# Patient Record
Sex: FEMALE | Race: WHITE | NOT HISPANIC OR LATINO | Employment: OTHER | URBAN - METROPOLITAN AREA
[De-identification: names, ages, dates, MRNs, and addresses within clinical notes are randomized per-mention and may not be internally consistent; named-entity substitution may affect disease eponyms.]

---

## 2017-01-05 ENCOUNTER — GENERIC CONVERSION - ENCOUNTER (OUTPATIENT)
Dept: OTHER | Facility: OTHER | Age: 57
End: 2017-01-05

## 2017-01-26 ENCOUNTER — GENERIC CONVERSION - ENCOUNTER (OUTPATIENT)
Dept: OTHER | Facility: OTHER | Age: 57
End: 2017-01-26

## 2017-01-27 ENCOUNTER — GENERIC CONVERSION - ENCOUNTER (OUTPATIENT)
Dept: OTHER | Facility: OTHER | Age: 57
End: 2017-01-27

## 2018-01-10 NOTE — PROGRESS NOTES
Medical Alert: Asthma    Diabetes    Fainting    Glaucoma    Tobacco User    Heart Condition    Respiratory Problems    Stroke    Thyroid Problems    Colitis    Frequent Headaches    Liver Disease    Organ Transplant    Low Blood Pressure    anemia  Medications: Vicodin 5mg/300mg    Clindamycin HCl 300 MG    Ibuprofen 600 MG    Peridex 0 12 %    Klonopin    Lyrica    Naprosyn    Reglan    Cymbalta    Albuterol    Atorvastatin    Amoxicillin    Niaspan    Omeprazole    Hydrocodone    Advair diskus    Bupropion Oral Tablet [Wellbutrin]    Trazodone HCl    Carbamazepine    Clonazepam    Hydroxyzine    Melatonin    Nitroglycerine    Aspirin  Allergies:      Latex    Sulfa Drugs    Biaxin    Clarithromycin    other  Since Last Visit: Medical Alert: No Change    Medications: No Change    Allergies:        No Change  Pain Scale Type: Numeric Pain ScalePain Level: 0  Description:  Pt presents for wax try in   PMH reviewed, no changes  Tooth set up tried  intraorally  Confirmed proper tissue support and occlusion  Pt confirmed  satisfaction with function and esthetics via pt mirror  Selected gingival shade   original  Pt left satisfied and ambulatory      NV: delivery    FORREST/URMILA    ----- Signed on Thursday, January 05, 2017 at 3:36:49 PM  -----  ----- Provider: [email or web address] - [facility], Dentist -- Clinic: [facility] -----

## 2018-01-10 NOTE — PROGRESS NOTES
Patient Health Assessment    Date:            05/10/2016  Blood Pressure:  102/66  Pulse:           76  Age:             55  Weight:          163 lbs  Height/Length:   5' 4"  Body Mass Index: 28 0  Provider:        [email or web address]  Clinic:          [facility] 39: Asthma    Diabetes    Fainting    Glaucoma    Tobacco User    Heart Condition    Respiratory Problems    Stroke    Thyroid Problems    Colitis    Frequent Headaches    Liver Disease    Organ Transplant    Low Blood Pressure    anemia  Medications: Vicodin 5mg/300mg    Klonopin    Lyrica    Naprosyn    Reglan    Cymbalta    Albuterol    Atorvastatin    Amoxicillin    Niaspan    Omeprazole    Hydrocodone    Advair diskus    Bupropion Oral Tablet [Wellbutrin]    Trazodone HCl    Carbamazepine    Clonazepam    Hydroxyzine    Melatonin    Nitroglycerine  Allergies:      Latex    Sulfa Drugs    Biaxin    Clarithromycin  Since Last Visit: Medical Alert: No Change    Medications: No Change    Allergies:        No Change  Pain Scale Type: Numeric Pain ScalePain Level: 0  Description:  Ext #20,21,22,23,24    Patient presents for Ext #07,53,04,45,28  [facility] [facility] [address] patient [facility] any [facility]  Obtained consent, and Pre-Op /66    topical LA applied  Administered 1 carpule of  2 % Lidocaine w/ 1:100,000 epi  via block and 2 carpules of 4% articaine with 1:100,000 epi given as  infiltration  Adequate anesthesia obtained, reflected gingiva and buccal flap, elevated,  and extracted #20,21,22,23,24    Socket curetted, sharp edges of bone filed,  3 0 chromic gut sutures placed  hemostasis achieved  patient tolerated the  procedure well  Upon dismissal, patient received gauze, POI (written and verbal),( patient  was told not to smoke post extraction)  patient has pain meds at home  prescribed by her physician   patient satisfied and left in good health        NV:ext #18 on oral surgery day    CORRIE/Dr Arely Lema    ----- Signed on Tuesday, May 10, 2016 at 2:45:26 PM -----  ----- Provider: [id number] - [facility], Dentist -- Clinic: [facility] -----

## 2018-01-11 NOTE — PROGRESS NOTES
Medical Alert: Asthma    Diabetes    Fainting    Glaucoma    Tobacco User    Heart Condition    Respiratory Problems    Stroke    Thyroid Problems    Colitis    Frequent Headaches    Liver Disease    Organ Transplant    Low Blood Pressure    anemia  Medications: Vicodin 5mg/300mg    Clindamycin HCl 300 MG    Ibuprofen 600 MG    Peridex 0 12 %    Klonopin    Lyrica    Naprosyn    Reglan    Cymbalta    Albuterol    Atorvastatin    Amoxicillin    Niaspan    Omeprazole    Hydrocodone    Advair diskus    Bupropion Oral Tablet [Wellbutrin]    Trazodone HCl    Carbamazepine    Clonazepam    Hydroxyzine    Melatonin    Nitroglycerine    Aspirin  Allergies:      Latex    Sulfa Drugs    Biaxin    Clarithromycin    other  Since Last Visit: Medical Alert: No Change    Medications: No Change    Allergies:        No Change  Pain Scale Type: Numeric Pain ScalePain Level: 0  Description: Pt  presents for follow-up following extraction of #18  Bone and  tissues appear to have healed nicely  Pt  interested in implant retained  dentures due to her extensive health hx and was told by her PCP that they are  medically necessary  Explained to pt  with Dr Rachel Erazo that we recommend  conventional dentures to start out with and then can possibly convert to  attachments in the future  Pt  understands and accepts tx plan of u/l  complete dentures   Pre-auth given to Leonard     NV: Alginate impressions for dentures    /Dr Rachel Erazo    ----- Signed on Thursday, September 01, 2016 at 10:43:51 AM  -----  ----- Provider: [id number] - [facility], Dentist -- Clinic: [facility] -----

## 2018-01-11 NOTE — PROGRESS NOTES
Medical Alert: Asthma    Diabetes    Fainting    Glaucoma    Tobacco User    Heart Condition    Respiratory Problems    Stroke    Thyroid Problems    Colitis    Frequent Headaches    Liver Disease    Organ Transplant    Low Blood Pressure    anemia  Medications: Vicodin 5mg/300mg    Clindamycin HCl 300 MG    Ibuprofen 600 MG    Peridex 0 12 %    Klonopin    Lyrica    Naprosyn    Reglan    Cymbalta    Albuterol    Atorvastatin    Amoxicillin    Niaspan    Omeprazole    Hydrocodone    Advair diskus    Bupropion Oral Tablet [Wellbutrin]    Trazodone HCl    Carbamazepine    Clonazepam    Hydroxyzine    Melatonin    Nitroglycerine    Aspirin  Allergies:      Latex    Sulfa Drugs    Biaxin    Clarithromycin    other  Since Last Visit: Medical Alert: No Change    Medications: No Change    Allergies:        No Change  Pain Scale Type: Numeric Pain ScalePain Level: 0  Description:  Pt presents for denture follow up  PMH reviewed, no changes  Dentures  removed, no areas of irritation  Pt left satisfied and ambulatory      NV: Follow-up as needed    FORREST/Dr Jayne Ramirez    ----- Signed on Friday, January 27, 2017 at 12:11:43 PM  -----  ----- Provider: [id number] - [facility] [employer], Dentist -- Clinic: [facility] -----

## 2018-01-12 NOTE — PROGRESS NOTES
Medical Alert: Asthma    Diabetes    Fainting    Glaucoma    Tobacco User    Heart Condition    Respiratory Problems    Stroke    Thyroid Problems    Colitis    Frequent Headaches    Liver Disease    Organ Transplant    Low Blood Pressure    anemia  Medications: Vicodin 5mg/300mg    Clindamycin HCl 300 MG    Ibuprofen 600 MG    Peridex 0 12 %    Klonopin    Lyrica    Naprosyn    Reglan    Cymbalta    Albuterol    Atorvastatin    Amoxicillin    Niaspan    Omeprazole    Hydrocodone    Advair diskus    Bupropion Oral Tablet [Wellbutrin]    Trazodone HCl    Carbamazepine    Clonazepam    Hydroxyzine    Melatonin    Nitroglycerine    Aspirin  Allergies:      Latex    Sulfa Drugs    Biaxin    Clarithromycin    other  Since Last Visit: Medical Alert: No Change    Medications: No Change    Allergies:        No Change  Pain Scale Type: Numeric Pain ScalePain Level: 0  Description: pt presents with CNA caregiver due to having pain and swelling  after extractions on 5/10/16  pt reports she did smoke within 24 hours after  extractions and pain is not getting any better even after taking vicodin  prescribed by her PCP  clinical exam reveals swelling on LL side, granulation  tissue present  gave pt rx for clindamycin, motrin 600mg and peridex rinse  and instructed to rinse 3x per day and return for follow up in 2 weeks  pt reports she was told by os at last visit that she needs to see and ENT  pt  reports she has an appt end of June      dr payne/dr Eduar Block    nv: 2 week follow up  nnv: exo #18 os day    ----- Signed on Tuesday, May 17, 2016 at 9:30:39 AM  -----  ----- Provider: [email or web address] - [employer], Dentist -- Clinic: [facility]  -----

## 2018-01-12 NOTE — PROGRESS NOTES
Medical Alert: Asthma    Diabetes    Fainting    Glaucoma    Tobacco User    Heart Condition    Respiratory Problems    Stroke    Thyroid Problems    Colitis    Frequent Headaches    Liver Disease    Organ Transplant    Low Blood Pressure    anemia  Medications: Vicodin 5mg/300mg    Clindamycin HCl 300 MG    Ibuprofen 600 MG    Peridex 0 12 %    Klonopin    Lyrica    Naprosyn    Reglan    Cymbalta    Albuterol    Atorvastatin    Amoxicillin    Niaspan    Omeprazole    Hydrocodone    Advair diskus    Bupropion Oral Tablet [Wellbutrin]    Trazodone HCl    Carbamazepine    Clonazepam    Hydroxyzine    Melatonin    Nitroglycerine    Aspirin  Allergies:      Latex    Sulfa Drugs    Biaxin    Clarithromycin    other  Since Last Visit: Medical Alert: No Change    Medications: No Change    Allergies:        No Change  Pain Scale Type: Numeric Pain ScalePain Level: 0  Description:  Pt presents for complete denture alginate impressions  PMH reviewed, no  changes  Alginate impressions made  Confirmed impression captured vestibules  and all tissues without major voids or distortions  Poured stone models for  custom tray fabrication  Explained process and number of visits until final  dentures are completed to patient  Pt understands, left satisfied and  ambulatory      NV: final impression    FORREST/MQ    ----- Signed on Tuesday, October 04, 2016 at 10:06:44 AM  -----  ----- Provider: [id number] - [facility], Dentist -- Clinic: [place] -----

## 2018-01-16 NOTE — PROGRESS NOTES
Patient pharmacist, Cedric Mak, [phone] says pt is not to be presribed  vicodin by dental clinic only by primary care provider that is managing her  chronic pain and pt is not to exceed the 4 per day prescribed by PCP  - Dr Genet Cintron    ----- Signed on Friday, May 20, 2016 at 12:18:19 PM  -----  ----- Provider: [email or web address] - [employer] [employer], Dentist -- Clinic: [facility]  -----

## 2018-01-16 NOTE — PROGRESS NOTES
Patient Health Assessment    Date:            07/27/2016  Blood Pressure:  107/70  Pulse:           80  Age:             48  Weight:          0 lbs  Height/Length:   0' 0"  Body Mass Index: 0 0  Provider:        [email or web address]  [facility]: [facility]    Diabetes    Fainting    Glaucoma    Tobacco User    Heart Condition    Respiratory Problems    Stroke    Thyroid Problems    Colitis    Frequent Headaches    Liver Disease    Organ Transplant    Low Blood Pressure    anemia  Medications: Vicodin 5mg/300mg    Clindamycin HCl 300 MG    Ibuprofen 600 MG    Peridex 0 12 %    Klonopin    Lyrica    Naprosyn    Reglan    Cymbalta    Albuterol    Atorvastatin    Amoxicillin    Niaspan    Omeprazole    Hydrocodone    Advair diskus    Bupropion Oral Tablet [Wellbutrin]    Trazodone HCl    Carbamazepine    Clonazepam    Hydroxyzine    Melatonin    Nitroglycerine    Aspirin  Allergies:      Latex    Sulfa Drugs    Biaxin    Clarithromycin    other  Since Last Visit: Medical Alert: No Change    Medications: No Change    Allergies:        No Change  Pain Scale Type: Numeric Pain ScalePain Level: 0  Description:    Patient presents for ext #18  Reviewed PMH, patient denies any changes  Explained benefits and risks of  procedure to pt, pt understands and consents to treatment  Signed and  uploaded consent form to doc center  Applied topical benzocaine, administered 1 5 carps of 2% Lidocaine w/ 1:100,000   epi via left BREANA block  Gingiva elevated with #9 periosteal elevator, tooth  #18 luxated from socket with straight elevators  Socket curetted and  irrigated with NS  Placed gauze soaked in NS and provided verbal and written  POI  Instructed pt to take 600mg ibuprofen PRN pain  Pt tolerated procedure  well, left satisfied and ambulatory      NV: 1 month f/u eval for implants on OS day (15min)    JS/SALBADOR    ----- Signed on Wednesday, July 27, 2016 at 2:03:50 PM  -----  ----- Provider: Minoo Villalba - [employer], Dentist -- Clinic: [facility] -----

## 2018-01-16 NOTE — PROGRESS NOTES
Medical Alert: Asthma    Diabetes    Fainting    Glaucoma    Tobacco User    Heart Condition    Respiratory Problems    Stroke    Thyroid Problems    Colitis    Frequent Headaches    Liver Disease    Organ Transplant    Low Blood Pressure    anemia  Medications: Vicodin 5mg/300mg    Clindamycin HCl 300 MG    Ibuprofen 600 MG    Peridex 0 12 %    Klonopin    Lyrica    Naprosyn    Reglan    Cymbalta    Albuterol    Atorvastatin    Amoxicillin    Niaspan    Omeprazole    Hydrocodone    Advair diskus    Bupropion Oral Tablet [Wellbutrin]    Trazodone HCl    Carbamazepine    Clonazepam    Hydroxyzine    Melatonin    Nitroglycerine    Aspirin  Allergies:      Latex    Sulfa Drugs    Biaxin    Clarithromycin    other  Since Last Visit: Medical Alert: No Change    Medications: No Change    Allergies:        No Change  Pain Scale Type: Numeric Pain ScalePain Level: 0  Description: Pt presents for delivery of max/gwendolyn complete dentures  PMH  reviewed, no changes  Denture tried intraorally, pt confirmed satisfaction with   esthetics via pt mirror  Denture seated with PIP paste, guided patient through   mastication and border molding movements  Adjusted areas of impingement with  acrylic burs, polished with rag wheel and pumice  Confirmed patient satisfied  with fit and no remaining areas of discomfort  Provided denture home care  instructions and storage case  Pt understands and left satisified and  ambulatory      NV: 24 hour denture follow up    Radha Martinez    ----- Signed on Thursday, January 26, 2017 at 10:47:28 AM  -----  ----- Provider: [email or web address] - [employer], Dentist -- Clinic: [facility] -----

## 2018-01-16 NOTE — PROGRESS NOTES
Edited Patient Health Assessment    Date:            02/09/2016  Blood Pressure:  119/79  Pulse:           71  Age:             55  Weight:          172 lbs  Height/Length:   5' 4"  Body Mass Index: 29 5  Provider:        [email or web address]  Clinic:          [facility]: Asthma    Diabetes    Fainting    Glaucoma    Tobacco User    Heart Condition    Respiratory Problems    Stroke    Thyroid Problems    Colitis    Frequent Headaches    Liver Disease    Organ Transplant    Low Blood Pressure    anemia  Medications: Vicodin 5mg/300mg    Klonopin    Lyrica    Naprosyn    Reglan    Cymbalta    Albuterol    Atorvastatin    Amoxicillin    Niaspan    Omeprazole    Hydrocodone    Advair diskus    Bupropion Oral Tablet [Wellbutrin]    Trazodone HCl    Carbamazepine    Clonazepam    Hydroxyzine    Melatonin    Nitroglycerine  Allergies:      Latex    Sulfa Drugs    Biaxin    Clarithromycin  Since Last Visit: Medical Alert: No Change    Medications: No Change    Allergies:        No Change  Pain Scale Type: Numeric Pain ScalePain Level: 0  Description:  Ext #56,80,53,07,19    Patient presents for Ext #73,01,26,87,28  [address], patient denies any changes  Obtained consent, and Pre-Op /79  pulse 69    topical LA applied  1 carpule of 2% lidocaine with 1:100,000 epi given as right   BREANA block after negative aspiration  2 carpules of 2% lidocaine with 1:100,000   epi given as lingual infiltration and 1 carpule of 4% articaine with 1:100,000   epi given as buccal infiltration  Adequate anesthesia obtained, buccal flap reflected, surgical handpiece used,  elevated, and extracted #25,26,27,28,29   sharp edges of bone filed  Socket  irrigated, and  4 0 chromic gut sutures placed  hemostasis achieved  patient  tolerated the procedure well  Upon dismissal, patient received POI, (written and verbal), gauze  told patient   not to smoke post extraction  patient said that she has Vicodin prescription  from her physician  asked her to take it if for pain as prescribed  patient  understood and left in good health        NV:more exts on Oral Surgery day    JF/Dr Shamika Foster    ----- Signed on Tuesday, February 09, 2016 at 5:06:35 PM  -----  ----- Provider: [id number] - [employer], Dentist -- Clinic: [facility] -----

## 2018-01-18 NOTE — PROGRESS NOTES
Medical Alert: Asthma    Diabetes    Fainting    Glaucoma    Tobacco User    Heart Condition    Respiratory Problems    Stroke    Thyroid Problems    Colitis    Frequent Headaches    Liver Disease    Organ Transplant    Low Blood Pressure    anemia  Medications: Vicodin 5mg/300mg    Clindamycin HCl 300 MG    Ibuprofen 600 MG    Peridex 0 12 %    Klonopin    Lyrica    Naprosyn    Reglan    Cymbalta    Albuterol    Atorvastatin    Amoxicillin    Niaspan    Omeprazole    Hydrocodone    Advair diskus    Bupropion Oral Tablet [Wellbutrin]    Trazodone HCl    Carbamazepine    Clonazepam    Hydroxyzine    Melatonin    Nitroglycerine    Aspirin  Allergies:      Latex    Sulfa Drugs    Biaxin    Clarithromycin    other  Since Last Visit: Medical Alert: No Change    Medications: No Change    Allergies:        No Change  Pain Scale Type: Numeric Pain ScalePain Level: 0  Description:  Pt presents for max and gwendolyn wax rims  PMH reviewed, no changes  Wax rims tried   intraorally  Adjusted maxillary rim to holliday plane, proper esthetics, phonetics,   and tissue support  Adjusted mandibular rim to maxillary rim at VDO with  proper tissue support  Marked midline, high smile line, and distal of canine  lines  Notched rims and obtained bite registration with bluprint PVS   Selected shade A1 and confirmed by pt via mirror  Pt left satisfied and  ambulatory        NV: wax try in    AH/MQ    ----- Signed on Friday, December 16, 2016 at 11:35:05 AM  -----  ----- Provider: [id number] - [facility], Dentist -- Clinic: [facility] -----

## 2018-02-07 ENCOUNTER — APPOINTMENT (EMERGENCY)
Dept: RADIOLOGY | Facility: HOSPITAL | Age: 58
End: 2018-02-07
Payer: COMMERCIAL

## 2018-02-07 ENCOUNTER — ANESTHESIA EVENT (OUTPATIENT)
Dept: GASTROENTEROLOGY | Facility: HOSPITAL | Age: 58
End: 2018-02-07
Payer: COMMERCIAL

## 2018-02-07 ENCOUNTER — HOSPITAL ENCOUNTER (EMERGENCY)
Facility: HOSPITAL | Age: 58
Discharge: HOME | End: 2018-02-08
Attending: EMERGENCY MEDICINE | Admitting: EMERGENCY MEDICINE
Payer: COMMERCIAL

## 2018-02-07 DIAGNOSIS — K92.1 BLOOD IN STOOL: Primary | ICD-10-CM

## 2018-02-07 DIAGNOSIS — R10.9 ABDOMINAL PAIN: ICD-10-CM

## 2018-02-07 LAB
ALBUMIN SERPL BCP-MCNC: 3.6 G/DL (ref 3.5–5)
ALP SERPL-CCNC: 72 U/L (ref 46–116)
ALT SERPL W P-5'-P-CCNC: 31 U/L (ref 12–78)
ANION GAP SERPL CALCULATED.3IONS-SCNC: 8 MMOL/L (ref 4–13)
AST SERPL W P-5'-P-CCNC: 18 U/L (ref 5–45)
BASOPHILS # BLD AUTO: 0.03 THOUSANDS/ΜL (ref 0–0.1)
BASOPHILS NFR BLD AUTO: 0 % (ref 0–1)
BILIRUB SERPL-MCNC: 0.18 MG/DL (ref 0.2–1)
BUN SERPL-MCNC: 11 MG/DL (ref 5–25)
CALCIUM SERPL-MCNC: 8.6 MG/DL (ref 8.3–10.1)
CHLORIDE SERPL-SCNC: 107 MMOL/L (ref 100–108)
CO2 SERPL-SCNC: 26 MMOL/L (ref 21–32)
CREAT SERPL-MCNC: 0.78 MG/DL (ref 0.6–1.3)
EOSINOPHIL # BLD AUTO: 0.06 THOUSAND/ΜL (ref 0–0.61)
EOSINOPHIL NFR BLD AUTO: 1 % (ref 0–6)
ERYTHROCYTE [DISTWIDTH] IN BLOOD BY AUTOMATED COUNT: 13.3 % (ref 11.6–15.1)
GFR SERPL CREATININE-BSD FRML MDRD: 85 ML/MIN/1.73SQ M
GLUCOSE SERPL-MCNC: 99 MG/DL (ref 65–140)
HCT VFR BLD AUTO: 38 % (ref 34.8–46.1)
HGB BLD-MCNC: 12.6 G/DL (ref 11.5–15.4)
LIPASE SERPL-CCNC: 113 U/L (ref 73–393)
LYMPHOCYTES # BLD AUTO: 3.99 THOUSANDS/ΜL (ref 0.6–4.47)
LYMPHOCYTES NFR BLD AUTO: 40 % (ref 14–44)
MCH RBC QN AUTO: 31.1 PG (ref 26.8–34.3)
MCHC RBC AUTO-ENTMCNC: 33.2 G/DL (ref 31.4–37.4)
MCV RBC AUTO: 94 FL (ref 82–98)
MONOCYTES # BLD AUTO: 0.64 THOUSAND/ΜL (ref 0.17–1.22)
MONOCYTES NFR BLD AUTO: 6 % (ref 4–12)
NEUTROPHILS # BLD AUTO: 5.16 THOUSANDS/ΜL (ref 1.85–7.62)
NEUTS SEG NFR BLD AUTO: 53 % (ref 43–75)
NRBC BLD AUTO-RTO: 0 /100 WBCS
PLATELET # BLD AUTO: 190 THOUSANDS/UL (ref 149–390)
PMV BLD AUTO: 9.9 FL (ref 8.9–12.7)
POTASSIUM SERPL-SCNC: 3.6 MMOL/L (ref 3.5–5.3)
PROT SERPL-MCNC: 7.2 G/DL (ref 6.4–8.2)
RBC # BLD AUTO: 4.05 MILLION/UL (ref 3.81–5.12)
SODIUM SERPL-SCNC: 141 MMOL/L (ref 136–145)
WBC # BLD AUTO: 9.94 THOUSAND/UL (ref 4.31–10.16)

## 2018-02-07 PROCEDURE — 96374 THER/PROPH/DIAG INJ IV PUSH: CPT

## 2018-02-07 PROCEDURE — 93005 ELECTROCARDIOGRAM TRACING: CPT | Performed by: EMERGENCY MEDICINE

## 2018-02-07 PROCEDURE — 96375 TX/PRO/DX INJ NEW DRUG ADDON: CPT

## 2018-02-07 PROCEDURE — 36415 COLL VENOUS BLD VENIPUNCTURE: CPT | Performed by: EMERGENCY MEDICINE

## 2018-02-07 PROCEDURE — 85025 COMPLETE CBC W/AUTO DIFF WBC: CPT | Performed by: EMERGENCY MEDICINE

## 2018-02-07 PROCEDURE — 96361 HYDRATE IV INFUSION ADD-ON: CPT

## 2018-02-07 PROCEDURE — 83690 ASSAY OF LIPASE: CPT | Performed by: EMERGENCY MEDICINE

## 2018-02-07 PROCEDURE — 80053 COMPREHEN METABOLIC PANEL: CPT | Performed by: EMERGENCY MEDICINE

## 2018-02-07 RX ORDER — PREGABALIN 150 MG/1
CAPSULE ORAL
COMMUNITY
Start: 2017-10-26

## 2018-02-07 RX ORDER — DULOXETIN HYDROCHLORIDE 30 MG/1
90 CAPSULE, DELAYED RELEASE ORAL
Status: ON HOLD | COMMUNITY
Start: 2018-01-25 | End: 2020-06-15

## 2018-02-07 RX ORDER — NAPROXEN 500 MG/1
500 TABLET ORAL 2 TIMES DAILY
Status: ON HOLD | COMMUNITY
Start: 2017-07-14 | End: 2020-06-15

## 2018-02-07 RX ORDER — TRAZODONE HYDROCHLORIDE 100 MG/1
100 TABLET ORAL
Status: ON HOLD | COMMUNITY
Start: 2018-01-25 | End: 2020-06-15

## 2018-02-07 RX ORDER — MELATONIN 10 MG
1 CAPSULE ORAL
COMMUNITY

## 2018-02-07 RX ORDER — MAGNESIUM GLUCONATE 30 MG(550)
30 TABLET ORAL
Status: ON HOLD | COMMUNITY
End: 2020-06-15

## 2018-02-07 RX ORDER — SUMATRIPTAN 100 MG/1
TABLET, FILM COATED ORAL
Status: ON HOLD | COMMUNITY
Start: 2018-01-29 | End: 2021-01-23

## 2018-02-07 RX ORDER — ATORVASTATIN CALCIUM 40 MG/1
80 TABLET, FILM COATED ORAL
COMMUNITY
Start: 2018-01-09 | End: 2022-08-04

## 2018-02-07 RX ORDER — PREGABALIN 100 MG/1
CAPSULE ORAL
COMMUNITY
Start: 2017-10-26

## 2018-02-07 RX ORDER — HYDROCODONE BITARTRATE AND ACETAMINOPHEN 10; 325 MG/1; MG/1
TABLET ORAL 2 TIMES DAILY
COMMUNITY
Start: 2018-01-29

## 2018-02-07 RX ORDER — CLONAZEPAM 1 MG/1
1 TABLET ORAL DAILY
COMMUNITY
Start: 2018-01-25

## 2018-02-07 RX ORDER — METOCLOPRAMIDE 5 MG/1
5 TABLET ORAL
COMMUNITY
Start: 2017-07-14 | End: 2018-02-07 | Stop reason: ALTCHOICE

## 2018-02-07 RX ORDER — ALBUTEROL SULFATE 90 UG/1
2 AEROSOL, METERED RESPIRATORY (INHALATION) EVERY 6 HOURS
COMMUNITY
Start: 2017-12-28

## 2018-02-07 RX ORDER — CYCLOBENZAPRINE HCL 10 MG
TABLET ORAL
Status: ON HOLD | COMMUNITY
Start: 2017-08-14 | End: 2022-06-21

## 2018-02-07 RX ORDER — ONDANSETRON 2 MG/ML
4 INJECTION INTRAMUSCULAR; INTRAVENOUS ONCE
Status: COMPLETED | OUTPATIENT
Start: 2018-02-07 | End: 2018-02-07

## 2018-02-07 RX ORDER — MORPHINE SULFATE 4 MG/ML
4 INJECTION, SOLUTION INTRAMUSCULAR; INTRAVENOUS ONCE
Status: COMPLETED | OUTPATIENT
Start: 2018-02-07 | End: 2018-02-07

## 2018-02-07 RX ORDER — BUSPIRONE HYDROCHLORIDE 15 MG/1
15 TABLET ORAL
COMMUNITY
Start: 2017-10-16 | End: 2021-01-10

## 2018-02-07 RX ORDER — OMEPRAZOLE 20 MG/1
40 CAPSULE, DELAYED RELEASE ORAL
COMMUNITY
Start: 2017-09-25 | End: 2021-12-02 | Stop reason: ALTCHOICE

## 2018-02-07 RX ORDER — LAMOTRIGINE 100 MG/1
100 TABLET ORAL DAILY
COMMUNITY
Start: 2018-01-16 | End: 2022-08-04

## 2018-02-07 RX ADMIN — ONDANSETRON 4 MG: 2 INJECTION INTRAMUSCULAR; INTRAVENOUS at 22:39

## 2018-02-07 RX ADMIN — SODIUM CHLORIDE 1000 ML: 0.9 INJECTION, SOLUTION INTRAVENOUS at 22:37

## 2018-02-07 RX ADMIN — MORPHINE SULFATE 4 MG: 4 INJECTION INTRAVENOUS at 22:39

## 2018-02-07 NOTE — ANESTHESIA PREPROCEDURE EVALUATION
Review of Systems/Medical History  Patient summary reviewed  Chart reviewed  No history of anesthetic complications     Cardiovascular  Hyperlipidemia, Hypertension ,    Pulmonary  Smoker (1 1/2 ppd, smoked this am) cigarette smoker  , COPD , Asthma: ,        GI/Hepatic      Comment: Abdominal pain/"Black stools"--hx of 15 laparotomies for "abdominal pain"     Negative  ROS        Endo/Other  History of thyroid disease (Nodules) ,   Obesity (BMI 34)    GYN       Hematology  Negative hematology ROS      Musculoskeletal    Comment: Spinal stenosis      Neurology    Neuromuscular disease (Fibromyalgia) , TIA, CVA , Headaches (Migraines),    Psychology   Anxiety, Depression , bipolar disorder,   Chronic opioid dependence Chronic pain           Physical Exam    Airway  Comment: 3 FB  Mallampati score: II         Dental   upper dentures and lower dentures,     Cardiovascular  Rhythm: regular,     Pulmonary  Wheezes (bilat lung fields),     Other Findings  Unusual affect      Anesthesia Plan  ASA Score- 4     Anesthesia Type- IV sedation with anesthesia with ASA Monitors  Additional Monitors:   Airway Plan:         Plan Factors-    Induction- intravenous  Postoperative Plan-     Informed Consent- Anesthetic plan and risks discussed with patient  I personally reviewed this patient with the CRNA  Discussed and agreed on the Anesthesia Plan with the CRNA  Marilyn Schwarz

## 2018-02-08 ENCOUNTER — HOSPITAL ENCOUNTER (OUTPATIENT)
Facility: HOSPITAL | Age: 58
Setting detail: OUTPATIENT SURGERY
Discharge: HOME | End: 2018-02-08
Attending: INTERNAL MEDICINE | Admitting: INTERNAL MEDICINE
Payer: COMMERCIAL

## 2018-02-08 ENCOUNTER — APPOINTMENT (EMERGENCY)
Dept: RADIOLOGY | Facility: HOSPITAL | Age: 58
End: 2018-02-08
Payer: COMMERCIAL

## 2018-02-08 ENCOUNTER — ANESTHESIA (OUTPATIENT)
Dept: GASTROENTEROLOGY | Facility: HOSPITAL | Age: 58
End: 2018-02-08
Payer: COMMERCIAL

## 2018-02-08 VITALS
DIASTOLIC BLOOD PRESSURE: 85 MMHG | RESPIRATION RATE: 20 BRPM | HEIGHT: 63 IN | OXYGEN SATURATION: 97 % | SYSTOLIC BLOOD PRESSURE: 135 MMHG | BODY MASS INDEX: 34.91 KG/M2 | WEIGHT: 197 LBS | TEMPERATURE: 98 F | HEART RATE: 85 BPM

## 2018-02-08 VITALS
DIASTOLIC BLOOD PRESSURE: 78 MMHG | HEIGHT: 64 IN | TEMPERATURE: 98.2 F | SYSTOLIC BLOOD PRESSURE: 135 MMHG | BODY MASS INDEX: 33.63 KG/M2 | OXYGEN SATURATION: 96 % | WEIGHT: 197 LBS | HEART RATE: 81 BPM | RESPIRATION RATE: 16 BRPM

## 2018-02-08 DIAGNOSIS — R10.84 GENERALIZED ABDOMINAL PAIN: ICD-10-CM

## 2018-02-08 PROCEDURE — PBSUR PB OPERATIVE NOTE CHARGE PLACEHOLDER: Performed by: INTERNAL MEDICINE

## 2018-02-08 PROCEDURE — 99285 EMERGENCY DEPT VISIT HI MDM: CPT

## 2018-02-08 PROCEDURE — 88305 TISSUE EXAM BY PATHOLOGIST: CPT | Performed by: INTERNAL MEDICINE

## 2018-02-08 PROCEDURE — 74177 CT ABD & PELVIS W/CONTRAST: CPT

## 2018-02-08 PROCEDURE — 96376 TX/PRO/DX INJ SAME DRUG ADON: CPT

## 2018-02-08 PROCEDURE — 88305 TISSUE EXAM BY PATHOLOGIST: CPT | Performed by: PATHOLOGY

## 2018-02-08 RX ORDER — PROMETHAZINE HYDROCHLORIDE 25 MG/1
25 TABLET ORAL 3 TIMES DAILY
Status: ON HOLD | COMMUNITY
End: 2020-06-15

## 2018-02-08 RX ORDER — MORPHINE SULFATE 4 MG/ML
4 INJECTION, SOLUTION INTRAMUSCULAR; INTRAVENOUS ONCE
Status: COMPLETED | OUTPATIENT
Start: 2018-02-08 | End: 2018-02-08

## 2018-02-08 RX ORDER — SODIUM CHLORIDE 9 MG/ML
50 INJECTION, SOLUTION INTRAVENOUS CONTINUOUS
Status: DISCONTINUED | OUTPATIENT
Start: 2018-02-08 | End: 2018-02-08 | Stop reason: HOSPADM

## 2018-02-08 RX ORDER — ONDANSETRON 2 MG/ML
INJECTION INTRAMUSCULAR; INTRAVENOUS AS NEEDED
Status: DISCONTINUED | OUTPATIENT
Start: 2018-02-08 | End: 2018-02-08 | Stop reason: SURG

## 2018-02-08 RX ORDER — FENTANYL CITRATE 50 UG/ML
INJECTION, SOLUTION INTRAMUSCULAR; INTRAVENOUS AS NEEDED
Status: DISCONTINUED | OUTPATIENT
Start: 2018-02-08 | End: 2018-02-08 | Stop reason: SURG

## 2018-02-08 RX ORDER — ALBUTEROL SULFATE 2.5 MG/3ML
2.5 SOLUTION RESPIRATORY (INHALATION) ONCE
Status: DISCONTINUED | OUTPATIENT
Start: 2018-02-08 | End: 2018-02-08

## 2018-02-08 RX ORDER — PROPOFOL 10 MG/ML
INJECTION, EMULSION INTRAVENOUS AS NEEDED
Status: DISCONTINUED | OUTPATIENT
Start: 2018-02-08 | End: 2018-02-08 | Stop reason: SURG

## 2018-02-08 RX ADMIN — PROPOFOL 30 MG: 10 INJECTION, EMULSION INTRAVENOUS at 09:59

## 2018-02-08 RX ADMIN — MORPHINE SULFATE 4 MG: 4 INJECTION INTRAVENOUS at 00:42

## 2018-02-08 RX ADMIN — PROPOFOL 20 MG: 10 INJECTION, EMULSION INTRAVENOUS at 10:17

## 2018-02-08 RX ADMIN — PROPOFOL 30 MG: 10 INJECTION, EMULSION INTRAVENOUS at 09:55

## 2018-02-08 RX ADMIN — PROPOFOL 30 MG: 10 INJECTION, EMULSION INTRAVENOUS at 10:04

## 2018-02-08 RX ADMIN — PROPOFOL 30 MG: 10 INJECTION, EMULSION INTRAVENOUS at 10:07

## 2018-02-08 RX ADMIN — IOHEXOL 100 ML: 350 INJECTION, SOLUTION INTRAVENOUS at 00:26

## 2018-02-08 RX ADMIN — PROPOFOL 30 MG: 10 INJECTION, EMULSION INTRAVENOUS at 10:25

## 2018-02-08 RX ADMIN — PROPOFOL 30 MG: 10 INJECTION, EMULSION INTRAVENOUS at 10:20

## 2018-02-08 RX ADMIN — PROPOFOL 30 MG: 10 INJECTION, EMULSION INTRAVENOUS at 10:11

## 2018-02-08 RX ADMIN — FENTANYL CITRATE 100 MCG: 50 INJECTION, SOLUTION INTRAMUSCULAR; INTRAVENOUS at 09:50

## 2018-02-08 RX ADMIN — PROPOFOL 30 MG: 10 INJECTION, EMULSION INTRAVENOUS at 10:15

## 2018-02-08 RX ADMIN — ONDANSETRON 4 MG: 2 INJECTION INTRAMUSCULAR; INTRAVENOUS at 09:50

## 2018-02-08 RX ADMIN — PROPOFOL 90 MG: 10 INJECTION, EMULSION INTRAVENOUS at 09:52

## 2018-02-08 RX ADMIN — PROPOFOL 20 MG: 10 INJECTION, EMULSION INTRAVENOUS at 10:01

## 2018-02-08 RX ADMIN — SODIUM CHLORIDE 50 ML/HR: 0.9 INJECTION, SOLUTION INTRAVENOUS at 09:26

## 2018-02-08 NOTE — ED PROVIDER NOTES
History  Chief Complaint   Patient presents with   Sadie Erika or Bloody Stool     Pt has a colonoscopy ordered for tomorrow morning took prep and noticed that she is having black stools, has not have stool like this with other bowel preps  Pt complains of nausea and RLQ pain      79-year-old woman with a history of COPD, hypertension, hyperlipidemia, migraine, chronic pain, and CVA presents for evaluation of black stools  Patient describes a 2 month history of progressive right lower quadrant pain and a reported mass  She has been evaluated by her PCP and most recently by GI yesterday with plans for colonoscopy tomorrow  Patient was using the bowel prep and noted passage of black stool without bright red blood or clots  She had worsening right lower quadrant pain, a new epigastric pain, and called EMS  She notes associated nausea and 1 episode of nonbloody emesis  No recent fevers, chills, chest pain, new dyspnea, or urinary symptoms  Last colonoscopy she says was in  which showed diverticulosis  Patient says she is supposed to get a colonoscopy every 3 years because her father was diagnosed with colon cancer in his 46s  She denies any imaging for the workup of this abdominal pain and there is none listed in our system  She has been taking her home chronic pain medications without additional medications  On arrival, patient is afebrile with otherwise normal vital signs  Physical exam shows            Prior to Admission Medications   Prescriptions Last Dose Informant Patient Reported? Taking? ASPIRIN 81 PO 2018 at Unknown time  Yes Yes   Sig: Take 325 mg by mouth     DULoxetine (CYMBALTA) 30 mg delayed release capsule 2018 at Unknown time  Yes Yes   Sig: Take 90 mg by mouth   HYDROcodone-acetaminophen (NORCO)  mg per tablet 2018 at Unknown time  Yes Yes   Si tab every 6 hours as needed for severe pain    Do not fill before 18   Magnesium Gluconate 550 MG TABS 2018 at Unknown time  Yes Yes   Sig: Take 30 mg by mouth   Melatonin 10 MG CAPS 2/6/2018 at Unknown time  Yes Yes   Sig: Take 1 tablet by mouth   SUMAtriptan (IMITREX) 100 mg tablet 2/6/2018 at Unknown time  Yes Yes   Sig: TAKE 1 TABLET BY MOUTH AT START OF MIGRAINE HEADACHE  MAY REPEAT IN 2 HOURS IF NEEDED   LIMIT TO NO MORE THAN 2 TABLETS IN 24 HOURS, THREE TI   albuterol (PROVENTIL HFA,VENTOLIN HFA) 90 mcg/act inhaler 2/6/2018 at Unknown time  Yes Yes   Sig: Inhale 2 puffs every 6 (six) hours   atorvastatin (LIPITOR) 40 mg tablet 2/6/2018 at Unknown time  Yes Yes   Sig: Take 40 mg by mouth   busPIRone (BUSPAR) 15 mg tablet 2/6/2018 at Unknown time  Yes Yes   Sig: Take 15 mg by mouth   clonazePAM (KlonoPIN) 1 mg tablet 2/6/2018 at Unknown time  Yes Yes   Sig: Take 1 mg by mouth Three times a day   cyclobenzaprine (FLEXERIL) 10 mg tablet 2/6/2018 at Unknown time  Yes Yes   Sig: TAKE 1 TABLET BY MOUTH 3 TIMES A DAY AS NEEDED FOR MUSCLE SPASMS   fluticasone-salmeterol (ADVAIR DISKUS) 500-50 mcg/dose 2/6/2018 at Unknown time  Yes Yes   Sig: Inhale 1 puff   lamoTRIgine (LaMICtal) 200 MG tablet 2/6/2018 at Unknown time  Yes Yes   Sig: Take 200 mg by mouth   naproxen (NAPROSYN) 500 mg tablet 2/6/2018 at Unknown time  Yes Yes   Sig: Take 500 mg by mouth 2 (two) times a day   omeprazole (PriLOSEC) 20 mg delayed release capsule 2/6/2018 at Unknown time  Yes Yes   Sig: take 1 capsule by mouth once daily   pregabalin (LYRICA) 100 mg capsule 2/6/2018 at Unknown time  Yes Yes   Sig: take 1 capsule by mouth three times a day   pregabalin (LYRICA) 150 mg capsule 2/6/2018 at Unknown time  Yes Yes   Sig: take 1 capsule by mouth NIGHTLY   traZODone (DESYREL) 100 mg tablet 2/6/2018 at Unknown time  Yes Yes   Sig: Take 100 mg by mouth      Facility-Administered Medications: None       Past Medical History:   Diagnosis Date    Anxiety     Asthma     Bipolar 1 disorder ([facility] )     Brain aneurysm     Chronic pain disorder     spinal stenosis  Concussion syndrome     neurological rx and balance rx    COPD (chronic obstructive pulmonary disease) (HCC)     Depression     Disease of thyroid gland     nodules     Family history of colon cancer     father    Fibromyalgia, primary     History of colon polyps     Hyperlipidemia     Hypertension     Migraine     Neuropathy     bilateral feet and hands    PTSD (post-traumatic stress disorder)     Stroke ([facility] [address] )     2012 no deficeits    TIA (transient ischemic attack)        Past Surgical History:   Procedure Laterality Date    ABDOMINAL SURGERY      laproscopic/ endometriosis    BRAIN SURGERY      aneurysm/ coiling procedure    CARPAL TUNNEL RELEASE      CHOLECYSTECTOMY      DENTAL SURGERY      EYE SURGERY      cataract    HYSTERECTOMY      THYROID SURGERY         History reviewed  No pertinent family history  I have reviewed and agree with the history as documented      Social History   Substance Use Topics    Smoking status: Current Every Day Smoker     Packs/day: 1 00     Types: Cigarettes    Smokeless tobacco: Never Used    Alcohol use No        Review of Systems    Physical Exam  ED Triage Vitals [02/07/18 2140]   Temperature Pulse Respirations Blood Pressure SpO2   99 °F (37 2 °C) 83 20 132/71 96 %      Temp Source Heart Rate Source Patient Position - Orthostatic VS BP Location FiO2 (%)   Oral Monitor Lying Right arm --      Pain Score       Worst Possible Pain           Orthostatic Vital Signs  Vitals:    02/08/18 0045 02/08/18 0050 02/08/18 0228 02/08/18 0350   BP:  139/78 135/85    Pulse: 82  80 85   Patient Position - Orthostatic VS:  Lying         Physical Exam    ED Medications  Medications    EMS REPLENISHMENT MED ( Does not apply Given to EMS 2/7/18 2150)   sodium chloride 0 9 % bolus 1,000 mL (0 mL Intravenous Stopped 2/8/18 0007)   morphine (PF) 4 mg/mL injection 4 mg (4 mg Intravenous Given 2/7/18 2239)   ondansetron (ZOFRAN) injection 4 mg (4 mg Intravenous Given 2/7/18 2239)   morphine (PF) 4 mg/mL injection 4 mg (4 mg Intravenous Given 2/8/18 0042)   iohexol (OMNIPAQUE) 350 MG/ML injection (MULTI-DOSE) 100 mL (100 mL Intravenous Given 2/8/18 0026)       Diagnostic Studies  Results Reviewed     Procedure Component Value Units Date/Time    Comprehensive metabolic panel [[id number]]  (Abnormal) Collected:  02/07/18 2238    Lab Status:  Final result Specimen:  Blood from Arm, Left Updated:  02/07/18 2318     Sodium 141 mmol/L      Potassium 3 6 mmol/L      Chloride 107 mmol/L      CO2 26 mmol/L      Anion Gap 8 mmol/L      BUN 11 mg/dL      Creatinine 0 78 mg/dL      Glucose 99 mg/dL      Calcium 8 6 mg/dL      AST 18 U/L      ALT 31 U/L      Alkaline Phosphatase 72 U/L      Total Protein 7 2 g/dL      Albumin 3 6 g/dL      Total Bilirubin 0 18 (L) mg/dL      eGFR 85 ml/min/1 73sq m     Narrative:         National Kidney Disease Education Program recommendations are as follows:  GFR calculation is accurate only with a steady state creatinine  Chronic Kidney disease less than 60 ml/min/1 73 sq  meters  Kidney failure less than 15 ml/min/1 73 sq  meters      Lipase [[id number]]  (Normal) Collected:  02/07/18 2238    Lab Status:  Final result Specimen:  Blood from Arm, Left Updated:  02/07/18 2318     Lipase 113 u/L     CBC and differential [[id number]]  (Normal) Collected:  02/07/18 2238    Lab Status:  Final result Specimen:  Blood from Arm, Left Updated:  02/07/18 2303     WBC 9 94 Thousand/uL      RBC 4 05 Million/uL      Hemoglobin 12 6 g/dL      Hematocrit 38 0 %      MCV 94 fL      MCH 31 1 pg      MCHC 33 2 g/dL      RDW 13 3 %      MPV 9 9 fL      Platelets 644 Thousands/uL      nRBC 0 /100 WBCs      Neutrophils Relative 53 %      Lymphocytes Relative 40 %      Monocytes Relative 6 %      Eosinophils Relative 1 %      Basophils Relative 0 %      Neutrophils Absolute 5 16 Thousands/µL      Lymphocytes Absolute 3 99 Thousands/µL      Monocytes Absolute 0 64 Thousand/µL      Eosinophils Absolute 0 06 Thousand/µL      Basophils Absolute 0 03 Thousands/µL                  CT abdomen pelvis with contrast   Final Result by Minor Dozier MD (02/08 0043)      1  Mild diffuse wall thickening of the colon which is fluid-filled which may be related to given history of colonoscopy preparation  2   Increasing moderate intrahepatic and extrahepatic biliary duct dilatation which is likely related to cholecystectomy state  Correlate for cholestasis lab values  If there is clinical concern for biliary obstruction further evaluation with MRI with    MRCP may be obtained  Workstation performed: [id number]               Procedures  Procedures      Phone Consults  ED Phone Contact    ED Course  ED Course                                MDM  Number of Diagnoses or Management Options  Abdominal pain:   Blood in stool:   Diagnosis management comments: Lab work and urine studies unremarkable  CT abd/pelv showed colonic wall thickening possibly related to bowel prep  No other acute findings  Patient was discharged with outpatient follow up including scheduled colonoscopy tomorrow  CritCare Time    Disposition  Final diagnoses:   Blood in stool   Abdominal pain     Time reflects when diagnosis was documented in both MDM as applicable and the Disposition within this note     Time User Action Codes Description Comment    2/8/2018  2:13 AM Marina Martinez Add [K92 1] Blood in stool     2/8/2018  2:13 AM Marina Martinez Add [R10 9] Abdominal pain       ED Disposition     ED Disposition Condition Comment    Discharge  Jennifer Woodall discharge to home/self care      Condition at discharge: Stable        Follow-up Information     Follow up With Specialties Details Why [facility]   follow up with primary care physician within 3-5 days for reevaluation of symptoms [address] [facility] [address]        Discharge Medication List as of 2/8/2018  2:15 AM      CONTINUE these medications which have NOT CHANGED    Details   albuterol (PROVENTIL HFA,VENTOLIN HFA) 90 mcg/act inhaler Inhale 2 puffs every 6 (six) hours, Starting Thu 12/28/2017, Historical Med      ASPIRIN 81 PO Take 81 mg by mouth, Starting Tue 1/9/2018, Until Wed 1/9/2019, Historical Med      atorvastatin (LIPITOR) 40 mg tablet Take 40 mg by mouth, Starting Tue 1/9/2018, Until Wed 1/9/2019, Historical Med      busPIRone (BUSPAR) 15 mg tablet Take 15 mg by mouth, Starting Mon 10/16/2017, Historical Med      clonazePAM (KlonoPIN) 1 mg tablet Take 1 mg by mouth Three times a day, Starting Thu 1/25/2018, Historical Med      cyclobenzaprine (FLEXERIL) 10 mg tablet TAKE 1 TABLET BY MOUTH 3 TIMES A DAY AS NEEDED FOR MUSCLE SPASMS, Historical Med      DULoxetine (CYMBALTA) 30 mg delayed release capsule Take 90 mg by mouth, Starting Thu 1/25/2018, Historical Med      fluticasone-salmeterol (ADVAIR DISKUS) 500-50 mcg/dose Inhale 1 puff, Starting Thu 12/28/2017, Until Fri 12/28/2018, Historical Med      HYDROcodone-acetaminophen (NORCO)  mg per tablet 1 tab every 6 hours as needed for severe pain    Do not fill before 2/12/18, Historical Med      lamoTRIgine (LaMICtal) 200 MG tablet Take 200 mg by mouth, Starting Tue 1/16/2018, Until Wed 1/16/2019, Historical Med      Magnesium Gluconate 550 MG TABS Take 30 mg by mouth, Historical Med      Melatonin 10 MG CAPS Take 1 tablet by mouth, Historical Med      naproxen (NAPROSYN) 500 mg tablet Take 500 mg by mouth 2 (two) times a day, Starting Fri 7/14/2017, Until Sat 7/14/2018, Historical Med      omeprazole (PriLOSEC) 20 mg delayed release capsule take 1 capsule by mouth once daily, Historical Med      !! pregabalin (LYRICA) 100 mg capsule take 1 capsule by mouth three times a day, Historical Med      !! pregabalin (LYRICA) 150 mg capsule take 1 capsule by mouth NIGHTLY, Historical Med      SUMAtriptan (IMITREX) 100 mg tablet TAKE 1 TABLET BY MOUTH AT START OF MIGRAINE HEADACHE  MAY REPEAT IN 2 HOURS IF NEEDED  LIMIT TO NO MORE THAN 2 TABLETS IN 24 HOURS, THREE TI, Historical Med      traZODone (DESYREL) 100 mg tablet Take 100 mg by mouth, Starting Thu 1/25/2018, Historical Med       !! - Potential duplicate medications found  Please discuss with provider  No discharge procedures on file  ED Provider  Attending physically available and evaluated [address]  I managed the patient along with the ED Attending      Electronically Signed by         Mikaela Cintron MD  02/08/18 5344

## 2018-02-08 NOTE — ED NOTES
Pt  Rings call bell  Reports, "they came before to get me for my Cat Scan, and I was going to the bathroom, so they said they'd come back, and they never did "  Pt  Informed by RN she will check with CT to see when patient can be taken for scan  Pt  Reports increased pain at this time  Requesting for pain medication  Pt  Also reports, "Since I've been here, I haven't gotten any of my anxiety medication, and I'm feeling very anxious "  Dr Lilibeth Muller made aware of report, order for IVP pain medication will be placed  Per Dr Lilibeth Muller, anxiety medication will be held until CT report is back  Pt  Made aware            Russell Zepeda, RN  02/08/18 6033

## 2018-02-08 NOTE — OP NOTE
**** GI/ENDOSCOPY REPORT ****     PATIENT NAME: GRICELDA LEON - VISIT ID:     INTRODUCTION: Esophagogastroduodenoscopy - A 65 female patient presents   for an outpatient Esophagogastroduodenoscopy at [address] [place] [address] [address]  INDICATIONS: Pain located in the epigastrium  CONSENT: The benefits, risks, and alternatives to the procedure were   discussed and informed consent was obtained from the patient  PREPARATION:  EKG, pulse, pulse oximetry and blood pressure were monitored   throughout the procedure  MEDICATIONS: Anesthesia-check records     PROCEDURE:  The endoscope was passed with ease under direct visualization   and advanced to the 3rd portion of the duodenum  The scope was withdrawn   and the mucosa was carefully examined  The views were excellent  The   patient's toleration of the procedure was excellent  FINDINGS:   Esophagus: The esophagus appeared to be normal  There was no   evidence of Hewitt's esophagus or esophagitis in the esophagus  Stomach: The stomach appeared to be normal  There was no evidence of ulcers,   tumors, or erythematous mucosae in the stomach  Duodenum: The duodenum   appeared to be normal  There was no evidence of ulcers in the duodenum  COMPLICATIONS: There were no complications  IMPRESSIONS: Normal esophagus  No evidence of Hewitt's esophagus and   esophagitis in the esophagus  Normal stomach  No evidence of ulcers,   tumors, and erythematous mucosae in the stomach  Normal duodenum  No   evidence of ulcers in the duodenum  ESTIMATED BLOOD LOSS:     RECOMMENDATIONS: Call Dr Diamond He [phone] with questions or   problems  Follow-up appointment with Dr Diamond He in 4 weeks  PROCEDURE CODES: 67067 - EGD flexible; incl brushing or washing     ICD-9 Codes: 789 06 Abdominal pain, epigastric     ICD-10 Codes: R10 13 Epigastric pain     PERFORMED BY: CIERA Sanford  on 02/08/2018    Version 1, electronically signed by CIERA Eddy  on   02/08/2018 at 09:59

## 2018-02-08 NOTE — H&P
History and Physical -  [facility]  Lacey Woodall 65 y o  female MRN: [id number]                  HPI: Luis Lipscomb is a 65y o  year old female who presents for the evaluation of reflux symptoms, epigastric pain, and black stools  She is also coming for screening colonoscopy  REVIEW OF SYSTEMS: Per the HPI, and otherwise unremarkable  Historical Information   Past Medical History:   Diagnosis Date    Anxiety     Asthma     Bipolar 1 disorder ([facility] )     Brain aneurysm     Chronic pain disorder     spinal stenosis    Concussion syndrome     neurological rx and balance rx    [facility] disease) ([facility])     Depression     Disease of thyroid gland     nodules     Family history of colon cancer     father    Fibromyalgia, primary     History of colon polyps     Hyperlipidemia     Hypertension     Migraine     Neuropathy     bilateral feet and hands    PTSD (post-traumatic stress disorder)     Stroke ([place] [address] )     2012 no deficeits    TIA (transient ischemic attack)      Past Surgical History:   Procedure Laterality Date    ABDOMINAL SURGERY      laproscopic/ endometriosis    BRAIN SURGERY      aneurysm/ coiling procedure    CARPAL TUNNEL RELEASE      CHOLECYSTECTOMY      DENTAL SURGERY      EYE SURGERY      cataract    HYSTERECTOMY      THYROID SURGERY       Social History   History   Alcohol Use No     History   Drug Use No     History   Smoking Status    Current Every Day Smoker    Packs/day: 1 00    Types: Cigarettes   Smokeless Tobacco    Never Used     History reviewed  No pertinent family history      Meds/Allergies     Prescriptions Prior to Admission   Medication    albuterol (PROVENTIL HFA,VENTOLIN HFA) 90 mcg/act inhaler    busPIRone (BUSPAR) 15 mg tablet    clonazePAM (KlonoPIN) 1 mg tablet    cyclobenzaprine (FLEXERIL) 10 mg tablet    DULoxetine (CYMBALTA) 30 mg delayed release capsule    fluticasone-salmeterol (ADVAIR DISKUS) 500-50 mcg/dose    HYDROcodone-acetaminophen (NORCO)  mg per tablet    lamoTRIgine (LaMICtal) 200 MG tablet    Magnesium Gluconate 550 MG TABS    Melatonin 10 MG CAPS    omeprazole (PriLOSEC) 20 mg delayed release capsule    pregabalin (LYRICA) 100 mg capsule    pregabalin (LYRICA) 150 mg capsule    promethazine (PHENERGAN) 25 mg tablet    SUMAtriptan (IMITREX) 100 mg tablet    traZODone (DESYREL) 100 mg tablet    ASPIRIN 81 PO    atorvastatin (LIPITOR) 40 mg tablet    naproxen (NAPROSYN) 500 mg tablet       Allergies   Allergen Reactions    Hydroxyzine Anaphylaxis     Claims it gives her convulsions   Other     Tree Extract     Clarithromycin Rash     Pt denies    Latex Rash    Sulfa Antibiotics Rash     "severe skin burn"       Objective     Blood pressure 113/67, pulse 85, temperature 98 2 °F (36 8 °C), temperature source Tympanic, resp  rate 18, height 5' 4" (1 626 m), weight 89 4 kg (197 lb), SpO2 95 %  PHYSICAL EXAM    Gen: NAD  CV: RRR  CHEST: Clear  ABD: soft, NT/ND  EXT: no edema      ASSESSMENT/PLAN:  This is a 65y o  year old female here for the evaluation of chronic upper abdominal pain, reflux symptoms  The patient had also black stools yesterday  The most recent colonoscopy was performed in 2009        PLAN:  Upper endoscopy and colonoscopy   Procedure:

## 2018-02-08 NOTE — DISCHARGE INSTRUCTIONS
Continue home medications as directed  Follow up for colonoscopy tomorrow as scheduled  Follow up with primary care physician within 3-5 days for reevaluation of symptoms  Return to ED if new or worsening symptoms for immediate reevaluation

## 2018-02-08 NOTE — ED ATTENDING ATTESTATION
Shaka Gonzalez MD, saw and evaluated the patient  All available labs and X-rays were ordered by me or the resident and have been reviewed by myself  I discussed the patient with the resident / non-physician and agree with the resident's / non-physician practitioner's findings and plan as documented in the resident's / non-physician practicitioner's note, except where noted  At this point, I agree with the current assessment done in the ED  Chief Complaint   Patient presents with   Chang Massing or Bloody Stool     Pt has a colonoscopy ordered for tomorrow morning took prep and noticed that she is having black stools, has not have stool like this with other bowel preps  Pt complains of nausea and RLQ pain      This is a 65year old female presenting for acute on chronic abd pain  The patient has been having belly pain in the RLQ x2 months  She has f/u-ed with GI + PCP for this  No imaging was done  A plan for colonoscopy tomorrow is in place  She started a bowel prep including gatorade (blue) + miralax as well as a laxative  The patient has been having increased pain  Has noted black colored stools today  She hasn't had this before  Denies f/ch/n/v/cp/sob  Denies dizziness/Lh  PMH:  - TIA  - Asthma  - COPd  - HTN  - HLD  - Migraines  - Hypothyroidism  PSH:  - hepatic cyst resection x2  - RASHAWN  - Thyroid surgery  - 15 laparotomies for belly pain  +smoking  No alcohol/drugs  FH:  - Dad had colon CA  PE:  Vitals:    02/08/18 0045 02/08/18 0050 02/08/18 0228 02/08/18 0350   BP:  139/78 135/85    Pulse: 82  80 85   Resp:   18 20   Temp:   98 °F (36 7 °C)    TempSrc:   Oral    SpO2: 96%  97%    Weight:       Height:       General: VSS, NAD, awake, alert  Well-nourished, well-developed  Appears stated age  Speaking normally in full sentences  Head: Normocephalic, atraumatic, nontender  Eyes: PERRL, EOM-I  No diplopia  No hyphema  No subconjunctival hemorrhages  Symmetrical lids     ENT: Atraumatic external nose and ears  MMM  No malocclusion  No stridor  Normal phonation  No drooling  Normal swallowing  Neck: Symmetric, trachea midline  No JVD  CV: RRR  +S1/S2  No murmurs or gallops  Peripheral pulses +2 throughout  No chest wall tenderness  Lungs:   Unlabored No retractions  CTAB, lungs sounds equal bilateral    No tachypnea  Abd: +BS, soft  Diffuse belly tenderness  Hemoccult positive  RLQ maximal pain  MSK:   FROM   Back:   No rashes  Skin: Dry, intact  Neuro: AAOx3, GCS 15, CN II-XII grossly intact  Motor grossly intact  Psychiatric/Behavioral: Appropriate mood and affect   Exam: deferred  A:  - Belly pain  - Melena  P:  - CT AP  - Pain control  - Fluids for hydration  - Basic labs  - Dispo per above  - 13 point ROS was performed and all are normal unless stated in the history above  - Nursing note reviewed  Vitals reviewed  - Orders placed by myself and/or advanced practitioner / resident     - Previous chart was reviewed  - No language barrier    - History obtained from patient  - There are no limitations to the history obtained  - Critical care time: Not applicable for this patient  Final Diagnosis:  1  Blood in stool    2  Abdominal pain        ED Course      Medications    EMS REPLENISHMENT MED ( Does not apply Given to EMS 2/7/18 2150)   sodium chloride 0 9 % bolus 1,000 mL (0 mL Intravenous Stopped 2/8/18 0007)   morphine (PF) 4 mg/mL injection 4 mg (4 mg Intravenous Given 2/7/18 2239)   ondansetron (ZOFRAN) injection 4 mg (4 mg Intravenous Given 2/7/18 2239)   morphine (PF) 4 mg/mL injection 4 mg (4 mg Intravenous Given 2/8/18 0042)   iohexol (OMNIPAQUE) 350 MG/ML injection (MULTI-DOSE) 100 mL (100 mL Intravenous Given 2/8/18 0026)     CT abdomen pelvis with contrast   Final Result      1  Mild diffuse wall thickening of the colon which is fluid-filled which may be related to given history of colonoscopy preparation     2   Increasing moderate intrahepatic and extrahepatic biliary duct dilatation which is likely related to cholecystectomy state  Correlate for cholestasis lab values  If there is clinical concern for biliary obstruction further evaluation with MRI with    MRCP may be obtained  Workstation performed: [id number]           Orders Placed This Encounter   Procedures    CT abdomen pelvis with contrast    CBC and differential    Comprehensive metabolic panel    Lipase    ECG 12 lead     Labs Reviewed   COMPREHENSIVE METABOLIC PANEL - Abnormal        Result Value Ref Range Status    Sodium 141  136 - 145 mmol/L Final    Potassium 3 6  3 5 - 5 3 mmol/L Final    Chloride 107  100 - 108 mmol/L Final    CO2 26  21 - 32 mmol/L Final    Anion Gap 8  4 - 13 mmol/L Final    BUN 11  5 - 25 mg/dL Final    Creatinine 0 78  0 60 - 1 30 mg/dL Final    Comment: Standardized to IDMS reference method    Glucose 99  65 - 140 mg/dL Final    Comment:   If the patient is fasting, the ADA then defines impaired fasting glucose as > 100 mg/dL and diabetes as > or equal to 123 mg/dL  Specimen collection should occur prior to Sulfasalazine administration due to the potential for falsely depressed results  Specimen collection should occur prior to Sulfapyridine administration due to the potential for falsely elevated results  Calcium 8 6  8 3 - 10 1 mg/dL Final    AST 18  5 - 45 U/L Final    Comment:   Specimen collection should occur prior to Sulfasalazine administration due to the potential for falsely depressed results  ALT 31  12 - 78 U/L Final    Comment:   Specimen collection should occur prior to Sulfasalazine and/or Sulfapyridine administration due to the potential for falsely depressed results       Alkaline Phosphatase 72  46 - 116 U/L Final    Total Protein 7 2  6 4 - 8 2 g/dL Final    Albumin 3 6  3 5 - 5 0 g/dL Final    Total Bilirubin 0 18 (*) 0 20 - 1 00 mg/dL Final    eGFR 85  ml/min/1 73sq m Final    Narrative:     National Kidney Disease Education Program recommendations are as follows:  GFR calculation is accurate only with a steady state creatinine  Chronic Kidney disease less than 60 ml/min/1 73 sq  meters  Kidney failure less than 15 ml/min/1 73 sq  meters  CBC AND DIFFERENTIAL - Normal    WBC 9 94  4 31 - 10 16 Thousand/uL Final    RBC 4 05  3 81 - 5 12 Million/uL Final    Hemoglobin 12 6  11 5 - 15 4 g/dL Final    Hematocrit 38 0  34 8 - 46 1 % Final    MCV 94  82 - 98 fL Final    MCH 31 1  26 8 - 34 3 pg Final    MCHC 33 2  31 4 - 37 4 g/dL Final    RDW 13 3  11 6 - 15 1 % Final    MPV 9 9  8 9 - 12 7 fL Final    Platelets 847  000 - 390 Thousands/uL Final    nRBC 0  /100 WBCs Final    Neutrophils Relative 53  43 - 75 % Final    Lymphocytes Relative 40  14 - 44 % Final    Monocytes Relative 6  4 - 12 % Final    Eosinophils Relative 1  0 - 6 % Final    Basophils Relative 0  0 - 1 % Final    Neutrophils Absolute 5 16  1 85 - 7 62 Thousands/µL Final    Lymphocytes Absolute 3 99  0 60 - 4 47 Thousands/µL Final    Monocytes Absolute 0 64  0 17 - 1 22 Thousand/µL Final    Eosinophils Absolute 0 06  0 00 - 0 61 Thousand/µL Final    Basophils Absolute 0 03  0 00 - 0 10 Thousands/µL Final   LIPASE - Normal    Lipase 113  73 - 393 u/L Final     Time reflects when diagnosis was documented in both MDM as applicable and the Disposition within this note     Time User Action Codes Description Comment    2/8/2018  2:13 AM Hudgins, Beryle Brew Add [K92 1] Blood in stool     2/8/2018  2:13 AM Hudgins, Beryle Brew Add [R10 9] Abdominal pain       ED Disposition     ED Disposition Condition Comment    Discharge  Jennifer Woodall discharge to home/self care      Condition at discharge: Stable        Follow-up Information     Follow up With Specialties Details Why [facility]   follow up with primary care physician within 3-5 days for reevaluation of symptoms [address] [facility] [address] Discharge Medication List as of 2/8/2018  2:15 AM      CONTINUE these medications which have NOT CHANGED    Details   albuterol (PROVENTIL HFA,VENTOLIN HFA) 90 mcg/act inhaler Inhale 2 puffs every 6 (six) hours, Starting Thu 12/28/2017, Historical Med      ASPIRIN 81 PO Take 81 mg by mouth, Starting Tue 1/9/2018, Until Wed 1/9/2019, Historical Med      atorvastatin (LIPITOR) 40 mg tablet Take 40 mg by mouth, Starting Tue 1/9/2018, Until Wed 1/9/2019, Historical Med      busPIRone (BUSPAR) 15 mg tablet Take 15 mg by mouth, Starting Mon 10/16/2017, Historical Med      clonazePAM (KlonoPIN) 1 mg tablet Take 1 mg by mouth Three times a day, Starting Thu 1/25/2018, Historical Med      cyclobenzaprine (FLEXERIL) 10 mg tablet TAKE 1 TABLET BY MOUTH 3 TIMES A DAY AS NEEDED FOR MUSCLE SPASMS, Historical Med      DULoxetine (CYMBALTA) 30 mg delayed release capsule Take 90 mg by mouth, Starting Thu 1/25/2018, Historical Med      fluticasone-salmeterol (ADVAIR DISKUS) 500-50 mcg/dose Inhale 1 puff, Starting Thu 12/28/2017, Until Fri 12/28/2018, Historical Med      HYDROcodone-acetaminophen (NORCO)  mg per tablet 1 tab every 6 hours as needed for severe pain    Do not fill before 2/12/18, Historical Med      lamoTRIgine (LaMICtal) 200 MG tablet Take 200 mg by mouth, Starting Tue 1/16/2018, Until Wed 1/16/2019, Historical Med      Magnesium Gluconate 550 MG TABS Take 30 mg by mouth, Historical Med      Melatonin 10 MG CAPS Take 1 tablet by mouth, Historical Med      naproxen (NAPROSYN) 500 mg tablet Take 500 mg by mouth 2 (two) times a day, Starting Fri 7/14/2017, Until Sat 7/14/2018, Historical Med      omeprazole (PriLOSEC) 20 mg delayed release capsule take 1 capsule by mouth once daily, Historical Med      !! pregabalin (LYRICA) 100 mg capsule take 1 capsule by mouth three times a day, Historical Med      !! pregabalin (LYRICA) 150 mg capsule take 1 capsule by mouth NIGHTLY, Historical Med      SUMAtriptan (IMITREX) 100 mg tablet TAKE 1 TABLET BY MOUTH AT START OF MIGRAINE HEADACHE  MAY REPEAT IN 2 HOURS IF NEEDED  LIMIT TO NO MORE THAN 2 TABLETS IN 24 HOURS, THREE TI, Historical Med      traZODone (DESYREL) 100 mg tablet Take 100 mg by mouth, Starting Thu 2018, Historical Med       !! - Potential duplicate medications found  Please discuss with provider  No discharge procedures on file  Prior to Admission Medications   Prescriptions Last Dose Informant Patient Reported? Taking? ASPIRIN 81 PO 2018 at Unknown time  Yes Yes   Sig: Take 325 mg by mouth     DULoxetine (CYMBALTA) 30 mg delayed release capsule 2018 at Unknown time  Yes Yes   Sig: Take 90 mg by mouth   HYDROcodone-acetaminophen (NORCO)  mg per tablet 2018 at Unknown time  Yes Yes   Si tab every 6 hours as needed for severe pain   Do not fill before 18   Magnesium Gluconate 550 MG TABS 2018 at Unknown time  Yes Yes   Sig: Take 30 mg by mouth   Melatonin 10 MG CAPS 2018 at Unknown time  Yes Yes   Sig: Take 1 tablet by mouth   SUMAtriptan (IMITREX) 100 mg tablet 2018 at Unknown time  Yes Yes   Sig: TAKE 1 TABLET BY MOUTH AT START OF MIGRAINE HEADACHE  MAY REPEAT IN 2 HOURS IF NEEDED   LIMIT TO NO MORE THAN 2 TABLETS IN 24 HOURS, THREE TI   albuterol (PROVENTIL HFA,VENTOLIN HFA) 90 mcg/act inhaler 2018 at Unknown time  Yes Yes   Sig: Inhale 2 puffs every 6 (six) hours   atorvastatin (LIPITOR) 40 mg tablet 2018 at Unknown time  Yes Yes   Sig: Take 40 mg by mouth   busPIRone (BUSPAR) 15 mg tablet 2018 at Unknown time  Yes Yes   Sig: Take 15 mg by mouth   clonazePAM (KlonoPIN) 1 mg tablet 2018 at Unknown time  Yes Yes   Sig: Take 1 mg by mouth Three times a day   cyclobenzaprine (FLEXERIL) 10 mg tablet 2018 at Unknown time  Yes Yes   Sig: TAKE 1 TABLET BY MOUTH 3 TIMES A DAY AS NEEDED FOR MUSCLE SPASMS   fluticasone-salmeterol (ADVAIR DISKUS) 500-50 mcg/dose 2018 at Unknown time  Yes Yes   Sig: Inhale 1 puff   lamoTRIgine (LaMICtal) 200 MG tablet 2/6/2018 at Unknown time  Yes Yes   Sig: Take 200 mg by mouth   naproxen (NAPROSYN) 500 mg tablet 2/6/2018 at Unknown time  Yes Yes   Sig: Take 500 mg by mouth 2 (two) times a day   omeprazole (PriLOSEC) 20 mg delayed release capsule 2/6/2018 at Unknown time  Yes Yes   Sig: take 1 capsule by mouth once daily   pregabalin (LYRICA) 100 mg capsule 2/6/2018 at Unknown time  Yes Yes   Sig: take 1 capsule by mouth three times a day   pregabalin (LYRICA) 150 mg capsule 2/6/2018 at Unknown time  Yes Yes   Sig: take 1 capsule by mouth NIGHTLY   traZODone (DESYREL) 100 mg tablet 2/6/2018 at Unknown time  Yes Yes   Sig: Take 100 mg by mouth      Facility-Administered Medications: None       Portions of the record may have been created with voice recognition software  Occasional wrong word or "sound a like" substitutions may have occurred due to the inherent limitations of voice recognition software  Read the chart carefully and recognize, using context, where substitutions have occurred      Electronically signed by:  Levi Saaevdra

## 2018-02-08 NOTE — ANESTHESIA POSTPROCEDURE EVALUATION
Post-Op Assessment Note      CV Status:  Stable    Mental Status:  Alert and awake    Hydration Status:  Stable    PONV Controlled:  None    Airway Patency:  Patent    Post Op Vitals Reviewed: Yes          Staff: CRNA           /79 (02/08/18 1032)    Temp      Pulse 88 (02/08/18 1032)   Resp 20 (02/08/18 1032)    SpO2 98 % (02/08/18 1032)

## 2018-02-09 LAB
ATRIAL RATE: 81 BPM
P AXIS: 54 DEGREES
PR INTERVAL: 134 MS
QRS AXIS: 80 DEGREES
QRSD INTERVAL: 70 MS
QT INTERVAL: 370 MS
QTC INTERVAL: 429 MS
T WAVE AXIS: 66 DEGREES
VENTRICULAR RATE: 81 BPM

## 2018-02-09 PROCEDURE — 93010 ELECTROCARDIOGRAM REPORT: CPT | Performed by: INTERNAL MEDICINE

## 2018-03-05 ENCOUNTER — TRANSCRIBE ORDERS (OUTPATIENT)
Dept: ADMINISTRATIVE | Facility: HOSPITAL | Age: 58
End: 2018-03-05

## 2018-03-05 DIAGNOSIS — R10.9 ABDOMINAL PAIN, UNSPECIFIED ABDOMINAL LOCATION: Primary | ICD-10-CM

## 2018-03-16 ENCOUNTER — HOSPITAL ENCOUNTER (OUTPATIENT)
Dept: RADIOLOGY | Facility: HOSPITAL | Age: 58
Discharge: HOME | End: 2018-03-16
Attending: INTERNAL MEDICINE
Payer: COMMERCIAL

## 2018-03-16 DIAGNOSIS — R10.9 ABDOMINAL PAIN, UNSPECIFIED ABDOMINAL LOCATION: ICD-10-CM

## 2018-03-16 PROCEDURE — 76700 US EXAM ABDOM COMPLETE: CPT

## 2018-12-31 ENCOUNTER — TRANSCRIBE ORDERS (OUTPATIENT)
Dept: ADMINISTRATIVE | Facility: HOSPITAL | Age: 58
End: 2018-12-31

## 2018-12-31 ENCOUNTER — APPOINTMENT (OUTPATIENT)
Dept: LAB | Facility: HOSPITAL | Age: 58
End: 2018-12-31
Payer: MEDICARE

## 2018-12-31 DIAGNOSIS — K76.9 DISEASE OF LIVER: ICD-10-CM

## 2018-12-31 DIAGNOSIS — K76.9 DISEASE OF LIVER: Primary | ICD-10-CM

## 2018-12-31 LAB
ALBUMIN SERPL BCP-MCNC: 3.9 G/DL (ref 3.5–5)
ALP SERPL-CCNC: 80 U/L (ref 46–116)
ALT SERPL W P-5'-P-CCNC: 40 U/L (ref 12–78)
ANION GAP SERPL CALCULATED.3IONS-SCNC: 11 MMOL/L (ref 4–13)
AST SERPL W P-5'-P-CCNC: 34 U/L (ref 5–45)
BASOPHILS # BLD AUTO: 0.06 THOUSANDS/ΜL (ref 0–0.1)
BASOPHILS NFR BLD AUTO: 1 % (ref 0–1)
BILIRUB SERPL-MCNC: 0.3 MG/DL (ref 0.2–1)
BUN SERPL-MCNC: 15 MG/DL (ref 5–25)
CALCIUM SERPL-MCNC: 9.1 MG/DL (ref 8.3–10.1)
CHLORIDE SERPL-SCNC: 103 MMOL/L (ref 100–108)
CO2 SERPL-SCNC: 28 MMOL/L (ref 21–32)
CREAT SERPL-MCNC: 1.1 MG/DL (ref 0.6–1.3)
EOSINOPHIL # BLD AUTO: 0.05 THOUSAND/ΜL (ref 0–0.61)
EOSINOPHIL NFR BLD AUTO: 1 % (ref 0–6)
ERYTHROCYTE [DISTWIDTH] IN BLOOD BY AUTOMATED COUNT: 13.2 % (ref 11.6–15.1)
GFR SERPL CREATININE-BSD FRML MDRD: 56 ML/MIN/1.73SQ M
GLUCOSE SERPL-MCNC: 120 MG/DL (ref 65–140)
HCT VFR BLD AUTO: 43 % (ref 34.8–46.1)
HGB BLD-MCNC: 14 G/DL (ref 11.5–15.4)
IMM GRANULOCYTES # BLD AUTO: 0.06 THOUSAND/UL (ref 0–0.2)
IMM GRANULOCYTES NFR BLD AUTO: 1 % (ref 0–2)
LYMPHOCYTES # BLD AUTO: 3.04 THOUSANDS/ΜL (ref 0.6–4.47)
LYMPHOCYTES NFR BLD AUTO: 37 % (ref 14–44)
MCH RBC QN AUTO: 31.3 PG (ref 26.8–34.3)
MCHC RBC AUTO-ENTMCNC: 32.6 G/DL (ref 31.4–37.4)
MCV RBC AUTO: 96 FL (ref 82–98)
MONOCYTES # BLD AUTO: 0.38 THOUSAND/ΜL (ref 0.17–1.22)
MONOCYTES NFR BLD AUTO: 5 % (ref 4–12)
NEUTROPHILS # BLD AUTO: 4.68 THOUSANDS/ΜL (ref 1.85–7.62)
NEUTS SEG NFR BLD AUTO: 55 % (ref 43–75)
NRBC BLD AUTO-RTO: 0 /100 WBCS
PLATELET # BLD AUTO: 217 THOUSANDS/UL (ref 149–390)
PMV BLD AUTO: 10.4 FL (ref 8.9–12.7)
POTASSIUM SERPL-SCNC: 3.5 MMOL/L (ref 3.5–5.3)
PROT SERPL-MCNC: 7.2 G/DL (ref 6.4–8.2)
RBC # BLD AUTO: 4.47 MILLION/UL (ref 3.81–5.12)
SODIUM SERPL-SCNC: 142 MMOL/L (ref 136–145)
WBC # BLD AUTO: 8.27 THOUSAND/UL (ref 4.31–10.16)

## 2018-12-31 PROCEDURE — 80053 COMPREHEN METABOLIC PANEL: CPT

## 2018-12-31 PROCEDURE — 36415 COLL VENOUS BLD VENIPUNCTURE: CPT

## 2018-12-31 PROCEDURE — 85025 COMPLETE CBC W/AUTO DIFF WBC: CPT

## 2019-01-07 ENCOUNTER — HOSPITAL ENCOUNTER (EMERGENCY)
Facility: HOSPITAL | Age: 59
Discharge: HOME | End: 2019-01-07
Attending: FAMILY MEDICINE | Admitting: FAMILY MEDICINE
Payer: MEDICARE

## 2019-01-07 ENCOUNTER — APPOINTMENT (EMERGENCY)
Dept: CT IMAGING | Facility: HOSPITAL | Age: 59
End: 2019-01-07
Payer: MEDICARE

## 2019-01-07 VITALS
RESPIRATION RATE: 16 BRPM | OXYGEN SATURATION: 98 % | WEIGHT: 193.6 LBS | HEART RATE: 82 BPM | TEMPERATURE: 97.6 F | DIASTOLIC BLOOD PRESSURE: 81 MMHG | BODY MASS INDEX: 33.23 KG/M2 | SYSTOLIC BLOOD PRESSURE: 128 MMHG

## 2019-01-07 DIAGNOSIS — N39.0 UTI (URINARY TRACT INFECTION): Primary | ICD-10-CM

## 2019-01-07 DIAGNOSIS — R10.9 ABDOMINAL PAIN: ICD-10-CM

## 2019-01-07 LAB
ANION GAP SERPL CALCULATED.3IONS-SCNC: 10 MMOL/L (ref 4–13)
BACTERIA UR QL AUTO: ABNORMAL /HPF
BASOPHILS # BLD AUTO: 0.07 THOUSANDS/ΜL (ref 0–0.1)
BASOPHILS NFR BLD AUTO: 1 % (ref 0–1)
BILIRUB UR QL STRIP: NEGATIVE
BUN SERPL-MCNC: 12 MG/DL (ref 5–25)
CALCIUM SERPL-MCNC: 8.6 MG/DL (ref 8.3–10.1)
CHLORIDE SERPL-SCNC: 103 MMOL/L (ref 100–108)
CLARITY UR: ABNORMAL
CO2 SERPL-SCNC: 27 MMOL/L (ref 21–32)
COLOR UR: YELLOW
CREAT SERPL-MCNC: 1.15 MG/DL (ref 0.6–1.3)
EOSINOPHIL # BLD AUTO: 0.09 THOUSAND/ΜL (ref 0–0.61)
EOSINOPHIL NFR BLD AUTO: 1 % (ref 0–6)
ERYTHROCYTE [DISTWIDTH] IN BLOOD BY AUTOMATED COUNT: 13.2 % (ref 11.6–15.1)
GFR SERPL CREATININE-BSD FRML MDRD: 53 ML/MIN/1.73SQ M
GLUCOSE SERPL-MCNC: 111 MG/DL (ref 65–140)
GLUCOSE UR STRIP-MCNC: NEGATIVE MG/DL
HCT VFR BLD AUTO: 41.6 % (ref 34.8–46.1)
HGB BLD-MCNC: 13.5 G/DL (ref 11.5–15.4)
HGB UR QL STRIP.AUTO: NEGATIVE
IMM GRANULOCYTES # BLD AUTO: 0.05 THOUSAND/UL (ref 0–0.2)
IMM GRANULOCYTES NFR BLD AUTO: 1 % (ref 0–2)
KETONES UR STRIP-MCNC: NEGATIVE MG/DL
LEUKOCYTE ESTERASE UR QL STRIP: ABNORMAL
LIPASE SERPL-CCNC: 145 U/L (ref 73–393)
LYMPHOCYTES # BLD AUTO: 3.11 THOUSANDS/ΜL (ref 0.6–4.47)
LYMPHOCYTES NFR BLD AUTO: 37 % (ref 14–44)
MCH RBC QN AUTO: 31.2 PG (ref 26.8–34.3)
MCHC RBC AUTO-ENTMCNC: 32.5 G/DL (ref 31.4–37.4)
MCV RBC AUTO: 96 FL (ref 82–98)
MONOCYTES # BLD AUTO: 0.43 THOUSAND/ΜL (ref 0.17–1.22)
MONOCYTES NFR BLD AUTO: 5 % (ref 4–12)
MUCOUS THREADS UR QL AUTO: ABNORMAL
NEUTROPHILS # BLD AUTO: 4.71 THOUSANDS/ΜL (ref 1.85–7.62)
NEUTS SEG NFR BLD AUTO: 55 % (ref 43–75)
NITRITE UR QL STRIP: NEGATIVE
NON-SQ EPI CELLS URNS QL MICRO: ABNORMAL /HPF
NRBC BLD AUTO-RTO: 0 /100 WBCS
OTHER STN SPEC: ABNORMAL
PH UR STRIP.AUTO: 6.5 [PH] (ref 4.5–8)
PLATELET # BLD AUTO: 193 THOUSANDS/UL (ref 149–390)
PMV BLD AUTO: 10.4 FL (ref 8.9–12.7)
POTASSIUM SERPL-SCNC: 3.5 MMOL/L (ref 3.5–5.3)
PROT UR STRIP-MCNC: NEGATIVE MG/DL
RBC # BLD AUTO: 4.33 MILLION/UL (ref 3.81–5.12)
RBC #/AREA URNS AUTO: ABNORMAL /HPF
SODIUM SERPL-SCNC: 140 MMOL/L (ref 136–145)
SP GR UR STRIP.AUTO: 1.02 (ref 1–1.03)
UROBILINOGEN UR QL STRIP.AUTO: 0.2 E.U./DL
WBC # BLD AUTO: 8.46 THOUSAND/UL (ref 4.31–10.16)
WBC #/AREA URNS AUTO: ABNORMAL /HPF

## 2019-01-07 PROCEDURE — 36415 COLL VENOUS BLD VENIPUNCTURE: CPT | Performed by: FAMILY MEDICINE

## 2019-01-07 PROCEDURE — 96361 HYDRATE IV INFUSION ADD-ON: CPT

## 2019-01-07 PROCEDURE — 74177 CT ABD & PELVIS W/CONTRAST: CPT

## 2019-01-07 PROCEDURE — 96374 THER/PROPH/DIAG INJ IV PUSH: CPT

## 2019-01-07 PROCEDURE — 81001 URINALYSIS AUTO W/SCOPE: CPT | Performed by: FAMILY MEDICINE

## 2019-01-07 PROCEDURE — 80048 BASIC METABOLIC PNL TOTAL CA: CPT | Performed by: FAMILY MEDICINE

## 2019-01-07 PROCEDURE — 96375 TX/PRO/DX INJ NEW DRUG ADDON: CPT

## 2019-01-07 PROCEDURE — 85025 COMPLETE CBC W/AUTO DIFF WBC: CPT | Performed by: FAMILY MEDICINE

## 2019-01-07 PROCEDURE — 99284 EMERGENCY DEPT VISIT MOD MDM: CPT

## 2019-01-07 PROCEDURE — 83690 ASSAY OF LIPASE: CPT | Performed by: FAMILY MEDICINE

## 2019-01-07 RX ORDER — NITROFURANTOIN 25; 75 MG/1; MG/1
100 CAPSULE ORAL 2 TIMES DAILY
Qty: 14 CAPSULE | Refills: 0 | Status: SHIPPED | OUTPATIENT
Start: 2019-01-07 | End: 2019-01-14

## 2019-01-07 RX ORDER — ONDANSETRON 2 MG/ML
4 INJECTION INTRAMUSCULAR; INTRAVENOUS ONCE
Status: COMPLETED | OUTPATIENT
Start: 2019-01-07 | End: 2019-01-07

## 2019-01-07 RX ADMIN — IOHEXOL 100 ML: 350 INJECTION, SOLUTION INTRAVENOUS at 16:35

## 2019-01-07 RX ADMIN — FAMOTIDINE 40 MG: 10 INJECTION, SOLUTION INTRAVENOUS at 17:04

## 2019-01-07 RX ADMIN — ONDANSETRON HYDROCHLORIDE 4 MG: 2 INJECTION, SOLUTION INTRAMUSCULAR; INTRAVENOUS at 16:15

## 2019-01-07 RX ADMIN — SODIUM CHLORIDE 1000 ML: 0.9 INJECTION, SOLUTION INTRAVENOUS at 16:15

## 2019-01-07 NOTE — ED PROVIDER NOTES
History  Chief Complaint   Patient presents with    Abdominal Pain     Patient states RLQ Abdominal pain since yesterday  Patient states pain radiates to her back and right flank  Patient also states she has had diarrhea for the past 10 days  History provided by:  Patient   used: No     This is a 45-year-old female with fibromyalgia presented to ED with complain of diarrhea times 10 days and abdominal pain that started yesterday  Patient rating her pain 10/10 at this time pain is the more generalized and is nonradiating at this time  Patient states moving around makes the pain worse Vicodin makes the pain better  Patient states she has been having diarrhea for 10 days however pain started last night  Patient states she has improved her PCP tomorrow but was not able to tolerate her abdominal pain which prompted this ED visit  Apparently patient has history of liver cirrhosis it and did had a follow-up in [place] but moved here November still have not seen GI  She denies any chest pain or palpitations time  She does complain of some nausea  Denies any blood in her stool  Prior to Admission Medications   Prescriptions Last Dose Informant Patient Reported? Taking? ASPIRIN 81 PO   Yes Yes   Sig: Take 325 mg by mouth     DULoxetine (CYMBALTA) 30 mg delayed release capsule   Yes Yes   Sig: Take 90 mg by mouth   HYDROcodone-acetaminophen (NORCO)  mg per tablet   Yes Yes   Si tab every 6 hours as needed for severe pain   Do not fill before 18   Magnesium Gluconate 550 MG TABS   Yes Yes   Sig: Take 30 mg by mouth   Melatonin 10 MG CAPS   Yes Yes   Sig: Take 1 tablet by mouth   SUMAtriptan (IMITREX) 100 mg tablet   Yes Yes   Sig: TAKE 1 TABLET BY MOUTH AT START OF MIGRAINE HEADACHE  MAY REPEAT IN 2 HOURS IF NEEDED   LIMIT TO NO MORE THAN 2 TABLETS IN 24 HOURS, THREE TI   albuterol (PROVENTIL HFA,VENTOLIN HFA) 90 mcg/act inhaler   Yes Yes   Sig: Inhale 2 puffs every 6 (six) hours   atorvastatin (LIPITOR) 40 mg tablet   Yes Yes   Sig: Take 40 mg by mouth   busPIRone (BUSPAR) 15 mg tablet   Yes Yes   Sig: Take 15 mg by mouth   clonazePAM (KlonoPIN) 1 mg tablet   Yes Yes   Sig: Take 1 mg by mouth Three times a day   cyclobenzaprine (FLEXERIL) 10 mg tablet   Yes Yes   Sig: TAKE 1 TABLET BY MOUTH 3 TIMES A DAY AS NEEDED FOR MUSCLE SPASMS   lamoTRIgine (LaMICtal) 200 MG tablet   Yes Yes   Sig: Take 200 mg by mouth   naproxen (NAPROSYN) 500 mg tablet   Yes Yes   Sig: Take 500 mg by mouth 2 (two) times a day   omeprazole (PriLOSEC) 20 mg delayed release capsule   Yes Yes   Sig: take 1 capsule by mouth once daily   pregabalin (LYRICA) 100 mg capsule   Yes Yes   Sig: take 1 capsule by mouth three times a day   pregabalin (LYRICA) 150 mg capsule   Yes Yes   Sig: take 1 capsule by mouth NIGHTLY   promethazine (PHENERGAN) 25 mg tablet   Yes Yes   Sig: Take 25 mg by mouth 3 (three) times a day   traZODone (DESYREL) 100 mg tablet   Yes Yes   Sig: Take 100 mg by mouth      Facility-Administered Medications: None       Past Medical History:   Diagnosis Date    Anxiety     Asthma     Bipolar 1 disorder (HCC)     Brain aneurysm     Chronic pain disorder     spinal stenosis    Concussion syndrome     neurological rx and balance rx    COPD (chronic obstructive pulmonary disease) (HCC)     Depression     Disease of thyroid gland     nodules     Family history of colon cancer     father    Fibromyalgia, primary     History of colon polyps     Hyperlipidemia     Hypertension     Migraine     Neuropathy     bilateral feet and hands    PTSD (post-traumatic stress disorder)     Stroke ([facility] [address] )     2012 no deficeits    TIA (transient ischemic attack)        Past Surgical History:   Procedure Laterality Date    ABDOMINAL SURGERY      laproscopic/ endometriosis    BRAIN SURGERY      aneurysm/ coiling procedure    CARPAL TUNNEL RELEASE      CHOLECYSTECTOMY      DENTAL SURGERY      EYE SURGERY      cataract    HYSTERECTOMY      MA ESOPHAGOGASTRODUODENOSCOPY TRANSORAL DIAGNOSTIC N/A 2/8/2018    Procedure: EGD AND COLONOSCOPY;  Surgeon: Pastora Huggins MD;  Location: [facility]; Service: Gastroenterology    THYROID SURGERY         History reviewed  No pertinent family history  I have reviewed and agree with the history as documented  Social History   Substance Use Topics    Smoking status: Current Every Day Smoker     Packs/day: 1 50     Types: Cigarettes    Smokeless tobacco: Never Used    Alcohol use No        Review of Systems   Constitutional: Negative  HENT: Negative  Eyes: Negative  Respiratory: Negative  Cardiovascular: Negative  Gastrointestinal: Positive for abdominal pain, diarrhea and nausea  Negative for vomiting  Genitourinary: Positive for dysuria  Musculoskeletal: Negative  Skin: Negative  Neurological: Negative  Psychiatric/Behavioral: Negative  Physical Exam  Physical Exam   Constitutional: She is oriented to person, place, and time  She appears well-developed and well-nourished  No distress  HENT:   Head: Normocephalic and atraumatic  Eyes: Pupils are equal, round, and reactive to light  EOM are normal    Neck: Normal range of motion  Neck supple  Cardiovascular: Normal rate, regular rhythm and normal heart sounds  Pulmonary/Chest: Effort normal and breath sounds normal    Abdominal: Soft  Bowel sounds are normal  She exhibits no mass  There is tenderness (Generalized tenderness)  There is no rebound and no guarding  Musculoskeletal: Normal range of motion  Neurological: She is alert and oriented to person, place, and time  Skin: Skin is warm  Nursing note and vitals reviewed        Vital Signs  ED Triage Vitals [01/07/19 1543]   Temperature Pulse Respirations Blood Pressure SpO2   97 6 °F (36 4 °C) 82 16 128/81 98 %      Temp Source Heart Rate Source Patient Position - Orthostatic VS BP Location FiO2 (%)   Temporal Monitor Sitting Left arm --      Pain Score       Worst Possible Pain           Vitals:    01/07/19 1543   BP: 128/81   Pulse: 82   Patient Position - Orthostatic VS: Sitting       Visual Acuity      ED Medications  Medications   sodium chloride 0 9 % bolus 1,000 mL (0 mL Intravenous Stopped 1/7/19 1758)   ondansetron (ZOFRAN) injection 4 mg (4 mg Intravenous Given 1/7/19 1615)   famotidine (PEPCID) injection 40 mg (40 mg Intravenous Given 1/7/19 1704)   iohexol (OMNIPAQUE) 350 MG/ML injection (SINGLE-DOSE) 100 mL (100 mL Intravenous Given 1/7/19 1635)       Diagnostic Studies  Results Reviewed     Procedure Component Value Units Date/Time    Urine Microscopic [[id number]]  (Abnormal) Collected:  01/07/19 1617    Lab Status:  Final result Specimen:  Urine from Urine, Clean Catch Updated:  01/07/19 1638     RBC, UA 2-4 (A) /hpf      WBC, UA 4-10 (A) /hpf      Epithelial Cells Innumerable (A) /hpf      Bacteria, UA Innumerable (A) /hpf      OTHER OBSERVATIONS Transitional Epithelial Cells     MUCUS THREADS Moderate (A)    UA w Reflex to Microscopic [[id number]]  (Abnormal) Collected:  01/07/19 1617    Lab Status:  Final result Specimen:  Urine from Urine, Clean Catch Updated:  01/07/19 1624     Color, UA Yellow     Clarity, UA Slightly Cloudy     Specific [place], UA 1 020     pH, UA 6 5     Leukocytes, UA Small (A)     Nitrite, UA Negative     Protein, UA Negative mg/dl      Glucose, UA Negative mg/dl      Ketones, UA Negative mg/dl      Urobilinogen, UA 0 2 E U /dl      Bilirubin, UA Negative     Blood, UA Negative    Basic metabolic panel [[id number]] Collected:  01/07/19 1600    Lab Status:  Final result Specimen:  Blood from Arm, Left Updated:  01/07/19 1616     Sodium 140 mmol/L      Potassium 3 5 mmol/L      Chloride 103 mmol/L      CO2 27 mmol/L      ANION GAP 10 mmol/L      BUN 12 mg/dL      Creatinine 1 15 mg/dL      Glucose 111 mg/dL      Calcium 8 6 mg/dL      eGFR 53 ml/min/1 73sq m     Narrative: National Kidney Disease Education Program recommendations are as follows:  GFR calculation is accurate only with a steady state creatinine  Chronic Kidney disease less than 60 ml/min/1 73 sq  meters  Kidney failure less than 15 ml/min/1 73 sq  meters  Lipase [[id number]]  (Normal) Collected:  01/07/19 1600    Lab Status:  Final result Specimen:  Blood from Arm, Left Updated:  01/07/19 1616     Lipase 145 u/L     CBC and differential [[id number]] Collected:  01/07/19 1600    Lab Status:  Final result Specimen:  Blood from Arm, Left Updated:  01/07/19 1605     WBC 8 46 Thousand/uL      RBC 4 33 Million/uL      Hemoglobin 13 5 g/dL      Hematocrit 41 6 %      MCV 96 fL      MCH 31 2 pg      MCHC 32 5 g/dL      RDW 13 2 %      MPV 10 4 fL      Platelets 180 Thousands/uL      nRBC 0 /100 WBCs      Neutrophils Relative 55 %      Immat GRANS % 1 %      Lymphocytes Relative 37 %      Monocytes Relative 5 %      Eosinophils Relative 1 %      Basophils Relative 1 %      Neutrophils Absolute 4 71 Thousands/µL      Immature Grans Absolute 0 05 Thousand/uL      Lymphocytes Absolute 3 11 Thousands/µL      Monocytes Absolute 0 43 Thousand/µL      Eosinophils Absolute 0 09 Thousand/µL      Basophils Absolute 0 07 Thousands/µL                  CT abdomen pelvis with contrast   Final Result by Darrion Gallardo MD (01/07 1655)      No acute intra-abdominal abnormality              Workstation performed: [id number]                    Procedures  Procedures       Phone Contacts  ED Phone Contact    ED Course                               MDM  CritCare Time    Disposition  Final diagnoses:   UTI (urinary tract infection)   Abdominal pain     Time reflects when diagnosis was documented in both MDM as applicable and the Disposition within this note     Time User Action Codes Description Comment    1/7/2019  5:52 PM Pradhan Sieve Add [N39 0] UTI (urinary tract infection)     1/7/2019  5:52 PM Pradhan Sieve Add [R10 9] Abdominal pain       ED Disposition     ED Disposition Condition Comment    Discharge  Dellia Boeck Remmel discharge to home/self care  Condition at discharge: Stable        Follow-up Information     Follow up With Specialties Details Why Contact Info    Ellen Evans, DO Family Medicine In 2 days If symptoms worsen [address] [place] [id number] [phone]            Patient's Medications   Discharge Prescriptions    NITROFURANTOIN (MACROBID) 100 MG CAPSULE    Take 1 capsule (100 mg total) by mouth 2 (two) times a day for 7 days       Start Date: 1/7/2019  End Date: 1/14/2019       Order Dose: 100 mg       Quantity: 14 capsule    Refills: 0     No discharge procedures on file      ED Provider  Electronically Signed by           Berta Michelle MD  01/07/19 1180

## 2019-01-07 NOTE — DISCHARGE INSTRUCTIONS
Abdominal Pain, Ambulatory Care   GENERAL INFORMATION:   Abdominal pain  can be dull, achy, or sharp  You may have pain in one area of your abdomen, or in your entire abdomen  Your pain may be caused by constipation, food sensitivity or poisoning, infection, or a blockage  Abdominal pain can also be caused by a hernia, appendicitis, or an ulcer  The cause of your abdominal pain may be unknown  Seek immediate care for the following symptoms:   · New chest pain or shortness of breath    · Pulsing pain in your upper abdomen or lower back that suddenly becomes constant    · Pain in the right lower abdominal area that worsens with movement    · Fever over 100 4°F (38°C) or shaking chills    · Vomiting and you cannot keep food or fluids down    · Pain does not improve or gets worse over the next 8 to 12 hours    · Blood in your vomit or bowel movements, or they look black and tarry    · Skin or the whites of your eyes turn yellow    · Large amount of vaginal bleeding that is not your monthly period  Treatment for abdominal pain  may include medicine to calm your stomach, prevent vomiting, or decrease pain  Follow up with your healthcare provider as directed:  Write down your questions so you remember to ask them during your visits  CARE AGREEMENT:   You have the right to help plan your care  Learn about your health condition and how it may be treated  Discuss treatment options with your caregivers to decide what care you want to receive  You always have the right to refuse treatment  The above information is an  only  It is not intended as medical advice for individual conditions or treatments  Talk to your doctor, nurse or pharmacist before following any medical regimen to see if it is safe and effective for you  © 2014 [address] is for End User's use only and may not be sold, redistributed or otherwise used for commercial purposes   All illustrations and images included in [facility] are the copyrighted property of [employer]  or Chang Mathew  Urinary Tract Infection in Women   WHAT YOU NEED TO KNOW:   What is a urinary tract infection (UTI)? A UTI is caused by bacteria that get inside your urinary tract  Your urinary tract includes your kidneys, ureters, bladder, and urethra  Urine is made in your kidneys, and it flows from the ureters to the bladder  Urine leaves the bladder through the urethra  A UTI is more common in your lower urinary tract, which includes your bladder and urethra  What increases my risk for a UTI? · A urinary catheter or self-catheterization    · Pregnancy    · Urinary tract problems, such as a narrowing, kidney stones, or inability to empty your bladder completely    · History of a UTI    · Sexual intercourse    · Menopause    · Diabetes or obesity  What are the signs and symptoms of a UTI? · Urinating more often than usual, leaking urine, or waking from sleep to urinate    · Pain or burning when you urinate    · Pain or pressure in your lower abdomen     · Urine that smells bad    · Blood in your urine  How is a UTI diagnosed? Your healthcare provider will ask about your signs and symptoms  Your provider may press on your abdomen, sides, and back to check if you feel pain  Your urine will be tested for bacteria that may be causing your infection  If you have UTIs often, you may need more tests to find the cause  How is a UTI treated? · Antibiotics  help fight a bacterial infection  · Medicines  may be given to decrease pain and burning when you urinate  They will also help decrease the feeling that you need to urinate often  These medicines will make your urine orange or red  What can I do to prevent a UTI? · Empty your bladder often  Urinate and empty your bladder as soon as you feel the need  Do not hold your urine for long periods of time  · Wipe from front to back after you urinate or have a bowel movement  This will help prevent germs from getting into your urinary tract through your urethra  · Drink liquids as directed  Ask how much liquid to drink each day and which liquids are best for you  You may need to drink more liquids than usual to help flush out the bacteria  Do not drink alcohol, caffeine, or citrus juices  These can irritate your bladder and increase your symptoms  Your healthcare provider may recommend cranberry juice to help prevent a UTI  · Urinate after you have sex  This can help flush out bacteria passed during sex  · Do not douche or use feminine deodorants  These can change the chemical balance in your vagina  · Change sanitary pads or tampons often  This will help prevent germs from getting into your urinary tract  · Do pelvic muscle exercises often  Pelvic muscle exercises may help you start and stop urinating  Strong pelvic muscles may help you empty your bladder easier  Squeeze these muscles tightly for 5 seconds like you are trying to hold back urine  Then relax for 5 seconds  Gradually work up to squeezing for 10 seconds  Do 3 sets of 15 repetitions a day, or as directed  When should I seek immediate care? · You are urinating very little or not at all  · You have a high fever with shaking chills  · You have side or back pain that gets worse  When should I contact my healthcare provider? · You have a mild fever  · You do not feel better after 2 days of taking antibiotics  · You have new symptoms, such as blood or pus in your urine  · You are vomiting  · You have questions or concerns about your condition or care  CARE AGREEMENT:   You have the right to help plan your care  Learn about your health condition and how it may be treated  Discuss treatment options with your caregivers to decide what care you want to receive  You always have the right to refuse treatment  The above information is an  only   It is not intended as medical advice for individual conditions or treatments  Talk to your doctor, nurse or pharmacist before following any medical regimen to see if it is safe and effective for you  © 2017 [address] Information is for End User's use only and may not be sold, redistributed or otherwise used for commercial purposes  All illustrations and images included in [employer]Notes® are the copyrighted property of A D A M , [employer]  or Chang Mathew

## 2019-02-07 ENCOUNTER — HOSPITAL ENCOUNTER (EMERGENCY)
Facility: HOSPITAL | Age: 59
Discharge: HOME | End: 2019-02-07
Attending: EMERGENCY MEDICINE | Admitting: EMERGENCY MEDICINE
Payer: COMMERCIAL

## 2019-02-07 ENCOUNTER — APPOINTMENT (EMERGENCY)
Dept: CT IMAGING | Facility: HOSPITAL | Age: 59
End: 2019-02-07
Payer: COMMERCIAL

## 2019-02-07 VITALS
HEART RATE: 90 BPM | HEIGHT: 64 IN | WEIGHT: 198.63 LBS | SYSTOLIC BLOOD PRESSURE: 135 MMHG | TEMPERATURE: 98.3 F | OXYGEN SATURATION: 99 % | RESPIRATION RATE: 16 BRPM | BODY MASS INDEX: 33.91 KG/M2 | DIASTOLIC BLOOD PRESSURE: 85 MMHG

## 2019-02-07 DIAGNOSIS — H04.123 BILATERAL DRY EYES: Primary | ICD-10-CM

## 2019-02-07 DIAGNOSIS — F41.8 ANXIETY ABOUT HEALTH: ICD-10-CM

## 2019-02-07 DIAGNOSIS — R68.89 ITCHY EYES: ICD-10-CM

## 2019-02-07 PROCEDURE — 99283 EMERGENCY DEPT VISIT LOW MDM: CPT

## 2019-02-07 PROCEDURE — 70450 CT HEAD/BRAIN W/O DYE: CPT

## 2019-02-07 RX ORDER — CARBOXYMETHYLCELLULOSE SODIUM 5 MG/ML
1 SOLUTION/ DROPS OPHTHALMIC DAILY PRN
Qty: 1 BOTTLE | Refills: 0 | Status: SHIPPED | OUTPATIENT
Start: 2019-02-07

## 2019-02-07 RX ORDER — TETRACAINE HYDROCHLORIDE 5 MG/ML
2 SOLUTION OPHTHALMIC ONCE
Status: COMPLETED | OUTPATIENT
Start: 2019-02-07 | End: 2019-02-07

## 2019-02-07 RX ADMIN — FLUORESCEIN SODIUM 2 STRIP: 1 STRIP OPHTHALMIC at 07:56

## 2019-02-07 RX ADMIN — TETRACAINE HYDROCHLORIDE 2 DROP: 5 SOLUTION OPHTHALMIC at 07:57

## 2019-02-07 NOTE — DISCHARGE INSTRUCTIONS
Dry Eye Syndrome   WHAT YOU NEED TO KNOW:   Dry eye syndrome happens when the eye has trouble keeping moisture  This may be caused by a lack of tears or having tears that cannot moisturize the eye  It may also happen when tears leave the eye too quickly  Dry eye syndrome may also be called dry eye disease or keratoconjunctivitis sicca (KCS)  DISCHARGE INSTRUCTIONS:   Contact your healthcare provider if:   · Your dry eyes do not get better with treatment or get worse  · You have thick, yellow drainage from one or both eyes  · Your eyelids or skin around your eyes is red and swollen  · You have changes in your vision  · You have questions or concerns about your condition or care  Medicines:   · Medicine  may be given to decrease pain or swelling, or treat an eye infection  You may also need medicine to help your eyes make more tears  These medicines will be given as eyedrops  · Take your medicine as directed  Contact your healthcare provider if you think your medicine is not helping or if you have side effects  Tell him of her if you are allergic to any medicine  Keep a list of the medicines, vitamins, and herbs you take  Include the amounts, and when and why you take them  Bring the list or the pill bottles to follow-up visits  Carry your medicine list with you in case of an emergency  Self-care:   · Use artificial tears, gels, and lubricating ointments  as directed  They are available without a doctor's order  These products can replace tears and help add moisture to your eyes  Ask your healthcare provider how often to use these products  Also ask where to buy them  · Apply a warm compress to your eyelids  as directed  Use a soft washcloth soaked in warm water  Leave the compress on your eyelids for 5 minutes  Gently massage your eyelids after you remove the compress  These actions may help open your tear glands   Your tear glands can make oil that will help keep tears and moisture on the eye's surface  · Wear glasses or sunglasses  that cover the sides of your eyes and fit close to your face  These will protect your eyes from dry air  They may also help keep moisture in your eyes  · Use a humidifier  in your home  A humidifier may help keep moisture in the air and prevent dry eyes  · Take vitamins and supplements  as directed  Certain vitamins and supplements may help decrease eye dryness  Examples include fish oil and vitamin A  Ask your healthcare provider what supplements you need and how often to take them  · Eat foods with high amounts of omega-3 fatty acids  Examples include salmon, tuna, walnuts, and flaxseeds  Omega-3 fatty acids may help relieve dry eyes  Ask your healthcare provider for a list of foods that contain fatty acids and how much you should eat each day  · Do not smoke  Nicotine and other chemicals in cigarettes and cigars can cause lung damage  Smoke from cigarettes and cigars can make dry eyes worse  Ask your healthcare provider for information if you currently smoke and need help to quit  E-cigarettes or smokeless tobacco still contain nicotine  Talk to your healthcare provider before you use these products  Follow up with your healthcare provider as directed:  Write down your questions so you remember to ask them during your visits  © 2017 [address] [facility] Information is for End User's use only and may not be sold, redistributed or otherwise used for commercial purposes  All illustrations and images included in CareNotes® are the copyrighted property of A [employer] M [employer]  or Chang Mathew  The above information is an  only  It is not intended as medical advice for individual conditions or treatments  Talk to your doctor, nurse or pharmacist before following any medical regimen to see if it is safe and effective for you

## 2019-02-07 NOTE — ED NOTES
Pt standing in hallway, yelling at Dr , states she is unhappy with recommendation to stop smoking and follow up with her PCP  Dr Manish Galan speaking in hallway with pt  Pt then began to speak calmly  Discharge instructions reviewed with and provided to pt by Dr Manish Galan       [address], RN  02/07/19 5168

## 2019-02-07 NOTE — ED PROVIDER NOTES
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  She has an appointment with Ophthalmology later this month  She also admits that she ran out of her Refresh Plus wetting drops that she is supposed to use daily  She continues to smoke  She has had no recent cough, cold, fever, ocular trauma, eye irritation, known foreign bodies, seasonal/environmental allergies or UV exposure  She was able to drive here and is ambulating in the department without difficulty  She reports "losing everything" in [place] in November, including some of her medications and her eyeglasses  No recent travel or similar sick contacts  Denies f/c, HA, vision change, chest pain, dyspnea, abdominal pain, n/v/d  History provided by:  Patient and medical records  Eye Pain   Location:  Bilateral  Quality:  Dry  itchy  Severity:  Mild  Onset quality:  Gradual  Duration:  5 days  Timing:  Constant  Progression:  Unchanged  Chronicity:  New  Context:  Ran out of rewetting drops  Relieved by:  Nothing tried  Worsened by:  Smoking  Ineffective treatments:  None tried  Associated symptoms: no abdominal pain, no chest pain, no congestion, no cough, no diarrhea, no fatigue, no fever, no headaches, no loss of consciousness, no nausea, no rhinorrhea, no shortness of breath, no sore throat, no vomiting and no wheezing    Risk factors:  Diabetes      Prior to Admission Medications   Prescriptions Last Dose Informant Patient Reported? Taking? DULoxetine (CYMBALTA) 30 mg delayed release capsule   Yes No   Sig: Take 90 mg by mouth   HYDROcodone-acetaminophen (NORCO)  mg per tablet   Yes No   Si tab every 6 hours as needed for severe pain    Do not fill before 2/12/18   Magnesium Gluconate 550 MG TABS   Yes No   Sig: Take 30 mg by mouth   Melatonin 10 MG CAPS   Yes No   Sig: Take 1 tablet by mouth   SUMAtriptan (IMITREX) 100 mg tablet   Yes No   Sig: TAKE 1 TABLET BY MOUTH AT START OF MIGRAINE HEADACHE  MAY REPEAT IN 2 HOURS IF NEEDED   LIMIT TO NO MORE THAN 2 TABLETS IN 24 HOURS, THREE TI   albuterol (PROVENTIL HFA,VENTOLIN HFA) 90 mcg/act inhaler   Yes No   Sig: Inhale 2 puffs every 6 (six) hours   atorvastatin (LIPITOR) 40 mg tablet   Yes No   Sig: Take 40 mg by mouth   busPIRone (BUSPAR) 15 mg tablet   Yes No   Sig: Take 15 mg by mouth   clonazePAM (KlonoPIN) 1 mg tablet   Yes No   Sig: Take 1 mg by mouth Three times a day   cyclobenzaprine (FLEXERIL) 10 mg tablet   Yes No   Sig: TAKE 1 TABLET BY MOUTH 3 TIMES A DAY AS NEEDED FOR MUSCLE SPASMS   lamoTRIgine (LaMICtal) 200 MG tablet   Yes No   Sig: Take 200 mg by mouth   naproxen (NAPROSYN) 500 mg tablet   Yes No   Sig: Take 500 mg by mouth 2 (two) times a day   omeprazole (PriLOSEC) 20 mg delayed release capsule   Yes No   Sig: take 1 capsule by mouth once daily   pregabalin (LYRICA) 100 mg capsule   Yes No   Sig: take 1 capsule by mouth three times a day   pregabalin (LYRICA) 150 mg capsule   Yes No   Sig: take 1 capsule by mouth NIGHTLY   promethazine (PHENERGAN) 25 mg tablet   Yes No   Sig: Take 25 mg by mouth 3 (three) times a day   traZODone (DESYREL) 100 mg tablet   Yes No   Sig: Take 100 mg by mouth      Facility-Administered Medications: None       Past Medical History:   Diagnosis Date    Anxiety     Asthma     Bipolar 1 disorder (HCC)     Brain aneurysm     Chronic pain disorder     spinal stenosis    Concussion syndrome     neurological rx and balance rx    COPD (chronic obstructive pulmonary disease) (HCC)     Depression     Disease of thyroid gland     nodules     Family history of colon cancer     father    Fibromyalgia, primary     History of colon polyps     Hyperlipidemia     Hypertension     Migraine     Neuropathy     bilateral feet and hands    PTSD (post-traumatic stress disorder)     Stroke ([facility] [address] )     2012 no deficeits    TIA (transient ischemic attack)        Past Surgical History:   Procedure Laterality Date    ABDOMINAL SURGERY      laproscopic/ endometriosis    BRAIN SURGERY      aneurysm/ coiling procedure    CARPAL TUNNEL RELEASE      CHOLECYSTECTOMY      DENTAL SURGERY      EYE SURGERY      cataract    HYSTERECTOMY      ME ESOPHAGOGASTRODUODENOSCOPY TRANSORAL DIAGNOSTIC N/A 2/8/2018    Procedure: EGD AND COLONOSCOPY;  Surgeon: Eileen Fleming MD;  Location: [facility]; Service: Gastroenterology    THYROID SURGERY         History reviewed  No pertinent family history  I have reviewed and agree with the history as documented  Social History   Substance Use Topics    Smoking status: Current Every Day Smoker     Packs/day: 1 50     Types: Cigarettes    Smokeless tobacco: Never Used    Alcohol use No        Review of Systems   Constitutional: Negative for chills, fatigue and fever  HENT: Negative for congestion, rhinorrhea, sore throat and trouble swallowing  Eyes: Positive for pain  Respiratory: Negative for cough, chest tightness, shortness of breath and wheezing  Cardiovascular: Negative for chest pain, palpitations and leg swelling  Gastrointestinal: Negative for abdominal pain, diarrhea, nausea and vomiting  Genitourinary: Negative for dysuria, flank pain, frequency and urgency  Musculoskeletal: Negative for back pain, neck pain and neck stiffness  Skin: Negative for pallor  Neurological: Negative for dizziness, loss of consciousness, syncope, weakness, light-headedness, numbness and headaches  Hematological: Negative for adenopathy  Psychiatric/Behavioral: Negative for confusion  The patient is not nervous/anxious  All other systems reviewed and are negative        Physical Exam  Physical Exam   Constitutional: She is oriented to person, place, and time  She appears well-developed and well-nourished  No distress  HENT:   Head: Normocephalic  Edentulous     Eyes: Pupils are equal, round, and reactive to light  EOM are normal    Neck: Normal range of motion  Neck supple  Cardiovascular: Normal rate and regular rhythm  No murmur heard  Pulmonary/Chest: Effort normal and breath sounds normal    Abdominal: Soft  Bowel sounds are normal  She exhibits no distension  There is no tenderness  obese   Musculoskeletal: Normal range of motion  She exhibits no edema or tenderness  Lymphadenopathy:     She has no cervical adenopathy  Neurological: She is alert and oriented to person, place, and time  She has normal reflexes  No cranial nerve deficit  She exhibits normal muscle tone  Skin: Skin is warm and dry  No rash noted  She is not diaphoretic  No erythema  No pallor  Psychiatric: She has a normal mood and affect  Her behavior is normal  Thought content normal    Vitals reviewed  Vital Signs  ED Triage Vitals [02/07/19 0734]   Temperature Pulse Respirations Blood Pressure SpO2   98 3 °F (36 8 °C) 90 16 135/85 99 %      Temp Source Heart Rate Source Patient Position - Orthostatic VS BP Location FiO2 (%)   Temporal Monitor Lying Left arm --      Pain Score       6           Vitals:    02/07/19 0734   BP: 135/85   Pulse: 90   Patient Position - Orthostatic VS: Lying       Visual Acuity  Visual Acuity      Most Recent Value   Visual acuity R eye is  20/50   Visual acuity Left eye is  20/70   Wearing corrective eyewear/lenses?   No [Pt states she lost her glasses 11/2018 and has not been wearing any corrective lenses since]          ED Medications  Medications   fluorescein sodium sterile ophthalmic strip 2 strip (2 strips Both Eyes Given 2/7/19 5287)   tetracaine 0 5 % ophthalmic solution 2 drop (2 drops Both Eyes Given 2/7/19 5393)       Diagnostic Studies  Results Reviewed     None                 CT head without contrast   Final Result by Kenny Duran MD ([id number])      Prior aneurysm embolization adjacent to the right cavernous sinus  No subarachnoid hemorrhage or other acute intracranial findings  Workstation performed: [id number]                    Procedures  Procedures       Phone Contacts  ED Phone Contact    ED Course  ED Course as of Feb 07 0920 Thu Feb 07, 2019   8809 Results reviewed with patient  Feels better but is upset that "no one wants to take care of her"  I did suggest that she stop smoking to improve her health and to afford her medications  She replied that she will smoke because her "daughter doesn't give two shits about" her  All questions answered to the patient's satisfaction  Recommend continued supportive treatment at home and follow up with Ophthalmology  MDM  Number of Diagnoses or Management Options  Diagnosis management comments: DDx:  Dry eyes, no clinical evidence of  conjunctivitis, trauma, foreign body, abrasion or ulceration  A/P: Will check fluoroscein stain uptake, treat symptoms, refer to Optho for f/u  Amount and/or Complexity of Data Reviewed  Review and summarize past medical records: yes        Disposition  Final diagnoses:   Bilateral dry eyes   Itchy eyes   Anxiety about health     Time reflects when diagnosis was documented in both MDM as applicable and the Disposition within this note     Time User Action Codes Description Comment    2/7/2019  9:03 AM [address] [place] [address] [address] [O19 006] Bilateral dry eyes     2/7/2019  9:03 AM Chante Danger Add [R68 [id number]] Itchy eyes     2/7/2019  9:17 AM Chante Danger Add [F41 8] Anxiety about health       ED Disposition     ED Disposition Condition Date/Time Comment    Discharge  Thu Feb 7, 2019  9:03 AM Petty Woodall discharge to home/self care      Condition at discharge: Good        Follow-up Information     Follow up With Specialties Details Why Contact [facility] Go to as scheduled [address] [facility]  [phone]            Discharge Medication List as of 2/7/2019  9:08 AM      START taking these medications    Details   carboxymethylcellulose 0 5 % SOLN Administer 1 drop to both eyes daily as needed for dry eyes, Starting Thu 2/7/2019, Normal         CONTINUE these medications which have NOT CHANGED    Details   albuterol (PROVENTIL HFA,VENTOLIN HFA) 90 mcg/act inhaler Inhale 2 puffs every 6 (six) hours, Starting Thu 12/28/2017, Historical Med      atorvastatin (LIPITOR) 40 mg tablet Take 40 mg by mouth, Starting Tue 1/9/2018, Until Wed 1/9/2019, Historical Med      busPIRone (BUSPAR) 15 mg tablet Take 15 mg by mouth, Starting Mon 10/16/2017, Historical Med      clonazePAM (KlonoPIN) 1 mg tablet Take 1 mg by mouth Three times a day, Starting Thu 1/25/2018, Historical Med      cyclobenzaprine (FLEXERIL) 10 mg tablet TAKE 1 TABLET BY MOUTH 3 TIMES A DAY AS NEEDED FOR MUSCLE SPASMS, Historical Med      DULoxetine (CYMBALTA) 30 mg delayed release capsule Take 90 mg by mouth, Starting Thu 1/25/2018, Historical Med      HYDROcodone-acetaminophen (NORCO)  mg per tablet 1 tab every 6 hours as needed for severe pain    Do not fill before 2/12/18, Historical Med      lamoTRIgine (LaMICtal) 200 MG tablet Take 200 mg by mouth, Starting Tue 1/16/2018, Until Wed 1/16/2019, Historical Med      Magnesium Gluconate 550 MG TABS Take 30 mg by mouth, Historical Med      Melatonin 10 MG CAPS Take 1 tablet by mouth, Historical Med      naproxen (NAPROSYN) 500 mg tablet Take 500 mg by mouth 2 (two) times a day, Starting Fri 7/14/2017, Until Mon 1/7/2019, Historical Med      omeprazole (PriLOSEC) 20 mg delayed release capsule take 1 capsule by mouth once daily, Historical Med      !! pregabalin (LYRICA) 100 mg capsule take 1 capsule by mouth three times a day, Historical Med      !! pregabalin (LYRICA) 150 mg capsule take 1 capsule by mouth NIGHTLY, Historical Med      promethazine (PHENERGAN) 25 mg tablet Take 25 mg by mouth 3 (three) times a day, Historical Med      SUMAtriptan (IMITREX) 100 mg tablet TAKE 1 TABLET BY MOUTH AT START OF MIGRAINE HEADACHE  MAY REPEAT IN 2 HOURS IF NEEDED  LIMIT TO NO MORE THAN 2 TABLETS IN 24 HOURS, THREE TI, Historical Med      traZODone (DESYREL) 100 mg tablet Take 100 mg by mouth, Starting Thu 1/25/2018, Historical Med       !! - Potential duplicate medications found  Please discuss with provider  No discharge procedures on file      ED Provider  Electronically Signed by           Diogo Estrada DO  02/07/19 9413

## 2019-03-14 ENCOUNTER — APPOINTMENT (EMERGENCY)
Dept: CT IMAGING | Facility: HOSPITAL | Age: 59
End: 2019-03-14
Payer: COMMERCIAL

## 2019-03-14 ENCOUNTER — HOSPITAL ENCOUNTER (EMERGENCY)
Facility: HOSPITAL | Age: 59
Discharge: ANOTHER HOSPITAL | End: 2019-03-15
Attending: EMERGENCY MEDICINE
Payer: COMMERCIAL

## 2019-03-14 DIAGNOSIS — R51.9 HEADACHE: Primary | ICD-10-CM

## 2019-03-14 DIAGNOSIS — R90.89 ABNORMAL CT OF BRAIN: ICD-10-CM

## 2019-03-14 DIAGNOSIS — H53.9 VISUAL DISTURBANCE: ICD-10-CM

## 2019-03-14 LAB
ANION GAP SERPL CALCULATED.3IONS-SCNC: 9 MMOL/L (ref 4–13)
APTT PPP: 27 SECONDS (ref 26–38)
BASOPHILS # BLD AUTO: 0.04 THOUSANDS/ΜL (ref 0–0.1)
BASOPHILS NFR BLD AUTO: 1 % (ref 0–1)
BUN SERPL-MCNC: 12 MG/DL (ref 5–25)
CALCIUM SERPL-MCNC: 9 MG/DL (ref 8.3–10.1)
CHLORIDE SERPL-SCNC: 103 MMOL/L (ref 100–108)
CO2 SERPL-SCNC: 28 MMOL/L (ref 21–32)
CREAT SERPL-MCNC: 1.05 MG/DL (ref 0.6–1.3)
EOSINOPHIL # BLD AUTO: 0.07 THOUSAND/ΜL (ref 0–0.61)
EOSINOPHIL NFR BLD AUTO: 1 % (ref 0–6)
ERYTHROCYTE [DISTWIDTH] IN BLOOD BY AUTOMATED COUNT: 13.2 % (ref 11.6–15.1)
ERYTHROCYTE [SEDIMENTATION RATE] IN BLOOD: 26 MM/HOUR (ref 0–20)
GFR SERPL CREATININE-BSD FRML MDRD: 59 ML/MIN/1.73SQ M
GLUCOSE SERPL-MCNC: 80 MG/DL (ref 65–140)
GLUCOSE SERPL-MCNC: 97 MG/DL (ref 65–140)
HCT VFR BLD AUTO: 39.6 % (ref 34.8–46.1)
HGB BLD-MCNC: 12.7 G/DL (ref 11.5–15.4)
IMM GRANULOCYTES # BLD AUTO: 0.03 THOUSAND/UL (ref 0–0.2)
IMM GRANULOCYTES NFR BLD AUTO: 0 % (ref 0–2)
INR PPP: 1.09 (ref 0.86–1.17)
LYMPHOCYTES # BLD AUTO: 3.49 THOUSANDS/ΜL (ref 0.6–4.47)
LYMPHOCYTES NFR BLD AUTO: 46 % (ref 14–44)
MAGNESIUM SERPL-MCNC: 2.1 MG/DL (ref 1.6–2.6)
MCH RBC QN AUTO: 30.8 PG (ref 26.8–34.3)
MCHC RBC AUTO-ENTMCNC: 32.1 G/DL (ref 31.4–37.4)
MCV RBC AUTO: 96 FL (ref 82–98)
MONOCYTES # BLD AUTO: 0.41 THOUSAND/ΜL (ref 0.17–1.22)
MONOCYTES NFR BLD AUTO: 5 % (ref 4–12)
NEUTROPHILS # BLD AUTO: 3.52 THOUSANDS/ΜL (ref 1.85–7.62)
NEUTS SEG NFR BLD AUTO: 47 % (ref 43–75)
NRBC BLD AUTO-RTO: 0 /100 WBCS
PLATELET # BLD AUTO: 200 THOUSANDS/UL (ref 149–390)
PMV BLD AUTO: 10.6 FL (ref 8.9–12.7)
POTASSIUM SERPL-SCNC: 3.4 MMOL/L (ref 3.5–5.3)
PROTHROMBIN TIME: 13.6 SECONDS (ref 11.8–14.2)
RBC # BLD AUTO: 4.12 MILLION/UL (ref 3.81–5.12)
SODIUM SERPL-SCNC: 140 MMOL/L (ref 136–145)
TROPONIN I SERPL-MCNC: <0.02 NG/ML
TSH SERPL DL<=0.05 MIU/L-ACNC: 1.14 UIU/ML (ref 0.36–3.74)
WBC # BLD AUTO: 7.56 THOUSAND/UL (ref 4.31–10.16)

## 2019-03-14 PROCEDURE — 93005 ELECTROCARDIOGRAM TRACING: CPT

## 2019-03-14 PROCEDURE — 85610 PROTHROMBIN TIME: CPT | Performed by: EMERGENCY MEDICINE

## 2019-03-14 PROCEDURE — 85652 RBC SED RATE AUTOMATED: CPT | Performed by: EMERGENCY MEDICINE

## 2019-03-14 PROCEDURE — 96375 TX/PRO/DX INJ NEW DRUG ADDON: CPT

## 2019-03-14 PROCEDURE — 70496 CT ANGIOGRAPHY HEAD: CPT

## 2019-03-14 PROCEDURE — 84443 ASSAY THYROID STIM HORMONE: CPT | Performed by: EMERGENCY MEDICINE

## 2019-03-14 PROCEDURE — 83735 ASSAY OF MAGNESIUM: CPT | Performed by: EMERGENCY MEDICINE

## 2019-03-14 PROCEDURE — 96366 THER/PROPH/DIAG IV INF ADDON: CPT

## 2019-03-14 PROCEDURE — 82948 REAGENT STRIP/BLOOD GLUCOSE: CPT

## 2019-03-14 PROCEDURE — 84484 ASSAY OF TROPONIN QUANT: CPT | Performed by: EMERGENCY MEDICINE

## 2019-03-14 PROCEDURE — 70498 CT ANGIOGRAPHY NECK: CPT

## 2019-03-14 PROCEDURE — 96376 TX/PRO/DX INJ SAME DRUG ADON: CPT

## 2019-03-14 PROCEDURE — 71260 CT THORAX DX C+: CPT

## 2019-03-14 PROCEDURE — 96361 HYDRATE IV INFUSION ADD-ON: CPT

## 2019-03-14 PROCEDURE — 74177 CT ABD & PELVIS W/CONTRAST: CPT

## 2019-03-14 PROCEDURE — 96374 THER/PROPH/DIAG INJ IV PUSH: CPT

## 2019-03-14 PROCEDURE — 99285 EMERGENCY DEPT VISIT HI MDM: CPT

## 2019-03-14 PROCEDURE — 85025 COMPLETE CBC W/AUTO DIFF WBC: CPT | Performed by: EMERGENCY MEDICINE

## 2019-03-14 PROCEDURE — 80048 BASIC METABOLIC PNL TOTAL CA: CPT | Performed by: EMERGENCY MEDICINE

## 2019-03-14 PROCEDURE — 36415 COLL VENOUS BLD VENIPUNCTURE: CPT | Performed by: EMERGENCY MEDICINE

## 2019-03-14 PROCEDURE — 85730 THROMBOPLASTIN TIME PARTIAL: CPT | Performed by: EMERGENCY MEDICINE

## 2019-03-14 PROCEDURE — 96365 THER/PROPH/DIAG IV INF INIT: CPT

## 2019-03-14 RX ORDER — FENTANYL CITRATE 50 UG/ML
50 INJECTION, SOLUTION INTRAMUSCULAR; INTRAVENOUS ONCE
Status: COMPLETED | OUTPATIENT
Start: 2019-03-14 | End: 2019-03-14

## 2019-03-14 RX ORDER — PREGABALIN 50 MG/1
50 CAPSULE ORAL ONCE
Status: COMPLETED | OUTPATIENT
Start: 2019-03-14 | End: 2019-03-14

## 2019-03-14 RX ORDER — FENTANYL CITRATE 50 UG/ML
25 INJECTION, SOLUTION INTRAMUSCULAR; INTRAVENOUS ONCE
Status: COMPLETED | OUTPATIENT
Start: 2019-03-14 | End: 2019-03-14

## 2019-03-14 RX ORDER — NICOTINE 21 MG/24HR
21 PATCH, TRANSDERMAL 24 HOURS TRANSDERMAL ONCE
Status: DISCONTINUED | OUTPATIENT
Start: 2019-03-14 | End: 2019-03-15 | Stop reason: HOSPADM

## 2019-03-14 RX ORDER — ONDANSETRON 2 MG/ML
4 INJECTION INTRAMUSCULAR; INTRAVENOUS ONCE
Status: COMPLETED | OUTPATIENT
Start: 2019-03-14 | End: 2019-03-14

## 2019-03-14 RX ORDER — CLONAZEPAM 0.5 MG/1
1 TABLET ORAL ONCE
Status: COMPLETED | OUTPATIENT
Start: 2019-03-14 | End: 2019-03-14

## 2019-03-14 RX ORDER — MAGNESIUM SULFATE HEPTAHYDRATE 40 MG/ML
2 INJECTION, SOLUTION INTRAVENOUS ONCE
Status: COMPLETED | OUTPATIENT
Start: 2019-03-14 | End: 2019-03-14

## 2019-03-14 RX ADMIN — PREGABALIN 50 MG: 50 CAPSULE ORAL at 23:14

## 2019-03-14 RX ADMIN — IOHEXOL 100 ML: 350 INJECTION, SOLUTION INTRAVENOUS at 18:37

## 2019-03-14 RX ADMIN — FENTANYL CITRATE 50 MCG: 50 INJECTION, SOLUTION INTRAMUSCULAR; INTRAVENOUS at 17:57

## 2019-03-14 RX ADMIN — CLONAZEPAM 1 MG: 0.5 TABLET ORAL at 23:14

## 2019-03-14 RX ADMIN — NICOTINE 21 MG: 21 PATCH TRANSDERMAL at 21:59

## 2019-03-14 RX ADMIN — FENTANYL CITRATE 25 MCG: 50 INJECTION, SOLUTION INTRAMUSCULAR; INTRAVENOUS at 23:15

## 2019-03-14 RX ADMIN — ONDANSETRON 4 MG: 2 INJECTION INTRAMUSCULAR; INTRAVENOUS at 17:57

## 2019-03-14 RX ADMIN — MAGNESIUM SULFATE IN WATER 2 G: 40 INJECTION, SOLUTION INTRAVENOUS at 18:01

## 2019-03-14 RX ADMIN — SODIUM CHLORIDE 1000 ML: 0.9 INJECTION, SOLUTION INTRAVENOUS at 17:57

## 2019-03-14 NOTE — ED PROVIDER NOTES
History  Chief Complaint   Patient presents with    CVA/TIA-like Symptoms     Patient states for the past three days, she has had episodes of blurred vison, slurred speech, and episodes of lost time  Patient states she also has a headache and feels like her vision is cloudy  20-year-old right-hand-dominant female was just at her family doctor's 3 days ago for routine checkup she had her Topamax Klonopin and Norco refilled  She had a repeat UA secondary to continued urinary symptoms and that he grew out E coli and had an unknown antibiotic called in which she has been taking for 1 day  Patient does have a history of migraine headaches a stroke in 2012 without deficits and history of according procedure to a brain aneurysm; she does complain of a continuous migraine  She states the intensity of either of her migraines has diminished but the frequency has increased  She states over the last 3 days she has had intermittent periods of talking nonsense where people feel she is rambling  She has been lightheaded and feels very off balance she has a diffuse headache  She has complains of gray spots to revision  She feels lethargic  Her chronic neck pain is worse  She denies any nausea as this has been well controlled on Zofran she did take an Imitrex at 3 this morning as well as a Norco   She has been lightheaded and feels as if she is going to pass out  She denies any fever or chills  She has had a an occasional cough which she attributes to smoking no chest pain or shortness of breath she has been bothered by bilateral lower quadrant abdominal pain which she attributes to her UTI and/or chronic back pain is worse and she is unsure if that is due to her kidneys  She does have scheduled urologic follow-up and she recently saw an ophthalmologist   She denies any trauma or falls  She has had no lower extremity edema  The current headache feels different from her prior migraines             Prior to Admission Medications   Prescriptions Last Dose Informant Patient Reported? Taking? DULoxetine (CYMBALTA) 30 mg delayed release capsule   Yes No   Sig: Take 90 mg by mouth   HYDROcodone-acetaminophen (NORCO)  mg per tablet   Yes No   Si tab every 6 hours as needed for severe pain   Do not fill before 18   Magnesium Gluconate 550 MG TABS   Yes No   Sig: Take 30 mg by mouth   Melatonin 10 MG CAPS   Yes No   Sig: Take 1 tablet by mouth   SUMAtriptan (IMITREX) 100 mg tablet   Yes No   Sig: TAKE 1 TABLET BY MOUTH AT START OF MIGRAINE HEADACHE  MAY REPEAT IN 2 HOURS IF NEEDED   LIMIT TO NO MORE THAN 2 TABLETS IN 24 HOURS, THREE TI   albuterol (PROVENTIL HFA,VENTOLIN HFA) 90 mcg/act inhaler   Yes No   Sig: Inhale 2 puffs every 6 (six) hours   atorvastatin (LIPITOR) 40 mg tablet   Yes No   Sig: Take 40 mg by mouth   busPIRone (BUSPAR) 15 mg tablet   Yes No   Sig: Take 15 mg by mouth   carboxymethylcellulose 0 5 % SOLN   No No   Sig: Administer 1 drop to both eyes daily as needed for dry eyes   clonazePAM (KlonoPIN) 1 mg tablet   Yes No   Sig: Take 1 mg by mouth Three times a day   cyclobenzaprine (FLEXERIL) 10 mg tablet   Yes No   Sig: TAKE 1 TABLET BY MOUTH 3 TIMES A DAY AS NEEDED FOR MUSCLE SPASMS   lamoTRIgine (LaMICtal) 200 MG tablet   Yes No   Sig: Take 200 mg by mouth   naproxen (NAPROSYN) 500 mg tablet   Yes No   Sig: Take 500 mg by mouth 2 (two) times a day   omeprazole (PriLOSEC) 20 mg delayed release capsule   Yes No   Sig: take 1 capsule by mouth once daily   pregabalin (LYRICA) 100 mg capsule   Yes No   Sig: take 1 capsule by mouth three times a day   pregabalin (LYRICA) 150 mg capsule   Yes No   Sig: take 1 capsule by mouth NIGHTLY   promethazine (PHENERGAN) 25 mg tablet   Yes No   Sig: Take 25 mg by mouth 3 (three) times a day   traZODone (DESYREL) 100 mg tablet   Yes No   Sig: Take 100 mg by mouth      Facility-Administered Medications: None       Past Medical History:   Diagnosis Date    Anxiety     Asthma     Bipolar 1 disorder ([facility] )     Brain aneurysm     Chronic pain disorder     spinal stenosis    Concussion syndrome     neurological rx and balance rx    COPD (chronic obstructive pulmonary disease) (HCC)     Depression     Disease of thyroid gland     nodules     Family history of colon cancer     father    Fibromyalgia, primary     History of colon polyps     Hyperlipidemia     Hypertension     Migraine     Neuropathy     bilateral feet and hands    PTSD (post-traumatic stress disorder)     Stroke ([place] [address] )     2012 no deficeits    TIA (transient ischemic attack)        Past Surgical History:   Procedure Laterality Date    ABDOMINAL SURGERY      laproscopic/ endometriosis    BRAIN SURGERY      aneurysm/ coiling procedure    CARPAL TUNNEL RELEASE      CHOLECYSTECTOMY      DENTAL SURGERY      EYE SURGERY      cataract    HYSTERECTOMY      [place] ESOPHAGOGASTRODUODENOSCOPY TRANSORAL DIAGNOSTIC N/A 2/8/2018    Procedure: EGD AND COLONOSCOPY;  Surgeon: Nigel Matta MD;  Location: [facility]; Service: Gastroenterology    THYROID SURGERY         History reviewed  No pertinent family history  I have reviewed and agree with the history as documented  Social History     Tobacco Use    Smoking status: Current Every Day Smoker     Packs/day: 2 00     Types: Cigarettes    Smokeless tobacco: Never Used   Substance Use Topics    Alcohol use: No    Drug use: No        Review of Systems   Constitutional: Positive for activity change, appetite change and fatigue  Negative for chills, diaphoresis and fever  HENT: Negative for congestion, ear pain, facial swelling, hearing loss, sinus pressure, sinus pain, trouble swallowing and voice change  Eyes: Positive for visual disturbance  Negative for photophobia  Respiratory: Positive for cough  Negative for shortness of breath  Cardiovascular: Negative for chest pain and leg swelling     Gastrointestinal: Positive for abdominal pain (bilateral lower quadrants)  Negative for diarrhea, nausea and vomiting  Genitourinary: Positive for dysuria and flank pain (bilateral)  Negative for difficulty urinating  Musculoskeletal: Positive for back pain and neck pain  Negative for neck stiffness  Skin: Negative for rash  Neurological: Positive for dizziness, light-headedness and headaches  Negative for syncope, weakness and numbness  Psychiatric/Behavioral: Negative for confusion  All other systems reviewed and are negative  Physical Exam  Physical Exam   Constitutional: She is oriented to person, place, and time  She appears well-developed and well-nourished  No distress  HENT:   Head: Normocephalic and atraumatic  Right Ear: External ear normal    Left Ear: External ear normal    Nose: Nose normal    Mouth/Throat: Oropharynx is clear and moist  No oropharyngeal exudate  TMS pale   Eyes: Pupils are equal, round, and reactive to light  Conjunctivae and EOM are normal  Right eye exhibits no discharge  Left eye exhibits no discharge  Visual acuity obtained by RN using Rosenbalm uncorrected 20/70 on the left;  20/50 on the right; direct confrontation patient has intact visual fields on the right the left she has finger wave to all quadrants  Neck: Normal range of motion  Neck supple  No midline or paraspinous tenderness   Cardiovascular: Normal rate, regular rhythm, normal heart sounds and intact distal pulses  Pulmonary/Chest: Effort normal and breath sounds normal  No respiratory distress  Abdominal: Soft  Bowel sounds are normal  She exhibits no distension and no mass  There is tenderness (bilateral lower quadrants)  There is no rebound and no guarding  Back no midline tenderness  Bilateral  CVA tenderness   Musculoskeletal: Normal range of motion  She exhibits no edema, tenderness or deformity  Lymphadenopathy:     She has no cervical adenopathy     Neurological: She is alert and oriented to person, place, and time  She displays normal reflexes  No cranial nerve deficit or sensory deficit  She exhibits normal muscle tone  Coordination normal    Toes downgoing; no pronator drift   equal   Skin: Skin is warm and dry  She is not diaphoretic  Psychiatric: She has a normal mood and affect  Vitals reviewed        Vital Signs  ED Triage Vitals [03/14/19 1702]   Temperature Pulse Respirations Blood Pressure SpO2   98 7 °F (37 1 °C) 74 16 140/84 99 %      Temp Source Heart Rate Source Patient Position - Orthostatic VS BP Location FiO2 (%)   Temporal Monitor Sitting Right arm --      Pain Score       9           Vitals:    03/14/19 2230 03/14/19 2300 03/14/19 2330 03/15/19 0000   BP: 115/68 119/69 112/67 95/56   Pulse: 69 68 73 74   Patient Position - Orthostatic VS: Sitting Sitting Lying Lying       qSOFA     Row Name 03/15/19 0000 03/14/19 2330 03/14/19 2300 03/14/19 2230 03/14/19 2100    Altered mental status GCS < 15              Respiratory Rate > / =22  0  0  0  0  0    Systolic BP < / =208  1  0  0  0  0    Q Sofa Score  1  0  0  0  0    Row Name 03/14/19 2030 03/14/19 2000 03/14/19 1930 03/14/19 1900 03/14/19 1845    Altered mental status GCS < 15              Respiratory Rate > / =22  0  0  0  0  0    Systolic BP < / =955  0  0  0  0  0    Q Sofa Score  0  0  0  0  0    Row Name 03/14/19 1800 03/14/19 1735 03/14/19 1702          Altered mental status GCS < 15    0        Respiratory Rate > / =22  0  0  0      Systolic BP < / =249  0  0  0      Q Sofa Score  0  0  0            Visual Acuity  Visual Acuity      Most Recent Value   Visual acuity R eye is  20/50   Visual acuity Left eye is  20/70   L Pupil Size (mm)  3   R Pupil Size (mm)  3          ED Medications  Medications   nicotine (NICODERM CQ) 21 mg/24 hr TD 24 hr patch 21 mg (21 mg Transdermal Medication Applied 3/14/19 2159)   sodium chloride 0 9 % bolus 1,000 mL (0 mL Intravenous Stopped 3/14/19 1857)   magnesium sulfate 2 g/50 mL IVPB (premix) 2 g (0 g Intravenous Stopped 3/14/19 2001)   ondansetron (ZOFRAN) injection 4 mg (4 mg Intravenous Given 3/14/19 1757)   fentanyl citrate (PF) 100 MCG/2ML 50 mcg (50 mcg Intravenous Given 3/14/19 1757)   iohexol (OMNIPAQUE) 350 MG/ML injection (SINGLE-DOSE) 100 mL (100 mL Intravenous Given 3/14/19 1837)   pregabalin (LYRICA) capsule 50 mg (50 mg Oral Given 3/14/19 2314)   fentanyl citrate (PF) 100 MCG/2ML 25 mcg (25 mcg Intravenous Given 3/14/19 2315)   clonazePAM (KlonoPIN) tablet 1 mg (1 mg Oral Given 3/14/19 2314)       Diagnostic Studies  Results Reviewed     Procedure Component Value Units Date/Time    Sedimentation rate, automated [[id number]]  (Abnormal) Collected:  03/14/19 1740    Lab Status:  Final result Specimen:  Blood Updated:  03/14/19 1853     Sed Rate 26 mm/hour     Magnesium [[id number]]  (Normal) Collected:  03/14/19 1802    Lab Status:  Final result Specimen:  Blood from Arm, Left Updated:  03/14/19 1827     Magnesium 2 1 mg/dL     Basic metabolic panel [[id number]]  (Abnormal) Collected:  03/14/19 1726    Lab Status:  Final result Specimen:  Blood from Arm, Right Updated:  03/14/19 1758     Sodium 140 mmol/L      Potassium 3 4 mmol/L      Chloride 103 mmol/L      CO2 28 mmol/L      ANION GAP 9 mmol/L      BUN 12 mg/dL      Creatinine 1 05 mg/dL      Glucose 97 mg/dL      Calcium 9 0 mg/dL      eGFR 59 ml/min/1 73sq m     Narrative:       National Kidney Disease Education Program recommendations are as follows:  GFR calculation is accurate only with a steady state creatinine  Chronic Kidney disease less than 60 ml/min/1 73 sq  meters  Kidney failure less than 15 ml/min/1 73 sq  meters      TSH [[id number]]  (Normal) Collected:  03/14/19 1726    Lab Status:  Final result Specimen:  Blood from Arm, Right Updated:  03/14/19 1758     TSH 3RD GENERATON 1 135 uIU/mL     Narrative:       Patients undergoing fluorescein dye angiography may retain small amounts of fluorescein in the body for 48-72 hours post procedure  Samples containing fluorescein can produce falsely depressed TSH values  If the patient had this procedure,a specimen should be resubmitted post fluorescein clearance  Troponin I [[id number]]  (Normal) Collected:  03/14/19 1726    Lab Status:  Final result Specimen:  Blood from Arm, Right Updated:  03/14/19 1752     Troponin I <0 02 ng/mL     Protime-INR [[id number]]  (Normal) Collected:  03/14/19 1726    Lab Status:  Final result Specimen:  Blood from Arm, Right Updated:  03/14/19 1745     Protime 13 6 seconds      INR 1 09    APTT [[id number]]  (Normal) Collected:  03/14/19 1726    Lab Status:  Final result Specimen:  Blood from Arm, Right Updated:  03/14/19 1745     PTT 27 seconds     CBC and differential [[id number]]  (Abnormal) Collected:  03/14/19 1726    Lab Status:  Final result Specimen:  Blood from Arm, Right Updated:  03/14/19 1736     WBC 7 56 Thousand/uL      RBC 4 12 Million/uL      Hemoglobin 12 7 g/dL      Hematocrit 39 6 %      MCV 96 fL      MCH 30 8 pg      MCHC 32 1 g/dL      RDW 13 2 %      MPV 10 6 fL      Platelets 064 Thousands/uL      nRBC 0 /100 WBCs      Neutrophils Relative 47 %      Immat GRANS % 0 %      Lymphocytes Relative 46 %      Monocytes Relative 5 %      Eosinophils Relative 1 %      Basophils Relative 1 %      Neutrophils Absolute 3 52 Thousands/µL      Immature Grans Absolute 0 03 Thousand/uL      Lymphocytes Absolute 3 49 Thousands/µL      Monocytes Absolute 0 41 Thousand/µL      Eosinophils Absolute 0 07 Thousand/µL      Basophils Absolute 0 04 Thousands/µL     Fingerstick Glucose (POCT) [[id number]]  (Normal) Collected:  03/14/19 1725    Lab Status:  Final result Updated:  03/14/19 1726     POC Glucose 80 mg/dl                  CTA head and neck with and without contrast   Final Result by Yanna Alexander MD (03/14 1853)      No evidence of acute intracranial hemorrhage  No evidence of hemodynamic significant stenosis, aneurysm or dissection  Microangiopathy and 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  Right carotid siphon coiling  Prominent bilateral optic nerve sheaths, consider follow-up brain/orbital MRI if clinically warranted  Workstation performed: [id number]         CT chest abdomen pelvis w contrast   Final Result by Alexander Reis MD (03/14 1944)      Unchanged hepatic cysts and intrahepatic/extra hepatic biliary ductal dilatation likely secondary change to cholecystectomy  Hepatomegaly  Workstation performed: [id number]                    Procedures  ECG 12 Lead Documentation  Date/Time: 3/14/2019 5:23 PM  Performed by: Cecilia Cisneros MD  Authorized by: Cecilia Cisneros MD     Indications / Diagnosis:  Blurred vision  ECG reviewed by me, the ED Provider: yes    Patient location:  ED  Previous ECG:     Previous ECG:  Compared to current    Comparison ECG info:  2/7/18    Similarity:  No change  Rate:     ECG rate:  70    ECG rate assessment: normal    Rhythm:     Rhythm: sinus rhythm    QRS:     QRS axis:  Normal  Comments:      No acute ischemic changes           Phone Contacts  ED Phone Contact    ED Course  ED Course as of Mar 15 0132   Thu Mar 14, 2019   2003 Sleeping easily awakened  Headache resolved  C/o lightheadedness  Will review findings with neurology      2055 Case reviewed with Dr Tricia Desouza neurology of concern is the dilated optic sheath which could be an indication of increased intracranial pressure  He recommends patient be observed for MRI of the brain and orbits tomorrow if there continues to be persistent signs of elevated pressure patient would need a diagnostic lumbar puncture to evaluate for elevated pressures  2141 Case reviewed with MARC Conley  who reviewed it with Dr Jass Dos Santos interventional Radiology is not available at this campus until next week    Recommends transfer for interventional Radiology availability for potential need for a lumbar puncture reviewed these recommendations with patient she is requesting [address] [place] if can accomodate will reviewed with transfer center      2242 Case reviewed with LV[facility]/pacs  Awaiting callback from admitting team      [phone] Case reviewed with Dr Brenna Keith, hospitalist accepts patient in transfer to [place] [address]      3490 Patient requesting her lyrica, klonopin and additional pain medication will adminster                Stroke Assessment     Row Name 03/14/19 1720             NIH Stroke Scale    Interval  Baseline      Level of Consciousness (1a )  0      LOC Questions (1b )  0      LOC Commands (1c )  0      Best Gaze (2 )  0      Visual (3 )  0      Facial Palsy (4 )  0      Motor Arm, Left (5a )  0      Motor Arm, Right (5b )  0      Motor Leg, Left (6a )  0      Motor Leg, Right (6b )  0      Limb Ataxia (7 )  0      Sensory (8 )  0      Best Language (9 )  0      Dysarthria (10 )  0      Extinction and Inattention (11 ) (Formerly Neglect)  0      Total  0                          MDM  Number of Diagnoses or Management Options  Abnormal CT of brain:   Headache:   Visual disturbance:   Diagnosis management comments: Mdm:  Given 3 days of symptoms with NIH of 0 patient is not a candidate for tPA  Will proceed with CT of the head and neck due to her symptoms and prior history of aneurysms  Will initiate treatment for migraine as this may be atypical migraine    And secondary to abdominal tenderness will pursue CT abdomen pelvis to evaluate kidneys      Disposition  Final diagnoses:   Headache   Visual disturbance   Abnormal CT of brain - prominent bilateral optic nerve sheaths ? incr ICP     Time reflects when diagnosis was documented in both MDM as applicable and the Disposition within this note     Time User Action Codes Description Comment    3/14/2019 10:23 PM Oanh Lloyd Add [R51] Headache     3/14/2019 10:23 PM Oanh Lloyd Add [H53 9] Visual disturbance     3/14/2019 10:24 PM Micki Louis O Add [R90 89] Abnormal CT of brain     3/14/2019 10:25 PM Clive Eugene Modify [R90 89] Abnormal CT of brain prominent bilateral optic nerve sheaths    3/14/2019 10:25 PM Yulissa Hogue Modify [R90 89] Abnormal CT of brain prominent bilateral optic nerve sheaths ? incr ICP      ED Disposition     ED Disposition Condition Date/Time Comment    Transfer to Another [facility]  Thu Mar 14, 2019 11:09 PM Abiel Melton should be transferred out to [facility] Dr Aracely Sr accepts patient in transfer      MD Documentation      Most Recent Value   Patient Condition  The patient has been stabilized such that within reasonable medical probability, no material deterioration of the patient condition or the condition of the unborn child(miquel) is likely to result from the transfer   Reason for Transfer  Level of Care needed not available at this facility   Benefits of Transfer  Specialized equipment and/or services available at the receiving facility (Include comment)________________________ Cathye Hedger of interventional radiology]   Risks of Transfer  Potential for delay in receiving treatment   Accepting Physician  Dr Heidi Cao Name, [address]    (Name & Tel number)  PACs   Sending MD Dr Efren Townsend [address] Name, [address]   Bed Assignment  [id number]    (Name & Tel number)  PACs      Follow-up Information    None         Discharge Medication List as of 3/15/2019 12:31 AM      CONTINUE these medications which have NOT CHANGED    Details   albuterol (PROVENTIL HFA,VENTOLIN HFA) 90 mcg/act inhaler Inhale 2 puffs every 6 (six) hours, Starting Thu 12/28/2017, Historical Med      atorvastatin (LIPITOR) 40 mg tablet Take 40 mg by mouth, Starting Tue 1/9/2018, Until Wed 1/9/2019, Historical Med      busPIRone (BUSPAR) 15 mg tablet Take 15 mg by mouth, Starting Mon 10/16/2017, Historical Med      carboxymethylcellulose 0 5 % SOLN Administer 1 drop to both eyes daily as needed for dry eyes, Starting Thu 2/7/2019, Normal      clonazePAM (KlonoPIN) 1 mg tablet Take 1 mg by mouth Three times a day, Starting Thu 1/25/2018, Historical Med      cyclobenzaprine (FLEXERIL) 10 mg tablet TAKE 1 TABLET BY MOUTH 3 TIMES A DAY AS NEEDED FOR MUSCLE SPASMS, Historical Med      DULoxetine (CYMBALTA) 30 mg delayed release capsule Take 90 mg by mouth, Starting Thu 1/25/2018, Historical Med      HYDROcodone-acetaminophen (NORCO)  mg per tablet 1 tab every 6 hours as needed for severe pain   Do not fill before 2/12/18, Historical Med      lamoTRIgine (LaMICtal) 200 MG tablet Take 200 mg by mouth, Starting Tue 1/16/2018, Until Wed 1/16/2019, Historical Med      Magnesium Gluconate 550 MG TABS Take 30 mg by mouth, Historical Med      Melatonin 10 MG CAPS Take 1 tablet by mouth, Historical Med      naproxen (NAPROSYN) 500 mg tablet Take 500 mg by mouth 2 (two) times a day, Starting Fri 7/14/2017, Until Mon 1/7/2019, Historical Med      omeprazole (PriLOSEC) 20 mg delayed release capsule take 1 capsule by mouth once daily, Historical Med      !! pregabalin (LYRICA) 100 mg capsule take 1 capsule by mouth three times a day, Historical Med      !! pregabalin (LYRICA) 150 mg capsule take 1 capsule by mouth NIGHTLY, Historical Med      promethazine (PHENERGAN) 25 mg tablet Take 25 mg by mouth 3 (three) times a day, Historical Med      SUMAtriptan (IMITREX) 100 mg tablet TAKE 1 TABLET BY MOUTH AT START OF MIGRAINE HEADACHE  MAY REPEAT IN 2 HOURS IF NEEDED  LIMIT TO NO MORE THAN 2 TABLETS IN 24 HOURS, THREE TI, Historical Med      traZODone (DESYREL) 100 mg tablet Take 100 mg by mouth, Starting Thu 1/25/2018, Historical Med       !! - Potential duplicate medications found  Please discuss with provider  No discharge procedures on file      ED Provider  Electronically Signed by           Roslyn Quan MD  03/15/19 6601

## 2019-03-15 VITALS
RESPIRATION RATE: 17 BRPM | OXYGEN SATURATION: 95 % | HEART RATE: 74 BPM | SYSTOLIC BLOOD PRESSURE: 95 MMHG | WEIGHT: 193.78 LBS | BODY MASS INDEX: 33.26 KG/M2 | TEMPERATURE: 98.7 F | DIASTOLIC BLOOD PRESSURE: 56 MMHG

## 2019-03-15 LAB
ATRIAL RATE: 70 BPM
P AXIS: 68 DEGREES
PR INTERVAL: 154 MS
QRS AXIS: 74 DEGREES
QRSD INTERVAL: 78 MS
QT INTERVAL: 410 MS
QTC INTERVAL: 442 MS
T WAVE AXIS: 60 DEGREES
VENTRICULAR RATE: 70 BPM

## 2019-03-15 PROCEDURE — 93010 ELECTROCARDIOGRAM REPORT: CPT | Performed by: INTERNAL MEDICINE

## 2019-03-15 NOTE — QUICK NOTE
Received a call in regard to this patient  Multiple complaints in addition to current severe headache accompanied by visual disturbance  I personally reviewed her prior head CT with the radiologist   Her optic nerves are clearly more prominent on the newer head CT with concern for potential idiopathic intracranial hypertension  Recommended that the patient should be observed then further evaluated with an MRI of the brain and orbits verses a lumbar puncture (if the patient's pressure is found to be greater than 20 cm of water then we should likely drain approximately 30 cc of cerebrospinal fluid and check a closing pressure dot)

## 2019-03-15 NOTE — ED NOTES
Luis MCMAHON @[facility]   Room 4T-406 Dr Parisa Murray, RN  03/14/19 3426

## 2019-03-15 NOTE — EMTALA/ACUTE CARE TRANSFER
[address] [facility] EMERGENCY DEPARTMENT  [address] [facility] [address]  Dept: [phone]      EMTALA TRANSFER CONSENT    NAME Noe Woodall                                         1960                              MRN [id number]    I have been informed of my rights regarding examination, treatment, and transfer   by Dr Lucia Milian MD    Benefits: Specialized equipment and/or services available at the receiving facility (Include comment)________________________(availability of interventional radiology)    Risks: Potential for delay in receiving treatment      Transfer Request   I acknowledge that my medical condition has been evaluated and explained to me by the emergency department physician or other qualified medical person and/or my attending physician who has recommended and offered to me further medical examination and treatment  I understand the Hospital's obligation with respect to the treatment and stabilization of my emergency medical condition  I nevertheless request to be transferred  I release the Hospital, the doctor, and any other persons caring for me from all responsibility or liability for any injury or ill effects that may result from my transfer and agree to accept all responsibility for the consequences of my choice to transfer, rather than receive stabilizing treatment at the Hospital  I understand that because the transfer is my request, my insurance may not provide reimbursement for the services  The Hospital will assist and direct me and my family in how to make arrangements for transfer, but the hospital is not liable for any fees charged by the transport service  In spite of this understanding, I refuse to consent to further medical examination and treatment which has been offered to me, and request transfer to  [address], [address] : [place] [address] [place] [address]   I authorize the performance of emergency medical procedures and treatments upon me in both transit and upon arrival at the receiving facility  Additionally, I authorize the release of any and all medical records to the receiving facility and request they be transported with me, if possible  I authorize the performance of emergency medical procedures and treatments upon me in both transit and upon arrival at the receiving facility  Additionally, I authorize the release of any and all medical records to the receiving facility and request they be transported with me, if possible  I understand that the safest mode of transportation during a medical emergency is an ambulance and that the Hospital advocates the use of this mode of transport  Risks of traveling to the receiving facility by car, including absence of medical control, life sustaining equipment, such as oxygen, and medical personnel has been explained to me and I fully understand them  (TOO CORRECT BOX BELOW)  [  ]  I consent to the stated transfer and to be transported by ambulance/helicopter  [  ]  I consent to the stated transfer, but refuse transportation by ambulance and accept full responsibility for my transportation by car  I understand the risks of non-ambulance transfers and I exonerate the Hospital and its staff from any deterioration in my condition that results from this refusal     X___________________________________________    DATE  19  TIME________  Signature of patient or legally responsible individual signing on patient behalf           RELATIONSHIP TO PATIENT_________________________          Provider Certification    NAME Aliyahtiawili Wagonerpavan                                         1960                              MRN [id number]    A medical screening exam was performed on the above named patient  Based on the examination:    Condition Necessitating Transfer The primary encounter diagnosis was Headache   Diagnoses of Visual disturbance and Abnormal CT of brain were also pertinent to this visit  Patient Condition: The patient has been stabilized such that within reasonable medical probability, no material deterioration of the patient condition or the condition of the unborn child(miquel) is likely to result from the transfer    Reason for Transfer: Level of Care needed not available at this facility    Transfer Requirements: [address]   · Space available and qualified personnel available for treatment as acknowledged by PACs  · Agreed to accept transfer and to provide appropriate medical treatment as acknowledged by       Dr Nito Rudolph  · Appropriate medical records of the examination and treatment of the patient are provided at the time of transfer   [address] _______  · Transfer will be performed by qualified personnel from    and appropriate transfer equipment as required, including the use of necessary and appropriate life support measures  Provider Certification: I have examined the patient and explained the following risks and benefits of being transferred/refusing transfer to the patient/family:         Based on these reasonable risks and benefits to the patient and/or the unborn child(miquel), and based upon the information available at the time of the patients examination, I certify that the medical benefits reasonably to be expected from the provision of appropriate medical treatments at another medical facility outweigh the increasing risks, if any, to the individuals medical condition, and in the case of labor to the unborn child, from effecting the transfer      X____________________________________________ DATE 03/14/19        TIME_______      ORIGINAL - SEND TO MEDICAL RECORDS   COPY - SEND WITH PATIENT DURING TRANSFER

## 2019-05-08 ENCOUNTER — APPOINTMENT (EMERGENCY)
Dept: RADIOLOGY | Facility: HOSPITAL | Age: 59
End: 2019-05-08
Payer: COMMERCIAL

## 2019-05-08 ENCOUNTER — HOSPITAL ENCOUNTER (EMERGENCY)
Facility: HOSPITAL | Age: 59
Discharge: HOME | End: 2019-05-08
Attending: EMERGENCY MEDICINE
Payer: COMMERCIAL

## 2019-05-08 ENCOUNTER — APPOINTMENT (EMERGENCY)
Dept: CT IMAGING | Facility: HOSPITAL | Age: 59
End: 2019-05-08
Payer: COMMERCIAL

## 2019-05-08 VITALS
OXYGEN SATURATION: 95 % | BODY MASS INDEX: 31.24 KG/M2 | HEART RATE: 75 BPM | TEMPERATURE: 98.1 F | HEIGHT: 64 IN | WEIGHT: 183 LBS | RESPIRATION RATE: 18 BRPM | SYSTOLIC BLOOD PRESSURE: 130 MMHG | DIASTOLIC BLOOD PRESSURE: 69 MMHG

## 2019-05-08 DIAGNOSIS — G89.29 OTHER CHRONIC PAIN: ICD-10-CM

## 2019-05-08 DIAGNOSIS — R07.89 CHEST WALL PAIN: ICD-10-CM

## 2019-05-08 DIAGNOSIS — M79.601 ARM PAIN, RIGHT: Primary | ICD-10-CM

## 2019-05-08 LAB
ALBUMIN SERPL BCP-MCNC: 3.6 G/DL (ref 3.5–5)
ALP SERPL-CCNC: 74 U/L (ref 46–116)
ALT SERPL W P-5'-P-CCNC: 28 U/L (ref 12–78)
ANION GAP SERPL CALCULATED.3IONS-SCNC: 8 MMOL/L (ref 4–13)
APAP SERPL-MCNC: <2 UG/ML (ref 10–20)
APTT PPP: 27 SECONDS (ref 26–38)
AST SERPL W P-5'-P-CCNC: 29 U/L (ref 5–45)
ATRIAL RATE: 64 BPM
BASOPHILS # BLD MANUAL: 0.08 THOUSAND/UL (ref 0–0.1)
BASOPHILS NFR MAR MANUAL: 1 % (ref 0–1)
BILIRUB SERPL-MCNC: 0.2 MG/DL (ref 0.2–1)
BUN SERPL-MCNC: 13 MG/DL (ref 5–25)
CALCIUM SERPL-MCNC: 8.7 MG/DL (ref 8.3–10.1)
CHLORIDE SERPL-SCNC: 106 MMOL/L (ref 100–108)
CO2 SERPL-SCNC: 29 MMOL/L (ref 21–32)
CREAT SERPL-MCNC: 1.05 MG/DL (ref 0.6–1.3)
EOSINOPHIL # BLD MANUAL: 0.17 THOUSAND/UL (ref 0–0.4)
EOSINOPHIL NFR BLD MANUAL: 2 % (ref 0–6)
ERYTHROCYTE [DISTWIDTH] IN BLOOD BY AUTOMATED COUNT: 13.2 % (ref 11.6–15.1)
ETHANOL SERPL-MCNC: <3 MG/DL (ref 0–3)
GFR SERPL CREATININE-BSD FRML MDRD: 59 ML/MIN/1.73SQ M
GLUCOSE SERPL-MCNC: 100 MG/DL (ref 65–140)
HCT VFR BLD AUTO: 38.9 % (ref 34.8–46.1)
HGB BLD-MCNC: 12.6 G/DL (ref 11.5–15.4)
INR PPP: 1.06 (ref 0.86–1.17)
LYMPHOCYTES # BLD AUTO: 5.72 THOUSAND/UL (ref 0.6–4.47)
LYMPHOCYTES # BLD AUTO: 68 % (ref 14–44)
MAGNESIUM SERPL-MCNC: 2.1 MG/DL (ref 1.6–2.6)
MCH RBC QN AUTO: 31.3 PG (ref 26.8–34.3)
MCHC RBC AUTO-ENTMCNC: 32.4 G/DL (ref 31.4–37.4)
MCV RBC AUTO: 97 FL (ref 82–98)
MONOCYTES # BLD AUTO: 0.17 THOUSAND/UL (ref 0–1.22)
MONOCYTES NFR BLD: 2 % (ref 4–12)
NEUTROPHILS # BLD MANUAL: 2.27 THOUSAND/UL (ref 1.85–7.62)
NEUTS SEG NFR BLD AUTO: 27 % (ref 43–75)
NRBC BLD AUTO-RTO: 0 /100 WBCS
P AXIS: 72 DEGREES
PLATELET # BLD AUTO: 196 THOUSANDS/UL (ref 149–390)
PLATELET BLD QL SMEAR: ADEQUATE
PMV BLD AUTO: 10.5 FL (ref 8.9–12.7)
POTASSIUM SERPL-SCNC: 3.3 MMOL/L (ref 3.5–5.3)
PR INTERVAL: 146 MS
PROT SERPL-MCNC: 7.1 G/DL (ref 6.4–8.2)
PROTHROMBIN TIME: 13.3 SECONDS (ref 11.8–14.2)
QRS AXIS: 74 DEGREES
QRSD INTERVAL: 72 MS
QT INTERVAL: 444 MS
QTC INTERVAL: 458 MS
RBC # BLD AUTO: 4.02 MILLION/UL (ref 3.81–5.12)
RBC MORPH BLD: NORMAL
SALICYLATES SERPL-MCNC: 3.2 MG/DL (ref 3–20)
SODIUM SERPL-SCNC: 143 MMOL/L (ref 136–145)
T WAVE AXIS: 72 DEGREES
TOTAL CELLS COUNTED SPEC: 100
TROPONIN I SERPL-MCNC: <0.02 NG/ML
VENTRICULAR RATE: 64 BPM
WBC # BLD AUTO: 8.41 THOUSAND/UL (ref 4.31–10.16)

## 2019-05-08 PROCEDURE — 80329 ANALGESICS NON-OPIOID 1 OR 2: CPT | Performed by: EMERGENCY MEDICINE

## 2019-05-08 PROCEDURE — 96374 THER/PROPH/DIAG INJ IV PUSH: CPT

## 2019-05-08 PROCEDURE — 80053 COMPREHEN METABOLIC PANEL: CPT | Performed by: EMERGENCY MEDICINE

## 2019-05-08 PROCEDURE — 93005 ELECTROCARDIOGRAM TRACING: CPT

## 2019-05-08 PROCEDURE — 36415 COLL VENOUS BLD VENIPUNCTURE: CPT | Performed by: EMERGENCY MEDICINE

## 2019-05-08 PROCEDURE — 80320 DRUG SCREEN QUANTALCOHOLS: CPT | Performed by: EMERGENCY MEDICINE

## 2019-05-08 PROCEDURE — G0480 DRUG TEST DEF 1-7 CLASSES: HCPCS | Performed by: EMERGENCY MEDICINE

## 2019-05-08 PROCEDURE — 85730 THROMBOPLASTIN TIME PARTIAL: CPT | Performed by: EMERGENCY MEDICINE

## 2019-05-08 PROCEDURE — 71045 X-RAY EXAM CHEST 1 VIEW: CPT

## 2019-05-08 PROCEDURE — 85610 PROTHROMBIN TIME: CPT | Performed by: EMERGENCY MEDICINE

## 2019-05-08 PROCEDURE — 70496 CT ANGIOGRAPHY HEAD: CPT

## 2019-05-08 PROCEDURE — 84484 ASSAY OF TROPONIN QUANT: CPT | Performed by: EMERGENCY MEDICINE

## 2019-05-08 PROCEDURE — 99284 EMERGENCY DEPT VISIT MOD MDM: CPT | Performed by: EMERGENCY MEDICINE

## 2019-05-08 PROCEDURE — 99284 EMERGENCY DEPT VISIT MOD MDM: CPT

## 2019-05-08 PROCEDURE — 93010 ELECTROCARDIOGRAM REPORT: CPT | Performed by: INTERNAL MEDICINE

## 2019-05-08 PROCEDURE — 85007 BL SMEAR W/DIFF WBC COUNT: CPT | Performed by: EMERGENCY MEDICINE

## 2019-05-08 PROCEDURE — 96360 HYDRATION IV INFUSION INIT: CPT

## 2019-05-08 PROCEDURE — 85027 COMPLETE CBC AUTOMATED: CPT | Performed by: EMERGENCY MEDICINE

## 2019-05-08 PROCEDURE — 83735 ASSAY OF MAGNESIUM: CPT | Performed by: EMERGENCY MEDICINE

## 2019-05-08 PROCEDURE — 70498 CT ANGIOGRAPHY NECK: CPT

## 2019-05-08 RX ORDER — LORAZEPAM 2 MG/ML
2 INJECTION INTRAMUSCULAR ONCE
Status: DISCONTINUED | OUTPATIENT
Start: 2019-05-08 | End: 2019-05-08 | Stop reason: HOSPADM

## 2019-05-08 RX ORDER — FENTANYL CITRATE 50 UG/ML
100 INJECTION, SOLUTION INTRAMUSCULAR; INTRAVENOUS ONCE
Status: COMPLETED | OUTPATIENT
Start: 2019-05-08 | End: 2019-05-08

## 2019-05-08 RX ADMIN — FENTANYL CITRATE 100 MCG: 50 INJECTION, SOLUTION INTRAMUSCULAR; INTRAVENOUS at 05:25

## 2019-05-08 RX ADMIN — IOHEXOL 100 ML: 350 INJECTION, SOLUTION INTRAVENOUS at 04:20

## 2019-05-08 RX ADMIN — SODIUM CHLORIDE 500 ML: 0.9 INJECTION, SOLUTION INTRAVENOUS at 04:00

## 2019-07-18 ENCOUNTER — APPOINTMENT (EMERGENCY)
Dept: CT IMAGING | Facility: HOSPITAL | Age: 59
End: 2019-07-18
Payer: COMMERCIAL

## 2019-07-18 ENCOUNTER — HOSPITAL ENCOUNTER (OUTPATIENT)
Facility: HOSPITAL | Age: 59
Setting detail: OBSERVATION
Discharge: HOME HEALTH | End: 2019-07-20
Attending: EMERGENCY MEDICINE | Admitting: FAMILY MEDICINE
Payer: COMMERCIAL

## 2019-07-18 ENCOUNTER — APPOINTMENT (EMERGENCY)
Dept: RADIOLOGY | Facility: HOSPITAL | Age: 59
End: 2019-07-18
Payer: COMMERCIAL

## 2019-07-18 DIAGNOSIS — N30.01 ACUTE CYSTITIS WITH HEMATURIA: ICD-10-CM

## 2019-07-18 DIAGNOSIS — R42 DIZZINESS: Primary | ICD-10-CM

## 2019-07-18 DIAGNOSIS — R53.1 WEAKNESS: ICD-10-CM

## 2019-07-18 LAB
ALBUMIN SERPL BCP-MCNC: 3.7 G/DL (ref 3.5–5)
ALP SERPL-CCNC: 72 U/L (ref 46–116)
ALT SERPL W P-5'-P-CCNC: 25 U/L (ref 12–78)
ANION GAP SERPL CALCULATED.3IONS-SCNC: 7 MMOL/L (ref 4–13)
APTT PPP: 26 SECONDS (ref 23–37)
AST SERPL W P-5'-P-CCNC: 21 U/L (ref 5–45)
BASOPHILS # BLD AUTO: 0.04 THOUSANDS/ΜL (ref 0–0.1)
BASOPHILS NFR BLD AUTO: 1 % (ref 0–1)
BILIRUB SERPL-MCNC: 0.2 MG/DL (ref 0.2–1)
BUN SERPL-MCNC: 16 MG/DL (ref 5–25)
CALCIUM SERPL-MCNC: 8.8 MG/DL (ref 8.3–10.1)
CHLORIDE SERPL-SCNC: 107 MMOL/L (ref 100–108)
CO2 SERPL-SCNC: 27 MMOL/L (ref 21–32)
CREAT SERPL-MCNC: 1.14 MG/DL (ref 0.6–1.3)
EOSINOPHIL # BLD AUTO: 0.13 THOUSAND/ΜL (ref 0–0.61)
EOSINOPHIL NFR BLD AUTO: 2 % (ref 0–6)
ERYTHROCYTE [DISTWIDTH] IN BLOOD BY AUTOMATED COUNT: 13.3 % (ref 11.6–15.1)
GFR SERPL CREATININE-BSD FRML MDRD: 54 ML/MIN/1.73SQ M
GLUCOSE SERPL-MCNC: 110 MG/DL (ref 65–140)
GLUCOSE SERPL-MCNC: 96 MG/DL (ref 65–140)
HCT VFR BLD AUTO: 40.2 % (ref 34.8–46.1)
HGB BLD-MCNC: 13 G/DL (ref 11.5–15.4)
IMM GRANULOCYTES # BLD AUTO: 0.03 THOUSAND/UL (ref 0–0.2)
IMM GRANULOCYTES NFR BLD AUTO: 0 % (ref 0–2)
INR PPP: 0.96 (ref 0.84–1.19)
LIPASE SERPL-CCNC: 115 U/L (ref 73–393)
LYMPHOCYTES # BLD AUTO: 3.94 THOUSANDS/ΜL (ref 0.6–4.47)
LYMPHOCYTES NFR BLD AUTO: 50 % (ref 14–44)
MCH RBC QN AUTO: 31 PG (ref 26.8–34.3)
MCHC RBC AUTO-ENTMCNC: 32.3 G/DL (ref 31.4–37.4)
MCV RBC AUTO: 96 FL (ref 82–98)
MONOCYTES # BLD AUTO: 0.41 THOUSAND/ΜL (ref 0.17–1.22)
MONOCYTES NFR BLD AUTO: 5 % (ref 4–12)
NEUTROPHILS # BLD AUTO: 3.27 THOUSANDS/ΜL (ref 1.85–7.62)
NEUTS SEG NFR BLD AUTO: 42 % (ref 43–75)
NRBC BLD AUTO-RTO: 0 /100 WBCS
PLATELET # BLD AUTO: 209 THOUSANDS/UL (ref 149–390)
PMV BLD AUTO: 10.6 FL (ref 8.9–12.7)
POTASSIUM SERPL-SCNC: 3.3 MMOL/L (ref 3.5–5.3)
PROT SERPL-MCNC: 7.3 G/DL (ref 6.4–8.2)
PROTHROMBIN TIME: 12.8 SECONDS (ref 11.6–14.5)
RBC # BLD AUTO: 4.2 MILLION/UL (ref 3.81–5.12)
SODIUM SERPL-SCNC: 141 MMOL/L (ref 136–145)
TROPONIN I SERPL-MCNC: <0.02 NG/ML
TSH SERPL DL<=0.05 MIU/L-ACNC: 8.32 UIU/ML (ref 0.36–3.74)
WBC # BLD AUTO: 7.82 THOUSAND/UL (ref 4.31–10.16)

## 2019-07-18 PROCEDURE — 93005 ELECTROCARDIOGRAM TRACING: CPT

## 2019-07-18 PROCEDURE — 99285 EMERGENCY DEPT VISIT HI MDM: CPT | Performed by: EMERGENCY MEDICINE

## 2019-07-18 PROCEDURE — 84484 ASSAY OF TROPONIN QUANT: CPT | Performed by: EMERGENCY MEDICINE

## 2019-07-18 PROCEDURE — 85025 COMPLETE CBC W/AUTO DIFF WBC: CPT | Performed by: EMERGENCY MEDICINE

## 2019-07-18 PROCEDURE — 36415 COLL VENOUS BLD VENIPUNCTURE: CPT | Performed by: EMERGENCY MEDICINE

## 2019-07-18 PROCEDURE — 80053 COMPREHEN METABOLIC PANEL: CPT | Performed by: EMERGENCY MEDICINE

## 2019-07-18 PROCEDURE — 70496 CT ANGIOGRAPHY HEAD: CPT

## 2019-07-18 PROCEDURE — 85610 PROTHROMBIN TIME: CPT | Performed by: EMERGENCY MEDICINE

## 2019-07-18 PROCEDURE — 83690 ASSAY OF LIPASE: CPT | Performed by: EMERGENCY MEDICINE

## 2019-07-18 PROCEDURE — 85730 THROMBOPLASTIN TIME PARTIAL: CPT | Performed by: EMERGENCY MEDICINE

## 2019-07-18 PROCEDURE — 99285 EMERGENCY DEPT VISIT HI MDM: CPT

## 2019-07-18 PROCEDURE — 84443 ASSAY THYROID STIM HORMONE: CPT | Performed by: EMERGENCY MEDICINE

## 2019-07-18 PROCEDURE — 96374 THER/PROPH/DIAG INJ IV PUSH: CPT

## 2019-07-18 PROCEDURE — 70498 CT ANGIOGRAPHY NECK: CPT

## 2019-07-18 PROCEDURE — 82948 REAGENT STRIP/BLOOD GLUCOSE: CPT

## 2019-07-18 PROCEDURE — 71045 X-RAY EXAM CHEST 1 VIEW: CPT

## 2019-07-18 RX ORDER — ASPIRIN 325 MG
325 TABLET ORAL DAILY
Status: ON HOLD | COMMUNITY
End: 2020-06-15

## 2019-07-18 RX ORDER — FENOFIBRATE 48 MG/1
48 TABLET, COATED ORAL DAILY
COMMUNITY

## 2019-07-18 RX ORDER — LORAZEPAM 2 MG/ML
1 INJECTION INTRAMUSCULAR ONCE
Status: COMPLETED | OUTPATIENT
Start: 2019-07-19 | End: 2019-07-19

## 2019-07-18 RX ORDER — TOPIRAMATE 100 MG/1
100 TABLET, FILM COATED ORAL EVERY 12 HOURS SCHEDULED
COMMUNITY

## 2019-07-18 RX ORDER — FLUCONAZOLE 50 MG/1
50 TABLET ORAL DAILY
Status: ON HOLD | COMMUNITY
End: 2020-06-15

## 2019-07-18 RX ORDER — POTASSIUM CHLORIDE 20 MEQ/1
40 TABLET, EXTENDED RELEASE ORAL ONCE
Status: COMPLETED | OUTPATIENT
Start: 2019-07-18 | End: 2019-07-18

## 2019-07-18 RX ORDER — MAGNESIUM 30 MG
500 TABLET ORAL 2 TIMES DAILY
COMMUNITY

## 2019-07-18 RX ORDER — FENTANYL CITRATE 50 UG/ML
50 INJECTION, SOLUTION INTRAMUSCULAR; INTRAVENOUS ONCE
Status: COMPLETED | OUTPATIENT
Start: 2019-07-18 | End: 2019-07-18

## 2019-07-18 RX ADMIN — IOHEXOL 100 ML: 350 INJECTION, SOLUTION INTRAVENOUS at 22:34

## 2019-07-18 RX ADMIN — POTASSIUM CHLORIDE 40 MEQ: 1500 TABLET, EXTENDED RELEASE ORAL at 23:22

## 2019-07-18 RX ADMIN — FENTANYL CITRATE 50 MCG: 50 INJECTION INTRAMUSCULAR; INTRAVENOUS at 23:19

## 2019-07-19 ENCOUNTER — APPOINTMENT (OUTPATIENT)
Dept: CT IMAGING | Facility: HOSPITAL | Age: 59
End: 2019-07-19
Payer: COMMERCIAL

## 2019-07-19 ENCOUNTER — APPOINTMENT (OUTPATIENT)
Dept: NON INVASIVE DIAGNOSTICS | Facility: HOSPITAL | Age: 59
End: 2019-07-19
Payer: COMMERCIAL

## 2019-07-19 ENCOUNTER — APPOINTMENT (OUTPATIENT)
Dept: MRI IMAGING | Facility: HOSPITAL | Age: 59
End: 2019-07-19
Payer: COMMERCIAL

## 2019-07-19 PROBLEM — E03.9 HYPOTHYROIDISM: Status: ACTIVE | Noted: 2019-07-19

## 2019-07-19 PROBLEM — R79.89 ELEVATED TSH: Status: ACTIVE | Noted: 2019-07-19

## 2019-07-19 PROBLEM — R42 DIZZINESS: Status: ACTIVE | Noted: 2019-07-19

## 2019-07-19 PROBLEM — J44.9 COPD (CHRONIC OBSTRUCTIVE PULMONARY DISEASE) (HCC): Status: ACTIVE | Noted: 2019-07-19

## 2019-07-19 PROBLEM — N30.01 ACUTE CYSTITIS WITH HEMATURIA: Status: ACTIVE | Noted: 2019-07-19

## 2019-07-19 PROBLEM — G45.9 TIA (TRANSIENT ISCHEMIC ATTACK): Status: ACTIVE | Noted: 2019-07-19

## 2019-07-19 PROBLEM — E78.49 OTHER HYPERLIPIDEMIA: Status: ACTIVE | Noted: 2019-07-19

## 2019-07-19 PROBLEM — I67.1 BRAIN ANEURYSM: Status: ACTIVE | Noted: 2019-07-19

## 2019-07-19 PROBLEM — E87.6 HYPOKALEMIA: Status: ACTIVE | Noted: 2019-07-19

## 2019-07-19 PROBLEM — Z72.0 TOBACCO ABUSE: Status: ACTIVE | Noted: 2019-07-19

## 2019-07-19 PROBLEM — E78.1 HYPERTRIGLYCERIDEMIA: Status: ACTIVE | Noted: 2019-07-19

## 2019-07-19 PROBLEM — E78.6 LOW HDL (UNDER 40): Status: ACTIVE | Noted: 2019-07-19

## 2019-07-19 PROBLEM — R10.9 INTRACTABLE ABDOMINAL PAIN: Status: ACTIVE | Noted: 2019-07-19

## 2019-07-19 PROBLEM — I10 ESSENTIAL HYPERTENSION: Status: ACTIVE | Noted: 2019-07-19

## 2019-07-19 LAB
ALBUMIN SERPL BCP-MCNC: 3.4 G/DL (ref 3.5–5)
ALP SERPL-CCNC: 65 U/L (ref 46–116)
ALT SERPL W P-5'-P-CCNC: 24 U/L (ref 12–78)
ANION GAP SERPL CALCULATED.3IONS-SCNC: 6 MMOL/L (ref 4–13)
ANION GAP SERPL CALCULATED.3IONS-SCNC: 7 MMOL/L (ref 4–13)
APTT PPP: 28 SECONDS (ref 23–37)
AST SERPL W P-5'-P-CCNC: 19 U/L (ref 5–45)
ATRIAL RATE: 63 BPM
BACTERIA UR QL AUTO: ABNORMAL /HPF
BILIRUB SERPL-MCNC: 0.3 MG/DL (ref 0.2–1)
BILIRUB UR QL STRIP: NEGATIVE
BUN SERPL-MCNC: 15 MG/DL (ref 5–25)
BUN SERPL-MCNC: 15 MG/DL (ref 5–25)
CALCIUM SERPL-MCNC: 8.3 MG/DL (ref 8.3–10.1)
CALCIUM SERPL-MCNC: 8.6 MG/DL (ref 8.3–10.1)
CHLORIDE SERPL-SCNC: 106 MMOL/L (ref 100–108)
CHLORIDE SERPL-SCNC: 108 MMOL/L (ref 100–108)
CHOLEST SERPL-MCNC: 228 MG/DL (ref 50–200)
CLARITY UR: ABNORMAL
CO2 SERPL-SCNC: 26 MMOL/L (ref 21–32)
CO2 SERPL-SCNC: 27 MMOL/L (ref 21–32)
COLOR UR: YELLOW
CREAT SERPL-MCNC: 1.02 MG/DL (ref 0.6–1.3)
CREAT SERPL-MCNC: 1.03 MG/DL (ref 0.6–1.3)
ERYTHROCYTE [DISTWIDTH] IN BLOOD BY AUTOMATED COUNT: 13.4 % (ref 11.6–15.1)
EST. AVERAGE GLUCOSE BLD GHB EST-MCNC: 123 MG/DL
GFR SERPL CREATININE-BSD FRML MDRD: 60 ML/MIN/1.73SQ M
GFR SERPL CREATININE-BSD FRML MDRD: 61 ML/MIN/1.73SQ M
GLUCOSE P FAST SERPL-MCNC: 103 MG/DL (ref 65–99)
GLUCOSE SERPL-MCNC: 103 MG/DL (ref 65–140)
GLUCOSE SERPL-MCNC: 95 MG/DL (ref 65–140)
GLUCOSE UR STRIP-MCNC: NEGATIVE MG/DL
HBA1C MFR BLD: 5.9 % (ref 4.2–6.3)
HCT VFR BLD AUTO: 37 % (ref 34.8–46.1)
HDLC SERPL-MCNC: 32 MG/DL (ref 40–60)
HGB BLD-MCNC: 11.9 G/DL (ref 11.5–15.4)
HGB UR QL STRIP.AUTO: NEGATIVE
INR PPP: 1 (ref 0.84–1.19)
KETONES UR STRIP-MCNC: NEGATIVE MG/DL
LDLC SERPL CALC-MCNC: 145 MG/DL (ref 0–100)
LEUKOCYTE ESTERASE UR QL STRIP: ABNORMAL
MAGNESIUM SERPL-MCNC: 2.1 MG/DL (ref 1.6–2.6)
MCH RBC QN AUTO: 31.2 PG (ref 26.8–34.3)
MCHC RBC AUTO-ENTMCNC: 32.2 G/DL (ref 31.4–37.4)
MCV RBC AUTO: 97 FL (ref 82–98)
NITRITE UR QL STRIP: POSITIVE
NON-SQ EPI CELLS URNS QL MICRO: ABNORMAL /HPF
P AXIS: 61 DEGREES
PH UR STRIP.AUTO: 6.5 [PH]
PHOSPHATE SERPL-MCNC: 4.2 MG/DL (ref 2.7–4.5)
PLATELET # BLD AUTO: 191 THOUSANDS/UL (ref 149–390)
PMV BLD AUTO: 10.4 FL (ref 8.9–12.7)
POTASSIUM SERPL-SCNC: 3.4 MMOL/L (ref 3.5–5.3)
POTASSIUM SERPL-SCNC: 4 MMOL/L (ref 3.5–5.3)
PR INTERVAL: 154 MS
PROT SERPL-MCNC: 6.8 G/DL (ref 6.4–8.2)
PROT UR STRIP-MCNC: NEGATIVE MG/DL
PROTHROMBIN TIME: 13.2 SECONDS (ref 11.6–14.5)
QRS AXIS: 67 DEGREES
QRSD INTERVAL: 76 MS
QT INTERVAL: 436 MS
QTC INTERVAL: 446 MS
RBC # BLD AUTO: 3.81 MILLION/UL (ref 3.81–5.12)
RBC #/AREA URNS AUTO: ABNORMAL /HPF
SODIUM SERPL-SCNC: 139 MMOL/L (ref 136–145)
SODIUM SERPL-SCNC: 141 MMOL/L (ref 136–145)
SP GR UR STRIP.AUTO: <=1.005 (ref 1–1.03)
T WAVE AXIS: 56 DEGREES
T4 FREE SERPL-MCNC: 0.89 NG/DL (ref 0.76–1.46)
TRIGL SERPL-MCNC: 255 MG/DL
UROBILINOGEN UR QL STRIP.AUTO: 0.2 E.U./DL
VENTRICULAR RATE: 63 BPM
WBC # BLD AUTO: 6.9 THOUSAND/UL (ref 4.31–10.16)
WBC #/AREA URNS AUTO: ABNORMAL /HPF

## 2019-07-19 PROCEDURE — 74177 CT ABD & PELVIS W/CONTRAST: CPT

## 2019-07-19 PROCEDURE — 80053 COMPREHEN METABOLIC PANEL: CPT | Performed by: PHYSICIAN ASSISTANT

## 2019-07-19 PROCEDURE — 99203 OFFICE O/P NEW LOW 30 MIN: CPT | Performed by: PSYCHIATRY & NEUROLOGY

## 2019-07-19 PROCEDURE — 84100 ASSAY OF PHOSPHORUS: CPT | Performed by: PHYSICIAN ASSISTANT

## 2019-07-19 PROCEDURE — 85027 COMPLETE CBC AUTOMATED: CPT | Performed by: PHYSICIAN ASSISTANT

## 2019-07-19 PROCEDURE — 97163 PT EVAL HIGH COMPLEX 45 MIN: CPT | Performed by: PHYSICAL THERAPIST

## 2019-07-19 PROCEDURE — 84439 ASSAY OF FREE THYROXINE: CPT | Performed by: PHYSICIAN ASSISTANT

## 2019-07-19 PROCEDURE — 93306 TTE W/DOPPLER COMPLETE: CPT

## 2019-07-19 PROCEDURE — 83735 ASSAY OF MAGNESIUM: CPT | Performed by: PHYSICIAN ASSISTANT

## 2019-07-19 PROCEDURE — G8979 MOBILITY GOAL STATUS: HCPCS | Performed by: PHYSICAL THERAPIST

## 2019-07-19 PROCEDURE — 85730 THROMBOPLASTIN TIME PARTIAL: CPT | Performed by: PHYSICIAN ASSISTANT

## 2019-07-19 PROCEDURE — 97116 GAIT TRAINING THERAPY: CPT | Performed by: PHYSICAL THERAPIST

## 2019-07-19 PROCEDURE — 94760 N-INVAS EAR/PLS OXIMETRY 1: CPT

## 2019-07-19 PROCEDURE — 85610 PROTHROMBIN TIME: CPT | Performed by: PHYSICIAN ASSISTANT

## 2019-07-19 PROCEDURE — 87077 CULTURE AEROBIC IDENTIFY: CPT | Performed by: INTERNAL MEDICINE

## 2019-07-19 PROCEDURE — G8978 MOBILITY CURRENT STATUS: HCPCS | Performed by: PHYSICAL THERAPIST

## 2019-07-19 PROCEDURE — 36415 COLL VENOUS BLD VENIPUNCTURE: CPT | Performed by: EMERGENCY MEDICINE

## 2019-07-19 PROCEDURE — 93306 TTE W/DOPPLER COMPLETE: CPT | Performed by: INTERNAL MEDICINE

## 2019-07-19 PROCEDURE — 81001 URINALYSIS AUTO W/SCOPE: CPT | Performed by: PHYSICIAN ASSISTANT

## 2019-07-19 PROCEDURE — 70551 MRI BRAIN STEM W/O DYE: CPT

## 2019-07-19 PROCEDURE — 87186 SC STD MICRODIL/AGAR DIL: CPT | Performed by: INTERNAL MEDICINE

## 2019-07-19 PROCEDURE — 83036 HEMOGLOBIN GLYCOSYLATED A1C: CPT | Performed by: PHYSICIAN ASSISTANT

## 2019-07-19 PROCEDURE — 87086 URINE CULTURE/COLONY COUNT: CPT | Performed by: INTERNAL MEDICINE

## 2019-07-19 PROCEDURE — 80048 BASIC METABOLIC PNL TOTAL CA: CPT | Performed by: EMERGENCY MEDICINE

## 2019-07-19 PROCEDURE — 99220 PR INITIAL OBSERVATION CARE/DAY 70 MINUTES: CPT | Performed by: INTERNAL MEDICINE

## 2019-07-19 PROCEDURE — 93010 ELECTROCARDIOGRAM REPORT: CPT | Performed by: INTERNAL MEDICINE

## 2019-07-19 PROCEDURE — 94640 AIRWAY INHALATION TREATMENT: CPT

## 2019-07-19 PROCEDURE — 80061 LIPID PANEL: CPT | Performed by: PHYSICIAN ASSISTANT

## 2019-07-19 RX ORDER — CEFTRIAXONE 1 G/50ML
1000 INJECTION, SOLUTION INTRAVENOUS EVERY 24 HOURS
Status: DISCONTINUED | OUTPATIENT
Start: 2019-07-19 | End: 2019-07-20 | Stop reason: HOSPADM

## 2019-07-19 RX ORDER — ASPIRIN 325 MG
325 TABLET ORAL DAILY
Status: DISCONTINUED | OUTPATIENT
Start: 2019-07-19 | End: 2019-07-19

## 2019-07-19 RX ORDER — ONDANSETRON 2 MG/ML
4 INJECTION INTRAMUSCULAR; INTRAVENOUS EVERY 6 HOURS PRN
Status: DISCONTINUED | OUTPATIENT
Start: 2019-07-19 | End: 2019-07-20 | Stop reason: HOSPADM

## 2019-07-19 RX ORDER — ATORVASTATIN CALCIUM 40 MG/1
80 TABLET, FILM COATED ORAL
Status: DISCONTINUED | OUTPATIENT
Start: 2019-07-19 | End: 2019-07-20 | Stop reason: HOSPADM

## 2019-07-19 RX ORDER — DULOXETIN HYDROCHLORIDE 30 MG/1
90 CAPSULE, DELAYED RELEASE ORAL DAILY
Status: DISCONTINUED | OUTPATIENT
Start: 2019-07-19 | End: 2019-07-20 | Stop reason: HOSPADM

## 2019-07-19 RX ORDER — LEVALBUTEROL 1.25 MG/.5ML
1.25 SOLUTION, CONCENTRATE RESPIRATORY (INHALATION)
Status: DISCONTINUED | OUTPATIENT
Start: 2019-07-19 | End: 2019-07-19

## 2019-07-19 RX ORDER — POTASSIUM CHLORIDE 20 MEQ/1
20 TABLET, EXTENDED RELEASE ORAL ONCE
Status: COMPLETED | OUTPATIENT
Start: 2019-07-19 | End: 2019-07-19

## 2019-07-19 RX ORDER — ASPIRIN 325 MG
325 TABLET ORAL ONCE
Status: COMPLETED | OUTPATIENT
Start: 2019-07-19 | End: 2019-07-19

## 2019-07-19 RX ORDER — CLONAZEPAM 0.5 MG/1
1 TABLET ORAL 3 TIMES DAILY
Status: DISCONTINUED | OUTPATIENT
Start: 2019-07-19 | End: 2019-07-20 | Stop reason: HOSPADM

## 2019-07-19 RX ORDER — HYDROMORPHONE HCL/PF 1 MG/ML
1 SYRINGE (ML) INJECTION
Status: DISCONTINUED | OUTPATIENT
Start: 2019-07-19 | End: 2019-07-20 | Stop reason: HOSPADM

## 2019-07-19 RX ORDER — NICOTINE 21 MG/24HR
1 PATCH, TRANSDERMAL 24 HOURS TRANSDERMAL DAILY
Status: DISCONTINUED | OUTPATIENT
Start: 2019-07-19 | End: 2019-07-19

## 2019-07-19 RX ORDER — ACETAMINOPHEN 325 MG/1
650 TABLET ORAL EVERY 6 HOURS PRN
Status: DISCONTINUED | OUTPATIENT
Start: 2019-07-19 | End: 2019-07-20 | Stop reason: HOSPADM

## 2019-07-19 RX ORDER — FENOFIBRATE 48 MG/1
48 TABLET, COATED ORAL DAILY
Status: DISCONTINUED | OUTPATIENT
Start: 2019-07-19 | End: 2019-07-20 | Stop reason: HOSPADM

## 2019-07-19 RX ORDER — LAMOTRIGINE 100 MG/1
200 TABLET ORAL DAILY
Status: DISCONTINUED | OUTPATIENT
Start: 2019-07-19 | End: 2019-07-20 | Stop reason: HOSPADM

## 2019-07-19 RX ORDER — ATORVASTATIN CALCIUM 40 MG/1
40 TABLET, FILM COATED ORAL
Status: DISCONTINUED | OUTPATIENT
Start: 2019-07-19 | End: 2019-07-19

## 2019-07-19 RX ORDER — TOPIRAMATE 25 MG/1
50 TABLET ORAL EVERY 12 HOURS SCHEDULED
Status: DISCONTINUED | OUTPATIENT
Start: 2019-07-19 | End: 2019-07-20 | Stop reason: HOSPADM

## 2019-07-19 RX ORDER — ASPIRIN 81 MG/1
81 TABLET ORAL DAILY
Status: DISCONTINUED | OUTPATIENT
Start: 2019-07-19 | End: 2019-07-20 | Stop reason: HOSPADM

## 2019-07-19 RX ORDER — PANTOPRAZOLE SODIUM 40 MG/1
40 TABLET, DELAYED RELEASE ORAL
Status: DISCONTINUED | OUTPATIENT
Start: 2019-07-19 | End: 2019-07-20 | Stop reason: HOSPADM

## 2019-07-19 RX ORDER — OXYCODONE HYDROCHLORIDE 5 MG/1
5 TABLET ORAL EVERY 4 HOURS PRN
Status: DISCONTINUED | OUTPATIENT
Start: 2019-07-19 | End: 2019-07-20 | Stop reason: HOSPADM

## 2019-07-19 RX ORDER — ALBUTEROL SULFATE 90 UG/1
2 AEROSOL, METERED RESPIRATORY (INHALATION) EVERY 6 HOURS
Status: DISCONTINUED | OUTPATIENT
Start: 2019-07-19 | End: 2019-07-20 | Stop reason: HOSPADM

## 2019-07-19 RX ORDER — PREGABALIN 50 MG/1
100 CAPSULE ORAL 3 TIMES DAILY
Status: DISCONTINUED | OUTPATIENT
Start: 2019-07-19 | End: 2019-07-20 | Stop reason: HOSPADM

## 2019-07-19 RX ORDER — FLUTICASONE FUROATE AND VILANTEROL 200; 25 UG/1; UG/1
1 POWDER RESPIRATORY (INHALATION)
Status: DISCONTINUED | OUTPATIENT
Start: 2019-07-19 | End: 2019-07-20 | Stop reason: HOSPADM

## 2019-07-19 RX ORDER — FLUCONAZOLE 100 MG/1
50 TABLET ORAL DAILY
Status: DISCONTINUED | OUTPATIENT
Start: 2019-07-19 | End: 2019-07-19

## 2019-07-19 RX ORDER — NICOTINE 21 MG/24HR
1 PATCH, TRANSDERMAL 24 HOURS TRANSDERMAL DAILY
Status: DISCONTINUED | OUTPATIENT
Start: 2019-07-19 | End: 2019-07-20 | Stop reason: HOSPADM

## 2019-07-19 RX ORDER — LANOLIN ALCOHOL/MO/W.PET/CERES
3 CREAM (GRAM) TOPICAL ONCE
Status: COMPLETED | OUTPATIENT
Start: 2019-07-19 | End: 2019-07-19

## 2019-07-19 RX ADMIN — CEFTRIAXONE 1000 MG: 1 INJECTION, SOLUTION INTRAVENOUS at 16:40

## 2019-07-19 RX ADMIN — LAMOTRIGINE 200 MG: 100 TABLET ORAL at 08:36

## 2019-07-19 RX ADMIN — HYDROMORPHONE HYDROCHLORIDE 1 MG: 1 INJECTION, SOLUTION INTRAMUSCULAR; INTRAVENOUS; SUBCUTANEOUS at 15:36

## 2019-07-19 RX ADMIN — LORAZEPAM 1 MG: 2 INJECTION INTRAMUSCULAR; INTRAVENOUS at 00:10

## 2019-07-19 RX ADMIN — NICOTINE 1 PATCH: 21 PATCH TRANSDERMAL at 01:43

## 2019-07-19 RX ADMIN — NICOTINE 1 PATCH: 21 PATCH TRANSDERMAL at 15:37

## 2019-07-19 RX ADMIN — MELATONIN 3 MG: at 02:01

## 2019-07-19 RX ADMIN — ASPIRIN 325 MG ORAL TABLET 325 MG: 325 PILL ORAL at 02:01

## 2019-07-19 RX ADMIN — FENOFIBRATE 48 MG: 48 TABLET ORAL at 08:35

## 2019-07-19 RX ADMIN — IPRATROPIUM BROMIDE 0.5 MG: 0.5 SOLUTION RESPIRATORY (INHALATION) at 07:07

## 2019-07-19 RX ADMIN — HYDROMORPHONE HYDROCHLORIDE 1 MG: 1 INJECTION, SOLUTION INTRAMUSCULAR; INTRAVENOUS; SUBCUTANEOUS at 11:15

## 2019-07-19 RX ADMIN — ALBUTEROL SULFATE 2 PUFF: 90 AEROSOL, METERED RESPIRATORY (INHALATION) at 19:35

## 2019-07-19 RX ADMIN — IPRATROPIUM BROMIDE 0.5 MG: 0.5 SOLUTION RESPIRATORY (INHALATION) at 19:51

## 2019-07-19 RX ADMIN — IOHEXOL 100 ML: 350 INJECTION, SOLUTION INTRAVENOUS at 14:54

## 2019-07-19 RX ADMIN — POTASSIUM CHLORIDE 20 MEQ: 1500 TABLET, EXTENDED RELEASE ORAL at 01:27

## 2019-07-19 RX ADMIN — CLONAZEPAM 1 MG: 0.5 TABLET ORAL at 16:39

## 2019-07-19 RX ADMIN — IOHEXOL 50 ML: 240 INJECTION, SOLUTION INTRATHECAL; INTRAVASCULAR; INTRAVENOUS; ORAL at 14:54

## 2019-07-19 RX ADMIN — OXYCODONE HYDROCHLORIDE 5 MG: 5 TABLET ORAL at 23:46

## 2019-07-19 RX ADMIN — PREGABALIN 100 MG: 50 CAPSULE ORAL at 08:36

## 2019-07-19 RX ADMIN — LEVALBUTEROL 1.25 MG: 1.25 SOLUTION, CONCENTRATE RESPIRATORY (INHALATION) at 19:51

## 2019-07-19 RX ADMIN — CLONAZEPAM 1 MG: 0.5 TABLET ORAL at 08:35

## 2019-07-19 RX ADMIN — IPRATROPIUM BROMIDE 0.5 MG: 0.5 SOLUTION RESPIRATORY (INHALATION) at 13:10

## 2019-07-19 RX ADMIN — ENOXAPARIN SODIUM 40 MG: 40 INJECTION SUBCUTANEOUS at 08:35

## 2019-07-19 RX ADMIN — LEVALBUTEROL 1.25 MG: 1.25 SOLUTION, CONCENTRATE RESPIRATORY (INHALATION) at 07:07

## 2019-07-19 RX ADMIN — PANTOPRAZOLE SODIUM 40 MG: 40 TABLET, DELAYED RELEASE ORAL at 05:06

## 2019-07-19 RX ADMIN — TOPIRAMATE 50 MG: 25 TABLET, FILM COATED ORAL at 01:27

## 2019-07-19 RX ADMIN — LEVALBUTEROL 1.25 MG: 1.25 SOLUTION, CONCENTRATE RESPIRATORY (INHALATION) at 13:10

## 2019-07-19 RX ADMIN — CLONAZEPAM 1 MG: 0.5 TABLET ORAL at 20:49

## 2019-07-19 RX ADMIN — TOPIRAMATE 50 MG: 25 TABLET, FILM COATED ORAL at 20:49

## 2019-07-19 RX ADMIN — ATORVASTATIN CALCIUM 80 MG: 40 TABLET, FILM COATED ORAL at 16:39

## 2019-07-19 RX ADMIN — PREGABALIN 100 MG: 50 CAPSULE ORAL at 20:49

## 2019-07-19 RX ADMIN — PREGABALIN 100 MG: 50 CAPSULE ORAL at 16:39

## 2019-07-19 RX ADMIN — DULOXETINE HYDROCHLORIDE 90 MG: 30 CAPSULE, DELAYED RELEASE ORAL at 08:35

## 2019-07-19 RX ADMIN — TOPIRAMATE 50 MG: 25 TABLET, FILM COATED ORAL at 08:36

## 2019-07-19 RX ADMIN — ASPIRIN 81 MG: 81 TABLET, COATED ORAL at 16:39

## 2019-07-19 NOTE — PLAN OF CARE
Problem: PHYSICAL THERAPY ADULT  Goal: Performs mobility at highest level of function for planned discharge setting  See evaluation for individualized goals  Outcome: Progressing  Note:   Prognosis: Good  Problem List: Decreased strength, Decreased endurance, Impaired balance, Decreased mobility, Pain, Decreased safety awareness, Impaired sensation  Assessment: Pt is a 63year old female with complex PMH including CVA/TIA 2 years ago, brain aneurysm, COPD, HTN, fibromyalgia, PTSD bipolar concussion syndrome presenting to [facility] with dizziness slurred speech and L sided weakness  Pt seen for high complexity PT evaluation presenting with increased back and rib pain L UE and LE weakness with decreased sensation slowed coordination but intact and decreased balance with limited ambulation tolerance due to persistent lightheadedness  Pt requiring one standing rest break during ambulation due to lightheadedness but no LOB during transfers and ambulation despite decreased sensation and weakness  Pt would benefit from continued activity in PT to improve stength balance endurance mobility transfers ambulation and step negotiation to return to (I) LOF  Pt will be safe to return home but would benefit from home health care services on discharge  Recommendation: Home PT     PT - OK to Discharge: Yes(when medically stable for discharge)    See flowsheet documentation for full assessment

## 2019-07-19 NOTE — ED PROVIDER NOTES
History  Chief Complaint   Patient presents with    Dizziness     Patient stated she woke from sleep with belly pain, dizziness, weakness mostly on left side and patient described a time while speaking with daughter on phone she had a change in her speech  HPI  72-year-old woman with history of stroke 2 years ago presents for evaluation of some left arm numbness and weakness  Patient also has back pain and abdominal pain times years  She has had vomiting  Patient states she is having abdominal pain earlier today, laid down for a nap around 13:30  She woke up this evening, she said she had vertigo, called her daughter who said that she had some trouble speaking so EMS was called  When EMS arrived, patient had no trouble speaking that is that she had some numbness or weakness in her left arm  Per EMS, they could not appreciate any changes in  strength in her upper extremities or changes in strength in her lower extremities  Patient also states that she has had some trouble opening her eyes secondary to pain  Patient was immediately assessed on arrival, case was discussed with on-call Neurology and after that discussion, stroke alert was called  Patient seemed to have trouble opening her left eye when you ask her to do it on command however in speaking to her she was able to open her eyes and tracking around the room  Patient denies any recent illness, denies headache neck pain chest pain, does endorse abdominal pain endorses back pain  Prior to Admission Medications   Prescriptions Last Dose Informant Patient Reported? Taking? DULoxetine (CYMBALTA) 30 mg delayed release capsule   Yes Yes   Sig: Take 90 mg by mouth   HYDROcodone-acetaminophen (NORCO)  mg per tablet   Yes Yes   Si tab every 6 hours as needed for severe pain    Do not fill before 18   Magnesium Gluconate 550 MG TABS Not Taking at Unknown time  Yes No   Sig: Take 30 mg by mouth   Melatonin 10 MG CAPS   Yes Yes Sig: Take 1 tablet by mouth   SUMAtriptan (IMITREX) 100 mg tablet   Yes Yes   Sig: TAKE 1 TABLET BY MOUTH AT START OF MIGRAINE HEADACHE  MAY REPEAT IN 2 HOURS IF NEEDED  LIMIT TO NO MORE THAN 2 TABLETS IN 24 HOURS, THREE TI   albuterol (PROVENTIL HFA,VENTOLIN HFA) 90 mcg/act inhaler   Yes Yes   Sig: Inhale 2 puffs every 6 (six) hours   aspirin 325 mg tablet   Yes Yes   Sig: Take 325 mg by mouth daily   atorvastatin (LIPITOR) 40 mg tablet   Yes Yes   Sig: Take 40 mg by mouth   busPIRone (BUSPAR) 15 mg tablet   Yes Yes   Sig: Take 15 mg by mouth   carboxymethylcellulose 0 5 % SOLN   No Yes   Sig: Administer 1 drop to both eyes daily as needed for dry eyes   clonazePAM (KlonoPIN) 1 mg tablet   Yes Yes   Sig: Take 1 mg by mouth Three times a day   cyclobenzaprine (FLEXERIL) 10 mg tablet   Yes Yes   Sig: TAKE 1 TABLET BY MOUTH 3 TIMES A DAY AS NEEDED FOR MUSCLE SPASMS   fenofibrate (TRICOR) 48 mg tablet   Yes Yes   Sig: Take 48 mg by mouth daily   fluconazole (DIFLUCAN) 50 mg tablet   Yes Yes   Sig: Take 50 mg by mouth daily   fluticasone-salmeterol (ADVAIR DISKUS, WIXELA INHUB) 500-50 mcg/dose inhaler   Yes Yes   Sig: Inhale 1 puff 2 (two) times a day Rinse mouth after use     lamoTRIgine (LaMICtal) 200 MG tablet   Yes Yes   Sig: Take 200 mg by mouth   magnesium 30 MG tablet   Yes Yes   Sig: Take 30 mg by mouth 2 (two) times a day   naproxen (NAPROSYN) 500 mg tablet   Yes Yes   Sig: Take 500 mg by mouth 2 (two) times a day   omeprazole (PriLOSEC) 20 mg delayed release capsule Not Taking at Unknown time  Yes No   Sig: take 1 capsule by mouth once daily   pregabalin (LYRICA) 100 mg capsule   Yes Yes   Sig: take 1 capsule by mouth three times a day   pregabalin (LYRICA) 150 mg capsule   Yes Yes   Sig: take 1 capsule by mouth NIGHTLY   promethazine (PHENERGAN) 25 mg tablet Not Taking at Unknown time  Yes No   Sig: Take 25 mg by mouth 3 (three) times a day   pseudoephedrine-acetaminophen (TYLENOL SINUS)  MG TABS   Yes Yes   Sig: Take 1 tablet by mouth every 4 (four) hours as needed for congestion   topiramate (TOPAMAX) 50 MG tablet   Yes Yes   Sig: Take 50 mg by mouth every 12 (twelve) hours   traZODone (DESYREL) 100 mg tablet Not Taking at Unknown time  Yes No   Sig: Take 100 mg by mouth      Facility-Administered Medications: None       Past Medical History:   Diagnosis Date    Anxiety     Asthma     Bipolar 1 disorder ([facility] )     Brain aneurysm     Chronic pain disorder     spinal stenosis    Concussion syndrome     neurological rx and balance rx    COPD (chronic obstructive pulmonary disease) (HCC)     Depression     Disease of thyroid gland     nodules     Family history of colon cancer     father    Fibromyalgia, primary     History of colon polyps     Hyperlipidemia     Hypertension     Migraine     Neuropathy     bilateral feet and hands    PTSD (post-traumatic stress disorder)     Stroke ([facility] )     2012 no deficeits    Thyroid Cancer     TIA (transient ischemic attack)        Past Surgical History:   Procedure Laterality Date    ABDOMINAL SURGERY      laproscopic/ endometriosis    BRAIN SURGERY      aneurysm/ coiling procedure    CARPAL TUNNEL RELEASE      CHOLECYSTECTOMY      DENTAL SURGERY      EYE SURGERY      cataract    HYSTERECTOMY      WI ESOPHAGOGASTRODUODENOSCOPY TRANSORAL DIAGNOSTIC N/A 2/8/2018    Procedure: EGD AND COLONOSCOPY;  Surgeon: Hugo Mcgee MD;  Location: [facility]; Service: Gastroenterology    THYROID SURGERY         History reviewed  No pertinent family history  I have reviewed and agree with the history as documented  Social History     Tobacco Use    Smoking status: Current Every Day Smoker     Packs/day: 2 00     Types: Cigarettes    Smokeless tobacco: Never Used   Substance Use Topics    Alcohol use: No    Drug use: No        Review of Systems   Constitutional: Negative  Negative for chills and fever  HENT: Negative    Negative for congestion and sore throat  Eyes: Negative  Negative for discharge and redness  Respiratory: Negative  Negative for chest tightness and shortness of breath  Cardiovascular: Negative  Negative for chest pain and palpitations  Gastrointestinal: Positive for abdominal pain  Negative for nausea and vomiting  Endocrine: Negative  Negative for cold intolerance and polyphagia  Genitourinary: Negative  Negative for difficulty urinating and dysuria  Musculoskeletal: Positive for back pain  Negative for arthralgias  Skin: Negative  Negative for color change and wound  Allergic/Immunologic: Negative  Negative for environmental allergies  Neurological: Positive for weakness  Negative for dizziness and headaches  Hematological: Negative  Psychiatric/Behavioral: Negative  Negative for behavioral problems  The patient is not nervous/anxious  All other systems reviewed and are negative  Physical Exam  Physical Exam   Constitutional: She is oriented to person, place, and time  She appears well-developed and well-nourished  No distress  HENT:   Head: Normocephalic and atraumatic  Right Ear: External ear normal    Left Ear: External ear normal    Mouth/Throat: Oropharynx is clear and moist    Eyes: Pupils are equal, round, and reactive to light  Conjunctivae and EOM are normal  Right eye exhibits no discharge  Left eye exhibits no discharge  No scleral icterus  Neck: Normal range of motion  Neck supple  No tracheal deviation present  No thyromegaly present  Cardiovascular: Normal rate, regular rhythm and intact distal pulses  Exam reveals no gallop and no friction rub  No murmur heard  Pulmonary/Chest: Effort normal and breath sounds normal  No stridor  No respiratory distress  She has no wheezes  She has no rales  Abdominal: Soft  Bowel sounds are normal  She exhibits no distension  There is no tenderness  There is no rebound and no guarding  Musculoskeletal: Normal range of motion   She exhibits tenderness (Patient is tender in the right paraspinal)  She exhibits no edema or deformity  Neurological: She is alert and oriented to person, place, and time  No cranial nerve deficit  Patient has no facial paralysis, she has symmetric smile, her tongue is straight, she has symmetric horizontal on a extraocular movements without nystagmus  She has no double vision at the periphery, visual fields are intact  She has bilateral tremor with holding her arms off the bed for greater than 10 seconds bilaterally  She does have dysmetria with finger to nose but is bilateral and symmetric  She can keep both legs off the bed for greater than 5 seconds with some tremor in the left leg greater than Right  She has clear fluent speech  She is alert and oriented  She has symmetric strength in upper and lower extremities  Skin: Skin is warm and dry  No rash noted  She is not diaphoretic  No erythema  Psychiatric: She has a normal mood and affect  Her behavior is normal  Thought content normal    Nursing note and vitals reviewed        Vital Signs  ED Triage Vitals [07/18/19 2134]   Temperature Pulse Respirations Blood Pressure SpO2   98 2 °F (36 8 °C) 69 20 116/79 97 %      Temp Source Heart Rate Source Patient Position - Orthostatic VS BP Location FiO2 (%)   Temporal Monitor Lying Left arm --      Pain Score       9           Vitals:    07/18/19 2245 07/18/19 2300 07/18/19 2315 07/18/19 2330   BP: 110/68  101/74 114/75   Pulse: 63 63 65 66   Patient Position - Orthostatic VS:             Visual Acuity  Visual Acuity      Most Recent Value   L Pupil Size (mm)  3   L Pupil Shape  Round   R Pupil Shape  Other (Comment)          ED Medications  Medications   iohexol (OMNIPAQUE) 350 MG/ML injection (SINGLE-DOSE) 100 mL (100 mL Intravenous Given 7/18/19 2234)   potassium chloride (K-DUR,KLOR-CON) CR tablet 40 mEq (40 mEq Oral Given 7/18/19 2322)   fentanyl citrate (PF) 100 MCG/2ML 50 mcg (50 mcg Intravenous Given 7/18/19 2319)       Diagnostic Studies  Results Reviewed     Procedure Component Value Units Date/Time    Fingerstick Glucose (POCT) [[id number]]  (Normal) Collected:  07/18/19 2232    Lab Status:  Final result Updated:  07/18/19 2255     POC Glucose 96 mg/dl     TSH [[id number]]  (Abnormal) Collected:  07/18/19 2205    Lab Status:  Final result Specimen:  Blood from Arm, Right Updated:  07/18/19 2236     TSH 3RD GENERATON 8 316 uIU/mL     Narrative:       Patients undergoing fluorescein dye angiography may retain small amounts of fluorescein in the body for 48-72 hours post procedure  Samples containing fluorescein can produce falsely depressed TSH values  If the patient had this procedure,a specimen should be resubmitted post fluorescein clearance        Lipase [[id number]]  (Normal) Collected:  07/18/19 2205    Lab Status:  Final result Specimen:  Blood from Arm, Right Updated:  07/18/19 2236     Lipase 115 u/L     Troponin I [[id number]]  (Normal) Collected:  07/18/19 2205    Lab Status:  Final result Specimen:  Blood from Arm, Right Updated:  07/18/19 2231     Troponin I <0 02 ng/mL     Comprehensive metabolic panel [[id number]]  (Abnormal) Collected:  07/18/19 2205    Lab Status:  Final result Specimen:  Blood from Arm, Right Updated:  07/18/19 2228     Sodium 141 mmol/L      Potassium 3 3 mmol/L      Chloride 107 mmol/L      CO2 27 mmol/L      ANION GAP 7 mmol/L      BUN 16 mg/dL      Creatinine 1 14 mg/dL      Glucose 110 mg/dL      Calcium 8 8 mg/dL      AST 21 U/L      ALT 25 U/L      Alkaline Phosphatase 72 U/L      Total Protein 7 3 g/dL      Albumin 3 7 g/dL      Total Bilirubin 0 20 mg/dL      eGFR 54 ml/min/1 73sq m     Narrative:       [facility] guidelines for Chronic Kidney Disease (CKD):     Stage 1 with normal or high GFR (GFR > 90 mL/min/1 73 square meters)    Stage 2 Mild CKD (GFR = 60-89 mL/min/1 73 square meters)    Stage 3A Moderate CKD (GFR = 45-59 mL/min/1 73 square meters)    Stage 3B Moderate CKD (GFR = 30-44 mL/min/1 73 square meters)    Stage 4 Severe CKD (GFR = 15-29 mL/min/1 73 square meters)    Stage 5 End Stage CKD (GFR <15 mL/min/1 73 square meters)  Note: GFR calculation is accurate only with a steady state creatinine    Protime-INR [[id number]]  (Normal) Collected:  07/18/19 2205    Lab Status:  Final result Specimen:  Blood from Arm, Right Updated:  07/18/19 2222     Protime 12 8 seconds      INR 0 96    APTT [[id number]]  (Normal) Collected:  07/18/19 2205    Lab Status:  Final result Specimen:  Blood from Arm, Right Updated:  07/18/19 2222     PTT 26 seconds     Basic metabolic panel [[id number]]     Lab Status:  No result Specimen:  Blood     CBC and differential [[id number]]  (Abnormal) Collected:  07/18/19 2205    Lab Status:  Final result Specimen:  Blood from Arm, Right Updated:  07/18/19 2212     WBC 7 82 Thousand/uL      RBC 4 20 Million/uL      Hemoglobin 13 0 g/dL      Hematocrit 40 2 %      MCV 96 fL      MCH 31 0 pg      MCHC 32 3 g/dL      RDW 13 3 %      MPV 10 6 fL      Platelets 705 Thousands/uL      nRBC 0 /100 WBCs      Neutrophils Relative 42 %      Immat GRANS % 0 %      Lymphocytes Relative 50 %      Monocytes Relative 5 %      Eosinophils Relative 2 %      Basophils Relative 1 %      Neutrophils Absolute 3 27 Thousands/µL      Immature Grans Absolute 0 03 Thousand/uL      Lymphocytes Absolute 3 94 Thousands/µL      Monocytes Absolute 0 41 Thousand/µL      Eosinophils Absolute 0 13 Thousand/µL      Basophils Absolute 0 04 Thousands/µL     UA w Reflex to Microscopic [[id number]]     Lab Status:  No result Specimen:  Urine                  X-ray chest 1 view portable   ED Interpretation by Arthur Valentino MD (07/18 2356)   No cardiopulmonary disease  CTA stroke alert (head/neck)   Final Result by Sanchez Nails MD (07/18 2246)      1  No hemodynamically significant stenosis of the visualized arteries of the neck     2   No high-grade proximal stenosis of the visualized [place]  3   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  No other significant intracranial aneurysm  4   No significant intracranial arteriovenous malformation  Workstation performed: [id number]         CT head wo contrast   Final Result by  (07/18 2357)   Addendum 1 of 1 by Faiza Webb MD (07/18 2242)   ADDENDUM:      Comparison is made with prior CT of the head on 5/8/2019  Final                 Procedures  Procedures       ED Course  ED Course as of Jul 18 2357   u Jul 18, 2019   2311 Creatinine: 1 14   2313 Potassium(!): 3 3      This EKG was interpreted by me  The EKG demonstrates Normal sinus rhythm, normal intervals and axis, normal QRS, no acute ST changes present  case was discussed with on-call Neurology  After stroke alert, nothing to intervene on, patient should be admitted down the stroke pathway  Case was discussed with samanta, patient will be admitted to the hospital     Stroke Assessment     Row Name 07/18/19 2137             NIH Stroke Scale    Interval  Baseline      Level of Consciousness (1a )  0      LOC Questions (1b )  0      LOC Commands (1c )  0      Best Gaze (2 )  0      Visual (3 )  0      Facial Palsy (4 )  0      Motor Arm, Left (5a )  0      Motor Arm, Right (5b )  0      Motor Leg, Left (6a )  0      Motor Leg, Right (6b )  0      Limb Ataxia (7 )  2      Sensory (8 )  0      Best Language (9 )  0      Dysarthria (10 )  0      Extinction and Inattention (11 ) (Formerly Neglect)  0      Total  2          First Filed Value   TPA Decision  Patient not a TPA candidate  Patient is not a candidate options  Symptoms resolved/clearly non disabling , Unclear time of onset outside appropriate time window                          MDM  Number of Diagnoses or Management Options  Dizziness:   Weakness:   Diagnosis management comments: 67-year-old woman who presents with vertigo with unclear onset time as well as some subjective weakness her left upper and left lower extremities  She also has chronic abdominal pain with back  Will do a stroke alert in speak with Neurology  Likely admission to the hospital       Disposition  Final diagnoses:   Dizziness   Weakness     Time reflects when diagnosis was documented in both MDM as applicable and the Disposition within this note     Time User Action Codes Description Comment    7/18/2019 11:19 PM Khalif Headings Add [R42] Dizziness     7/18/2019 11:20 PM Khalif Headings Add [R53 1] Weakness       ED Disposition     ED Disposition Condition Date/Time Comment    Admit Stable Thu Jul 18, 2019 11:22 PM [facility] obs        Follow-up Information    None         Patient's Medications   Discharge Prescriptions    No medications on file     No discharge procedures on file      ED Provider  Electronically Signed by           Abraham Lott MD  07/18/19 1738

## 2019-07-19 NOTE — SPEECH THERAPY NOTE
Pt is awake, alert and oriented  Recalls events which prompted admission  speech is fluent and grammatical, no reported/documented dysphagia  Passed dysphagia screen and tolerating a regular diet  Will acknowledge and d/c order  Truong Ponce, CCC/SLP  [id number]  [email or web address]  Available via [employer] text

## 2019-07-19 NOTE — H&P
[facility]  H&P- Alcira Woodall 1960, 63 y o  female MRN: [id number]    Unit/Bed#: 409-01 Encounter: [id number]    Primary Care Provider: Rodolfo Frost MD   Date and time admitted to hospital: 7/18/2019  9:32 PM        * Dizziness  Assessment & Plan  Present to the ER with complaints of dizziness  Associated with visual disturbances, and slurred speech  She has hx of brain aneurysm and prior TIAs  Concerns for new CVA/TIA  Stroke alert call in emergency room  Dr Liza Marsh answered Stroke alert- patient not TPA candidate- See Dr Reji Castro note for details  CTA head and neck shows-No hemodynamically significant stenosis of the visualized arteries of the neck  2   No high-grade proximal stenosis of the visualized [place]  3   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   No other significant intracranial aneurysm  4   No significant intracranial arteriovenous malformation  Admit on observation  Continue stroke pathway  Telemetry monitoring  Check MRI, check echo  Continue aspirin, Lipitor  Check A1c, lipid panel  Neurology consult  A m  Labs  Supportive care                Hypokalemia  Assessment & Plan  Potassium mildly decreased at 3 3  Replace potassium  A m   CMP    Brain aneurysm  Assessment & Plan  Continue outpatient neurology/neurosurgery follow-up    Hypothyroidism  Assessment & Plan  TSH at 8 136  She does report hs of Thyroid surgery  Not currently on medication regimen  Check T4  Encourage close PCP follow up      Other hyperlipidemia  Assessment & Plan  Continue statins  Check Lipid panel    COPD (chronic obstructive pulmonary disease) ([facility] [address] )  Assessment & Plan  No respiratory distress  Respiratory protocol  Continue home medication  Supportive care    Essential hypertension  Assessment & Plan  Blood pressure is stable  Continue home medications  Monitor blood pressure closely          VTE Prophylaxis: Enoxaparin (Lovenox)  / sequential compression device   Code Status: Level1- Full Code  POLST: POLST form is not discussed and not completed at this time  Discussion with family: Son at bedside    Anticipated Length of Stay:  Patient will be admitted on an Observation basis with an anticipated length of stay of  < 2 midnights  Justification for Hospital Stay: Dizziness R/O CVA/TIA,     Total Time for Visit, including Counseling / Coordination of Care: 30 minutes  Greater than 50% of this total time spent on direct patient counseling and coordination of care  Chief Complaint:   Dizziness    History of Present Illness:    Dustin Allison is a 63 y o  female who presents to the emergency room via EMS complaints of dizziness associated with slurred speech and left-sided weakness  She reports that her she called her daughter who noted that she had trouble speaking and contacted EMS  She reports having history of CVA/TIA 2 years ago  She also has a history of brain aneurysm, COPD, hypertension, hyperlipidemia  EMS reports that she did not have any slurred speech upon arrival but did complain of weakness to her left arm  Stroke alert call in emergency room  Dr Brian Bo responded to stroke alert call  The patient deemed not candidate for tPA given unknown duration of symptoms  Dr Brian Bo recommended admission had minor as maintain stroke pathway (see Dr José Miguel Pozo note for details)  Labs completed in emergency room with results as shown below  CT head without contrast, CTA head and neck completed with results as shown below  Chest x-ray completed official report pending  She received potassium 40 mEq emergency room, fentanyl 50 mcg IV and lorazepam 1 mg IV  At bedside she is awake and alert symptoms in noted on arrival has improved  She does complain of the pain to her lower back and right lower quadrant abdominal pain      Review of Systems:    Review of Systems   Constitutional: Negative for appetite change, chills, diaphoresis, fatigue and fever  HENT: Negative for congestion, facial swelling, tinnitus, trouble swallowing and voice change  Eyes: Positive for visual disturbance  Negative for photophobia, pain and redness  Respiratory: Positive for chest tightness  Negative for cough, shortness of breath and wheezing  Cardiovascular: Negative for chest pain, palpitations and leg swelling  Gastrointestinal: Negative for abdominal pain, nausea and vomiting  Endocrine: Negative for polydipsia, polyphagia and polyuria  Genitourinary: Negative for difficulty urinating, dysuria, flank pain, hematuria and urgency  Musculoskeletal: Positive for back pain  Negative for arthralgias, gait problem, joint swelling and myalgias  Skin: Negative for color change, pallor and rash  Neurological: Positive for dizziness, speech difficulty and headaches  Negative for weakness  Psychiatric/Behavioral: Positive for agitation  Negative for confusion and sleep disturbance  The patient is not nervous/anxious          Past Medical and Surgical History:     Past Medical History:   Diagnosis Date    Anxiety     Asthma     [facility]     Brain aneurysm     Chronic pain disorder     spinal stenosis    Concussion syndrome     neurological rx and balance rx    COPD (chronic obstructive pulmonary disease) (HCC)     Depression     Disease of thyroid gland     nodules     Family history of colon cancer     father    Fibromyalgia, primary     History of colon polyps     Hyperlipidemia     Hypertension     Migraine     Neuropathy     bilateral feet and hands    PTSD (post-traumatic stress disorder)     Stroke [facility]     2012 no deficeits    Thyroid Cancer     TIA (transient ischemic attack)        Past Surgical History:   Procedure Laterality Date    ABDOMINAL SURGERY      laproscopic/ endometriosis    BRAIN SURGERY      aneurysm/ coiling procedure    CARPAL TUNNEL RELEASE      CHOLECYSTECTOMY      DENTAL SURGERY      EYE SURGERY      cataract    HYSTERECTOMY      IL ESOPHAGOGASTRODUODENOSCOPY TRANSORAL DIAGNOSTIC N/A 2/8/2018    Procedure: EGD AND COLONOSCOPY;  Surgeon: Kaila Thompson MD;  Location: [facility]; Service: Gastroenterology    THYROID SURGERY         Meds/Allergies:    Prior to Admission medications    Medication Sig Start Date End Date Taking? Authorizing Provider   albuterol (PROVENTIL HFA,VENTOLIN HFA) 90 mcg/act inhaler Inhale 2 puffs every 6 (six) hours 12/28/17  Yes Historical Provider, MD   atorvastatin (LIPITOR) 40 mg tablet Take 40 mg by mouth 1/9/18 7/18/19 Yes Historical Provider, MD   busPIRone (BUSPAR) 15 mg tablet Take 15 mg by mouth 10/16/17  Yes Historical Provider, MD   carboxymethylcellulose 0 5 % SOLN Administer 1 drop to both eyes daily as needed for dry eyes 2/7/19  Yes Liseth Kellogg DO   clonazePAM (KlonoPIN) 1 mg tablet Take 1 mg by mouth Three times a day 1/25/18  Yes Historical Provider, MD   cyclobenzaprine (FLEXERIL) 10 mg tablet TAKE 1 TABLET BY MOUTH 3 TIMES A DAY AS NEEDED FOR MUSCLE SPASMS 8/14/17  Yes Historical Provider, MD   DULoxetine (CYMBALTA) 30 mg delayed release capsule Take 90 mg by mouth 1/25/18  Yes Historical Provider, MD   fenofibrate (TRICOR) 48 mg tablet Take 48 mg by mouth daily   Yes Historical Provider, MD   fluticasone-salmeterol (ADVAIR DISKUS, WIXELA INHUB) 500-50 mcg/dose inhaler Inhale 1 puff 2 (two) times a day Rinse mouth after use  Yes Historical Provider, MD   HYDROcodone-acetaminophen (NORCO)  mg per tablet 1 tab every 6 hours as needed for severe pain    Do not fill before 2/12/18 1/29/18  Yes Historical Provider, MD   lamoTRIgine (LaMICtal) 200 MG tablet Take 200 mg by mouth 1/16/18 7/18/19 Yes Historical Provider, MD   magnesium 30 MG tablet Take 30 mg by mouth 2 (two) times a day   Yes Historical Provider, MD   Melatonin 10 MG CAPS Take 1 tablet by mouth   Yes Historical Provider, MD   naproxen (NAPROSYN) 500 mg tablet Take 500 mg by mouth 2 (two) times a day 7/14/17 7/18/19 Yes Historical Provider, MD   pregabalin (LYRICA) 100 mg capsule take 1 capsule by mouth three times a day 10/26/17  Yes Historical Provider, MD   pregabalin (LYRICA) 150 mg capsule take 1 capsule by mouth NIGHTLY 10/26/17  Yes Historical Provider, MD   pseudoephedrine-acetaminophen (TYLENOL SINUS)  MG TABS Take 1 tablet by mouth every 4 (four) hours as needed for congestion   Yes Historical Provider, MD   SUMAtriptan (IMITREX) 100 mg tablet TAKE 1 TABLET BY MOUTH AT START OF MIGRAINE HEADACHE  MAY REPEAT IN 2 HOURS IF NEEDED  LIMIT TO NO MORE THAN 2 TABLETS IN 24 HOURS, THREE TI 1/29/18  Yes Historical Provider, MD   topiramate (TOPAMAX) 50 MG tablet Take 50 mg by mouth every 12 (twelve) hours   Yes Historical Provider, MD   aspirin 325 mg tablet Take 325 mg by mouth daily    Historical Provider, MD   fluconazole (DIFLUCAN) 50 mg tablet Take 50 mg by mouth daily    Historical Provider, MD   Magnesium Gluconate 550 MG TABS Take 30 mg by mouth    Historical Provider, MD   omeprazole (PriLOSEC) 20 mg delayed release capsule take 1 capsule by mouth once daily 9/25/17   Historical Provider, MD   promethazine (PHENERGAN) 25 mg tablet Take 25 mg by mouth 3 (three) times a day    Historical Provider, MD   traZODone (DESYREL) 100 mg tablet Take 100 mg by mouth 1/25/18   Historical Provider, MD     I have reviewed home medications with patient personally  Allergies: Allergies   Allergen Reactions    Hydroxyzine Anaphylaxis     Claims it gives her convulsions       Other     Tree Extract     Clarithromycin Rash     Pt denies    Latex Rash    Sulfa Antibiotics Rash     "severe skin burn"       Social History:     Marital Status:    Occupation:   Patient Pre-hospital Living Situation: Lives alone  Patient Pre-hospital Level of Mobility: Active  Patient Pre-hospital Diet Restrictions: None reported  Substance Use History:   Social History     Substance and Sexual Activity   Alcohol Use Never    Alcohol/week: 0 0 standard drinks    Frequency: Patient refused    Drinks per session: Patient refused    Binge frequency: Never     Social History     Tobacco Use   Smoking Status Current Every Day Smoker    Packs/day: 2 00    Types: Cigarettes   Smokeless Tobacco Never Used     Social History     Substance and Sexual Activity   Drug Use No       Family History:    History reviewed  No pertinent family history  Physical Exam:     Vitals:   Blood Pressure: 109/74 (07/19/19 0406)  Pulse: 62 (07/19/19 0406)  Temperature: 97 5 °F (36 4 °C) (07/19/19 0202)  Temp Source: Oral (07/19/19 0202)  Respirations: 14 (07/19/19 0406)  Height: 5' 4" (162 6 cm) (07/19/19 0044)  Weight - Scale: 83 kg (182 lb 15 7 oz) (07/19/19 0109)  SpO2: 94 % (07/19/19 0406)    Physical Exam   Constitutional: She is oriented to person, place, and time  No distress  HENT:   Head: Atraumatic  Mouth/Throat: Oropharynx is clear and moist    Eyes: EOM are normal  Right eye exhibits no discharge  Left eye exhibits no discharge  No scleral icterus  Sluggish right pupil   Neck: Normal range of motion  Neck supple  No JVD present  Cardiovascular: Normal rate, regular rhythm and intact distal pulses  Pulmonary/Chest: Effort normal  No stridor  No respiratory distress  She has no wheezes  Abdominal: Soft  Bowel sounds are normal  She exhibits no distension and no mass  There is no tenderness  There is no guarding  Musculoskeletal: Normal range of motion  She exhibits no edema, tenderness or deformity  Lymphadenopathy:     She has no cervical adenopathy  Neurological: She is oriented to person, place, and time  Coordination normal    Skin: Skin is warm and dry  Capillary refill takes less than 2 seconds  No rash noted  She is not diaphoretic  No erythema  No pallor  Psychiatric: She has a normal mood and affect  Vitals reviewed        Additional Data:     Lab Results: I have personally reviewed pertinent reports  Results from last 7 days   Lab Units 07/18/19  2205   WBC Thousand/uL 7 82   HEMOGLOBIN g/dL 13 0   HEMATOCRIT % 40 2   PLATELETS Thousands/uL 209   NEUTROS PCT % 42*   LYMPHS PCT % 50*   MONOS PCT % 5   EOS PCT % 2     Results from last 7 days   Lab Units 07/19/19  0010 07/18/19  2205   SODIUM mmol/L 139 141   POTASSIUM mmol/L 3 4* 3 3*   CHLORIDE mmol/L 106 107   CO2 mmol/L 27 27   BUN mg/dL 15 16   CREATININE mg/dL 1 02 1 14   ANION GAP mmol/L 6 7   CALCIUM mg/dL 8 3 8 8   ALBUMIN g/dL  --  3 7   TOTAL BILIRUBIN mg/dL  --  0 20   ALK PHOS U/L  --  72   ALT U/L  --  25   AST U/L  --  21   GLUCOSE RANDOM mg/dL 103 110     Results from last 7 days   Lab Units 07/18/19  2205   INR  0 96     Results from last 7 days   Lab Units 07/18/19  2232   POC GLUCOSE mg/dl 96               Imaging: I have personally reviewed pertinent reports  X-ray chest 1 view portable   ED Interpretation by Aquiles Barraza MD (07/18 6346)   No cardiopulmonary disease  CTA stroke alert (head/neck)   Final Result by Bella Fisher MD (07/18 2246)      1  No hemodynamically significant stenosis of the visualized arteries of the neck  2   No high-grade proximal stenosis of the visualized [place]  3   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  No other significant intracranial aneurysm  4   No significant intracranial arteriovenous malformation  Workstation performed: [id number]         CT head wo contrast   Final Result by  (07/19 9714)   Addendum 1 of 1 by Bella Fisher MD (07/18 3942)   ADDENDUM:      Comparison is made with prior CT of the head on 5/8/2019        Final      MRI Inpatient Order    (Results Pending)       EKG, Pathology, and Other Studies Reviewed on Admission:   · EKG:  Normal sinus rhythm at a rate of 63 beats per minute    Allscripts / Epic Records Reviewed: Yes     ** Please Note: This note has been constructed using a voice recognition system   **

## 2019-07-19 NOTE — PLAN OF CARE
Problem: Potential for Falls  Goal: Patient will remain free of falls  Description  INTERVENTIONS:  - Assess patient frequently for physical needs  -  Identify cognitive and physical deficits and behaviors that affect risk of falls  -  [place] fall precautions as indicated by assessment   High fall risk    - Educate patient/family on patient safety including physical limitations  - Instruct patient to call for assistance with activity based on assessment  - Modify environment to reduce risk of injury  - Consider OT/PT consult to assist with strengthening/mobility   Outcome: Progressing     Problem: PAIN - ADULT  Goal: Verbalizes/displays adequate comfort level or baseline comfort level  Description  Interventions:  - Encourage patient to monitor pain and request assistance  - Assess pain using appropriate pain scale (0-10 pain scale)  - Administer analgesics based on type and severity of pain and evaluate response  - Implement non-pharmacological measures as appropriate and evaluate response  - Notify physician/advanced practitioner if interventions unsuccessful or patient reports new pain   Outcome: Progressing     Problem: INFECTION - ADULT  Goal: Absence or prevention of progression during hospitalization  Description  INTERVENTIONS:  - Assess and monitor for signs and symptoms of infection  - Monitor lab/diagnostic results  - Monitor all insertion sites, i e  indwelling lines  - Edgar appropriate cooling/warming therapies per order  - Administer medications as ordered  - Instruct and encourage patient and family to use good hand hygiene technique   Outcome: Progressing     Problem: SAFETY ADULT  Goal: Maintain or return to baseline ADL function  Description  INTERVENTIONS:  -  Assess patient's ability to carry out ADLs; assess patient's baseline for ADL function and identify physical deficits which impact ability to perform ADLs (bathing, care of mouth/teeth, toileting, grooming, dressing, etc )  - Assess/evaluate cause of self-care deficits   - Assess range of motion  - Assess patient's mobility; develop plan if impaired  - Assess patient's need for assistive devices and provide as appropriate  - Encourage maximum independence but intervene and supervise when necessary  ¯ Involve family in performance of ADLs  ¯ Assess for home care needs following discharge   ¯ Request OT consult to assist with ADL evaluation and planning for discharge  ¯ Provide patient education as appropriate  Outcome: Progressing  Goal: Maintain or return mobility status to optimal level  Description  INTERVENTIONS:  - Assess patient's baseline mobility status (modified independent)    - Identify cognitive and physical deficits and behaviors that affect mobility  - Identify mobility aids required to assist with transfers and/or ambulation (cane )  - [place] fall precautions as indicated by assessment   High fall risk    - Record patient progress and toleration of activity level on Mobility SBAR; progress patient to next Phase/Stage  - Instruct patient to call for assistance with activity based on assessment  - Request Rehabilitation consult to assist with strengthening/weightbearing, etc    Outcome: Progressing     Problem: DISCHARGE PLANNING  Goal: Discharge to home or other facility with appropriate resources  Description  INTERVENTIONS:  - Identify barriers to discharge w/patient and caregiver  - Arrange for needed discharge resources and transportation as appropriate  - Identify discharge learning needs (meds)  - Refer to Case Management Department for coordinating discharge planning if the patient needs post-hospital services based on physician/advanced practitioner order or complex needs related to functional status, cognitive ability, or social support system   Outcome: Progressing     Problem: Knowledge Deficit  Goal: Patient/family/caregiver demonstrates understanding of disease process, treatment plan, medications, and discharge instructions  Description  Complete learning assessment and assess knowledge base  Interventions:  - Provide teaching at level of understanding  - Provide teaching via preferred learning methods (verbal, written, demonstration)   Outcome: Progressing     Problem: Neurological Deficit  Goal: Neurological status is stable or improving  Description  Interventions:  - Monitor and assess patient's level of consciousness, motor function, sensory function, and level of assistance needed for ADLs  - Monitor and report changes from baseline  Collaborate with interdisciplinary team to initiate plan and implement interventions as ordered  - Provide and maintain a safe environment  - Utilize fall precautions  - Utilize aspiration precautions  Outcome: Progressing     Problem: Activity Intolerance/Impaired Mobility  Goal: Mobility/activity is maintained at optimum level for patient  Description  Interventions:  - Assess and monitor patient  barriers to mobility and need for assistive/adaptive devices  - Assess patient's emotional response to limitations  - Collaborate with interdisciplinary team and initiate plans and interventions as ordered  - Encourage independent activity per ability   - Maintain proper body alignment  - Perform active/passive rom as tolerated/ordered  - Plan activities to conserve energy   - Turn patient  Outcome: Progressing     Problem: CARDIOVASCULAR - ADULT  Goal: Maintains optimal cardiac output and hemodynamic stability  Description  INTERVENTIONS:  - Monitor I/O, vital signs and rhythm  - Monitor for S/S and trends of decreased cardiac output i e  bleeding, hypotension  - Administer and titrate ordered vasoactive medications to optimize hemodynamic stability  - Assess quality of pulses, skin color and temperature  - Assess for signs of decreased coronary artery perfusion - ex   Angina  - Instruct patient to report change in severity of symptoms  Outcome: Progressing  Goal: Absence of cardiac dysrhythmias or at baseline rhythm  Description  INTERVENTIONS:  - Continuous cardiac monitoring, monitor vital signs, obtain 12 lead EKG if indicated  - Administer antiarrhythmic and heart rate control medications as ordered  - Monitor electrolytes and administer replacement therapy as ordered  Outcome: Progressing
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
## 2019-07-19 NOTE — SOCIAL WORK
Cm met with the patient to evaluate the patients prior function and living situation and any barriers to d/c and form a safe d/c plan  Cm also evaluated the patient for any services in the home or needs for services  Pt resides at home alone in an apartment  (states it's a 1/2 double that has 3 units)  Pt is on the 1st floor  Has 2 FRANCY  Pt has a cane that she uses when she goes out  Pt is set up with [facility]  and has a  Maryuri  [phone]  Pt states Roro Song goes out to her apartment 1x week  Pt states she is set up with [employer] and that they are working on setting up services for pt for in the home-approved for 21 hours a week and working with [address] [place] [address] to try and get them to come in to pt's home  Pt states they are also working on trying to get her a medical alert as well as a walker and nebulizer  (They are going through her PCP to try and arrange this)  Pt is inquiring about CM arranging for homehealth care for dc (RN, PT, OT and hhc aide)-CM will request order from physician  Pt drives  PCP is Rubio Nice and pharmacy is [facility] in [place]  Pt plans home on dc, CM will follow and assist in dc planning

## 2019-07-19 NOTE — UTILIZATION REVIEW
Initial Clinical Review    Admission: Date/Time/Statement: 7/18/19 @ 2323 -- OBS    Orders Placed This Encounter   Procedures    Place in Observation     Standing Status:   Standing     Number of Occurrences:   1     Order Specific Question:   Admitting Physician     Answer:   August Scruggs     Order Specific Question:   Level of Care     Answer:   Med Surg [16]     ED Arrival Information     Expected Arrival Acuity Means of Arrival Escorted By Service Admission Type    7/18/2019 7/18/2019 21:32 [facility]    Arrival Complaint    -        Chief Complaint   Patient presents with    Dizziness     Patient stated she woke from sleep with belly pain, dizziness, weakness mostly on left side and patient described a time while speaking with daughter on phone she had a change in her speech  Assessment/Plan: 63 y o  female who presents to the emergency room via EMS complaints of dizziness associated with slurred speech and left-sided weakness  She reports that her she called her daughter who noted that she had trouble speaking and contacted EMS  She reports having history of CVA/TIA 2 years ago  She also has a history of brain aneurysm, COPD, hypertension, hyperlipidemia  EMS reports that she did not have any slurred speech upon arrival but did complain of weakness to her left arm  Stroke alert call in emergency room  Dr Lopes First responded to stroke alert call  The patient deemed not candidate for tPA given unknown duration of symptoms  Dr Lopes First recommended admission had minor as maintain stroke pathway (see Dr Anirudh Barkley note for details)  Labs completed in emergency room with results as shown below  CT head without contrast, CTA head and neck completed with results as shown below  Chest x-ray completed official report pending  She received potassium 40 mEq emergency room, fentanyl 50 mcg IV and lorazepam 1 mg IV    At bedside she is awake and alert symptoms in noted on arrival has improved  She does complain of the pain to her lower back and right lower quadrant abdominal pain     Dizziness  Assessment & Plan  Present to the ER with complaints of dizziness  Associated with visual disturbances, and slurred speech  She has hx of brain aneurysm and prior TIAs  Concerns for new CVA/TIA  Stroke alert call in emergency room  Dr Lam Villar answered Stroke alert- patient not TPA candidate- See Dr Juanita Vazquez note for details  CTA head and neck shows-No hemodynamically significant stenosis of the visualized arteries of the neck  2   No high-grade proximal stenosis of the visualized [place]  3   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   No other significant intracranial aneurysm  4   No significant intracranial arteriovenous malformation  Admit on observation  Continue stroke pathway  Telemetry monitoring  Check MRI, check echo  Continue aspirin, Lipitor  Check A1c, lipid panel  Neurology consult  A m   Labs  Supportive care      ED Triage Vitals [07/18/19 2134]   Temperature Pulse Respirations Blood Pressure SpO2   98 2 °F (36 8 °C) 69 20 116/79 97 %      Temp Source Heart Rate Source Patient Position - Orthostatic VS BP Location FiO2 (%)   Temporal Monitor Lying Left arm --      Pain Score       9        Wt Readings from Last 1 Encounters:   07/19/19 80 5 kg (177 lb 7 5 oz)     Additional Vital Signs:   Date/Time  Temp  Pulse  Resp  BP  MAP (mmHg)  SpO2  O2 Device   07/19/19 07:33:44  97 5 °F (36 4 °C)  67  18  108/72  84  95 %     07/19/19 0707            96 %      07/19/19 05:52:06  97 8 °F (36 6 °C)  61  18  110/73  85  97 %     07/19/19 04:06:33    62  14  109/74  86  94 %  None (Room air)   07/19/19 03:02:40    64  14  108/74  85  93 %  None (Room air)   07/19/19 02:02:51  97 5 °F (36 4 °C)  61  16  108/75  86  96 %  None (Room air)   07/19/19 0140    60  18      94 %  None (Room air) 07/19/19 00:44:51  97 5 °F (36 4 °C)  61  15  106/76  86  97 %  None (Room air)   07/18/19 2330    66  18  114/75    99 %     07/18/19 2315    65  20  101/74         07/18/19 2300    63  19      97 %     07/18/19 2245    63  17  110/68    98 %     07/18/19 2230    63  22  115/65    95 %     07/18/19 2215    69  18  97/54    98 %     07/18/19 2200    74  20  101/71    96 %     07/18/19 2145    70  15      95 %     07/18/19 2134  98 2 °F (36 8 °C)  69  20  116/79    97 %  None (Room air)       Pertinent Labs/Diagnostic Test Results:   Results from last 7 days   Lab Units 07/19/19  0534 07/18/19  2205   WBC Thousand/uL 6 90 7 82   HEMOGLOBIN g/dL 11 9 13 0   HEMATOCRIT % 37 0 40 2   PLATELETS Thousands/uL 191 209   NEUTROS ABS Thousands/µL  --  3 27     Results from last 7 days   Lab Units 07/19/19  0534 07/19/19  0010 07/18/19  2205   SODIUM mmol/L 141 139 141   POTASSIUM mmol/L 4 0 3 4* 3 3*   CHLORIDE mmol/L 108 106 107   CO2 mmol/L 26 27 27   ANION GAP mmol/L 7 6 7   BUN mg/dL 15 15 16   CREATININE mg/dL 1 03 1 02 1 14   EGFR ml/min/1 73sq m 60 61 54   CALCIUM mg/dL 8 6 8 3 8 8   MAGNESIUM mg/dL 2 1  --   --    PHOSPHORUS mg/dL 4 2  --   --      Results from last 7 days   Lab Units 07/19/19  0534 07/18/19  2205   AST U/L 19 21   ALT U/L 24 25   ALK PHOS U/L 65 72   TOTAL PROTEIN g/dL 6 8 7 3   ALBUMIN g/dL 3 4* 3 7   TOTAL BILIRUBIN mg/dL 0 30 0 20     Results from last 7 days   Lab Units 07/18/19  2232   POC GLUCOSE mg/dl 96     Results from last 7 days   Lab Units 07/19/19  0534 07/19/19  0010 07/18/19  2205   GLUCOSE RANDOM mg/dL 95 103 110     Results from last 7 days   Lab Units 07/18/19  2205   TROPONIN I ng/mL <0 02     Results from last 7 days   Lab Units 07/19/19  0719 07/18/19  2205   PROTIME seconds 13 2 12 8   INR  1 00 0 96   PTT seconds 28 26     Results from last 7 days   Lab Units 07/18/19  2205   LIPASE u/L 115     Results from last 7 days   Lab Units 07/19/19  0547 CLARITY UA  Slightly Cloudy   COLOR UA  Yellow   SPEC GRAV UA  <=1 005   PH UA  6 5   GLUCOSE UA mg/dl Negative   KETONES UA mg/dl Negative   BLOOD UA  Negative   PROTEIN UA mg/dl Negative   NITRITE UA  Positive*   BILIRUBIN UA  Negative   UROBILINOGEN UA E U /dl 0 2   LEUKOCYTES UA  Trace*   WBC UA /hpf 1-2*   RBC UA /hpf 1-2*   BACTERIA UA /hpf Innumerable*   EPITHELIAL CELLS WET PREP /hpf Occasional     CT head 7/18 --   1   No acute intracranial hemorrhage  2   Chronic small vessel ischemic changes  3   If there is continued clinical concern for acute infarct, further evaluation with MRI may be obtained which is more sensitive if not contraindicated       CTA head / neck 7/18 --   1   No hemodynamically significant stenosis of the visualized arteries of the neck  2   No high-grade proximal stenosis of the visualized [place]  3   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   No other significant intracranial aneurysm  4   No significant intracranial arteriovenous malformation  CXR 7/19 -- No active pulmonary disease      MRI brain -- pending     ED Treatment:   Medication Administration from 07/18/2019 2132 to 07/19/2019 0037       Date/Time Order Dose Route Action     07/18/2019 2234 iohexol (OMNIPAQUE) 350 MG/ML injection (SINGLE-DOSE) 100 mL 100 mL Intravenous Given     07/18/2019 2322 potassium chloride (K-DUR,KLOR-CON) CR tablet 40 mEq 40 mEq Oral Given     07/18/2019 2319 fentanyl citrate (PF) 100 MCG/2ML 50 mcg 50 mcg Intravenous Given     07/19/2019 0010 LORazepam (ATIVAN) 2 mg/mL injection 1 mg 1 mg Intravenous Given        Past Medical History:   Diagnosis Date    Anxiety     Asthma     [facility]     Brain aneurysm     Chronic pain disorder     spinal stenosis    Concussion syndrome     neurological rx and balance rx    [facility]     Depression     Disease of thyroid gland     nodules     Family history of colon cancer     father    Fibromyalgia, primary     History of colon polyps     Hyperlipidemia     Hypertension     Migraine     Neuropathy     bilateral feet and hands    PTSD (post-traumatic stress disorder)     St[facility]     2012 no deficeits    Thyroid Cancer     TIA (transient ischemic attack)        Admitting Diagnosis: Dizziness [R42]  Weakness [R53 1]  Generalized body aches [R52]  Age/Sex: 63 y o  female  Admission Orders:  Tele  I/O  SCD's  Neuro checks q4h  Nursing dysphagia eval prior to po  Cardiac diet  PT/OT/Speech evals    Current Facility-Administered Medications:  albuterol 2 puff Inhalation Q6H   atorvastatin 40 mg Oral Daily With Dinner   cefTRIAXone 1,000 mg Intravenous Q24H   clonazePAM 1 mg Oral TID   DULoxetine 90 mg Oral Daily   enoxaparin 40 mg Subcutaneous Daily   fenofibrate 48 mg Oral Daily   fluticasone-vilanterol 1 puff Inhalation Daily   HYDROmorphone 1 mg Intravenous Q3H PRN   ipratropium 0 5 mg Nebulization TID   lamoTRIgine 200 mg Oral Daily   levalbuterol 1 25 mg Nebulization TID   nicotine 1 patch Transdermal Daily   ondansetron 4 mg Intravenous Q6H PRN   oxyCODONE 5 mg Oral Q4H PRN   pantoprazole 40 mg Oral Early Morning   pregabalin 100 mg Oral TID   topiramate 50 mg Oral Q12H [address]       IP CONSULT TO NEUROLOGY    Network Utilization Review Department  Phone: [phone]; Fax [phone]  [email or web address]  ATTENTION: Please call with any questions or concerns to [phone]  and carefully listen to the prompts so that you are directed to the right person  Send all requests for admission clinical reviews, approved or denied determinations and any other requests to fax [phone]   All voicemails are confidential

## 2019-07-19 NOTE — ASSESSMENT & PLAN NOTE
TSH at 8 136  She does report hs of Thyroid surgery  Not currently on medication regimen  Check T4  Encourage close PCP follow up

## 2019-07-19 NOTE — QUICK NOTE
Mike Woodall          Assessment/Plan   Assessment:  Lanec Brown is a 64-year-old woman with multiple vascular risk factors as well as a chronic pain syndrome who presents with subjective vertigo and diplopia without clear evidence of ataxia/central nervous system dysfunction based on the reported emergency room examination  Clearly posterior circulation stroke remains on the differential     TPA Decision: Patient not a TPA candidate  Unclear time of onset outside appropriate time window  Plan: -admit along stroke pathway  -maintain blood sugar less than 180  -permissive hypertension to maximum blood pressure to 20/110  -PT/OT  -dysphagia screen prior to any oral intake  -full dose aspirin x1 and then low-dose aspirin daily  -MRI brain when able  -2D echocardiogram  -fasting lipid panel/hemoglobin A1c  A-atorvastatin 40 mg daily or home dose if her home medication is higher  -continue outpatient psychiatric medication regimen    History of Present Illness     Reason for Consult / Principal Problem: stroke alert  Hx and PE limited by: none  Patient last known well: Unclear  Afternoon on 7/18  Stroke alert called: 7/18 2205  Neurology time of arrival: 7/18 2200 (was speaking to the Er by phone prior to stroke alert    HPI: Dorothy Matos is a 63 y o  female who presents as a stroke alert  She has multiple comorbidities as described below including a remote history of stroke and chronic pain issues  Reportedly she was feeling unwell earlier in the day and decided to take a nap this afternoon  When she awoke from her nap she felt vertiginous and attempted to call her sister  At that point in time it was felt that her speech was abnormal in her sister called 911  She reports ongoing vertigo and some double vision  Due to her unclear last known well she is not a candidate for IV alteplase    I personally reviewed her noncontrast head CT which does not reveal any evidence of large vessel occlusions/large territorial infarction or intercerebral hemorrhage  I also reviewed her CT angiography which does not reveal any evidence of large vessel occlusion or target for endovascular intervention      Past Medical History:   Diagnosis Date    Anxiety     Asthma     Bipolar 1 disorder ([facility] )     Brain aneurysm     Chronic pain disorder     spinal stenosis    Concussion syndrome     neurological rx and balance rx    COPD (chronic obstructive pulmonary disease) (HCC)     Depression     Disease of thyroid gland     nodules     Family history of colon cancer     father    Fibromyalgia, primary     History of colon polyps     Hyperlipidemia     Hypertension     Migraine     Neuropathy     bilateral feet and hands    PTSD (post-traumatic stress disorder)     Stroke ([place] [address] )     2012 no deficeits    Thyroid Cancer     TIA (transient ischemic attack)        Social History     Socioeconomic History    Marital status:      Spouse name: Not on file    Number of children: Not on file    Years of education: Not on file    Highest education level: Not on file   Occupational History    Not on file   Social Needs    Financial resource strain: Not on file    Food insecurity:     Worry: Not on file     Inability: Not on file    Transportation needs:     Medical: Not on file     Non-medical: Not on file   Tobacco Use    Smoking status: Current Every Day Smoker     Packs/day: 2 00     Types: Cigarettes    Smokeless tobacco: Never Used   Substance and Sexual Activity    Alcohol use: No    Drug use: No    Sexual activity: Not on file   Lifestyle    Physical activity:     Days per week: Not on file     Minutes per session: Not on file    Stress: Not on file   Relationships    Social connections:     Talks on phone: Not on file     Gets together: Not on file     Attends Quaker service: Not on file     Active member of club or organization: Not on file     Attends meetings of clubs or organizations: Not on file     Relationship status: Not on file    Intimate partner violence:     Fear of current or ex partner: Not on file     Emotionally abused: Not on file     Physically abused: Not on file     Forced sexual activity: Not on file   Other Topics Concern    Not on file   Social History Narrative    Not on file         History reviewed  No pertinent family history  Meds/Allergies   PTA meds:   Prior to Admission Medications   Prescriptions Last Dose Informant Patient Reported? Taking? DULoxetine (CYMBALTA) 30 mg delayed release capsule   Yes No   Sig: Take 90 mg by mouth   HYDROcodone-acetaminophen (NORCO)  mg per tablet   Yes No   Si tab every 6 hours as needed for severe pain   Do not fill before 18   Magnesium Gluconate 550 MG TABS   Yes No   Sig: Take 30 mg by mouth   Melatonin 10 MG CAPS   Yes No   Sig: Take 1 tablet by mouth   SUMAtriptan (IMITREX) 100 mg tablet   Yes No   Sig: TAKE 1 TABLET BY MOUTH AT START OF MIGRAINE HEADACHE  MAY REPEAT IN 2 HOURS IF NEEDED   LIMIT TO NO MORE THAN 2 TABLETS IN 24 HOURS, THREE TI   albuterol (PROVENTIL HFA,VENTOLIN HFA) 90 mcg/act inhaler   Yes No   Sig: Inhale 2 puffs every 6 (six) hours   atorvastatin (LIPITOR) 40 mg tablet   Yes No   Sig: Take 40 mg by mouth   busPIRone (BUSPAR) 15 mg tablet   Yes No   Sig: Take 15 mg by mouth   carboxymethylcellulose 0 5 % SOLN   No No   Sig: Administer 1 drop to both eyes daily as needed for dry eyes   clonazePAM (KlonoPIN) 1 mg tablet   Yes No   Sig: Take 1 mg by mouth Three times a day   cyclobenzaprine (FLEXERIL) 10 mg tablet   Yes No   Sig: TAKE 1 TABLET BY MOUTH 3 TIMES A DAY AS NEEDED FOR MUSCLE SPASMS   lamoTRIgine (LaMICtal) 200 MG tablet   Yes No   Sig: Take 200 mg by mouth   naproxen (NAPROSYN) 500 mg tablet   Yes No   Sig: Take 500 mg by mouth 2 (two) times a day   omeprazole (PriLOSEC) 20 mg delayed release capsule   Yes No   Sig: take 1 capsule by mouth once daily   pregabalin (LYRICA) 100 mg capsule   Yes No   Sig: take 1 capsule by mouth three times a day   pregabalin (LYRICA) 150 mg capsule   Yes No   Sig: take 1 capsule by mouth NIGHTLY   promethazine (PHENERGAN) 25 mg tablet   Yes No   Sig: Take 25 mg by mouth 3 (three) times a day   traZODone (DESYREL) 100 mg tablet   Yes No   Sig: Take 100 mg by mouth      Facility-Administered Medications: None         Patient Vitals for the past 24 hrs:   BP Temp Temp src Pulse Resp SpO2 Weight   07/18/19 2134 116/79 98 2 °F (36 8 °C) Temporal 69 20 97 % 89 2 kg (196 lb 10 4 oz)       Lab Results:   CBC:   Results from last 7 days   Lab Units 07/18/19  2205   WBC Thousand/uL 7 82   RBC Million/uL 4 20   HEMOGLOBIN g/dL 13 0   HEMATOCRIT % 40 2   MCV fL 96   PLATELETS Thousands/uL 209   , BMP/CMP:   Results from last 7 days   Lab Units 07/18/19  2205   SODIUM mmol/L 141   POTASSIUM mmol/L 3 3*   CHLORIDE mmol/L 107   CO2 mmol/L 27   BUN mg/dL 16   CREATININE mg/dL 1 14   CALCIUM mg/dL 8 8   AST U/L 21   ALT U/L 25   ALK PHOS U/L 72   EGFR ml/min/1 73sq m 54   , Coagulation:   Results from last 7 days   Lab Units 07/18/19  2205   INR  0 96     Imaging Studies: I have personally reviewed pertinent films in PACS

## 2019-07-19 NOTE — RESPIRATORY THERAPY NOTE
RT Protocol Note  Mason Woodall 63 y o  female MRN: [id number]  Unit/Bed#: 409-01 Encounter: [id number]    Assessment    Active Problems:    * No active hospital problems   *      Home Pulmonary Medications:  Breo and Proair MDI       Past Medical History:   Diagnosis Date    Anxiety     Asthma     Bipolar 1 disorder ([place] [address] )     Brain aneurysm     Chronic pain disorder     spinal stenosis    Concussion syndrome     neurological rx and balance rx    COPD (chronic obstructive pulmonary disease) (HCC)     Depression     Disease of thyroid gland     nodules     Family history of colon cancer     father    Fibromyalgia, primary     History of colon polyps     Hyperlipidemia     Hypertension     Migraine     Neuropathy     bilateral feet and hands    PTSD (post-traumatic stress disorder)     Stroke ([place] [address] )     2012 no deficeits    Thyroid Cancer     TIA (transient ischemic attack)      Social History     Socioeconomic History    Marital status:      Spouse name: None    Number of children: None    Years of education: None    Highest education level: None   Occupational History    None   Social Needs    Financial resource strain: None    Food insecurity:     Worry: None     Inability: None    Transportation needs:     Medical: None     Non-medical: None   Tobacco Use    Smoking status: Current Every Day Smoker     Packs/day: 2 00     Types: Cigarettes    Smokeless tobacco: Never Used   Substance and Sexual Activity    Alcohol use: Never     Alcohol/week: 0 0 standard drinks     Frequency: Patient refused     Drinks per session: Patient refused     Binge frequency: Never    Drug use: No    Sexual activity: None   Lifestyle    Physical activity:     Days per week: None     Minutes per session: None    Stress: None   Relationships    Social connections:     Talks on phone: None     Gets together: None     Attends Ep[employer]opalian service: None     Active member of club or organization: None Attends meetings of clubs or organizations: None     Relationship status: None    Intimate partner violence:     Fear of current or ex partner: None     Emotionally abused: None     Physically abused: None     Forced sexual activity: None   Other Topics Concern    None   Social History Narrative    None       Subjective         Objective    Physical Exam:   Assessment Type: Assess only  General Appearance: Alert, Awake  Respiratory Pattern: Normal  Chest Assessment: Chest expansion symmetrical  Bilateral Breath Sounds: Diminished, Expiratory wheezes  Cough: None  O2 Device: None    Vitals:  Blood pressure 106/76, pulse 60, temperature 98 2 °F (36 8 °C), temperature source Temporal, resp  rate 18, weight 83 kg (182 lb 15 7 oz), SpO2 94 %  Imaging and other studies: I have personally reviewed pertinent reports        O2 Device: None     Plan    Respiratory Plan: Home Bronchodilator Patient pathway      SVN TID with 1 25mg Xopenex/0 5mg Atrovent

## 2019-07-19 NOTE — PHYSICAL THERAPY NOTE
Physical Therapy Evaluation and Treatment    Patient Name: Arlette Kilgore    QEFAK'O Date: 7/19/2019     Problem List  Patient Active Problem List   Diagnosis    Essential hypertension    [facility] disease) ([facility] )    Other hyperlipidemia    Dizziness    Hypothyroidism    Brain aneurysm    Hypokalemia    Hypertriglyceridemia    Low HDL (under 40)        Past Medical History  Past Medical History:   Diagnosis Date    Anxiety     Asthma     Bipolar 1 disorder ([facility] )     Brain aneurysm     Chronic pain disorder     spinal stenosis    Concussion syndrome     neurological rx and balance rx    COPD (chronic obstructive pulmonary disease) (HCC)     Depression     Disease of thyroid gland     nodules     Family history of colon cancer     father    Fibromyalgia, primary     History of colon polyps     Hyperlipidemia     Hypertension     Migraine     Neuropathy     bilateral feet and hands    PTSD (post-traumatic stress disorder)     Stroke ([place] [address] )     2012 no deficeits    Thyroid Cancer     TIA (transient ischemic attack)         Past Surgical History  Past Surgical History:   Procedure Laterality Date    ABDOMINAL SURGERY      laproscopic/ endometriosis    BRAIN SURGERY      aneurysm/ coiling procedure    CARPAL TUNNEL RELEASE      CHOLECYSTECTOMY      DENTAL SURGERY      EYE SURGERY      cataract    HYSTERECTOMY      AK ESOPHAGOGASTRODUODENOSCOPY TRANSORAL DIAGNOSTIC N/A 2/8/2018    Procedure: EGD AND COLONOSCOPY;  Surgeon: Deena Garcia MD;  Location: [facility]; Service: Gastroenterology    THYROID SURGERY             07/19/19 1026   Note Type   Note type Eval/Treat   Pain Assessment   Pain Score 9   Pain Type Acute pain   Pain Location Back;Rib cage   Pain Orientation Right   Home Living   Type of Home Apartment  (1st floor apartment)   Home Layout One level; Able to live on main level with bedroom/bathroom; Performs ADLs on one level;Stairs to enter with rails  (1 plus 1 FRANCY with HR)   Bathroom Shower/Tub Tub/shower unit   Bathroom Toilet Standard   Bathroom Accessibility Accessible   Home Equipment Cane   Additional Comments pt states she is approved for 21 hours of aide service  Pt states she needs a RW and shower chair to go home   Prior Function   Level of [place] Independent with ADLs and functional mobility  ((I) ambulation with SPC)   Lives With Alone   ADL Assistance Independent   IADLs Independent  (pt notes ADL's and IADL's are getting more difficult)   Comments (I) driving   Restrictions/Precautions   Weight Bearing Precautions Per Order No   Other Precautions Multiple lines;Telemetry;Pain; Fall Risk   General   Family/Caregiver Present No   Cognition   Arousal/Participation Alert   Orientation Level Oriented X4   Following Commands Follows all commands and directions without difficulty   RUE Assessment   RUE Assessment X  (flex to 110, 4+/5)   LUE Assessment   LUE Assessment X  (shoulder flex to 110, 4-/5 shoulder wrist  strength)   RLE Assessment   RLE Assessment WFL  (4+/5)   LLE Assessment   LLE Assessment WFL  (4-/5 throughout)   Coordination   Movements are Fluid and Coordinated 1  (normal thumb to fingertip and fingertip to nose bilaterally)   Sensation X  (pt has neuropathy with decreased sensation mid shin to foot)   Light Touch   RLE Light Touch Grossly intact   LLE Light Touch Impaired   LLE Light Touch Comments decreased to light touch throughout L LE   Bed Mobility   Supine to Sit 5  Supervision   Additional items Bedrails   Sit to Supine 5  Supervision   Additional items Bedrails   Additional Comments Pt with no difficulty with bed mobility  Pt did not lightheadedness throughout session  SpO2 on room air 95% HR 76   BP after ambulation 103/75 which pt states is her normal blood pressure   Transfers   Sit to Stand 5  Supervision   Additional items Bedrails   Stand to Sit 5  Supervision   Additional items Bedrails   Stand pivot 5  Supervision   Additional Comments pt able to perform all transfers and ambulation with (S) no A  D  but utilizes wide RADHA and decreased stride length  No LOB during ambulation but limited by L LE weakness and dizziness   Ambulation/Elevation   Gait pattern Wide RADHA; Short stride   Gait Assistance 5  Supervision   Assistive Device None   Distance 100ft no A D  (S) but limited by dizziness and required one standing rest break   Balance   Static Sitting Good   Dynamic Sitting Good   Static Standing Fair +   Dynamic Standing Fair   Ambulatory Fair   Endurance Deficit   Endurance Deficit Yes   Endurance Deficit Description limited ambulation and activity tolerance due to weakness and pain   Activity Tolerance   Activity Tolerance Patient limited by fatigue;Patient limited by pain   Assessment   Prognosis Good   Problem List Decreased strength;Decreased endurance; Impaired balance;Decreased mobility;Pain;Decreased safety awareness; Impaired sensation   Assessment Pt is a 63year old female with complex PMH including CVA/TIA 2 years ago, brain aneurysm, COPD, HTN, fibromyalgia, PTSD bipolar concussion syndrome presenting to [facility] with dizziness slurred speech and L sided weakness  Pt seen for high complexity PT evaluation presenting with increased back and rib pain L UE and LE weakness with decreased sensation slowed coordination but intact and decreased balance with limited ambulation tolerance due to persistent lightheadedness  Pt requiring one standing rest break during ambulation due to lightheadedness but no LOB during transfers and ambulation despite decreased sensation and weakness  Pt would benefit from continued activity in PT to improve stength balance endurance mobility transfers ambulation and step negotiation to return to (I) LOF  Pt will be safe to return home but would benefit from home health care services on discharge     Goals   Patient Goals To feel better and return home   LTG Expiration Date 08/02/19   Long Term Goal #1 Improve bilateral LE strength by 1/2 muscle grade to improve all bed mobility and transfers to (I) no A D  Long Term Goal #2 Improve standing and ambulatory balance to good no A D  to improve ambulation to 250ft with SPC modified (I) and to improve step negotiation to 4 steps with HR (S) no LOB or dizziness  Treatment Day 1   Plan   Treatment/Interventions Functional transfer training;LE strengthening/ROM; Elevations; Therapeutic exercise; Endurance training;Patient/family training;Bed mobility;Gait training   PT Frequency   (3-5x/wk)   Recommendation   Recommendation Home PT   PT - OK to Discharge Yes  (when medically stable for discharge)     PHYSICAL THERAPY TREATMENT NOTE    Time In: 10:15    Time Out: 10:25  Total Time: 10 min  MRN: [id number]    S: Pt complaining of pain and dizziness still persisting  Pt states she also needs more assistance at home    O: Therapeutic Activities:   Supine > sit with supervision with bedrail; sit > supine with supervision with bedrail  Sit > stand with supervision no A D ; stand > sit with supervision no A  D   Ambulation with no assistive device with supervision 100ft; ambulates with wide RADHA short step length and decreased gait speed with abnormal weightshifting  Vitals: left UE BP = 103/75, Heart Rate = 76 bpm, SpO2 = 95%  on RA  A: Pt able to perform all functional mobility and ambulation no A D  At a (S) level with altered gait pattern but no LOB  Pt at increased fall risk due to persistent lightheadedness during evaluation and treatment  Pt requiring increased time to complete task due to fatigue and pain  Weakness present L UE and LE which pt states is increased from baseline  No drop in SpO2 on room air or HR elevation greater than 94   Pt would benefit from continued activity in PT to improve strength balance endurance pain safety awareness mobility transfers ambulation and step negotiation  P: See initial evaluation for full PT poc  Patient opted to return to bed; patient positioned with all needs, including call bell, within reach      Anirudh Foley, PT, MPT

## 2019-07-19 NOTE — ASSESSMENT & PLAN NOTE
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

## 2019-07-20 VITALS
RESPIRATION RATE: 18 BRPM | OXYGEN SATURATION: 96 % | BODY MASS INDEX: 30.6 KG/M2 | DIASTOLIC BLOOD PRESSURE: 65 MMHG | WEIGHT: 179.23 LBS | TEMPERATURE: 98.4 F | HEIGHT: 64 IN | SYSTOLIC BLOOD PRESSURE: 99 MMHG | HEART RATE: 86 BPM

## 2019-07-20 LAB
ALBUMIN SERPL BCP-MCNC: 3.4 G/DL (ref 3.5–5)
ALP SERPL-CCNC: 70 U/L (ref 46–116)
ALT SERPL W P-5'-P-CCNC: 24 U/L (ref 12–78)
ANION GAP SERPL CALCULATED.3IONS-SCNC: 9 MMOL/L (ref 4–13)
AST SERPL W P-5'-P-CCNC: 22 U/L (ref 5–45)
BILIRUB SERPL-MCNC: 0.3 MG/DL (ref 0.2–1)
BUN SERPL-MCNC: 13 MG/DL (ref 5–25)
CALCIUM SERPL-MCNC: 8.9 MG/DL (ref 8.3–10.1)
CHLORIDE SERPL-SCNC: 105 MMOL/L (ref 100–108)
CK MB SERPL-MCNC: 0.9 NG/ML (ref 0–5)
CK MB SERPL-MCNC: <1 % (ref 0–2.5)
CK SERPL-CCNC: 136 U/L (ref 26–192)
CO2 SERPL-SCNC: 29 MMOL/L (ref 21–32)
CREAT SERPL-MCNC: 1 MG/DL (ref 0.6–1.3)
ERYTHROCYTE [DISTWIDTH] IN BLOOD BY AUTOMATED COUNT: 13.6 % (ref 11.6–15.1)
GFR SERPL CREATININE-BSD FRML MDRD: 63 ML/MIN/1.73SQ M
GLUCOSE SERPL-MCNC: 105 MG/DL (ref 65–140)
HAV IGM SER QL: NORMAL
HBV CORE IGM SER QL: NORMAL
HBV SURFACE AG SER QL: NORMAL
HCT VFR BLD AUTO: 39.5 % (ref 34.8–46.1)
HCV AB SER QL: NORMAL
HGB BLD-MCNC: 12.4 G/DL (ref 11.5–15.4)
LACTATE SERPL-SCNC: 1 MMOL/L (ref 0.5–2)
MAGNESIUM SERPL-MCNC: 2.2 MG/DL (ref 1.6–2.6)
MCH RBC QN AUTO: 30.4 PG (ref 26.8–34.3)
MCHC RBC AUTO-ENTMCNC: 31.4 G/DL (ref 31.4–37.4)
MCV RBC AUTO: 97 FL (ref 82–98)
PHOSPHATE SERPL-MCNC: 5.2 MG/DL (ref 2.7–4.5)
PLATELET # BLD AUTO: 192 THOUSANDS/UL (ref 149–390)
PMV BLD AUTO: 10.4 FL (ref 8.9–12.7)
POTASSIUM SERPL-SCNC: 3.7 MMOL/L (ref 3.5–5.3)
PROCALCITONIN SERPL-MCNC: <0.05 NG/ML
PROT SERPL-MCNC: 7.1 G/DL (ref 6.4–8.2)
RBC # BLD AUTO: 4.08 MILLION/UL (ref 3.81–5.12)
SODIUM SERPL-SCNC: 143 MMOL/L (ref 136–145)
T3 SERPL-MCNC: 0.8 NG/ML (ref 0.6–1.8)
T4 FREE SERPL-MCNC: 0.77 NG/DL (ref 0.76–1.46)
WBC # BLD AUTO: 6.26 THOUSAND/UL (ref 4.31–10.16)

## 2019-07-20 PROCEDURE — 82550 ASSAY OF CK (CPK): CPT | Performed by: INTERNAL MEDICINE

## 2019-07-20 PROCEDURE — 84145 PROCALCITONIN (PCT): CPT | Performed by: INTERNAL MEDICINE

## 2019-07-20 PROCEDURE — 82553 CREATINE MB FRACTION: CPT | Performed by: INTERNAL MEDICINE

## 2019-07-20 PROCEDURE — 99239 HOSP IP/OBS DSCHRG MGMT >30: CPT | Performed by: PHYSICIAN ASSISTANT

## 2019-07-20 PROCEDURE — 97530 THERAPEUTIC ACTIVITIES: CPT | Performed by: PHYSICAL THERAPIST

## 2019-07-20 PROCEDURE — 85027 COMPLETE CBC AUTOMATED: CPT | Performed by: STUDENT IN AN ORGANIZED HEALTH CARE EDUCATION/TRAINING PROGRAM

## 2019-07-20 PROCEDURE — 84100 ASSAY OF PHOSPHORUS: CPT | Performed by: STUDENT IN AN ORGANIZED HEALTH CARE EDUCATION/TRAINING PROGRAM

## 2019-07-20 PROCEDURE — 84480 ASSAY TRIIODOTHYRONINE (T3): CPT | Performed by: INTERNAL MEDICINE

## 2019-07-20 PROCEDURE — 83605 ASSAY OF LACTIC ACID: CPT | Performed by: INTERNAL MEDICINE

## 2019-07-20 PROCEDURE — 84439 ASSAY OF FREE THYROXINE: CPT | Performed by: INTERNAL MEDICINE

## 2019-07-20 PROCEDURE — 80053 COMPREHEN METABOLIC PANEL: CPT | Performed by: INTERNAL MEDICINE

## 2019-07-20 PROCEDURE — 83735 ASSAY OF MAGNESIUM: CPT | Performed by: STUDENT IN AN ORGANIZED HEALTH CARE EDUCATION/TRAINING PROGRAM

## 2019-07-20 PROCEDURE — 80074 ACUTE HEPATITIS PANEL: CPT | Performed by: INTERNAL MEDICINE

## 2019-07-20 RX ORDER — CEPHALEXIN 500 MG/1
500 CAPSULE ORAL EVERY 12 HOURS SCHEDULED
Qty: 10 CAPSULE | Refills: 0 | Status: SHIPPED | OUTPATIENT
Start: 2019-07-21 | End: 2019-07-26

## 2019-07-20 RX ORDER — LEVOTHYROXINE SODIUM 137 UG/1
137 TABLET ORAL DAILY
COMMUNITY
Start: 2019-01-21

## 2019-07-20 RX ADMIN — LAMOTRIGINE 200 MG: 100 TABLET ORAL at 09:22

## 2019-07-20 RX ADMIN — TOPIRAMATE 50 MG: 25 TABLET, FILM COATED ORAL at 09:22

## 2019-07-20 RX ADMIN — ASPIRIN 81 MG: 81 TABLET, COATED ORAL at 09:22

## 2019-07-20 RX ADMIN — ALBUTEROL SULFATE 2 PUFF: 90 AEROSOL, METERED RESPIRATORY (INHALATION) at 09:20

## 2019-07-20 RX ADMIN — PREGABALIN 100 MG: 50 CAPSULE ORAL at 09:22

## 2019-07-20 RX ADMIN — CLONAZEPAM 1 MG: 0.5 TABLET ORAL at 09:22

## 2019-07-20 RX ADMIN — SODIUM CHLORIDE 500 ML: 0.9 INJECTION, SOLUTION INTRAVENOUS at 09:50

## 2019-07-20 RX ADMIN — CEFTRIAXONE 1000 MG: 1 INJECTION, SOLUTION INTRAVENOUS at 12:03

## 2019-07-20 RX ADMIN — DULOXETINE HYDROCHLORIDE 90 MG: 30 CAPSULE, DELAYED RELEASE ORAL at 09:22

## 2019-07-20 RX ADMIN — PANTOPRAZOLE SODIUM 40 MG: 40 TABLET, DELAYED RELEASE ORAL at 05:14

## 2019-07-20 RX ADMIN — ENOXAPARIN SODIUM 40 MG: 40 INJECTION SUBCUTANEOUS at 09:23

## 2019-07-20 RX ADMIN — ALBUTEROL SULFATE 2 PUFF: 90 AEROSOL, METERED RESPIRATORY (INHALATION) at 01:39

## 2019-07-20 RX ADMIN — HYDROMORPHONE HYDROCHLORIDE 1 MG: 1 INJECTION, SOLUTION INTRAMUSCULAR; INTRAVENOUS; SUBCUTANEOUS at 04:56

## 2019-07-20 RX ADMIN — FENOFIBRATE 48 MG: 48 TABLET ORAL at 09:22

## 2019-07-20 RX ADMIN — NICOTINE 1 PATCH: 21 PATCH TRANSDERMAL at 01:36

## 2019-07-20 RX ADMIN — FLUTICASONE FUROATE AND VILANTEROL TRIFENATATE 1 PUFF: 200; 25 POWDER RESPIRATORY (INHALATION) at 09:21

## 2019-07-20 NOTE — PLAN OF CARE
Problem: Potential for Falls  Goal: Patient will remain free of falls  Description  INTERVENTIONS:  - Assess patient frequently for physical needs  -  Identify cognitive and physical deficits and behaviors that affect risk of falls  -  [place] fall precautions as indicated by assessment   High fall risk    - Educate patient/family on patient safety including physical limitations  - Instruct patient to call for assistance with activity based on assessment  - Modify environment to reduce risk of injury  - Consider OT/PT consult to assist with strengthening/mobility   7/20/2019 1405 by Pancho Keys RN  Outcome: Completed  7/20/2019 0749 by Pancho Keys RN  Outcome: Progressing     Problem: PAIN - ADULT  Goal: Verbalizes/displays adequate comfort level or baseline comfort level  Description  Interventions:  - Encourage patient to monitor pain and request assistance  - Assess pain using appropriate pain scale (0-10 pain scale)  - Administer analgesics based on type and severity of pain and evaluate response  - Implement non-pharmacological measures as appropriate and evaluate response  - Notify physician/advanced practitioner if interventions unsuccessful or patient reports new pain   7/20/2019 1405 by Pancho Keys RN  Outcome: Completed  7/20/2019 0749 by Pancho Keys RN  Outcome: Progressing     Problem: INFECTION - ADULT  Goal: Absence or prevention of progression during hospitalization  Description  INTERVENTIONS:  - Assess and monitor for signs and symptoms of infection  - Monitor lab/diagnostic results  - Monitor all insertion sites, i e  indwelling lines  - [place] appropriate cooling/warming therapies per order  - Administer medications as ordered  - Instruct and encourage patient and family to use good hand hygiene technique   7/20/2019 1405 by Pancho Keys RN  Outcome: Completed  7/20/2019 0749 by Pancho Keys RN  Outcome: Progressing     Problem: SAFETY ADULT  Goal: Maintain or return to baseline ADL function  Description  INTERVENTIONS:  -  Assess patient's ability to carry out ADLs; assess patient's baseline for ADL function and identify physical deficits which impact ability to perform ADLs (bathing, care of mouth/teeth, toileting, grooming, dressing, etc )  - Assess/evaluate cause of self-care deficits   - Assess range of motion  - Assess patient's mobility; develop plan if impaired  - Assess patient's need for assistive devices and provide as appropriate  - Encourage maximum independence but intervene and supervise when necessary  ¯ Involve family in performance of ADLs  ¯ Assess for home care needs following discharge   ¯ Request OT consult to assist with ADL evaluation and planning for discharge  ¯ Provide patient education as appropriate  7/20/2019 1405 by Long Hummel RN  Outcome: Completed  7/20/2019 0749 by Long Hummel RN  Outcome: Progressing  Goal: Maintain or return mobility status to optimal level  Description  INTERVENTIONS:  - Assess patient's baseline mobility status (modified independent)    - Identify cognitive and physical deficits and behaviors that affect mobility  - Identify mobility aids required to assist with transfers and/or ambulation (cane )  - [place] fall precautions as indicated by assessment   High fall risk    - Record patient progress and toleration of activity level on Mobility SBAR; progress patient to next Phase/Stage  - Instruct patient to call for assistance with activity based on assessment  - Request Rehabilitation consult to assist with strengthening/weightbearing, etc    7/20/2019 1405 by Long Hummel RN  Outcome: Completed  7/20/2019 0749 by Long Hummel RN  Outcome: Progressing     Problem: DISCHARGE PLANNING  Goal: Discharge to home or other facility with appropriate resources  Description  INTERVENTIONS:  - Identify barriers to discharge w/patient and caregiver  - Arrange for needed discharge resources and transportation as appropriate  - Identify discharge learning needs (meds)  - Refer to Case Management Department for coordinating discharge planning if the patient needs post-hospital services based on physician/advanced practitioner order or complex needs related to functional status, cognitive ability, or social support system   7/20/2019 1405 by Meng Frey RN  Outcome: Completed  7/20/2019 0749 by Meng Frey RN  Outcome: Progressing     Problem: Knowledge Deficit  Goal: Patient/family/caregiver demonstrates understanding of disease process, treatment plan, medications, and discharge instructions  Description  Complete learning assessment and assess knowledge base  Interventions:  - Provide teaching at level of understanding  - Provide teaching via preferred learning methods (verbal, written, demonstration)   7/20/2019 1405 by Meng Frey RN  Outcome: Completed  7/20/2019 0749 by Meng Frey RN  Outcome: Progressing     Problem: Neurological Deficit  Goal: Neurological status is stable or improving  Description  Interventions:  - Monitor and assess patient's level of consciousness, motor function, sensory function, and level of assistance needed for ADLs  - Monitor and report changes from baseline  Collaborate with interdisciplinary team to initiate plan and implement interventions as ordered  - Provide and maintain a safe environment  - Utilize fall precautions  - Utilize aspiration precautions  7/20/2019 1405 by Meng Frey RN  Outcome: Completed  7/20/2019 0749 by Meng Frey RN  Outcome: Progressing     Problem: Activity Intolerance/Impaired Mobility  Goal: Mobility/activity is maintained at optimum level for patient  Description  Interventions:  - Assess and monitor patient  barriers to mobility and need for assistive/adaptive devices  - Assess patient's emotional response to limitations  - Collaborate with interdisciplinary team and initiate plans and interventions as ordered    - Encourage independent activity per ability   - Maintain proper body alignment  - Perform active/passive rom as tolerated/ordered  - Plan activities to conserve energy   - Turn patient  7/20/2019 1405 by Terry Hsu RN  Outcome: Completed  7/20/2019 0749 by Terry Hsu RN  Outcome: Progressing     Problem: CARDIOVASCULAR - ADULT  Goal: Maintains optimal cardiac output and hemodynamic stability  Description  INTERVENTIONS:  - Monitor I/O, vital signs and rhythm  - Monitor for S/S and trends of decreased cardiac output i e  bleeding, hypotension  - Administer and titrate ordered vasoactive medications to optimize hemodynamic stability  - Assess quality of pulses, skin color and temperature  - Assess for signs of decreased coronary artery perfusion - ex   Angina  - Instruct patient to report change in severity of symptoms  7/20/2019 1405 by Terry Hsu RN  Outcome: Completed  7/20/2019 0749 by Terry Hsu RN  Outcome: Progressing  Goal: Absence of cardiac dysrhythmias or at baseline rhythm  Description  INTERVENTIONS:  - Continuous cardiac monitoring, monitor vital signs, obtain 12 lead EKG if indicated  - Administer antiarrhythmic and heart rate control medications as ordered  - Monitor electrolytes and administer replacement therapy as ordered  7/20/2019 1405 by Terry Hsu RN  Outcome: Completed  7/20/2019 0749 by Terry Hsu RN  Outcome: Progressing     Problem: DISCHARGE PLANNING - CARE MANAGEMENT  Goal: Discharge to post-acute care or home with appropriate resources  Description  INTERVENTIONS:  - Conduct assessment to determine patient/family and health care team treatment goals, and need for post-acute services based on payer coverage, community resources, and patient preferences, and barriers to discharge  - Address psychosocial, clinical, and financial barriers to discharge as identified in assessment in conjunction with the patient/family and health care team  - Arrange appropriate level of post-acute services according to patient's   needs and preference and payer coverage in collaboration with the physician and health care team  - Communicate with and update the patient/family, physician, and health care team regarding progress on the discharge plan  - Arrange appropriate transportation to post-acute venues  -pt requesting [facility] on dc (RN, PT, OT and hhc aide)  -outpatient follow up   7/20/2019 1405 by Angelo Guerra RN  Outcome: Completed  7/20/2019 0749 by Angelo Guerra RN  Outcome: Progressing     Problem: Nutrition/Hydration-ADULT  Goal: Nutrient/Hydration intake appropriate for improving, restoring or maintaining nutritional needs  Description  Monitor and assess patient's nutrition/hydration status for malnutrition (ex- brittle hair, bruises, dry skin, pale skin and conjunctiva, muscle wasting, smooth red tongue, and disorientation)  Collaborate with interdisciplinary team and initiate plan and interventions as ordered  Monitor patient's weight and dietary intake as ordered or per policy  Utilize nutrition screening tool and intervene per policy  Determine patient's food preferences and provide high-protein, high-caloric foods as appropriate       INTERVENTIONS:  - Monitor oral intake, urinary output, labs, and treatment plans  - Assess nutrition and hydration status and recommend course of action  - Evaluate amount of meals eaten  - Assist patient with eating if necessary   - Allow adequate time for meals  - Recommend/ encourage appropriate diets, oral nutritional supplements, and vitamin/mineral supplements  - Order, calculate, and assess calorie counts as needed  - Recommend, monitor, and adjust tube feedings and TPN/PPN based on assessed needs  - Assess need for intravenous fluids  - Provide specific nutrition/hydration education as appropriate  - Include patient/family/caregiver in decisions related to nutrition  7/20/2019 1405 by Angelo Guerra RN  Outcome: Completed  7/20/2019 0749 by Franchester Gottron, TRUNG  Outcome: Progressing

## 2019-07-20 NOTE — PHYSICAL THERAPY NOTE
Physical Therapy Treatment    Patient Name: Jose Luis Post    GLNAB'O Date: 7/20/2019     Problem List  Patient Active Problem List   Diagnosis    Essential hypertension    [facility]    Other hyperlipidemia    Dizziness    Hypothyroidism    Brain aneurysm    Hypokalemia    Hypertriglyceridemia    Low HDL (under 40)    Elevated TSH    Tobacco abuse    Acute cystitis with hematuria    Intractable abdominal pain        Past Medical History  Past Medical History:   Diagnosis Date    Anxiety     Asthma     [facility] 1 [facility]     Brain aneurysm     Chronic pain disorder     spinal stenosis    Concussion syndrome     neurological rx and balance rx    COPD (chronic obstructive pulmonary disease) (HCC)     Depression     Disease of thyroid gland     nodules     Family history of colon cancer     father    Fibromyalgia, primary     History of colon polyps     Hyperlipidemia     Hypertension     Migraine     Neuropathy     bilateral feet and hands    PTSD (post-traumatic stress disorder)     St[facility] ([facility] )     2012 no deficeits    Thyroid Cancer     TIA (transient ischemic attack)         Past Surgical History  Past Surgical History:   Procedure Laterality Date    ABDOMINAL SURGERY      laproscopic/ endometriosis    BRAIN SURGERY      aneurysm/ coiling procedure    CARPAL TUNNEL RELEASE      CHOLECYSTECTOMY      DENTAL SURGERY      EYE SURGERY      cataract    HYSTERECTOMY      ME ESOPHAGOGASTRODUODENOSCOPY TRANSORAL DIAGNOSTIC N/A 2/8/2018    Procedure: EGD AND COLONOSCOPY;  Surgeon: Mitul Quintanilla MD;  Location: [facility];   Service: Gastroenterology    THYROID SURGERY             07/20/19 1111   Pain Assessment   Pain Score No Pain   Cognition   Arousal/Participation Alert   Orientation Level Oriented X4   Following Commands Follows all commands and directions without difficulty Subjective   Subjective Pt states "I'm going home today but they are giving me fluids first because my blood pressure is low "   Bed Mobility   Supine to Sit 6  Modified independent   Additional items Bedrails   Sit to Supine 6  Modified independent   Additional items Bedrails   Additional Comments pt provided with RW for home use adjusted to proper height  Pt demonstrated safe use of RW   Transfers   Sit to Stand 7  Independent   Stand to Sit 5  Supervision   Stand pivot 5  Supervision   Toilet transfer 7  Independent   Additional Comments pt able to perform clothing management self care and hand washing independently no LOB  Pt ambulated no A D  to bathroom and back to bed with (S) no LOB but complains of mild dizziness   Ambulation/Elevation   Gait pattern Narrow RADHA; Short stride   Gait Assistance 5  Supervision   Assistive Device None   Distance 8ft x 2 no A D  (S)   Balance   Static Sitting Good   Dynamic Sitting Good   Static Standing Fair +   Dynamic Standing Fair   Ambulatory Fair   Endurance Deficit   Endurance Deficit Yes   Endurance Deficit Description limited ambulation due to mild dizziness   Activity Tolerance   Activity Tolerance Patient limited by fatigue   Assessment   Prognosis Good   Problem List Decreased strength;Decreased endurance; Impaired balance;Decreased mobility   Assessment Pt still performing all functional mobility at a (S) to (I) level  Pt provided with RW for home for long distance ambulation adjusted to proper height per MD request  Pt able to perform all toilet transfers and self care independently including clothing management  Pt with mild dizziness throughout session but no LOB  Pt returned to supine in bed with bell in reach after session  Pt would benefit from home health care PT on discharge as pt is scheduled for discharge home this date  Goals   Treatment Day 2   Plan   Treatment/Interventions ADL retraining;Functional transfer training; Endurance training;Patient/family training;Bed mobility;Gait training   Progress Progressing toward goals   Recommendation   Recommendation Home PT   PT - OK to Discharge Yes   pt supine in bed with call bell in reach after session

## 2019-07-20 NOTE — RESPIRATORY THERAPY NOTE
RICARDO xoponex and atrovent nebulizer treatments  I discussed with Nola Alcantar this patient, he stated that this patient takes her albuteral MDI Q6 at home and he would like to continue this patient on her home medication regiment   That way we do not overdose the patient on bronchodialator therapy

## 2019-07-20 NOTE — ASSESSMENT & PLAN NOTE
Recommend heart healthy diet on discharge  Continue statin, fenofibrate  Encourage physical activity if cleared by PCP

## 2019-07-20 NOTE — ASSESSMENT & PLAN NOTE
Present to the ER with complaints of dizziness, visual disturbances, and slurred speech with left sided weakness, resolving  She has hx of brain aneurysm and prior TIAs  Significant psychiatric history and notes very stressful situation / event just prior to experiencing symptoms  Question psychogenic vs  Possible TIA  Dr Val Resendiz answered Stroke alert- patient not TPA candidate- See Dr Rajendra Moore note for details  CTA head and neck shows-No hemodynamically significant stenosis of the visualized arteries of the neck  2   No high-grade proximal stenosis of the visualized [place]  3   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   No other significant intracranial aneurysm  4   No significant intracranial arteriovenous malformation  MRI brain - Mild chronic microvascular ischemic disease, consistent with prior studies  No acute ischemic disease  Status post right ICA cavernous segment aneurysm coiling    Echocardiogram unremarkable  Continue aspirin, Lipitor but at increased dose of 80mg daily      A1c - 5 9  Neurology stroke alert consult appreciated  Would recommend close outpatient follow up with neurology  Patient has scheduled appointment August 2nd  PT/OT recommending rolling walker and follow up with Homecare - PT and nursing

## 2019-07-20 NOTE — ASSESSMENT & PLAN NOTE
Lipid panel - , total cholesterol 228, triglycerides 255, HDL 32  Continue statin - Lipitor at increased intensity dose of 80mg daily and fenofibrate

## 2019-07-20 NOTE — ASSESSMENT & PLAN NOTE
Likely UTI in the setting of innumerable bacteria and nitrate positive urine with reports of increased urinary frequency and urgency  She was started on Rocephin here, will continue PO keflex on discharge for a total 5 day course  Urine culture pending at discharge

## 2019-07-20 NOTE — DISCHARGE SUMMARY
Discharge- Sarah Woodall 1960, 63 y o  female MRN: [id number]    Unit/Bed#: 409-01 Encounter: [id number]    Primary Care Provider: Matthew Rossi MD   Date and time admitted to hospital: 7/18/2019  9:32 PM        * Dizziness  Assessment & Plan  Present to the ER with complaints of dizziness, visual disturbances, and slurred speech with left sided weakness, resolving  She has hx of brain aneurysm and prior TIAs  Significant psychiatric history and notes very stressful situation / event just prior to experiencing symptoms  Question psychogenic vs  Possible TIA  Dr Dino Wilder answered Stroke alert- patient not TPA candidate- See Dr Mendosa Cea note for details  CTA head and neck shows-No hemodynamically significant stenosis of the visualized arteries of the neck  2   No high-grade proximal stenosis of the visualized [place]  3   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   No other significant intracranial aneurysm  4   No significant intracranial arteriovenous malformation  MRI brain - Mild chronic microvascular ischemic disease, consistent with prior studies  No acute ischemic disease  Status post right ICA cavernous segment aneurysm coiling    Continue aspirin, Lipitor but at increased dose of 80mg daily      A1c - 5 9  Neurology stroke alert consult appreciated  Would recommend close outpatient follow up with neurology  Patient has scheduled appointment August 2nd  PT/OT recommending rolling walker and follow up with Homecare - PT and nursing  Acute cystitis with hematuria  Assessment & Plan  Likely UTI in the setting of innumerable bacteria and nitrate positive urine with reports of increased urinary frequency and urgency  She was started on Rocephin here, will continue PO keflex on discharge for a total 5 day course  Urine culture pending at discharge  Intractable abdominal pain  Assessment & Plan  Now resolved     CT abdomen and pelvis was unremarkable  No need for further workup  Tobacco abuse  Assessment & Plan  Encourage cessation  Low HDL (under 40)  Assessment & Plan  Recommend heart healthy diet on discharge  Continue statin, fenofibrate  Encourage physical activity if cleared by PCP  Hypertriglyceridemia  Assessment & Plan  Recommend heart healthy diet on discharge  Hypokalemia  Assessment & Plan  Potassium mildly decreased at 3 3, now resolved after replacement  Brain aneurysm  Assessment & Plan  Continue outpatient neurology/neurosurgery follow-up    Hypothyroidism  Assessment & Plan  TSH elevated 8 136  She does report hs of Thyroid surgery  Not currently on medication regimen  T3, T4 pending  Encourage close PCP follow up      Other hyperlipidemia  Assessment & Plan  Lipid panel - , total cholesterol 228, triglycerides 255, HDL 32  Continue statin - Lipitor at increased intensity dose of 80mg daily and fenofibrate  COPD (chronic obstructive pulmonary disease) (HCC)  Assessment & Plan  No respiratory distress or signs of acute exacerbation  Continue albuterol HFA inhaler, Breo  Essential hypertension  Assessment & Plan  Blood pressure is stable, transiently low, responded to IV fluid hydration  Not currently on an antihypertensive regimen  Monitor blood pressure closely        Discharging Physician / Practitioner: Kristopher Boo PA-C  PCP: Anirudh Vela MD  Admission Date:   Admission Orders (From admission, onward)    Ordered        07/18/19 2323  Place in Observation  Once             Discharge Date: 07/20/19    Resolved Problems  Date Reviewed: 7/20/2019    None            Consultations During Hospital Stay:  · none    Procedures Performed:   · none    Significant Findings / Test Results:   X-ray Chest 1 View Portable  Result Date: 7/19/2019  Impression: No active pulmonary disease   Workstation performed: [id number]     Ct Head Wo Contrast  Addendum Date: 7/18/2019    ADDENDUM: Comparison is made with prior CT of the head on 5/8/2019  Result Date: 7/18/2019  Impression: 1  No acute intracranial hemorrhage  2   Chronic small vessel ischemic changes  3   If there is continued clinical concern for acute infarct, further evaluation with MRI may be obtained which is more sensitive if not contraindicated  I personally discussed this study with Dr Joana Ricci on 7/18/2019 at 10:39 PM  Workstation performed: [id number]     Mri Brain Wo Contrast  Result Date: 7/19/2019  Impression: Mild chronic microvascular ischemic disease, consistent with prior studies No acute ischemic disease Status post right ICA cavernous segment aneurysm coiling Workstation performed: [id number]     Ct Abdomen Pelvis W Contrast  Result Date: 7/19/2019  Impression: No acute inflammatory findings within the abdomen or the pelvis  Workstation performed: [id number]     Cta Stroke Alert (head/neck)  Result Date: 7/18/2019  Impression: 1  No hemodynamically significant stenosis of the visualized arteries of the neck  2   No high-grade proximal stenosis of the visualized [place]  3   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  No other significant intracranial aneurysm  4   No significant intracranial arteriovenous malformation  Workstation performed: [id number]       Incidental Findings:   · none    Test Results Pending at Discharge (will require follow up):   · Urine culture     Outpatient Tests Requested:  · none    Complications:  none    Reason for Admission: dizziness, left sided weakness, speech difficulties      Hospital Course:   Cynthia Mccarthy is a 63 y o  female patient who originally presented to the hospital on 7/18/2019 due to Dizziness (Patient stated she woke from sleep with belly pain, dizziness, weakness mostly on left side and patient described a time while speaking with daughter on phone she had a change in her speech  )    Please see above list of diagnoses and related plan for additional information  Condition at Discharge: good     Discharge Day Visit / Exam:   Subjective:  Patient is feeling better today  Speech difficulty is resolved  Left sided weakness is improving  Vitals: Blood Pressure: 99/65 (07/20/19 1133)  Pulse: 86 (07/20/19 1133)  Temperature: 98 4 °F (36 9 °C) (07/20/19 0757)  Temp Source: Temporal (07/20/19 0405)  Respirations: 18 (07/20/19 0757)  Height: 5' 4" (162 6 cm) (07/19/19 1455)  Weight - Scale: 81 3 kg (179 lb 3 7 oz) (07/20/19 0533)  SpO2: 96 % (07/20/19 1133)  Exam:   Physical Exam   Constitutional: She is oriented to person, place, and time  She appears well-developed and well-nourished  No distress  HENT:   Head: Normocephalic  Mouth/Throat: Oropharynx is clear and moist    Cardiovascular: Normal rate and regular rhythm  No murmur heard  Pulmonary/Chest: Effort normal and breath sounds normal  No respiratory distress  Abdominal: Soft  Bowel sounds are normal  She exhibits no distension  Neurological: She is alert and oriented to person, place, and time  No cranial nerve deficit or sensory deficit  Weakness of the LUE and LLE  Skin: Skin is warm and dry  She is not diaphoretic  Psychiatric:   Appears anxious on exam and history  Nursing note and vitals reviewed  Discharge instructions/Information to patient and family:   See after visit summary for information provided to patient and family  Provisions for Follow-Up Care:  See after visit summary for information related to follow-up care and any pertinent home health orders  Disposition:   Home      Planned Readmission: no     Discharge Statement:  I spent 60 minutes discharging the patient  This time was spent on the day of discharge  I had direct contact with the patient on the day of discharge   Greater than 50% of the total time was spent examining patient, answering all patient questions, arranging and discussing plan of care with patient as well as directly providing post-discharge instructions  Additional time then spent on discharge activities  Discharge Medications:  See after visit summary for reconciled discharge medications provided to patient and family        ** Please Note: This note has been constructed using a voice recognition system **

## 2019-07-20 NOTE — ASSESSMENT & PLAN NOTE
Blood pressure is stable, transiently low, responded to IV fluid hydration  Not currently on an antihypertensive regimen    Monitor blood pressure closely

## 2019-07-20 NOTE — NURSING NOTE
AVS printed and reviewed with patient  Patient verbalized understanding  Requested paperwork with diagnosis on  Summary of care was printed  Homecare was ordered on d/c  Patient also was d/c with a roller walker

## 2019-07-20 NOTE — PLAN OF CARE
Problem: Potential for Falls  Goal: Patient will remain free of falls  Description  INTERVENTIONS:  - Assess patient frequently for physical needs  -  Identify cognitive and physical deficits and behaviors that affect risk of falls  -  [place] fall precautions as indicated by assessment   High fall risk    - Educate patient/family on patient safety including physical limitations  - Instruct patient to call for assistance with activity based on assessment  - Modify environment to reduce risk of injury  - Consider OT/PT consult to assist with strengthening/mobility   Outcome: Progressing     Problem: PAIN - ADULT  Goal: Verbalizes/displays adequate comfort level or baseline comfort level  Description  Interventions:  - Encourage patient to monitor pain and request assistance  - Assess pain using appropriate pain scale (0-10 pain scale)  - Administer analgesics based on type and severity of pain and evaluate response  - Implement non-pharmacological measures as appropriate and evaluate response  - Notify physician/advanced practitioner if interventions unsuccessful or patient reports new pain   Outcome: Progressing     Problem: INFECTION - ADULT  Goal: Absence or prevention of progression during hospitalization  Description  INTERVENTIONS:  - Assess and monitor for signs and symptoms of infection  - Monitor lab/diagnostic results  - Monitor all insertion sites, i e  indwelling lines  - [place] appropriate cooling/warming therapies per order  - Administer medications as ordered  - Instruct and encourage patient and family to use good hand hygiene technique   Outcome: Progressing     Problem: SAFETY ADULT  Goal: Maintain or return to baseline ADL function  Description  INTERVENTIONS:  -  Assess patient's ability to carry out ADLs; assess patient's baseline for ADL function and identify physical deficits which impact ability to perform ADLs (bathing, care of mouth/teeth, toileting, grooming, dressing, etc )  - Assess/evaluate cause of self-care deficits   - Assess range of motion  - Assess patient's mobility; develop plan if impaired  - Assess patient's need for assistive devices and provide as appropriate  - Encourage maximum independence but intervene and supervise when necessary  ¯ Involve family in performance of ADLs  ¯ Assess for home care needs following discharge   ¯ Request OT consult to assist with ADL evaluation and planning for discharge  ¯ Provide patient education as appropriate  Outcome: Progressing  Goal: Maintain or return mobility status to optimal level  Description  INTERVENTIONS:  - Assess patient's baseline mobility status (modified independent)    - Identify cognitive and physical deficits and behaviors that affect mobility  - Identify mobility aids required to assist with transfers and/or ambulation (cane )  - [place] fall precautions as indicated by assessment   High fall risk    - Record patient progress and toleration of activity level on Mobility SBAR; progress patient to next Phase/Stage  - Instruct patient to call for assistance with activity based on assessment  - Request Rehabilitation consult to assist with strengthening/weightbearing, etc    Outcome: Progressing     Problem: DISCHARGE PLANNING  Goal: Discharge to home or other facility with appropriate resources  Description  INTERVENTIONS:  - Identify barriers to discharge w/patient and caregiver  - Arrange for needed discharge resources and transportation as appropriate  - Identify discharge learning needs (meds)  - Refer to Case Management Department for coordinating discharge planning if the patient needs post-hospital services based on physician/advanced practitioner order or complex needs related to functional status, cognitive ability, or social support system   Outcome: Progressing     Problem: Knowledge Deficit  Goal: Patient/family/caregiver demonstrates understanding of disease process, treatment plan, medications, and discharge instructions  Description  Complete learning assessment and assess knowledge base  Interventions:  - Provide teaching at level of understanding  - Provide teaching via preferred learning methods (verbal, written, demonstration)   Outcome: Progressing     Problem: Neurological Deficit  Goal: Neurological status is stable or improving  Description  Interventions:  - Monitor and assess patient's level of consciousness, motor function, sensory function, and level of assistance needed for ADLs  - Monitor and report changes from baseline  Collaborate with interdisciplinary team to initiate plan and implement interventions as ordered  - Provide and maintain a safe environment  - Utilize fall precautions  - Utilize aspiration precautions  Outcome: Progressing     Problem: Activity Intolerance/Impaired Mobility  Goal: Mobility/activity is maintained at optimum level for patient  Description  Interventions:  - Assess and monitor patient  barriers to mobility and need for assistive/adaptive devices  - Assess patient's emotional response to limitations  - Collaborate with interdisciplinary team and initiate plans and interventions as ordered  - Encourage independent activity per ability   - Maintain proper body alignment  - Perform active/passive rom as tolerated/ordered  - Plan activities to conserve energy   - Turn patient  Outcome: Progressing     Problem: CARDIOVASCULAR - ADULT  Goal: Maintains optimal cardiac output and hemodynamic stability  Description  INTERVENTIONS:  - Monitor I/O, vital signs and rhythm  - Monitor for S/S and trends of decreased cardiac output i e  bleeding, hypotension  - Administer and titrate ordered vasoactive medications to optimize hemodynamic stability  - Assess quality of pulses, skin color and temperature  - Assess for signs of decreased coronary artery perfusion - ex   Angina  - Instruct patient to report change in severity of symptoms  Outcome: Progressing  Goal: Absence of cardiac dysrhythmias or at baseline rhythm  Description  INTERVENTIONS:  - Continuous cardiac monitoring, monitor vital signs, obtain 12 lead EKG if indicated  - Administer antiarrhythmic and heart rate control medications as ordered  - Monitor electrolytes and administer replacement therapy as ordered  Outcome: Progressing     Problem: DISCHARGE PLANNING - CARE MANAGEMENT  Goal: Discharge to post-acute care or home with appropriate resources  Description  INTERVENTIONS:  - Conduct assessment to determine patient/family and health care team treatment goals, and need for post-acute services based on payer coverage, community resources, and patient preferences, and barriers to discharge  - Address psychosocial, clinical, and financial barriers to discharge as identified in assessment in conjunction with the patient/family and health care team  - Arrange appropriate level of post-acute services according to patient's   needs and preference and payer coverage in collaboration with the physician and health care team  - Communicate with and update the patient/family, physician, and health care team regarding progress on the discharge plan  - Arrange appropriate transportation to post-acute venues  -pt requesting hhc on dc (RN, PT, OT and hhc aide)  -outpatient follow up   Outcome: Progressing     Problem: Nutrition/Hydration-ADULT  Goal: Nutrient/Hydration intake appropriate for improving, restoring or maintaining nutritional needs  Description  Monitor and assess patient's nutrition/hydration status for malnutrition (ex- brittle hair, bruises, dry skin, pale skin and conjunctiva, muscle wasting, smooth red tongue, and disorientation)  Collaborate with interdisciplinary team and initiate plan and interventions as ordered  Monitor patient's weight and dietary intake as ordered or per policy  Utilize nutrition screening tool and intervene per policy   Determine patient's food preferences and provide high-protein, high-caloric foods as appropriate       INTERVENTIONS:  - Monitor oral intake, urinary output, labs, and treatment plans  - Assess nutrition and hydration status and recommend course of action  - Evaluate amount of meals eaten  - Assist patient with eating if necessary   - Allow adequate time for meals  - Recommend/ encourage appropriate diets, oral nutritional supplements, and vitamin/mineral supplements  - Order, calculate, and assess calorie counts as needed  - Recommend, monitor, and adjust tube feedings and TPN/PPN based on assessed needs  - Assess need for intravenous fluids  - Provide specific nutrition/hydration education as appropriate  - Include patient/family/caregiver in decisions related to nutrition  Outcome: Progressing

## 2019-07-20 NOTE — ASSESSMENT & PLAN NOTE
TSH elevated 8 136  She does report hs of Thyroid surgery  Not currently on medication regimen  T3, T4 pending    Encourage close PCP follow up

## 2019-07-20 NOTE — PLAN OF CARE
Problem: PHYSICAL THERAPY ADULT  Goal: Performs mobility at highest level of function for planned discharge setting  See evaluation for individualized goals  Outcome: Adequate for Discharge  Note:   Prognosis: Good  Problem List: Decreased strength, Decreased endurance, Impaired balance, Decreased mobility  Assessment: Pt still performing all functional mobility at a (S) to (I) level  Pt provided with RW for home for long distance ambulation adjusted to proper height per MD request  Pt able to perform all toilet transfers and self care independently including clothing management  Pt with mild dizziness throughout session but no LOB  Pt returned to supine in bed with bell in reach after session  Pt would benefit from home health care PT on discharge as pt is scheduled for discharge home this date  Recommendation: Home PT     PT - OK to Discharge: Yes    See flowsheet documentation for full assessment

## 2019-07-21 LAB
BACTERIA UR CULT: ABNORMAL
BACTERIA UR CULT: ABNORMAL

## 2019-08-05 ENCOUNTER — LAB REQUISITION (OUTPATIENT)
Dept: LAB | Facility: HOSPITAL | Age: 59
End: 2019-08-05
Payer: COMMERCIAL

## 2019-08-05 DIAGNOSIS — N39.0 URINARY TRACT INFECTION: ICD-10-CM

## 2019-08-05 LAB
AMORPH URATE CRY URNS QL MICRO: ABNORMAL /HPF
BACTERIA UR QL AUTO: ABNORMAL /HPF
BILIRUB UR QL STRIP: NEGATIVE
CLARITY UR: ABNORMAL
COLOR UR: YELLOW
GLUCOSE UR STRIP-MCNC: NEGATIVE MG/DL
HGB UR QL STRIP.AUTO: NEGATIVE
KETONES UR STRIP-MCNC: NEGATIVE MG/DL
LEUKOCYTE ESTERASE UR QL STRIP: ABNORMAL
NITRITE UR QL STRIP: NEGATIVE
NON-SQ EPI CELLS URNS QL MICRO: ABNORMAL /HPF
PH UR STRIP.AUTO: 6.5 [PH]
PROT UR STRIP-MCNC: NEGATIVE MG/DL
RBC #/AREA URNS AUTO: ABNORMAL /HPF
SP GR UR STRIP.AUTO: 1.01 (ref 1–1.03)
UROBILINOGEN UR QL STRIP.AUTO: 0.2 E.U./DL
WBC #/AREA URNS AUTO: ABNORMAL /HPF

## 2019-08-05 PROCEDURE — 81001 URINALYSIS AUTO W/SCOPE: CPT | Performed by: FAMILY MEDICINE

## 2019-08-05 PROCEDURE — 87086 URINE CULTURE/COLONY COUNT: CPT | Performed by: FAMILY MEDICINE

## 2019-08-06 LAB — BACTERIA UR CULT: NORMAL

## 2019-09-13 ENCOUNTER — APPOINTMENT (EMERGENCY)
Dept: CT IMAGING | Facility: HOSPITAL | Age: 59
End: 2019-09-13
Payer: COMMERCIAL

## 2019-09-13 ENCOUNTER — HOSPITAL ENCOUNTER (EMERGENCY)
Facility: HOSPITAL | Age: 59
Discharge: HOME | End: 2019-09-13
Attending: EMERGENCY MEDICINE | Admitting: EMERGENCY MEDICINE
Payer: COMMERCIAL

## 2019-09-13 VITALS
RESPIRATION RATE: 15 BRPM | HEIGHT: 64 IN | BODY MASS INDEX: 29.51 KG/M2 | WEIGHT: 172.84 LBS | TEMPERATURE: 97.2 F | OXYGEN SATURATION: 99 % | DIASTOLIC BLOOD PRESSURE: 84 MMHG | HEART RATE: 62 BPM | SYSTOLIC BLOOD PRESSURE: 130 MMHG

## 2019-09-13 DIAGNOSIS — R51.9 HEADACHE: ICD-10-CM

## 2019-09-13 DIAGNOSIS — R10.9 ABDOMINAL PAIN: ICD-10-CM

## 2019-09-13 DIAGNOSIS — N39.0 UTI (URINARY TRACT INFECTION): ICD-10-CM

## 2019-09-13 DIAGNOSIS — M54.9 BACK PAIN: Primary | ICD-10-CM

## 2019-09-13 LAB
ALBUMIN SERPL BCP-MCNC: 3.7 G/DL (ref 3.5–5)
ALP SERPL-CCNC: 76 U/L (ref 46–116)
ALT SERPL W P-5'-P-CCNC: 29 U/L (ref 12–78)
ANION GAP SERPL CALCULATED.3IONS-SCNC: 11 MMOL/L (ref 4–13)
AST SERPL W P-5'-P-CCNC: 27 U/L (ref 5–45)
BACTERIA UR QL AUTO: ABNORMAL /HPF
BASOPHILS # BLD AUTO: 0.04 THOUSANDS/ΜL (ref 0–0.1)
BASOPHILS NFR BLD AUTO: 1 % (ref 0–1)
BILIRUB SERPL-MCNC: 0.3 MG/DL (ref 0.2–1)
BILIRUB UR QL STRIP: NEGATIVE
BUN SERPL-MCNC: 11 MG/DL (ref 5–25)
CALCIUM SERPL-MCNC: 8.9 MG/DL (ref 8.3–10.1)
CHLORIDE SERPL-SCNC: 107 MMOL/L (ref 100–108)
CLARITY UR: CLEAR
CO2 SERPL-SCNC: 27 MMOL/L (ref 21–32)
COLOR UR: YELLOW
CREAT SERPL-MCNC: 1.04 MG/DL (ref 0.6–1.3)
EOSINOPHIL # BLD AUTO: 0.11 THOUSAND/ΜL (ref 0–0.61)
EOSINOPHIL NFR BLD AUTO: 2 % (ref 0–6)
ERYTHROCYTE [DISTWIDTH] IN BLOOD BY AUTOMATED COUNT: 12.9 % (ref 11.6–15.1)
GFR SERPL CREATININE-BSD FRML MDRD: 60 ML/MIN/1.73SQ M
GLUCOSE SERPL-MCNC: 96 MG/DL (ref 65–140)
GLUCOSE UR STRIP-MCNC: NEGATIVE MG/DL
HCT VFR BLD AUTO: 43 % (ref 34.8–46.1)
HGB BLD-MCNC: 13.8 G/DL (ref 11.5–15.4)
HGB UR QL STRIP.AUTO: NEGATIVE
IMM GRANULOCYTES # BLD AUTO: 0.02 THOUSAND/UL (ref 0–0.2)
IMM GRANULOCYTES NFR BLD AUTO: 0 % (ref 0–2)
KETONES UR STRIP-MCNC: NEGATIVE MG/DL
LEUKOCYTE ESTERASE UR QL STRIP: ABNORMAL
LYMPHOCYTES # BLD AUTO: 3.82 THOUSANDS/ΜL (ref 0.6–4.47)
LYMPHOCYTES NFR BLD AUTO: 56 % (ref 14–44)
MAGNESIUM SERPL-MCNC: 2.2 MG/DL (ref 1.6–2.6)
MCH RBC QN AUTO: 30.7 PG (ref 26.8–34.3)
MCHC RBC AUTO-ENTMCNC: 32.1 G/DL (ref 31.4–37.4)
MCV RBC AUTO: 96 FL (ref 82–98)
MONOCYTES # BLD AUTO: 0.4 THOUSAND/ΜL (ref 0.17–1.22)
MONOCYTES NFR BLD AUTO: 6 % (ref 4–12)
NEUTROPHILS # BLD AUTO: 2.35 THOUSANDS/ΜL (ref 1.85–7.62)
NEUTS SEG NFR BLD AUTO: 35 % (ref 43–75)
NITRITE UR QL STRIP: POSITIVE
NON-SQ EPI CELLS URNS QL MICRO: ABNORMAL /HPF
NRBC BLD AUTO-RTO: 0 /100 WBCS
PH UR STRIP.AUTO: 6 [PH]
PLATELET # BLD AUTO: 180 THOUSANDS/UL (ref 149–390)
PMV BLD AUTO: 10.6 FL (ref 8.9–12.7)
POTASSIUM SERPL-SCNC: 3.6 MMOL/L (ref 3.5–5.3)
PROT SERPL-MCNC: 7.4 G/DL (ref 6.4–8.2)
PROT UR STRIP-MCNC: NEGATIVE MG/DL
RBC # BLD AUTO: 4.5 MILLION/UL (ref 3.81–5.12)
RBC #/AREA URNS AUTO: ABNORMAL /HPF
SODIUM SERPL-SCNC: 145 MMOL/L (ref 136–145)
SP GR UR STRIP.AUTO: <=1.005 (ref 1–1.03)
TROPONIN I SERPL-MCNC: <0.02 NG/ML
UROBILINOGEN UR QL STRIP.AUTO: 0.2 E.U./DL
WBC # BLD AUTO: 6.74 THOUSAND/UL (ref 4.31–10.16)
WBC #/AREA URNS AUTO: ABNORMAL /HPF

## 2019-09-13 PROCEDURE — 36415 COLL VENOUS BLD VENIPUNCTURE: CPT | Performed by: PHYSICIAN ASSISTANT

## 2019-09-13 PROCEDURE — 85025 COMPLETE CBC W/AUTO DIFF WBC: CPT | Performed by: PHYSICIAN ASSISTANT

## 2019-09-13 PROCEDURE — 74177 CT ABD & PELVIS W/CONTRAST: CPT

## 2019-09-13 PROCEDURE — 80053 COMPREHEN METABOLIC PANEL: CPT | Performed by: PHYSICIAN ASSISTANT

## 2019-09-13 PROCEDURE — 99284 EMERGENCY DEPT VISIT MOD MDM: CPT

## 2019-09-13 PROCEDURE — 70450 CT HEAD/BRAIN W/O DYE: CPT

## 2019-09-13 PROCEDURE — 96361 HYDRATE IV INFUSION ADD-ON: CPT

## 2019-09-13 PROCEDURE — 96365 THER/PROPH/DIAG IV INF INIT: CPT

## 2019-09-13 PROCEDURE — 96372 THER/PROPH/DIAG INJ SC/IM: CPT

## 2019-09-13 PROCEDURE — 83735 ASSAY OF MAGNESIUM: CPT | Performed by: PHYSICIAN ASSISTANT

## 2019-09-13 PROCEDURE — 93005 ELECTROCARDIOGRAM TRACING: CPT

## 2019-09-13 PROCEDURE — 84484 ASSAY OF TROPONIN QUANT: CPT | Performed by: PHYSICIAN ASSISTANT

## 2019-09-13 PROCEDURE — 96375 TX/PRO/DX INJ NEW DRUG ADDON: CPT

## 2019-09-13 PROCEDURE — 99284 EMERGENCY DEPT VISIT MOD MDM: CPT | Performed by: PHYSICIAN ASSISTANT

## 2019-09-13 PROCEDURE — 81001 URINALYSIS AUTO W/SCOPE: CPT | Performed by: PHYSICIAN ASSISTANT

## 2019-09-13 RX ORDER — HALOPERIDOL 5 MG/ML
2 INJECTION INTRAMUSCULAR ONCE
Status: COMPLETED | OUTPATIENT
Start: 2019-09-13 | End: 2019-09-13

## 2019-09-13 RX ORDER — CEPHALEXIN 500 MG/1
500 CAPSULE ORAL 4 TIMES DAILY
Qty: 28 CAPSULE | Refills: 0 | Status: SHIPPED | OUTPATIENT
Start: 2019-09-13 | End: 2019-09-20

## 2019-09-13 RX ORDER — CEFTRIAXONE 1 G/50ML
1000 INJECTION, SOLUTION INTRAVENOUS ONCE
Status: COMPLETED | OUTPATIENT
Start: 2019-09-13 | End: 2019-09-13

## 2019-09-13 RX ORDER — KETOROLAC TROMETHAMINE 30 MG/ML
15 INJECTION, SOLUTION INTRAMUSCULAR; INTRAVENOUS ONCE
Status: COMPLETED | OUTPATIENT
Start: 2019-09-13 | End: 2019-09-13

## 2019-09-13 RX ADMIN — KETOROLAC TROMETHAMINE 15 MG: 30 INJECTION, SOLUTION INTRAMUSCULAR; INTRAVENOUS at 11:49

## 2019-09-13 RX ADMIN — IOHEXOL 100 ML: 350 INJECTION, SOLUTION INTRAVENOUS at 11:32

## 2019-09-13 RX ADMIN — CEFTRIAXONE 1000 MG: 1 INJECTION, SOLUTION INTRAVENOUS at 11:06

## 2019-09-13 RX ADMIN — HALOPERIDOL LACTATE 2 MG: 5 INJECTION, SOLUTION INTRAMUSCULAR at 11:45

## 2019-09-13 RX ADMIN — HALOPERIDOL LACTATE 2 MG: 5 INJECTION, SOLUTION INTRAMUSCULAR at 10:44

## 2019-09-13 RX ADMIN — SODIUM CHLORIDE 1000 ML: 0.9 INJECTION, SOLUTION INTRAVENOUS at 10:46

## 2019-09-13 NOTE — CASE MANAGEMENT
I self referred the patient to discuss resources that might be available to her  She asked about the good RX card and how it works  I explained the good RX card to her  The patient lives alone in an apartment  There is two steps to get into the apartment  She has a cane, walker, and nebulizer  She has [facility] and they come in seven days a weeks twelve hours a day  She does not receive meals on wheels  The patient takes care of her own ADL's and she drives  she uses [facility] in [place]  She has no issues getting her medications or paying for them  She has Contractually and medicare  The patient stated that she called her PCP prior to coming to the ED today

## 2019-09-13 NOTE — DISCHARGE INSTRUCTIONS
1   Follow-up with your neurologist fever headache that is now resolved  2  Follow up with her pain management doctor for low back pain  3  Follow-up with urologist I gave you for your ongoing urinary tract infections  4, follow-up with her family doctor for ongoing care to recheck next week  5  Return with any worsening symptoms or questions comments or concerns  6  Continue home medications as directed  I would limit the amount of Imitrex she use in follow-up with your neurologist to discuss Imitrex usage

## 2019-09-13 NOTE — ED PROVIDER NOTES
History  Chief Complaint   Patient presents with    Back Pain     Back pain radiating anteriorly to right side  dizzy  This is a 81-year-old female patient who comes with multi factorial complaints  This patient comes in with the medical assistant of her own  Patient states that she has had chronic low back pain for 18 years in which she takes Norco 10 in 325 4 times a day over the last week she has had increased right-sided low back pain without radicular symptoms no change in bowel or bladder no saddle anesthesia  Patient states that her family doctor in rolled her in pain management she is waiting for an appointment  No numbness or tingling or weakness  She can walk under her own power  There is no sign of cauda equina syndrome or nerve root impingement  Her 2nd complaint is migraine headache refractory to her Imitrex  Patient does have a history of TIA, stroke and she states aneurysm in her head  However she feels a migraine she also bit dizzy with vomiting over the last few days  She complains of some right lower quadrant pain no diarrhea  She also states that her fibromyalgia is acting up  She cannot tell me if she has blurred vision because states his eye problems  She is a bit manic and rambling she is not homicidal or suicidal   She continues used her Imitrex however she states he was diagnosed with a stroke and I am uncertain of that is a good thing to do  I advised her to call her neurologist   She describes the dizziness as more of being off balance not room spinning nothing makes this better or worse  As for her back movement makes her pain worse  No she is vomiting she is able to keep some water down  But is not eating as much  Denies any fever chills no chest pain or shortness of breath positive nausea with intermittent vomiting right lower quadrant abdominal pain no urgency frequency or dysuria    At this time she will be given a CT scan of head cardiac workup CT scan of her abdomen  After reviewing the patient's notes from previous visits the patient keeps saying she had a stroke 2 months ago however it is unclear whether it was a TIA or supratentorial/psychogenic  Patient has a negative MRI at that time  She also complained of right lower quadrant pain and had a negative CT scan of her abdomen  After further discussion the patient states that she is upset because she has been having problems for 20 years but never find anything  Prior to Admission Medications   Prescriptions Last Dose Informant Patient Reported? Taking? DULoxetine (CYMBALTA) 30 mg delayed release capsule   Yes No   Sig: Take 90 mg by mouth   HYDROcodone-acetaminophen (NORCO)  mg per tablet   Yes No   Si tab every 6 hours as needed for severe pain   Do not fill before 18   Magnesium Gluconate 550 MG TABS   Yes No   Sig: Take 30 mg by mouth   Melatonin 10 MG CAPS   Yes No   Sig: Take 1 tablet by mouth   SUMAtriptan (IMITREX) 100 mg tablet   Yes No   Sig: TAKE 1 TABLET BY MOUTH AT START OF MIGRAINE HEADACHE  MAY REPEAT IN 2 HOURS IF NEEDED   LIMIT TO NO MORE THAN 2 TABLETS IN 24 HOURS, THREE TI   albuterol (PROVENTIL HFA,VENTOLIN HFA) 90 mcg/act inhaler   Yes No   Sig: Inhale 2 puffs every 6 (six) hours   aspirin 325 mg tablet   Yes No   Sig: Take 325 mg by mouth daily   atorvastatin (LIPITOR) 40 mg tablet   Yes No   Sig: Take 40 mg by mouth   busPIRone (BUSPAR) 15 mg tablet   Yes No   Sig: Take 15 mg by mouth   carboxymethylcellulose 0 5 % SOLN   No No   Sig: Administer 1 drop to both eyes daily as needed for dry eyes   clonazePAM (KlonoPIN) 1 mg tablet   Yes No   Sig: Take 1 mg by mouth Three times a day   cyclobenzaprine (FLEXERIL) 10 mg tablet   Yes No   Sig: TAKE 1 TABLET BY MOUTH 3 TIMES A DAY AS NEEDED FOR MUSCLE SPASMS   fenofibrate (TRICOR) 48 mg tablet   Yes No   Sig: Take 48 mg by mouth daily   fluconazole (DIFLUCAN) 50 mg tablet   Yes No   Sig: Take 50 mg by mouth daily fluticasone-salmeterol (ADVAIR DISKUS, WIXELA INHUB) 500-50 mcg/dose inhaler   Yes No   Sig: Inhale 1 puff 2 (two) times a day Rinse mouth after use     lamoTRIgine (LaMICtal) 200 MG tablet   Yes No   Sig: Take 200 mg by mouth   levothyroxine 137 mcg tablet   Yes No   Sig: Take 137 mcg by mouth daily   magnesium 30 MG tablet   Yes No   Sig: Take 30 mg by mouth 2 (two) times a day   naproxen (NAPROSYN) 500 mg tablet   Yes No   Sig: Take 500 mg by mouth 2 (two) times a day   omeprazole (PriLOSEC) 20 mg delayed release capsule   Yes No   Sig: take 1 capsule by mouth once daily   pregabalin (LYRICA) 100 mg capsule   Yes No   Sig: take 1 capsule by mouth three times a day   pregabalin (LYRICA) 150 mg capsule   Yes No   Sig: take 1 capsule by mouth NIGHTLY   promethazine (PHENERGAN) 25 mg tablet   Yes No   Sig: Take 25 mg by mouth 3 (three) times a day   pseudoephedrine-acetaminophen (TYLENOL SINUS)  MG TABS   Yes No   Sig: Take 1 tablet by mouth every 4 (four) hours as needed for congestion   topiramate (TOPAMAX) 50 MG tablet   Yes No   Sig: Take 50 mg by mouth every 12 (twelve) hours   traZODone (DESYREL) 100 mg tablet   Yes No   Sig: Take 100 mg by mouth      Facility-Administered Medications: None       Past Medical History:   Diagnosis Date    Anxiety     Asthma     [facility]     Brain aneurysm     Chronic pain disorder     spinal stenosis    Concussion syndrome     neurological rx and balance rx    COPD (chronic obstructive pulmonary disease) (HCC)     Depression     Disease of thyroid gland     nodules     Family history of colon cancer     father    Fibromyalgia, primary     History of colon polyps     Hyperlipidemia     Hypertension     Migraine     Neuropathy     bilateral feet and hands    Peripheral neuropathy     PTSD (post-traumatic stress disorder)     Stroke ([facility] )     2012 no deficeits    Thyroid Cancer     TIA (transient ischemic attack)        Past Surgical History:   Procedure Laterality Date    ABDOMINAL SURGERY      laproscopic/ endometriosis    BRAIN SURGERY      aneurysm/ coiling procedure    CARPAL TUNNEL RELEASE      CHOLECYSTECTOMY      DENTAL SURGERY      EYE SURGERY      cataract    HYSTERECTOMY      NM ESOPHAGOGASTRODUODENOSCOPY TRANSORAL DIAGNOSTIC N/A 2/8/2018    Procedure: EGD AND COLONOSCOPY;  Surgeon: Mitul Quintanilla MD;  Location: [facility]; Service: Gastroenterology    THYROID SURGERY         History reviewed  No pertinent family history  I have reviewed and agree with the history as documented  Social History     Tobacco Use    Smoking status: Current Every Day Smoker     Packs/day: 2 00     Types: Cigarettes    Smokeless tobacco: Never Used   Substance Use Topics    Alcohol use: Never     Alcohol/week: 0 0 standard drinks     Frequency: Patient refused     Drinks per session: Patient refused     Binge frequency: Never    Drug use: No        Review of Systems   Constitutional: Positive for appetite change and fatigue  Negative for chills and fever  HENT: Negative for congestion, dental problem, drooling, ear discharge, ear pain, facial swelling, hearing loss, mouth sores, nosebleeds, postnasal drip, rhinorrhea, sinus pressure, sinus pain, sneezing, sore throat, tinnitus, trouble swallowing and voice change  Eyes: Positive for visual disturbance  Negative for photophobia, pain, discharge, redness and itching  The eye disturbances chronic   Respiratory: Negative  Cardiovascular: Negative  Gastrointestinal: Positive for abdominal pain, nausea and vomiting  Negative for abdominal distention, anal bleeding, blood in stool, constipation, diarrhea and rectal pain  Endocrine: Negative  Genitourinary: Negative  Musculoskeletal: Positive for back pain  Negative for arthralgias, gait problem, joint swelling, myalgias, neck pain and neck stiffness  Skin: Negative  Allergic/Immunologic: Negative  Neurological: Positive for dizziness  Negative for tremors, syncope, facial asymmetry, speech difficulty, weakness, light-headedness, numbness and headaches  Hematological: Negative  Psychiatric/Behavioral: Positive for agitation  Negative for behavioral problems, confusion, decreased concentration, dysphoric mood, hallucinations, self-injury, sleep disturbance and suicidal ideas  The patient is nervous/anxious  The patient is not hyperactive  Physical Exam  Physical Exam   Constitutional: She is oriented to person, place, and time  She appears well-developed and well-nourished  HENT:   Head: Normocephalic and atraumatic  Right Ear: External ear normal    Left Ear: External ear normal    Nose: Nose normal    Mouth/Throat: Oropharynx is clear and moist    Eyes: Pupils are equal, round, and reactive to light  Conjunctivae are normal    Neck: Normal range of motion  Neck supple  Cardiovascular: Normal rate and regular rhythm  Pulmonary/Chest: Effort normal and breath sounds normal    Abdominal: Soft  Bowel sounds are normal        No CVA tenderness bilaterally   Musculoskeletal:        Back:    Neurological: She is alert and oriented to person, place, and time  She has normal strength  She displays normal reflexes  No cranial nerve deficit or sensory deficit  She exhibits normal muscle tone  She displays a negative Romberg sign  Coordination normal  GCS eye subscore is 4  GCS verbal subscore is 5  GCS motor subscore is 6  Reflex Scores:       Tricep reflexes are 2+ on the right side and 2+ on the left side  Bicep reflexes are 2+ on the right side and 2+ on the left side  Brachioradialis reflexes are 2+ on the right side and 2+ on the left side  Patellar reflexes are 2+ on the right side and 2+ on the left side  Achilles reflexes are 2+ on the right side and 2+ on the left side  Skin: Skin is warm  Psychiatric: She has a normal mood and affect   Her behavior is normal  Nursing note and vitals reviewed        Vital Signs  ED Triage Vitals   Temperature Pulse Respirations Blood Pressure SpO2   09/13/19 1003 09/13/19 1003 09/13/19 1007 09/13/19 1007 09/13/19 1003   (!) 97 2 °F (36 2 °C) 73 16 130/81 98 %      Temp Source Heart Rate Source Patient Position - Orthostatic VS BP Location FiO2 (%)   09/13/19 1003 09/13/19 1003 09/13/19 1007 09/13/19 1007 --   Temporal Monitor Sitting Right arm       Pain Score       09/13/19 1003       Worst Possible Pain           Vitals:    09/13/19 1003 09/13/19 1007 09/13/19 1200 09/13/19 1230   BP:  130/81 140/76 130/84   Pulse: 73  66 62   Patient Position - Orthostatic VS:  Sitting Lying Lying         Visual Acuity      ED Medications  Medications   sodium chloride 0 9 % bolus 1,000 mL (1,000 mL Intravenous New Bag 9/13/19 1046)   haloperidol lactate (HALDOL) injection 2 mg (2 mg Intramuscular Given 9/13/19 1044)   cefTRIAXone (ROCEPHIN) IVPB (premix) 1,000 mg (0 mg Intravenous Stopped 9/13/19 1215)   haloperidol lactate (HALDOL) injection 2 mg (2 mg Intramuscular Given 9/13/19 1145)   iohexol (OMNIPAQUE) 350 MG/ML injection (SINGLE-DOSE) 100 mL (100 mL Intravenous Given 9/13/19 1132)   ketorolac (TORADOL) injection 15 mg (15 mg Intravenous Given 9/13/19 1149)       Diagnostic Studies  Results Reviewed     Procedure Component Value Units Date/Time    Urine Microscopic [[id number]]  (Abnormal) Collected:  09/13/19 1049    Lab Status:  Final result Specimen:  Urine, Clean Catch Updated:  09/13/19 1106     RBC, UA 1-2 /hpf      WBC, UA 1-2 /hpf      Epithelial Cells Occasional /hpf      Bacteria, UA Occasional /hpf     Troponin I [[id number]]  (Normal) Collected:  09/13/19 1035    Lab Status:  Final result Specimen:  Blood from Arm, Left Updated:  09/13/19 1104     Troponin I <0 02 ng/mL     Comprehensive metabolic panel [[id number]] Collected:  09/13/19 1035    Lab Status:  Final result Specimen:  Blood from Arm, Left Updated:  09/13/19 1101     Sodium 145 mmol/L      Potassium 3 6 mmol/L      Chloride 107 mmol/L      CO2 27 mmol/L      ANION GAP 11 mmol/L      BUN 11 mg/dL      Creatinine 1 04 mg/dL      Glucose 96 mg/dL      Calcium 8 9 mg/dL      AST 27 U/L      ALT 29 U/L      Alkaline Phosphatase 76 U/L      Total Protein 7 4 g/dL      Albumin 3 7 g/dL      Total Bilirubin 0 30 mg/dL      eGFR 60 ml/min/1 73sq m     Narrative:       National Kidney Disease Foundation guidelines for Chronic Kidney Disease (CKD):     Stage 1 with normal or high GFR (GFR > 90 mL/min/1 73 square meters)    Stage 2 Mild CKD (GFR = 60-89 mL/min/1 73 square meters)    Stage 3A Moderate CKD (GFR = 45-59 mL/min/1 73 square meters)    Stage 3B Moderate CKD (GFR = 30-44 mL/min/1 73 square meters)    Stage 4 Severe CKD (GFR = 15-29 mL/min/1 73 square meters)    Stage 5 End Stage CKD (GFR <15 mL/min/1 73 square meters)  Note: GFR calculation is accurate only with a steady state creatinine    UA w Reflex to Microscopic [[id number]]  (Abnormal) Collected:  09/13/19 1049    Lab Status:  Final result Specimen:  Urine, Clean Catch Updated:  09/13/19 1056     Color, UA Yellow     Clarity, UA Clear     Specific Gravity, UA <=1 005     pH, UA 6 0     Leukocytes, UA Small     Nitrite, UA Positive     Protein, UA Negative mg/dl      Glucose, UA Negative mg/dl      Ketones, UA Negative mg/dl      Urobilinogen, UA 0 2 E U /dl      Bilirubin, UA Negative     Blood, UA Negative    Magnesium [[id number]]  (Normal) Collected:  09/13/19 1035    Lab Status:  Final result Specimen:  Blood from Arm, Left Updated:  09/13/19 1055     Magnesium 2 2 mg/dL     CBC and differential [[id number]]  (Abnormal) Collected:  09/13/19 1035    Lab Status:  Final result Specimen:  Blood from Arm, Left Updated:  09/13/19 1044     WBC 6 74 Thousand/uL      RBC 4 50 Million/uL      Hemoglobin 13 8 g/dL      Hematocrit 43 0 %      MCV 96 fL      MCH 30 7 pg      MCHC 32 1 g/dL      RDW 12 9 %      MPV 10 6 fL      Platelets 180 Thousands/uL      nRBC 0 /100 WBCs      Neutrophils Relative 35 %      Immat GRANS % 0 %      Lymphocytes Relative 56 %      Monocytes Relative 6 %      Eosinophils Relative 2 %      Basophils Relative 1 %      Neutrophils Absolute 2 35 Thousands/µL      Immature Grans Absolute 0 02 Thousand/uL      Lymphocytes Absolute 3 82 Thousands/µL      Monocytes Absolute 0 40 Thousand/µL      Eosinophils Absolute 0 11 Thousand/µL      Basophils Absolute 0 04 Thousands/µL                  CT recon only lumbar spine   Final Result by Shameka Lopez DO (09/13 1250)      Lumbar degenerative disc disease at the L1-2 level  Mild annular bulging at L2-3 and L3-4  Workstation performed: [id number]         CT abdomen pelvis with contrast   Final Result by Ghassan Rios MD (09/13 1143)      No acute CT abnormality in the abdomen or pelvis to account for the patient's symptoms  Normal appendix  Workstation performed: [id number]         CT head without contrast   Final Result by Ghassan Rios MD (09/13 1139)      No acute intracranial abnormality  Specifically, no CT evidence of acute intracranial hemorrhage        Workstation performed: [id number]                    Procedures  Procedures       ED Course  ED Course as of Sep 13 1304   Fri Sep 13, 2019   1221 Initial EKG interpreted by me 69 beats per minute normal sinus rhythm no ST elevation no ectopics  normal axis do not have access to previous            HEART Risk Score      Most Recent Value   History  0 Filed at: 09/13/2019 1220   ECG  0 Filed at: 09/13/2019 1220   Age  1 Filed at: 09/13/2019 1220   Risk Factors  2 Filed at: 09/13/2019 1220   Troponin  0 Filed at: 09/13/2019 1220   Heart Score Risk Calculator   History  0 Filed at: 09/13/2019 1220   ECG  0 Filed at: 09/13/2019 1220   Age  1 Filed at: 09/13/2019 1220   Risk Factors  2 Filed at: 09/13/2019 1220   Troponin  0 Filed at: 09/13/2019 1220   HEART Score  3 Filed at: 09/13/2019 1220 HEART Score  3 Filed at: 09/13/2019 1220                            [facility]  Number of Diagnoses or Management Options  Abdominal pain:   Back pain:   Headache:   UTI (urinary tract infection):   Diagnosis management comments: This is a 64-year-old female patient came with multifactorial issues  1 would be a migraine 2nd the right lower quadrant pain 30 be acute on chronic low back pain patient has CT of her head abdomen recon low back it appears that she has a UTI was given 1 dose of Rocephin she was given Toradol and Haldol and her pain complaints are now subsided  She states she would like to go home  At this time she will be given cephalexin for UTI and a urologist referral due to fact that she keeps getting chronic UTIs  Patient is alert and oriented nontoxic in no acute distress      Disposition  Final diagnoses:   Back pain   Abdominal pain   Headache   UTI (urinary tract infection)     Time reflects when diagnosis was documented in both MDM as applicable and the Disposition within this note     Time User Action Codes Description Comment    9/13/2019  1:01 PM [place], [address] [M54 9] Back pain     9/13/2019  1:01 PM Heri, [address] [place] [address] [R10 9] Abdominal pain     9/13/2019  1:01 PM Paresh Liriano [R51] Headache     9/13/2019  1:02 PM Heri, [address] [place] [address] [N39 0] UTI (urinary tract infection)       ED Disposition     ED Disposition Condition Date/Time Comment    Discharge Stable Fri Sep 13, 2019  1:01 PM Mike Woodall discharge to home/self care              Follow-up Information     Follow up With Specialties Details Why Contact Pratik Kirby MD [facility] Schedule an appointment as soon as possible for a visit  Follow-up for your back pain and general medical care [address]   [phone]            Patient's Medications   Discharge Prescriptions    CEPHALEXIN (KEFLEX) 500 MG CAPSULE    Take 1 capsule (500 mg total) by mouth 4 (four) times a day for 7 days       Start Date: 9/13/2019 End Date: 9/20/2019       Order Dose: 500 mg       Quantity: 28 capsule    Refills: 0     No discharge procedures on file      ED Provider  Electronically Signed by           Leo Velazquez PA-C  09/13/19 1528

## 2019-09-15 LAB
ATRIAL RATE: 69 BPM
P AXIS: 68 DEGREES
PR INTERVAL: 156 MS
QRS AXIS: 71 DEGREES
QRSD INTERVAL: 68 MS
QT INTERVAL: 410 MS
QTC INTERVAL: 439 MS
T WAVE AXIS: 60 DEGREES
VENTRICULAR RATE: 69 BPM

## 2019-09-15 PROCEDURE — 93010 ELECTROCARDIOGRAM REPORT: CPT | Performed by: INTERNAL MEDICINE

## 2019-12-12 ENCOUNTER — TELEPHONE (OUTPATIENT)
Dept: URGENT CARE | Facility: CLINIC | Age: 59
End: 2019-12-12

## 2019-12-12 ENCOUNTER — OFFICE VISIT (OUTPATIENT)
Dept: URGENT CARE | Facility: CLINIC | Age: 59
End: 2019-12-12
Payer: COMMERCIAL

## 2019-12-12 VITALS
OXYGEN SATURATION: 98 % | RESPIRATION RATE: 20 BRPM | DIASTOLIC BLOOD PRESSURE: 68 MMHG | TEMPERATURE: 98.3 F | WEIGHT: 172 LBS | HEART RATE: 80 BPM | SYSTOLIC BLOOD PRESSURE: 112 MMHG | BODY MASS INDEX: 29.37 KG/M2 | HEIGHT: 64 IN

## 2019-12-12 DIAGNOSIS — M54.50 ACUTE BILATERAL LOW BACK PAIN WITHOUT SCIATICA: ICD-10-CM

## 2019-12-12 DIAGNOSIS — N39.0 URINARY TRACT INFECTION WITHOUT HEMATURIA, SITE UNSPECIFIED: Primary | ICD-10-CM

## 2019-12-12 LAB
SL AMB  POCT GLUCOSE, UA: ABNORMAL
SL AMB LEUKOCYTE ESTERASE,UA: ABNORMAL
SL AMB POCT BILIRUBIN,UA: ABNORMAL
SL AMB POCT BLOOD,UA: ABNORMAL
SL AMB POCT CLARITY,UA: ABNORMAL
SL AMB POCT COLOR,UA: YELLOW
SL AMB POCT KETONES,UA: ABNORMAL
SL AMB POCT NITRITE,UA: POSITIVE
SL AMB POCT PH,UA: 6
SL AMB POCT SPECIFIC GRAVITY,UA: 1.01
SL AMB POCT URINE PROTEIN: ABNORMAL
SL AMB POCT UROBILINOGEN: 0.2

## 2019-12-12 PROCEDURE — 99213 OFFICE O/P EST LOW 20 MIN: CPT | Performed by: NURSE PRACTITIONER

## 2019-12-12 PROCEDURE — 87186 SC STD MICRODIL/AGAR DIL: CPT | Performed by: NURSE PRACTITIONER

## 2019-12-12 PROCEDURE — 87077 CULTURE AEROBIC IDENTIFY: CPT | Performed by: NURSE PRACTITIONER

## 2019-12-12 PROCEDURE — 87086 URINE CULTURE/COLONY COUNT: CPT | Performed by: NURSE PRACTITIONER

## 2019-12-12 PROCEDURE — 81002 URINALYSIS NONAUTO W/O SCOPE: CPT | Performed by: NURSE PRACTITIONER

## 2019-12-12 RX ORDER — CEPHALEXIN 500 MG/1
500 CAPSULE ORAL EVERY 12 HOURS SCHEDULED
Qty: 14 CAPSULE | Refills: 0 | Status: SHIPPED | OUTPATIENT
Start: 2019-12-12 | End: 2019-12-19

## 2019-12-12 NOTE — PROGRESS NOTES
[facility]        NAME: Ken Jones is a 63 y o  female  : 1960    MRN: [id number]  DATE: 2019  TIME: 2:53 PM    Assessment and Plan   Acute bilateral low back pain without sciatica [M54 5]  1  Acute bilateral low back pain without sciatica  POCT urine dip    Urine culture   2  Urinary tract infection without hematuria, site unspecified  cephalexin (KEFLEX) 500 mg capsule         Patient Instructions     Patient Instructions   There is small amount of leukocytes and nitrates in urine  Will start treatment for UTI  I did recommend you go to ER for full evaluation of flank pain but refused at this time  Will start treatment for UTI to see if there is improvement  As we discussed, you need to go to ER for any worsening symptoms, nausea, vomiting or fevers  You verbalized understanding and state you will go if symptoms become worse  AMA formed signed in regards to not going to ER verbalized understanding  Chief Complaint     Chief Complaint   Patient presents with    Back Pain     C/O b/l low back pain, foul smelling urine, nausea and slight lightheadedness since this AM           History of Present Illness   Ken Jones presents to the clinic c/o    This is a 63year old female here today with complaints low back pain, foul smelling urine, nausea and slight lightheadedness  She does state she has had lightheadedness in past and symptoms of this are no worse  She states she took Vicodin which helped low back pain  She states she woke up today feeling more tired than normal   She has had some nausea  She states she has history of chronic back pain  She states she has been sick for 22 years  She denies any urinary symptoms such as urgency frequency at this time  She states she has decreased appetite  She states pain in her back is different from her normal back pain  She states yesterday she had hip and pelvic xray's done by her pain management doctor    She is tearful at times during visit but does not have any thoughts of self harm  She expresses frustration with her chronic pain  She states she does not want to go to ER at this time as she is tired of going to doctors  She has a caregiver with her today  She states she has a caregiver 21 hours a week  She has some abd pain but this is her baseline  Review of Systems   Review of Systems   Constitutional: Positive for activity change and fatigue  Negative for chills and fever  HENT: Negative  Gastrointestinal: Positive for abdominal pain  Negative for nausea and vomiting  Genitourinary: Positive for flank pain  Negative for decreased urine volume, dysuria, frequency and urgency  Skin: Negative  Neurological: Positive for light-headedness  Negative for weakness  Psychiatric/Behavioral: Negative            Current Medications     Long-Term Medications   Medication Sig Dispense Refill    atorvastatin (LIPITOR) 40 mg tablet Take 40 mg by mouth      busPIRone (BUSPAR) 15 mg tablet Take 15 mg by mouth      carboxymethylcellulose 0 5 % SOLN Administer 1 drop to both eyes daily as needed for dry eyes 1 Bottle 0    clonazePAM (KlonoPIN) 1 mg tablet Take 1 mg by mouth Three times a day      cyclobenzaprine (FLEXERIL) 10 mg tablet TAKE 1 TABLET BY MOUTH 3 TIMES A DAY AS NEEDED FOR MUSCLE SPASMS      DULoxetine (CYMBALTA) 30 mg delayed release capsule Take 90 mg by mouth      fenofibrate (TRICOR) 48 mg tablet Take 48 mg by mouth daily      lamoTRIgine (LaMICtal) 200 MG tablet Take 200 mg by mouth      levothyroxine 137 mcg tablet Take 137 mcg by mouth daily      magnesium 30 MG tablet Take 30 mg by mouth 2 (two) times a day      Magnesium Gluconate 550 MG TABS Take 30 mg by mouth      naproxen (NAPROSYN) 500 mg tablet Take 500 mg by mouth 2 (two) times a day      omeprazole (PriLOSEC) 20 mg delayed release capsule take 1 capsule by mouth once daily      pregabalin (LYRICA) 100 mg capsule take 1 capsule by mouth three times a day      pregabalin (LYRICA) 150 mg capsule take 1 capsule by mouth NIGHTLY      promethazine (PHENERGAN) 25 mg tablet Take 25 mg by mouth 3 (three) times a day      SUMAtriptan (IMITREX) 100 mg tablet TAKE 1 TABLET BY MOUTH AT START OF MIGRAINE HEADACHE  MAY REPEAT IN 2 HOURS IF NEEDED  LIMIT TO NO MORE THAN 2 TABLETS IN 24 HOURS, THREE TI      topiramate (TOPAMAX) 50 MG tablet Take 50 mg by mouth every 12 (twelve) hours      traZODone (DESYREL) 100 mg tablet Take 100 mg by mouth         Current Allergies     Allergies as of 12/12/2019 - Reviewed 12/12/2019   Allergen Reaction Noted    Hydroxyzine Anaphylaxis 03/28/2016    Other  08/12/2013    Pollen extract Itching 08/12/2013    Tree extract  08/12/2013    Clarithromycin Rash 07/27/2005    Latex Rash 03/11/2015    Sulfa antibiotics Rash 07/27/2005            The following portions of the patient's history were reviewed and updated as appropriate: allergies, current medications, past family history, past medical history, past social history, past surgical history and problem list     Objective   /68   Pulse 80   Temp 98 3 °F (36 8 °C) (Tympanic)   Resp 20   Ht 5' 4" (1 626 m)   Wt 78 kg (172 lb)   SpO2 98%   BMI 29 52 kg/m²        Physical Exam     Physical Exam   Constitutional: She is oriented to person, place, and time  Non-toxic appearance  She has a sickly appearance  She does not appear ill  No distress  Cardiovascular: Normal rate and regular rhythm  Pulmonary/Chest: Effort normal and breath sounds normal  No stridor  No respiratory distress  She has no wheezes  She has no rales  She exhibits no tenderness  Abdominal: She exhibits no mass  There is tenderness  There is no guarding  No hernia  Right sided abd pain- history of chronic pain   Left CVA tenderness    Neurological: She is alert and oriented to person, place, and time  Psychiatric: She has a normal mood and affect   Her behavior is normal

## 2019-12-12 NOTE — PATIENT INSTRUCTIONS
There is small amount of leukocytes and nitrates in urine  Will start treatment for UTI  I did recommend you go to ER for full evaluation of flank pain but refused at this time  Will start treatment for UTI to see if there is improvement  As we discussed, you need to go to ER for any worsening symptoms, nausea, vomiting or fevers  You verbalized understanding and state you will go if symptoms become worse

## 2019-12-14 ENCOUNTER — TELEPHONE (OUTPATIENT)
Dept: URGENT CARE | Facility: HOSPITAL | Age: 59
End: 2019-12-14

## 2019-12-14 LAB
BACTERIA UR CULT: ABNORMAL
BACTERIA UR CULT: ABNORMAL

## 2019-12-14 NOTE — TELEPHONE ENCOUNTER
Attempted to call patient discussed urine culture results  No answer left message to return the cough  Tried the mobile number which person who answers states this is no longer her number    The

## 2019-12-16 ENCOUNTER — TELEPHONE (OUTPATIENT)
Dept: URGENT CARE | Facility: HOSPITAL | Age: 59
End: 2019-12-16

## 2019-12-16 DIAGNOSIS — N39.0 URINARY TRACT INFECTION WITHOUT HEMATURIA, SITE UNSPECIFIED: Primary | ICD-10-CM

## 2019-12-16 RX ORDER — NITROFURANTOIN 25; 75 MG/1; MG/1
100 CAPSULE ORAL 2 TIMES DAILY
Qty: 10 CAPSULE | Refills: 0 | Status: SHIPPED | OUTPATIENT
Start: 2019-12-16 | End: 2019-12-21

## 2019-12-17 NOTE — TELEPHONE ENCOUNTER
Called and discussed urine culture with patient  She states she has continues to have some back pain  She does have a history of having back pain and abdominal pain  Will start Macrobid at this time to ensure resolution of UTI  Discussed with patient if his symptoms become any worse she needs to go to the ER as discussed at the visit when I seen her  She verbalized understanding of instruction  She states her home health aide will be picking up other medications for her tomorrow

## 2020-02-04 ENCOUNTER — HOSPITAL ENCOUNTER (EMERGENCY)
Facility: HOSPITAL | Age: 60
Discharge: HOME | End: 2020-02-04
Attending: EMERGENCY MEDICINE
Payer: COMMERCIAL

## 2020-02-04 ENCOUNTER — APPOINTMENT (EMERGENCY)
Dept: RADIOLOGY | Facility: HOSPITAL | Age: 60
End: 2020-02-04
Payer: COMMERCIAL

## 2020-02-04 ENCOUNTER — APPOINTMENT (EMERGENCY)
Dept: CT IMAGING | Facility: HOSPITAL | Age: 60
End: 2020-02-04
Payer: COMMERCIAL

## 2020-02-04 VITALS
WEIGHT: 174.82 LBS | RESPIRATION RATE: 18 BRPM | OXYGEN SATURATION: 100 % | DIASTOLIC BLOOD PRESSURE: 61 MMHG | HEART RATE: 75 BPM | SYSTOLIC BLOOD PRESSURE: 131 MMHG | BODY MASS INDEX: 30.01 KG/M2 | TEMPERATURE: 97.1 F

## 2020-02-04 DIAGNOSIS — R10.9 ABDOMINAL PAIN: Primary | ICD-10-CM

## 2020-02-04 LAB
ALBUMIN SERPL BCP-MCNC: 3.2 G/DL (ref 3.5–5)
ALP SERPL-CCNC: 53 U/L (ref 46–116)
ALT SERPL W P-5'-P-CCNC: 13 U/L (ref 12–78)
ANION GAP SERPL CALCULATED.3IONS-SCNC: 8 MMOL/L (ref 4–13)
AST SERPL W P-5'-P-CCNC: 15 U/L (ref 5–45)
BASOPHILS # BLD AUTO: 0.05 THOUSANDS/ΜL (ref 0–0.1)
BASOPHILS NFR BLD AUTO: 1 % (ref 0–1)
BILIRUB SERPL-MCNC: 0.1 MG/DL (ref 0.2–1)
BILIRUB UR QL STRIP: NEGATIVE
BUN SERPL-MCNC: 16 MG/DL (ref 5–25)
CALCIUM SERPL-MCNC: 8.5 MG/DL (ref 8.3–10.1)
CHLORIDE SERPL-SCNC: 110 MMOL/L (ref 100–108)
CLARITY UR: CLEAR
CO2 SERPL-SCNC: 26 MMOL/L (ref 21–32)
COLOR UR: YELLOW
CREAT SERPL-MCNC: 0.8 MG/DL (ref 0.6–1.3)
EOSINOPHIL # BLD AUTO: 0.04 THOUSAND/ΜL (ref 0–0.61)
EOSINOPHIL NFR BLD AUTO: 1 % (ref 0–6)
ERYTHROCYTE [DISTWIDTH] IN BLOOD BY AUTOMATED COUNT: 13 % (ref 11.6–15.1)
GFR SERPL CREATININE-BSD FRML MDRD: 82 ML/MIN/1.73SQ M
GLUCOSE SERPL-MCNC: 96 MG/DL (ref 65–140)
GLUCOSE UR STRIP-MCNC: NEGATIVE MG/DL
HCT VFR BLD AUTO: 39 % (ref 34.8–46.1)
HGB BLD-MCNC: 12.5 G/DL (ref 11.5–15.4)
HGB UR QL STRIP.AUTO: NEGATIVE
HOLD SPECIMEN: NORMAL
IMM GRANULOCYTES # BLD AUTO: 0.04 THOUSAND/UL (ref 0–0.2)
IMM GRANULOCYTES NFR BLD AUTO: 1 % (ref 0–2)
KETONES UR STRIP-MCNC: NEGATIVE MG/DL
LEUKOCYTE ESTERASE UR QL STRIP: NEGATIVE
LIPASE SERPL-CCNC: 145 U/L (ref 73–393)
LYMPHOCYTES # BLD AUTO: 2.72 THOUSANDS/ΜL (ref 0.6–4.47)
LYMPHOCYTES NFR BLD AUTO: 35 % (ref 14–44)
MCH RBC QN AUTO: 31.8 PG (ref 26.8–34.3)
MCHC RBC AUTO-ENTMCNC: 32.1 G/DL (ref 31.4–37.4)
MCV RBC AUTO: 99 FL (ref 82–98)
MONOCYTES # BLD AUTO: 0.52 THOUSAND/ΜL (ref 0.17–1.22)
MONOCYTES NFR BLD AUTO: 7 % (ref 4–12)
NEUTROPHILS # BLD AUTO: 4.31 THOUSANDS/ΜL (ref 1.85–7.62)
NEUTS SEG NFR BLD AUTO: 55 % (ref 43–75)
NITRITE UR QL STRIP: NEGATIVE
NRBC BLD AUTO-RTO: 0 /100 WBCS
PH UR STRIP.AUTO: 7 [PH]
PLATELET # BLD AUTO: 191 THOUSANDS/UL (ref 149–390)
PMV BLD AUTO: 10.4 FL (ref 8.9–12.7)
POTASSIUM SERPL-SCNC: 3.7 MMOL/L (ref 3.5–5.3)
PROT SERPL-MCNC: 6.9 G/DL (ref 6.4–8.2)
PROT UR STRIP-MCNC: NEGATIVE MG/DL
RBC # BLD AUTO: 3.93 MILLION/UL (ref 3.81–5.12)
SODIUM SERPL-SCNC: 144 MMOL/L (ref 136–145)
SP GR UR STRIP.AUTO: <=1.005 (ref 1–1.03)
TROPONIN I SERPL-MCNC: <0.02 NG/ML
UROBILINOGEN UR QL STRIP.AUTO: 0.2 E.U./DL
WBC # BLD AUTO: 7.68 THOUSAND/UL (ref 4.31–10.16)

## 2020-02-04 PROCEDURE — 96375 TX/PRO/DX INJ NEW DRUG ADDON: CPT

## 2020-02-04 PROCEDURE — 74177 CT ABD & PELVIS W/CONTRAST: CPT

## 2020-02-04 PROCEDURE — 85025 COMPLETE CBC W/AUTO DIFF WBC: CPT | Performed by: EMERGENCY MEDICINE

## 2020-02-04 PROCEDURE — 71046 X-RAY EXAM CHEST 2 VIEWS: CPT

## 2020-02-04 PROCEDURE — 80053 COMPREHEN METABOLIC PANEL: CPT | Performed by: EMERGENCY MEDICINE

## 2020-02-04 PROCEDURE — 83690 ASSAY OF LIPASE: CPT | Performed by: EMERGENCY MEDICINE

## 2020-02-04 PROCEDURE — 96376 TX/PRO/DX INJ SAME DRUG ADON: CPT

## 2020-02-04 PROCEDURE — 99285 EMERGENCY DEPT VISIT HI MDM: CPT

## 2020-02-04 PROCEDURE — 81003 URINALYSIS AUTO W/O SCOPE: CPT | Performed by: EMERGENCY MEDICINE

## 2020-02-04 PROCEDURE — 93005 ELECTROCARDIOGRAM TRACING: CPT

## 2020-02-04 PROCEDURE — 96374 THER/PROPH/DIAG INJ IV PUSH: CPT

## 2020-02-04 PROCEDURE — 36415 COLL VENOUS BLD VENIPUNCTURE: CPT | Performed by: EMERGENCY MEDICINE

## 2020-02-04 PROCEDURE — 99285 EMERGENCY DEPT VISIT HI MDM: CPT | Performed by: EMERGENCY MEDICINE

## 2020-02-04 PROCEDURE — 84484 ASSAY OF TROPONIN QUANT: CPT | Performed by: EMERGENCY MEDICINE

## 2020-02-04 RX ORDER — FENTANYL CITRATE 50 UG/ML
50 INJECTION, SOLUTION INTRAMUSCULAR; INTRAVENOUS ONCE
Status: COMPLETED | OUTPATIENT
Start: 2020-02-04 | End: 2020-02-04

## 2020-02-04 RX ORDER — KETOROLAC TROMETHAMINE 30 MG/ML
15 INJECTION, SOLUTION INTRAMUSCULAR; INTRAVENOUS ONCE
Status: COMPLETED | OUTPATIENT
Start: 2020-02-04 | End: 2020-02-04

## 2020-02-04 RX ORDER — NICOTINE 21 MG/24HR
21 PATCH, TRANSDERMAL 24 HOURS TRANSDERMAL ONCE
Status: DISCONTINUED | OUTPATIENT
Start: 2020-02-04 | End: 2020-02-04 | Stop reason: HOSPADM

## 2020-02-04 RX ORDER — ACETAMINOPHEN 325 MG/1
650 TABLET ORAL ONCE
Status: COMPLETED | OUTPATIENT
Start: 2020-02-04 | End: 2020-02-04

## 2020-02-04 RX ADMIN — FENTANYL CITRATE 50 MCG: 50 INJECTION, SOLUTION INTRAMUSCULAR; INTRAVENOUS at 16:58

## 2020-02-04 RX ADMIN — FENTANYL CITRATE 50 MCG: 50 INJECTION, SOLUTION INTRAMUSCULAR; INTRAVENOUS at 15:50

## 2020-02-04 RX ADMIN — ACETAMINOPHEN 650 MG: 325 TABLET, FILM COATED ORAL at 16:57

## 2020-02-04 RX ADMIN — NICOTINE 21 MG: 21 PATCH TRANSDERMAL at 18:02

## 2020-02-04 RX ADMIN — KETOROLAC TROMETHAMINE 15 MG: 30 INJECTION, SOLUTION INTRAMUSCULAR at 16:59

## 2020-02-04 RX ADMIN — IOHEXOL 100 ML: 350 INJECTION, SOLUTION INTRAVENOUS at 16:26

## 2020-02-04 NOTE — ED NOTES
Patient returned from [address]  She reports that the pain is completely back and is requesting some more pain medications  Dr Vinicius Mota made aware        Huey Greco, RN  02/04/20 6822

## 2020-02-04 NOTE — ED NOTES
Patient transported to [facility], [address] [place] [address]  02/04/20 4134

## 2020-02-04 NOTE — ED NOTES
Patient assisted to bedside commode and instructed to call when she is finished        Derek Greco RN  02/04/20 7590

## 2020-02-04 NOTE — ED NOTES
Ulysses Rosette at bedside   She got a ride for the patient to get home at [address] [facility] [address], [facility]  02/04/20 5415

## 2020-02-06 LAB
ATRIAL RATE: 76 BPM
P AXIS: 72 DEGREES
PR INTERVAL: 148 MS
QRS AXIS: 75 DEGREES
QRSD INTERVAL: 74 MS
QT INTERVAL: 384 MS
QTC INTERVAL: 432 MS
T WAVE AXIS: 63 DEGREES
VENTRICULAR RATE: 76 BPM

## 2020-02-06 PROCEDURE — 93010 ELECTROCARDIOGRAM REPORT: CPT | Performed by: INTERNAL MEDICINE

## 2020-02-09 NOTE — ED PROVIDER NOTES
History  Chief Complaint   Patient presents with    Abdominal Pain     right lower quad abdooimanl pain off and on for 5 days  HPI  This is a 64-year-old woman presents with right lower quadrant abdominal pain  Pain has been intermittent for last 5 days  What changed days patient states she was tired of the pain  Pain does not radiate not exacerbated by movement  Pain comes and goes does not seem to relation to anything with the patient does  It is not related to eating or drinking  Does not radiate  Patient does have chronic pain baseline including chronic as well as she is also weaned off medications  Patient also has history COPD and still smokes  Denies diarrhea denies any fevers or chills    Prior to Admission Medications   Prescriptions Last Dose Informant Patient Reported? Taking? DULoxetine (CYMBALTA) 30 mg delayed release capsule Not Taking at Unknown time  Yes No   Sig: Take 90 mg by mouth   HYDROcodone-acetaminophen (NORCO)  mg per tablet   Yes Yes   Si tab every 6 hours as needed for severe pain   Do not fill before 18   Magnesium Gluconate 550 MG TABS   Yes Yes   Sig: Take 30 mg by mouth   Melatonin 10 MG CAPS   Yes Yes   Sig: Take 1 tablet by mouth   SUMAtriptan (IMITREX) 100 mg tablet   Yes Yes   Sig: TAKE 1 TABLET BY MOUTH AT START OF MIGRAINE HEADACHE  MAY REPEAT IN 2 HOURS IF NEEDED   LIMIT TO NO MORE THAN 2 TABLETS IN 24 HOURS, THREE TI   albuterol (PROVENTIL HFA,VENTOLIN HFA) 90 mcg/act inhaler   Yes Yes   Sig: Inhale 2 puffs every 6 (six) hours   aspirin 325 mg tablet Not Taking at Unknown time  Yes No   Sig: Take 325 mg by mouth daily   atorvastatin (LIPITOR) 40 mg tablet   Yes No   Sig: Take 40 mg by mouth   busPIRone (BUSPAR) 15 mg tablet Not Taking at Unknown time  Yes No   Sig: Take 15 mg by mouth   carboxymethylcellulose 0 5 % SOLN   No Yes   Sig: Administer 1 drop to both eyes daily as needed for dry eyes   clonazePAM (KlonoPIN) 1 mg tablet   Yes Yes   Sig: Take 1 mg by mouth 2 (two) times a day    cyclobenzaprine (FLEXERIL) 10 mg tablet   Yes Yes   Sig: TAKE 1 TABLET BY MOUTH 3 TIMES A DAY AS NEEDED FOR MUSCLE SPASMS   fenofibrate (TRICOR) 48 mg tablet   Yes Yes   Sig: Take 48 mg by mouth daily   fluconazole (DIFLUCAN) 50 mg tablet   Yes Yes   Sig: Take 50 mg by mouth daily   fluticasone-salmeterol (ADVAIR DISKUS, WIXELA INHUB) 500-50 mcg/dose inhaler 2/4/2020 at Unknown time  Yes Yes   Sig: Inhale 1 puff 2 (two) times a day Rinse mouth after use     lamoTRIgine (LaMICtal) 200 MG tablet   Yes No   Sig: Take 200 mg by mouth   levothyroxine 137 mcg tablet   Yes Yes   Sig: Take 137 mcg by mouth daily   magnesium 30 MG tablet   Yes Yes   Sig: Take 30 mg by mouth 2 (two) times a day   naproxen (NAPROSYN) 500 mg tablet   Yes No   Sig: Take 500 mg by mouth 2 (two) times a day   omeprazole (PriLOSEC) 20 mg delayed release capsule   Yes Yes   Sig: take 1 capsule by mouth once daily   pregabalin (LYRICA) 100 mg capsule   Yes Yes   Sig: take 1 capsule by mouth three times a day   pregabalin (LYRICA) 150 mg capsule   Yes Yes   Sig: take 1 capsule by mouth NIGHTLY   promethazine (PHENERGAN) 25 mg tablet Not Taking at Unknown time  Yes No   Sig: Take 25 mg by mouth 3 (three) times a day   pseudoephedrine-acetaminophen (TYLENOL SINUS)  MG TABS   Yes Yes   Sig: Take 1 tablet by mouth every 4 (four) hours as needed for congestion   topiramate (TOPAMAX) 50 MG tablet   Yes Yes   Sig: Take 50 mg by mouth every 12 (twelve) hours   traZODone (DESYREL) 100 mg tablet Not Taking at Unknown time  Yes No   Sig: Take 100 mg by mouth      Facility-Administered Medications: None       Past Medical History:   Diagnosis Date    Anxiety     Asthma     Bipolar 1 disorder ([facility] [address] )     Brain aneurysm     Chronic pain disorder     spinal stenosis    Concussion syndrome     neurological rx and balance rx    [facility]ch[facility] obstructive pulmonary disease) [facility]     Depression     Disease of thyroid gland     nodules     Family history of colon cancer     father    Fibromyalgia, primary     History of colon polyps     Hyperlipidemia     Hypertension     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 [address] )     2012 no deficeits    Thyroid Cancer     TIA (transient ischemic attack)        Past Surgical History:   Procedure Laterality Date    ABDOMINAL SURGERY      laproscopic/ endometriosis    BRAIN SURGERY      aneurysm/ coiling procedure    CARPAL TUNNEL RELEASE      CHOLECYSTECTOMY      DENTAL SURGERY      EYE SURGERY      cataract    HYSTERECTOMY      MI ESOPHAGOGASTRODUODENOSCOPY TRANSORAL DIAGNOSTIC N/A 2/8/2018    Procedure: EGD AND COLONOSCOPY;  Surgeon: Bharat Caballero MD;  Location: [facility]; Service: Gastroenterology    THYROID SURGERY         History reviewed  No pertinent family history  I have reviewed and agree with the history as documented  Social History     Tobacco Use    Smoking status: Current Every Day Smoker     Packs/day: 2 00     Types: Cigarettes    Smokeless tobacco: Never Used   Substance Use Topics    Alcohol use: Never     Alcohol/week: 0 0 standard drinks     Frequency: Patient refused     Drinks per session: Patient refused     Binge frequency: Never    Drug use: No        Review of Systems   Constitutional: Negative  Negative for chills and fever  HENT: Negative  Negative for congestion and sore throat  Eyes: Negative  Negative for discharge and redness  Respiratory: Negative  Negative for chest tightness and shortness of breath  Cardiovascular: Negative  Negative for chest pain and palpitations  Gastrointestinal: Positive for abdominal pain  Negative for nausea and vomiting  Endocrine: Negative  Negative for cold intolerance and polyphagia  Genitourinary: Negative    Negative for difficulty urinating and dysuria  Musculoskeletal: Negative  Negative for arthralgias and back pain  Skin: Negative  Negative for color change and wound  Allergic/Immunologic: Negative  Negative for environmental allergies  Neurological: Negative  Negative for dizziness, weakness and headaches  Hematological: Negative  Psychiatric/Behavioral: Negative  Negative for behavioral problems  The patient is not nervous/anxious  All other systems reviewed and are negative  Physical Exam  Physical Exam   Constitutional: She is oriented to person, place, and time  She appears well-developed and well-nourished  No distress  HENT:   Head: Normocephalic and atraumatic  Right Ear: External ear normal    Left Ear: External ear normal    Mouth/Throat: Oropharynx is clear and moist    Eyes: Pupils are equal, round, and reactive to light  Conjunctivae and EOM are normal  Right eye exhibits no discharge  Left eye exhibits no discharge  No scleral icterus  Neck: Normal range of motion  Neck supple  No tracheal deviation present  No thyromegaly present  Cardiovascular: Normal rate, regular rhythm and intact distal pulses  Exam reveals no gallop and no friction rub  No murmur heard  Pulmonary/Chest: Effort normal and breath sounds normal  No stridor  No respiratory distress  She has no wheezes  She has no rales  Abdominal: Soft  Bowel sounds are normal  She exhibits no distension  There is tenderness in the right lower quadrant  There is no rebound and no guarding  Musculoskeletal: Normal range of motion  She exhibits no edema or deformity  Neurological: She is alert and oriented to person, place, and time  No cranial nerve deficit  Skin: Skin is warm and dry  No rash noted  She is not diaphoretic  No erythema  Psychiatric: She has a normal mood and affect  Her behavior is normal  Thought content normal    Nursing note and vitals reviewed        Vital Signs  ED Triage Vitals [02/04/20 1404] Temperature Pulse Respirations Blood Pressure SpO2   (!) 97 1 °F (36 2 °C) 82 20 125/80 99 %      Temp Source Heart Rate Source Patient Position - Orthostatic VS BP Location FiO2 (%)   Temporal Monitor Lying Left arm --      Pain Score       6           Vitals:    02/04/20 1445 02/04/20 1600 02/04/20 1730 02/04/20 1836   BP: 139/84 116/88 99/60 131/61   Pulse: 80 79 73 75   Patient Position - Orthostatic VS: Lying Lying Sitting Lying         Visual Acuity      ED Medications  Medications   fentanyl citrate (PF) 100 MCG/2ML 50 mcg (50 mcg Intravenous Given 2/4/20 1550)   iohexol (OMNIPAQUE) 350 MG/ML injection (MULTI-DOSE) 100 mL (100 mL Intravenous Given 2/4/20 1626)   acetaminophen (TYLENOL) tablet 650 mg (650 mg Oral Given 2/4/20 1657)   ketorolac (TORADOL) injection 15 mg (15 mg Intravenous Given 2/4/20 1659)   fentanyl citrate (PF) 100 MCG/2ML 50 mcg (50 mcg Intravenous Given 2/4/20 1658)       Diagnostic Studies  Results Reviewed     Procedure Component Value Units Date/Time    UA w Reflex to Microscopic w Reflex to Culture [[id number]] Collected:  02/04/20 1728    Lab Status:  Final result Specimen:  Urine, Clean Catch Updated:  02/04/20 1734     Color, UA Yellow     Clarity, UA Clear     Specific Gravity, UA <=1 005     pH, UA 7 0     Leukocytes, UA Negative     Nitrite, UA Negative     Protein, UA Negative mg/dl      Glucose, UA Negative mg/dl      Ketones, UA Negative mg/dl      Urobilinogen, UA 0 2 E U /dl      Bilirubin, UA Negative     Blood, UA Negative    Comprehensive metabolic panel [[id number]]  (Abnormal) Collected:  02/04/20 1439    Lab Status:  Final result Specimen:  Blood from Arm, Left Updated:  02/04/20 1509     Sodium 144 mmol/L      Potassium 3 7 mmol/L      Chloride 110 mmol/L      CO2 26 mmol/L      ANION GAP 8 mmol/L      BUN 16 mg/dL      Creatinine 0 80 mg/dL      Glucose 96 mg/dL      Calcium 8 5 mg/dL      AST 15 U/L      ALT 13 U/L      Alkaline Phosphatase 53 U/L      Total Protein 6 9 g/dL      Albumin 3 2 g/dL      Total Bilirubin 0 10 mg/dL      eGFR 82 ml/min/1 73sq m     Narrative:       [place] guidelines for Chronic Kidney Disease (CKD):     Stage 1 with normal or high GFR (GFR > 90 mL/min/1 73 square meters)    Stage 2 Mild CKD (GFR = 60-89 mL/min/1 73 square meters)    Stage 3A Moderate CKD (GFR = 45-59 mL/min/1 73 square meters)    Stage 3B Moderate CKD (GFR = 30-44 mL/min/1 73 square meters)    Stage 4 Severe CKD (GFR = 15-29 mL/min/1 73 square meters)    Stage 5 End Stage CKD (GFR <15 mL/min/1 73 square meters)  Note: GFR calculation is accurate only with a steady state creatinine    Troponin I [[id number]]  (Normal) Collected:  02/04/20 1439    Lab Status:  Final result Specimen:  Blood Updated:  02/04/20 1507     Troponin I <0 02 ng/mL     Lipase [[id number]]  (Normal) Collected:  02/04/20 1439    Lab Status:  Final result Specimen:  Blood from Arm, Left Updated:  02/04/20 1458     Lipase 145 u/L     CBC and differential [[id number]]  (Abnormal) Collected:  02/04/20 1439    Lab Status:  Final result Specimen:  Blood from Arm, Left Updated:  02/04/20 1446     WBC 7 68 Thousand/uL      RBC 3 93 Million/uL      Hemoglobin 12 5 g/dL      Hematocrit 39 0 %      MCV 99 fL      MCH 31 8 pg      MCHC 32 1 g/dL      RDW 13 0 %      MPV 10 4 fL      Platelets 049 Thousands/uL      nRBC 0 /100 WBCs      Neutrophils Relative 55 %      Immat GRANS % 1 %      Lymphocytes Relative 35 %      Monocytes Relative 7 %      Eosinophils Relative 1 %      Basophils Relative 1 %      Neutrophils Absolute 4 31 Thousands/µL      Immature Grans Absolute 0 04 Thousand/uL      Lymphocytes Absolute 2 72 Thousands/µL      Monocytes Absolute 0 52 Thousand/µL      Eosinophils Absolute 0 04 Thousand/µL      Basophils Absolute 0 05 Thousands/µL                  CT abdomen pelvis with contrast   Final Result by Kayy Ospina MD (02/04 1645)      No acute inflammatory findings in the abdomen or pelvis  Workstation performed: [id number]         XR chest 2 views   Final Result by Horacio Wakefield MD (02/04 1527)      No active disease in the chest             Workstation performed: [id number]                    Procedures  Procedures         ED Course        This EKG was interpreted by me  The EKG demonstrates Normal sinus rhythm, normal intervals and axis, normal QRS, no acute ST changes present  HEART Risk Score      Most Recent Value   History  0 Filed at: 02/04/2020 1357   ECG  0 Filed at: 02/04/2020 1357   Age  1 Filed at: 02/04/2020 1357   Risk Factors  2 Filed at: 02/04/2020 1357   Troponin  0 Filed at: 02/04/2020 1357   Heart Score Risk Calculator   History  0 Filed at: 02/04/2020 1357   ECG  0 Filed at: 02/04/2020 1357   Age  1 Filed at: 02/04/2020 1357   Risk Factors  2 Filed at: 02/04/2020 1357   Troponin  0 Filed at: 02/04/2020 1357   HEART Score  3 Filed at: 02/04/2020 1357   HEART Score  3 Filed at: 02/04/2020 [facility]7                            MDM  Number of Diagnoses or Management Options  Abdominal pain:   Diagnosis management comments: 81-year-old woman with history of chronic pain comes in with 4 days of right lower quadrant a CBC, CMP and lipase history, will also get EKG chest get treat the patient's pain reassess  Disposition  Final diagnoses:   Abdominal pain     Time reflects when diagnosis was documented in both MDM as applicable and the Disposition within this note     Time User Action Codes Description Comment    2/4/2020  6:25 PM Reji Molina Add [R10 9] Abdominal pain       ED Disposition     ED Disposition Condition Date/Time Comment    Discharge Stable Tue Feb 4, 2020  6:25 PM Shelby Woodall discharge to home/self care              Follow-up Information     Follow up With Specialties Details Why Contact Info Additional Information    Genevieve Cisneros MD [facility] Medicine Schedule an appointment as soon as possible for a visit in 3 days follow up being seen in the emergency department [address] [facility] Emergency Department Emergency Medicine Go to  As needed, If symptoms worsen Ana Chadwick [address] [phone] [place] [facility], [address], [place], [address] [facility], [address]          Discharge Medication List as of 2/4/2020  6:25 PM      CONTINUE these medications which have NOT CHANGED    Details   albuterol (PROVENTIL HFA,VENTOLIN HFA) 90 mcg/act inhaler Inhale 2 puffs every 6 (six) hours, Starting Thu 12/28/2017, Historical Med      aspirin 325 mg tablet Take 325 mg by mouth daily, Historical Med      atorvastatin (LIPITOR) 40 mg tablet Take 40 mg by mouth, Starting Tue 1/9/2018, Until Thu 7/18/2019, Historical Med      busPIRone (BUSPAR) 15 mg tablet Take 15 mg by mouth, Starting Mon 10/16/2017, Historical Med      carboxymethylcellulose 0 5 % SOLN Administer 1 drop to both eyes daily as needed for dry eyes, Starting Thu 2/7/2019, Normal      clonazePAM (KlonoPIN) 1 mg tablet Take 1 mg by mouth 2 (two) times a day , Starting Thu 1/25/2018, Historical Med      cyclobenzaprine (FLEXERIL) 10 mg tablet TAKE 1 TABLET BY MOUTH 3 TIMES A DAY AS NEEDED FOR MUSCLE SPASMS, Historical Med      DULoxetine (CYMBALTA) 30 mg delayed release capsule Take 90 mg by mouth, Starting Thu 1/25/2018, Historical Med      fenofibrate (TRICOR) 48 mg tablet Take 48 mg by mouth daily, Historical Med      fluconazole (DIFLUCAN) 50 mg tablet Take 50 mg by mouth daily, Historical Med      fluticasone-salmeterol (ADVAIR DISKUS, WIXELA INHUB) 500-50 mcg/dose inhaler Inhale 1 puff 2 (two) times a day Rinse mouth after use , Historical Med      HYDROcodone-acetaminophen (NORCO)  mg per tablet 1 tab every 6 hours as needed for severe pain    Do not fill before 2/12/18, Historical Med      lamoTRIgine (LaMICtal) 200 MG tablet Take 200 mg by mouth, Starting Tue 1/16/2018, Until Thu 7/18/2019, Historical Med      levothyroxine 137 mcg tablet Take 137 mcg by mouth daily, Starting Mon 1/21/2019, Historical Med      magnesium 30 MG tablet Take 30 mg by mouth 2 (two) times a day, Historical Med      Magnesium Gluconate 550 MG TABS Take 30 mg by mouth, Historical Med      Melatonin 10 MG CAPS Take 1 tablet by mouth, Historical Med      naproxen (NAPROSYN) 500 mg tablet Take 500 mg by mouth 2 (two) times a day, Starting Fri 7/14/2017, Until Thu 7/18/2019, Historical Med      omeprazole (PriLOSEC) 20 mg delayed release capsule take 1 capsule by mouth once daily, Historical Med      !! pregabalin (LYRICA) 100 mg capsule take 1 capsule by mouth three times a day, Historical Med      !! pregabalin (LYRICA) 150 mg capsule take 1 capsule by mouth NIGHTLY, Historical Med      promethazine (PHENERGAN) 25 mg tablet Take 25 mg by mouth 3 (three) times a day, Historical Med      pseudoephedrine-acetaminophen (TYLENOL SINUS)  MG TABS Take 1 tablet by mouth every 4 (four) hours as needed for congestion, Historical Med      SUMAtriptan (IMITREX) 100 mg tablet TAKE 1 TABLET BY MOUTH AT START OF MIGRAINE HEADACHE  MAY REPEAT IN 2 HOURS IF NEEDED  LIMIT TO NO MORE THAN 2 TABLETS IN 24 HOURS, THREE TI, Historical Med      topiramate (TOPAMAX) 50 MG tablet Take 50 mg by mouth every 12 (twelve) hours, Historical Med      traZODone (DESYREL) 100 mg tablet Take 100 mg by mouth, Starting Thu 1/25/2018, Historical Med       !! - Potential duplicate medications found  Please discuss with provider  No discharge procedures on file      ED Provider  Electronically Signed by           Tracey Baker MD  02/09/20 3421

## 2020-05-22 ENCOUNTER — OFFICE VISIT (OUTPATIENT)
Dept: OTOLARYNGOLOGY | Facility: CLINIC | Age: 60
End: 2020-05-22
Payer: COMMERCIAL

## 2020-05-22 VITALS
HEIGHT: 64 IN | HEART RATE: 80 BPM | BODY MASS INDEX: 31.1 KG/M2 | SYSTOLIC BLOOD PRESSURE: 120 MMHG | WEIGHT: 182.2 LBS | DIASTOLIC BLOOD PRESSURE: 76 MMHG

## 2020-05-22 DIAGNOSIS — M26.629 TMJ SYNDROME: Primary | ICD-10-CM

## 2020-05-22 DIAGNOSIS — H92.01 OTALGIA, RIGHT: ICD-10-CM

## 2020-05-22 DIAGNOSIS — H61.23 BILATERAL HEARING LOSS DUE TO CERUMEN IMPACTION: ICD-10-CM

## 2020-05-22 PROCEDURE — 99203 OFFICE O/P NEW LOW 30 MIN: CPT | Performed by: OTOLARYNGOLOGY

## 2020-05-22 PROCEDURE — 69210 REMOVE IMPACTED EAR WAX UNI: CPT | Performed by: OTOLARYNGOLOGY

## 2020-06-15 ENCOUNTER — HOSPITAL ENCOUNTER (OUTPATIENT)
Facility: HOSPITAL | Age: 60
Setting detail: OBSERVATION
Discharge: HOME HEALTH | End: 2020-06-16
Attending: EMERGENCY MEDICINE | Admitting: FAMILY MEDICINE
Payer: COMMERCIAL

## 2020-06-15 ENCOUNTER — APPOINTMENT (EMERGENCY)
Dept: RADIOLOGY | Facility: HOSPITAL | Age: 60
End: 2020-06-15
Payer: COMMERCIAL

## 2020-06-15 ENCOUNTER — APPOINTMENT (EMERGENCY)
Dept: CT IMAGING | Facility: HOSPITAL | Age: 60
End: 2020-06-15
Payer: COMMERCIAL

## 2020-06-15 DIAGNOSIS — R29.90 STROKE-LIKE SYMPTOMS: ICD-10-CM

## 2020-06-15 DIAGNOSIS — G62.9 NEUROPATHY: ICD-10-CM

## 2020-06-15 DIAGNOSIS — R42 DIZZINESS: ICD-10-CM

## 2020-06-15 DIAGNOSIS — R20.2 PARESTHESIAS: Primary | ICD-10-CM

## 2020-06-15 PROBLEM — R07.89 LEFT CHEST PRESSURE: Status: ACTIVE | Noted: 2020-06-15

## 2020-06-15 PROBLEM — R19.7 DIARRHEA: Status: ACTIVE | Noted: 2020-06-15

## 2020-06-15 PROBLEM — R11.0 NAUSEA: Status: ACTIVE | Noted: 2020-06-15

## 2020-06-15 LAB
ALBUMIN SERPL BCP-MCNC: 3.4 G/DL (ref 3.5–5)
ALP SERPL-CCNC: 79 U/L (ref 46–116)
ALT SERPL W P-5'-P-CCNC: 24 U/L (ref 12–78)
ANION GAP SERPL CALCULATED.3IONS-SCNC: 8 MMOL/L (ref 4–13)
APTT PPP: 27 SECONDS (ref 23–37)
AST SERPL W P-5'-P-CCNC: 18 U/L (ref 5–45)
BASOPHILS # BLD AUTO: 0.05 THOUSANDS/ΜL (ref 0–0.1)
BASOPHILS NFR BLD AUTO: 1 % (ref 0–1)
BILIRUB SERPL-MCNC: 0.2 MG/DL (ref 0.2–1)
BUN SERPL-MCNC: 14 MG/DL (ref 5–25)
CALCIUM SERPL-MCNC: 8.3 MG/DL (ref 8.3–10.1)
CHLORIDE SERPL-SCNC: 107 MMOL/L (ref 100–108)
CO2 SERPL-SCNC: 26 MMOL/L (ref 21–32)
CREAT SERPL-MCNC: 0.93 MG/DL (ref 0.6–1.3)
EOSINOPHIL # BLD AUTO: 0.1 THOUSAND/ΜL (ref 0–0.61)
EOSINOPHIL NFR BLD AUTO: 2 % (ref 0–6)
ERYTHROCYTE [DISTWIDTH] IN BLOOD BY AUTOMATED COUNT: 12.7 % (ref 11.6–15.1)
GFR SERPL CREATININE-BSD FRML MDRD: 68 ML/MIN/1.73SQ M
GLUCOSE SERPL-MCNC: 120 MG/DL (ref 65–140)
HCT VFR BLD AUTO: 38.3 % (ref 34.8–46.1)
HGB BLD-MCNC: 12.1 G/DL (ref 11.5–15.4)
IMM GRANULOCYTES # BLD AUTO: 0.03 THOUSAND/UL (ref 0–0.2)
IMM GRANULOCYTES NFR BLD AUTO: 0 % (ref 0–2)
INR PPP: 1.03 (ref 0.84–1.19)
LYMPHOCYTES # BLD AUTO: 2.66 THOUSANDS/ΜL (ref 0.6–4.47)
LYMPHOCYTES NFR BLD AUTO: 39 % (ref 14–44)
MCH RBC QN AUTO: 30.3 PG (ref 26.8–34.3)
MCHC RBC AUTO-ENTMCNC: 31.6 G/DL (ref 31.4–37.4)
MCV RBC AUTO: 96 FL (ref 82–98)
MONOCYTES # BLD AUTO: 0.47 THOUSAND/ΜL (ref 0.17–1.22)
MONOCYTES NFR BLD AUTO: 7 % (ref 4–12)
NEUTROPHILS # BLD AUTO: 3.48 THOUSANDS/ΜL (ref 1.85–7.62)
NEUTS SEG NFR BLD AUTO: 51 % (ref 43–75)
NRBC BLD AUTO-RTO: 0 /100 WBCS
PLATELET # BLD AUTO: 179 THOUSANDS/UL (ref 149–390)
PMV BLD AUTO: 10.4 FL (ref 8.9–12.7)
POTASSIUM SERPL-SCNC: 3.3 MMOL/L (ref 3.5–5.3)
PROT SERPL-MCNC: 7 G/DL (ref 6.4–8.2)
PROTHROMBIN TIME: 13.5 SECONDS (ref 11.6–14.5)
RBC # BLD AUTO: 4 MILLION/UL (ref 3.81–5.12)
SODIUM SERPL-SCNC: 141 MMOL/L (ref 136–145)
TROPONIN I SERPL-MCNC: <0.02 NG/ML
WBC # BLD AUTO: 6.79 THOUSAND/UL (ref 4.31–10.16)

## 2020-06-15 PROCEDURE — 84484 ASSAY OF TROPONIN QUANT: CPT

## 2020-06-15 PROCEDURE — 85730 THROMBOPLASTIN TIME PARTIAL: CPT

## 2020-06-15 PROCEDURE — 99291 CRITICAL CARE FIRST HOUR: CPT

## 2020-06-15 PROCEDURE — 70496 CT ANGIOGRAPHY HEAD: CPT

## 2020-06-15 PROCEDURE — 99220 PR INITIAL OBSERVATION CARE/DAY 70 MINUTES: CPT | Performed by: FAMILY MEDICINE

## 2020-06-15 PROCEDURE — 84484 ASSAY OF TROPONIN QUANT: CPT | Performed by: PHYSICIAN ASSISTANT

## 2020-06-15 PROCEDURE — 70498 CT ANGIOGRAPHY NECK: CPT

## 2020-06-15 PROCEDURE — 85610 PROTHROMBIN TIME: CPT

## 2020-06-15 PROCEDURE — 99285 EMERGENCY DEPT VISIT HI MDM: CPT | Performed by: EMERGENCY MEDICINE

## 2020-06-15 PROCEDURE — 85025 COMPLETE CBC W/AUTO DIFF WBC: CPT

## 2020-06-15 PROCEDURE — 71045 X-RAY EXAM CHEST 1 VIEW: CPT

## 2020-06-15 PROCEDURE — 80053 COMPREHEN METABOLIC PANEL: CPT

## 2020-06-15 PROCEDURE — 93005 ELECTROCARDIOGRAM TRACING: CPT

## 2020-06-15 PROCEDURE — 36415 COLL VENOUS BLD VENIPUNCTURE: CPT

## 2020-06-15 RX ORDER — LAMOTRIGINE 100 MG/1
100 TABLET ORAL DAILY
Status: DISCONTINUED | OUTPATIENT
Start: 2020-06-16 | End: 2020-06-16 | Stop reason: HOSPADM

## 2020-06-15 RX ORDER — NICOTINE 21 MG/24HR
1 PATCH, TRANSDERMAL 24 HOURS TRANSDERMAL DAILY
Status: DISCONTINUED | OUTPATIENT
Start: 2020-06-16 | End: 2020-06-16

## 2020-06-15 RX ORDER — LAMOTRIGINE 100 MG/1
200 TABLET ORAL DAILY
Status: DISCONTINUED | OUTPATIENT
Start: 2020-06-16 | End: 2020-06-15

## 2020-06-15 RX ORDER — LANOLIN ALCOHOL/MO/W.PET/CERES
9 CREAM (GRAM) TOPICAL
Status: DISCONTINUED | OUTPATIENT
Start: 2020-06-15 | End: 2020-06-16 | Stop reason: HOSPADM

## 2020-06-15 RX ORDER — ASENAPINE 10 MG/1
5 TABLET SUBLINGUAL
Status: DISCONTINUED | OUTPATIENT
Start: 2020-06-15 | End: 2020-06-16 | Stop reason: HOSPADM

## 2020-06-15 RX ORDER — ASENAPINE 10 MG/1
5 TABLET SUBLINGUAL 2 TIMES DAILY
Status: DISCONTINUED | OUTPATIENT
Start: 2020-06-16 | End: 2020-06-15

## 2020-06-15 RX ORDER — ATORVASTATIN CALCIUM 40 MG/1
80 TABLET, FILM COATED ORAL EVERY EVENING
Status: DISCONTINUED | OUTPATIENT
Start: 2020-06-15 | End: 2020-06-16 | Stop reason: HOSPADM

## 2020-06-15 RX ORDER — LAMOTRIGINE 25 MG/1
75 TABLET ORAL
COMMUNITY
End: 2021-12-02 | Stop reason: ALTCHOICE

## 2020-06-15 RX ORDER — FENOFIBRATE 48 MG/1
48 TABLET, COATED ORAL DAILY
Status: DISCONTINUED | OUTPATIENT
Start: 2020-06-16 | End: 2020-06-16 | Stop reason: HOSPADM

## 2020-06-15 RX ORDER — NICOTINE 21 MG/24HR
14 PATCH, TRANSDERMAL 24 HOURS TRANSDERMAL ONCE
Status: DISCONTINUED | OUTPATIENT
Start: 2020-06-15 | End: 2020-06-16

## 2020-06-15 RX ORDER — PROMETHAZINE HYDROCHLORIDE 25 MG/1
25 TABLET ORAL 3 TIMES DAILY
Status: DISCONTINUED | OUTPATIENT
Start: 2020-06-15 | End: 2020-06-15

## 2020-06-15 RX ORDER — DULOXETIN HYDROCHLORIDE 30 MG/1
90 CAPSULE, DELAYED RELEASE ORAL DAILY
Status: DISCONTINUED | OUTPATIENT
Start: 2020-06-16 | End: 2020-06-15

## 2020-06-15 RX ORDER — TRAZODONE HYDROCHLORIDE 50 MG/1
100 TABLET ORAL
Status: DISCONTINUED | OUTPATIENT
Start: 2020-06-15 | End: 2020-06-15

## 2020-06-15 RX ORDER — CLONAZEPAM 0.5 MG/1
0.5 TABLET ORAL
Status: DISCONTINUED | OUTPATIENT
Start: 2020-06-15 | End: 2020-06-16 | Stop reason: HOSPADM

## 2020-06-15 RX ORDER — LAMOTRIGINE 25 MG/1
75 TABLET ORAL
Status: DISCONTINUED | OUTPATIENT
Start: 2020-06-15 | End: 2020-06-16 | Stop reason: HOSPADM

## 2020-06-15 RX ORDER — PREGABALIN 50 MG/1
100 CAPSULE ORAL 2 TIMES DAILY
Status: DISCONTINUED | OUTPATIENT
Start: 2020-06-15 | End: 2020-06-16 | Stop reason: HOSPADM

## 2020-06-15 RX ORDER — CLONAZEPAM 0.5 MG/1
0.5 TABLET ORAL
COMMUNITY

## 2020-06-15 RX ORDER — HYDROCODONE BITARTRATE AND ACETAMINOPHEN 5; 325 MG/1; MG/1
1 TABLET ORAL ONCE
Status: COMPLETED | OUTPATIENT
Start: 2020-06-15 | End: 2020-06-15

## 2020-06-15 RX ORDER — ACETAMINOPHEN 325 MG/1
650 TABLET ORAL EVERY 4 HOURS PRN
Status: DISCONTINUED | OUTPATIENT
Start: 2020-06-15 | End: 2020-06-16 | Stop reason: HOSPADM

## 2020-06-15 RX ORDER — FLUTICASONE FUROATE AND VILANTEROL 100; 25 UG/1; UG/1
1 POWDER RESPIRATORY (INHALATION)
Status: DISCONTINUED | OUTPATIENT
Start: 2020-06-15 | End: 2020-06-16 | Stop reason: HOSPADM

## 2020-06-15 RX ORDER — ALBUTEROL SULFATE 90 UG/1
2 AEROSOL, METERED RESPIRATORY (INHALATION) EVERY 6 HOURS SCHEDULED
Status: DISCONTINUED | OUTPATIENT
Start: 2020-06-15 | End: 2020-06-16 | Stop reason: HOSPADM

## 2020-06-15 RX ORDER — POTASSIUM CHLORIDE 20 MEQ/1
40 TABLET, EXTENDED RELEASE ORAL ONCE
Status: COMPLETED | OUTPATIENT
Start: 2020-06-15 | End: 2020-06-15

## 2020-06-15 RX ORDER — DICYCLOMINE HYDROCHLORIDE 10 MG/1
10 CAPSULE ORAL
Status: DISCONTINUED | OUTPATIENT
Start: 2020-06-15 | End: 2020-06-16 | Stop reason: HOSPADM

## 2020-06-15 RX ORDER — CLONAZEPAM 0.5 MG/1
1 TABLET ORAL DAILY
Status: DISCONTINUED | OUTPATIENT
Start: 2020-06-16 | End: 2020-06-16 | Stop reason: HOSPADM

## 2020-06-15 RX ORDER — PREGABALIN 75 MG/1
150 CAPSULE ORAL
Status: DISCONTINUED | OUTPATIENT
Start: 2020-06-15 | End: 2020-06-16 | Stop reason: HOSPADM

## 2020-06-15 RX ORDER — TOPIRAMATE 25 MG/1
50 TABLET ORAL EVERY 12 HOURS SCHEDULED
Status: DISCONTINUED | OUTPATIENT
Start: 2020-06-15 | End: 2020-06-16 | Stop reason: HOSPADM

## 2020-06-15 RX ORDER — ONDANSETRON 2 MG/ML
4 INJECTION INTRAMUSCULAR; INTRAVENOUS EVERY 6 HOURS PRN
Status: DISCONTINUED | OUTPATIENT
Start: 2020-06-15 | End: 2020-06-16 | Stop reason: HOSPADM

## 2020-06-15 RX ORDER — PANTOPRAZOLE SODIUM 40 MG/1
40 TABLET, DELAYED RELEASE ORAL
Status: DISCONTINUED | OUTPATIENT
Start: 2020-06-16 | End: 2020-06-16 | Stop reason: HOSPADM

## 2020-06-15 RX ORDER — ASPIRIN 81 MG/1
81 TABLET, CHEWABLE ORAL DAILY
Status: DISCONTINUED | OUTPATIENT
Start: 2020-06-15 | End: 2020-06-16 | Stop reason: HOSPADM

## 2020-06-15 RX ORDER — DICYCLOMINE HYDROCHLORIDE 10 MG/1
10 CAPSULE ORAL
COMMUNITY
End: 2021-02-11

## 2020-06-15 RX ORDER — HYDROCODONE BITARTRATE AND ACETAMINOPHEN 5; 325 MG/1; MG/1
1 TABLET ORAL EVERY 6 HOURS PRN
Status: DISCONTINUED | OUTPATIENT
Start: 2020-06-15 | End: 2020-06-16 | Stop reason: HOSPADM

## 2020-06-15 RX ADMIN — PREGABALIN 100 MG: 50 CAPSULE ORAL at 17:28

## 2020-06-15 RX ADMIN — ASENAPINE MALEATE 5 MG: 10 TABLET SUBLINGUAL at 22:01

## 2020-06-15 RX ADMIN — POTASSIUM CHLORIDE 40 MEQ: 1500 TABLET, EXTENDED RELEASE ORAL at 17:28

## 2020-06-15 RX ADMIN — HYDROCODONE BITARTRATE AND ACETAMINOPHEN 1 TABLET: 5; 325 TABLET ORAL at 13:24

## 2020-06-15 RX ADMIN — CLONAZEPAM 0.5 MG: 0.5 TABLET ORAL at 22:03

## 2020-06-15 RX ADMIN — NICOTINE 14 MG: 14 PATCH TRANSDERMAL at 13:24

## 2020-06-15 RX ADMIN — DICYCLOMINE HYDROCHLORIDE 10 MG: 10 CAPSULE ORAL at 17:28

## 2020-06-15 RX ADMIN — HYDROCODONE BITARTRATE AND ACETAMINOPHEN 1 TABLET: 5; 325 TABLET ORAL at 23:45

## 2020-06-15 RX ADMIN — Medication 400 MG: at 17:28

## 2020-06-15 RX ADMIN — ALBUTEROL SULFATE 2 PUFF: 90 AEROSOL, METERED RESPIRATORY (INHALATION) at 23:46

## 2020-06-15 RX ADMIN — MELATONIN 9 MG: at 22:03

## 2020-06-15 RX ADMIN — ASPIRIN 81 MG 81 MG: 81 TABLET ORAL at 17:28

## 2020-06-15 RX ADMIN — HYDROCODONE BITARTRATE AND ACETAMINOPHEN 1 TABLET: 5; 325 TABLET ORAL at 17:44

## 2020-06-15 RX ADMIN — IOHEXOL 100 ML: 350 INJECTION, SOLUTION INTRAVENOUS at 12:08

## 2020-06-15 RX ADMIN — PREGABALIN 150 MG: 75 CAPSULE ORAL at 22:03

## 2020-06-15 RX ADMIN — TOPIRAMATE 50 MG: 25 TABLET, FILM COATED ORAL at 17:28

## 2020-06-15 RX ADMIN — ALBUTEROL SULFATE 2 PUFF: 90 AEROSOL, METERED RESPIRATORY (INHALATION) at 17:35

## 2020-06-15 RX ADMIN — LAMOTRIGINE 75 MG: 25 TABLET ORAL at 22:03

## 2020-06-15 RX ADMIN — FLUTICASONE FUROATE AND VILANTEROL TRIFENATATE 1 PUFF: 100; 25 POWDER RESPIRATORY (INHALATION) at 17:34

## 2020-06-15 RX ADMIN — ATORVASTATIN CALCIUM 80 MG: 40 TABLET, FILM COATED ORAL at 17:28

## 2020-06-16 ENCOUNTER — APPOINTMENT (OUTPATIENT)
Dept: MRI IMAGING | Facility: HOSPITAL | Age: 60
End: 2020-06-16
Payer: COMMERCIAL

## 2020-06-16 ENCOUNTER — APPOINTMENT (OUTPATIENT)
Dept: NON INVASIVE DIAGNOSTICS | Facility: HOSPITAL | Age: 60
End: 2020-06-16
Payer: COMMERCIAL

## 2020-06-16 VITALS
BODY MASS INDEX: 31.92 KG/M2 | WEIGHT: 186.95 LBS | HEART RATE: 82 BPM | SYSTOLIC BLOOD PRESSURE: 104 MMHG | TEMPERATURE: 98 F | OXYGEN SATURATION: 100 % | RESPIRATION RATE: 18 BRPM | HEIGHT: 64 IN | DIASTOLIC BLOOD PRESSURE: 68 MMHG

## 2020-06-16 LAB
ALBUMIN SERPL BCP-MCNC: 3.2 G/DL (ref 3.5–5)
ALP SERPL-CCNC: 69 U/L (ref 46–116)
ALT SERPL W P-5'-P-CCNC: 23 U/L (ref 12–78)
ANION GAP SERPL CALCULATED.3IONS-SCNC: 10 MMOL/L (ref 4–13)
AST SERPL W P-5'-P-CCNC: 16 U/L (ref 5–45)
ATRIAL RATE: 81 BPM
BASOPHILS # BLD AUTO: 0.06 THOUSANDS/ΜL (ref 0–0.1)
BASOPHILS NFR BLD AUTO: 1 % (ref 0–1)
BILIRUB SERPL-MCNC: 0.2 MG/DL (ref 0.2–1)
BUN SERPL-MCNC: 16 MG/DL (ref 5–25)
CALCIUM SERPL-MCNC: 8.3 MG/DL (ref 8.3–10.1)
CHLORIDE SERPL-SCNC: 108 MMOL/L (ref 100–108)
CHOLEST SERPL-MCNC: 216 MG/DL (ref 50–200)
CO2 SERPL-SCNC: 25 MMOL/L (ref 21–32)
CREAT SERPL-MCNC: 0.91 MG/DL (ref 0.6–1.3)
EOSINOPHIL # BLD AUTO: 0.11 THOUSAND/ΜL (ref 0–0.61)
EOSINOPHIL NFR BLD AUTO: 2 % (ref 0–6)
ERYTHROCYTE [DISTWIDTH] IN BLOOD BY AUTOMATED COUNT: 13 % (ref 11.6–15.1)
EST. AVERAGE GLUCOSE BLD GHB EST-MCNC: 126 MG/DL
GFR SERPL CREATININE-BSD FRML MDRD: 70 ML/MIN/1.73SQ M
GLUCOSE SERPL-MCNC: 115 MG/DL (ref 65–140)
HBA1C MFR BLD: 6 %
HCT VFR BLD AUTO: 42.7 % (ref 34.8–46.1)
HDLC SERPL-MCNC: 40 MG/DL
HGB BLD-MCNC: 13.1 G/DL (ref 11.5–15.4)
IMM GRANULOCYTES # BLD AUTO: 0.04 THOUSAND/UL (ref 0–0.2)
IMM GRANULOCYTES NFR BLD AUTO: 1 % (ref 0–2)
LDLC SERPL CALC-MCNC: 115 MG/DL (ref 0–100)
LYMPHOCYTES # BLD AUTO: 3.38 THOUSANDS/ΜL (ref 0.6–4.47)
LYMPHOCYTES NFR BLD AUTO: 49 % (ref 14–44)
MAGNESIUM SERPL-MCNC: 2.2 MG/DL (ref 1.6–2.6)
MCH RBC QN AUTO: 30.2 PG (ref 26.8–34.3)
MCHC RBC AUTO-ENTMCNC: 30.7 G/DL (ref 31.4–37.4)
MCV RBC AUTO: 98 FL (ref 82–98)
MONOCYTES # BLD AUTO: 0.4 THOUSAND/ΜL (ref 0.17–1.22)
MONOCYTES NFR BLD AUTO: 6 % (ref 4–12)
NEUTROPHILS # BLD AUTO: 2.73 THOUSANDS/ΜL (ref 1.85–7.62)
NEUTS SEG NFR BLD AUTO: 41 % (ref 43–75)
NRBC BLD AUTO-RTO: 0 /100 WBCS
P AXIS: 62 DEGREES
PHOSPHATE SERPL-MCNC: 4.8 MG/DL (ref 2.7–4.5)
PLATELET # BLD AUTO: 196 THOUSANDS/UL (ref 149–390)
PMV BLD AUTO: 10.7 FL (ref 8.9–12.7)
POTASSIUM SERPL-SCNC: 3.9 MMOL/L (ref 3.5–5.3)
PR INTERVAL: 160 MS
PROT SERPL-MCNC: 6.7 G/DL (ref 6.4–8.2)
QRS AXIS: 65 DEGREES
QRSD INTERVAL: 70 MS
QT INTERVAL: 390 MS
QTC INTERVAL: 453 MS
RBC # BLD AUTO: 4.34 MILLION/UL (ref 3.81–5.12)
SODIUM SERPL-SCNC: 143 MMOL/L (ref 136–145)
T WAVE AXIS: 54 DEGREES
TRIGL SERPL-MCNC: 307 MG/DL
TROPONIN I SERPL-MCNC: <0.02 NG/ML
VENTRICULAR RATE: 81 BPM
WBC # BLD AUTO: 6.72 THOUSAND/UL (ref 4.31–10.16)

## 2020-06-16 PROCEDURE — 80053 COMPREHEN METABOLIC PANEL: CPT | Performed by: PHYSICIAN ASSISTANT

## 2020-06-16 PROCEDURE — 85025 COMPLETE CBC W/AUTO DIFF WBC: CPT | Performed by: PHYSICIAN ASSISTANT

## 2020-06-16 PROCEDURE — 93010 ELECTROCARDIOGRAM REPORT: CPT | Performed by: INTERNAL MEDICINE

## 2020-06-16 PROCEDURE — 83735 ASSAY OF MAGNESIUM: CPT | Performed by: PHYSICIAN ASSISTANT

## 2020-06-16 PROCEDURE — 84100 ASSAY OF PHOSPHORUS: CPT | Performed by: PHYSICIAN ASSISTANT

## 2020-06-16 PROCEDURE — 92610 EVALUATE SWALLOWING FUNCTION: CPT

## 2020-06-16 PROCEDURE — 97163 PT EVAL HIGH COMPLEX 45 MIN: CPT

## 2020-06-16 PROCEDURE — 93306 TTE W/DOPPLER COMPLETE: CPT

## 2020-06-16 PROCEDURE — 80061 LIPID PANEL: CPT | Performed by: PHYSICIAN ASSISTANT

## 2020-06-16 PROCEDURE — 70551 MRI BRAIN STEM W/O DYE: CPT

## 2020-06-16 PROCEDURE — 97167 OT EVAL HIGH COMPLEX 60 MIN: CPT

## 2020-06-16 PROCEDURE — 99217 PR OBSERVATION CARE DISCHARGE MANAGEMENT: CPT | Performed by: PHYSICIAN ASSISTANT

## 2020-06-16 PROCEDURE — 83036 HEMOGLOBIN GLYCOSYLATED A1C: CPT | Performed by: PHYSICIAN ASSISTANT

## 2020-06-16 PROCEDURE — 93306 TTE W/DOPPLER COMPLETE: CPT | Performed by: INTERNAL MEDICINE

## 2020-06-16 PROCEDURE — 84484 ASSAY OF TROPONIN QUANT: CPT | Performed by: PHYSICIAN ASSISTANT

## 2020-06-16 RX ORDER — NICOTINE 21 MG/24HR
21 PATCH, TRANSDERMAL 24 HOURS TRANSDERMAL DAILY
Status: DISCONTINUED | OUTPATIENT
Start: 2020-06-16 | End: 2020-06-16 | Stop reason: HOSPADM

## 2020-06-16 RX ADMIN — FLUTICASONE FUROATE AND VILANTEROL TRIFENATATE 1 PUFF: 100; 25 POWDER RESPIRATORY (INHALATION) at 08:46

## 2020-06-16 RX ADMIN — PREGABALIN 100 MG: 50 CAPSULE ORAL at 08:45

## 2020-06-16 RX ADMIN — NICOTINE 21 MG: 21 PATCH TRANSDERMAL at 09:01

## 2020-06-16 RX ADMIN — ASPIRIN 81 MG 81 MG: 81 TABLET ORAL at 08:45

## 2020-06-16 RX ADMIN — PANTOPRAZOLE SODIUM 40 MG: 40 TABLET, DELAYED RELEASE ORAL at 05:28

## 2020-06-16 RX ADMIN — Medication 400 MG: at 08:45

## 2020-06-16 RX ADMIN — TOPIRAMATE 50 MG: 25 TABLET, FILM COATED ORAL at 08:45

## 2020-06-16 RX ADMIN — DICYCLOMINE HYDROCHLORIDE 10 MG: 10 CAPSULE ORAL at 11:57

## 2020-06-16 RX ADMIN — DICYCLOMINE HYDROCHLORIDE 10 MG: 10 CAPSULE ORAL at 08:46

## 2020-06-16 RX ADMIN — ALBUTEROL SULFATE 2 PUFF: 90 AEROSOL, METERED RESPIRATORY (INHALATION) at 11:58

## 2020-06-16 RX ADMIN — FENOFIBRATE 48 MG: 48 TABLET ORAL at 08:46

## 2020-06-16 RX ADMIN — ALBUTEROL SULFATE 2 PUFF: 90 AEROSOL, METERED RESPIRATORY (INHALATION) at 05:28

## 2020-06-16 RX ADMIN — LAMOTRIGINE 100 MG: 100 TABLET ORAL at 08:45

## 2020-06-16 RX ADMIN — CLONAZEPAM 1 MG: 0.5 TABLET ORAL at 08:45

## 2020-06-16 RX ADMIN — LEVOTHYROXINE SODIUM 137 MCG: 112 TABLET ORAL at 05:28

## 2020-06-16 RX ADMIN — HYDROCODONE BITARTRATE AND ACETAMINOPHEN 1 TABLET: 5; 325 TABLET ORAL at 07:23

## 2020-07-23 ENCOUNTER — TRANSCRIBE ORDERS (OUTPATIENT)
Dept: ADMINISTRATIVE | Facility: HOSPITAL | Age: 60
End: 2020-07-23

## 2020-07-23 DIAGNOSIS — R60.0 LOCALIZED EDEMA: Primary | ICD-10-CM

## 2020-08-24 ENCOUNTER — HOSPITAL ENCOUNTER (OUTPATIENT)
Dept: NON INVASIVE DIAGNOSTICS | Facility: HOSPITAL | Age: 60
Discharge: HOME | End: 2020-08-24
Attending: PODIATRIST
Payer: COMMERCIAL

## 2020-08-24 DIAGNOSIS — R60.0 LOCALIZED EDEMA: ICD-10-CM

## 2020-08-24 PROCEDURE — 93970 EXTREMITY STUDY: CPT

## 2020-08-24 PROCEDURE — 93970 EXTREMITY STUDY: CPT | Performed by: SURGERY

## 2020-10-16 ENCOUNTER — APPOINTMENT (EMERGENCY)
Dept: RADIOLOGY | Facility: HOSPITAL | Age: 60
End: 2020-10-16
Payer: COMMERCIAL

## 2020-10-16 ENCOUNTER — HOSPITAL ENCOUNTER (EMERGENCY)
Facility: HOSPITAL | Age: 60
Discharge: HOME | End: 2020-10-16
Attending: EMERGENCY MEDICINE | Admitting: EMERGENCY MEDICINE
Payer: COMMERCIAL

## 2020-10-16 ENCOUNTER — APPOINTMENT (EMERGENCY)
Dept: CT IMAGING | Facility: HOSPITAL | Age: 60
End: 2020-10-16
Payer: COMMERCIAL

## 2020-10-16 VITALS
TEMPERATURE: 98.1 F | HEART RATE: 78 BPM | DIASTOLIC BLOOD PRESSURE: 71 MMHG | OXYGEN SATURATION: 96 % | BODY MASS INDEX: 31.8 KG/M2 | WEIGHT: 186.29 LBS | SYSTOLIC BLOOD PRESSURE: 116 MMHG | HEIGHT: 64 IN | RESPIRATION RATE: 18 BRPM

## 2020-10-16 DIAGNOSIS — N39.0 URINARY TRACT INFECTION: ICD-10-CM

## 2020-10-16 DIAGNOSIS — R11.0 NAUSEA: ICD-10-CM

## 2020-10-16 DIAGNOSIS — R06.02 SHORTNESS OF BREATH: Primary | ICD-10-CM

## 2020-10-16 DIAGNOSIS — M79.10 MYALGIA: ICD-10-CM

## 2020-10-16 LAB
ALBUMIN SERPL BCP-MCNC: 3.3 G/DL (ref 3.5–5)
ALP SERPL-CCNC: 64 U/L (ref 46–116)
ALT SERPL W P-5'-P-CCNC: 25 U/L (ref 12–78)
ANION GAP SERPL CALCULATED.3IONS-SCNC: 10 MMOL/L (ref 4–13)
AST SERPL W P-5'-P-CCNC: 22 U/L (ref 5–45)
BACTERIA UR QL AUTO: ABNORMAL /HPF
BASOPHILS # BLD AUTO: 0.04 THOUSANDS/ΜL (ref 0–0.1)
BASOPHILS NFR BLD AUTO: 1 % (ref 0–1)
BILIRUB SERPL-MCNC: 0.2 MG/DL (ref 0.2–1)
BILIRUB UR QL STRIP: NEGATIVE
BUN SERPL-MCNC: 10 MG/DL (ref 5–25)
CALCIUM ALBUM COR SERPL-MCNC: 9.4 MG/DL (ref 8.3–10.1)
CALCIUM SERPL-MCNC: 8.8 MG/DL (ref 8.3–10.1)
CHLORIDE SERPL-SCNC: 107 MMOL/L (ref 100–108)
CLARITY UR: ABNORMAL
CO2 SERPL-SCNC: 25 MMOL/L (ref 21–32)
COLOR UR: YELLOW
CREAT SERPL-MCNC: 0.96 MG/DL (ref 0.6–1.3)
EOSINOPHIL # BLD AUTO: 0.09 THOUSAND/ΜL (ref 0–0.61)
EOSINOPHIL NFR BLD AUTO: 2 % (ref 0–6)
ERYTHROCYTE [DISTWIDTH] IN BLOOD BY AUTOMATED COUNT: 13.5 % (ref 11.6–15.1)
FLUAV RNA NPH QL NAA+PROBE: NORMAL
FLUBV RNA NPH QL NAA+PROBE: NORMAL
GFR SERPL CREATININE-BSD FRML MDRD: 65 ML/MIN/1.73SQ M
GLUCOSE SERPL-MCNC: 116 MG/DL (ref 65–140)
GLUCOSE UR STRIP-MCNC: NEGATIVE MG/DL
HCT VFR BLD AUTO: 39.1 % (ref 34.8–46.1)
HGB BLD-MCNC: 12.4 G/DL (ref 11.5–15.4)
HGB UR QL STRIP.AUTO: NEGATIVE
IMM GRANULOCYTES # BLD AUTO: 0.02 THOUSAND/UL (ref 0–0.2)
IMM GRANULOCYTES NFR BLD AUTO: 0 % (ref 0–2)
KETONES UR STRIP-MCNC: NEGATIVE MG/DL
LEUKOCYTE ESTERASE UR QL STRIP: ABNORMAL
LIPASE SERPL-CCNC: 91 U/L (ref 73–393)
LYMPHOCYTES # BLD AUTO: 2.72 THOUSANDS/ΜL (ref 0.6–4.47)
LYMPHOCYTES NFR BLD AUTO: 46 % (ref 14–44)
MCH RBC QN AUTO: 29.5 PG (ref 26.8–34.3)
MCHC RBC AUTO-ENTMCNC: 31.7 G/DL (ref 31.4–37.4)
MCV RBC AUTO: 93 FL (ref 82–98)
MONOCYTES # BLD AUTO: 0.36 THOUSAND/ΜL (ref 0.17–1.22)
MONOCYTES NFR BLD AUTO: 6 % (ref 4–12)
NEUTROPHILS # BLD AUTO: 2.58 THOUSANDS/ΜL (ref 1.85–7.62)
NEUTS SEG NFR BLD AUTO: 45 % (ref 43–75)
NITRITE UR QL STRIP: POSITIVE
NON-SQ EPI CELLS URNS QL MICRO: ABNORMAL /HPF
NRBC BLD AUTO-RTO: 0 /100 WBCS
PH UR STRIP.AUTO: 6.5 [PH]
PLATELET # BLD AUTO: 179 THOUSANDS/UL (ref 149–390)
PMV BLD AUTO: 10.3 FL (ref 8.9–12.7)
POTASSIUM SERPL-SCNC: 3.9 MMOL/L (ref 3.5–5.3)
PROT SERPL-MCNC: 6.7 G/DL (ref 6.4–8.2)
PROT UR STRIP-MCNC: NEGATIVE MG/DL
RBC # BLD AUTO: 4.2 MILLION/UL (ref 3.81–5.12)
RBC #/AREA URNS AUTO: ABNORMAL /HPF
RSV RNA NPH QL NAA+PROBE: NORMAL
SARS-COV-2 RNA RESP QL NAA+PROBE: NEGATIVE
SODIUM SERPL-SCNC: 142 MMOL/L (ref 136–145)
SP GR UR STRIP.AUTO: 1.01 (ref 1–1.03)
UROBILINOGEN UR QL STRIP.AUTO: 0.2 E.U./DL
WBC # BLD AUTO: 5.81 THOUSAND/UL (ref 4.31–10.16)
WBC #/AREA URNS AUTO: ABNORMAL /HPF

## 2020-10-16 PROCEDURE — 36415 COLL VENOUS BLD VENIPUNCTURE: CPT | Performed by: EMERGENCY MEDICINE

## 2020-10-16 PROCEDURE — 85025 COMPLETE CBC W/AUTO DIFF WBC: CPT | Performed by: EMERGENCY MEDICINE

## 2020-10-16 PROCEDURE — 87631 RESP VIRUS 3-5 TARGETS: CPT | Performed by: EMERGENCY MEDICINE

## 2020-10-16 PROCEDURE — 80053 COMPREHEN METABOLIC PANEL: CPT | Performed by: EMERGENCY MEDICINE

## 2020-10-16 PROCEDURE — G1004 CDSM NDSC: HCPCS

## 2020-10-16 PROCEDURE — 96361 HYDRATE IV INFUSION ADD-ON: CPT

## 2020-10-16 PROCEDURE — 71045 X-RAY EXAM CHEST 1 VIEW: CPT

## 2020-10-16 PROCEDURE — 96374 THER/PROPH/DIAG INJ IV PUSH: CPT

## 2020-10-16 PROCEDURE — 87635 SARS-COV-2 COVID-19 AMP PRB: CPT | Performed by: EMERGENCY MEDICINE

## 2020-10-16 PROCEDURE — 74176 CT ABD & PELVIS W/O CONTRAST: CPT

## 2020-10-16 PROCEDURE — 83690 ASSAY OF LIPASE: CPT | Performed by: EMERGENCY MEDICINE

## 2020-10-16 PROCEDURE — 81001 URINALYSIS AUTO W/SCOPE: CPT | Performed by: EMERGENCY MEDICINE

## 2020-10-16 PROCEDURE — 99284 EMERGENCY DEPT VISIT MOD MDM: CPT | Performed by: EMERGENCY MEDICINE

## 2020-10-16 PROCEDURE — 96375 TX/PRO/DX INJ NEW DRUG ADDON: CPT

## 2020-10-16 PROCEDURE — 99284 EMERGENCY DEPT VISIT MOD MDM: CPT

## 2020-10-16 RX ORDER — CEPHALEXIN 500 MG/1
500 CAPSULE ORAL EVERY 12 HOURS SCHEDULED
Qty: 10 CAPSULE | Refills: 0 | Status: SHIPPED | OUTPATIENT
Start: 2020-10-16 | End: 2020-10-21

## 2020-10-16 RX ORDER — KETOROLAC TROMETHAMINE 30 MG/ML
15 INJECTION, SOLUTION INTRAMUSCULAR; INTRAVENOUS ONCE
Status: COMPLETED | OUTPATIENT
Start: 2020-10-16 | End: 2020-10-16

## 2020-10-16 RX ORDER — GUAIFENESIN 600 MG
1200 TABLET, EXTENDED RELEASE 12 HR ORAL EVERY 12 HOURS SCHEDULED
Qty: 28 TABLET | Refills: 0 | Status: SHIPPED | OUTPATIENT
Start: 2020-10-16 | End: 2020-10-23

## 2020-10-16 RX ORDER — ONDANSETRON 2 MG/ML
4 INJECTION INTRAMUSCULAR; INTRAVENOUS ONCE
Status: DISCONTINUED | OUTPATIENT
Start: 2020-10-16 | End: 2020-10-16

## 2020-10-16 RX ORDER — CEPHALEXIN 250 MG/1
500 CAPSULE ORAL ONCE
Status: COMPLETED | OUTPATIENT
Start: 2020-10-16 | End: 2020-10-16

## 2020-10-16 RX ORDER — ONDANSETRON 2 MG/ML
4 INJECTION INTRAMUSCULAR; INTRAVENOUS ONCE
Status: COMPLETED | OUTPATIENT
Start: 2020-10-16 | End: 2020-10-16

## 2020-10-16 RX ORDER — ONDANSETRON 4 MG/1
4 TABLET, ORALLY DISINTEGRATING ORAL ONCE
Status: COMPLETED | OUTPATIENT
Start: 2020-10-16 | End: 2020-10-16

## 2020-10-16 RX ORDER — ONDANSETRON 4 MG/1
4 TABLET, ORALLY DISINTEGRATING ORAL EVERY 8 HOURS PRN
Qty: 12 TABLET | Refills: 0 | Status: ON HOLD | OUTPATIENT
Start: 2020-10-16 | End: 2021-01-27

## 2020-10-16 RX ORDER — IBUPROFEN 800 MG/1
800 TABLET ORAL EVERY 8 HOURS PRN
Qty: 15 TABLET | Refills: 0 | Status: SHIPPED | OUTPATIENT
Start: 2020-10-16 | End: 2021-01-27 | Stop reason: HOSPADM

## 2020-10-16 RX ADMIN — CEPHALEXIN 500 MG: 250 CAPSULE ORAL at 16:48

## 2020-10-16 RX ADMIN — ONDANSETRON 4 MG: 4 TABLET, ORALLY DISINTEGRATING ORAL at 17:38

## 2020-10-16 RX ADMIN — KETOROLAC TROMETHAMINE 15 MG: 30 INJECTION, SOLUTION INTRAMUSCULAR at 14:54

## 2020-10-16 RX ADMIN — ONDANSETRON 4 MG: 2 INJECTION INTRAMUSCULAR; INTRAVENOUS at 14:53

## 2020-10-16 RX ADMIN — SODIUM CHLORIDE 1000 ML: 0.9 INJECTION, SOLUTION INTRAVENOUS at 14:52

## 2020-11-06 ENCOUNTER — TELEPHONE (OUTPATIENT)
Dept: NEUROLOGY | Facility: CLINIC | Age: 60
End: 2020-11-06

## 2021-01-10 ENCOUNTER — HOSPITAL ENCOUNTER (EMERGENCY)
Facility: HOSPITAL | Age: 61
Discharge: HOME | End: 2021-01-10
Attending: EMERGENCY MEDICINE
Payer: COMMERCIAL

## 2021-01-10 ENCOUNTER — APPOINTMENT (EMERGENCY)
Dept: CT IMAGING | Facility: HOSPITAL | Age: 61
End: 2021-01-10
Payer: COMMERCIAL

## 2021-01-10 VITALS
WEIGHT: 191.14 LBS | TEMPERATURE: 99.5 F | RESPIRATION RATE: 20 BRPM | DIASTOLIC BLOOD PRESSURE: 70 MMHG | BODY MASS INDEX: 32.81 KG/M2 | OXYGEN SATURATION: 94 % | HEART RATE: 81 BPM | SYSTOLIC BLOOD PRESSURE: 121 MMHG

## 2021-01-10 DIAGNOSIS — N39.0 UTI (URINARY TRACT INFECTION): Primary | ICD-10-CM

## 2021-01-10 LAB
ALBUMIN SERPL BCP-MCNC: 3.7 G/DL (ref 3.5–5)
ALP SERPL-CCNC: 86 U/L (ref 46–116)
ALT SERPL W P-5'-P-CCNC: 30 U/L (ref 12–78)
ANION GAP SERPL CALCULATED.3IONS-SCNC: 7 MMOL/L (ref 4–13)
AST SERPL W P-5'-P-CCNC: 25 U/L (ref 5–45)
BACTERIA UR QL AUTO: ABNORMAL /HPF
BASOPHILS # BLD AUTO: 0.05 THOUSANDS/ΜL (ref 0–0.1)
BASOPHILS NFR BLD AUTO: 1 % (ref 0–1)
BILIRUB SERPL-MCNC: 0.3 MG/DL (ref 0.2–1)
BILIRUB UR QL STRIP: NEGATIVE
BUN SERPL-MCNC: 9 MG/DL (ref 5–25)
CALCIUM SERPL-MCNC: 8.7 MG/DL (ref 8.3–10.1)
CHLORIDE SERPL-SCNC: 106 MMOL/L (ref 100–108)
CLARITY UR: ABNORMAL
CO2 SERPL-SCNC: 29 MMOL/L (ref 21–32)
COLOR UR: ABNORMAL
CREAT SERPL-MCNC: 0.98 MG/DL (ref 0.6–1.3)
D DIMER PPP FEU-MCNC: 0.66 UG/ML FEU
EOSINOPHIL # BLD AUTO: 0.09 THOUSAND/ΜL (ref 0–0.61)
EOSINOPHIL NFR BLD AUTO: 1 % (ref 0–6)
ERYTHROCYTE [DISTWIDTH] IN BLOOD BY AUTOMATED COUNT: 13.6 % (ref 11.6–15.1)
FLUAV RNA RESP QL NAA+PROBE: NEGATIVE
FLUBV RNA RESP QL NAA+PROBE: NEGATIVE
GFR SERPL CREATININE-BSD FRML MDRD: 63 ML/MIN/1.73SQ M
GLUCOSE SERPL-MCNC: 110 MG/DL (ref 65–140)
GLUCOSE UR STRIP-MCNC: NEGATIVE MG/DL
HCT VFR BLD AUTO: 38.5 % (ref 34.8–46.1)
HGB BLD-MCNC: 12.3 G/DL (ref 11.5–15.4)
HGB UR QL STRIP.AUTO: NEGATIVE
IMM GRANULOCYTES # BLD AUTO: 0.03 THOUSAND/UL (ref 0–0.2)
IMM GRANULOCYTES NFR BLD AUTO: 0 % (ref 0–2)
KETONES UR STRIP-MCNC: NEGATIVE MG/DL
LEUKOCYTE ESTERASE UR QL STRIP: ABNORMAL
LIPASE SERPL-CCNC: 49 U/L (ref 73–393)
LYMPHOCYTES # BLD AUTO: 3.22 THOUSANDS/ΜL (ref 0.6–4.47)
LYMPHOCYTES NFR BLD AUTO: 43 % (ref 14–44)
MCH RBC QN AUTO: 30.5 PG (ref 26.8–34.3)
MCHC RBC AUTO-ENTMCNC: 31.9 G/DL (ref 31.4–37.4)
MCV RBC AUTO: 96 FL (ref 82–98)
MONOCYTES # BLD AUTO: 0.4 THOUSAND/ΜL (ref 0.17–1.22)
MONOCYTES NFR BLD AUTO: 5 % (ref 4–12)
NEUTROPHILS # BLD AUTO: 3.73 THOUSANDS/ΜL (ref 1.85–7.62)
NEUTS SEG NFR BLD AUTO: 50 % (ref 43–75)
NITRITE UR QL STRIP: NEGATIVE
NON-SQ EPI CELLS URNS QL MICRO: ABNORMAL /HPF
NRBC BLD AUTO-RTO: 0 /100 WBCS
PH UR STRIP.AUTO: 7.5 [PH]
PLATELET # BLD AUTO: 193 THOUSANDS/UL (ref 149–390)
PMV BLD AUTO: 9.9 FL (ref 8.9–12.7)
POTASSIUM SERPL-SCNC: 3.6 MMOL/L (ref 3.5–5.3)
PROT SERPL-MCNC: 7.4 G/DL (ref 6.4–8.2)
PROT UR STRIP-MCNC: NEGATIVE MG/DL
RBC # BLD AUTO: 4.03 MILLION/UL (ref 3.81–5.12)
RBC #/AREA URNS AUTO: ABNORMAL /HPF
RSV RNA RESP QL NAA+PROBE: NEGATIVE
SARS-COV-2 RNA RESP QL NAA+PROBE: NEGATIVE
SODIUM SERPL-SCNC: 142 MMOL/L (ref 136–145)
SP GR UR STRIP.AUTO: 1.01 (ref 1–1.03)
TROPONIN I SERPL-MCNC: <0.02 NG/ML
UROBILINOGEN UR QL STRIP.AUTO: 0.2 E.U./DL
WBC # BLD AUTO: 7.52 THOUSAND/UL (ref 4.31–10.16)
WBC #/AREA URNS AUTO: ABNORMAL /HPF

## 2021-01-10 PROCEDURE — 0241U HB NFCT DS VIR RESP RNA 4 TRGT: CPT | Performed by: EMERGENCY MEDICINE

## 2021-01-10 PROCEDURE — 84484 ASSAY OF TROPONIN QUANT: CPT | Performed by: EMERGENCY MEDICINE

## 2021-01-10 PROCEDURE — 85025 COMPLETE CBC W/AUTO DIFF WBC: CPT | Performed by: EMERGENCY MEDICINE

## 2021-01-10 PROCEDURE — 80053 COMPREHEN METABOLIC PANEL: CPT | Performed by: EMERGENCY MEDICINE

## 2021-01-10 PROCEDURE — 99284 EMERGENCY DEPT VISIT MOD MDM: CPT | Performed by: EMERGENCY MEDICINE

## 2021-01-10 PROCEDURE — 71275 CT ANGIOGRAPHY CHEST: CPT

## 2021-01-10 PROCEDURE — 96361 HYDRATE IV INFUSION ADD-ON: CPT

## 2021-01-10 PROCEDURE — 74177 CT ABD & PELVIS W/CONTRAST: CPT

## 2021-01-10 PROCEDURE — G1004 CDSM NDSC: HCPCS

## 2021-01-10 PROCEDURE — 96374 THER/PROPH/DIAG INJ IV PUSH: CPT

## 2021-01-10 PROCEDURE — 85379 FIBRIN DEGRADATION QUANT: CPT | Performed by: EMERGENCY MEDICINE

## 2021-01-10 PROCEDURE — 81001 URINALYSIS AUTO W/SCOPE: CPT | Performed by: EMERGENCY MEDICINE

## 2021-01-10 PROCEDURE — 36415 COLL VENOUS BLD VENIPUNCTURE: CPT | Performed by: EMERGENCY MEDICINE

## 2021-01-10 PROCEDURE — 83690 ASSAY OF LIPASE: CPT | Performed by: EMERGENCY MEDICINE

## 2021-01-10 PROCEDURE — 99285 EMERGENCY DEPT VISIT HI MDM: CPT

## 2021-01-10 PROCEDURE — 93005 ELECTROCARDIOGRAM TRACING: CPT

## 2021-01-10 RX ORDER — CEPHALEXIN 250 MG/1
500 CAPSULE ORAL ONCE
Status: COMPLETED | OUTPATIENT
Start: 2021-01-10 | End: 2021-01-10

## 2021-01-10 RX ORDER — CEPHALEXIN 500 MG/1
500 CAPSULE ORAL EVERY 12 HOURS SCHEDULED
Qty: 20 CAPSULE | Refills: 0 | Status: SHIPPED | OUTPATIENT
Start: 2021-01-10 | End: 2021-01-20

## 2021-01-10 RX ORDER — BUPRENORPHINE HYDROCHLORIDE 300 UG/1
300 FILM, SOLUBLE BUCCAL 2 TIMES DAILY
COMMUNITY

## 2021-01-10 RX ORDER — FUROSEMIDE 20 MG/1
20 TABLET ORAL EVERY OTHER DAY
COMMUNITY

## 2021-01-10 RX ORDER — ONDANSETRON 2 MG/ML
4 INJECTION INTRAMUSCULAR; INTRAVENOUS ONCE
Status: COMPLETED | OUTPATIENT
Start: 2021-01-10 | End: 2021-01-10

## 2021-01-10 RX ORDER — ACETAMINOPHEN 325 MG/1
650 TABLET ORAL ONCE
Status: COMPLETED | OUTPATIENT
Start: 2021-01-10 | End: 2021-01-10

## 2021-01-10 RX ADMIN — ACETAMINOPHEN 650 MG: 325 TABLET, FILM COATED ORAL at 12:05

## 2021-01-10 RX ADMIN — IOHEXOL 100 ML: 350 INJECTION, SOLUTION INTRAVENOUS at 11:59

## 2021-01-10 RX ADMIN — ONDANSETRON 4 MG: 2 INJECTION INTRAMUSCULAR; INTRAVENOUS at 11:23

## 2021-01-10 RX ADMIN — CEPHALEXIN 500 MG: 250 CAPSULE ORAL at 13:27

## 2021-01-10 RX ADMIN — SODIUM CHLORIDE 1000 ML: 0.9 INJECTION, SOLUTION INTRAVENOUS at 11:10

## 2021-01-10 NOTE — ED PROVIDER NOTES
History  Chief Complaint   Patient presents with    Weakness - Generalized     Pt  reports generalized weakness, right sided flank pain, right sided abdominal pain, nausea, couth, and headache since yesterday morning  Pt  reports being worried about COVID     HPI    Prior to Admission Medications   Prescriptions Last Dose Informant Patient Reported? Taking? Asenapine Maleate (SAPHRIS SL)   Yes No   Sig: Place 5 mg under the tongue   HYDROcodone-acetaminophen (NORCO)  mg per tablet   Yes No   Si tab every 6 hours as needed for severe pain   Do not fill before 18   Melatonin 10 MG CAPS   Yes No   Sig: Take 1 tablet by mouth   SUMAtriptan (IMITREX) 100 mg tablet   Yes No   Sig: TAKE 1 TABLET BY MOUTH AT START OF MIGRAINE HEADACHE  MAY REPEAT IN 2 HOURS IF NEEDED   LIMIT TO NO MORE THAN 2 TABLETS IN 24 HOURS, THREE TI   albuterol (PROVENTIL HFA,VENTOLIN HFA) 90 mcg/act inhaler   Yes No   Sig: Inhale 2 puffs every 6 (six) hours   atorvastatin (LIPITOR) 40 mg tablet   Yes No   Sig: Take 40 mg by mouth   busPIRone (BUSPAR) 15 mg tablet   Yes No   Sig: Take 15 mg by mouth   carboxymethylcellulose 0 5 % SOLN   No No   Sig: Administer 1 drop to both eyes daily as needed for dry eyes   clonazePAM (KlonoPIN) 0 5 mg tablet   Yes No   Sig: Take 0 5 mg by mouth daily at bedtime   clonazePAM (KlonoPIN) 1 mg tablet   Yes No   Sig: Take 1 mg by mouth daily    cyclobenzaprine (FLEXERIL) 10 mg tablet   Yes No   Sig: TAKE 1 TABLET BY MOUTH 3 TIMES A DAY AS NEEDED FOR MUSCLE SPASMS   dicyclomine (BENTYL) 10 mg capsule   Yes No   Sig: Take 10 mg by mouth 3 (three) times a day before meals   fenofibrate (TRICOR) 48 mg tablet   Yes No   Sig: Take 48 mg by mouth daily   ibuprofen (MOTRIN) 800 mg tablet   No No   Sig: Take 1 tablet (800 mg total) by mouth every 8 (eight) hours as needed for mild pain or fever (Body aches, fevers) for up to 5 days   lamoTRIgine (LaMICtal) 200 MG tablet   Yes No   Sig: Take 100 mg by mouth lamoTRIgine (LaMICtal) 25 mg tablet   Yes No   Sig: Take 75 mg by mouth daily at bedtime   levothyroxine 137 mcg tablet   Yes No   Sig: Take 137 mcg by mouth daily   magnesium 30 MG tablet   Yes No   Sig: Take 30 mg by mouth 2 (two) times a day   omeprazole (PriLOSEC) 20 mg delayed release capsule   Yes No   Si mg    ondansetron (ZOFRAN-ODT) 4 mg disintegrating tablet   No No   Sig: Take 1 tablet (4 mg total) by mouth every 8 (eight) hours as needed for nausea or vomiting for up to 4 days   pregabalin (LYRICA) 100 mg capsule   Yes No   Sig: take 1 capsule by mouth three times a day   pregabalin (LYRICA) 150 mg capsule   Yes No   Sig: take 1 capsule by mouth NIGHTLY   topiramate (TOPAMAX) 50 MG tablet   Yes No   Sig: Take 50 mg by mouth every 12 (twelve) hours      Facility-Administered Medications: None       Past Medical History:   Diagnosis Date    Anxiety     Asthma     Bipolar 1 disorder ([facility] )     Brain aneurysm     Chronic pain disorder     spinal stenosis    Concussion syndrome     neurological rx and balance rx    COPD (chronic obstructive pulmonary disease) (HCC)     Depression     Disease of thyroid gland     nodules     Family history of colon cancer     father    Fibromyalgia, primary     History of colon polyps     Hyperlipidemia     Hypertension     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     Migraine     Neuropathy     bilateral feet and hands    Peripheral neuropathy     PTSD (post-traumatic stress disorder)     Stroke ([facility] )      no deficeits    Thyroid Cancer     TIA (transient ischemic attack)        Past Surgical History:   Procedure Laterality Date    ABDOMINAL SURGERY      laproscopic/ endometriosis    BRAIN SURGERY      aneurysm/ coiling procedure    CARPAL TUNNEL RELEASE      CHOLECYSTECTOMY      DENTAL SURGERY      EYE SURGERY      cataract    HYSTERECTOMY      [place] ESOPHAGOGASTRODUODENOSCOPY TRANSORAL DIAGNOSTIC N/A 2018    Procedure: EGD AND COLONOSCOPY;  Surgeon: Alphonso Aldana MD;  Location: [facility]; Service: Gastroenterology    THYROID SURGERY         History reviewed  No pertinent family history  I have reviewed and agree with the history as documented      E-Cigarette/Vaping    E-Cigarette Use Never User      E-Cigarette/Vaping Substances     Social History     Tobacco Use    Smoking status: Current Every Day Smoker     Packs/day: 2 00     Types: Cigarettes    Smokeless tobacco: Never Used   Substance Use Topics    Alcohol use: Never     Alcohol/week: 0 0 standard drinks     Frequency: Patient refused     Drinks per session: Patient refused     Binge frequency: Never    Drug use: Never       Review of Systems    Physical Exam  Physical Exam    Vital Signs  ED Triage Vitals [01/10/21 1101]   Temperature Pulse Respirations Blood Pressure SpO2   99 5 °F (37 5 °C) 87 18 127/77 98 %      Temp Source Heart Rate Source Patient Position - Orthostatic VS BP Location FiO2 (%)   Oral Monitor Lying Left arm --      Pain Score       --           Vitals:    01/10/21 1101   BP: 127/77   Pulse: 87   Patient Position - Orthostatic VS: Lying         Visual Acuity      ED Medications  Medications   sodium chloride 0 9 % bolus 1,000 mL (1,000 mL Intravenous New Bag 1/10/21 1110)   ondansetron (ZOFRAN) injection 4 mg (has no administration in time range)       Diagnostic Studies  Results Reviewed     Procedure Component Value Units Date/Time    CBC and differential [[id number]] Collected: 01/10/21 1110    Lab Status: Final result Specimen: Blood from Arm, Right Updated: 01/10/21 1115     WBC 7 52 Thousand/uL      RBC 4 03 Million/uL      Hemoglobin 12 3 g/dL      Hematocrit 38 5 %      MCV 96 fL      MCH 30 5 pg      MCHC 31 9 g/dL      RDW 13 6 %      MPV 9 9 fL      Platelets 794 Thousands/uL      nRBC 0 /100 WBCs      Neutrophils Relative 50 %      Immat GRANS % 0 %      Lymphocytes Relative 43 %      Monocytes Relative 5 %      Eosinophils Relative 1 %      Basophils Relative 1 %      Neutrophils Absolute 3 73 Thousands/µL      Immature Grans Absolute 0 03 Thousand/uL      Lymphocytes Absolute 3 22 Thousands/µL      Monocytes Absolute 0 40 Thousand/µL      Eosinophils Absolute 0 09 Thousand/µL      Basophils Absolute 0 05 Thousands/µL     Troponin I [[id number]] Collected: 01/10/21 1110    Lab Status: In process Specimen: Blood from Arm, Right Updated: 01/10/21 1113    D-Dimer [[id number]] Collected: 01/10/21 1110    Lab Status: In process Specimen: Blood from Arm, Right Updated: 01/10/21 1113    Comprehensive metabolic panel [[id number]] Collected: 01/10/21 1110    Lab Status: In process Specimen: Blood from Arm, Right Updated: 01/10/21 1113    Lipase [[id number]] Collected: 01/10/21 1110    Lab Status:  In process Specimen: Blood from Arm, Right Updated: 01/10/21 1113    UA w Reflex to Microscopic w Reflex to Culture [[id number]]     Lab Status: No result Specimen: Urine     COVID19, Influenza A/B, RSV PCR, [facility] [[id number]]     Lab Status: No result Specimen: Nares                  No orders to display              Procedures  ECG 12 Lead Documentation Only    Date/Time: 1/10/2021 11:23 AM  Performed by: Huma Marie DO  Authorized by: Huma Marie DO     Indications / Diagnosis:  Chest pain   ECG reviewed by me, the ED Provider: yes    Patient location:  ED  Previous ECG:     Previous ECG:  Unavailable  Interpretation:     Interpretation: non-specific    Rate:     ECG rate:  86    ECG rate assessment: normal    Rhythm:     Rhythm: sinus rhythm    ST segments:     ST segments:  Non-specific             ED Course             HEART Risk Score      Most Recent Value   Heart Score Risk Calculator   History  1 Filed at: 01/10/2021 1059   ECG  0 Filed at: 01/10/2021 1059   Age  1 Filed at: 01/10/2021 1059   Risk Factors  2 Filed at: 01/10/2021 1059   Troponin  0 Filed at: 01/10/2021 1059   HEART Score  4 Filed at: 01/10/2021 1059 MDM    Disposition  Final diagnoses:   None     ED Disposition     None      Follow-up Information    None         Patient's Medications   Discharge Prescriptions    No medications on file     No discharge procedures on file      PDMP Review     None          ED Provider  Electronically Signed by           Nabeel Alexandre DO  01/10/21 0068

## 2021-01-11 LAB
ATRIAL RATE: 86 BPM
P AXIS: 49 DEGREES
PR INTERVAL: 152 MS
QRS AXIS: 56 DEGREES
QRSD INTERVAL: 70 MS
QT INTERVAL: 400 MS
QTC INTERVAL: 478 MS
T WAVE AXIS: 54 DEGREES
VENTRICULAR RATE: 86 BPM

## 2021-01-11 PROCEDURE — 93010 ELECTROCARDIOGRAM REPORT: CPT | Performed by: INTERNAL MEDICINE

## 2021-01-22 ENCOUNTER — HOSPITAL ENCOUNTER (INPATIENT)
Facility: HOSPITAL | Age: 61
LOS: 4 days | Discharge: HOME | DRG: 390 | End: 2021-01-27
Attending: EMERGENCY MEDICINE | Admitting: INTERNAL MEDICINE
Payer: COMMERCIAL

## 2021-01-22 ENCOUNTER — APPOINTMENT (EMERGENCY)
Dept: CT IMAGING | Facility: HOSPITAL | Age: 61
DRG: 390 | End: 2021-01-22
Payer: COMMERCIAL

## 2021-01-22 DIAGNOSIS — K56.7 ILEUS (HCC): ICD-10-CM

## 2021-01-22 DIAGNOSIS — R11.2 NAUSEA AND VOMITING: ICD-10-CM

## 2021-01-22 DIAGNOSIS — R11.0 NAUSEA: ICD-10-CM

## 2021-01-22 DIAGNOSIS — R10.9 ABDOMINAL PAIN: Primary | ICD-10-CM

## 2021-01-22 DIAGNOSIS — K52.9 COLITIS: ICD-10-CM

## 2021-01-22 DIAGNOSIS — K59.00 CONSTIPATION: ICD-10-CM

## 2021-01-22 LAB
ALBUMIN SERPL BCP-MCNC: 4.2 G/DL (ref 3.5–5)
ALP SERPL-CCNC: 96 U/L (ref 46–116)
ALT SERPL W P-5'-P-CCNC: 47 U/L (ref 12–78)
ANION GAP SERPL CALCULATED.3IONS-SCNC: 14 MMOL/L (ref 4–13)
AST SERPL W P-5'-P-CCNC: 36 U/L (ref 5–45)
BASOPHILS # BLD AUTO: 0.06 THOUSANDS/ΜL (ref 0–0.1)
BASOPHILS NFR BLD AUTO: 1 % (ref 0–1)
BILIRUB SERPL-MCNC: 0.2 MG/DL (ref 0.2–1)
BILIRUB UR QL STRIP: NEGATIVE
BUN SERPL-MCNC: 17 MG/DL (ref 5–25)
CALCIUM SERPL-MCNC: 9 MG/DL (ref 8.3–10.1)
CHLORIDE SERPL-SCNC: 104 MMOL/L (ref 100–108)
CLARITY UR: CLEAR
CO2 SERPL-SCNC: 23 MMOL/L (ref 21–32)
COLOR UR: YELLOW
CREAT SERPL-MCNC: 1.13 MG/DL (ref 0.6–1.3)
EOSINOPHIL # BLD AUTO: 0.09 THOUSAND/ΜL (ref 0–0.61)
EOSINOPHIL NFR BLD AUTO: 1 % (ref 0–6)
ERYTHROCYTE [DISTWIDTH] IN BLOOD BY AUTOMATED COUNT: 13.8 % (ref 11.6–15.1)
GFR SERPL CREATININE-BSD FRML MDRD: 53 ML/MIN/1.73SQ M
GLUCOSE SERPL-MCNC: 119 MG/DL (ref 65–140)
GLUCOSE UR STRIP-MCNC: NEGATIVE MG/DL
HCT VFR BLD AUTO: 43.4 % (ref 34.8–46.1)
HGB BLD-MCNC: 14.5 G/DL (ref 11.5–15.4)
HGB UR QL STRIP.AUTO: NEGATIVE
IMM GRANULOCYTES # BLD AUTO: 0.04 THOUSAND/UL (ref 0–0.2)
IMM GRANULOCYTES NFR BLD AUTO: 0 % (ref 0–2)
KETONES UR STRIP-MCNC: ABNORMAL MG/DL
LEUKOCYTE ESTERASE UR QL STRIP: NEGATIVE
LIPASE SERPL-CCNC: 61 U/L (ref 73–393)
LYMPHOCYTES # BLD AUTO: 3.71 THOUSANDS/ΜL (ref 0.6–4.47)
LYMPHOCYTES NFR BLD AUTO: 31 % (ref 14–44)
MCH RBC QN AUTO: 30.8 PG (ref 26.8–34.3)
MCHC RBC AUTO-ENTMCNC: 33.4 G/DL (ref 31.4–37.4)
MCV RBC AUTO: 92 FL (ref 82–98)
MONOCYTES # BLD AUTO: 0.51 THOUSAND/ΜL (ref 0.17–1.22)
MONOCYTES NFR BLD AUTO: 4 % (ref 4–12)
NEUTROPHILS # BLD AUTO: 7.77 THOUSANDS/ΜL (ref 1.85–7.62)
NEUTS SEG NFR BLD AUTO: 63 % (ref 43–75)
NITRITE UR QL STRIP: NEGATIVE
NRBC BLD AUTO-RTO: 0 /100 WBCS
PH UR STRIP.AUTO: 6 [PH]
PLATELET # BLD AUTO: 247 THOUSANDS/UL (ref 149–390)
PMV BLD AUTO: 11.1 FL (ref 8.9–12.7)
POTASSIUM SERPL-SCNC: 3 MMOL/L (ref 3.5–5.3)
PROT SERPL-MCNC: 8.2 G/DL (ref 6.4–8.2)
PROT UR STRIP-MCNC: NEGATIVE MG/DL
RBC # BLD AUTO: 4.71 MILLION/UL (ref 3.81–5.12)
SODIUM SERPL-SCNC: 141 MMOL/L (ref 136–145)
SP GR UR STRIP.AUTO: >=1.03 (ref 1–1.03)
UROBILINOGEN UR QL STRIP.AUTO: 0.2 E.U./DL
WBC # BLD AUTO: 12.18 THOUSAND/UL (ref 4.31–10.16)

## 2021-01-22 PROCEDURE — 74177 CT ABD & PELVIS W/CONTRAST: CPT

## 2021-01-22 PROCEDURE — G1004 CDSM NDSC: HCPCS

## 2021-01-22 PROCEDURE — 96375 TX/PRO/DX INJ NEW DRUG ADDON: CPT

## 2021-01-22 PROCEDURE — 36415 COLL VENOUS BLD VENIPUNCTURE: CPT | Performed by: PHYSICIAN ASSISTANT

## 2021-01-22 PROCEDURE — 99285 EMERGENCY DEPT VISIT HI MDM: CPT

## 2021-01-22 PROCEDURE — 83690 ASSAY OF LIPASE: CPT | Performed by: PHYSICIAN ASSISTANT

## 2021-01-22 PROCEDURE — 85025 COMPLETE CBC W/AUTO DIFF WBC: CPT | Performed by: PHYSICIAN ASSISTANT

## 2021-01-22 PROCEDURE — 81003 URINALYSIS AUTO W/O SCOPE: CPT | Performed by: PHYSICIAN ASSISTANT

## 2021-01-22 PROCEDURE — 96374 THER/PROPH/DIAG INJ IV PUSH: CPT

## 2021-01-22 PROCEDURE — 80053 COMPREHEN METABOLIC PANEL: CPT | Performed by: PHYSICIAN ASSISTANT

## 2021-01-22 PROCEDURE — 99284 EMERGENCY DEPT VISIT MOD MDM: CPT | Performed by: PHYSICIAN ASSISTANT

## 2021-01-22 RX ORDER — LORAZEPAM 2 MG/ML
1 INJECTION INTRAMUSCULAR ONCE
Status: COMPLETED | OUTPATIENT
Start: 2021-01-22 | End: 2021-01-22

## 2021-01-22 RX ORDER — POTASSIUM CHLORIDE 20 MEQ/1
20 TABLET, EXTENDED RELEASE ORAL ONCE
Status: COMPLETED | OUTPATIENT
Start: 2021-01-22 | End: 2021-01-22

## 2021-01-22 RX ORDER — MORPHINE SULFATE 4 MG/ML
4 INJECTION, SOLUTION INTRAMUSCULAR; INTRAVENOUS ONCE
Status: COMPLETED | OUTPATIENT
Start: 2021-01-22 | End: 2021-01-22

## 2021-01-22 RX ORDER — ONDANSETRON 2 MG/ML
4 INJECTION INTRAMUSCULAR; INTRAVENOUS ONCE
Status: COMPLETED | OUTPATIENT
Start: 2021-01-22 | End: 2021-01-22

## 2021-01-22 RX ORDER — KETOROLAC TROMETHAMINE 30 MG/ML
15 INJECTION, SOLUTION INTRAMUSCULAR; INTRAVENOUS ONCE
Status: COMPLETED | OUTPATIENT
Start: 2021-01-22 | End: 2021-01-22

## 2021-01-22 RX ADMIN — KETOROLAC TROMETHAMINE 15 MG: 30 INJECTION, SOLUTION INTRAMUSCULAR at 19:50

## 2021-01-22 RX ADMIN — IOHEXOL 100 ML: 350 INJECTION, SOLUTION INTRAVENOUS at 21:59

## 2021-01-22 RX ADMIN — MORPHINE SULFATE 4 MG: 4 INJECTION, SOLUTION INTRAMUSCULAR; INTRAVENOUS at 22:58

## 2021-01-22 RX ADMIN — ONDANSETRON 4 MG: 2 INJECTION INTRAMUSCULAR; INTRAVENOUS at 19:48

## 2021-01-22 RX ADMIN — POTASSIUM CHLORIDE 20 MEQ: 1500 TABLET, EXTENDED RELEASE ORAL at 20:53

## 2021-01-22 RX ADMIN — LORAZEPAM 1 MG: 2 INJECTION INTRAMUSCULAR; INTRAVENOUS at 22:56

## 2021-01-23 ENCOUNTER — APPOINTMENT (INPATIENT)
Dept: RADIOLOGY | Facility: HOSPITAL | Age: 61
DRG: 390 | End: 2021-01-23
Payer: COMMERCIAL

## 2021-01-23 PROBLEM — R10.84 GENERALIZED ABDOMINAL PAIN: Status: ACTIVE | Noted: 2021-01-23

## 2021-01-23 PROBLEM — K52.9 COLITIS: Status: ACTIVE | Noted: 2020-06-15

## 2021-01-23 LAB
ALBUMIN SERPL BCP-MCNC: 3.8 G/DL (ref 3.5–5)
ALP SERPL-CCNC: 86 U/L (ref 46–116)
ALT SERPL W P-5'-P-CCNC: 43 U/L (ref 12–78)
ANION GAP SERPL CALCULATED.3IONS-SCNC: 14 MMOL/L (ref 4–13)
ANION GAP SERPL CALCULATED.3IONS-SCNC: 14 MMOL/L (ref 4–13)
AST SERPL W P-5'-P-CCNC: 33 U/L (ref 5–45)
BILIRUB SERPL-MCNC: 0.3 MG/DL (ref 0.2–1)
BUN SERPL-MCNC: 18 MG/DL (ref 5–25)
BUN SERPL-MCNC: 19 MG/DL (ref 5–25)
CALCIUM SERPL-MCNC: 8.3 MG/DL (ref 8.3–10.1)
CALCIUM SERPL-MCNC: 8.4 MG/DL (ref 8.3–10.1)
CHLORIDE SERPL-SCNC: 106 MMOL/L (ref 100–108)
CHLORIDE SERPL-SCNC: 106 MMOL/L (ref 100–108)
CO2 SERPL-SCNC: 22 MMOL/L (ref 21–32)
CO2 SERPL-SCNC: 22 MMOL/L (ref 21–32)
CREAT SERPL-MCNC: 1.09 MG/DL (ref 0.6–1.3)
CREAT SERPL-MCNC: 1.11 MG/DL (ref 0.6–1.3)
ERYTHROCYTE [DISTWIDTH] IN BLOOD BY AUTOMATED COUNT: 13.9 % (ref 11.6–15.1)
GFR SERPL CREATININE-BSD FRML MDRD: 54 ML/MIN/1.73SQ M
GFR SERPL CREATININE-BSD FRML MDRD: 56 ML/MIN/1.73SQ M
GLUCOSE SERPL-MCNC: 123 MG/DL (ref 65–140)
GLUCOSE SERPL-MCNC: 131 MG/DL (ref 65–140)
HCT VFR BLD AUTO: 42.4 % (ref 34.8–46.1)
HGB BLD-MCNC: 13.5 G/DL (ref 11.5–15.4)
MAGNESIUM SERPL-MCNC: 2.2 MG/DL (ref 1.6–2.6)
MCH RBC QN AUTO: 29.8 PG (ref 26.8–34.3)
MCHC RBC AUTO-ENTMCNC: 31.8 G/DL (ref 31.4–37.4)
MCV RBC AUTO: 94 FL (ref 82–98)
OB PNL GAST: POSITIVE
PHOSPHATE SERPL-MCNC: 3.8 MG/DL (ref 2.7–4.5)
PLATELET # BLD AUTO: 211 THOUSANDS/UL (ref 149–390)
PMV BLD AUTO: 11.1 FL (ref 8.9–12.7)
POTASSIUM SERPL-SCNC: 2.5 MMOL/L (ref 3.5–5.3)
POTASSIUM SERPL-SCNC: 3.5 MMOL/L (ref 3.5–5.3)
PROT SERPL-MCNC: 7.4 G/DL (ref 6.4–8.2)
RBC # BLD AUTO: 4.53 MILLION/UL (ref 3.81–5.12)
RV AG STL QL: NEGATIVE
SODIUM SERPL-SCNC: 142 MMOL/L (ref 136–145)
SODIUM SERPL-SCNC: 142 MMOL/L (ref 136–145)
WBC # BLD AUTO: 12.87 THOUSAND/UL (ref 4.31–10.16)

## 2021-01-23 PROCEDURE — 74022 RADEX COMPL AQT ABD SERIES: CPT

## 2021-01-23 PROCEDURE — 97116 GAIT TRAINING THERAPY: CPT | Performed by: PHYSICAL THERAPIST

## 2021-01-23 PROCEDURE — 83735 ASSAY OF MAGNESIUM: CPT | Performed by: PHYSICIAN ASSISTANT

## 2021-01-23 PROCEDURE — 87493 C DIFF AMPLIFIED PROBE: CPT | Performed by: PHYSICIAN ASSISTANT

## 2021-01-23 PROCEDURE — 97163 PT EVAL HIGH COMPLEX 45 MIN: CPT | Performed by: PHYSICAL THERAPIST

## 2021-01-23 PROCEDURE — 80053 COMPREHEN METABOLIC PANEL: CPT | Performed by: PHYSICIAN ASSISTANT

## 2021-01-23 PROCEDURE — 87505 NFCT AGENT DETECTION GI: CPT | Performed by: PHYSICIAN ASSISTANT

## 2021-01-23 PROCEDURE — C9113 INJ PANTOPRAZOLE SODIUM, VIA: HCPCS | Performed by: PHYSICIAN ASSISTANT

## 2021-01-23 PROCEDURE — 85027 COMPLETE CBC AUTOMATED: CPT | Performed by: PHYSICIAN ASSISTANT

## 2021-01-23 PROCEDURE — 89055 LEUKOCYTE ASSESSMENT FECAL: CPT | Performed by: PHYSICIAN ASSISTANT

## 2021-01-23 PROCEDURE — 87425 ROTAVIRUS AG IA: CPT | Performed by: PHYSICIAN ASSISTANT

## 2021-01-23 PROCEDURE — 80048 BASIC METABOLIC PNL TOTAL CA: CPT | Performed by: PHYSICIAN ASSISTANT

## 2021-01-23 PROCEDURE — 82271 OCCULT BLOOD OTHER SOURCES: CPT | Performed by: PHYSICIAN ASSISTANT

## 2021-01-23 PROCEDURE — 84100 ASSAY OF PHOSPHORUS: CPT | Performed by: PHYSICIAN ASSISTANT

## 2021-01-23 PROCEDURE — 97167 OT EVAL HIGH COMPLEX 60 MIN: CPT

## 2021-01-23 PROCEDURE — 94760 N-INVAS EAR/PLS OXIMETRY 1: CPT

## 2021-01-23 PROCEDURE — 99223 1ST HOSP IP/OBS HIGH 75: CPT | Performed by: PHYSICIAN ASSISTANT

## 2021-01-23 PROCEDURE — 94664 DEMO&/EVAL PT USE INHALER: CPT

## 2021-01-23 PROCEDURE — 97535 SELF CARE MNGMENT TRAINING: CPT

## 2021-01-23 RX ORDER — MULTIVIT,TX WITH IRON,MINERALS
500 TABLET, EXTENDED RELEASE ORAL
Status: DISCONTINUED | OUTPATIENT
Start: 2021-01-23 | End: 2021-01-27 | Stop reason: HOSPADM

## 2021-01-23 RX ORDER — CLONAZEPAM 0.5 MG/1
1 TABLET ORAL DAILY
Status: DISCONTINUED | OUTPATIENT
Start: 2021-01-23 | End: 2021-01-27 | Stop reason: HOSPADM

## 2021-01-23 RX ORDER — ALBUTEROL SULFATE 90 UG/1
2 AEROSOL, METERED RESPIRATORY (INHALATION) EVERY 6 HOURS
Status: DISCONTINUED | OUTPATIENT
Start: 2021-01-23 | End: 2021-01-25

## 2021-01-23 RX ORDER — DICYCLOMINE HYDROCHLORIDE 10 MG/1
10 CAPSULE ORAL
Status: DISCONTINUED | OUTPATIENT
Start: 2021-01-23 | End: 2021-01-25

## 2021-01-23 RX ORDER — HYDROMORPHONE HCL/PF 1 MG/ML
0.5 SYRINGE (ML) INJECTION EVERY 4 HOURS PRN
Status: DISCONTINUED | OUTPATIENT
Start: 2021-01-23 | End: 2021-01-24

## 2021-01-23 RX ORDER — PREGABALIN 50 MG/1
100 CAPSULE ORAL 3 TIMES DAILY
Status: DISCONTINUED | OUTPATIENT
Start: 2021-01-23 | End: 2021-01-27 | Stop reason: HOSPADM

## 2021-01-23 RX ORDER — PANTOPRAZOLE SODIUM 40 MG/1
40 TABLET, DELAYED RELEASE ORAL
Status: DISCONTINUED | OUTPATIENT
Start: 2021-01-23 | End: 2021-01-23

## 2021-01-23 RX ORDER — PREGABALIN 75 MG/1
150 CAPSULE ORAL
Status: DISCONTINUED | OUTPATIENT
Start: 2021-01-23 | End: 2021-01-27 | Stop reason: HOSPADM

## 2021-01-23 RX ORDER — CLONAZEPAM 0.5 MG/1
0.5 TABLET ORAL
Status: DISCONTINUED | OUTPATIENT
Start: 2021-01-23 | End: 2021-01-27 | Stop reason: HOSPADM

## 2021-01-23 RX ORDER — NICOTINE 21 MG/24HR
1 PATCH, TRANSDERMAL 24 HOURS TRANSDERMAL DAILY
Status: DISCONTINUED | OUTPATIENT
Start: 2021-01-23 | End: 2021-01-26

## 2021-01-23 RX ORDER — ATORVASTATIN CALCIUM 40 MG/1
40 TABLET, FILM COATED ORAL
Status: DISCONTINUED | OUTPATIENT
Start: 2021-01-23 | End: 2021-01-27 | Stop reason: HOSPADM

## 2021-01-23 RX ORDER — SODIUM CHLORIDE 9 MG/ML
250 INJECTION, SOLUTION INTRAVENOUS ONCE
Status: COMPLETED | OUTPATIENT
Start: 2021-01-23 | End: 2021-01-23

## 2021-01-23 RX ORDER — POLYVINYL ALCOHOL 14 MG/ML
1 SOLUTION/ DROPS OPHTHALMIC
Status: DISCONTINUED | OUTPATIENT
Start: 2021-01-23 | End: 2021-01-27 | Stop reason: HOSPADM

## 2021-01-23 RX ORDER — FUROSEMIDE 20 MG/1
20 TABLET ORAL DAILY
Status: DISCONTINUED | OUTPATIENT
Start: 2021-01-23 | End: 2021-01-24

## 2021-01-23 RX ORDER — POTASSIUM CHLORIDE 14.9 MG/ML
20 INJECTION INTRAVENOUS
Status: COMPLETED | OUTPATIENT
Start: 2021-01-23 | End: 2021-01-23

## 2021-01-23 RX ORDER — HYDROCODONE BITARTRATE AND ACETAMINOPHEN 5; 325 MG/1; MG/1
1 TABLET ORAL EVERY 6 HOURS PRN
Status: DISCONTINUED | OUTPATIENT
Start: 2021-01-23 | End: 2021-01-23

## 2021-01-23 RX ORDER — ONDANSETRON 2 MG/ML
4 INJECTION INTRAMUSCULAR; INTRAVENOUS EVERY 6 HOURS PRN
Status: DISCONTINUED | OUTPATIENT
Start: 2021-01-23 | End: 2021-01-23

## 2021-01-23 RX ORDER — SODIUM CHLORIDE 9 MG/ML
100 INJECTION, SOLUTION INTRAVENOUS CONTINUOUS
Status: DISCONTINUED | OUTPATIENT
Start: 2021-01-23 | End: 2021-01-24

## 2021-01-23 RX ORDER — TOPIRAMATE 25 MG/1
50 TABLET ORAL EVERY 12 HOURS SCHEDULED
Status: DISCONTINUED | OUTPATIENT
Start: 2021-01-23 | End: 2021-01-27 | Stop reason: HOSPADM

## 2021-01-23 RX ORDER — POTASSIUM CHLORIDE 20 MEQ/1
40 TABLET, EXTENDED RELEASE ORAL ONCE
Status: COMPLETED | OUTPATIENT
Start: 2021-01-23 | End: 2021-01-23

## 2021-01-23 RX ORDER — ACETAMINOPHEN 325 MG/1
650 TABLET ORAL EVERY 6 HOURS PRN
Status: DISCONTINUED | OUTPATIENT
Start: 2021-01-23 | End: 2021-01-25

## 2021-01-23 RX ORDER — FENOFIBRATE 48 MG/1
48 TABLET, COATED ORAL DAILY
Status: DISCONTINUED | OUTPATIENT
Start: 2021-01-23 | End: 2021-01-27 | Stop reason: HOSPADM

## 2021-01-23 RX ORDER — ONDANSETRON 2 MG/ML
4 INJECTION INTRAMUSCULAR; INTRAVENOUS EVERY 4 HOURS PRN
Status: DISCONTINUED | OUTPATIENT
Start: 2021-01-23 | End: 2021-01-27 | Stop reason: HOSPADM

## 2021-01-23 RX ORDER — GALCANEZUMAB 120 MG/ML
120 INJECTION, SOLUTION SUBCUTANEOUS
COMMUNITY

## 2021-01-23 RX ORDER — PANTOPRAZOLE SODIUM 40 MG/1
40 INJECTION, POWDER, FOR SOLUTION INTRAVENOUS EVERY 12 HOURS SCHEDULED
Status: DISCONTINUED | OUTPATIENT
Start: 2021-01-23 | End: 2021-01-27 | Stop reason: HOSPADM

## 2021-01-23 RX ORDER — HYDROCODONE BITARTRATE AND ACETAMINOPHEN 5; 325 MG/1; MG/1
1 TABLET ORAL EVERY 4 HOURS PRN
Status: DISCONTINUED | OUTPATIENT
Start: 2021-01-23 | End: 2021-01-24

## 2021-01-23 RX ORDER — LANOLIN ALCOHOL/MO/W.PET/CERES
6 CREAM (GRAM) TOPICAL
Status: DISCONTINUED | OUTPATIENT
Start: 2021-01-23 | End: 2021-01-27 | Stop reason: HOSPADM

## 2021-01-23 RX ORDER — VITAMIN B COMPLEX
1 CAPSULE ORAL DAILY
COMMUNITY

## 2021-01-23 RX ORDER — UREA 10 %
500 LOTION (ML) TOPICAL
Status: DISCONTINUED | OUTPATIENT
Start: 2021-01-23 | End: 2021-01-23

## 2021-01-23 RX ORDER — LAMOTRIGINE 25 MG/1
75 TABLET ORAL
Status: DISCONTINUED | OUTPATIENT
Start: 2021-01-23 | End: 2021-01-27 | Stop reason: HOSPADM

## 2021-01-23 RX ADMIN — FENOFIBRATE 48 MG: 48 TABLET ORAL at 08:38

## 2021-01-23 RX ADMIN — TOPIRAMATE 50 MG: 25 TABLET, FILM COATED ORAL at 21:55

## 2021-01-23 RX ADMIN — HYDROCODONE BITARTRATE AND ACETAMINOPHEN 1 TABLET: 5; 325 TABLET ORAL at 12:25

## 2021-01-23 RX ADMIN — PREGABALIN 100 MG: 50 CAPSULE ORAL at 08:38

## 2021-01-23 RX ADMIN — SODIUM CHLORIDE 250 ML/HR: 0.9 INJECTION, SOLUTION INTRAVENOUS at 17:00

## 2021-01-23 RX ADMIN — POTASSIUM CHLORIDE 20 MEQ: 14.9 INJECTION, SOLUTION INTRAVENOUS at 11:55

## 2021-01-23 RX ADMIN — TOPIRAMATE 50 MG: 25 TABLET, FILM COATED ORAL at 02:35

## 2021-01-23 RX ADMIN — ONDANSETRON 4 MG: 2 INJECTION INTRAMUSCULAR; INTRAVENOUS at 16:24

## 2021-01-23 RX ADMIN — TOPIRAMATE 50 MG: 25 TABLET, FILM COATED ORAL at 08:38

## 2021-01-23 RX ADMIN — HYDROMORPHONE HYDROCHLORIDE 0.5 MG: 1 INJECTION, SOLUTION INTRAMUSCULAR; INTRAVENOUS; SUBCUTANEOUS at 17:58

## 2021-01-23 RX ADMIN — PANTOPRAZOLE SODIUM 40 MG: 40 INJECTION, POWDER, LYOPHILIZED, FOR SOLUTION INTRAVENOUS at 17:56

## 2021-01-23 RX ADMIN — FUROSEMIDE 20 MG: 20 TABLET ORAL at 08:38

## 2021-01-23 RX ADMIN — ONDANSETRON 4 MG: 2 INJECTION INTRAMUSCULAR; INTRAVENOUS at 05:15

## 2021-01-23 RX ADMIN — SODIUM CHLORIDE 100 ML/HR: 0.9 INJECTION, SOLUTION INTRAVENOUS at 16:20

## 2021-01-23 RX ADMIN — ALBUTEROL SULFATE 2 PUFF: 90 AEROSOL, METERED RESPIRATORY (INHALATION) at 15:53

## 2021-01-23 RX ADMIN — DICYCLOMINE HYDROCHLORIDE 10 MG: 10 CAPSULE ORAL at 11:56

## 2021-01-23 RX ADMIN — CLONAZEPAM 0.5 MG: 0.5 TABLET ORAL at 21:55

## 2021-01-23 RX ADMIN — CLONAZEPAM 1 MG: 0.5 TABLET ORAL at 08:38

## 2021-01-23 RX ADMIN — PREGABALIN 100 MG: 50 CAPSULE ORAL at 11:56

## 2021-01-23 RX ADMIN — HYDROMORPHONE HYDROCHLORIDE 0.5 MG: 1 INJECTION, SOLUTION INTRAMUSCULAR; INTRAVENOUS; SUBCUTANEOUS at 21:58

## 2021-01-23 RX ADMIN — PREGABALIN 150 MG: 75 CAPSULE ORAL at 21:55

## 2021-01-23 RX ADMIN — ENOXAPARIN SODIUM 40 MG: 40 INJECTION SUBCUTANEOUS at 08:39

## 2021-01-23 RX ADMIN — SODIUM CHLORIDE 75 ML/HR: 0.9 INJECTION, SOLUTION INTRAVENOUS at 02:36

## 2021-01-23 RX ADMIN — POTASSIUM CHLORIDE 20 MEQ: 14.9 INJECTION, SOLUTION INTRAVENOUS at 09:56

## 2021-01-23 RX ADMIN — Medication 6 MG: at 21:55

## 2021-01-23 RX ADMIN — ONDANSETRON 4 MG: 2 INJECTION INTRAMUSCULAR; INTRAVENOUS at 21:55

## 2021-01-23 RX ADMIN — POTASSIUM CHLORIDE 40 MEQ: 1500 TABLET, EXTENDED RELEASE ORAL at 09:56

## 2021-01-23 RX ADMIN — ALBUTEROL SULFATE 2 PUFF: 90 AEROSOL, METERED RESPIRATORY (INHALATION) at 08:46

## 2021-01-23 RX ADMIN — ONDANSETRON 4 MG: 2 INJECTION INTRAMUSCULAR; INTRAVENOUS at 12:09

## 2021-01-23 RX ADMIN — LAMOTRIGINE 75 MG: 25 TABLET ORAL at 21:55

## 2021-01-23 RX ADMIN — ALBUTEROL SULFATE 2 PUFF: 90 AEROSOL, METERED RESPIRATORY (INHALATION) at 20:07

## 2021-01-23 RX ADMIN — NICOTINE 1 PATCH: 14 PATCH, EXTENDED RELEASE TRANSDERMAL at 08:39

## 2021-01-23 NOTE — ED NOTES
Patient states "something is happening " to [address] tech  She was unable to determine what the patient needed  RN notified and attending to her       Lavonne Cardenas RN  01/22/21 3368

## 2021-01-23 NOTE — PLAN OF CARE
Problem: Potential for Falls  Goal: Patient will remain free of falls  Description: INTERVENTIONS:  - Assess patient frequently for physical needs  -  Identify cognitive and physical deficits and behaviors that affect risk of falls    -  [place] fall precautions as indicated by assessment   - Educate patient/family on patient safety including physical limitations  - Instruct patient to call for assistance with activity based on assessment  - Modify environment to reduce risk of injury  - Consider OT/PT consult to assist with strengthening/mobility  Outcome: Progressing     Problem: PAIN - ADULT  Goal: Verbalizes/displays adequate comfort level or baseline comfort level  Description: Interventions:  - Encourage patient to monitor pain and request assistance  - Assess pain using appropriate pain scale  - Administer analgesics based on type and severity of pain and evaluate response  - Implement non-pharmacological measures as appropriate and evaluate response  - Consider cultural and social influences on pain and pain management  - Notify physician/advanced practitioner if interventions unsuccessful or patient reports new pain  Outcome: Progressing     Problem: INFECTION - ADULT  Goal: Absence or prevention of progression during hospitalization  Description: INTERVENTIONS:  - Assess and monitor for signs and symptoms of infection  - Monitor lab/diagnostic results  - Monitor all insertion sites, i e  indwelling lines, tubes, and drains  - Monitor endotracheal if appropriate and nasal secretions for changes in amount and color  - [place] appropriate cooling/warming therapies per order  - Administer medications as ordered  - Instruct and encourage patient and family to use good hand hygiene technique  - Identify and instruct in appropriate isolation precautions for identified infection/condition  Outcome: Progressing     Problem: SAFETY ADULT  Goal: Patient will remain free of falls  Description: INTERVENTIONS:  - Assess patient frequently for physical needs  -  Identify cognitive and physical deficits and behaviors that affect risk of falls    -  [place] fall precautions as indicated by assessment   - Educate patient/family on patient safety including physical limitations  - Instruct patient to call for assistance with activity based on assessment  - Modify environment to reduce risk of injury  - Consider OT/PT consult to assist with strengthening/mobility  Outcome: Progressing  Goal: Maintain or return to baseline ADL function  Description: INTERVENTIONS:  -  Assess patient's ability to carry out ADLs; assess patient's baseline for ADL function and identify physical deficits which impact ability to perform ADLs (bathing, care of mouth/teeth, toileting, grooming, dressing, etc )  - Assess/evaluate cause of self-care deficits   - Assess range of motion  - Assess patient's mobility; develop plan if impaired  - Assess patient's need for assistive devices and provide as appropriate  - Encourage maximum independence but intervene and supervise when necessary  - Involve family in performance of ADLs  - Assess for home care needs following discharge   - Consider OT consult to assist with ADL evaluation and planning for discharge  - Provide patient education as appropriate  Outcome: Progressing  Goal: Maintain or return mobility status to optimal level  Description: INTERVENTIONS:  - Assess patient's baseline mobility status (ambulation, transfers, stairs, etc )    - Identify cognitive and physical deficits and behaviors that affect mobility  - Identify mobility aids required to assist with transfers and/or ambulation (gait belt, sit-to-stand, lift, walker, cane, etc )  - [place] fall precautions as indicated by assessment  - Record patient progress and toleration of activity level on Mobility SBAR; progress patient to next Phase/Stage  - Instruct patient to call for assistance with activity based on assessment  - Consider rehabilitation consult to assist with strengthening/weightbearing, etc   Outcome: Progressing     Problem: DISCHARGE PLANNING  Goal: Discharge to home or other facility with appropriate resources  Description: INTERVENTIONS:  - Identify barriers to discharge w/patient and caregiver  - Arrange for needed discharge resources and transportation as appropriate  - Identify discharge learning needs (meds, wound care, etc )  - Arrange for interpretive services to assist at discharge as needed  - Refer to Case Management Department for coordinating discharge planning if the patient needs post-hospital services based on physician/advanced practitioner order or complex needs related to functional status, cognitive ability, or social support system  Outcome: Progressing     Problem: Knowledge Deficit  Goal: Patient/family/caregiver demonstrates understanding of disease process, treatment plan, medications, and discharge instructions  Description: Complete learning assessment and assess knowledge base    Interventions:  - Provide teaching at level of understanding  - Provide teaching via preferred learning methods  Outcome: Progressing

## 2021-01-23 NOTE — PLAN OF CARE
Problem: OCCUPATIONAL THERAPY ADULT  Goal: Performs self-care activities at highest level of function for planned discharge setting  See evaluation for individualized goals  Description: Treatment Interventions: ADL retraining, Functional transfer training, UE strengthening/ROM, Endurance training, Patient/family training, Equipment evaluation/education, Activityengagement          See flowsheet documentation for full assessment, interventions and recommendations  Note: Limitation: Decreased ADL status, Decreased UE strength, Decreased Safe judgement during ADL, Decreased endurance, Decreased self-care trans, Decreased high-level ADLs     Assessment:  Pt is a 62 y o  female seen for OT evaluation s/p admit to [facility] on 1/22/2021 w/ Diarrhea  Comorbidities affecting pt's functional performance at time of assessment include: TIA, asthma, COPD, migraines, CVA, depression, brain aneurysm, neuropathy, fibromyalgia, bipolar  Personal factors affecting pt at time of IE include:limited home support, behavioral pattern, difficulty performing ADLS, difficulty performing IADLS , limited insight into deficits, decreased initiation and engagement  and health management   Prior to admission, pt was (A) with ADLs and IADLs with use of SPC during mobility  Upon evaluation: Pt requires (S)-max (A) x1-2 with use of RW during mobility 2* the following deficits impacting occupational performance: weakness, decreased strength, decreased balance, decreased tolerance, impaired initiation, impaired problem solving, decreased safety awareness, increased pain and impaired interpersonal skills  Pt to benefit from continued skilled OT tx while in the hospital to address deficits as defined above and maximize level of functional independence w ADL's and functional mobility  Occupational Performance areas to address include: grooming, bathing/shower, toilet hygiene, dressing, functional mobility, community mobility and clothing management  From OT standpoint, recommendation at time of d/c would be home with continued home health aide (A)  Pt's raw score on the AM-PAC Daily Activity inpatient short form is 15, standardized score is 34 69, (greater than/less than) 39 4  Patients at this level are likely to benefit from DC to  however, please refer to therapist recommendation for safe DC planning        OT Discharge Recommendation: Home with skilled therapy

## 2021-01-23 NOTE — ED NOTES
Patient crying out in pain moaning "please, please, please, please   nurse, nurse, nurse, nurse, help " She was holding her stomach in pain  Doctor aware       Zak Domingo RN  01/22/21 2005

## 2021-01-23 NOTE — PLAN OF CARE
Problem: Potential for Falls  Goal: Patient will remain free of falls  Description: INTERVENTIONS:  - Assess patient frequently for physical needs  -  Identify cognitive and physical deficits and behaviors that affect risk of falls    -  [place] fall precautions as indicated by assessment   - Educate patient/family on patient safety including physical limitations  - Instruct patient to call for assistance with activity based on assessment  - Modify environment to reduce risk of injury  - Consider OT/PT consult to assist with strengthening/mobility  Outcome: Progressing     Problem: PAIN - ADULT  Goal: Verbalizes/displays adequate comfort level or baseline comfort level  Description: Interventions:  - Encourage patient to monitor pain and request assistance  - Assess pain using appropriate pain scale  - Administer analgesics based on type and severity of pain and evaluate response  - Implement non-pharmacological measures as appropriate and evaluate response  - Consider cultural and social influences on pain and pain management  - Notify physician/advanced practitioner if interventions unsuccessful or patient reports new pain  Outcome: Progressing     Problem: INFECTION - ADULT  Goal: Absence or prevention of progression during hospitalization  Description: INTERVENTIONS:  - Assess and monitor for signs and symptoms of infection  - Monitor lab/diagnostic results  - Monitor all insertion sites, i e  indwelling lines, tubes, and drains  - Monitor endotracheal if appropriate and nasal secretions for changes in amount and color  - [place] appropriate cooling/warming therapies per order  - Administer medications as ordered  - Instruct and encourage patient and family to use good hand hygiene technique  - Identify and instruct in appropriate isolation precautions for identified infection/condition  Outcome: Progressing     Problem: SAFETY ADULT  Goal: Patient will remain free of falls  Description: INTERVENTIONS:  - Assess patient frequently for physical needs  -  Identify cognitive and physical deficits and behaviors that affect risk of falls    -  [place] fall precautions as indicated by assessment   - Educate patient/family on patient safety including physical limitations  - Instruct patient to call for assistance with activity based on assessment  - Modify environment to reduce risk of injury  - Consider OT/PT consult to assist with strengthening/mobility  Outcome: Progressing  Goal: Maintain or return to baseline ADL function  Description: INTERVENTIONS:  -  Assess patient's ability to carry out ADLs; assess patient's baseline for ADL function and identify physical deficits which impact ability to perform ADLs (bathing, care of mouth/teeth, toileting, grooming, dressing, etc )  - Assess/evaluate cause of self-care deficits   - Assess range of motion  - Assess patient's mobility; develop plan if impaired  - Assess patient's need for assistive devices and provide as appropriate  - Encourage maximum independence but intervene and supervise when necessary  - Involve family in performance of ADLs  - Assess for home care needs following discharge   - Consider OT consult to assist with ADL evaluation and planning for discharge  - Provide patient education as appropriate  Outcome: Progressing  Goal: Maintain or return mobility status to optimal level  Description: INTERVENTIONS:  - Assess patient's baseline mobility status (ambulation, transfers, stairs, etc )    - Identify cognitive and physical deficits and behaviors that affect mobility  - Identify mobility aids required to assist with transfers and/or ambulation (gait belt, sit-to-stand, lift, walker, cane, etc )  - [place] fall precautions as indicated by assessment  - Record patient progress and toleration of activity level on Mobility SBAR; progress patient to next Phase/Stage  - Instruct patient to call for assistance with activity based on assessment  - Consider rehabilitation consult to assist with strengthening/weightbearing, etc   Outcome: Progressing     Problem: DISCHARGE PLANNING  Goal: Discharge to home or other facility with appropriate resources  Description: INTERVENTIONS:  - Identify barriers to discharge w/patient and caregiver  - Arrange for needed discharge resources and transportation as appropriate  - Identify discharge learning needs (meds, wound care, etc )  - Arrange for interpretive services to assist at discharge as needed  - Refer to Case Management Department for coordinating discharge planning if the patient needs post-hospital services based on physician/advanced practitioner order or complex needs related to functional status, cognitive ability, or social support system  Outcome: Progressing     Problem: Knowledge Deficit  Goal: Patient/family/caregiver demonstrates understanding of disease process, treatment plan, medications, and discharge instructions  Description: Complete learning assessment and assess knowledge base    Interventions:  - Provide teaching at level of understanding  - Provide teaching via preferred learning methods  Outcome: Progressing

## 2021-01-23 NOTE — ASSESSMENT & PLAN NOTE
Presented emergency room with complaint of generalized abdominal pain  She states that she had food that she normally eat and developed severe abdominal pain a few hours after eating  CT abdomen and pelvis shows- Marked gastric distention with air-fluid level   The finding could be related to slow transit or gastroenteritis   No findings to indicate gastric outlet obstruction  2   Distended small and large bowel loops containing liquid stool   No evidence of bowel obstruction or significant wall thickening  3   Stable hepatomegaly     Clear liquid diet for now  Pain medication PRN  Zofran PRN  IV fluids  Monitor for new onset vomiting, worsening pain  Monitor IO, daily weights  Am labs  Supportive care

## 2021-01-23 NOTE — OCCUPATIONAL THERAPY NOTE
Occupational Therapy Evaluation     Patient Name: Ken Jones  XZWRW'D Date: 1/23/2021  Problem List  Principal Problem:    Diarrhea  Active Problems:    Essential hypertension    COPD (chronic obstructive pulmonary disease) ([facility] )    Other hyperlipidemia    Hypothyroidism    Brain aneurysm    Hypokalemia    Generalized abdominal pain    Past Medical History  Past Medical History:   Diagnosis Date    Anxiety     Asthma     Bipolar 1 disorder ([facility] )     Brain aneurysm     Chronic pain disorder     spinal stenosis    Concussion syndrome     neurological rx and balance rx    COPD (chronic obstructive pulmonary disease) (HCC)     Depression     Disease of thyroid gland     nodules     Family history of colon cancer     father    Fibromyalgia, primary     History of colon polyps     Hyperlipidemia     Hypertension     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     Migraine     Neuropathy     bilateral feet and hands    Peripheral neuropathy     Psychiatric disorder     PTSD (post-traumatic stress disorder)     Stroke ([place] [address] )     2012 no deficeits    Thyroid Cancer     TIA (transient ischemic attack)      Past Surgical History  Past Surgical History:   Procedure Laterality Date    ABDOMINAL SURGERY      laproscopic/ endometriosis    BRAIN SURGERY      aneurysm/ coiling procedure    CARPAL TUNNEL RELEASE      CHOLECYSTECTOMY      DENTAL SURGERY      EYE SURGERY      cataract    HYSTERECTOMY      [place] ESOPHAGOGASTRODUODENOSCOPY TRANSORAL DIAGNOSTIC N/A 2/8/2018    Procedure: EGD AND COLONOSCOPY;  Surgeon: Soni Mckeon MD;  [facility]; Service: Gastroenterology    THYROID SURGERY      TONSILLECTOMY               01/23/21 1001   OT Last Visit   OT Visit Date 01/23/21   Note Type   Note type Evaluation   Restrictions/Precautions   Weight Bearing Precautions Per Order No   Other Precautions Multiple lines;Telemetry;Pain; Fall Risk   Pain Assessment   Pain Assessment Tool 0-10   Pain Score 7   Pain Location/Orientation Location: Abdomen   Home Living   Type of [address]  One level;Performs ADLs on one level; Able to live on main level with bedroom/bathroom;Stairs to enter without rails; Other (Comment)  (2 FRANCY no HR)   Bathroom Shower/Tub Tub/shower unit   Bathroom Toilet Standard   Bathroom Equipment Shower chair   Bathroom Accessibility Accessible   Home Equipment Walker;Cane   Additional Comments pt reports functional mobility with SPC at baseline during functional mobility   Prior Function   Level of [place] Needs assistance with ADLs and functional mobility; Needs assistance with IADLs   Lives With Alone   Receives Help From Home health   ADL Assistance Needs assistance   IADLs Needs assistance   Falls in the last 6 months 1 to 4   Vocational On disability   Comments pt has 26 hrs (A) home health aides; pt reports (A) with all tasks    Psychosocial   Psychosocial (WDL) X   Patient Behaviors/Mood Anxious; Flat affect;Irritable   Subjective   Subjective "you need to stop walking away when I am speaking to you"   ADL   Where Assessed   (bathroom)   LB Dressing Assistance 2  Maximal Assistance   LB Dressing Deficit Thread RLE into underwear; Thread LLE into underwear;Pull up over hips   Toileting Assistance  2  Maximal Assistance   Toileting Deficit Clothing management down;Clothing management up;Perineal hygiene; Increased time to complete   Additional Comments pt with incontinence in brief and reports "this is a mess, I didn't know I was in a mess"; on attempt to perform morena hygiene, pt reports "I can't this is a mess"; reassured pt that there was no mess, and pt continues to state she can not perform morena hygiene; requires max (A) with attempt to perform without assist   Bed Mobility   Supine to Sit 4  Minimal assistance   Additional items Assist x 2;Bedrails; Increased time required;LE management   Sit to Supine 5  Supervision   Additional items Increased time required;Verbal cues   Additional Comments pt on RA during session; no complaints of SOB; SpO2 WFL   Transfers   Sit to Stand 5  Supervision   Additional items Increased time required;Verbal cues  (RW)   Stand to Sit 5  Supervision   Additional items Increased time required;Verbal cues  (RW)   Toilet transfer 5  Supervision   Additional items Increased time required;Verbal cues  (RW)   Additional Comments pt performed functional transfers with no significant LOB; pt reports "my legs are going numb" when seated on toilet and "they are going to collapse"   Functional Mobility   Functional Mobility 5  Supervision   Additional Comments pt performed functional mobility with RW during session to and from bathroom; pt limited due to abdominal pain and lack of motivation   Additional items Rolling walker   Balance   Static Sitting Good   Dynamic Sitting Good   Static Standing Fair +   Dynamic Standing Fair   Ambulatory Fair   Activity Tolerance   Activity Tolerance Patient limited by fatigue;Patient limited by pain   RUE Assessment   RUE Assessment WFL   LUE Assessment   LUE Assessment WFL   Hand Function   Gross Motor Coordination Functional   Fine Motor Coordination Functional   Sensation   Light Touch No apparent deficits   Sharp/Dull No apparent deficits   Cognition   Overall Cognitive Status WFL   Arousal/Participation Alert   Attention Within functional limits   Orientation Level Oriented X4   Memory Within functional limits   Following Commands Follows all commands and directions without difficulty   Assessment   Limitation Decreased ADL status; Decreased UE strength;Decreased Safe judgement during ADL;Decreased endurance;Decreased self-care trans;Decreased high-level ADLs   Assessment  Pt is a 62 y o  female seen for OT evaluation s/p admit to [facility] on 1/22/2021 w/ Diarrhea    Comorbidities affecting pt's functional performance at time of assessment include: TIA, asthma, COPD, migraines, CVA, depression, brain aneurysm, neuropathy, fibromyalgia, bipolar  Personal factors affecting pt at time of IE include:limited home support, behavioral pattern, difficulty performing ADLS, difficulty performing IADLS , limited insight into deficits, decreased initiation and engagement  and health management   Prior to admission, pt was (A) with ADLs and IADLs with use of SPC during mobility  Upon evaluation: Pt requires (S)-max (A) x1-2 with use of RW during mobility 2* the following deficits impacting occupational performance: weakness, decreased strength, decreased balance, decreased tolerance, impaired initiation, impaired problem solving, decreased safety awareness, increased pain and impaired interpersonal skills  Pt to benefit from continued skilled OT tx while in the hospital to address deficits as defined above and maximize level of functional independence w ADL's and functional mobility  Occupational Performance areas to address include: grooming, bathing/shower, toilet hygiene, dressing, functional mobility, community mobility and clothing management  From OT standpoint, recommendation at time of d/c would be home with continued home health aide (A)  Pt's raw score on the AM-PAC Daily Activity inpatient short form is 15, standardized score is 34 69, (greater than/less than) 39 4  Patients at this level are likely to benefit from DC to  however, please refer to therapist recommendation for safe DC planning  Goals   Patient Goals to go home    Short Term Goal  pt will perform UE strengthening exercises    Long Term Goal #1 pt will demonstrate toilet transfers and hygiene at (S) level   Long Term Goal #2 pt will demonstrate UB/LB bathing and grooming tasks at min (A) level   Long Term Goal pt will perform functional mobility with RW at mod (I) level   Plan   Treatment Interventions ADL retraining;Functional transfer training;UE strengthening/ROM; Endurance training;Patient/family training;Equipment evaluation/education; Activityengagement Goal Expiration Date 02/06/21   OT Frequency 3-5x/wk   Recommendation   OT Discharge Recommendation Home with skilled therapy   AM-PAC Daily Activity Inpatient   Lower Body Dressing 2   Bathing 2   Toileting 2   Upper Body Dressing 2   Grooming 3   Eating 4   Daily Activity Raw Score 15   Daily Activity Standardized Score (Calc for Raw Score >=11) 34 69   AM-PAC Applied Cognition Inpatient   Following a Speech/Presentation 4   Understanding Ordinary Conversation 4   Taking Medications 4   Remembering Where Things Are Placed or Put Away 4   Remembering List of 4-5 Errands 4   Taking Care of Complicated Tasks 4   Applied Cognition Raw Score 24   Applied Cognition Standardized Score 62 21       Pt will benefit from continued OT services in order to maximize (I) c ADL performance, FM c RW, and improve overall endurance/strength required to complete functional tasks in preparation for d/c  Pt supine in bed at end of session; all needs within reach and all lines intact; scds connected and turned on

## 2021-01-23 NOTE — ASSESSMENT & PLAN NOTE
Blood pressure is stable  Continue PTA Blood pressure medications  Monitor blood pressure closely in the setting of diarrhea

## 2021-01-23 NOTE — ED PROVIDER NOTES
History  Chief Complaint   Patient presents with    Abdominal Pain     abdominal pain since 11am, with belching  Pt presents to the ED with diffuse lower abdominal pain that started at 11a today  + nausea, no vomiting, however pt states she is belching and they smell and taste bad  No fevers or CP  Pt states the pain is so bad it takes her breath away  Pt was scared with how back the pain was so she called EMS  No diarrhea - had normal BM this afternoon  No blood  Hx of cholecystectomy  No cough, congestion or fevers  Pt is on Norco 10/325 - chronically, pt denies any constipation problems  Prior to Admission Medications   Prescriptions Last Dose Informant Patient Reported? Taking? Asenapine Maleate (SAPHRIS SL)   Yes No   Sig: Place 5 mg under the tongue   Buprenorphine HCl ([place]) 300 MCG FILM   Yes No   Sig: Apply 300 mcg to cheek 2 (two) times a day   HYDROcodone-acetaminophen (NORCO)  mg per tablet   Yes No   Si tab every 6 hours as needed for severe pain   Do not fill before 18   Melatonin 10 MG CAPS   Yes No   Sig: Take 1 tablet by mouth   SUMAtriptan (IMITREX) 100 mg tablet   Yes No   Sig: TAKE 1 TABLET BY MOUTH AT START OF MIGRAINE HEADACHE  MAY REPEAT IN 2 HOURS IF NEEDED   LIMIT TO NO MORE THAN 2 TABLETS IN 24 HOURS, THREE TI   albuterol (PROVENTIL HFA,VENTOLIN HFA) 90 mcg/act inhaler   Yes No   Sig: Inhale 2 puffs every 6 (six) hours   atorvastatin (LIPITOR) 40 mg tablet   Yes No   Sig: Take 40 mg by mouth   carboxymethylcellulose 0 5 % SOLN   No No   Sig: Administer 1 drop to both eyes daily as needed for dry eyes   clonazePAM (KlonoPIN) 0 5 mg tablet   Yes No   Sig: Take 0 5 mg by mouth daily at bedtime   clonazePAM (KlonoPIN) 1 mg tablet   Yes No   Sig: Take 1 mg by mouth daily    cyclobenzaprine (FLEXERIL) 10 mg tablet   Yes No   Sig: TAKE 1 TABLET BY MOUTH 3 TIMES A DAY AS NEEDED FOR MUSCLE SPASMS   dicyclomine (BENTYL) 10 mg capsule   Yes No   Sig: Take 10 mg by mouth 3 (three) times a day before meals   fenofibrate (TRICOR) 48 mg tablet   Yes No   Sig: Take 48 mg by mouth daily   furosemide (LASIX) 20 mg tablet   Yes No   Sig: Take 20 mg by mouth daily   ibuprofen (MOTRIN) 800 mg tablet   No No   Sig: Take 1 tablet (800 mg total) by mouth every 8 (eight) hours as needed for mild pain or fever (Body aches, fevers) for up to 5 days   lamoTRIgine (LaMICtal) 100 mg tablet   Yes No   Sig: Take 100 mg by mouth    lamoTRIgine (LaMICtal) 25 mg tablet   Yes No   Sig: Take 75 mg by mouth daily at bedtime   levothyroxine 137 mcg tablet   Yes No   Sig: Take 137 mcg by mouth daily   magnesium 30 MG tablet   Yes No   Sig: Take 30 mg by mouth 2 (two) times a day   omeprazole (PriLOSEC) 20 mg delayed release capsule   Yes No   Si mg    ondansetron (ZOFRAN-ODT) 4 mg disintegrating tablet   No No   Sig: Take 1 tablet (4 mg total) by mouth every 8 (eight) hours as needed for nausea or vomiting for up to 4 days   pregabalin (LYRICA) 100 mg capsule   Yes No   Sig: take 1 capsule by mouth three times a day   pregabalin (LYRICA) 150 mg capsule   Yes No   Sig: take 1 capsule by mouth NIGHTLY   topiramate (TOPAMAX) 100 mg tablet   Yes No   Sig: Take 50 mg by mouth every 12 (twelve) hours       Facility-Administered Medications: None       Past Medical History:   Diagnosis Date    Anxiety     Asthma     [facility] disorder [facility] )     Brain aneurysm     Chronic pain disorder     spinal stenosis    Concussion syndrome     neurological rx and balance rx    COPD (chronic obstructive pulmonary disease) (HCC)     Depression     Disease of thyroid gland     nodules     Family history of colon cancer     father    Fibromyalgia, primary     History of colon polyps     Hyperlipidemia     Hypertension     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)     2012 no deficeits    Thyroid Cancer     TIA (transient ischemic attack)        Past Surgical History:   Procedure Laterality Date    ABDOMINAL SURGERY      laproscopic/ endometriosis    BRAIN SURGERY      aneurysm/ coiling procedure    CARPAL TUNNEL RELEASE      CHOLECYSTECTOMY      DENTAL SURGERY      EYE SURGERY      cataract    HYSTERECTOMY      [place] ESOPHAGOGASTRODUODENOSCOPY TRANSORAL DIAGNOSTIC N/A 2/8/2018    Procedure: EGD AND COLONOSCOPY;  Surgeon: Gaby Nieto MD;  [facility]; Service: Gastroenterology    THYROID SURGERY         History reviewed  No pertinent family history  I have reviewed and agree with the history as documented  E-Cigarette/Vaping    E-Cigarette Use Never User      E-Cigarette/Vaping Substances    Nicotine No     THC No     CBD No     Flavoring No     Other No     Unknown No      Social History     Tobacco Use    Smoking status: Current Every Day Smoker     Packs/day: 2 00     Types: Cigarettes    Smokeless tobacco: Never Used   Substance Use Topics    Alcohol use: Never     Alcohol/week: 0 0 standard drinks     Frequency: Patient refused     Drinks per session: Patient refused     Binge frequency: Never    Drug use: Never       Review of Systems   HENT: Negative for congestion  Respiratory: Negative for cough  Cardiovascular: Negative  Gastrointestinal: Positive for abdominal distention, abdominal pain and nausea  Negative for blood in stool and constipation  Genitourinary: Negative  All other systems reviewed and are negative  Physical Exam  Physical Exam  Vitals signs and nursing note reviewed  Constitutional:       Appearance: She is well-developed  Comments: Pt crying very upset with her abdominal pian  HENT:      Head: Normocephalic and atraumatic        Right Ear: External ear normal       Left Ear: External ear normal    Eyes:      Conjunctiva/sclera: Conjunctivae normal    Neck: Musculoskeletal: Neck supple  Cardiovascular:      Rate and Rhythm: Normal rate and regular rhythm  Heart sounds: Normal heart sounds  Pulmonary:      Effort: Pulmonary effort is normal       Breath sounds: Normal breath sounds  Abdominal:      General: Bowel sounds are increased  There is distension  Tenderness: There is abdominal tenderness  Musculoskeletal: Normal range of motion  Lymphadenopathy:      Cervical: No cervical adenopathy  Skin:     General: Skin is warm  Findings: No rash  Neurological:      Mental Status: She is alert  Psychiatric:         Behavior: Behavior normal          Thought Content: Thought content normal          Vital Signs  ED Triage Vitals [01/22/21 1925]   Temperature Pulse Respirations Blood Pressure SpO2   (!) 97 2 °F (36 2 °C) 83 18 127/82 95 %      Temp src Heart Rate Source Patient Position - Orthostatic VS BP Location FiO2 (%)   -- Monitor Sitting Left arm --      Pain Score       Worst Possible Pain           Vitals:    01/22/21 1925   BP: 127/82   Pulse: 83   Patient Position - Orthostatic VS: Sitting         Visual Acuity      ED Medications  Medications   iohexol (OMNIPAQUE) 240 MG/ML solution 50 mL (has no administration in time range)   ondansetron (ZOFRAN) injection 4 mg (4 mg Intravenous Given 1/22/21 1948)   ketorolac (TORADOL) injection 15 mg (15 mg Intravenous Given 1/22/21 1950)   potassium chloride (K-DUR,KLOR-CON) CR tablet 20 mEq (20 mEq Oral Given 1/22/21 2053)       Diagnostic Studies  Results Reviewed     Procedure Component Value Units Date/Time    UA w Reflex to Microscopic w Reflex to Culture [[id number]] Collected: 01/22/21 2114    Lab Status:  In process Specimen: Urine, Straight Cath Updated: 01/22/21 2119    Comprehensive metabolic panel [[id number]]  (Abnormal) Collected: 01/22/21 1952    Lab Status: Final result Specimen: Blood from Arm, Left Updated: 01/22/21 2022     Sodium 141 mmol/L      Potassium 3 0 mmol/L      Chloride 104 mmol/L      CO2 23 mmol/L      ANION GAP 14 mmol/L      BUN 17 mg/dL      Creatinine 1 13 mg/dL      Glucose 119 mg/dL      Calcium 9 0 mg/dL      AST 36 U/L      ALT 47 U/L      Alkaline Phosphatase 96 U/L      Total Protein 8 2 g/dL      Albumin 4 2 g/dL      Total Bilirubin 0 20 mg/dL      eGFR 53 ml/min/1 73sq m     Narrative:      [place] guidelines for Chronic Kidney Disease (CKD):     Stage 1 with normal or high GFR (GFR > 90 mL/min/1 73 square meters)    Stage 2 Mild CKD (GFR = 60-89 mL/min/1 73 square meters)    Stage 3A Moderate CKD (GFR = 45-59 mL/min/1 73 square meters)    Stage 3B Moderate CKD (GFR = 30-44 mL/min/1 73 square meters)    Stage 4 Severe CKD (GFR = 15-29 mL/min/1 73 square meters)    Stage 5 End Stage CKD (GFR <15 mL/min/1 73 square meters)  Note: GFR calculation is accurate only with a steady state creatinine    Lipase [[id number]]  (Abnormal) Collected: 01/22/21 1952    Lab Status: Final result Specimen: Blood from Arm, Left Updated: 01/22/21 2015     Lipase 61 u/L     CBC and differential [[id number]]  (Abnormal) Collected: 01/22/21 1952    Lab Status: Final result Specimen: Blood from Arm, Left Updated: 01/22/21 2001     WBC 12 18 Thousand/uL      RBC 4 71 Million/uL      Hemoglobin 14 5 g/dL      Hematocrit 43 4 %      MCV 92 fL      MCH 30 8 pg      MCHC 33 4 g/dL      RDW 13 8 %      MPV 11 1 fL      Platelets 552 Thousands/uL      nRBC 0 /100 WBCs      Neutrophils Relative 63 %      Immat GRANS % 0 %      Lymphocytes Relative 31 %      Monocytes Relative 4 %      Eosinophils Relative 1 %      Basophils Relative 1 %      Neutrophils Absolute 7 77 Thousands/µL      Immature Grans Absolute 0 04 Thousand/uL      Lymphocytes Absolute 3 71 Thousands/µL      Monocytes Absolute 0 51 Thousand/µL      Eosinophils Absolute 0 09 Thousand/µL      Basophils Absolute 0 06 Thousands/µL                  CT abdomen pelvis with contrast    (Results Pending) Procedures  Procedures         ED Course  ED Course as of Jan 22 2120   Reuben Most Jan 22, 2021 1955 Pt vomited here  2017 Pt now able to drink the PO contrast      2039 Pt had very large BM and abdominal pain has subsided  2110 Signed out to DR Fortino Donohue - awaiting CT                                SBIRT 22yo+      Most Recent Value   SBIRT (23 yo +)   In order to provide better care to our patients, we are screening all of our patients for alcohol and drug use  Would it be okay to ask you these screening questions? No Filed at: 01/22/2021 1927   Initial Alcohol Screen: US AUDIT-C    1  How often do you have a drink containing alcohol?  0 Filed at: 01/22/2021 1927   2  How many drinks containing alcohol do you have on a typical day you are drinking? 0 Filed at: 01/22/2021 1927   3a  Male UNDER 65: How often do you have five or more drinks on one occasion? 0 Filed at: 01/22/2021 1927   3b  FEMALE Any Age, or MALE 65+: How often do you have 4 or more drinks on one occassion? 0 Filed at: 01/22/2021 1927   Audit-C Score  0 Filed at: 01/22/2021 1927   ABIOLA: How many times in the past year have you    Used an illegal drug or used a prescription medication for non-medical reasons?   Never Filed at: 01/22/2021 1927                    MDM  Number of Diagnoses or Management Options  Abdominal pain:   Constipation:   Nausea and vomiting:   Diagnosis management comments: Discussed PO contrast w pt concern for obstruction pt tolerating PO contrast        Amount and/or Complexity of Data Reviewed  Clinical lab tests: reviewed    Risk of Complications, Morbidity, and/or Mortality  General comments: Signed out to DR JOSEPH [facility] awaiting CT and urine    Patient Progress  Patient progress: stable      Disposition  Final diagnoses:   Abdominal pain   Nausea and vomiting   Constipation     Time reflects when diagnosis was documented in both MDM as applicable and the Disposition within this note     Time User Action Codes Description Comment    1/22/2021  8:40 PM Pretty Cheung [R10 9] Abdominal pain     1/22/2021  8:40 PM Pretty Cheung [R11 2] Nausea and vomiting     1/22/2021  8:40 PM Pretty Cheung [K59 00] Constipation       ED Disposition     None      Follow-up Information    None         Patient's Medications   Discharge Prescriptions    No medications on file     No discharge procedures on file      PDMP Review     None          ED Provider  Electronically Signed by           Tracey Urbina PA-C  01/22/21 6938

## 2021-01-23 NOTE — ED NOTES
Had small emesis of light brownish liquid w/partially digested food particles-last meal this morning consisting of amelia steak and ate some cashews  Emesis did have a foul, putrid odor        Sheryl Freitas RN  01/22/21 1956

## 2021-01-23 NOTE — ASSESSMENT & PLAN NOTE
No respiratory distress or evidence of acute exacerbation  Respiratory protocol  Continue PT respiratory medication  Continue outpatient pulmonary follow-up

## 2021-01-23 NOTE — ASSESSMENT & PLAN NOTE
Significant hypokalemia, continue with IV replacement via normal saline with 40 mEq of potassium per L

## 2021-01-23 NOTE — ED RE-EVALUATION NOTE
9:50 PM - Received sign out from CARLOS Cheung concerning pt w/ a history of fibromyalgia, COPD, bipolar, HTN, HLD, PTSD, c/o diffuse, lower abd pain which started earlier today  Pt states that she has been constipated and "belching a lot "  Pt has had several stools since she has been in the department  She feels somewhat improved  Labs show a leukocytosis 12 1 and a potassium of 3 0  She has received oral KCL, toradol and zofran  She is awaiting results of her CT scan, but she does feel somewhat better  11:15 PM - I re-evaluated the patient, and she is still having abd pain and diarrhea  She does not feel that she can take care of herself if she is discharged home  Will admit to medicine  1:00 AM - I spoke w/ the hospitalist who will admit the patient to their service       Isabel Arcos DO  01/30/21 0005

## 2021-01-23 NOTE — ASSESSMENT & PLAN NOTE
Presented to the emergency room with complaints of abdominal pain  She started having episodes of diarrhea while in the ER  She had at least 5 episodes of diarrhea in emergency room  No evidence of bloody diarrhea  Check C-diff  Check rotavirus antigen stool, stool enteric bacterial panel by PCR, fecal leukocytes  Check procalcitonin  IV fluids  Monitor for new onset, fever, vomiting worsening abdominal pain  Monitor electrolytes and replace as needed  Monitor vital signs  Monitor I/O daily weights  Am labs  Supportive care

## 2021-01-23 NOTE — CASE MANAGEMENT
Cm met with the patient to evaluate the patients prior function and living situation and any barriers to d/c and form a safe d/c plan  Cm also evaluated the patient for any services in the home or needs for services  Pt resides at home in apartment  Has 2 FRANCY  Pt has aides who come in 26 hours a week from [address] [place] [address]  PT has a cane, walker and shower chair (uses her cane to ambulate)  Pt's family does the driving  PCP is Emory Runner and pharmacy is [facility] in [place] who deliver to pt  Plans are home on dc with resumption of services and outpatient follow up

## 2021-01-23 NOTE — QUICK NOTE
H&P from early this morning reviewed  Patient seen and examined  She is still having profuse watery diarrhea as well as nausea and abdominal pain  So far tolerating liquid diet  Stool studies pending  Potassium repleted this morning  Appears very dry by exam, will give temporary increased rate of IV fluids

## 2021-01-23 NOTE — H&P
[facility] Internal Medicine  H&P- Leanor Nathan Woodall 1960, 62 y o  female MRN: [id number]    Unit/Bed#: 479-10 Encounter: [id number]    Primary Care Provider: Deepika Ponce MD   Date and time admitted to hospital: 1/22/2021  7:27 PM        Diarrhea  Assessment & Plan  Presented to the emergency room with complaints of abdominal pain  She started having episodes of diarrhea while in the ER  She had at least 5 episodes of diarrhea in emergency room  No evidence of bloody diarrhea  Check C-diff  Check rotavirus antigen stool, stool enteric bacterial panel by PCR, fecal leukocytes  Check procalcitonin  IV fluids  Monitor for new onset, fever, vomiting worsening abdominal pain  Monitor electrolytes and replace as needed  Monitor vital signs  Monitor I/O daily weights  Am labs  Supportive care        Generalized abdominal pain  Assessment & Plan  Presented emergency room with complaint of generalized abdominal pain  She states that she had food that she normally eat and developed severe abdominal pain a few hours after eating  CT abdomen and pelvis shows- Marked gastric distention with air-fluid level   The finding could be related to slow transit or gastroenteritis   No findings to indicate gastric outlet obstruction  2   Distended small and large bowel loops containing liquid stool   No evidence of bowel obstruction or significant wall thickening  3   Stable hepatomegaly     Clear liquid diet for now  Pain medication PRN  Zofran PRN  IV fluids  Monitor for new onset vomiting, worsening pain  Monitor IO, daily weights  Am labs  Supportive care      Hypokalemia  Assessment & Plan  Potassium level at 3 0  Replace potassium  Check Am CMP  Monitor potassium levels    Brain aneurysm  Assessment & Plan  Currently stable  Continue outpatient Neurosurgery follow-up    Hypothyroidism  Assessment & Plan  Continue levothyroxine    Other hyperlipidemia  Assessment & Plan  Continue statins, Tricor    COPD (chronic obstructive pulmonary disease) (HCC)  Assessment & Plan  No respiratory distress or evidence of acute exacerbation  Respiratory protocol  Continue PT respiratory medication  Continue outpatient pulmonary follow-up    Essential hypertension  Assessment & Plan  Blood pressure is stable  Continue PTA Blood pressure medications  Monitor blood pressure closely        VTE Prophylaxis: Enoxaparin (Lovenox)  / sequential compression device   Code Status: Level 1- Full code  POLST: POLST form is not discussed and not completed at this time  Discussion with family: Spoke to Apolinar Deutsch (Patient's daughter) via phone  Anticipated Length of Stay:  Patient will be admitted on an Inpatient basis with an anticipated length of stay of  > 2 midnights  Justification for Hospital Stay: Acute diarrhea, Abdominal pain, Hypokalemia  Total Time for Visit, including Counseling / Coordination of Care: 30 minutes  Greater than 50% of this total time spent on direct patient counseling and coordination of care  Chief Complaint:   Abdominal pain    History of Present Illness:    Vel Trinh is a 62 y o  female who presents to the ER with complaints of abdominal pain  starting early on yesterday morning at 11:00 a m  she also complained of associated nausea without vomiting  She will do indicated feeling very fatigued  She denies shortness of breath, chest pain, fever, chills, bloody diarrhea, urinary symptoms  Labs completed in emergency room with results as shown below  CT abdomen and pelvis completed with results as shown below  In the ER she received Zofran 4 mg IV, Toradol 15 mg IV, potassium chloride 20 mEq p o , morphine sulfate 4 mg IV Ativan 1 mg IV  She started having episodes of diarrhea while in the ER  She had more than 5 episodes of diarrhea while I the ER  At bedside she is awake and alert  She states that abdominal pain has improved but she is still having episodes of diarrhea   She also complains of feeling tired  Patient is being admitted on inpatient status [facility] level care for further workup and management of acute diarrhea, abdominal pain, hypokalemia  Review of Systems:    Review of Systems   Constitutional: Positive for fatigue  Negative for appetite change, chills, diaphoresis and fever  HENT: Negative for congestion and sore throat  Eyes: Negative for photophobia and visual disturbance  Respiratory: Negative for cough, chest tightness, shortness of breath and wheezing  Cardiovascular: Negative for chest pain, palpitations and leg swelling  Gastrointestinal: Positive for abdominal pain, diarrhea and nausea  Negative for vomiting  Endocrine: Negative for polydipsia, polyphagia and polyuria  Genitourinary: Negative for difficulty urinating, dysuria, flank pain, frequency and hematuria  Musculoskeletal: Negative for arthralgias, back pain, gait problem and joint swelling  Skin: Negative for color change, pallor, rash and wound  Neurological: Positive for tremors and weakness  Negative for dizziness, syncope and headaches  Psychiatric/Behavioral: Negative for agitation and confusion  The patient is not nervous/anxious          Past Medical and Surgical History:     Past Medical History:   Diagnosis Date    Anxiety     Asthma     Bipolar 1 disorder ([place] [address] )     Brain aneurysm     Chronic pain disorder     spinal stenosis    Concussion syndrome     neurological rx and balance rx    [facility]     Depression     Disease of thyroid gland     nodules     Family history of colon cancer     father    Fibromyalgia, primary     History of colon polyps     Hyperlipidemia     Hypertension     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     Migraine     Neuropathy     bilateral feet and hands    Peripheral neuropathy     Psychiatric disorder     PTSD (post-traumatic stress disorder)     Stroke ([facility] )     2012 no deficeits    Thyroid Cancer     TIA (transient ischemic attack)        Past Surgical History:   Procedure Laterality Date    ABDOMINAL SURGERY      laproscopic/ endometriosis    BRAIN SURGERY      aneurysm/ coiling procedure    CARPAL TUNNEL RELEASE      CHOLECYSTECTOMY      DENTAL SURGERY      EYE SURGERY      cataract    HYSTERECTOMY      [place] ESOPHAGOGASTRODUODENOSCOPY TRANSORAL DIAGNOSTIC N/A 2/8/2018    Procedure: EGD AND COLONOSCOPY;  Surgeon: Blessing Murcia MD;  Location:  GI LAB; Service: Gastroenterology    THYROID SURGERY      TONSILLECTOMY         Meds/Allergies:    Prior to Admission medications    Medication Sig Start Date End Date Taking?  Authorizing Provider   albuterol (PROVENTIL HFA,VENTOLIN HFA) 90 mcg/act inhaler Inhale 2 puffs every 6 (six) hours 12/28/17   Historical Provider, MD   Asenapine Maleate (SAPHRIS SL) Place 5 mg under the tongue    Historical Provider, MD   atorvastatin (LIPITOR) 40 mg tablet Take 40 mg by mouth 1/9/18 6/15/20  Historical Provider, MD   Buprenorphine HCl (Belbuca) 300 MCG FILM Apply 300 mcg to cheek 2 (two) times a day    Historical Provider, MD   carboxymethylcellulose 0 5 % SOLN Administer 1 drop to both eyes daily as needed for dry eyes 2/7/19   Lolly Prasanth, DO   clonazePAM (KlonoPIN) 0 5 mg tablet Take 0 5 mg by mouth daily at bedtime    Historical Provider, MD   clonazePAM (KlonoPIN) 1 mg tablet Take 1 mg by mouth daily  1/25/18   Historical Provider, MD   cyclobenzaprine (FLEXERIL) 10 mg tablet TAKE 1 TABLET BY MOUTH 3 TIMES A DAY AS NEEDED FOR MUSCLE SPASMS 8/14/17   Historical Provider, MD   dicyclomine (BENTYL) 10 mg capsule Take 10 mg by mouth 3 (three) times a day before meals    Historical Provider, MD   fenofibrate (TRICOR) 48 mg tablet Take 48 mg by mouth daily    Historical Provider, MD   furosemide (LASIX) 20 mg tablet Take 20 mg by mouth daily    Historical Provider, MD   HYDROcodone-acetaminophen (NORCO)  mg per tablet 1 tab every 6 hours as needed for severe pain   Do not fill before 2/12/18 1/29/18   Historical Provider, MD   ibuprofen (MOTRIN) 800 mg tablet Take 1 tablet (800 mg total) by mouth every 8 (eight) hours as needed for mild pain or fever (Body aches, fevers) for up to 5 days 10/16/20 10/21/20  Shawn Andersen MD   lamoTRIgine (LaMICtal) 100 mg tablet Take 100 mg by mouth  1/16/18 1/10/21  Historical Provider, MD   lamoTRIgine (LaMICtal) 25 mg tablet Take 75 mg by mouth daily at bedtime    Historical Provider, MD   levothyroxine 137 mcg tablet Take 137 mcg by mouth daily 1/21/19   Historical Provider, MD   magnesium 30 MG tablet Take 30 mg by mouth 2 (two) times a day    Historical Provider, MD   Melatonin 10 MG CAPS Take 1 tablet by mouth    Historical Provider, MD   omeprazole (PriLOSEC) 20 mg delayed release capsule 40 mg  9/25/17   Historical Provider, MD   ondansetron (ZOFRAN-ODT) 4 mg disintegrating tablet Take 1 tablet (4 mg total) by mouth every 8 (eight) hours as needed for nausea or vomiting for up to 4 days 10/16/20 1/10/21  Shawn Andersen MD   pregabalin (LYRICA) 100 mg capsule take 1 capsule by mouth three times a day 10/26/17   Historical Provider, MD   pregabalin (LYRICA) 150 mg capsule take 1 capsule by mouth NIGHTLY 10/26/17   Historical Provider, MD   SUMAtriptan (IMITREX) 100 mg tablet TAKE 1 TABLET BY MOUTH AT START OF MIGRAINE HEADACHE  MAY REPEAT IN 2 HOURS IF NEEDED  LIMIT TO NO MORE THAN 2 TABLETS IN 24 HOURS, THREE TI 1/29/18   Historical Provider, MD   topiramate (TOPAMAX) 100 mg tablet Take 50 mg by mouth every 12 (twelve) hours     Historical Provider, MD     I have reviewed home medications with patient personally  Allergies: Allergies   Allergen Reactions    Hydroxyzine Anaphylaxis     Claims it gives her convulsions       Other     Pollen Extract Itching    Tree Extract     Clarithromycin Rash     Pt denies    Latex Rash    Sulfa Antibiotics Rash     "severe skin burn"       Social History: Marital Status:    Occupation: Disabled  Patient Pre-hospital Living Situation: Lives alone  Patient Pre-hospital Level of Mobility: Active  Patient Pre-hospital Diet Restrictions: None reported  Substance Use History:   Social History     Substance and Sexual Activity   Alcohol Use Never    Alcohol/week: 0 0 standard drinks    Frequency: Patient refused    Drinks per session: Patient refused    Binge frequency: Never     Social History     Tobacco Use   Smoking Status Current Every Day Smoker    Packs/day: 2 00    Types: Cigarettes   Smokeless Tobacco Never Used     Social History     Substance and Sexual Activity   Drug Use Never    Comment: prescribed Belbuca       Family History:    History reviewed  No pertinent family history  Physical Exam:     Vitals:   Blood Pressure: 148/89 (01/23/21 0208)  Pulse: 79 (01/23/21 0208)  Temperature: 98 6 °F (37 °C) (01/23/21 0208)  Temp Source: Oral (01/23/21 6126)  Respirations: 18 (01/23/21 0239)  Height: 5' 4" (162 6 cm) (01/23/21 0328)  Weight - Scale: 86 4 kg (190 lb 7 6 oz) (01/23/21 0208)  SpO2: 95 % (01/23/21 0208)    Physical Exam  Vitals signs reviewed  Constitutional:       General: She is not in acute distress  Appearance: She is ill-appearing  She is not diaphoretic  HENT:      Head: Normocephalic and atraumatic  Mouth/Throat:      Mouth: Mucous membranes are dry  Pharynx: No oropharyngeal exudate or posterior oropharyngeal erythema  Eyes:      Extraocular Movements: Extraocular movements intact  Pupils: Pupils are equal, round, and reactive to light  Neck:      Musculoskeletal: Normal range of motion and neck supple  No neck rigidity  Cardiovascular:      Rate and Rhythm: Normal rate and regular rhythm  Pulses: Normal pulses  Pulmonary:      Effort: No respiratory distress  Breath sounds: No stridor  No wheezing, rhonchi or rales  Abdominal:      General: There is no distension        Palpations: Abdomen is soft  Tenderness: There is no abdominal tenderness  Musculoskeletal: Normal range of motion  Right lower leg: No edema  Skin:     General: Skin is warm and dry  Capillary Refill: Capillary refill takes less than 2 seconds  Coloration: Skin is not jaundiced or pale  Findings: No erythema or rash  Neurological:      Mental Status: She is alert and oriented to person, place, and time  Psychiatric:         Mood and Affect: Mood normal          Additional Data:     Lab Results: I have personally reviewed pertinent reports  Results from last 7 days   Lab Units 01/22/21 1952   WBC Thousand/uL 12 18*   HEMOGLOBIN g/dL 14 5   HEMATOCRIT % 43 4   PLATELETS Thousands/uL 247   NEUTROS PCT % 63   LYMPHS PCT % 31   MONOS PCT % 4   EOS PCT % 1     Results from last 7 days   Lab Units 01/22/21 1952   SODIUM mmol/L 141   POTASSIUM mmol/L 3 0*   CHLORIDE mmol/L 104   CO2 mmol/L 23   BUN mg/dL 17   CREATININE mg/dL 1 13   ANION GAP mmol/L 14*   CALCIUM mg/dL 9 0   ALBUMIN g/dL 4 2   TOTAL BILIRUBIN mg/dL 0 20   ALK PHOS U/L 96   ALT U/L 47   AST U/L 36   GLUCOSE RANDOM mg/dL 119                       Imaging: I have personally reviewed pertinent reports  CT abdomen pelvis with contrast   Final Result by Niel Sacks, MD (01/22 2252)      1  Marked gastric distention with air-fluid level  The finding could be related to slow transit or gastroenteritis  No findings to indicate gastric outlet obstruction  2   Distended small and large bowel loops containing liquid stool  No evidence of bowel obstruction or significant wall thickening  3   Stable hepatomegaly  The study was marked in [facility] for immediate notification  Workstation performed: [id number]             EKG, Pathology, and Other Studies Reviewed on Admission:   · EKG:      Allscripts / Epic Records Reviewed: Yes     ** Please Note: This note has been constructed using a voice recognition system  **

## 2021-01-23 NOTE — ED NOTES
Passed a grossly large amount of formed brown stool w/ some liquid brown stool present  Passing gas        Rubi Guardado RN  01/22/21 2115

## 2021-01-23 NOTE — ASSESSMENT & PLAN NOTE
No respiratory distress or evidence of acute exacerbation  Respiratory protocol    Continue outpatient pulmonary follow-up

## 2021-01-23 NOTE — ED NOTES
Patient noted to be belching, odor noted to have foul smell        Katrin Ballesteros RN  01/22/21 1931

## 2021-01-23 NOTE — ED NOTES
Incontinent x2 of large amount liquid stool  Mikaela-care given and clean brief applied        Rhina Askew RN  01/22/21 1634

## 2021-01-23 NOTE — ASSESSMENT & PLAN NOTE
Presented to the emergency room with complaints of abdominal pain, diffuse, watery diarrhea      Suspected viral gastroenteritis, C diff testing negative    Continue supportive care, changed to normal saline with 40 mEq of potassium per L    Gastric content is positive for occult blood, likely gastritis, continue PPI, no significant drop in hemoglobin

## 2021-01-23 NOTE — PHYSICAL THERAPY NOTE
Physical Therapy Evaluation   Patient Name: Anisha Smith    VPJQY'N Date: 1/23/2021     Problem List  Principal Problem:    Diarrhea  Active Problems:    Essential hypertension    COPD [facility]chronic obstructive pulmonary disease) ([facility] )    Other hyperlipidemia    Hypothyroidism    Brain aneurysm    Hypokalemia    Generalized abdominal pain       Past Medical History  Past Medical History:   Diagnosis Date    Anxiety     Asthma     Bipolar 1 disorder ([facility] )     Brain aneurysm     Chronic pain disorder     spinal stenosis    Concussion syndrome     neurological rx and balance rx    COPD (chronic obstructive pulmonary disease) (HCC)     Depression     Disease of thyroid gland     nodules     Family history of colon cancer     father    Fibromyalgia, primary     History of colon polyps     Hyperlipidemia     Hypertension     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     Migraine     Neuropathy     bilateral feet and hands    Peripheral neuropathy     Psychiatric disorder     PTSD (post-traumatic stress disorder)     Stroke ([place] [address] )     2012 no deficeits    Thyroid Cancer     TIA (transient ischemic attack)         Past Surgical History  Past Surgical History:   Procedure Laterality Date    ABDOMINAL SURGERY      laproscopic/ endometriosis    BRAIN SURGERY      aneurysm/ coiling procedure    CARPAL TUNNEL RELEASE      CHOLECYSTECTOMY      DENTAL SURGERY      EYE SURGERY      cataract    HYSTERECTOMY      [place] ESOPHAGOGASTRODUODENOSCOPY TRANSORAL DIAGNOSTIC N/A 2/8/2018    Procedure: EGD AND COLONOSCOPY;  Surgeon: Marissa Arana MD;  Location: [facility];   Service: Gastroenterology    THYROID SURGERY      TONSILLECTOMY             01/23/21 1002   Note Type   Note type Evaluation   Pain Assessment   Pain Assessment Tool 0-10   Pain Score 7   Pain Location/Orientation Location: Abdomen   Home Living   Additional Comments See OT Evaluation    Prior Function   Comments See OT Evaluation    Restrictions/Precautions   Weight Bearing Precautions Per Order No   Other Precautions Multiple lines;Telemetry; Fall Risk;Pain   Cognition   Overall Cognitive Status WFL   Arousal/Participation Alert   Attention Within functional limits   Orientation Level Oriented X4   Memory Within functional limits   Following Commands Follows all commands and directions without difficulty   RLE Assessment   RLE Assessment WFL   LLE Assessment   LLE Assessment WFL   Bed Mobility   Supine to Sit 4  Minimal assistance   Additional items Assist x 2;Bedrails; Increased time required;LE management   Sit to Supine 5  Supervision   Additional items Increased time required;Verbal cues   Transfers   Sit to Stand 5  Supervision   Additional items Increased time required;Verbal cues   Stand to Sit 5  Supervision   Additional items Increased time required;Verbal cues   Toilet transfer 5  Supervision   Additional items Increased time required;Verbal cues   Ambulation/Elevation   Gait pattern Inconsistent yue; Foward flexed; Excessively slow;Decreased R stance;Decreased L stance;Decreased foot clearance;Narrow RADHA   Gait Assistance 5  Supervision   Assistive Device Rolling walker   Distance 20 feet to and from bathroom   Balance   Static Sitting Good   Dynamic Sitting Good   Static Standing Fair +   Dynamic Standing Fair   Ambulatory Fair   Activity Tolerance   Activity Tolerance Patient limited by fatigue   Assessment   Prognosis Good   Problem List Decreased strength;Decreased endurance;Decreased range of motion; Impaired balance;Decreased mobility; Decreased coordination   Assessment Pt is 62 y o  female seen for PT evaluation s/p admit to [address] on 1/22/2021 w/ Diarrhea  PT consulted to assess pt's functional mobility and d/c needs  Order placed for PT eval and tx, w/ up and OOB as tolerated order   Comorbidities affecting pt's physical performance at time of assessment include: TIA, Asthma, COPD, HTN, hyperlipidemia, stroke, depression, anxiety, Bipolar, PTSD, neuropathy,    PTA, pt was independent w/ all functional mobility w/ SPC  Personal factors affecting pt at time of IE include: ambulating w/ assistive device, inability to ambulate household distances, inability to navigate community distances, inability to navigate level surfaces w/o external assistance, unable to perform dynamic tasks in community, decreased cognition, unable to perform physical activity, limited insight into impairments, inability to perform IADLs and inability to perform ADLs  Please find objective findings from PT assessment regarding body systems outlined above with impairments and limitations including weakness, decreased ROM, impaired balance, decreased endurance, gait deviations, pain, decreased activity tolerance, decreased functional mobility tolerance, fall risk, orthopedic restrictions and SOB upon exertion  Pt to benefit from continued PT tx to address deficits as defined above and maximize level of functional independent mobility and consistency  From PT/mobility standpoint, recommendation at time of d/c would be Home PT pending progress in order to facilitate return to PLOF  Goals   Patient Goals To go home    LTG Expiration Date 02/06/21   Long Term Goal #1 Patient will be (I) with all transfers and bed mobility    Long Term Goal #2 Patient will safely ambulate 100 feet (I) to help facilitate (I) at home and in the community    Plan   Treatment/Interventions ADL retraining;Functional transfer training;LE strengthening/ROM; Elevations; Therapeutic exercise; Bed mobility;Gait training   PT Frequency Other (Comment)  (3-5x per weeks)   Recommendation   PT Discharge Recommendation Home with skilled therapy   Equipment Recommended Walker   PT - OK to Discharge Yes   Additional Comments when medically stable    AM-PAC Basic Mobility Inpatient   Turning in Bed Without Bedrails 2   Lying on Back to Sitting on Edge of Flat Bed 2   Moving Bed to Chair 3   Standing Up From Chair 3   Walk in Room 3   Climb 3-5 Stairs 3   Basic Mobility Inpatient Raw Score 16   Basic Mobility Standardized Score 38 32     Patient in bed when PT entered  Patient in bed when PT left   All lines intact, all needs within reach

## 2021-01-23 NOTE — PLAN OF CARE
Problem: PHYSICAL THERAPY ADULT  Goal: Performs mobility at highest level of function for planned discharge setting  See evaluation for individualized goals  Description: Treatment/Interventions: ADL retraining, Functional transfer training, LE strengthening/ROM, Elevations, Therapeutic exercise, Bed mobility, Gait training  Equipment Recommended: [place]       See flowsheet documentation for full assessment, interventions and recommendations  Note: Prognosis: Good  Problem List: Decreased strength, Decreased endurance, Decreased range of motion, Impaired balance, Decreased mobility, Decreased coordination  Assessment: Pt is 62 y o  female seen for PT evaluation s/p admit to [address] on 1/22/2021 w/ Diarrhea  PT consulted to assess pt's functional mobility and d/c needs  Order placed for PT eval and tx, w/ up and OOB as tolerated order  Comorbidities affecting pt's physical performance at time of assessment include: TIA, Asthma, COPD, HTN, hyperlipidemia, stroke, depression, anxiety, Bipolar, PTSD, neuropathy,    PTA, pt was independent w/ all functional mobility w/ SPC  Personal factors affecting pt at time of IE include: ambulating w/ assistive device, inability to ambulate household distances, inability to navigate community distances, inability to navigate level surfaces w/o external assistance, unable to perform dynamic tasks in community, decreased cognition, unable to perform physical activity, limited insight into impairments, inability to perform IADLs and inability to perform ADLs  Please find objective findings from PT assessment regarding body systems outlined above with impairments and limitations including weakness, decreased ROM, impaired balance, decreased endurance, gait deviations, pain, decreased activity tolerance, decreased functional mobility tolerance, fall risk, orthopedic restrictions and SOB upon exertion  Pt to benefit from continued PT tx to address deficits as defined above and maximize level of functional independent mobility and consistency  From PT/mobility standpoint, recommendation at time of d/c would be Home PT pending progress in order to facilitate return to PLOF  PT Discharge Recommendation: Home with skilled therapy     PT - OK to Discharge: Yes    See flowsheet documentation for full assessment

## 2021-01-23 NOTE — ED NOTES
Patient crying out in pain  Saying "help me " When doctor entered the room she was speaking regularly and not crying  Stated that she did not need anything at this time  When doctor left the room, she started crying out in pain again        Porter Pedroza RN  01/22/21 2023

## 2021-01-24 LAB
ANION GAP SERPL CALCULATED.3IONS-SCNC: 12 MMOL/L (ref 4–13)
BASOPHILS # BLD AUTO: 0.04 THOUSANDS/ΜL (ref 0–0.1)
BASOPHILS NFR BLD AUTO: 0 % (ref 0–1)
BUN SERPL-MCNC: 19 MG/DL (ref 5–25)
C DIFF TOX B TCDB STL QL NAA+PROBE: NEGATIVE
CALCIUM SERPL-MCNC: 7.5 MG/DL (ref 8.3–10.1)
CAMPYLOBACTER DNA SPEC NAA+PROBE: NORMAL
CHLORIDE SERPL-SCNC: 110 MMOL/L (ref 100–108)
CO2 SERPL-SCNC: 22 MMOL/L (ref 21–32)
CREAT SERPL-MCNC: 0.94 MG/DL (ref 0.6–1.3)
EOSINOPHIL # BLD AUTO: 0.07 THOUSAND/ΜL (ref 0–0.61)
EOSINOPHIL NFR BLD AUTO: 1 % (ref 0–6)
ERYTHROCYTE [DISTWIDTH] IN BLOOD BY AUTOMATED COUNT: 14.2 % (ref 11.6–15.1)
GFR SERPL CREATININE-BSD FRML MDRD: 67 ML/MIN/1.73SQ M
GLUCOSE SERPL-MCNC: 108 MG/DL (ref 65–140)
HCT VFR BLD AUTO: 43.2 % (ref 34.8–46.1)
HGB BLD-MCNC: 13.4 G/DL (ref 11.5–15.4)
IMM GRANULOCYTES # BLD AUTO: 0.04 THOUSAND/UL (ref 0–0.2)
IMM GRANULOCYTES NFR BLD AUTO: 0 % (ref 0–2)
LYMPHOCYTES # BLD AUTO: 2.24 THOUSANDS/ΜL (ref 0.6–4.47)
LYMPHOCYTES NFR BLD AUTO: 21 % (ref 14–44)
MCH RBC QN AUTO: 29.8 PG (ref 26.8–34.3)
MCHC RBC AUTO-ENTMCNC: 31 G/DL (ref 31.4–37.4)
MCV RBC AUTO: 96 FL (ref 82–98)
MONOCYTES # BLD AUTO: 0.73 THOUSAND/ΜL (ref 0.17–1.22)
MONOCYTES NFR BLD AUTO: 7 % (ref 4–12)
NEUTROPHILS # BLD AUTO: 7.66 THOUSANDS/ΜL (ref 1.85–7.62)
NEUTS SEG NFR BLD AUTO: 71 % (ref 43–75)
NRBC BLD AUTO-RTO: 0 /100 WBCS
PLATELET # BLD AUTO: 205 THOUSANDS/UL (ref 149–390)
PMV BLD AUTO: 11.2 FL (ref 8.9–12.7)
POTASSIUM SERPL-SCNC: 2.8 MMOL/L (ref 3.5–5.3)
RBC # BLD AUTO: 4.5 MILLION/UL (ref 3.81–5.12)
SALMONELLA DNA SPEC QL NAA+PROBE: NORMAL
SHIGA TOXIN STX GENE SPEC NAA+PROBE: NORMAL
SHIGELLA DNA SPEC QL NAA+PROBE: NORMAL
SODIUM SERPL-SCNC: 144 MMOL/L (ref 136–145)
WBC # BLD AUTO: 10.78 THOUSAND/UL (ref 4.31–10.16)

## 2021-01-24 PROCEDURE — 85025 COMPLETE CBC W/AUTO DIFF WBC: CPT | Performed by: PHYSICIAN ASSISTANT

## 2021-01-24 PROCEDURE — C9113 INJ PANTOPRAZOLE SODIUM, VIA: HCPCS | Performed by: PHYSICIAN ASSISTANT

## 2021-01-24 PROCEDURE — 80048 BASIC METABOLIC PNL TOTAL CA: CPT | Performed by: PHYSICIAN ASSISTANT

## 2021-01-24 PROCEDURE — 99232 SBSQ HOSP IP/OBS MODERATE 35: CPT | Performed by: INTERNAL MEDICINE

## 2021-01-24 RX ORDER — POTASSIUM CHLORIDE AND SODIUM CHLORIDE 900; 300 MG/100ML; MG/100ML
100 INJECTION, SOLUTION INTRAVENOUS CONTINUOUS
Status: DISCONTINUED | OUTPATIENT
Start: 2021-01-24 | End: 2021-01-27 | Stop reason: HOSPADM

## 2021-01-24 RX ORDER — ASENAPINE 10 MG/1
5 TABLET SUBLINGUAL ONCE
Status: DISCONTINUED | OUTPATIENT
Start: 2021-01-24 | End: 2021-01-24

## 2021-01-24 RX ORDER — HYDROCODONE BITARTRATE AND ACETAMINOPHEN 5; 325 MG/1; MG/1
2 TABLET ORAL EVERY 4 HOURS PRN
Status: DISCONTINUED | OUTPATIENT
Start: 2021-01-24 | End: 2021-01-24

## 2021-01-24 RX ORDER — HYDROCODONE BITARTRATE AND ACETAMINOPHEN 5; 325 MG/1; MG/1
2 TABLET ORAL EVERY 4 HOURS PRN
Status: DISCONTINUED | OUTPATIENT
Start: 2021-01-24 | End: 2021-01-25

## 2021-01-24 RX ADMIN — CLONAZEPAM 0.5 MG: 0.5 TABLET ORAL at 21:34

## 2021-01-24 RX ADMIN — Medication 500 MG: at 15:57

## 2021-01-24 RX ADMIN — LEVOTHYROXINE SODIUM 137 MCG: 25 TABLET ORAL at 06:09

## 2021-01-24 RX ADMIN — ALBUTEROL SULFATE 2 PUFF: 90 AEROSOL, METERED RESPIRATORY (INHALATION) at 15:57

## 2021-01-24 RX ADMIN — ENOXAPARIN SODIUM 40 MG: 40 INJECTION SUBCUTANEOUS at 09:15

## 2021-01-24 RX ADMIN — ALBUTEROL SULFATE 2 PUFF: 90 AEROSOL, METERED RESPIRATORY (INHALATION) at 21:33

## 2021-01-24 RX ADMIN — TOPIRAMATE 50 MG: 25 TABLET, FILM COATED ORAL at 09:16

## 2021-01-24 RX ADMIN — DICYCLOMINE HYDROCHLORIDE 10 MG: 10 CAPSULE ORAL at 15:54

## 2021-01-24 RX ADMIN — NICOTINE 1 PATCH: 14 PATCH, EXTENDED RELEASE TRANSDERMAL at 09:17

## 2021-01-24 RX ADMIN — HYDROMORPHONE HYDROCHLORIDE 0.5 MG: 1 INJECTION, SOLUTION INTRAMUSCULAR; INTRAVENOUS; SUBCUTANEOUS at 03:48

## 2021-01-24 RX ADMIN — PREGABALIN 100 MG: 50 CAPSULE ORAL at 12:36

## 2021-01-24 RX ADMIN — TOPIRAMATE 50 MG: 25 TABLET, FILM COATED ORAL at 21:34

## 2021-01-24 RX ADMIN — PANTOPRAZOLE SODIUM 40 MG: 40 INJECTION, POWDER, LYOPHILIZED, FOR SOLUTION INTRAVENOUS at 21:40

## 2021-01-24 RX ADMIN — CLONAZEPAM 1 MG: 0.5 TABLET ORAL at 09:16

## 2021-01-24 RX ADMIN — DICYCLOMINE HYDROCHLORIDE 10 MG: 10 CAPSULE ORAL at 06:09

## 2021-01-24 RX ADMIN — PANTOPRAZOLE SODIUM 40 MG: 40 INJECTION, POWDER, LYOPHILIZED, FOR SOLUTION INTRAVENOUS at 09:16

## 2021-01-24 RX ADMIN — PREGABALIN 150 MG: 75 CAPSULE ORAL at 21:34

## 2021-01-24 RX ADMIN — HYDROCODONE BITARTRATE AND ACETAMINOPHEN 2 TABLET: 5; 325 TABLET ORAL at 18:11

## 2021-01-24 RX ADMIN — POTASSIUM CHLORIDE AND SODIUM CHLORIDE 100 ML/HR: 900; 300 INJECTION, SOLUTION INTRAVENOUS at 11:10

## 2021-01-24 RX ADMIN — FUROSEMIDE 20 MG: 20 TABLET ORAL at 09:15

## 2021-01-24 RX ADMIN — SODIUM CHLORIDE 100 ML/HR: 0.9 INJECTION, SOLUTION INTRAVENOUS at 03:49

## 2021-01-24 RX ADMIN — ALBUTEROL SULFATE 2 PUFF: 90 AEROSOL, METERED RESPIRATORY (INHALATION) at 09:47

## 2021-01-24 RX ADMIN — PREGABALIN 100 MG: 50 CAPSULE ORAL at 09:16

## 2021-01-24 RX ADMIN — DICYCLOMINE HYDROCHLORIDE 10 MG: 10 CAPSULE ORAL at 12:36

## 2021-01-24 RX ADMIN — Medication 500 MG: at 06:09

## 2021-01-24 RX ADMIN — POTASSIUM CHLORIDE AND SODIUM CHLORIDE 100 ML/HR: 900; 300 INJECTION, SOLUTION INTRAVENOUS at 21:27

## 2021-01-24 RX ADMIN — LAMOTRIGINE 75 MG: 25 TABLET ORAL at 21:34

## 2021-01-24 RX ADMIN — Medication 6 MG: at 21:34

## 2021-01-24 RX ADMIN — ONDANSETRON 4 MG: 2 INJECTION INTRAMUSCULAR; INTRAVENOUS at 18:12

## 2021-01-24 RX ADMIN — FENOFIBRATE 48 MG: 48 TABLET ORAL at 09:15

## 2021-01-24 RX ADMIN — ONDANSETRON 4 MG: 2 INJECTION INTRAMUSCULAR; INTRAVENOUS at 03:55

## 2021-01-24 RX ADMIN — PREGABALIN 100 MG: 50 CAPSULE ORAL at 17:25

## 2021-01-24 RX ADMIN — ALBUTEROL SULFATE 2 PUFF: 90 AEROSOL, METERED RESPIRATORY (INHALATION) at 03:48

## 2021-01-24 RX ADMIN — ATORVASTATIN CALCIUM 40 MG: 40 TABLET, FILM COATED ORAL at 15:54

## 2021-01-24 NOTE — UTILIZATION REVIEW
Initial Clinical Review    Admission: Date/Time/Statement:   Admission Orders (From admission, onward)     Ordered        01/23/21 0100  Inpatient Admission  Once                   Orders Placed This Encounter   Procedures    Inpatient Admission     Standing Status:   Standing     Number of Occurrences:   1     Order Specific Question:   Level of Care     Answer:   Med Surg [16]     Order Specific Question:   Estimated length of stay     Answer:   More than 2 Midnights     Order Specific Question:   Certification     Answer:   I certify that inpatient services are medically necessary for this patient for a duration of greater than two midnights  See H&P and MD Progress Notes for additional information about the patient's course of treatment  ED Arrival Information     Expected Arrival Acuity Means of Arrival Escorted By Service Admission Type    - 1/22/2021 19:24 [facility]    Arrival Complaint    abdominal pain/vomiting        Chief Complaint   Patient presents with    Abdominal Pain     abdominal pain since 11am, with belching  Assessment/Plan: 62year old female, presented to the ED @ [address] [facility], from home via EMS  Admitted as Inpateint due to  Generalized abd pain - diarrhea  Yesterday AM started with abd pain, associated with nausea  Feeling very fatigued  CT abdomen and pelvis completed with results as shown below  In the ER she received Zofran 4 mg IV, Toradol 15 mg IV, potassium chloride 20 mEq p o , morphine sulfate 4 mg IV Ativan 1 mg IV  She started having episodes of diarrhea while in the ER  She had more than 5 episodes of diarrhea  Clear liquid diet  Analgesia PRN  Zofran PRN  IV flds  Monitor for any new onset of vomiting or worsening abd pain  Monitor daily weight and I&O  AM labs  About 1640 vometed moderate amount brown/black emesis  01/24/2021 Progress Note:  Colitis:  Continue IV flds    Generalized abdominal pain reported, patient is lethargic but responds to questions, slow to answer questions, patient told me that she feels confused      ED Triage Vitals   Temperature Pulse Respirations Blood Pressure SpO2   01/22/21 1925 01/22/21 1925 01/22/21 1925 01/22/21 1925 01/22/21 1925   (!) 97 2 °F (36 2 °C) 83 18 127/82 95 %      Temp Source Heart Rate Source Patient Position - Orthostatic VS BP Location FiO2 (%)   01/22/21 2229 01/22/21 1925 01/22/21 1925 01/22/21 1925 --   Temporal Monitor Sitting Left arm       Pain Score       01/22/21 1925       Worst Possible Pain          Wt Readings from Last 1 Encounters:   01/24/21 86 4 kg (190 lb 7 6 oz)     Additional Vital Signs:   Date/Time  Temp  Pulse  Resp  BP  MAP (mmHg)  SpO2  O2 Device  Patient Position - Orthostatic VS   01/24/21 08:18:26  98 5 °F (36 9 °C)  --  18  130/87  101  --  --  --   01/24/21 03:28:17  --  77  16  134/86  102  95 %  --  --   01/23/21 21:56:02  99 2 °F (37 3 °C)  --  18  136/86  103  --  --  --   01/23/21 2044  --  --  --  --  --  --  None (Room air)  --   01/23/21 20:33:59  --  --  18  136/91  106  --  --  --   01/23/21 16:30:24  --  81  --  140/90  107  98 %  --  --   01/23/21 07:21:08  98 5 °F (36 9 °C)  79  18  137/85  102  93 %  --  --   01/23/21 0241  --  --  --  --  --  --  None (Room air)  --   01/23/21 0239  --  --  18  --  --  --  --  --   01/23/21 0227  --  --  --  --  --  --  None (Room air)  --   01/23/21 02:08:44  98 6 °F (37 °C)  79  19  148/89  109  95 %  None (Room air)  Lying   01/23/21 02:06:34  98 6 °F (37 °C)  --  19  148/89  109  --  --  --   01/23/21 0130  --  81  18  123/84  98  94 %  None (Room air)  --   01/23/21 0100  --  83  18  113/72  86  93 %  None (Room air)  --   01/23/21 0030  --  83  18  125/81  99  93 %  None (Room air)  Sitting   01/23/21 0000  --  84  18  119/82  97  92 %  None (Room air)  Sitting   01/22/21 2330  --  83  18  127/82  100  91 %  None (Room air)  Sitting   01/22/21 2229  98 5 °F (36 9 °C)  81  18  123/88  -- 94 %  None (Room air)  Lying   01/22/21 1925  97 2 °F (36 2 °C)Abnormal   83  18  127/82  --  95 %  None (Room air)  Sitting     01/24/2021 @ 0721  abd obs X:  1   Nonobstructive bowel gas pattern  2   Distended, air-filled large and small bowel demonstrating differential fluid levels, similar to the earlier CT scan   Findings most compatible with diffuse intestinal ileus  3   Distended stomach is straightening differential fluid level in the erect position   Finding similar to recent CT scan   Correlate for gastroparesis  01/22/2021 @ 2222 CT Abd/pel:  1   Marked gastric distention with air-fluid level   The finding could be related to slow transit or gastroenteritis   No findings to indicate gastric outlet obstruction  2   Distended small and large bowel loops containing liquid stool   No evidence of bowel obstruction or significant wall thickening  3   Stable hepatomegaly       Pertinent Labs/Diagnostic Test Results:     Results from last 7 days   Lab Units 01/24/21  0426 01/23/21 0433 01/22/21 1952   WBC Thousand/uL 10 78* 12 87* 12 18*   HEMOGLOBIN g/dL 13 4 13 5 14 5   HEMATOCRIT % 43 2 42 4 43 4   PLATELETS Thousands/uL 205 211 247   NEUTROS ABS Thousands/µL 7 66*  --  7 77*     Results from last 7 days   Lab Units 01/24/21  0426 01/23/21  1239 01/23/21 0433 01/22/21 1952   SODIUM mmol/L 144 142 142 141   POTASSIUM mmol/L 2 8* 3 5 2 5* 3 0*   CHLORIDE mmol/L 110* 106 106 104   CO2 mmol/L 22 22 22 23   ANION GAP mmol/L 12 14* 14* 14*   BUN mg/dL 19 19 18 17   CREATININE mg/dL 0 94 1 11 1 09 1 13   EGFR ml/min/1 73sq m 67 54 56 53   CALCIUM mg/dL 7 5* 8 4 8 3 9 0   MAGNESIUM mg/dL  --   --  2 2  --    PHOSPHORUS mg/dL  --   --  3 8  --      Results from last 7 days   Lab Units 01/23/21 0433 01/22/21 1952   AST U/L 33 36   ALT U/L 43 47   ALK PHOS U/L 86 96   TOTAL PROTEIN g/dL 7 4 8 2   ALBUMIN g/dL 3 8 4 2   TOTAL BILIRUBIN mg/dL 0 30 0 20     Results from last 7 days   Lab Units 01/24/21  0426 01/23/21  1239 01/23/21  0433 01/22/21 1952   GLUCOSE RANDOM mg/dL 108 131 123 119     Results from last 7 days   Lab Units 01/22/21 1952   LIPASE u/L 61*     Results from last 7 days   Lab Units 01/22/21 2114   CLARITY UA  Clear   COLOR UA  Yellow   SPEC GRAV UA  >=1 030   PH UA  6 0   GLUCOSE UA mg/dl Negative   KETONES UA mg/dl Trace*   BLOOD UA  Negative   PROTEIN UA mg/dl Negative   NITRITE UA  Negative   BILIRUBIN UA  Negative   UROBILINOGEN UA E U /dl 0 2   LEUKOCYTES UA  Negative     Results from last 7 days   Lab Units 01/23/21  0246   C DIFF TOXIN B BY PCR  Negative     Results from last 7 days   Lab Units 01/23/21  0246   SALMONELLA SP PCR  None Detected   SHIGELLA SP/ENTEROINVASIVE E  COLI (EIEC)  None Detected   CAMPYLOBACTER SP (JEJUNI AND COLI)  None Detected   SHIGA TOXIN 1/SHIGA TOXIN 2  None Detected     ED Treatment:   Medication Administration from 01/22/2021 1924 to 01/23/2021 0205       Date/Time Order Dose Route Action     01/22/2021 1948 ondansetron (ZOFRAN) injection 4 mg 4 mg Intravenous Given     01/22/2021 1950 ketorolac (TORADOL) injection 15 mg 15 mg Intravenous Given     01/22/2021 2053 potassium chloride (K-DUR,KLOR-CON) CR tablet 20 mEq 20 mEq Oral Given     01/22/2021 2159 iohexol (OMNIPAQUE) 350 MG/ML injection (SINGLE-DOSE) 100 mL 100 mL Intravenous Given     01/22/2021 2258 morphine (PF) 4 mg/mL injection 4 mg 4 mg Intravenous Given     01/22/2021 2256 LORazepam (ATIVAN) injection 1 mg 1 mg Intravenous Given        Past Medical History:   Diagnosis Date    Anxiety     Asthma     Bipolar 1 disorder ([address] )     Brain aneurysm     Chronic pain disorder     spinal stenosis    Concussion syndrome     neurological rx and balance rx    COPD (chronic obstructive pulmonary disease) (HCC)     Depression     Disease of thyroid gland     nodules     Family history of colon cancer     father    Fibromyalgia, primary     History of colon polyps     Hyperlipidemia  Hypertension     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     Migraine     Neuropathy     bilateral feet and hands    Peripheral neuropathy     Psychiatric disorder     PTSD (post-traumatic stress disorder)     Stroke ([facility] [address] )     2012 no deficeits    Thyroid Cancer     TIA (transient ischemic attack)      Present on Admission:   COPD (chronic obstructive pulmonary disease) (HCC)   Colitis   Essential hypertension   Other hyperlipidemia   Hypokalemia   Generalized abdominal pain   Hypothyroidism   Brain aneurysm      Admitting Diagnosis: Abdominal pain [R10 9]  Nausea and vomiting [R11 2]  Constipation [K59 00]  Age/Sex: 62 y o  female  Admission Orders:  Scheduled Medications:  albuterol, 2 puff, Inhalation, Q6H  atorvastatin, 40 mg, Oral, Daily With Dinner  clonazePAM, 0 5 mg, Oral, HS  clonazePAM, 1 mg, Oral, Daily  dicyclomine, 10 mg, Oral, TID AC  enoxaparin, 40 mg, Subcutaneous, Daily  fenofibrate, 48 mg, Oral, Daily  lamoTRIgine, 75 mg, Oral, HS  levothyroxine, 137 mcg, Oral, Early Morning  Magnesium Gluconate, 500 mg, Oral, BID AC  melatonin, 6 mg, Oral, HS  nicotine, 1 patch, Transdermal, Daily  pantoprazole, 40 mg, Intravenous, Q12H SNOW  pregabalin, 100 mg, Oral, TID  pregabalin, 150 mg, Oral, HS  topiramate, 50 mg, Oral, Q12H SNOW      Continuous IV Infusions:  sodium chloride 0 9 % with KCl 40 mEq/L, 100 mL/hr, Intravenous, Continuous      PRN Meds:  acetaminophen, 650 mg, Oral, Q6H PRN  HYDROcodone-acetaminophen, 2 tablet, Oral, Q4H PRN  iohexol, 50 mL, Oral, Once in imaging  ondansetron, 4 mg, Intravenous, Q4H PRN  polyvinyl alcohol, 1 drop, Both Eyes, Q3H PRN      Clear liquid diet  Daily weight / I&O  Consult PT/OT  José Antonio SCDs  IP CONSULT TO CASE MANAGEMENT  IP CONSULT TO CASE MANAGEMENT    Network Utilization Review Department  ATTENTION: Please call with any questions or concerns to [phone] and carefully listen to the prompts so that you are directed to the right person  All voicemails are confidential   Mat Bicker all requests for admission clinical reviews, approved or denied determinations and any other requests to dedicated fax number below belonging to the campus where the patient is receiving treatment   List of dedicated fax numbers for the Facilities:  [address] DENIALS (Administrative/Medical Necessity) [phone]   [address] (Maternity/NICU/Pediatrics) [phone] [address] [address] [address] [place] [address] (Dare[place] Round) [address] [address] [place] [address]) [address] [place] [address] [phone]

## 2021-01-24 NOTE — PROGRESS NOTES
Progress Note Kyra Woodall 1960, 62 y o  female MRN: [id number]    Unit/Bed#: 022-20 Encounter: [id number]    Primary Care Provider: Elba Block MD   Date and time admitted to hospital: 1/22/2021  7:27 PM        * Colitis  Assessment & Plan  Presented to the emergency room with complaints of abdominal pain, diffuse, watery diarrhea  Suspected viral gastroenteritis, C diff testing negative    Continue supportive care, changed to normal saline with 40 mEq of potassium per L    Gastric content is positive for occult blood, likely gastritis, continue PPI, no significant drop in hemoglobin    Hypokalemia  Assessment & Plan  Significant hypokalemia, continue with IV replacement via normal saline with 40 mEq of potassium per L    Generalized abdominal pain  Assessment & Plan  Presented emergency room with complaint of generalized abdominal pain      CT abdomen and pelvis shows- Marked gastric distention with air-fluid level   The finding could be related to slow transit or gastroenteritis   No findings to indicate gastric outlet obstruction  2   Distended small and large bowel loops containing liquid stool   No evidence of bowel obstruction or significant wall thickening  3   Stable hepatomegaly  Continue clear liquids      Brain aneurysm  Assessment & Plan  Currently stable  Continue outpatient Neurosurgery follow-up    COPD (chronic obstructive pulmonary disease) ([facility] )  Assessment & Plan  No respiratory distress or evidence of acute exacerbation  Respiratory protocol    Continue outpatient pulmonary follow-up    Essential hypertension  Assessment & Plan  Blood pressure is stable  Continue PTA Blood pressure medications  Monitor blood pressure closely in the setting of diarrhea     Hypothyroidism  Assessment & Plan  Continue levothyroxine    Other hyperlipidemia  Assessment & Plan  Continue statins, Tricor          Code Status: Level 1 - Full Code      Subjective:   Generalized abdominal pain reported, patient is lethargic but responds to questions, slow to answer questions, patient told me that she feels confused    Patient is oriented to person place and time    Objective:     Vitals:   Temp (24hrs), Av 9 °F (37 2 °C), Min:98 5 °F (36 9 °C), Max:99 2 °F (37 3 °C)    Temp:  [98 5 °F (36 9 °C)-99 2 °F (37 3 °C)] 98 5 °F (36 9 °C)  HR:  [77-81] 77  Resp:  [16-18] 18  BP: (130-140)/(86-91) 130/87  SpO2:  [95 %-98 %] 95 %  Body mass index is 32 7 kg/m²  Input and Output Summary (last 24 hours): Intake/Output Summary (Last 24 hours) at 2021 1257  Last data filed at 2021 1745  Gross per 24 hour   Intake 0 ml   Output --   Net 0 ml       Physical Exam:     Physical Exam  Vitals signs and nursing note reviewed  Constitutional:       General: She is not in acute distress  Appearance: She is not ill-appearing, toxic-appearing or diaphoretic  Comments: Chronically ill-appearing female who appears older than stated age   HENT:      Head: Normocephalic and atraumatic  Right Ear: External ear normal       Left Ear: External ear normal    Neck:      Musculoskeletal: Normal range of motion  Cardiovascular:      Rate and Rhythm: Normal rate  Pulmonary:      Effort: Pulmonary effort is normal       Breath sounds: Normal breath sounds  No wheezing or rhonchi  Skin:     General: Skin is warm and dry  Coloration: Skin is pale  Skin is not jaundiced  Neurological:      General: No focal deficit present  Mental Status: She is alert and oriented to person, place, and time             Additional Data:     Labs:    Results from last 7 days   Lab Units 21  0426   WBC Thousand/uL 10 78*   HEMOGLOBIN g/dL 13 4   HEMATOCRIT % 43 2   PLATELETS Thousands/uL 205   NEUTROS PCT % 71   LYMPHS PCT % 21   MONOS PCT % 7   EOS PCT % 1     Results from last 7 days   Lab Units 21  0426  21  0433   POTASSIUM mmol/L 2 8*   < > 2 5*   CHLORIDE mmol/L 110*   < > 106   CO2 mmol/L 22   < > 22   BUN mg/dL 19   < > 18   CREATININE mg/dL 0 94   < > 1 09   CALCIUM mg/dL 7 5*   < > 8 3   ALK PHOS U/L  --   --  86   ALT U/L  --   --  43   AST U/L  --   --  33    < > = values in this interval not displayed  * I Have Reviewed All Lab Data Listed Above  * Additional Pertinent Lab Tests Reviewed:  Jing Sutherland Admission Reviewed      Recent Cultures (last 7 days):     Results from last 7 days   Lab Units 01/23/21  0246   C DIFF TOXIN B BY PCR  Negative       Last 24 Hours Medication List:   Current Facility-Administered Medications   Medication Dose Route Frequency Provider Last Rate    acetaminophen  650 mg Oral Q6H PRN Uriel Alfaro PA-C      albuterol  2 puff Inhalation Q6H Uriel Alfaro PA-C      atorvastatin  40 mg Oral Daily With Dinner Uriel Alfaro PA-C      clonazePAM  0 5 mg Oral HS Uriel Alfaro PA-C      clonazePAM  1 mg Oral Daily Uriel Alfaro PA-C      dicyclomine  10 mg Oral TID AC Uriel Alfaro PA-C      enoxaparin  40 mg Subcutaneous Daily Uriel Alfaro PA-C      fenofibrate  48 mg Oral Daily Uriel Alfaro PA-C      HYDROcodone-acetaminophen  2 tablet Oral Q4H PRN Elizabeth Charles, DO      iohexol  50 mL Oral Once in imaging Uriel Alfaro PA-C      lamoTRIgine  75 mg Oral HS Uriel Alfaro PA-C      levothyroxine  137 mcg Oral Early Morning Uriel Alfaro PA-C      Magnesium Gluconate  500 mg Oral BID AC [facility], DO      melatonin  6 mg Oral HS Uriel Alfaro PA-C      nicotine  1 patch Transdermal Daily Uriel Alfaro PA-C      ondansetron  4 mg Intravenous Q4H PRN Elizabeth Charles, DO      pantoprazole  40 mg Intravenous Q12H [address] Annette Green PA-C      polyvinyl alcohol  1 drop Both Eyes Q3H PRN Elizabeth Charles, DO      pregabalin  100 mg Oral TID Uriel Alfaro PA-C      pregabalin  150 mg Oral HS Uriel Alfaro PA-C      sodium chloride 0 9 % with KCl 40 mEq/L  100 mL/hr Intravenous Continuous Elizabeth Charles,  mL/hr (01/24/21 1110)    topiramate  50 mg Oral Q12H [facility] Uriel Alfaro PA-C          Today, Patient Was Seen By: Tessa Somers DO

## 2021-01-24 NOTE — ASSESSMENT & PLAN NOTE
Presented emergency room with complaint of generalized abdominal pain      CT abdomen and pelvis shows- Marked gastric distention with air-fluid level   The finding could be related to slow transit or gastroenteritis   No findings to indicate gastric outlet obstruction  2   Distended small and large bowel loops containing liquid stool   No evidence of bowel obstruction or significant wall thickening  3   Stable hepatomegaly  Continue clear liquids

## 2021-01-24 NOTE — PLAN OF CARE
Problem: Potential for Falls  Goal: Patient will remain free of falls  Description: INTERVENTIONS:  - Assess patient frequently for physical needs  -  Identify cognitive and physical deficits and behaviors that affect risk of falls    -  [place] fall precautions as indicated by assessment   - Educate patient/family on patient safety including physical limitations  - Instruct patient to call for assistance with activity based on assessment  - Modify environment to reduce risk of injury  - Consider OT/PT consult to assist with strengthening/mobility  Outcome: Progressing     Problem: PAIN - ADULT  Goal: Verbalizes/displays adequate comfort level or baseline comfort level  Description: Interventions:  - Encourage patient to monitor pain and request assistance  - Assess pain using appropriate pain scale  - Administer analgesics based on type and severity of pain and evaluate response  - Implement non-pharmacological measures as appropriate and evaluate response  - Consider cultural and social influences on pain and pain management  - Notify physician/advanced practitioner if interventions unsuccessful or patient reports new pain  Outcome: Progressing     Problem: INFECTION - ADULT  Goal: Absence or prevention of progression during hospitalization  Description: INTERVENTIONS:  - Assess and monitor for signs and symptoms of infection  - Monitor lab/diagnostic results  - Monitor all insertion sites, i e  indwelling lines, tubes, and drains  - Monitor endotracheal if appropriate and nasal secretions for changes in amount and color  - [place] appropriate cooling/warming therapies per order  - Administer medications as ordered  - Instruct and encourage patient and family to use good hand hygiene technique  - Identify and instruct in appropriate isolation precautions for identified infection/condition  Outcome: Progressing     Problem: SAFETY ADULT  Goal: Patient will remain free of falls  Description: INTERVENTIONS:  - Assess patient frequently for physical needs  -  Identify cognitive and physical deficits and behaviors that affect risk of falls    -  [place] fall precautions as indicated by assessment   - Educate patient/family on patient safety including physical limitations  - Instruct patient to call for assistance with activity based on assessment  - Modify environment to reduce risk of injury  - Consider OT/PT consult to assist with strengthening/mobility  Outcome: Progressing  Goal: Maintain or return to baseline ADL function  Description: INTERVENTIONS:  -  Assess patient's ability to carry out ADLs; assess patient's baseline for ADL function and identify physical deficits which impact ability to perform ADLs (bathing, care of mouth/teeth, toileting, grooming, dressing, etc )  - Assess/evaluate cause of self-care deficits   - Assess range of motion  - Assess patient's mobility; develop plan if impaired  - Assess patient's need for assistive devices and provide as appropriate  - Encourage maximum independence but intervene and supervise when necessary  - Involve family in performance of ADLs  - Assess for home care needs following discharge   - Consider OT consult to assist with ADL evaluation and planning for discharge  - Provide patient education as appropriate  Outcome: Progressing  Goal: Maintain or return mobility status to optimal level  Description: INTERVENTIONS:  - Assess patient's baseline mobility status (ambulation, transfers, stairs, etc )    - Identify cognitive and physical deficits and behaviors that affect mobility  - Identify mobility aids required to assist with transfers and/or ambulation (gait belt, sit-to-stand, lift, walker, cane, etc )  - [place] fall precautions as indicated by assessment  - Record patient progress and toleration of activity level on Mobility SBAR; progress patient to next Phase/Stage  - Instruct patient to call for assistance with activity based on assessment  - Consider rehabilitation consult to assist with strengthening/weightbearing, etc   Outcome: Progressing     Problem: DISCHARGE PLANNING  Goal: Discharge to home or other facility with appropriate resources  Description: INTERVENTIONS:  - Identify barriers to discharge w/patient and caregiver  - Arrange for needed discharge resources and transportation as appropriate  - Identify discharge learning needs (meds, wound care, etc )  - Arrange for interpretive services to assist at discharge as needed  - Refer to Case Management Department for coordinating discharge planning if the patient needs post-hospital services based on physician/advanced practitioner order or complex needs related to functional status, cognitive ability, or social support system  Outcome: Progressing     Problem: Knowledge Deficit  Goal: Patient/family/caregiver demonstrates understanding of disease process, treatment plan, medications, and discharge instructions  Description: Complete learning assessment and assess knowledge base    Interventions:  - Provide teaching at level of understanding  - Provide teaching via preferred learning methods  Outcome: Progressing

## 2021-01-25 ENCOUNTER — APPOINTMENT (INPATIENT)
Dept: RADIOLOGY | Facility: HOSPITAL | Age: 61
DRG: 390 | End: 2021-01-25
Payer: COMMERCIAL

## 2021-01-25 PROBLEM — E83.39 HYPOPHOSPHATEMIA: Status: ACTIVE | Noted: 2021-01-25

## 2021-01-25 PROBLEM — I95.9 HYPOTENSION: Status: ACTIVE | Noted: 2021-01-25

## 2021-01-25 PROBLEM — K56.7 ILEUS (HCC): Status: ACTIVE | Noted: 2021-01-25

## 2021-01-25 PROBLEM — Z72.0 TOBACCO USE: Status: ACTIVE | Noted: 2021-01-25

## 2021-01-25 LAB
ALBUMIN SERPL BCP-MCNC: 2.7 G/DL (ref 3.5–5)
ALP SERPL-CCNC: 57 U/L (ref 46–116)
ALT SERPL W P-5'-P-CCNC: 26 U/L (ref 12–78)
ANION GAP SERPL CALCULATED.3IONS-SCNC: 7 MMOL/L (ref 4–13)
AST SERPL W P-5'-P-CCNC: 26 U/L (ref 5–45)
BILIRUB SERPL-MCNC: 0.2 MG/DL (ref 0.2–1)
BUN SERPL-MCNC: 16 MG/DL (ref 5–25)
CALCIUM ALBUM COR SERPL-MCNC: 8.3 MG/DL (ref 8.3–10.1)
CALCIUM SERPL-MCNC: 7.3 MG/DL (ref 8.3–10.1)
CHLORIDE SERPL-SCNC: 113 MMOL/L (ref 100–108)
CO2 SERPL-SCNC: 24 MMOL/L (ref 21–32)
CREAT SERPL-MCNC: 1.05 MG/DL (ref 0.6–1.3)
FLUAV RNA RESP QL NAA+PROBE: NEGATIVE
FLUBV RNA RESP QL NAA+PROBE: NEGATIVE
GFR SERPL CREATININE-BSD FRML MDRD: 58 ML/MIN/1.73SQ M
GLUCOSE SERPL-MCNC: 92 MG/DL (ref 65–140)
INR PPP: 1.13 (ref 0.84–1.19)
MAGNESIUM SERPL-MCNC: 2.1 MG/DL (ref 1.6–2.6)
PHOSPHATE SERPL-MCNC: 1.4 MG/DL (ref 2.7–4.5)
POTASSIUM SERPL-SCNC: 3.7 MMOL/L (ref 3.5–5.3)
PROCALCITONIN SERPL-MCNC: <0.05 NG/ML
PROT SERPL-MCNC: 5.6 G/DL (ref 6.4–8.2)
PROTHROMBIN TIME: 14.3 SECONDS (ref 11.6–14.5)
RSV RNA RESP QL NAA+PROBE: NEGATIVE
SARS-COV-2 RNA RESP QL NAA+PROBE: NEGATIVE
SODIUM SERPL-SCNC: 144 MMOL/L (ref 136–145)
WBC SPEC QL GRAM STN: NORMAL

## 2021-01-25 PROCEDURE — 84145 PROCALCITONIN (PCT): CPT | Performed by: INTERNAL MEDICINE

## 2021-01-25 PROCEDURE — 97530 THERAPEUTIC ACTIVITIES: CPT

## 2021-01-25 PROCEDURE — NC001 PR NO CHARGE: Performed by: SURGERY

## 2021-01-25 PROCEDURE — 80053 COMPREHEN METABOLIC PANEL: CPT | Performed by: INTERNAL MEDICINE

## 2021-01-25 PROCEDURE — 94760 N-INVAS EAR/PLS OXIMETRY 1: CPT

## 2021-01-25 PROCEDURE — C9113 INJ PANTOPRAZOLE SODIUM, VIA: HCPCS | Performed by: PHYSICIAN ASSISTANT

## 2021-01-25 PROCEDURE — 74240 X-RAY XM UPR GI TRC 1CNTRST: CPT

## 2021-01-25 PROCEDURE — 0241U HB NFCT DS VIR RESP RNA 4 TRGT: CPT | Performed by: INTERNAL MEDICINE

## 2021-01-25 PROCEDURE — 99232 SBSQ HOSP IP/OBS MODERATE 35: CPT | Performed by: INTERNAL MEDICINE

## 2021-01-25 PROCEDURE — 84100 ASSAY OF PHOSPHORUS: CPT | Performed by: INTERNAL MEDICINE

## 2021-01-25 PROCEDURE — 83735 ASSAY OF MAGNESIUM: CPT | Performed by: INTERNAL MEDICINE

## 2021-01-25 PROCEDURE — 85610 PROTHROMBIN TIME: CPT | Performed by: INTERNAL MEDICINE

## 2021-01-25 PROCEDURE — 74248 X-RAY SM INT F-THRU STD: CPT

## 2021-01-25 PROCEDURE — 99222 1ST HOSP IP/OBS MODERATE 55: CPT | Performed by: SURGERY

## 2021-01-25 PROCEDURE — 97116 GAIT TRAINING THERAPY: CPT

## 2021-01-25 RX ORDER — LORAZEPAM 0.5 MG/1
0.5 TABLET ORAL EVERY 6 HOURS PRN
Status: DISCONTINUED | OUTPATIENT
Start: 2021-01-25 | End: 2021-01-27 | Stop reason: HOSPADM

## 2021-01-25 RX ORDER — HYDROMORPHONE HCL/PF 1 MG/ML
0.5 SYRINGE (ML) INJECTION EVERY 4 HOURS PRN
Status: DISCONTINUED | OUTPATIENT
Start: 2021-01-25 | End: 2021-01-26

## 2021-01-25 RX ORDER — METOCLOPRAMIDE HYDROCHLORIDE 5 MG/ML
5 INJECTION INTRAMUSCULAR; INTRAVENOUS EVERY 6 HOURS SCHEDULED
Status: DISCONTINUED | OUTPATIENT
Start: 2021-01-25 | End: 2021-01-27 | Stop reason: HOSPADM

## 2021-01-25 RX ORDER — ALBUTEROL SULFATE 90 UG/1
2 AEROSOL, METERED RESPIRATORY (INHALATION) EVERY 6 HOURS PRN
Status: DISCONTINUED | OUTPATIENT
Start: 2021-01-25 | End: 2021-01-27 | Stop reason: HOSPADM

## 2021-01-25 RX ORDER — HYDROCODONE BITARTRATE AND ACETAMINOPHEN 5; 325 MG/1; MG/1
2 TABLET ORAL EVERY 4 HOURS PRN
Status: DISCONTINUED | OUTPATIENT
Start: 2021-01-25 | End: 2021-01-26

## 2021-01-25 RX ADMIN — ALBUTEROL SULFATE 2 PUFF: 90 AEROSOL, METERED RESPIRATORY (INHALATION) at 02:02

## 2021-01-25 RX ADMIN — PANTOPRAZOLE SODIUM 40 MG: 40 INJECTION, POWDER, LYOPHILIZED, FOR SOLUTION INTRAVENOUS at 09:18

## 2021-01-25 RX ADMIN — PREGABALIN 100 MG: 50 CAPSULE ORAL at 09:18

## 2021-01-25 RX ADMIN — Medication 500 MG: at 16:56

## 2021-01-25 RX ADMIN — HYDROMORPHONE HYDROCHLORIDE 0.5 MG: 1 INJECTION, SOLUTION INTRAMUSCULAR; INTRAVENOUS; SUBCUTANEOUS at 13:43

## 2021-01-25 RX ADMIN — ALBUTEROL SULFATE 2 PUFF: 90 AEROSOL, METERED RESPIRATORY (INHALATION) at 09:34

## 2021-01-25 RX ADMIN — METOCLOPRAMIDE HYDROCHLORIDE 5 MG: 5 INJECTION INTRAMUSCULAR; INTRAVENOUS at 18:17

## 2021-01-25 RX ADMIN — HYDROMORPHONE HYDROCHLORIDE 0.5 MG: 1 INJECTION, SOLUTION INTRAMUSCULAR; INTRAVENOUS; SUBCUTANEOUS at 21:37

## 2021-01-25 RX ADMIN — PREGABALIN 100 MG: 50 CAPSULE ORAL at 16:56

## 2021-01-25 RX ADMIN — HYDROCODONE BITARTRATE AND ACETAMINOPHEN 2 TABLET: 5; 325 TABLET ORAL at 01:17

## 2021-01-25 RX ADMIN — POTASSIUM CHLORIDE AND SODIUM CHLORIDE 100 ML/HR: 900; 300 INJECTION, SOLUTION INTRAVENOUS at 09:17

## 2021-01-25 RX ADMIN — PANTOPRAZOLE SODIUM 40 MG: 40 INJECTION, POWDER, LYOPHILIZED, FOR SOLUTION INTRAVENOUS at 21:37

## 2021-01-25 RX ADMIN — LORAZEPAM 0.5 MG: 0.5 TABLET ORAL at 16:57

## 2021-01-25 RX ADMIN — LAMOTRIGINE 75 MG: 25 TABLET ORAL at 21:37

## 2021-01-25 RX ADMIN — Medication 500 MG: at 06:23

## 2021-01-25 RX ADMIN — METOCLOPRAMIDE HYDROCHLORIDE 5 MG: 5 INJECTION INTRAMUSCULAR; INTRAVENOUS at 23:43

## 2021-01-25 RX ADMIN — TOPIRAMATE 50 MG: 25 TABLET, FILM COATED ORAL at 09:18

## 2021-01-25 RX ADMIN — POTASSIUM PHOSPHATE, MONOBASIC AND POTASSIUM PHOSPHATE, DIBASIC 12 MMOL: 224; 236 INJECTION, SOLUTION, CONCENTRATE INTRAVENOUS at 16:54

## 2021-01-25 RX ADMIN — ENOXAPARIN SODIUM 40 MG: 40 INJECTION SUBCUTANEOUS at 09:19

## 2021-01-25 RX ADMIN — DICYCLOMINE HYDROCHLORIDE 10 MG: 10 CAPSULE ORAL at 06:23

## 2021-01-25 RX ADMIN — HYDROMORPHONE HYDROCHLORIDE 0.5 MG: 1 INJECTION, SOLUTION INTRAMUSCULAR; INTRAVENOUS; SUBCUTANEOUS at 09:24

## 2021-01-25 RX ADMIN — CLONAZEPAM 1 MG: 0.5 TABLET ORAL at 09:18

## 2021-01-25 RX ADMIN — FENOFIBRATE 48 MG: 48 TABLET ORAL at 09:18

## 2021-01-25 RX ADMIN — NICOTINE 1 PATCH: 14 PATCH, EXTENDED RELEASE TRANSDERMAL at 09:24

## 2021-01-25 RX ADMIN — PREGABALIN 150 MG: 75 CAPSULE ORAL at 21:36

## 2021-01-25 RX ADMIN — Medication 6 MG: at 21:37

## 2021-01-25 RX ADMIN — TOPIRAMATE 50 MG: 25 TABLET, FILM COATED ORAL at 21:37

## 2021-01-25 RX ADMIN — CLONAZEPAM 0.5 MG: 0.5 TABLET ORAL at 21:37

## 2021-01-25 RX ADMIN — LEVOTHYROXINE SODIUM 137 MCG: 25 TABLET ORAL at 06:23

## 2021-01-25 RX ADMIN — ONDANSETRON 4 MG: 2 INJECTION INTRAMUSCULAR; INTRAVENOUS at 09:24

## 2021-01-25 RX ADMIN — ATORVASTATIN CALCIUM 40 MG: 40 TABLET, FILM COATED ORAL at 16:57

## 2021-01-25 NOTE — PROGRESS NOTES
Progress Note Beau Woodall 1960, 62 y o  female MRN: [id number]    Unit/Bed#: 765-02 Encounter: [id number]    Primary Care Provider: Ham Cisneros MD   Date and time admitted to hospital: 1/22/2021  7:27 PM        * [facility])  Assessment & Plan  · The patient was seen in consultation by General Surgery  · Clear liquid diet for now  · Discontinue bentyl  · Initiate reglan 5 mg IV every 6 hours  · Continue IV fluids  · Check a small-bowel follow-through study  · May require lumbar spine imaging if her symptoms persist to rule-out a disc herniation with the patient's symptoms of lower extremity paresthesias  · Consult Gastroenterology    Hypotension  Assessment & Plan  · Likely secondary to hypovolemia  · Continue IV fluids  · Follow the blood pressure trend    Tobacco use  Assessment & Plan  · Nicotine patch  · Smoking cessation counseling    Hypophosphatemia  Assessment & Plan  · Give potassium phosphate 12 mmol IV x 1 dose    Hypokalemia  Assessment & Plan  · Improved with IV potassium chloride supplementation  · Follow the potassium level and magnesium level    Results from last 7 days   Lab Units 01/25/21  0417 01/24/21  0426 01/23/21  1239   SODIUM mmol/L 144 144 142   POTASSIUM mmol/L 3 7 2 8* 3 5   CHLORIDE mmol/L 113* 110* 106   CO2 mmol/L 24 22 22   BUN mg/dL 16 19 19   CREATININE mg/dL 1 05 0 94 1 11   CALCIUM mg/dL 7 3* 7 5* 8 4         Brain aneurysm  Assessment & Plan  Currently stable  Continue outpatient Neurosurgery follow-up    Hypothyroidism  Assessment & Plan  · Continue levothyroxine    Other hyperlipidemia  Assessment & Plan  · Continue statin therapy and fenofibrate        VTE Pharmacologic Prophylaxis:   Pharmacologic: Enoxaparin (Lovenox)  Mechanical VTE Prophylaxis in Place: Yes    Patient Centered Rounds: I have performed bedside rounds with nursing staff today      Discussions with Specialists or Other Care Team Provider: I discussed the case with General Surgery  Time Spent for Care: 30 minutes  More than 50% of total time spent on counseling and coordination of care as described above  Current Length of Stay: 2 day(s)    Current Patient Status: Inpatient   Certification Statement: The patient will continue to require additional inpatient hospital stay due to the need for further work-up of the ileus, for continuous IV fluids, and with the patient only tolerating minimal clear liquids at this time  Code Status: Level 1 - Full Code      Subjective: The patient was seen and examined  The patient continues to experience generalized abdominal pain as well as nausea  In addition, the patient had multiple episodes of diarrhea this morning  The patient is only able to tolerate minimal clear liquids at this time  Objective:     Vitals:   Temp (24hrs), Av 7 °F (36 5 °C), Min:97 °F (36 1 °C), Max:98 1 °F (36 7 °C)    Temp:  [97 °F (36 1 °C)-98 1 °F (36 7 °C)] 98 1 °F (36 7 °C)  HR:  [72-74] 72  Resp:  [18-19] 18  BP: ()/(57-74) 112/66  SpO2:  [95 %-97 %] 96 %  Body mass index is 32 7 kg/m²  Input and Output Summary (last 24 hours):        Intake/Output Summary (Last 24 hours) at 2021 1500  Last data filed at 2021 0924  Gross per 24 hour   Intake 100 ml   Output 500 ml   Net -400 ml       Physical Exam:     Physical Exam  General:  NAD, follows commands  HEENT:  NC/AT, mucous membranes dry  Neck:  Supple, No JVP elevation  CV:  + S1, + S2, RRR  Pulm:  Lung fields are CTA bilaterally  Abd:  Soft, Mild distension, Generalized tenderness with palpation  Ext:  No clubbing/cyanosis/edema  Skin:  No rashes  Neuro:  Awake, alert, oriented  Psych:  Normal mood and affect      Additional Data:    Labs:    Results from last 7 days   Lab Units 21  0426   WBC Thousand/uL 10 78*   HEMOGLOBIN g/dL 13 4   HEMATOCRIT % 43 2   PLATELETS Thousands/uL 205   NEUTROS PCT % 71   LYMPHS PCT % 21   MONOS PCT % 7   EOS PCT % 1     Results from last 7 days   Lab Units 01/25/21  0417   SODIUM mmol/L 144   POTASSIUM mmol/L 3 7   CHLORIDE mmol/L 113*   CO2 mmol/L 24   BUN mg/dL 16   CREATININE mg/dL 1 05   ANION GAP mmol/L 7   CALCIUM mg/dL 7 3*   ALBUMIN g/dL 2 7*   TOTAL BILIRUBIN mg/dL 0 20   ALK PHOS U/L 57   ALT U/L 26   AST U/L 26   GLUCOSE RANDOM mg/dL 92     Results from last 7 days   Lab Units 01/25/21  0417   INR  1 13                       * I Have Reviewed All Lab Data Listed Above  * Additional Pertinent Lab Tests Reviewed:  Jing 66 Admission Reviewed      Recent Cultures (last 7 days):     Results from last 7 days   Lab Units 01/23/21  0246   C DIFF TOXIN B BY PCR  Negative       Last 24 Hours Medication List:   Current Facility-Administered Medications   Medication Dose Route Frequency Provider Last Rate    albuterol  2 puff Inhalation Q6H Uriel Alfaro PA-C      atorvastatin  40 mg Oral Daily With Dinner Uriel Alfaro PA-C      clonazePAM  0 5 mg Oral HS Uriel Alfaro PA-C      clonazePAM  1 mg Oral Daily Uriel Alfaro PA-C      enoxaparin  40 mg Subcutaneous Daily Uriel Alfaro PA-C      fenofibrate  48 mg Oral Daily Uriel Alfaro PA-C      HYDROcodone-acetaminophen  2 tablet Oral Q4H PRN [employer], DO      Or    HYDROmorphone  0 5 mg Intravenous Q4H PRN [employer], DO      iohexol  50 mL Oral Once in imaging Uriel Alfaro PA-C      lamoTRIgine  75 mg Oral HS Uriel Alfaro PA-C      levothyroxine  137 mcg Oral Early Morning Uriel Alfaro PA-C      LORazepam  0 5 mg Oral Q6H PRN [employer], DO      Magnesium Gluconate  500 mg Oral BID AC Jose Patel, DO      melatonin  6 mg Oral HS Uriel Alfaro PA-C      metoclopramide  5 mg Intravenous Q6H [facility] Gareth Styles PA-C      nicotine  1 patch Transdermal Daily Uriel Alfaro PA-C      ondansetron  4 mg Intravenous Q4H PRN [place] Jose Luis,       pantoprazole  40 mg Intravenous Q12H [facility] Elisabeth Ellsworth PA-C      polyvinyl alcohol  1 drop Both Eyes Q3H PRN Sam Cameron Jorge, DO      potassium phosphate  12 mmol Intravenous Once Chang Crawford,       pregabalin  100 mg Oral TID Uriel Alfaro PA-C      pregabalin  150 mg Oral HS Uriel Alfaro PA-C      sodium chloride 0 9 % with KCl 40 mEq/L  100 mL/hr Intravenous Continuous Lourena Lapidus,  mL/hr (01/25/21 4639)    topiramate  50 mg Oral Q12H [address] Uriel Alfaro PA-C          Today, Patient Was Seen By: Chang Crawford DO    ** Please Note: Dictation voice to text software may have been used in the creation of this document   **

## 2021-01-25 NOTE — CONSULTS
Consultation - [address] 62 y o  female MRN: [id number]  Unit/Bed#: 717-83 Encounter: [id number]    Assessment/Plan     Assessment:  Patient admitted 3 days ago with generalized abdominal pain, vomiting and diarrhea  Last formed bowel movement was on Friday  Patient presented with multiple episodes of loose watery stools, 1 overnight episode of diarrhea  No vomiting yesterday  NG tube decompression was not required  Mild leukocytosis on admission 12 18 resolved  Stool for enteric pathogens and C difficile negative    -Suspect paralytic ileus  -Doubt Olgilvie syndrome or toxic megacolon  -Possible gastroparesis, patient is not diabetic   -Obstruction series done yesterday showed nonobstructive bowel gas pattern  Distended loops of large and small bowel compatible with diffuse intestinal ileus  Stomach was distended and suggested possible gastroparesis  -past abdominal surgery included total abdominal hysterectomy and laparoscopic cholecystectomy,  possible intra-abdominal adhesions  -Previous history of TIA June 2020, prior intercranial cerebral aneurysm coiling  -Morbid obesity, BMI 32 70  -Known lumbar disc disease, CT spine reviewed from September 2019 showed lumbar degenerative disc disease at L1-L2 and mild annular bulging at L2-3 and L3-4   -Patient with a history of peripheral neuropathy  She has complaints of both legs going numb sitting in an upright position in bedside chair  One possibility may be neurogenic root compression as the cause of possible paralytic ileus   -Patient with longstanding history of fibromyalgia  -COPD  -Bipolar disorder  -Hypertension  -Posttraumatic stress disorder  -Hyperlipidemia  -Tobacco use disorder    Plan:  -Maintain clear liquids as tolerated  -Discontinue Bentyl  -IV fluids for hydration  -AM labs, check electrolytes    -Consideration for TPN if bowel function does not return  -Personally discussed via telephone with Dr Krystian Engel, general surgeon who will see the patient later today  -He recommended starting the patient on Reglan, will add 5 mg IV q 6 hours round-the-clock  -The patient did not have any sign of intestinal obstruction on imaging studies   -Proceed with small-bowel follow-through evaluate GI motility, prefer to be performed today   -Avoid use of narcotic pain medications if possible  -Recommend Gastroenterology consultation tomorrow, Tuesday January 26th   -Personally discussed with Dr Ag Botello, hospitalist physician  -No plan for general surgical intervention at this time, will continue to follow   -Serial abdominal exams  -Consideration for further evaluation of the patient's degenerative lumbar disc disease and herniated disc at L2-L3 and L3-L4     -At this point would hold on obtaining MRI spine imaging until small-bowel follow-through performed and gastroenterology has evaluated patient  History of Present Illness     HPI:  Ericka Wilson is a 62 y o  obese white female the history of TIA hospitalized here in June of this year as well as previous cerebral aneurysm coiling in the past who presents with symptoms of diffuse abdominal pain and admitted 3 days ago  Patient presented with multiple episodes of loose watery diarrhea  She had at least 5 episodes of diarrhea in the ED  No blood present in the stool  She is evaluated emergency department and admitted with suspected viral gastroenteritis  C difficile testing was subsequently negative  Gastric content was a positive for occult blood  Was initially kept nothing by mouth  She had mild leukocytosis which has since resolved  She was advanced clear liquids  Patient describes abdominal cramping across her entire abdomen  She is not passing any flatus today  Some nausea over the weekend, no vomiting recorded yesterday  She had 1 loose watery stool around 330 this morning  The patient denies previous history of similar type symptoms    She has not been on recent antibiotics  No travel outside the area or anyone else sick in her family residing with her  She really has not been able to tolerate clear liquids because of nausea  She is not diabetic  CT scan imaging was reviewed from January 22nd showed marked gastric dilatation with slow transit or gastroenteritis, no reason to suggest gastric outlet obstruction  She was noted to have distended small large bowel loops containing liquid stool, no obvious bowel obstruction or wall thickening  Obstruction series was done yesterday showed similar findings and probable diffuse intestinal ileus  General surgical consultation requested by the hospitalist provider at this time  Patient did not require NG tube insertion for decompression  At present she is sitting up in bedside chair, she is complaining that her legs feel numb in a sitting position desires to go back to lie supine in bed  She does have a history of lumbar degenerative disc disease upon review of CT scan findings from September 2019  CT abdomen and pelvis shows- Marked gastric distention with air-fluid level   The finding could be related to slow transit or gastroenteritis   No findings to indicate gastric outlet obstruction  2   Distended small and large bowel loops containing liquid stool   No evidence of bowel obstruction or significant wall thickening  3   Stable hepatomegaly  CT spine September 2019 showed:       Lumbar degenerative disc disease at the L1-2 level  Mild annular bulging at L2-3 and L3-4        Inpatient consult to Acute Care Surgery  Consult performed by: Chandra Powell PA-C  Consult ordered by: Kyrie Earl DO        Review of Systems   Constitutional: Positive for appetite change  Negative for chills, diaphoresis, fatigue, fever and unexpected weight change  HENT: Negative  Respiratory: Negative for cough, shortness of breath, wheezing and stridor      Cardiovascular: Negative for chest pain and leg swelling  Gastrointestinal: Positive for abdominal distention, abdominal pain, diarrhea and nausea  Negative for anal bleeding, blood in stool, rectal pain and vomiting  Endocrine: Negative  Negative for cold intolerance, heat intolerance, polydipsia, polyphagia and polyuria  Genitourinary: Negative   2, para 2  Musculoskeletal: Positive for arthralgias, back pain and myalgias  Negative for neck pain and neck stiffness  Skin: Negative  Allergic/Immunologic: Negative  Neurological: Positive for weakness and numbness  Negative for tremors, syncope and speech difficulty  Hematological: Negative for adenopathy  Does not bruise/bleed easily  Psychiatric/Behavioral: Negative             Historical Information   Past Medical History:   Diagnosis Date    Anxiety     Asthma     Bipolar 1 disorder ([facility] )     Brain aneurysm     Chronic pain disorder     spinal stenosis    Concussion syndrome     neurological rx and balance rx    [facility]     Depression     Disease of thyroid gland     nodules     Family history of colon cancer     father    Fibromyalgia, primary     History of colon polyps     Hyperlipidemia     Hypertension     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     Migraine     Neuropathy     bilateral feet and hands    Peripheral neuropathy     Psychiatric disorder     PTSD (post-traumatic stress disorder)     Stroke ([facility] )      no deficeits    Thyroid Cancer     TIA (transient ischemic attack)      Past Surgical History:   Procedure Laterality Date    ABDOMINAL SURGERY      laproscopic/ endometriosis    BRAIN SURGERY      aneurysm/ coiling procedure    CARPAL TUNNEL RELEASE      CHOLECYSTECTOMY      DENTAL SURGERY      EYE SURGERY      cataract    HYSTERECTOMY      MA ESOPHAGOGASTRODUODENOSCOPY TRANSORAL DIAGNOSTIC N/A 2018    Procedure: EGD AND COLONOSCOPY;  Surgeon: Yakelin Johnson MD; Location:  GI LAB; Service: Gastroenterology    THYROID SURGERY      TONSILLECTOMY       Social History   Social History     Substance and Sexual Activity   Alcohol Use Never    Alcohol/week: 0 0 standard drinks    Frequency: Patient refused    Drinks per session: Patient refused    Binge frequency: Never     Social History     Substance and Sexual Activity   Drug Use Never    Comment: prescribed Belbuca     E-Cigarette/Vaping    E-Cigarette Use Never User      E-Cigarette/Vaping Substances    Nicotine No     THC No     CBD No     Flavoring No     Other No     Unknown No      Social History     Tobacco Use   Smoking Status Current Every Day Smoker    Packs/day: 2 00    Types: Cigarettes   Smokeless Tobacco Never Used     Family History: Father diagnosed with colon CA in his 46s, recently  from Alzheimer's  Patient has one son in [place], one daughter lives in [place]  Patient is  and lives alone      Meds/Allergies   current meds:   Current Facility-Administered Medications   Medication Dose Route Frequency    albuterol (PROVENTIL HFA,VENTOLIN HFA) inhaler 2 puff  2 puff Inhalation Q6H    atorvastatin (LIPITOR) tablet 40 mg  40 mg Oral Daily With Dinner    clonazePAM (KlonoPIN) tablet 0 5 mg  0 5 mg Oral HS    clonazePAM (KlonoPIN) tablet 1 mg  1 mg Oral Daily    dicyclomine (BENTYL) capsule 10 mg  10 mg Oral TID AC    enoxaparin (LOVENOX) subcutaneous injection 40 mg  40 mg Subcutaneous Daily    fenofibrate (TRICOR) tablet 48 mg  48 mg Oral Daily    HYDROcodone-acetaminophen (NORCO) 5-325 mg per tablet 2 tablet  2 tablet Oral Q4H PRN    Or    HYDROmorphone (DILAUDID) injection 0 5 mg  0 5 mg Intravenous Q4H PRN    iohexol (OMNIPAQUE) 240 MG/ML solution 50 mL  50 mL Oral Once in imaging    lamoTRIgine (LaMICtal) tablet 75 mg  75 mg Oral HS    levothyroxine tablet 137 mcg  137 mcg Oral Early Morning    LORazepam (ATIVAN) tablet 0 5 mg  0 5 mg Oral Q6H PRN    Magnesium Gluconate TABS 500 mg  500 mg Oral BID AC    melatonin tablet 6 mg  6 mg Oral HS    nicotine (NICODERM CQ) 14 mg/24hr TD 24 hr patch 1 patch  1 patch Transdermal Daily    ondansetron (ZOFRAN) injection 4 mg  4 mg Intravenous Q4H PRN    pantoprazole (PROTONIX) injection 40 mg  40 mg Intravenous Q12H SNOW    polyvinyl alcohol (LIQUIFILM TEARS) 1 4 % ophthalmic solution 1 drop  1 drop Both Eyes Q3H PRN    potassium phosphates 12 mmol in sodium chloride 0 9 % 250 mL infusion  12 mmol Intravenous Once    pregabalin (LYRICA) capsule 100 mg  100 mg Oral TID    pregabalin (LYRICA) capsule 150 mg  150 mg Oral HS    sodium chloride 0 9 % with KCl 40 mEq/L infusion (premix)  100 mL/hr Intravenous Continuous    topiramate (TOPAMAX) tablet 50 mg  50 mg Oral Q12H [facility]     Allergies   Allergen Reactions    Hydroxyzine Anaphylaxis     Claims it gives her convulsions       Other     Pollen Extract Itching    Tree Extract     Clarithromycin Rash     Pt denies    Latex Rash    Sulfa Antibiotics Rash     "severe skin burn"       Objective   First Vitals:   Blood Pressure: 127/82 (01/22/21 1925)  Pulse: 83 (01/22/21 1925)  Temperature: (!) 97 2 °F (36 2 °C) (01/22/21 1925)  Temp Source: Temporal (01/22/21 2229)  Respirations: 18 (01/22/21 1925)  Height: 5' 4" (162 6 cm) (01/22/21 1925)  Weight - Scale: 88 9 kg (195 lb 15 8 oz) (01/22/21 1925)  SpO2: 95 % (01/22/21 1925)    Current Vitals:   Blood Pressure: 112/66 (01/25/21 0721)  Pulse: 72 (01/25/21 0543)  Temperature: 98 1 °F (36 7 °C) (01/25/21 0721)  Temp Source: Oral (01/25/21 0543)  Respirations: 18 (01/25/21 0721)  Height: 5' 4" (162 6 cm) (01/23/21 8885)  Weight - Scale: 86 4 kg (190 lb 7 6 oz) (01/25/21 0600)  SpO2: 96 % (01/25/21 0543)      Intake/Output Summary (Last 24 hours) at 1/25/2021 0916  Last data filed at 1/25/2021 0217  Gross per 24 hour   Intake 120 ml   Output 500 ml   Net -380 ml       Invasive Devices     Peripheral Intravenous Line Peripheral IV 01/22/21 Right Antecubital 2 days                Physical Exam  Constitutional:       General: She is not in acute distress  Appearance: Normal appearance  She is not ill-appearing, toxic-appearing or diaphoretic  Comments: The patient appears much older than stated age of 62years of age  HENT:      Head: Normocephalic and atraumatic  Nose: No congestion or rhinorrhea  Mouth/Throat:      Pharynx: No oropharyngeal exudate or posterior oropharyngeal erythema  Comments: Edentulous  Eyes:      General: No scleral icterus  Extraocular Movements: Extraocular movements intact  Pupils: Pupils are equal, round, and reactive to light  Neck:      Musculoskeletal: No neck rigidity or muscular tenderness  Vascular: No carotid bruit  Cardiovascular:      Rate and Rhythm: Normal rate and regular rhythm  Heart sounds: No murmur  No friction rub  No gallop  Pulmonary:      Effort: No respiratory distress  Breath sounds: No stridor  No wheezing, rhonchi or rales  Chest:      Chest wall: No tenderness  Abdominal:      Palpations: There is no mass  Tenderness: There is abdominal tenderness  There is no right CVA tenderness, left CVA tenderness, guarding or rebound  Hernia: No hernia is present  Comments: Bowel sounds very inactive  Mild abdominal distention  Some tympany over the epigastrium  No masses  Liver edge is not palpable  No fluid wave is elicited  Low transverse abdominal incision noted  No hernias palpable  Musculoskeletal:         General: No swelling, tenderness, deformity or signs of injury  Right lower leg: No edema  Left lower leg: No edema  Lymphadenopathy:      Cervical: No cervical adenopathy  Skin:     Coloration: Skin is not jaundiced or pale  Findings: No bruising, erythema, lesion or rash  Neurological:      Mental Status: She is alert and oriented to person, place, and time   Mental status is at baseline  Comments: Babinski's downgoing bilaterally  Neg SLR bilateral   Moves toes  No calf tenderness  Has sensation to touch both LEs  No tremor  Ambulation not observed  Psychiatric:         Mood and Affect: Mood normal          Behavior: Behavior normal          Thought Content: Thought content normal         Lab Results:   I have personally reviewed pertinent lab results  , CBC: No results found for: WBC, HGB, HCT, MCV, PLT, ADJUSTEDWBC, MCH, MCHC, RDW, MPV, NRBC, CMP:   Lab Results   Component Value Date    SODIUM 144 01/25/2021    K 3 7 01/25/2021     (H) 01/25/2021    CO2 24 01/25/2021    BUN 16 01/25/2021    CREATININE 1 05 01/25/2021    CALCIUM 7 3 (L) 01/25/2021    AST 26 01/25/2021    ALT 26 01/25/2021    ALKPHOS 57 01/25/2021    EGFR 58 01/25/2021   , Coagulation:   Lab Results   Component Value Date    INR 1 13 01/25/2021   , Urinalysis: No results found for: Briana Randolph, [address], [address] [facility], [address] [facility], [place], [address] [facility], GLUCOSEU, KETONESU, BILIRUBINUR, BLOODU  Imaging: I have personally reviewed pertinent films in PACS     OBSTRUCTION SERIES - 1/23/2021     INDICATION:   Abdominal pain vomiting diarrhea      COMPARISON:  CT abdomen pelvis performed one day earlier      EXAM PERFORMED/VIEWS:  XR ABDOMEN OBSTRUCTION SERIES  Supine and erect views were performed  Images: 5        FINDINGS:  Again identified are multiple dilated, air-filled loops of large and small bowel throughout the abdomen and pelvis demonstrating differential fluid levels in the erect position  Differential fluid level is seen in the stomach as well      Urinary bladder distended and filled with contrast material       No free air beneath the hemidiaphragms       No pathologic calcifications or soft tissue masses evident       Osseous structures are unremarkable      Examination of the chest reveals a normal cardiomediastinal silhouette  Atherosclerotic changes in the aorta    Lungs are clear         IMPRESSION:  1  Nonobstructive bowel gas pattern  2   Distended, air-filled large and small bowel demonstrating differential fluid levels, similar to the earlier CT scan  Findings most compatible with diffuse intestinal ileus  3   Distended stomach is straightening differential fluid level in the erect position  Finding similar to recent CT scan  Correlate for gastroparesis      CT ABDOMEN AND PELVIS WITH IV CONTRAST - 1/22/2021     INDICATION:   Nausea/vomiting  Abdominal pain, acute, nonlocalized  abdominal pian and belching      COMPARISON:  1/10/2021      TECHNIQUE:  CT examination of the abdomen and pelvis was performed  Axial, sagittal, and coronal 2D reformatted images were created from the source data and submitted for interpretation      Radiation dose length product (DLP) for this visit:  1447 mGy-cm   This examination, like all CT scans performed in the [facility], was performed utilizing techniques to minimize radiation dose exposure, including the use of iterative   reconstruction and automated exposure control      IV Contrast:  100 mL of iohexol (OMNIPAQUE)  Enteric Contrast:  Enteric contrast was not administered      FINDINGS:     ABDOMEN     LOWER CHEST:  Mild dependent changes in the lung bases      LIVER/BILIARY TREE:  Stable hepatomegaly measuring 22 cm increasing according to a centimeter  Hepatic steatosis  Stable 1 7 cm left hepatic lobe cyst   Additional subcentimeter hypodensities are too small to accurately characterize  Stable prominence   of the common bile duct and central intrahepatic ducts likely related to the sequela of cholecystectomy      GALLBLADDER:  Gallbladder is surgically absent      SPLEEN:  Unremarkable      PANCREAS:  Unremarkable      ADRENAL GLANDS:  Unremarkable      KIDNEYS/URETERS:  No hydronephrosis or urinary tract calculus    One or more sharply circumscribed subcentimeter renal hypodensities are present, too small to accurately characterize, and statistically most likely benign findings  According to recent   literature (Radiology 2019) no further workup of these findings is recommended      STOMACH AND BOWEL:  Marked gastric distention containing air-fluid level  Distended small and large bowel loops containing liquid stool, most pronounced in the rectosigmoid and cecal region  No evidence of bowel obstruction or significant wall   thickening       APPENDIX:  No findings to suggest appendicitis      ABDOMINOPELVIC CAVITY:  No ascites  No pneumoperitoneum  No lymphadenopathy      VESSELS:  Unremarkable for patient's age      PELVIS     REPRODUCTIVE ORGANS:  Surgical changes of prior hysterectomy      URINARY BLADDER:  Unremarkable      ABDOMINAL WALL/INGUINAL REGIONS:  Small fat-containing umbilical hernia      OSSEOUS STRUCTURES:  No acute fracture or destructive osseous lesion  Multilevel degenerative changes of the lumbar spine  Minimal grade 1 anterolisthesis of L3 on L4      IMPRESSION:     1  Marked gastric distention with air-fluid level  The finding could be related to slow transit or gastroenteritis  No findings to indicate gastric outlet obstruction  2   Distended small and large bowel loops containing liquid stool  No evidence of bowel obstruction or significant wall thickening  3   Stable hepatomegaly  CT Spine 9/13/2019  IMPRESSION:     Lumbar degenerative disc disease at the L1-2 level  Mild annular bulging at L2-3 and L3-4  EKG, Pathology, and Other Studies: I have personally reviewed pertinent reports  Counseling / Coordination of Care  Total floor / unit time spent today 60 minutes  Greater than 50% of total time was spent with the patient and / or family counseling and / or coordination of care  A description of the counseling / coordination of care: review of imaging, labs, personal examination of patient, discussed via telephone with Dr Hector Fox, and review of existing notes  Kenyetta Akins

## 2021-01-25 NOTE — PLAN OF CARE
Problem: PHYSICAL THERAPY ADULT  Goal: Performs mobility at highest level of function for planned discharge setting  See evaluation for individualized goals  Description: Treatment/Interventions: ADL retraining, Functional transfer training, LE strengthening/ROM, Elevations, Therapeutic exercise, Bed mobility, Gait training  Equipment Recommended: Marcin Treviño       See flowsheet documentation for full assessment, interventions and recommendations  Outcome: Progressing  Note: Prognosis: Good  Problem List: Decreased strength, Decreased endurance, Impaired balance, Decreased mobility  Assessment: p        PT Discharge Recommendation: Home with skilled therapy    See flowsheet documentation for full assessment

## 2021-01-25 NOTE — ASSESSMENT & PLAN NOTE
· Improved with IV potassium chloride supplementation  · Follow the potassium level and magnesium level    Results from last 7 days   Lab Units 01/25/21  0417 01/24/21  0426 01/23/21  1239   SODIUM mmol/L 144 144 142   POTASSIUM mmol/L 3 7 2 8* 3 5   CHLORIDE mmol/L 113* 110* 106   CO2 mmol/L 24 22 22   BUN mg/dL 16 19 19   CREATININE mg/dL 1 05 0 94 1 11   CALCIUM mg/dL 7 3* 7 5* 8 4

## 2021-01-25 NOTE — PLAN OF CARE
Problem: Potential for Falls  Goal: Patient will remain free of falls  Description: INTERVENTIONS:  - Assess patient frequently for physical needs  -  Identify cognitive and physical deficits and behaviors that affect risk of falls    -  [place] fall precautions as indicated by assessment   - Educate patient/family on patient safety including physical limitations  - Instruct patient to call for assistance with activity based on assessment  - Modify environment to reduce risk of injury  - Consider OT/PT consult to assist with strengthening/mobility  1/25/2021 1047 by Kelly Henderson RN  Outcome: Progressing  1/25/2021 0759 by Kelly Henderson RN  Outcome: Progressing     Problem: PAIN - ADULT  Goal: Verbalizes/displays adequate comfort level or baseline comfort level  Description: Interventions:  - Encourage patient to monitor pain and request assistance  - Assess pain using appropriate pain scale  - Administer analgesics based on type and severity of pain and evaluate response  - Implement non-pharmacological measures as appropriate and evaluate response  - Consider cultural and social influences on pain and pain management  - Notify physician/advanced practitioner if interventions unsuccessful or patient reports new pain  1/25/2021 1047 by Kelly Henderson RN  Outcome: Progressing  1/25/2021 0759 by Kelly Henderson RN  Outcome: Progressing     Problem: INFECTION - ADULT  Goal: Absence or prevention of progression during hospitalization  Description: INTERVENTIONS:  - Assess and monitor for signs and symptoms of infection  - Monitor lab/diagnostic results  - Monitor all insertion sites, i e  indwelling lines, tubes, and drains  - Administer medications as ordered  - Instruct and encourage patient and family to use good hand hygiene technique  - Identify and instruct in appropriate isolation precautions for identified infection/condition  1/25/2021 1047 by Kelly Henderson RN  Outcome: Progressing  1/25/2021 0759 by Sujit Pickett RN  Outcome: Progressing     Problem: SAFETY ADULT  Goal: Patient will remain free of falls  Description: INTERVENTIONS:  - Assess patient frequently for physical needs  -  Identify cognitive and physical deficits and behaviors that affect risk of falls    -  [place] fall precautions as indicated by assessment   - Educate patient/family on patient safety including physical limitations  - Instruct patient to call for assistance with activity based on assessment  - Modify environment to reduce risk of injury  - Consider OT/PT consult to assist with strengthening/mobility  1/25/2021 1047 by Sujit Pickett RN  Outcome: Progressing  1/25/2021 0759 by Sujit Pickett RN  Outcome: Progressing  Goal: Maintain or return to baseline ADL function  Description: INTERVENTIONS:  -  Assess patient's ability to carry out ADLs; assess patient's baseline for ADL function and identify physical deficits which impact ability to perform ADLs (bathing, care of mouth/teeth, toileting, grooming, dressing, etc )  - Assess/evaluate cause of self-care deficits   - Assess range of motion  - Assess patient's mobility; develop plan if impaired  - Assess patient's need for assistive devices and provide as appropriate  - Encourage maximum independence but intervene and supervise when necessary  - Involve family in performance of ADLs  - Assess for home care needs following discharge   - Consider OT consult to assist with ADL evaluation and planning for discharge  - Provide patient education as appropriate  1/25/2021 1047 by Sujit Pickett RN  Outcome: Progressing  1/25/2021 0759 by Sujit Pickett RN  Outcome: Progressing  Goal: Maintain or return mobility status to optimal level  Description: INTERVENTIONS:  - Assess patient's baseline mobility status (ambulation, transfers, stairs, etc )    - Identify cognitive and physical deficits and behaviors that affect mobility  - Identify mobility aids required to assist with transfers and/or ambulation (gait belt, sit-to-stand, lift, walker, cane, etc )  - [place] fall precautions as indicated by assessment  - Record patient progress and toleration of activity level on Mobility SBAR; progress patient to next Phase/Stage  - Instruct patient to call for assistance with activity based on assessment  - Consider rehabilitation consult to assist with strengthening/weightbearing, etc   1/25/2021 1047 by Leola Adkins RN  Outcome: Progressing  1/25/2021 0759 by Leola Adkins RN  Outcome: Progressing     Problem: DISCHARGE PLANNING  Goal: Discharge to home or other facility with appropriate resources  Description: INTERVENTIONS:  - Identify barriers to discharge w/patient and caregiver  - Arrange for needed discharge resources and transportation as appropriate  - Identify discharge learning needs (meds, wound care, etc )  - Refer to Case Management Department for coordinating discharge planning if the patient needs post-hospital services based on physician/advanced practitioner order or complex needs related to functional status, cognitive ability, or social support system  1/25/2021 1047 by Leoal Adkins RN  Outcome: Progressing  1/25/2021 0759 by Leola Adkins RN  Outcome: Progressing     Problem: Knowledge Deficit  Goal: Patient/family/caregiver demonstrates understanding of disease process, treatment plan, medications, and discharge instructions  Description: Complete learning assessment and assess knowledge base    Interventions:  - Provide teaching at level of understanding  - Provide teaching via preferred learning methods  1/25/2021 1047 by Leola Adkins RN  Outcome: Progressing  1/25/2021 0759 by Leola Adkins RN  Outcome: Progressing

## 2021-01-25 NOTE — ASSESSMENT & PLAN NOTE
· The patient was seen in consultation by General Surgery    · Clear liquid diet for now  · Discontinue bentyl  · Initiate reglan 5 mg IV every 6 hours  · Continue IV fluids  · Check a small-bowel follow-through study  · May require lumbar spine imaging if her symptoms persist to rule-out a disc herniation with the patient's symptoms of lower extremity paresthesias  · Consult Gastroenterology

## 2021-01-25 NOTE — CASE MANAGEMENT
Continuing to follow pt  Ileus; not getting better; On clears; +Nausea/abdominal pain; SBFT; IVF's  Will continue to follow and assist in dc planning

## 2021-01-25 NOTE — PHYSICAL THERAPY NOTE
PHYSICAL THERAPY NOTE          Patient Name: Diamond Pop  RKRHJ'A Date: 1/25/2021 01/25/21 0815   Restrictions/Precautions   Other Precautions   (Multiple lines;Telemetry;Pain; Fall Risk)   Cognition   Overall Cognitive Status WFL   Arousal/Participation Alert   Following Commands Follows all commands and directions without difficulty   Subjective   Subjective Agreeable to therapy   Bed Mobility   Supine to Sit 4  Minimal assistance   Additional items Assist x 1;HOB elevated; Increased time required;Verbal cues   Transfers   Sit to Stand 5  Supervision   Additional items Assist x 1; Increased time required   Stand to Sit 5  Supervision   Additional items Armrests; Increased time required;Verbal cues   Stand pivot 5  Supervision   Ambulation/Elevation   Gait pattern Excessively slow; Short stride; Foward flexed   Gait Assistance 5  Supervision   Additional items Assist x 1   Assistive Device Rolling walker   Distance 25'   Balance   Ambulatory Fair  (RW)   Endurance Deficit   Endurance Deficit Yes   Activity Tolerance   Activity Tolerance Patient limited by fatigue;Patient limited by pain   Assessment   Prognosis Good   Problem List Decreased strength;Decreased endurance; Impaired balance;Decreased mobility   Assessment p   Goals   LTG Expiration Date 02/06/21   PT Treatment Day 1   Plan   Treatment/Interventions Functional transfer training;LE strengthening/ROM; Therapeutic exercise; Endurance training;Bed mobility;Gait training   Progress Progressing toward goals   Recommendation   PT Discharge Recommendation Home with skilled therapy   AM-PAC Basic Mobility Inpatient   Turning in Bed Without Bedrails 2   Lying on Back to Sitting on Edge of Flat Bed 3   Moving Bed to Chair 3   Standing Up From Chair 3   Walk in Room 3   Climb 3-5 Stairs 3   Basic Mobility Inpatient Raw Score 17   Basic Mobility Standardized Score 39 67   Pt  OOB in chair   with call bell within reach  at end of PT session  Discussed with  PT today's treatment and patient's current level of function for care coordination

## 2021-01-25 NOTE — PLAN OF CARE
Problem: Potential for Falls  Goal: Patient will remain free of falls  Description: INTERVENTIONS:  - Assess patient frequently for physical needs  -  Identify cognitive and physical deficits and behaviors that affect risk of falls  -  [place] fall precautions as indicated by assessment   - Educate patient/family on patient safety including physical limitations  - Instruct patient to call for assistance with activity based on assessment  - Modify environment to reduce risk of injury  - Consider OT/PT consult to assist with strengthening/mobility  Outcome: Progressing     Problem: PAIN - ADULT  Goal: Verbalizes/displays adequate comfort level or baseline comfort level  Description: Interventions:  - Encourage patient to monitor pain and request assistance  - Assess pain using appropriate pain scale  - Administer analgesics based on type and severity of pain and evaluate response  - Implement non-pharmacological measures as appropriate and evaluate response  - Consider cultural and social influences on pain and pain management  - Notify physician/advanced practitioner if interventions unsuccessful or patient reports new pain  Outcome: Progressing     Problem: INFECTION - ADULT  Goal: Absence or prevention of progression during hospitalization  Description: INTERVENTIONS:  - Assess and monitor for signs and symptoms of infection  - Monitor lab/diagnostic results  - Monitor all insertion sites, i e  indwelling lines, tubes, and drains  - Administer medications as ordered  - Instruct and encourage patient and family to use good hand hygiene technique  - Identify and instruct in appropriate isolation precautions for identified infection/condition  Outcome: Progressing     Problem: SAFETY ADULT  Goal: Patient will remain free of falls  Description: INTERVENTIONS:  - Assess patient frequently for physical needs  -  Identify cognitive and physical deficits and behaviors that affect risk of falls    -  [place] fall precautions as indicated by assessment   - Educate patient/family on patient safety including physical limitations  - Instruct patient to call for assistance with activity based on assessment  - Modify environment to reduce risk of injury  - Consider OT/PT consult to assist with strengthening/mobility  Outcome: Progressing  Goal: Maintain or return to baseline ADL function  Description: INTERVENTIONS:  -  Assess patient's ability to carry out ADLs; assess patient's baseline for ADL function and identify physical deficits which impact ability to perform ADLs (bathing, care of mouth/teeth, toileting, grooming, dressing, etc )  - Assess/evaluate cause of self-care deficits   - Assess range of motion  - Assess patient's mobility; develop plan if impaired  - Assess patient's need for assistive devices and provide as appropriate  - Encourage maximum independence but intervene and supervise when necessary  - Involve family in performance of ADLs  - Assess for home care needs following discharge   - Consider OT consult to assist with ADL evaluation and planning for discharge  - Provide patient education as appropriate  Outcome: Progressing  Goal: Maintain or return mobility status to optimal level  Description: INTERVENTIONS:  - Assess patient's baseline mobility status (ambulation, transfers, stairs, etc )    - Identify cognitive and physical deficits and behaviors that affect mobility  - Identify mobility aids required to assist with transfers and/or ambulation (gait belt, sit-to-stand, lift, walker, cane, etc )  - [place] fall precautions as indicated by assessment  - Record patient progress and toleration of activity level on Mobility SBAR; progress patient to next Phase/Stage  - Instruct patient to call for assistance with activity based on assessment  - Consider rehabilitation consult to assist with strengthening/weightbearing, etc   Outcome: Progressing     Problem: DISCHARGE PLANNING  Goal: Discharge to home or other facility with appropriate resources  Description: INTERVENTIONS:  - Identify barriers to discharge w/patient and caregiver  - Arrange for needed discharge resources and transportation as appropriate  - Identify discharge learning needs (meds, wound care, etc )  - Refer to Case Management Department for coordinating discharge planning if the patient needs post-hospital services based on physician/advanced practitioner order or complex needs related to functional status, cognitive ability, or social support system  Outcome: Progressing     Problem: Knowledge Deficit  Goal: Patient/family/caregiver demonstrates understanding of disease process, treatment plan, medications, and discharge instructions  Description: Complete learning assessment and assess knowledge base    Interventions:  - Provide teaching at level of understanding  - Provide teaching via preferred learning methods  Outcome: Progressing

## 2021-01-26 PROBLEM — E03.8 OTHER SPECIFIED HYPOTHYROIDISM: Status: ACTIVE | Noted: 2019-07-19

## 2021-01-26 LAB
ALBUMIN SERPL BCP-MCNC: 3 G/DL (ref 3.5–5)
ALP SERPL-CCNC: 56 U/L (ref 46–116)
ALT SERPL W P-5'-P-CCNC: 25 U/L (ref 12–78)
ANION GAP SERPL CALCULATED.3IONS-SCNC: 7 MMOL/L (ref 4–13)
AST SERPL W P-5'-P-CCNC: 21 U/L (ref 5–45)
BASOPHILS # BLD AUTO: 0.02 THOUSANDS/ΜL (ref 0–0.1)
BASOPHILS NFR BLD AUTO: 0 % (ref 0–1)
BILIRUB SERPL-MCNC: 0.2 MG/DL (ref 0.2–1)
BUN SERPL-MCNC: 8 MG/DL (ref 5–25)
CALCIUM ALBUM COR SERPL-MCNC: 8.3 MG/DL (ref 8.3–10.1)
CALCIUM SERPL-MCNC: 7.5 MG/DL (ref 8.3–10.1)
CHLORIDE SERPL-SCNC: 112 MMOL/L (ref 100–108)
CK SERPL-CCNC: 98 U/L (ref 26–192)
CO2 SERPL-SCNC: 26 MMOL/L (ref 21–32)
CREAT SERPL-MCNC: 0.9 MG/DL (ref 0.6–1.3)
EOSINOPHIL # BLD AUTO: 0.13 THOUSAND/ΜL (ref 0–0.61)
EOSINOPHIL NFR BLD AUTO: 2 % (ref 0–6)
ERYTHROCYTE [DISTWIDTH] IN BLOOD BY AUTOMATED COUNT: 14.3 % (ref 11.6–15.1)
GFR SERPL CREATININE-BSD FRML MDRD: 70 ML/MIN/1.73SQ M
GLUCOSE SERPL-MCNC: 81 MG/DL (ref 65–140)
HCT VFR BLD AUTO: 34.9 % (ref 34.8–46.1)
HGB BLD-MCNC: 10.9 G/DL (ref 11.5–15.4)
IMM GRANULOCYTES # BLD AUTO: 0.03 THOUSAND/UL (ref 0–0.2)
IMM GRANULOCYTES NFR BLD AUTO: 0 % (ref 0–2)
LACTATE SERPL-SCNC: 0.6 MMOL/L (ref 0.5–2)
LYMPHOCYTES # BLD AUTO: 3.3 THOUSANDS/ΜL (ref 0.6–4.47)
LYMPHOCYTES NFR BLD AUTO: 45 % (ref 14–44)
MAGNESIUM SERPL-MCNC: 2 MG/DL (ref 1.6–2.6)
MCH RBC QN AUTO: 30.2 PG (ref 26.8–34.3)
MCHC RBC AUTO-ENTMCNC: 31.2 G/DL (ref 31.4–37.4)
MCV RBC AUTO: 97 FL (ref 82–98)
MONOCYTES # BLD AUTO: 0.42 THOUSAND/ΜL (ref 0.17–1.22)
MONOCYTES NFR BLD AUTO: 6 % (ref 4–12)
NEUTROPHILS # BLD AUTO: 3.38 THOUSANDS/ΜL (ref 1.85–7.62)
NEUTS SEG NFR BLD AUTO: 47 % (ref 43–75)
NRBC BLD AUTO-RTO: 0 /100 WBCS
PHOSPHATE SERPL-MCNC: 2.4 MG/DL (ref 2.7–4.5)
PLATELET # BLD AUTO: 162 THOUSANDS/UL (ref 149–390)
PMV BLD AUTO: 10 FL (ref 8.9–12.7)
POTASSIUM SERPL-SCNC: 3.7 MMOL/L (ref 3.5–5.3)
PROCALCITONIN SERPL-MCNC: <0.05 NG/ML
PROT SERPL-MCNC: 5.8 G/DL (ref 6.4–8.2)
RBC # BLD AUTO: 3.61 MILLION/UL (ref 3.81–5.12)
SODIUM SERPL-SCNC: 145 MMOL/L (ref 136–145)
WBC # BLD AUTO: 7.28 THOUSAND/UL (ref 4.31–10.16)

## 2021-01-26 PROCEDURE — 85025 COMPLETE CBC W/AUTO DIFF WBC: CPT | Performed by: INTERNAL MEDICINE

## 2021-01-26 PROCEDURE — 87209 SMEAR COMPLEX STAIN: CPT | Performed by: PHYSICIAN ASSISTANT

## 2021-01-26 PROCEDURE — 97530 THERAPEUTIC ACTIVITIES: CPT

## 2021-01-26 PROCEDURE — 83735 ASSAY OF MAGNESIUM: CPT | Performed by: INTERNAL MEDICINE

## 2021-01-26 PROCEDURE — 84100 ASSAY OF PHOSPHORUS: CPT | Performed by: INTERNAL MEDICINE

## 2021-01-26 PROCEDURE — 80053 COMPREHEN METABOLIC PANEL: CPT | Performed by: INTERNAL MEDICINE

## 2021-01-26 PROCEDURE — 87177 OVA AND PARASITES SMEARS: CPT | Performed by: PHYSICIAN ASSISTANT

## 2021-01-26 PROCEDURE — 87329 GIARDIA AG IA: CPT | Performed by: INTERNAL MEDICINE

## 2021-01-26 PROCEDURE — 99222 1ST HOSP IP/OBS MODERATE 55: CPT | Performed by: INTERNAL MEDICINE

## 2021-01-26 PROCEDURE — 82550 ASSAY OF CK (CPK): CPT | Performed by: INTERNAL MEDICINE

## 2021-01-26 PROCEDURE — 99232 SBSQ HOSP IP/OBS MODERATE 35: CPT | Performed by: INTERNAL MEDICINE

## 2021-01-26 PROCEDURE — 84145 PROCALCITONIN (PCT): CPT | Performed by: INTERNAL MEDICINE

## 2021-01-26 PROCEDURE — 99232 SBSQ HOSP IP/OBS MODERATE 35: CPT | Performed by: SURGERY

## 2021-01-26 PROCEDURE — 83605 ASSAY OF LACTIC ACID: CPT | Performed by: INTERNAL MEDICINE

## 2021-01-26 PROCEDURE — C9113 INJ PANTOPRAZOLE SODIUM, VIA: HCPCS | Performed by: PHYSICIAN ASSISTANT

## 2021-01-26 PROCEDURE — 97116 GAIT TRAINING THERAPY: CPT

## 2021-01-26 RX ORDER — HYDROMORPHONE HCL/PF 1 MG/ML
1 SYRINGE (ML) INJECTION EVERY 4 HOURS PRN
Status: DISCONTINUED | OUTPATIENT
Start: 2021-01-26 | End: 2021-01-27 | Stop reason: HOSPADM

## 2021-01-26 RX ORDER — HYDROMORPHONE HCL/PF 1 MG/ML
0.5 SYRINGE (ML) INJECTION ONCE
Status: COMPLETED | OUTPATIENT
Start: 2021-01-26 | End: 2021-01-26

## 2021-01-26 RX ORDER — NICOTINE 21 MG/24HR
21 PATCH, TRANSDERMAL 24 HOURS TRANSDERMAL DAILY
Status: DISCONTINUED | OUTPATIENT
Start: 2021-01-26 | End: 2021-01-27 | Stop reason: HOSPADM

## 2021-01-26 RX ORDER — HYDROCODONE BITARTRATE AND ACETAMINOPHEN 5; 325 MG/1; MG/1
2 TABLET ORAL EVERY 4 HOURS PRN
Status: DISCONTINUED | OUTPATIENT
Start: 2021-01-26 | End: 2021-01-27 | Stop reason: HOSPADM

## 2021-01-26 RX ADMIN — HYDROMORPHONE HYDROCHLORIDE 0.5 MG: 1 INJECTION, SOLUTION INTRAMUSCULAR; INTRAVENOUS; SUBCUTANEOUS at 09:02

## 2021-01-26 RX ADMIN — TOPIRAMATE 50 MG: 25 TABLET, FILM COATED ORAL at 08:03

## 2021-01-26 RX ADMIN — HYDROMORPHONE HYDROCHLORIDE 0.5 MG: 1 INJECTION, SOLUTION INTRAMUSCULAR; INTRAVENOUS; SUBCUTANEOUS at 04:35

## 2021-01-26 RX ADMIN — ENOXAPARIN SODIUM 40 MG: 40 INJECTION SUBCUTANEOUS at 08:03

## 2021-01-26 RX ADMIN — HYDROMORPHONE HYDROCHLORIDE 1 MG: 1 INJECTION, SOLUTION INTRAMUSCULAR; INTRAVENOUS; SUBCUTANEOUS at 21:29

## 2021-01-26 RX ADMIN — PANTOPRAZOLE SODIUM 40 MG: 40 INJECTION, POWDER, LYOPHILIZED, FOR SOLUTION INTRAVENOUS at 08:00

## 2021-01-26 RX ADMIN — FENOFIBRATE 48 MG: 48 TABLET ORAL at 08:03

## 2021-01-26 RX ADMIN — HYDROMORPHONE HYDROCHLORIDE 0.5 MG: 1 INJECTION, SOLUTION INTRAMUSCULAR; INTRAVENOUS; SUBCUTANEOUS at 11:45

## 2021-01-26 RX ADMIN — BISACODYL 10 MG: 5 TABLET, COATED ORAL at 15:25

## 2021-01-26 RX ADMIN — LORAZEPAM 0.5 MG: 0.5 TABLET ORAL at 18:34

## 2021-01-26 RX ADMIN — TOPIRAMATE 50 MG: 25 TABLET, FILM COATED ORAL at 21:28

## 2021-01-26 RX ADMIN — PREGABALIN 150 MG: 75 CAPSULE ORAL at 21:28

## 2021-01-26 RX ADMIN — CLONAZEPAM 0.5 MG: 0.5 TABLET ORAL at 21:28

## 2021-01-26 RX ADMIN — PREGABALIN 100 MG: 50 CAPSULE ORAL at 07:59

## 2021-01-26 RX ADMIN — Medication 21 MG: at 11:42

## 2021-01-26 RX ADMIN — Medication 500 MG: at 07:59

## 2021-01-26 RX ADMIN — PREGABALIN 100 MG: 50 CAPSULE ORAL at 16:32

## 2021-01-26 RX ADMIN — NICOTINE 1 PATCH: 14 PATCH, EXTENDED RELEASE TRANSDERMAL at 08:03

## 2021-01-26 RX ADMIN — METOCLOPRAMIDE HYDROCHLORIDE 5 MG: 5 INJECTION INTRAMUSCULAR; INTRAVENOUS at 11:45

## 2021-01-26 RX ADMIN — METOCLOPRAMIDE HYDROCHLORIDE 5 MG: 5 INJECTION INTRAMUSCULAR; INTRAVENOUS at 17:51

## 2021-01-26 RX ADMIN — POTASSIUM PHOSPHATE, MONOBASIC AND POTASSIUM PHOSPHATE, DIBASIC 9 MMOL: 224; 236 INJECTION, SOLUTION, CONCENTRATE INTRAVENOUS at 16:28

## 2021-01-26 RX ADMIN — POLYETHYLENE GLYCOL-3350 AND ELECTROLYTES 4000 ML: 236; 6.74; 5.86; 2.97; 22.74 POWDER, FOR SOLUTION ORAL at 16:30

## 2021-01-26 RX ADMIN — POTASSIUM CHLORIDE AND SODIUM CHLORIDE 100 ML/HR: 900; 300 INJECTION, SOLUTION INTRAVENOUS at 04:44

## 2021-01-26 RX ADMIN — Medication 500 MG: at 15:25

## 2021-01-26 RX ADMIN — ATORVASTATIN CALCIUM 40 MG: 40 TABLET, FILM COATED ORAL at 15:39

## 2021-01-26 RX ADMIN — PANTOPRAZOLE SODIUM 40 MG: 40 INJECTION, POWDER, LYOPHILIZED, FOR SOLUTION INTRAVENOUS at 21:28

## 2021-01-26 RX ADMIN — LAMOTRIGINE 75 MG: 25 TABLET ORAL at 21:29

## 2021-01-26 RX ADMIN — Medication 6 MG: at 21:28

## 2021-01-26 RX ADMIN — CLONAZEPAM 1 MG: 0.5 TABLET ORAL at 08:03

## 2021-01-26 RX ADMIN — PREGABALIN 100 MG: 50 CAPSULE ORAL at 11:43

## 2021-01-26 RX ADMIN — HYDROMORPHONE HYDROCHLORIDE 1 MG: 1 INJECTION, SOLUTION INTRAMUSCULAR; INTRAVENOUS; SUBCUTANEOUS at 16:32

## 2021-01-26 RX ADMIN — METOCLOPRAMIDE HYDROCHLORIDE 5 MG: 5 INJECTION INTRAMUSCULAR; INTRAVENOUS at 05:01

## 2021-01-26 RX ADMIN — LEVOTHYROXINE SODIUM 137 MCG: 25 TABLET ORAL at 05:01

## 2021-01-26 RX ADMIN — POTASSIUM CHLORIDE AND SODIUM CHLORIDE 100 ML/HR: 900; 300 INJECTION, SOLUTION INTRAVENOUS at 15:24

## 2021-01-26 NOTE — PLAN OF CARE
Problem: PHYSICAL THERAPY ADULT  Goal: Performs mobility at highest level of function for planned discharge setting  See evaluation for individualized goals  Description: Treatment/Interventions: ADL retraining, Functional transfer training, LE strengthening/ROM, Elevations, Therapeutic exercise, Bed mobility, Gait training  Equipment Recommended: Mat Prader       See flowsheet documentation for full assessment, interventions and recommendations  Outcome: Progressing  Note: Prognosis: Good  Problem List: Decreased strength, Decreased endurance, Impaired balance, Decreased mobility  Assessment: Pt  seen for PT treatment session this date with interventions consisting of bed mobility, transfers and  gait training w/ emphasis on improving pt's ability to ambulate  Pt  Currently performing  tx and ambulation at ( ) x 1 level of function  Utilizing RW with fair balance and stability  The patient's AM-PAC Basic Mobility Inpatient Short Form Raw Score is 17, Standardized Score is 39 67  A standardized score less than 42 9 suggests the patient may benefit from discharge to home PT  Please also refer to physical therapy recommendation for safe DC planning  In comparison to previous session, Pt  With improvements in activity tolerance  Pt is in need of continued activity in PT to improve strength balance endurance mobility transfers and ambulation with return to maximize LOF  From PT/mobility standpoint, recommendation at time of d/c would be home PT  in order to promote return to PLOF and independence  PT Discharge Recommendation: Home with skilled therapy       See flowsheet documentation for full assessment

## 2021-01-26 NOTE — RESPIRATORY THERAPY NOTE
01/26/21 1155   Respiratory Assessment   Assessment Type Assess only   General Appearance Awake   Respiratory Pattern Normal   Chest Assessment Chest expansion symmetrical   Bilateral Breath Sounds Clear   O2 Device RA   Additional Assessments   Pulse 68   Respirations 18   SpO2 98 %

## 2021-01-26 NOTE — ASSESSMENT & PLAN NOTE
· The patient was seen in consultation by General Surgery  · Clear liquid diet for now  · Discontinue bentyl  · Initiated reglan 5 mg IV every 6 hours on 01/25/2021  · Continue IV fluids  · A small-bowel follow-through study was checked on 01/25/2021:  IMPRESSION:     1  Mild esophageal dysmotility  2  Esophageal reflux noted  3  Small bowel transit time  is 4 hours,  while normal, this is at the upper threshold  No significant abnormality in the small bowel appearance  · May require lumbar spine imaging if her symptoms persist to rule-out a disc herniation with the patient's symptoms of lower extremity paresthesias  · Also with heme-positive stools  · The patient was seen in consultation by Gastroenterology and will undergo an EGD and colonoscopy on 01/27/2021

## 2021-01-26 NOTE — PLAN OF CARE
Problem: Potential for Falls  Goal: Patient will remain free of falls  Description: INTERVENTIONS:  - Assess patient frequently for physical needs  -  Identify cognitive and physical deficits and behaviors that affect risk of falls  -  [place] fall precautions as indicated by assessment   - Educate patient/family on patient safety including physical limitations  - Instruct patient to call for assistance with activity based on assessment  - Modify environment to reduce risk of injury  - Consider OT/PT consult to assist with strengthening/mobility  Outcome: Progressing     Problem: PAIN - ADULT  Goal: Verbalizes/displays adequate comfort level or baseline comfort level  Description: Interventions:  - Encourage patient to monitor pain and request assistance  - Assess pain using appropriate pain scale  - Administer analgesics based on type and severity of pain and evaluate response  - Implement non-pharmacological measures as appropriate and evaluate response  - Consider cultural and social influences on pain and pain management  - Notify physician/advanced practitioner if interventions unsuccessful or patient reports new pain  Outcome: Progressing     Problem: INFECTION - ADULT  Goal: Absence or prevention of progression during hospitalization  Description: INTERVENTIONS:  - Assess and monitor for signs and symptoms of infection  - Monitor lab/diagnostic results  - Monitor all insertion sites, i e  indwelling lines, tubes, and drains  - Administer medications as ordered  - Instruct and encourage patient and family to use good hand hygiene technique  - Identify and instruct in appropriate isolation precautions for identified infection/condition  Outcome: Progressing     Problem: SAFETY ADULT  Goal: Patient will remain free of falls  Description: INTERVENTIONS:  - Assess patient frequently for physical needs  -  Identify cognitive and physical deficits and behaviors that affect risk of falls    -  [place] fall precautions as indicated by assessment   - Educate patient/family on patient safety including physical limitations  - Instruct patient to call for assistance with activity based on assessment  - Modify environment to reduce risk of injury  - Consider OT/PT consult to assist with strengthening/mobility  Outcome: Progressing  Goal: Maintain or return to baseline ADL function  Description: INTERVENTIONS:  -  Assess patient's ability to carry out ADLs; assess patient's baseline for ADL function and identify physical deficits which impact ability to perform ADLs (bathing, care of mouth/teeth, toileting, grooming, dressing, etc )  - Assess/evaluate cause of self-care deficits   - Assess range of motion  - Assess patient's mobility; develop plan if impaired  - Assess patient's need for assistive devices and provide as appropriate  - Encourage maximum independence but intervene and supervise when necessary  - Involve family in performance of ADLs  - Assess for home care needs following discharge   - Consider OT consult to assist with ADL evaluation and planning for discharge  - Provide patient education as appropriate  Outcome: Progressing  Goal: Maintain or return mobility status to optimal level  Description: INTERVENTIONS:  - Assess patient's baseline mobility status (ambulation, transfers, stairs, etc )    - Identify cognitive and physical deficits and behaviors that affect mobility  - Identify mobility aids required to assist with transfers and/or ambulation (gait belt, sit-to-stand, lift, walker, cane, etc )  - [place] fall precautions as indicated by assessment  - Record patient progress and toleration of activity level on Mobility SBAR; progress patient to next Phase/Stage  - Instruct patient to call for assistance with activity based on assessment  - Consider rehabilitation consult to assist with strengthening/weightbearing, etc   Outcome: Progressing     Problem: DISCHARGE PLANNING  Goal: Discharge to home or other facility with appropriate resources  Description: INTERVENTIONS:  - Identify barriers to discharge w/patient and caregiver  - Arrange for needed discharge resources and transportation as appropriate  - Identify discharge learning needs (meds, wound care, etc )  - Refer to Case Management Department for coordinating discharge planning if the patient needs post-hospital services based on physician/advanced practitioner order or complex needs related to functional status, cognitive ability, or social support system  Outcome: Progressing     Problem: Knowledge Deficit  Goal: Patient/family/caregiver demonstrates understanding of disease process, treatment plan, medications, and discharge instructions  Description: Complete learning assessment and assess knowledge base    Interventions:  - Provide teaching at level of understanding  - Provide teaching via preferred learning methods  Outcome: Progressing

## 2021-01-26 NOTE — ASSESSMENT & PLAN NOTE
· Intermittent hypotension  · Likely secondary to hypovolemia  · Continue IV fluids  · Follow the blood pressure trend

## 2021-01-26 NOTE — ASSESSMENT & PLAN NOTE
· Improved with IV potassium chloride supplementation  · Continue NSS IV fluids with 40 meQ KCl at 100 ml/hr for now  · Follow the potassium level and magnesium level    Results from last 7 days   Lab Units 01/26/21  0500 01/25/21  0417 01/24/21  0426   SODIUM mmol/L 145 144 144   POTASSIUM mmol/L 3 7 3 7 2 8*   CHLORIDE mmol/L 112* 113* 110*   CO2 mmol/L 26 24 22   BUN mg/dL 8 16 19   CREATININE mg/dL 0 90 1 05 0 94   CALCIUM mg/dL 7 5* 7 3* 7 5*

## 2021-01-26 NOTE — PHYSICAL THERAPY NOTE
PHYSICAL THERAPY NOTE          Patient Name: Jose Sykes  CUYTQ'Y Date: 1/26/2021 01/26/21 0919   Pain Assessment   Pain Score Worst Possible Pain   Pain Location/Orientation Location: Abdomen   Cognition   Overall Cognitive Status [facility]   Arousal/Participation Alert; Cooperative   Following Commands Follows all commands and directions without difficulty   Subjective   Subjective c/o increased abdominal pain with ambulation  states this is what happened at home  Bed Mobility   Supine to Sit 4  Minimal assistance   Additional items Assist x 1;HOB elevated; Increased time required;Verbal cues   Transfers   Sit to Stand 5  Supervision   Additional items Assist x 1; Increased time required   Stand to Sit 5  Supervision   Additional items Assist x 1; Armrests; Increased time required   Stand pivot 5  Supervision   Ambulation/Elevation   Gait pattern Foward flexed; Excessively slow;Decreased foot clearance   Gait Assistance 5  Supervision   Additional items Assist x 1   Assistive Device Rolling walker   Distance 65'   Balance   Ambulatory Fair  (RW)   Endurance Deficit   Endurance Deficit Yes   Activity Tolerance   Activity Tolerance Patient limited by fatigue;Patient limited by pain   Assessment   Prognosis Good   Problem List Decreased strength;Decreased endurance; Impaired balance;Decreased mobility   Assessment Pt  seen for PT treatment session this date with interventions consisting of bed mobility, transfers and  gait training w/ emphasis on improving pt's ability to ambulate  Pt  Currently performing  tx and ambulation at ( ) x 1 level of function  Utilizing RW with fair balance and stability  The patient's AM-PAC Basic Mobility Inpatient Short Form Raw Score is 17, Standardized Score is 39 67  A standardized score less than 42 9 suggests the patient may benefit from discharge to home PT   Please also refer to physical therapy recommendation for safe DC planning  In comparison to previous session, Pt  With improvements in activity tolerance  Pt is in need of continued activity in PT to improve strength balance endurance mobility transfers and ambulation with return to maximize LOF  From PT/mobility standpoint, recommendation at time of d/c would be home PT  in order to promote return to PLOF and independence  Plan   Treatment/Interventions Functional transfer training;LE strengthening/ROM; Therapeutic exercise; Endurance training;Bed mobility;Gait training   Progress Progressing toward goals   Recommendation   PT Discharge Recommendation Home with skilled therapy   AM-PAC Basic Mobility Inpatient   Turning in Bed Without Bedrails 2   Lying on Back to Sitting on Edge of Flat Bed 3   Moving Bed to Chair 3   Standing Up From Chair 3   Walk in Room 3   Climb 3-5 Stairs 3   Basic Mobility Inpatient Raw Score 17   Basic Mobility Standardized Score 39 67   Pt  OOB in chair   with call bell within reach at end of PT session  Discussed with  PT today's treatment and patient's current level of function for care coordination

## 2021-01-26 NOTE — RESPIRATORY THERAPY NOTE
01/25/21 2104   Oxygen Therapy/Pulse Ox   O2 Device None (Room air)   O2 Therapy Room air   $ Pulse Oximetry Spot Check Charge Completed     Oxgyen saturation 97%  Patient states her breathing is doing well, she has belly pain, I will notify her nurse

## 2021-01-26 NOTE — CONSULTS
Consultation - [facility]  Johanna Woodall 62 y o  female MRN: [id number]  Unit/Bed#: 449-13 Encounter: [id number]        Inpatient consult to gastroenterology  Consult performed by: [address], PA-C  Consult ordered by: Celestino Modi DO      Reason for Consult / Principal Problem:     Ileus  Colitis    ASSESSMENT AND PLAN:      62year old female with a PMHx of abdominal pain and IBS presenting with acute on chronic abdominal pain    1  Abdominal pain   2  Diarrhea  The patient presented with generalized abdominal pain and while in the emergency department developed diarrhea  CT scan and KUB most consistent with probable diffuse intestinal ileus  Patient did not require NG tube insertion for decompression  For her diarrhea she has had infectious studies which are negative  She does have a known history of IBS and last underwent EGD and colonoscopy approximately 5 years ago  Per her report EGD was normal and colonoscopy had benign polyps  - c diff and stool enteric panel negative  - given pain and diarrhea recommend EGD and colonoscopy, can plan for this tomorrow  - clear liquids tonday, bowel prep this evening, and NPO at midnight     ______________________________________________________________________    HPI:  62year old female with a PMHx of abdominal pain and IBS presenting with acute on chronic abdominal pain who presented to the emergency department 1/22/2021 with complaints of acute on chronic abdominal pain  The patient states that she has chronic abdominal pain but she had an acute exacerbation  This was associated with nausea but no vomiting  Initially her bowel movements were normal but they progressed to diarrhea  She denies any blood or black in her stool  CT scan 1/23/21 was obtained and revealed gastric distension, distended small large bowel loops without evidence of obstruction or wall thickening, and stable pattern megaly   KUB 1/23/21 revealed persistent nonobstructive bowel gas pattern, distended largest small bowel, and a distended stomach  FL UGI/small bowel 1/25/21 revealed mild esophageal dysmotility, esophageal reflux, and small bowel transit time of 4 hours which is within normal limits  C diff, stool enteric bacterial panel, and rotavirus negative  She states that she does have known IBS  She last underwent an upper endoscopy and colonoscopy approximately 5 years ago  Her upper endoscopy per her report was normal and her colonoscopy had polyps that were benign  REVIEW OF SYSTEMS:    CONSTITUTIONAL: Denies any fever, chills, rigors, and weight loss  HEENT: No earache or tinnitus  Denies hearing loss or visual disturbances  CARDIOVASCULAR: No chest pain or palpitations  RESPIRATORY: Denies any cough, hemoptysis, shortness of breath or dyspnea on exertion  GASTROINTESTINAL: As noted in the History of Present Illness  GENITOURINARY: No problems with urination  Denies any hematuria or dysuria  NEUROLOGIC: No dizziness or vertigo, denies headaches  MUSCULOSKELETAL: Denies any muscle or joint pain  SKIN: Denies skin rashes or itching  ENDOCRINE: Denies excessive thirst  Denies intolerance to heat or cold  PSYCHOSOCIAL: Denies depression or anxiety  Denies any recent memory loss         Historical Information   Past Medical History:   Diagnosis Date    Anxiety     Asthma     Bipolar 1 disorder ([facility] [address] )     Brain aneurysm     Chronic pain disorder     spinal stenosis    Concussion syndrome     neurological rx and balance rx    [facility] obstructive [facility] disease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     Migraine     Neuropathy     bilateral feet and hands    Peripheral neuropathy     Psychiatric disorder     PTSD (post-traumatic stress disorder)     Stroke ([facility] [address] )     2012 no deficeits    Thyroid Cancer     TIA (transient ischemic attack)      Past Surgical History:   Procedure Laterality Date    ABDOMINAL SURGERY      laproscopic/ endometriosis    BRAIN SURGERY      aneurysm/ coiling procedure    CARPAL TUNNEL RELEASE      CHOLECYSTECTOMY      DENTAL SURGERY      EYE SURGERY      cataract    HYSTERECTOMY      AK ESOPHAGOGASTRODUODENOSCOPY TRANSORAL DIAGNOSTIC N/A 2/8/2018    Procedure: EGD AND COLONOSCOPY;  Surgeon: Toña Marlow MD;  Location:  GI LAB; Service: Gastroenterology    THYROID SURGERY      TONSILLECTOMY       Social History   Social History     Substance and Sexual Activity   Alcohol Use Never    Alcohol/week: 0 0 standard drinks    Frequency: Patient refused    Drinks per session: Patient refused    Binge frequency: Never     Social History     Substance and Sexual Activity   Drug Use Never    Comment: prescribed Belbuca     Social History     Tobacco Use   Smoking Status Current Every Day Smoker    Packs/day: 2 00    Types: Cigarettes   Smokeless Tobacco Never Used     History reviewed  No pertinent family history      Meds/Allergies     Medications Prior to Admission   Medication    albuterol (PROVENTIL HFA,VENTOLIN HFA) 90 mcg/act inhaler    Asenapine Maleate (SAPHRIS SL)    b complex vitamins capsule    Buprenorphine HCl (Belbuca) 300 MCG FILM    carboxymethylcellulose 0 5 % SOLN    clonazePAM (KlonoPIN) 0 5 mg tablet    clonazePAM (KlonoPIN) 1 mg tablet    cyclobenzaprine (FLEXERIL) 10 mg tablet    dicyclomine (BENTYL) 10 mg capsule    fenofibrate (TRICOR) 48 mg tablet    furosemide (LASIX) 20 mg tablet    Galcanezumab-gnlm (Emgality) 120 MG/ML SOAJ    HYDROcodone-acetaminophen (NORCO)  mg per tablet    lamoTRIgine (LaMICtal) 25 mg tablet    levothyroxine 137 mcg tablet    magnesium 30 MG tablet    Melatonin 10 MG CAPS    omeprazole (PriLOSEC) 20 mg delayed release capsule    pregabalin (LYRICA) 100 mg capsule    pregabalin (LYRICA) 150 mg capsule    topiramate (TOPAMAX) 100 mg tablet    atorvastatin (LIPITOR) 40 mg tablet    ibuprofen (MOTRIN) 800 mg tablet    lamoTRIgine (LaMICtal) 100 mg tablet    ondansetron (ZOFRAN-ODT) 4 mg disintegrating tablet     Current Facility-Administered Medications   Medication Dose Route Frequency    albuterol (PROVENTIL HFA,VENTOLIN HFA) inhaler 2 puff  2 puff Inhalation Q6H PRN    atorvastatin (LIPITOR) tablet 40 mg  40 mg Oral Daily With Dinner    clonazePAM (KlonoPIN) tablet 0 5 mg  0 5 mg Oral HS    clonazePAM (KlonoPIN) tablet 1 mg  1 mg Oral Daily    enoxaparin (LOVENOX) subcutaneous injection 40 mg  40 mg Subcutaneous Daily    fenofibrate (TRICOR) tablet 48 mg  48 mg Oral Daily    HYDROcodone-acetaminophen (NORCO) 5-325 mg per tablet 2 tablet  2 tablet Oral Q4H PRN    Or    HYDROmorphone (DILAUDID) injection 0 5 mg  0 5 mg Intravenous Q4H PRN    iohexol (OMNIPAQUE) 240 MG/ML solution 50 mL  50 mL Oral Once in imaging    lamoTRIgine (LaMICtal) tablet 75 mg  75 mg Oral HS    levothyroxine tablet 137 mcg  137 mcg Oral Early Morning    LORazepam (ATIVAN) tablet 0 5 mg  0 5 mg Oral Q6H PRN    Magnesium Gluconate TABS 500 mg  500 mg Oral BID AC    melatonin tablet 6 mg  6 mg Oral HS    metoclopramide (REGLAN) injection 5 mg  5 mg Intravenous Q6H [address]    nicotine (NICODERM CQ) 14 mg/24hr TD 24 hr patch 1 patch  1 patch Transdermal Daily    ondansetron (ZOFRAN) injection 4 mg  4 mg Intravenous Q4H PRN    pantoprazole (PROTONIX) injection 40 mg  40 mg Intravenous Q12H SNOW    polyvinyl alcohol (LIQUIFILM TEARS) 1 4 % ophthalmic solution 1 drop  1 drop Both Eyes Q3H PRN    pregabalin (LYRICA) capsule 100 mg  100 mg Oral TID    pregabalin (LYRICA) capsule 150 mg  150 mg Oral HS    sodium chloride 0 9 % with KCl 40 mEq/L infusion (premix)  100 mL/hr Intravenous Continuous    topiramate (TOPAMAX) tablet 50 mg  50 mg Oral Q12H [address]       Allergies   Allergen Reactions    Hydroxyzine Anaphylaxis     Claims it gives her convulsions   Other     Pollen Extract Itching    Tree Extract     Clarithromycin Rash     Pt denies    Latex Rash    Sulfa Antibiotics Rash     "severe skin burn"           Objective     Blood pressure 116/81, pulse 66, temperature 98 1 °F (36 7 °C), resp  rate 17, height 5' 4" (1 626 m), weight 91 kg (200 lb 9 9 oz), SpO2 98 %  Body mass index is 34 44 kg/m²  Intake/Output Summary (Last 24 hours) at 1/26/2021 1026  Last data filed at 1/26/2021 0448  Gross per 24 hour   Intake 380 ml   Output 1100 ml   Net -720 ml         PHYSICAL EXAM:      General Appearance:   Alert, cooperative, no distress   HEENT:   Normocephalic, atraumatic, anicteric      Neck:  Supple, symmetrical, trachea midline   Lungs:   Clear to auscultation bilaterally; no rales, rhonchi or wheezing; respirations unlabored    Heart[de-identified]   Regular rate and rhythm; no murmur, rub, or gallop  Abdomen:   Soft, non-tender, non-distended; normal bowel sounds; no masses, no organomegaly    Genitalia:   Deferred    Rectal:   Deferred    Extremities:  No cyanosis, clubbing or edema    Pulses:  2+ and symmetric all extremities    Skin:  No jaundice, rashes, or lesions    Lymph nodes:  No palpable cervical lymphadenopathy        Lab Results:   No results displayed because visit has over 200 results  Imaging Studies: I have personally reviewed pertinent imaging studies

## 2021-01-26 NOTE — PROGRESS NOTES
Progress Note - General Surgery   Yosvany Woodall 62 y o  female MRN: [id number]  Unit/Bed#: 958-67 Encounter: [id number]    Assessment:  Suspected paralytic ileus  Abdominal pain  Diarrhea  Patient with longstanding history of fibromyalgia  COPD  Bipolar disorder  Hypertension  Posttraumatic stress disorder  Hyperlipidemia  Tobacco use disorder    Plan:  No surgical intervention warranted at this time  Continue clear liquids today, NPO after midnight  GI evaluated patient and plan for EGD and colonoscopy, likely tomorrow  Medicate PRN pain/nausea  OOB ad jose  Follow qAM CBC and BMP    Subjective/Objective     Subjective: No acute events overnight  Pain improving  Denies fevers/chills  Denies chest pain or short of breath    Objective:    Blood pressure 116/81, pulse 68, temperature 98 1 °F (36 7 °C), resp  rate 18, height 5' 4" (1 626 m), weight 91 kg (200 lb 9 9 oz), SpO2 98 %  ,Body mass index is 34 44 kg/m²  Intake/Output Summary (Last 24 hours) at 1/26/2021 1252  Last data filed at 1/26/2021 0448  Gross per 24 hour   Intake 380 ml   Output 1100 ml   Net -720 ml       Invasive Devices     Peripheral Intravenous Line            Peripheral IV 01/22/21 Right Antecubital 3 days                Physical Exam:  Gen: AxOx3  Heart: RRR  Lungs: CTA  Abd: Soft, non-tender, non-distended; normal bowel sounds, no masses or organomegaly noted    Lab, Imaging and other studies:  I have personally reviewed pertinent lab results      CBC:   Lab Results   Component Value Date    WBC 7 28 01/26/2021    HGB 10 9 (L) 01/26/2021    HCT 34 9 01/26/2021    MCV 97 01/26/2021     01/26/2021    MCH 30 2 01/26/2021    MCHC 31 2 (L) 01/26/2021    RDW 14 3 01/26/2021    MPV 10 0 01/26/2021    NRBC 0 01/26/2021     CMP:   Lab Results   Component Value Date    SODIUM 145 01/26/2021    K 3 7 01/26/2021     (H) 01/26/2021    CO2 26 01/26/2021    BUN 8 01/26/2021    CREATININE 0 90 01/26/2021    CALCIUM 7 5 (L) 01/26/2021    AST 21 01/26/2021    ALT 25 01/26/2021    ALKPHOS 56 01/26/2021    EGFR 70 01/26/2021       FL UGI/sm bowel 1/25/21  IMPRESSION:  1   Mild esophageal dysmotility  2   Esophageal reflux noted  3   Small bowel transit time  is 4 hours,  while normal, this is at the upper threshold   No significant abnormality in the small bowel appearance      VTE Pharmacologic Prophylaxis: Enoxaparin (Lovenox)  VTE Mechanical Prophylaxis: sequential compression device      Day Juan PA-C

## 2021-01-26 NOTE — APP STUDENT NOTE
Progress Note - General Surgery   Cynthia Woodall 62 y o  female MRN: [id number]  Unit/Bed#: 882-43 Encounter: [id number]    Assessment: 1  Paralytic Ileus  R/o Olgilvie syndrome       Plan:  Had small bowel follow through 1/25/21  Showed mild dysmotility, reflux and a transit time of 4 hours which is on upper threshold of normal  Patient to be consulted by GI  No further need for surgical services at this time  Subjective/Objective   Chief Complaint: Right sided abdominal pain since Friday    Subjective: Mrs Vinod Parra is a 23-year-old  female who presented to the ER on Friday 1/22/21 due to abdominal pain  She was admitted that night because CT scan was consistent with an ileus  She states her abdominal pain is still a 10 at times but the pain medications are helping significantly  Ambulating makes the pain worse  She states she had a bowel movement this morning described as "liquid stool " She has not vomited since Saturday and has nausea associated with pain  Low appetite  Still experiencing numbness in legs, myalgias and joint aches  Denies fever, chest pain or difficulty breathing  Objective:     Blood pressure 116/81, pulse 66, temperature 98 1 °F (36 7 °C), resp  rate 17, height 5' 4" (1 626 m), weight 91 kg (200 lb 9 9 oz), SpO2 98 %  ,Body mass index is 34 44 kg/m²        Intake/Output Summary (Last 24 hours) at 1/26/2021 1125  Last data filed at 1/26/2021 0448  Gross per 24 hour   Intake 380 ml   Output 1100 ml   Net -720 ml       Invasive Devices     Peripheral Intravenous Line            Peripheral IV 01/22/21 Right Antecubital 3 days                Physical Exam: /81   Pulse 66   Temp 98 1 °F (36 7 °C)   Resp 17   Ht 5' 4" (1 626 m)   Wt 91 kg (200 lb 9 9 oz)   SpO2 98%   BMI 34 44 kg/m²     General Appearance:    Alert, cooperative, no distress, appears stated age   Head:    Normocephalic, without obvious abnormality, atraumatic                           Lungs:     Clear to auscultation bilaterally, respirations unlabored        Heart:    Regular rate and rhythm, S1 and S2 normal, no murmur, rub   or gallop  Abdomen:    Normoactive BS in all four quadrants  Tenderness on the right side, both upper and lower quadrants  (-) Guarding or rebound tenderness    no masses, no organomegaly           Extremities:   Extremities normal, atraumatic, no cyanosis or edema   Pulses:   2+ and symmetric all extremities   Skin:   Skin color, texture, turgor normal, no rashes or lesions               Lab, Imaging and other studies:I have personally reviewed pertinent lab results  Upper GI and SB follow through 1/25/21 IMPRESSION:     1  Mild esophageal dysmotility  2  Esophageal reflux noted  3  Small bowel transit time  is 4 hours,  while normal, this is at the upper threshold    No significant abnormality in the small bowel appearance

## 2021-01-26 NOTE — PROGRESS NOTES
Progress Note Frank Woodall 1960, 62 y o  female MRN: [id number]    Unit/Bed#: 482-61 Encounter: [id number]    Primary Care Provider: Emerson Sandoval MD   Date and time admitted to hospital: 1/22/2021  7:27 PM        * Ileus [facility])  Assessment & Plan  · The patient was seen in consultation by General Surgery  · Clear liquid diet for now  · Discontinue bentyl  · Initiated reglan 5 mg IV every 6 hours on 01/25/2021  · Continue IV fluids  · A small-bowel follow-through study was checked on 01/25/2021:  IMPRESSION:     1  Mild esophageal dysmotility  2  Esophageal reflux noted  3  Small bowel transit time  is 4 hours,  while normal, this is at the upper threshold  No significant abnormality in the small bowel appearance  · May require lumbar spine imaging if her symptoms persist to rule-out a disc herniation with the patient's symptoms of lower extremity paresthesias  · Also with heme-positive stools  · The patient was seen in consultation by Gastroenterology and will undergo an EGD and colonoscopy on 01/27/2021      Hypotension  Assessment & Plan  · Intermittent hypotension  · Likely secondary to hypovolemia  · Continue IV fluids  · Follow the blood pressure trend    Tobacco use  Assessment & Plan  · Nicotine patch  · Smoking cessation counseling    Hypophosphatemia  Assessment & Plan  · Give potassium phosphate 9 mmol IV x 1 dose  · Follow the phosphorus level    Hypokalemia  Assessment & Plan  · Improved with IV potassium chloride supplementation  · Continue NSS IV fluids with 40 meQ KCl at 100 ml/hr for now  · Follow the potassium level and magnesium level    Results from last 7 days   Lab Units 01/26/21  0500 01/25/21  0417 01/24/21  0426   SODIUM mmol/L 145 144 144   POTASSIUM mmol/L 3 7 3 7 2 8*   CHLORIDE mmol/L 112* 113* 110*   CO2 mmol/L 26 24 22   BUN mg/dL 8 16 19   CREATININE mg/dL 0 90 1 05 0 94   CALCIUM mg/dL 7 5* 7 3* 7 5*         Brain aneurysm  Assessment & Plan  Currently stable  History of aneurysm coiling  Continue outpatient Neurosurgery follow-up    Other specified hypothyroidism  Assessment & Plan  · Continue levothyroxine    Other hyperlipidemia  Assessment & Plan  · Continue statin therapy and fenofibrate        VTE Pharmacologic Prophylaxis:   Pharmacologic: Enoxaparin (Lovenox)  Mechanical VTE Prophylaxis in Place: Yes    Patient Centered Rounds: I have performed bedside rounds with nursing staff today  Discussions with Specialists or Other Care Team Provider: I discussed the case with Dr Srinivasa Alvarez (General Surgery)  Time Spent for Care: 30 minutes  More than 50% of total time spent on counseling and coordination of care as described above  Current Length of Stay: 3 day(s)    Current Patient Status: Inpatient   Certification Statement: The patient will continue to require additional inpatient hospital stay due to the need for an EGD and colonoscopy on 2021  Code Status: Level 1 - Full Code      Subjective: The patient was seen and examined  The patient continues to experience severe, generalized abdominal pain  She also complains of nausea and is having difficulty tolerating a clear liquid diet  Objective:     Vitals:   Temp (24hrs), Av °F (36 7 °C), Min:97 9 °F (36 6 °C), Max:98 1 °F (36 7 °C)    Temp:  [97 9 °F (36 6 °C)-98 1 °F (36 7 °C)] 98 1 °F (36 7 °C)  HR:  [65-75] 68  Resp:  [17-18] 18  BP: ()/(58-81) 116/81  SpO2:  [96 %-99 %] 98 %  Body mass index is 34 44 kg/m²  Input and Output Summary (last 24 hours):        Intake/Output Summary (Last 24 hours) at 2021 1317  Last data filed at 2021 1300  Gross per 24 hour   Intake 860 ml   Output 1100 ml   Net -240 ml       Physical Exam:     Physical Exam  General:  NAD, follows commands  HEENT:  NC/AT, mucous membranes moist  Neck:  Supple, No JVP elevation  CV:  + S1, + S2, RRR  Pulm:  Lung fields are CTA bilaterally  Abd:  Soft, Generalized tenderness with palpation, Moderate distension  Ext:  No clubbing/cyanosis/edema  Skin:  No rashes  Neuro:  Awake, alert, oriented  Psych:  Normal mood and affect      Additional Data:    Labs:    Results from last 7 days   Lab Units 01/26/21  0459   WBC Thousand/uL 7 28   HEMOGLOBIN g/dL 10 9*   HEMATOCRIT % 34 9   PLATELETS Thousands/uL 162   NEUTROS PCT % 47   LYMPHS PCT % 45*   MONOS PCT % 6   EOS PCT % 2     Results from last 7 days   Lab Units 01/26/21  0500   SODIUM mmol/L 145   POTASSIUM mmol/L 3 7   CHLORIDE mmol/L 112*   CO2 mmol/L 26   BUN mg/dL 8   CREATININE mg/dL 0 90   ANION GAP mmol/L 7   CALCIUM mg/dL 7 5*   ALBUMIN g/dL 3 0*   TOTAL BILIRUBIN mg/dL 0 20   ALK PHOS U/L 56   ALT U/L 25   AST U/L 21   GLUCOSE RANDOM mg/dL 81     Results from last 7 days   Lab Units 01/25/21  0417   INR  1 13             Results from last 7 days   Lab Units 01/26/21  0459 01/25/21  0417   LACTIC ACID mmol/L 0 6  --    PROCALCITONIN ng/ml  --  <0 05           * I Have Reviewed All Lab Data Listed Above  * Additional Pertinent Lab Tests Reviewed:  All [facility] Admission Reviewed      Recent Cultures (last 7 days):     Results from last 7 days   Lab Units 01/23/21  0246   C DIFF TOXIN B BY PCR  Negative       Last 24 Hours Medication List:   Current Facility-Administered Medications   Medication Dose Route Frequency Provider Last Rate    albuterol  2 puff Inhalation Q6H PRN Francisco J Junk, DO      atorvastatin  40 mg Oral Daily With [employer], ENEDINA      bisacodyl  10 mg Oral Once Laila Sparrow PA-C      clonazePAM  0 5 mg Oral HS Uriel Alfaro PA-C      clonazePAM  1 mg Oral Daily Uriel Alfaro PA-C      enoxaparin  40 mg Subcutaneous Daily Uriel Alfaro PA-C      fenofibrate  48 mg Oral Daily Uriel Alfaro PA-C      HYDROmorphone  1 mg Intravenous Q4H PRN Francisco J Junk, DO      Or    HYDROcodone-acetaminophen  2 tablet Oral Q4H PRN Francisco J Junk, DO      iohexol  50 mL Oral Once in imaging Uriel Alfaro, ENEDINA      lamoTRIgine  75 mg Oral HS Uriel Alfaro PA-C      levothyroxine  137 mcg Oral Early Morning Uriel Alfaro PA-C      LORazepam  0 5 mg Oral Q6H PRN [facility], DO      Magnesium Gluconate  500 mg Oral BID AC Jose Patel, DO      melatonin  6 mg Oral HS Uriel Alfaro PA-C      metoclopramide  5 mg Intravenous Q6H [facility] Brittany Tellez PA-C      nicotine  21 mg Transdermal Daily [facility], DO      ondansetron  4 mg Intravenous Q4H PRN Reed Guillen, DO      pantoprazole  40 mg Intravenous Q12H [facility] Paul Mattson PA-C      polyethylene glycol  4,000 mL Oral See Admin Instructions Rosa Guerra PA-C      polyvinyl alcohol  1 drop Both Eyes Q3H PRN Reed Guillen, DO      potassium phosphate  9 mmol Intravenous Once [facility], DO      pregabalin  100 mg Oral TID Uriel Alfaro PA-C      pregabalin  150 mg Oral HS Uriel Alfaro PA-C      sodium chloride 0 9 % with KCl 40 mEq/L  100 mL/hr Intravenous Continuous Reed Guillen  mL/hr (01/26/21 0444)    topiramate  50 mg Oral Q12H [facility] Uriel Alfaro PA-C          Today, Patient Was Seen By: [facility], DO    ** Please Note: Dictation voice to text software may have been used in the creation of this document   **

## 2021-01-27 ENCOUNTER — ANESTHESIA (INPATIENT)
Dept: PERIOP | Facility: HOSPITAL | Age: 61
DRG: 390 | End: 2021-01-27
Payer: COMMERCIAL

## 2021-01-27 ENCOUNTER — APPOINTMENT (INPATIENT)
Dept: PERIOP | Facility: HOSPITAL | Age: 61
DRG: 390 | End: 2021-01-27
Payer: COMMERCIAL

## 2021-01-27 ENCOUNTER — ANESTHESIA EVENT (INPATIENT)
Dept: PERIOP | Facility: HOSPITAL | Age: 61
DRG: 390 | End: 2021-01-27
Payer: COMMERCIAL

## 2021-01-27 VITALS
HEART RATE: 65 BPM | DIASTOLIC BLOOD PRESSURE: 80 MMHG | OXYGEN SATURATION: 98 % | TEMPERATURE: 99.6 F | HEIGHT: 64 IN | BODY MASS INDEX: 32.48 KG/M2 | RESPIRATION RATE: 19 BRPM | SYSTOLIC BLOOD PRESSURE: 120 MMHG | WEIGHT: 190.26 LBS

## 2021-01-27 VITALS — HEART RATE: 63 BPM

## 2021-01-27 PROBLEM — K29.00 GASTRITIS, ACUTE: Status: ACTIVE | Noted: 2021-01-25

## 2021-01-27 LAB
ALBUMIN SERPL BCP-MCNC: 3.2 G/DL (ref 3.5–5)
ALP SERPL-CCNC: 62 U/L (ref 46–116)
ALT SERPL W P-5'-P-CCNC: 33 U/L (ref 12–78)
ANION GAP SERPL CALCULATED.3IONS-SCNC: 10 MMOL/L (ref 4–13)
AST SERPL W P-5'-P-CCNC: 27 U/L (ref 5–45)
BASOPHILS # BLD AUTO: 0.03 THOUSANDS/ΜL (ref 0–0.1)
BASOPHILS NFR BLD AUTO: 1 % (ref 0–1)
BILIRUB SERPL-MCNC: 0.3 MG/DL (ref 0.2–1)
BUN SERPL-MCNC: 4 MG/DL (ref 5–25)
CALCIUM ALBUM COR SERPL-MCNC: 8.7 MG/DL (ref 8.3–10.1)
CALCIUM SERPL-MCNC: 8.1 MG/DL (ref 8.3–10.1)
CHLORIDE SERPL-SCNC: 109 MMOL/L (ref 100–108)
CO2 SERPL-SCNC: 24 MMOL/L (ref 21–32)
CREAT SERPL-MCNC: 0.76 MG/DL (ref 0.6–1.3)
EOSINOPHIL # BLD AUTO: 0.12 THOUSAND/ΜL (ref 0–0.61)
EOSINOPHIL NFR BLD AUTO: 2 % (ref 0–6)
ERYTHROCYTE [DISTWIDTH] IN BLOOD BY AUTOMATED COUNT: 14 % (ref 11.6–15.1)
GFR SERPL CREATININE-BSD FRML MDRD: 86 ML/MIN/1.73SQ M
GLUCOSE SERPL-MCNC: 82 MG/DL (ref 65–140)
HCT VFR BLD AUTO: 34.8 % (ref 34.8–46.1)
HGB BLD-MCNC: 10.9 G/DL (ref 11.5–15.4)
IMM GRANULOCYTES # BLD AUTO: 0.06 THOUSAND/UL (ref 0–0.2)
IMM GRANULOCYTES NFR BLD AUTO: 1 % (ref 0–2)
LYMPHOCYTES # BLD AUTO: 2.77 THOUSANDS/ΜL (ref 0.6–4.47)
LYMPHOCYTES NFR BLD AUTO: 42 % (ref 14–44)
MAGNESIUM SERPL-MCNC: 2 MG/DL (ref 1.6–2.6)
MCH RBC QN AUTO: 29.6 PG (ref 26.8–34.3)
MCHC RBC AUTO-ENTMCNC: 31.3 G/DL (ref 31.4–37.4)
MCV RBC AUTO: 95 FL (ref 82–98)
MONOCYTES # BLD AUTO: 0.39 THOUSAND/ΜL (ref 0.17–1.22)
MONOCYTES NFR BLD AUTO: 6 % (ref 4–12)
NEUTROPHILS # BLD AUTO: 3.28 THOUSANDS/ΜL (ref 1.85–7.62)
NEUTS SEG NFR BLD AUTO: 48 % (ref 43–75)
NRBC BLD AUTO-RTO: 0 /100 WBCS
PHOSPHATE SERPL-MCNC: 2.7 MG/DL (ref 2.7–4.5)
PLATELET # BLD AUTO: 164 THOUSANDS/UL (ref 149–390)
PMV BLD AUTO: 10.6 FL (ref 8.9–12.7)
POTASSIUM SERPL-SCNC: 3.5 MMOL/L (ref 3.5–5.3)
PROCALCITONIN SERPL-MCNC: <0.05 NG/ML
PROT SERPL-MCNC: 6.2 G/DL (ref 6.4–8.2)
RBC # BLD AUTO: 3.68 MILLION/UL (ref 3.81–5.12)
SODIUM SERPL-SCNC: 143 MMOL/L (ref 136–145)
TSH SERPL DL<=0.05 MIU/L-ACNC: 86.61 UIU/ML (ref 0.36–3.74)
WBC # BLD AUTO: 6.65 THOUSAND/UL (ref 4.31–10.16)

## 2021-01-27 PROCEDURE — 45378 DIAGNOSTIC COLONOSCOPY: CPT | Performed by: INTERNAL MEDICINE

## 2021-01-27 PROCEDURE — 88305 TISSUE EXAM BY PATHOLOGIST: CPT | Performed by: PATHOLOGY

## 2021-01-27 PROCEDURE — C9113 INJ PANTOPRAZOLE SODIUM, VIA: HCPCS | Performed by: PHYSICIAN ASSISTANT

## 2021-01-27 PROCEDURE — 83735 ASSAY OF MAGNESIUM: CPT | Performed by: INTERNAL MEDICINE

## 2021-01-27 PROCEDURE — 85025 COMPLETE CBC W/AUTO DIFF WBC: CPT | Performed by: INTERNAL MEDICINE

## 2021-01-27 PROCEDURE — 88313 SPECIAL STAINS GROUP 2: CPT | Performed by: PATHOLOGY

## 2021-01-27 PROCEDURE — 84100 ASSAY OF PHOSPHORUS: CPT | Performed by: INTERNAL MEDICINE

## 2021-01-27 PROCEDURE — 0DB68ZX EXCISION OF STOMACH, VIA NATURAL OR ARTIFICIAL OPENING ENDOSCOPIC, DIAGNOSTIC: ICD-10-PCS | Performed by: INTERNAL MEDICINE

## 2021-01-27 PROCEDURE — 88342 IMHCHEM/IMCYTCHM 1ST ANTB: CPT | Performed by: PATHOLOGY

## 2021-01-27 PROCEDURE — 0DBE8ZX EXCISION OF LARGE INTESTINE, VIA NATURAL OR ARTIFICIAL OPENING ENDOSCOPIC, DIAGNOSTIC: ICD-10-PCS | Performed by: INTERNAL MEDICINE

## 2021-01-27 PROCEDURE — 0DB98ZX EXCISION OF DUODENUM, VIA NATURAL OR ARTIFICIAL OPENING ENDOSCOPIC, DIAGNOSTIC: ICD-10-PCS | Performed by: INTERNAL MEDICINE

## 2021-01-27 PROCEDURE — 84443 ASSAY THYROID STIM HORMONE: CPT | Performed by: INTERNAL MEDICINE

## 2021-01-27 PROCEDURE — NC001 PR NO CHARGE

## 2021-01-27 PROCEDURE — 43239 EGD BIOPSY SINGLE/MULTIPLE: CPT | Performed by: INTERNAL MEDICINE

## 2021-01-27 PROCEDURE — 99232 SBSQ HOSP IP/OBS MODERATE 35: CPT | Performed by: SURGERY

## 2021-01-27 PROCEDURE — 99238 HOSP IP/OBS DSCHRG MGMT 30/<: CPT | Performed by: PHYSICIAN ASSISTANT

## 2021-01-27 PROCEDURE — 84145 PROCALCITONIN (PCT): CPT | Performed by: INTERNAL MEDICINE

## 2021-01-27 PROCEDURE — 80053 COMPREHEN METABOLIC PANEL: CPT | Performed by: INTERNAL MEDICINE

## 2021-01-27 RX ORDER — ONDANSETRON 4 MG/1
4 TABLET, ORALLY DISINTEGRATING ORAL EVERY 8 HOURS PRN
Qty: 12 TABLET | Refills: 0 | Status: SHIPPED | OUTPATIENT
Start: 2021-01-27 | End: 2021-10-26 | Stop reason: SDUPTHER

## 2021-01-27 RX ORDER — PROPOFOL 10 MG/ML
INJECTION, EMULSION INTRAVENOUS CONTINUOUS PRN
Status: DISCONTINUED | OUTPATIENT
Start: 2021-01-27 | End: 2021-01-27

## 2021-01-27 RX ORDER — POLYETHYLENE GLYCOL 3350 17 G/17G
17 POWDER, FOR SOLUTION ORAL DAILY
Qty: 20 EACH | Refills: 0 | Status: SHIPPED | OUTPATIENT
Start: 2021-01-27 | End: 2021-02-01

## 2021-01-27 RX ORDER — LIDOCAINE HYDROCHLORIDE 20 MG/ML
INJECTION, SOLUTION EPIDURAL; INFILTRATION; INTRACAUDAL; PERINEURAL AS NEEDED
Status: DISCONTINUED | OUTPATIENT
Start: 2021-01-27 | End: 2021-01-27

## 2021-01-27 RX ORDER — SUCRALFATE 1 G/1
1 TABLET ORAL
Status: DISCONTINUED | OUTPATIENT
Start: 2021-01-27 | End: 2021-01-27 | Stop reason: HOSPADM

## 2021-01-27 RX ORDER — PROPOFOL 10 MG/ML
INJECTION, EMULSION INTRAVENOUS AS NEEDED
Status: DISCONTINUED | OUTPATIENT
Start: 2021-01-27 | End: 2021-01-27

## 2021-01-27 RX ORDER — ONDANSETRON 2 MG/ML
4 INJECTION INTRAMUSCULAR; INTRAVENOUS ONCE AS NEEDED
Status: DISCONTINUED | OUTPATIENT
Start: 2021-01-27 | End: 2021-01-27

## 2021-01-27 RX ORDER — SODIUM CHLORIDE, SODIUM LACTATE, POTASSIUM CHLORIDE, CALCIUM CHLORIDE 600; 310; 30; 20 MG/100ML; MG/100ML; MG/100ML; MG/100ML
125 INJECTION, SOLUTION INTRAVENOUS CONTINUOUS
Status: DISCONTINUED | OUTPATIENT
Start: 2021-01-27 | End: 2021-01-27 | Stop reason: HOSPADM

## 2021-01-27 RX ORDER — POLYETHYLENE GLYCOL 3350 17 G/17G
17 POWDER, FOR SOLUTION ORAL DAILY
Status: DISCONTINUED | OUTPATIENT
Start: 2021-01-27 | End: 2021-01-27 | Stop reason: HOSPADM

## 2021-01-27 RX ORDER — SODIUM CHLORIDE, SODIUM LACTATE, POTASSIUM CHLORIDE, CALCIUM CHLORIDE 600; 310; 30; 20 MG/100ML; MG/100ML; MG/100ML; MG/100ML
INJECTION, SOLUTION INTRAVENOUS CONTINUOUS PRN
Status: DISCONTINUED | OUTPATIENT
Start: 2021-01-27 | End: 2021-01-27

## 2021-01-27 RX ADMIN — POTASSIUM CHLORIDE AND SODIUM CHLORIDE 100 ML/HR: 900; 300 INJECTION, SOLUTION INTRAVENOUS at 03:40

## 2021-01-27 RX ADMIN — PROPOFOL 50 MG: 10 INJECTION, EMULSION INTRAVENOUS at 10:43

## 2021-01-27 RX ADMIN — PANTOPRAZOLE SODIUM 40 MG: 40 INJECTION, POWDER, LYOPHILIZED, FOR SOLUTION INTRAVENOUS at 09:17

## 2021-01-27 RX ADMIN — METOCLOPRAMIDE HYDROCHLORIDE 5 MG: 5 INJECTION INTRAMUSCULAR; INTRAVENOUS at 06:10

## 2021-01-27 RX ADMIN — SODIUM CHLORIDE, SODIUM LACTATE, POTASSIUM CHLORIDE, AND CALCIUM CHLORIDE: .6; .31; .03; .02 INJECTION, SOLUTION INTRAVENOUS at 10:30

## 2021-01-27 RX ADMIN — PROPOFOL 50 MG: 10 INJECTION, EMULSION INTRAVENOUS at 10:47

## 2021-01-27 RX ADMIN — METOCLOPRAMIDE HYDROCHLORIDE 5 MG: 5 INJECTION INTRAMUSCULAR; INTRAVENOUS at 00:14

## 2021-01-27 RX ADMIN — PREGABALIN 100 MG: 50 CAPSULE ORAL at 12:27

## 2021-01-27 RX ADMIN — HYDROCODONE BITARTRATE AND ACETAMINOPHEN 2 TABLET: 5; 325 TABLET ORAL at 12:27

## 2021-01-27 RX ADMIN — METOCLOPRAMIDE HYDROCHLORIDE 5 MG: 5 INJECTION INTRAMUSCULAR; INTRAVENOUS at 12:27

## 2021-01-27 RX ADMIN — LEVOTHYROXINE SODIUM 137 MCG: 25 TABLET ORAL at 06:10

## 2021-01-27 RX ADMIN — HYDROMORPHONE HYDROCHLORIDE 1 MG: 1 INJECTION, SOLUTION INTRAMUSCULAR; INTRAVENOUS; SUBCUTANEOUS at 06:29

## 2021-01-27 RX ADMIN — PROPOFOL 120 MCG/KG/MIN: 10 INJECTION, EMULSION INTRAVENOUS at 10:50

## 2021-01-27 RX ADMIN — PROPOFOL 80 MG: 10 INJECTION, EMULSION INTRAVENOUS at 10:41

## 2021-01-27 RX ADMIN — Medication 21 MG: at 12:20

## 2021-01-27 RX ADMIN — LIDOCAINE HYDROCHLORIDE 100 MG: 20 INJECTION, SOLUTION EPIDURAL; INFILTRATION; INTRACAUDAL; PERINEURAL at 10:41

## 2021-01-27 RX ADMIN — Medication 500 MG: at 06:10

## 2021-01-27 NOTE — ANESTHESIA PREPROCEDURE EVALUATION
Procedure:  COLONOSCOPY  EGD    Relevant Problems   CARDIO   (+) Brain aneurysm   (+) Hypertension   (+) Hypertriglyceridemia   (+) Other hyperlipidemia      ENDO   (+) Other specified hypothyroidism      GI/HEPATIC   (+) Ileus (HCC)      PULMONARY   (+) COPD (chronic obstructive pulmonary disease) (HCC)      Other   (+) Hypokalemia   (+) TMJ syndrome   BMI 32 66     Physical Exam    Airway      TM Distance: >3 FB  Neck ROM: full     Dental       Cardiovascular  Cardiovascular exam normal    Pulmonary  Pulmonary exam normal     Other Findings        Anesthesia Plan  ASA Score- 3     Anesthesia Type- IV sedation with anesthesia with ASA Monitors  Additional Monitors:   Airway Plan:           Plan Factors-    Chart reviewed  Patient summary reviewed  Induction- intravenous  Postoperative Plan-     Informed Consent- Anesthetic plan and risks discussed with patient  I personally reviewed this patient with the CRNA  Discussed and agreed on the Anesthesia Plan with the CRNA  Vidhya Lopez

## 2021-01-27 NOTE — PROGRESS NOTES
Progress Note - General Surgery   Hermann Woodall 62 y o  female MRN: [id number]  Unit/Bed#: 103-65 Encounter: [id number]    Assessment:  Suspected paralytic ileus  Possible GI dysmotility, gastroparesis  Patient shows no evidence of bowel obstruction on imaging  Upper GI with small-bowel follow-through done 2 days ago showed mild esophageal dysmotility, esophageal reflux and small bowel transit time 4:00 a m  with no abnormality in the small bowel appearance  Patient continues with generalized abdominal pain  History of irritable bowel syndrome  Patient for upper and lower GI endoscopy later today, rule out peptic ulcer disease, celiac sprue, and both infectious versus ischemic colitis  Hemoglobin stable 6 65 today  Patient continues diarrhea, she had a bowel prep yesterday  Clear diarrhea this morning  Fibromyalgia  Bipolar disorder  Morbid obesity, BMI 32 66  Previous history of TIA June 2020  Previous intracranial cerebral aneurysm coiling  Lumbar disc disease with annular bulging L2-L3 and L3-L4  History of peripheral neuropathy, chronic back pain  COPD  PTSD  Hyperlipidemia  Smoker    Plan:  General surgery follow from the periphery  No general surgical intervention indicated at this time  Nothing by mouth since midnight, patient received bowel prep  GI to perform EGD and colonoscopy later today  Analgesics and antiemetics as ordered  IV Reglan 5 mg q 6 hours  Out of bed as tolerated    Subjective/Objective   Chief Complaint:  Clear watery diarrhea this morning  Bowel prep was started yesterday evening  Subjective:  Patient for EGD and colonoscopy later today  Bowel prep is running clear  Continues with some generalized abdominal pain throughout the abdomen  No fever or chills  She denies nausea or vomiting  She does not admit passing any flatus        Scheduled Meds:  Current Facility-Administered Medications   Medication Dose Route Frequency Provider Last Rate    albuterol  2 puff Inhalation Q6H PRN [employer], DO      atorvastatin  40 mg Oral Daily With "[employer]"ENEDINA      clonazePAM  0 5 mg Oral HS Uriel Alfaro PA-C      clonazePAM  1 mg Oral Daily Uriel Alfaro PA-C      enoxaparin  40 mg Subcutaneous Daily Uriel Alfaro PA-C      fenofibrate  48 mg Oral Daily Uriel Alfaro PA-C      HYDROmorphone  1 mg Intravenous Q4H PRN [employer], DO      Or    HYDROcodone-acetaminophen  2 tablet Oral Q4H PRN [employer], DO      iohexol  50 mL Oral Once in imaging Uriel Alfaro PA-C      lamoTRIgine  75 mg Oral HS Uriel Alfaro PA-C      levothyroxine  137 mcg Oral Early Morning Uriel Alfaro PA-C      LORazepam  0 5 mg Oral Q6H PRN [employer], DO      Magnesium Gluconate  500 mg Oral BID AC Jose Patel, DO      melatonin  6 mg Oral HS Uriel Alfaro PA-C      metoclopramide  5 mg Intravenous Q6H [address] Maribel Doss PA-C      nicotine  21 mg Transdermal Daily [employer], DO      ondansetron  4 mg Intravenous Q4H PRN Eulas Billy, DO      pantoprazole  40 mg Intravenous Q12H [address] Andrez Price PA-C      polyvinyl alcohol  1 drop Both Eyes Q3H PRN Eulas Billy, DO      pregabalin  100 mg Oral TID Uriel Alfaro PA-C      pregabalin  150 mg Oral HS Uriel Alfaro PA-C      sodium chloride 0 9 % with KCl 40 mEq/L  100 mL/hr Intravenous Continuous Eulas Billy,  mL/hr (01/27/21 0340)    topiramate  50 mg Oral Q12H [address] Uriel Alfaro PA-C       Continuous Infusions:sodium chloride 0 9 % with KCl 40 mEq/L, 100 mL/hr, Last Rate: 100 mL/hr (01/27/21 0340)      PRN Meds:   albuterol    HYDROcodone-acetaminophen **OR** HYDROmorphone    iohexol    LORazepam    ondansetron    polyvinyl alcohol    Objective:     Blood pressure 118/80, pulse 68, temperature 97 9 °F (36 6 °C), temperature source Oral, resp  rate 16, height 5' 4" (1 626 m), weight 86 3 kg (190 lb 4 1 oz), SpO2 96 %  ,Body mass index is 32 66 kg/m²        Intake/Output Summary (Last 24 hours) at 1/27/2021 0921  Last data filed at 1/27/2021 0854  Gross per 24 hour   Intake 840 ml   Output --   Net 840 ml       Invasive Devices     Peripheral Intravenous Line            Peripheral IV 01/22/21 Right Antecubital 4 days                Physical Exam:     Awake, no acute distress  Appears much older than her stated age of 62  Skin warm dry to touch  No rash or pallor  ENT clear  She is edentulous  Heart regular rate and rhythm  No murmur or gallop  Lungs clear  No rales rhonchi  Abdomen rare infrequent minimal distant bowel sounds  Mildly distended  Tympanic to percussion  No flank tenderness  Nontender deep palpation  No masses or abdominal wall hernias  Moves all 4 extremities well  No calf tenderness or edema  Neurological exam is unchanged and shows no focal motor sensory neurologic weakness  Lab, Imaging and other studies:  I have personally reviewed pertinent lab results    , CBC:   Lab Results   Component Value Date    WBC 6 65 01/27/2021    HGB 10 9 (L) 01/27/2021    HCT 34 8 01/27/2021    MCV 95 01/27/2021     01/27/2021    MCH 29 6 01/27/2021    MCHC 31 3 (L) 01/27/2021    RDW 14 0 01/27/2021    MPV 10 6 01/27/2021    NRBC 0 01/27/2021   , CMP:   Lab Results   Component Value Date    SODIUM 143 01/27/2021    K 3 5 01/27/2021     (H) 01/27/2021    CO2 24 01/27/2021    BUN 4 (L) 01/27/2021    CREATININE 0 76 01/27/2021    CALCIUM 8 1 (L) 01/27/2021    AST 27 01/27/2021    ALT 33 01/27/2021    ALKPHOS 62 01/27/2021    EGFR 86 01/27/2021     VTE Pharmacologic Prophylaxis: Enoxaparin (Lovenox)  VTE Mechanical Prophylaxis: sequential compression device     Pineda Bueno PA-C

## 2021-01-27 NOTE — ANESTHESIA POSTPROCEDURE EVALUATION
Post-Op Assessment Note    CV Status:  Stable    Pain management: adequate     Mental Status:  Alert and awake   Hydration Status:  Euvolemic   PONV Controlled:  Controlled   Airway Patency:  Patent      Post Op Vitals Reviewed: Yes      Staff: other anesthesia staff, CRNA         No complications documented      BP      Temp      Pulse     Resp      SpO2

## 2021-01-27 NOTE — PROGRESS NOTES
Progress Note Ami Woodall 1960, 62 y o  female MRN: [id number]    Unit/Bed#: 407-79 Encounter: [id number]    Primary Care Provider: [place] Robert MD   Date and time admitted to hospital: 1/22/2021  7:27 PM        * Gastritis, acute  Assessment & Plan  · The patient was seen in consultation by General Surgery  · Initiated reglan 5 mg IV every 6 hours during her hospitalization   · The patient received IV fluids during her hospitalization  · A small-bowel follow-through study was checked  · IMPRESSION:     1  Mild esophageal dysmotility  2  Esophageal reflux noted  3  Small bowel transit time  is 4 hours,  while normal, this is at the upper threshold  No significant abnormality in the small bowel appearance    · Also with heme-positive stools  · The patient was seen in consultation by Gastroenterology and underwent an EGD and colonoscopy on 01/27/2021  · EGD:   IMPRESSION:  Severe gastritis  Normal esophagus and duodenum      RECOMMENDATION:  Await pathology results  Continue current medications  Colonoscopy to follow    · Colonoscopy:  IMPRESSION:  Large amount of solid and liquid stool mixed with white contrast material      RECOMMENDATION:  Repeat in 1 year due to an inadequate bowel preparation or sooner    · Gastroenterology cleared patient for discharge from a GI prospective  The patient was tolerating her diet and asked to go home  She was discharged home with instructions to follow-up with GI as needed  · Outpatient follow-up with PCP within 1 week       Hypotension  Assessment & Plan  · Intermittent hypotension which resolved with IV fluids  · Likely secondary to hypovolemia    Tobacco use  Assessment & Plan  · Nicotine patch  · Smoking cessation counseling    Hypophosphatemia  Assessment & Plan  · The patient was given potassium phosphate 9 mmol IV x 1 dose  · Phosphorus level of 2 7 on discharge    Hypokalemia  Assessment & Plan  · Resolved with IV potassium chloride supplementation    Results from last 7 days   Lab Units 01/27/21  0612 01/26/21  0500 01/25/21  0417   SODIUM mmol/L 143 145 144   POTASSIUM mmol/L 3 5 3 7 3 7   CHLORIDE mmol/L 109* 112* 113*   CO2 mmol/L 24 26 24   BUN mg/dL 4* 8 16   CREATININE mg/dL 0 76 0 90 1 05   CALCIUM mg/dL 8 1* 7 5* 7 3*         Brain aneurysm  Assessment & Plan  Currently stable  History of aneurysm coiling  Continue outpatient Neurosurgery follow-up    Other specified hypothyroidism  Assessment & Plan  · Continue levothyroxine    Other hyperlipidemia  Assessment & Plan  · Continue statin therapy and fenofibrate    Discharging Physician / Practitioner: Althea Lucas PA-C  PCP: Cyndi Torres MD  Admission Date:   Admission Orders (From admission, onward)     Ordered        01/23/21 0100  Inpatient Admission  Once                   Discharge Date: 01/27/21    Resolved Problems  Date Reviewed: 1/27/2021    None          Consultations During Hospital Stay:  · General surgery  · Gastroenterology     Procedures Performed:   · EGD:  IMPRESSION:  Severe gastritis  Normal esophagus and duodenum      RECOMMENDATION:  Await pathology results  Continue current medications  Colonoscopy to follow  · Colonoscopy:  IMPRESSION:  Large amount of solid and liquid stool mixed with white contrast material      RECOMMENDATION:  Repeat in 1 year due to an inadequate bowel preparation or sooner    Significant Findings / Test Results:   Xr Abdomen Obstruction Series    Result Date: 1/24/2021  Impression: 1  Nonobstructive bowel gas pattern  2   Distended, air-filled large and small bowel demonstrating differential fluid levels, similar to the earlier CT scan  Findings most compatible with diffuse intestinal ileus  3   Distended stomach is straightening differential fluid level in the erect position  Finding similar to recent CT scan  Correlate for gastroparesis   Workstation performed: [id number]     Fl Ugi/sm Bowel    Result Date: 1/25/2021  Impression: 1  Mild esophageal dysmotility 2  Esophageal reflux noted 3  Small bowel transit time  is 4 hours,  while normal, this is at the upper threshold  No significant abnormality in the small bowel appearance Workstation performed: [id number]     Ct Abdomen Pelvis With Contrast    Result Date: 1/22/2021  · Impression: 1  Marked gastric distention with air-fluid level  The finding could be related to slow transit or gastroenteritis  No findings to indicate gastric outlet obstruction  2   Distended small and large bowel loops containing liquid stool  No evidence of bowel obstruction or significant wall thickening  3   Stable hepatomegaly  The study was marked in [facility] for immediate notification  Workstation performed: [id number]   ·     Incidental Findings:   · none     Test Results Pending at Discharge (will require follow up):   · none     Outpatient Tests Requested:  · none    Complications:  none    Reason for Admission: abdominal pain    Hospital Course:     Sesar Orta is a 62 y o  female patient who originally presented to the hospital on 1/22/2021 due to complaints of abdominal pain  starting early on yesterday morning at 11:00 a m  she also complained of associated nausea without vomiting  She will do indicated feeling very fatigued  She denies shortness of breath, chest pain, fever, chills, bloody diarrhea, urinary symptoms  Labs completed in emergency room with results as shown below  CT abdomen and pelvis completed with results as shown below  In the ER she received Zofran 4 mg IV, Toradol 15 mg IV, potassium chloride 20 mEq p o , morphine sulfate 4 mg IV Ativan 1 mg IV  She started having episodes of diarrhea while in the ER  She had more than 5 episodes of diarrhea while I the ER  At bedside she is awake and alert  She states that abdominal pain has improved but she is still having episodes of diarrhea  She also complains of feeling tired   Patient is being admitted on inpatient status [facility] level care for further workup and management of acute diarrhea, abdominal pain, hypokalemia  Please see above list of diagnoses and related plan for additional information  Condition at Discharge: good     Discharge Day Visit / Exam:     Subjective:    Vitals: Blood Pressure: 120/80 (01/27/21 1219)  Pulse: 65 (01/27/21 1219)  Temperature: 99 6 °F (37 6 °C) (01/27/21 1140)  Temp Source: Oral (01/27/21 0729)  Respirations: 19 (01/27/21 1150)  Height: 5' 4" (162 6 cm) (01/23/21 3508)  Weight - Scale: 86 3 kg (190 lb 4 1 oz) (01/27/21 0600)  SpO2: 98 % (01/27/21 1219)  Exam:   Physical Exam  Vitals signs and nursing note reviewed  Constitutional:       Appearance: Normal appearance  HENT:      Head: Normocephalic and atraumatic  Cardiovascular:      Rate and Rhythm: Normal rate and regular rhythm  Pulmonary:      Effort: Pulmonary effort is normal       Breath sounds: Normal breath sounds  No wheezing, rhonchi or rales  Abdominal:      General: Bowel sounds are normal  There is distension  Palpations: Abdomen is soft  Tenderness: There is no abdominal tenderness  Skin:     General: Skin is warm and dry  Neurological:      General: No focal deficit present  Mental Status: She is alert and oriented to person, place, and time  Psychiatric:         Behavior: Behavior normal            Discharge instructions/Information to patient and family:   See after visit summary for information provided to patient and family  Provisions for Follow-Up Care:  See after visit summary for information related to follow-up care and any pertinent home health orders  Disposition:     Home    For Discharges to [facility] SNF:   · Not Applicable to this Patient - Not Applicable to this Patient    Planned Readmission: no     Discharge Statement:  I spent 25 minutes discharging the patient  This time was spent on the day of discharge   I had direct contact with the patient on the day of discharge  Greater than 50% of the total time was spent examining patient, answering all patient questions, arranging and discussing plan of care with patient as well as directly providing post-discharge instructions  Additional time then spent on discharge activities  Discharge Medications:  See after visit summary for reconciled discharge medications provided to patient and family        ** Please Note: This note has been constructed using a voice recognition system **

## 2021-01-27 NOTE — NURSING NOTE
Avs reviewed with pt  Pt understands that rx will be at pharm  Pt has no questions at this time  Pt vitals stable

## 2021-01-27 NOTE — RESPIRATORY THERAPY NOTE
Respiratory   Patient has been having clear breathe sounds, she remains on room air, oxygen saturations upper 90's  Patient has not been sob   No respiratory invention has been needed

## 2021-01-27 NOTE — DISCHARGE INSTRUCTIONS
Gastritis   WHAT YOU NEED TO KNOW:   Gastritis is inflammation or irritation of the lining of your stomach  DISCHARGE INSTRUCTIONS:   Call 911 for any of the following:   · You develop chest pain or shortness of breath  Seek care immediately if:   · You vomit blood  · You have black or bloody bowel movements  · You have severe stomach or back pain  Contact your healthcare provider if:   · You have a fever  · You have new or worsening symptoms, even after treatment  · You have questions or concerns about your condition or care  Medicines:   · Medicines  may be given to help treat a bacterial infection or decrease stomach acid  · Take your medicine as directed  Contact your healthcare provider if you think your medicine is not helping or if you have side effects  Tell him or her if you are allergic to any medicine  Keep a list of the medicines, vitamins, and herbs you take  Include the amounts, and when and why you take them  Bring the list or the pill bottles to follow-up visits  Carry your medicine list with you in case of an emergency  Manage or prevent gastritis:   · Do not smoke  Nicotine and other chemicals in cigarettes and cigars can make your symptoms worse and cause lung damage  Ask your healthcare provider for information if you currently smoke and need help to quit  E-cigarettes or smokeless tobacco still contain nicotine  Talk to your healthcare provider before you use these products  · Do not drink alcohol  Alcohol can prevent healing and make your gastritis worse  Talk to your healthcare provider if you need help to stop drinking  · Do not take NSAIDs or aspirin unless directed  These and similar medicines can cause irritation  If your healthcare provider says it is okay to take NSAIDs, take them with food  · Do not eat foods that cause irritation  Foods such as oranges and salsa can cause burning or pain  Eat a variety of healthy foods   Examples include fruits (not citrus), vegetables, low-fat dairy products, beans, whole-grain breads, and lean meats and fish  Try to eat small meals, and drink water with your meals  Do not eat for at least 3 hours before you go to bed  · Find ways to relax and decrease stress  Stress can increase stomach acid and make gastritis worse  Activities such as yoga, meditation, or listening to music can help you relax  Spend time with friends, or do things you enjoy  Follow up with your healthcare provider as directed: You may need ongoing tests or treatment, or referral to a gastroenterologist  Write down your questions so you remember to ask them during your visits  © Copyright [address] Information is for End User's use only and may not be sold, redistributed or otherwise used for commercial purposes  All illustrations and images included in CareNotes® are the copyrighted property of [employer]  or [facility] Kendal Pruett   The above information is an  only  It is not intended as medical advice for individual conditions or treatments  Talk to your doctor, nurse or pharmacist before following any medical regimen to see if it is safe and effective for you

## 2021-01-27 NOTE — ASSESSMENT & PLAN NOTE
· Resolved with IV potassium chloride supplementation    Results from last 7 days   Lab Units 01/27/21  0612 01/26/21  0500 01/25/21  0417   SODIUM mmol/L 143 145 144   POTASSIUM mmol/L 3 5 3 7 3 7   CHLORIDE mmol/L 109* 112* 113*   CO2 mmol/L 24 26 24   BUN mg/dL 4* 8 16   CREATININE mg/dL 0 76 0 90 1 05   CALCIUM mg/dL 8 1* 7 5* 7 3*

## 2021-01-27 NOTE — CASE MANAGEMENT
Discussed with patient preferences on discharge;understanding how to manage health at home; purpose of taking medications; importance of follow up care/appointments; and symptoms to watch out for once discharged home  Pt is being dc'd home on this date with outpatient follow up and resumption of aides from [address] [place] [address] Drive

## 2021-01-27 NOTE — PLAN OF CARE
Problem: Potential for Falls  Goal: Patient will remain free of falls  Description: INTERVENTIONS:  - Assess patient frequently for physical needs  -  Identify cognitive and physical deficits and behaviors that affect risk of falls  -  [place] fall precautions as indicated by assessment   - Educate patient/family on patient safety including physical limitations  - Instruct patient to call for assistance with activity based on assessment  - Modify environment to reduce risk of injury  - Consider OT/PT consult to assist with strengthening/mobility  Outcome: Progressing     Problem: PAIN - ADULT  Goal: Verbalizes/displays adequate comfort level or baseline comfort level  Description: Interventions:  - Encourage patient to monitor pain and request assistance  - Assess pain using appropriate pain scale  - Administer analgesics based on type and severity of pain and evaluate response  - Implement non-pharmacological measures as appropriate and evaluate response  - Consider cultural and social influences on pain and pain management  - Notify physician/advanced practitioner if interventions unsuccessful or patient reports new pain  Outcome: Progressing     Problem: INFECTION - ADULT  Goal: Absence or prevention of progression during hospitalization  Description: INTERVENTIONS:  - Assess and monitor for signs and symptoms of infection  - Monitor lab/diagnostic results  - Monitor all insertion sites, i e  indwelling lines, tubes, and drains  - Administer medications as ordered  - Instruct and encourage patient and family to use good hand hygiene technique  - Identify and instruct in appropriate isolation precautions for identified infection/condition  Outcome: Progressing     Problem: SAFETY ADULT  Goal: Patient will remain free of falls  Description: INTERVENTIONS:  - Assess patient frequently for physical needs  -  Identify cognitive and physical deficits and behaviors that affect risk of falls    -  [place] fall precautions as indicated by assessment   - Educate patient/family on patient safety including physical limitations  - Instruct patient to call for assistance with activity based on assessment  - Modify environment to reduce risk of injury  - Consider OT/PT consult to assist with strengthening/mobility  Outcome: Progressing  Goal: Maintain or return to baseline ADL function  Description: INTERVENTIONS:  -  Assess patient's ability to carry out ADLs; assess patient's baseline for ADL function and identify physical deficits which impact ability to perform ADLs (bathing, care of mouth/teeth, toileting, grooming, dressing, etc )  - Assess/evaluate cause of self-care deficits   - Assess range of motion  - Assess patient's mobility; develop plan if impaired  - Assess patient's need for assistive devices and provide as appropriate  - Encourage maximum independence but intervene and supervise when necessary  - Involve family in performance of ADLs  - Assess for home care needs following discharge   - Consider OT consult to assist with ADL evaluation and planning for discharge  - Provide patient education as appropriate  Outcome: Progressing  Goal: Maintain or return mobility status to optimal level  Description: INTERVENTIONS:  - Assess patient's baseline mobility status (ambulation, transfers, stairs, etc )    - Identify cognitive and physical deficits and behaviors that affect mobility  - Identify mobility aids required to assist with transfers and/or ambulation (gait belt, sit-to-stand, lift, walker, cane, etc )  - [place] fall precautions as indicated by assessment  - Record patient progress and toleration of activity level on Mobility SBAR; progress patient to next Phase/Stage  - Instruct patient to call for assistance with activity based on assessment  - Consider rehabilitation consult to assist with strengthening/weightbearing, etc   Outcome: Progressing     Problem: DISCHARGE PLANNING  Goal: Discharge to home or other facility with appropriate resources  Description: INTERVENTIONS:  - Identify barriers to discharge w/patient and caregiver  - Arrange for needed discharge resources and transportation as appropriate  - Identify discharge learning needs (meds, wound care, etc )  - Refer to Case Management Department for coordinating discharge planning if the patient needs post-hospital services based on physician/advanced practitioner order or complex needs related to functional status, cognitive ability, or social support system  Outcome: Progressing     Problem: Knowledge Deficit  Goal: Patient/family/caregiver demonstrates understanding of disease process, treatment plan, medications, and discharge instructions  Description: Complete learning assessment and assess knowledge base  Interventions:  - Provide teaching at level of understanding  - Provide teaching via preferred learning methods  Outcome: Progressing     Problem: Nutrition/Hydration-ADULT  Goal: Nutrient/Hydration intake appropriate for improving, restoring or maintaining nutritional needs  Description: Monitor and assess patient's nutrition/hydration status for malnutrition  Collaborate with interdisciplinary team and initiate plan and interventions as ordered  Monitor patient's weight and dietary intake as ordered or per policy  Utilize nutrition screening tool and intervene as necessary  Determine patient's food preferences and provide high-protein, high-caloric foods as appropriate       INTERVENTIONS:  - Monitor oral intake, urinary output, labs, and treatment plans  - Assess nutrition and hydration status and recommend course of action  - Evaluate amount of meals eaten  - Assist patient with eating if necessary   - Allow adequate time for meals  - Recommend/ encourage appropriate diets, oral nutritional supplements, and vitamin/mineral supplements  - Order, calculate, and assess calorie counts as needed  - Recommend, monitor, and adjust tube feedings and TPN/PPN based on assessed needs  - Assess need for intravenous fluids  - Provide specific nutrition/hydration education as appropriate  - Include patient/family/caregiver in decisions related to nutrition  Outcome: Progressing

## 2021-01-27 NOTE — DISCHARGE SUMMARY
Discharge- Rula Woodall 1960, 62 y o  female MRN: [id number]    Unit/Bed#: 880-40 Encounter: [id number]    Primary Care Provider: Vinh Greenberg MD   Date and time admitted to hospital: 1/22/2021  7:27 PM        * Gastritis, acute  Assessment & Plan  · The patient was seen in consultation by General Surgery  · Initiated reglan 5 mg IV every 6 hours during her hospitalization   · The patient received IV fluids during her hospitalization  · A small-bowel follow-through study was checked  · IMPRESSION:     1  Mild esophageal dysmotility  2  Esophageal reflux noted  3  Small bowel transit time  is 4 hours,  while normal, this is at the upper threshold  No significant abnormality in the small bowel appearance    · Also with heme-positive stools  · The patient was seen in consultation by Gastroenterology and underwent an EGD and colonoscopy on 01/27/2021  · EGD:   IMPRESSION:  Severe gastritis  Normal esophagus and duodenum      RECOMMENDATION:  Await pathology results  Continue current medications  Colonoscopy to follow    · Colonoscopy:  IMPRESSION:  Large amount of solid and liquid stool mixed with white contrast material      RECOMMENDATION:  Repeat in 1 year due to an inadequate bowel preparation or sooner    · Gastroenterology cleared patient for discharge from a GI prospective  The patient was tolerating her diet and asked to go home  She was discharged home with instructions to follow-up with GI as needed  · Outpatient follow-up with PCP within 1 week       Hypotension  Assessment & Plan  · Intermittent hypotension which resolved with IV fluids  · Likely secondary to hypovolemia    Tobacco use  Assessment & Plan  · Nicotine patch  · Smoking cessation counseling    Hypophosphatemia  Assessment & Plan  · The patient was given potassium phosphate 9 mmol IV x 1 dose  · Phosphorus level of 2 7 on discharge    Hypokalemia  Assessment & Plan  · Resolved with IV potassium chloride supplementation    Results from last 7 days   Lab Units 01/27/21  0612 01/26/21  0500 01/25/21  0417   SODIUM mmol/L 143 145 144   POTASSIUM mmol/L 3 5 3 7 3 7   CHLORIDE mmol/L 109* 112* 113*   CO2 mmol/L 24 26 24   BUN mg/dL 4* 8 16   CREATININE mg/dL 0 76 0 90 1 05   CALCIUM mg/dL 8 1* 7 5* 7 3*         Brain aneurysm  Assessment & Plan  Currently stable  History of aneurysm coiling  Continue outpatient Neurosurgery follow-up    Other specified hypothyroidism  Assessment & Plan  · Continue levothyroxine    Other hyperlipidemia  Assessment & Plan  · Continue statin therapy and fenofibrate    Discharging Physician / Practitioner: Maribel Castañeda PA-C  PCP: Emerson Sandoval MD  Admission Date:   Admission Orders (From admission, onward)     Ordered        01/23/21 0100  Inpatient Admission  Once                   Discharge Date: 01/27/21    Resolved Problems  Date Reviewed: 1/27/2021    None          Consultations During Hospital Stay:  · General surgery  · Gastroenterology     Procedures Performed:   · EGD:  IMPRESSION:  Severe gastritis  Normal esophagus and duodenum      RECOMMENDATION:  Await pathology results  Continue current medications  Colonoscopy to follow  · Colonoscopy:  IMPRESSION:  Large amount of solid and liquid stool mixed with white contrast material      RECOMMENDATION:  Repeat in 1 year due to an inadequate bowel preparation or sooner    Significant Findings / Test Results:   Xr Abdomen Obstruction Series    Result Date: 1/24/2021  Impression: 1  Nonobstructive bowel gas pattern  2   Distended, air-filled large and small bowel demonstrating differential fluid levels, similar to the earlier CT scan  Findings most compatible with diffuse intestinal ileus  3   Distended stomach is straightening differential fluid level in the erect position  Finding similar to recent CT scan  Correlate for gastroparesis   Workstation performed: [id number]     Fl Ugi/sm Bowel    Result Date: 1/25/2021  Impression: 1  Mild esophageal dysmotility 2  Esophageal reflux noted 3  Small bowel transit time  is 4 hours,  while normal, this is at the upper threshold  No significant abnormality in the small bowel appearance Workstation performed: [id number]     Ct Abdomen Pelvis With Contrast    Result Date: 1/22/2021  · Impression: 1  Marked gastric distention with air-fluid level  The finding could be related to slow transit or gastroenteritis  No findings to indicate gastric outlet obstruction  2   Distended small and large bowel loops containing liquid stool  No evidence of bowel obstruction or significant wall thickening  3   Stable hepatomegaly  The study was marked in [facility] for immediate notification  Workstation performed: [id number]   ·     Incidental Findings:   · none     Test Results Pending at Discharge (will require follow up):   · none     Outpatient Tests Requested:  · none    Complications:  none    Reason for Admission: abdominal pain    Hospital Course:     Diamond Pop is a 62 y o  female patient who originally presented to the hospital on 1/22/2021 due to complaints of abdominal pain  starting early on yesterday morning at 11:00 a m  she also complained of associated nausea without vomiting  She will do indicated feeling very fatigued  She denies shortness of breath, chest pain, fever, chills, bloody diarrhea, urinary symptoms  Labs completed in emergency room with results as shown below  CT abdomen and pelvis completed with results as shown below  In the ER she received Zofran 4 mg IV, Toradol 15 mg IV, potassium chloride 20 mEq p o , morphine sulfate 4 mg IV Ativan 1 mg IV  She started having episodes of diarrhea while in the ER  She had more than 5 episodes of diarrhea while I the ER  At bedside she is awake and alert  She states that abdominal pain has improved but she is still having episodes of diarrhea  She also complains of feeling tired   Patient is being admitted on inpatient status [facility] level care for further workup and management of acute diarrhea, abdominal pain, hypokalemia  Please see above list of diagnoses and related plan for additional information  Condition at Discharge: good     Discharge Day Visit / Exam:     Subjective:    Vitals: Blood Pressure: 120/80 (01/27/21 1219)  Pulse: 65 (01/27/21 1219)  Temperature: 99 6 °F (37 6 °C) (01/27/21 1140)  Temp Source: Oral (01/27/21 0729)  Respirations: 19 (01/27/21 1150)  Height: 5' 4" (162 6 cm) (01/23/21 1351)  Weight - Scale: 86 3 kg (190 lb 4 1 oz) (01/27/21 0600)  SpO2: 98 % (01/27/21 1219)  Exam:   Physical Exam  Vitals signs and nursing note reviewed  Constitutional:       Appearance: Normal appearance  HENT:      Head: Normocephalic and atraumatic  Cardiovascular:      Rate and Rhythm: Normal rate and regular rhythm  Pulmonary:      Effort: Pulmonary effort is normal       Breath sounds: Normal breath sounds  No wheezing, rhonchi or rales  Abdominal:      General: Bowel sounds are normal  There is distension  Palpations: Abdomen is soft  Tenderness: There is no abdominal tenderness  Skin:     General: Skin is warm and dry  Neurological:      General: No focal deficit present  Mental Status: She is alert and oriented to person, place, and time  Psychiatric:         Behavior: Behavior normal            Discharge instructions/Information to patient and family:   See after visit summary for information provided to patient and family  Provisions for Follow-Up Care:  See after visit summary for information related to follow-up care and any pertinent home health orders  Disposition:     Home    For Discharges to [facility] SNF:   · Not Applicable to this Patient - Not Applicable to this Patient    Planned Readmission: no     Discharge Statement:  I spent 25 minutes discharging the patient  This time was spent on the day of discharge   I had direct contact with the patient on the day of discharge  Greater than 50% of the total time was spent examining patient, answering all patient questions, arranging and discussing plan of care with patient as well as directly providing post-discharge instructions  Additional time then spent on discharge activities  Discharge Medications:  See after visit summary for reconciled discharge medications provided to patient and family        ** Please Note: This note has been constructed using a voice recognition system **

## 2021-01-27 NOTE — PLAN OF CARE
Problem: Potential for Falls  Goal: Patient will remain free of falls  Description: INTERVENTIONS:  - Assess patient frequently for physical needs  -  Identify cognitive and physical deficits and behaviors that affect risk of falls    -  [place] fall precautions as indicated by assessment   - Educate patient/family on patient safety including physical limitations  - Instruct patient to call for assistance with activity based on assessment  - Modify environment to reduce risk of injury  - Consider OT/PT consult to assist with strengthening/mobility  1/27/2021 1848 by Sugey Watkins RN  Outcome: Adequate for Discharge  1/27/2021 0728 by Sugey Watkins RN  Outcome: Progressing     Problem: PAIN - ADULT  Goal: Verbalizes/displays adequate comfort level or baseline comfort level  Description: Interventions:  - Encourage patient to monitor pain and request assistance  - Assess pain using appropriate pain scale  - Administer analgesics based on type and severity of pain and evaluate response  - Implement non-pharmacological measures as appropriate and evaluate response  - Consider cultural and social influences on pain and pain management  - Notify physician/advanced practitioner if interventions unsuccessful or patient reports new pain  1/27/2021 1848 by Sugey Watkins RN  Outcome: Adequate for Discharge  1/27/2021 0728 by Sugey Watkins RN  Outcome: Progressing     Problem: INFECTION - ADULT  Goal: Absence or prevention of progression during hospitalization  Description: INTERVENTIONS:  - Assess and monitor for signs and symptoms of infection  - Monitor lab/diagnostic results  - Monitor all insertion sites, i e  indwelling lines, tubes, and drains  - Administer medications as ordered  - Instruct and encourage patient and family to use good hand hygiene technique  - Identify and instruct in appropriate isolation precautions for identified infection/condition  1/27/2021 1848 by Sugey Watkins RN  Outcome: Adequate for Discharge  1/27/2021 0728 by Anne King RN  Outcome: Progressing     Problem: SAFETY ADULT  Goal: Patient will remain free of falls  Description: INTERVENTIONS:  - Assess patient frequently for physical needs  -  Identify cognitive and physical deficits and behaviors that affect risk of falls    -  Normal fall precautions as indicated by assessment   - Educate patient/family on patient safety including physical limitations  - Instruct patient to call for assistance with activity based on assessment  - Modify environment to reduce risk of injury  - Consider OT/PT consult to assist with strengthening/mobility  1/27/2021 1848 by Anne King RN  Outcome: Adequate for Discharge  1/27/2021 0728 by Anne King RN  Outcome: Progressing  Goal: Maintain or return to baseline ADL function  Description: INTERVENTIONS:  -  Assess patient's ability to carry out ADLs; assess patient's baseline for ADL function and identify physical deficits which impact ability to perform ADLs (bathing, care of mouth/teeth, toileting, grooming, dressing, etc )  - Assess/evaluate cause of self-care deficits   - Assess range of motion  - Assess patient's mobility; develop plan if impaired  - Assess patient's need for assistive devices and provide as appropriate  - Encourage maximum independence but intervene and supervise when necessary  - Involve family in performance of ADLs  - Assess for home care needs following discharge   - Consider OT consult to assist with ADL evaluation and planning for discharge  - Provide patient education as appropriate  1/27/2021 1848 by Anne King RN  Outcome: Adequate for Discharge  1/27/2021 0728 by Anne King RN  Outcome: Progressing  Goal: Maintain or return mobility status to optimal level  Description: INTERVENTIONS:  - Assess patient's baseline mobility status (ambulation, transfers, stairs, etc )    - Identify cognitive and physical deficits and behaviors that affect mobility  - Identify mobility aids required to assist with transfers and/or ambulation (gait belt, sit-to-stand, lift, walker, cane, etc )  - [place] fall precautions as indicated by assessment  - Record patient progress and toleration of activity level on Mobility SBAR; progress patient to next Phase/Stage  - Instruct patient to call for assistance with activity based on assessment  - Consider rehabilitation consult to assist with strengthening/weightbearing, etc   1/27/2021 1848 by Mike Bonds RN  Outcome: Adequate for Discharge  1/27/2021 0728 by Mike Bonds RN  Outcome: Progressing     Problem: DISCHARGE PLANNING  Goal: Discharge to home or other facility with appropriate resources  Description: INTERVENTIONS:  - Identify barriers to discharge w/patient and caregiver  - Arrange for needed discharge resources and transportation as appropriate  - Identify discharge learning needs (meds, wound care, etc )  - Refer to Case Management Department for coordinating discharge planning if the patient needs post-hospital services based on physician/advanced practitioner order or complex needs related to functional status, cognitive ability, or social support system  1/27/2021 1848 by Mike Bonds RN  Outcome: Adequate for Discharge  1/27/2021 0728 by Mike Bonds RN  Outcome: Progressing     Problem: Knowledge Deficit  Goal: Patient/family/caregiver demonstrates understanding of disease process, treatment plan, medications, and discharge instructions  Description: Complete learning assessment and assess knowledge base    Interventions:  - Provide teaching at level of understanding  - Provide teaching via preferred learning methods  1/27/2021 1848 by Mike Bonds RN  Outcome: Adequate for Discharge  1/27/2021 0728 by Mike Bonds RN  Outcome: Progressing     Problem: Nutrition/Hydration-ADULT  Goal: Nutrient/Hydration intake appropriate for improving, restoring or maintaining nutritional needs  Description: Monitor and assess patient's nutrition/hydration status for malnutrition  Collaborate with interdisciplinary team and initiate plan and interventions as ordered  Monitor patient's weight and dietary intake as ordered or per policy  Utilize nutrition screening tool and intervene as necessary  Determine patient's food preferences and provide high-protein, high-caloric foods as appropriate       INTERVENTIONS:  - Monitor oral intake, urinary output, labs, and treatment plans  - Assess nutrition and hydration status and recommend course of action  - Evaluate amount of meals eaten  - Assist patient with eating if necessary   - Allow adequate time for meals  - Recommend/ encourage appropriate diets, oral nutritional supplements, and vitamin/mineral supplements  - Order, calculate, and assess calorie counts as needed  - Recommend, monitor, and adjust tube feedings and TPN/PPN based on assessed needs  - Assess need for intravenous fluids  - Provide specific nutrition/hydration education as appropriate  - Include patient/family/caregiver in decisions related to nutrition  1/27/2021 1848 by Paul Calderon, RN  Outcome: Adequate for Discharge  1/27/2021 0728 by Paul Calderon, RN  Outcome: Progressing

## 2021-01-27 NOTE — ASSESSMENT & PLAN NOTE
· The patient was given potassium phosphate 9 mmol IV x 1 dose  · Phosphorus level of 2 7 on discharge

## 2021-01-27 NOTE — ASSESSMENT & PLAN NOTE
· The patient was seen in consultation by General Surgery  · Initiated reglan 5 mg IV every 6 hours during her hospitalization   · The patient received IV fluids during her hospitalization  · A small-bowel follow-through study was checked  · IMPRESSION:     1  Mild esophageal dysmotility  2  Esophageal reflux noted  3  Small bowel transit time  is 4 hours,  while normal, this is at the upper threshold  No significant abnormality in the small bowel appearance    · Also with heme-positive stools  · The patient was seen in consultation by Gastroenterology and underwent an EGD and colonoscopy on 01/27/2021  · EGD:   IMPRESSION:  Severe gastritis  Normal esophagus and duodenum      RECOMMENDATION:  Await pathology results  Continue current medications  Colonoscopy to follow    · Colonoscopy:  IMPRESSION:  Large amount of solid and liquid stool mixed with white contrast material      RECOMMENDATION:  Repeat in 1 year due to an inadequate bowel preparation or sooner    · Gastroenterology cleared patient for discharge from a GI prospective  The patient was tolerating her diet and asked to go home  She was discharged home with instructions to follow-up with GI as needed  · Outpatient follow-up with PCP within 1 week

## 2021-01-28 LAB — O+P STL CONC: NORMAL

## 2021-01-30 LAB — G LAMBLIA AG STL QL IA: POSITIVE

## 2021-02-01 RX ORDER — METRONIDAZOLE 500 MG/1
500 TABLET ORAL EVERY 12 HOURS SCHEDULED
Qty: 14 TABLET | Refills: 0 | Status: SHIPPED | OUTPATIENT
Start: 2021-02-01 | End: 2021-02-01 | Stop reason: SDUPTHER

## 2021-02-01 RX ORDER — METRONIDAZOLE 500 MG/1
500 TABLET ORAL EVERY 12 HOURS SCHEDULED
Qty: 14 TABLET | Refills: 0 | Status: SHIPPED | OUTPATIENT
Start: 2021-02-01 | End: 2021-02-08

## 2021-02-01 RX ORDER — POLYETHYLENE GLYCOL 3350 17 G/17G
17 POWDER, FOR SOLUTION ORAL DAILY
Qty: 20 EACH | Refills: 0 | Status: SHIPPED | OUTPATIENT
Start: 2021-02-01 | End: 2021-02-01

## 2021-02-01 RX ORDER — POLYETHYLENE GLYCOL 3350 17 G/17G
17 POWDER, FOR SOLUTION ORAL DAILY
Qty: 20 EACH | Refills: 0 | Status: SHIPPED | OUTPATIENT
Start: 2021-02-01 | End: 2021-08-10 | Stop reason: SDUPTHER

## 2021-02-09 ENCOUNTER — TELEPHONE (OUTPATIENT)
Dept: GASTROENTEROLOGY | Facility: CLINIC | Age: 61
End: 2021-02-09

## 2021-02-09 NOTE — TELEPHONE ENCOUNTER
Patient contacted office for appointment  She was seen while inpatient  And has been experiencing weightloss,nausea,vomiting,and diarrhea  I did schedule her for an appointment 2/11 virtual visit with [facility]  She is looking to speak with someone regarding recommendations until scheduled appointment  Please advise

## 2021-02-09 NOTE — TELEPHONE ENCOUNTER
Patient is s/p admission Jan 23 - Jermain Hernandez  She said she was constipated since admission but had several liquid bm after taking dulcolax  She said she is using zofran to control nausea last vomiting a week ago  She is also using omeprazole twice a day  She has many health issues and has not been seen in office yet  She felt it might be better to come in person to office vs virtual   She wcb if she can get a ride to office and we can then change to in person  All questions answered at this time

## 2021-02-11 ENCOUNTER — OFFICE VISIT (OUTPATIENT)
Dept: GASTROENTEROLOGY | Facility: CLINIC | Age: 61
End: 2021-02-11
Payer: COMMERCIAL

## 2021-02-11 VITALS
WEIGHT: 186.8 LBS | DIASTOLIC BLOOD PRESSURE: 75 MMHG | HEART RATE: 90 BPM | HEIGHT: 64 IN | BODY MASS INDEX: 31.89 KG/M2 | TEMPERATURE: 98.1 F | SYSTOLIC BLOOD PRESSURE: 135 MMHG

## 2021-02-11 DIAGNOSIS — K29.00 ACUTE GASTRITIS WITHOUT HEMORRHAGE, UNSPECIFIED GASTRITIS TYPE: ICD-10-CM

## 2021-02-11 DIAGNOSIS — K56.7 ILEUS (HCC): ICD-10-CM

## 2021-02-11 DIAGNOSIS — K59.09 OTHER CONSTIPATION: Primary | ICD-10-CM

## 2021-02-11 PROCEDURE — 99214 OFFICE O/P EST MOD 30 MIN: CPT | Performed by: PHYSICIAN ASSISTANT

## 2021-02-11 PROCEDURE — 3008F BODY MASS INDEX DOCD: CPT | Performed by: PHYSICIAN ASSISTANT

## 2021-02-11 RX ORDER — DICYCLOMINE HCL 20 MG
20 TABLET ORAL EVERY 6 HOURS
Qty: 120 TABLET | Refills: 4 | Status: SHIPPED | OUTPATIENT
Start: 2021-02-11 | End: 2021-07-15

## 2021-02-11 NOTE — PROGRESS NOTES
Assessment/Plan:     Diagnoses and all orders for this visit:    Other constipation  Ileus [facility])   patient was recently admitted for abdominal pain  Imaging showed ileus  She has had some difficulty moving her bowels per would recommend taking MiraLax on a regular basis to help remove fluid bowel motility  We also discussed some of this may be because of her chronic pain medication  Recommend increasing water intake and movement as much as possible  Will also prescribed Bentyl for an as-needed basis for pain  She will need a repeat colonoscopy in 1 year's time due to poor prep  -     dicyclomine (BENTYL) 20 mg tablet; Take 1 tablet (20 mg total) by mouth every 6 (six) hours    Acute gastritis without hemorrhage, unspecified gastritis type   she was also complaining nausea on an endoscopy which showed severe gastritis  She was on omeprazole 20 mg however this was recently increased 40 she continued to be symptomatic  I agree with continuing 40 mg for the time being and we can reassess at her next visit      will see her back in the next 3-4 months or sooner if necessary  Subjective:      Patient ID: Wisam Fortune is a 62 y o  female  HPI     This is a hospital follow-up for abdominal pain  According to the chart she had acute on chronic abdominal pain  She states she was in her right abdomen but was also diffuse  She states the pain was so bad that she could not walk  It was associated with nausea but no vomiting  Reportedly her bowel movements were within normal limits but then turned diarrhea  Stool studies including C diff culture were negative  CT showed gastric distention, distended small and large bowel loops without evidence of obstruction or wall thickening  She also had an upper GI which showed mild esophageal dysmotility & reflux  Given her symptoms it was recommended that she undergo endoscopy and colonoscopy  EGD showed severe gastritis     Biopsies were negative for H pylori or celiac  She was on omeprazole 20 mg but called yesterday due to worsening symptoms and this was increased to 40 mg  She underwent colonoscopy at the same time & was found to have a poor prep, the transverse colon, splenic flexure, descending colon, sigmoid and rectosigmoid appeared normal but exam was extremely limited  Biopsy was negative for microscopic colitis  Patient Active Problem List   Diagnosis    Other hyperlipidemia    Dizziness    Other specified hypothyroidism    Brain aneurysm    Hypokalemia    Hypertriglyceridemia    Low HDL (under 40)    Elevated TSH    Tobacco abuse    Acute cystitis with hematuria    Intractable abdominal pain    TMJ syndrome    Stroke-like symptoms    Nausea    Left chest pressure    Hypophosphatemia    Gastritis, acute    Hypotension    Tobacco use    [facility])    Hypertension    Other constipation    Ileus ([facility])     Allergies   Allergen Reactions    Hydroxyzine Anaphylaxis     Claims it gives her convulsions       Other     Pollen Extract Itching    Tree Extract     Clarithromycin Rash     Pt denies    Latex Rash    Sulfa Antibiotics Rash     "severe skin burn"     Current Outpatient Medications on File Prior to Visit   Medication Sig    albuterol (PROVENTIL HFA,VENTOLIN HFA) 90 mcg/act inhaler Inhale 2 puffs every 6 (six) hours    Asenapine Maleate (SAPHRIS SL) Place 5 mg under the tongue    b complex vitamins capsule Take 1 capsule by mouth daily    Buprenorphine HCl (Belbuca) 300 MCG FILM Apply 300 mcg to cheek 2 (two) times a day    carboxymethylcellulose 0 5 % SOLN Administer 1 drop to both eyes daily as needed for dry eyes    clonazePAM (KlonoPIN) 0 5 mg tablet Take 0 5 mg by mouth daily at bedtime    clonazePAM (KlonoPIN) 1 mg tablet Take 1 mg by mouth daily     cyclobenzaprine (FLEXERIL) 10 mg tablet TAKE 1 TABLET BY MOUTH 3 TIMES A DAY AS NEEDED FOR MUSCLE SPASMS    fenofibrate (TRICOR) 48 mg tablet Take 48 mg by mouth daily    furosemide (LASIX) 20 mg tablet Take 20 mg by mouth every other day     Galcanezumab-gnlm (Emgality) 120 MG/ML SOAJ Inject 120 mg under the skin every 30 (thirty) days    HYDROcodone-acetaminophen (NORCO)  mg per tablet 1 tab every 6 hours as needed for severe pain   Do not fill before 2/12/18    lamoTRIgine (LaMICtal) 25 mg tablet Take 75 mg by mouth daily at bedtime    levothyroxine 137 mcg tablet Take 137 mcg by mouth daily    magnesium 30 MG tablet Take 30 mg by mouth 2 (two) times a day    Melatonin 10 MG CAPS Take 1 tablet by mouth    omeprazole (PriLOSEC) 20 mg delayed release capsule 40 mg     polyethylene glycol (MIRALAX) 17 g packet Take 17 g by mouth daily    pregabalin (LYRICA) 100 mg capsule take 1 capsule by mouth three times a day    pregabalin (LYRICA) 150 mg capsule take 1 capsule by mouth NIGHTLY    topiramate (TOPAMAX) 100 mg tablet Take 100 mg by mouth every 12 (twelve) hours     [DISCONTINUED] dicyclomine (BENTYL) 10 mg capsule Take 10 mg by mouth 3 (three) times a day before meals    atorvastatin (LIPITOR) 40 mg tablet Take 40 mg by mouth daily at bedtime     lamoTRIgine (LaMICtal) 100 mg tablet Take 100 mg by mouth     ondansetron (ZOFRAN-ODT) 4 mg disintegrating tablet Take 1 tablet (4 mg total) by mouth every 8 (eight) hours as needed for nausea or vomiting for up to 4 days     No current facility-administered medications on file prior to visit  History reviewed  No pertinent family history    Past Medical History:   Diagnosis Date    Anxiety     Asthma     Bipolar 1 disorder ([facility] [address] )     Brain aneurysm     Chronic pain disorder     spinal stenosis    Concussion syndrome     neurological rx and balance rx    COPD (chronic obstructive pulmonary disease) ([facility])     Depression     Disease of thyroid gland     nodules     Family history of colon cancer     father    Fibromyalgia, primary     History of colon polyps     Hyperlipidemia     Hypertension     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     Migraine     Neuropathy     bilateral feet and hands    Peripheral neuropathy     Psychiatric disorder     PTSD (post-traumatic stress disorder)     Stroke ([facility] [address] )     2012 no deficeits    Thyroid Cancer     TIA (transient ischemic attack)      Social History     Socioeconomic History    Marital status:      Spouse name: None    Number of children: None    Years of education: None    Highest education level: None   Occupational History    None   Social Needs    Financial resource strain: None    Food insecurity     Worry: None     Inability: None    Transportation needs     Medical: None     Non-medical: None   Tobacco Use    Smoking status: Current Every Day Smoker     Packs/day: 2 00     Types: Cigarettes    Smokeless tobacco: Never Used   Substance and Sexual Activity    Alcohol use: Never     Alcohol/week: 0 0 standard drinks     Frequency: Patient refused     Drinks per session: Patient refused     Binge frequency: Never    Drug use: Never     Comment: prescribed Belbuca    Sexual activity: Yes     Partners: Male   Lifestyle    Physical activity     Days per week: None     Minutes per session: None    Stress: None   Relationships    Social connections     Talks on phone: None     Gets together: None     Attends [employer] service: None     Active member of club or organization: None     Attends meetings of clubs or organizations: None     Relationship status: None    Intimate partner violence     Fear of current or ex partner: None     Emotionally abused: None     Physically abused: None     Forced sexual activity: None   Other Topics Concern    None   Social History Narrative    None     Past Surgical History:   Procedure Laterality Date    ABDOMINAL SURGERY      laproscopic/ endometriosis    BRAIN SURGERY      aneurysm/ coiling procedure    CARPAL TUNNEL RELEASE  CHOLECYSTECTOMY      DENTAL SURGERY      EYE SURGERY      cataract    HYSTERECTOMY      CA ESOPHAGOGASTRODUODENOSCOPY TRANSORAL DIAGNOSTIC N/A 2/8/2018    Procedure: EGD AND COLONOSCOPY;  Surgeon: Fina Parra MD;  Location: [facility]; Service: Gastroenterology    THYROID SURGERY      TONSILLECTOMY           Review of Systems   All other systems reviewed and are negative  Objective:      /75 (BP Location: Left arm, Patient Position: Sitting, Cuff Size: Extra-Large)   Pulse 90   Temp 98 1 °F (36 7 °C) (Tympanic)   Ht 5' 4" (1 626 m)   Wt 84 7 kg (186 lb 12 8 oz)   BMI 32 06 kg/m²          Physical Exam  Constitutional:       Appearance: She is well-developed  HENT:      Head: Normocephalic and atraumatic  Eyes:      Conjunctiva/sclera: Conjunctivae normal    Neck:      Musculoskeletal: Normal range of motion  Cardiovascular:      Rate and Rhythm: Normal rate and regular rhythm  Pulmonary:      Effort: Pulmonary effort is normal       Breath sounds: Normal breath sounds  Abdominal:      General: Bowel sounds are normal  There is no distension  Palpations: Abdomen is soft  Tenderness: There is abdominal tenderness (  Mild tenderness to palpation right abdomen)  Musculoskeletal:      Comments: Walks with a cane   Skin:     General: Skin is warm and dry  Neurological:      Mental Status: She is alert and oriented to person, place, and time     Psychiatric:         Mood and Affect: Mood normal          Behavior: Behavior normal

## 2021-02-15 ENCOUNTER — TELEPHONE (OUTPATIENT)
Dept: GASTROENTEROLOGY | Facility: CLINIC | Age: 61
End: 2021-02-15

## 2021-02-15 NOTE — TELEPHONE ENCOUNTER
I called patient to let her know what Dr Mauricio Able said  I had to [facility] to call the office back

## 2021-02-18 ENCOUNTER — TELEPHONE (OUTPATIENT)
Dept: GASTROENTEROLOGY | Facility: CLINIC | Age: 61
End: 2021-02-18

## 2021-02-18 DIAGNOSIS — K59.09 OTHER CONSTIPATION: Primary | ICD-10-CM

## 2021-02-18 RX ORDER — LINACLOTIDE 72 UG/1
72 CAPSULE, GELATIN COATED ORAL DAILY
Qty: 30 CAPSULE | Refills: 3 | Status: SHIPPED | OUTPATIENT
Start: 2021-02-18 | End: 2021-12-02 | Stop reason: ALTCHOICE

## 2021-02-18 NOTE — TELEPHONE ENCOUNTER
I called patient to let her know what Dr Helen Alvarez said, Dr Helen Alvarez wanted me to let patient know that she can consider adding bisacodyl either oral or suppository as needed up to every other day for constipation (in addition to MiraLax daily)   Bentyl may be constipating so she should only use that as needed for pain  She stated she can not afford any medication over the counter  If she gets something through her insurance she will get it  She has no extra cash for any medication  She stated that the Miralax is not helping, she has not had a bowel in 5 days, she stated she threw up one time  She does not have a aide right now so she is doing everything on her own and is not feel good       Please advise patient @ [phone]

## 2021-02-18 NOTE — TELEPHONE ENCOUNTER
I sent linzess to her pharmacy but it will likely require prior auth  She can try OTC mag citrate in the meantime  Yes OTC but only a few dollars    [place]

## 2021-02-18 NOTE — TELEPHONE ENCOUNTER
----- Message from Sincere Lynch MD sent at 2/15/2021 10:14 AM EST -----  Please let patient know that she can consider adding bisacodyl either oral or suppository as needed up to every other day for constipation (in addition to MiraLax daily)  Bentyl may be constipating so she should only use that as needed for pain      Thank you

## 2021-02-19 NOTE — TELEPHONE ENCOUNTER
I called the patient to let her know  Misti Domínguez PA-C sent Linzess 72 mcg  to the pharmacy and also she can try Magnesium Citrate  Patient expressed understanding

## 2021-03-26 ENCOUNTER — IMMUNIZATIONS (OUTPATIENT)
Dept: FAMILY MEDICINE CLINIC | Facility: HOSPITAL | Age: 61
End: 2021-03-26

## 2021-03-26 DIAGNOSIS — Z23 ENCOUNTER FOR IMMUNIZATION: Primary | ICD-10-CM

## 2021-03-26 PROCEDURE — 91301 SARS-COV-2 / COVID-19 MRNA VACCINE (MODERNA) 100 MCG: CPT

## 2021-03-26 PROCEDURE — 0011A SARS-COV-2 / COVID-19 MRNA VACCINE (MODERNA) 100 MCG: CPT

## 2021-04-28 ENCOUNTER — IMMUNIZATIONS (OUTPATIENT)
Dept: FAMILY MEDICINE CLINIC | Facility: HOSPITAL | Age: 61
End: 2021-04-28

## 2021-04-28 DIAGNOSIS — Z23 ENCOUNTER FOR IMMUNIZATION: Primary | ICD-10-CM

## 2021-04-28 PROCEDURE — 0012A SARS-COV-2 / COVID-19 MRNA VACCINE (MODERNA) 100 MCG: CPT

## 2021-04-28 PROCEDURE — 91301 SARS-COV-2 / COVID-19 MRNA VACCINE (MODERNA) 100 MCG: CPT

## 2021-07-15 DIAGNOSIS — K59.09 OTHER CONSTIPATION: ICD-10-CM

## 2021-07-15 RX ORDER — DICYCLOMINE HCL 20 MG
TABLET ORAL
Qty: 120 TABLET | Refills: 4 | Status: SHIPPED | OUTPATIENT
Start: 2021-07-15 | End: 2021-12-02 | Stop reason: ALTCHOICE

## 2021-07-27 ENCOUNTER — OFFICE VISIT (OUTPATIENT)
Dept: GASTROENTEROLOGY | Facility: CLINIC | Age: 61
End: 2021-07-27
Payer: COMMERCIAL

## 2021-07-27 VITALS
BODY MASS INDEX: 31.55 KG/M2 | HEIGHT: 64 IN | WEIGHT: 184.8 LBS | HEART RATE: 76 BPM | DIASTOLIC BLOOD PRESSURE: 73 MMHG | TEMPERATURE: 97 F | SYSTOLIC BLOOD PRESSURE: 109 MMHG

## 2021-07-27 DIAGNOSIS — K29.00 ACUTE GASTRITIS WITHOUT HEMORRHAGE, UNSPECIFIED GASTRITIS TYPE: ICD-10-CM

## 2021-07-27 DIAGNOSIS — K59.09 OTHER CONSTIPATION: Primary | ICD-10-CM

## 2021-07-27 DIAGNOSIS — R10.9 INTRACTABLE ABDOMINAL PAIN: ICD-10-CM

## 2021-07-27 DIAGNOSIS — K56.7 ILEUS (HCC): ICD-10-CM

## 2021-07-27 PROCEDURE — 99214 OFFICE O/P EST MOD 30 MIN: CPT | Performed by: INTERNAL MEDICINE

## 2021-07-27 PROCEDURE — 3008F BODY MASS INDEX DOCD: CPT | Performed by: INTERNAL MEDICINE

## 2021-07-27 RX ORDER — OMEPRAZOLE 40 MG/1
CAPSULE, DELAYED RELEASE ORAL 2 TIMES DAILY
COMMUNITY
Start: 2021-07-05

## 2021-07-27 RX ORDER — ALBUTEROL SULFATE 2.5 MG/3ML
SOLUTION RESPIRATORY (INHALATION)
COMMUNITY

## 2021-07-27 NOTE — PATIENT INSTRUCTIONS
Recall In for a follow up appointment with Dr Mabel Vargas in 11/2021  6 week follow up appointment in September 2021 with Deniz Carty  Patient expressed understanding

## 2021-07-29 NOTE — PROGRESS NOTES
[facility] - Outpatient Follow-up Note  Tamara Woodall 62 y o  female MRN: [id number]  Encounter: [id number]          ASSESSMENT AND PLAN:      1  Other constipation  2  Ileus ([facility] )  3  Intractable abdominal pain  She has constipation predominant irritable bowel syndrome and I encouraged her to take MiraLax every day  If she remains constipated then she can increase this to twice a day  If she develops diarrhea then she can decrease it to every other day  I reminded her that she will need to have some chronic laxative therapy to keep her regular and that she should stop if she starts to have a few days with regular bowel movements  I will have her follow up in three months and at that time I will schedule her for her repeat colonoscopy because of her inadequate bowel preparation  4  Acute gastritis without hemorrhage, unspecified gastritis type  She should continue the omeprazole daily for her reflux and gastritis     ______________________________________________________________________    SUBJECTIVE:  She presents for follow-up of her abdominal pain, constipation, bloating, reflux, and nausea  She continues to have symptoms but has not been taking the MiraLax consistently  She complains of bloating, constipation, occasional diarrhea, and reflux symptoms  She does feel omeprazole 40 mg daily is helping  She denies recent bleeding, weight loss, or difficulty swallowing  Her recent upper endoscopy and colonoscopy in January were notable for a large amount of stool in the colon and severe gastritis  I reviewed her labs, imaging, endoscopy and colonoscopy reports, and biopsy results and last PCP note  REVIEW OF SYSTEMS IS OTHERWISE NEGATIVE        Historical Information   Past Medical History:   Diagnosis Date    Anxiety     Asthma     Bipolar 1 disorder ([facility] )     Brain aneurysm     Chronic pain disorder     spinal stenosis    Concussion syndrome     neurological rx and balance rx    COPD (chronic obstructive pulmonary disease) ([facility] )     Depression     Disease of thyroid gland     nodules     Family history of colon cancer     father    Fibromyalgia, primary     History of colon polyps     Hyperlipidemia     Hypertension     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     Migraine     Neuropathy     bilateral feet and hands    Peripheral neuropathy     Psychiatric disorder     PTSD (post-traumatic stress disorder)     Stroke ([facility] )     2012 no deficeits    Thyroid Cancer     TIA (transient ischemic attack)      Past Surgical History:   Procedure Laterality Date    ABDOMINAL SURGERY      laproscopic/ endometriosis    BRAIN SURGERY      aneurysm/ coiling procedure    CARPAL TUNNEL RELEASE      CHOLECYSTECTOMY      DENTAL SURGERY      EYE SURGERY      cataract    HYSTERECTOMY      [place] ESOPHAGOGASTRODUODENOSCOPY TRANSORAL DIAGNOSTIC N/A 2/8/2018    Procedure: EGD AND COLONOSCOPY;  Surgeon: Gina You MD;  Location:  GI LAB; Service: Gastroenterology    THYROID SURGERY      TONSILLECTOMY       Social History   Social History     Substance and Sexual Activity   Alcohol Use Never    Alcohol/week: 0 0 standard drinks     Social History     Substance and Sexual Activity   Drug Use Never    Comment: prescribed Belbuca     Social History     Tobacco Use   Smoking Status Current Every Day Smoker    Packs/day: 2 00    Types: Cigarettes   Smokeless Tobacco Never Used     History reviewed  No pertinent family history      Meds/Allergies       Current Outpatient Medications:     albuterol (2 5 mg/3 mL) 0 083 % nebulizer solution    albuterol (PROVENTIL HFA,VENTOLIN HFA) 90 mcg/act inhaler    ammonium lactate (AmLactin) 12 % lotion    amoxicillin (AMOXIL) 875 mg tablet    Asenapine Maleate (SAPHRIS SL)    aspirin 325 mg tablet    b complex vitamins capsule    buprenorphine (BUTRANS) 15 mcg/hr    Buprenorphine HCl (Belbuca) 300 MCG FILM   busPIRone (BUSPAR) 15 mg tablet    carboxymethylcellulose 0 5 % SOLN    cephalexin (KEFLEX) 500 mg capsule    ciprofloxacin (CIPRO) 250 mg tablet    clonazePAM (KlonoPIN) 0 5 mg tablet    clonazePAM (KlonoPIN) 1 mg tablet    cyclobenzaprine (FLEXERIL) 10 mg tablet    diazepam (VALIUM) 5 mg tablet    dicyclomine (BENTYL) 20 mg tablet    DULoxetine (CYMBALTA) 30 mg delayed release capsule    fenofibrate (TRICOR) 48 mg tablet    fluconazole (DIFLUCAN) 150 mg tablet    fluticasone-salmeterol (Wixela Inhub) 500-50 mcg/dose inhaler    fluticasone-salmeterol (Wixela Inhub) 500-50 mcg/dose inhaler    fluticasone-salmeterol (Wixela Inhub) 500-50 mcg/dose inhaler    fluticasone-salmeterol (Wixela Inhub) 500-50 mcg/dose inhaler    furosemide (LASIX) 20 mg tablet    Galcanezumab-gnlm (Emgality) 120 MG/ML SOAJ    HYDROcodone-acetaminophen (NORCO)  mg per tablet    ibuprofen (MOTRIN) 800 mg tablet    lamoTRIgine (LaMICtal) 25 mg tablet    latanoprost (XALATAN) 0 005 % ophthalmic solution    levothyroxine 137 mcg tablet    linaCLOtide (Linzess) 72 MCG CAPS    lurasidone (Latuda) 20 mg tablet    lurasidone (Latuda) 40 mg tablet    Lurasidone HCl (Latuda) 60 MG TABS    magnesium 30 MG tablet    Melatonin 10 MG CAPS    metroNIDAZOLE (FLAGYL) 500 mg tablet    naloxone (Narcan) 4 mg/0 1 mL nasal spray    nitrofurantoin (MACRODANTIN) 100 mg capsule    omeprazole (PriLOSEC) 20 mg delayed release capsule    omeprazole (PriLOSEC) 20 mg delayed release capsule    omeprazole (PriLOSEC) 40 MG capsule    ondansetron (ZOFRAN) 4 mg tablet    ondansetron (ZOFRAN) 8 mg tablet    polyethylene glycol (MIRALAX) 17 g packet    pregabalin (LYRICA) 100 mg capsule    pregabalin (LYRICA) 150 mg capsule    promethazine (PHENERGAN) 25 mg tablet    rizatriptan (MAXALT-MLT) 10 MG disintegrating tablet    SUMAtriptan (IMITREX) 100 mg tablet    thiamine 100 MG tablet    topiramate (TOPAMAX) 100 mg tablet   transdermal buprenorphine (BUTRANS) 20 mcg/hr PTWK TD patch    traZODone (DESYREL) 100 mg tablet    Wixela Inhub 500-50 MCG/DOSE inhaler    Zoster Vac Recomb Adjuvanted (Shingrix) 50 MCG/0 5ML SUSR    atorvastatin (LIPITOR) 40 mg tablet    lamoTRIgine (LaMICtal) 100 mg tablet    ondansetron (ZOFRAN-ODT) 4 mg disintegrating tablet    Allergies   Allergen Reactions    Hydroxyzine Anaphylaxis     Claims it gives her convulsions   Other     Pollen Extract Itching    Tree Extract     Clarithromycin Rash     Pt denies    Latex Rash    Sulfa Antibiotics Rash     "severe skin burn"           Objective     Blood pressure 109/73, pulse 76, temperature (!) 97 °F (36 1 °C), temperature source Tympanic, height 5' 4" (1 626 m), weight 83 8 kg (184 lb 12 8 oz)  Body mass index is 31 72 kg/m²  PHYSICAL EXAM:      General Appearance:   Alert, cooperative, no distress   HEENT:   Normocephalic, atraumatic, anicteric      Neck:  Supple, symmetrical, trachea midline   Lungs:   Clear to auscultation bilaterally; no rales, rhonchi or wheezing; respirations unlabored    Heart[de-identified]   Regular rate and rhythm; no murmur, rub, or gallop  Abdomen:   Soft, mild generalized tenderness and distension; normal bowel sounds; no masses, no organomegaly    Genitalia:   Deferred    Rectal:   Deferred    Extremities:  No cyanosis, clubbing or edema    Pulses:  2+ and symmetric    Skin:  No jaundice, rashes, or lesions    Lymph nodes:  No palpable cervical lymphadenopathy        Lab Results:   No visits with results within 1 Day(s) from this visit  Latest known visit with results is:   No results displayed because visit has over 200 results  Radiology Results:   No results found

## 2021-08-09 DIAGNOSIS — K59.00 CONSTIPATION: ICD-10-CM

## 2021-08-09 NOTE — TELEPHONE ENCOUNTER
Patient has complaints of stomach pain and stated she hasnt had a bowel movement in 4 days  She also needs a refill of miralax sent to [facility]  She would like a phone call back

## 2021-08-10 RX ORDER — POLYETHYLENE GLYCOL 3350 17 G/17G
17 POWDER, FOR SOLUTION ORAL DAILY
Qty: 30 EACH | Refills: 5 | Status: SHIPPED | OUTPATIENT
Start: 2021-08-10

## 2021-08-10 NOTE — TELEPHONE ENCOUNTER
Patient of Dr Kathia Abraham, last seen 7/27/21    History of constipation, ileus, intractable abdominal pain, IBS-C, acute gastritis    Called and spoke with patient  She reports she is having 9/10 lower back pain and abdominal pain at the time of our call  She was crying  I advised her if pain is that severe she should go to ED  Patient declined and said that they always tell her it's due to constipation  She did not have a bowel movement for 4 days but this morning she states she passed "a very small amount" of stool and felt slightly better  She is all out of miralax and needs it prescribed though  I again encouraged her to go to ED but she stated she will try the miralax and if she does not have a bowel movement and if pain persists then she will go to ED  I instructed her to take 2 doses of miralax today since patient states "it always works for me, I just sometimes forget to take it"   Script for miralax attached, please send to [facility]

## 2021-08-18 ENCOUNTER — TELEPHONE (OUTPATIENT)
Dept: GASTROENTEROLOGY | Facility: AMBULARY SURGERY CENTER | Age: 61
End: 2021-08-18

## 2021-08-18 NOTE — TELEPHONE ENCOUNTER
Patients GI provider:  Dr Yung Coello     Number to return call: [phone]     Reason for call: Pt calling stated polyethylene powder is not covered by insurance  Requesting to please call in polyhethylene bottle form       Scheduled procedure/appointment date if applicable: Apt/procedure 12/8/21

## 2021-08-18 NOTE — TELEPHONE ENCOUNTER
Pt of Dr Beatrice Colvin last seen 7/27/2021 in office   EGD/colonoscopy 1/27/2021; EGD showed Severe gastritis  Colonoscopy needs to be repeated in one year d/t poor prep   Future appt with colorectal 9/14/2021  Future appt with Dr Beatrice Colvin 12/8/2021  HX: constipation, abdominal pain, acute gastritis     Called pharmacy, they stated mirilax is not covered by insurance and pt would need to purchase OTC   I called pt, no answer, left VM reviewing this

## 2021-08-20 NOTE — TELEPHONE ENCOUNTER
Patients GI provider:  Dr Michell Reese     Number to return call: [phone]  Reason for call: Pt calling stated she is experiencing same symptoms requesting for polyethylene bottle called in instead of the packet because the packet was denied by the insurance    Requesting to speak with Dr Michell Reese     Scheduled procedure/appointment date if applicable: Apt/procedure 12/8/21

## 2021-08-20 NOTE — TELEPHONE ENCOUNTER
Called and spoke with pharmacist who took verbal order for miralax bottle  Unable to tell me if insurance will cover it  Called pt and notified her of this and informed her if insurance does not cover the bottle either, that she would just have to purchase it OTC  Is adamant that she would like phone call with Dr Danyell Cabrera as she has multiple concerns

## 2021-09-13 RX ORDER — LAMOTRIGINE 150 MG/1
150 TABLET ORAL
COMMUNITY
Start: 2021-08-09

## 2021-09-13 RX ORDER — AMOXICILLIN AND CLAVULANATE POTASSIUM 875; 125 MG/1; MG/1
TABLET, FILM COATED ORAL
COMMUNITY
Start: 2021-08-18 | End: 2021-12-02 | Stop reason: ALTCHOICE

## 2021-09-13 NOTE — PROGRESS NOTES
[facility] - Outpatient Follow-up Note  Lianne Woodall 62 y o  female MRN: [id number]  Encounter: [id number]          ASSESSMENT AND PLAN:     1  Constipation, unspecified constipation type  2  Incontinence of feces, unspecified fecal incontinence type    Patient reports that she was taking MiraLax every day until she started having stool on the toilet paper whenever she wipes even though she did have a bowel movement  Patient reports that she had seen a doctor who was concerned that her back issues may be causing fecal incontinence so she stopped taking the MiraLax  She denies black or bloody stools  Discussed restarting MiraLax to produce 1 bowel movement a day  Discussed that the fecal incontinence may be due to constipation  Patient agreeable    - Colonoscopy; Future  - bisacodyl (DULCOLAX) 5 mg EC tablet; Take for prep as directed  Dispense: 1 tablet; Refill: 0  - polyethylene glycol (GLYCOLAX) 17 GM/SCOOP powder; Take as instructed for prep  Dispense: 238 g; Refill: 0    3  Nausea    Patient reports nausea, regurgitation of food and trouble swallowing a couple times a week  Patient had an EGD in January that severe gastritis but normal esophagus and duodenum  Patient also underwent a small-bowel follow-through that revealed mild esophageal dysmotility, esophageal reflux and small bowel transit time of 4 hours  While this was normal was upper threshold  Would recommend gastric emptying study to evaluate for gastroparesis related to nausea and regurgitation  Patient agreeable  - NM gastric emptying; Future    Will see patient back after procedure   ______________________________________________________________________    SUBJECTIVE:  Courtney Noriega is a 62year old female that presents today with a follow-up for constipation predominant irritable bowel syndrome    Patient feeling very stressed right now due to several medical issues at this time including a severe low back pain in her sacrum, needing to have eye surgery, and fecal incontinence  Patient reports that she was taking MiraLax every day until she started having stool on the toilet paper whenever she wipes even though she did have a bowel movement  Patient reports that she had seen a doctor who was concerned that her back issues may be causing fecal incontinence so she stopped taking the MiraLax  She reports some abdominal pain occasionally  She denies black or bloody stools  Patient's last EGD and colon were notable for large amount of stool and severe gastritis  It was recommended for her to repeat her colonoscopy in a year  Patient denies heartburn however states that she has nausea, regurgitation occasional trouble swallowing a couple times a week  Patient had a upper GI small bowel follow-through in January that revealed mild esophageal dysmotility, esophageal reflux and small bowel transit time of 4 hours, while this was normal it was upper threshold, no significant abnormality in the small bowel appearance  REVIEW OF SYSTEMS:  Review of Systems   Constitutional: Negative for unexpected weight change  HENT: Positive for trouble swallowing  Respiratory: Negative for shortness of breath  Cardiovascular: Negative for chest pain  Gastrointestinal: Positive for abdominal distention, abdominal pain, constipation and nausea  Negative for anal bleeding, blood in stool, diarrhea, rectal pain and vomiting  Musculoskeletal: Positive for arthralgias and back pain  Psychiatric/Behavioral: The patient is nervous/anxious            Historical Information   Past Medical History:   Diagnosis Date    Anxiety     Asthma     Bipolar 1 disorder ([facility] )     Brain aneurysm     Chronic pain disorder     spinal stenosis    Concussion syndrome     neurological rx and balance rx    [facility]     Depression     Disease of thyroid gland     nodules     Family history of colon cancer     father    Fibromyalgia, primary     History of colon polyps     Hyperlipidemia     Hypertension     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     Migraine     Neuropathy     bilateral feet and hands    Peripheral neuropathy     Psychiatric disorder     PTSD (post-traumatic stress disorder)     Stroke ([address] )     2012 no deficeits    Thyroid Cancer     TIA (transient ischemic attack)      Past Surgical History:   Procedure Laterality Date    ABDOMINAL SURGERY      laproscopic/ endometriosis    BRAIN SURGERY      aneurysm/ coiling procedure    CARPAL TUNNEL RELEASE      CHOLECYSTECTOMY      DENTAL SURGERY      EYE SURGERY      cataract    HYSTERECTOMY      [place] ESOPHAGOGASTRODUODENOSCOPY TRANSORAL DIAGNOSTIC N/A 2/8/2018    Procedure: EGD AND COLONOSCOPY;  Surgeon: Zaid Verdin MD;  Location: [facility]; Service: Gastroenterology    THYROID SURGERY      TONSILLECTOMY       Social History   Social History     Substance and Sexual Activity   Alcohol Use Never    Alcohol/week: 0 0 standard drinks     Social History     Substance and Sexual Activity   Drug Use Never    Comment: prescribed Belbuca     Social History     Tobacco Use   Smoking Status Current Every Day Smoker    Packs/day: 2 00    Types: Cigarettes   Smokeless Tobacco Never Used     No family history on file      Meds/Allergies       Current Outpatient Medications:     albuterol (2 5 mg/3 mL) 0 083 % nebulizer solution    albuterol (PROVENTIL HFA,VENTOLIN HFA) 90 mcg/act inhaler    ammonium lactate (AmLactin) 12 % lotion    amoxicillin (AMOXIL) 875 mg tablet    amoxicillin-clavulanate (AUGMENTIN) 875-125 mg per tablet    Asenapine Maleate (SAPHRIS SL)    aspirin 325 mg tablet    atorvastatin (LIPITOR) 40 mg tablet    b complex vitamins capsule    buprenorphine (BUTRANS) 15 mcg/hr    Buprenorphine HCl (Belbuca) 300 MCG FILM    busPIRone (BUSPAR) 15 mg tablet    carboxymethylcellulose 0 5 % SOLN    cephalexin (KEFLEX) 500 mg capsule    ciprofloxacin (CIPRO) 250 mg tablet    clonazePAM (KlonoPIN) 0 5 mg tablet    clonazePAM (KlonoPIN) 1 mg tablet    cyclobenzaprine (FLEXERIL) 10 mg tablet    diazepam (VALIUM) 5 mg tablet    dicyclomine (BENTYL) 20 mg tablet    DULoxetine (CYMBALTA) 30 mg delayed release capsule    fenofibrate (TRICOR) 48 mg tablet    fluconazole (DIFLUCAN) 150 mg tablet    fluticasone-salmeterol (Wixela Inhub) 500-50 mcg/dose inhaler    fluticasone-salmeterol (Wixela Inhub) 500-50 mcg/dose inhaler    fluticasone-salmeterol (Wixela Inhub) 500-50 mcg/dose inhaler    furosemide (LASIX) 20 mg tablet    Galcanezumab-gnlm (Emgality) 120 MG/ML SOAJ    HYDROcodone-acetaminophen (NORCO)  mg per tablet    ibuprofen (MOTRIN) 800 mg tablet    lamoTRIgine (LaMICtal) 100 mg tablet    lamoTRIgine (LaMICtal) 150 MG tablet    lamoTRIgine (LaMICtal) 25 mg tablet    latanoprost (XALATAN) 0 005 % ophthalmic solution    levothyroxine 137 mcg tablet    linaCLOtide (Linzess) 72 MCG CAPS    lurasidone (Latuda) 20 mg tablet    lurasidone (Latuda) 40 mg tablet    Lurasidone HCl (Latuda) 60 MG TABS    magnesium 30 MG tablet    Melatonin 10 MG CAPS    metroNIDAZOLE (FLAGYL) 500 mg tablet    naloxone (Narcan) 4 mg/0 1 mL nasal spray    nitrofurantoin (MACRODANTIN) 100 mg capsule    omeprazole (PriLOSEC) 20 mg delayed release capsule    omeprazole (PriLOSEC) 20 mg delayed release capsule    omeprazole (PriLOSEC) 40 MG capsule    ondansetron (ZOFRAN) 4 mg tablet    ondansetron (ZOFRAN) 8 mg tablet    ondansetron (ZOFRAN-ODT) 4 mg disintegrating tablet    polyethylene glycol (MIRALAX) 17 g packet    pregabalin (LYRICA) 100 mg capsule    pregabalin (LYRICA) 150 mg capsule    promethazine (PHENERGAN) 25 mg tablet    rizatriptan (MAXALT-MLT) 10 MG disintegrating tablet    SUMAtriptan (IMITREX) 100 mg tablet    thiamine 100 MG tablet    topiramate (TOPAMAX) 100 mg tablet    transdermal buprenorphine (BUTRANS) 20 mcg/hr PTWK TD patch    traZODone (DESYREL) 100 mg tablet    Wixela Inhub 500-50 MCG/DOSE inhaler    Zoster Vac Recomb Adjuvanted (Shingrix) 50 MCG/0 5ML SUSR    Allergies   Allergen Reactions    Hydroxyzine Anaphylaxis     Claims it gives her convulsions   Other     Pollen Extract Itching    Tree Extract     Clarithromycin Rash     Pt denies    Latex Rash    Sulfa Antibiotics Rash     "severe skin burn"           Objective     There were no vitals taken for this visit  There is no height or weight on file to calculate BMI  PHYSICAL EXAM:      General Appearance:   Alert, cooperative, no distress   HEENT:   Normocephalic, atraumatic, anicteric  Neck:  Supple, symmetrical, trachea midline   Lungs:   Clear to auscultation bilaterally; no rales, rhonchi or wheezing; respirations unlabored    Heart[de-identified]   Regular rate and rhythm; no murmur, rub, or gallop  Abdomen:   Soft, non-tender, non-distended; normal bowel sounds; no masses, no organomegaly    Genitalia:   Deferred    Rectal:   Deferred    Extremities:  No cyanosis, clubbing or edema    Skin:  No jaundice, rashes, or lesions             Lab Results:   No visits with results within 1 Day(s) from this visit  Latest known visit with results is:   No results displayed because visit has over 200 results  Radiology Results:   No results found

## 2021-09-14 ENCOUNTER — OFFICE VISIT (OUTPATIENT)
Dept: GASTROENTEROLOGY | Facility: CLINIC | Age: 61
End: 2021-09-14
Payer: COMMERCIAL

## 2021-09-14 VITALS
HEART RATE: 91 BPM | HEIGHT: 64 IN | TEMPERATURE: 97.8 F | WEIGHT: 180.2 LBS | DIASTOLIC BLOOD PRESSURE: 82 MMHG | SYSTOLIC BLOOD PRESSURE: 122 MMHG | BODY MASS INDEX: 30.77 KG/M2

## 2021-09-14 DIAGNOSIS — R15.9 INCONTINENCE OF FECES, UNSPECIFIED FECAL INCONTINENCE TYPE: ICD-10-CM

## 2021-09-14 DIAGNOSIS — K59.00 CONSTIPATION, UNSPECIFIED CONSTIPATION TYPE: Primary | ICD-10-CM

## 2021-09-14 DIAGNOSIS — R11.0 NAUSEA: ICD-10-CM

## 2021-09-14 PROCEDURE — 99214 OFFICE O/P EST MOD 30 MIN: CPT | Performed by: NURSE PRACTITIONER

## 2021-09-14 RX ORDER — ASENAPINE 5 MG/1
TABLET SUBLINGUAL
COMMUNITY
Start: 2021-09-05 | End: 2021-12-02 | Stop reason: ALTCHOICE

## 2021-09-14 RX ORDER — POLYETHYLENE GLYCOL 3350 17 G/17G
POWDER, FOR SOLUTION ORAL
Qty: 238 G | Refills: 0 | Status: SHIPPED | OUTPATIENT
Start: 2021-09-14 | End: 2021-12-02 | Stop reason: ALTCHOICE

## 2021-09-14 NOTE — PATIENT INSTRUCTIONS
Scheduled patient for a Colonoscopy on 11/19/2021 with Dr Danii Cotton  Gave Miralax/Dulcolax instructions  Follow up appointment 12/29/2021 11:20 pm  Scheduled  NM Gastric Emptying on 11/9/2021@ 8:00 am  Gave instructions for this test   Patient expressed understanding

## 2021-09-28 ENCOUNTER — OFFICE VISIT (OUTPATIENT)
Dept: DENTISTRY | Facility: CLINIC | Age: 61
End: 2021-09-28

## 2021-09-28 VITALS — DIASTOLIC BLOOD PRESSURE: 80 MMHG | HEART RATE: 73 BPM | TEMPERATURE: 97.7 F | SYSTOLIC BLOOD PRESSURE: 115 MMHG

## 2021-09-28 DIAGNOSIS — Z01.20 ENCOUNTER FOR DENTAL EXAMINATION: Primary | ICD-10-CM

## 2021-09-28 NOTE — PROGRESS NOTES
Jere Ashley presents to [facility] for adjustment of dentures  Pt reported that she had brain TIA in the past years  Pt said dentures were fabricated at [address] [address] in 2017  Pt denture is currently ill fitting  Pt given Fixodent to help with retention of denture  PIP applied to intaglio surface used to identity pressure areas  Acrylic bur used to adjust  Dentures were adjusted and occlusion was checked  Pt stated dentures were comfortable  Pt left satisfied and in stable condition  ASSESSMENT  Ill fitting dentures    PLAN  New denture for complete max and gwendolyn needs to be fabricated  Codes for pre-authorization submitted to max and gwendolyn      NEXT VISIT  Alginate impressions for max and gwendolyn    Dr Oswaldo Chacon

## 2021-10-05 ENCOUNTER — APPOINTMENT (EMERGENCY)
Dept: RADIOLOGY | Facility: HOSPITAL | Age: 61
End: 2021-10-05
Payer: COMMERCIAL

## 2021-10-05 ENCOUNTER — HOSPITAL ENCOUNTER (EMERGENCY)
Facility: HOSPITAL | Age: 61
Discharge: HOME | End: 2021-10-05
Attending: EMERGENCY MEDICINE | Admitting: EMERGENCY MEDICINE
Payer: COMMERCIAL

## 2021-10-05 VITALS
WEIGHT: 180.56 LBS | BODY MASS INDEX: 30.99 KG/M2 | RESPIRATION RATE: 22 BRPM | OXYGEN SATURATION: 96 % | DIASTOLIC BLOOD PRESSURE: 73 MMHG | SYSTOLIC BLOOD PRESSURE: 112 MMHG | TEMPERATURE: 98.9 F | HEART RATE: 76 BPM

## 2021-10-05 DIAGNOSIS — N39.0 ACUTE UTI: Primary | ICD-10-CM

## 2021-10-05 LAB
ALBUMIN SERPL BCP-MCNC: 3.8 G/DL (ref 3.5–5)
ALP SERPL-CCNC: 77 U/L (ref 46–116)
ALT SERPL W P-5'-P-CCNC: 32 U/L (ref 12–78)
ANION GAP SERPL CALCULATED.3IONS-SCNC: 10 MMOL/L (ref 4–13)
AST SERPL W P-5'-P-CCNC: 21 U/L (ref 5–45)
BACTERIA UR QL AUTO: ABNORMAL /HPF
BASOPHILS # BLD AUTO: 0.05 THOUSANDS/ΜL (ref 0–0.1)
BASOPHILS NFR BLD AUTO: 1 % (ref 0–1)
BILIRUB SERPL-MCNC: 0.17 MG/DL (ref 0.2–1)
BILIRUB UR QL STRIP: NEGATIVE
BUN SERPL-MCNC: 13 MG/DL (ref 5–25)
CALCIUM SERPL-MCNC: 8.4 MG/DL (ref 8.3–10.1)
CHLORIDE SERPL-SCNC: 109 MMOL/L (ref 100–108)
CLARITY UR: CLEAR
CO2 SERPL-SCNC: 26 MMOL/L (ref 21–32)
COLOR UR: YELLOW
CREAT SERPL-MCNC: 0.92 MG/DL (ref 0.6–1.3)
EOSINOPHIL # BLD AUTO: 0.08 THOUSAND/ΜL (ref 0–0.61)
EOSINOPHIL NFR BLD AUTO: 1 % (ref 0–6)
ERYTHROCYTE [DISTWIDTH] IN BLOOD BY AUTOMATED COUNT: 13.3 % (ref 11.6–15.1)
GFR SERPL CREATININE-BSD FRML MDRD: 68 ML/MIN/1.73SQ M
GLUCOSE SERPL-MCNC: 129 MG/DL (ref 65–140)
GLUCOSE UR STRIP-MCNC: NEGATIVE MG/DL
HCT VFR BLD AUTO: 38.6 % (ref 34.8–46.1)
HGB BLD-MCNC: 12.1 G/DL (ref 11.5–15.4)
HGB UR QL STRIP.AUTO: NEGATIVE
IMM GRANULOCYTES # BLD AUTO: 0.02 THOUSAND/UL (ref 0–0.2)
IMM GRANULOCYTES NFR BLD AUTO: 0 % (ref 0–2)
KETONES UR STRIP-MCNC: NEGATIVE MG/DL
LEUKOCYTE ESTERASE UR QL STRIP: ABNORMAL
LIPASE SERPL-CCNC: 100 U/L (ref 73–393)
LYMPHOCYTES # BLD AUTO: 2.48 THOUSANDS/ΜL (ref 0.6–4.47)
LYMPHOCYTES NFR BLD AUTO: 34 % (ref 14–44)
MCH RBC QN AUTO: 29.7 PG (ref 26.8–34.3)
MCHC RBC AUTO-ENTMCNC: 31.3 G/DL (ref 31.4–37.4)
MCV RBC AUTO: 95 FL (ref 82–98)
MONOCYTES # BLD AUTO: 0.47 THOUSAND/ΜL (ref 0.17–1.22)
MONOCYTES NFR BLD AUTO: 6 % (ref 4–12)
NEUTROPHILS # BLD AUTO: 4.21 THOUSANDS/ΜL (ref 1.85–7.62)
NEUTS SEG NFR BLD AUTO: 58 % (ref 43–75)
NITRITE UR QL STRIP: NEGATIVE
NON-SQ EPI CELLS URNS QL MICRO: ABNORMAL /HPF
NRBC BLD AUTO-RTO: 0 /100 WBCS
NT-PROBNP SERPL-MCNC: 10 PG/ML
PH UR STRIP.AUTO: 7.5 [PH]
PLATELET # BLD AUTO: 202 THOUSANDS/UL (ref 149–390)
PMV BLD AUTO: 10.2 FL (ref 8.9–12.7)
POTASSIUM SERPL-SCNC: 3.5 MMOL/L (ref 3.5–5.3)
PROT SERPL-MCNC: 6.9 G/DL (ref 6.4–8.2)
PROT UR STRIP-MCNC: NEGATIVE MG/DL
RBC # BLD AUTO: 4.07 MILLION/UL (ref 3.81–5.12)
RBC #/AREA URNS AUTO: ABNORMAL /HPF
SARS-COV-2 RNA RESP QL NAA+PROBE: NEGATIVE
SODIUM SERPL-SCNC: 145 MMOL/L (ref 136–145)
SP GR UR STRIP.AUTO: 1.01 (ref 1–1.03)
TROPONIN I SERPL-MCNC: <0.02 NG/ML
UROBILINOGEN UR QL STRIP.AUTO: 0.2 E.U./DL
WBC # BLD AUTO: 7.31 THOUSAND/UL (ref 4.31–10.16)
WBC #/AREA URNS AUTO: ABNORMAL /HPF

## 2021-10-05 PROCEDURE — 84484 ASSAY OF TROPONIN QUANT: CPT | Performed by: EMERGENCY MEDICINE

## 2021-10-05 PROCEDURE — 93005 ELECTROCARDIOGRAM TRACING: CPT

## 2021-10-05 PROCEDURE — 83880 ASSAY OF NATRIURETIC PEPTIDE: CPT | Performed by: EMERGENCY MEDICINE

## 2021-10-05 PROCEDURE — U0003 INFECTIOUS AGENT DETECTION BY NUCLEIC ACID (DNA OR RNA); SEVERE ACUTE RESPIRATORY SYNDROME CORONAVIRUS 2 (SARS-COV-2) (CORONAVIRUS DISEASE [COVID-19]), AMPLIFIED PROBE TECHNIQUE, MAKING USE OF HIGH THROUGHPUT TECHNOLOGIES AS DESCRIBED BY CMS-2020-01-R: HCPCS | Performed by: EMERGENCY MEDICINE

## 2021-10-05 PROCEDURE — U0005 INFEC AGEN DETEC AMPLI PROBE: HCPCS | Performed by: EMERGENCY MEDICINE

## 2021-10-05 PROCEDURE — 96374 THER/PROPH/DIAG INJ IV PUSH: CPT

## 2021-10-05 PROCEDURE — 71045 X-RAY EXAM CHEST 1 VIEW: CPT

## 2021-10-05 PROCEDURE — 36415 COLL VENOUS BLD VENIPUNCTURE: CPT | Performed by: EMERGENCY MEDICINE

## 2021-10-05 PROCEDURE — 85025 COMPLETE CBC W/AUTO DIFF WBC: CPT | Performed by: EMERGENCY MEDICINE

## 2021-10-05 PROCEDURE — 99285 EMERGENCY DEPT VISIT HI MDM: CPT

## 2021-10-05 PROCEDURE — 99285 EMERGENCY DEPT VISIT HI MDM: CPT | Performed by: EMERGENCY MEDICINE

## 2021-10-05 PROCEDURE — 81001 URINALYSIS AUTO W/SCOPE: CPT | Performed by: EMERGENCY MEDICINE

## 2021-10-05 PROCEDURE — 80053 COMPREHEN METABOLIC PANEL: CPT | Performed by: EMERGENCY MEDICINE

## 2021-10-05 PROCEDURE — 83690 ASSAY OF LIPASE: CPT | Performed by: EMERGENCY MEDICINE

## 2021-10-05 RX ORDER — ALBUTEROL SULFATE 90 UG/1
2 AEROSOL, METERED RESPIRATORY (INHALATION) ONCE
Status: COMPLETED | OUTPATIENT
Start: 2021-10-05 | End: 2021-10-05

## 2021-10-05 RX ORDER — CEPHALEXIN 250 MG/1
250 CAPSULE ORAL EVERY 6 HOURS SCHEDULED
Qty: 40 CAPSULE | Refills: 0 | Status: SHIPPED | OUTPATIENT
Start: 2021-10-05 | End: 2021-10-15

## 2021-10-05 RX ORDER — KETOROLAC TROMETHAMINE 30 MG/ML
15 INJECTION, SOLUTION INTRAMUSCULAR; INTRAVENOUS ONCE
Status: COMPLETED | OUTPATIENT
Start: 2021-10-05 | End: 2021-10-05

## 2021-10-05 RX ORDER — LIDOCAINE 50 MG/G
1 PATCH TOPICAL ONCE
Status: DISCONTINUED | OUTPATIENT
Start: 2021-10-05 | End: 2021-10-05 | Stop reason: HOSPADM

## 2021-10-05 RX ORDER — NICOTINE 21 MG/24HR
14 PATCH, TRANSDERMAL 24 HOURS TRANSDERMAL ONCE
Status: DISCONTINUED | OUTPATIENT
Start: 2021-10-05 | End: 2021-10-05 | Stop reason: HOSPADM

## 2021-10-05 RX ORDER — HYDROCODONE BITARTRATE AND ACETAMINOPHEN 5; 325 MG/1; MG/1
2 TABLET ORAL ONCE
Status: COMPLETED | OUTPATIENT
Start: 2021-10-05 | End: 2021-10-05

## 2021-10-05 RX ADMIN — LIDOCAINE 5% 1 PATCH: 700 PATCH TOPICAL at 09:10

## 2021-10-05 RX ADMIN — Medication 14 MG: at 09:10

## 2021-10-05 RX ADMIN — HYDROCODONE BITARTRATE AND ACETAMINOPHEN 2 TABLET: 5; 325 TABLET ORAL at 09:50

## 2021-10-05 RX ADMIN — ALBUTEROL SULFATE 2 PUFF: 90 AEROSOL, METERED RESPIRATORY (INHALATION) at 09:10

## 2021-10-05 RX ADMIN — KETOROLAC TROMETHAMINE 15 MG: 30 INJECTION, SOLUTION INTRAMUSCULAR at 09:13

## 2021-10-06 LAB
ATRIAL RATE: 80 BPM
P AXIS: 74 DEGREES
PR INTERVAL: 152 MS
QRS AXIS: 74 DEGREES
QRSD INTERVAL: 80 MS
QT INTERVAL: 382 MS
QTC INTERVAL: 440 MS
T WAVE AXIS: 71 DEGREES
VENTRICULAR RATE: 80 BPM

## 2021-10-06 PROCEDURE — 93010 ELECTROCARDIOGRAM REPORT: CPT | Performed by: INTERNAL MEDICINE

## 2021-10-13 ENCOUNTER — HOSPITAL ENCOUNTER (EMERGENCY)
Facility: HOSPITAL | Age: 61
Discharge: HOME | End: 2021-10-13
Attending: EMERGENCY MEDICINE
Payer: COMMERCIAL

## 2021-10-13 ENCOUNTER — APPOINTMENT (EMERGENCY)
Dept: CT IMAGING | Facility: HOSPITAL | Age: 61
End: 2021-10-13
Payer: COMMERCIAL

## 2021-10-13 ENCOUNTER — APPOINTMENT (EMERGENCY)
Dept: RADIOLOGY | Facility: HOSPITAL | Age: 61
End: 2021-10-13
Payer: COMMERCIAL

## 2021-10-13 VITALS
WEIGHT: 180.56 LBS | HEIGHT: 64 IN | BODY MASS INDEX: 30.83 KG/M2 | SYSTOLIC BLOOD PRESSURE: 122 MMHG | HEART RATE: 76 BPM | OXYGEN SATURATION: 98 % | TEMPERATURE: 97.6 F | RESPIRATION RATE: 18 BRPM | DIASTOLIC BLOOD PRESSURE: 74 MMHG

## 2021-10-13 DIAGNOSIS — M54.50 LOW BACK PAIN: Primary | ICD-10-CM

## 2021-10-13 DIAGNOSIS — N39.0 UTI (URINARY TRACT INFECTION): ICD-10-CM

## 2021-10-13 LAB
ALBUMIN SERPL BCP-MCNC: 3.5 G/DL (ref 3.5–5)
ALP SERPL-CCNC: 74 U/L (ref 46–116)
ALT SERPL W P-5'-P-CCNC: 22 U/L (ref 12–78)
ANION GAP SERPL CALCULATED.3IONS-SCNC: 10 MMOL/L (ref 4–13)
AST SERPL W P-5'-P-CCNC: 16 U/L (ref 5–45)
BACTERIA UR QL AUTO: ABNORMAL /HPF
BASOPHILS # BLD AUTO: 0.04 THOUSANDS/ΜL (ref 0–0.1)
BASOPHILS NFR BLD AUTO: 1 % (ref 0–1)
BILIRUB SERPL-MCNC: 0.16 MG/DL (ref 0.2–1)
BILIRUB UR QL STRIP: NEGATIVE
BUN SERPL-MCNC: 13 MG/DL (ref 5–25)
CALCIUM SERPL-MCNC: 8.2 MG/DL (ref 8.3–10.1)
CHLORIDE SERPL-SCNC: 108 MMOL/L (ref 100–108)
CLARITY UR: CLEAR
CO2 SERPL-SCNC: 26 MMOL/L (ref 21–32)
COLOR UR: YELLOW
CREAT SERPL-MCNC: 0.9 MG/DL (ref 0.6–1.3)
EOSINOPHIL # BLD AUTO: 0.09 THOUSAND/ΜL (ref 0–0.61)
EOSINOPHIL NFR BLD AUTO: 1 % (ref 0–6)
ERYTHROCYTE [DISTWIDTH] IN BLOOD BY AUTOMATED COUNT: 13.2 % (ref 11.6–15.1)
GFR SERPL CREATININE-BSD FRML MDRD: 70 ML/MIN/1.73SQ M
GLUCOSE SERPL-MCNC: 95 MG/DL (ref 65–140)
GLUCOSE UR STRIP-MCNC: NEGATIVE MG/DL
HCT VFR BLD AUTO: 37.7 % (ref 34.8–46.1)
HGB BLD-MCNC: 12 G/DL (ref 11.5–15.4)
HGB UR QL STRIP.AUTO: ABNORMAL
IMM GRANULOCYTES # BLD AUTO: 0.02 THOUSAND/UL (ref 0–0.2)
IMM GRANULOCYTES NFR BLD AUTO: 0 % (ref 0–2)
KETONES UR STRIP-MCNC: NEGATIVE MG/DL
LEUKOCYTE ESTERASE UR QL STRIP: ABNORMAL
LIPASE SERPL-CCNC: 80 U/L (ref 73–393)
LYMPHOCYTES # BLD AUTO: 4.21 THOUSANDS/ΜL (ref 0.6–4.47)
LYMPHOCYTES NFR BLD AUTO: 57 % (ref 14–44)
MAGNESIUM SERPL-MCNC: 2 MG/DL (ref 1.6–2.6)
MCH RBC QN AUTO: 30.3 PG (ref 26.8–34.3)
MCHC RBC AUTO-ENTMCNC: 31.8 G/DL (ref 31.4–37.4)
MCV RBC AUTO: 95 FL (ref 82–98)
MONOCYTES # BLD AUTO: 0.35 THOUSAND/ΜL (ref 0.17–1.22)
MONOCYTES NFR BLD AUTO: 5 % (ref 4–12)
NEUTROPHILS # BLD AUTO: 2.6 THOUSANDS/ΜL (ref 1.85–7.62)
NEUTS SEG NFR BLD AUTO: 36 % (ref 43–75)
NITRITE UR QL STRIP: NEGATIVE
NON-SQ EPI CELLS URNS QL MICRO: ABNORMAL /HPF
NRBC BLD AUTO-RTO: 0 /100 WBCS
PH UR STRIP.AUTO: 6 [PH]
PLATELET # BLD AUTO: 182 THOUSANDS/UL (ref 149–390)
PMV BLD AUTO: 10.1 FL (ref 8.9–12.7)
POTASSIUM SERPL-SCNC: 3.3 MMOL/L (ref 3.5–5.3)
PROT SERPL-MCNC: 7 G/DL (ref 6.4–8.2)
PROT UR STRIP-MCNC: NEGATIVE MG/DL
RBC # BLD AUTO: 3.96 MILLION/UL (ref 3.81–5.12)
RBC #/AREA URNS AUTO: ABNORMAL /HPF
SODIUM SERPL-SCNC: 144 MMOL/L (ref 136–145)
SP GR UR STRIP.AUTO: <=1.005 (ref 1–1.03)
TROPONIN I SERPL-MCNC: <0.02 NG/ML
TSH SERPL DL<=0.05 MIU/L-ACNC: 3.29 UIU/ML (ref 0.36–3.74)
UROBILINOGEN UR QL STRIP.AUTO: 0.2 E.U./DL
WBC # BLD AUTO: 7.31 THOUSAND/UL (ref 4.31–10.16)
WBC #/AREA URNS AUTO: ABNORMAL /HPF

## 2021-10-13 PROCEDURE — 80053 COMPREHEN METABOLIC PANEL: CPT | Performed by: PHYSICIAN ASSISTANT

## 2021-10-13 PROCEDURE — 99284 EMERGENCY DEPT VISIT MOD MDM: CPT | Performed by: PHYSICIAN ASSISTANT

## 2021-10-13 PROCEDURE — 96374 THER/PROPH/DIAG INJ IV PUSH: CPT

## 2021-10-13 PROCEDURE — 36415 COLL VENOUS BLD VENIPUNCTURE: CPT | Performed by: PHYSICIAN ASSISTANT

## 2021-10-13 PROCEDURE — 84443 ASSAY THYROID STIM HORMONE: CPT | Performed by: PHYSICIAN ASSISTANT

## 2021-10-13 PROCEDURE — 96375 TX/PRO/DX INJ NEW DRUG ADDON: CPT

## 2021-10-13 PROCEDURE — 87086 URINE CULTURE/COLONY COUNT: CPT | Performed by: PHYSICIAN ASSISTANT

## 2021-10-13 PROCEDURE — 83690 ASSAY OF LIPASE: CPT | Performed by: PHYSICIAN ASSISTANT

## 2021-10-13 PROCEDURE — 71045 X-RAY EXAM CHEST 1 VIEW: CPT

## 2021-10-13 PROCEDURE — 83735 ASSAY OF MAGNESIUM: CPT | Performed by: PHYSICIAN ASSISTANT

## 2021-10-13 PROCEDURE — 99284 EMERGENCY DEPT VISIT MOD MDM: CPT

## 2021-10-13 PROCEDURE — 85025 COMPLETE CBC W/AUTO DIFF WBC: CPT | Performed by: PHYSICIAN ASSISTANT

## 2021-10-13 PROCEDURE — 84484 ASSAY OF TROPONIN QUANT: CPT | Performed by: PHYSICIAN ASSISTANT

## 2021-10-13 PROCEDURE — 74177 CT ABD & PELVIS W/CONTRAST: CPT

## 2021-10-13 PROCEDURE — G1004 CDSM NDSC: HCPCS

## 2021-10-13 PROCEDURE — 81001 URINALYSIS AUTO W/SCOPE: CPT | Performed by: PHYSICIAN ASSISTANT

## 2021-10-13 RX ORDER — PREDNISONE 20 MG/1
40 TABLET ORAL DAILY
Qty: 8 TABLET | Refills: 0 | Status: SHIPPED | OUTPATIENT
Start: 2021-10-14 | End: 2021-10-18

## 2021-10-13 RX ORDER — METHOCARBAMOL 500 MG/1
500 TABLET, FILM COATED ORAL ONCE
Status: COMPLETED | OUTPATIENT
Start: 2021-10-13 | End: 2021-10-13

## 2021-10-13 RX ORDER — PREDNISONE 20 MG/1
40 TABLET ORAL ONCE
Status: COMPLETED | OUTPATIENT
Start: 2021-10-13 | End: 2021-10-13

## 2021-10-13 RX ORDER — POTASSIUM CHLORIDE 20 MEQ/1
20 TABLET, EXTENDED RELEASE ORAL ONCE
Status: COMPLETED | OUTPATIENT
Start: 2021-10-13 | End: 2021-10-13

## 2021-10-13 RX ORDER — KETOROLAC TROMETHAMINE 30 MG/ML
15 INJECTION, SOLUTION INTRAMUSCULAR; INTRAVENOUS ONCE
Status: COMPLETED | OUTPATIENT
Start: 2021-10-13 | End: 2021-10-13

## 2021-10-13 RX ORDER — NITROFURANTOIN 25; 75 MG/1; MG/1
100 CAPSULE ORAL 2 TIMES DAILY WITH MEALS
Status: DISCONTINUED | OUTPATIENT
Start: 2021-10-13 | End: 2021-10-13 | Stop reason: HOSPADM

## 2021-10-13 RX ORDER — ONDANSETRON 2 MG/ML
4 INJECTION INTRAMUSCULAR; INTRAVENOUS ONCE
Status: COMPLETED | OUTPATIENT
Start: 2021-10-13 | End: 2021-10-13

## 2021-10-13 RX ORDER — NITROFURANTOIN 25; 75 MG/1; MG/1
100 CAPSULE ORAL 2 TIMES DAILY
Qty: 10 CAPSULE | Refills: 0 | Status: SHIPPED | OUTPATIENT
Start: 2021-10-13 | End: 2021-10-18

## 2021-10-13 RX ADMIN — ONDANSETRON 4 MG: 2 INJECTION INTRAMUSCULAR; INTRAVENOUS at 10:52

## 2021-10-13 RX ADMIN — PREDNISONE 40 MG: 20 TABLET ORAL at 14:55

## 2021-10-13 RX ADMIN — IOHEXOL 100 ML: 350 INJECTION, SOLUTION INTRAVENOUS at 11:16

## 2021-10-13 RX ADMIN — KETOROLAC TROMETHAMINE 15 MG: 30 INJECTION, SOLUTION INTRAMUSCULAR at 10:52

## 2021-10-13 RX ADMIN — POTASSIUM CHLORIDE 20 MEQ: 1500 TABLET, EXTENDED RELEASE ORAL at 11:46

## 2021-10-13 RX ADMIN — NITROFURANTOIN (MONOHYDRATE/MACROCRYSTALS) 100 MG: 75; 25 CAPSULE ORAL at 15:25

## 2021-10-13 RX ADMIN — METHOCARBAMOL 500 MG: 500 TABLET ORAL at 14:54

## 2021-10-15 LAB — BACTERIA UR CULT: ABNORMAL

## 2021-10-20 ENCOUNTER — APPOINTMENT (OUTPATIENT)
Dept: LAB | Facility: HOSPITAL | Age: 61
End: 2021-10-20
Payer: COMMERCIAL

## 2021-10-20 DIAGNOSIS — Z79.899 ENCOUNTER FOR LONG-TERM (CURRENT) USE OF OTHER MEDICATIONS: ICD-10-CM

## 2021-10-20 DIAGNOSIS — F31.9 BIPOLAR AFFECTIVE DISORDER, REMISSION STATUS UNSPECIFIED (HCC): ICD-10-CM

## 2021-10-20 LAB
25(OH)D3 SERPL-MCNC: 18.4 NG/ML (ref 30–100)
CHOLEST SERPL-MCNC: 169 MG/DL (ref 50–200)
GLUCOSE P FAST SERPL-MCNC: 94 MG/DL (ref 65–99)
HDLC SERPL-MCNC: 50 MG/DL
LDLC SERPL CALC-MCNC: 78 MG/DL (ref 0–100)
NONHDLC SERPL-MCNC: 119 MG/DL
T4 FREE SERPL-MCNC: 1.15 NG/DL (ref 0.76–1.46)
TRIGL SERPL-MCNC: 206 MG/DL
TSH SERPL DL<=0.05 MIU/L-ACNC: 3.09 UIU/ML (ref 0.36–3.74)

## 2021-10-20 PROCEDURE — 84439 ASSAY OF FREE THYROXINE: CPT

## 2021-10-20 PROCEDURE — 82306 VITAMIN D 25 HYDROXY: CPT

## 2021-10-20 PROCEDURE — 80061 LIPID PANEL: CPT

## 2021-10-20 PROCEDURE — 82947 ASSAY GLUCOSE BLOOD QUANT: CPT

## 2021-10-20 PROCEDURE — 36415 COLL VENOUS BLD VENIPUNCTURE: CPT

## 2021-10-20 PROCEDURE — 84443 ASSAY THYROID STIM HORMONE: CPT

## 2021-10-26 ENCOUNTER — TELEPHONE (OUTPATIENT)
Dept: GASTROENTEROLOGY | Facility: CLINIC | Age: 61
End: 2021-10-26

## 2021-10-26 DIAGNOSIS — R11.0 NAUSEA: ICD-10-CM

## 2021-10-26 RX ORDER — ONDANSETRON 4 MG/1
4 TABLET, ORALLY DISINTEGRATING ORAL EVERY 6 HOURS SCHEDULED
Qty: 30 TABLET | Refills: 2 | Status: SHIPPED | OUTPATIENT
Start: 2021-10-26 | End: 2021-12-02 | Stop reason: ALTCHOICE

## 2021-11-08 ENCOUNTER — TELEPHONE (OUTPATIENT)
Dept: GASTROENTEROLOGY | Facility: CLINIC | Age: 61
End: 2021-11-08

## 2021-11-08 ENCOUNTER — OFFICE VISIT (OUTPATIENT)
Dept: GASTROENTEROLOGY | Facility: CLINIC | Age: 61
End: 2021-11-08
Payer: COMMERCIAL

## 2021-11-08 VITALS
WEIGHT: 174.4 LBS | DIASTOLIC BLOOD PRESSURE: 81 MMHG | HEART RATE: 85 BPM | HEIGHT: 64 IN | TEMPERATURE: 98.1 F | SYSTOLIC BLOOD PRESSURE: 122 MMHG | BODY MASS INDEX: 29.78 KG/M2

## 2021-11-08 DIAGNOSIS — K58.2 IRRITABLE BOWEL SYNDROME WITH BOTH CONSTIPATION AND DIARRHEA: Primary | ICD-10-CM

## 2021-11-08 PROCEDURE — 3008F BODY MASS INDEX DOCD: CPT | Performed by: INTERNAL MEDICINE

## 2021-11-08 PROCEDURE — 99213 OFFICE O/P EST LOW 20 MIN: CPT | Performed by: INTERNAL MEDICINE

## 2021-11-09 ENCOUNTER — HOSPITAL ENCOUNTER (OUTPATIENT)
Dept: NUCLEAR MEDICINE | Facility: HOSPITAL | Age: 61
Discharge: HOME | End: 2021-11-09
Payer: COMMERCIAL

## 2021-11-09 DIAGNOSIS — R11.0 NAUSEA: ICD-10-CM

## 2021-11-09 PROCEDURE — G1004 CDSM NDSC: HCPCS

## 2021-11-09 PROCEDURE — A9541 TC99M SULFUR COLLOID: HCPCS

## 2021-11-09 PROCEDURE — 78264 GASTRIC EMPTYING IMG STUDY: CPT

## 2021-11-11 ENCOUNTER — TELEPHONE (OUTPATIENT)
Dept: OTHER | Facility: OTHER | Age: 61
End: 2021-11-11

## 2021-11-18 ENCOUNTER — TELEPHONE (OUTPATIENT)
Dept: SURGERY | Facility: HOSPITAL | Age: 61
End: 2021-11-18

## 2021-11-19 ENCOUNTER — HOSPITAL ENCOUNTER (OUTPATIENT)
Dept: PERIOP | Facility: HOSPITAL | Age: 61
Setting detail: OUTPATIENT SURGERY
Discharge: HOME | End: 2021-11-19
Admitting: NURSE PRACTITIONER
Payer: COMMERCIAL

## 2021-11-19 ENCOUNTER — ANESTHESIA EVENT (OUTPATIENT)
Dept: PERIOP | Facility: HOSPITAL | Age: 61
End: 2021-11-19

## 2021-11-19 ENCOUNTER — ANESTHESIA (OUTPATIENT)
Dept: PERIOP | Facility: HOSPITAL | Age: 61
End: 2021-11-19

## 2021-11-19 VITALS
TEMPERATURE: 97 F | HEART RATE: 78 BPM | OXYGEN SATURATION: 98 % | DIASTOLIC BLOOD PRESSURE: 60 MMHG | SYSTOLIC BLOOD PRESSURE: 100 MMHG | RESPIRATION RATE: 20 BRPM

## 2021-11-19 DIAGNOSIS — R15.9 INCONTINENCE OF FECES, UNSPECIFIED FECAL INCONTINENCE TYPE: ICD-10-CM

## 2021-11-19 PROCEDURE — 88305 TISSUE EXAM BY PATHOLOGIST: CPT | Performed by: PATHOLOGY

## 2021-11-19 PROCEDURE — 45385 COLONOSCOPY W/LESION REMOVAL: CPT | Performed by: INTERNAL MEDICINE

## 2021-11-19 PROCEDURE — NC001 PR NO CHARGE: Performed by: INTERNAL MEDICINE

## 2021-11-19 PROCEDURE — 45380 COLONOSCOPY AND BIOPSY: CPT | Performed by: INTERNAL MEDICINE

## 2021-11-19 RX ORDER — LIDOCAINE HYDROCHLORIDE 20 MG/ML
INJECTION, SOLUTION EPIDURAL; INFILTRATION; INTRACAUDAL; PERINEURAL AS NEEDED
Status: DISCONTINUED | OUTPATIENT
Start: 2021-11-19 | End: 2021-11-19

## 2021-11-19 RX ORDER — PROPOFOL 10 MG/ML
INJECTION, EMULSION INTRAVENOUS CONTINUOUS PRN
Status: DISCONTINUED | OUTPATIENT
Start: 2021-11-19 | End: 2021-11-19

## 2021-11-19 RX ORDER — SODIUM CHLORIDE, SODIUM LACTATE, POTASSIUM CHLORIDE, CALCIUM CHLORIDE 600; 310; 30; 20 MG/100ML; MG/100ML; MG/100ML; MG/100ML
INJECTION, SOLUTION INTRAVENOUS CONTINUOUS PRN
Status: DISCONTINUED | OUTPATIENT
Start: 2021-11-19 | End: 2021-11-19

## 2021-11-19 RX ORDER — PROPOFOL 10 MG/ML
INJECTION, EMULSION INTRAVENOUS AS NEEDED
Status: DISCONTINUED | OUTPATIENT
Start: 2021-11-19 | End: 2021-11-19

## 2021-11-19 RX ADMIN — PROPOFOL 100 MG: 10 INJECTION, EMULSION INTRAVENOUS at 09:59

## 2021-11-19 RX ADMIN — SODIUM CHLORIDE, SODIUM LACTATE, POTASSIUM CHLORIDE, AND CALCIUM CHLORIDE: .6; .31; .03; .02 INJECTION, SOLUTION INTRAVENOUS at 08:43

## 2021-11-19 RX ADMIN — PROPOFOL 130 MCG/KG/MIN: 10 INJECTION, EMULSION INTRAVENOUS at 09:59

## 2021-11-19 RX ADMIN — LIDOCAINE HYDROCHLORIDE 50 MG: 20 INJECTION, SOLUTION EPIDURAL; INFILTRATION; INTRACAUDAL; PERINEURAL at 09:59

## 2021-11-29 ENCOUNTER — TELEPHONE (OUTPATIENT)
Dept: GASTROENTEROLOGY | Facility: CLINIC | Age: 61
End: 2021-11-29

## 2021-12-02 ENCOUNTER — OFFICE VISIT (OUTPATIENT)
Dept: FAMILY MEDICINE CLINIC | Facility: HOME HEALTHCARE | Age: 61
End: 2021-12-02
Payer: COMMERCIAL

## 2021-12-02 VITALS
RESPIRATION RATE: 16 BRPM | TEMPERATURE: 98.5 F | OXYGEN SATURATION: 96 % | BODY MASS INDEX: 29.28 KG/M2 | SYSTOLIC BLOOD PRESSURE: 104 MMHG | HEART RATE: 88 BPM | WEIGHT: 170.6 LBS | DIASTOLIC BLOOD PRESSURE: 74 MMHG

## 2021-12-02 DIAGNOSIS — G89.4 CHRONIC PAIN DISORDER: ICD-10-CM

## 2021-12-02 DIAGNOSIS — M54.9 CHRONIC BACK PAIN, UNSPECIFIED BACK LOCATION, UNSPECIFIED BACK PAIN LATERALITY: ICD-10-CM

## 2021-12-02 DIAGNOSIS — M79.7 FIBROMYALGIA, PRIMARY: ICD-10-CM

## 2021-12-02 DIAGNOSIS — F41.9 ANXIETY: ICD-10-CM

## 2021-12-02 DIAGNOSIS — Z76.89 ENCOUNTER TO ESTABLISH CARE: Primary | ICD-10-CM

## 2021-12-02 DIAGNOSIS — G89.29 CHRONIC BACK PAIN, UNSPECIFIED BACK LOCATION, UNSPECIFIED BACK PAIN LATERALITY: ICD-10-CM

## 2021-12-02 DIAGNOSIS — F32.A DEPRESSION, UNSPECIFIED DEPRESSION TYPE: ICD-10-CM

## 2021-12-02 DIAGNOSIS — F31.9 BIPOLAR 1 DISORDER (HCC): ICD-10-CM

## 2021-12-02 DIAGNOSIS — Z72.0 TOBACCO ABUSE: ICD-10-CM

## 2021-12-02 PROCEDURE — 99203 OFFICE O/P NEW LOW 30 MIN: CPT | Performed by: FAMILY MEDICINE

## 2021-12-06 ENCOUNTER — OFFICE VISIT (OUTPATIENT)
Dept: FAMILY MEDICINE CLINIC | Facility: HOME HEALTHCARE | Age: 61
End: 2021-12-06
Payer: COMMERCIAL

## 2021-12-06 ENCOUNTER — TELEPHONE (OUTPATIENT)
Dept: ADMINISTRATIVE | Facility: OTHER | Age: 61
End: 2021-12-06

## 2021-12-06 ENCOUNTER — HOSPITAL ENCOUNTER (OUTPATIENT)
Dept: NON INVASIVE DIAGNOSTICS | Facility: HOSPITAL | Age: 61
Discharge: HOME | End: 2021-12-06
Payer: COMMERCIAL

## 2021-12-06 VITALS
HEART RATE: 94 BPM | OXYGEN SATURATION: 97 % | DIASTOLIC BLOOD PRESSURE: 68 MMHG | BODY MASS INDEX: 29.08 KG/M2 | RESPIRATION RATE: 16 BRPM | WEIGHT: 169.4 LBS | SYSTOLIC BLOOD PRESSURE: 118 MMHG | TEMPERATURE: 97.4 F

## 2021-12-06 DIAGNOSIS — F43.10 POSTTRAUMATIC STRESS DISORDER: ICD-10-CM

## 2021-12-06 DIAGNOSIS — G89.4 CHRONIC PAIN DISORDER: ICD-10-CM

## 2021-12-06 DIAGNOSIS — F31.9 BIPOLAR AFFECTIVE DISORDER, REMISSION STATUS UNSPECIFIED (HCC): ICD-10-CM

## 2021-12-06 DIAGNOSIS — M79.7 FIBROMYALGIA, PRIMARY: ICD-10-CM

## 2021-12-06 DIAGNOSIS — Z72.0 TOBACCO ABUSE: ICD-10-CM

## 2021-12-06 DIAGNOSIS — I67.1 BRAIN ANEURYSM: Primary | ICD-10-CM

## 2021-12-06 DIAGNOSIS — G43.909 MIGRAINE: ICD-10-CM

## 2021-12-06 DIAGNOSIS — M79.2 RADICULAR PAIN IN RIGHT ARM: ICD-10-CM

## 2021-12-06 DIAGNOSIS — F31.9 BIPOLAR 1 DISORDER (HCC): ICD-10-CM

## 2021-12-06 PROCEDURE — 99213 OFFICE O/P EST LOW 20 MIN: CPT | Performed by: FAMILY MEDICINE

## 2021-12-06 PROCEDURE — 93005 ELECTROCARDIOGRAM TRACING: CPT

## 2021-12-06 RX ORDER — DICYCLOMINE HCL 20 MG
20 TABLET ORAL EVERY 6 HOURS
COMMUNITY

## 2021-12-07 LAB
ATRIAL RATE: 82 BPM
P AXIS: 49 DEGREES
PR INTERVAL: 154 MS
QRS AXIS: 55 DEGREES
QRSD INTERVAL: 74 MS
QT INTERVAL: 384 MS
QTC INTERVAL: 448 MS
T WAVE AXIS: 31 DEGREES
VENTRICULAR RATE: 82 BPM

## 2021-12-07 PROCEDURE — 93010 ELECTROCARDIOGRAM REPORT: CPT | Performed by: INTERNAL MEDICINE

## 2021-12-09 ENCOUNTER — TELEPHONE (OUTPATIENT)
Dept: GASTROENTEROLOGY | Facility: CLINIC | Age: 61
End: 2021-12-09

## 2021-12-12 ENCOUNTER — HOSPITAL ENCOUNTER (OUTPATIENT)
Facility: HOSPITAL | Age: 61
Setting detail: OBSERVATION
Discharge: LEFT BEFORE DISCHARGE | End: 2021-12-13
Attending: EMERGENCY MEDICINE | Admitting: FAMILY MEDICINE
Payer: COMMERCIAL

## 2021-12-12 ENCOUNTER — APPOINTMENT (EMERGENCY)
Dept: RADIOLOGY | Facility: HOSPITAL | Age: 61
End: 2021-12-12
Payer: COMMERCIAL

## 2021-12-12 ENCOUNTER — APPOINTMENT (EMERGENCY)
Dept: CT IMAGING | Facility: HOSPITAL | Age: 61
End: 2021-12-12
Payer: COMMERCIAL

## 2021-12-12 DIAGNOSIS — R55 SYNCOPE: Primary | ICD-10-CM

## 2021-12-12 LAB
ALBUMIN SERPL BCP-MCNC: 3.8 G/DL (ref 3.5–5)
ALP SERPL-CCNC: 78 U/L (ref 46–116)
ALT SERPL W P-5'-P-CCNC: 24 U/L (ref 12–78)
ANION GAP SERPL CALCULATED.3IONS-SCNC: 10 MMOL/L (ref 4–13)
APTT PPP: 28 SECONDS (ref 23–37)
AST SERPL W P-5'-P-CCNC: 19 U/L (ref 5–45)
BASOPHILS # BLD AUTO: 0.05 THOUSANDS/ΜL (ref 0–0.1)
BASOPHILS NFR BLD AUTO: 1 % (ref 0–1)
BILIRUB DIRECT SERPL-MCNC: 0.05 MG/DL (ref 0–0.2)
BILIRUB SERPL-MCNC: 0.17 MG/DL (ref 0.2–1)
BUN SERPL-MCNC: 12 MG/DL (ref 5–25)
CALCIUM SERPL-MCNC: 8.7 MG/DL (ref 8.3–10.1)
CARDIAC TROPONIN I PNL SERPL HS: <2 NG/L
CHLORIDE SERPL-SCNC: 105 MMOL/L (ref 100–108)
CO2 SERPL-SCNC: 27 MMOL/L (ref 21–32)
CREAT SERPL-MCNC: 1.01 MG/DL (ref 0.6–1.3)
EOSINOPHIL # BLD AUTO: 0.1 THOUSAND/ΜL (ref 0–0.61)
EOSINOPHIL NFR BLD AUTO: 1 % (ref 0–6)
ERYTHROCYTE [DISTWIDTH] IN BLOOD BY AUTOMATED COUNT: 13.2 % (ref 11.6–15.1)
GFR SERPL CREATININE-BSD FRML MDRD: 61 ML/MIN/1.73SQ M
GLUCOSE SERPL-MCNC: 89 MG/DL (ref 65–140)
HCT VFR BLD AUTO: 40.6 % (ref 34.8–46.1)
HGB BLD-MCNC: 13.1 G/DL (ref 11.5–15.4)
IMM GRANULOCYTES # BLD AUTO: 0.01 THOUSAND/UL (ref 0–0.2)
IMM GRANULOCYTES NFR BLD AUTO: 0 % (ref 0–2)
INR PPP: 1.05 (ref 0.84–1.19)
LYMPHOCYTES # BLD AUTO: 4.14 THOUSANDS/ΜL (ref 0.6–4.47)
LYMPHOCYTES NFR BLD AUTO: 56 % (ref 14–44)
MCH RBC QN AUTO: 30 PG (ref 26.8–34.3)
MCHC RBC AUTO-ENTMCNC: 32.3 G/DL (ref 31.4–37.4)
MCV RBC AUTO: 93 FL (ref 82–98)
MONOCYTES # BLD AUTO: 0.4 THOUSAND/ΜL (ref 0.17–1.22)
MONOCYTES NFR BLD AUTO: 5 % (ref 4–12)
NEUTROPHILS # BLD AUTO: 2.74 THOUSANDS/ΜL (ref 1.85–7.62)
NEUTS SEG NFR BLD AUTO: 37 % (ref 43–75)
NRBC BLD AUTO-RTO: 0 /100 WBCS
PLATELET # BLD AUTO: 196 THOUSANDS/UL (ref 149–390)
PMV BLD AUTO: 10.6 FL (ref 8.9–12.7)
POTASSIUM SERPL-SCNC: 3.3 MMOL/L (ref 3.5–5.3)
PROT SERPL-MCNC: 7.5 G/DL (ref 6.4–8.2)
PROTHROMBIN TIME: 13.2 SECONDS (ref 11.6–14.5)
RBC # BLD AUTO: 4.37 MILLION/UL (ref 3.81–5.12)
SODIUM SERPL-SCNC: 142 MMOL/L (ref 136–145)
WBC # BLD AUTO: 7.44 THOUSAND/UL (ref 4.31–10.16)

## 2021-12-12 PROCEDURE — 96360 HYDRATION IV INFUSION INIT: CPT

## 2021-12-12 PROCEDURE — 84484 ASSAY OF TROPONIN QUANT: CPT | Performed by: EMERGENCY MEDICINE

## 2021-12-12 PROCEDURE — 85730 THROMBOPLASTIN TIME PARTIAL: CPT | Performed by: EMERGENCY MEDICINE

## 2021-12-12 PROCEDURE — 93005 ELECTROCARDIOGRAM TRACING: CPT

## 2021-12-12 PROCEDURE — 85025 COMPLETE CBC W/AUTO DIFF WBC: CPT | Performed by: EMERGENCY MEDICINE

## 2021-12-12 PROCEDURE — 71045 X-RAY EXAM CHEST 1 VIEW: CPT

## 2021-12-12 PROCEDURE — 36415 COLL VENOUS BLD VENIPUNCTURE: CPT | Performed by: EMERGENCY MEDICINE

## 2021-12-12 PROCEDURE — 70498 CT ANGIOGRAPHY NECK: CPT

## 2021-12-12 PROCEDURE — 85610 PROTHROMBIN TIME: CPT | Performed by: EMERGENCY MEDICINE

## 2021-12-12 PROCEDURE — 96361 HYDRATE IV INFUSION ADD-ON: CPT

## 2021-12-12 PROCEDURE — 70496 CT ANGIOGRAPHY HEAD: CPT

## 2021-12-12 PROCEDURE — 80048 BASIC METABOLIC PNL TOTAL CA: CPT | Performed by: EMERGENCY MEDICINE

## 2021-12-12 PROCEDURE — 99285 EMERGENCY DEPT VISIT HI MDM: CPT

## 2021-12-12 PROCEDURE — 80076 HEPATIC FUNCTION PANEL: CPT | Performed by: EMERGENCY MEDICINE

## 2021-12-12 RX ADMIN — SODIUM CHLORIDE 1000 ML: 0.9 INJECTION, SOLUTION INTRAVENOUS at 22:20

## 2021-12-12 RX ADMIN — IOHEXOL 100 ML: 350 INJECTION, SOLUTION INTRAVENOUS at 23:24

## 2021-12-13 VITALS
RESPIRATION RATE: 20 BRPM | SYSTOLIC BLOOD PRESSURE: 119 MMHG | OXYGEN SATURATION: 99 % | HEART RATE: 89 BPM | DIASTOLIC BLOOD PRESSURE: 67 MMHG | TEMPERATURE: 97.9 F

## 2021-12-13 PROBLEM — R55 SYNCOPE AND COLLAPSE: Status: ACTIVE | Noted: 2021-12-13

## 2021-12-13 LAB
ATRIAL RATE: 66 BPM
P AXIS: 76 DEGREES
PR INTERVAL: 174 MS
QRS AXIS: 76 DEGREES
QRSD INTERVAL: 78 MS
QT INTERVAL: 446 MS
QTC INTERVAL: 467 MS
T WAVE AXIS: 64 DEGREES
VENTRICULAR RATE: 66 BPM

## 2021-12-13 PROCEDURE — 99285 EMERGENCY DEPT VISIT HI MDM: CPT | Performed by: EMERGENCY MEDICINE

## 2021-12-13 PROCEDURE — 93010 ELECTROCARDIOGRAM REPORT: CPT | Performed by: INTERNAL MEDICINE

## 2021-12-13 PROCEDURE — 96361 HYDRATE IV INFUSION ADD-ON: CPT

## 2021-12-14 ENCOUNTER — APPOINTMENT (OUTPATIENT)
Dept: RADIOLOGY | Facility: MEDICAL CENTER | Age: 61
End: 2021-12-14
Payer: COMMERCIAL

## 2021-12-14 ENCOUNTER — OFFICE VISIT (OUTPATIENT)
Dept: PAIN MEDICINE | Facility: CLINIC | Age: 61
End: 2021-12-14
Payer: COMMERCIAL

## 2021-12-14 VITALS — WEIGHT: 170 LBS | BODY MASS INDEX: 29.02 KG/M2 | HEIGHT: 64 IN

## 2021-12-14 DIAGNOSIS — G89.29 CHRONIC BACK PAIN, UNSPECIFIED BACK LOCATION, UNSPECIFIED BACK PAIN LATERALITY: ICD-10-CM

## 2021-12-14 DIAGNOSIS — M54.12 CERVICAL RADICULOPATHY: ICD-10-CM

## 2021-12-14 DIAGNOSIS — M79.7 FIBROMYALGIA, PRIMARY: ICD-10-CM

## 2021-12-14 DIAGNOSIS — M54.9 CHRONIC BACK PAIN, UNSPECIFIED BACK LOCATION, UNSPECIFIED BACK PAIN LATERALITY: ICD-10-CM

## 2021-12-14 DIAGNOSIS — M54.2 NECK PAIN: ICD-10-CM

## 2021-12-14 DIAGNOSIS — M54.9 MID BACK PAIN: ICD-10-CM

## 2021-12-14 DIAGNOSIS — M54.9 MID BACK PAIN: Primary | ICD-10-CM

## 2021-12-14 DIAGNOSIS — M46.1 SACROILIITIS (HCC): ICD-10-CM

## 2021-12-14 PROCEDURE — 1111F DSCHRG MED/CURRENT MED MERGE: CPT | Performed by: ANESTHESIOLOGY

## 2021-12-14 PROCEDURE — 72050 X-RAY EXAM NECK SPINE 4/5VWS: CPT

## 2021-12-14 PROCEDURE — 72070 X-RAY EXAM THORAC SPINE 2VWS: CPT

## 2021-12-14 PROCEDURE — 99204 OFFICE O/P NEW MOD 45 MIN: CPT | Performed by: ANESTHESIOLOGY

## 2021-12-22 ENCOUNTER — TELEPHONE (OUTPATIENT)
Dept: PAIN MEDICINE | Facility: CLINIC | Age: 61
End: 2021-12-22

## 2021-12-23 ENCOUNTER — HOSPITAL ENCOUNTER (OUTPATIENT)
Dept: MRI IMAGING | Facility: HOSPITAL | Age: 61
Discharge: HOME | End: 2021-12-23
Attending: ANESTHESIOLOGY
Payer: COMMERCIAL

## 2021-12-23 DIAGNOSIS — M54.9 MID BACK PAIN: ICD-10-CM

## 2021-12-23 DIAGNOSIS — M54.2 NECK PAIN: ICD-10-CM

## 2021-12-23 DIAGNOSIS — M79.7 FIBROMYALGIA, PRIMARY: ICD-10-CM

## 2021-12-23 DIAGNOSIS — M54.12 CERVICAL RADICULOPATHY: ICD-10-CM

## 2021-12-23 PROCEDURE — 72141 MRI NECK SPINE W/O DYE: CPT

## 2021-12-23 PROCEDURE — G1004 CDSM NDSC: HCPCS

## 2021-12-23 PROCEDURE — 72146 MRI CHEST SPINE W/O DYE: CPT

## 2021-12-30 ENCOUNTER — TELEPHONE (OUTPATIENT)
Dept: PAIN MEDICINE | Facility: CLINIC | Age: 61
End: 2021-12-30

## 2021-12-30 ENCOUNTER — OFFICE VISIT (OUTPATIENT)
Dept: GASTROENTEROLOGY | Facility: CLINIC | Age: 61
End: 2021-12-30
Payer: COMMERCIAL

## 2021-12-30 VITALS
HEIGHT: 64 IN | SYSTOLIC BLOOD PRESSURE: 100 MMHG | HEART RATE: 77 BPM | WEIGHT: 170.23 LBS | TEMPERATURE: 96.5 F | BODY MASS INDEX: 29.06 KG/M2 | DIASTOLIC BLOOD PRESSURE: 66 MMHG

## 2021-12-30 DIAGNOSIS — K58.2 IRRITABLE BOWEL SYNDROME WITH BOTH CONSTIPATION AND DIARRHEA: Primary | ICD-10-CM

## 2021-12-30 PROCEDURE — 3008F BODY MASS INDEX DOCD: CPT | Performed by: ANESTHESIOLOGY

## 2021-12-30 PROCEDURE — 99213 OFFICE O/P EST LOW 20 MIN: CPT | Performed by: INTERNAL MEDICINE

## 2021-12-30 NOTE — TELEPHONE ENCOUNTER
Patient   [phone]  Dr Aranda     Patient is calling in stating that she is in a lot of pain  Please review patient MRI results  She said that she feels like her arm is broke  Her fingers are tingling   Please follow up with pt     Pain level is a 10/10

## 2022-01-03 NOTE — TELEPHONE ENCOUNTER
Order placed for surgical evaluation   MRI cervical spine shows decreased space around the spinal cord

## 2022-01-20 ENCOUNTER — CONSULT (OUTPATIENT)
Dept: NEUROSURGERY | Facility: CLINIC | Age: 62
End: 2022-01-20
Payer: COMMERCIAL

## 2022-01-20 VITALS
RESPIRATION RATE: 16 BRPM | DIASTOLIC BLOOD PRESSURE: 82 MMHG | WEIGHT: 167 LBS | BODY MASS INDEX: 28.51 KG/M2 | HEART RATE: 90 BPM | SYSTOLIC BLOOD PRESSURE: 110 MMHG | HEIGHT: 64 IN | TEMPERATURE: 97.1 F

## 2022-01-20 DIAGNOSIS — R29.898 RIGHT ARM WEAKNESS: Primary | ICD-10-CM

## 2022-01-20 DIAGNOSIS — M48.02 CERVICAL SPINAL STENOSIS: ICD-10-CM

## 2022-01-20 PROCEDURE — 3008F BODY MASS INDEX DOCD: CPT | Performed by: NURSE PRACTITIONER

## 2022-01-20 PROCEDURE — 99204 OFFICE O/P NEW MOD 45 MIN: CPT | Performed by: NURSE PRACTITIONER

## 2022-01-20 NOTE — ASSESSMENT & PLAN NOTE
Patient follows with [facility] for brain aneurysm  · Patient reports she follows with Dr Jan Cedillo at [address]  S/p aneurysm coil in March 2016    · Patient would like to continue follow-up at [facility] and has repeat MRA in 1 year

## 2022-01-20 NOTE — PROGRESS NOTES
Neurosurgery Office Note  Noe Woodall 62 y o  female MRN: [id number]      Assessment/Plan     Cervical radiculopathy  Patient seen in outpatient office today as a new patient referred by pain management for further workup and evaluation of cervical radiculopathy  · Started approximately 2 months ago with no precipitating events or traumas  · Patient complaining neck pain with radiation down bilateral arms more on right than left  Also complaining of subjective weakness in bilateral arms  Imaging reviewed personally and reviewed with Dr Luis Koch:    MRI cervical spine wo 12/23/21:Multilevel small disc protrusions causing mild canal stenosis  Foraminal stenosis most pronounced at C6-7 with moderate severe stenosis and on the left at C4-5 with moderate stenosis    MRI thoracic spine wo 12/23/2021:Mild degenerative spondylosis  No significant canal stenosis  Plan:  · Reviewed imaging with patient in detail    · No neurosurgical intervention warranted at this time  · Recommend patient continue with conservative management with pain management and physical therapy  Spoke with patient about trialing MANISHA and placed referral to PT  · Spoke with patient about following up with PCP and can consider UE EMG  · Placed referral to Neurology, patient has appointment scheduled with Neurology at [address] [place] [address] already  · Patient will follow-up prn or if symptoms worsen   · Patient made aware if she develops severe uncontrolled neck pain, arm pain, arm weakness, paresthesias, difficulty with fine motor skills, or ambulatory dysfunction is seek care sooner  · Patient made aware to contact Neurosurgery with any further questions or concerns  Brain aneurysm  Patient follows with [facility] for brain aneurysm  · Patient reports she follows with Dr Zbigniew Palacio at [address] [place] [id number]  S/p aneurysm coil in March 2016    · Patient would like to continue follow-up at [facility] and has repeat MRA in 1 year       Diagnoses and all orders for this visit:    Right arm weakness  -     Ambulatory referral to Neurology; Future    Cervical spinal stenosis  -     Ambulatory referral to Neurosurgery  -     Ambulatory Referral to Physical Therapy; Future  -     Ambulatory referral to Neurology; Future            CHIEF COMPLAINT    Chief Complaint   Patient presents with    Follow-up       HISTORY    History of Present Illness     62y o  year old female with past medical history significant for hypothyroidism, aneurysm coil, COPD, hypertension, migraines, TMJ syndrome, fibromyalgia, hyperlipidemia, bipolar 1 disorder, chronic pain disorder, anxiety, asthma, and depression  Patient seen in outpatient office today as a new patient referred by pain management for further workup and evaluation of cervical radiculopathy  Patient states pain and symptoms started roughly 2 months ago with no precipitating events or traumas  Patient was seen by Pain Management who ordered MRI cervical and thoracic spine  Patient has multiple complaints today including neck and low back pain, patient states her neck pain and arm symptoms are what are really bothering her  Patient currently complaining of 7/10 constant aching/sharp neck pain  Patient states her neck pain mostly is constant into the right arm and occasionally into the left arm when turning her head to the left  Patient states her neck pain radiates down her anterior arm and stops at the elbows bilaterally  She also endorses associated numbness and tingling in her arms and fingers  Patient states her fingers will freeze up at times  She also endorses right arm weakness  She reports turning her head to the left, not using a pillow behind her neck or stress worsens her pain  Patient states her current pain regimen helps with her pain as well as using pillows, stretches or massages  She also reports neck stiffness    Patient states she just noticed about 2 weeks ago she was having a hard time turning her head to the left with associated arm symptoms  Patient also endorses headaches off and on with history of migraines  She also endorses blurry vision but has undergone multiple eye surgeries  She also reports dizziness which can come out of no where or is associated with standing  Patient also endorses ongoing shortness of breath and chest pain at times which is not new or unchanged  She also endorses abdominal pain and nausea  She also reports 6 months of ongoing urine dribbling which she wears pads for but no hunter incontinence  She also reports history of bilateral lower extremity neuropathy  She denies any recent falls or traumas but does report feeling off balance at times and uses a cane for ambulation  She denies any difficulty with fine motor skills but does report spilling things when holding cups in her hands  Patient states she did see physical therapy from October to November for her low back which made things worse, she reports never seeing physical therapy for neck in the past   She does report seeing pain management and has received multiple injections in her back but nothing in her neck  She denies any vomiting, diarrhea, no new problems with bowel or bladder, no new weakness or numbness/tingling  HPI    See Discussion    REVIEW OF SYSTEMS    Review of Systems   Constitutional: Negative  HENT: Negative  Eyes: Positive for pain and visual disturbance (looking at a 3rd eye surgery)  Respiratory: Positive for shortness of breath  Cardiovascular: Positive for chest pain  Gastrointestinal: Negative  Endocrine: Positive for cold intolerance (hands are always cold)  Genitourinary: Positive for difficulty urinating (frequent UTI's)  Musculoskeletal: Positive for back pain (extreme lower back pain), gait problem (uses a cane), neck pain and neck stiffness  Skin: Negative  Allergic/Immunologic: Negative      Neurological: Positive for tremors, weakness and numbness (right arm and now happening to the left arm)  Negative for seizures  Hematological: Negative  Psychiatric/Behavioral: The patient is nervous/anxious (has been high)  ROS reviewed with patient and agree and changes were made as needed    Meds/Allergies     Current Outpatient Medications   Medication Sig Dispense Refill    albuterol (PROVENTIL HFA,VENTOLIN HFA) 90 mcg/act inhaler Inhale 2 puffs every 6 (six) hours      ammonium lactate (AmLactin) 12 % lotion Every 12 hours      b complex vitamins capsule Take 1 capsule by mouth daily      Buprenorphine HCl (Belbuca) 300 MCG FILM Apply 300 mcg to cheek 2 (two) times a day      carboxymethylcellulose 0 5 % SOLN Administer 1 drop to both eyes daily as needed for dry eyes 1 Bottle 0    clonazePAM (KlonoPIN) 0 5 mg tablet Take 0 5 mg by mouth daily at bedtime      clonazePAM (KlonoPIN) 1 mg tablet Take 1 mg by mouth daily       cyclobenzaprine (FLEXERIL) 10 mg tablet TAKE 1 TABLET BY MOUTH 3 TIMES A DAY AS NEEDED FOR MUSCLE SPASMS      dicyclomine (BENTYL) 20 mg tablet Take 20 mg by mouth every 6 (six) hours      fenofibrate (TRICOR) 48 mg tablet Take 48 mg by mouth daily      Galcanezumab-gnlm (Emgality) 120 MG/ML SOAJ Inject 120 mg under the skin every 30 (thirty) days      HYDROcodone-acetaminophen (NORCO)  mg per tablet 1 tab every 6 hours as needed for severe pain    Do not fill before 2/12/18      lamoTRIgine (LaMICtal) 100 mg tablet Take 100 mg by mouth       lamoTRIgine (LaMICtal) 150 MG tablet Take 150 mg by mouth daily at bedtime      latanoprost (XALATAN) 0 005 % ophthalmic solution latanoprost 0 005 % eye drops      levothyroxine 137 mcg tablet Take 137 mcg by mouth daily      magnesium 30 MG tablet Take 30 mg by mouth 2 (two) times a day      Melatonin 10 MG CAPS Take 1 tablet by mouth      omeprazole (PriLOSEC) 40 MG capsule Take 40 mg by mouth 2 (two) times a day      polyethylene glycol (MIRALAX) 17 g packet Take 17 g by mouth daily 30 each 5    pregabalin (LYRICA) 150 mg capsule take 1 capsule by mouth NIGHTLY      topiramate (TOPAMAX) 100 mg tablet Take 100 mg by mouth every 12 (twelve) hours       albuterol (2 5 mg/3 mL) 0 083 % nebulizer solution albuterol sulfate HFA 90 mcg/actuation aerosol inhaler   inhale 2 puffs by mouth every 6 hours if needed for wheezing      atorvastatin (LIPITOR) 40 mg tablet Take 40 mg by mouth daily at bedtime       furosemide (LASIX) 20 mg tablet Take 20 mg by mouth every other day  (Patient not taking: Reported on 1/20/2022 )      naloxone (Narcan) 4 mg/0 1 mL nasal spray Narcan 4 mg/actuation nasal spray      pregabalin (LYRICA) 100 mg capsule take 1 capsule by mouth three times a day (Patient not taking: Reported on 1/20/2022)      Zoster Vac Recomb Adjuvanted (Shingrix) 50 MCG/0 5ML SUSR Shingrix (PF) 50 mcg/0 5 mL intramuscular suspension, kit   inject 0 5 milliliter intramuscularly       No current facility-administered medications for this visit  Allergies   Allergen Reactions    Hydroxyzine Anaphylaxis     Claims it gives her convulsions       Other     Pollen Extract Itching    Tree Extract     Clarithromycin Rash     Pt denies    Latex Rash    Sulfa Antibiotics Rash     "severe skin burn"       PAST HISTORY    Past Medical History:   Diagnosis Date    Anxiety     Asthma     Bipolar 1 disorder [facility] [address] [facility]     Brain aneurysm     Chronic pain disorder     spinal stenosis    Concussion syndrome     neurological rx and balance rx    COPD [facility]ch[facility] obstructive [facility] disease) [facility]     Depression     Disease of thyroid gland     nodules     Family history of colon cancer     father    Fibromyalgia, primary     History of colon polyps     Hyperlipidemia     Hypertension     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     Migraine     Neuropathy     bilateral feet and hands    Peripheral neuropathy     Psychiatric disorder     PTSD (post-traumatic stress disorder)     Stroke ([facility] [address] )     2012 no deficeits    Thyroid Cancer     TIA (transient ischemic attack)        Past Surgical History:   Procedure Laterality Date    ABDOMINAL SURGERY      laproscopic/ endometriosis    BRAIN SURGERY      aneurysm/ coiling procedure    CARPAL TUNNEL RELEASE      CHOLECYSTECTOMY      DENTAL SURGERY      EYE SURGERY      cataract    HYSTERECTOMY      IN ESOPHAGOGASTRODUODENOSCOPY TRANSORAL DIAGNOSTIC N/A 2/8/2018    Procedure: EGD AND COLONOSCOPY;  Surgeon: Jessa Walton MD;  Location: [facility]; Service: Gastroenterology    THYROID SURGERY      TONSILLECTOMY         Social History     Tobacco Use    Smoking status: Current Every Day Smoker     Packs/day: 2 00     Types: Cigarettes    Smokeless tobacco: Never Used   Vaping Use    Vaping Use: Never used   Substance Use Topics    Alcohol use: Never     Alcohol/week: 0 0 standard drinks    Drug use: Never     Comment: prescribed Belbuca       History reviewed  No pertinent family history  Above history personally reviewed  EXAM    Vitals:Blood pressure 110/82, pulse 90, temperature (!) 97 1 °F (36 2 °C), temperature source Temporal, resp  rate 16, height 5' 4" (1 626 m), weight 75 8 kg (167 lb)  ,Body mass index is 28 67 kg/m²  Physical Exam  Vitals reviewed  Constitutional:       General: She is awake  She is not in acute distress  Appearance: Normal appearance  She is not ill-appearing  HENT:      Head: Normocephalic and atraumatic  Eyes:      Extraocular Movements: Extraocular movements intact and EOM normal       Conjunctiva/sclera: Conjunctivae normal       Pupils: Pupils are equal, round, and reactive to light  Neck:      Comments: ROM WNL except limited when looking to L  Cardiovascular:      Rate and Rhythm: Normal rate  Pulmonary:      Effort: Pulmonary effort is normal  No respiratory distress  Chest:      Chest wall: No tenderness  Abdominal:      General: There is no distension        Palpations: Abdomen is soft       Tenderness: There is no abdominal tenderness  Musculoskeletal:         General: Normal range of motion  Cervical back: Neck supple  Tenderness present  Spinous process tenderness and muscular tenderness present  Skin:     General: Skin is warm and dry  Neurological:      Mental Status: She is alert and oriented to person, place, and time  Coordination: Finger-Nose-Finger Test normal       Deep Tendon Reflexes:      Reflex Scores:       Bicep reflexes are 2+ on the right side and 2+ on the left side  Patellar reflexes are 2+ on the right side and 2+ on the left side  Psychiatric:         Attention and Perception: Attention and perception normal          Mood and Affect: Mood and affect normal          Speech: Speech normal          Behavior: Behavior normal  Behavior is cooperative  Thought Content: Thought content normal          Cognition and Memory: Cognition and memory normal          Judgment: Judgment normal          Neurologic Exam     Mental Status   Oriented to person, place, and time  Follows 2 step commands  Attention: normal  Concentration: normal    Speech: speech is normal   Level of consciousness: alert  Knowledge: good  Able to perform simple calculations  Able to name object  Able to repeat  Normal comprehension  Cranial Nerves     CN III, IV, VI   Pupils are equal, round, and reactive to light  Extraocular motions are normal    CN III: no CN III palsy  CN VI: no CN VI palsy  Nystagmus: none   Diplopia: none  Conjugate gaze: present    CN V   Facial sensation intact  CN VII   Facial expression full, symmetric  CN VIII   CN VIII normal    Hearing: intact    CN IX, X   CN IX normal      CN XI   CN XI normal      CN XII   CN XII normal      Motor Exam   Muscle bulk: normal  Overall muscle tone: normal  Right arm pronator drift: absent  Left arm pronator drift: absent    Strength   Strength 5/5 except as noted   R WE 4-/5  R IOs 4-/5  R UE 4/5    Strength 5/5 throughout in lower extremities with some giveaway weakness secondary to pain     Sensory Exam   Left arm light touch: normal  Left leg light touch: normal  Proprioception normal    JPS and DST intact    Dullness to LT in anterior upper arm and medial right thigh and posterior calf     Gait, Coordination, and Reflexes     Gait  Gait: (walking with cane)    Coordination   Finger to nose coordination: normal    Tremor   Resting tremor: absent  Intention tremor: absent  Action tremor: absent    Reflexes   Right biceps: 2+  Left biceps: 2+  Right patellar: 2+  Left patellar: 2+  Right Lugo: absent  Left Lugo: absent  Right ankle clonus: absent  Left ankle clonus: absent        MEDICAL DECISION MAKING    Imaging Studies:     MRI cervical spine wo contrast    Result Date: 12/28/2021  Narrative: MRI CERVICAL SPINE WITHOUT CONTRAST INDICATION: M54 2: Cervicalgia M54 12: Radiculopathy, cervical region  COMPARISON:  CT dated 12/12/2021  TECHNIQUE:  Sagittal T1, sagittal T2, sagittal inversion recovery, axial T2, axial  2D merge IMAGE QUALITY:  Diagnostic FINDINGS: ALIGNMENT:  Straightening of the normal cervical lordosis  No compression fracture  No subluxation  No scoliosis  MARROW SIGNAL:  Mild Modic type I endplate degenerative changes at C6-7  No suspicious marrow signal abnormality  CERVICAL AND VISUALIZED THORACIC CORD:  Normal signal within the visualized cord  PREVERTEBRAL AND PARASPINAL SOFT TISSUES:  Normal  VISUALIZED POSTERIOR FOSSA:  The visualized posterior fossa demonstrates no abnormal signal  CERVICAL DISC SPACES: C2-C3:  No disc herniation, canal or foraminal stenosis  C3-C4:  Shallow central protrusion  Facet arthropathy  No significant canal or foraminal stenosis  C4-C5:  Minimal disc bulge  Left facet arthropathy  Moderate left foraminal stenosis  No significant canal or right foraminal stenosis  C5-C6:  Small right central protrusion  Mild canal stenosis   No significant foraminal stenosis  C6-C7:  Central protrusion, marginal osteophyte and uncovertebral hypertrophy  Mild canal stenosis  Moderate severe right and mild left foraminal stenosis  C7-T1:  Small central protrusion  Mild canal stenosis  No foraminal stenosis  UPPER THORACIC DISC SPACES:  Mild noncompressive degenerative changes  Please refer to concurrent MRI of thoracic spine  Impression: Multilevel small disc protrusions causing mild canal stenosis  Foraminal stenosis most pronounced at C6-7 with moderate severe stenosis and on the left at C4-5 with moderate stenosis Workstation performed: [id number]     MRI thoracic spine wo contrast    Result Date: 12/28/2021  Narrative: MRI THORACIC SPINE WITHOUT CONTRAST INDICATION: M79 7: Fibromyalgia M54 9: Dorsalgia, unspecified  COMPARISON:  X-rays dated 12/14/2021  TECHNIQUE:  Sagittal T1, sagittal T2, sagittal inversion recovery, axial T2,  axial 2D MERGE  IMAGE QUALITY: Diagnostic  FINDINGS: ALIGNMENT: Normal alignment of the thoracic spine  No compression fracture  No subluxation  No scoliosis  MARROW SIGNAL:  Normal marrow signal is identified within the visualized bony structures  No discrete marrow lesion  THORACIC CORD: Normal signal within the thoracic cord  PARAVERTEBRAL SOFT TISSUES:  Normal  THORACIC DEGENERATIVE CHANGE: C7-T1  Central protrusion abuts the cord  Mild canal stenosis  Tiny left paracentral protrusion at T2-3 and small disc complexes at T10-11 and T11-T12 that minimally indent the thecal sac without significant canal stenosis  Facet arthropathy  Mild left foraminal stenosis at T9-T10  Impression: Mild degenerative spondylosis  No significant canal stenosis  Workstation performed: [id number]       I have personally reviewed pertinent reports     and I have personally reviewed pertinent films in PACS

## 2022-01-20 NOTE — ASSESSMENT & PLAN NOTE
Patient seen in outpatient office today as a new patient referred by pain management for further workup and evaluation of cervical radiculopathy  · Started approximately 2 months ago with no precipitating events or traumas  · Patient complaining neck pain with radiation down bilateral arms more on right than left  Also complaining of subjective weakness in bilateral arms  Imaging reviewed personally and reviewed with Dr Rossy Hoyt:    MRI cervical spine wo 12/23/21:Multilevel small disc protrusions causing mild canal stenosis  Foraminal stenosis most pronounced at C6-7 with moderate severe stenosis and on the left at C4-5 with moderate stenosis    MRI thoracic spine wo 12/23/2021:Mild degenerative spondylosis  No significant canal stenosis  Plan:  · Reviewed imaging with patient in detail    · No neurosurgical intervention warranted at this time  · Recommend patient continue with conservative management with pain management and physical therapy  Spoke with patient about trialing MANISHA and placed referral to PT  · Spoke with patient about following up with PCP and can consider UE EMG  · Placed referral to Neurology, patient has appointment scheduled with Neurology at [facility] already  · Patient will follow-up prn or if symptoms worsen   · Patient made aware if she develops severe uncontrolled neck pain, arm pain, arm weakness, paresthesias, difficulty with fine motor skills, or ambulatory dysfunction is seek care sooner  · Patient made aware to contact Neurosurgery with any further questions or concerns

## 2022-01-25 ENCOUNTER — IMMUNIZATIONS (OUTPATIENT)
Dept: FAMILY MEDICINE CLINIC | Facility: HOSPITAL | Age: 62
End: 2022-01-25

## 2022-01-25 DIAGNOSIS — Z23 ENCOUNTER FOR IMMUNIZATION: Primary | ICD-10-CM

## 2022-01-25 PROCEDURE — 0064A COVID-19 MODERNA VACC 0.25 ML BOOSTER: CPT

## 2022-01-25 PROCEDURE — 91306 COVID-19 MODERNA VACC 0.25 ML BOOSTER: CPT

## 2022-01-27 ENCOUNTER — EVALUATION (OUTPATIENT)
Dept: PHYSICAL THERAPY | Facility: HOME HEALTHCARE | Age: 62
End: 2022-01-27
Payer: COMMERCIAL

## 2022-01-27 DIAGNOSIS — M48.02 CERVICAL SPINAL STENOSIS: Primary | ICD-10-CM

## 2022-01-27 PROCEDURE — 97112 NEUROMUSCULAR REEDUCATION: CPT

## 2022-01-27 PROCEDURE — 97162 PT EVAL MOD COMPLEX 30 MIN: CPT

## 2022-01-27 PROCEDURE — 97110 THERAPEUTIC EXERCISES: CPT

## 2022-01-27 NOTE — PROGRESS NOTES
PT Evaluation     Today's date: 2022  Patient name: Cheyanne Dillon  : 1960  MRN: [id number]  Referring provider: Jacinta Marquez  Dx:   Encounter Diagnosis     ICD-10-CM    1  Cervical spinal stenosis  M48 02 Ambulatory Referral to Physical Therapy       Start Time: 745  Stop Time: 830  Total time in clinic (min): 45 minutes    Assessment  Assessment details: Pt is a 61 y/o female presenting with neck pain with radicular symptoms to bilateral UE consistent with diagnosis of cervical spinal stenosis, chronic pain syndrome, and posture dysfunction  Pt has significant pain levels, strength deficits, cervical/shoulder ROM deficits, and functional mobility deficits, which is affecting her ability to perform ADLs  Pt requires skilled physical therapy in order to improve in pain, ROM, strength, quality of life, functional mobility, and return to PLOF  Thank you! Impairments: abnormal muscle firing, abnormal or restricted ROM, abnormal movement, activity intolerance, impaired physical strength, lacks appropriate home exercise program, pain with function, poor posture  and poor body mechanics    Symptom irritability: high  Goals  STG: 3-4 weeks  Pt will be independent with HEP in order to continue to progress outside of PT treatment sessions  Pt will improve in quality of life as demonstrated by worst pain levels of 5/10 or less  Pt will improve in functional mobility as demonstrated by 50% improvement in resting posture  LT-8 weeks  Pt will improve in quality of life as demonstrated by best pain levels of 2/10 or less  Pt will improve in functional mobility as demonstrated by strength levels of 4/5 or greater  Pt will improve in functional mobility as demonstrated by cervical Rot AROM of 45 degrees or greater        Plan  Patient would benefit from: skilled physical therapy  Planned modality interventions: thermotherapy: hydrocollator packs  Planned therapy interventions: activity modification, body mechanics training, flexibility, functional ROM exercises, home exercise program, joint mobilization, manual therapy, massage, neuromuscular re-education, patient education, postural training, strengthening, stretching, therapeutic activities and therapeutic exercise  Frequency: 2x week  Duration in weeks: 4  Plan of Care beginning date: 1/27/2022  Plan of Care expiration date: 2/27/2022  Treatment plan discussed with: patient        Subjective Evaluation    History of Present Illness  Mechanism of injury: Pt reports that she is experiencing numbness in bilateral arms and neck pain for the past 2-3 months  She reports she was diagnosed with cervical spinal stenosis  She reports that she also has low back pain, but the MD did not get to that part yet  She reports that she has difficulty with turning her head  Plan is to start with PT with the neck first   She reports that she feels the numbness and pain 24/7  She experiences the most numbness in her R arm, but also gets it in the L  The paraesthesia travels all the way to her elbow, hands, and finger tips  She also reports "freezing" of her L fingers  Pt reports that she woke up paralyzed one day and was diagnosed with fibromyalgia  Uses SPC for ambulation, but also has a RW  Pain  Current pain rating: 10  At best pain rating: 3  At worst pain rating: 10  Quality: throbbing (Tingling)  Relieving factors: medications (Massage)  Aggravating factors: lifting (Stress)      Diagnostic Tests  MRI studies: abnormal (Multilevel small disc protrusions causing mild canal stenosis  )  Patient Goals  Patient goals for therapy: decreased pain, increased motion, increased strength and independence with ADLs/IADLs  Patient goal: To minimize the pain and increase in neck mobility  Objective     Postural Observations  Seated posture: poor  Standing posture: poor        Palpation   Left   Hypertonic in the levator scapulae and upper trapezius  Tenderness of the levator scapulae and upper trapezius  Trigger point to levator scapulae and upper trapezius  Right   Hypertonic in the levator scapulae and upper trapezius  Tenderness of the levator scapulae and upper trapezius  Trigger point to levator scapulae and upper trapezius       Neurological Testing     Sensation   Cervical/Thoracic   Left   Intact: light touch    Right   Diminished: light touch    Active Range of Motion   Cervical/Thoracic Spine       Cervical  Subcranial protraction:  with pain   Restriction level: moderate  Subcranial retraction:  with pain   Restriction level: maximal  Flexion: 20 degrees  with pain  Extension: 22 (More pain) degrees     with pain  Left lateral flexion: 10 degrees     with pain  Right lateral flexion: 20 degrees     with pain  Left rotation: 30 degrees with pain  Right rotation: 15 degrees    with pain  Left Shoulder   Flexion: 80 degrees   Abduction: 90 degrees     Right Shoulder   Flexion: 90 degrees   Abduction: 100 degrees     Strength/Myotome Testing   Cervical Spine     Left   Interossei strength (t1): 4+    Right   Interossei strength (t1): 4+    Left Shoulder     Planes of Motion   Flexion: 3-   Abduction: 3-   External rotation at 0°: 3+     Isolated Muscles   Upper trapezius: 4+     Right Shoulder     Planes of Motion   Flexion: 3-   Abduction: 3-   External rotation at 0°: 3     Isolated Muscles   Upper trapezius: 4+     Left Elbow   Flexion: 3+  Extension: 3+    Right Elbow   Flexion: 3  Extension: 3    Left Wrist/Hand   Ulnar deviation: 3+    Right Wrist/Hand   Ulnar deviation: 4    Tests     Additional Tests Details  Unable to perform ULTT or cervical radiculopathy special tests due to high symptom irritability             Precautions: High symptom irritability    Re-eval Date: 2/27/2022      Manuals 1/27            IASTM josé antonio UT             SOR, c/s PROM             José Antonio should PROM                          Neuro Re-Ed             Patient education 10 mins            Cervical retractions 1x10            Scapular retractions 5"x15            MTP/LTP             DNF endurance                                                    Ther Ex             Nustep             José Antonio UT stretch 3x20" José Antonio            C/s Rot AROM             C/S flex/ext AROM             Cervical Rot SNAGs                                                    Ther Activity                                       Gait Training                                       Modalities             P

## 2022-02-01 ENCOUNTER — APPOINTMENT (OUTPATIENT)
Dept: PHYSICAL THERAPY | Facility: HOME HEALTHCARE | Age: 62
End: 2022-02-01
Payer: COMMERCIAL

## 2022-02-03 ENCOUNTER — APPOINTMENT (OUTPATIENT)
Dept: PHYSICAL THERAPY | Facility: HOME HEALTHCARE | Age: 62
End: 2022-02-03
Payer: COMMERCIAL

## 2022-02-08 ENCOUNTER — APPOINTMENT (OUTPATIENT)
Dept: PHYSICAL THERAPY | Facility: HOME HEALTHCARE | Age: 62
End: 2022-02-08
Payer: COMMERCIAL

## 2022-02-10 ENCOUNTER — OFFICE VISIT (OUTPATIENT)
Dept: PHYSICAL THERAPY | Facility: HOME HEALTHCARE | Age: 62
End: 2022-02-10
Payer: COMMERCIAL

## 2022-02-10 DIAGNOSIS — M48.02 CERVICAL SPINAL STENOSIS: Primary | ICD-10-CM

## 2022-02-10 PROCEDURE — 97112 NEUROMUSCULAR REEDUCATION: CPT

## 2022-02-10 PROCEDURE — 97140 MANUAL THERAPY 1/> REGIONS: CPT

## 2022-02-10 PROCEDURE — 97110 THERAPEUTIC EXERCISES: CPT

## 2022-02-10 NOTE — PROGRESS NOTES
Daily Note     Today's date: 2/10/2022  Patient name: Yanira Hester  : 1960  MRN: [id number]  Referring provider: Robina Randolph  Dx:   Encounter Diagnosis     ICD-10-CM    1  Cervical spinal stenosis  M48 02        Start Time: 1100  Stop Time: 1155  Total time in clinic (min): 55 minutes    Subjective: Pt reports that she is experiencing a lot of pain in her neck today with numbness and tingling in her fingers  Objective: See treatment diary below      Assessment: Pt continues to present with high symptom irritability, however, she had symptom improvement following cervical retractions  Pt continues to present with significant cervical AROM deficits, however, her rotation improved following cue to hold neck into a retraction while turning her head  Pt would benefit from continued PT  Plan: Continue per plan of care        Precautions: High symptom irritability    Re-eval Date: 2022      Manuals 1/27 2/10           IASTM josé antonio UT  10'           SOR, c/s PROM             José Antonio should PROM                          Neuro Re-Ed             Patient education 10 mins 5 mins           Cervical retractions 1x10 2x10           Scapular retractions 5"x15 5"x15           MTP/LTP             DNF endurance                                                    Ther Ex             Nustep  L1 10'           José Antonio UT stretch 3x20" José Antonio            C/s Rot AROM  1x7 José Antonio           C/S flex/ext AROM             Cervical Rot SNAGs             Cervical lateral flex AROM  1x5 José Antonio                                     Ther Activity                                       Gait Training                                       Modalities             MHP

## 2022-02-15 ENCOUNTER — APPOINTMENT (OUTPATIENT)
Dept: PHYSICAL THERAPY | Facility: HOME HEALTHCARE | Age: 62
End: 2022-02-15
Payer: COMMERCIAL

## 2022-02-16 ENCOUNTER — APPOINTMENT (OUTPATIENT)
Dept: RADIOLOGY | Facility: MEDICAL CENTER | Age: 62
End: 2022-02-16
Payer: COMMERCIAL

## 2022-02-16 ENCOUNTER — OFFICE VISIT (OUTPATIENT)
Dept: PAIN MEDICINE | Facility: CLINIC | Age: 62
End: 2022-02-16
Payer: COMMERCIAL

## 2022-02-16 VITALS
HEIGHT: 64 IN | SYSTOLIC BLOOD PRESSURE: 113 MMHG | DIASTOLIC BLOOD PRESSURE: 71 MMHG | HEART RATE: 84 BPM | BODY MASS INDEX: 28.67 KG/M2

## 2022-02-16 DIAGNOSIS — M54.16 LUMBAR RADICULOPATHY: Primary | ICD-10-CM

## 2022-02-16 DIAGNOSIS — M47.816 LUMBAR SPONDYLOSIS: ICD-10-CM

## 2022-02-16 DIAGNOSIS — M51.36 LUMBAR DEGENERATIVE DISC DISEASE: ICD-10-CM

## 2022-02-16 DIAGNOSIS — M50.120 CERVICAL DISC DISORDER WITH RADICULOPATHY OF MID-CERVICAL REGION: ICD-10-CM

## 2022-02-16 DIAGNOSIS — M54.12 CERVICAL RADICULOPATHY: ICD-10-CM

## 2022-02-16 DIAGNOSIS — M54.16 LUMBAR RADICULOPATHY: ICD-10-CM

## 2022-02-16 PROBLEM — M51.369 LUMBAR DEGENERATIVE DISC DISEASE: Status: ACTIVE | Noted: 2022-02-16

## 2022-02-16 PROCEDURE — 72110 X-RAY EXAM L-2 SPINE 4/>VWS: CPT

## 2022-02-16 PROCEDURE — 99214 OFFICE O/P EST MOD 30 MIN: CPT | Performed by: ANESTHESIOLOGY

## 2022-02-16 NOTE — H&P (VIEW-ONLY)
Assessment:  1  Lumbar radiculopathy    2  Lumbar spondylosis    3  Lumbar degenerative disc disease    4  Cervical radiculopathy    5  Cervical disc disorder with radiculopathy of mid-cervical region        Plan:  Patient is a 11year-old female complaints of neck pain, right arm pain, low back pain, bilateral leg pain with chronic pain syndrome secondary to cervical degenerative disc disease, cervical radiculopathy, lumbar spondylosis, lumbar radiculopathy presents to office for follow-up visit  MRI of cervical spine from 12/23/2021 shows multi level small disc protrusions causing mild canal stenosis with foraminal stenosis most pronounced at C6-7 and moderate right severe stenosis C6-C7  MRI of thoracic spine showed multilevel spondylitic changes in degenerative disc change  Patient has notable swelling and tenderness at the L2 spinous process  She also knows increasing weakness in radiculopathy in bilateral lower extremities  At this time will need more diagnostic imaging before proceeding with interventional management of the low back and legs  1  We will schedule patient for right C7-T1 interlaminar epidural steroid injection  2  We will order an x-ray of the lumbar spine to assess for any degenerative changes that correlate patient's significant low back pain  3  We will order an MRI of the lumbar spine assess for discogenic pathology correlate patient's current presentation  4  Follow-up 1 month after injection    Complete risks and benefits including bleeding, infection, tissue reaction, nerve injury and allergic reaction were discussed  The approach was demonstrated using models and literature was provided  Verbal and written consent was obtained  History of Present Illness: The patient is a 62 y o  female who presents for a follow up office visit in regards to Neck Pain, Foot Pain, Leg Pain, and Arm Pain     The patients current symptoms include 9/10 intermittent sharp, throbbing, shooting, numbness, pins and needles without any particular time pattern  Current pain medications includes:  None  I have personally reviewed and/or updated the patient's past medical history, past surgical history, family history, social history, current medications, allergies, and vital signs today  Review of Systems  Review of Systems   Constitutional: Negative for chills and fever  HENT: Negative for ear pain and sore throat  Eyes: Negative for pain and visual disturbance  Respiratory: Positive for shortness of breath  Negative for cough  Chest pain   Cardiovascular: Negative for chest pain and palpitations  Gastrointestinal: Positive for nausea  Negative for abdominal pain and vomiting  Genitourinary: Negative for dysuria and hematuria  Musculoskeletal: Positive for gait problem  Negative for arthralgias and back pain  Decreased ROM, pain in extremities, joint stiffness    Skin: Negative for color change and rash  Neurological: Positive for dizziness and weakness  Negative for seizures and syncope  All other systems reviewed and are negative          Past Medical History:   Diagnosis Date    Anxiety     Asthma     Bipolar 1 disorder ([facility] )     Brain aneurysm     Chronic pain disorder     spinal stenosis    Concussion syndrome     neurological rx and balance rx    COPD (chronic obstructive pulmonary disease) (HCC)     Depression     Disease of thyroid gland     nodules     Family history of colon cancer     father    Fibromyalgia, primary     History of colon polyps     Hyperlipidemia     Hypertension     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     Migraine     Neuropathy     bilateral feet and hands    Peripheral neuropathy     Psychiatric disorder     PTSD (post-traumatic stress disorder)     Stroke ([facility] )     2012 no deficeits    Thyroid Cancer     TIA (transient ischemic attack)        Past Surgical History:   Procedure Laterality Date    ABDOMINAL SURGERY      laproscopic/ endometriosis    BRAIN SURGERY      aneurysm/ coiling procedure    CARPAL TUNNEL RELEASE      CHOLECYSTECTOMY      DENTAL SURGERY      EYE SURGERY      cataract    HYSTERECTOMY      HI ESOPHAGOGASTRODUODENOSCOPY TRANSORAL DIAGNOSTIC N/A 2/8/2018    Procedure: EGD AND COLONOSCOPY;  Surgeon: Yin Ramsey MD;  Location: [facility]; Service: Gastroenterology    THYROID SURGERY      TONSILLECTOMY         No family history on file      Social History     Occupational History    Not on file   Tobacco Use    Smoking status: Current Every Day Smoker     Packs/day: 2 00     Types: Cigarettes    Smokeless tobacco: Never Used   Vaping Use    Vaping Use: Never used   Substance and Sexual Activity    Alcohol use: Never     Alcohol/week: 0 0 standard drinks    Drug use: Never     Comment: prescribed Belbuca    Sexual activity: Yes     Partners: Male         Current Outpatient Medications:     albuterol (2 5 mg/3 mL) 0 083 % nebulizer solution, albuterol sulfate HFA 90 mcg/actuation aerosol inhaler  inhale 2 puffs by mouth every 6 hours if needed for wheezing, Disp: , Rfl:     albuterol (PROVENTIL HFA,VENTOLIN HFA) 90 mcg/act inhaler, Inhale 2 puffs every 6 (six) hours, Disp: , Rfl:     ammonium lactate (AmLactin) 12 % lotion, Every 12 hours, Disp: , Rfl:     atorvastatin (LIPITOR) 40 mg tablet, Take 40 mg by mouth daily at bedtime , Disp: , Rfl:     b complex vitamins capsule, Take 1 capsule by mouth daily, Disp: , Rfl:     Buprenorphine HCl (Belbuca) 300 MCG FILM, Apply 300 mcg to cheek 2 (two) times a day, Disp: , Rfl:     carboxymethylcellulose 0 5 % SOLN, Administer 1 drop to both eyes daily as needed for dry eyes, Disp: 1 Bottle, Rfl: 0    clonazePAM (KlonoPIN) 0 5 mg tablet, Take 0 5 mg by mouth daily at bedtime, Disp: , Rfl:     clonazePAM (KlonoPIN) 1 mg tablet, Take 1 mg by mouth daily , Disp: , Rfl:     cyclobenzaprine (FLEXERIL) 10 mg tablet, TAKE 1 TABLET BY MOUTH 3 TIMES A DAY AS NEEDED FOR MUSCLE SPASMS, Disp: , Rfl:     dicyclomine (BENTYL) 20 mg tablet, Take 20 mg by mouth every 6 (six) hours, Disp: , Rfl:     fenofibrate (TRICOR) 48 mg tablet, Take 48 mg by mouth daily, Disp: , Rfl:     furosemide (LASIX) 20 mg tablet, Take 20 mg by mouth every other day  (Patient not taking: Reported on 1/20/2022 ), Disp: , Rfl:     Galcanezumab-gnlm (Emgality) 120 MG/ML SOAJ, Inject 120 mg under the skin every 30 (thirty) days, Disp: , Rfl:     HYDROcodone-acetaminophen (NORCO)  mg per tablet, 1 tab every 6 hours as needed for severe pain    Do not fill before 2/12/18, Disp: , Rfl:     lamoTRIgine (LaMICtal) 100 mg tablet, Take 100 mg by mouth , Disp: , Rfl:     lamoTRIgine (LaMICtal) 150 MG tablet, Take 150 mg by mouth daily at bedtime, Disp: , Rfl:     latanoprost (XALATAN) 0 005 % ophthalmic solution, latanoprost 0 005 % eye drops, Disp: , Rfl:     levothyroxine 137 mcg tablet, Take 137 mcg by mouth daily, Disp: , Rfl:     magnesium 30 MG tablet, Take 30 mg by mouth 2 (two) times a day, Disp: , Rfl:     Melatonin 10 MG CAPS, Take 1 tablet by mouth, Disp: , Rfl:     naloxone (Narcan) 4 mg/0 1 mL nasal spray, Narcan 4 mg/actuation nasal spray, Disp: , Rfl:     omeprazole (PriLOSEC) 40 MG capsule, Take 40 mg by mouth 2 (two) times a day, Disp: , Rfl:     polyethylene glycol (MIRALAX) 17 g packet, Take 17 g by mouth daily, Disp: 30 each, Rfl: 5    pregabalin (LYRICA) 100 mg capsule, take 1 capsule by mouth three times a day (Patient not taking: Reported on 1/20/2022), Disp: , Rfl:     pregabalin (LYRICA) 150 mg capsule, take 1 capsule by mouth NIGHTLY, Disp: , Rfl:     topiramate (TOPAMAX) 100 mg tablet, Take 100 mg by mouth every 12 (twelve) hours , Disp: , Rfl:     Zoster Vac Recomb Adjuvanted (Shingrix) 50 MCG/0 5ML SUSR, Shingrix (PF) 50 mcg/0 5 mL intramuscular suspension, kit  inject 0 5 milliliter intramuscularly, Disp: , Rfl:     Allergies   Allergen Reactions    Hydroxyzine Anaphylaxis     Claims it gives her convulsions   Other     Pollen Extract Itching    Tree Extract     Clarithromycin Rash     Pt denies    Latex Rash    Sulfa Antibiotics Rash     "severe skin burn"       Physical Exam:    /71 (BP Location: Left arm, Patient Position: Sitting, Cuff Size: Standard)   Pulse 84   Ht 5' 4" (1 626 m)   BMI 28 67 kg/m²     Constitutional:normal, well developed, well nourished, alert, in no distress and non-toxic and no overt pain behavior  and obese  Eyes:anicteric  HEENT:grossly intact  Neck:supple, symmetric, trachea midline and no masses   Pulmonary:even and unlabored  Cardiovascular:No edema or pitting edema present  Skin:Normal without rashes or lesions and well hydrated  Psychiatric:Mood and affect appropriate  Neurologic:Cranial Nerves II-XII grossly intact  Musculoskeletal:antalgic; tenderness to palpation L2 spinous process     Cervical Spine examination demonstrates  Decreased ROM secondary to pain with lateral rotation to the left/right and bending to the left/right, in addition to neck flexion  4/5 right upper extremity strength in all muscle groups bilaterally  Negative Spurling's maneuver to the b/l Ue, sensitivity to light touch intact b/l Ue  Lumbar/Sacral Spine examination demonstrates  Decreased range of motion lumbar spine with pain upon: flexion, lateral rotation to the left/right, and bending to the left/right  Bilateral lumbar paraspinals tender to palpation  Muscle spasms noted in the lumbar area bilaterally  4/5 lower extremity strength in all muscle groups bilaterally  Positive seated straight leg raise for bilateral lower extremities  Sensitivity to light touch intact bilateral lower extremities  2+ reflexes in the patella and Achilles  No ankle clonus     Imaging  No orders to display       No orders of the defined types were placed in this encounter

## 2022-02-16 NOTE — PROGRESS NOTES
Assessment:  1  Lumbar radiculopathy    2  Lumbar spondylosis    3  Lumbar degenerative disc disease    4  Cervical radiculopathy    5  Cervical disc disorder with radiculopathy of mid-cervical region        Plan:  Patient is a 11year-old female complaints of neck pain, right arm pain, low back pain, bilateral leg pain with chronic pain syndrome secondary to cervical degenerative disc disease, cervical radiculopathy, lumbar spondylosis, lumbar radiculopathy presents to office for follow-up visit  MRI of cervical spine from 12/23/2021 shows multi level small disc protrusions causing mild canal stenosis with foraminal stenosis most pronounced at C6-7 and moderate right severe stenosis C6-C7  MRI of thoracic spine showed multilevel spondylitic changes in degenerative disc change  Patient has notable swelling and tenderness at the L2 spinous process  She also knows increasing weakness in radiculopathy in bilateral lower extremities  At this time will need more diagnostic imaging before proceeding with interventional management of the low back and legs  1  We will schedule patient for right C7-T1 interlaminar epidural steroid injection  2  We will order an x-ray of the lumbar spine to assess for any degenerative changes that correlate patient's significant low back pain  3  We will order an MRI of the lumbar spine assess for discogenic pathology correlate patient's current presentation  4  Follow-up 1 month after injection    Complete risks and benefits including bleeding, infection, tissue reaction, nerve injury and allergic reaction were discussed  The approach was demonstrated using models and literature was provided  Verbal and written consent was obtained  History of Present Illness: The patient is a 62 y o  female who presents for a follow up office visit in regards to Neck Pain, Foot Pain, Leg Pain, and Arm Pain     The patients current symptoms include 9/10 intermittent sharp, throbbing, shooting, numbness, pins and needles without any particular time pattern  Current pain medications includes:  None  I have personally reviewed and/or updated the patient's past medical history, past surgical history, family history, social history, current medications, allergies, and vital signs today  Review of Systems  Review of Systems   Constitutional: Negative for chills and fever  HENT: Negative for ear pain and sore throat  Eyes: Negative for pain and visual disturbance  Respiratory: Positive for shortness of breath  Negative for cough  Chest pain   Cardiovascular: Negative for chest pain and palpitations  Gastrointestinal: Positive for nausea  Negative for abdominal pain and vomiting  Genitourinary: Negative for dysuria and hematuria  Musculoskeletal: Positive for gait problem  Negative for arthralgias and back pain  Decreased ROM, pain in extremities, joint stiffness    Skin: Negative for color change and rash  Neurological: Positive for dizziness and weakness  Negative for seizures and syncope  All other systems reviewed and are negative          Past Medical History:   Diagnosis Date    Anxiety     Asthma     [facility]     Brain aneurysm     Chronic pain disorder     spinal stenosis    Concussion syndrome     neurological rx and balance rx    COPD (chronic obstructive pulmonary disease) (HCC)     Depression     Disease of thyroid gland     nodules     Family history of colon cancer     father    Fibromyalgia, primary     History of colon polyps     Hyperlipidemia     Hypertension     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     Migraine     Neuropathy     bilateral feet and hands    Peripheral neuropathy     Psychiatric disorder     PTSD (post-traumatic stress disorder)     Stroke ([facility]     2012 no deficeits    Thyroid Cancer     TIA (transient ischemic attack)        Past Surgical History:   Procedure Laterality Date    ABDOMINAL SURGERY      laproscopic/ endometriosis    BRAIN SURGERY      aneurysm/ coiling procedure    CARPAL TUNNEL RELEASE      CHOLECYSTECTOMY      DENTAL SURGERY      EYE SURGERY      cataract    HYSTERECTOMY      [place] ESOPHAGOGASTRODUODENOSCOPY TRANSORAL DIAGNOSTIC N/A 2/8/2018    Procedure: EGD AND COLONOSCOPY;  Surgeon: Polina Eden MD;  Location: [facility]; Service: Gastroenterology    THYROID SURGERY      TONSILLECTOMY         No family history on file      Social History     Occupational History    Not on file   Tobacco Use    Smoking status: Current Every Day Smoker     Packs/day: 2 00     Types: Cigarettes    Smokeless tobacco: Never Used   Vaping Use    Vaping Use: Never used   Substance and Sexual Activity    Alcohol use: Never     Alcohol/week: 0 0 standard drinks    Drug use: Never     Comment: prescribed Belbuca    Sexual activity: Yes     Partners: Male         Current Outpatient Medications:     albuterol (2 5 mg/3 mL) 0 083 % nebulizer solution, albuterol sulfate HFA 90 mcg/actuation aerosol inhaler  inhale 2 puffs by mouth every 6 hours if needed for wheezing, Disp: , Rfl:     albuterol (PROVENTIL HFA,VENTOLIN HFA) 90 mcg/act inhaler, Inhale 2 puffs every 6 (six) hours, Disp: , Rfl:     ammonium lactate (AmLactin) 12 % lotion, Every 12 hours, Disp: , Rfl:     atorvastatin (LIPITOR) 40 mg tablet, Take 40 mg by mouth daily at bedtime , Disp: , Rfl:     b complex vitamins capsule, Take 1 capsule by mouth daily, Disp: , Rfl:     Buprenorphine HCl (Belbuca) 300 MCG FILM, Apply 300 mcg to cheek 2 (two) times a day, Disp: , Rfl:     carboxymethylcellulose 0 5 % SOLN, Administer 1 drop to both eyes daily as needed for dry eyes, Disp: 1 Bottle, Rfl: 0    clonazePAM (KlonoPIN) 0 5 mg tablet, Take 0 5 mg by mouth daily at bedtime, Disp: , Rfl:     clonazePAM (KlonoPIN) 1 mg tablet, Take 1 mg by mouth daily , Disp: , Rfl:     cyclobenzaprine (FLEXERIL) 10 mg tablet, TAKE 1 TABLET BY MOUTH 3 TIMES A DAY AS NEEDED FOR MUSCLE SPASMS, Disp: , Rfl:     dicyclomine (BENTYL) 20 mg tablet, Take 20 mg by mouth every 6 (six) hours, Disp: , Rfl:     fenofibrate (TRICOR) 48 mg tablet, Take 48 mg by mouth daily, Disp: , Rfl:     furosemide (LASIX) 20 mg tablet, Take 20 mg by mouth every other day  (Patient not taking: Reported on 1/20/2022 ), Disp: , Rfl:     Galcanezumab-gnlm (Emgality) 120 MG/ML SOAJ, Inject 120 mg under the skin every 30 (thirty) days, Disp: , Rfl:     HYDROcodone-acetaminophen (NORCO)  mg per tablet, 1 tab every 6 hours as needed for severe pain    Do not fill before 2/12/18, Disp: , Rfl:     lamoTRIgine (LaMICtal) 100 mg tablet, Take 100 mg by mouth , Disp: , Rfl:     lamoTRIgine (LaMICtal) 150 MG tablet, Take 150 mg by mouth daily at bedtime, Disp: , Rfl:     latanoprost (XALATAN) 0 005 % ophthalmic solution, latanoprost 0 005 % eye drops, Disp: , Rfl:     levothyroxine 137 mcg tablet, Take 137 mcg by mouth daily, Disp: , Rfl:     magnesium 30 MG tablet, Take 30 mg by mouth 2 (two) times a day, Disp: , Rfl:     Melatonin 10 MG CAPS, Take 1 tablet by mouth, Disp: , Rfl:     naloxone (Narcan) 4 mg/0 1 mL nasal spray, Narcan 4 mg/actuation nasal spray, Disp: , Rfl:     omeprazole (PriLOSEC) 40 MG capsule, Take 40 mg by mouth 2 (two) times a day, Disp: , Rfl:     polyethylene glycol (MIRALAX) 17 g packet, Take 17 g by mouth daily, Disp: 30 each, Rfl: 5    pregabalin (LYRICA) 100 mg capsule, take 1 capsule by mouth three times a day (Patient not taking: Reported on 1/20/2022), Disp: , Rfl:     pregabalin (LYRICA) 150 mg capsule, take 1 capsule by mouth NIGHTLY, Disp: , Rfl:     topiramate (TOPAMAX) 100 mg tablet, Take 100 mg by mouth every 12 (twelve) hours , Disp: , Rfl:     Zoster Vac Recomb Adjuvanted (Shingrix) 50 MCG/0 5ML SUSR, Shingrix (PF) 50 mcg/0 5 mL intramuscular suspension, kit  inject 0 5 milliliter intramuscularly, Disp: , Rfl:     Allergies   Allergen Reactions    Hydroxyzine Anaphylaxis     Claims it gives her convulsions   Other     Pollen Extract Itching    Tree Extract     Clarithromycin Rash     Pt denies    Latex Rash    Sulfa Antibiotics Rash     "severe skin burn"       Physical Exam:    /71 (BP Location: Left arm, Patient Position: Sitting, Cuff Size: Standard)   Pulse 84   Ht 5' 4" (1 626 m)   BMI 28 67 kg/m²     Constitutional:normal, well developed, well nourished, alert, in no distress and non-toxic and no overt pain behavior  and obese  Eyes:anicteric  HEENT:grossly intact  Neck:supple, symmetric, trachea midline and no masses   Pulmonary:even and unlabored  Cardiovascular:No edema or pitting edema present  Skin:Normal without rashes or lesions and well hydrated  Psychiatric:Mood and affect appropriate  Neurologic:Cranial Nerves II-XII grossly intact  Musculoskeletal:antalgic; tenderness to palpation L2 spinous process     Cervical Spine examination demonstrates  Decreased ROM secondary to pain with lateral rotation to the left/right and bending to the left/right, in addition to neck flexion  4/5 right upper extremity strength in all muscle groups bilaterally  Negative Spurling's maneuver to the b/l Ue, sensitivity to light touch intact b/l Ue  Lumbar/Sacral Spine examination demonstrates  Decreased range of motion lumbar spine with pain upon: flexion, lateral rotation to the left/right, and bending to the left/right  Bilateral lumbar paraspinals tender to palpation  Muscle spasms noted in the lumbar area bilaterally  4/5 lower extremity strength in all muscle groups bilaterally  Positive seated straight leg raise for bilateral lower extremities  Sensitivity to light touch intact bilateral lower extremities  2+ reflexes in the patella and Achilles  No ankle clonus     Imaging  No orders to display       No orders of the defined types were placed in this encounter

## 2022-02-17 ENCOUNTER — OFFICE VISIT (OUTPATIENT)
Dept: PHYSICAL THERAPY | Facility: HOME HEALTHCARE | Age: 62
End: 2022-02-17
Payer: COMMERCIAL

## 2022-02-17 ENCOUNTER — TELEPHONE (OUTPATIENT)
Dept: PAIN MEDICINE | Facility: CLINIC | Age: 62
End: 2022-02-17

## 2022-02-17 DIAGNOSIS — M48.02 CERVICAL SPINAL STENOSIS: Primary | ICD-10-CM

## 2022-02-17 PROCEDURE — 97110 THERAPEUTIC EXERCISES: CPT | Performed by: PHYSICAL THERAPIST

## 2022-02-17 PROCEDURE — 97140 MANUAL THERAPY 1/> REGIONS: CPT | Performed by: PHYSICAL THERAPIST

## 2022-02-17 NOTE — PROGRESS NOTES
Daily Note     Today's date: 2022  Patient name: Venus Pimentel  : 1960  MRN: [id number]  Referring provider: Omid Trent  Dx:   Encounter Diagnosis     ICD-10-CM    1  Cervical spinal stenosis  M48 02                   Subjective: Pt reporting that last session Ishmael Campuzano had her pain down to 0/10 when she left therapy  Objective: See treatment diary below      Assessment: Pt needs constant supervision during session; visibly agitated when therapist had to walk away and complained that she was not being watched to do her exercises correctly  She did have some relief to end session following [place] application  Plan: Continue per plan of care        Precautions: High symptom irritability    Re-eval Date: 2022      Manuals 1/27 2/10 2/17          IASTM krishna UT  10' 10'           SOR, c/s PROM             Krishna should PROM                          Neuro Re-Ed             Patient education 10 mins 5 mins 10 min          Cervical retractions 1x10 2x10 2 x 10          Scapular retractions 5"x15 5"x15 5"x15           MTP/LTP             DNF endurance                                                    Ther Ex             Nustep  L1 10' L1 10'           Krishna UT stretch 3x20" Krishna  3x20"           C/s Rot AROM  1x7 Krishna 1 x 10           C/S flex/ext AROM   1 x 10           Cervical Rot SNAGs             Cervical lateral flex AROM  1x5 Krishna 1 x 10 krishna                                     Ther Activity                                       Gait Training                                       Modalities             [facility]

## 2022-02-22 ENCOUNTER — OFFICE VISIT (OUTPATIENT)
Dept: PHYSICAL THERAPY | Facility: HOME HEALTHCARE | Age: 62
End: 2022-02-22
Payer: COMMERCIAL

## 2022-02-22 DIAGNOSIS — M48.02 CERVICAL SPINAL STENOSIS: Primary | ICD-10-CM

## 2022-02-22 PROCEDURE — 97110 THERAPEUTIC EXERCISES: CPT

## 2022-02-22 PROCEDURE — 97112 NEUROMUSCULAR REEDUCATION: CPT

## 2022-02-22 PROCEDURE — 97140 MANUAL THERAPY 1/> REGIONS: CPT

## 2022-02-22 NOTE — PROGRESS NOTES
Daily Note     Today's date: 2022  Patient name: Oliva Gupta  : 1960  MRN: [id number]  Referring provider: Narinder Hamilton  Dx:   Encounter Diagnosis     ICD-10-CM    1  Cervical spinal stenosis  M48 02        Start Time: 1120  Stop Time: 1205  Total time in clinic (min): 45 minutes    Subjective: Pt reports that her neck is hurting today and that she will be receiving injections in her neck soon  Objective: See treatment diary below      Assessment: Pt continues to present with significant cervical AROM deficits due to pain and stiffness  Pt experienced radicular symptoms into her R arm when performing L UT stretch, but had symptom improvement when given the cue to perform cervical retraction prior to stretching  Pt would benefit from continued PT  Plan: Continue per plan of care        Precautions: High symptom irritability    Re-eval Date: 2022      Manuals 1/27 2/10 2/17 2/22         IASTM josé antonio UT  10' 10'  10'         SOR, c/s PROM             José Antonio should PROM                          Neuro Re-Ed             Patient education 10 mins 5 mins 10 min 5 min         Cervical retractions 1x10 2x10 2 x 10 2x10         Scapular retractions 5"x15 5"x15 5"x15  5"x15         MTP/LTP    MTP Red 1x10         DNF endurance                                                    Ther Ex             Nustep  L1 10' L1 10'  L1 10'         José Antonio UT stretch 3x20" José Antonio  3x20"  3x20" José Antonio         C/s Rot AROM  1x7 José Antonio 1 x 10  1x10 José Antonio         C/S flex/ext AROM   1 x 10  1x10         Cervical Rot SNAGs             Cervical lateral flex AROM  1x5 José Antonio 1 x 10 josé antonio  np                                   Ther Activity                                       Gait Training                                       Modalities             P

## 2022-02-22 NOTE — TELEPHONE ENCOUNTER
Patient is returning procedure schedulers call  She also wants Dr Lance Muhammad to know her MRI is scheduled on 3/1/22   Please reach out to her at # [phone]

## 2022-02-23 ENCOUNTER — OFFICE VISIT (OUTPATIENT)
Dept: PHYSICAL THERAPY | Facility: HOME HEALTHCARE | Age: 62
End: 2022-02-23
Payer: COMMERCIAL

## 2022-02-23 DIAGNOSIS — M48.02 CERVICAL SPINAL STENOSIS: Primary | ICD-10-CM

## 2022-02-23 PROCEDURE — 97110 THERAPEUTIC EXERCISES: CPT

## 2022-02-23 PROCEDURE — 97140 MANUAL THERAPY 1/> REGIONS: CPT

## 2022-02-23 PROCEDURE — 97112 NEUROMUSCULAR REEDUCATION: CPT

## 2022-02-23 NOTE — PROGRESS NOTES
Daily Note     Today's date: 2022  Patient name: Ayleen Wilkins  : 1960  MRN: [id number]  Referring provider: Tiara Eller  Dx:   Encounter Diagnosis     ICD-10-CM    1  Cervical spinal stenosis  M48 02        Start Time: 1130  Stop Time: 1210  Total time in clinic (min): 40 minutes    Subjective: Pt reports that she had a bad migraine yesterday and today and that she had to take her stronger pain medication  Pt reports she was cleaning up leaves outside yesterday, which might have led to increased pain  Objective: See treatment diary below      Assessment: Pt extremely lethargic throughout treatment session and demonstrated very limited tolerance to exercise today  Pt had some symptom improvement with josé antonio UT STM, however, when performing suboccipital STM, the pt demonstrated high emotional lability  Pt made comments of feeling hopeless due to her long history of symptoms, but did improve in overall pain levels and mood at the end of the session  Plan: Continue per plan of care        Precautions: High symptom irritability    Re-eval Date: 2022      Manuals 1/27 2/10 2/17 2/22 2/23        IASTM josé antonio UT  10' 10'  10' 10'        Sub occipital STM     5'        SOR, c/s PROM             José Antonio should PROM                          Neuro Re-Ed             Patient education 10 mins 5 mins 10 min 5 min 5 min        Cervical retractions 1x10 2x10 2 x 10 2x10 2x10        Scapular retractions 5"x15 5"x15 5"x15  5"x15 5"x15        MTP/LTP    MTP Red 1x10 np        DNF endurance                                                    Ther Ex             Nustep  L1 10' L1 10'  L1 10' L1 9'        José Antonio UT stretch 3x20" José Antonio  3x20"  3x20" José Antonio 3x20" José Antonio        C/s Rot AROM  1x7 José Antonio 1 x 10  1x10 José Antonio nv        C/S flex/ext AROM   1 x 10  1x10 nv        Cervical Rot SNAGs             Cervical lateral flex AROM  1x5 José Antonio 1 x 10 josé antonio  np                                   Ther Activity Gait Training                                       Modalities             [facility]

## 2022-02-25 ENCOUNTER — APPOINTMENT (OUTPATIENT)
Dept: PHYSICAL THERAPY | Facility: HOME HEALTHCARE | Age: 62
End: 2022-02-25
Payer: COMMERCIAL

## 2022-03-01 ENCOUNTER — HOSPITAL ENCOUNTER (OUTPATIENT)
Dept: MRI IMAGING | Facility: HOSPITAL | Age: 62
Discharge: HOME | End: 2022-03-01
Attending: ANESTHESIOLOGY
Payer: COMMERCIAL

## 2022-03-01 DIAGNOSIS — M54.16 LUMBAR RADICULOPATHY: ICD-10-CM

## 2022-03-01 DIAGNOSIS — M51.36 LUMBAR DEGENERATIVE DISC DISEASE: ICD-10-CM

## 2022-03-01 DIAGNOSIS — M47.816 LUMBAR SPONDYLOSIS: ICD-10-CM

## 2022-03-01 PROCEDURE — 72148 MRI LUMBAR SPINE W/O DYE: CPT

## 2022-03-01 PROCEDURE — G1004 CDSM NDSC: HCPCS

## 2022-03-02 ENCOUNTER — EVALUATION (OUTPATIENT)
Dept: PHYSICAL THERAPY | Facility: HOME HEALTHCARE | Age: 62
End: 2022-03-02
Payer: COMMERCIAL

## 2022-03-02 DIAGNOSIS — M48.02 CERVICAL SPINAL STENOSIS: Primary | ICD-10-CM

## 2022-03-02 PROCEDURE — 97110 THERAPEUTIC EXERCISES: CPT

## 2022-03-02 PROCEDURE — 97140 MANUAL THERAPY 1/> REGIONS: CPT

## 2022-03-02 NOTE — LETTER
2022    Aurelia DustyGriselda    Patient: Jose Guadalupe Champion   YOB: 1960   Date of Visit: 3/2/2022     Encounter Diagnosis     ICD-10-CM    1  Cervical spinal stenosis  M48 02        Dear Dr Suyapa Mae:    Thank you for your recent referral of Jose Guadalupe Champion  Please review the attached evaluation summary from Jennifer's recent visit  Please verify that you agree with the plan of care by signing the attached order  If you have any questions or concerns, please do not hesitate to call  I sincerely appreciate the opportunity to share in the care of one of your patients and hope to have another opportunity to work with you in the near future  Sincerely,    Vera Espinal, PT      Referring Provider:      I certify that I have read the below Plan of Care and certify the need for these services furnished under this plan of treatment while under my care  Aurelia Montano, [address] [id number]  Via Fax: [phone]          PT Re-Evaluation     Today's date: 3/2/2022  Patient name: Jose Guadalupe Champion  : 1960  MRN: [id number]  Referring provider: Angelita Bautista  Dx:   Encounter Diagnosis     ICD-10-CM    1  Cervical spinal stenosis  M48 02        Start Time: 1105  Stop Time: 1145  Total time in clinic (min): 40 minutes    Assessment  Assessment details: Pt has made some improvements in cervical ROM, shoulder ROM, and pain levels since beginning PT, however, she continues to demonstrate significant cervical and shoulder ROM deficits, UE weakness, and overall functional mobility deficits  She has not yet met her short term goals, however, she is making improvements towards her goals for pain levels, independence with HEP, and posture  Pt does continue to experience cervical radicular symptoms into her bilateral UE at times, most likely due to plausible cervical spinal stenosis  Pt would benefit from continued PT in order to make further improvements in pain levels, strength, ROM, functional mobility, quality of life, and return to PLOF  Thank you! Impairments: abnormal muscle firing, abnormal or restricted ROM, abnormal movement, activity intolerance, impaired physical strength, lacks appropriate home exercise program, pain with function, poor posture  and poor body mechanics    Symptom irritability: high  Goals  STG: 3-4 weeks  Pt will be independent with HEP in order to continue to progress outside of PT treatment sessions  Progressing  Pt will improve in quality of life as demonstrated by worst pain levels of 5/10 or less  Progressing  Pt will improve in functional mobility as demonstrated by 50% improvement in resting posture  Progressing  LT-8 weeks  Pt will improve in quality of life as demonstrated by best pain levels of 2/10 or less  Pt will improve in functional mobility as demonstrated by strength levels of 4/5 or greater  Pt will improve in functional mobility as demonstrated by cervical Rot AROM of 45 degrees or greater  Plan  Patient would benefit from: skilled physical therapy  Planned modality interventions: thermotherapy: hydrocollator packs  Planned therapy interventions: activity modification, body mechanics training, flexibility, functional ROM exercises, home exercise program, joint mobilization, manual therapy, massage, neuromuscular re-education, patient education, postural training, strengthening, stretching, therapeutic activities and therapeutic exercise  Frequency: 2x week  Duration in weeks: 4  Plan of Care beginning date: 3/2/2022  Plan of Care expiration date: 2022  Treatment plan discussed with: patient        Subjective Evaluation    History of Present Illness  Mechanism of injury: Pt reports that she is experiencing numbness in bilateral arms and neck pain for the past 2-3 months    She reports she was diagnosed with cervical spinal stenosis  She reports that she also has low back pain, but the MD did not get to that part yet  She reports that she has difficulty with turning her head  Plan is to start with PT with the neck first   She reports that she feels the numbness and pain   She experiences the most numbness in her R arm, but also gets it in the L  The paraesthesia travels all the way to her elbow, hands, and finger tips  She also reports "freezing" of her L fingers  Pt reports that she woke up paralyzed one day and was diagnosed with fibromyalgia  Uses SPC for ambulation, but also has a RW  UPDATE: 3/2/2022:  Pt reports that she feels her neck pain has been improving since beginning physical therapy  She reports that she still has difficulty turning her head to the Left though  She also reports that she has been experiencing some ear pain on the R side  Pain  Current pain ratin  At best pain rating: 3  At worst pain ratin  Quality: throbbing (Tingling)  Relieving factors: medications (Massage)  Aggravating factors: lifting (Stress)      Diagnostic Tests  MRI studies: abnormal (Multilevel small disc protrusions causing mild canal stenosis  )  Patient Goals  Patient goals for therapy: decreased pain, increased motion, increased strength and independence with ADLs/IADLs  Patient goal: To minimize the pain and increase in neck mobility  Objective     Postural Observations  Seated posture: poor  Standing posture: poor        Palpation   Left   Hypertonic in the levator scapulae, scalenes and upper trapezius  Tenderness of the levator scapulae, scalenes and upper trapezius  Trigger point to levator scapulae, scalenes and upper trapezius  Right   Hypertonic in the levator scapulae, scalenes and upper trapezius  Tenderness of the levator scapulae, scalenes and upper trapezius  Trigger point to levator scapulae, scalenes and upper trapezius       Neurological Testing     Sensation   Cervical/Thoracic Left   Intact: light touch    Right   Diminished: light touch    Active Range of Motion   Cervical/Thoracic Spine       Cervical  Subcranial protraction:  with pain   Restriction level: moderate  Subcranial retraction:  with pain   Restriction level: maximal  Flexion: 22 degrees  with pain  Extension: 34 (More pain) degrees     with pain  Left lateral flexion: 18 degrees     with pain  Right lateral flexion: 12 degrees     with pain  Left rotation: 30 degrees with pain  Right rotation: 39 degrees    with pain  Left Shoulder   Flexion: 100 degrees   Abduction: 85 degrees     Right Shoulder   Flexion: 135 degrees   Abduction: 100 degrees     Strength/Myotome Testing   Cervical Spine     Left   Interossei strength (t1): 4+    Right   Interossei strength (t1): 4+    Left Shoulder     Planes of Motion   Flexion: 3-   Abduction: 3-   External rotation at 0°: 3+     Isolated Muscles   Upper trapezius: 4+     Right Shoulder     Planes of Motion   Flexion: 3-   Abduction: 3-   External rotation at 0°: 3+     Isolated Muscles   Upper trapezius: 4+     Left Elbow   Flexion: 3+  Extension: 3+    Right Elbow   Flexion: 3+  Extension: 3+    Left Wrist/Hand   Ulnar deviation: 3+    Right Wrist/Hand   Ulnar deviation: 4    Tests     Additional Tests Details  Unable to perform ULTT or cervical radiculopathy special tests due to high symptom irritability             Precautions: High symptom irritability    Re-eval Date: 4/2/2022      Manuals 1/27 2/10 2/17 2/22 2/23 3/2       IASTM josé antonio UT  10' 10'  10' 10' nv       Sub occipital STM     5' nv       SOR, c/s PROM             José Antonio should PROM                          Neuro Re-Ed             Patient education 10 mins 5 mins 10 min 5 min 5 min 5 min       Cervical retractions 1x10 2x10 2 x 10 2x10 2x10 1x5*       Scapular retractions 5"x15 5"x15 5"x15  5"x15 5"x15 5"x15       MTP/LTP    MTP Red 1x10 np        DNF endurance                                                    Ther Ex Nustep  L1 10' L1 10'  L1 10' L1 9' L1 10'       José Antonio UT stretch 3x20" José Antonio  3x20"  3x20" José Antonio 3x20" José Antonio 4x20" José Antonio       C/s Rot AROM  1x7 José Antonio 1 x 10  1x10 José Antonio nv 1x5       C/S flex/ext AROM   1 x 10  1x10 nv 1x5       Cervical Rot SNAGs             Cervical lateral flex AROM  1x5 José Antonio 1 x 10 josé antonio  np                                   Ther Activity                                       Gait Training                                       Modalities             P

## 2022-03-02 NOTE — PROGRESS NOTES
PT Re-Evaluation     Today's date: 3/2/2022  Patient name: Elizabeth Lisa  : 1960  MRN: [id number]  Referring provider: Erika Weller  Dx:   Encounter Diagnosis     ICD-10-CM    1  Cervical spinal stenosis  M48 02        Start Time: 1105  Stop Time: 1145  Total time in clinic (min): 40 minutes    Assessment  Assessment details: Pt has made some improvements in cervical ROM, shoulder ROM, and pain levels since beginning PT, however, she continues to demonstrate significant cervical and shoulder ROM deficits, UE weakness, and overall functional mobility deficits  She has not yet met her short term goals, however, she is making improvements towards her goals for pain levels, independence with HEP, and posture  Pt does continue to experience cervical radicular symptoms into her bilateral UE at times, most likely due to plausible cervical spinal stenosis  Pt would benefit from continued PT in order to make further improvements in pain levels, strength, ROM, functional mobility, quality of life, and return to PLOF  Thank you! Impairments: abnormal muscle firing, abnormal or restricted ROM, abnormal movement, activity intolerance, impaired physical strength, lacks appropriate home exercise program, pain with function, poor posture  and poor body mechanics    Symptom irritability: high  Goals  STG: 3-4 weeks  Pt will be independent with HEP in order to continue to progress outside of PT treatment sessions  Progressing  Pt will improve in quality of life as demonstrated by worst pain levels of 5/10 or less  Progressing  Pt will improve in functional mobility as demonstrated by 50% improvement in resting posture  Progressing  LT-8 weeks  Pt will improve in quality of life as demonstrated by best pain levels of 2/10 or less  Pt will improve in functional mobility as demonstrated by strength levels of 4/5 or greater    Pt will improve in functional mobility as demonstrated by cervical Rot AROM of 45 degrees or greater  Plan  Patient would benefit from: skilled physical therapy  Planned modality interventions: thermotherapy: hydrocollator packs  Planned therapy interventions: activity modification, body mechanics training, flexibility, functional ROM exercises, home exercise program, joint mobilization, manual therapy, massage, neuromuscular re-education, patient education, postural training, strengthening, stretching, therapeutic activities and therapeutic exercise  Frequency: 2x week  Duration in weeks: 4  Plan of Care beginning date: 3/2/2022  Plan of Care expiration date: 2022  Treatment plan discussed with: patient        Subjective Evaluation    History of Present Illness  Mechanism of injury: Pt reports that she is experiencing numbness in bilateral arms and neck pain for the past 2-3 months  She reports she was diagnosed with cervical spinal stenosis  She reports that she also has low back pain, but the MD did not get to that part yet  She reports that she has difficulty with turning her head  Plan is to start with PT with the neck first   She reports that she feels the numbness and pain 24/7  She experiences the most numbness in her R arm, but also gets it in the L  The paraesthesia travels all the way to her elbow, hands, and finger tips  She also reports "freezing" of her L fingers  Pt reports that she woke up paralyzed one day and was diagnosed with fibromyalgia  Uses SPC for ambulation, but also has a RW  UPDATE: 3/2/2022:  Pt reports that she feels her neck pain has been improving since beginning physical therapy  She reports that she still has difficulty turning her head to the Left though  She also reports that she has been experiencing some ear pain on the R side    Pain  Current pain ratin  At best pain rating: 3  At worst pain ratin  Quality: throbbing (Tingling)  Relieving factors: medications (Massage)  Aggravating factors: lifting (Stress)      Diagnostic Tests  MRI studies: abnormal (Multilevel small disc protrusions causing mild canal stenosis  )  Patient Goals  Patient goals for therapy: decreased pain, increased motion, increased strength and independence with ADLs/IADLs  Patient goal: To minimize the pain and increase in neck mobility  Objective     Postural Observations  Seated posture: poor  Standing posture: poor        Palpation   Left   Hypertonic in the levator scapulae, scalenes and upper trapezius  Tenderness of the levator scapulae, scalenes and upper trapezius  Trigger point to levator scapulae, scalenes and upper trapezius  Right   Hypertonic in the levator scapulae, scalenes and upper trapezius  Tenderness of the levator scapulae, scalenes and upper trapezius  Trigger point to levator scapulae, scalenes and upper trapezius       Neurological Testing     Sensation   Cervical/Thoracic   Left   Intact: light touch    Right   Diminished: light touch    Active Range of Motion   Cervical/Thoracic Spine       Cervical  Subcranial protraction:  with pain   Restriction level: moderate  Subcranial retraction:  with pain   Restriction level: maximal  Flexion: 22 degrees  with pain  Extension: 34 (More pain) degrees     with pain  Left lateral flexion: 18 degrees     with pain  Right lateral flexion: 12 degrees     with pain  Left rotation: 30 degrees with pain  Right rotation: 39 degrees    with pain  Left Shoulder   Flexion: 100 degrees   Abduction: 85 degrees     Right Shoulder   Flexion: 135 degrees   Abduction: 100 degrees     Strength/Myotome Testing   Cervical Spine     Left   Interossei strength (t1): 4+    Right   Interossei strength (t1): 4+    Left Shoulder     Planes of Motion   Flexion: 3-   Abduction: 3-   External rotation at 0°: 3+     Isolated Muscles   Upper trapezius: 4+     Right Shoulder     Planes of Motion   Flexion: 3-   Abduction: 3-   External rotation at 0°: 3+     Isolated Muscles   Upper trapezius: 4+     Left Elbow Flexion: 3+  Extension: 3+    Right Elbow   Flexion: 3+  Extension: 3+    Left Wrist/Hand   Ulnar deviation: 3+    Right Wrist/Hand   Ulnar deviation: 4    Tests     Additional Tests Details  Unable to perform ULTT or cervical radiculopathy special tests due to high symptom irritability             Precautions: High symptom irritability    Re-eval Date: 4/2/2022      Manuals 1/27 2/10 2/17 2/22 2/23 3/2       IASTM josé antonio UT  10' 10'  10' 10' nv       Sub occipital STM     5' nv       SOR, c/s PROM             José Antonio should PROM                          Neuro Re-Ed             Patient education 10 mins 5 mins 10 min 5 min 5 min 5 min       Cervical retractions 1x10 2x10 2 x 10 2x10 2x10 1x5*       Scapular retractions 5"x15 5"x15 5"x15  5"x15 5"x15 5"x15       MTP/LTP    MTP Red 1x10 np        DNF endurance                                                    Ther Ex             Nustep  L1 10' L1 10'  L1 10' L1 9' L1 10'       José Antonio UT stretch 3x20" José Antonio  3x20"  3x20" José Antonio 3x20" José Antonio 4x20" José Antonio       C/s Rot AROM  1x7 José Antonio 1 x 10  1x10 José Antonio nv 1x5       C/S flex/ext AROM   1 x 10  1x10 nv 1x5       Cervical Rot SNAGs             Cervical lateral flex AROM  1x5 José Antonio 1 x 10 josé antonio  np                                   Ther Activity                                       Gait Training                                       Modalities             [facility]

## 2022-03-03 ENCOUNTER — HOSPITAL ENCOUNTER (OUTPATIENT)
Facility: HOSPITAL | Age: 62
Setting detail: OUTPATIENT SURGERY
Discharge: HOME | End: 2022-03-03
Attending: ANESTHESIOLOGY | Admitting: ANESTHESIOLOGY
Payer: COMMERCIAL

## 2022-03-03 ENCOUNTER — APPOINTMENT (OUTPATIENT)
Dept: RADIOLOGY | Facility: HOSPITAL | Age: 62
End: 2022-03-03
Payer: COMMERCIAL

## 2022-03-03 VITALS
DIASTOLIC BLOOD PRESSURE: 70 MMHG | OXYGEN SATURATION: 96 % | HEART RATE: 60 BPM | TEMPERATURE: 97.9 F | SYSTOLIC BLOOD PRESSURE: 105 MMHG | RESPIRATION RATE: 18 BRPM

## 2022-03-03 DIAGNOSIS — M54.12 CERVICAL RADICULOPATHY: ICD-10-CM

## 2022-03-03 PROCEDURE — 62321 NJX INTERLAMINAR CRV/THRC: CPT | Performed by: ANESTHESIOLOGY

## 2022-03-03 PROCEDURE — 77002 NEEDLE LOCALIZATION BY XRAY: CPT

## 2022-03-03 RX ORDER — LIDOCAINE HYDROCHLORIDE 10 MG/ML
INJECTION, SOLUTION EPIDURAL; INFILTRATION; INTRACAUDAL; PERINEURAL AS NEEDED
Status: DISCONTINUED | OUTPATIENT
Start: 2022-03-03 | End: 2022-03-03 | Stop reason: HOSPADM

## 2022-03-03 RX ORDER — DEXAMETHASONE SODIUM PHOSPHATE 10 MG/ML
INJECTION, SOLUTION INTRAMUSCULAR; INTRAVENOUS AS NEEDED
Status: DISCONTINUED | OUTPATIENT
Start: 2022-03-03 | End: 2022-03-03 | Stop reason: HOSPADM

## 2022-03-03 NOTE — OP NOTE
OPERATIVE REPORT  PATIENT NAME: Consuella Brunner    :  1960  MRN: [id number]  Pt Location: [facility] OR ROOM 01    SURGERY DATE: 3/3/2022    Surgeon(s) and Role:     * Alysa Nicholas MD - Primary    Preop Diagnosis:  Cervical radiculopathy [M54 12]    Post-Op Diagnosis Codes:     * Cervical radiculopathy [M54 12]    Procedure(s) (LRB):  C7-T1 interlaminar epidural steroid injection (Right)    Specimen(s):  * No specimens in log *    Estimated Blood Loss:   Minimal    Drains:  * No LDAs found *    Anesthesia Type:   Local    Operative Indications:  Cervical radiculopathy [M54 12]      Operative Findings:  same    Complications:   None    Procedure and Technique:  Fluoroscopically-guided right C7-T1 interlaminar epidural steroid injection under fluoroscopy      After discussing the risks, benefits, and alternatives to the procedure, the patient expressed understanding and wished to proceed  The patient was brought to the fluoroscopy suite and placed in the prone position  A procedural pause was conducted to verify:  correct patient identity, procedure to be performed and as applicable, correct side and site, correct patient position, and availability of implants, special equipment and special requirements  After identifying the C7-T1 space fluoroscopically, the skin was sterilely prepped and draped in the usual fashion using Chloraprep skin prep  The skin and subcutaneous tissue were anesthetized with 0 5 % lidocaine  Utilizing a loss of resistance technique and intermittent fluoroscopic guidance, a 3 5 20 gauge Tuohy needle was advanced into the epidural space  Proper needle positioning was confirmed using multiple fluoroscopic views  After negative aspiration, Omnipaque 300 contrast was injected confirming epidural spread without evidence of intravascular or intrathecal spread    A 4 ml solution consisting of 10 mg of dexamethasone in sterile saline was injected slowly and incrementally into the epidural space   Following the injection the needle was withdrawn slightly and flushed with 0 5 % lidocaine as it was fully extracted  The patient tolerated the procedure well and there were no apparent complications  After appropriate observation, the patient was dismissed from the clinic in good condition under their own power       I was present for the entire procedure    Patient Disposition:  hemodynamically stable      SIGNATURE: Kenia Washington MD  DATE: March 3, 2022  TIME: 9:21 AM

## 2022-03-03 NOTE — DISCHARGE INSTRUCTIONS
Epidural Steroid Injection   WHAT YOU NEED TO KNOW:   An epidural steroid injection (MANISHA) is a procedure to inject steroid medicine into the epidural space  The epidural space is between your spinal cord and vertebrae  Steroids reduce inflammation and fluid buildup in your spine that may be causing pain  You may be given pain medicine along with the steroids  ACTIVITY  · Do not drive or operate machinery today  · No strenuous activity today - bending, lifting, etc   · You may resume normal activites starting tomorrow - start slowly and as tolerated  · You may shower today, but no tub baths or hot tubs  · You may have numbness for several hours from the local anesthetic  Please use caution and common sense, especially with weight-bearing activities  CARE OF THE INJECTION SITE  · If you have soreness or pain, apply ice to the area today (20 minutes on/20 minutes off)  · Starting tomorrow, you may use warm, moist heat or ice if needed  · You may have an increase or change in your discomfort for 36-48 hours after your treatment  · Apply ice and continue with any pain medication you have been prescribed  · Notify the [facility] if you have any of the following: redness, drainage, swelling, headache, stiff neck or fever above 100°F     SPECIAL INSTRUCTIONS  · Our office will contact you in approximately 7 days for a progress report  MEDICATIONS  · Continue to take all routine medications  · Our office may have instructed you to hold some medications  As no general anesthesia was used in today's procedure, you should not experience any side effects related to anesthesia  If you have a problem specifically related to your procedure, please call our office at [phone]  Problems not related to your procedure should be directed to your primary care physician

## 2022-03-03 NOTE — INTERVAL H&P NOTE
H&P reviewed  After examining the patient I find no changes in the patients condition since the H&P had been written      Vitals:    03/03/22 0906   BP: 95/73   Pulse: 64   Resp: 20   Temp: 97 9 °F (36 6 °C)   SpO2: 96%

## 2022-03-04 ENCOUNTER — OFFICE VISIT (OUTPATIENT)
Dept: PHYSICAL THERAPY | Facility: HOME HEALTHCARE | Age: 62
End: 2022-03-04
Payer: COMMERCIAL

## 2022-03-04 DIAGNOSIS — M48.02 CERVICAL SPINAL STENOSIS: Primary | ICD-10-CM

## 2022-03-04 PROCEDURE — 97140 MANUAL THERAPY 1/> REGIONS: CPT

## 2022-03-04 PROCEDURE — 97112 NEUROMUSCULAR REEDUCATION: CPT

## 2022-03-04 PROCEDURE — 97110 THERAPEUTIC EXERCISES: CPT

## 2022-03-08 ENCOUNTER — APPOINTMENT (OUTPATIENT)
Dept: PHYSICAL THERAPY | Facility: HOME HEALTHCARE | Age: 62
End: 2022-03-08
Payer: COMMERCIAL

## 2022-03-10 ENCOUNTER — OFFICE VISIT (OUTPATIENT)
Dept: PHYSICAL THERAPY | Facility: HOME HEALTHCARE | Age: 62
End: 2022-03-10
Payer: COMMERCIAL

## 2022-03-10 ENCOUNTER — TELEPHONE (OUTPATIENT)
Dept: PAIN MEDICINE | Facility: MEDICAL CENTER | Age: 62
End: 2022-03-10

## 2022-03-10 DIAGNOSIS — M48.02 CERVICAL SPINAL STENOSIS: Primary | ICD-10-CM

## 2022-03-10 PROCEDURE — 97140 MANUAL THERAPY 1/> REGIONS: CPT

## 2022-03-10 PROCEDURE — 97110 THERAPEUTIC EXERCISES: CPT

## 2022-03-10 PROCEDURE — 97112 NEUROMUSCULAR REEDUCATION: CPT

## 2022-03-10 NOTE — PROGRESS NOTES
Daily Note     Today's date: 3/10/2022  Patient name: Eddi Mantilla  : 1960  MRN: [id number]  Referring provider: Nurys Bee  Dx:   Encounter Diagnosis     ICD-10-CM    1  Cervical spinal stenosis  M48 02        Start Time: 1120  Stop Time: 1205  Total time in clinic (min): 45 minutes    Subjective: Pt reports that the injections helped for a approximately 1 week, but that she started experiencing neck pain again 2 days ago  She also reports that she has a lot of anxiety  Objective: See treatment diary below      Assessment: Pt demonstrated good tolerance to progressing strengthening exercises today  She demonstrated improved cervical ROM from previous session, however, it is still significantly limited  Pt had significant myofascial trigger points in her bilateral upper traps and scalenes  Pt demonstrated good response to STM  Pt would benefit from continued PT  Plan: Continue per plan of care        Precautions: High symptom irritability    Re-eval Date: 2022      Manuals 1/27 2/10 2/17 2/22 2/23 3/2 3/4 3/10     IASTM josé antonio UT  10' 10'  10' 10' nv 10' 10'     Suboccipital STM     5' nv       SOR, c/s PROM             José Antonio should PROM                          Neuro Re-Ed             Patient education 10 mins 5 mins 10 min 5 min 5 min 5 min 5 min 5 min     Cervical retractions 1x10 2x10 2 x 10 2x10 2x10 1x5*       Scapular retractions 5"x15 5"x15 5"x15  5"x15 5"x15 5"x15 5"x15 5"x15     MTP/LTP    MTP Red 1x10 np  Red 1x15 ea Red 1x20 ea     DNF endurance                                                    Ther Ex             Nustep  L1 10' L1 10'  L1 10' L1 9' L1 10' L1 11' L1 10'     José Antonio UT stretch 3x20" José Antonio  3x20"  3x20" José Antonio 3x20" José Antonio 4x20" José Antonio 4x20" José Antonio 4x20"     C/s Rot AROM  1x7 José Antonio 1 x 10  1x10 José Antonio nv 1x5 nv 1x5     C/S flex/ext AROM   1 x 10  1x10 nv 1x5 nv      Cervical Rot SNAGs             Cervical lateral flex AROM  1x5 José Antonio 1 x 10 josé antonio  np Ther Activity                                       Gait Training                                       Modalities             P

## 2022-03-11 NOTE — TELEPHONE ENCOUNTER
MRI lumbar spine only shows mild degenerative changes  Nothing significant that would correlate with weakness in bilateral lower extremities    Patient does have arthritic joints of the lumbar spine

## 2022-03-11 NOTE — TELEPHONE ENCOUNTER
Pt called in to go over the MRI results  Please be advised thank you    Pt can be reached @   [phone]

## 2022-03-11 NOTE — ASSESSMENT & PLAN NOTE
Rooming documentation of social history includes tobacco screening only.       · Nicotine patch  · Smoking cessation counseling

## 2022-03-15 ENCOUNTER — APPOINTMENT (OUTPATIENT)
Dept: PHYSICAL THERAPY | Facility: HOME HEALTHCARE | Age: 62
End: 2022-03-15
Payer: COMMERCIAL

## 2022-03-15 NOTE — TELEPHONE ENCOUNTER
Spoke to pt regarding MRI results  Pt verbalized understanding of results  Pt is interested in Aqua Therapy to strength her core    Pt stated she had a RAKESH on 3/3 which she had zero pain for over a week, pt stated that the pain is about 50% for the past couple of days especially when turning her head to the left side       Pt inquiring if AT would help with that or can she repeat the injection     Please advise

## 2022-03-17 ENCOUNTER — APPOINTMENT (OUTPATIENT)
Dept: PHYSICAL THERAPY | Facility: HOME HEALTHCARE | Age: 62
End: 2022-03-17
Payer: COMMERCIAL

## 2022-03-22 ENCOUNTER — OFFICE VISIT (OUTPATIENT)
Dept: PHYSICAL THERAPY | Facility: HOME HEALTHCARE | Age: 62
End: 2022-03-22
Payer: COMMERCIAL

## 2022-03-22 DIAGNOSIS — M48.02 CERVICAL SPINAL STENOSIS: Primary | ICD-10-CM

## 2022-03-22 PROCEDURE — 97110 THERAPEUTIC EXERCISES: CPT

## 2022-03-22 PROCEDURE — 97140 MANUAL THERAPY 1/> REGIONS: CPT

## 2022-03-22 PROCEDURE — 97112 NEUROMUSCULAR REEDUCATION: CPT

## 2022-03-22 NOTE — PROGRESS NOTES
Daily Note     Today's date: 3/22/2022  Patient name: Eddi Mantilla  : 1960  MRN: [id number]  Referring provider: Nurys Bee  Dx:   Encounter Diagnosis     ICD-10-CM    1  Cervical spinal stenosis  M48 02        Start Time: 1115  Stop Time: 1155  Total time in clinic (min): 40 minutes    Subjective: Pt reports that the neck injection did not seem to help very much, but that her arm is a little less tingly  Objective: See treatment diary below      Assessment: Pt was lethargic throughout treatment session and almost fell asleep multiple times during treatment session  Multiple soft tissue restrictions were felt in her bilateral upper traps and suboccipital muscles  Pt had symptom improvement following STM  Pt would benefit from continued PT  Plan: Continue per plan of care        Precautions: High symptom irritability    Re-eval Date: 2022      Manuals 1/27 2/10 2/17 2/22 2/23 3/2 3/4 3/10 3/22    IASTM josé antonio UT  10' 10'  10' 10' nv 10' 10' 15'    Suboccipital STM     5' nv       SOR, c/s PROM             José Antonio should PROM                          Neuro Re-Ed             Patient education 10 mins 5 mins 10 min 5 min 5 min 5 min 5 min 5 min     Cervical retractions 1x10 2x10 2 x 10 2x10 2x10 1x5*       Scapular retractions 5"x15 5"x15 5"x15  5"x15 5"x15 5"x15 5"x15 5"x15 5"x15    MTP/LTP    MTP Red 1x10 np  Red 1x15 ea Red 1x20 ea Red 1x15 ea    DNF endurance                                                    Ther Ex             Nustep  L1 10' L1 10'  L1 10' L1 9' L1 10' L1 11' L1 10' L1 10'    José Antonio UT stretch 3x20" José Antonio  3x20"  3x20" José Antonio 3x20" José Antonio 4x20" José Antonio 4x20" José Antonio 4x20" 4x20"    C/s Rot AROM  1x7 José Antonio 1 x 10  1x10 José Antonio nv 1x5 nv 1x5     C/S flex/ext AROM   1 x 10  1x10 nv 1x5 nv      Cervical Rot SNAGs             Cervical lateral flex AROM  1x5 José Antonio 1 x 10 josé antonio  np                                   Ther Activity                                       Gait Training Modalities             P

## 2022-03-24 ENCOUNTER — OFFICE VISIT (OUTPATIENT)
Dept: DENTISTRY | Facility: CLINIC | Age: 62
End: 2022-03-24

## 2022-03-24 VITALS — DIASTOLIC BLOOD PRESSURE: 77 MMHG | SYSTOLIC BLOOD PRESSURE: 108 MMHG | HEART RATE: 90 BPM | TEMPERATURE: 97.6 F

## 2022-03-24 DIAGNOSIS — K08.109 EDENTULOUS: Primary | ICD-10-CM

## 2022-03-24 PROCEDURE — D0191 ASSESSMENT OF A PATIENT: HCPCS | Performed by: DENTIST

## 2022-03-24 NOTE — TELEPHONE ENCOUNTER
I just want to confirm the levels before scheduling the patient  She previously had C7-T1 IESI and in the message it says Lets schedule a second injection at C6-7 ILESI  Please confirm the levels that need to be scheduled

## 2022-03-24 NOTE — PROGRESS NOTES
63 y/o F patient presented for examination to begin the process for fabricating new upper and lower complete dentures  The patient had dentures fabricated in our clinic in Jan 2017 (archived notes in [employer]) - these dentures are no longer retentive  The patient reported soreness on her alveolar ridges when palpated  No sore spots noted  The patient's mouth is very dry - soreness is secondary to dry mouth  Attempted to take new panoramic x-ray but the patient felt dizzy and faint  Resulting radiograph was non-diagnostic  Asked patient to sit until she felt better  Re-took BP and pulse - both WNL  The patient stated that she had not eaten today  She also admitted to fainting recently  Given patient presentation, asked patient to return for treatment once she was in better health  NV: Take panoramic x-ray and preliminary impressions for upper and lower complete dentures

## 2022-03-25 ENCOUNTER — OFFICE VISIT (OUTPATIENT)
Dept: PHYSICAL THERAPY | Facility: HOME HEALTHCARE | Age: 62
End: 2022-03-25
Payer: COMMERCIAL

## 2022-03-25 DIAGNOSIS — M48.02 CERVICAL SPINAL STENOSIS: Primary | ICD-10-CM

## 2022-03-25 PROCEDURE — 97140 MANUAL THERAPY 1/> REGIONS: CPT

## 2022-03-25 PROCEDURE — 97110 THERAPEUTIC EXERCISES: CPT

## 2022-03-25 PROCEDURE — 97112 NEUROMUSCULAR REEDUCATION: CPT

## 2022-03-25 NOTE — PROGRESS NOTES
Daily Note     Today's date: 3/25/2022  Patient name: Juliet Disla  : 1960  MRN: [id number]  Referring provider: Ruth Logan  Dx:   Encounter Diagnosis     ICD-10-CM    1  Cervical spinal stenosis  M48 02        Start Time: 1120  Stop Time: 1200  Total time in clinic (min): 40 minutes    Subjective: Pt reports that she is going to be receiving another injection in her neck  Objective: See treatment diary below      Assessment: Pt continues to present with significant cervical ROM deficits, however, she demonstrated improved tolerance to strengthening exercises today  Multiple soft tissue restrictions noted in bilateral upper traps, levator scapulae, and scalene musculature  Pt had some symptom improvement following STM  Pt would benefit from continued PT  Plan: Continue per plan of care        Precautions: High symptom irritability    Re-eval Date: 2022      Manuals 1/27 2/10 2/17 2/22 2/23 3/2 3/4 3/10 3/22 3/25   IASTM josé antonio UT  10' 10'  10' 10' nv 10' 10' 15' 10'   Suboccipital STM     5' nv       SOR, c/s PROM             José Antonio should PROM                          Neuro Re-Ed             Patient education 10 mins 5 mins 10 min 5 min 5 min 5 min 5 min 5 min     Cervical retractions 1x10 2x10 2 x 10 2x10 2x10 1x5*       Scapular retractions 5"x15 5"x15 5"x15  5"x15 5"x15 5"x15 5"x15 5"x15 5"x15 5"x15   MTP/LTP    MTP Red 1x10 np  Red 1x15 ea Red 1x20 ea Red 1x15 ea MTP Blue 1x20, LTP Blue 1x20   DNF endurance                                                    Ther Ex             Nustep  L1 10' L1 10'  L1 10' L1 9' L1 10' L1 11' L1 10' L1 10' L1 10'   José Antonio UT stretch 3x20" José Antonio  3x20"  3x20" José Antonio 3x20" José Antonio 4x20" José Antonio 4x20" José Antonio 4x20" 4x20" 3x10"   C/s Rot AROM  1x7 José Antonio 1 x 10  1x10 José Antonio nv 1x5 nv 1x5  1x5 josé antonio   C/S flex/ext AROM   1 x 10  1x10 nv 1x5 nv      Cervical Rot SNAGs             Cervical lateral flex AROM  1x5 José Antonio 1 x 10 josé antonio  np                                   Ther Activity Gait Training                                       Modalities             [facility]

## 2022-03-29 ENCOUNTER — APPOINTMENT (OUTPATIENT)
Dept: PHYSICAL THERAPY | Facility: HOME HEALTHCARE | Age: 62
End: 2022-03-29
Payer: COMMERCIAL

## 2022-03-31 ENCOUNTER — OFFICE VISIT (OUTPATIENT)
Dept: PHYSICAL THERAPY | Facility: HOME HEALTHCARE | Age: 62
End: 2022-03-31
Payer: COMMERCIAL

## 2022-03-31 DIAGNOSIS — M48.02 CERVICAL SPINAL STENOSIS: Primary | ICD-10-CM

## 2022-03-31 PROCEDURE — 97110 THERAPEUTIC EXERCISES: CPT

## 2022-03-31 PROCEDURE — 97140 MANUAL THERAPY 1/> REGIONS: CPT

## 2022-03-31 NOTE — PROGRESS NOTES
Daily Note     Today's date: 3/31/2022  Patient name: Shady Gurrola  : 1960  MRN: [id number]  Referring provider: Althea Riojas  Dx:   Encounter Diagnosis     ICD-10-CM    1  Cervical spinal stenosis  M48 02        Start Time: 1130  Stop Time: 1200  Total time in clinic (min): 30 minutes    Subjective: Pt reports that she is having some pain in her R ear today  Objective: See treatment diary below      Assessment: Pt arrived approximately 15 minutes late today, so her treatment was shortened slightly  Pt demonstrated improved tolerance to MTP/LTP exercises at the start of the session, however, she became more lethargic and emotional throughout the treatment session  She had difficulty performing scapular retractions and UT stretch due to pain levels today  Multiple soft tissue restrictions were noted on bilateral UT, scalenes, and suboccipital muscles  Pt had limited symptom improvement today with STM, which is not consistent with her response to manual therapy in previous sessions, possibly due to increased stress levels  Plan: Continue per plan of care        Precautions: High symptom irritability    Re-eval Date: 2022      Manuals 3/31  2/17 2/22 2/23 3/2 3/4 3/10 3/22 3/25   IASTM josé antonio UT 10'  10'  10' 10' nv 10' 10' 15' 10'   Suboccipital STM     5' nv       SOR, c/s PROM             José Antonio should PROM                          Neuro Re-Ed             Patient education   10 min 5 min 5 min 5 min 5 min 5 min     Cervical retractions   2 x 10 2x10 2x10 1x5*       Scapular retractions 5"x15  5"x15  5"x15 5"x15 5"x15 5"x15 5"x15 5"x15 5"x15   MTP/LTP Blue 1x20 ea   MTP Red 1x10 np  Red 1x15 ea Red 1x20 ea Red 1x15 ea MTP Blue 1x20, LTP Blue 1x20   DNF endurance                                                    Ther Ex             Nustep L1 10'  L1 10'  L1 10' L1 9' L1 10' L1 11' L1 10' L1 10' L1 10'   José Antonio UT stretch 3x15"  3x20"  3x20" José Antonio 3x20" José Antonio 4x20" José Antonio 4x20" José Antonio 4x20" 4x20" 3x10"   C/s Rot AROM 1x5 José Antonio  1 x 10  1x10 José Antonio nv 1x5 nv 1x5  1x5 josé antonio   C/S flex/ext AROM   1 x 10  1x10 nv 1x5 nv      Cervical Rot SNAGs             Cervical lateral flex AROM   1 x 10 josé antonio  np                                   Ther Activity                                       Gait Training                                       Modalities             P

## 2022-04-05 ENCOUNTER — OFFICE VISIT (OUTPATIENT)
Dept: PHYSICAL THERAPY | Facility: HOME HEALTHCARE | Age: 62
End: 2022-04-05
Payer: COMMERCIAL

## 2022-04-05 DIAGNOSIS — M48.02 CERVICAL SPINAL STENOSIS: Primary | ICD-10-CM

## 2022-04-05 PROCEDURE — 97110 THERAPEUTIC EXERCISES: CPT

## 2022-04-05 PROCEDURE — 97112 NEUROMUSCULAR REEDUCATION: CPT

## 2022-04-05 NOTE — PROGRESS NOTES
Daily Note     Today's date: 2022  Patient name: Ken Jones  : 1960  MRN: [id number]  Referring provider: Eliana Deleon  Dx:   Encounter Diagnosis     ICD-10-CM    1  Cervical spinal stenosis  M48 02        Start Time: 1125  Stop Time: 1205  Total time in clinic (min): 40 minutes    Subjective: Pt reports that she will be receiving another injection in her neck  Objective: See treatment diary below      Assessment: Pt continues to present with significant cervical ROM deficits and overall decreased functional mobility  Pt demonstrated good tolerance to increasing resistance for MTP/LTP, however, she had an increase in neck symptoms when performing josé antonio TB ER  Pt would benefit from continued PT  Plan: Continue per plan of care  Re-eval NV       Precautions: High symptom irritability    Re-eval Date: 2022      Manuals 3/31 4/5 2/17 2/22 2/23 3/2 3/4 3/10 3/22 3/25   IASTM josé antonio UT 10' np 10'  10' 10' nv 10' 10' 15' 10'   Suboccipital STM     5' nv       SOR, c/s PROM             José Antonio should PROM                          Neuro Re-Ed             Patient education   10 min 5 min 5 min 5 min 5 min 5 min     Cervical retractions   2 x 10 2x10 2x10 1x5*       Scapular retractions 5"x15 5"x15 5"x15  5"x15 5"x15 5"x15 5"x15 5"x15 5"x15 5"x15   MTP/LTP Blue 1x20 ea Black 1x20 ea  MTP Red 1x10 np  Red 1x15 ea Red 1x20 ea Red 1x15 ea MTP Blue 1x20, LTP Blue 1x20   DNF endurance             José Antonio TB ER  Red 1x10                                     Ther Ex             Nustep L1 10' L1 10' L1 10'  L1 10' L1 9' L1 10' L1 11' L1 10' L1 10' L1 10'   José Antonio UT stretch 3x15" 3x15" josé antonio 3x20"  3x20" José Antonio 3x20" José Antonio 4x20" José Antonio 4x20" José Antonio 4x20" 4x20" 3x10"   C/s Rot AROM 1x5 José Antonio 1x5 José Antonio 1 x 10  1x10 José Antonio nv 1x5 nv 1x5  1x5 josé antonio   C/S flex/ext AROM   1 x 10  1x10 nv 1x5 nv      Cervical Rot SNAGs             Cervical lateral flex AROM   1 x 10 josé antonio  np         Doorway stretch                          Ther Activity Gait Training                                       Modalities             [facility]

## 2022-04-20 ENCOUNTER — TELEPHONE (OUTPATIENT)
Dept: PAIN MEDICINE | Facility: CLINIC | Age: 62
End: 2022-04-20
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
## 2022-04-20 NOTE — TELEPHONE ENCOUNTER
FYI-    S/w pt  Per pt she wanted to check what kind of procedure she was having tomorrow with RM pt was advised that she is scheduled for a RAKESH  Pt denies taking any NSAIDS or blood thinning medications  Pt also wanted to let our office know that she stopped PT last Tuesday and her final evaluation with PT is on 4/22  Pt verbalized that she is having abdominal pain and will be following up with GI  Pt was advised to notify her home care agency to advise them of her concerns

## 2022-04-21 ENCOUNTER — APPOINTMENT (OUTPATIENT)
Dept: RADIOLOGY | Facility: HOSPITAL | Age: 62
End: 2022-04-21
Payer: COMMERCIAL

## 2022-04-21 ENCOUNTER — HOSPITAL ENCOUNTER (OUTPATIENT)
Facility: HOSPITAL | Age: 62
Setting detail: OUTPATIENT SURGERY
Discharge: HOME | End: 2022-04-21
Attending: ANESTHESIOLOGY | Admitting: RADIOLOGY
Payer: COMMERCIAL

## 2022-04-21 VITALS
DIASTOLIC BLOOD PRESSURE: 78 MMHG | RESPIRATION RATE: 18 BRPM | OXYGEN SATURATION: 99 % | SYSTOLIC BLOOD PRESSURE: 123 MMHG

## 2022-04-21 DIAGNOSIS — M54.12 CERVICAL RADICULOPATHY: ICD-10-CM

## 2022-04-21 PROCEDURE — 62321 NJX INTERLAMINAR CRV/THRC: CPT | Performed by: ANESTHESIOLOGY

## 2022-04-21 RX ORDER — LIDOCAINE HYDROCHLORIDE 10 MG/ML
INJECTION, SOLUTION EPIDURAL; INFILTRATION; INTRACAUDAL; PERINEURAL AS NEEDED
Status: DISCONTINUED | OUTPATIENT
Start: 2022-04-21 | End: 2022-04-21 | Stop reason: HOSPADM

## 2022-04-21 RX ORDER — DEXAMETHASONE SODIUM PHOSPHATE 10 MG/ML
INJECTION, SOLUTION INTRAMUSCULAR; INTRAVENOUS AS NEEDED
Status: DISCONTINUED | OUTPATIENT
Start: 2022-04-21 | End: 2022-04-21 | Stop reason: HOSPADM

## 2022-04-21 NOTE — DISCHARGE INSTRUCTIONS
Epidural Steroid Injection   WHAT YOU NEED TO KNOW:   An epidural steroid injection (MANISHA) is a procedure to inject steroid medicine into the epidural space  The epidural space is between your spinal cord and vertebrae  Steroids reduce inflammation and fluid buildup in your spine that may be causing pain  You may be given pain medicine along with the steroids  ACTIVITY  · Do not drive or operate machinery today  · No strenuous activity today - bending, lifting, etc   · You may resume normal activites starting tomorrow - start slowly and as tolerated  · You may shower today, but no tub baths or hot tubs  · You may have numbness for several hours from the local anesthetic  Please use caution and common sense, especially with weight-bearing activities  CARE OF THE INJECTION SITE  · If you have soreness or pain, apply ice to the area today (20 minutes on/20 minutes off)  · Starting tomorrow, you may use warm, moist heat or ice if needed  · You may have an increase or change in your discomfort for 36-48 hours after your treatment  · Apply ice and continue with any pain medication you have been prescribed  · Notify [facility] if you have any of the following: redness, drainage, swelling, headache, stiff neck or fever above 100°F     SPECIAL INSTRUCTIONS  · Our office will contact you in approximately 7 days for a progress report  MEDICATIONS  · Continue to take all routine medications  · Our office may have instructed you to hold some medications  As no general anesthesia was used in today's procedure, you should not experience any side effects related to anesthesia  If you have a problem specifically related to your procedure, please call our office at [phone]  Problems not related to your procedure should be directed to your primary care physician

## 2022-04-21 NOTE — OP NOTE
OPERATIVE REPORT  PATIENT NAME: Ro Bliss    :  1960  MRN: [id number]  Pt Location: [facility] OR ROOM 01    SURGERY DATE: 2022    Surgeon(s) and Role:     * Hua Blackmon MD - Primary    Preop Diagnosis:  Cervical radiculopathy [M54 12]    Post-Op Diagnosis Codes:     * Cervical radiculopathy [M54 12]    Procedure(s) (LRB):  C6-C7 BLOCK / INJECTION EPIDURAL STEROID CERVICAL (N/A)    Specimen(s):  * No specimens in log *    Estimated Blood Loss:   Minimal    Drains:  * No LDAs found *    Anesthesia Type:   Local    Operative Indications:  Cervical radiculopathy [M54 12]      Operative Findings:  same    Complications:   None    Procedure and Technique:  Fluoroscopically-guided  C6-C7 interlaminar epidural steroid injection under fluoroscopy      After discussing the risks, benefits, and alternatives to the procedure, the patient expressed understanding and wished to proceed  The patient was brought to the fluoroscopy suite and placed in the prone position  A procedural pause was conducted to verify:  correct patient identity, procedure to be performed and as applicable, correct side and site, correct patient position, and availability of implants, special equipment and special requirements  After identifying the 67 space fluoroscopically, the skin was sterilely prepped and draped in the usual fashion using Chloraprep skin prep  The skin and subcutaneous tissue were anesthetized with 0 5 % lidocaine  Utilizing a loss of resistance technique and intermittent fluoroscopic guidance, a 3 5 20 gauge Tuohy needle was advanced into the epidural space  Proper needle positioning was confirmed using multiple fluoroscopic views  After negative aspiration, Omnipaque 300 contrast was injected confirming epidural spread without evidence of intravascular or intrathecal spread    A 4 ml solution consisting of 10 mg of dexamethasone in sterile saline was injected slowly and incrementally into the epidural space   Following the injection the needle was withdrawn slightly and flushed with 0 5 % lidocaine as it was fully extracted  The patient tolerated the procedure well and there were no apparent complications  After appropriate observation, the patient was dismissed from the clinic in good condition under their own power       I was present for the entire procedure    Patient Disposition:  hemodynamically stable      SIGNATURE: Sandip Velásquez MD  DATE: April 21, 2022  TIME: 2:28 PM

## 2022-04-21 NOTE — H&P
Assessment:  1  Cervical radiculopathy  FL guide & loc dx/ther proc    FL guide & loc dx/ther proc       Plan:  Trini Garcia is a 62 y o  female with complaints of neck and arm pain presents to surgical center for procedure  1  We will perform a Procedure(s) (LRB):  2  C6-C7 BLOCK / INJECTION EPIDURAL STEROID CERVICAL (N/A)   3  2  Follow-up 1 month after injection    Complete risks and benefits including bleeding, infection, tissue reaction, nerve injury and allergic reaction were discussed  The approach was demonstrated using models and literature was provided  Verbal and written consent was obtained  My impressions and treatment recommendations were discussed in detail with the patient who verbalized understanding and had no further questions  Discharge instructions were provided  I personally saw and examined the patient and I agree with the above discussed plan of care  Orders Placed This Encounter   Procedures    FL guide & loc dx/ther proc     Standing Status:   Standing     Number of Occurrences:   1     Order Specific Question:   Reason for Exam:     Answer:   Procedure: C6-C7 BLOCK / INJECTION EPIDURAL STEROID CERVICAL     Order Specific Question:   Is the patient pregnant? Answer:   No    Imaging- FL < 1 hour     Standing Status:   Standing     Number of Occurrences:   1     Order Specific Question:   Reason for Exam:     Answer:   Procedure     Order Specific Question:   Is the patient pregnant? Answer:   No     No orders of the defined types were placed in this encounter  History of Present Illness:  Trini Garcia is a 62 y o  female who presents for a follow up office visit in regards to neck and arm pain  The patients current symptoms include 7/10 constant dull/aching, throbbing cramping, burning without any particular time pattern        I have personally reviewed and/or updated the patient's past medical history, past surgical history, family history, social history, current medications, allergies, and vital signs today  Review of Systems   Musculoskeletal: Positive for neck pain and neck stiffness  All other systems reviewed and are negative        Patient Active Problem List   Diagnosis    Other hyperlipidemia    Dizziness    Other specified hypothyroidism    Brain aneurysm    Hypokalemia    Hypertriglyceridemia    Low HDL (under 40)    Elevated TSH    Tobacco abuse    Acute cystitis with hematuria    Intractable abdominal pain    TMJ syndrome    Stroke-like symptoms    Nausea    Left chest pressure    Hypophosphatemia    Gastritis, acute    Hypotension    Tobacco use    [facility]    Hypertension    Other constipation    Ileus ([facility])    Fibromyalgia, primary    Bipolar 1 disorder ([facility])    Depression    Chronic pain disorder    Anxiety    Migraine    Syncope and collapse    Mid back pain    Cervical radiculopathy    Neck pain    Lumbar degenerative disc disease    Lumbar radiculopathy    Lumbar spondylosis    Cervical disc disorder with radiculopathy of mid-cervical region       Past Medical History:   Diagnosis Date    Anxiety     Asthma     Bipolar 1 disorder ([facility] [address] [facility]     Brain aneurysm     Chronic pain disorder     spinal stenosis    Concussion syndrome     neurological rx and balance rx    COPD (chronic obstructive pulmonary disease) (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 [address] )     2012 no deficeits    Thyroid Cancer     TIA (transient ischemic attack) Past Surgical History:   Procedure Laterality Date    ABDOMINAL SURGERY      laproscopic/ endometriosis    BRAIN SURGERY      aneurysm/ coiling procedure    CARPAL TUNNEL RELEASE      CHOLECYSTECTOMY      DENTAL SURGERY      EPIDURAL BLOCK INJECTION Right 3/3/2022    Procedure: C7-T1 interlaminar epidural steroid injection;  Surgeon: Pepito Dudley MD;  Location: [facility] MA[facility];  Service: Pain Management     EYE SURGERY      cataract    FL GUIDED NEEDLE PLAC BX/ASP/INJ  3/3/2022    HYSTERECTOMY      VT ESOPHAGOGASTRODUODENOSCOPY TRANSORAL DIAGNOSTIC N/A 2/8/2018    Procedure: EGD AND COLONOSCOPY;  Surgeon: Ellen Jacobs MD;  Location:  GI LAB; Service: Gastroenterology    THYROID SURGERY      TONSILLECTOMY         No family history on file  Social History     Occupational History    Not on file   Tobacco Use    Smoking status: Current Every Day Smoker     Packs/day: 2 00     Types: Cigarettes    Smokeless tobacco: Never Used   Vaping Use    Vaping Use: Never used   Substance and Sexual Activity    Alcohol use: Never     Alcohol/week: 0 0 standard drinks    Drug use: Never     Comment: prescribed Belbuca    Sexual activity: Yes     Partners: Male       No current facility-administered medications on file prior to encounter       Current Outpatient Medications on File Prior to Encounter   Medication Sig    albuterol (2 5 mg/3 mL) 0 083 % nebulizer solution albuterol sulfate HFA 90 mcg/actuation aerosol inhaler   inhale 2 puffs by mouth every 6 hours if needed for wheezing (Patient not taking: Reported on 2/16/2022)    albuterol (PROVENTIL HFA,VENTOLIN HFA) 90 mcg/act inhaler Inhale 2 puffs every 6 (six) hours    ammonium lactate (AmLactin) 12 % lotion Every 12 hours (Patient not taking: Reported on 2/16/2022 )    atorvastatin (LIPITOR) 40 mg tablet Take 40 mg by mouth daily at bedtime     b complex vitamins capsule Take 1 capsule by mouth daily    Buprenorphine HCl (Belbuca) 300 MCG FILM Apply 300 mcg to cheek 2 (two) times a day    carboxymethylcellulose 0 5 % SOLN Administer 1 drop to both eyes daily as needed for dry eyes    clonazePAM (KlonoPIN) 0 5 mg tablet Take 0 5 mg by mouth daily at bedtime    clonazePAM (KlonoPIN) 1 mg tablet Take 1 mg by mouth daily     cyclobenzaprine (FLEXERIL) 10 mg tablet TAKE 1 TABLET BY MOUTH 3 TIMES A DAY AS NEEDED FOR MUSCLE SPASMS    dicyclomine (BENTYL) 20 mg tablet Take 20 mg by mouth every 6 (six) hours    fenofibrate (TRICOR) 48 mg tablet Take 48 mg by mouth daily    furosemide (LASIX) 20 mg tablet Take 20 mg by mouth every other day Taking as needed     Galcanezumab-gnlm (Emgality) 120 MG/ML SOAJ Inject 120 mg under the skin every 30 (thirty) days    HYDROcodone-acetaminophen (NORCO)  mg per tablet 1 tab every 6 hours as needed for severe pain    Do not fill before 2/12/18    lamoTRIgine (LaMICtal) 100 mg tablet Take 100 mg by mouth     lamoTRIgine (LaMICtal) 150 MG tablet Take 150 mg by mouth daily at bedtime    latanoprost (XALATAN) 0 005 % ophthalmic solution latanoprost 0 005 % eye drops    levothyroxine 137 mcg tablet Take 137 mcg by mouth daily    magnesium 30 MG tablet Take 30 mg by mouth 2 (two) times a day    Melatonin 10 MG CAPS Take 1 tablet by mouth    naloxone (Narcan) 4 mg/0 1 mL nasal spray Narcan 4 mg/actuation nasal spray    omeprazole (PriLOSEC) 40 MG capsule Take 40 mg by mouth 2 (two) times a day    polyethylene glycol (MIRALAX) 17 g packet Take 17 g by mouth daily    pregabalin (LYRICA) 100 mg capsule take 1 capsule by mouth three times a day    pregabalin (LYRICA) 150 mg capsule take 1 capsule by mouth NIGHTLY    topiramate (TOPAMAX) 100 mg tablet Take 100 mg by mouth every 12 (twelve) hours     Zoster Vac Recomb Adjuvanted (Shingrix) 50 MCG/0 5ML SUSR Shingrix (PF) 50 mcg/0 5 mL intramuscular suspension, kit   inject 0 5 milliliter intramuscularly       Allergies   Allergen Reactions  Hydroxyzine Anaphylaxis     Claims it gives her convulsions   Other     Pollen Extract Itching    Tree Extract     Clarithromycin Rash     Pt denies    Latex Rash    Sulfa Antibiotics Rash     "severe skin burn"       Physical Exam:    /76   Resp 18   SpO2 99%     Constitutional:normal, well developed, well nourished, alert, in no distress and non-toxic and no overt pain behavior    Eyes:anicteric  HEENT:grossly intact  Neck:supple, symmetric, trachea midline and no masses   Pulmonary:even and unlabored  Cardiovascular:No edema or pitting edema present  Skin:Normal without rashes or lesions and well hydrated  Psychiatric:Mood and affect appropriate  Neurologic:Cranial Nerves II-XII grossly intact  Musculoskeletal:normal

## 2022-04-22 ENCOUNTER — APPOINTMENT (OUTPATIENT)
Dept: PHYSICAL THERAPY | Facility: HOME HEALTHCARE | Age: 62
End: 2022-04-22
Payer: COMMERCIAL

## 2022-04-22 ENCOUNTER — OFFICE VISIT (OUTPATIENT)
Dept: PHYSICAL THERAPY | Facility: HOME HEALTHCARE | Age: 62
End: 2022-04-22
Payer: COMMERCIAL

## 2022-04-22 DIAGNOSIS — M48.02 CERVICAL SPINAL STENOSIS: Primary | ICD-10-CM

## 2022-04-22 PROCEDURE — 97112 NEUROMUSCULAR REEDUCATION: CPT

## 2022-04-22 PROCEDURE — 97110 THERAPEUTIC EXERCISES: CPT

## 2022-04-22 NOTE — PROGRESS NOTES
PT Discharge    Today's date: 2022  Patient name: Nahomi Allen  : 1960  MRN: [id number]  Referring provider: Figueroa Toledo  Dx:   Encounter Diagnosis     ICD-10-CM    1  Cervical spinal stenosis  M48 02        Start Time: 1135  Stop Time: 1200  Total time in clinic (min): 25 minutes    Assessment  Assessment details: Pt has made improvements in cervical ROM, shoulder ROM, and pain levels since beginning PT  Although she has yet to meet all of her goals, she demonstrated independence with her HEP and she is confident in her ability to adhere to her HEP and continue to progress on her own  Pt is appropriate for D/C to HEP at this time  Symptom irritability: high  Goals  STG: 3-4 weeks  Pt will be independent with HEP in order to continue to progress outside of PT treatment sessions  Met  Pt will improve in quality of life as demonstrated by worst pain levels of 5/10 or less  Progressing  Pt will improve in functional mobility as demonstrated by 50% improvement in resting posture  Progressing  LT-8 weeks  Pt will improve in quality of life as demonstrated by best pain levels of 2/10 or less  Pt will improve in functional mobility as demonstrated by strength levels of 4/5 or greater  Pt will improve in functional mobility as demonstrated by cervical Rot AROM of 45 degrees or greater  Plan  Plan details: D/C to HEP  Treatment plan discussed with: patient        Subjective Evaluation    History of Present Illness  Mechanism of injury: Pt reports that she is experiencing numbness in bilateral arms and neck pain for the past 2-3 months  She reports she was diagnosed with cervical spinal stenosis  She reports that she also has low back pain, but the MD did not get to that part yet  She reports that she has difficulty with turning her head  Plan is to start with PT with the neck first   She reports that she feels the numbness and pain     She experiences the most numbness in her R arm, but also gets it in the L  The paraesthesia travels all the way to her elbow, hands, and finger tips  She also reports "freezing" of her L fingers  Pt reports that she woke up paralyzed one day and was diagnosed with fibromyalgia  Uses SPC for ambulation, but also has a RW  UPDATE: 3/2/2022:  Pt reports that she feels her neck pain has been improving since beginning physical therapy  She reports that she still has difficulty turning her head to the Left though  She also reports that she has been experiencing some ear pain on the R side  Pain  Current pain ratin  At best pain rating: 3  At worst pain ratin  Quality: throbbing (Tingling)  Relieving factors: medications (Massage)  Aggravating factors: lifting (Stress)      Diagnostic Tests  MRI studies: abnormal (Multilevel small disc protrusions causing mild canal stenosis  )  Patient Goals  Patient goals for therapy: decreased pain, increased motion, increased strength and independence with ADLs/IADLs  Patient goal: To minimize the pain and increase in neck mobility  Objective     Postural Observations  Seated posture: poor  Standing posture: poor        Palpation   Left   Hypertonic in the levator scapulae, scalenes and upper trapezius  Tenderness of the levator scapulae, scalenes and upper trapezius  Trigger point to levator scapulae, scalenes and upper trapezius  Right   Hypertonic in the levator scapulae, scalenes and upper trapezius  Tenderness of the levator scapulae, scalenes and upper trapezius  Trigger point to levator scapulae, scalenes and upper trapezius       Neurological Testing     Sensation   Cervical/Thoracic   Left   Intact: light touch    Right   Diminished: light touch    Active Range of Motion   Cervical/Thoracic Spine       Cervical  Subcranial protraction:  with pain   Restriction level: moderate  Subcranial retraction:  with pain   Restriction level: maximal  Flexion: 10 degrees  with pain  Extension: 35 (More pain) degrees     with pain  Left lateral flexion: 25 degrees     with pain  Right lateral flexion: 16 degrees     with pain  Left rotation: 35 degrees with pain  Right rotation: 20 degrees    with pain  Left Shoulder   Flexion: 100 degrees   Abduction: 85 degrees     Right Shoulder   Flexion: 135 degrees   Abduction: 100 degrees     Strength/Myotome Testing   Cervical Spine     Left   Interossei strength (t1): 4+    Right   Interossei strength (t1): 4+    Left Shoulder     Planes of Motion   Flexion: 3-   Abduction: 3-   External rotation at 0°: 3+     Isolated Muscles   Upper trapezius: 4+     Right Shoulder     Planes of Motion   Flexion: 3-   Abduction: 3-   External rotation at 0°: 3+     Isolated Muscles   Upper trapezius: 4+     Left Elbow   Flexion: 3+  Extension: 3+    Right Elbow   Flexion: 3+  Extension: 3+    Left Wrist/Hand   Ulnar deviation: 3+    Right Wrist/Hand   Ulnar deviation: 4    Tests     Additional Tests Details  Unable to perform ULTT or cervical radiculopathy special tests due to high symptom irritability             Precautions: High symptom irritability    Re-eval Date: 4/2/2022      Manuals 3/31 4/5 4/22 2/22 2/23 3/2 3/4 3/10 3/22 3/25   IASTM josé antonio UT 10' np  10' 10' nv 10' 10' 15' 10'   Suboccipital STM     5' nv       SOR, c/s PROM             José Antonio should PROM                          Neuro Re-Ed             Patient education    5 min 5 min 5 min 5 min 5 min     Cervical retractions    2x10 2x10 1x5*       Scapular retractions 5"x15 5"x15 HEP 5"x15 5"x15 5"x15 5"x15 5"x15 5"x15 5"x15   MTP/LTP Blue 1x20 ea Black 1x20 ea  MTP Red 1x10 np  Red 1x15 ea Red 1x20 ea Red 1x15 ea MTP Blue 1x20, LTP Blue 1x20   DNF endurance             José Antonio TB ER  Red 1x10 HEP                       Patient education   8'          Ther Ex             Nustep L1 10' L1 10'  L1 10' L1 9' L1 10' L1 11' L1 10' L1 10' L1 10'   José Antonio UT stretch 3x15" 3x15" josé antonio HEP 3x20" José Antonio 3x20" José Antonio 4x20" José Antonio 4x20" José Antonio 4x20" 4x20" 3x10"   C/s Rot AROM 1x5 José Antonio 1x5 José Antonio HEP 1x10 José Antonio nv 1x5 nv 1x5  1x5 josé antonio   C/S flex/ext AROM    1x10 nv 1x5 nv      Cervical Rot SNAGs             Cervical lateral flex AROM    np         Doorway stretch                          Ther Activity                                       Gait Training                                       Modalities             P

## 2022-04-28 ENCOUNTER — TELEPHONE (OUTPATIENT)
Dept: PAIN MEDICINE | Facility: CLINIC | Age: 62
End: 2022-04-28

## 2022-05-20 ENCOUNTER — TELEPHONE (OUTPATIENT)
Dept: PAIN MEDICINE | Facility: CLINIC | Age: 62
End: 2022-05-20

## 2022-05-20 NOTE — TELEPHONE ENCOUNTER
Patient   [phone]  Dr Blanca Dangelo     Patient is calling in to see about her next steps are? She is having pain, she said that she thought she was suppose to have a FU office visit  She is having some numbness  What is her status with the neck bone?  It has been a month now  " she is just supposed to just suck it up"

## 2022-05-23 NOTE — TELEPHONE ENCOUNTER
Spoke to pt regarding pain in left side cervical radiating into shoulder and hand  Pt described the pain as aching, throbbing as well as numbness   Pt stated she had a RAKESH on 4/21 which she received 2 weeks of relief   Pt stated she is taking Vicodin as well as heat with little relief  Last OV 2/16  Schedule OV?

## 2022-05-24 ENCOUNTER — OFFICE VISIT (OUTPATIENT)
Dept: PAIN MEDICINE | Facility: CLINIC | Age: 62
End: 2022-05-24
Payer: COMMERCIAL

## 2022-05-24 VITALS
HEIGHT: 64 IN | WEIGHT: 172 LBS | BODY MASS INDEX: 29.37 KG/M2 | DIASTOLIC BLOOD PRESSURE: 78 MMHG | HEART RATE: 92 BPM | SYSTOLIC BLOOD PRESSURE: 124 MMHG

## 2022-05-24 DIAGNOSIS — M54.16 LUMBAR RADICULOPATHY: ICD-10-CM

## 2022-05-24 DIAGNOSIS — M50.120 CERVICAL DISC DISORDER WITH RADICULOPATHY OF MID-CERVICAL REGION: ICD-10-CM

## 2022-05-24 DIAGNOSIS — M51.36 LUMBAR DEGENERATIVE DISC DISEASE: ICD-10-CM

## 2022-05-24 DIAGNOSIS — M54.2 NECK PAIN: ICD-10-CM

## 2022-05-24 DIAGNOSIS — I72.0 CAROTID ARTERY ANEURYSM (HCC): ICD-10-CM

## 2022-05-24 DIAGNOSIS — M54.50 CHRONIC MIDLINE LOW BACK PAIN WITHOUT SCIATICA: Primary | ICD-10-CM

## 2022-05-24 DIAGNOSIS — F31.60 BIPOLAR AFFECTIVE DISORDER, MIXED (HCC): ICD-10-CM

## 2022-05-24 DIAGNOSIS — M47.816 LUMBAR SPONDYLOSIS: ICD-10-CM

## 2022-05-24 DIAGNOSIS — M46.1 SACROILIITIS (HCC): ICD-10-CM

## 2022-05-24 DIAGNOSIS — G89.29 CHRONIC MIDLINE LOW BACK PAIN WITHOUT SCIATICA: Primary | ICD-10-CM

## 2022-05-24 PROCEDURE — 99214 OFFICE O/P EST MOD 30 MIN: CPT | Performed by: NURSE PRACTITIONER

## 2022-05-24 PROCEDURE — 3008F BODY MASS INDEX DOCD: CPT | Performed by: NURSE PRACTITIONER

## 2022-05-24 NOTE — PROGRESS NOTES
Assessment:  1  Chronic midline low back pain without sciatica    2  Lumbar radiculopathy    3  Lumbar spondylosis    4  Lumbar degenerative disc disease    5  Neck pain    6  Cervical disc disorder with radiculopathy of mid-cervical region        Plan:  While the patient was in the office today, I did have a thorough conversation regarding their chronic pain syndrome, medication management, and treatment plan options  Patient is being seen for a follow-up visit  She recently underwent a C6-7 interlaminar epidural steroid injection  This injection provided her with up to 80% improvement for up to 2 weeks after the injection  She had a similar response to previous epidural steroid injection  At this point, it was recommended that patient follow up with Dr Navid Drake  She had previously seen Dr Navid Drake who recommended patient here for epidural steroid injections  She continues with low back and left leg pain which seems to be worsening as well  Patient will be scheduled for a left-sided L3-4 transforaminal epidural steroid injection the near future  Complete risks and benefits including bleeding, infection, tissue reaction, nerve injury and allergic reaction were discussed  The approach was demonstrated using models and literature was provided  Verbal and written consent was obtained  History of Present Illness: The patient is a 62 y o  female who presents for a follow up office visit in regards to Shoulder Pain, Neck Pain, Hip Pain, Back Pain, Leg Pain, and Foot Pain  The patients current symptoms include complaints of neck and left upper extremity pain, complains of low back and left lower extremity pain  Current pain level is an 8/10  Quality of pain is described as sharp, throbbing, shooting, numb, pins and needles  Current pain medications includes:  PCP prescribes Lyrica 100 mg 3 times daily and 150 mg at bedtime, hydrocodone 10/325 and the Belbuca       I have personally reviewed and/or updated the patient's past medical history, past surgical history, family history, social history, current medications, allergies, and vital signs today  Review of Systems  Review of Systems   Constitutional: Negative for chills and fever  HENT: Negative for ear pain and sore throat  Eyes: Negative for pain and visual disturbance  Respiratory: Negative for cough and shortness of breath  Cardiovascular: Positive for chest pain  Negative for palpitations  Gastrointestinal: Positive for nausea  Negative for abdominal pain and vomiting  Genitourinary: Negative for dysuria and hematuria  Musculoskeletal: Positive for gait problem and myalgias  Negative for arthralgias and back pain  Decreased range of motion  Joint stiffness  Pain in extremity- left arm, leg, back, neck and butt     Skin: Negative for color change and rash  Neurological: Positive for dizziness  Negative for seizures and syncope  All other systems reviewed and are negative          Past Medical History:   Diagnosis Date    Anxiety     Asthma     [facility]     Brain aneurysm     Chronic pain disorder     spinal stenosis    Concussion syndrome     neurological rx and balance rx    COPD (chronic obstructive pulmonary disease) (HCC)     Depression     Disease of thyroid gland     nodules     Family history of colon cancer     father    Fibromyalgia, primary     History of colon polyps     Hyperlipidemia     Hypertension     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     Lumbar degenerative disc disease 2/16/2022    Migraine     Neuropathy     bilateral feet and hands    Peripheral neuropathy     Psychiatric disorder     PTSD (post-traumatic stress disorder)     Stroke ([facility] )     2012 no deficeits    Thyroid Cancer     TIA (transient ischemic attack)        Past Surgical History:   Procedure Laterality Date    ABDOMINAL SURGERY      laproscopic/ endometriosis    BRAIN SURGERY aneurysm/ coiling procedure    CARPAL TUNNEL RELEASE      CHOLECYSTECTOMY      DENTAL SURGERY      EPIDURAL BLOCK INJECTION Right 3/3/2022    Procedure: C7-T1 interlaminar epidural steroid injection;  Surgeon: Félix Nance MD;  [facility];  Service: Pain Management     EPIDURAL BLOCK INJECTION N/A 4/21/2022    Procedure: C6-C7 BLOCK / INJECTION EPIDURAL STEROID CERVICAL;  Surgeon: Félix Nance MD;  [facility];  Service: Pain Management     EYE SURGERY      cataract    FL GUIDED NEEDLE PLAC BX/ASP/INJ  3/3/2022    HYSTERECTOMY      UT ESOPHAGOGASTRODUODENOSCOPY TRANSORAL DIAGNOSTIC N/A 2/8/2018    Procedure: EGD AND COLONOSCOPY;  Surgeon: Deejay Alejandra MD;  [facility]: [facility]; Service: Gastroenterology    THYROID SURGERY      TONSILLECTOMY         History reviewed  No pertinent family history      Social History     Occupational History    Not on file   Tobacco Use    Smoking status: Current Every Day Smoker     Packs/day: 2 00     Types: Cigarettes    Smokeless tobacco: Never Used   Vaping Use    Vaping Use: Never used   Substance and Sexual Activity    Alcohol use: Never     Alcohol/week: 0 0 standard drinks    Drug use: Never     Comment: prescribed Belbuca    Sexual activity: Yes     Partners: Male         Current Outpatient Medications:     albuterol (PROVENTIL HFA,VENTOLIN HFA) 90 mcg/act inhaler, Inhale 2 puffs every 6 (six) hours, Disp: , Rfl:     atorvastatin (LIPITOR) 40 mg tablet, Take 40 mg by mouth daily at bedtime , Disp: , Rfl:     b complex vitamins capsule, Take 1 capsule by mouth daily, Disp: , Rfl:     Buprenorphine HCl (Belbuca) 300 MCG FILM, Apply 300 mcg to cheek 2 (two) times a day, Disp: , Rfl:     carboxymethylcellulose 0 5 % SOLN, Administer 1 drop to both eyes daily as needed for dry eyes, Disp: 1 Bottle, Rfl: 0    clonazePAM (KlonoPIN) 0 5 mg tablet, Take 0 5 mg by mouth daily at bedtime, Disp: , Rfl:     clonazePAM (KlonoPIN) 1 mg tablet, Take 1 mg by mouth daily , Disp: , Rfl:     cyclobenzaprine (FLEXERIL) 10 mg tablet, TAKE 1 TABLET BY MOUTH 3 TIMES A DAY AS NEEDED FOR MUSCLE SPASMS, Disp: , Rfl:     dicyclomine (BENTYL) 20 mg tablet, Take 20 mg by mouth every 6 (six) hours, Disp: , Rfl:     fenofibrate (TRICOR) 48 mg tablet, Take 48 mg by mouth daily, Disp: , Rfl:     furosemide (LASIX) 20 mg tablet, Take 20 mg by mouth every other day Taking as needed , Disp: , Rfl:     Galcanezumab-gnlm (Emgality) 120 MG/ML SOAJ, Inject 120 mg under the skin every 30 (thirty) days, Disp: , Rfl:     HYDROcodone-acetaminophen (NORCO)  mg per tablet, 1 tab every 6 hours as needed for severe pain    Do not fill before 2/12/18, Disp: , Rfl:     lamoTRIgine (LaMICtal) 100 mg tablet, Take 100 mg by mouth , Disp: , Rfl:     lamoTRIgine (LaMICtal) 150 MG tablet, Take 150 mg by mouth daily at bedtime, Disp: , Rfl:     latanoprost (XALATAN) 0 005 % ophthalmic solution, latanoprost 0 005 % eye drops, Disp: , Rfl:     levothyroxine 137 mcg tablet, Take 137 mcg by mouth daily, Disp: , Rfl:     magnesium 30 MG tablet, Take 30 mg by mouth 2 (two) times a day, Disp: , Rfl:     Melatonin 10 MG CAPS, Take 1 tablet by mouth, Disp: , Rfl:     naloxone (Narcan) 4 mg/0 1 mL nasal spray, Narcan 4 mg/actuation nasal spray, Disp: , Rfl:     omeprazole (PriLOSEC) 40 MG capsule, Take 40 mg by mouth 2 (two) times a day, Disp: , Rfl:     polyethylene glycol (MIRALAX) 17 g packet, Take 17 g by mouth daily, Disp: 30 each, Rfl: 5    pregabalin (LYRICA) 100 mg capsule, take 1 capsule by mouth three times a day, Disp: , Rfl:     pregabalin (LYRICA) 150 mg capsule, take 1 capsule by mouth NIGHTLY, Disp: , Rfl:     topiramate (TOPAMAX) 100 mg tablet, Take 100 mg by mouth every 12 (twelve) hours , Disp: , Rfl:     Zoster Vac Recomb Adjuvanted (Shingrix) 50 MCG/0 5ML SUSR, Shingrix (PF) 50 mcg/0 5 mL intramuscular suspension, kit  inject 0 5 milliliter intramuscularly, Disp: , Rfl:     albuterol (2 5 mg/3 mL) 0 083 % nebulizer solution, albuterol sulfate HFA 90 mcg/actuation aerosol inhaler  inhale 2 puffs by mouth every 6 hours if needed for wheezing (Patient not taking: No sig reported), Disp: , Rfl:     ammonium lactate (LAC-HYDRIN) 12 % lotion, Every 12 hours (Patient not taking: No sig reported), Disp: , Rfl:     Allergies   Allergen Reactions    Hydroxyzine Anaphylaxis     Claims it gives her convulsions   Other     Pollen Extract Itching    Tree Extract     Clarithromycin Rash     Pt denies    Latex Rash    Sulfa Antibiotics Rash     "severe skin burn"       Physical Exam:    /78   Pulse 92   Ht 5' 4" (1 626 m)   Wt 78 kg (172 lb)   BMI 29 52 kg/m²     Constitutional:normal, well developed, well nourished, alert, in no distress and non-toxic and no overt pain behavior  Eyes:anicteric  HEENT:grossly intact  Neck:supple, symmetric, trachea midline and no masses   Pulmonary:even and unlabored  Cardiovascular:No edema or pitting edema present  Skin:Normal without rashes or lesions and well hydrated  Psychiatric:Mood and affect appropriate  Neurologic:Cranial Nerves II-XII grossly intact  Musculoskeletal:normal    Imaging  No orders to display       No orders of the defined types were placed in this encounter

## 2022-05-24 NOTE — H&P (VIEW-ONLY)
Assessment:  1  Chronic midline low back pain without sciatica    2  Lumbar radiculopathy    3  Lumbar spondylosis    4  Lumbar degenerative disc disease    5  Neck pain    6  Cervical disc disorder with radiculopathy of mid-cervical region        Plan:  While the patient was in the office today, I did have a thorough conversation regarding their chronic pain syndrome, medication management, and treatment plan options  Patient is being seen for a follow-up visit  She recently underwent a C6-7 interlaminar epidural steroid injection  This injection provided her with up to 80% improvement for up to 2 weeks after the injection  She had a similar response to previous epidural steroid injection  At this point, it was recommended that patient follow up with Dr Presley Welch  She had previously seen Dr Presley Welch who recommended patient here for epidural steroid injections  She continues with low back and left leg pain which seems to be worsening as well  Patient will be scheduled for a left-sided L3-4 transforaminal epidural steroid injection the near future  Complete risks and benefits including bleeding, infection, tissue reaction, nerve injury and allergic reaction were discussed  The approach was demonstrated using models and literature was provided  Verbal and written consent was obtained  History of Present Illness: The patient is a 62 y o  female who presents for a follow up office visit in regards to Shoulder Pain, Neck Pain, Hip Pain, Back Pain, Leg Pain, and Foot Pain  The patients current symptoms include complaints of neck and left upper extremity pain, complains of low back and left lower extremity pain  Current pain level is an 8/10  Quality of pain is described as sharp, throbbing, shooting, numb, pins and needles  Current pain medications includes:  PCP prescribes Lyrica 100 mg 3 times daily and 150 mg at bedtime, hydrocodone 10/325 and the Belbuca       I have personally reviewed and/or updated the patient's past medical history, past surgical history, family history, social history, current medications, allergies, and vital signs today  Review of Systems  Review of Systems   Constitutional: Negative for chills and fever  HENT: Negative for ear pain and sore throat  Eyes: Negative for pain and visual disturbance  Respiratory: Negative for cough and shortness of breath  Cardiovascular: Positive for chest pain  Negative for palpitations  Gastrointestinal: Positive for nausea  Negative for abdominal pain and vomiting  Genitourinary: Negative for dysuria and hematuria  Musculoskeletal: Positive for gait problem and myalgias  Negative for arthralgias and back pain  Decreased range of motion  Joint stiffness  Pain in extremity- left arm, leg, back, neck and butt     Skin: Negative for color change and rash  Neurological: Positive for dizziness  Negative for seizures and syncope  All other systems reviewed and are negative          Past Medical History:   Diagnosis Date    Anxiety     Asthma     [facility]     Brain aneurysm     Chronic pain disorder     spinal stenosis    Concussion syndrome     neurological rx and balance rx    COPD (chronic obstructive pulmonary disease) (HCC)     Depression     Disease of thyroid gland     nodules     Family history of colon cancer     father    Fibromyalgia, primary     History of colon polyps     Hyperlipidemia     Hypertension     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     Lumbar degenerative disc disease 2/16/2022    Migraine     Neuropathy     bilateral feet and hands    Peripheral neuropathy     Psychiatric disorder     PTSD (post-traumatic stress disorder)     Stroke ([facility] )     2012 no deficeits    Thyroid Cancer     TIA (transient ischemic attack)        Past Surgical History:   Procedure Laterality Date    ABDOMINAL SURGERY      laproscopic/ endometriosis    BRAIN SURGERY aneurysm/ coiling procedure    CARPAL TUNNEL RELEASE      CHOLECYSTECTOMY      DENTAL SURGERY      EPIDURAL BLOCK INJECTION Right 3/3/2022    Procedure: C7-T1 interlaminar epidural steroid injection;  Surgeon: Lydia Zacarias MD;  [facility];  Service: Pain Management     EPIDURAL BLOCK INJECTION N/A 4/21/2022    Procedure: C6-C7 BLOCK / INJECTION EPIDURAL STEROID CERVICAL;  Surgeon: Lydia Zacarias MD;  [facility];  Service: Pain Management     EYE SURGERY      cataract    FL GUIDED NEEDLE PLAC BX/ASP/INJ  3/3/2022    HYSTERECTOMY      SC ESOPHAGOGASTRODUODENOSCOPY TRANSORAL DIAGNOSTIC N/A 2/8/2018    Procedure: EGD AND COLONOSCOPY;  Surgeon: Jonas Mcmahan MD;  Location:  GI LAB; Service: Gastroenterology    THYROID SURGERY      TONSILLECTOMY         History reviewed  No pertinent family history      Social History     Occupational History    Not on file   Tobacco Use    Smoking status: Current Every Day Smoker     Packs/day: 2 00     Types: Cigarettes    Smokeless tobacco: Never Used   Vaping Use    Vaping Use: Never used   Substance and Sexual Activity    Alcohol use: Never     Alcohol/week: 0 0 standard drinks    Drug use: Never     Comment: prescribed Belbuca    Sexual activity: Yes     Partners: Male         Current Outpatient Medications:     albuterol (PROVENTIL HFA,VENTOLIN HFA) 90 mcg/act inhaler, Inhale 2 puffs every 6 (six) hours, Disp: , Rfl:     atorvastatin (LIPITOR) 40 mg tablet, Take 40 mg by mouth daily at bedtime , Disp: , Rfl:     b complex vitamins capsule, Take 1 capsule by mouth daily, Disp: , Rfl:     Buprenorphine HCl (Belbuca) 300 MCG FILM, Apply 300 mcg to cheek 2 (two) times a day, Disp: , Rfl:     carboxymethylcellulose 0 5 % SOLN, Administer 1 drop to both eyes daily as needed for dry eyes, Disp: 1 Bottle, Rfl: 0    clonazePAM (KlonoPIN) 0 5 mg tablet, Take 0 5 mg by mouth daily at bedtime, Disp: , Rfl:     clonazePAM (KlonoPIN) 1 mg tablet, Take 1 mg by mouth daily , Disp: , Rfl:     cyclobenzaprine (FLEXERIL) 10 mg tablet, TAKE 1 TABLET BY MOUTH 3 TIMES A DAY AS NEEDED FOR MUSCLE SPASMS, Disp: , Rfl:     dicyclomine (BENTYL) 20 mg tablet, Take 20 mg by mouth every 6 (six) hours, Disp: , Rfl:     fenofibrate (TRICOR) 48 mg tablet, Take 48 mg by mouth daily, Disp: , Rfl:     furosemide (LASIX) 20 mg tablet, Take 20 mg by mouth every other day Taking as needed , Disp: , Rfl:     Galcanezumab-gnlm (Emgality) 120 MG/ML SOAJ, Inject 120 mg under the skin every 30 (thirty) days, Disp: , Rfl:     HYDROcodone-acetaminophen (NORCO)  mg per tablet, 1 tab every 6 hours as needed for severe pain    Do not fill before 2/12/18, Disp: , Rfl:     lamoTRIgine (LaMICtal) 100 mg tablet, Take 100 mg by mouth , Disp: , Rfl:     lamoTRIgine (LaMICtal) 150 MG tablet, Take 150 mg by mouth daily at bedtime, Disp: , Rfl:     latanoprost (XALATAN) 0 005 % ophthalmic solution, latanoprost 0 005 % eye drops, Disp: , Rfl:     levothyroxine 137 mcg tablet, Take 137 mcg by mouth daily, Disp: , Rfl:     magnesium 30 MG tablet, Take 30 mg by mouth 2 (two) times a day, Disp: , Rfl:     Melatonin 10 MG CAPS, Take 1 tablet by mouth, Disp: , Rfl:     naloxone (Narcan) 4 mg/0 1 mL nasal spray, Narcan 4 mg/actuation nasal spray, Disp: , Rfl:     omeprazole (PriLOSEC) 40 MG capsule, Take 40 mg by mouth 2 (two) times a day, Disp: , Rfl:     polyethylene glycol (MIRALAX) 17 g packet, Take 17 g by mouth daily, Disp: 30 each, Rfl: 5    pregabalin (LYRICA) 100 mg capsule, take 1 capsule by mouth three times a day, Disp: , Rfl:     pregabalin (LYRICA) 150 mg capsule, take 1 capsule by mouth NIGHTLY, Disp: , Rfl:     topiramate (TOPAMAX) 100 mg tablet, Take 100 mg by mouth every 12 (twelve) hours , Disp: , Rfl:     Zoster Vac Recomb Adjuvanted (Shingrix) 50 MCG/0 5ML SUSR, Shingrix (PF) 50 mcg/0 5 mL intramuscular suspension, kit  inject 0 5 milliliter intramuscularly, Disp: , Rfl:     albuterol (2 5 mg/3 mL) 0 083 % nebulizer solution, albuterol sulfate HFA 90 mcg/actuation aerosol inhaler  inhale 2 puffs by mouth every 6 hours if needed for wheezing (Patient not taking: No sig reported), Disp: , Rfl:     ammonium lactate (LAC-HYDRIN) 12 % lotion, Every 12 hours (Patient not taking: No sig reported), Disp: , Rfl:     Allergies   Allergen Reactions    Hydroxyzine Anaphylaxis     Claims it gives her convulsions   Other     Pollen Extract Itching    Tree Extract     Clarithromycin Rash     Pt denies    Latex Rash    Sulfa Antibiotics Rash     "severe skin burn"       Physical Exam:    /78   Pulse 92   Ht 5' 4" (1 626 m)   Wt 78 kg (172 lb)   BMI 29 52 kg/m²     Constitutional:normal, well developed, well nourished, alert, in no distress and non-toxic and no overt pain behavior  Eyes:anicteric  HEENT:grossly intact  Neck:supple, symmetric, trachea midline and no masses   Pulmonary:even and unlabored  Cardiovascular:No edema or pitting edema present  Skin:Normal without rashes or lesions and well hydrated  Psychiatric:Mood and affect appropriate  Neurologic:Cranial Nerves II-XII grossly intact  Musculoskeletal:normal    Imaging  No orders to display       No orders of the defined types were placed in this encounter

## 2022-06-09 ENCOUNTER — NURSE TRIAGE (OUTPATIENT)
Dept: OTHER | Facility: OTHER | Age: 62
End: 2022-06-09

## 2022-06-09 ENCOUNTER — TELEPHONE (OUTPATIENT)
Dept: GASTROENTEROLOGY | Facility: CLINIC | Age: 62
End: 2022-06-09

## 2022-06-09 NOTE — TELEPHONE ENCOUNTER
Regarding: stomach pain  ----- Message from [facility] sent at 6/9/2022 12:11 PM EDT -----  "I have stomach pain on the right side  I have gas and pain in my pelvic area "

## 2022-06-09 NOTE — TELEPHONE ENCOUNTER
Patient has multiple complaints that involve GI, neurology, pain management  PCP is with [facility]; not able to route assessment to her; has OV with PCP on 6/13/22  Patient reports moderate to severe pain on right side from umbilicus down to groin area with a lot of gas with multiple formed stools  Pain comes and goes  Takes Tylenol 1500 mg when pain is severe; no more than twice in 24 hours  Scheduled for OV 7/13/22  Please follow up with patient  Reason for Disposition   Abdominal pain is a chronic symptom (recurrent or ongoing AND lasting > 4 weeks)    Answer Assessment - Initial Assessment Questions  1  LOCATION: "Where does it hurt?"       Umbilicus and down to groin on right side    2  RADIATION: "Does the pain shoot anywhere else?" (e g , chest, back)      Ongoing back pain; can't differentiate    3  ONSET: "When did the pain begin?" (e g , minutes, hours or days ago)       Past year; gas past three days with frequent formed bowel movement;    4  SUDDEN: "Gradual or sudden onset?"      Sudden onset    5  PATTERN "Does the pain come and go, or is it constant?"     - If constant: "Is it getting better, staying the same, or worsening?"       (Note: Constant means the pain never goes away completely; most serious pain is constant and it progresses)      - If intermittent: "How long does it last?" "Do you have pain now?"      (Note: Intermittent means the pain goes away completely between bouts)      Comes and goes    6   SEVERITY: "How bad is the pain?"  (e g , Scale 1-10; mild, moderate, or severe)    - MILD (1-3): doesn't interfere with normal activities, abdomen soft and not tender to touch     - MODERATE (4-7): interferes with normal activities or awakens from sleep, tender to touch     - SEVERE (8-10): excruciating pain, doubled over, unable to do any normal activities       Moderate to severe 3-4 days per week; takes Acetaminophen 1500 mg one to two times per day PRN    7  RECURRENT SYMPTOM: "Have you ever had this type of stomach pain before?" If Yes, ask: "When was the last time?" and "What happened that time?"       Yes; seen for appendicitis not founded; seen and treated for GERD    8  CAUSE: "What do you think is causing the stomach pain?"      Unknown    9  RELIEVING/AGGRAVATING FACTORS: "What makes it better or worse?" (e g , movement, antacids, bowel movement)     Patient reports being under a lot of stress and passing a lot of gas    10  OTHER SYMPTOMS: "Has there been any vomiting, diarrhea, constipation, or urine problems?"       Frequent urination; blood on the stool, nausea    11   PREGNANCY: "Is there any chance you are pregnant?" "When was your last menstrual period?"        Denies    Protocols used: ABDOMINAL PAIN - [facility]

## 2022-06-17 ENCOUNTER — TELEPHONE (OUTPATIENT)
Dept: PAIN MEDICINE | Facility: CLINIC | Age: 62
End: 2022-06-17

## 2022-06-17 NOTE — TELEPHONE ENCOUNTER
Yoli Garcia from patients PCP office called stating that the patient is having some fear of the procedure and wants to know if Dr Stephenie Pulido can numb the area topically or provide her with medication for anxiety for the procedure on 6/21- pls advise thank you

## 2022-06-17 NOTE — TELEPHONE ENCOUNTER
We can use topical numbing spray before the injections performed    Which is had patient remind us when she is in the procedure room

## 2022-06-21 ENCOUNTER — APPOINTMENT (OUTPATIENT)
Dept: RADIOLOGY | Facility: HOSPITAL | Age: 62
End: 2022-06-21
Payer: COMMERCIAL

## 2022-06-21 ENCOUNTER — HOSPITAL ENCOUNTER (OUTPATIENT)
Facility: HOSPITAL | Age: 62
Setting detail: OUTPATIENT SURGERY
Discharge: HOME | End: 2022-06-21
Attending: ANESTHESIOLOGY | Admitting: ANESTHESIOLOGY
Payer: COMMERCIAL

## 2022-06-21 VITALS
TEMPERATURE: 99.1 F | BODY MASS INDEX: 29.36 KG/M2 | HEIGHT: 64 IN | OXYGEN SATURATION: 99 % | WEIGHT: 171.96 LBS | SYSTOLIC BLOOD PRESSURE: 112 MMHG | RESPIRATION RATE: 20 BRPM | HEART RATE: 84 BPM | DIASTOLIC BLOOD PRESSURE: 74 MMHG

## 2022-06-21 DIAGNOSIS — G89.29 CHRONIC MIDLINE LOW BACK PAIN WITHOUT SCIATICA: ICD-10-CM

## 2022-06-21 DIAGNOSIS — M54.16 LUMBAR RADICULOPATHY: ICD-10-CM

## 2022-06-21 DIAGNOSIS — M54.50 CHRONIC MIDLINE LOW BACK PAIN WITHOUT SCIATICA: ICD-10-CM

## 2022-06-21 PROCEDURE — A9585 GADOBUTROL INJECTION: HCPCS | Performed by: ANESTHESIOLOGY

## 2022-06-21 PROCEDURE — 64483 NJX AA&/STRD TFRM EPI L/S 1: CPT | Performed by: ANESTHESIOLOGY

## 2022-06-21 PROCEDURE — 77003 FLUOROGUIDE FOR SPINE INJECT: CPT

## 2022-06-21 RX ORDER — DEXAMETHASONE SODIUM PHOSPHATE 10 MG/ML
INJECTION, SOLUTION INTRAMUSCULAR; INTRAVENOUS AS NEEDED
Status: DISCONTINUED | OUTPATIENT
Start: 2022-06-21 | End: 2022-06-21 | Stop reason: HOSPADM

## 2022-06-21 RX ORDER — ETHYL CHLORIDE 100 %
AEROSOL, SPRAY (ML) TOPICAL AS NEEDED
Status: DISCONTINUED | OUTPATIENT
Start: 2022-06-21 | End: 2022-06-21 | Stop reason: HOSPADM

## 2022-06-21 RX ORDER — LIDOCAINE HYDROCHLORIDE 10 MG/ML
INJECTION, SOLUTION EPIDURAL; INFILTRATION; INTRACAUDAL; PERINEURAL AS NEEDED
Status: DISCONTINUED | OUTPATIENT
Start: 2022-06-21 | End: 2022-06-21 | Stop reason: HOSPADM

## 2022-06-21 NOTE — OP NOTE
OPERATIVE REPORT  PATIENT NAME: Marshall Woodall    :  1960  MRN: [id number]  Pt Location: [facility] 01    SURGERY DATE: 2022    Surgeon(s) and Role:     * Maile Nageotte, MD - Primary    Preop Diagnosis:  Chronic midline low back pain without sciatica [M54 50, G89 29]  Lumbar radiculopathy [M54 16]    Post-Op Diagnosis Codes:     * Chronic midline low back pain without sciatica [M54 50, G89 29]     * Lumbar radiculopathy [M54 16]    Procedure(s) (LRB):  BLOCK / INJECTION EPIDURAL STEROID LUMBAR  left L3-4 TFESI (Left)    Specimen(s):  * No specimens in log *    Estimated Blood Loss:   Minimal    Drains:  * No LDAs found *    Anesthesia Type:   Local    Operative Indications:  Chronic midline low back pain without sciatica [M54 50, G89 29]  Lumbar radiculopathy [M54 16]      Operative Findings:  SAME    Complications:   None    Procedure and Technique:  Fluoroscopically-guided Left L3-4 transforaminal epidural steroid injection under fluoroscopy      After discussing the risks, benefits, and alternatives to the procedure, the patient expressed understanding and wished to proceed  The patient was brought to the fluoroscopy suite and placed in the prone position  A procedural pause was conducted to verify:  correct patient identity, procedure to be performed and as applicable, correct side and site, correct patient position, and availability of implants, special equipment and special requirements  After identifying the left L3 pedicle fluoroscopically with an oblique view, the skin was sterilely prepped and draped in the usual fashion using Chloraprep skin prep  The skin and subcutaneous tissue were anesthetized with 0 5% lidocaine  A 5 inch 22 gauge spinal needle was then advanced under fluoroscopic guidance to the posterior aspect of the left L3-4 neural foramen    Appropriate foraminal depth was determined with a lateral fluoroscopic view, and AP visualization confirmed needle positioning at approximately the 6 oclock position relative to the pedicle  After negative aspiration, 1 mL gadavist contrast was injected using live fluoroscopy/digital subtraction angiography, confirming appropriate transforaminal spread without evidence of intravascular or intrathecal uptake  Next, a local anesthetic test dose consisting of 1 mL of 2% lidocaine was injected through the needle  After an appropriate period of observation, a directed neurological exam was performed which revealed no new neurologic deficits  Next, a 1 5 ml solution consisting of 7 5 mg of dexamethasone in sterile saline was injected slowly and incrementally into the epidural space  Following the injection the needle was withdrawn slightly and flushed with lidocaine as it was fully extracted  The patient tolerated the procedure well and there were no apparent complications  The patient did not develop any new neurologic deficits  After appropriate observation, the patient was dismissed from the clinic in good condition under their own power  COMMENTS  The patient received a total steroid dose of 7 5 mg of dexamethasone     I was present for the entire procedure    Patient Disposition:  hemodynamically stable      SIGNATURE: Bessy Ortiz MD  DATE: June 21, 2022  TIME: 9:50 AM

## 2022-06-21 NOTE — DISCHARGE INSTRUCTIONS
Epidural Steroid Injection   WHAT YOU NEED TO KNOW:   An epidural steroid injection (MANISHA) is a procedure to inject steroid medicine into the epidural space  The epidural space is between your spinal cord and vertebrae  Steroids reduce inflammation and fluid buildup in your spine that may be causing pain  You may be given pain medicine along with the steroids  ACTIVITY  Do not drive or operate machinery today  No strenuous activity today - bending, lifting, etc   You may resume normal activites starting tomorrow - start slowly and as tolerated  You may shower today, but no tub baths or hot tubs  You may have numbness for several hours from the local anesthetic  Please use caution and common sense, especially with weight-bearing activities  CARE OF THE INJECTION SITE  If you have soreness or pain, apply ice to the area today (20 minutes on/20 minutes off)  Starting tomorrow, you may use warm, moist heat or ice if needed  You may have an increase or change in your discomfort for 36-48 hours after your treatment  Apply ice and continue with any pain medication you have been prescribed  Notify the [facility] if you have any of the following: redness, drainage, swelling, headache, stiff neck or fever above 100°F     SPECIAL INSTRUCTIONS  Our office will contact you in approximately 7 days for a progress report  MEDICATIONS  Continue to take all routine medications  Our office may have instructed you to hold some medications  As no general anesthesia was used in today's procedure, you should not experience any side effects related to anesthesia  If you have a problem specifically related to your procedure, please call our office at [phone]  Problems not related to your procedure should be directed to your primary care physician

## 2022-06-21 NOTE — INTERVAL H&P NOTE
H&P reviewed  After examining the patient I find no changes in the patients condition since the H&P had been written      Vitals:    06/21/22 0911   BP: 122/84   Pulse: 88   Resp: 20   Temp: 99 1 °F (37 3 °C)   SpO2: 99%

## 2022-07-08 ENCOUNTER — APPOINTMENT (OUTPATIENT)
Dept: LAB | Facility: HOSPITAL | Age: 62
End: 2022-07-08
Payer: COMMERCIAL

## 2022-07-08 DIAGNOSIS — Z79.899 PHARMACOLOGIC THERAPY: ICD-10-CM

## 2022-07-08 LAB
CHOLEST SERPL-MCNC: 191 MG/DL
GLUCOSE P FAST SERPL-MCNC: 125 MG/DL (ref 65–99)
HDLC SERPL-MCNC: 53 MG/DL
LDLC SERPL CALC-MCNC: 114 MG/DL (ref 0–100)
NONHDLC SERPL-MCNC: 138 MG/DL
TRIGL SERPL-MCNC: 122 MG/DL

## 2022-07-08 PROCEDURE — 80061 LIPID PANEL: CPT

## 2022-07-08 PROCEDURE — 82947 ASSAY GLUCOSE BLOOD QUANT: CPT

## 2022-07-08 PROCEDURE — 36415 COLL VENOUS BLD VENIPUNCTURE: CPT

## 2022-07-11 ENCOUNTER — OFFICE VISIT (OUTPATIENT)
Dept: DENTISTRY | Facility: CLINIC | Age: 62
End: 2022-07-11

## 2022-07-11 DIAGNOSIS — K08.109 EDENTULOUS: Primary | ICD-10-CM

## 2022-07-11 PROCEDURE — WIS5000 PRELIMINARY IMPRESSIONS: Performed by: DENTIST

## 2022-07-11 NOTE — PROGRESS NOTES
Removable    Pt presents for a denture visit     Caries Assessment: Non applicable     Radiographs: Films are current    Oral Hygiene instruction reviewed  Pt has existing dentures that pt takes off during the night and soaks them during the night  Pt's alveolar ridges look WNL, no sore spots, but as previous note states, pt's ridge is tender to palpation  Annual evaluation of soft tissue recommended  Explained denture making process to patient, including the necessary number of visits and timeframe to make denture  Reviewed denture expectations, adjustment period, and hygiene  Pt mentioned something of "her gums need to be resurfaced"  I informed the pt that after we take preliminary impression, we will assess if the pt needs any pre=prosthetic surgery  Upper and lower alginate impressions made using stock trays       NV: Border molding and final impressions

## 2022-07-13 ENCOUNTER — OFFICE VISIT (OUTPATIENT)
Dept: GASTROENTEROLOGY | Facility: CLINIC | Age: 62
End: 2022-07-13
Payer: COMMERCIAL

## 2022-07-13 VITALS
BODY MASS INDEX: 29.09 KG/M2 | SYSTOLIC BLOOD PRESSURE: 105 MMHG | HEIGHT: 64 IN | TEMPERATURE: 97.7 F | HEART RATE: 71 BPM | WEIGHT: 170.4 LBS | RESPIRATION RATE: 16 BRPM | DIASTOLIC BLOOD PRESSURE: 73 MMHG

## 2022-07-13 DIAGNOSIS — R11.0 NAUSEA: ICD-10-CM

## 2022-07-13 DIAGNOSIS — K58.2 IRRITABLE BOWEL SYNDROME WITH BOTH CONSTIPATION AND DIARRHEA: Primary | ICD-10-CM

## 2022-07-13 PROCEDURE — 99213 OFFICE O/P EST LOW 20 MIN: CPT | Performed by: INTERNAL MEDICINE

## 2022-07-13 RX ORDER — ONDANSETRON HYDROCHLORIDE 8 MG/1
8 TABLET, FILM COATED ORAL EVERY 8 HOURS PRN
Qty: 20 TABLET | Refills: 0 | Status: SHIPPED | OUTPATIENT
Start: 2022-07-13

## 2022-07-13 RX ORDER — FLUTICASONE PROPIONATE AND SALMETEROL 500; 50 UG/1; UG/1
1 POWDER RESPIRATORY (INHALATION) 2 TIMES DAILY
COMMUNITY
Start: 2022-06-14

## 2022-07-13 RX ORDER — BREXPIPRAZOLE 0.25 MG/1
TABLET ORAL
COMMUNITY
Start: 2022-07-13

## 2022-07-13 RX ORDER — TIZANIDINE 2 MG/1
4 TABLET ORAL EVERY 6 HOURS PRN
COMMUNITY
Start: 2022-06-13

## 2022-07-13 NOTE — PROGRESS NOTES
[facility] - Outpatient Follow-up Note  Marisa Woodall 62 y o  female MRN: [id number]  Encounter: [id number]          ASSESSMENT AND PLAN:      1  Irritable bowel syndrome with both constipation and diarrhea    This is a 71-year-old white female with irritable bowel syndrome  Her main complaint at this time is nausea which has caused her to have a decreased appetite  She is currently on ondansetron all for her nausea with some improvement but she is still having symptoms  I will increase the ondansetron to 8 mg every 8 hours  I will see her again in six months  Thank you very much for referring this patient   ______________________________________________________________________    Geoff Parrishlinda returns for follow-up  She is complaining of nausea  Her constipation has improved  She also still has right lower quadrant pain at times  REVIEW OF SYSTEMS IS OTHERWISE NEGATIVE        Historical Information   Past Medical History:   Diagnosis Date    Anxiety     Asthma     [facility] disorder ([facility]     Brain aneurysm     Chronic pain disorder     spinal stenosis    Concussion syndrome     neurological rx and balance rx    COPD (chronic obstructive pulmonary disease) (HCC)     Depression     Disease of thyroid gland     nodules     Family history of colon cancer     father    Fibromyalgia, primary     History of colon polyps     Hyperlipidemia     Hypertension     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     Lumbar degenerative disc disease 2/16/2022    Migraine     Neuropathy     bilateral feet and hands    Peripheral neuropathy     Psychiatric disorder     PTSD (post-traumatic stress disorder)     Stroke ([facility] )     2012 no deficeits    Thyroid Cancer     TIA (transient ischemic attack)      Past Surgical History:   Procedure Laterality Date    ABDOMINAL SURGERY      laproscopic/ endometriosis    BRAIN SURGERY      aneurysm/ coiling procedure  CARPAL TUNNEL RELEASE      CHOLECYSTECTOMY      DENTAL SURGERY      EPIDURAL BLOCK INJECTION Right 3/3/2022    Procedure: C7-T1 interlaminar epidural steroid injection;  Surgeon: Madisyn Barajas MD;  Location: [facility];  Service: Pain Management     EPIDURAL BLOCK INJECTION N/A 4/21/2022    Procedure: C6-C7 BLOCK / INJECTION EPIDURAL STEROID CERVICAL;  Surgeon: Madisyn Barajas MD;  [facility]: [facility];  Service: Pain Management     EPIDURAL BLOCK INJECTION Left 6/21/2022    Procedure: BLOCK / INJECTION EPIDURAL STEROID LUMBAR  left L3-4 TFESI;  Surgeon: Madisyn Barajas MD;  [facility]: [facility];  Service: Pain Management     EYE SURGERY      cataract    FL GUIDED NEEDLE PLAC BX/ASP/INJ  3/3/2022    HYSTERECTOMY      [place] ESOPHAGOGASTRODUODENOSCOPY TRANSORAL DIAGNOSTIC N/A 2/8/2018    Procedure: EGD AND COLONOSCOPY;  Surgeon: Robbin Diaz MD;  [facility]; Service: Gastroenterology    THYROID SURGERY      TONSILLECTOMY       Social History   Social History     Substance and Sexual Activity   Alcohol Use Never    Alcohol/week: 0 0 standard drinks     Social History     Substance and Sexual Activity   Drug Use Never    Comment: prescribed Belbuca     Social History     Tobacco Use   Smoking Status Current Every Day Smoker    Packs/day: 2 00    Types: Cigarettes   Smokeless Tobacco Never Used     History reviewed  No pertinent family history      Meds/Allergies       Current Outpatient Medications:     albuterol (PROVENTIL HFA,VENTOLIN HFA) 90 mcg/act inhaler    b complex vitamins capsule    Buprenorphine HCl (Belbuca) 300 MCG FILM    carboxymethylcellulose 0 5 % SOLN    clonazePAM (KlonoPIN) 0 5 mg tablet    clonazePAM (KlonoPIN) 1 mg tablet    dicyclomine (BENTYL) 20 mg tablet    fenofibrate (TRICOR) 48 mg tablet    furosemide (LASIX) 20 mg tablet    Galcanezumab-gnlm (Emgality) 120 MG/ML SOAJ    HYDROcodone-acetaminophen (NORCO)  mg per tablet   lamoTRIgine (LaMICtal) 150 MG tablet    latanoprost (XALATAN) 0 005 % ophthalmic solution    levothyroxine 137 mcg tablet    magnesium 30 MG tablet    Melatonin 10 MG CAPS    naloxone (Narcan) 4 mg/0 1 mL nasal spray    omeprazole (PriLOSEC) 40 MG capsule    polyethylene glycol (MIRALAX) 17 g packet    pregabalin (LYRICA) 100 mg capsule    pregabalin (LYRICA) 150 mg capsule    topiramate (TOPAMAX) 100 mg tablet    Zoster Vac Recomb Adjuvanted (Shingrix) 50 MCG/0 5ML SUSR    albuterol (2 5 mg/3 mL) 0 083 % nebulizer solution    ammonium lactate (LAC-HYDRIN) 12 % lotion    atorvastatin (LIPITOR) 40 mg tablet    Fluticasone-Salmeterol (Advair) 500-50 mcg/dose inhaler    lamoTRIgine (LaMICtal) 100 mg tablet    Rexulti 0 25 MG tablet    tiZANidine (ZANAFLEX) 2 mg tablet    Allergies   Allergen Reactions    Hydroxyzine Anaphylaxis     Claims it gives her convulsions   Other     Pollen Extract Itching    Tree Extract     Clarithromycin Rash     Pt denies    Latex Rash    Sulfa Antibiotics Rash     "severe skin burn"           Objective     Blood pressure 105/73, pulse 71, temperature 97 7 °F (36 5 °C), temperature source Tympanic, resp  rate 16, height 5' 4" (1 626 m), weight 77 3 kg (170 lb 6 4 oz)  Body mass index is 29 25 kg/m²  PHYSICAL EXAM:      General Appearance:   Alert, cooperative, no distress   HEENT:   Normocephalic, atraumatic, anicteric  Neck:  Supple, symmetrical, trachea midline   Lungs:   Clear to auscultation bilaterally; no rales, rhonchi or wheezing; respirations unlabored    Heart[de-identified]   Regular rate and rhythm; no murmur, rub, or gallop     Abdomen:   Soft, non-tender, non-distended; normal bowel sounds; no masses, no organomegaly    Genitalia:   Deferred    Rectal:   Deferred    Extremities:  No cyanosis, clubbing or edema    Pulses:  2+ and symmetric    Skin:  No jaundice, rashes, or lesions    Lymph nodes:  No palpable cervical lymphadenopathy        Lab Results: No visits with results within 1 Day(s) from this visit  Latest known visit with results is:   Appointment on 07/08/2022   Component Date Value    Glucose, Fasting 07/08/2022 125 (A)    Cholesterol 07/08/2022 191     Triglycerides 07/08/2022 122     HDL, Direct 07/08/2022 53     LDL Calculated 07/08/2022 114 (A)    Non-HDL-Chol (CHOL-HDL) 07/08/2022 138          Radiology Results:   [facility] guide & loc dx/ther proc    Result Date: 6/23/2022  Narrative: C-arm - lumbar spine INDICATION: M54 50: Low back pain, unspecified G89 29: Other chronic pain M54 16: Radiculopathy, lumbar region  Procedure guidance  COMPARISON:  None IMAGES:  2 FLUOROSCOPY TIME:   53 seconds TECHNIQUE: FINDINGS: Fluoroscopic guidance provided for pain management procedure   (left L3 transforaminal epidural steroid injection)     Impression: Fluoroscopic guidance provided for pain management procedure    Workstation performed: [id number]

## 2022-07-28 ENCOUNTER — OFFICE VISIT (OUTPATIENT)
Dept: DENTISTRY | Facility: CLINIC | Age: 62
End: 2022-07-28

## 2022-07-28 DIAGNOSIS — K08.109 EDENTULOUS: Primary | ICD-10-CM

## 2022-07-28 PROCEDURE — WIS5001 FINAL IMPRESSIONS DENTURE: Performed by: DENTIST

## 2022-07-28 NOTE — PROGRESS NOTES
Removable    Final Impression    Pt presents for maxillary complete denture and mandibular complete denture final impression  Pt was concerned about her gums needing to resurface  According to the patient, a previous provider stated that her gums were receeding due to ill fitting dentures and need to resurface  Pt's maxillary and mandibular alveolar ridges were inspected by Dr Jinny Castrejon and Dr Noel Ramos  No pathology noted, no swelling and no sore spots noted  Pt's ridges and soft tissue are WNL  Custom tray modified to properly fit arch and extend short of vestibule  Border molding performed with heavy body around vestibules then palatal seal  Light body wash performed  Contours, vestibule and intaglio captured  Stock tray fitted with heavy body used to take impression of mandibular arch  Light body wash taken on remaining surface       NV: Wax try in

## 2022-08-01 ENCOUNTER — TELEPHONE (OUTPATIENT)
Dept: NEUROSURGERY | Facility: CLINIC | Age: 62
End: 2022-08-01

## 2022-08-01 ENCOUNTER — OFFICE VISIT (OUTPATIENT)
Dept: PAIN MEDICINE | Facility: CLINIC | Age: 62
End: 2022-08-01
Payer: COMMERCIAL

## 2022-08-01 VITALS
BODY MASS INDEX: 28.68 KG/M2 | DIASTOLIC BLOOD PRESSURE: 82 MMHG | SYSTOLIC BLOOD PRESSURE: 128 MMHG | WEIGHT: 168 LBS | HEIGHT: 64 IN | HEART RATE: 76 BPM

## 2022-08-01 DIAGNOSIS — M54.50 CHRONIC MIDLINE LOW BACK PAIN WITHOUT SCIATICA: ICD-10-CM

## 2022-08-01 DIAGNOSIS — G89.4 CHRONIC PAIN DISORDER: Primary | ICD-10-CM

## 2022-08-01 DIAGNOSIS — M54.16 LUMBAR RADICULOPATHY: ICD-10-CM

## 2022-08-01 DIAGNOSIS — M54.2 NECK PAIN: ICD-10-CM

## 2022-08-01 DIAGNOSIS — M51.36 LUMBAR DEGENERATIVE DISC DISEASE: ICD-10-CM

## 2022-08-01 DIAGNOSIS — M54.12 CERVICAL RADICULOPATHY: ICD-10-CM

## 2022-08-01 DIAGNOSIS — M47.816 LUMBAR SPONDYLOSIS: ICD-10-CM

## 2022-08-01 DIAGNOSIS — G89.29 CHRONIC MIDLINE LOW BACK PAIN WITHOUT SCIATICA: ICD-10-CM

## 2022-08-01 PROCEDURE — 99213 OFFICE O/P EST LOW 20 MIN: CPT | Performed by: NURSE PRACTITIONER

## 2022-08-01 NOTE — PROGRESS NOTES
Assessment:  1  Chronic pain disorder    2  Chronic midline low back pain without sciatica    3  Lumbar spondylosis    4  Lumbar radiculopathy    5  Lumbar degenerative disc disease    6  Neck pain    7  Cervical radiculopathy        Plan:  While the patient was in the office today, I did have a thorough conversation regarding their chronic pain syndrome, medication management, and treatment plan options  Patient is being seen for follow-up visit  She recently underwent a left-sided L3-4 transforaminal epidural steroid injection  She reports some improvement in her back pain, but states that the left leg pain, numbness, weakness never improved  Patient was last seen for follow-up here on 05/24/2022 status post C6-7 interlaminar epidural steroid injection which provided her with short-term relief  It was recommended at that point that she follow-up with Dr Rossy Hoyt  Today, patient tells me that she has not followed up with Dr Rossy oHyt because she has not had time to do so yet  Thank to follow-up with Dr Rossy Hoyt regarding her cervical and lumbar issues  Patient tells me that her PCP prescribes Belbuca, hydrocodone, Lyrica, tizanidine, clonazepam     We will follow-up with her after surgical evaluation  If patient is not a surgical candidate, will consider neuromodulation  History of Present Illness: The patient is a 62 y o  female who presents for a follow up office visit in regards to Neck Pain, Back Pain, Leg Pain, and Arm Pain  The patients current symptoms include complaints of neck and left upper extremity pain, low back and left leg pain  Current pain level is a 10/10  Quality pain is described as cramping, pressure-like, shooting, numb, pins and needles  Current pain medications includes:  PCP prescribes Belbuca, hydrocodone, Lyrica, tizanidine, clonazepam    The patient reports that this regimen is providing 50-70 % pain relief    The patient is reporting no side effects from this pain medication regimen  I have personally reviewed and/or updated the patient's past medical history, past surgical history, family history, social history, current medications, allergies, and vital signs today  Review of Systems  Review of Systems   Constitutional: Negative for fatigue and unexpected weight change  HENT: Negative for dental problem, ear pain, hearing loss and sneezing  Eyes: Negative for visual disturbance  Respiratory: Negative for cough and chest tightness  Cardiovascular: Negative for leg swelling  Gastrointestinal: Negative for anal bleeding  Endocrine: Negative for heat intolerance  Genitourinary: Negative for flank pain and genital sores  Skin: Negative for wound  Allergic/Immunologic: Negative for immunocompromised state  Neurological: Negative for speech difficulty and light-headedness  Hematological: Negative for adenopathy  Psychiatric/Behavioral: Negative for confusion  The patient is not hyperactive  All other systems reviewed and are negative          Past Medical History:   Diagnosis Date    Anxiety     Asthma     [facility]     Brain aneurysm     Chronic pain disorder     spinal stenosis    Concussion syndrome     neurological rx and balance rx    COPD (chronic obstructive pulmonary disease) (HCC)     Depression     Disease of thyroid gland     nodules     Family history of colon cancer     father    Fibromyalgia, primary     History of colon polyps     Hyperlipidemia     Hypertension     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     Lumbar degenerative disc disease 2/16/2022    Migraine     Neuropathy     bilateral feet and hands    Peripheral neuropathy     Psychiatric disorder     PTSD (post-traumatic stress disorder)     [facility]     2012 no deficeits    Thyroid Cancer     TIA (transient ischemic attack)        Past Surgical History:   Procedure Laterality Date    ABDOMINAL SURGERY laproscopic/ endometriosis    BRAIN SURGERY      aneurysm/ coiling procedure    CARPAL TUNNEL RELEASE      CHOLECYSTECTOMY      DENTAL SURGERY      EPIDURAL BLOCK INJECTION Right 3/3/2022    Procedure: C7-T1 interlaminar epidural steroid injection;  Surgeon: Laura Lewis MD;  [facility];  Service: Pain Management     EPIDURAL BLOCK INJECTION N/A 4/21/2022    Procedure: C6-C7 BLOCK / INJECTION EPIDURAL STEROID CERVICAL;  Surgeon: Laura Lewis MD;  [facility];  Service: Pain Management     EPIDURAL BLOCK INJECTION Left 6/21/2022    Procedure: BLOCK / INJECTION EPIDURAL STEROID LUMBAR  left L3-4 TFESI;  Surgeon: Laura Lewis MD;  [facility];  Service: Pain Management     EYE SURGERY      cataract    FL GUIDED NEEDLE PLAC BX/ASP/INJ  3/3/2022    HYSTERECTOMY      [place] ESOPHAGOGASTRODUODENOSCOPY TRANSORAL DIAGNOSTIC N/A 2/8/2018    Procedure: EGD AND COLONOSCOPY;  Surgeon: Nettie Marina MD;  [facility]; Service: Gastroenterology    THYROID SURGERY      TONSILLECTOMY         History reviewed  No pertinent family history      Social History     Occupational History    Not on file   Tobacco Use    Smoking status: Current Every Day Smoker     Packs/day: 2 00     Types: Cigarettes    Smokeless tobacco: Never Used   Vaping Use    Vaping Use: Never used   Substance and Sexual Activity    Alcohol use: Never     Alcohol/week: 0 0 standard drinks    Drug use: Never     Comment: prescribed Belbuca    Sexual activity: Yes     Partners: Male         Current Outpatient Medications:     albuterol (PROVENTIL HFA,VENTOLIN HFA) 90 mcg/act inhaler, Inhale 2 puffs every 6 (six) hours, Disp: , Rfl:     atorvastatin (LIPITOR) 40 mg tablet, Take 40 mg by mouth daily at bedtime , Disp: , Rfl:     b complex vitamins capsule, Take 1 capsule by mouth daily, Disp: , Rfl:     Buprenorphine HCl (Belbuca) 300 MCG FILM, Apply 300 mcg to cheek 2 (two) times a day, Disp: , Rfl:     carboxymethylcellulose 0 5 % SOLN, Administer 1 drop to both eyes daily as needed for dry eyes, Disp: 1 Bottle, Rfl: 0    clonazePAM (KlonoPIN) 0 5 mg tablet, Take 0 5 mg by mouth daily at bedtime, Disp: , Rfl:     clonazePAM (KlonoPIN) 1 mg tablet, Take 1 mg by mouth daily , Disp: , Rfl:     dicyclomine (BENTYL) 20 mg tablet, Take 20 mg by mouth every 6 (six) hours, Disp: , Rfl:     fenofibrate (TRICOR) 48 mg tablet, Take 48 mg by mouth daily, Disp: , Rfl:     Fluticasone-Salmeterol (Advair) 500-50 mcg/dose inhaler, Inhale 1 puff 2 (two) times a day, Disp: , Rfl:     furosemide (LASIX) 20 mg tablet, Take 20 mg by mouth every other day Taking as needed , Disp: , Rfl:     Galcanezumab-gnlm (Emgality) 120 MG/ML SOAJ, Inject 120 mg under the skin every 30 (thirty) days, Disp: , Rfl:     HYDROcodone-acetaminophen (NORCO)  mg per tablet, 1 tab every 6 hours as needed for severe pain    Do not fill before 2/12/18, Disp: , Rfl:     lamoTRIgine (LaMICtal) 100 mg tablet, Take 100 mg by mouth , Disp: , Rfl:     lamoTRIgine (LaMICtal) 150 MG tablet, Take 150 mg by mouth daily at bedtime, Disp: , Rfl:     latanoprost (XALATAN) 0 005 % ophthalmic solution, latanoprost 0 005 % eye drops, Disp: , Rfl:     levothyroxine 137 mcg tablet, Take 137 mcg by mouth daily, Disp: , Rfl:     magnesium 30 MG tablet, Take 30 mg by mouth 2 (two) times a day, Disp: , Rfl:     Melatonin 10 MG CAPS, Take 1 tablet by mouth, Disp: , Rfl:     naloxone (Narcan) 4 mg/0 1 mL nasal spray, Narcan 4 mg/actuation nasal spray, Disp: , Rfl:     omeprazole (PriLOSEC) 40 MG capsule, Take 40 mg by mouth 2 (two) times a day, Disp: , Rfl:     ondansetron (ZOFRAN) 8 mg tablet, Take 1 tablet (8 mg total) by mouth every 8 (eight) hours as needed for nausea or vomiting, Disp: 20 tablet, Rfl: 0    polyethylene glycol (MIRALAX) 17 g packet, Take 17 g by mouth daily, Disp: 30 each, Rfl: 5    pregabalin (LYRICA) 100 mg capsule, take 1 capsule by mouth three times a day, Disp: , Rfl:     pregabalin (LYRICA) 150 mg capsule, take 1 capsule by mouth NIGHTLY, Disp: , Rfl:     Rexulti 0 25 MG tablet, , Disp: , Rfl:     tiZANidine (ZANAFLEX) 2 mg tablet, Take 2 mg by mouth every 6 (six) hours as needed, Disp: , Rfl:     topiramate (TOPAMAX) 100 mg tablet, Take 100 mg by mouth every 12 (twelve) hours , Disp: , Rfl:     Zoster Vac Recomb Adjuvanted (Shingrix) 50 MCG/0 5ML SUSR, Shingrix (PF) 50 mcg/0 5 mL intramuscular suspension, kit  inject 0 5 milliliter intramuscularly, Disp: , Rfl:     albuterol (2 5 mg/3 mL) 0 083 % nebulizer solution, albuterol sulfate HFA 90 mcg/actuation aerosol inhaler  inhale 2 puffs by mouth every 6 hours if needed for wheezing (Patient not taking: No sig reported), Disp: , Rfl:     ammonium lactate (LAC-HYDRIN) 12 % lotion, Every 12 hours (Patient not taking: No sig reported), Disp: , Rfl:     Allergies   Allergen Reactions    Hydroxyzine Anaphylaxis     Claims it gives her convulsions   Other     Pollen Extract Itching    Tree Extract     Clarithromycin Rash     Pt denies    Latex Rash    Sulfa Antibiotics Rash     "severe skin burn"       Physical Exam:    /82   Pulse 76   Ht 5' 4" (1 626 m)   Wt 76 2 kg (168 lb)   BMI 28 84 kg/m²     Constitutional:normal, well developed, well nourished, alert, in no distress and non-toxic and no overt pain behavior  Eyes:anicteric  HEENT:grossly intact  Neck:supple, symmetric, trachea midline and no masses   Pulmonary:even and unlabored  Cardiovascular:No edema or pitting edema present  Skin:Normal without rashes or lesions and well hydrated  Psychiatric:Mood and affect appropriate  Neurologic:Cranial Nerves II-XII grossly intact  Musculoskeletal:normal    Imaging  No orders to display       No orders of the defined types were placed in this encounter

## 2022-08-01 NOTE — TELEPHONE ENCOUNTER
Received a call from patient on the nurseline requesting for an appt  Returned call to patient who stated she has been following with PM who instructed she follow back up with our office to discuss plan of care  Patient stated she has been doing PT and injections however her pain is getting worse since she was last seen in the office in January  Patient stated she is losing mobility in her left leg and has increase numbness and tingling in that extremity  Patient has no new imaging and for that reason this RN scheduled her an appt with Michelle Paz who on Thursday 8/4 at 1115  Patient was appreciative of the call back

## 2022-08-04 ENCOUNTER — OFFICE VISIT (OUTPATIENT)
Dept: NEUROSURGERY | Facility: CLINIC | Age: 62
End: 2022-08-04
Payer: COMMERCIAL

## 2022-08-04 VITALS
TEMPERATURE: 97.6 F | DIASTOLIC BLOOD PRESSURE: 83 MMHG | HEART RATE: 80 BPM | WEIGHT: 168 LBS | SYSTOLIC BLOOD PRESSURE: 120 MMHG | BODY MASS INDEX: 28.68 KG/M2 | HEIGHT: 64 IN | OXYGEN SATURATION: 99 %

## 2022-08-04 DIAGNOSIS — M54.16 LUMBAR RADICULOPATHY: ICD-10-CM

## 2022-08-04 DIAGNOSIS — M54.12 CERVICAL RADICULOPATHY: Primary | ICD-10-CM

## 2022-08-04 PROCEDURE — 99213 OFFICE O/P EST LOW 20 MIN: CPT | Performed by: NURSE PRACTITIONER

## 2022-08-04 NOTE — ASSESSMENT & PLAN NOTE
Patient seen in outpatient office today for further workup and evaluation of cervical and lumbar radiculopathy  · Patient states her symptoms started about 21 years ago when she woke up and was unable to move and was diagnosed with fibromyalgia  She states over the past 6 months her pain and symptoms have worsened  Imaging reviewed personally and reviewed with Dr Bette Bear:    MRI cervical spine wo 12/23/21:Multilevel small disc protrusions causing mild canal stenosis  Foraminal stenosis most pronounced at C6-7 with moderate severe stenosis and on the left at C4-5 with moderate stenosis    MRI thoracic spine wo 12/23/2021:Mild degenerative spondylosis  No significant canal stenosis  UE EMG 4/27/22:  Normal exam    Plan:  · Reviewed imaging with patient in detail    · No neurosurgical intervention warranted at this time  · Recommend patient continue with conservative management with pain management and physical therapy  Placed referral to physical therapy  · Continue follow-up with psychiatry  · Patient will follow-up prn or if symptoms worsen   · Patient made aware if she develops severe uncontrolled neck pain, arm pain, arm weakness, paresthesias, difficulty with fine motor skills, or ambulatory dysfunction is seek care sooner  · Patient made aware to contact Neurosurgery with any further questions or concerns

## 2022-08-04 NOTE — ASSESSMENT & PLAN NOTE
Patient follows with [facility] for brain aneurysm  · Patient reports she follows with Dr Derik Chandra at [facility]  S/p aneurysm coil in March 2016    · Patient would like to continue follow-up at [facility] and has repeat MRA in 1 year

## 2022-08-04 NOTE — ASSESSMENT & PLAN NOTE
Imaging reviewed with Dr Gates:    MRI lumbar spine wo 3/1/22:Mild degenerative spondylosis without significant change      Plan:  No neurosurgical intervention warranted  See above plan

## 2022-08-04 NOTE — PROGRESS NOTES
Neurosurgery Office Note  Aruna Woodall 62 y o  female MRN: [id number]      Assessment/Plan     Cervical radiculopathy  Patient seen in outpatient office today for further workup and evaluation of cervical and lumbar radiculopathy  · Patient states her symptoms started about 21 years ago when she woke up and was unable to move and was diagnosed with fibromyalgia  She states over the past 6 months her pain and symptoms have worsened  Imaging reviewed personally and reviewed with Dr Daniel Chanel:    MRI cervical spine wo 12/23/21:Multilevel small disc protrusions causing mild canal stenosis  Foraminal stenosis most pronounced at C6-7 with moderate severe stenosis and on the left at C4-5 with moderate stenosis    MRI thoracic spine wo 12/23/2021:Mild degenerative spondylosis  No significant canal stenosis  UE EMG 4/27/22:  Normal exam    Plan:  · Reviewed imaging with patient in detail    · No neurosurgical intervention warranted at this time  · Recommend patient continue with conservative management with pain management and physical therapy  Placed referral to physical therapy  · Continue follow-up with psychiatry  · Patient will follow-up prn or if symptoms worsen   · Patient made aware if she develops severe uncontrolled neck pain, arm pain, arm weakness, paresthesias, difficulty with fine motor skills, or ambulatory dysfunction is seek care sooner  · Patient made aware to contact Neurosurgery with any further questions or concerns  Lumbar radiculopathy  Imaging reviewed with Dr Gates:    MRI lumbar spine wo 3/1/22:Mild degenerative spondylosis without significant change  Plan:  No neurosurgical intervention warranted  See above plan    Brain aneurysm  Patient follows with [facility] for brain aneurysm  · Patient reports she follows with Dr Jarrett Vila at [address]  S/p aneurysm coil in March 2016    · Patient would like to continue follow-up at [facility] and has repeat MRA in 1 year       Diagnoses and all orders for this visit:    Cervical radiculopathy  -     Ambulatory Referral to Physical Therapy; Future    Lumbar radiculopathy  -     Ambulatory Referral to Physical Therapy; Future    Other orders  -     Butalbital-APAP-Caffeine (FIORICET PO); Take by mouth              CHIEF COMPLAINT    Chief Complaint   Patient presents with    Follow-up     Cervical radiculopathy        HISTORY    History of Present Illness     62y o  year old female with past medical history significant for COPD, hypertension, migraines, carotid artery aneurysms, cervical and lumbar radiculopathy, fibromyalgia, TMJ syndrome, hyperlipidemia, tobacco abuse, bipolar disorder, anxiety, chronic pain syndrome, asthma, PTSD, and peripheral neuropathy  Patient seen outpatient office today for further workup and evaluation of cervical and lumbar radiculopathy  Patient reports her symptoms started about 21 years ago when she woke up and was unable to move and was diagnosed with fibromyalgia  She reports over the past years things have worsened and even more over the past 6 months  Patient states the whole left side of her body is messed up  Patient has a variety of complaints  Patient currently complaining of 10/10 constant low back pain which at times radiates into her bilateral legs left worse than right  She states it radiates down her posterior leg into her whole foot  At times on the right side down her posterior leg stopping at her ankle  She also endorses left leg numbness  She reports in her right leg she complains of right low back pain and pain behind her right knee  She states some days are better than other days  She states bending, moving, or the weather worsens her pain  Patient states her current pain regimen helps with her pain  She also endorses left-sided tingling which has been on and off for the last few months  She reports about 2 weeks ago her left leg gave out from underneath her and she sustained a fall    She states her balance is off at times for which she has a cane  In regards to her neck pain, patient currently complaining of 7/10 constant neck pain which radiates into bilateral arms left worse than right  She states it radiates down her medial/posterior arm into all fingers  She states turning her head worsens her pain as well as if she closes her eyes or relaxes this helps with her pain  She also reports her current pain regimen helps with her pain  She also reports that her left fingers will freeze up  She denies any difficulty with fine motor skills or dropping objects  She does endorse migraines almost every day as well as ongoing blurry vision for which she is seeing an eye doctor for  She also endorses dizziness at times  She states she also experiences shortness of breath and chest pain secondary to anxiety at times  She also endorses for the past month or so she has had bowel and urinary incontinence  She states this happens a few times a month, she states she did reach out to her PCP in regards to this  Patient reports seeing physical therapy for about 12 weeks which did help but it did not last long back in April she reports  She also reports seeing pain management and underwent injections in her neck and back  Most recently she underwent a L3-4 MANISHA on 06/21/2022 which improved her pain and symptoms for about 2 weeks  She denies any abdominal pain, nausea, vomiting, diarrhea, no new problems with bowel or bladder, no new weakness or numbness/tingling  HPI    See Discussion    REVIEW OF SYSTEMS    Review of Systems   Constitutional: Negative  HENT: Negative  Eyes: Negative  Respiratory: Negative  Cardiovascular: Negative  Gastrointestinal: Positive for nausea  Endocrine: Negative  Musculoskeletal: Positive for arthralgias (pain both shoulders), back pain (mid - low back pain ), gait problem (using a cane ), neck pain and neck stiffness (trouble turning neck to the left side  )  Symptoms began-   Has done physical therapy    Has followed with dr Doug Pak had a few steroid injections  Skin: Negative  Allergic/Immunologic: Negative  Neurological: Positive for numbness (Left side of body tingling and numbness  ) and headaches (migraines - daily)  Hematological: Negative  Psychiatric/Behavioral: Positive for confusion       ROS reviewed with patient and agree and changes were made as needed    Meds/Allergies     Current Outpatient Medications   Medication Sig Dispense Refill    albuterol (PROVENTIL HFA,VENTOLIN HFA) 90 mcg/act inhaler Inhale 2 puffs every 6 (six) hours      atorvastatin (LIPITOR) 40 mg tablet Take 80 mg by mouth daily at bedtime      b complex vitamins capsule Take 1 capsule by mouth daily      Buprenorphine HCl (Belbuca) 300 MCG FILM Apply 300 mcg to cheek 2 (two) times a day      Butalbital-APAP-Caffeine (FIORICET PO) Take by mouth      carboxymethylcellulose 0 5 % SOLN Administer 1 drop to both eyes daily as needed for dry eyes 1 Bottle 0    clonazePAM (KlonoPIN) 0 5 mg tablet Take 0 5 mg by mouth daily at bedtime      clonazePAM (KlonoPIN) 1 mg tablet Take 1 mg by mouth daily       dicyclomine (BENTYL) 20 mg tablet Take 20 mg by mouth every 6 (six) hours      fenofibrate (TRICOR) 48 mg tablet Take 48 mg by mouth daily      Fluticasone-Salmeterol (Advair) 500-50 mcg/dose inhaler Inhale 1 puff 2 (two) times a day      furosemide (LASIX) 20 mg tablet Take 20 mg by mouth every other day Taking as needed       Galcanezumab-gnlm (Emgality) 120 MG/ML SOAJ Inject 120 mg under the skin every 30 (thirty) days      HYDROcodone-acetaminophen (NORCO)  mg per tablet 2 (two) times a day      lamoTRIgine (LaMICtal) 100 mg tablet Take 100 mg by mouth daily      lamoTRIgine (LaMICtal) 150 MG tablet Take 150 mg by mouth daily at bedtime      levothyroxine 137 mcg tablet Take 137 mcg by mouth daily      magnesium 30 MG tablet Take 500 mg by mouth 2 (two) times a day      Melatonin 10 MG CAPS Take 1 tablet by mouth      omeprazole (PriLOSEC) 40 MG capsule Take by mouth 2 (two) times a day      ondansetron (ZOFRAN) 8 mg tablet Take 1 tablet (8 mg total) by mouth every 8 (eight) hours as needed for nausea or vomiting 20 tablet 0    pregabalin (LYRICA) 100 mg capsule take 1 capsule by mouth three times a day      pregabalin (LYRICA) 150 mg capsule take 1 capsule by mouth NIGHTLY      Rexulti 0 25 MG tablet daily at bedtime      tiZANidine (ZANAFLEX) 2 mg tablet Take 4 mg by mouth every 6 (six) hours as needed      topiramate (TOPAMAX) 100 mg tablet Take 100 mg by mouth every 12 (twelve) hours 150mg in the morning and one tab at bedtime      Zoster Vac Recomb Adjuvanted (Shingrix) 50 MCG/0 5ML SUSR Shingrix (PF) 50 mcg/0 5 mL intramuscular suspension, kit   inject 0 5 milliliter intramuscularly      albuterol (2 5 mg/3 mL) 0 083 % nebulizer solution albuterol sulfate HFA 90 mcg/actuation aerosol inhaler   inhale 2 puffs by mouth every 6 hours if needed for wheezing (Patient not taking: Reported on 8/4/2022)      ammonium lactate (LAC-HYDRIN) 12 % lotion Every 12 hours (Patient not taking: No sig reported)      latanoprost (XALATAN) 0 005 % ophthalmic solution latanoprost 0 005 % eye drops (Patient not taking: Reported on 8/4/2022)      naloxone (Narcan) 4 mg/0 1 mL nasal spray Narcan 4 mg/actuation nasal spray (Patient not taking: Reported on 8/4/2022)      polyethylene glycol (MIRALAX) 17 g packet Take 17 g by mouth daily (Patient not taking: Reported on 8/4/2022) 30 each 5     No current facility-administered medications for this visit  Allergies   Allergen Reactions    Hydroxyzine Anaphylaxis     Claims it gives her convulsions       Other     Pollen Extract Itching    Tree Extract     Clarithromycin Rash     Pt denies    Latex Rash    Sulfa Antibiotics Rash     "severe skin burn"       PAST HISTORY    Past Medical History:   Diagnosis Date    Anxiety     Asthma     [facility]     Brain aneurysm     Chronic pain disorder     spinal stenosis    Concussion syndrome     neurological rx and balance rx    COPD (chronic obstructive pulmonary disease) (HCC)     Depression     Disease of thyroid gland     nodules     Family history of colon cancer     father    Fibromyalgia, primary     History of colon polyps     Hyperlipidemia     Hypertension     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     Lumbar degenerative disc disease 2/16/2022    Migraine     Neuropathy     bilateral feet and hands    Peripheral neuropathy     Psychiatric disorder     PTSD (post-traumatic stress disorder)     Stroke ([facility] )     2012 no deficeits    Thyroid Cancer     TIA (transient ischemic attack)        Past Surgical History:   Procedure Laterality Date    ABDOMINAL SURGERY      laproscopic/ endometriosis    BRAIN SURGERY      aneurysm/ coiling procedure    CARPAL TUNNEL RELEASE      CHOLECYSTECTOMY      DENTAL SURGERY      EPIDURAL BLOCK INJECTION Right 3/3/2022    Procedure: C7-T1 interlaminar epidural steroid injection;  Surgeon: Gale Lopez MD;  Location: [facility];  Service: Pain Management     EPIDURAL BLOCK INJECTION N/A 4/21/2022    Procedure: C6-C7 BLOCK / INJECTION EPIDURAL STEROID CERVICAL;  Surgeon: Gale Lopez MD;  [facility]: [facility];  Service: Pain Management     EPIDURAL BLOCK INJECTION Left 6/21/2022    Procedure: BLOCK / INJECTION EPIDURAL STEROID LUMBAR  left L3-4 TFESI;  Surgeon: Gale Lopez MD;  Location: [facility];  Service: Pain Management     EYE SURGERY      cataract    FL GUIDED NEEDLE PLAC BX/ASP/INJ  3/3/2022    HYSTERECTOMY      [place] ESOPHAGOGASTRODUODENOSCOPY TRANSORAL DIAGNOSTIC N/A 2/8/2018    Procedure: EGD AND COLONOSCOPY;  Surgeon: Sarabjit Celeste MD;  [facility]: [facility];   Service: Gastroenterology    THYROID SURGERY      TONSILLECTOMY         Social History     Tobacco Use    Smoking status: Current Every Day Smoker     Packs/day: 2 00     Types: Cigarettes    Smokeless tobacco: Never Used   Vaping Use    Vaping Use: Never used   Substance Use Topics    Alcohol use: Never     Alcohol/week: 0 0 standard drinks    Drug use: Never     Comment: prescribed Belbuca       History reviewed  No pertinent family history  Above history personally reviewed  EXAM    Vitals:Blood pressure 120/83, pulse 80, temperature 97 6 °F (36 4 °C), temperature source Temporal, height 5' 4" (1 626 m), weight 76 2 kg (168 lb), SpO2 99 %  ,Body mass index is 28 84 kg/m²  Physical Exam  Vitals reviewed  Constitutional:       General: She is awake  She is not in acute distress  Appearance: Normal appearance  She is not ill-appearing  HENT:      Head: Normocephalic and atraumatic  Eyes:      Extraocular Movements: Extraocular movements intact and EOM normal       Conjunctiva/sclera: Conjunctivae normal    Cardiovascular:      Rate and Rhythm: Normal rate  Pulmonary:      Effort: Pulmonary effort is normal  No respiratory distress  Chest:      Chest wall: No tenderness  Abdominal:      General: There is no distension  Palpations: Abdomen is soft  Tenderness: There is no abdominal tenderness  Musculoskeletal:         General: Normal range of motion  Cervical back: Normal range of motion and neck supple  Tenderness present  Spinous process tenderness and muscular tenderness present  Thoracic back: Tenderness present  Lumbar back: Tenderness present  Skin:     General: Skin is warm and dry  Neurological:      Mental Status: She is alert and oriented to person, place, and time  Coordination: Finger-Nose-Finger Test normal       Deep Tendon Reflexes:      Reflex Scores:       Bicep reflexes are 2+ on the right side and 2+ on the left side  Patellar reflexes are 2+ on the right side and 2+ on the left side    Psychiatric: Attention and Perception: Attention and perception normal          Mood and Affect: Mood and affect normal          Speech: Speech normal          Behavior: Behavior normal  Behavior is cooperative  Thought Content: Thought content normal          Cognition and Memory: Cognition and memory normal          Judgment: Judgment normal       Comments: Patient becomes agitated and angry as well as upset during exam and visit         Neurologic Exam     Mental Status   Oriented to person, place, and time  Follows 2 step commands  Attention: normal  Concentration: normal    Speech: speech is normal   Level of consciousness: alert  Knowledge: good  Able to perform simple calculations  Able to name object  Normal comprehension  Cranial Nerves     CN III, IV, VI   Extraocular motions are normal    CN III: no CN III palsy  CN VI: no CN VI palsy  Nystagmus: none   Diplopia: none  Conjugate gaze: present    CN V   Facial sensation intact  CN VII   Facial expression full, symmetric       CN VIII   CN VIII normal    Hearing: intact    CN IX, X   CN IX normal      CN XI   CN XI normal      CN XII   CN XII normal    R pupil irregular shape and nonreactive to light  L pupil 3mm and reactive to light     Motor Exam   Muscle bulk: normal  Overall muscle tone: normal  Right arm pronator drift: absent  Left arm pronator drift: absentStrength 4+/5 throughout except   bilateral dorsiflexion/plantar flexion 4-/5 and L HF/KE/KF 4-/5 secondary to give-way weakness  Left  and deltoid 4-/5     Sensory Exam   Light touch normal    Proprioception normal    JPS and DST intact     Gait, Coordination, and Reflexes     Gait  Gait: (walking with a cane)    Coordination   Finger to nose coordination: normal    Tremor   Resting tremor: absent  Intention tremor: absent  Action tremor: absent    Reflexes   Right biceps: 2+  Left biceps: 2+  Right patellar: 2+  Left patellar: 2+  Right Lugo: absent  Left Lugo: absent  Right ankle clonus: absent  Left ankle clonus: absent        MEDICAL DECISION MAKING    Imaging Studies:     No results found  I have personally reviewed pertinent reports     and I have personally reviewed pertinent films in PACS

## 2022-08-04 NOTE — PATIENT INSTRUCTIONS
Patient will follow-up p r n  or if symptoms worsen    Recommend continuing conservative management with physical therapy and pain management    Placed referral to physical therapy

## 2022-08-10 ENCOUNTER — TELEPHONE (OUTPATIENT)
Dept: NEUROSURGERY | Facility: CLINIC | Age: 62
End: 2022-08-10

## 2022-08-10 DIAGNOSIS — M54.12 CERVICAL RADICULOPATHY: Primary | ICD-10-CM

## 2022-08-10 DIAGNOSIS — M54.16 LUMBAR RADICULOPATHY: ICD-10-CM

## 2022-08-10 NOTE — TELEPHONE ENCOUNTER
Received a call from patient questioning if Giovanna Alvarenga thinks patient should have a new MRI d/t her having worsening pain in her neck since she was seen in the office 6 days ago  Returned call to patient who stated since her appt she has been having increased neck pain that is intermittent  Patient reports the pain waking her up during the night  Patient is adamant about this RN asking Giovanna Alvarenga for her opinion on this  Advised patient this RN will route to the provider  Patient also questioned if a referral is going to be placed for PM for patient to have a stimulator trial  Advised patient since she is already a patient of PM a referral is not necessarily needed since she is a new patient  Attempted to explain to patient that PM just wanted her to be evaluated by our office to rule out surgery  Patient reported during her visit with our office Giovanna Alvarenga stated she will place a referral  Advised patient this RN will place the referral      Patient also expressed a multitude of other problems she is having such as medication management with her PCP, non-neurosurgery related symptoms and environmental issues  This RN provided emotional support  Patient was appreciative of the call back

## 2022-08-11 NOTE — TELEPHONE ENCOUNTER
Discussed with [id number] who stated she spoke with Dr Chema Zayas and they both agreed that new imaging is not warranted at this time  Reached out to patient and made her aware of this  She stated an understanding and was appreciative of the call

## 2022-08-17 ENCOUNTER — TELEPHONE (OUTPATIENT)
Dept: PAIN MEDICINE | Facility: CLINIC | Age: 62
End: 2022-08-17

## 2022-08-17 DIAGNOSIS — Z01.818 PREOP TESTING: Primary | ICD-10-CM

## 2022-08-17 NOTE — TELEPHONE ENCOUNTER
Pt was seen by neurosurgery and would like to proceed with an SCS trial, are you able to order MRI and psych eval or does pt need to be seen in office first to discuss?

## 2022-08-22 NOTE — TELEPHONE ENCOUNTER
S/w pt, pt states  she has new right arm pain felt like it's broken, it's numb and tingly, radiated to fingertips  The pain went straight for 2 days in a row last Saturday to Sunday  She has a lot of pain on left leg too down to her knee, it's numb and unable to move  She said she has history of TIA so  recommended to go to the ER to be evaluated  Discussed about SCS  Pt has MRI scheduled for 8/26  Requested for f/u ov scheduled 9/19  Any further recommendations?

## 2022-08-22 NOTE — TELEPHONE ENCOUNTER
Pt called in to speak with the the nurse in regard to her pain       Please be advised thank you    Pt can be reached @   [phone]

## 2022-08-26 ENCOUNTER — HOSPITAL ENCOUNTER (OUTPATIENT)
Dept: MRI IMAGING | Facility: HOSPITAL | Age: 62
End: 2022-08-26
Payer: COMMERCIAL

## 2022-08-26 DIAGNOSIS — Z01.818 PREOP TESTING: ICD-10-CM

## 2022-08-26 PROCEDURE — 72146 MRI CHEST SPINE W/O DYE: CPT

## 2022-09-09 NOTE — TELEPHONE ENCOUNTER
Pt called in to give an update on the Psych Eval referral         Dr Kristian Cohn     Will be contacted our office     Please be advised thank you

## 2022-09-15 ENCOUNTER — TELEPHONE (OUTPATIENT)
Dept: NEUROSURGERY | Facility: CLINIC | Age: 62
End: 2022-09-15

## 2022-09-15 NOTE — TELEPHONE ENCOUNTER
Received a call from [facility] requesting call back from [facility] regarding neuropysch eval required by Dr Umberto Bishop before trial  Advised that unfortunately there is nothing our office can do to change this requirement  Suggested she reach out to Dr Pinzon Samples office for assistance  She states she has already attempted this and her currently psychiatrist will not do neuropysch eval for medical reasons  Apologized for her current situation  Encouraged her to reach out to her insurance to see if they can direct her to someone in network who can assist  She was appreciative

## 2022-09-19 ENCOUNTER — OFFICE VISIT (OUTPATIENT)
Dept: PAIN MEDICINE | Facility: CLINIC | Age: 62
End: 2022-09-19
Payer: COMMERCIAL

## 2022-09-19 VITALS
HEIGHT: 64 IN | HEART RATE: 68 BPM | WEIGHT: 169.6 LBS | BODY MASS INDEX: 28.95 KG/M2 | SYSTOLIC BLOOD PRESSURE: 130 MMHG | DIASTOLIC BLOOD PRESSURE: 80 MMHG

## 2022-09-19 DIAGNOSIS — M47.816 LUMBAR SPONDYLOSIS: ICD-10-CM

## 2022-09-19 DIAGNOSIS — G89.4 CHRONIC PAIN DISORDER: Primary | ICD-10-CM

## 2022-09-19 DIAGNOSIS — M54.16 LUMBAR RADICULOPATHY: ICD-10-CM

## 2022-09-19 DIAGNOSIS — G89.29 CHRONIC MIDLINE LOW BACK PAIN WITHOUT SCIATICA: ICD-10-CM

## 2022-09-19 DIAGNOSIS — M51.36 LUMBAR DEGENERATIVE DISC DISEASE: ICD-10-CM

## 2022-09-19 DIAGNOSIS — M50.120 CERVICAL DISC DISORDER WITH RADICULOPATHY OF MID-CERVICAL REGION: ICD-10-CM

## 2022-09-19 DIAGNOSIS — M54.12 CERVICAL RADICULOPATHY: ICD-10-CM

## 2022-09-19 DIAGNOSIS — M54.50 CHRONIC MIDLINE LOW BACK PAIN WITHOUT SCIATICA: ICD-10-CM

## 2022-09-19 PROCEDURE — 99213 OFFICE O/P EST LOW 20 MIN: CPT | Performed by: NURSE PRACTITIONER

## 2022-09-19 RX ORDER — RISPERIDONE 0.25 MG/1
0.5 TABLET, FILM COATED ORAL 2 TIMES DAILY
COMMUNITY

## 2022-09-19 NOTE — TELEPHONE ENCOUNTER
email was sent to [id number] eval provider today    Pt still needs meeting with Alayna

## 2022-09-19 NOTE — PROGRESS NOTES
Assessment:  1  Chronic pain disorder    2  Chronic midline low back pain without sciatica    3  Lumbar radiculopathy    4  Lumbar degenerative disc disease    5  Cervical radiculopathy    6  Cervical disc disorder with radiculopathy of mid-cervical region    7  Lumbar spondylosis        Plan:  While the patient was in the office today, I did have a thorough conversation regarding their chronic pain syndrome, medication management, and treatment plan options  Patient is being seen for follow-up visit  She continues to complain of neck and upper extremity pain as well as low back and lower extremity pain  She underwent cervical and lumbar epidural steroid injections which failed to provide her with significant improvement  She was evaluated neurosurgically and surgery was not recommended  She is interested in pursuing the lumbar spinal cord stimulator trial   She had the MRI of her thoracic spine  She has not had the psychological evaluation or patient on meeting with spinal cord stimulator representative as of yet  Continue Belbuca, hydrocodone, Lyrica, tizanidine as prescribed by her PCP  History of Present Illness: The patient is a 62 y o  female who presents for a follow up office visit in regards to Neck Pain, Shoulder Pain, Arm Pain, Back Pain, Knee Pain, Foot Pain, Wrist Pain, Hip Pain, and Leg Pain  The patients current symptoms include complaints of neck and upper extremity pain, low back and lower extremity pain  Current pain level is a 10/10  Quality pain is described as sharp, throbbing, shooting  Current pain medications includes:  PCP prescribes Belbuca   I have personally reviewed and/or updated the patient's past medical history, past surgical history, family history, social history, current medications, allergies, and vital signs today  Review of Systems  Review of Systems   Constitutional: Negative for unexpected weight change  HENT: Negative for hearing loss  Eyes: Negative for visual disturbance  Respiratory: Positive for shortness of breath  Cardiovascular: Negative for leg swelling  Gastrointestinal: Positive for constipation (sometimes) and nausea  Endocrine: Negative for polyuria  Genitourinary: Negative for difficulty urinating  Musculoskeletal: Positive for arthralgias, gait problem and myalgias  Negative for joint swelling  Decreased range of motion  Swelling- feet, legs, neck  Pain in extremity- calf, feet, middle/lower back, neck (can't move)   Skin: Negative for rash  Neurological: Positive for dizziness  Negative for weakness and headaches  Memory loss   Psychiatric/Behavioral: Negative for decreased concentration  All other systems reviewed and are negative          Past Medical History:   Diagnosis Date    Anxiety     Asthma     [facility]     Brain aneurysm     Chronic pain disorder     spinal stenosis    Concussion syndrome     neurological rx and balance rx    [facility]     Depression     Disease of thyroid gland     nodules     Family history of colon cancer     father    Fibromyalgia, primary     History of colon polyps     Hyperlipidemia     Hypertension     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     Lumbar degenerative disc disease 2/16/2022    Migraine     Neuropathy     bilateral feet and hands    Peripheral neuropathy     Psychiatric disorder     PTSD (post-traumatic stress disorder)     Stroke ([facility] )     2012 no deficeits    Thyroid Cancer     TIA (transient ischemic attack)        Past Surgical History:   Procedure Laterality Date    ABDOMINAL SURGERY      laproscopic/ endometriosis    BRAIN SURGERY      aneurysm/ coiling procedure    CARPAL TUNNEL RELEASE      CHOLECYSTECTOMY      DENTAL SURGERY      EPIDURAL BLOCK INJECTION Right 3/3/2022    Procedure: C7-T1 interlaminar epidural steroid injection;  Surgeon: Adia oBrden MD Rosi;  [facility];  Service: Pain Management     EPIDURAL BLOCK INJECTION N/A 4/21/2022    Procedure: C6-C7 BLOCK / INJECTION EPIDURAL STEROID CERVICAL;  Surgeon: Paul Solis MD;  [facility];  Service: Pain Management     EPIDURAL BLOCK INJECTION Left 6/21/2022    Procedure: BLOCK / INJECTION EPIDURAL STEROID LUMBAR  left L3-4 TFESI;  Surgeon: Paul Solis MD;  [facility];  Service: Pain Management     EYE SURGERY      cataract    FL GUIDED NEEDLE PLAC BX/ASP/INJ  3/3/2022    HYSTERECTOMY      [place] ESOPHAGOGASTRODUODENOSCOPY TRANSORAL DIAGNOSTIC N/A 2/8/2018    Procedure: EGD AND COLONOSCOPY;  Surgeon: Belle Lott MD;  Location:  GI LAB; Service: Gastroenterology    THYROID SURGERY      TONSILLECTOMY         No family history on file      Social History     Occupational History    Not on file   Tobacco Use    Smoking status: Current Every Day Smoker     Packs/day: 2 00     Types: Cigarettes    Smokeless tobacco: Never Used   Vaping Use    Vaping Use: Never used   Substance and Sexual Activity    Alcohol use: Never     Alcohol/week: 0 0 standard drinks    Drug use: Never     Comment: prescribed Belbuca    Sexual activity: Yes     Partners: Male         Current Outpatient Medications:     albuterol (PROVENTIL HFA,VENTOLIN HFA) 90 mcg/act inhaler, Inhale 2 puffs every 6 (six) hours, Disp: , Rfl:     b complex vitamins capsule, Take 1 capsule by mouth daily, Disp: , Rfl:     Buprenorphine HCl (Belbuca) 300 MCG FILM, Apply 300 mcg to cheek 2 (two) times a day, Disp: , Rfl:     Butalbital-APAP-Caffeine (FIORICET PO), Take by mouth, Disp: , Rfl:     carboxymethylcellulose 0 5 % SOLN, Administer 1 drop to both eyes daily as needed for dry eyes, Disp: 1 Bottle, Rfl: 0    clonazePAM (KlonoPIN) 0 5 mg tablet, Take 0 5 mg by mouth daily at bedtime, Disp: , Rfl:     clonazePAM (KlonoPIN) 1 mg tablet, Take 1 mg by mouth daily , Disp: , Rfl:    dicyclomine (BENTYL) 20 mg tablet, Take 20 mg by mouth every 6 (six) hours, Disp: , Rfl:     fenofibrate (TRICOR) 48 mg tablet, Take 48 mg by mouth daily, Disp: , Rfl:     Fluticasone-Salmeterol (Advair) 500-50 mcg/dose inhaler, Inhale 1 puff 2 (two) times a day, Disp: , Rfl:     furosemide (LASIX) 20 mg tablet, Take 20 mg by mouth every other day Taking as needed , Disp: , Rfl:     Galcanezumab-gnlm (Emgality) 120 MG/ML SOAJ, Inject 120 mg under the skin every 30 (thirty) days, Disp: , Rfl:     HYDROcodone-acetaminophen (NORCO)  mg per tablet, 2 (two) times a day, Disp: , Rfl:     lamoTRIgine (LaMICtal) 150 MG tablet, Take 100 mg by mouth daily at bedtime, Disp: , Rfl:     levothyroxine 137 mcg tablet, Take 137 mcg by mouth daily, Disp: , Rfl:     magnesium 30 MG tablet, Take 500 mg by mouth 2 (two) times a day, Disp: , Rfl:     Melatonin 10 MG CAPS, Take 1 tablet by mouth, Disp: , Rfl:     omeprazole (PriLOSEC) 40 MG capsule, Take 80 mg by mouth 2 (two) times a day, Disp: , Rfl:     ondansetron (ZOFRAN) 8 mg tablet, Take 1 tablet (8 mg total) by mouth every 8 (eight) hours as needed for nausea or vomiting, Disp: 20 tablet, Rfl: 0    pregabalin (LYRICA) 100 mg capsule, take 1 capsule by mouth three times a day, Disp: , Rfl:     pregabalin (LYRICA) 150 mg capsule, take 1 capsule by mouth NIGHTLY, Disp: , Rfl:     Rexulti 0 25 MG tablet, daily at bedtime, Disp: , Rfl:     risperiDONE (RisperDAL) 0 25 mg tablet, Take 0 5 mg by mouth 2 (two) times a day, Disp: , Rfl:     tiZANidine (ZANAFLEX) 2 mg tablet, Take 4 mg by mouth every 6 (six) hours as needed, Disp: , Rfl:     topiramate (TOPAMAX) 100 mg tablet, Take 100 mg by mouth every 12 (twelve) hours 150mg in the morning and one tab at bedtime, Disp: , Rfl:     Zoster Vac Recomb Adjuvanted (Shingrix) 50 MCG/0 5ML SUSR, Shingrix (PF) 50 mcg/0 5 mL intramuscular suspension, kit  inject 0 5 milliliter intramuscularly, Disp: , Rfl:     albuterol (2 5 mg/3 mL) 0 083 % nebulizer solution, albuterol sulfate HFA 90 mcg/actuation aerosol inhaler  inhale 2 puffs by mouth every 6 hours if needed for wheezing (Patient not taking: No sig reported), Disp: , Rfl:     ammonium lactate (LAC-HYDRIN) 12 % lotion, Every 12 hours (Patient not taking: No sig reported), Disp: , Rfl:     atorvastatin (LIPITOR) 40 mg tablet, Take 80 mg by mouth daily at bedtime, Disp: , Rfl:     lamoTRIgine (LaMICtal) 100 mg tablet, Take 100 mg by mouth daily, Disp: , Rfl:     latanoprost (XALATAN) 0 005 % ophthalmic solution, latanoprost 0 005 % eye drops (Patient not taking: No sig reported), Disp: , Rfl:     naloxone (Narcan) 4 mg/0 1 mL nasal spray, Narcan 4 mg/actuation nasal spray (Patient not taking: No sig reported), Disp: , Rfl:     polyethylene glycol (MIRALAX) 17 g packet, Take 17 g by mouth daily (Patient not taking: No sig reported), Disp: 30 each, Rfl: 5    Allergies   Allergen Reactions    Hydroxyzine Anaphylaxis     Claims it gives her convulsions   Other     Pollen Extract Itching    Tree Extract     Clarithromycin Rash     Pt denies    Latex Rash    Sulfa Antibiotics Rash     "severe skin burn"       Physical Exam:    /80   Pulse 68   Ht 5' 4" (1 626 m)   Wt 76 9 kg (169 lb 9 6 oz)   BMI 29 11 kg/m²     Constitutional:Patient is anxious, somewhat weepy  Eyes:anicteric  HEENT:grossly intact  Neck:supple, symmetric, trachea midline and no masses   Pulmonary:even and unlabored  Cardiovascular:No edema or pitting edema present  Skin:Normal without rashes or lesions and well hydrated  Psychiatric:Mood and affect appropriate  Neurologic:Cranial Nerves II-XII grossly intact  Musculoskeletal:normal    Imaging  No orders to display       No orders of the defined types were placed in this encounter

## 2022-09-21 ENCOUNTER — TELEPHONE (OUTPATIENT)
Dept: PSYCHIATRY | Facility: CLINIC | Age: 62
End: 2022-09-21

## 2022-09-22 ENCOUNTER — TELEMEDICINE (OUTPATIENT)
Dept: BEHAVIORAL/MENTAL HEALTH CLINIC | Facility: CLINIC | Age: 62
End: 2022-09-22
Payer: COMMERCIAL

## 2022-09-22 DIAGNOSIS — F31.9 BIPOLAR 1 DISORDER (HCC): Primary | ICD-10-CM

## 2022-09-22 PROCEDURE — 90791 PSYCH DIAGNOSTIC EVALUATION: CPT
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
## 2022-09-23 ENCOUNTER — TELEPHONE (OUTPATIENT)
Dept: PAIN MEDICINE | Facility: CLINIC | Age: 62
End: 2022-09-23

## 2022-09-23 NOTE — TELEPHONE ENCOUNTER
Ele Bates,  Pt called me and is concerned w the assessment  She said that there were a few questions (about 3) that were missing part of the question so she answered as best she could  She just wanted to you to be aware and feel free to reach out to her if you have any questions

## 2022-09-23 NOTE — TELEPHONE ENCOUNTER
----- Message from Yojana Torres sent at 9/19/2022 12:12 PM EDT -----  Regarding: FW: psych eval  Has this pt participated in info session?  ----- Message -----  From: LAURIE Nogueira  Sent: 9/19/2022  11:16 AM EDT  To: Yojana Torres  Subject: psych eval                                       Hi Yolis Stage is planning to proceed with a trial spinal cord stimulator  Order in her chart for psychological evaluation     Thanks,  Pratik Pak

## 2022-10-05 ENCOUNTER — TELEPHONE (OUTPATIENT)
Dept: BEHAVIORAL/MENTAL HEALTH CLINIC | Facility: CLINIC | Age: 62
End: 2022-10-05

## 2022-10-05 NOTE — TELEPHONE ENCOUNTER
LVM msgfor pt to return call to discuss BHI 2 results/recommendations  Also contacted via mobile but was unable to LM  Provided CB number

## 2022-10-07 ENCOUNTER — TELEPHONE (OUTPATIENT)
Dept: BEHAVIORAL/MENTAL HEALTH CLINIC | Facility: CLINIC | Age: 62
End: 2022-10-07

## 2022-10-07 NOTE — TELEPHONE ENCOUNTER
Spoke with pt and reviewed BHI 2 results  Pt voiced understanding  Pt indicates she spoke with her external therapist who does not support SCS implant as pt is too "manicky "  She provided verbal consent to speak with psychiatrist   She is willing to present to office if needed to sign EPHRAIM

## 2022-10-11 ENCOUNTER — TELEPHONE (OUTPATIENT)
Dept: OTHER | Facility: OTHER | Age: 62
End: 2022-10-11

## 2022-10-11 ENCOUNTER — TELEPHONE (OUTPATIENT)
Dept: BEHAVIORAL/MENTAL HEALTH CLINIC | Facility: CLINIC | Age: 62
End: 2022-10-11

## 2022-10-11 NOTE — TELEPHONE ENCOUNTER
Spoke with Estephania Vila who provided name of Dr Nelsy Carmona for EPHRAIM  EPHRAIM was sent to pt via secure email from [employer]  Estephania Vila agrees to [employer] and return ROIs via [employer] directed to [facility]

## 2022-10-11 NOTE — TELEPHONE ENCOUNTER
Caller:Patient    Doctor: Talib Everett    Reason for call: pt has a question about her current health condition  As of now she is bed ridden because she is in pain and can't walk on her leg because of pain and it is numb   Pt needs to talk with a nurse or Abby Geiger    Call back#: [phone]

## 2022-10-11 NOTE — TELEPHONE ENCOUNTER
Patient wants to let her psychiatrist know that her condition is getting worse patient states that she spoke with Dr Zeyad Moreland today  Patient would like to know how long this is going to take until she can get her implant  Patient states she lost her cane in [place] and her sister is dying  She states that her leg is so numb and there is nothing anything anyone can do  She states that a good portion of this is her depression and anxiety

## 2022-10-11 NOTE — TELEPHONE ENCOUNTER
LVM for pt requesting return call to complete EPHRAIM form re SCS implant eval   Provided CB number

## 2022-10-12 NOTE — TELEPHONE ENCOUNTER
S/W pt  Pt stated her symptoms are getting worse  She stated Dr Efrain Osborne is going to talk to her psychologist   Pt is asking what the next step is? Please advise

## 2022-10-12 NOTE — TELEPHONE ENCOUNTER
Copied from other task:    No Triage Call    Barbie Olguin, RN routed conversation to [facility] 17 hours ago (4:54 PM)     Barbie Olguin RN 17 hours ago (4:54 PM)            Patient wants to let her psychiatrist know that her condition is getting worse patient states that she spoke with Dr Kirti Lilly today  Patient would like to know how long this is going to take until she can get her implant  Patient states she lost her cane in [place] and her sister is dying  She states that her leg is so numb and there is nothing anything anyone can do  She states that a good portion of this is her depression and anxiety            Documentation

## 2022-10-13 ENCOUNTER — TELEPHONE (OUTPATIENT)
Dept: BEHAVIORAL/MENTAL HEALTH CLINIC | Facility: CLINIC | Age: 62
End: 2022-10-13

## 2022-10-13 NOTE — TELEPHONE ENCOUNTER
Rohan Maria, RN  Hi Isauro Guillen, I am awaiting the return of her signed EPHRAIM  She was going to send it via [address] [place] [address],[address] to [facility]  Do you know if it was received and I don't see it ?   There should be 2 ROIs,one for her psychiatrist and one for her therapist      Pattie Hsu

## 2022-10-13 NOTE — TELEPHONE ENCOUNTER
Received VMM from pt requesting this writer to speak to her psychiatrist re SCS implant  Edward KATHLEENM for pt requesting her to return signed Uvaldo Crain that were emailed to her on 10/11/022

## 2022-10-14 ENCOUNTER — TELEPHONE (OUTPATIENT)
Dept: BEHAVIORAL/MENTAL HEALTH CLINIC | Facility: CLINIC | Age: 62
End: 2022-10-14

## 2022-10-14 NOTE — TELEPHONE ENCOUNTER
Kelin Alarcon contacted this writer and informed this writer that she would be dropping off signed EPHRAIM at [facility] in [place]  Routing this note to referring provider with request for provider to alert office staff to upload signed ROIs and provide message to this writer when same is completed

## 2022-10-20 ENCOUNTER — TELEPHONE (OUTPATIENT)
Dept: BEHAVIORAL/MENTAL HEALTH CLINIC | Facility: CLINIC | Age: 62
End: 2022-10-20

## 2022-10-20 NOTE — TELEPHONE ENCOUNTER
Spoke with Christiano Vazquez ([phone]), pt's therapist, Mitzi benz  Discussed SCS implant and potential impact on MH stability  Christiano Vazquez reports he has addressed mind/body connection and will address interventions to manage pain and increase pain tolerance  He indicates pt is appointment compliant; is unsure re med compliance  He acknowledges pt does demonstrate rapid and severe mood swings and that she hs made progress with anger mgmt  Christiano Vazquez identified strong pt  support system  Contacted Dr Bindu Castro (psychiatrist) and LM, awaiting return call

## 2022-10-21 ENCOUNTER — TELEPHONE (OUTPATIENT)
Dept: BEHAVIORAL/MENTAL HEALTH CLINIC | Facility: CLINIC | Age: 62
End: 2022-10-21

## 2022-10-21 ENCOUNTER — TELEPHONE (OUTPATIENT)
Dept: PAIN MEDICINE | Facility: CLINIC | Age: 62
End: 2022-10-21

## 2022-10-21 NOTE — TELEPHONE ENCOUNTER
Frannie Juarez    Doctor: Tracey Cordero    Reason for call: patient called stating that  Dr Moni Jamison is holding up her stimulator surgery and her condition is getting worst    Call back#: [phone]

## 2022-10-21 NOTE — TELEPHONE ENCOUNTER
Spoke with pt and provided update re contact with therapist and awaiting contact with psych  Pt provided following number to contact psych:  21   She relates the area code may be 570  She will contact psych and request that dr to contact this writer  This writer will reach out to doctor again on Monday if no contact is made prior  Pt  reports increased numbness in foot and contacted nurses's hotline  She relates she is maintaining emotional mgmt despite various challenges associated with physical health

## 2022-10-24 ENCOUNTER — TELEPHONE (OUTPATIENT)
Dept: BEHAVIORAL/MENTAL HEALTH CLINIC | Facility: CLINIC | Age: 62
End: 2022-10-24

## 2022-10-24 NOTE — TELEPHONE ENCOUNTER
NAIDA with  requesting return call from Dr Rajat Acuna at [facility] ([phone])  Provided CB number

## 2022-10-24 NOTE — TELEPHONE ENCOUNTER
S/W pt and advised of same   Pt appreciative of our office staff and Physician however she wasn't clearance from Dr Kishan Hargrove

## 2022-10-25 ENCOUNTER — TRANSCRIBE ORDERS (OUTPATIENT)
Dept: PAIN MEDICINE | Facility: CLINIC | Age: 62
End: 2022-10-25

## 2022-10-26 ENCOUNTER — TELEPHONE (OUTPATIENT)
Dept: PAIN MEDICINE | Facility: CLINIC | Age: 62
End: 2022-10-26

## 2022-10-26 NOTE — TELEPHONE ENCOUNTER
S/w Pt to let her know that the report was sent yesterday and they will be in contact to get you scheduled per Salvador Jo

## 2022-10-31 ENCOUNTER — PREP FOR PROCEDURE (OUTPATIENT)
Dept: PAIN MEDICINE | Facility: CLINIC | Age: 62
End: 2022-10-31

## 2022-10-31 DIAGNOSIS — G89.4 CHRONIC PAIN SYNDROME: Primary | ICD-10-CM

## 2022-10-31 DIAGNOSIS — M51.36 DEGENERATION OF LUMBAR INTERVERTEBRAL DISC: ICD-10-CM

## 2022-10-31 DIAGNOSIS — G89.29 OTHER CHRONIC PAIN: ICD-10-CM

## 2022-10-31 DIAGNOSIS — M47.816 LUMBAR SPONDYLOSIS: ICD-10-CM

## 2022-10-31 DIAGNOSIS — M54.16 LUMBAR RADICULOPATHY: ICD-10-CM

## 2022-10-31 NOTE — TELEPHONE ENCOUNTER
Pts authorization for SCS trial is pending, should receive determination any day- saving procedure date of 11/17/22 for pts SCS trial- pt aware we also need a pre op and post op appt once insurance approval received  Spoke to pt, she cried throughout call explaining that her pain is worsening and she's bed ridden currently  She also left her cane in [place] last week and fell on her porch bc of this  She is having difficulty getting another cane  As far as her pain goes- she's always had intermittent pain and numbness in her left leg, and that is now constant- the pain goes from her hip down her entire left leg and into her left foot, and her foot feels like someone is "smashing a cinder block on it repeatedly"  She's also now experiencing pain down her right side which is new- from her right knee down to her foot and the right side just "feels broken" with numbness and tingling  Her right arm is constantly number and tingling, previously just intermittent, and she is scheduled for a neurology consult for this  The appt was supposed to be on 09/06/22 but their doctor was off that day, and they rescheduled her to 02/16/23  She says she is desperate for help, doesn't know what to do  She needs help w obtaining a new cane, and is reporting that her pain medication is not working well for her- it is lasting about half the time it is supposed to

## 2022-10-31 NOTE — TELEPHONE ENCOUNTER
Caller: Patient    Doctor: Dr Angie Navarro     Reason for call: [facility] Trial     Call back#: [phone]

## 2022-11-01 RX ORDER — TIZANIDINE 4 MG/1
TABLET ORAL
COMMUNITY
Start: 2022-10-08

## 2022-11-01 RX ORDER — ATORVASTATIN CALCIUM 80 MG/1
80 TABLET, FILM COATED ORAL EVERY EVENING
COMMUNITY
Start: 2022-10-14

## 2022-11-02 ENCOUNTER — OFFICE VISIT (OUTPATIENT)
Dept: GASTROENTEROLOGY | Facility: CLINIC | Age: 62
End: 2022-11-02

## 2022-11-02 VITALS
RESPIRATION RATE: 16 BRPM | SYSTOLIC BLOOD PRESSURE: 111 MMHG | TEMPERATURE: 99.2 F | BODY MASS INDEX: 28.75 KG/M2 | DIASTOLIC BLOOD PRESSURE: 76 MMHG | OXYGEN SATURATION: 96 % | HEART RATE: 70 BPM | WEIGHT: 168.4 LBS | HEIGHT: 64 IN

## 2022-11-02 DIAGNOSIS — R68.81 EARLY SATIETY: ICD-10-CM

## 2022-11-02 DIAGNOSIS — K58.2 IRRITABLE BOWEL SYNDROME WITH BOTH CONSTIPATION AND DIARRHEA: ICD-10-CM

## 2022-11-02 DIAGNOSIS — R11.0 NAUSEA: Primary | ICD-10-CM

## 2022-11-02 DIAGNOSIS — Z86.010 HISTORY OF COLON POLYPS: ICD-10-CM

## 2022-11-02 DIAGNOSIS — K31.84 GASTROPARESIS: ICD-10-CM

## 2022-11-02 DIAGNOSIS — K29.70 GASTRITIS WITHOUT BLEEDING, UNSPECIFIED CHRONICITY, UNSPECIFIED GASTRITIS TYPE: ICD-10-CM

## 2022-11-02 NOTE — PROGRESS NOTES
[facility] - Outpatient Follow-up Note  Azul Woodall 62 y o  female MRN: [id number]  Encounter: [id number]          ASSESSMENT AND PLAN:      1  Nausea  2  Gastroparesis  3  Early satiety    Patient was found to have a delayed gastric emptying on study in November  She continues with nausea and early satiety  She does take Zofran as needed with some relief  Discussed gastroparesis diet and lifestyle modifications  Also added information to AVS   Encouraged patient to take Zofran as needed       - gastroparesis diet lifestyle modifications   - Zofran as needed    4  Gastritis without bleeding, unspecified chronicity, unspecified gastritis type    Patient's last EGD was in January of 2021 that revealed severe gastritis  She continues to have issues of acid reflux, nausea and epigastric discomfort despite taking omeprazole 40 mg twice a day  Would recommend EGD to evaluate for etiology such as but not limited to peptic ulcer disease, H pylori reflux esophagitis as well as to evaluate previous gastritis     Process, risks and benefits discussed with patient, she is agreeable  - EGD; Future    5  History of colon polyps    Last colonoscopy was in November of 2021 that revealed multiple polyps with 9 adenomas and she was recommended to repeat in 1 year  Process, risks and benefits discussed with the patient, she is agreeable  - Colonoscopy; Future    6  Irritable bowel syndrome with both constipation and diarrhea    Patient reports that she has diarrhea most days  She has been taking dicyclomine daily which is mildly helpful  Discussed irritable bowel syndrome management with diet, medications, stress management and lifestyle  Patient reports that she is significantly stressed with multiple medical and home problems but states that she is having a spinal stimulator placed in the near future as well as a new home health aide who has been helpful       Will see patient back after procedures  ______________________________________________________________________    Marti Swartz is a 81-year-old female that presents for a follow-up of multiple GI issues  Patient was found to have a delayed gastric emptying study in November of last year  She continues with nausea, poor appetite and early satiety  She does take Zofran with some relief but does report that she continues drinking a lot of coffee and continues to smoke cigarettes daily  Her last EGD was in January 2021 that revealed severe gastritis, a normal esophagus and duodenum  She takes omeprazole 40 mg twice a day  She also reports continuing with abdominal discomfort and diarrhea most of the time  She states that she takes dicyclomine daily with some relief  Her last colonoscopy was in November of 2021 that revealed multiple polyps with 9 adenomas and she was recommended to repeat in 1 year  REVIEW OF SYSTEMS:  Review of Systems   Constitutional: Positive for appetite change  Gastrointestinal: Positive for abdominal distention, abdominal pain, diarrhea and nausea           Historical Information   Past Medical History:   Diagnosis Date   • Anxiety    • Asthma    • Bipolar 1 disorder ([facility] )    • Brain aneurysm    • Chronic pain disorder     spinal stenosis   • Concussion syndrome     neurological rx and balance rx   • COPD (chronic obstructive pulmonary disease) (HCC)    • Depression    • Disease of thyroid gland     nodules    • Family history of colon cancer     father   • Fibromyalgia, primary    • History of colon polyps    • Hyperlipidemia    • Hypertension    • 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    • Lumbar degenerative disc disease 2/16/2022   • Migraine    • Neuropathy     bilateral feet and hands   • Peripheral neuropathy    • Psychiatric disorder    • PTSD (post-traumatic stress disorder)    • Stroke ([facility] )     2012 no deficeits   • Thyroid Cancer    • TIA (transient ischemic attack)      Past Surgical History:   Procedure Laterality Date   • ABDOMINAL SURGERY      laproscopic/ endometriosis   • BRAIN SURGERY      aneurysm/ coiling procedure   • CARPAL TUNNEL RELEASE     • CHOLECYSTECTOMY     • DENTAL SURGERY     • EPIDURAL BLOCK INJECTION Right 3/3/2022    Procedure: C7-T1 interlaminar epidural steroid injection;  Surgeon: Tru Alvarez MD;  Location: [facility];  Service: Pain Management    • EPIDURAL BLOCK INJECTION N/A 4/21/2022    Procedure: C6-C7 BLOCK / INJECTION EPIDURAL STEROID CERVICAL;  Surgeon: Tru Alvarez MD;  Location: [facility];  Service: Pain Management    • EPIDURAL BLOCK INJECTION Left 6/21/2022    Procedure: BLOCK / INJECTION EPIDURAL STEROID LUMBAR  left L3-4 TFESI;  Surgeon: Tru Alvarez MD;  Location: [facility];  Service: Pain Management    • EYE SURGERY      cataract   • FL GUIDED NEEDLE PLAC BX/ASP/INJ  3/3/2022   • HYSTERECTOMY     • CO ESOPHAGOGASTRODUODENOSCOPY TRANSORAL DIAGNOSTIC N/A 2/8/2018    Procedure: EGD AND COLONOSCOPY;  Surgeon: Alba Wilson MD;  Location: [facility]; Service: Gastroenterology   • THYROID SURGERY     • TONSILLECTOMY       Social History   Social History     Substance and Sexual Activity   Alcohol Use Never   • Alcohol/week: 0 0 standard drinks     Social History     Substance and Sexual Activity   Drug Use Never    Comment: prescribed Belbuca     Social History     Tobacco Use   Smoking Status Current Every Day Smoker   • Packs/day: 2 00   • Types: Cigarettes   Smokeless Tobacco Never Used     History reviewed  No pertinent family history      Meds/Allergies       Current Outpatient Medications:   •  albuterol (2 5 mg/3 mL) 0 083 % nebulizer solution  •  albuterol (PROVENTIL HFA,VENTOLIN HFA) 90 mcg/act inhaler  •  ammonium lactate (LAC-HYDRIN) 12 % lotion  •  atorvastatin (LIPITOR) 40 mg tablet  •  atorvastatin (LIPITOR) 80 mg tablet  •  b complex vitamins capsule  •  Buprenorphine HCl (Belbuca) 300 MCG FILM  •  Butalbital-APAP-Caffeine (FIORICET PO)  •  carboxymethylcellulose 0 5 % SOLN  •  clonazePAM (KlonoPIN) 0 5 mg tablet  •  clonazePAM (KlonoPIN) 1 mg tablet  •  dicyclomine (BENTYL) 20 mg tablet  •  fenofibrate (TRICOR) 48 mg tablet  •  Fluticasone-Salmeterol (Advair) 500-50 mcg/dose inhaler  •  furosemide (LASIX) 20 mg tablet  •  Galcanezumab-gnlm (Emgality) 120 MG/ML SOAJ  •  HYDROcodone-acetaminophen (NORCO)  mg per tablet  •  lamoTRIgine (LaMICtal) 100 mg tablet  •  lamoTRIgine (LaMICtal) 150 MG tablet  •  latanoprost (XALATAN) 0 005 % ophthalmic solution  •  levothyroxine 137 mcg tablet  •  magnesium 30 MG tablet  •  Melatonin 10 MG CAPS  •  naloxone (Narcan) 4 mg/0 1 mL nasal spray  •  omeprazole (PriLOSEC) 40 MG capsule  •  ondansetron (ZOFRAN) 8 mg tablet  •  polyethylene glycol (MIRALAX) 17 g packet  •  pregabalin (LYRICA) 100 mg capsule  •  pregabalin (LYRICA) 150 mg capsule  •  Rexulti 0 25 MG tablet  •  risperiDONE (RisperDAL) 0 25 mg tablet  •  tiZANidine (ZANAFLEX) 2 mg tablet  •  tiZANidine (ZANAFLEX) 4 mg tablet  •  topiramate (TOPAMAX) 100 mg tablet  •  Zoster Vac Recomb Adjuvanted (Shingrix) 50 MCG/0 5ML SUSR    Allergies   Allergen Reactions   • Hydroxyzine Anaphylaxis     Claims it gives her convulsions  • Other    • Pollen Extract Itching   • Tree Extract    • Clarithromycin Rash     Pt denies   • Latex Rash   • Sulfa Antibiotics Rash     "severe skin burn"           Objective     Blood pressure 111/76, pulse 70, temperature 99 2 °F (37 3 °C), temperature source Tympanic, resp  rate 16, height 5' 4" (1 626 m), weight 76 4 kg (168 lb 6 4 oz), SpO2 96 %  Body mass index is 28 91 kg/m²  PHYSICAL EXAM:      General Appearance:   Alert, cooperative, no distress   HEENT:   Normocephalic, atraumatic, anicteric       Neck:  Supple, symmetrical, trachea midline   Lungs:   Clear to auscultation bilaterally; no rales, rhonchi or wheezing; respirations unlabored    Heart[de-identified]   Regular rate and rhythm; no murmur, rub, or gallop  Abdomen:   Soft, generalized tender, non-distended; normal bowel sounds; no masses, no organomegaly    Genitalia:   Deferred    Rectal:   Deferred    Extremities:  No cyanosis, clubbing or edema    Skin:  No jaundice, rashes, or lesions             Lab Results:   No visits with results within 1 Day(s) from this visit  Latest known visit with results is:   Appointment on 07/08/2022   Component Date Value   • Glucose, Fasting 07/08/2022 125 (A)   • Cholesterol 07/08/2022 191    • Triglycerides 07/08/2022 122    • HDL, Direct 07/08/2022 53    • LDL Calculated 07/08/2022 114 (A)   • Non-HDL-Chol (CHOL-HDL) 07/08/2022 138          Radiology Results:   No results found

## 2022-11-02 NOTE — PATIENT INSTRUCTIONS
Gastroparesis diet:  Changes to your diet  Maintaining adequate nutrition is the most important goal in the treatment of gastroparesis  Many people can manage gastroparesis with diet changes and dietary changes are the first step in managing this condition  Your doctor may refer you to a dietitian who can work with you to find foods that are easier for you to digest so that you're more likely to get enough calories and nutrients from the food you eat   A dietitian might suggest that you try to:  Eat smaller meals more frequently   Chew food thoroughly   Eat well-cooked fruits and vegetables rather than raw fruits and vegetables   Avoid fibrous fruits and vegetables, such as oranges and broccoli, which may cause bezoars   Choose mostly low-fat foods, but if you can tolerate them, add small servings of fatty foods to your diet   Try soups and pureed foods if liquids are easier for you to swallow   Drink about 34 to 51 ounces (1 to 1 5 liters) of water a day   Exercise gently after you eat, such as going for a walk   Avoid carbonated drinks, alcohol and smoking   Try to avoid lying down for 2 hours after a meal   Take a multivitamin daily     Here's a brief list of foods recommended for people with gastroparesis (your dietitian can give you a more comprehensive list):  Starches  White bread and rolls and "light" whole-wheat bread without nuts or seeds   Plain or egg bagels   English muffins   Flour or corn tortillas   Pancakes   Puffed wheat and rice cereals   Cream of wheat or rice   White crackers   Potatoes, white or sweet (no skin)   Baked french fries   Rice   Pasta  Protein  Lean beef, veal and pork (not fried)   Chicken or turkey (no skin and not fried)   Crab, lobster, shrimp, clams, scallops, oysters   Tuna (packed in water)   M D C  Holdings   Eggs   Tofu   Strained meat baby food  Fruits and vegetables  Baby food vegetables and fruits   Tomato sauce, paste, puree, juice   Carrots (cooked)   Beets (cooked) Mushrooms (cooked)   Vegetable juice   Vegetable broth   Fruit juices and drinks   Applesauce   Bananas   Peaches and pears (canned)  Dairy  Milk, if tolerated   Yogurt (without fruit pieces)   Custard and pudding   Frozen yogurt      Scheduled date of EGD/colonoscopy (as of today): 1/10/2023  Physician performing EGD/colonoscopy: Dr Caryn Mckeon MD  Location of EGD/colonoscopy: [address] [place] [address]  Desired bowel prep reviewed with patient:miralax/dulcolax  Instructions reviewed with patient by:  Shelli de la cruz  Clearances: N/A    Follow up appointment made

## 2022-11-04 ENCOUNTER — TELEPHONE (OUTPATIENT)
Dept: PAIN MEDICINE | Facility: MEDICAL CENTER | Age: 62
End: 2022-11-04

## 2022-11-04 NOTE — TELEPHONE ENCOUNTER
Caller: Patient    Doctor: Jenny Reynoso    Reason for call: patient is returning a missed call    Call back#: [phone]

## 2022-11-08 ENCOUNTER — TELEPHONE (OUTPATIENT)
Dept: PAIN MEDICINE | Facility: MEDICAL CENTER | Age: 62
End: 2022-11-08

## 2022-11-08 ENCOUNTER — APPOINTMENT (OUTPATIENT)
Dept: LAB | Facility: HOSPITAL | Age: 62
End: 2022-11-08
Attending: ANESTHESIOLOGY

## 2022-11-08 ENCOUNTER — TELEPHONE (OUTPATIENT)
Dept: RADIOLOGY | Facility: CLINIC | Age: 62
End: 2022-11-08

## 2022-11-08 ENCOUNTER — OFFICE VISIT (OUTPATIENT)
Dept: PAIN MEDICINE | Facility: CLINIC | Age: 62
End: 2022-11-08

## 2022-11-08 VITALS
WEIGHT: 169.4 LBS | SYSTOLIC BLOOD PRESSURE: 118 MMHG | HEIGHT: 64 IN | BODY MASS INDEX: 28.92 KG/M2 | HEART RATE: 72 BPM | DIASTOLIC BLOOD PRESSURE: 87 MMHG

## 2022-11-08 DIAGNOSIS — Z79.01 CHRONIC ANTICOAGULATION: ICD-10-CM

## 2022-11-08 DIAGNOSIS — Z79.899 ENCOUNTER FOR LONG-TERM (CURRENT) USE OF OTHER MEDICATIONS: ICD-10-CM

## 2022-11-08 DIAGNOSIS — M79.7 FIBROMYALGIA, PRIMARY: ICD-10-CM

## 2022-11-08 DIAGNOSIS — G89.4 CHRONIC PAIN SYNDROME: ICD-10-CM

## 2022-11-08 DIAGNOSIS — G89.4 CHRONIC PAIN SYNDROME: Primary | ICD-10-CM

## 2022-11-08 DIAGNOSIS — M54.16 LUMBAR RADICULOPATHY: ICD-10-CM

## 2022-11-08 DIAGNOSIS — M54.50 CHRONIC LOW BACK PAIN, UNSPECIFIED BACK PAIN LATERALITY, UNSPECIFIED WHETHER SCIATICA PRESENT: ICD-10-CM

## 2022-11-08 DIAGNOSIS — G89.29 CHRONIC LOW BACK PAIN, UNSPECIFIED BACK PAIN LATERALITY, UNSPECIFIED WHETHER SCIATICA PRESENT: ICD-10-CM

## 2022-11-08 DIAGNOSIS — M47.816 LUMBAR SPONDYLOSIS: ICD-10-CM

## 2022-11-08 DIAGNOSIS — M51.36 LUMBAR DEGENERATIVE DISC DISEASE: ICD-10-CM

## 2022-11-08 DIAGNOSIS — M54.16 LUMBAR RADICULOPATHY: Primary | ICD-10-CM

## 2022-11-08 LAB
ALBUMIN SERPL BCP-MCNC: 3.5 G/DL (ref 3.5–5)
ALP SERPL-CCNC: 83 U/L (ref 46–116)
ALT SERPL W P-5'-P-CCNC: 25 U/L (ref 12–78)
ANION GAP SERPL CALCULATED.3IONS-SCNC: 6 MMOL/L (ref 4–13)
APTT PPP: 18 SECONDS (ref 23–37)
AST SERPL W P-5'-P-CCNC: 24 U/L (ref 5–45)
BILIRUB SERPL-MCNC: 0.28 MG/DL (ref 0.2–1)
BUN SERPL-MCNC: 16 MG/DL (ref 5–25)
CALCIUM SERPL-MCNC: 8.9 MG/DL (ref 8.3–10.1)
CHLORIDE SERPL-SCNC: 112 MMOL/L (ref 96–108)
CO2 SERPL-SCNC: 23 MMOL/L (ref 21–32)
CREAT SERPL-MCNC: 0.87 MG/DL (ref 0.6–1.3)
ERYTHROCYTE [DISTWIDTH] IN BLOOD BY AUTOMATED COUNT: 13.1 % (ref 11.6–15.1)
EST. AVERAGE GLUCOSE BLD GHB EST-MCNC: 126 MG/DL
GFR SERPL CREATININE-BSD FRML MDRD: 72 ML/MIN/1.73SQ M
GLUCOSE SERPL-MCNC: 90 MG/DL (ref 65–140)
HBA1C MFR BLD: 6 %
HCT VFR BLD AUTO: 40 % (ref 34.8–46.1)
HGB BLD-MCNC: 13.1 G/DL (ref 11.5–15.4)
INR PPP: 0.96 (ref 0.84–1.19)
MCH RBC QN AUTO: 30.3 PG (ref 26.8–34.3)
MCHC RBC AUTO-ENTMCNC: 32.8 G/DL (ref 31.4–37.4)
MCV RBC AUTO: 93 FL (ref 82–98)
PLATELET # BLD AUTO: 210 THOUSANDS/UL (ref 149–390)
PMV BLD AUTO: 10.3 FL (ref 8.9–12.7)
POTASSIUM SERPL-SCNC: 3.6 MMOL/L (ref 3.5–5.3)
PROT SERPL-MCNC: 7.6 G/DL (ref 6.4–8.4)
PROTHROMBIN TIME: 13 SECONDS (ref 11.6–14.5)
RBC # BLD AUTO: 4.32 MILLION/UL (ref 3.81–5.12)
SODIUM SERPL-SCNC: 141 MMOL/L (ref 135–147)
WBC # BLD AUTO: 8.85 THOUSAND/UL (ref 4.31–10.16)

## 2022-11-08 NOTE — TELEPHONE ENCOUNTER
Pt was in today for H&P/ consents for SCS trial with Dr Samira Alaniz  Pt discussed removal w Dr Samira Alaniz and both decided [facility] (is currently on 's [place] schedule on Wed 11/23)  Called pt, home phone voicemail is full, unable to leave message  LMOM for her on her cell to call me back to discuss appt time on Fri 11/25

## 2022-11-08 NOTE — TELEPHONE ENCOUNTER
Caller: Patient    Agustin Nga    Reason for call: Patient is returning a missed call    Call back#: [phone]

## 2022-11-08 NOTE — PROGRESS NOTES
Assessment    1  Lumbar radiculopathy     2  Lumbar degenerative disc disease     3  Lumbar spondylosis     4  Fibromyalgia, primary         Plan  The spinal cord stimulator trial was discussed in depth with the patient  The patient was advised that a stimulator lead will be placed percutaneously through an epidural needle, with intra-op stimulation done to confirm appropriate lead placement  The lead will then be connected to an external generator and secured to the skin  The patient was advised that an industry clinical specialist will be present for the trial, program the stimulator and explain how to use it  During the trial, the patient was instructed to perform their activities of daily living, but limit twisting and bending motions, and no lifting greater than 10 pounds  The patient was advised to refrain from tub baths, showers, swimming, and hot tubs during the trial  The patient will return to the office for trial evaluation and lead removal on 11/25/2022  At that time, if the trial is successful, the patient will be referred to Dr Lexie Palacio for permanent spinal cord stimulator placement  Pre-procedure instructions were reviewed with the patient  The patient was given a prescription for blood work to be done by 11/08/2022  Complete risks and benefits including bleeding, infection, headache, tissue reaction, nerve injury and allergic reaction were discussed  The approach was demonstrated using models and literature was provided  The patient was advised that they would receive pre-procedure antibiotics and a prescription to take during the week of the trial     The patient will next follow- up on 11/25/2022 for the stimulator trial     No orders of the defined types were placed in this encounter  History of Present Illness  The patient is a 62 y o  female scheduled for an [facility] spinal cord stimulator trial to treat chronic pain from low back and bilateral leg pain    The current pain pattern includes 10/10 constant burning, sharp, cramping, shooting, numbness, pins and needles, stabbing pain worse in morning time  The patient's goals for the trial include increased quality sleep, increased ability to stand  Pain Assessment Measures   Numeric Rating Scale 10   Oswestry Disability Index Score/Neck Disability Index Score 37   PROMIS-29   - Physical Function 9   - Anxiety 14   - Depression 13   - Fatigue 17   - Sleep Disturbance 20   - Ability to Participate in Social Roles And Activities 10   - Pain Interference 18         I have personally reviewed and/or updated the patient's past medical history, past surgical history, family history, social history, current medications, allergies, and vital signs today  Review of Systems   Respiratory: Positive for shortness of breath  Cardiovascular: Positive for chest pain  Gastrointestinal: Positive for diarrhea and nausea  Negative for constipation and vomiting  Musculoskeletal: Positive for arthralgias, gait problem, joint swelling and myalgias  Decreased range of motion  Joint stiffness  Pain in extremity - all   Neurological: Positive for dizziness  Negative for seizures and weakness  All other systems reviewed and are negative         Past Medical History:   Diagnosis Date   • Anxiety    • Asthma    • Bipolar 1 disorder ([facility] [address] )    • Brain aneurysm    • Chronic pain disorder     spinal stenosis   • Concussion syndrome     neurological rx and balance rx   • COPD (chronic obstructive pulmonary disease) ([facility])    • Depression    • Disease of thyroid gland     nodules    • Family history of colon cancer     father   • Fibromyalgia, primary    • History of colon polyps    • Hyperlipidemia    • Hypertension    • 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    • Lumbar degenerative disc disease 2/16/2022   • Migraine    • Neuropathy     bilateral feet and hands   • Peripheral neuropathy    • Psychiatric disorder    • PTSD (post-traumatic stress disorder)    • Stroke [facility]     2012 no deficeits   • Thyroid Cancer    • TIA (transient ischemic attack)        Past Surgical History:   Procedure Laterality Date   • ABDOMINAL SURGERY      laproscopic/ endometriosis   • BRAIN SURGERY      aneurysm/ coiling procedure   • CARPAL TUNNEL RELEASE     • CHOLECYSTECTOMY     • DENTAL SURGERY     • EPIDURAL BLOCK INJECTION Right 3/3/2022    Procedure: C7-T1 interlaminar epidural steroid injection;  Surgeon: Sandip Velásquez MD;  [facility]: [facility];  Service: Pain Management    • EPIDURAL BLOCK INJECTION N/A 4/21/2022    Procedure: C6-C7 BLOCK / INJECTION EPIDURAL STEROID CERVICAL;  Surgeon: Sandip Velásquez MD;  Location: [facility];  Service: Pain Management    • EPIDURAL BLOCK INJECTION Left 6/21/2022    Procedure: BLOCK / INJECTION EPIDURAL STEROID LUMBAR  left L3-4 TFESI;  Surgeon: Sandip Velásquez MD;  [facility]: [facility];  Service: Pain Management    • EYE SURGERY      cataract   • FL GUIDED NEEDLE PLAC BX/ASP/INJ  3/3/2022   • HYSTERECTOMY     • [place] ESOPHAGOGASTRODUODENOSCOPY TRANSORAL DIAGNOSTIC N/A 2/8/2018    Procedure: EGD AND COLONOSCOPY;  Surgeon: Nola Trejo MD;  [facility]: [facility]; Service: Gastroenterology   • THYROID SURGERY     • TONSILLECTOMY         No family history on file      Social History     Occupational History   • Not on file   Tobacco Use   • Smoking status: Current Every Day Smoker     Packs/day: 2 00     Types: Cigarettes   • Smokeless tobacco: Never Used   Vaping Use   • Vaping Use: Never used   Substance and Sexual Activity   • Alcohol use: Never     Alcohol/week: 0 0 standard drinks   • Drug use: Never     Comment: prescribed Belbuca   • Sexual activity: Yes     Partners: Male         Current Outpatient Medications:   •  albuterol (2 5 mg/3 mL) 0 083 % nebulizer solution, albuterol sulfate HFA 90 mcg/actuation aerosol inhaler  inhale 2 puffs by mouth every 6 hours if needed for wheezing (Patient not taking: No sig reported), Disp: , Rfl:   •  albuterol (PROVENTIL HFA,VENTOLIN HFA) 90 mcg/act inhaler, Inhale 2 puffs every 6 (six) hours, Disp: , Rfl:   •  ammonium lactate (LAC-HYDRIN) 12 % lotion, Every 12 hours (Patient not taking: No sig reported), Disp: , Rfl:   •  atorvastatin (LIPITOR) 40 mg tablet, Take 80 mg by mouth daily at bedtime, Disp: , Rfl:   •  atorvastatin (LIPITOR) 80 mg tablet, Take 80 mg by mouth every evening, Disp: , Rfl:   •  b complex vitamins capsule, Take 1 capsule by mouth daily, Disp: , Rfl:   •  Buprenorphine HCl (Belbuca) 300 MCG FILM, Apply 300 mcg to cheek 2 (two) times a day, Disp: , Rfl:   •  Butalbital-APAP-Caffeine (FIORICET PO), Take by mouth, Disp: , Rfl:   •  carboxymethylcellulose 0 5 % SOLN, Administer 1 drop to both eyes daily as needed for dry eyes, Disp: 1 Bottle, Rfl: 0  •  clonazePAM (KlonoPIN) 0 5 mg tablet, Take 0 5 mg by mouth daily at bedtime, Disp: , Rfl:   •  clonazePAM (KlonoPIN) 1 mg tablet, Take 1 mg by mouth daily , Disp: , Rfl:   •  dicyclomine (BENTYL) 20 mg tablet, Take 20 mg by mouth every 6 (six) hours, Disp: , Rfl:   •  fenofibrate (TRICOR) 48 mg tablet, Take 48 mg by mouth daily, Disp: , Rfl:   •  Fluticasone-Salmeterol (Advair) 500-50 mcg/dose inhaler, Inhale 1 puff 2 (two) times a day, Disp: , Rfl:   •  furosemide (LASIX) 20 mg tablet, Take 20 mg by mouth every other day Taking as needed , Disp: , Rfl:   •  Galcanezumab-gnlm (Emgality) 120 MG/ML SOAJ, Inject 120 mg under the skin every 30 (thirty) days, Disp: , Rfl:   •  HYDROcodone-acetaminophen (NORCO)  mg per tablet, 2 (two) times a day, Disp: , Rfl:   •  lamoTRIgine (LaMICtal) 100 mg tablet, Take 100 mg by mouth daily, Disp: , Rfl:   •  lamoTRIgine (LaMICtal) 150 MG tablet, Take 100 mg by mouth daily at bedtime, Disp: , Rfl:   •  latanoprost (XALATAN) 0 005 % ophthalmic solution, latanoprost 0 005 % eye drops (Patient not taking: No sig reported), Disp: , Rfl:   •  levothyroxine 137 mcg tablet, Take 137 mcg by mouth daily, Disp: , Rfl:   •  magnesium 30 MG tablet, Take 500 mg by mouth 2 (two) times a day, Disp: , Rfl:   •  Melatonin 10 MG CAPS, Take 1 tablet by mouth, Disp: , Rfl:   •  naloxone (Narcan) 4 mg/0 1 mL nasal spray, Narcan 4 mg/actuation nasal spray (Patient not taking: No sig reported), Disp: , Rfl:   •  omeprazole (PriLOSEC) 40 MG capsule, Take 80 mg by mouth 2 (two) times a day, Disp: , Rfl:   •  ondansetron (ZOFRAN) 8 mg tablet, Take 1 tablet (8 mg total) by mouth every 8 (eight) hours as needed for nausea or vomiting, Disp: 20 tablet, Rfl: 0  •  polyethylene glycol (MIRALAX) 17 g packet, Take 17 g by mouth daily (Patient not taking: No sig reported), Disp: 30 each, Rfl: 5  •  pregabalin (LYRICA) 100 mg capsule, take 1 capsule by mouth three times a day, Disp: , Rfl:   •  pregabalin (LYRICA) 150 mg capsule, take 1 capsule by mouth NIGHTLY, Disp: , Rfl:   •  Rexulti 0 25 MG tablet, daily at bedtime, Disp: , Rfl:   •  risperiDONE (RisperDAL) 0 25 mg tablet, Take 0 5 mg by mouth 2 (two) times a day, Disp: , Rfl:   •  tiZANidine (ZANAFLEX) 2 mg tablet, Take 4 mg by mouth every 6 (six) hours as needed, Disp: , Rfl:   •  tiZANidine (ZANAFLEX) 4 mg tablet, take 2 tablets by mouth every 6 hours if needed for muscle spasm, Disp: , Rfl:   •  topiramate (TOPAMAX) 100 mg tablet, Take 100 mg by mouth every 12 (twelve) hours 150mg in the morning and one tab at bedtime, Disp: , Rfl:   •  Zoster Vac Recomb Adjuvanted (Shingrix) 50 MCG/0 5ML SUSR, Shingrix (PF) 50 mcg/0 5 mL intramuscular suspension, kit  inject 0 5 milliliter intramuscularly, Disp: , Rfl:     Allergies   Allergen Reactions   • Hydroxyzine Anaphylaxis     Claims it gives her convulsions      • Other    • Pollen Extract Itching   • Tree Extract    • Clarithromycin Rash     Pt denies   • Latex Rash   • Sulfa Antibiotics Rash     "severe skin burn"       Physical Exam    /87 (BP Location: Left arm, Patient Position: Sitting, Cuff Size: Large)   Pulse 72   Ht 5' 4" (1 626 m)   Wt 76 8 kg (169 lb 6 4 oz)   BMI 29 08 kg/m²     Constitutional:normal, well developed, well nourished, alert, in no distress and non-toxic and no overt pain behavior    Eyes:anicteric  HEENT:grossly intact  Neck:supple, symmetric, trachea midline and no masses   Pulmonary:even and unlabored  Cardiovascular:No edema or pitting edema present  Skin:Normal without rashes or lesions and well hydrated  Psychiatric:Mood and affect appropriate  Neurologic:Cranial Nerves II-XII grossly intact  Musculoskeletal:antalgic    Imaging

## 2022-11-08 NOTE — H&P (VIEW-ONLY)
Assessment    1  Lumbar radiculopathy     2  Lumbar degenerative disc disease     3  Lumbar spondylosis     4  Fibromyalgia, primary         Plan  The spinal cord stimulator trial was discussed in depth with the patient  The patient was advised that a stimulator lead will be placed percutaneously through an epidural needle, with intra-op stimulation done to confirm appropriate lead placement  The lead will then be connected to an external generator and secured to the skin  The patient was advised that an industry clinical specialist will be present for the trial, program the stimulator and explain how to use it  During the trial, the patient was instructed to perform their activities of daily living, but limit twisting and bending motions, and no lifting greater than 10 pounds  The patient was advised to refrain from tub baths, showers, swimming, and hot tubs during the trial  The patient will return to the office for trial evaluation and lead removal on 11/25/2022  At that time, if the trial is successful, the patient will be referred to Dr Pati Marino for permanent spinal cord stimulator placement  Pre-procedure instructions were reviewed with the patient  The patient was given a prescription for blood work to be done by 11/08/2022  Complete risks and benefits including bleeding, infection, headache, tissue reaction, nerve injury and allergic reaction were discussed  The approach was demonstrated using models and literature was provided  The patient was advised that they would receive pre-procedure antibiotics and a prescription to take during the week of the trial     The patient will next follow- up on 11/25/2022 for the stimulator trial     No orders of the defined types were placed in this encounter  History of Present Illness  The patient is a 62 y o  female scheduled for an [facility] spinal cord stimulator trial to treat chronic pain from low back and bilateral leg pain    The current pain pattern includes 10/10 constant burning, sharp, cramping, shooting, numbness, pins and needles, stabbing pain worse in morning time  The patient's goals for the trial include increased quality sleep, increased ability to stand  Pain Assessment Measures   Numeric Rating Scale 10   Oswestry Disability Index Score/Neck Disability Index Score 37   PROMIS-29   - Physical Function 9   - Anxiety 14   - Depression 13   - Fatigue 17   - Sleep Disturbance 20   - Ability to Participate in Social Roles And Activities 10   - Pain Interference 18         I have personally reviewed and/or updated the patient's past medical history, past surgical history, family history, social history, current medications, allergies, and vital signs today  Review of Systems   Respiratory: Positive for shortness of breath  Cardiovascular: Positive for chest pain  Gastrointestinal: Positive for diarrhea and nausea  Negative for constipation and vomiting  Musculoskeletal: Positive for arthralgias, gait problem, joint swelling and myalgias  Decreased range of motion  Joint stiffness  Pain in extremity - all   Neurological: Positive for dizziness  Negative for seizures and weakness  All other systems reviewed and are negative         Past Medical History:   Diagnosis Date   • Anxiety    • Asthma    • Bipolar 1 disorder ([facility] [address] )    • Brain aneurysm    • Chronic pain disorder     spinal stenosis   • Concussion syndrome     neurological rx and balance rx   • COPD (chronic obstructive pulmonary disease) ([facility])    • Depression    • Disease of thyroid gland     nodules    • Family history of colon cancer     father   • Fibromyalgia, primary    • History of colon polyps    • Hyperlipidemia    • Hypertension    • 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     2012 no deficeits   • Thyroid Cancer    • TIA (transient ischemic attack)        Past Surgical History:   Procedure Laterality Date   • ABDOMINAL SURGERY      laproscopic/ endometriosis   • BRAIN SURGERY      aneurysm/ coiling procedure   • CARPAL TUNNEL RELEASE     • CHOLECYSTECTOMY     • DENTAL SURGERY     • EPIDURAL BLOCK INJECTION Right 3/3/2022    Procedure: C7-T1 interlaminar epidural steroid injection;  Surgeon: Kenia Washington MD;  [facility];  Service: Pain Management    • EPIDURAL BLOCK INJECTION N/A 4/21/2022    Procedure: C6-C7 BLOCK / INJECTION EPIDURAL STEROID CERVICAL;  Surgeon: Kenia Washington MD;  [facility];  Service: Pain Management    • EPIDURAL BLOCK INJECTION Left 6/21/2022    Procedure: BLOCK / INJECTION EPIDURAL STEROID LUMBAR  left L3-4 TFESI;  Surgeon: Kenia Washington MD;  [facility]: [facility];  Service: Pain Management    • EYE SURGERY      cataract   • FL GUIDED NEEDLE PLAC BX/ASP/INJ  3/3/2022   • HYSTERECTOMY     • [place] ESOPHAGOGASTRODUODENOSCOPY TRANSORAL DIAGNOSTIC N/A 2/8/2018    Procedure: EGD AND COLONOSCOPY;  Surgeon: Shelby Cuevas MD;  Location:  GI LAB; Service: Gastroenterology   • THYROID SURGERY     • TONSILLECTOMY         No family history on file      Social History     Occupational History   • Not on file   Tobacco Use   • Smoking status: Current Every Day Smoker     Packs/day: 2 00     Types: Cigarettes   • Smokeless tobacco: Never Used   Vaping Use   • Vaping Use: Never used   Substance and Sexual Activity   • Alcohol use: Never     Alcohol/week: 0 0 standard drinks   • Drug use: Never     Comment: prescribed Belbuca   • Sexual activity: Yes     Partners: Male         Current Outpatient Medications:   •  albuterol (2 5 mg/3 mL) 0 083 % nebulizer solution, albuterol sulfate HFA 90 mcg/actuation aerosol inhaler  inhale 2 puffs by mouth every 6 hours if needed for wheezing (Patient not taking: No sig reported), Disp: , Rfl:   •  albuterol (PROVENTIL HFA,VENTOLIN HFA) 90 mcg/act inhaler, Inhale 2 puffs every 6 (six) hours, Disp: , Rfl:   •  ammonium lactate (LAC-HYDRIN) 12 % lotion, Every 12 hours (Patient not taking: No sig reported), Disp: , Rfl:   •  atorvastatin (LIPITOR) 40 mg tablet, Take 80 mg by mouth daily at bedtime, Disp: , Rfl:   •  atorvastatin (LIPITOR) 80 mg tablet, Take 80 mg by mouth every evening, Disp: , Rfl:   •  b complex vitamins capsule, Take 1 capsule by mouth daily, Disp: , Rfl:   •  Buprenorphine HCl (Belbuca) 300 MCG FILM, Apply 300 mcg to cheek 2 (two) times a day, Disp: , Rfl:   •  Butalbital-APAP-Caffeine (FIORICET PO), Take by mouth, Disp: , Rfl:   •  carboxymethylcellulose 0 5 % SOLN, Administer 1 drop to both eyes daily as needed for dry eyes, Disp: 1 Bottle, Rfl: 0  •  clonazePAM (KlonoPIN) 0 5 mg tablet, Take 0 5 mg by mouth daily at bedtime, Disp: , Rfl:   •  clonazePAM (KlonoPIN) 1 mg tablet, Take 1 mg by mouth daily , Disp: , Rfl:   •  dicyclomine (BENTYL) 20 mg tablet, Take 20 mg by mouth every 6 (six) hours, Disp: , Rfl:   •  fenofibrate (TRICOR) 48 mg tablet, Take 48 mg by mouth daily, Disp: , Rfl:   •  Fluticasone-Salmeterol (Advair) 500-50 mcg/dose inhaler, Inhale 1 puff 2 (two) times a day, Disp: , Rfl:   •  furosemide (LASIX) 20 mg tablet, Take 20 mg by mouth every other day Taking as needed , Disp: , Rfl:   •  Galcanezumab-gnlm (Emgality) 120 MG/ML SOAJ, Inject 120 mg under the skin every 30 (thirty) days, Disp: , Rfl:   •  HYDROcodone-acetaminophen (NORCO)  mg per tablet, 2 (two) times a day, Disp: , Rfl:   •  lamoTRIgine (LaMICtal) 100 mg tablet, Take 100 mg by mouth daily, Disp: , Rfl:   •  lamoTRIgine (LaMICtal) 150 MG tablet, Take 100 mg by mouth daily at bedtime, Disp: , Rfl:   •  latanoprost (XALATAN) 0 005 % ophthalmic solution, latanoprost 0 005 % eye drops (Patient not taking: No sig reported), Disp: , Rfl:   •  levothyroxine 137 mcg tablet, Take 137 mcg by mouth daily, Disp: , Rfl:   •  magnesium 30 MG tablet, Take 500 mg by mouth 2 (two) times a day, Disp: , Rfl:   •  Melatonin 10 MG CAPS, Take 1 tablet by mouth, Disp: , Rfl:   •  naloxone (Narcan) 4 mg/0 1 mL nasal spray, Narcan 4 mg/actuation nasal spray (Patient not taking: No sig reported), Disp: , Rfl:   •  omeprazole (PriLOSEC) 40 MG capsule, Take 80 mg by mouth 2 (two) times a day, Disp: , Rfl:   •  ondansetron (ZOFRAN) 8 mg tablet, Take 1 tablet (8 mg total) by mouth every 8 (eight) hours as needed for nausea or vomiting, Disp: 20 tablet, Rfl: 0  •  polyethylene glycol (MIRALAX) 17 g packet, Take 17 g by mouth daily (Patient not taking: No sig reported), Disp: 30 each, Rfl: 5  •  pregabalin (LYRICA) 100 mg capsule, take 1 capsule by mouth three times a day, Disp: , Rfl:   •  pregabalin (LYRICA) 150 mg capsule, take 1 capsule by mouth NIGHTLY, Disp: , Rfl:   •  Rexulti 0 25 MG tablet, daily at bedtime, Disp: , Rfl:   •  risperiDONE (RisperDAL) 0 25 mg tablet, Take 0 5 mg by mouth 2 (two) times a day, Disp: , Rfl:   •  tiZANidine (ZANAFLEX) 2 mg tablet, Take 4 mg by mouth every 6 (six) hours as needed, Disp: , Rfl:   •  tiZANidine (ZANAFLEX) 4 mg tablet, take 2 tablets by mouth every 6 hours if needed for muscle spasm, Disp: , Rfl:   •  topiramate (TOPAMAX) 100 mg tablet, Take 100 mg by mouth every 12 (twelve) hours 150mg in the morning and one tab at bedtime, Disp: , Rfl:   •  Zoster Vac Recomb Adjuvanted (Shingrix) 50 MCG/0 5ML SUSR, Shingrix (PF) 50 mcg/0 5 mL intramuscular suspension, kit  inject 0 5 milliliter intramuscularly, Disp: , Rfl:     Allergies   Allergen Reactions   • Hydroxyzine Anaphylaxis     Claims it gives her convulsions      • Other    • Pollen Extract Itching   • Tree Extract    • Clarithromycin Rash     Pt denies   • Latex Rash   • Sulfa Antibiotics Rash     "severe skin burn"       Physical Exam    /87 (BP Location: Left arm, Patient Position: Sitting, Cuff Size: Large)   Pulse 72   Ht 5' 4" (1 626 m)   Wt 76 8 kg (169 lb 6 4 oz)   BMI 29 08 kg/m²     Constitutional:normal, well developed, well nourished, alert, in no distress and non-toxic and no overt pain behavior    Eyes:anicteric  HEENT:grossly intact  Neck:supple, symmetric, trachea midline and no masses   Pulmonary:even and unlabored  Cardiovascular:No edema or pitting edema present  Skin:Normal without rashes or lesions and well hydrated  Psychiatric:Mood and affect appropriate  Neurologic:Cranial Nerves II-XII grossly intact  Musculoskeletal:antalgic    Imaging

## 2022-11-10 NOTE — TELEPHONE ENCOUNTER
Spoke to pt, rescheduled SCS lead pull to Fri 11/25/22 at [address] with Rosalba in [place]      Pt very appreciative of help throughout scheduling trial

## 2022-11-10 NOTE — TELEPHONE ENCOUNTER
Spoke to pt, rescheduled SCS lead pull to Fri 11/25/22 at [address] with Rosalba in [facility]       Pt very appreciative of help throughout scheduling trial

## 2022-11-16 RX ORDER — RISPERIDONE 0.5 MG/1
TABLET ORAL
COMMUNITY
Start: 2022-11-04

## 2022-11-16 NOTE — TELEPHONE ENCOUNTER
S/w pt who states that she just rec'd a call form Ashlyn Short with arrival time  and location for tomorrow and is feeling better  Pt is requesting anesthesia for SCS trial  Pt advised that trials are not done under anesthesia but she would like RM to be aware that she is requesting it  She will do whatever RM is comfortable with

## 2022-11-16 NOTE — TELEPHONE ENCOUNTER
--Sunitha Velazquez- please see RM's task--    S/W pt  Advised pt of the same  Pt verbalized understanding

## 2022-11-16 NOTE — TELEPHONE ENCOUNTER
Caller: EMETERIO  Doctor: Rosi   Call back#: [phone]    Reason for call: Pt is calling in requesting a call back asap  She said that she keeps getting calls from the hospital  She is worried that the procedure isn't going to happen  Myesha from [address] [place] [address] called about pre admin testing  Please call her back  ( Pt doesn't know what is going on) [phone]   Please call before 3 PM

## 2022-11-17 ENCOUNTER — TELEPHONE (OUTPATIENT)
Dept: PAIN MEDICINE | Facility: CLINIC | Age: 62
End: 2022-11-17

## 2022-11-17 ENCOUNTER — APPOINTMENT (OUTPATIENT)
Dept: RADIOLOGY | Facility: HOSPITAL | Age: 62
End: 2022-11-17
Payer: COMMERCIAL

## 2022-11-17 ENCOUNTER — HOSPITAL ENCOUNTER (OUTPATIENT)
Facility: HOSPITAL | Age: 62
Setting detail: OUTPATIENT SURGERY
Discharge: HOME | End: 2022-11-17
Attending: ANESTHESIOLOGY | Admitting: ANESTHESIOLOGY
Payer: COMMERCIAL

## 2022-11-17 VITALS
DIASTOLIC BLOOD PRESSURE: 64 MMHG | TEMPERATURE: 98 F | SYSTOLIC BLOOD PRESSURE: 125 MMHG | WEIGHT: 169 LBS | HEIGHT: 64 IN | OXYGEN SATURATION: 99 % | RESPIRATION RATE: 16 BRPM | BODY MASS INDEX: 28.85 KG/M2 | HEART RATE: 74 BPM

## 2022-11-17 PROCEDURE — 63650 IMPLANT NEUROELECTRODES: CPT | Performed by: ANESTHESIOLOGY

## 2022-11-17 PROCEDURE — C1897 LEAD, NEUROSTIM TEST KIT: HCPCS | Performed by: ANESTHESIOLOGY

## 2022-11-17 DEVICE — TRIAL LEAD KIT, 50CM WITH 5MM SPACING, CURVED STYLET .016"
Type: IMPLANTABLE DEVICE | Status: FUNCTIONAL
Brand: SENZA

## 2022-11-17 RX ORDER — BUPIVACAINE HYDROCHLORIDE 5 MG/ML
INJECTION, SOLUTION EPIDURAL; INTRACAUDAL AS NEEDED
Status: DISCONTINUED | OUTPATIENT
Start: 2022-11-17 | End: 2022-11-17 | Stop reason: HOSPADM

## 2022-11-17 RX ORDER — CEFAZOLIN SODIUM 1 G/50ML
1000 SOLUTION INTRAVENOUS ONCE
Status: DISCONTINUED | OUTPATIENT
Start: 2022-11-17 | End: 2022-11-17 | Stop reason: HOSPADM

## 2022-11-17 RX ORDER — SODIUM CHLORIDE, SODIUM LACTATE, POTASSIUM CHLORIDE, CALCIUM CHLORIDE 600; 310; 30; 20 MG/100ML; MG/100ML; MG/100ML; MG/100ML
50 INJECTION, SOLUTION INTRAVENOUS ONCE
Status: CANCELLED | OUTPATIENT
Start: 2022-11-17 | End: 2022-11-17

## 2022-11-17 RX ORDER — CEFAZOLIN SODIUM 1 G/50ML
1000 SOLUTION INTRAVENOUS ONCE
Status: CANCELLED | OUTPATIENT
Start: 2022-11-17 | End: 2022-11-17

## 2022-11-17 RX ORDER — LORAZEPAM 1 MG/1
2 TABLET ORAL ONCE
Status: COMPLETED | OUTPATIENT
Start: 2022-11-17 | End: 2022-11-17

## 2022-11-17 RX ADMIN — LORAZEPAM 2 MG: 1 TABLET ORAL at 13:33

## 2022-11-17 NOTE — NURSING NOTE
Patient seen by Rep at bedside instructions reviewed regarding unit  All questions answered by Rep  Family at bedside the entire time  Dressing clean, dry, and intact  Patient stating pain improved 5/10 feels okay to be discharged  Tolerated po food and fluids  Patient stated she was okay with unit being taped to her left side  Rep was reluctant to change the unit to opposite side and she was in agreement with leaving it where it was taped  Patient able to get dressed and ambulated in room, able to get dressed easily on own  Transferred to wheelchair  DC in stable condition

## 2022-11-17 NOTE — INTERVAL H&P NOTE
H&P reviewed  After examining the patient I find no changes in the patients condition since the H&P had been written      Vitals:    11/17/22 1245   BP: 126/81   Resp: 18   Temp: 98 °F (36 7 °C)   SpO2: 99%

## 2022-11-17 NOTE — DISCHARGE INSTRUCTIONS
SPINE AND PAIN: SPINAL CORD STIMULATION/PERIPHERAL NERVE STIMULATION TRIAL/ PERMANENT IMPLANT DISCHARGE INSTRUCTIONS    ACTIVITY RESTRICTIONS DURING TRIAL PERIOD  Do not drive with the SCS "on"  Do not bend or twist at the waist   Do not lift anything that weighs over 5 pounds  When you lie down, "log roll" (keep spine aligned) to change positions  Do not sleep on your stomach  Limit stair climbing and strenuous activity  CARE OF THE INJECTION SITE  CHECK THE DRESSING DAILY  It should intact  Notify [facility] if you have any of the following: redness, drainage, swelling, chills or fever over 100°F   Do not change dressing, only reinforce edges if necessary  Keep the dressing dry  Do not shower, (sponge baths only) until evaluated in office  SPECIAL INSTRUCTIONS  Take your temperature daily  Follow-up for lead removal scheduled for 11/23/2022  The  box is expensive  DO NOT drop or get wet  Do not pull on the cable or leads  Do not disconnect the cable and lead  Do activities that normally cause you to have pain and try different  settings  Obtain post procedure x-ray shortly after procedure if ordered by physician  MEDICATIONS  Continue to take all routine medications  Our office may have instructed you to hold some medications  You may resume _______________________________________________  Take antiobiotic as prescribed:____ Keflex 500 mg p o  Q i d  _________________________________  If you have any problems specifically related to your procedure, please call our office at [phone]  Problems not related to your procedure should be directed at your primary care physician  Contact the company representative with any questions regarding settings or operation of the external  box  As no general anesthesia was used in today's procedure, you should not experience any side effects related to anesthesia

## 2022-11-18 NOTE — TELEPHONE ENCOUNTER
S/W pt  Pt stated dressing is C/D/I, is in a lot of pain and didn't sleep well last night  She stated she doesn't have a fever but she did get chills x2 for a short time and they went away  Advised pt to check her temperature daily and call back if it is 100 or greater  She is taking the antibiotics  She called the nervo rep last night b/c of the pain  He told her we didn't get started yet and will call her today  She has a migraine  Confirmed her next appt  Emotional support given

## 2022-11-25 ENCOUNTER — OFFICE VISIT (OUTPATIENT)
Dept: PAIN MEDICINE | Facility: CLINIC | Age: 62
End: 2022-11-25

## 2022-11-25 VITALS
WEIGHT: 175 LBS | BODY MASS INDEX: 29.88 KG/M2 | DIASTOLIC BLOOD PRESSURE: 79 MMHG | SYSTOLIC BLOOD PRESSURE: 114 MMHG | HEART RATE: 73 BPM | HEIGHT: 64 IN

## 2022-11-25 DIAGNOSIS — G89.29 CHRONIC MIDLINE LOW BACK PAIN WITHOUT SCIATICA: ICD-10-CM

## 2022-11-25 DIAGNOSIS — M47.816 LUMBAR SPONDYLOSIS: ICD-10-CM

## 2022-11-25 DIAGNOSIS — G89.4 CHRONIC PAIN DISORDER: Primary | ICD-10-CM

## 2022-11-25 DIAGNOSIS — M54.16 LUMBAR RADICULOPATHY: ICD-10-CM

## 2022-11-25 DIAGNOSIS — M54.50 CHRONIC MIDLINE LOW BACK PAIN WITHOUT SCIATICA: ICD-10-CM

## 2022-11-25 DIAGNOSIS — M46.1 SACROILIITIS (HCC): ICD-10-CM

## 2022-11-25 DIAGNOSIS — M51.36 LUMBAR DEGENERATIVE DISC DISEASE: ICD-10-CM

## 2022-11-25 NOTE — PROGRESS NOTES
Assessment  1  Chronic pain disorder  Ambulatory Referral to Neurosurgery      2  Chronic midline low back pain without sciatica  Ambulatory Referral to Neurosurgery      3  Lumbar degenerative disc disease  Ambulatory Referral to Neurosurgery      4  Lumbar radiculopathy  Ambulatory Referral to Neurosurgery      5  Lumbar spondylosis  Ambulatory Referral to Neurosurgery      6  Sacroiliitis [facility])  Ambulatory Referral to Neurosurgery          Plan  The patient had a successful Nevro spinal cord stimulator trial   The patient will be referred to Dr Roberto Marc for permanent implantation  The patient will then follow back in our office 6 weeks after permanent implantation for re-evaluation  The patient was advised to complete their antibiotics course and will observe lead insertion sites for signs and symptoms of infection such as redness, swelling, drainage  Orders Placed This Encounter   Procedures   • Ambulatory Referral to Neurosurgery     Standing Status:   Future     Standing Expiration Date:   11/25/2023     Referral Priority:   Routine     Referral Type:   Consult - AMB     Referral Reason:   Specialty Services Required     Referred to Provider:   Casi Cox MD     Requested Specialty:   Neurosurgery     Number of Visits Requested:   1     Expiration Date:   11/25/2023         History of Present Illness    The patient is a 62 y o  female status post Nevro spinal cord stimulator percutaneous lead placement on 11/17/2022   The patient's reported an overall reduction in pain of 55-60 during the trial   The patient reported functional improvement included decreased pain and improved function      Pain Assessment Measures   Numeric Rating Scale 6   Oswestry Disability Index Score/Neck Disability Index Score 30   PROMIS-29   - Physical Function 10   - Anxiety 15   - Depression 11   - Fatigue 11   - Sleep Disturbance 14   - Ability to Participate in Social Roles And Activities 9   - Pain Interference 29 Review of Systems   Constitutional: Negative for unexpected weight change  HENT: Negative for hearing loss  Eyes: Negative for visual disturbance  Respiratory: Positive for shortness of breath  Cardiovascular: Negative for leg swelling  Gastrointestinal: Positive for nausea  Negative for constipation  Endocrine: Negative for polyuria  Genitourinary: Negative for difficulty urinating  Musculoskeletal: Positive for arthralgias, gait problem and myalgias  Negative for joint swelling  Joint stiffness  Swelling - feet  Pain in extremity- arm, feet   Skin: Negative for rash  Neurological: Negative for weakness and headaches  Memory loss   Psychiatric/Behavioral: Negative for decreased concentration  All other systems reviewed and are negative        Patient Active Problem List   Diagnosis   • Other hyperlipidemia   • Dizziness   • Other specified hypothyroidism   • Brain aneurysm   • Hypokalemia   • Hypertriglyceridemia   • Low HDL (under 40)   • Elevated TSH   • Tobacco abuse   • Acute cystitis with hematuria   • Intractable abdominal pain   • TMJ syndrome   • Stroke-like symptoms   • Nausea   • Left chest pressure   • Hypophosphatemia   • Gastritis, acute   • Hypotension   • Tobacco use   • COPD (chronic obstructive pulmonary disease) ([facility])   • Hypertension   • Other constipation   • Ileus ([facility])   • Fibromyalgia, primary   • Bipolar 1 disorder ([facility])   • Depression   • Chronic pain disorder   • Anxiety   • Migraine   • Syncope and collapse   • Mid back pain   • Cervical radiculopathy   • Neck pain   • Lumbar degenerative disc disease   • Lumbar radiculopathy   • Lumbar spondylosis   • Cervical disc disorder with radiculopathy of mid-cervical region   • Chronic midline low back pain without sciatica   • Sacroiliitis ([facility])   • Carotid artery aneurysm ([facility])   • Irritable bowel syndrome with both constipation and diarrhea        Past Medical History:   Diagnosis Date   • Anxiety    • Asthma    • Bipolar 1 disorder ([facility] )    • Brain aneurysm    • Chronic pain disorder     spinal stenosis   • Concussion syndrome     neurological rx and balance rx   • COPD (chronic obstructive pulmonary disease) ([facility])    • Depression    • Disease of thyroid gland     nodules    • Family history of colon cancer     father   • Fibromyalgia, primary    • History of colon polyps    • Hyperlipidemia    • Hypertension    • 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    • Lumbar degenerative disc disease 02/16/2022   • Migraine    • Neuropathy     bilateral feet and hands   • Peripheral neuropathy    • Psychiatric disorder    • PTSD (post-traumatic stress disorder)    • Sleep apnea    • Stroke ([facility] )     2012 no deficeits   • Thyroid Cancer    • TIA (transient ischemic attack)        Past Surgical History:   Procedure Laterality Date   • ABDOMINAL SURGERY      laproscopic/ endometriosis   • BRAIN SURGERY      aneurysm/ coiling procedure   • CARPAL TUNNEL RELEASE     • CHOLECYSTECTOMY     • COLONOSCOPY     • DENTAL SURGERY     • EPIDURAL BLOCK INJECTION Right 03/03/2022    Procedure: C7-T1 interlaminar epidural steroid injection;  Surgeon: Elzbieta Brandon MD;  Location: [facility];  Service: Pain Management    • EPIDURAL BLOCK INJECTION N/A 04/21/2022    Procedure: C6-C7 BLOCK / INJECTION EPIDURAL STEROID CERVICAL;  Surgeon: Elzbieta Brandon MD;  Location: [facility];  Service: Pain Management    • EPIDURAL BLOCK INJECTION Left 06/21/2022    Procedure: BLOCK / INJECTION EPIDURAL STEROID LUMBAR  left L3-4 TFESI;  Surgeon: Elzbieta Brandon MD;  Location: [facility];  Service: Pain Management    • EYE SURGERY      cataract   • FL GUIDED NEEDLE PLAC BX/ASP/INJ  03/03/2022   • FL GUIDED NEEDLE PLAC BX/ASP/INJ  11/17/2022   • HYSTERECTOMY     • SC ESOPHAGOGASTRODUODENOSCOPY TRANSORAL DIAGNOSTIC N/A 02/08/2018    Procedure: EGD AND COLONOSCOPY;  Surgeon: Huey Valdivia MD;  Location: [facility];   Service: Gastroenterology   • TX PERCUT IMPLNT Randal Romero 124 N/A 11/17/2022    Procedure: Melani Ware SCS TRIAL;  Surgeon: Guanako Cotton MD;  Location: [facility];  Service: Pain Management    • THYROID SURGERY     • TONSILLECTOMY         Family History   Problem Relation Age of Onset   • Brain cancer Mother    • Alzheimer's disease Mother    • Alzheimer's disease Father    • Parkinsonism Father        Social History     Occupational History   • Not on file   Tobacco Use   • Smoking status: Every Day     Packs/day: 2 00     Types: Cigarettes   • Smokeless tobacco: Never   Vaping Use   • Vaping Use: Never used   Substance and Sexual Activity   • Alcohol use: Never     Alcohol/week: 0 0 standard drinks   • Drug use: Never     Comment: prescribed Belbuca   • Sexual activity: Yes     Partners: Male         Current Outpatient Medications:   •  albuterol (2 5 mg/3 mL) 0 083 % nebulizer solution, albuterol sulfate HFA 90 mcg/actuation aerosol inhaler  inhale 2 puffs by mouth every 6 hours if needed for wheezing, Disp: , Rfl:   •  albuterol (PROVENTIL HFA,VENTOLIN HFA) 90 mcg/act inhaler, Inhale 2 puffs every 6 (six) hours as needed, Disp: , Rfl:   •  ammonium lactate (LAC-HYDRIN) 12 % lotion, as needed, Disp: , Rfl:   •  atorvastatin (LIPITOR) 80 mg tablet, Take 80 mg by mouth every evening, Disp: , Rfl:   •  Buprenorphine HCl (Belbuca) 300 MCG FILM, Apply 300 mcg to cheek 2 (two) times a day, Disp: , Rfl:   •  Butalbital-APAP-Caffeine (FIORICET PO), Take by mouth as needed, Disp: , Rfl:   •  carboxymethylcellulose 0 5 % SOLN, Administer 1 drop to both eyes daily as needed for dry eyes, Disp: 1 Bottle, Rfl: 0  •  clonazePAM (KlonoPIN) 0 5 mg tablet, Take 0 5 mg by mouth daily at bedtime, Disp: , Rfl:   •  clonazePAM (KlonoPIN) 1 mg tablet, Take 1 mg by mouth daily , Disp: , Rfl:   •  dicyclomine (BENTYL) 20 mg tablet, Take 20 mg by mouth every 6 (six) hours, Disp: , Rfl:   •  fenofibrate (TRICOR) 48 mg tablet, Take 48 mg by mouth daily, Disp: , Rfl:   •  Fluticasone-Salmeterol (Advair) 500-50 mcg/dose inhaler, Inhale 1 puff 2 (two) times a day, Disp: , Rfl:   •  furosemide (LASIX) 20 mg tablet, Take 20 mg by mouth as needed Taking as needed, Disp: , Rfl:   •  Galcanezumab-gnlm (Emgality) 120 MG/ML SOAJ, Inject 120 mg under the skin every 30 (thirty) days, Disp: , Rfl:   •  HYDROcodone-acetaminophen (NORCO)  mg per tablet, 2 (two) times a day, Disp: , Rfl:   •  levothyroxine 137 mcg tablet, Take 137 mcg by mouth daily, Disp: , Rfl:   •  magnesium 30 MG tablet, Take 500 mg by mouth 2 (two) times a day, Disp: , Rfl:   •  Melatonin 10 MG CAPS, Take 1 tablet by mouth daily at bedtime as needed, Disp: , Rfl:   •  naloxone (Narcan) 4 mg/0 1 mL nasal spray, Narcan 4 mg/actuation nasal spray, Disp: , Rfl:   •  omeprazole (PriLOSEC) 40 MG capsule, Take 80 mg by mouth 2 (two) times a day, Disp: , Rfl:   •  ondansetron (ZOFRAN) 8 mg tablet, Take 1 tablet (8 mg total) by mouth every 8 (eight) hours as needed for nausea or vomiting, Disp: 20 tablet, Rfl: 0  •  pregabalin (LYRICA) 100 mg capsule, take 1 capsule by mouth three times a day, Disp: , Rfl:   •  pregabalin (LYRICA) 150 mg capsule, take 1 capsule by mouth NIGHTLY, Disp: , Rfl:   •  risperiDONE (RisperDAL) 0 5 mg tablet, TAKE 1 HALF TABLETS BY MOUTH EVERY MORNING AND 1 TABLET AT BEDTIME, Disp: , Rfl:   •  tiZANidine (ZANAFLEX) 4 mg tablet, take 2 tablets by mouth every 6 hours if needed for muscle spasm, Disp: , Rfl:   •  topiramate (TOPAMAX) 100 mg tablet, Take 100 mg by mouth every 12 (twelve) hours 150mg in the morning and one tab at bedtime, Disp: , Rfl:   •  lamoTRIgine (LaMICtal) 100 mg tablet, Take 100 mg by mouth 2 (two) times a day, Disp: , Rfl:     Allergies   Allergen Reactions   • Hydroxyzine Anaphylaxis     Claims it gives her convulsions      • Other    • Pollen Extract Itching   • Tree Extract    • Clarithromycin Rash     Pt denies   • Latex Rash   • Sulfa Antibiotics Rash     "severe skin burn"         Physical Exam    /79   Pulse 73   Ht 5' 4" (1 626 m)   Wt 79 4 kg (175 lb)   BMI 30 04 kg/m²     Thoracic Spine:  Spinal cord stimulator lead removed with tip intact; no erythema, tenderness or signs of infection; area cleansed with alcohol and bandage placed

## 2022-11-28 NOTE — OP NOTE
OPERATIVE REPORT  PATIENT NAME: Bernice Woodall    :  1960  MRN: [id number]  Pt Location: [facility] ROOM 01    SURGERY DATE: 2022    Surgeon(s) and Role:     * Kun Silveira MD - Primary    Preop Diagnosis:  Chronic pain syndrome [G89 4]  Other chronic pain [G89 29]  Lumbar radiculopathy [M54 16]  Degeneration of lumbar intervertebral disc [M51 36]  Lumbar spondylosis [M47 816]    Post-Op Diagnosis Codes:     * Chronic pain syndrome [G89 4]     * Other chronic pain [G89 29]     * Lumbar radiculopathy [M54 16]     * Degeneration of lumbar intervertebral disc [M51 36]     * Lumbar spondylosis [M47 816]    Procedure(s) (LRB):  NEVRO SCS TRIAL (N/A)    Specimen(s):  * No specimens in log *    Estimated Blood Loss:   Minimal    Drains:  * No LDAs found *    Anesthesia Type:   Choice    Operative Indications:  Chronic pain syndrome [G89 4]  Other chronic pain [G89 29]  Lumbar radiculopathy [M54 16]  Degeneration of lumbar intervertebral disc [M51 36]  Lumbar spondylosis [M47 816]      Operative Findings:  same    Complications:   None    Procedure and Technique:  Spinal Cord Stimulator Trial    The patient was identified and evaluated in the preoperative holing area  Risks, benefits, and alternatives, and a team approach were discussed with the patient, and the pertinent surgical site was verified and marked with initials  The patient had the opportunity to ask questions, and wished to proceed  The patient was transported to the procedure room, at which time they were placed in the prone position after appropriate IV placement  The patient received preoperative antibiotics in the form of 2 g Ancef  The patient was then prepped and draped with Chloraprep, Prevail, and sterile drapes  A procedural pause was conducted to verify: patient identification, patient allergies, completion of antibiotic prophylaxis, and site of entry  Under fluoroscopic guidance, the T12-L1 interspace was identified   The skin and underlying subcutaneous tissue was anesthetized by an infiltration of a 1:1 mixture of 1% lidocaine and 0 25% bupivacaine with 1:200,000 epinephrine additive  A 14-gauge Coude epidural needle was advanced into the interlaminar space at T12-L1 without difficulty  Epidural access was obtained using a loss of resistance technique to saline  The epidural spinal cord stimulation lead was advanced to the bottom of T7  The lead was midline  Under fluoroscopic guidance, the T12-L1 interspace was identified  The skin and underlying subcutaneous tissue was anesthetized by an infiltration of a 1:1 mixture of 1% lidocaine and 0 25% bupivacaine with 1:200,000 epinephrine additive  A 14-gauge Coude epidural needle was advanced into the interlaminar space at T12-L1 without difficulty  Epidural access was obtained using a loss of resistance technique to saline  The epidural spinal cord stimulation lead was advanced to the middle of T9  The lead was midline  Impedance was checked and multiple electric combinations were utilized to provide paresthesia coverage of the patientâ€™s area of pain  The needle was removed and he lead was secured to the back with the use of Steri-strips, StayFix dressing, and Tegaderm  The patient was taken to the recovery area in excellent condition  The patient tolerated the procedure well without evidence of complication  The patient was transferred from the procedure table without difficulty  After an appropriate amount of observation in the recovery area, the patient was dismissed home               I was present for the entire procedure    Patient Disposition:  hemodynamically stable        SIGNATURE: Yasir Jorge MD  DATE: November 28, 2022  TIME: 7:43 AM

## 2022-12-08 ENCOUNTER — OFFICE VISIT (OUTPATIENT)
Dept: URGENT CARE | Facility: CLINIC | Age: 62
End: 2022-12-08

## 2022-12-08 ENCOUNTER — APPOINTMENT (OUTPATIENT)
Dept: RADIOLOGY | Facility: CLINIC | Age: 62
End: 2022-12-08

## 2022-12-08 VITALS
RESPIRATION RATE: 20 BRPM | WEIGHT: 175 LBS | BODY MASS INDEX: 30.04 KG/M2 | OXYGEN SATURATION: 96 % | TEMPERATURE: 98.1 F | DIASTOLIC BLOOD PRESSURE: 60 MMHG | SYSTOLIC BLOOD PRESSURE: 96 MMHG | HEART RATE: 80 BPM

## 2022-12-08 DIAGNOSIS — B30.9 ACUTE VIRAL CONJUNCTIVITIS OF BOTH EYES: ICD-10-CM

## 2022-12-08 DIAGNOSIS — J44.1 COPD EXACERBATION (HCC): Primary | ICD-10-CM

## 2022-12-08 DIAGNOSIS — R05.1 ACUTE COUGH: ICD-10-CM

## 2022-12-08 DIAGNOSIS — R50.9 FEVER, UNSPECIFIED FEVER CAUSE: ICD-10-CM

## 2022-12-08 DIAGNOSIS — R68.89 FLU-LIKE SYMPTOMS: ICD-10-CM

## 2022-12-08 RX ORDER — PREDNISONE 20 MG/1
60 TABLET ORAL DAILY
Qty: 15 TABLET | Refills: 0 | Status: SHIPPED | OUTPATIENT
Start: 2022-12-08 | End: 2022-12-13

## 2022-12-08 RX ORDER — DOXYCYCLINE HYCLATE 100 MG
100 TABLET ORAL 2 TIMES DAILY
Qty: 14 TABLET | Refills: 0 | Status: SHIPPED | OUTPATIENT
Start: 2022-12-08 | End: 2022-12-15

## 2022-12-08 RX ORDER — ERYTHROMYCIN 5 MG/G
0.5 OINTMENT OPHTHALMIC
Qty: 3.5 G | Refills: 0 | Status: SHIPPED | OUTPATIENT
Start: 2022-12-08 | End: 2022-12-15

## 2022-12-08 NOTE — PATIENT INSTRUCTIONS
Your xray was preliminarily read by your provider  A radiologist will read the xray and you will be notified if it is abnormal     You are to see your PCP tomorrow as scheduled  Start the doxycycline and prednisone  Continue to use your inhalers and nebulizer  Stop smoking  Go to the ED if symptoms worsen  You have a flu and covid test pending  Download [facility] for the results in 24-48 hours  You will need to contact your PCP if + covid to discuss Paxlovid treatment

## 2022-12-08 NOTE — PROGRESS NOTES
[facility]        NAME: Mary Dietrich is a 62 y o  female  : 1960    MRN: [id number]  DATE: 2022  TIME: 4:56 PM    Assessment and Plan   COPD exacerbation ([facility] [address] ) [J44 1]  1  COPD exacerbation (HCC)  doxycycline hyclate (VIBRA-TABS) 100 mg tablet    predniSONE 20 mg tablet      2  Acute cough  XR chest pa & lateral    Cov/Flu-Collected at [facility]    doxycycline hyclate (VIBRA-TABS) 100 mg tablet    predniSONE 20 mg tablet      3  Flu-like symptoms  doxycycline hyclate (VIBRA-TABS) 100 mg tablet    predniSONE 20 mg tablet      4  Fever, unspecified fever cause  doxycycline hyclate (VIBRA-TABS) 100 mg tablet    predniSONE 20 mg tablet      5  Acute viral conjunctivitis of both eyes  erythromycin (ILOTYCIN) ophthalmic ointment            Patient Instructions       Follow up with PCP in 3-5 days  Proceed to  ER if symptoms worsen  Your xray was preliminarily read by your provider  A radiologist will read the xray and you will be notified if it is abnormal     You are to see your PCP tomorrow as scheduled  Start the doxycycline and prednisone  Continue to use your inhalers and nebulizer  Stop smoking  Go to the ED if symptoms worsen  You have a flu and covid test pending  Download [employer] for the results in 24-48 hours  You will need to contact your PCP if + covid to discuss Paxlovid treatment  Chief Complaint     Chief Complaint   Patient presents with   • Fever   • Cough     Loose stools    • Eye Problem     Crusted eyes in am          History of Present Illness       This is a 62year old female who states was ill x 1 week with cold, flu like symptoms and got better  She now got sick again on Monday and now has worsening symptoms, cough, fever of 101, bilateral eye drainage  She has COPD and still smokes  Dry cough with wheezing  Has nebulizers and MDI and are using them, last was before she came in  She states she is covid and flu vaccinated   She has a Dr marvin tomorrow but states they may not see her since she is ill  She states she was also told not to "clog up the emergency rooms"         Review of Systems   Review of Systems   Constitutional: Positive for chills, fatigue and fever  HENT: Positive for congestion, ear pain, rhinorrhea and sore throat  Eyes: Negative  Respiratory: Positive for cough and wheezing  Cardiovascular: Negative  Gastrointestinal: Negative  Endocrine: Negative  Genitourinary: Negative  Musculoskeletal: Negative  Skin: Negative  Allergic/Immunologic: Negative  Neurological: Negative  Hematological: Negative  Psychiatric/Behavioral: Negative            Current Medications       Current Outpatient Medications:   •  doxycycline hyclate (VIBRA-TABS) 100 mg tablet, Take 1 tablet (100 mg total) by mouth 2 (two) times a day for 7 days, Disp: 14 tablet, Rfl: 0  •  erythromycin (ILOTYCIN) ophthalmic ointment, Administer 0 5 inches to both eyes daily at bedtime for 7 days, Disp: 3 5 g, Rfl: 0  •  predniSONE 20 mg tablet, Take 3 tablets (60 mg total) by mouth daily for 5 days, Disp: 15 tablet, Rfl: 0  •  albuterol (2 5 mg/3 mL) 0 083 % nebulizer solution, albuterol sulfate HFA 90 mcg/actuation aerosol inhaler  inhale 2 puffs by mouth every 6 hours if needed for wheezing, Disp: , Rfl:   •  albuterol (PROVENTIL HFA,VENTOLIN HFA) 90 mcg/act inhaler, Inhale 2 puffs every 6 (six) hours as needed, Disp: , Rfl:   •  ammonium lactate (LAC-HYDRIN) 12 % lotion, as needed, Disp: , Rfl:   •  atorvastatin (LIPITOR) 80 mg tablet, Take 80 mg by mouth every evening, Disp: , Rfl:   •  Buprenorphine HCl (Belbuca) 300 MCG FILM, Apply 300 mcg to cheek 2 (two) times a day, Disp: , Rfl:   •  Butalbital-APAP-Caffeine (FIORICET PO), Take by mouth as needed, Disp: , Rfl:   •  carboxymethylcellulose 0 5 % SOLN, Administer 1 drop to both eyes daily as needed for dry eyes, Disp: 1 Bottle, Rfl: 0  •  clonazePAM (KlonoPIN) 0 5 mg tablet, Take 0 5 mg by mouth daily at bedtime, Disp: , Rfl:   •  clonazePAM (KlonoPIN) 1 mg tablet, Take 1 mg by mouth daily , Disp: , Rfl:   •  dicyclomine (BENTYL) 20 mg tablet, Take 20 mg by mouth every 6 (six) hours, Disp: , Rfl:   •  fenofibrate (TRICOR) 48 mg tablet, Take 48 mg by mouth daily, Disp: , Rfl:   •  Fluticasone-Salmeterol (Advair) 500-50 mcg/dose inhaler, Inhale 1 puff 2 (two) times a day, Disp: , Rfl:   •  furosemide (LASIX) 20 mg tablet, Take 20 mg by mouth as needed Taking as needed, Disp: , Rfl:   •  Galcanezumab-gnlm (Emgality) 120 MG/ML SOAJ, Inject 120 mg under the skin every 30 (thirty) days, Disp: , Rfl:   •  HYDROcodone-acetaminophen (NORCO)  mg per tablet, 2 (two) times a day, Disp: , Rfl:   •  lamoTRIgine (LaMICtal) 100 mg tablet, Take 100 mg by mouth 2 (two) times a day, Disp: , Rfl:   •  levothyroxine 137 mcg tablet, Take 137 mcg by mouth daily, Disp: , Rfl:   •  magnesium 30 MG tablet, Take 500 mg by mouth 2 (two) times a day, Disp: , Rfl:   •  Melatonin 10 MG CAPS, Take 1 tablet by mouth daily at bedtime as needed, Disp: , Rfl:   •  naloxone (Narcan) 4 mg/0 1 mL nasal spray, Narcan 4 mg/actuation nasal spray, Disp: , Rfl:   •  omeprazole (PriLOSEC) 40 MG capsule, Take 80 mg by mouth 2 (two) times a day, Disp: , Rfl:   •  ondansetron (ZOFRAN) 8 mg tablet, Take 1 tablet (8 mg total) by mouth every 8 (eight) hours as needed for nausea or vomiting, Disp: 20 tablet, Rfl: 0  •  pregabalin (LYRICA) 100 mg capsule, take 1 capsule by mouth three times a day, Disp: , Rfl:   •  pregabalin (LYRICA) 150 mg capsule, take 1 capsule by mouth NIGHTLY, Disp: , Rfl:   •  risperiDONE (RisperDAL) 0 5 mg tablet, TAKE 1 HALF TABLETS BY MOUTH EVERY MORNING AND 1 TABLET AT BEDTIME, Disp: , Rfl:   •  tiZANidine (ZANAFLEX) 4 mg tablet, take 2 tablets by mouth every 6 hours if needed for muscle spasm, Disp: , Rfl:   •  topiramate (TOPAMAX) 100 mg tablet, Take 100 mg by mouth every 12 (twelve) hours 150mg in the morning and one tab at bedtime, Disp: , Rfl:     Current Allergies     Allergies as of 12/08/2022 - Reviewed 12/08/2022   Allergen Reaction Noted   • Hydroxyzine Anaphylaxis 03/28/2016   • Other  08/12/2013   • Pollen extract Itching 08/12/2013   • Tree extract  08/12/2013   • Clarithromycin Rash 07/27/2005   • Latex Rash 03/11/2015   • Sulfa antibiotics Rash 07/27/2005            The following portions of the patient's history were reviewed and updated as appropriate: allergies, current medications, past family history, past medical history, past social history, past surgical history and problem list      Past Medical History:   Diagnosis Date   • Anxiety    • Asthma    • B[facility] 1 disorder ([facility] )    • Brain aneurysm    • Chronic pain disorder     spinal stenosis   • Concussion syndrome     neurological rx and balance rx   • COPD (chronic obstructive pulmonary disease) ([facility] )    • Depression    • Disease of thyroid gland     nodules    • Family history of colon cancer     father   • Fibromyalgia, primary    • History of colon polyps    • Hyperlipidemia    • Hypertension    • 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    • Lumbar degenerative disc disease 02/16/2022   • Migraine    • Neuropathy     bilateral feet and hands   • Peripheral neuropathy    • Psychiatric disorder    • PTSD (post-traumatic stress disorder)    • Sleep apnea    • Stroke ([email or web address] )     2012 no deficeits   • Thyroid Cancer    • TIA (transient ischemic attack)        Past Surgical History:   Procedure Laterality Date   • ABDOMINAL SURGERY      laproscopic/ endometriosis   • BRAIN SURGERY      aneurysm/ coiling procedure   • CARPAL TUNNEL RELEASE     • CHOLECYSTECTOMY     • COLONOSCOPY     • DENTAL SURGERY     • EPIDURAL BLOCK INJECTION Right 03/03/2022    Procedure: C7-T1 interlaminar epidural steroid injection;  Surgeon: Calli Quick MD;  Location: [facility];  Service: Pain Management    • EPIDURAL BLOCK INJECTION N/A 04/21/2022    Procedure: C6-C7 BLOCK / INJECTION EPIDURAL STEROID CERVICAL;  Surgeon: Myles Argueta MD;  Location: [facility];  Service: Pain Management    • EPIDURAL BLOCK INJECTION Left 06/21/2022    Procedure: BLOCK / INJECTION EPIDURAL STEROID LUMBAR  left L3-4 TFESI;  Surgeon: Myles Argueta MD;  Location: [facility];  Service: Pain Management    • EYE SURGERY      cataract   • FL GUIDED NEEDLE PLAC BX/ASP/INJ  03/03/2022   • FL GUIDED NEEDLE PLAC BX/ASP/INJ  11/17/2022   • HYSTERECTOMY     • CA ESOPHAGOGASTRODUODENOSCOPY TRANSORAL DIAGNOSTIC N/A 02/08/2018    Procedure: EGD AND COLONOSCOPY;  Surgeon: Mil Smith MD;  Location: [facility]; Service: Gastroenterology   • CA PERCUT IMPLNT [address] N/A 11/17/2022    Procedure: Christi Marleneshira SCS TRIAL;  Surgeon: Myles Argueta MD;  Location: [facility];  Service: Pain Management    • THYROID SURGERY     • TONSILLECTOMY         Family History   Problem Relation Age of Onset   • Brain cancer Mother    • Alzheimer's disease Mother    • Alzheimer's disease Father    • Parkinsonism Father          Medications have been verified  Objective   BP 96/60   Pulse 80   Temp 98 1 °F (36 7 °C)   Resp 20   Wt 79 4 kg (175 lb)   SpO2 96%   BMI 30 04 kg/m²   No LMP recorded  Patient has had a hysterectomy  Physical Exam     Physical Exam  Vitals and nursing note reviewed  Constitutional:       General: She is not in acute distress  Appearance: Normal appearance  She is normal weight  She is ill-appearing  She is not toxic-appearing or diaphoretic  HENT:      Head: Normocephalic and atraumatic  Right Ear: There is no impacted cerumen  Left Ear: There is impacted cerumen  Nose: Congestion and rhinorrhea present  Mouth/Throat:      Mouth: Mucous membranes are moist       Pharynx: Oropharynx is clear  Posterior oropharyngeal erythema present  No oropharyngeal exudate     Eyes:      Extraocular Movements: Extraocular movements intact  Cardiovascular:      Rate and Rhythm: Normal rate and regular rhythm  Pulses: Normal pulses  Heart sounds: Normal heart sounds  Pulmonary:      Effort: Pulmonary effort is normal       Breath sounds: No wheezing  Musculoskeletal:         General: Normal range of motion  Cervical back: Normal range of motion and neck supple  Skin:     General: Skin is warm and dry  Capillary Refill: Capillary refill takes less than 2 seconds  Neurological:      General: No focal deficit present  Mental Status: She is alert and oriented to person, place, and time  Psychiatric:         Mood and Affect: Mood normal          Behavior: Behavior normal          Thought Content:  Thought content normal          Judgment: Judgment normal

## 2022-12-09 NOTE — RESULT ENCOUNTER NOTE
Pt prescribed doxycycline with strict ED instructions if symptoms worsen    Pt states has PCP appt 12/9/2022

## 2022-12-10 LAB
FLUAV RNA RESP QL NAA+PROBE: NEGATIVE
FLUBV RNA RESP QL NAA+PROBE: NEGATIVE
SARS-COV-2 RNA RESP QL NAA+PROBE: NEGATIVE

## 2022-12-12 ENCOUNTER — TELEPHONE (OUTPATIENT)
Dept: OTHER | Facility: OTHER | Age: 62
End: 2022-12-12

## 2022-12-12 ENCOUNTER — OFFICE VISIT (OUTPATIENT)
Dept: DENTISTRY | Facility: CLINIC | Age: 62
End: 2022-12-12

## 2022-12-12 DIAGNOSIS — K08.109 EDENTULOUS: Primary | ICD-10-CM

## 2022-12-12 NOTE — PROGRESS NOTES
Wax Try in   Pt presents for Wax rim  Denture seated but patient noted mild discomfort  Pt was informed that its only wax try rims and are not final dentures and are only used for measurements  Pt understood  Verified internal and removed any interferences that prevent complete and comfortable seating  Verified and adjusted maxillary plane occlusion to be even and balanced and comply with lip length  Verified interpupillary line and ala-tragus line  Adjusted mandibular rim to be even with maxillary rim and leveled to height of Centric relation  Marked midline, and canine positioning  Pt selected shade A2  Cut notches in the posterior wax and took bite registration with Aluwax/PVS bite reg/Blue Mousse  VDO determined to be 61 mm  VDR determined to be 59mm  Freeway space is [2 mm]  Discussed expectations, and alternatives (implants) if patient is still not satisfied with long term fit  Pt understood and left in good spirits  OHI reviewed  Pt left satisfied and ambulatory  Case will be sent to [facility]      NV: Teeth Try-in

## 2022-12-13 ENCOUNTER — OFFICE VISIT (OUTPATIENT)
Dept: NEUROSURGERY | Facility: CLINIC | Age: 62
End: 2022-12-13

## 2022-12-13 VITALS
OXYGEN SATURATION: 99 % | RESPIRATION RATE: 18 BRPM | TEMPERATURE: 98 F | DIASTOLIC BLOOD PRESSURE: 80 MMHG | WEIGHT: 170.4 LBS | BODY MASS INDEX: 29.25 KG/M2 | SYSTOLIC BLOOD PRESSURE: 112 MMHG | HEART RATE: 76 BPM

## 2022-12-13 DIAGNOSIS — M46.1 SACROILIITIS (HCC): ICD-10-CM

## 2022-12-13 DIAGNOSIS — M54.50 CHRONIC MIDLINE LOW BACK PAIN WITHOUT SCIATICA: ICD-10-CM

## 2022-12-13 DIAGNOSIS — M47.816 LUMBAR SPONDYLOSIS: ICD-10-CM

## 2022-12-13 DIAGNOSIS — M51.36 LUMBAR DEGENERATIVE DISC DISEASE: ICD-10-CM

## 2022-12-13 DIAGNOSIS — G89.4 CHRONIC PAIN DISORDER: ICD-10-CM

## 2022-12-13 DIAGNOSIS — M54.16 LUMBAR RADICULOPATHY: ICD-10-CM

## 2022-12-13 DIAGNOSIS — G89.29 CHRONIC MIDLINE LOW BACK PAIN WITHOUT SCIATICA: ICD-10-CM

## 2022-12-13 NOTE — ASSESSMENT & PLAN NOTE
· Presents for evaluation of spinal cord stimulator  · Patient underwent trial with Dr Talib Everett 11/17/2022   · Reported decreased pain and numbness as well as improved functionality  Imaging:   · MRI thoracic spine 8/26/2022: Stable mild degenerative disc disease at T1-2, T2-3, T10-11, T11-12  No new disc herniations  Thoracic cord appears normal in caliber without signal change  Plan:   · Continue to monitor symptoms   · Reviewed imaging with patient in room  · Discussed percutaneous SCS placement with patient  · Please see attestation for further discussion  · Given hypersensitivity, percutaneous leads likely to have better outcome compared to stimulator paddle  · Necessary pre-operative imaging completed  · Patient should have repeated chest XR prior to surgery given recent urgent care presentation with ABX treatment  · Continue taking pain medication as prescribed  Follow up with PCP who currently manages pain medication  · Advised on smoking cessation and associated increased risk of infection with surgery  · Continue to be active with walking and low impact activities   · Pre-operative orders and labs placed by attending neurosurgeon  · Will continue to follow up with patient in the pre- and post-operative period

## 2022-12-13 NOTE — TELEPHONE ENCOUNTER
12/13/2022-CALLED PT, LEFT MESSAGE ON MACHINE CONFIRMING TODAY'S (12/13/2022) APT AT 10:00am IN [place] OFFICE

## 2022-12-13 NOTE — PROGRESS NOTES
Neurosurgery Office Note  Yi Woodall 62 y o  female MRN: [id number]      Assessment/Plan     Chronic pain syndrome  · Presents for evaluation of spinal cord stimulator  · Patient underwent trial with Dr Saw Tirado 11/17/2022   · Reported decreased pain and numbness as well as improved functionality  Imaging:   · MRI thoracic spine 8/26/2022: Stable mild degenerative disc disease at T1-2, T2-3, T10-11, T11-12  No new disc herniations  Thoracic cord appears normal in caliber without signal change  Plan:   · Continue to monitor symptoms   · Reviewed imaging with patient in room  · Discussed percutaneous SCS placement with patient  · Please see attestation for further discussion  · Given hypersensitivity, percutaneous leads likely to have better outcome compared to stimulator paddle  · Necessary pre-operative imaging completed  · Patient should have repeated chest XR prior to surgery given recent urgent care presentation with ABX treatment  · Continue taking pain medication as prescribed  Follow up with PCP who currently manages pain medication  · Advised on smoking cessation and associated increased risk of infection with surgery  · Continue to be active with walking and low impact activities   · Pre-operative orders and labs placed by attending neurosurgeon  · Will continue to follow up with patient in the pre- and post-operative period            Diagnoses and all orders for this visit:    Chronic pain disorder  -     Ambulatory Referral to Neurosurgery    Chronic midline low back pain without sciatica  -     Ambulatory Referral to Neurosurgery    Lumbar degenerative disc disease  -     Ambulatory Referral to Neurosurgery    Lumbar radiculopathy  -     Ambulatory Referral to Neurosurgery    Lumbar spondylosis  -     Ambulatory Referral to Neurosurgery    Sacroiliitis [facility])  -     Ambulatory Referral to Neurosurgery          I spent 25 minutes with the patient today in which >50% of the time was spent counseling/coordination of care regarding diagnosis, imaging review, symptoms and treatment plan  CHIEF COMPLAINT    Chief Complaint   Patient presents with   • Follow-up       HISTORY    History of Present Illness     62y o  year old female who presents for evaluation and discussion of a permanent spinal cord stimulator implantation  Patient underwent a successful spinal cord stimulator percutaneous trial with Dr Eunice Uribe  She reports receiving 50 to 60% relief but more importantly had an improvement in her overall quality of life  She states she was able to get up and out of bed and participate in ADLs with less limitation  She has a very complex and long history with chronic pain  She has fibromyalgia which she was diagnosed with 20 some years ago  She has been undergoing evaluation with her pain management providers for many years  She now is managed primarily by her PCP  She reports pains in her bilateral shoulders neck upper arms that radiates  She has pain in her mid back as well as her low back  Her low back and her left leg are her primary areas of worst pain  She states she has pain that radiates all the way down to her foot  She has swelling as well as a crushing sensation in her left foot that causes severe discomfort for her  She has hypersensitivity to touch in the foot as well as the calf  She has significant weakness in the bilateral lower extremities  She was told 21 years ago that she would never walk again and was paralyzed but currently uses a cane for ambulation  Patient does also have a history of bipolar disorder  She follows with a psychiatrist and does complete therapy  She received psychiatric clearance prior to trial implantation  Since her explant of the trial she reports some gradual progression of her lower extremity symptoms and low back pain  See Discussion    REVIEW OF SYSTEMS    Review of Systems   Constitutional: Negative  HENT: Negative  Eyes: Negative  Respiratory: Negative  Cardiovascular: Negative  Gastrointestinal: Positive for nausea  Endocrine: Negative  Musculoskeletal: Positive for arthralgias ( pain both shoulders more so on left side), back pain (mid - low back pain  worsen), gait problem (using a cane ), neck pain (left shoulder pain ) and neck stiffness (trouble turning neck to the left side  )  Symptoms began-   Has done physical therapy    Has followed with dr Thomas Manzano had a few steroid injections  Skin: Negative  Allergic/Immunologic: Negative  Neurological: Positive for tremors (in hands and both arms), weakness (both legs), numbness (Left side of body tingling and numbness  more so on left legt hip to foot) and headaches (migraines - daily)  Hematological: Negative  Psychiatric/Behavioral: Positive for confusion           Meds/Allergies     Current Outpatient Medications   Medication Sig Dispense Refill   • albuterol (2 5 mg/3 mL) 0 083 % nebulizer solution albuterol sulfate HFA 90 mcg/actuation aerosol inhaler   inhale 2 puffs by mouth every 6 hours if needed for wheezing     • albuterol (PROVENTIL HFA,VENTOLIN HFA) 90 mcg/act inhaler Inhale 2 puffs every 6 (six) hours as needed     • ammonium lactate (LAC-HYDRIN) 12 % lotion as needed     • atorvastatin (LIPITOR) 80 mg tablet Take 80 mg by mouth every evening     • Buprenorphine HCl (Belbuca) 300 MCG FILM Apply 300 mcg to cheek 2 (two) times a day     • Butalbital-APAP-Caffeine (FIORICET PO) Take by mouth as needed     • carboxymethylcellulose 0 5 % SOLN Administer 1 drop to both eyes daily as needed for dry eyes 1 Bottle 0   • clonazePAM (KlonoPIN) 1 mg tablet Take 1 mg by mouth daily      • dicyclomine (BENTYL) 20 mg tablet Take 20 mg by mouth every 6 (six) hours     • doxycycline hyclate (VIBRA-TABS) 100 mg tablet Take 1 tablet (100 mg total) by mouth 2 (two) times a day for 7 days 14 tablet 0   • fenofibrate (TRICOR) 48 mg tablet Take 48 mg by mouth daily     • Fluticasone-Salmeterol (Advair) 500-50 mcg/dose inhaler Inhale 1 puff 2 (two) times a day     • furosemide (LASIX) 20 mg tablet Take 20 mg by mouth as needed Taking as needed     • Galcanezumab-gnlm (Emgality) 120 MG/ML SOAJ Inject 120 mg under the skin every 30 (thirty) days     • HYDROcodone-acetaminophen (NORCO)  mg per tablet 2 (two) times a day     • lamoTRIgine (LaMICtal) 100 mg tablet Take 100 mg by mouth 2 (two) times a day     • levothyroxine 137 mcg tablet Take 137 mcg by mouth daily     • magnesium 30 MG tablet Take 500 mg by mouth 2 (two) times a day     • Melatonin 10 MG CAPS Take 1 tablet by mouth daily at bedtime as needed     • omeprazole (PriLOSEC) 40 MG capsule Take 80 mg by mouth 2 (two) times a day     • ondansetron (ZOFRAN) 8 mg tablet Take 1 tablet (8 mg total) by mouth every 8 (eight) hours as needed for nausea or vomiting 20 tablet 0   • predniSONE 20 mg tablet Take 3 tablets (60 mg total) by mouth daily for 5 days 15 tablet 0   • pregabalin (LYRICA) 100 mg capsule take 1 capsule by mouth three times a day     • pregabalin (LYRICA) 150 mg capsule take 1 capsule by mouth NIGHTLY     • risperiDONE (RisperDAL) 0 5 mg tablet TAKE 1 HALF TABLETS BY MOUTH EVERY MORNING AND 1 TABLET AT BEDTIME     • tiZANidine (ZANAFLEX) 4 mg tablet take 2 tablets by mouth every 6 hours if needed for muscle spasm     • topiramate (TOPAMAX) 100 mg tablet Take 100 mg by mouth every 12 (twelve) hours 150mg in the morning and one tab at bedtime     • clonazePAM (KlonoPIN) 0 5 mg tablet Take 0 5 mg by mouth daily at bedtime     • erythromycin (ILOTYCIN) ophthalmic ointment Administer 0 5 inches to both eyes daily at bedtime for 7 days (Patient not taking: Reported on 12/13/2022) 3 5 g 0   • naloxone (Narcan) 4 mg/0 1 mL nasal spray Narcan 4 mg/actuation nasal spray (Patient not taking: Reported on 12/13/2022)       No current facility-administered medications for this visit         Allergies Allergen Reactions   • Hydroxyzine Anaphylaxis     Claims it gives her convulsions      • Other    • Pollen Extract Itching   • Tree Extract    • Clarithromycin Rash     Pt denies   • Latex Rash   • Sulfa Antibiotics Rash     "severe skin burn"       PAST HISTORY    Past Medical History:   Diagnosis Date   • Anxiety    • Asthma    • Bipolar 1 disorder ([facility] [address] )    • Brain aneurysm    • Chronic pain disorder     spinal stenosis   • Concussion syndrome     neurological rx and balance rx   • COPD (chronic obstructive pulmonary disease) (HCC)    • Depression    • Disease of thyroid gland     nodules    • Family history of colon cancer     father   • Fibromyalgia, primary    • History of colon polyps    • Hyperlipidemia    • Hypertension    • 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    • Lumbar degenerative disc disease 02/16/2022   • Migraine    • Neuropathy     bilateral feet and hands   • Peripheral neuropathy    • Psychiatric disorder    • PTSD (post-traumatic stress disorder)    • Sleep apnea    • Stroke ([facility] )     2012 no deficeits   • Thyroid Cancer    • TIA (transient ischemic attack)        Past Surgical History:   Procedure Laterality Date   • ABDOMINAL SURGERY      laproscopic/ endometriosis   • BRAIN SURGERY      aneurysm/ coiling procedure   • CARPAL TUNNEL RELEASE     • CHOLECYSTECTOMY     • COLONOSCOPY     • DENTAL SURGERY     • EPIDURAL BLOCK INJECTION Right 03/03/2022    Procedure: C7-T1 interlaminar epidural steroid injection;  Surgeon: Kenia Washington MD;  Location: [facility];  Service: Pain Management    • EPIDURAL BLOCK INJECTION N/A 04/21/2022    Procedure: C6-C7 BLOCK / INJECTION EPIDURAL STEROID CERVICAL;  Surgeon: Kenia Washington MD;  Location: [facility];  Service: Pain Management    • EPIDURAL BLOCK INJECTION Left 06/21/2022    Procedure: BLOCK / INJECTION EPIDURAL STEROID LUMBAR  left L3-4 TFESI;  Surgeon: Kenia Washington MD;  Location: [facility];  Service: Pain Management    • EYE SURGERY      cataract   • FL GUIDED NEEDLE PLAC BX/ASP/INJ  03/03/2022   • FL GUIDED NEEDLE PLAC BX/ASP/INJ  11/17/2022   • HYSTERECTOMY     • DE ESOPHAGOGASTRODUODENOSCOPY TRANSORAL DIAGNOSTIC N/A 02/08/2018    Procedure: EGD AND COLONOSCOPY;  Surgeon: Pastora Huggins MD;  Location:  GI LAB; Service: Gastroenterology   • DE PERCUT IMPLNT [address] N/A 11/17/2022    Procedure: Libia Garcia SCS TRIAL;  Surgeon: Apolinar Bedolla MD;  Location: [facility];  Service: Pain Management    • THYROID SURGERY     • TONSILLECTOMY         Social History     Tobacco Use   • Smoking status: Every Day     Packs/day: 2 00     Types: Cigarettes   • Smokeless tobacco: Never   Vaping Use   • Vaping Use: Never used   Substance Use Topics   • Alcohol use: Never     Alcohol/week: 0 0 standard drinks   • Drug use: Never     Comment: prescribed Belbuca       Family History   Problem Relation Age of Onset   • Brain cancer Mother    • Alzheimer's disease Mother    • Alzheimer's disease Father    • Parkinsonism Father          Above history personally reviewed  EXAM    Vitals:Blood pressure 112/80, pulse 76, temperature 98 °F (36 7 °C), resp  rate 18, weight 77 3 kg (170 lb 6 4 oz), SpO2 99 %  ,Body mass index is 29 25 kg/m²  Physical Exam  Constitutional:       Appearance: Normal appearance  She is well-developed  HENT:      Head: Normocephalic and atraumatic  Eyes:      Extraocular Movements: Extraocular movements intact and EOM normal       Pupils: Pupils are equal, round, and reactive to light  Neck:      Vascular: No JVD  Trachea: No tracheal deviation  Cardiovascular:      Rate and Rhythm: Normal rate  Pulmonary:      Effort: Pulmonary effort is normal  No respiratory distress  Musculoskeletal:         General: Tenderness present  No deformity  Cervical back: Normal range of motion  Tenderness present        Comments: DROM seconadry to pain   Skin:     General: Skin is warm and dry  Neurological:      Mental Status: She is alert and oriented to person, place, and time  Cranial Nerves: No cranial nerve deficit  Sensory: Sensory deficit present  Motor: Weakness present  Deep Tendon Reflexes:      Reflex Scores:       Patellar reflexes are 1+ on the right side and 2+ on the left side  Psychiatric:         Speech: Speech normal          Behavior: Behavior normal          Thought Content: Thought content normal          Neurologic Exam     Mental Status   Oriented to person, place, and time  Attention: normal    Speech: speech is normal   Level of consciousness: alert  Knowledge: good  Normal comprehension  Cranial Nerves     CN III, IV, VI   Pupils are equal, round, and reactive to light  Extraocular motions are normal    Upgaze: normal  Downgaze: normal    CN V   Facial sensation intact  CN VII   Facial expression full, symmetric  CN VIII   CN VIII normal    Hearing: intact    CN XI   CN XI normal    Right trapezius strength: normal  Left trapezius strength: normal    CN XII   CN XII normal      Motor Exam   Muscle bulk: normal  Right arm tone: normal  Left arm tone: normal  Right leg tone: normal  Left leg tone: normalNo focal weakness  Generalized weakness 4/5 in BUE and RLE  3 - 4-/5 in the LLE with associated pain  Sensory Exam   Right arm light touch: normal  Right leg light touch: normal  Decreased sensation to touch on the left side throughout  Hypersensitivity to touch in the LLE from the knee distally  Gait, Coordination, and Reflexes     Gait  Gait: (uses a cane)    Tremor   Resting tremor: absent  Action tremor: absent    Reflexes   Right patellar: 1+  Left patellar: 2+        MEDICAL DECISION MAKING    Imaging Studies:     XR chest pa & lateral    Result Date: 12/9/2022  Narrative: CHEST INDICATION:   R05 1: Acute cough   COMPARISON:  12/12/2021 EXAM PERFORMED/VIEWS:  XR CHEST PA & LATERAL Images: 2 FINDINGS: Cardiomediastinal silhouette appears unremarkable  Strand-like opacities at left lung base likely minimal subsegmental atelectasis, not previously evident No pneumothorax or pleural effusion  Osseous structures appear within normal limits for patient age  Impression: Development of suspected minimal subsegmental atelectasis left lung base Workstation performed: [id number]     FL guided needle plac bx/asp/inj    Result Date: 11/17/2022  Narrative: C-arm - thoracic spine INDICATION: SCS TRIAL   Procedure guidance  COMPARISON:  None IMAGES:  2 FLUOROSCOPY TIME:   2 39 MINUTES TECHNIQUE: FINDINGS: Fluoroscopic guidance provided for pain management procedure  (NEVRO  spinal cord stimulator electrodes are noted  2 [id number] electrodes are present one extending from T8-9 to T10-11 and the other extending from T7-8 to T9-10  )     Impression: Fluoroscopic guidance provided for pain management procedure  Workstation performed: [id number]       I have personally reviewed pertinent reports     and I have personally reviewed pertinent films in PACS

## 2022-12-14 ENCOUNTER — PREP FOR PROCEDURE (OUTPATIENT)
Dept: OTHER | Facility: HOSPITAL | Age: 62
End: 2022-12-14

## 2022-12-14 DIAGNOSIS — G89.4 CHRONIC PAIN SYNDROME: Primary | ICD-10-CM

## 2022-12-14 RX ORDER — CEFAZOLIN SODIUM 2 G/50ML
2000 SOLUTION INTRAVENOUS ONCE
OUTPATIENT
Start: 2022-12-14 | End: 2022-12-14

## 2022-12-14 RX ORDER — CHLORHEXIDINE GLUCONATE 0.12 MG/ML
15 RINSE ORAL ONCE
OUTPATIENT
Start: 2022-12-14 | End: 2022-12-14

## 2022-12-21 ENCOUNTER — TELEPHONE (OUTPATIENT)
Dept: GASTROENTEROLOGY | Facility: CLINIC | Age: 62
End: 2022-12-21

## 2022-12-21 NOTE — TELEPHONE ENCOUNTER
Left message to reschedule 2/9/23 appointment with Naya Perry due to schedule change  Sending contact letter

## 2022-12-27 ENCOUNTER — TELEPHONE (OUTPATIENT)
Dept: PAIN MEDICINE | Facility: CLINIC | Age: 62
End: 2022-12-27

## 2022-12-27 NOTE — TELEPHONE ENCOUNTER
Caller: Piotr Kidney ( PT)    Doctor: Dr Barber Click    Reason for call: Pt is calling to speak with a nurse about her condition,  Call back#: [phone]

## 2022-12-28 NOTE — TELEPHONE ENCOUNTER
Caller: Rowan Espinoza (PT)    Doctor/Office: Dr Flower Fernandes     Call regarding : Pt condition     Call was transferred to: Nurse

## 2022-12-28 NOTE — TELEPHONE ENCOUNTER
FYI:  S/W pt who states she has been having increased pain across sacrum and pelvis, with increased pain from left foot to her hip area over the last two weeks  States has numbness in saddle area and increased urination and feeling that she has to have a BM  Going to the bathroom does not relieve feeling, she is also having diarrhea  Pain is unrelieved by Hydrocodone and or Belbuca when she can take it  Just now took Jemma  States she also had a CXR that showed bilateral partial lung collapse? States she is smoking currently and that increases her coughing which could also be making pain worse  States she is due to have SCS in Feb but wanted to contact our office first   Advised pt she needs to go to ED if these symptoms are worsening and because of the saddle anesthesia she is having    Pt agreeable and will go if the Jemma does not work  Advised pt she should be evaluated anyway  Pt also has call out to PCP as well Noted O positive, negative gonorrhea/chlamydia

## 2023-01-03 ENCOUNTER — TELEPHONE (OUTPATIENT)
Dept: GASTROENTEROLOGY | Facility: CLINIC | Age: 63
End: 2023-01-03

## 2023-01-06 ENCOUNTER — TELEPHONE (OUTPATIENT)
Dept: GASTROENTEROLOGY | Facility: CLINIC | Age: 63
End: 2023-01-06

## 2023-01-06 NOTE — TELEPHONE ENCOUNTER
Patients GI provider:  Bonnie Sylvester    Number to return call: [phone]    Reason for call: Pt calling because she needs a prescription for her prep because she can not afford to get the miralax/ dulcolax  She also has a cough and has to get a chest Xray because the bottom of her lung collapsed       Scheduled procedure/appointment date if applicable: Procedure 2/87/43

## 2023-01-09 ENCOUNTER — TELEPHONE (OUTPATIENT)
Dept: GASTROENTEROLOGY | Facility: CLINIC | Age: 63
End: 2023-01-09

## 2023-01-09 ENCOUNTER — PREP FOR PROCEDURE (OUTPATIENT)
Dept: GASTROENTEROLOGY | Facility: CLINIC | Age: 63
End: 2023-01-09

## 2023-01-09 DIAGNOSIS — Z86.010 HISTORY OF COLON POLYPS: Primary | ICD-10-CM

## 2023-01-09 DIAGNOSIS — K29.70 GASTRITIS WITHOUT BLEEDING, UNSPECIFIED CHRONICITY, UNSPECIFIED GASTRITIS TYPE: ICD-10-CM

## 2023-01-09 DIAGNOSIS — R11.0 NAUSEA: ICD-10-CM

## 2023-01-09 DIAGNOSIS — K59.00 CONSTIPATION, UNSPECIFIED CONSTIPATION TYPE: ICD-10-CM

## 2023-01-09 DIAGNOSIS — K31.84 GASTROPARESIS: ICD-10-CM

## 2023-01-09 NOTE — TELEPHONE ENCOUNTER
Patients GI provider:  Joseph Reyes    Number to return call: [phone]    Reason for call: Pt called in to cancel her procedures for tomorrow because she is coughing and she is concerned about going under  Pt was very tearful stating that she has been crying for the past 2 days  Pt states that her therapist quit on her on the 29th and doesn't care about her  Pt states that her Aide told her to go to [facility] and pt stated that she doesn't care about anything anymore  She stated that she took extra pills to calm her down today  Advised pt that I would get a nurse on the line to talk with her and she hung up when I placed her on hold  Above is her number       Scheduled procedure/appointment date if applicable: Apt/procedure NA

## 2023-01-09 NOTE — TELEPHONE ENCOUNTER
Spoke with patient, who sounded congested and coughing on the phone  Pt denies alarming symptoms that would require ED evaluation  Pt angry that she was not rescheduled for procedures at the time of initial phone call  Advised pt that we would reschedule her when her symptoms resolved and she was feeling better  Pt confirmed she has been in contact with her PCP for symptom management   Pt verbalized understanding and confirmed she will call back when feeling better

## 2023-01-13 ENCOUNTER — OFFICE VISIT (OUTPATIENT)
Dept: DENTISTRY | Facility: CLINIC | Age: 63
End: 2023-01-13

## 2023-01-13 VITALS — SYSTOLIC BLOOD PRESSURE: 118 MMHG | DIASTOLIC BLOOD PRESSURE: 79 MMHG | HEART RATE: 75 BPM | TEMPERATURE: 97.2 F

## 2023-01-13 DIAGNOSIS — K08.109 COMPLETE EDENTULISM, UNSPECIFIED EDENTULISM CLASS: Primary | ICD-10-CM

## 2023-01-13 NOTE — PROGRESS NOTES
Teeth Try-In     Pt presents for Teeth try-in (max/ gwendolyn comp)    ASA: II   Pain: 0/10     Denture seated and patient noted it was comfortable  Pt verified satisfaction with shade, shape  Pt has a high gum line, thus new gingival marginal line was noted on the wax rim to reposition teeth more apically  Verified occlusion of teeth  Open anterior bite  New tooth set up is needed  Bite reg was taken with Blue print for the lab to remount the case and re do tooth set up  Discussed expectations, and alternatives (implants) if patient is still not satisfied with long term fit  Pt understood and left in good spirits       Dentures submitted to NDL    NV: Tooth try in

## 2023-01-19 ENCOUNTER — APPOINTMENT (OUTPATIENT)
Dept: LAB | Facility: HOSPITAL | Age: 63
End: 2023-01-19
Attending: NEUROLOGICAL SURGERY

## 2023-01-19 ENCOUNTER — OFFICE VISIT (OUTPATIENT)
Dept: LAB | Facility: HOSPITAL | Age: 63
End: 2023-01-19
Attending: NEUROLOGICAL SURGERY

## 2023-01-19 DIAGNOSIS — Z01.818 PRE-PROCEDURAL EXAMINATION: ICD-10-CM

## 2023-01-19 DIAGNOSIS — G89.4 CHRONIC PAIN DISORDER: ICD-10-CM

## 2023-01-19 LAB
ALBUMIN SERPL BCP-MCNC: 4.1 G/DL (ref 3.5–5)
ALP SERPL-CCNC: 84 U/L (ref 34–104)
ALT SERPL W P-5'-P-CCNC: 16 U/L (ref 7–52)
ANION GAP SERPL CALCULATED.3IONS-SCNC: 9 MMOL/L (ref 4–13)
APTT PPP: 24 SECONDS (ref 23–37)
AST SERPL W P-5'-P-CCNC: 19 U/L (ref 13–39)
ATRIAL RATE: 68 BPM
BASOPHILS # BLD AUTO: 0.05 THOUSANDS/ÂΜL (ref 0–0.1)
BASOPHILS NFR BLD AUTO: 1 % (ref 0–1)
BILIRUB SERPL-MCNC: 0.23 MG/DL (ref 0.2–1)
BUN SERPL-MCNC: 15 MG/DL (ref 5–25)
CALCIUM SERPL-MCNC: 8.6 MG/DL (ref 8.4–10.2)
CHLORIDE SERPL-SCNC: 109 MMOL/L (ref 96–108)
CO2 SERPL-SCNC: 26 MMOL/L (ref 21–32)
CREAT SERPL-MCNC: 0.77 MG/DL (ref 0.6–1.3)
EOSINOPHIL # BLD AUTO: 0.09 THOUSAND/ÂΜL (ref 0–0.61)
EOSINOPHIL NFR BLD AUTO: 1 % (ref 0–6)
ERYTHROCYTE [DISTWIDTH] IN BLOOD BY AUTOMATED COUNT: 14.1 % (ref 11.6–15.1)
EST. AVERAGE GLUCOSE BLD GHB EST-MCNC: 120 MG/DL
GFR SERPL CREATININE-BSD FRML MDRD: 83 ML/MIN/1.73SQ M
GLUCOSE P FAST SERPL-MCNC: 75 MG/DL (ref 65–99)
HBA1C MFR BLD: 5.8 %
HCT VFR BLD AUTO: 40.1 % (ref 34.8–46.1)
HGB BLD-MCNC: 12.7 G/DL (ref 11.5–15.4)
IMM GRANULOCYTES # BLD AUTO: 0.04 THOUSAND/UL (ref 0–0.2)
IMM GRANULOCYTES NFR BLD AUTO: 1 % (ref 0–2)
INR PPP: 0.96 (ref 0.84–1.19)
LYMPHOCYTES # BLD AUTO: 3.52 THOUSANDS/ÂΜL (ref 0.6–4.47)
LYMPHOCYTES NFR BLD AUTO: 44 % (ref 14–44)
MCH RBC QN AUTO: 30.1 PG (ref 26.8–34.3)
MCHC RBC AUTO-ENTMCNC: 31.7 G/DL (ref 31.4–37.4)
MCV RBC AUTO: 95 FL (ref 82–98)
MONOCYTES # BLD AUTO: 0.62 THOUSAND/ÂΜL (ref 0.17–1.22)
MONOCYTES NFR BLD AUTO: 8 % (ref 4–12)
NEUTROPHILS # BLD AUTO: 3.76 THOUSANDS/ÂΜL (ref 1.85–7.62)
NEUTS SEG NFR BLD AUTO: 45 % (ref 43–75)
NRBC BLD AUTO-RTO: 0 /100 WBCS
P AXIS: 59 DEGREES
PLATELET # BLD AUTO: 213 THOUSANDS/UL (ref 149–390)
PMV BLD AUTO: 10.9 FL (ref 8.9–12.7)
POTASSIUM SERPL-SCNC: 3.7 MMOL/L (ref 3.5–5.3)
PR INTERVAL: 152 MS
PROT SERPL-MCNC: 6.7 G/DL (ref 6.4–8.4)
PROTHROMBIN TIME: 12.9 SECONDS (ref 11.6–14.5)
QRS AXIS: 69 DEGREES
QRSD INTERVAL: 68 MS
QT INTERVAL: 398 MS
QTC INTERVAL: 423 MS
RBC # BLD AUTO: 4.22 MILLION/UL (ref 3.81–5.12)
SODIUM SERPL-SCNC: 144 MMOL/L (ref 135–147)
T WAVE AXIS: 71 DEGREES
VENTRICULAR RATE: 68 BPM
WBC # BLD AUTO: 8.08 THOUSAND/UL (ref 4.31–10.16)

## 2023-01-26 ENCOUNTER — TELEPHONE (OUTPATIENT)
Dept: NEUROSURGERY | Facility: CLINIC | Age: 63
End: 2023-01-26

## 2023-01-26 RX ORDER — CEPHALEXIN 500 MG/1
500 CAPSULE ORAL EVERY 6 HOURS SCHEDULED
COMMUNITY
Start: 2023-01-25 | End: 2023-03-10

## 2023-01-26 NOTE — TELEPHONE ENCOUNTER
Patient called requesting a letter be written to her home care provider  She is requesting for home care services to be extended for 2 weeks after surgery  I told patient that the letter will be provided for 2 weeks but will not be extended after that as the home care facility to provides services for a limited amount of time  Patient agreed and was appreciative     Letter was faxed today to [facility]  @ [phone], ATT: Rubén Cavanaugh

## 2023-01-26 NOTE — PRE-PROCEDURE INSTRUCTIONS
Pre-Surgery Instructions:   Medication Instructions   • albuterol (2 5 mg/3 mL) 0 083 % nebulizer solution Uses PRN- OK to take day of surgery   • albuterol (PROVENTIL HFA,VENTOLIN HFA) 90 mcg/act inhaler Uses PRN- OK to take day of surgery   • ammonium lactate (LAC-HYDRIN) 12 % lotion Hold day of surgery  • atorvastatin (LIPITOR) 80 mg tablet Take night before surgery   • Buprenorphine HCl (Belbuca) 300 MCG FILM Take day of surgery  • carboxymethylcellulose 0 5 % SOLN Uses PRN- OK to take day of surgery   • cephalexin (KEFLEX) 500 mg capsule 1/25 x7d   • clonazePAM (KlonoPIN) 0 5 mg tablet Take night before surgery   • clonazePAM (KlonoPIN) 1 mg tablet Take day of surgery  • dicyclomine (BENTYL) 20 mg tablet Take day of surgery  • fenofibrate (TRICOR) 48 mg tablet Take day of surgery  • Fluticasone-Salmeterol (Advair) 500-50 mcg/dose inhaler Take day of surgery  • furosemide (LASIX) 20 mg tablet Hold day of surgery  • Galcanezumab-gnlm (Emgality) 120 MG/ML SOAJ LD 1/19   • HYDROcodone-acetaminophen (NORCO)  mg per tablet Uses PRN- OK to take day of surgery   • lamoTRIgine (LaMICtal) 100 mg tablet Take day of surgery  • levothyroxine 137 mcg tablet Take day of surgery  • omeprazole (PriLOSEC) 40 MG capsule Take day of surgery  • ondansetron (ZOFRAN) 8 mg tablet Uses PRN- OK to take day of surgery   • pregabalin (LYRICA) 100 mg capsule Take day of surgery  • pregabalin (LYRICA) 150 mg capsule Take night before surgery   • topiramate (TOPAMAX) 100 mg tablet Uses PRN- OK to take day of surgery     Pt verbalizes understanding of the following:    - Bathing instructions, has chg, neck down, no genitals  - No lotions, powders, sprays, deodorant, jewelry, or make-up  - No shaving within 24hrs    - DO NOT EAT OR DRINK ANYTHING after midnight on the evening before your procedure including candy & gum   - ONLY SIPS OF WATER with your medications are allowed on the morning of your procedure    - Avoid OTC non-directed Vit/ Suppl/ Herbals 7 days prior to surgery to ensure no drug interactions with perioperative surgical/ anesthetic meds  - Avoid NSAIDs 3 days prior  - Avoid ASA containing products 5 days prior  - Continue statin medication up through and including the day of surgery    - Bring a list of meds you take at home with your last dose noted  - Bring INHALER you take for breathing problems     - Arrange for someone to drive you home after the procedure & stay with you until the next morning    - Bring insurance cards & photo id    - Bring a case for your glasses    - Bring a container for your dentures  - Leave all valuables such as credit cards, money & jewelry at home      - Notify surgeon if you develop any cold symptoms, change in your health history or develop open wounds     - Did the surgeon's office give you any other special instructions? No  - Did you require any clearances?  [facility]

## 2023-01-30 ENCOUNTER — ANESTHESIA EVENT (OUTPATIENT)
Dept: PERIOP | Facility: HOSPITAL | Age: 63
End: 2023-01-30

## 2023-01-31 ENCOUNTER — DOCUMENTATION (OUTPATIENT)
Dept: NEUROSURGERY | Facility: CLINIC | Age: 63
End: 2023-01-31

## 2023-02-01 ENCOUNTER — ANESTHESIA (OUTPATIENT)
Dept: PERIOP | Facility: HOSPITAL | Age: 63
End: 2023-02-01

## 2023-02-01 ENCOUNTER — HOSPITAL ENCOUNTER (OUTPATIENT)
Dept: RADIOLOGY | Facility: HOSPITAL | Age: 63
Setting detail: OUTPATIENT SURGERY
Discharge: HOME | End: 2023-02-01

## 2023-02-01 ENCOUNTER — HOSPITAL ENCOUNTER (OUTPATIENT)
Facility: HOSPITAL | Age: 63
Setting detail: OUTPATIENT SURGERY
Discharge: HOME | End: 2023-02-01
Attending: NEUROLOGICAL SURGERY | Admitting: NEUROLOGICAL SURGERY

## 2023-02-01 VITALS
DIASTOLIC BLOOD PRESSURE: 66 MMHG | WEIGHT: 170 LBS | BODY MASS INDEX: 29.02 KG/M2 | HEART RATE: 72 BPM | RESPIRATION RATE: 16 BRPM | OXYGEN SATURATION: 99 % | SYSTOLIC BLOOD PRESSURE: 114 MMHG | HEIGHT: 64 IN | TEMPERATURE: 96.5 F

## 2023-02-01 DIAGNOSIS — G89.4 CHRONIC PAIN SYNDROME: Primary | ICD-10-CM

## 2023-02-01 DIAGNOSIS — G89.4 CHRONIC PAIN SYNDROME: ICD-10-CM

## 2023-02-01 DIAGNOSIS — Z98.890 POSTOPERATIVE STATE: ICD-10-CM

## 2023-02-01 LAB
GLUCOSE SERPL-MCNC: 124 MG/DL (ref 65–140)
GLUCOSE SERPL-MCNC: 85 MG/DL (ref 65–140)

## 2023-02-01 DEVICE — BLUE LEAD KIT, 70CM WITH 5MM SPACING
Type: IMPLANTABLE DEVICE | Site: SPINE THORACIC | Status: FUNCTIONAL
Brand: NEVRO®

## 2023-02-01 DEVICE — N300 LEAD ANCHOR KIT
Type: IMPLANTABLE DEVICE | Site: SPINE THORACIC | Status: FUNCTIONAL
Brand: NEVRO®

## 2023-02-01 DEVICE — SENZA OMNIA IPG KIT
Type: IMPLANTABLE DEVICE | Site: BUTTOCKS | Status: FUNCTIONAL
Brand: OMNIA

## 2023-02-01 RX ORDER — DIPHENHYDRAMINE HYDROCHLORIDE 50 MG/ML
12.5 INJECTION INTRAMUSCULAR; INTRAVENOUS ONCE AS NEEDED
Status: DISCONTINUED | OUTPATIENT
Start: 2023-02-01 | End: 2023-02-01 | Stop reason: HOSPADM

## 2023-02-01 RX ORDER — ALBUTEROL SULFATE 2.5 MG/3ML
2.5 SOLUTION RESPIRATORY (INHALATION) ONCE AS NEEDED
Status: DISCONTINUED | OUTPATIENT
Start: 2023-02-01 | End: 2023-02-01 | Stop reason: HOSPADM

## 2023-02-01 RX ORDER — ONDANSETRON 2 MG/ML
INJECTION INTRAMUSCULAR; INTRAVENOUS AS NEEDED
Status: DISCONTINUED | OUTPATIENT
Start: 2023-02-01 | End: 2023-02-01

## 2023-02-01 RX ORDER — ONDANSETRON 2 MG/ML
4 INJECTION INTRAMUSCULAR; INTRAVENOUS ONCE AS NEEDED
Status: DISCONTINUED | OUTPATIENT
Start: 2023-02-01 | End: 2023-02-01 | Stop reason: HOSPADM

## 2023-02-01 RX ORDER — SUCCINYLCHOLINE/SOD CL,ISO/PF 100 MG/5ML
SYRINGE (ML) INTRAVENOUS AS NEEDED
Status: DISCONTINUED | OUTPATIENT
Start: 2023-02-01 | End: 2023-02-01

## 2023-02-01 RX ORDER — PROPOFOL 10 MG/ML
INJECTION, EMULSION INTRAVENOUS CONTINUOUS PRN
Status: DISCONTINUED | OUTPATIENT
Start: 2023-02-01 | End: 2023-02-01

## 2023-02-01 RX ORDER — HYDROMORPHONE HCL/PF 1 MG/ML
0.5 SYRINGE (ML) INJECTION
Status: DISCONTINUED | OUTPATIENT
Start: 2023-02-01 | End: 2023-02-01 | Stop reason: HOSPADM

## 2023-02-01 RX ORDER — MIDAZOLAM HYDROCHLORIDE 2 MG/2ML
INJECTION, SOLUTION INTRAMUSCULAR; INTRAVENOUS AS NEEDED
Status: DISCONTINUED | OUTPATIENT
Start: 2023-02-01 | End: 2023-02-01

## 2023-02-01 RX ORDER — CEFAZOLIN SODIUM 2 G/50ML
2000 SOLUTION INTRAVENOUS ONCE
Status: COMPLETED | OUTPATIENT
Start: 2023-02-01 | End: 2023-02-01

## 2023-02-01 RX ORDER — PROPOFOL 10 MG/ML
INJECTION, EMULSION INTRAVENOUS AS NEEDED
Status: DISCONTINUED | OUTPATIENT
Start: 2023-02-01 | End: 2023-02-01

## 2023-02-01 RX ORDER — SODIUM CHLORIDE 9 MG/ML
INJECTION, SOLUTION INTRAVENOUS CONTINUOUS PRN
Status: DISCONTINUED | OUTPATIENT
Start: 2023-02-01 | End: 2023-02-01

## 2023-02-01 RX ORDER — EPHEDRINE SULFATE 50 MG/ML
INJECTION INTRAVENOUS AS NEEDED
Status: DISCONTINUED | OUTPATIENT
Start: 2023-02-01 | End: 2023-02-01

## 2023-02-01 RX ORDER — SODIUM CHLORIDE, SODIUM LACTATE, POTASSIUM CHLORIDE, CALCIUM CHLORIDE 600; 310; 30; 20 MG/100ML; MG/100ML; MG/100ML; MG/100ML
INJECTION, SOLUTION INTRAVENOUS CONTINUOUS PRN
Status: DISCONTINUED | OUTPATIENT
Start: 2023-02-01 | End: 2023-02-01

## 2023-02-01 RX ORDER — MAGNESIUM HYDROXIDE 1200 MG/15ML
LIQUID ORAL AS NEEDED
Status: DISCONTINUED | OUTPATIENT
Start: 2023-02-01 | End: 2023-02-01 | Stop reason: HOSPADM

## 2023-02-01 RX ORDER — HYDROCODONE BITARTRATE AND ACETAMINOPHEN 5; 325 MG/1; MG/1
2 TABLET ORAL EVERY 6 HOURS PRN
Status: DISCONTINUED | OUTPATIENT
Start: 2023-02-01 | End: 2023-02-01 | Stop reason: HOSPADM

## 2023-02-01 RX ORDER — KETAMINE HYDROCHLORIDE 100 MG/ML
INJECTION INTRAMUSCULAR; INTRAVENOUS AS NEEDED
Status: DISCONTINUED | OUTPATIENT
Start: 2023-02-01 | End: 2023-02-01

## 2023-02-01 RX ORDER — TIZANIDINE 4 MG/1
4 TABLET ORAL EVERY 8 HOURS PRN
Status: DISCONTINUED | OUTPATIENT
Start: 2023-02-01 | End: 2023-02-01 | Stop reason: HOSPADM

## 2023-02-01 RX ORDER — DEXAMETHASONE SODIUM PHOSPHATE 10 MG/ML
INJECTION, SOLUTION INTRAMUSCULAR; INTRAVENOUS AS NEEDED
Status: DISCONTINUED | OUTPATIENT
Start: 2023-02-01 | End: 2023-02-01

## 2023-02-01 RX ORDER — CEPHALEXIN 500 MG/1
500 CAPSULE ORAL EVERY 6 HOURS SCHEDULED
Qty: 12 CAPSULE | Refills: 0 | Status: SHIPPED | OUTPATIENT
Start: 2023-02-01 | End: 2023-02-04

## 2023-02-01 RX ORDER — HYDROCODONE BITARTRATE AND ACETAMINOPHEN 10; 325 MG/1; MG/1
1 TABLET ORAL EVERY 6 HOURS PRN
Refills: 0
Start: 2023-02-01 | End: 2023-02-11

## 2023-02-01 RX ORDER — FENTANYL CITRATE 50 UG/ML
INJECTION, SOLUTION INTRAMUSCULAR; INTRAVENOUS AS NEEDED
Status: DISCONTINUED | OUTPATIENT
Start: 2023-02-01 | End: 2023-02-01

## 2023-02-01 RX ORDER — BUPIVACAINE HYDROCHLORIDE 2.5 MG/ML
INJECTION, SOLUTION EPIDURAL; INFILTRATION; INTRACAUDAL AS NEEDED
Status: DISCONTINUED | OUTPATIENT
Start: 2023-02-01 | End: 2023-02-01 | Stop reason: HOSPADM

## 2023-02-01 RX ORDER — FENTANYL CITRATE/PF 50 MCG/ML
25 SYRINGE (ML) INJECTION
Status: DISCONTINUED | OUTPATIENT
Start: 2023-02-01 | End: 2023-02-01 | Stop reason: HOSPADM

## 2023-02-01 RX ORDER — CHLORHEXIDINE GLUCONATE 0.12 MG/ML
15 RINSE ORAL ONCE
Status: COMPLETED | OUTPATIENT
Start: 2023-02-01 | End: 2023-02-01

## 2023-02-01 RX ORDER — VANCOMYCIN HYDROCHLORIDE 1 G/20ML
INJECTION, POWDER, LYOPHILIZED, FOR SOLUTION INTRAVENOUS AS NEEDED
Status: DISCONTINUED | OUTPATIENT
Start: 2023-02-01 | End: 2023-02-01 | Stop reason: HOSPADM

## 2023-02-01 RX ORDER — LIDOCAINE HYDROCHLORIDE 10 MG/ML
INJECTION, SOLUTION EPIDURAL; INFILTRATION; INTRACAUDAL; PERINEURAL AS NEEDED
Status: DISCONTINUED | OUTPATIENT
Start: 2023-02-01 | End: 2023-02-01

## 2023-02-01 RX ORDER — DEXMEDETOMIDINE HYDROCHLORIDE 100 UG/ML
INJECTION, SOLUTION INTRAVENOUS AS NEEDED
Status: DISCONTINUED | OUTPATIENT
Start: 2023-02-01 | End: 2023-02-01

## 2023-02-01 RX ADMIN — PHENYLEPHRINE HYDROCHLORIDE 20 MCG/MIN: 10 INJECTION INTRAVENOUS at 12:09

## 2023-02-01 RX ADMIN — CHLORHEXIDINE GLUCONATE 15 ML: 1.2 SOLUTION ORAL at 11:10

## 2023-02-01 RX ADMIN — KETAMINE HYDROCHLORIDE 50 MG: 100 INJECTION INTRAMUSCULAR; INTRAVENOUS at 11:44

## 2023-02-01 RX ADMIN — PROPOFOL 100 MCG/KG/MIN: 10 INJECTION, EMULSION INTRAVENOUS at 12:09

## 2023-02-01 RX ADMIN — PROPOFOL 200 MG: 10 INJECTION, EMULSION INTRAVENOUS at 11:44

## 2023-02-01 RX ADMIN — SODIUM CHLORIDE: 0.9 INJECTION, SOLUTION INTRAVENOUS at 11:49

## 2023-02-01 RX ADMIN — Medication 50 MG: at 12:54

## 2023-02-01 RX ADMIN — Medication 100 MG: at 11:44

## 2023-02-01 RX ADMIN — REMIFENTANIL HYDROCHLORIDE 0.25 MCG/KG/MIN: 1 INJECTION, POWDER, LYOPHILIZED, FOR SOLUTION INTRAVENOUS at 13:04

## 2023-02-01 RX ADMIN — REMIFENTANIL HYDROCHLORIDE 0.2 MCG/KG/MIN: 1 INJECTION, POWDER, LYOPHILIZED, FOR SOLUTION INTRAVENOUS at 12:09

## 2023-02-01 RX ADMIN — CEFAZOLIN SODIUM 2000 MG: 2 SOLUTION INTRAVENOUS at 11:54

## 2023-02-01 RX ADMIN — FENTANYL CITRATE 100 MCG: 50 INJECTION, SOLUTION INTRAMUSCULAR; INTRAVENOUS at 11:44

## 2023-02-01 RX ADMIN — ONDANSETRON 4 MG: 2 INJECTION INTRAMUSCULAR; INTRAVENOUS at 13:28

## 2023-02-01 RX ADMIN — DEXAMETHASONE SODIUM PHOSPHATE 10 MG: 10 INJECTION, SOLUTION INTRAMUSCULAR; INTRAVENOUS at 11:49

## 2023-02-01 RX ADMIN — LIDOCAINE HYDROCHLORIDE 50 MG: 10 INJECTION, SOLUTION EPIDURAL; INFILTRATION; INTRACAUDAL; PERINEURAL at 11:44

## 2023-02-01 RX ADMIN — PROPOFOL 50 MG: 10 INJECTION, EMULSION INTRAVENOUS at 11:54

## 2023-02-01 RX ADMIN — SODIUM CHLORIDE, SODIUM LACTATE, POTASSIUM CHLORIDE, AND CALCIUM CHLORIDE: .6; .31; .03; .02 INJECTION, SOLUTION INTRAVENOUS at 11:36

## 2023-02-01 RX ADMIN — DEXMEDETOMIDINE HCL 16 MCG: 100 INJECTION INTRAVENOUS at 11:54

## 2023-02-01 RX ADMIN — EPHEDRINE SULFATE 5 MG: 50 INJECTION INTRAVENOUS at 12:16

## 2023-02-01 RX ADMIN — DEXMEDETOMIDINE HCL 8 MCG: 100 INJECTION INTRAVENOUS at 12:28

## 2023-02-01 RX ADMIN — EPHEDRINE SULFATE 10 MG: 50 INJECTION INTRAVENOUS at 12:12

## 2023-02-01 RX ADMIN — MIDAZOLAM 2 MG: 1 INJECTION INTRAMUSCULAR; INTRAVENOUS at 11:37

## 2023-02-01 NOTE — OP NOTE
OPERATIVE REPORT  PATIENT NAME: Bonnie Moses    :  1960  MRN: [id number]  Pt Location: [facility] OR ROOM 09    SURGERY DATE: 2023    Surgeon(s) and Role:     * Taye Gr MD - Primary     * Jenelle Beck PA-C - Assisting    Preop Diagnosis:  Chronic pain syndrome [G89 4]    Post-Op Diagnosis Codes:     * Chronic pain syndrome [G89 4]    Procedure(s): Insertion percutaneous thoracic spinal cord stimulator with left buttock implantable pulse generator    Specimen(s):  * No specimens in log *    Estimated Blood Loss:   Minimal    Drains:  NG/OG/Enteral Tube Orogastric 18 Fr Center mouth (Active)   Number of days: 0       Anesthesia Type:   General    Operative Indications:  Chronic pain syndrome [G89 4]  Successful trial of Nevro device    Operative Findings:  2 Nevro 70cm leads placed to top of T8 and top of T9    Complications:   None    Procedure: Insertion percutaneous thoracic spinal cord stimulator with left buttock implantable pulse generator  CPT(R) Code:  17560 - SD PRQ IMPLTJ NSTIM ELECTRODE ARRAY EPIDURAL      Indications: Bonnie Moses is an 65 y o  female who is having surgery for chronic back and bilateral leg pain  Successful trial of Nevro spinal cord stimulator  Neuropsychiatric evaluation favorable  We discussed the nature of spinal cord stimulation and the details of surgery for permanent implantation  The patient knows that the permanent implant is, at most, as good as the trial in location and degree of pain relief, not better  The patient knows there are other options, but has not gotten significant improvement in symptoms with anything else and no longer wants to live with things as they are    The risks, benefits, and alternatives were discussed with the patient, including but not limited to: general anesthesia, bleeding, infection, stroke, coma, death, CSF leak, nerve damage, spinal cord damage, worsening neurologic or pain status, new weakness, new numbness, new pain, paralysis, failure to improve, failure of hardware, migration of leads, need for reoperation  They know that another surgery will be necessary to change the battery when it runs out of power  Typically this occurs every 5-10 years, depending on the amount of energy their program uses  No guarantees given  All questions answered  Surgeon(s) & Assistant(s)  Surgeon(s) and Role:     * Matilda Quintana MD - Primary     * Kathya Gannon PA-C - Assisting    Anesthesia: General ASA: III    Estimated Blood Loss: Minimal   Drains:   NG/OG/Enteral Tube Orogastric 18 Fr Center mouth (Active)      Wound: Clean    Complications:   none    Specimen: none    Findings:   General endotracheal anesthesia was induced  Appropriate intravenous antibiotics were given  Serial compression devices were applied to the legs and the patient was placed prone on the Ja table with all pressure points padded  X-ray confirmation of the appropriate level was obtained using fluoroscopy  The back was prepped and draped in the usual sterile fashion  Timeout was performed per protocol  The loss of resistance technique was used to enter the epidural space with the spinal needle on the right, starting at the pedicle of L3 and aiming towards the air bubble at the T11/12 disk space in the midline  A trial wire was inserted through the needle  Epidural placement was confirmed radiographically  The trial wire was removed and the Nevro lead was placed through the needle using the navigating stylet to mimic the trial with the top at the T8 level  A relaxing loop was sewn in place using 0-Vicryl  The hanging drop technique was used to enter the epidural space with the spinal needle on the left, starting at the pedicle of L3 and aiming towards the air bubble at the T11/12 disk space in the midline  A trial wire was inserted through the needle  Epidural placement was confirmed radiographically    The trial wire was removed and the Nevro lead was placed through the needle using the navigating stylet to mimic the trial with the top at the T9 level  A midline incision between the 2 needles was made with a #10 blade  The subcutaneous tissue above the fascia was dissected on each side until the needle was visualized  The fascia was incised around the the needle to allow room for the locking flange on both sides  The needles and stylets were carefully removed so that the leads were not displaced  This was confirmed radiographically  The leads were then passed from the percutaneous insertion sites to the midline  The locking flanges were placed over the wires, partially through the fascial openings on both sided  They were locked down using the torque wrench  They were secured in place with 0- silk suture, twice on each side  Attention was then turned towards the hip incision  A horizontal incision at the waistline on the left per patient request was created with a #10 blade  Blunt and sharp dissection were used to make a pocket for the battery approximately 1 cm deep to the skin  Hemostasis was obtained  The trial pulse generator battery pack fit well in the pocket created for it  It was removed  Vancomycin powder was applied to that wound  The wires were tunneled from superior to the inferior incision  They were cleaned and inserted into the Nevro Omnia pulse generator battery pack per protocol  The device was grounded and tested  Good impedance was noted at all leads  The leads were final tightened to the battery using the torque wrench at each lead  The battery was then placed in the pouch for the final time with the lettering facing dorsally and the wires coiled underneath  Vancomycin powder was applied in the superior incision  The wounds were closed in layers of 0 Vicryl, 2-0 Vicryl, 3-0 Monocryl and glue at the skin    Prior to complete closure approximately 20cc of 0 25% Marcaine was instilled in the musculature  Disposition: The patient tolerated the procedure well  The patient was extubated in the OR  The patient went to PACU  Condition: hemodynamically stable  Counts were correct for needles, sponges, and cottonoids       I was present for the entire procedure and A physician assistant was required during the procedure for retraction tissue handling,dissection and suturing    Patient Disposition:  PACU  and extubated and stable        SIGNATURE: Mora Corbin MD  DATE: February 1, 2023  TIME: 2:12 PM

## 2023-02-01 NOTE — ANESTHESIA POSTPROCEDURE EVALUATION
Post-Op Assessment Note    CV Status:  Stable  Pain Score: 7 (baseline pain 9/10 prior to procedure)    Pain management: adequate     Mental Status:  Alert and awake   Hydration Status:  Euvolemic   PONV Controlled:  Controlled   Airway Patency:  Patent      Post Op Vitals Reviewed: Yes      Staff: CRNA         No notable events documented      /66 (02/01/23 1404)    Temp 97 9 °F (36 6 °C) (02/01/23 1404)    Pulse 85 (02/01/23 1404)   Resp 16 (02/01/23 1404)    SpO2 98 % (02/01/23 1404)

## 2023-02-01 NOTE — DISCHARGE INSTR - AVS FIRST PAGE
Discharge Instructions  Spinal Cord Stimulator (SCS)    Activity:  Do not lift more than 10 pounds for 6 weeks  Avoid bending, lifting and twisting for 6 weeks  No strenuous activities  No driving for 2 weeks  When able to shower, continue to use clean towel and washcloth for 2 weeks post-op  Continue to change bed linens and pajamas more frequently  Wear clean clothes daily  Surgical incision care: For Permanent SCS placement:  Keep incisions dry for 3 days  May shower with mild antimicrobial soap after 3 days  Do not immerse the incisions in water for 6 weeks  Do not apply any creams or ointments to the incision for 6 weeks, unless otherwise instructed by [facility]  Contact office if increasing redness, drainage, pain or swelling around the incisions  Postoperative medication:  Complete course of antibiotic as directed  For permanent spinal cord stimulators: 3 days  [facility] will provide pain medication as coordinated with your pain specialist  All prescriptions must come from a single provider  Take all medications as prescribed  Call office with any questions/concerns  Please contact office for questions regarding dosage and modifications  No antiplatelet, anticoagulation or Nonsteroidal anti-inflammatory (NSAIDs) medication until cleared by [employer], unless otherwise instructed  Do not operate heavy machinery or vehicles while taking sedating medications  Use a bowel regimen while on opioids as they induce constipation  Ie  Senokot-S, Miralax, Colace, etc  Increase fiber and water intake  ***Note that it may take some time for the stimulator to improve chronic pain symptoms  Adjustment of the stimulator program can begin 2 weeks after placement   ***    ***PLEASE CONTACT YOUR REP PRIOR TO YOUR 2 WEEK POSTOPERATIVE VISIT TO PROVIDE THEM WITH THE DATE AND TIME OF YOUR APPOINTMENT***

## 2023-02-01 NOTE — PROGRESS NOTES
Post op check  Emerging from anesthesia  Moving all extremities at baseline  Wound: CDI  A/P Doing well         Mobilize  I spoke with Britni Gray from the patient's family by phone [phone] after surgery to let him know per the patient's request

## 2023-02-01 NOTE — H&P
There were no changes in complaints since the H&P    Heart, Lungs, GI, , Neuro without change  No new abdominal issues on exam  No contraindication to proposed surgery this AM

## 2023-02-01 NOTE — ANESTHESIA PREPROCEDURE EVALUATION
Procedure:  INSERTION THORACIC SPINAL CORD STIMULATOR PERCUTANEOUS W IMPLANTABLE PULSE GENERATOR (Spine Thoracic)    Relevant Problems   CARDIO   (+) Hypertension   (+) Hypertriglyceridemia   (+) Migraine   (+) Other hyperlipidemia      ENDO   (+) Other specified hypothyroidism      GI/HEPATIC   (+) Ileus (HCC)      MUSCULOSKELETAL   (+) Chronic midline low back pain without sciatica   (+) Fibromyalgia, primary   (+) Lumbar degenerative disc disease   (+) Lumbar spondylosis   (+) Mid back pain   (+) Sacroiliitis (HCC)      NEURO/PSYCH   (+) Anxiety   (+) Chronic midline low back pain without sciatica   (+) Chronic pain syndrome   (+) Depression   (+) Fibromyalgia, primary   (+) Migraine      PULMONARY   (+) COPD (chronic obstructive pulmonary disease) ([facility])        Physical Exam    Airway    Mallampati score: I  TM Distance: >3 FB  Neck ROM: full     Dental       Cardiovascular      Pulmonary      Other Findings     June 2022:  LEFT VENTRICLE: Size was normal  Systolic function was normal  Ejection fraction was estimated to be 60 %  There were no regional wall motion abnormalities  Wall thickness was at the upper limits of normal      RIGHT VENTRICLE: The size was normal  Systolic function was normal  Wall thickness was normal      LEFT ATRIUM: Size was normal      RIGHT ATRIUM: Size was normal      MITRAL VALVE: Valve structure was normal  There was normal leaflet separation  DOPPLER: The transmitral velocity was within the normal range  There was no evidence for stenosis  There was no regurgitation      AORTIC VALVE: The valve was trileaflet  Leaflets exhibited normal thickness and normal cuspal separation  DOPPLER: Transaortic velocity was within the normal range  There was no evidence for stenosis  There was no regurgitation      TRICUSPID VALVE: The valve structure was normal  There was normal leaflet separation  DOPPLER: The transtricuspid velocity was within the normal range   There was no evidence for stenosis  There was trace regurgitation      PULMONIC VALVE: Leaflets exhibited normal thickness, no calcification, and normal cuspal separation  DOPPLER: The transpulmonic velocity was within the normal range  There was no regurgitation      PERICARDIUM: There was no pericardial effusion  The pericardium was normal in appearance      AORTA: The root exhibited normal size      EKG Aries 10 2023:Normal sinus rhythm  Normal ECG  When compared with ECG of 12-DEC-2021 22:20,  No significant change was found  Confirmed by Michelle Sullivan ([id number]) on 1/19/2023 7:51:35 AM      Component Ref Range & Units 1/19/23 0714 11/8/22 1441 12/12/21 2220 10/13/21 1023 10/5/21 0824 1/27/21 0612 1/26/21 0459   WBC 4 31 - 10 16 Thousand/uL 8 08  8 85  7 44  7 31  7 31  6 65  7 28    RBC 3 81 - 5 12 Million/uL 4 22  4 32  4 37  3 96  4 07  3 68 Low   3 61 Low     Hemoglobin 11 5 - 15 4 g/dL 12 7  13 1  13 1  12 0  12 1  10 9 Low   10 9 Low     Hematocrit 34 8 - 46 1 % 40 1  40 0  40 6  37 7  38 6  34 8  34 9    MCV 82 - 98 fL 95  93  93  95  95  95  97    MCH 26 8 - 34 3 pg 30 1  30 3  30 0  30 3  29 7  29 6  30 2    MCHC 31 4 - 37 4 g/dL 31 7  32 8  32 3  31 8  31 3 Low   31 3 Low   31 2 Low     RDW 11 6 - 15 1 % 14 1  13 1  13 2  13 2  13 3  14 0  14 3    MPV 8 9 - 12 7 fL 10 9  10 3  10 6  10 1  10 2  10 6  10 0    Platelets 693 - 822 Thousands/uL 213  210  196  182  202  164  162      Component Ref Range & Units 1/19/23 0714 11/8/22 1442 7/8/22 1437 12/12/21 2220 12/12/21 2220 10/20/21 0750 10/13/21 1023   Sodium 135 - 147 mmol/L 144  141    142 R   144 R    Potassium 3 5 - 5 3 mmol/L 3 7  3 6    3 3 Low    3 3 Low     Chloride 96 - 108 mmol/L 109 High   112 High     105 R   108 R    CO2 21 - 32 mmol/L 26  23    27   26    ANION GAP 4 - 13 mmol/L 9  6    10   10    BUN 5 - 25 mg/dL 15  16    12   13    Creatinine 0 60 - 1 30 mg/dL 0 77  0 87 CM    1 01 CM   0 90 CM    Comment: Standardized to IDMS reference method   Glucose, Fasting 65 - 99 mg/dL 75   125 High  CM    94 CM     Comment: Specimen collection should occur prior to Sulfasalazine administration due to the potential for falsely depressed results  Specimen collection should occur prior to Sulfapyridine administration due to the potential for falsely elevated results  Calcium 8 4 - 10 2 mg/dL 8 6  8 9 R    8 7 R   8 2 Low  R    AST 13 - 39 U/L 19  24 R, CM   19 R, CM    16 R, CM    Comment: Specimen collection should occur prior to Sulfasalazine administration due to the potential for falsely depressed results  ALT 7 - 52 U/L 16  25 R, CM   24 R, CM    22 R, CM    Comment: Specimen collection should occur prior to Sulfasalazine administration due to the potential for falsely depressed results  Alkaline Phosphatase 34 - 104 U/L 84  83 R   78 R    74 R    Total Protein 6 4 - 8 4 g/dL 6 7  7 6   7 5 R    7 0 R    Albumin 3 5 - 5 0 g/dL 4 1  3 5   3 8    3 5    Total Bilirubin 0 20 - 1 00 mg/dL 0 23  0 28 CM   0 17 Low  CM    0 16 Low  CM    eGFR ml/min/1 73sq m 83  72    61   70          Anesthesia Plan  ASA Score- 3     Anesthesia Type- general with ASA Monitors  Additional Monitors:   Airway Plan: ETT  Plan Factors-    Chart reviewed  EKG reviewed  Imaging results reviewed  Existing labs reviewed  Patient summary reviewed  Patient is a current smoker  Patient did not smoke on day of surgery  Induction- intravenous  Postoperative Plan- Plan for postoperative opioid use  Planned trial extubation    Informed Consent- Anesthetic plan and risks discussed with patient  I personally reviewed this patient with the CRNA  Discussed and agreed on the Anesthesia Plan with the CRNA  Ju Sandoval

## 2023-02-02 ENCOUNTER — TELEPHONE (OUTPATIENT)
Dept: NEUROSURGERY | Facility: CLINIC | Age: 63
End: 2023-02-02

## 2023-02-02 NOTE — TELEPHONE ENCOUNTER
Received another call from Mike Benjamin stating that she attempted to  her vicodin at the pharmacy and it was not there  Advised that the rx was print only and was only placed as an update to her regimen as she already has a supply of the medication as provided by her PCP  Also called Dr Tez Solomon office and spoke with office staff  Requested that they let Dr Maxime Valdez know that we have changed her pain regimen to 1 tab q6h for post op  Made them aware because she will run out of medication early  Provided my callback number with questions or concerns

## 2023-02-02 NOTE — TELEPHONE ENCOUNTER
Received call from patient on nurseline stating that she was experiencing severe pain from her surgery yesterday  After assessment, patient states that she has a pain contract which is norco  BID, our provider wrote a script for norco 10-325mg Q6  Patient stated that she did not have our prescription filled and was not taking her pain medication as ordered by our provider sarah she was fearful that she would get herself into trouble with pain management and her PCP for taking more than her contract allowed  Informed her that our office will typically manage pain medication for 2 weeks after a stim surgery after which point pain management would take back over  She stated that she would have the prescription filled  Also discussed using a heating pad intermittently and avoiding the incisions sites directly to assist in managing pain symptoms  Patient reports she is doing well overall and denies any incisional issues or fevers  Patient is able to ambulate around the house and complete ADLs  Educated the patient about the importance of preventing blood clots and provided measures how to prevent them  Patient has not moved her bowels since the surgery  Encouraged patient to take an over the counter stool softener, if she is taking narcotic pain medication  Encouraged fiber intake and fluids  Reviewed incision care with the patient  Advised that after three days she may take a shower and gently wash the surgical site with soap and water  Use clean wash cloth, towels, and clothing  Do not submerge in water until cleared by the surgeon  Do not apply any creams, ointments, or lotions to the site  Patient is aware to call the office if any redness, swelling, drainage, dehiscence of incision, or fever >100 F occurs  Patient is aware to call the office if any concerns or questions may arise  Reminded patient of her upcoming appointments with the date/time/location   Patient was appreciative for the call

## 2023-02-03 ENCOUNTER — OFFICE VISIT (OUTPATIENT)
Dept: DENTISTRY | Facility: CLINIC | Age: 63
End: 2023-02-03

## 2023-02-03 VITALS — HEART RATE: 61 BPM | TEMPERATURE: 97.1 F | DIASTOLIC BLOOD PRESSURE: 55 MMHG | SYSTOLIC BLOOD PRESSURE: 90 MMHG

## 2023-02-03 DIAGNOSIS — K08.109 EDENTULISM: Primary | ICD-10-CM

## 2023-02-05 ENCOUNTER — DOCUMENTATION (OUTPATIENT)
Dept: DENTISTRY | Facility: CLINIC | Age: 63
End: 2023-02-05

## 2023-02-05 NOTE — PROGRESS NOTES
Teeth Try-In     Pt presents for Teeth try-in (max/ gwendolyn comp)     ASA: II   Pain: 0/10     Denture seated and patient noted it was comfortable  Pt verified satisfaction with shade, shape  Pt has a high gum line, thus new gingival marginal line was noted on the wax rim to reposition teeth more apically       Verified occlusion of teeth  Open anterior bite  New tooth set up is needed  Bite reg was taken with Blue print for the lab to remount the case and re do tooth set up        Discussed expectations, and alternatives (implants) if patient is still not satisfied with long term fit  Pt understood and left in good spirits      Dentures submitted to [facility]     NV: Tooth try in    Teeth Try-In     Pt presents for Teeth try-in (max/ gwendolyn comp)     ASA: II   Pain: 0/10     Denture seated and patient noted it was comfortable  Pt verified satisfaction with shade, shape  Pt has a high gum line, thus new gingival marginal line was noted on the wax rim to reposition teeth more apically       Verified occlusion of teeth  Open anterior bite  New tooth set up is needed  Bite reg was taken with Blue print for the lab to remount the case and re do tooth set up        Discussed expectations, and alternatives (implants) if patient is still not satisfied with long term fit   Pt understood and left in good spirits      Dentures submitted to [facility]DL     NV: Tooth try in

## 2023-02-09 ENCOUNTER — TELEPHONE (OUTPATIENT)
Dept: NEUROSURGERY | Facility: CLINIC | Age: 63
End: 2023-02-09

## 2023-02-09 NOTE — TELEPHONE ENCOUNTER
Received a call from patient requesting a call back regarding her pain  Returned call to patient who stated she was doing well however is now having increased pain and is questioning if it is normal  Advised it is normal to have good days and bad days  Suggested she continue taking her medications and use heating pads/ice packs  She stated she will continue her regimen and just wanted to confirm that this is normal      Patient reports she does have a follow up appt with her PCP on Monday and is not having any issues with her incision sites  She was appreciative of the call back

## 2023-02-14 ENCOUNTER — TELEPHONE (OUTPATIENT)
Dept: PAIN MEDICINE | Facility: CLINIC | Age: 63
End: 2023-02-14

## 2023-02-14 NOTE — TELEPHONE ENCOUNTER
Caller: Nico Lilly ( PT )    Doctor: Dr Rory Goodpasture     Reason for call: Pt called in to see what the next step will be they are reducing her home health aid from 40 hours to 24 hours a week and would like the doctors recommendation      Call back#: [phone]

## 2023-02-15 ENCOUNTER — CLINICAL SUPPORT (OUTPATIENT)
Dept: NEUROSURGERY | Facility: CLINIC | Age: 63
End: 2023-02-15

## 2023-02-15 VITALS — DIASTOLIC BLOOD PRESSURE: 79 MMHG | TEMPERATURE: 98.1 F | SYSTOLIC BLOOD PRESSURE: 119 MMHG

## 2023-02-15 DIAGNOSIS — Z48.89 POSTOPERATIVE VISIT: Primary | ICD-10-CM

## 2023-02-15 NOTE — PROGRESS NOTES
Post-Op Visit- Neurosurgery    Cipriano Woodall 65 y o  female MRN: [id number]    Chief Complaint:  Patient presents post: Insertion percutaneous thoracic spinal cord stimulator with left buttock implantable pulse generator    History of Present Illness:  Patient presents for 2 week POV for incision check accompanied by child and ambulating without an assistive device  Patient reports she is doing well overall and denies any incisional issues or fevers  she denies any new weakness, numbness or tingling since the surgery  Patient admits to surgical pain at this time and rates their pain as a Pain Score:   8/10  Patient is currently taking vicodin with some relief of pain symptoms  Assessment:   Vitals:    02/15/23 1014   BP: 119/79   Temp: 98 1 °F (36 7 °C)       Wound Exam: Incision well approximated  No erythema, edema or drainage present  Location: back, left buttock         Discussion/Summary:  Doing well postoperatively  Reviewed incision care with patient including daily observation for s/s infection including: increased erythema, edema, drainage, dehiscence of incision or fever >101  Should these be observed, she understands that she is to call and/or return immediately for reassessment  Advised patient to continue cleansing area with mild soap and water and pat dry  Not to apply any lotions, creams, or ointments, & not to submerge in any water for 4 more weeks  Patient admits to not adhering to activity restrictions, states she has been bending and changing the cat litter and lifting heavy drinks into the refrigerator  Reinforced the importance of maintaining activity restrictions until cleared by the surgeon  Activity levels were also reviewed with the patient in detail, she is to lift no greater than 10 pounds and ambulation is encouraged as tolerated       Verified date/time/location of upcoming POV and reminded her to complete x-rays prior to her next appointment  She is to call the office with any further questions or concerns, or if any incisional issues or fevers would arise  Patient met with Raymond from [facility] after our visit

## 2023-02-15 NOTE — TELEPHONE ENCOUNTER
I am not sure that there is anything that we can do about this  We do not prescribe home health aides for patients, so I am not sure how this came about  Find out from patient #1 how did she get the home health aide in the first place and #2 who is " they"? ?  Meaning, who reduced the hours of the home health aide  I do not think this has anything to do with us (pain management)

## 2023-02-17 NOTE — TELEPHONE ENCOUNTER
S/w pt and advised of same  Pt is insisting that she needs a letter from our practice saying that she is under our care and what she is being treated for  Per pt her PCP is providing more information        Pt is requesting this letter be sent to   [employer]  Fax [phone]

## 2023-02-17 NOTE — TELEPHONE ENCOUNTER
We do not write letters of medical necessity or address disability at spine and pain  Generally, we believe these type of recommendations up to the PCP, who (it sounds like) originally recommended the home health aide

## 2023-02-17 NOTE — TELEPHONE ENCOUNTER
RN attempted to reach pt regarding previous  VMMLOM with c/b number office hours and location provided  RN then called "other" number on chart and was able to speak with th ept's son who stated that he would call his mother and tell her to answer the phone when RN calls back

## 2023-02-17 NOTE — TELEPHONE ENCOUNTER
RN s/w pt regarding previous  Pt clarified that her PCP recommended the home health aids 15 years ago and she had 40 hours a week until recently when a  re evaluated her needs and stated because her son is now living with her on the 3rd floor she only needs 24 hours a week of home care  Pt is requesting a letter of medical necessity from all her specialists listing low long we have been caring for her (12/14/21) what all her diagnosis are that we teat, how she is a fall risk, cannot care for herself,needs assist with bathing, dressing, ADL's  recently had a lumbar SCS implanted, and was told that she would need one for her neck as well  Per pt her PCP and neurologist are also writing letters of medical necessity and pt will be calling to provide the fax number of where this should be faxed once written      --please advise thank you--

## 2023-02-20 NOTE — TELEPHONE ENCOUNTER
Caller: Onesimo Armando (PT)    Doctor: Erica Carrel CRNP    Reason for call: Returning the nurse call    Call back#: [phone]

## 2023-02-21 NOTE — TELEPHONE ENCOUNTER
Caller: patient    Doctor: Shelli Morales    Reason for call: would like that letter mail to her, To have a copy      Call back#:

## 2023-02-27 ENCOUNTER — OFFICE VISIT (OUTPATIENT)
Dept: DENTISTRY | Facility: CLINIC | Age: 63
End: 2023-02-27

## 2023-02-27 DIAGNOSIS — Z01.20 ENCOUNTER FOR DENTAL EXAMINATION: Primary | ICD-10-CM

## 2023-02-27 NOTE — PROGRESS NOTES
Teeth Try-In     Pt presents for Teeth try-in  Denture seated and patient noted it was comfortable  Pt verified satisfaction with shade, shape, and positioning of teeth  Verified occlusion of teeth  Even occlusal contacts throughout denture  Positions of teeth altered to eliminate interferences and occlusal discrepancies  Discussed expectations, and alternatives (implants) if patient is still not satisfied with long term fit  Pt understood and left in good spirits       Dentures submitted to [facility]    NV: Delivery

## 2023-03-06 ENCOUNTER — TELEPHONE (OUTPATIENT)
Dept: NEUROSURGERY | Facility: CLINIC | Age: 63
End: 2023-03-06

## 2023-03-06 DIAGNOSIS — Z96.89 STATUS POST INSERTION OF SPINAL CORD STIMULATOR: Primary | ICD-10-CM

## 2023-03-06 NOTE — TELEPHONE ENCOUNTER
Received the photos of patient's incision site  No signs of infection noted, incision seems to be healing well  Reached out to patient and informed her of the above  Patient stated she will be getting her xray completed today for her 6 week visit next week  Patient did report a temperature of 100 F  Suggested she reach out to her PCP for her flu like symptoms  Patient stated an understanding and was appreciative of the call back

## 2023-03-08 ENCOUNTER — HOSPITAL ENCOUNTER (OUTPATIENT)
Dept: RADIOLOGY | Facility: HOSPITAL | Age: 63
Discharge: HOME | End: 2023-03-08
Attending: NEUROLOGICAL SURGERY

## 2023-03-08 DIAGNOSIS — Z96.89 STATUS POST INSERTION OF SPINAL CORD STIMULATOR: ICD-10-CM

## 2023-03-10 ENCOUNTER — ANESTHESIA (OUTPATIENT)
Dept: PERIOP | Facility: HOSPITAL | Age: 63
End: 2023-03-10

## 2023-03-10 ENCOUNTER — ANESTHESIA EVENT (OUTPATIENT)
Dept: PERIOP | Facility: HOSPITAL | Age: 63
End: 2023-03-10

## 2023-03-10 ENCOUNTER — HOSPITAL ENCOUNTER (OUTPATIENT)
Dept: PERIOP | Facility: HOSPITAL | Age: 63
Setting detail: OUTPATIENT SURGERY
End: 2023-03-10
Attending: INTERNAL MEDICINE

## 2023-03-10 VITALS
WEIGHT: 170 LBS | HEIGHT: 64 IN | DIASTOLIC BLOOD PRESSURE: 64 MMHG | TEMPERATURE: 97.2 F | SYSTOLIC BLOOD PRESSURE: 104 MMHG | RESPIRATION RATE: 18 BRPM | OXYGEN SATURATION: 97 % | BODY MASS INDEX: 29.02 KG/M2 | HEART RATE: 66 BPM

## 2023-03-10 DIAGNOSIS — R11.0 NAUSEA: ICD-10-CM

## 2023-03-10 DIAGNOSIS — K59.00 CONSTIPATION, UNSPECIFIED CONSTIPATION TYPE: ICD-10-CM

## 2023-03-10 DIAGNOSIS — K29.70 GASTRITIS WITHOUT BLEEDING, UNSPECIFIED CHRONICITY, UNSPECIFIED GASTRITIS TYPE: ICD-10-CM

## 2023-03-10 DIAGNOSIS — Z86.010 HISTORY OF COLON POLYPS: ICD-10-CM

## 2023-03-10 DIAGNOSIS — K31.84 GASTROPARESIS: ICD-10-CM

## 2023-03-10 LAB
GLUCOSE SERPL-MCNC: 66 MG/DL (ref 65–140)
GLUCOSE SERPL-MCNC: 77 MG/DL (ref 65–140)

## 2023-03-10 RX ORDER — PROPOFOL 10 MG/ML
INJECTION, EMULSION INTRAVENOUS CONTINUOUS PRN
Status: DISCONTINUED | OUTPATIENT
Start: 2023-03-10 | End: 2023-03-10

## 2023-03-10 RX ORDER — PROPOFOL 10 MG/ML
INJECTION, EMULSION INTRAVENOUS AS NEEDED
Status: DISCONTINUED | OUTPATIENT
Start: 2023-03-10 | End: 2023-03-10

## 2023-03-10 RX ORDER — SODIUM CHLORIDE, SODIUM LACTATE, POTASSIUM CHLORIDE, CALCIUM CHLORIDE 600; 310; 30; 20 MG/100ML; MG/100ML; MG/100ML; MG/100ML
INJECTION, SOLUTION INTRAVENOUS CONTINUOUS PRN
Status: DISCONTINUED | OUTPATIENT
Start: 2023-03-10 | End: 2023-03-10

## 2023-03-10 RX ADMIN — PROPOFOL 30 MG: 10 INJECTION, EMULSION INTRAVENOUS at 13:01

## 2023-03-10 RX ADMIN — PROPOFOL 150 MG: 10 INJECTION, EMULSION INTRAVENOUS at 12:58

## 2023-03-10 RX ADMIN — SODIUM CHLORIDE, SODIUM LACTATE, POTASSIUM CHLORIDE, AND CALCIUM CHLORIDE: .6; .31; .03; .02 INJECTION, SOLUTION INTRAVENOUS at 12:54

## 2023-03-10 RX ADMIN — PROPOFOL 30 MG: 10 INJECTION, EMULSION INTRAVENOUS at 13:04

## 2023-03-10 RX ADMIN — PROPOFOL 60 MCG/KG/MIN: 10 INJECTION, EMULSION INTRAVENOUS at 13:10

## 2023-03-10 NOTE — ANESTHESIA PREPROCEDURE EVALUATION
Procedure:  COLONOSCOPY  EGD    Relevant Problems   ANESTHESIA (within normal limits)      CARDIO   (+) Hypertension   (+) Hypertriglyceridemia   (+) Migraine   (+) Other hyperlipidemia      ENDO   (+) Other specified hypothyroidism      GI/HEPATIC   (+) Ileus (HCC)      MUSCULOSKELETAL   (+) Chronic midline low back pain without sciatica   (+) Fibromyalgia, primary   (+) Lumbar degenerative disc disease   (+) Lumbar spondylosis   (+) Mid back pain   (+) Sacroiliitis (HCC)      NEURO/PSYCH   (+) Anxiety   (+) Chronic midline low back pain without sciatica   (+) Chronic pain syndrome   (+) Depression   (+) Fibromyalgia, primary   (+) Migraine      PULMONARY   (+) COPD (chronic obstructive pulmonary disease) (HCC)        Physical Exam    Airway    Mallampati score: II  TM Distance: >3 FB  Neck ROM: full     Dental       Cardiovascular  Rate: normal,     Pulmonary  Pulmonary exam normal     Other Findings        Anesthesia Plan  ASA Score- 3     Anesthesia Type- IV sedation with anesthesia with ASA Monitors  Additional Monitors:   Airway Plan:     Comment: Per patient, appropriately NPO, denies active CP/SOB/wheezing/symptoms related to heartburn/nausea/vomiting  Plan Factors-Exercise tolerance (METS): >4 METS  Chart reviewed  Patient summary reviewed  Patient is not a current smoker  Induction- intravenous  Postoperative Plan-     Informed Consent- Anesthetic plan and risks discussed with patient  I personally reviewed this patient with the CRNA  Discussed and agreed on the Anesthesia Plan with the CRNA  Darling Wall

## 2023-03-10 NOTE — H&P
History and Physical - [facility]  Aylin Woodall 65 y o  female MRN: [id number]                  HPI: Alba Zavaleta is a 65y o  year old female who presents for severe gastritis and history of colon polyps  REVIEW OF SYSTEMS: Per the HPI, and otherwise unremarkable      Historical Information   Past Medical History:   Diagnosis Date   • Ambulates with cane    • Anxiety    • Arthritis    • Asthma    • Bipolar 1 disorder ([facility] )    • Brain aneurysm    • Chronic pain disorder     spinal stenosis   • Concussion syndrome     neurological rx and balance rx   • COPD (chronic obstructive pulmonary disease) ([facility])    • Depression    • Diabetes mellitus (HCC)     controlled w/ diet   • Disease of thyroid gland     nodules    • Family history of colon cancer     father   • Fibromyalgia, primary    • GERD (gastroesophageal reflux disease)    • History of colon polyps    • Hyperlipidemia    • Hypertension    • 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    • Irritable bowel syndrome    • Lumbar degenerative disc disease 02/16/2022   • Migraine    • Neuropathy     bilateral feet and hands   • Peripheral neuropathy    • Psychiatric disorder    • PTSD (post-traumatic stress disorder)    • Shortness of breath    • Sleep apnea     had 3 studies & last one negative   • Stroke ([facility] )     2012 no deficeits   • TIA (transient ischemic attack)    • Wears dentures    • Wears glasses      Past Surgical History:   Procedure Laterality Date   • ABDOMINAL SURGERY      laproscopic/ endometriosis   • BRAIN SURGERY      aneurysm/ coiling procedure   • CARPAL TUNNEL RELEASE     • CHOLECYSTECTOMY     • COLONOSCOPY     • DENTAL SURGERY     • EPIDURAL BLOCK INJECTION Right 03/03/2022    Procedure: C7-T1 interlaminar epidural steroid injection;  Surgeon: Terrance Davis MD;  Location: [facility];  Service: Pain Management    • EPIDURAL BLOCK INJECTION N/A 04/21/2022    Procedure: C6-C7 BLOCK / INJECTION EPIDURAL STEROID CERVICAL;  Surgeon: Allison Pena MD;  Location: [facility];  Service: Pain Management    • EPIDURAL BLOCK INJECTION Left 06/21/2022    Procedure: BLOCK / INJECTION EPIDURAL STEROID LUMBAR  left L3-4 TFESI;  Surgeon: Allison Pena MD;  Location: [facility];  Service: Pain Management    • EYE SURGERY      cataract   • FL GUIDED NEEDLE PLAC BX/ASP/INJ  03/03/2022   • FL GUIDED NEEDLE PLAC BX/ASP/INJ  11/17/2022   • HYSTERECTOMY     • TX ESOPHAGOGASTRODUODENOSCOPY TRANSORAL DIAGNOSTIC N/A 02/08/2018    Procedure: EGD AND COLONOSCOPY;  Surgeon: Mynor Park MD;  Location:  GI LAB; Service: Gastroenterology   • TX PRQ IMPLTJ NSTIM ELECTRODE ARRAY EPIDURAL N/A 11/17/2022    Procedure: Atilio Rounds SCS TRIAL;  Surgeon: Allison Pena MD;  Location: [facility];  Service: Pain Management    • TX PRQ IMPLTJ NSTIM ELECTRODE ARRAY EPIDURAL N/A 2/1/2023    Procedure:  Insertion percutaneous thoracic spinal cord stimulator with left buttock implantable pulse generator;  Surgeon: Huey Naranjo MD;  Location:  MAIN OR;  Service: Neurosurgery   • THYROID SURGERY     • TONSILLECTOMY       Social History   Social History     Substance and Sexual Activity   Alcohol Use Never   • Alcohol/week: 0 0 standard drinks     Social History     Substance and Sexual Activity   Drug Use Never    Comment: prescribed Belbuca     Social History     Tobacco Use   Smoking Status Every Day   • Packs/day: 2 00   • Types: Cigarettes   Smokeless Tobacco Never     Family History   Problem Relation Age of Onset   • Brain cancer Mother    • Alzheimer's disease Mother    • Alzheimer's disease Father    • Parkinsonism Father        Meds/Allergies       Current Outpatient Medications:   •  albuterol (2 5 mg/3 mL) 0 083 % nebulizer solution  •  albuterol (PROVENTIL HFA,VENTOLIN HFA) 90 mcg/act inhaler  •  ammonium lactate (LAC-HYDRIN) 12 % lotion  •  atorvastatin (LIPITOR) 80 mg tablet  •  Buprenorphine HCl (Belbuca) 300 MCG FILM  •  Butalbital-APAP-Caffeine (FIORICET PO)  •  carboxymethylcellulose 0 5 % SOLN  •  clonazePAM (KlonoPIN) 0 5 mg tablet  •  clonazePAM (KlonoPIN) 1 mg tablet  •  dicyclomine (BENTYL) 20 mg tablet  •  fenofibrate (TRICOR) 48 mg tablet  •  Fluticasone-Salmeterol (Advair) 500-50 mcg/dose inhaler  •  lamoTRIgine (LaMICtal) 100 mg tablet  •  levothyroxine 137 mcg tablet  •  magnesium 30 MG tablet  •  Melatonin 10 MG CAPS  •  omeprazole (PriLOSEC) 40 MG capsule  •  ondansetron (ZOFRAN) 8 mg tablet  •  pregabalin (LYRICA) 100 mg capsule  •  pregabalin (LYRICA) 150 mg capsule  •  tiZANidine (ZANAFLEX) 4 mg tablet  •  topiramate (TOPAMAX) 100 mg tablet  •  furosemide (LASIX) 20 mg tablet  •  Galcanezumab-gnlm (Emgality) 120 MG/ML SOAJ  •  naloxone (Narcan) 4 mg/0 1 mL nasal spray  •  polyethylene glycol (GOLYTELY) 4000 mL solution    Allergies   Allergen Reactions   • Hydroxyzine Anaphylaxis     Claims it gives her convulsions  • Pollen Extract Itching   • Tree Extract    • Clarithromycin Rash     Pt denies   • Latex Rash   • Sulfa Antibiotics Rash     "severe skin burn"       Objective     /70   Pulse 74   Temp 97 9 °F (36 6 °C) (Tympanic)   Resp 18   Ht 5' 4" (1 626 m)   Wt 77 1 kg (170 lb)   SpO2 99%   BMI 29 18 kg/m²       PHYSICAL EXAM    Gen: NAD  Head: NCAT  CV: RRR  CHEST: Clear  ABD: soft, NT/ND  EXT: no edema      ASSESSMENT/PLAN:  This is a 65y o  year old female here for upper endoscopy and colonoscopy, and she is stable and optimized for her procedure

## 2023-03-10 NOTE — ANESTHESIA POSTPROCEDURE EVALUATION
Post-Op Assessment Note    CV Status:  Stable  Pain Score: 0    Pain management: adequate     Mental Status:  Sleepy and arousable   Hydration Status:  Euvolemic   PONV Controlled:  Controlled   Airway Patency:  Patent      Post Op Vitals Reviewed: Yes      Staff: Anesthesiologist, CRNA         No notable events documented      /64 (03/10/23 1341)    Temp (!) 97 2 °F (36 2 °C) (03/10/23 1341)    Pulse 69 (03/10/23 1341)   Resp 20 (03/10/23 1341)    SpO2 99 % (03/10/23 1341)

## 2023-03-14 ENCOUNTER — OFFICE VISIT (OUTPATIENT)
Dept: DENTISTRY | Facility: CLINIC | Age: 63
End: 2023-03-14

## 2023-03-14 VITALS — TEMPERATURE: 97.1 F | HEART RATE: 75 BPM | DIASTOLIC BLOOD PRESSURE: 76 MMHG | SYSTOLIC BLOOD PRESSURE: 111 MMHG

## 2023-03-14 DIAGNOSIS — K08.109 EDENTULISM: Primary | ICD-10-CM

## 2023-03-15 ENCOUNTER — TELEPHONE (OUTPATIENT)
Dept: NEUROSURGERY | Facility: CLINIC | Age: 63
End: 2023-03-15

## 2023-03-15 NOTE — TELEPHONE ENCOUNTER
Received a call from patient questioning if her xray results are in  Returned call to patient and advised the results are in and will be reviewed with her at her upcoming appt  She stated an understanding and was appreciative of the call back

## 2023-03-15 NOTE — PROGRESS NOTES
Denture Delivery    Pt presents for denture delivery  Denture picked up and verified prior to appt  Denture seated but patient noted mild discomfort  Verified internal and removed any interferences that prevent complete and comfortable seating  No areas of concern that needed adjustment  Verified and adjusted occlusion to be even and balanced  Checked occlusion with articulating paper  No areas of adjustment needed  Pt stated she liked the aesthetics, feel, and comfort  Advised patient to return if she feels any pain or discomfort when wearing dentures for adjustments  Provided home care instructions, discussed expectations, and alternatives (implants) if patient is still not satisfied with long term fit  Pt was given denture care kit  Pt understood and left in good spirits  OHI and denture care instructions reviewed  Goodie bag with denture case, cleaning brush, Fixodent, cleaning tablets, and home care instructions given  Pt left satisfied and ambulatory  Pt agreed to f/u as recommended upon delivery      NV: Adjustment if necessary

## 2023-03-16 DIAGNOSIS — B37.81 CANDIDA ESOPHAGITIS (HCC): Primary | ICD-10-CM

## 2023-03-16 RX ORDER — FLUCONAZOLE 200 MG/1
200 TABLET ORAL DAILY
Qty: 15 TABLET | Refills: 0 | Status: SHIPPED | OUTPATIENT
Start: 2023-03-16 | End: 2023-03-31

## 2023-03-16 NOTE — RESULT ENCOUNTER NOTE
I called her with her results and left a voicemail to call me back  She has Candida esophagitis so I will treat with fluconazole for 2 weeks

## 2023-03-22 ENCOUNTER — PREP FOR PROCEDURE (OUTPATIENT)
Dept: GASTROENTEROLOGY | Facility: MEDICAL CENTER | Age: 63
End: 2023-03-22

## 2023-03-22 DIAGNOSIS — K31.7 GASTRIC POLYPS: Primary | ICD-10-CM

## 2023-03-22 NOTE — RESULT ENCOUNTER NOTE
I called her with her results and left her voicemail to call me back  I will complete her treatment with fluconazole for her Candida esophagitis and plan for repeat upper endoscopy in 3 months because of her fundic gland polyp with low-grade dysplasia  This will also be an opportunity to confirm her Candida esophagitis has healed  She is due for her next colonoscopy in 3 years because of her colon adenomas

## 2023-04-02 PROBLEM — N30.01 ACUTE CYSTITIS WITH HEMATURIA: Status: RESOLVED | Noted: 2019-07-19 | Resolved: 2023-04-02

## 2023-04-04 ENCOUNTER — TELEPHONE (OUTPATIENT)
Dept: NEUROSURGERY | Facility: CLINIC | Age: 63
End: 2023-04-04

## 2023-04-06 ENCOUNTER — TELEPHONE (OUTPATIENT)
Dept: NEUROSURGERY | Facility: CLINIC | Age: 63
End: 2023-04-06

## 2023-04-06 NOTE — TELEPHONE ENCOUNTER
Received a call from patient requesting a sooner appt that 4/18  Attempted to return call to patient however she did not answer  Left detailed VM advising she currently has the soonest available with Dr An Pinedo  Provided office number should she have questions or concerns

## 2023-04-18 DIAGNOSIS — R10.9 INTRACTABLE ABDOMINAL PAIN: ICD-10-CM

## 2023-04-18 DIAGNOSIS — R19.7 DIARRHEA, UNSPECIFIED TYPE: Primary | ICD-10-CM

## 2023-04-20 ENCOUNTER — APPOINTMENT (OUTPATIENT)
Dept: LAB | Facility: HOSPITAL | Age: 63
End: 2023-04-20

## 2023-04-20 DIAGNOSIS — R19.7 DIARRHEA, UNSPECIFIED TYPE: ICD-10-CM

## 2023-04-20 PROCEDURE — 87505 NFCT AGENT DETECTION GI: CPT | Performed by: PHYSICIAN ASSISTANT

## 2023-04-21 LAB
C DIFF TOX GENS STL QL NAA+PROBE: NEGATIVE
CAMPYLOBACTER DNA SPEC NAA+PROBE: NORMAL
SALMONELLA DNA SPEC QL NAA+PROBE: NORMAL
SHIGA TOXIN STX GENE SPEC NAA+PROBE: NORMAL
SHIGELLA DNA SPEC QL NAA+PROBE: NORMAL

## 2023-04-22 LAB — G LAMBLIA AG STL QL IA: NEGATIVE

## 2023-04-25 ENCOUNTER — HOSPITAL ENCOUNTER (OUTPATIENT)
Dept: RADIOLOGY | Facility: HOSPITAL | Age: 63
Discharge: HOME | End: 2023-04-25

## 2023-04-25 DIAGNOSIS — R05.1 ACUTE COUGH: ICD-10-CM

## 2023-04-26 ENCOUNTER — TELEPHONE (OUTPATIENT)
Dept: GASTROENTEROLOGY | Facility: CLINIC | Age: 63
End: 2023-04-26

## 2023-04-26 NOTE — TELEPHONE ENCOUNTER
Patients aid dropped off [employer] paperwork for [employer]  She requested 3/10 EGD report and letter that she is a patient of [address] [place] [address] as well as documentation of future EGD to be sent to [facility] Review fax [phone]  EGD report and letter faxed

## 2023-04-27 ENCOUNTER — APPOINTMENT (EMERGENCY)
Dept: RADIOLOGY | Facility: HOSPITAL | Age: 63
End: 2023-04-27

## 2023-04-27 ENCOUNTER — APPOINTMENT (EMERGENCY)
Dept: CT IMAGING | Facility: HOSPITAL | Age: 63
End: 2023-04-27

## 2023-04-27 ENCOUNTER — HOSPITAL ENCOUNTER (EMERGENCY)
Facility: HOSPITAL | Age: 63
Discharge: HOME | End: 2023-04-27
Attending: EMERGENCY MEDICINE

## 2023-04-27 VITALS
HEART RATE: 83 BPM | SYSTOLIC BLOOD PRESSURE: 154 MMHG | RESPIRATION RATE: 18 BRPM | DIASTOLIC BLOOD PRESSURE: 80 MMHG | TEMPERATURE: 97.7 F | OXYGEN SATURATION: 97 %

## 2023-04-27 DIAGNOSIS — J44.1 COPD EXACERBATION (HCC): Primary | ICD-10-CM

## 2023-04-27 DIAGNOSIS — J32.9 SINUSITIS: ICD-10-CM

## 2023-04-27 DIAGNOSIS — R91.1 PULMONARY NODULE: ICD-10-CM

## 2023-04-27 DIAGNOSIS — N12 PYELONEPHRITIS: ICD-10-CM

## 2023-04-27 DIAGNOSIS — R07.9 CHEST PAIN: ICD-10-CM

## 2023-04-27 LAB
ALBUMIN SERPL BCP-MCNC: 3.8 G/DL (ref 3.5–5)
ALP SERPL-CCNC: 72 U/L (ref 34–104)
ALT SERPL W P-5'-P-CCNC: 26 U/L (ref 7–52)
ANION GAP SERPL CALCULATED.3IONS-SCNC: 7 MMOL/L (ref 4–13)
APTT PPP: 23 SECONDS (ref 23–37)
AST SERPL W P-5'-P-CCNC: 19 U/L (ref 13–39)
BACTERIA UR QL AUTO: ABNORMAL /HPF
BASOPHILS # BLD AUTO: 0.06 THOUSANDS/ΜL (ref 0–0.1)
BASOPHILS NFR BLD AUTO: 1 % (ref 0–1)
BILIRUB SERPL-MCNC: 0.28 MG/DL (ref 0.2–1)
BILIRUB UR QL STRIP: NEGATIVE
BUN SERPL-MCNC: 21 MG/DL (ref 5–25)
CALCIUM SERPL-MCNC: 8.8 MG/DL (ref 8.4–10.2)
CARDIAC TROPONIN I PNL SERPL HS: <2 NG/L
CARDIAC TROPONIN I PNL SERPL HS: <2 NG/L
CHLORIDE SERPL-SCNC: 108 MMOL/L (ref 96–108)
CLARITY UR: CLEAR
CO2 SERPL-SCNC: 26 MMOL/L (ref 21–32)
COLOR UR: YELLOW
CREAT SERPL-MCNC: 0.79 MG/DL (ref 0.6–1.3)
EOSINOPHIL # BLD AUTO: 0.06 THOUSAND/ΜL (ref 0–0.61)
EOSINOPHIL NFR BLD AUTO: 1 % (ref 0–6)
ERYTHROCYTE [DISTWIDTH] IN BLOOD BY AUTOMATED COUNT: 13.8 % (ref 11.6–15.1)
FLUAV RNA RESP QL NAA+PROBE: NEGATIVE
FLUBV RNA RESP QL NAA+PROBE: NEGATIVE
GFR SERPL CREATININE-BSD FRML MDRD: 81 ML/MIN/1.73SQ M
GLUCOSE SERPL-MCNC: 86 MG/DL (ref 65–140)
GLUCOSE UR STRIP-MCNC: NEGATIVE MG/DL
HCT VFR BLD AUTO: 43.1 % (ref 34.8–46.1)
HGB BLD-MCNC: 14 G/DL (ref 11.5–15.4)
HGB UR QL STRIP.AUTO: NEGATIVE
IMM GRANULOCYTES # BLD AUTO: 0.05 THOUSAND/UL (ref 0–0.2)
IMM GRANULOCYTES NFR BLD AUTO: 1 % (ref 0–2)
INR PPP: 0.99 (ref 0.84–1.19)
KETONES UR STRIP-MCNC: NEGATIVE MG/DL
LACTATE SERPL-SCNC: 0.7 MMOL/L (ref 0.5–2)
LEUKOCYTE ESTERASE UR QL STRIP: NEGATIVE
LYMPHOCYTES # BLD AUTO: 4.1 THOUSANDS/ΜL (ref 0.6–4.47)
LYMPHOCYTES NFR BLD AUTO: 43 % (ref 14–44)
MCH RBC QN AUTO: 31.2 PG (ref 26.8–34.3)
MCHC RBC AUTO-ENTMCNC: 32.5 G/DL (ref 31.4–37.4)
MCV RBC AUTO: 96 FL (ref 82–98)
MONOCYTES # BLD AUTO: 0.54 THOUSAND/ΜL (ref 0.17–1.22)
MONOCYTES NFR BLD AUTO: 6 % (ref 4–12)
NEUTROPHILS # BLD AUTO: 4.75 THOUSANDS/ΜL (ref 1.85–7.62)
NEUTS SEG NFR BLD AUTO: 48 % (ref 43–75)
NITRITE UR QL STRIP: POSITIVE
NON-SQ EPI CELLS URNS QL MICRO: ABNORMAL /HPF
NRBC BLD AUTO-RTO: 0 /100 WBCS
PH UR STRIP.AUTO: 6.5 [PH]
PLATELET # BLD AUTO: 175 THOUSANDS/UL (ref 149–390)
PMV BLD AUTO: 10.9 FL (ref 8.9–12.7)
POTASSIUM SERPL-SCNC: 3.6 MMOL/L (ref 3.5–5.3)
PROCALCITONIN SERPL-MCNC: <0.05 NG/ML
PROT SERPL-MCNC: 6.1 G/DL (ref 6.4–8.4)
PROT UR STRIP-MCNC: NEGATIVE MG/DL
PROTHROMBIN TIME: 13.3 SECONDS (ref 11.6–14.5)
RBC # BLD AUTO: 4.49 MILLION/UL (ref 3.81–5.12)
RBC #/AREA URNS AUTO: ABNORMAL /HPF
RSV RNA RESP QL NAA+PROBE: NEGATIVE
SARS-COV-2 RNA RESP QL NAA+PROBE: NEGATIVE
SODIUM SERPL-SCNC: 141 MMOL/L (ref 135–147)
SP GR UR STRIP.AUTO: 1.01 (ref 1–1.03)
UROBILINOGEN UR QL STRIP.AUTO: 0.2 E.U./DL
WBC # BLD AUTO: 9.56 THOUSAND/UL (ref 4.31–10.16)
WBC #/AREA URNS AUTO: ABNORMAL /HPF

## 2023-04-27 RX ORDER — NICOTINE 21 MG/24HR
21 PATCH, TRANSDERMAL 24 HOURS TRANSDERMAL ONCE
Status: DISCONTINUED | OUTPATIENT
Start: 2023-04-27 | End: 2023-04-27 | Stop reason: HOSPADM

## 2023-04-27 RX ORDER — HYDROCODONE BITARTRATE AND ACETAMINOPHEN 5; 325 MG/1; MG/1
2 TABLET ORAL ONCE
Status: COMPLETED | OUTPATIENT
Start: 2023-04-27 | End: 2023-04-27

## 2023-04-27 RX ORDER — ALBUTEROL SULFATE 2.5 MG/3ML
5 SOLUTION RESPIRATORY (INHALATION) ONCE
Status: COMPLETED | OUTPATIENT
Start: 2023-04-27 | End: 2023-04-27

## 2023-04-27 RX ORDER — PREDNISONE 20 MG/1
50 TABLET ORAL DAILY
Qty: 10 TABLET | Refills: 0 | Status: SHIPPED | OUTPATIENT
Start: 2023-04-27 | End: 2023-05-02

## 2023-04-27 RX ORDER — CEFTRIAXONE 2 G/50ML
2000 INJECTION, SOLUTION INTRAVENOUS ONCE
Status: COMPLETED | OUTPATIENT
Start: 2023-04-27 | End: 2023-04-27

## 2023-04-27 RX ORDER — CEFPODOXIME PROXETIL 200 MG/1
200 TABLET, FILM COATED ORAL 2 TIMES DAILY
Qty: 20 TABLET | Refills: 0 | Status: SHIPPED | OUTPATIENT
Start: 2023-04-27 | End: 2023-05-07

## 2023-04-27 RX ADMIN — SODIUM CHLORIDE 1000 ML: 0.9 INJECTION, SOLUTION INTRAVENOUS at 12:46

## 2023-04-27 RX ADMIN — CEFTRIAXONE 2000 MG: 2 INJECTION, SOLUTION INTRAVENOUS at 12:46

## 2023-04-27 RX ADMIN — IPRATROPIUM BROMIDE 0.5 MG: 0.5 SOLUTION RESPIRATORY (INHALATION) at 12:40

## 2023-04-27 RX ADMIN — PREDNISONE 50 MG: 20 TABLET ORAL at 13:50

## 2023-04-27 RX ADMIN — NICOTINE 21 MG: 21 PATCH, EXTENDED RELEASE TRANSDERMAL at 15:51

## 2023-04-27 RX ADMIN — HYDROCODONE BITARTRATE AND ACETAMINOPHEN 2 TABLET: 5; 325 TABLET ORAL at 15:50

## 2023-04-27 RX ADMIN — IOHEXOL 100 ML: 350 INJECTION, SOLUTION INTRAVENOUS at 14:49

## 2023-04-27 RX ADMIN — ALBUTEROL SULFATE 5 MG: 2.5 SOLUTION RESPIRATORY (INHALATION) at 12:40

## 2023-04-27 NOTE — DISCHARGE INSTRUCTIONS
Please take all of your antibiotics, even if your symptoms improve  You are begin started on a steroids with initial dose provided today  Return for worsening of symptoms  Follow up with your PCP  A referral was placed for pulmonology  Please call and make follow up appointment  Follow up with pulmonology or your family doctor for the pulmonary nodule seen today  Due to your smoking history recommended follow up for CT in 12 months  Without appropriate follow up diagnosis such as but not limited to cancer can be missed

## 2023-04-27 NOTE — ED PROVIDER NOTES
"History  Chief Complaint   Patient presents with   • Cough     Patient states she started with a cough, chest pain/burning, SOB, diarrhea, abdominal pain and back pain about 2 weeks ago and feels like she is getting worse  Patient had a chest xray done on Tuesday and was told there was nothing abnormal       Patient is a 81-year-old female with past medical history of COPD, fibromyalgia arriving for evaluation of multiple complaints  Patient reports that she started with diarrhea 2 weeks ago and right lower quadrant pain that is  Patient denies blood in stool  Patient denies blood in urine  Patient reports that she has had increase in urinary frequency and burning for the past two days  Patient report generalized fatigue  Patient states she had a spinal stimulator placed on February first  Patient states migraines have increased in frequency but not intensity  Patient states baseline left lower extremity weakness  Patient states has \"numbness\" from her left shoulder to her left elbow for the past three weeks as well  Patient's numbness starts at left shoulder and left elbow, and is constant  No change to  strength  Patient states that this is constant  Patient reports bilateral flank pain, as well as right lower quadrant pain  Patient states she is s/p \"gall bladder being removed  \" Patient states two days of shortness of breath  Patient states she has \"burning in my lungs\" when she breathes  Patient states chest pain as well  Patient states cough ongoing for over two weeks, and requested a chest xray that was, \"negative  \" Patient states this is different than her regular shortness of breath  Patient states recently completed course of doxy and prednisone due to cough  Patient reports she continues to smoke cigarettes  Patient states the burning sensation and chest pain are different than her normal COPD  Patient arrives with her personal care aid   Patient states has tried doing nebulizer at home but, \"Will " "only work for two hours if I'm ivis  \"                   Prior to Admission Medications   Prescriptions Last Dose Informant Patient Reported? Taking?    Buprenorphine HCl (Belbuca) 300 MCG FILM   Yes No   Sig: Apply 300 mcg to cheek in the morning   Butalbital-APAP-Caffeine (FIORICET PO)   Yes No   Sig: Take by mouth as needed   Fluticasone-Salmeterol (Advair) 500-50 mcg/dose inhaler   Yes No   Sig: Inhale 1 puff 2 (two) times a day   Galcanezumab-gnlm (Emgality) 120 MG/ML SOAJ   Yes No   Sig: Inject 120 mg under the skin every 30 (thirty) days Last inj 23   HYDROcodone-acetaminophen (NORCO)  mg per tablet   Yes No   Sig: TAKE 2 TABLETS BY MOUTH IN THE MORNING AND 1 BY MOUTH AT BEDTIME   Melatonin 10 MG CAPS   Yes No   Sig: Take 1 tablet by mouth daily at bedtime as needed   albuterol (2 5 mg/3 mL) 0 083 % nebulizer solution   Yes No   Sig: albuterol sulfate HFA 90 mcg/actuation aerosol inhaler   inhale 2 puffs by mouth every 6 hours if needed for wheezing   albuterol (PROVENTIL HFA,VENTOLIN HFA) 90 mcg/act inhaler   Yes No   Sig: Inhale 2 puffs every 6 (six) hours as needed   ammonium lactate (LAC-HYDRIN) 12 % lotion   Yes No   Sig: as needed   atorvastatin (LIPITOR) 80 mg tablet   Yes No   Sig: Take 80 mg by mouth every evening   carboxymethylcellulose 0 5 % SOLN   No No   Sig: Administer 1 drop to both eyes daily as needed for dry eyes   clonazePAM (KlonoPIN) 0 5 mg tablet   Yes No   Sig: Take 0 5 mg by mouth daily at bedtime   clonazePAM (KlonoPIN) 1 mg tablet   Yes No   Sig: Take 1 mg by mouth daily in the early morning   dicyclomine (BENTYL) 20 mg tablet   Yes No   Sig: Take 20 mg by mouth every 6 (six) hours   fenofibrate (TRICOR) 48 mg tablet   Yes No   Sig: Take 48 mg by mouth daily   fluticasone (FLONASE) 50 mcg/act nasal spray   Yes No   Si sprays into each nostril daily   furosemide (LASIX) 20 mg tablet   Yes No   Sig: Take 20 mg by mouth as needed Taking as needed   lamoTRIgine (LaMICtal) " 100 mg tablet   Yes No   Sig: Take 100 mg by mouth 2 (two) times a day   levothyroxine 137 mcg tablet   Yes No   Sig: Take 137 mcg by mouth daily   magnesium 30 MG tablet   Yes No   Sig: Take 500 mg by mouth 2 (two) times a day 1/26/23 right now not taking   naloxone (Narcan) 4 mg/0 1 mL nasal spray   Yes No   Sig: Narcan 4 mg/actuation nasal spray   Patient not taking: Reported on 12/13/2022   omeprazole (PriLOSEC) 40 MG capsule   Yes No   Sig: Take 80 mg by mouth 2 (two) times a day   ondansetron (ZOFRAN) 8 mg tablet   No No   Sig: Take 1 tablet (8 mg total) by mouth every 8 (eight) hours as needed for nausea or vomiting   polyethylene glycol (GOLYTELY) 4000 mL solution   No No   Sig: Take 4,000 mL by mouth once for 1 dose   pregabalin (LYRICA) 100 mg capsule   Yes No   Sig: Take 100 mg by mouth 3 (three) times a day Morning-lunch-dinner   pregabalin (LYRICA) 150 mg capsule   Yes No   Sig: take 1 capsule by mouth NIGHTLY   tiZANidine (ZANAFLEX) 4 mg tablet   Yes No   Sig: take 2 tablets by mouth every 6 hours if needed for muscle spasm   topiramate (TOPAMAX) 100 mg tablet   Yes No   Sig: Take 100 mg by mouth every 6 (six) hours as needed Takes 2 tabs every 6 hours      Facility-Administered Medications: None       Past Medical History:   Diagnosis Date   • Ambulates with cane    • Anxiety    • Arthritis    • Asthma    • Bipolar 1 disorder ([facility])    • Brain aneurysm    • Chronic pain disorder     spinal stenosis   • Concussion syndrome     neurological rx and balance rx   • COPD (chronic obstructive pulmonary disease) ([facility])    • Depression    • Diabetes mellitus ([facility])     controlled w/ diet   • Disease of thyroid gland     nodules    • Family history of colon cancer     father   • Fibromyalgia, primary    • GERD (gastroesophageal reflux disease)    • History of colon polyps    • Hyperlipidemia    • Hypertension    • 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    • Irritable bowel syndrome    • Lumbar degenerative disc disease 02/16/2022   • Migraine    • Neuropathy     bilateral feet and hands   • Peripheral neuropathy    • Psychiatric disorder    • PTSD (post-traumatic stress disorder)    • Shortness of breath    • Sleep apnea     had 3 studies & last one negative   • Stroke ([address] )     2012 no deficeits   • TIA (transient ischemic attack)    • Wears dentures    • Wears glasses        Past Surgical History:   Procedure Laterality Date   • ABDOMINAL SURGERY      laproscopic/ endometriosis   • BRAIN SURGERY      aneurysm/ coiling procedure   • CARPAL TUNNEL RELEASE     • CHOLECYSTECTOMY     • COLONOSCOPY     • DENTAL SURGERY     • EPIDURAL BLOCK INJECTION Right 03/03/2022    Procedure: C7-T1 interlaminar epidural steroid injection;  Surgeon: Kamran Muhammad MD;  Location: [facility];  Service: Pain Management    • EPIDURAL BLOCK INJECTION N/A 04/21/2022    Procedure: C6-C7 BLOCK / INJECTION EPIDURAL STEROID CERVICAL;  Surgeon: Kamran Muhammad MD;  [facility]: [facility];  Service: Pain Management    • EPIDURAL BLOCK INJECTION Left 06/21/2022    Procedure: BLOCK / INJECTION EPIDURAL STEROID LUMBAR  left L3-4 TFESI;  Surgeon: Kamran Muhammad MD;  Location: [facility];  Service: Pain Management    • EYE SURGERY      cataract   • FL GUIDED NEEDLE PLAC BX/ASP/INJ  03/03/2022   • FL GUIDED NEEDLE PLAC BX/ASP/INJ  11/17/2022   • HYSTERECTOMY     • AR ESOPHAGOGASTRODUODENOSCOPY TRANSORAL DIAGNOSTIC N/A 02/08/2018    Procedure: EGD AND COLONOSCOPY;  Surgeon: Craig Newton MD;  Location: [facility]; Service: Gastroenterology   • AR PRQ IMPLTJ NSTIM ELECTRODE ARRAY EPIDURAL N/A 11/17/2022    Procedure: Kendall Franco SCS TRIAL;  Surgeon: Kamran Muhammad MD;  Location: [facility];  Service: Pain Management    • AR PRQ IMPLTJ NSTIM ELECTRODE ARRAY EPIDURAL N/A 2/1/2023    Procedure:  Insertion percutaneous thoracic spinal cord stimulator with left buttock implantable pulse generator;  Surgeon: Jeff Kelley Jessica Waite MD;  [facility];  Service: Neurosurgery   • THYROID SURGERY     • TONSILLECTOMY         Family History   Problem Relation Age of Onset   • Brain cancer Mother    • Alzheimer's disease Mother    • Alzheimer's disease Father    • Parkinsonism Father      I have reviewed and agree with the history as documented  E-Cigarette/Vaping   • E-Cigarette Use Never User      E-Cigarette/Vaping Substances   • Nicotine No    • THC No    • CBD No    • Flavoring No    • Other No    • Unknown No      Social History     Tobacco Use   • Smoking status: Every Day     Packs/day: 2 00     Types: Cigarettes   • Smokeless tobacco: Never   Vaping Use   • Vaping Use: Never used   Substance Use Topics   • Alcohol use: Never     Alcohol/week: 0 0 standard drinks   • Drug use: Never     Comment: prescribed Belbuca       Review of Systems   Constitutional: Negative  HENT: Negative  Eyes: Negative  Respiratory: Positive for cough and shortness of breath  Negative for stridor  Cardiovascular: Positive for chest pain  Gastrointestinal: Positive for abdominal pain  Endocrine: Negative  Negative for cold intolerance  Genitourinary: Positive for dysuria, flank pain and frequency  Negative for decreased urine volume, difficulty urinating, enuresis, genital sores, hematuria, menstrual problem, pelvic pain, urgency, vaginal bleeding, vaginal discharge and vaginal pain  Skin: Negative  Allergic/Immunologic: Negative  Neurological: Positive for headaches  Negative for dizziness, tremors, seizures, syncope, facial asymmetry, speech difficulty, light-headedness and numbness  Hematological: Negative  Psychiatric/Behavioral: Negative  All other systems reviewed and are negative  Physical Exam  Physical Exam  Vitals and nursing note reviewed  Constitutional:       Appearance: Normal appearance  She is normal weight  HENT:      Head: Normocephalic        Right Ear: External ear normal       Left Ear: External ear normal       Nose: Congestion and rhinorrhea present  Mouth/Throat:      Mouth: Mucous membranes are moist    Eyes:      General:         Right eye: No discharge  Extraocular Movements: Extraocular movements intact  Conjunctiva/sclera: Conjunctivae normal       Pupils: Pupils are equal, round, and reactive to light  Cardiovascular:      Rate and Rhythm: Normal rate and regular rhythm  Pulses: Normal pulses  Heart sounds: Normal heart sounds  Pulmonary:      Effort: No tachypnea, bradypnea, accessory muscle usage, prolonged expiration or respiratory distress  Breath sounds: Normal breath sounds  Decreased air movement present  No wheezing, rhonchi or rales  Chest:      Chest wall: No tenderness  Abdominal:      General: Abdomen is flat  Bowel sounds are normal       Palpations: Abdomen is soft  There is no mass  Tenderness: There is abdominal tenderness  There is right CVA tenderness and left CVA tenderness  There is no rebound  Hernia: No hernia is present  Musculoskeletal:         General: Normal range of motion  Cervical back: Normal range of motion and neck supple  Skin:     General: Skin is warm  Capillary Refill: Capillary refill takes less than 2 seconds  Neurological:      General: No focal deficit present  Mental Status: She is alert and oriented to person, place, and time  Mental status is at baseline  GCS: GCS eye subscore is 4  GCS verbal subscore is 5  GCS motor subscore is 6  Cranial Nerves: Cranial nerves 2-12 are intact  No cranial nerve deficit, dysarthria or facial asymmetry  Sensory: Sensation is intact  Motor: Motor function is intact  No weakness, tremor, abnormal muscle tone or pronator drift  Coordination: Coordination is intact  Romberg sign negative  Gait: Gait is intact        Comments: 5/5 upper extremity strength, no numbness or tingling   5/5 lower extremity strength, no numbness or tingling   Psychiatric:         Mood and Affect: Mood normal          Behavior: Behavior normal          Thought Content: Thought content normal          Vital Signs  ED Triage Vitals   Temperature Pulse Respirations Blood Pressure SpO2   04/27/23 1139 04/27/23 1139 04/27/23 1139 04/27/23 1139 04/27/23 1139   97 7 °F (36 5 °C) 66 16 134/80 99 %      Temp Source Heart Rate Source Patient Position - Orthostatic VS BP Location FiO2 (%)   04/27/23 1139 04/27/23 1139 04/27/23 1200 04/27/23 1200 --   Temporal Monitor Sitting Left arm       Pain Score       04/27/23 1139       9           Vitals:    04/27/23 1500 04/27/23 1530 04/27/23 1600 04/27/23 1630   BP: 120/64 170/79 130/63 154/80   Pulse: 68 69 67 83   Patient Position - Orthostatic VS: Sitting Sitting Sitting Sitting         Visual Acuity      ED Medications  Medications   albuterol inhalation solution 5 mg (5 mg Nebulization Given 4/27/23 1240)   ipratropium (ATROVENT) 0 02 % inhalation solution 0 5 mg (0 5 mg Nebulization Given 4/27/23 1240)   cefTRIAXone (ROCEPHIN) IVPB (premix in dextrose) 2,000 mg 50 mL (0 mg Intravenous Stopped 4/27/23 1316)   sodium chloride 0 9 % bolus 1,000 mL (0 mL Intravenous Stopped 4/27/23 1423)   predniSONE tablet 50 mg (50 mg Oral Given 4/27/23 1350)   iohexol (OMNIPAQUE) 350 MG/ML injection (SINGLE-DOSE) 100 mL (100 mL Intravenous Given 4/27/23 1449)   HYDROcodone-acetaminophen (NORCO) 5-325 mg per tablet 2 tablet (2 tablets Oral Given 4/27/23 1550)       Diagnostic Studies  Results Reviewed     Procedure Component Value Units Date/Time    Blood culture #1 [[id number]] Collected: 04/27/23 1210    Lab Status: Preliminary result Specimen: Blood from Arm, Right Updated: 04/27/23 2001     Blood Culture Received in Microbiology Lab  Culture in Progress      Blood culture #2 [[id number]] Collected: 04/27/23 1212    Lab Status: Preliminary result Specimen: Blood from Arm, Left Updated: 04/27/23 2001     Blood Culture Received in Microbiology Lab  Culture in Progress  HS Troponin I 2hr [[id number]] Collected: 04/27/23 1412    Lab Status: Final result Specimen: Blood from Arm, Right Updated: 04/27/23 1439     hs TnI 2hr <2 ng/L      Delta 2hr hsTnI --    FLU/RSV/COVID - if FLU/RSV clinically relevant [[id number]]  (Normal) Collected: 04/27/23 1234    Lab Status: Final result Specimen: Nares from Nose Updated: 04/27/23 1317     SARS-CoV-2 Negative     INFLUENZA A PCR Negative     INFLUENZA B PCR Negative     RSV PCR Negative    Narrative:      FOR PEDIATRIC PATIENTS - copy/paste COVID Guidelines URL to browser: [email or web address]  GetMaid    SARS-CoV-2 assay is a Nucleic Acid Amplification assay intended for the  qualitative detection of nucleic acid from SARS-CoV-2 in nasopharyngeal  swabs  Results are for the presumptive identification of SARS-CoV-2 RNA  Positive results are indicative of infection with SARS-CoV-2, the virus  causing COVID-19, but do not rule out bacterial infection or co-infection  with other viruses  Laboratories within the [place] and its  territories are required to report all positive results to the appropriate  public health authorities  Negative results do not preclude SARS-CoV-2  infection and should not be used as the sole basis for treatment or other  patient management decisions  Negative results must be combined with  clinical observations, patient history, and epidemiological information  This test has not been FDA cleared or approved  This test has been authorized by FDA under an Emergency Use Authorization  (EUA)  This test is only authorized for the duration of time the  declaration that circumstances exist justifying the authorization of the  emergency use of an in vitro diagnostic tests for detection of SARS-CoV-2  virus and/or diagnosis of COVID-19 infection under section 564(b)(1) of  the Act, 21 U  S C  801UMD-9(K)(0), unless the authorization is terminated  or revoked sooner  The test has been validated but independent review by FDA  and CLIA is pending  Test performed using [employer] GeneXpert: This RT-PCR assay targets N2,  a region unique to SARS-CoV-2  A conserved region in the E-gene was chosen  for pan-Sarbecovirus detection which includes SARS-CoV-2  According to CMS-2020-01-R, this platform meets the definition of high-throughput technology      Procalcitonin [[id number]]  (Normal) Collected: 04/27/23 1210    Lab Status: Final result Specimen: Blood from Arm, Right Updated: 04/27/23 1244     Procalcitonin <0 05 ng/ml     HS Troponin 0hr (reflex protocol) [[id number]]  (Normal) Collected: 04/27/23 1210    Lab Status: Final result Specimen: Blood from Arm, Right Updated: 04/27/23 1240     hs TnI 0hr <2 ng/L     Urine Microscopic [[id number]]  (Abnormal) Collected: 04/27/23 1220    Lab Status: Final result Specimen: Urine, Clean Catch Updated: 04/27/23 1237     RBC, UA None Seen /hpf      WBC, UA 1-2 /hpf      Epithelial Cells None Seen /hpf      Bacteria, UA Innumerable /hpf     Comprehensive metabolic panel [[id number]]  (Abnormal) Collected: 04/27/23 1210    Lab Status: Final result Specimen: Blood from Arm, Right Updated: 04/27/23 1235     Sodium 141 mmol/L      Potassium 3 6 mmol/L      Chloride 108 mmol/L      CO2 26 mmol/L      ANION GAP 7 mmol/L      BUN 21 mg/dL      Creatinine 0 79 mg/dL      Glucose 86 mg/dL      Calcium 8 8 mg/dL      AST 19 U/L      ALT 26 U/L      Alkaline Phosphatase 72 U/L      Total Protein 6 1 g/dL      Albumin 3 8 g/dL      Total Bilirubin 0 28 mg/dL      eGFR 81 ml/min/1 73sq m     Narrative:      [place] guidelines for Chronic Kidney Disease (CKD):   •  Stage 1 with normal or high GFR (GFR > 90 mL/min/1 73 square meters)  •  Stage 2 Mild CKD (GFR = 60-89 mL/min/1 73 square meters)  •  Stage 3A Moderate CKD (GFR = 45-59 mL/min/1 73 square meters)  •  Stage 3B Moderate CKD (GFR = 30-44 mL/min/1 73 square meters)  •  Stage 4 Severe CKD (GFR = 15-29 mL/min/1 73 square meters)  •  Stage 5 End Stage CKD (GFR <15 mL/min/1 73 square meters)  Note: GFR calculation is accurate only with a steady state creatinine    Lactic acid [[id number]]  (Normal) Collected: 04/27/23 1210    Lab Status: Final result Specimen: Blood from Arm, Right Updated: 04/27/23 1233     LACTIC ACID 0 7 mmol/L     Narrative:      Result may be elevated if tourniquet was used during collection      Protime-INR [[id number]]  (Normal) Collected: 04/27/23 1210    Lab Status: Final result Specimen: Blood from Arm, Right Updated: 04/27/23 1231     Protime 13 3 seconds      INR 0 99    APTT [[id number]]  (Normal) Collected: 04/27/23 1210    Lab Status: Final result Specimen: Blood from Arm, Right Updated: 04/27/23 1231     PTT 23 seconds     UA w Reflex to Microscopic w Reflex to Culture [[id number]]  (Abnormal) Collected: 04/27/23 1220    Lab Status: Final result Specimen: Urine, Clean Catch Updated: 04/27/23 1230     Color, UA Yellow     Clarity, UA Clear     Specific Gravity, UA 1 010     pH, UA 6 5     Leukocytes, UA Negative     Nitrite, UA Positive     Protein, UA Negative mg/dl      Glucose, UA Negative mg/dl      Ketones, UA Negative mg/dl      Urobilinogen, UA 0 2 E U /dl      Bilirubin, UA Negative     Occult Blood, UA Negative    CBC and differential [[id number]] Collected: 04/27/23 1210    Lab Status: Final result Specimen: Blood from Arm, Right Updated: 04/27/23 1221     WBC 9 56 Thousand/uL      RBC 4 49 Million/uL      Hemoglobin 14 0 g/dL      Hematocrit 43 1 %      MCV 96 fL      MCH 31 2 pg      MCHC 32 5 g/dL      RDW 13 8 %      MPV 10 9 fL      Platelets 135 Thousands/uL      nRBC 0 /100 WBCs      Neutrophils Relative 48 %      Immat GRANS % 1 %      Lymphocytes Relative 43 %      Monocytes Relative 6 %      Eosinophils Relative 1 %      Basophils Relative 1 %      Neutrophils Absolute 4 75 Thousands/µL      Immature Grans Absolute 0 05 Thousand/uL      Lymphocytes Absolute 4 10 Thousands/µL      Monocytes Absolute 0 54 Thousand/µL      Eosinophils Absolute 0 06 Thousand/µL      Basophils Absolute 0 06 Thousands/µL                  PE Study with CT Abdomen and Pelvis with contrast   Final Result by Janalyn Boast, MD (04/27 1623)      1  No evidence of pulmonary embolism   2  No acute pulmonary disease  0 3 cm right lower lobe nodule  Based on current Fleischner Society 2017 Guidelines on incidental pulmonary nodule, no routine follow-up is needed if the patient is low risk  If the patient is high risk, optional follow-up    chest CT at 12 months can be considered  3  Prominence of the intra and extrahepatic biliary tract is unchanged from 10/13/2021 likely reflecting postcholecystectomy state  4  No acute inflammatory changes in the abdomen or pelvis                        Workstation performed: [id number]         CT head without contrast   Final Result by Janalyn Boast, MD (04/27 3021)      1  No acute intracranial abnormality   2  Small amount of fluid in the maxillary sinuses and patchy areas of opacification in ethmoid air cells  Workstation performed: [id number]         XR chest portable   Final Result by Nancie Bhandari MD (04/27 6946)      No acute cardiopulmonary disease                    Workstation performed: [id number]                    Procedures  ECG 12 Lead Documentation Only    Date/Time: 4/27/2023 11:44 AM  Performed by: LAURIE Syed  Authorized by: LAURIE Syed     Indications / Diagnosis:  Chest pain   ECG reviewed by me, the ED Provider: yes    Patient location:  ED  Interpretation:     Interpretation: normal    Rate:     ECG rate:  81    ECG rate assessment: normal    Rhythm:     Rhythm: sinus rhythm    Ectopy:     Ectopy: none    QRS:     QRS axis:  Normal  Conduction:     Conduction: normal    ST segments:     ST segments:  Normal  T waves:     T waves: normal ED Course  ED Course as of 04/27/23 2003   Thu Apr 27, 2023   1221 CBC and differential  No leukocytosis, no anemia noted  1233 UA w Reflex to Microscopic w Reflex to Culture(!)  UTI appreciated  1241 Bacteria, UA(!): Innumerable   1243 Comprehensive metabolic panel(!)  No yann, no electrolyte abnormality  1325 FLU/RSV/COVID - if FLU/RSV clinically relevant   1325 Pt reports feeling improved after breathing treatment  1446 hs TnI 2hr: <2   1446 ACS ruled out per [address]'s ACS algorithm     3168 Patient reports SOB is resolved  1546 Pt reporting she is due for her vicodin that she takes daily  Patient reports would like a nicotine patch  HEART Risk Score    Flowsheet Row Most Recent Value   Heart Score Risk Calculator    History 0 Filed at: 04/27/2023 1956   ECG 0 Filed at: 04/27/2023 1956   Age 1 Filed at: 04/27/2023 1956   Risk Factors 2 Filed at: 04/27/2023 1956   Troponin 0 Filed at: 04/27/2023 1956   HEART Score 3 Filed at: 04/27/2023 1956                        SBIRT 22yo+    Flowsheet Row Most Recent Value   Initial Alcohol Screen: US AUDIT-C     1  How often do you have a drink containing alcohol? 0 Filed at: 04/27/2023 1150   2  How many drinks containing alcohol do you have on a typical day you are drinking? 0 Filed at: 04/27/2023 1150   3a  Male UNDER 65: How often do you have five or more drinks on one occasion? 0 Filed at: 04/27/2023 1150   3b  FEMALE Any Age, or MALE 65+: How often do you have 4 or more drinks on one occassion? 0 Filed at: 04/27/2023 1150   Audit-C Score 0 Filed at: 04/27/2023 1150   ABIOLA: How many times in the past year have you    Used an illegal drug or used a prescription medication for non-medical reasons?  Never Filed at: 04/27/2023 1150          Wells' Criteria for PE    Flowsheet Row Most Recent Value   Wells' Criteria for PE    Clinical signs and symptoms of DVT 0 Filed at: 04/27/2023 1204   PE is primary diagnosis or equally likely 3 Filed at: 04/27/2023 1204   HR >100 0 Filed at: 04/27/2023 1204   Immobilization at least 3 days or Surgery in the previous 4 weeks 1 5 Filed at: 04/27/2023 1204   Previous, objectively diagnosed PE or DVT 0 Filed at: 04/27/2023 1204   Hemoptysis 0 Filed at: 04/27/2023 1204   Malignancy with treatment within 6 months or palliative 1 Filed at: 04/27/2023 1204   Wells' Criteria Total 5 5 Filed at: 04/27/2023 1204                Medical Decision Making  Differential diagnosis including pulmonary embolism, pneumonia, UTI, appendicitis, ACS, ICH  Patient is a 58-year-old female with significant past medical history including fibromyalgia, COPD, continuing to smoke cigarettes arriving for evaluation of shortness of breath she states is different than her normal COPD  Patient reports that her symptoms have all been going on for 3 weeks including her abdominal pain, diarrhea, chest pain, cough, rhinorrhea, with the addition of shortness of breath over the past 2 days  Patient describes pain with breathing noting that it is burning  Patient reports that she is able to increase in activity and has been sedentary over the past several weeks due to this  Patient reports that she was initially placed on doxycycline, and steroids by her PCP, leaving of her symptoms  Patient also reporting right lower quadrant pain  Patient reports increasing frequency of urination  Denies any gross blood in urine or stool  Patient reports that she recently had a stimulator placed in February  As her shortness of breath, is reported as being different than her normal COPD, will rule out PE as patient has high risk  Patient also reporting that she is having abdominal pain or diarrhea, and flank pain  Will add on addition of CT abdomen pelvis to assess for intra-abdominal pathologies  Patient is in no extremis, and well ill-appearing, is nontoxic appearing    Patient has no increased work of breathing, no tachypnea, maintaining her own airway  Patient urine is positive for nitrates  As patient has flank pain, will treat this as a pyelonephritis  Dose of Rocephin provided in department  And also provided with a nebulizer treatment of Atrovent and albuterol as she is reporting that she is having shortness of breath  Patient also provided with dose of steroids, reporting that her last dose of steroids was not taking properly secondary to description on bottle  Patient provided with fluids  As document patient has no leukocytosis, no anemia, no ALEX, no electrolyte abnormalities  Patient's COVID flu RSV are negative  Patient has no elevated procalcitonin, no elevated lactic acidosis  Patient reports that her shortness of breath is relieved after receiving steroids and breathing treatment  As documented patient reports that she felt relief after receiving steroids breathing treatment  Patient has no oxygen requirement  Patient has no tachypnea, or increased work of breathing throughout her stay  Patient's had no episodes of tachycardia, or acute telemetry events  Patient's had no episodes of hypotension  Based on CT head scan, will provide patient for treatment for sinusitis as well as her pyelonephritis  Patient was offered admission for pyelonephritis, but declines  With shared decision making, given patient's unremarkable work up patient would like to try antibiotics at home  Patient has strict return precautions to the emergency department  Patient was provided with initial dose of IV antibiotics  CT head findings were discussed with patient, and aware that she is being treated for sinusitis  Discussed with patient that no pneumonia was seen on CAT scan  Discussed incidental finding of pulmonary nodule and that she needs to follow-up with her PCP as she is continuing to smoke and is considered high risk  Discussed that without appropriate follow-up she can have diagnosis missed such as but not limited to cancer    This was discussed in front of patient's personal care attendant  Patient has no acute evidence of pulmonary embolism, no inflammatory changes in her abdomen or pelvis  Patient has no right upper quadrant pain  As patient blood work is unremarkable, CT scans are unremarkable for acute chest abdomen pelvis illness, patient is stable for discharge  Patient will be treated for COPD exacerbation with a steroid burst of 50 mg prednisone daily, first dose provided in the department  Encourage patient to continue using her nebulizer at home  Patient provided with initial dose of IV antibiotics for pyelonephritis  Patient provided with prescription for antibiotics will also cover for sinusitis  Discussed with patient that she did receive Vicodin (now known as norco) today, does have an acetaminophen component  Patient is aware of this and take her next dose of vicodin at scheduled time  Due to burning chest pain patient had ACS ruled out with 2 troponins, and 2 EKGs within normal limits  Patient has a heart score of three  Patient stable to follow up with cardiology in outpatient setting  Patient has had multiple COPD exacerbations this year  Patient also does not have a pulmonologist  Referral placed for pulmonology as well as number provided  Pt's increase in migraines likely secondary to uti/pyelonephritis  Patient reports that these headaches are similar to all her migraines, with no new symptomology, just increasing frequency  However, will check CT head given increase in frequency  Headache onset is gradual and similar to her other migraines  Reviewed reasons to return to ed  Patient verbalized understanding of diagnosis and agreement with discharge plan of care as well as understanding of reasons to return to ed  Amount and/or Complexity of Data Reviewed  Labs: ordered  Decision-making details documented in ED Course  Radiology: ordered        Risk  Prescription drug management  Disposition  Final diagnoses:   COPD exacerbation ([facility] )   Sinusitis   Pyelonephritis   Pulmonary nodule   Chest pain     Time reflects when diagnosis was documented in both MDM as applicable and the Disposition within this note     Time User Action Codes Description Comment    4/27/2023  4:32 PM Canary Danker Add [J44 1] COPD exacerbation ([facility] [address] )     4/27/2023  4:32 PM Canary Danker Add [J32 9] Sinusitis     4/27/2023  4:33 PM Canary Danker Add [N12] Pyelonephritis     4/27/2023  4:44 PM Canary Danker Add [R91 1] Pulmonary nodule     4/27/2023  7:58 PM Canary Danker Add [R07 9] Chest pain       ED Disposition     ED Disposition   Discharge    Condition   Stable    Date/Time   Thu Apr 27, 2023  4:32 PM    Comment   Omar Woodall discharge to home/self care                 Follow-up Information     Follow up With Specialties Details Why Contact Info Additional Information    Sulma Alexandra MD Family Medicine Schedule an appointment as soon as possible for a visit in 1 day For further evaluation of symptoms, abnormal CT findings--pulmonary nodule [address] [facility] [id number]  [facility] Emergency Medicine Go to  If symptoms worsen [place] [id number]  [facility] Emergency Department, [address], [facility] [place] [facility] Schedule an appointment as soon as possible for a visit in 2 days For further evaluation of symptoms [address] [id number] [address], [facility]          Discharge Medication List as of 4/27/2023  4:46 PM      START taking these medications    Details   cefpodoxime (VANTIN) 200 mg tablet Take 1 tablet (200 mg total) by mouth 2 (two) times a day for 10 days, Starting Thu 4/27/2023, Until Sun 5/7/2023, Normal      predniSONE 20 mg tablet Take 2 5 tablets (50 mg total) by mouth daily for 4 days, Starting Thu 4/27/2023, Until Mon 5/1/2023, Normal         CONTINUE these medications which have NOT CHANGED    Details   albuterol (2 5 mg/3 mL) 0 083 % nebulizer solution albuterol sulfate HFA 90 mcg/actuation aerosol inhaler   inhale 2 puffs by mouth every 6 hours if needed for wheezing, Historical Med      albuterol (PROVENTIL HFA,VENTOLIN HFA) 90 mcg/act inhaler Inhale 2 puffs every 6 (six) hours as needed, Starting Thu 12/28/2017, Historical Med      ammonium lactate (LAC-HYDRIN) 12 % lotion as needed, Historical Med      atorvastatin (LIPITOR) 80 mg tablet Take 80 mg by mouth every evening, Starting Fri 10/14/2022, Historical Med      Buprenorphine HCl (Belbuca) 300 MCG FILM Apply 300 mcg to cheek in the morning, Historical Med      Butalbital-APAP-Caffeine (FIORICET PO) Take by mouth as needed, Historical Med      carboxymethylcellulose 0 5 % SOLN Administer 1 drop to both eyes daily as needed for dry eyes, Starting u 2/7/2019, Normal      !! clonazePAM (KlonoPIN) 0 5 mg tablet Take 0 5 mg by mouth daily at bedtime, Historical Med      !! clonazePAM (KlonoPIN) 1 mg tablet Take 1 mg by mouth daily in the early morning, Starting u 1/25/2018, Historical Med      dicyclomine (BENTYL) 20 mg tablet Take 20 mg by mouth every 6 (six) hours, Historical Med      fenofibrate (TRICOR) 48 mg tablet Take 48 mg by mouth daily, Historical Med      fluticasone (FLONASE) 50 mcg/act nasal spray 2 sprays into each nostril daily, Starting Wed 1/11/2023, Historical Med      Fluticasone-Salmeterol (Advair) 500-50 mcg/dose inhaler Inhale 1 puff 2 (two) times a day, Starting Tue 6/14/2022, Historical Med      furosemide (LASIX) 20 mg tablet Take 20 mg by mouth as needed Taking as needed, Historical Med      Galcanezumab-gnlm (Emgality) 120 MG/ML SOAJ Inject 120 mg under the skin every 30 (thirty) days Last inj 1/19/23, Historical Med      HYDROcodone-acetaminophen (NORCO)  mg per tablet TAKE 2 TABLETS BY MOUTH IN THE MORNING AND 1 BY MOUTH AT BEDTIME, Historical Med      lamoTRIgine (LaMICtal) 100 mg tablet Take 100 mg by mouth 2 (two) times a day, Starting Tue 1/16/2018, Until Fri 3/10/2023, Historical Med      levothyroxine 137 mcg tablet Take 137 mcg by mouth daily, Starting Mon 1/21/2019, Historical Med      magnesium 30 MG tablet Take 500 mg by mouth 2 (two) times a day 1/26/23 right now not taking, Historical Med      Melatonin 10 MG CAPS Take 1 tablet by mouth daily at bedtime as needed, Historical Med      naloxone (Narcan) 4 mg/0 1 mL nasal spray Narcan 4 mg/actuation nasal spray, Historical Med      omeprazole (PriLOSEC) 40 MG capsule Take 80 mg by mouth 2 (two) times a day, Starting Mon 7/5/2021, Historical Med      ondansetron (ZOFRAN) 8 mg tablet Take 1 tablet (8 mg total) by mouth every 8 (eight) hours as needed for nausea or vomiting, Starting Wed 7/13/2022, Normal      polyethylene glycol (GOLYTELY) 4000 mL solution Take 4,000 mL by mouth once for 1 dose, Starting Mon 1/9/2023, Normal      !! pregabalin (LYRICA) 100 mg capsule Take 100 mg by mouth 3 (three) times a day Morning-lunch-dinner, Starting Thu 10/26/2017, Historical Med      !! pregabalin (LYRICA) 150 mg capsule take 1 capsule by mouth NIGHTLY, Historical Med      tiZANidine (ZANAFLEX) 4 mg tablet take 2 tablets by mouth every 6 hours if needed for muscle spasm, Historical Med      topiramate (TOPAMAX) 100 mg tablet Take 100 mg by mouth every 6 (six) hours as needed Takes 2 tabs every 6 hours, Historical Med       !! - Potential duplicate medications found  Please discuss with provider                PDMP Review       Value Time User    PDMP Reviewed  Yes 12/13/2021  1:23 AM Poli Mitchell PA-C          ED Provider  Electronically Signed by           LAURIE Garcia  04/27/23 2002       Kiera Fernandez Susie Yap, [address] [facility]  04/27/23 2003

## 2023-04-28 LAB
ATRIAL RATE: 81 BPM
P AXIS: 77 DEGREES
PR INTERVAL: 146 MS
QRS AXIS: 81 DEGREES
QRSD INTERVAL: 70 MS
QT INTERVAL: 396 MS
QTC INTERVAL: 460 MS
T WAVE AXIS: 79 DEGREES
VENTRICULAR RATE: 81 BPM

## 2023-05-02 ENCOUNTER — CONSULT (OUTPATIENT)
Dept: PULMONOLOGY | Facility: CLINIC | Age: 63
End: 2023-05-02

## 2023-05-02 VITALS
BODY MASS INDEX: 27.66 KG/M2 | OXYGEN SATURATION: 98 % | WEIGHT: 162 LBS | TEMPERATURE: 99 F | HEART RATE: 75 BPM | SYSTOLIC BLOOD PRESSURE: 112 MMHG | HEIGHT: 64 IN | DIASTOLIC BLOOD PRESSURE: 76 MMHG

## 2023-05-02 DIAGNOSIS — Z72.0 TOBACCO ABUSE: ICD-10-CM

## 2023-05-02 DIAGNOSIS — M54.9 MID BACK PAIN: ICD-10-CM

## 2023-05-02 DIAGNOSIS — J44.9 CHRONIC OBSTRUCTIVE PULMONARY DISEASE, UNSPECIFIED COPD TYPE (HCC): Primary | ICD-10-CM

## 2023-05-02 DIAGNOSIS — J44.1 COPD EXACERBATION (HCC): ICD-10-CM

## 2023-05-02 DIAGNOSIS — J30.89 ENVIRONMENTAL AND SEASONAL ALLERGIES: ICD-10-CM

## 2023-05-02 DIAGNOSIS — R06.02 SHORTNESS OF BREATH: ICD-10-CM

## 2023-05-02 LAB
BACTERIA BLD CULT: NORMAL
BACTERIA BLD CULT: NORMAL

## 2023-05-02 RX ORDER — ALBUTEROL SULFATE 2.5 MG/3ML
2.5 SOLUTION RESPIRATORY (INHALATION) EVERY 4 HOURS PRN
Qty: 180 ML | Refills: 3 | Status: SHIPPED | OUTPATIENT
Start: 2023-05-02 | End: 2023-07-31

## 2023-05-02 RX ORDER — BENZONATATE 100 MG/1
100 CAPSULE ORAL 3 TIMES DAILY PRN
Qty: 30 CAPSULE | Refills: 1 | Status: SHIPPED | OUTPATIENT
Start: 2023-05-02

## 2023-05-02 NOTE — PROGRESS NOTES
Pulmonary Consultation   Kermit Woodall 65 y o  female MRN: [id number]  5/2/2023      Assessment:    COPD (chronic obstructive pulmonary disease) ([facility] )  Views chest CT shows no significant emphysema  Patient does have a 2-pack-year tobacco history and endorses shortness of breath, chest tightness, cough and wheezing  She has benefited from bronchodilator therapy and steroid treatments in the past     Plan  Recently completed steroid burst   Lungs are clear on auscultation today  Continue albuterol nebulizer  Given samples of high-dose Trelegy Ellipta today to use in place of advair 500/50  Referred for pulmonary function test which she will complete in approximately 1 month after she sees completed antibiotics and recovered from her recent infections  Will discuss smoking cessation in the future  Today she wanted to defer that discussion  Follow-up in approximately 4 to 6 weeks    Tobacco abuse  Patient smokes 2 packs of cigarettes per day  She rolls her own cigarettes  We will continue to address smoking cessation during future encounters  Shortness of breath  Likely multifactorial and possibly secondary to underlying COPD versus asthma versus bronchitis  I suspect also component of deconditioning  Patient also smokes 2 packs of cigarettes per day   '  Continue COPD treatment  Smoking cessation recommended  We will check for allergies  Complete antibiotics for sinusitis  In the future may benefit from pulmonary rehab      Mid back pain  Has lower back pain  While this could be secondary to Clint CT angiogram fairly unremarkable  I suspect more musculoskeletal based upon the tenderness to palpation bilaterally right greater than left  Recommend continuation of her current medications  Recommended lidocaine patches, warm bath        Plan:    Diagnoses and all orders for this visit:    Chronic obstructive pulmonary disease, unspecified COPD type ([facility] )  -     Complete PFT with post bronchodilator; Future  -     albuterol (2 5 mg/3 mL) 0 083 % nebulizer solution; Take 3 mL (2 5 mg total) by nebulization every 4 (four) hours as needed for wheezing or shortness of breath  -     benzonatate (TESSALON PERLES) 100 mg capsule; Take 1 capsule (100 mg total) by mouth 3 (three) times a day as needed for cough    Environmental and seasonal allergies  -     [facility] Allergy Panel, Adult; Future    Tobacco abuse    Shortness of breath    Mid back pain        No follow-ups on file  History of Present Illness   HPI:  Barbara Zazueta is a 65 y o  female with history of COPD, fibromyalgia who was seen at [address] [place] [address] emergency room April 27 where she presented due to several issues to include diarrhea, right lower quadrant abdominal pain, urinary frequency, migraines, chronic left lower extremity weakness, left shoulder and left elbow pain and shortness of breath  Patient also reported cough for 2 weeks  Prior to that presentation she completed a course of doxycycline and prednisone due to coughing  Vital signs were normal in the emergency room  COVID-19/influenza/RSV PCR negative procalcitonin was less than 0 05 CBC was unremarkable  She underwent CT PE study showed no evidence of acute pulmonary embolism  There was a 3 mm right lobe pulmonary nodule  Due to the aforementioned issues she was treated for pyelonephritis, sinusitis and COPD exacerbation  She was prescribed a 50 mg burst of prednisone and referred to pulmonary for further evaluation  Reports a variety of issues: abdominal pain, flank pain    She has a spinal stimulator placed on 2/1/2023  She has a home nebulizer for albuterol  She is here with her home health aide  States her breathing is better after prednisone but still short of breath  She is still coughing  Her sputum is still yellow at times  She still feels his upper respiratory congestion  She smokes approximately 2 packs of cigarettes per day   She is not interested in quitting or discussing smoking cessation at this time  She remains on antibiotics/ cefpodoxime (for a 10 day course) for sinusitis and pyelonephritis  Review of Systems   Constitutional: Positive for fatigue  Negative for chills and fever  HENT: Positive for postnasal drip, rhinorrhea and sinus pressure  Negative for congestion, nosebleeds and sore throat  Eyes: Negative for discharge, redness and itching  Respiratory: Positive for cough, shortness of breath and wheezing  Negative for choking, chest tightness and stridor  Cardiovascular: Negative for chest pain, palpitations and leg swelling  Gastrointestinal: Positive for abdominal pain  Negative for blood in stool  Genitourinary: Negative for difficulty urinating and dysuria  Musculoskeletal: Positive for back pain  Negative for arthralgias, joint swelling and myalgias  Skin: Negative for color change and rash  Neurological: Negative for light-headedness and headaches  Hematological: Negative for adenopathy         Historical Information   Past Medical History:   Diagnosis Date    Ambulates with cane     Anxiety     Arthritis     Asthma     Bipolar 1 disorder ([facility] )     Brain aneurysm     Chronic pain disorder     spinal stenosis    Concussion syndrome     neurological rx and balance rx    COPD (chronic obstructive pulmonary disease) ([facility])     Depression     Diabetes mellitus ([facility])     controlled w/ diet    Disease of thyroid gland     nodules     Family history of colon cancer     father    Fibromyalgia, primary     GERD (gastroesophageal reflux disease)     History of colon polyps     Hyperlipidemia     Hypertension     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     Irritable bowel syndrome     Lumbar degenerative disc disease 02/16/2022    Migraine     Neuropathy     bilateral feet and hands    Peripheral neuropathy     Psychiatric disorder     PTSD (post-traumatic stress disorder)     Shortness of breath     Sleep apnea     had 3 studies & last one negative    Stroke ([facility] )     2012 no deficeits    TIA (transient ischemic attack)     Wears dentures     Wears glasses      Past Surgical History:   Procedure Laterality Date    ABDOMINAL SURGERY      laproscopic/ endometriosis    BRAIN SURGERY      aneurysm/ coiling procedure    CARPAL TUNNEL RELEASE      CHOLECYSTECTOMY      COLONOSCOPY      DENTAL SURGERY      EPIDURAL BLOCK INJECTION Right 03/03/2022    Procedure: C7-T1 interlaminar epidural steroid injection;  Surgeon: Nikolay Mak MD;  [facility]: [facility];  Service: Pain Management     EPIDURAL BLOCK INJECTION N/A 04/21/2022    Procedure: C6-C7 BLOCK / INJECTION EPIDURAL STEROID CERVICAL;  Surgeon: Nikolay Mak MD;  [facility]: [facility];  Service: Pain Management     EPIDURAL BLOCK INJECTION Left 06/21/2022    Procedure: BLOCK / INJECTION EPIDURAL STEROID LUMBAR  left L3-4 TFESI;  Surgeon: Nikolay Mak MD;  [facility];  Service: Pain Management     EYE SURGERY      cataract    FL GUIDED NEEDLE PLAC BX/ASP/INJ  03/03/2022    FL GUIDED NEEDLE PLAC BX/ASP/INJ  11/17/2022    HYSTERECTOMY      OR ESOPHAGOGASTRODUODENOSCOPY TRANSORAL DIAGNOSTIC N/A 02/08/2018    Procedure: EGD AND COLONOSCOPY;  Surgeon: Dorothy Escoto MD;  Location: [facility]; Service: Gastroenterology    OR PRQ [address] NSTIM ELECTRODE ARRAY EPIDURAL N/A 11/17/2022    Procedure: Amira San SCS TRIAL;  Surgeon: Nikolay Mak MD;  [facility]: [facility];  Service: Pain Management     OR PRQ [address] NSTIM ELECTRODE ARRAY EPIDURAL N/A 2/1/2023    Procedure:  Insertion percutaneous thoracic spinal cord stimulator with left buttock implantable pulse generator;  Surgeon: Susie Graham MD;  [facility];  Service: Neurosurgery    THYROID SURGERY      TONSILLECTOMY       Family History   Problem Relation Age of Onset    Brain cancer Mother     Alzheimer's disease Mother    Tarah Kelley Alzheimer's disease Father     Parkinsonism Father        Social History   Places lived: [place], [place]  Occupation:  Tobacco: 2 pack day   Alcohol:  Illicits:    Hobbies:       Meds/Allergies     Current Outpatient Medications:     albuterol (2 5 mg/3 mL) 0 083 % nebulizer solution, Take 3 mL (2 5 mg total) by nebulization every 4 (four) hours as needed for wheezing or shortness of breath, Disp: 180 mL, Rfl: 3    albuterol (PROVENTIL HFA,VENTOLIN HFA) 90 mcg/act inhaler, Inhale 2 puffs every 6 (six) hours as needed, Disp: , Rfl:     ammonium lactate (LAC-HYDRIN) 12 % lotion, as needed, Disp: , Rfl:     atorvastatin (LIPITOR) 80 mg tablet, Take 80 mg by mouth every evening, Disp: , Rfl:     benzonatate (TESSALON PERLES) 100 mg capsule, Take 1 capsule (100 mg total) by mouth 3 (three) times a day as needed for cough, Disp: 30 capsule, Rfl: 1    Buprenorphine HCl (Belbuca) 300 MCG FILM, Apply 300 mcg to cheek in the morning, Disp: , Rfl:     Butalbital-APAP-Caffeine (FIORICET PO), Take by mouth as needed, Disp: , Rfl:     carboxymethylcellulose 0 5 % SOLN, Administer 1 drop to both eyes daily as needed for dry eyes, Disp: 1 Bottle, Rfl: 0    cefpodoxime (VANTIN) 200 mg tablet, Take 1 tablet (200 mg total) by mouth 2 (two) times a day for 10 days, Disp: 20 tablet, Rfl: 0    clonazePAM (KlonoPIN) 0 5 mg tablet, Take 0 5 mg by mouth daily at bedtime, Disp: , Rfl:     clonazePAM (KlonoPIN) 1 mg tablet, Take 1 mg by mouth daily in the early morning, Disp: , Rfl:     dicyclomine (BENTYL) 20 mg tablet, Take 20 mg by mouth every 6 (six) hours, Disp: , Rfl:     fenofibrate (TRICOR) 48 mg tablet, Take 48 mg by mouth daily, Disp: , Rfl:     fluticasone (FLONASE) 50 mcg/act nasal spray, 2 sprays into each nostril daily, Disp: , Rfl:     Fluticasone-Salmeterol (Advair) 500-50 mcg/dose inhaler, Inhale 1 puff 2 (two) times a day, Disp: , Rfl:     Galcanezumab-gnlm (Emgality) 120 MG/ML SOAJ, Inject 120 mg under the skin every 30 (thirty) days Last inj 1/19/23, Disp: , Rfl:     HYDROcodone-acetaminophen (NORCO)  mg per tablet, every 8 (eight) hours as needed, Disp: , Rfl:     lamoTRIgine (LaMICtal) 100 mg tablet, Take 100 mg by mouth 2 (two) times a day, Disp: , Rfl:     levothyroxine 137 mcg tablet, Take 137 mcg by mouth daily, Disp: , Rfl:     magnesium 30 MG tablet, Take 500 mg by mouth 2 (two) times a day 1/26/23 right now not taking, Disp: , Rfl:     Melatonin 10 MG CAPS, Take 1 tablet by mouth daily at bedtime as needed, Disp: , Rfl:     naloxone (Narcan) 4 mg/0 1 mL nasal spray, , Disp: , Rfl:     omeprazole (PriLOSEC) 40 MG capsule, Take 80 mg by mouth 2 (two) times a day, Disp: , Rfl:     ondansetron (ZOFRAN) 8 mg tablet, Take 1 tablet (8 mg total) by mouth every 8 (eight) hours as needed for nausea or vomiting, Disp: 20 tablet, Rfl: 0    predniSONE 20 mg tablet, Take 2 5 tablets (50 mg total) by mouth daily for 4 days, Disp: 10 tablet, Rfl: 0    pregabalin (LYRICA) 100 mg capsule, Take 100 mg by mouth 3 (three) times a day Morning-lunch-dinner, Disp: , Rfl:     pregabalin (LYRICA) 150 mg capsule, take 1 capsule by mouth NIGHTLY, Disp: , Rfl:     tiZANidine (ZANAFLEX) 4 mg tablet, take 2 tablets by mouth every 6 hours if needed for muscle spasm, Disp: , Rfl:     topiramate (TOPAMAX) 100 mg tablet, Take 100 mg by mouth every 6 (six) hours as needed Takes 2 tabs every 6 hours, Disp: , Rfl:     furosemide (LASIX) 20 mg tablet, Take 20 mg by mouth as needed Taking as needed (Patient not taking: Reported on 5/2/2023), Disp: , Rfl:     polyethylene glycol (GOLYTELY) 4000 mL solution, Take 4,000 mL by mouth once for 1 dose (Patient not taking: Reported on 5/2/2023), Disp: 4000 mL, Rfl: 0  Allergies   Allergen Reactions    Hydroxyzine Anaphylaxis     Claims it gives her convulsions       Pollen Extract Itching    Tree Extract     Clarithromycin Rash     Pt denies    Latex Rash    Sulfa Antibiotics Rash "\"severe skin burn\"       Vitals: Blood pressure 112/76, pulse 75, temperature 99 °F (37 2 °C), temperature source Tympanic, height 5' 4\" (1 626 m), weight 73 5 kg (162 lb), SpO2 98 %  Body mass index is 27 81 kg/m²  Oxygen Therapy  SpO2: 98 %  Oxygen Therapy: None (Room air)      Physical Exam  Physical Exam  Vitals reviewed  Constitutional:       General: She is not in acute distress  Appearance: She is well-developed  She is not ill-appearing, toxic-appearing or diaphoretic  HENT:      Head: Normocephalic and atraumatic  Right Ear: External ear normal       Left Ear: External ear normal       Nose: Nose normal  No congestion or rhinorrhea  Mouth/Throat:      Pharynx: No oropharyngeal exudate or posterior oropharyngeal erythema  Eyes:      General: No scleral icterus  Right eye: No discharge  Left eye: No discharge  Conjunctiva/sclera: Conjunctivae normal       Pupils: Pupils are equal, round, and reactive to light  Neck:      Trachea: No tracheal deviation  Cardiovascular:      Rate and Rhythm: Normal rate and regular rhythm  Heart sounds: Normal heart sounds  No murmur heard  No friction rub  No gallop  Pulmonary:      Effort: Pulmonary effort is normal       Breath sounds: Normal breath sounds  No stridor  No wheezing or rales  Musculoskeletal:      Cervical back: Normal range of motion  Right lower leg: No edema  Left lower leg: No edema  Lymphadenopathy:      Head:      Right side of head: No submental, submandibular, preauricular or posterior auricular adenopathy  Left side of head: No submental, submandibular, preauricular or posterior auricular adenopathy  Cervical: No cervical adenopathy  Skin:     General: Skin is warm and dry  Findings: No lesion or rash  Neurological:      Mental Status: She is alert and oriented to person, place, and time     Psychiatric:      Comments: Upset, tangential, pressured speech  " Labs: I have personally reviewed pertinent lab results  Lab Results   Component Value Date    WBC 9 56 04/27/2023    HGB 14 0 04/27/2023    HCT 43 1 04/27/2023    MCV 96 04/27/2023     04/27/2023     Lab Results   Component Value Date    GLUCOSE 99 08/10/2015    CALCIUM 8 8 04/27/2023     08/10/2015    K 3 6 04/27/2023    CO2 26 04/27/2023     04/27/2023    BUN 21 04/27/2023    CREATININE 0 79 04/27/2023     No results found for: IGE  Lab Results   Component Value Date    ALT 26 04/27/2023    AST 19 04/27/2023    ALKPHOS 72 04/27/2023    BILITOT 0 29 08/10/2015       Imaging and other studies: I have personally reviewed pertinent reports  and I have personally reviewed pertinent films in PACS    CTA chest/ab/pelvis   FINDINGS:     CHEST     PULMONARY ARTERIAL TREE:  No pulmonary embolus is seen      LUNGS: 0 3 cm right lower lobe nodule image 6/92 not seen on prior study of 1/10/2021  No airspace consolidation  Scattered areas of mild subsegmental atelectasis, lung bases     PLEURA:  Unremarkable      HEART/AORTA: Coronary artery calcification  Ascending thoracic aortic diameter within normal limits at 3 0 cm  Posterior arch prominence with diameter 3 5 cm unchanged from prior study (ductus bump)     MEDIASTINUM AND RODNEY:  Unremarkable      CHEST WALL AND LOWER NECK:  Unremarkable      ABDOMEN     LIVER/BILIARY TREE: Intra and extrahepatic biliary ductal dilatation with common bile duct diameter of 1 6 cm unchanged from prior  Unchanged left hepatic lobe cyst  Additional subcentimeter hypodense nodules too small to characterize      GALLBLADDER: Status post cholecystectomy     SPLEEN:  Unremarkable      PANCREAS:  Unremarkable      ADRENAL GLANDS:  Unremarkable      KIDNEYS/URETERS:  Unremarkable  No hydronephrosis      STOMACH AND BOWEL:  Unremarkable      APPENDIX:  No findings to suggest appendicitis      ABDOMINOPELVIC CAVITY:  No ascites  No pneumoperitoneum    No lymphadenopathy      VESSELS:  Unremarkable for patient's age      PELVIS     REPRODUCTIVE ORGANS: Surgical changes of prior hysterectomy      URINARY BLADDER:  Unremarkable      ABDOMINAL WALL/INGUINAL REGIONS: No acute finding     OSSEOUS STRUCTURES: Spinal cord stimulator present      IMPRESSION:     1  No evidence of pulmonary embolism  2  No acute pulmonary disease  0 3 cm right lower lobe nodule  Based on current Fleischner Society 2017 Guidelines on incidental pulmonary nodule, no routine follow-up is needed if the patient is low risk  If the patient is high risk, optional follow-up   chest CT at 12 months can be considered  3  Prominence of the intra and extrahepatic biliary tract is unchanged from 10/13/2021 likely reflecting postcholecystectomy state  4  No acute inflammatory changes in the abdomen or pelvis      Pulmonary function testing:   PFTs ordered today     EKG, Pathology, and Other Studies: I have personally reviewed pertinent reports  and I have personally reviewed pertinent films in PACS     Echo 6/2020   SUMMARY     LEFT VENTRICLE:  Size was normal   Systolic function was normal  Ejection fraction was estimated to be 60 %  There were no regional wall motion abnormalities  Wall thickness was at the upper limits of normal      TRICUSPID VALVE:  There was trace regurgitation      HISTORY: PRIOR HISTORY: hypertension, copd, hyperlipidemia, dizziness, hypothyroid     PROCEDURE: The procedure was performed at the bedside  This was a routine study  The transthoracic approach was used  The study included complete 2D imaging, M-mode, complete spectral Doppler, and color Doppler  Images were obtained from  the parasternal, apical, subcostal, and suprasternal notch acoustic windows  Image quality was adequate      LEFT VENTRICLE: Size was normal  Systolic function was normal  Ejection fraction was estimated to be 60 %  There were no regional wall motion abnormalities   Wall thickness was at the upper limits of normal      RIGHT VENTRICLE: The size was normal  Systolic function was normal  Wall thickness was normal      LEFT ATRIUM: Size was normal      RIGHT ATRIUM: Size was normal      MITRAL VALVE: Valve structure was normal  There was normal leaflet separation  DOPPLER: The transmitral velocity was within the normal range  There was no evidence for stenosis  There was no regurgitation      AORTIC VALVE: The valve was trileaflet  Leaflets exhibited normal thickness and normal cuspal separation  DOPPLER: Transaortic velocity was within the normal range  There was no evidence for stenosis  There was no regurgitation      TRICUSPID VALVE: The valve structure was normal  There was normal leaflet separation  DOPPLER: The transtricuspid velocity was within the normal range  There was no evidence for stenosis  There was trace regurgitation      PULMONIC VALVE: Leaflets exhibited normal thickness, no calcification, and normal cuspal separation  DOPPLER: The transpulmonic velocity was within the normal range  There was no regurgitation      PERICARDIUM: There was no pericardial effusion  The pericardium was normal in appearance      AORTA: The root exhibited normal size        CIERA Willett [facility]

## 2023-05-02 NOTE — ASSESSMENT & PLAN NOTE
Likely multifactorial and possibly secondary to underlying COPD versus asthma versus bronchitis  I suspect also component of deconditioning    Patient also smokes 2 packs of cigarettes per day   '  Continue COPD treatment  Smoking cessation recommended  We will check for allergies  Complete antibiotics for sinusitis  In the future may benefit from pulmonary rehab

## 2023-05-02 NOTE — ASSESSMENT & PLAN NOTE
Patient smokes 2 packs of cigarettes per day  She rolls her own cigarettes  We will continue to address smoking cessation during future encounters

## 2023-05-02 NOTE — ASSESSMENT & PLAN NOTE
Has lower back pain  While this could be secondary to Clint CT angiogram fairly unremarkable  I suspect more musculoskeletal based upon the tenderness to palpation bilaterally right greater than left  Recommend continuation of her current medications  Recommended lidocaine patches, warm bath

## 2023-05-02 NOTE — ASSESSMENT & PLAN NOTE
Views chest CT shows no significant emphysema  Patient does have a 2-pack-year tobacco history and endorses shortness of breath, chest tightness, cough and wheezing  She has benefited from bronchodilator therapy and steroid treatments in the past     Plan  Recently completed steroid burst   Lungs are clear on auscultation today  Continue albuterol nebulizer  Given samples of high-dose Trelegy Ellipta today to use in place of advair 500/50  Referred for pulmonary function test which she will complete in approximately 1 month after she sees completed antibiotics and recovered from her recent infections  Will discuss smoking cessation in the future  Today she wanted to defer that discussion     Follow-up in approximately 4 to 6 weeks

## 2023-05-12 ENCOUNTER — TELEPHONE (OUTPATIENT)
Dept: GASTROENTEROLOGY | Facility: CLINIC | Age: 63
End: 2023-05-12

## 2023-05-12 NOTE — TELEPHONE ENCOUNTER
Pt has EGD scheduled with Dr Reji Fox on 6/7/23   She would like Dr Reji Fox to review ED visit & CT from 4/27 and provide recommendations on proceeding with scheduled EGD

## 2023-05-12 NOTE — TELEPHONE ENCOUNTER
Spoke with pt, relayed recommendations as per Dr Juju Mojica  While on phone pt reported having intermittent abdominal pain 6-10/10 that starts at umbilicus and radiates to RUQ  + BM everyday to every 2-3 days  +intermittent nausea  Pt reporting she just completed course of abx for resp & UTI infection  Reviewed symptoms management recommendations, reassurance provided  Pt agreeable to recommendations, will proceed with EGD as scheduled   Pt will call back PRN

## 2023-05-12 NOTE — TELEPHONE ENCOUNTER
Patient called in wanting to update the doctor that she was in the ER and had a CT Scan done and would like for the doctor to take a look at it  Patient is also asking if she should still have the EGD done  Please call patient to discuss

## 2023-05-15 ENCOUNTER — TELEPHONE (OUTPATIENT)
Dept: PSYCHIATRY | Facility: CLINIC | Age: 63
End: 2023-05-15

## 2023-05-15 NOTE — TELEPHONE ENCOUNTER
Patient has been added to the Medication Management and Talk Therapy wait list without a referral     Insurance: united healthcare  Insurance Type:    Commercial []   Medicaid []   [place] (if applicable)   Medicare [x]  Location Preference: [place]  Provider Preference: no prov pref  Virtual: Yes [] No [x]

## 2023-05-17 ENCOUNTER — TELEPHONE (OUTPATIENT)
Dept: PULMONOLOGY | Facility: CLINIC | Age: 63
End: 2023-05-17

## 2023-05-17 DIAGNOSIS — J44.9 CHRONIC OBSTRUCTIVE PULMONARY DISEASE, UNSPECIFIED COPD TYPE (HCC): Primary | ICD-10-CM

## 2023-05-17 NOTE — TELEPHONE ENCOUNTER
Pt has called in stating she was seen in our office on 05/02 and given 2 samples of trelegy  Pt states it has been working great and would like a rx to be sent in to the [facility] on file   Please advise

## 2023-05-18 RX ORDER — FLUTICASONE FUROATE, UMECLIDINIUM BROMIDE AND VILANTEROL TRIFENATATE 200; 62.5; 25 UG/1; UG/1; UG/1
1 POWDER RESPIRATORY (INHALATION) DAILY
Qty: 60 BLISTER | Refills: 5 | Status: SHIPPED | OUTPATIENT
Start: 2023-05-18

## 2023-05-28 ENCOUNTER — LAB REQUISITION (OUTPATIENT)
Dept: LAB | Facility: HOSPITAL | Age: 63
End: 2023-05-28

## 2023-05-28 DIAGNOSIS — Z02.83 ENCOUNTER FOR BLOOD-ALCOHOL AND BLOOD-DRUG TEST: ICD-10-CM

## 2023-05-29 ENCOUNTER — APPOINTMENT (EMERGENCY)
Dept: RADIOLOGY | Facility: HOSPITAL | Age: 63
End: 2023-05-29

## 2023-05-29 ENCOUNTER — HOSPITAL ENCOUNTER (EMERGENCY)
Facility: HOSPITAL | Age: 63
Discharge: HOME | End: 2023-05-29
Attending: EMERGENCY MEDICINE

## 2023-05-29 VITALS
HEIGHT: 64 IN | OXYGEN SATURATION: 96 % | HEART RATE: 59 BPM | DIASTOLIC BLOOD PRESSURE: 62 MMHG | TEMPERATURE: 97.6 F | BODY MASS INDEX: 27.66 KG/M2 | SYSTOLIC BLOOD PRESSURE: 101 MMHG | WEIGHT: 162 LBS | RESPIRATION RATE: 17 BRPM

## 2023-05-29 DIAGNOSIS — R06.09 OTHER FORM OF DYSPNEA: ICD-10-CM

## 2023-05-29 DIAGNOSIS — M25.519 SHOULDER PAIN: Primary | ICD-10-CM

## 2023-05-29 LAB
ALBUMIN SERPL BCP-MCNC: 4.2 G/DL (ref 3.5–5)
ALP SERPL-CCNC: 76 U/L (ref 34–104)
ALT SERPL W P-5'-P-CCNC: 20 U/L (ref 7–52)
ANION GAP SERPL CALCULATED.3IONS-SCNC: 10 MMOL/L (ref 4–13)
AST SERPL W P-5'-P-CCNC: 20 U/L (ref 13–39)
BASOPHILS # BLD AUTO: 0.04 THOUSANDS/ÂΜL (ref 0–0.1)
BASOPHILS NFR BLD AUTO: 1 % (ref 0–1)
BILIRUB SERPL-MCNC: 0.36 MG/DL (ref 0.2–1)
BUN SERPL-MCNC: 12 MG/DL (ref 5–25)
CALCIUM SERPL-MCNC: 8.7 MG/DL (ref 8.4–10.2)
CARDIAC TROPONIN I PNL SERPL HS: 2 NG/L
CHLORIDE SERPL-SCNC: 111 MMOL/L (ref 96–108)
CO2 SERPL-SCNC: 21 MMOL/L (ref 21–32)
CREAT SERPL-MCNC: 0.83 MG/DL (ref 0.6–1.3)
EOSINOPHIL # BLD AUTO: 0.05 THOUSAND/ÂΜL (ref 0–0.61)
EOSINOPHIL NFR BLD AUTO: 1 % (ref 0–6)
ERYTHROCYTE [DISTWIDTH] IN BLOOD BY AUTOMATED COUNT: 13.7 % (ref 11.6–15.1)
GFR SERPL CREATININE-BSD FRML MDRD: 76 ML/MIN/1.73SQ M
GLUCOSE SERPL-MCNC: 114 MG/DL (ref 65–140)
HCT VFR BLD AUTO: 41.6 % (ref 34.8–46.1)
HGB BLD-MCNC: 13.7 G/DL (ref 11.5–15.4)
IMM GRANULOCYTES # BLD AUTO: 0.03 THOUSAND/UL (ref 0–0.2)
IMM GRANULOCYTES NFR BLD AUTO: 0 % (ref 0–2)
LYMPHOCYTES # BLD AUTO: 3.5 THOUSANDS/ÂΜL (ref 0.6–4.47)
LYMPHOCYTES NFR BLD AUTO: 40 % (ref 14–44)
MAGNESIUM SERPL-MCNC: 2.1 MG/DL (ref 1.9–2.7)
MCH RBC QN AUTO: 31.6 PG (ref 26.8–34.3)
MCHC RBC AUTO-ENTMCNC: 32.9 G/DL (ref 31.4–37.4)
MCV RBC AUTO: 96 FL (ref 82–98)
MONOCYTES # BLD AUTO: 0.54 THOUSAND/ÂΜL (ref 0.17–1.22)
MONOCYTES NFR BLD AUTO: 6 % (ref 4–12)
NEUTROPHILS # BLD AUTO: 4.54 THOUSANDS/ÂΜL (ref 1.85–7.62)
NEUTS SEG NFR BLD AUTO: 52 % (ref 43–75)
NRBC BLD AUTO-RTO: 0 /100 WBCS
PLATELET # BLD AUTO: 192 THOUSANDS/UL (ref 149–390)
PMV BLD AUTO: 10.9 FL (ref 8.9–12.7)
POTASSIUM SERPL-SCNC: 3.5 MMOL/L (ref 3.5–5.3)
PROT SERPL-MCNC: 6.7 G/DL (ref 6.4–8.4)
RBC # BLD AUTO: 4.34 MILLION/UL (ref 3.81–5.12)
SODIUM SERPL-SCNC: 142 MMOL/L (ref 135–147)
WBC # BLD AUTO: 8.7 THOUSAND/UL (ref 4.31–10.16)

## 2023-05-29 RX ORDER — KETOROLAC TROMETHAMINE 30 MG/ML
15 INJECTION, SOLUTION INTRAMUSCULAR; INTRAVENOUS ONCE
Status: COMPLETED | OUTPATIENT
Start: 2023-05-29 | End: 2023-05-29

## 2023-05-29 RX ORDER — LIDOCAINE 50 MG/G
1 PATCH TOPICAL ONCE
Status: DISCONTINUED | OUTPATIENT
Start: 2023-05-29 | End: 2023-05-29 | Stop reason: HOSPADM

## 2023-05-29 RX ADMIN — SODIUM CHLORIDE 1000 ML: 0.9 INJECTION, SOLUTION INTRAVENOUS at 15:00

## 2023-05-29 RX ADMIN — KETOROLAC TROMETHAMINE 15 MG: 30 INJECTION, SOLUTION INTRAMUSCULAR at 14:59

## 2023-05-29 RX ADMIN — LIDOCAINE 1 PATCH: 50 PATCH CUTANEOUS at 15:01

## 2023-05-29 NOTE — ED PROVIDER NOTES
"History  Chief Complaint   Patient presents with   • Chest Pain     Chest pain started with pain radiating in neck and left shoulder  \"Started yesterday after she was arrested for dui  \"      58-year-old female presents for evaluation  Patient is tearful and reports being anxious, states she was arrested yesterday for a DUI  Patient was her symptoms started then and include left shoulder and arm pain  Prior to review patient has been complaining of left shoulder pain for some time and has previously considered cervical stimulator  Patient reports the pain she had previously was only from her shoulder to her elbow, now she has pain throughout particularly with putting her arm behind her back  Patient reports a dyspnea that sensation that started both last night as well as around noon today, she states taking a breathing treatment with temporary relief  She does report anxiety causes dyspnea, she took a prescribed klonopin around noon  She denies cough, cold symptoms  Patient reports left leg weakness however this appears to be chronic, patient ambulates with a cane and has been doing so for several months per neurosurgery note in December 2022  Patient appears very dry, states that she has been attempting to hydrate however has been unsuccessful  Triage note states chest pain however patient denies chest pain to myself complaining of this neck and shoulder pain instead  Prior to Admission Medications   Prescriptions Last Dose Informant Patient Reported? Taking?    Buprenorphine HCl (Belbuca) 300 MCG FILM  Self Yes No   Sig: Apply 300 mcg to cheek in the morning   Butalbital-APAP-Caffeine (FIORICET PO)   Yes No   Sig: Take by mouth as needed   Galcanezumab-gnlm (Emgality) 120 MG/ML SOAJ  Self Yes No   Sig: Inject 120 mg under the skin every 30 (thirty) days Last inj 1/19/23   HYDROcodone-acetaminophen (NORCO)  mg per tablet   Yes No   Sig: every 8 (eight) hours as needed   Melatonin 10 MG CAPS  " Self Yes No   Sig: Take 1 tablet by mouth daily at bedtime as needed   albuterol (2 5 mg/3 mL) 0 083 % nebulizer solution   No No   Sig: Take 3 mL (2 5 mg total) by nebulization every 4 (four) hours as needed for wheezing or shortness of breath   albuterol (PROVENTIL HFA,VENTOLIN HFA) 90 mcg/act inhaler  Self Yes No   Sig: Inhale 2 puffs every 6 (six) hours as needed   ammonium lactate (LAC-HYDRIN) 12 % lotion   Yes No   Sig: as needed   atorvastatin (LIPITOR) 80 mg tablet   Yes No   Sig: Take 80 mg by mouth every evening   benzonatate (TESSALON PERLES) 100 mg capsule   No No   Sig: Take 1 capsule (100 mg total) by mouth 3 (three) times a day as needed for cough   carboxymethylcellulose 0 5 % SOLN  Self No No   Sig: Administer 1 drop to both eyes daily as needed for dry eyes   clonazePAM (KlonoPIN) 0 5 mg tablet  Self Yes No   Sig: Take 0 5 mg by mouth daily at bedtime   clonazePAM (KlonoPIN) 1 mg tablet  Self Yes No   Sig: Take 1 mg by mouth daily in the early morning   dicyclomine (BENTYL) 20 mg tablet  Self Yes No   Sig: Take 20 mg by mouth every 6 (six) hours   fenofibrate (TRICOR) 48 mg tablet  Self Yes No   Sig: Take 48 mg by mouth daily   fluticasone (FLONASE) 50 mcg/act nasal spray   Yes No   Si sprays into each nostril daily   fluticasone-umeclidinium-vilanterol (Trelegy Ellipta) 200-62 5-25 mcg/actuation AEPB inhaler   No No   Sig: Inhale 1 puff daily Rinse mouth after use     furosemide (LASIX) 20 mg tablet  Self Yes No   Sig: Take 20 mg by mouth as needed Taking as needed   Patient not taking: Reported on 2023   lamoTRIgine (LaMICtal) 100 mg tablet   Yes No   Sig: Take 100 mg by mouth 2 (two) times a day   levothyroxine 137 mcg tablet  Self Yes No   Sig: Take 137 mcg by mouth daily   magnesium 30 MG tablet  Self Yes No   Sig: Take 500 mg by mouth 2 (two) times a day 23 right now not taking   naloxone (Narcan) 4 mg/0 1 mL nasal spray   Yes No   omeprazole (PriLOSEC) 40 MG capsule  Self Yes No Sig: Take 80 mg by mouth 2 (two) times a day   ondansetron (ZOFRAN) 8 mg tablet   No No   Sig: Take 1 tablet (8 mg total) by mouth every 8 (eight) hours as needed for nausea or vomiting   polyethylene glycol (GOLYTELY) 4000 mL solution   No No   Sig: Take 4,000 mL by mouth once for 1 dose   Patient not taking: Reported on 5/2/2023   pregabalin (LYRICA) 100 mg capsule  Self Yes No   Sig: Take 100 mg by mouth 3 (three) times a day Morning-lunch-dinner   pregabalin (LYRICA) 150 mg capsule  Self Yes No   Sig: take 1 capsule by mouth NIGHTLY   tiZANidine (ZANAFLEX) 4 mg tablet   Yes No   Sig: take 2 tablets by mouth every 6 hours if needed for muscle spasm   topiramate (TOPAMAX) 100 mg tablet  Self Yes No   Sig: Take 100 mg by mouth every 6 (six) hours as needed Takes 2 tabs every 6 hours      Facility-Administered Medications: None       Past Medical History:   Diagnosis Date   • Ambulates with cane    • Anxiety    • Arthritis    • Asthma    • Bipolar 1 disorder ([facility])    • Brain aneurysm    • Chronic pain disorder     spinal stenosis   • Concussion syndrome     neurological rx and balance rx   • COPD (chronic obstructive pulmonary disease) ([facility])    • Depression    • Diabetes mellitus ([facility])     controlled w/ diet   • Disease of thyroid gland     nodules    • Family history of colon cancer     father   • Fibromyalgia, primary    • GERD (gastroesophageal reflux disease)    • History of colon polyps    • Hyperlipidemia    • Hypertension    • 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    • Irritable bowel syndrome    • Lumbar degenerative disc disease 02/16/2022   • Migraine    • Neuropathy     bilateral feet and hands   • Peripheral neuropathy    • Psychiatric disorder    • PTSD (post-traumatic stress disorder)    • Shortness of breath    • Sleep apnea     had 3 studies & last one negative   • Stroke ([address] )     2012 no deficeits   • TIA (transient ischemic attack)    • Wears dentures    • Wears glasses        Past Surgical History:   Procedure Laterality Date   • ABDOMINAL SURGERY      laproscopic/ endometriosis   • BRAIN SURGERY      aneurysm/ coiling procedure   • CARPAL TUNNEL RELEASE     • CHOLECYSTECTOMY     • COLONOSCOPY     • DENTAL SURGERY     • EPIDURAL BLOCK INJECTION Right 03/03/2022    Procedure: C7-T1 interlaminar epidural steroid injection;  Surgeon: Yue Estrada MD;  Location: [facility];  Service: Pain Management    • EPIDURAL BLOCK INJECTION N/A 04/21/2022    Procedure: C6-C7 BLOCK / INJECTION EPIDURAL STEROID CERVICAL;  Surgeon: Yue Estrada MD;  Location: [facility];  Service: Pain Management    • EPIDURAL BLOCK INJECTION Left 06/21/2022    Procedure: BLOCK / INJECTION EPIDURAL STEROID LUMBAR  left L3-4 TFESI;  Surgeon: Yue Estrada MD;  Location: [facility];  Service: Pain Management    • EYE SURGERY      cataract   • FL GUIDED NEEDLE PLAC BX/ASP/INJ  03/03/2022   • FL GUIDED NEEDLE PLAC BX/ASP/INJ  11/17/2022   • HYSTERECTOMY     • VA ESOPHAGOGASTRODUODENOSCOPY TRANSORAL DIAGNOSTIC N/A 02/08/2018    Procedure: EGD AND COLONOSCOPY;  Surgeon: Ester Bowie MD;  [facility]: [facility]; Service: Gastroenterology   • VA PRQ IMPLTJ NSTIM ELECTRODE ARRAY EPIDURAL N/A 11/17/2022    Procedure: Martinae Jeff SCS TRIAL;  Surgeon: Yue Estrada MD;  Location: [facility];  Service: Pain Management    • VA PRQ IMPLTJ NSTIM ELECTRODE ARRAY EPIDURAL N/A 2/1/2023    Procedure: Insertion percutaneous thoracic spinal cord stimulator with left buttock implantable pulse generator;  Surgeon: Tiny Hensley MD;  [facility];  Service: Neurosurgery   • THYROID SURGERY     • TONSILLECTOMY         Family History   Problem Relation Age of Onset   • Brain cancer Mother    • Alzheimer's disease Mother    • Alzheimer's disease Father    • Parkinsonism Father      I have reviewed and agree with the history as documented      E-Cigarette/Vaping   • E-Cigarette Use Never User E-Cigarette/Vaping Substances   • Nicotine No    • THC No    • CBD No    • Flavoring No    • Other No    • Unknown No      Social History     Tobacco Use   • Smoking status: Every Day     Packs/day: 2 00     Types: Cigarettes   • Smokeless tobacco: Never   Vaping Use   • Vaping Use: Never used   Substance Use Topics   • Alcohol use: Yes     Comment: socially once yesterday   • Drug use: Never     Comment: prescribed Belbuca       Review of Systems   Constitutional: Negative for appetite change, chills and fever  Respiratory: Positive for shortness of breath  Negative for cough  Cardiovascular: Negative for chest pain  Gastrointestinal: Negative for abdominal pain  Musculoskeletal: Positive for arthralgias and myalgias  Skin: Negative for wound  All other systems reviewed and are negative  Physical Exam  Physical Exam  Vitals reviewed  Constitutional:       General: She is not in acute distress  Appearance: She is well-developed  She is not toxic-appearing  HENT:      Head: Normocephalic and atraumatic  Right Ear: External ear normal       Left Ear: External ear normal       Nose: Nose normal       Mouth/Throat:      Mouth: Mucous membranes are dry  Eyes:      General:         Right eye: No discharge  Left eye: No discharge  Extraocular Movements: Extraocular movements intact  Cardiovascular:      Rate and Rhythm: Normal rate and regular rhythm  Pulses: Normal pulses  Radial pulses are 2+ on the right side and 2+ on the left side  Dorsalis pedis pulses are 2+ on the right side and 2+ on the left side  Pulmonary:      Effort: Pulmonary effort is normal  No respiratory distress  Breath sounds: Normal breath sounds  No stridor  No wheezing, rhonchi or rales  Musculoskeletal:         General: Tenderness (reports tenderness to palpation to shoulder) present  No deformity or signs of injury        Comments: Uses her b/l UE to adjust in bed Skin:     General: Skin is warm  Coloration: Skin is not jaundiced or pale  Neurological:      General: No focal deficit present  Mental Status: She is alert  Mental status is at baseline           Vital Signs  ED Triage Vitals   Temperature Pulse Respirations Blood Pressure SpO2   05/29/23 1428 05/29/23 1415 05/29/23 1415 05/29/23 1415 05/29/23 1415   97 6 °F (36 4 °C) 71 20 113/70 97 %      Temp src Heart Rate Source Patient Position - Orthostatic VS BP Location FiO2 (%)   -- 05/29/23 1415 05/29/23 1415 05/29/23 1415 --    Monitor Lying Right arm       Pain Score       05/29/23 1415       8           Vitals:    05/29/23 1415 05/29/23 1515   BP: 113/70 101/62   Pulse: 71 59   Patient Position - Orthostatic VS: Lying          Visual Acuity      ED Medications  Medications   lidocaine (LIDODERM) 5 % patch 1 patch (1 patch Topical Medication Applied 5/29/23 1501)   sodium chloride 0 9 % bolus 1,000 mL (0 mL Intravenous Stopped 5/29/23 1600)   ketorolac (TORADOL) injection 15 mg (15 mg Intravenous Given 5/29/23 1459)       Diagnostic Studies  Results Reviewed     Procedure Component Value Units Date/Time    HS Troponin I 4hr [[id number]]     Lab Status: No result Specimen: Blood     HS Troponin 0hr (reflex protocol) [[id number]]  (Normal) Collected: 05/29/23 1440    Lab Status: Final result Specimen: Blood from Arm, Right Updated: 05/29/23 1522     hs TnI 0hr 2 ng/L     HS Troponin I 2hr [[id number]]     Lab Status: No result Specimen: Blood     Comprehensive metabolic panel [[id number]]  (Abnormal) Collected: 05/29/23 1440    Lab Status: Final result Specimen: Blood from Arm, Right Updated: 05/29/23 1515     Sodium 142 mmol/L      Potassium 3 5 mmol/L      Chloride 111 mmol/L      CO2 21 mmol/L      ANION GAP 10 mmol/L      BUN 12 mg/dL      Creatinine 0 83 mg/dL      Glucose 114 mg/dL      Calcium 8 7 mg/dL      AST 20 U/L      ALT 20 U/L      Alkaline Phosphatase 76 U/L      Total Protein 6 7 g/dL Albumin 4 2 g/dL      Total Bilirubin 0 36 mg/dL      eGFR 76 ml/min/1 73sq m     Narrative:      [place] guidelines for Chronic Kidney Disease (CKD):   •  Stage 1 with normal or high GFR (GFR > 90 mL/min/1 73 square meters)  •  Stage 2 Mild CKD (GFR = 60-89 mL/min/1 73 square meters)  •  Stage 3A Moderate CKD (GFR = 45-59 mL/min/1 73 square meters)  •  Stage 3B Moderate CKD (GFR = 30-44 mL/min/1 73 square meters)  •  Stage 4 Severe CKD (GFR = 15-29 mL/min/1 73 square meters)  •  Stage 5 End Stage CKD (GFR <15 mL/min/1 73 square meters)  Note: GFR calculation is accurate only with a steady state creatinine    Magnesium [[id number]]  (Normal) Collected: 05/29/23 1440    Lab Status: Final result Specimen: Blood from Arm, Right Updated: 05/29/23 1515     Magnesium 2 1 mg/dL     CBC and differential [[id number]] Collected: 05/29/23 1440    Lab Status: Final result Specimen: Blood from Arm, Right Updated: 05/29/23 1458     WBC 8 70 Thousand/uL      RBC 4 34 Million/uL      Hemoglobin 13 7 g/dL      Hematocrit 41 6 %      MCV 96 fL      MCH 31 6 pg      MCHC 32 9 g/dL      RDW 13 7 %      MPV 10 9 fL      Platelets 128 Thousands/uL      nRBC 0 /100 WBCs      Neutrophils Relative 52 %      Immat GRANS % 0 %      Lymphocytes Relative 40 %      Monocytes Relative 6 %      Eosinophils Relative 1 %      Basophils Relative 1 %      Neutrophils Absolute 4 54 Thousands/µL      Immature Grans Absolute 0 03 Thousand/uL      Lymphocytes Absolute 3 50 Thousands/µL      Monocytes Absolute 0 54 Thousand/µL      Eosinophils Absolute 0 05 Thousand/µL      Basophils Absolute 0 04 Thousands/µL                  XR chest 1 view portable    (Results Pending)   XR shoulder 2+ views LEFT    (Results Pending)              Procedures  Procedures         ED Course  ED Course as of 05/29/23 1620   Mon May 29, 2023   1424 Blood Pressure: 113/70   1424 Pulse: 71   1424 Respirations: 20   1424 SpO2: 97 %   1438 Procedure Note: EKG  Date/Time: 05/29/23 2:38 PM   Interpreted by: Mayur Aldana  Indications / Diagnosis: CP  ECG reviewed by me, the ED Provider: yes   The EKG demonstrates:  Rhythm: normal sinus  Intervals: normal intervals  Axis: normal axis  QRS/Blocks: normal QRS  ST Changes: No acute ST Changes, no STD/FRANCY  TWI v1-v3, as seen on previous EKGs  No significant change from previous EKGs  1524 hs TnI 0hr: 2  Negative for ACS   1524 CBC and differential  Within normal   1524 Magnesium: 2 1  Normal   1524 Comprehensive metabolic panel(!)  Unremarkable   1524 XR shoulder 2+ views LEFT  My interpretation, no acute osseous injury   1526 XR chest 1 view portable  Interpretation, no acute cardiopulmonary disease, no pneumothorax   1548 Pt updated to labs, EKG, imaging  All re-assuring at this point  Will d/c pt home  SBIRT 20yo+    Flowsheet Row Most Recent Value   Initial Alcohol Screen: US AUDIT-C     1  How often do you have a drink containing alcohol? 0  [only had one drink yesterday, doesn't drink routinely] Filed at: 05/29/2023 1423   2  How many drinks containing alcohol do you have on a typical day you are drinking? 0 Filed at: 05/29/2023 1423   3b  FEMALE Any Age, or MALE 65+: How often do you have 4 or more drinks on one occassion? 0 Filed at: 05/29/2023 1423   Audit-C Score 0 Filed at: 05/29/2023 1423                    Medical Decision Making  28-year-old female presents for evaluation  Patient appears to have several chronic medical problems, is difficult to ascertain what is new today versus old  Will evaluate for ACS, pneumothorax, shoulder injury, electrolyte abnormality, renal dysfunction  Will provide IV fluids for hydration  Amount and/or Complexity of Data Reviewed  Labs: ordered  Decision-making details documented in ED Course  Radiology: ordered  Decision-making details documented in ED Course  Risk  Prescription drug management            Disposition  Final diagnoses:   Shoulder pain   Other form of dyspnea     Time reflects when diagnosis was documented in both MDM as applicable and the Disposition within this note     Time User Action Codes Description Comment    5/29/2023  3:49 PM Clarene Dense Add [M25 519] Shoulder pain     5/29/2023  3:49 PM Clarene Dense Add [R06 09] Other form of dyspnea       ED Disposition     ED Disposition   Discharge    Condition   Stable    Date/Time   Mon May 29, 2023  3:49 PM    Comment   Chay Woodall discharge to home/self care                 Follow-up Information     Follow up With Specialties Details Why Contact Info    Radha Hoyt MD [facility] Medicine Schedule an appointment as soon as possible for a visit   [address] [id number] [phone]            Discharge Medication List as of 5/29/2023  3:49 PM      CONTINUE these medications which have NOT CHANGED    Details   albuterol (2 5 mg/3 mL) 0 083 % nebulizer solution Take 3 mL (2 5 mg total) by nebulization every 4 (four) hours as needed for wheezing or shortness of breath, Starting Tue 5/2/2023, Until Mon 7/31/2023 at 2359, Normal      albuterol (PROVENTIL HFA,VENTOLIN HFA) 90 mcg/act inhaler Inhale 2 puffs every 6 (six) hours as needed, Starting Thu 12/28/2017, Historical Med      ammonium lactate (LAC-HYDRIN) 12 % lotion as needed, Historical Med      atorvastatin (LIPITOR) 80 mg tablet Take 80 mg by mouth every evening, Starting Fri 10/14/2022, Historical Med      benzonatate (TESSALON PERLES) 100 mg capsule Take 1 capsule (100 mg total) by mouth 3 (three) times a day as needed for cough, Starting Tue 5/2/2023, Normal      Buprenorphine HCl (Belbuca) 300 MCG FILM Apply 300 mcg to cheek in the morning, Historical Med      Butalbital-APAP-Caffeine (FIORICET PO) Take by mouth as needed, Historical Med      carboxymethylcellulose 0 5 % SOLN Administer 1 drop to both eyes daily as needed for dry eyes, Starting Thu 2/7/2019, Normal      !! clonazePAM (KlonoPIN) 0 5 mg tablet Take 0 5 mg by mouth daily at bedtime, Historical Med      !! clonazePAM (KlonoPIN) 1 mg tablet Take 1 mg by mouth daily in the early morning, Starting Thu 1/25/2018, Historical Med      dicyclomine (BENTYL) 20 mg tablet Take 20 mg by mouth every 6 (six) hours, Historical Med      fenofibrate (TRICOR) 48 mg tablet Take 48 mg by mouth daily, Historical Med      fluticasone (FLONASE) 50 mcg/act nasal spray 2 sprays into each nostril daily, Starting Wed 1/11/2023, Historical Med      fluticasone-umeclidinium-vilanterol (Trelegy Ellipta) 200-62 5-25 mcg/actuation AEPB inhaler Inhale 1 puff daily Rinse mouth after use , Starting Thu 5/18/2023, Normal      furosemide (LASIX) 20 mg tablet Take 20 mg by mouth as needed Taking as needed, Historical Med      Galcanezumab-gnlm (Emgality) 120 MG/ML SOAJ Inject 120 mg under the skin every 30 (thirty) days Last inj 1/19/23, Historical Med      HYDROcodone-acetaminophen (NORCO)  mg per tablet every 8 (eight) hours as needed, Starting Fri 3/24/2023, Historical Med      lamoTRIgine (LaMICtal) 100 mg tablet Take 100 mg by mouth 2 (two) times a day, Starting Tue 1/16/2018, Until Tue 5/2/2023, Historical Med      levothyroxine 137 mcg tablet Take 137 mcg by mouth daily, Starting Mon 1/21/2019, Historical Med      magnesium 30 MG tablet Take 500 mg by mouth 2 (two) times a day 1/26/23 right now not taking, Historical Med      Melatonin 10 MG CAPS Take 1 tablet by mouth daily at bedtime as needed, Historical Med      naloxone (Narcan) 4 mg/0 1 mL nasal spray Historical Med      omeprazole (PriLOSEC) 40 MG capsule Take 80 mg by mouth 2 (two) times a day, Starting Mon 7/5/2021, Historical Med      ondansetron (ZOFRAN) 8 mg tablet Take 1 tablet (8 mg total) by mouth every 8 (eight) hours as needed for nausea or vomiting, Starting Wed 7/13/2022, Normal      polyethylene glycol (GOLYTELY) 4000 mL solution Take 4,000 mL by mouth once for 1 dose, Starting Mon 1/9/2023, Normal      !! pregabalin (LYRICA) 100 mg capsule Take 100 mg by mouth 3 (three) times a day Morning-lunch-dinner, Starting Thu 10/26/2017, Historical Med      !! pregabalin (LYRICA) 150 mg capsule take 1 capsule by mouth NIGHTLY, Historical Med      tiZANidine (ZANAFLEX) 4 mg tablet take 2 tablets by mouth every 6 hours if needed for muscle spasm, Historical Med      topiramate (TOPAMAX) 100 mg tablet Take 100 mg by mouth every 6 (six) hours as needed Takes 2 tabs every 6 hours, Historical Med       !! - Potential duplicate medications found  Please discuss with provider  No discharge procedures on file      PDMP Review       Value Time User    PDMP Reviewed  Yes 12/13/2021  1:23 AM Kimo Dumont PA-C          ED Provider  Electronically Signed by           Shauna Hernandez DO  05/29/23 9287

## 2023-06-01 ENCOUNTER — APPOINTMENT (OUTPATIENT)
Dept: LAB | Facility: HOSPITAL | Age: 63
End: 2023-06-01
Payer: COMMERCIAL

## 2023-06-01 DIAGNOSIS — J30.89 ENVIRONMENTAL AND SEASONAL ALLERGIES: ICD-10-CM

## 2023-06-01 PROCEDURE — 82785 ASSAY OF IGE: CPT

## 2023-06-01 PROCEDURE — 36415 COLL VENOUS BLD VENIPUNCTURE: CPT

## 2023-06-01 PROCEDURE — 86003 ALLG SPEC IGE CRUDE XTRC EA: CPT

## 2023-06-02 LAB
ATRIAL RATE: 74 BPM
P AXIS: 73 DEGREES
PR INTERVAL: 154 MS
QRS AXIS: 78 DEGREES
QRSD INTERVAL: 66 MS
QT INTERVAL: 390 MS
QTC INTERVAL: 432 MS
T WAVE AXIS: 73 DEGREES
VENTRICULAR RATE: 74 BPM

## 2023-06-05 ENCOUNTER — HOSPITAL ENCOUNTER (OUTPATIENT)
Dept: PULMONOLOGY | Facility: HOSPITAL | Age: 63
Discharge: HOME | End: 2023-06-05
Attending: INTERNAL MEDICINE
Payer: COMMERCIAL

## 2023-06-05 DIAGNOSIS — J44.9 CHRONIC OBSTRUCTIVE PULMONARY DISEASE, UNSPECIFIED COPD TYPE (HCC): ICD-10-CM

## 2023-06-05 LAB
A ALTERNATA IGE QN: <0.1 KUA/I
A FUMIGATUS IGE QN: <0.1 KUA/I
BERMUDA GRASS IGE QN: 0.16 KUA/I
BOXELDER IGE QN: 0.15 KUA/I
C HERBARUM IGE QN: <0.1 KUA/I
CAT DANDER IGE QN: <0.1 KUA/I
CMN PIGWEED IGE QN: 0.14 KUA/I
COMMON RAGWEED IGE QN: 0.3 KUA/I
COTTONWOOD IGE QN: 0.16 KUA/I
D FARINAE IGE QN: <0.1 KUA/I
D PTERONYSS IGE QN: <0.1 KUA/I
DOG DANDER IGE QN: <0.1 KUA/I
LONDON PLANE IGE QN: 0.14 KUA/I
MOUSE URINE PROT IGE QN: <0.1 KUA/I
MT JUNIPER IGE QN: 0.17 KUA/I
MUGWORT IGE QN: 0.13 KUA/I
P NOTATUM IGE QN: <0.1 KUA/I
ROACH IGE QN: 0.82 KUA/I
SHEEP SORREL IGE QN: 0.13 KUA/I
SILVER BIRCH IGE QN: 0.12 KUA/I
TIMOTHY IGE QN: 0.15 KUA/I
TOTAL IGE SMQN RAST: 82.1 KU/L (ref 0–113)
WALNUT IGE QN: 0.17 KUA/I
WHITE ASH IGE QN: 0.14 KUA/I
WHITE ELM IGE QN: 0.17 KUA/I
WHITE MULBERRY IGE QN: <0.1 KUA/I
WHITE OAK IGE QN: 0.18 KUA/I

## 2023-06-05 PROCEDURE — 94729 DIFFUSING CAPACITY: CPT

## 2023-06-05 PROCEDURE — 94726 PLETHYSMOGRAPHY LUNG VOLUMES: CPT

## 2023-06-05 PROCEDURE — 94726 PLETHYSMOGRAPHY LUNG VOLUMES: CPT | Performed by: INTERNAL MEDICINE

## 2023-06-05 PROCEDURE — 94060 EVALUATION OF WHEEZING: CPT

## 2023-06-05 PROCEDURE — 94760 N-INVAS EAR/PLS OXIMETRY 1: CPT

## 2023-06-05 PROCEDURE — 94729 DIFFUSING CAPACITY: CPT | Performed by: INTERNAL MEDICINE

## 2023-06-05 PROCEDURE — 94060 EVALUATION OF WHEEZING: CPT | Performed by: INTERNAL MEDICINE

## 2023-06-05 RX ORDER — ALBUTEROL SULFATE 2.5 MG/3ML
2.5 SOLUTION RESPIRATORY (INHALATION) ONCE
Status: DISCONTINUED | OUTPATIENT
Start: 2023-06-05 | End: 2023-06-09 | Stop reason: HOSPADM

## 2023-06-05 RX ORDER — ALBUTEROL SULFATE 2.5 MG/3ML
2.5 SOLUTION RESPIRATORY (INHALATION) ONCE AS NEEDED
Status: COMPLETED | OUTPATIENT
Start: 2023-06-05 | End: 2023-06-05

## 2023-06-05 RX ADMIN — ALBUTEROL SULFATE 2.5 MG: 2.5 SOLUTION RESPIRATORY (INHALATION) at 10:40

## 2023-06-07 ENCOUNTER — ANESTHESIA (OUTPATIENT)
Dept: PERIOP | Facility: HOSPITAL | Age: 63
End: 2023-06-07

## 2023-06-07 ENCOUNTER — ANESTHESIA EVENT (OUTPATIENT)
Dept: PERIOP | Facility: HOSPITAL | Age: 63
End: 2023-06-07

## 2023-06-07 ENCOUNTER — HOSPITAL ENCOUNTER (OUTPATIENT)
Dept: PERIOP | Facility: HOSPITAL | Age: 63
Setting detail: OUTPATIENT SURGERY
Discharge: HOME | End: 2023-06-07
Attending: INTERNAL MEDICINE | Admitting: INTERNAL MEDICINE
Payer: COMMERCIAL

## 2023-06-07 VITALS
OXYGEN SATURATION: 98 % | WEIGHT: 162.04 LBS | TEMPERATURE: 97.5 F | BODY MASS INDEX: 27.66 KG/M2 | SYSTOLIC BLOOD PRESSURE: 110 MMHG | RESPIRATION RATE: 18 BRPM | DIASTOLIC BLOOD PRESSURE: 71 MMHG | HEART RATE: 67 BPM | HEIGHT: 64 IN

## 2023-06-07 DIAGNOSIS — K31.7 GASTRIC POLYPS: ICD-10-CM

## 2023-06-07 PROCEDURE — 88305 TISSUE EXAM BY PATHOLOGIST: CPT | Performed by: STUDENT IN AN ORGANIZED HEALTH CARE EDUCATION/TRAINING PROGRAM

## 2023-06-07 RX ORDER — LEVALBUTEROL INHALATION SOLUTION 1.25 MG/3ML
1.25 SOLUTION RESPIRATORY (INHALATION) EVERY 4 HOURS PRN
COMMUNITY
Start: 2023-01-24 | End: 2024-01-24

## 2023-06-07 RX ORDER — LIDOCAINE HYDROCHLORIDE 20 MG/ML
INJECTION, SOLUTION EPIDURAL; INFILTRATION; INTRACAUDAL; PERINEURAL AS NEEDED
Status: DISCONTINUED | OUTPATIENT
Start: 2023-06-07 | End: 2023-06-07

## 2023-06-07 RX ORDER — RISPERIDONE 0.5 MG/1
0.25 TABLET ORAL 2 TIMES DAILY
COMMUNITY
Start: 2023-04-15

## 2023-06-07 RX ORDER — DOXYCYCLINE HYCLATE 100 MG
TABLET ORAL
COMMUNITY
Start: 2023-04-19

## 2023-06-07 RX ORDER — SODIUM CHLORIDE, SODIUM LACTATE, POTASSIUM CHLORIDE, CALCIUM CHLORIDE 600; 310; 30; 20 MG/100ML; MG/100ML; MG/100ML; MG/100ML
INJECTION, SOLUTION INTRAVENOUS CONTINUOUS PRN
Status: DISCONTINUED | OUTPATIENT
Start: 2023-06-07 | End: 2023-06-07

## 2023-06-07 RX ORDER — SODIUM CHLORIDE, SODIUM LACTATE, POTASSIUM CHLORIDE, CALCIUM CHLORIDE 600; 310; 30; 20 MG/100ML; MG/100ML; MG/100ML; MG/100ML
75 INJECTION, SOLUTION INTRAVENOUS CONTINUOUS
Status: DISCONTINUED | OUTPATIENT
Start: 2023-06-07 | End: 2023-06-11 | Stop reason: HOSPADM

## 2023-06-07 RX ORDER — SODIUM CHLORIDE, SODIUM LACTATE, POTASSIUM CHLORIDE, CALCIUM CHLORIDE 600; 310; 30; 20 MG/100ML; MG/100ML; MG/100ML; MG/100ML
20 INJECTION, SOLUTION INTRAVENOUS CONTINUOUS
Status: CANCELLED | OUTPATIENT
Start: 2023-06-07

## 2023-06-07 RX ORDER — ONDANSETRON 4 MG/1
4 TABLET, FILM COATED ORAL EVERY 8 HOURS PRN
COMMUNITY
Start: 2023-05-09

## 2023-06-07 RX ORDER — PROPOFOL 10 MG/ML
INJECTION, EMULSION INTRAVENOUS AS NEEDED
Status: DISCONTINUED | OUTPATIENT
Start: 2023-06-07 | End: 2023-06-07

## 2023-06-07 RX ORDER — GLYCOPYRROLATE 0.2 MG/ML
INJECTION INTRAMUSCULAR; INTRAVENOUS AS NEEDED
Status: DISCONTINUED | OUTPATIENT
Start: 2023-06-07 | End: 2023-06-07

## 2023-06-07 RX ORDER — ALCOHOL ANTISEPTIC PADS
PADS, MEDICATED (EA) TOPICAL
COMMUNITY
Start: 2023-04-11

## 2023-06-07 RX ADMIN — PROPOFOL 150 MG: 10 INJECTION, EMULSION INTRAVENOUS at 09:25

## 2023-06-07 RX ADMIN — LIDOCAINE HYDROCHLORIDE 60 MG: 20 INJECTION, SOLUTION EPIDURAL; INFILTRATION; INTRACAUDAL; PERINEURAL at 09:25

## 2023-06-07 RX ADMIN — SODIUM CHLORIDE, SODIUM LACTATE, POTASSIUM CHLORIDE, AND CALCIUM CHLORIDE 75 ML/HR: .6; .31; .03; .02 INJECTION, SOLUTION INTRAVENOUS at 08:54

## 2023-06-07 RX ADMIN — GLYCOPYRROLATE 0.2 MG: 0.2 INJECTION INTRAMUSCULAR; INTRAVENOUS at 09:25

## 2023-06-07 RX ADMIN — SODIUM CHLORIDE, SODIUM LACTATE, POTASSIUM CHLORIDE, AND CALCIUM CHLORIDE: .6; .31; .03; .02 INJECTION, SOLUTION INTRAVENOUS at 09:22

## 2023-06-07 RX ADMIN — PROPOFOL 50 MG: 10 INJECTION, EMULSION INTRAVENOUS at 09:29

## 2023-06-07 NOTE — ANESTHESIA PREPROCEDURE EVALUATION
Procedure:  EGD    Relevant Problems   CARDIO   (+) Hypertension   (+) Hypertriglyceridemia   (+) Migraine   (+) Other hyperlipidemia      ENDO   (+) Other specified hypothyroidism      GI/HEPATIC   (+) Ileus (HCC)      MUSCULOSKELETAL   (+) Chronic midline low back pain without sciatica   (+) Fibromyalgia, primary   (+) Lumbar degenerative disc disease   (+) Lumbar spondylosis   (+) Mid back pain   (+) Sacroiliitis (HCC)      NEURO/PSYCH   (+) Anxiety   (+) Chronic midline low back pain without sciatica   (+) Chronic pain syndrome   (+) Depression   (+) Fibromyalgia, primary   (+) Migraine      PULMONARY   (+) COPD (chronic obstructive pulmonary disease) (HCC)   (+) Shortness of breath        Physical Exam    Airway    Mallampati score: II  TM Distance: >3 FB  Neck ROM: full     Dental       Cardiovascular  Cardiovascular exam normal    Pulmonary  Pulmonary exam normal     Other Findings        Anesthesia Plan  ASA Score- 3     Anesthesia Type- IV sedation with anesthesia with ASA Monitors  Additional Monitors:   Airway Plan:           Plan Factors-Exercise tolerance (METS): >4 METS  Chart reviewed  EKG reviewed  Imaging results reviewed  Existing labs reviewed  Patient summary reviewed  Patient is a current smoker  Patient instructed to abstain from smoking on day of procedure  Patient smoked on day of surgery  Induction- intravenous  Postoperative Plan-     Informed Consent- Anesthetic plan and risks discussed with patient  I personally reviewed this patient with the CRNA  Discussed and agreed on the Anesthesia Plan with the CRNA  April Donnelly

## 2023-06-07 NOTE — ANESTHESIA POSTPROCEDURE EVALUATION
Post-Op Assessment Note    CV Status:  Stable  Pain Score: 0    Pain management: adequate     Mental Status:  Alert and awake   Hydration Status:  Euvolemic   PONV Controlled:  Controlled   Airway Patency:  Patent      Post Op Vitals Reviewed: Yes      Staff: CRNA         No notable events documented      BP   95/50   Temp  97   Pulse  66   Resp   14   SpO2   99

## 2023-06-07 NOTE — H&P
History and Physical - [facility]  Lyndsey Woodall 65 y o  female MRN: [id number]                  HPI: William Corrales is a 65y o  year old female who presents for gastric polyps  REVIEW OF SYSTEMS: Per the HPI, and otherwise unremarkable      Historical Information   Past Medical History:   Diagnosis Date   • Ambulates with cane    • Anxiety    • Arthritis    • Asthma    • Bipolar 1 disorder ([facility] )    • Brain aneurysm    • Chronic pain disorder     spinal stenosis   • Concussion syndrome     neurological rx and balance rx   • COPD (chronic obstructive pulmonary disease) ([facility])    • Depression    • Diabetes mellitus (HCC)     controlled w/ diet   • Disease of thyroid gland     nodules    • Family history of colon cancer     father   • Fibromyalgia, primary    • GERD (gastroesophageal reflux disease)    • History of colon polyps    • Hyperlipidemia    • Hypertension    • 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    • Irritable bowel syndrome    • Lumbar degenerative disc disease 02/16/2022   • Migraine    • Neuropathy     bilateral feet and hands   • Peripheral neuropathy    • Psychiatric disorder    • PTSD (post-traumatic stress disorder)    • Shortness of breath    • Sleep apnea     had 3 studies & last one negative   • Stroke ([facility] )     2012 no deficeits   • TIA (transient ischemic attack)    • Wears dentures    • Wears glasses      Past Surgical History:   Procedure Laterality Date   • ABDOMINAL SURGERY      laproscopic/ endometriosis   • BRAIN SURGERY      aneurysm/ coiling procedure   • CARPAL TUNNEL RELEASE     • CHOLECYSTECTOMY     • COLONOSCOPY     • DENTAL SURGERY     • EPIDURAL BLOCK INJECTION Right 03/03/2022    Procedure: C7-T1 interlaminar epidural steroid injection;  Surgeon: Ashley Lyn MD;  [facility]: [facility];  Service: Pain Management    • EPIDURAL BLOCK INJECTION N/A 04/21/2022    Procedure: C6-C7 BLOCK / INJECTION EPIDURAL STEROID CERVICAL;  Surgeon: Ashley Lyn MD;  [facility];  Service: Pain Management    • EPIDURAL BLOCK INJECTION Left 06/21/2022    Procedure: BLOCK / INJECTION EPIDURAL STEROID LUMBAR  left L3-4 TFESI;  Surgeon: Ashley Lyn MD;  [facility];  Service: Pain Management    • EYE SURGERY      cataract   • FL GUIDED NEEDLE PLAC BX/ASP/INJ  03/03/2022   • FL GUIDED NEEDLE PLAC BX/ASP/INJ  11/17/2022   • HYSTERECTOMY     • AK ESOPHAGOGASTRODUODENOSCOPY TRANSORAL DIAGNOSTIC N/A 02/08/2018    Procedure: EGD AND COLONOSCOPY;  Surgeon: Delon Meek MD;  Location:  GI LAB; Service: Gastroenterology   • AK PRQ IMPLTJ NSTIM ELECTRODE ARRAY EPIDURAL N/A 11/17/2022    Procedure: Eula Kirkpatrick SCS TRIAL;  Surgeon: Ashley Lyn MD;  [facility];  Service: Pain Management    • AK PRQ IMPLTJ NSTIM ELECTRODE ARRAY EPIDURAL N/A 2/1/2023    Procedure:  Insertion percutaneous thoracic spinal cord stimulator with left buttock implantable pulse generator;  Surgeon: Pawan Martinez MD;  Location:  MAIN OR;  Service: Neurosurgery   • THYROID SURGERY     • TONSILLECTOMY       Social History   Social History     Substance and Sexual Activity   Alcohol Use Not Currently     Social History     Substance and Sexual Activity   Drug Use Never    Comment: prescribed Belbuca     Social History     Tobacco Use   Smoking Status Every Day   • Packs/day: 2 00   • Types: Cigarettes   Smokeless Tobacco Never     Family History   Problem Relation Age of Onset   • Brain cancer Mother    • Alzheimer's disease Mother    • Alzheimer's disease Father    • Parkinsonism Father        Meds/Allergies       Current Outpatient Medications:   •  albuterol (2 5 mg/3 mL) 0 083 % nebulizer solution  •  atorvastatin (LIPITOR) 80 mg tablet  •  benzonatate (TESSALON PERLES) 100 mg capsule  •  Buprenorphine HCl (Belbuca) 300 MCG FILM  •  Butalbital-APAP-Caffeine (FIORICET PO)  •  clonazePAM (KlonoPIN) 0 5 mg tablet  •  clonazePAM (KlonoPIN) "1 mg tablet  •  dicyclomine (BENTYL) 20 mg tablet  •  fenofibrate (TRICOR) 48 mg tablet  •  fluticasone (FLONASE) 50 mcg/act nasal spray  •  fluticasone-umeclidinium-vilanterol (Trelegy Ellipta) 200-62 5-25 mcg/actuation AEPB inhaler  •  HYDROcodone-acetaminophen (NORCO)  mg per tablet  •  lamoTRIgine (LaMICtal) 100 mg tablet  •  levothyroxine 137 mcg tablet  •  magnesium 30 MG tablet  •  omeprazole (PriLOSEC) 40 MG capsule  •  ondansetron (ZOFRAN) 8 mg tablet  •  pregabalin (LYRICA) 100 mg capsule  •  pregabalin (LYRICA) 150 mg capsule  •  tiZANidine (ZANAFLEX) 4 mg tablet  •  topiramate (TOPAMAX) 100 mg tablet  •  albuterol (PROVENTIL HFA,VENTOLIN HFA) 90 mcg/act inhaler  •  ammonium lactate (LAC-HYDRIN) 12 % lotion  •  carboxymethylcellulose 0 5 % SOLN  •  furosemide (LASIX) 20 mg tablet  •  Galcanezumab-gnlm (Emgality) 120 MG/ML SOAJ  •  Melatonin 10 MG CAPS  •  naloxone (Narcan) 4 mg/0 1 mL nasal spray  •  polyethylene glycol (GOLYTELY) 4000 mL solution    Current Facility-Administered Medications:   •  lactated ringers infusion, 75 mL/hr, Intravenous, Continuous, 75 mL/hr at 06/07/23 0854    Facility-Administered Medications Ordered in Other Encounters:   •  albuterol inhalation solution 2 5 mg, 2 5 mg, Nebulization, Once    Allergies   Allergen Reactions   • Hydroxyzine Anaphylaxis     Claims it gives her convulsions  • Pollen Extract Itching   • Tree Extract    • Clarithromycin Rash     Pt denies   • Latex Rash   • Sulfa Antibiotics Rash     \"severe skin burn\"       Objective     /71   Pulse 67   Temp (!) 96 7 °F (35 9 °C) (Tympanic)   Resp 20   Ht 5' 4\" (1 626 m)   Wt 73 5 kg (162 lb 0 6 oz)   SpO2 99%   BMI 27 81 kg/m²       PHYSICAL EXAM    Gen: NAD  Head: NCAT  CV: RRR  CHEST: Clear  ABD: soft, NT/ND  EXT: no edema      ASSESSMENT/PLAN:  This is a 65y o  year old female here for upper endoscopy, and she is stable and optimized for her procedure        "

## 2023-06-12 ENCOUNTER — OFFICE VISIT (OUTPATIENT)
Dept: PAIN MEDICINE | Facility: CLINIC | Age: 63
End: 2023-06-12
Payer: COMMERCIAL

## 2023-06-12 VITALS
BODY MASS INDEX: 28.68 KG/M2 | HEART RATE: 69 BPM | WEIGHT: 168 LBS | RESPIRATION RATE: 16 BRPM | DIASTOLIC BLOOD PRESSURE: 67 MMHG | HEIGHT: 64 IN | SYSTOLIC BLOOD PRESSURE: 104 MMHG

## 2023-06-12 DIAGNOSIS — M47.812 CERVICAL SPONDYLOSIS: Primary | ICD-10-CM

## 2023-06-12 DIAGNOSIS — M54.50 CHRONIC MIDLINE LOW BACK PAIN WITHOUT SCIATICA: ICD-10-CM

## 2023-06-12 DIAGNOSIS — M51.36 LUMBAR DEGENERATIVE DISC DISEASE: ICD-10-CM

## 2023-06-12 DIAGNOSIS — G89.29 CHRONIC MIDLINE LOW BACK PAIN WITHOUT SCIATICA: ICD-10-CM

## 2023-06-12 DIAGNOSIS — M54.16 LUMBAR RADICULOPATHY: ICD-10-CM

## 2023-06-12 DIAGNOSIS — G89.4 CHRONIC PAIN SYNDROME: ICD-10-CM

## 2023-06-12 DIAGNOSIS — M79.7 FIBROMYALGIA, PRIMARY: ICD-10-CM

## 2023-06-12 DIAGNOSIS — R29.90 STROKE-LIKE SYMPTOMS: ICD-10-CM

## 2023-06-12 PROCEDURE — 99214 OFFICE O/P EST MOD 30 MIN: CPT | Performed by: ANESTHESIOLOGY

## 2023-06-12 PROCEDURE — 88305 TISSUE EXAM BY PATHOLOGIST: CPT | Performed by: STUDENT IN AN ORGANIZED HEALTH CARE EDUCATION/TRAINING PROGRAM

## 2023-06-12 NOTE — PROGRESS NOTES
Assessment:  1  Cervical spondylosis    2  Stroke-like symptoms    3  Chronic midline low back pain without sciatica    4  Lumbar radiculopathy    5  Fibromyalgia, primary    6  Lumbar degenerative disc disease    7  Chronic pain syndrome        Plan:  Patient is a 79-year-old female complains of neck pain, low back pain, leg pain with chronic pain syndrome secondary to cervical spondylosis, cervical degenerative disc disease, lumbar degenerative disease lumbar spondylosis, lumbar radiculopathy presents to office for follow-up visit  Patient is status post spinal cord stimulator implantation lumbar spine at this time she does complain about the length of battery life  There may be some confusion on how long the patient is to sit still for the battery charge to full capacity to provide patient significant relief throughout the days  Patient will contact rep in order to get a clear understanding of the recommendations  At this time patient is complaining mainly of 6 facet agenic neck pain which is exacerbated by turning her head to the left  1   We will schedule patient for a left C3-C4 and C4-C5 medial branch block  2  We will provide patient water therapy lumbar core strengthening exercises  3  Follow-up 1 month after injection    Complete risks and benefits including bleeding, infection, tissue reaction, nerve injury and allergic reaction were discussed  The approach was demonstrated using models and literature was provided  Verbal and written consent was obtained  History of Present Illness: The patient is a 65 y o  female who presents for a follow up office visit in regards to Back Pain  The patient’s current symptoms include 10 out of 10 intermittent burning, sharp, pressure-like, shooting, numbness without any particular time pattern  Current pain medications includes: None        I have personally reviewed and/or updated the patient's past medical history, past surgical history, family history, social history, current medications, allergies, and vital signs today  Review of Systems  Review of Systems   Respiratory: Positive for shortness of breath  Cardiovascular: Positive for chest pain  Gastrointestinal: Positive for nausea  Musculoskeletal: Positive for back pain, gait problem, joint swelling and myalgias  Decreased ROM  Joint stiffness  Swelling (feet, facial)  Pain (back, left shoulder, left foot)  Numbness (leg)   Neurological: Positive for dizziness  Memory loss   All other systems reviewed and are negative            Past Medical History:   Diagnosis Date   • Ambulates with cane    • Anxiety    • Arthritis    • Asthma    • Bipolar 1 disorder ([facility] )    • Brain aneurysm    • Chronic pain disorder     spinal stenosis   • Concussion syndrome     neurological rx and balance rx   • COPD (chronic obstructive pulmonary disease) ([facility])    • Depression    • Diabetes mellitus (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    • Irritable bowel syndrome    • Lumbar degenerative disc disease 02/16/2022   • Migraine    • Neuropathy     bilateral feet and hands   • Peripheral neuropathy    • Psychiatric disorder    • PTSD (post-traumatic stress disorder)    • Shortness of breath    • Sleep apnea     had 3 studies & last one negative   • Stroke ([place] [address] )     2012 no deficeits   • TIA (transient ischemic attack)    • Wears dentures    • Wears glasses        Past Surgical History:   Procedure Laterality Date   • ABDOMINAL SURGERY      laproscopic/ endometriosis   • BRAIN SURGERY      aneurysm/ coiling procedure   • CARPAL TUNNEL RELEASE     • CHOLECYSTECTOMY     • COLONOSCOPY     • DENTAL SURGERY     • EPIDURAL BLOCK INJECTION Right 03/03/2022    Procedure: C7-T1 interlaminar epidural steroid injection;  Surgeon: Michele Rios MD;  [facility];  Service: Pain Management    • EPIDURAL BLOCK INJECTION N/A 04/21/2022    Procedure: C6-C7 BLOCK / INJECTION EPIDURAL STEROID CERVICAL;  Surgeon: Michele Rios MD;  [facility];  Service: Pain Management    • EPIDURAL BLOCK INJECTION Left 06/21/2022    Procedure: BLOCK / INJECTION EPIDURAL STEROID LUMBAR  left L3-4 TFESI;  Surgeon: Michele Rios MD;  [facility];  Service: Pain Management    • EYE SURGERY      cataract   • FL GUIDED NEEDLE PLAC BX/ASP/INJ  03/03/2022   • FL GUIDED NEEDLE PLAC BX/ASP/INJ  11/17/2022   • HYSTERECTOMY     • RI ESOPHAGOGASTRODUODENOSCOPY TRANSORAL DIAGNOSTIC N/A 02/08/2018    Procedure: EGD AND COLONOSCOPY;  Surgeon: Jayesh Aceves MD;  Location:  GI LAB; Service: Gastroenterology   • RI PRQ IMPLTJ NSTIM ELECTRODE ARRAY EPIDURAL N/A 11/17/2022    Procedure: Aurelio Eth SCS TRIAL;  Surgeon: Michele Rios MD;  [facility];  Service: Pain Management    • RI PRQ IMPLTJ NSTIM ELECTRODE ARRAY EPIDURAL N/A 2/1/2023    Procedure:  Insertion percutaneous thoracic spinal cord stimulator with left buttock implantable pulse generator;  Surgeon: Cristina Mendoza MD;  [facility];  Service: Neurosurgery   • THYROID SURGERY     • TONSILLECTOMY         Family History   Problem Relation Age of Onset   • Brain cancer Mother    • Alzheimer's disease Mother    • Alzheimer's disease Father    • Parkinsonism Father        Social History     Occupational History   • Not on file   Tobacco Use   • Smoking status: Every Day     Packs/day: 2 00     Types: Cigarettes   • Smokeless tobacco: Never   Vaping Use   • Vaping Use: Never used   Substance and Sexual Activity   • Alcohol use: Not Currently   • Drug use: Never     Comment: prescribed Belbuca   • Sexual activity: Yes     Partners: Male         Current Outpatient Medications:   •  albuterol (2 5 mg/3 mL) 0 083 % nebulizer solution, Take 3 mL (2 5 mg total) by nebulization every 4 (four) hours as needed for wheezing or shortness of breath, Disp: 180 mL, Rfl: 3  •  albuterol (PROVENTIL HFA,VENTOLIN HFA) 90 mcg/act inhaler, Inhale 2 puffs every 6 (six) hours as needed, Disp: , Rfl:   •  ammonium lactate (LAC-HYDRIN) 12 % lotion, as needed, Disp: , Rfl:   •  atorvastatin (LIPITOR) 80 mg tablet, Take 80 mg by mouth every evening, Disp: , Rfl:   •  benzonatate (TESSALON PERLES) 100 mg capsule, Take 1 capsule (100 mg total) by mouth 3 (three) times a day as needed for cough, Disp: 30 capsule, Rfl: 1  •  Buprenorphine HCl (Belbuca) 300 MCG FILM, Apply 300 mcg to cheek in the morning, Disp: , Rfl:   •  Butalbital-APAP-Caffeine (FIORICET PO), Take by mouth as needed, Disp: , Rfl:   •  carboxymethylcellulose 0 5 % SOLN, Administer 1 drop to both eyes daily as needed for dry eyes, Disp: 1 Bottle, Rfl: 0  •  clonazePAM (KlonoPIN) 0 5 mg tablet, Take 0 5 mg by mouth daily at bedtime, Disp: , Rfl:   •  clonazePAM (KlonoPIN) 1 mg tablet, Take 1 mg by mouth daily in the early morning, Disp: , Rfl:   •  dicyclomine (BENTYL) 20 mg tablet, Take 20 mg by mouth every 6 (six) hours, Disp: , Rfl:   •  doxycycline hyclate (VIBRA-TABS) 100 mg tablet, take 1 tablet by mouth twice a day for 7 days, Disp: , Rfl:   •  fenofibrate (TRICOR) 48 mg tablet, Take 48 mg by mouth daily, Disp: , Rfl:   •  fluticasone (FLONASE) 50 mcg/act nasal spray, 2 sprays into each nostril daily, Disp: , Rfl:   •  fluticasone-umeclidinium-vilanterol (Trelegy Ellipta) 200-62 5-25 mcg/actuation AEPB inhaler, Inhale 1 puff daily Rinse mouth after use , Disp: 60 blister, Rfl: 5  •  Galcanezumab-gnlm (Emgality) 120 MG/ML SOAJ, Inject 120 mg under the skin every 30 (thirty) days Last inj 1/19/23, Disp: , Rfl:   •  HYDROcodone-acetaminophen (NORCO)  mg per tablet, every 8 (eight) hours as needed, Disp: , Rfl:   •  lamoTRIgine (LaMICtal) 100 mg tablet, Take 100 mg by mouth 2 (two) times a day, Disp: , Rfl:   •  levalbuterol (XOPENEX) 1 25 mg/3 mL nebulizer solution, Inhale 1 25 mg every 4 (four) hours as needed, Disp: , Rfl:   •  levothyroxine 137 mcg tablet, Take 137 mcg by mouth daily, Disp: , Rfl:   •  magnesium 30 MG tablet, Take 500 mg by mouth 2 (two) times a day 1/26/23 right now not taking, Disp: , Rfl:   •  Melatonin 10 MG CAPS, Take 1 tablet by mouth daily at bedtime as needed, Disp: , Rfl:   •  naloxone (Narcan) 4 mg/0 1 mL nasal spray, , Disp: , Rfl:   •  omeprazole (PriLOSEC) 40 MG capsule, Take 80 mg by mouth 2 (two) times a day, Disp: , Rfl:   •  ondansetron (ZOFRAN) 4 mg tablet, Take 4 mg by mouth every 8 (eight) hours as needed, Disp: , Rfl:   •  ondansetron (ZOFRAN) 8 mg tablet, Take 1 tablet (8 mg total) by mouth every 8 (eight) hours as needed for nausea or vomiting, Disp: 20 tablet, Rfl: 0  •  pregabalin (LYRICA) 100 mg capsule, Take 100 mg by mouth 3 (three) times a day Morning-lunch-dinner, Disp: , Rfl:   •  pregabalin (LYRICA) 150 mg capsule, take 1 capsule by mouth NIGHTLY, Disp: , Rfl:   •  risperiDONE (RisperDAL) 0 5 mg tablet, Take 0 25 mg by mouth 2 (two) times a day, Disp: , Rfl:   •  tiZANidine (ZANAFLEX) 4 mg tablet, take 2 tablets by mouth every 6 hours if needed for muscle spasm, Disp: , Rfl:   •  topiramate (TOPAMAX) 100 mg tablet, Take 100 mg by mouth every 6 (six) hours as needed Takes 2 tabs every 6 hours, Disp: , Rfl:   •  topiramate (TOPAMAX) 200 MG tablet, , Disp: , Rfl:   •  True Comfort Safety Lancets MISC, USE TO TEST BLOOD GLUCOSE 2-3 TIMES DAILY, Disp: , Rfl:   •  furosemide (LASIX) 20 mg tablet, Take 20 mg by mouth as needed Taking as needed (Patient not taking: Reported on 5/2/2023), Disp: , Rfl:     Allergies   Allergen Reactions   • Hydroxyzine Anaphylaxis     Claims it gives her convulsions      • Pollen Extract Itching   • Tree Extract    • Clarithromycin Rash     Pt denies   • Latex Rash "  • Sulfa Antibiotics Rash     \"severe skin burn\"       Physical Exam:    /67 (BP Location: Right arm, Patient Position: Sitting, Cuff Size: Large)   Pulse 69   Resp 16   Ht 5' 4\" (1 626 m)   Wt 76 2 kg (168 lb)   BMI 28 84 kg/m²     Constitutional:normal, well developed, well nourished, alert, in no distress and non-toxic and no overt pain behavior  Eyes:anicteric  HEENT:grossly intact  Neck:supple, symmetric, trachea midline and no masses   Pulmonary:even and unlabored  Cardiovascular:No edema or pitting edema present  Skin:Normal without rashes or lesions and well hydrated  Psychiatric:Mood and affect appropriate  Neurologic:Cranial Nerves II-XII grossly intact  Musculoskeletal:antalgic    Cervical Spine examination demonstrates  Decreased ROM secondary to pain with lateral rotation to the left/right and bending to the left/right, in addition to neck flexion  5/5 upper extremity strength in all muscle groups bilaterally  Negative Spurling's maneuver to the b/l Ue, sensitivity to light touch intact b/l Ue  Imaging    Study Result    Narrative & Impression   MRI LUMBAR SPINE WITHOUT CONTRAST     INDICATION: M54 16: Radiculopathy, lumbar region  M47 816: Spondylosis without myelopathy or radiculopathy, lumbar region  M51 36: Other intervertebral disc degeneration, lumbar region      COMPARISON:  CT dated 9/13/2019      TECHNIQUE:  Sagittal T1, sagittal T2, sagittal inversion recovery, axial T1 and axial T2, coronal T2     IMAGE QUALITY:  Diagnostic     FINDINGS:     VERTEBRAL no spondylolysis or spondylolisthesis  No compression  Modic type II and type III endplate degenerative changes are seen at L1-2  No suspicious marrow signal abnormality      SACRUM:  Normal signal within the sacrum   No evidence of insufficiency or stress fracture      DISTAL CORD AND CONUS:  Normal size and signal within the distal cord and conus      PARASPINAL SOFT TISSUES:  Paraspinal soft tissues are " unremarkable      LOWER THORACIC DISC SPACES: No disc herniation, canal or foraminal stenosis  Mild degenerative disc disease      LUMBAR DISC SPACES:     L1-L2: Disc space narrowing is unchanged     Disc osteophyte complex asymmetric that mildly effaces the thecal sac is unchanged  Mild right lateral recess narrowing  Slight right foraminal narrowing      L2-L3:  Small disc bulge asymmetric to the left  Mild facet arthropathy  Mild indentation the thecal sac  No significant foraminal stenosis      L3-L4:  Minimal degenerative anterolisthesis  Moderate facet arthropathy  Mild canal stenosis  Left lateral recess narrowing contacts the left L4 nerve root  Mild foraminal stenosis      L4-L5:  Small disc bulge with tiny superimposed protrusion  Facet arthropathy  No canal stenosis  Mild left lateral recess narrowing  No significant foraminal stenosis        L5-S1:  No disc herniation, canal or foraminal stenosis      IMPRESSION:     Mild degenerative spondylosis without significant change         Workstation performed: [id number]                No orders to display       No orders of the defined types were placed in this encounter

## 2023-06-20 ENCOUNTER — TELEPHONE (OUTPATIENT)
Dept: GASTROENTEROLOGY | Facility: CLINIC | Age: 63
End: 2023-06-20

## 2023-06-20 NOTE — TELEPHONE ENCOUNTER
I left a voice message to inform patient of the results,I provided the office number with any questions or concerns, Thank you

## 2023-06-30 ENCOUNTER — TELEPHONE (OUTPATIENT)
Dept: PAIN MEDICINE | Facility: CLINIC | Age: 63
End: 2023-06-30

## 2023-06-30 NOTE — TELEPHONE ENCOUNTER
S/W pt  Pt stated she met with Shauna Davis with [facility] and still has problems with the stim, changed the battery and now has increased problems  She has more pain in her lower back at the base of the spine and Shauna Davis told pt to tell RM to ask about getting tests done  Pain goes up to a 10/10 and she takes Vicodin  It wakes her up and she is going on 32 hours of no sleep  The left leg numbness never improved from the stim  She didn't schedule PT or aqua therapy yet b/c she has a lot of other things going on right now  Please advise

## 2023-06-30 NOTE — TELEPHONE ENCOUNTER
Caller: pt    Doctor: Yg Novak    Reason for call: pt is having pain at the base of her spine and it is really bad  She would like to talk about this      Call back#: [phone]

## 2023-07-14 ENCOUNTER — CONSULT (OUTPATIENT)
Dept: CARDIOLOGY CLINIC | Facility: HOSPITAL | Age: 63
End: 2023-07-14
Payer: COMMERCIAL

## 2023-07-14 VITALS
BODY MASS INDEX: 28.68 KG/M2 | SYSTOLIC BLOOD PRESSURE: 114 MMHG | WEIGHT: 168 LBS | DIASTOLIC BLOOD PRESSURE: 76 MMHG | HEIGHT: 64 IN | HEART RATE: 81 BPM

## 2023-07-14 DIAGNOSIS — I25.10 CORONARY ARTERY CALCIFICATION SEEN ON CAT SCAN: ICD-10-CM

## 2023-07-14 DIAGNOSIS — Z72.0 TOBACCO USE: ICD-10-CM

## 2023-07-14 DIAGNOSIS — R07.89 ATYPICAL CHEST PAIN: ICD-10-CM

## 2023-07-14 DIAGNOSIS — R06.02 SHORTNESS OF BREATH: ICD-10-CM

## 2023-07-14 PROBLEM — E11.42 DIABETIC POLYNEUROPATHY ASSOCIATED WITH TYPE 2 DIABETES MELLITUS (HCC): Status: ACTIVE | Noted: 2023-07-14

## 2023-07-14 PROCEDURE — 99204 OFFICE O/P NEW MOD 45 MIN: CPT | Performed by: INTERNAL MEDICINE

## 2023-07-14 NOTE — PROGRESS NOTES
Cardiology Consultation     Antonietta Johnson  [id number]  1960  [address] [place] [address] [facility] [address] [id number]      1. Atypical chest pain  Ambulatory Referral to Cardiology    NM myocardial perfusion spect (rx stress and/or rest)      2. Coronary artery calcification seen on CAT scan        3. Shortness of breath        4. Tobacco use            Discussion/Summary:  Ms. Dalila Carlos is a 58-year-old female who presents to the office today for the evaluation of chest pain. She is somewhat of a poor historian and goes off topic frequently but she does report chest pain which appears atypical in nature. She has had pretty constant chest discomfort waxing and waning in intensity over the last few weeks. She has been evaluated for atypical chest pain in the past by a few cardiologists with multiple stress test being unremarkable. Nonetheless for completeness and risk factor she will undergo a pharmacologic nuclear stress test given she cannot walk on a treadmill as she ambulates with a cane. Her blood pressure is well controlled in the office today. She is maintained on statin therapy with slightly suboptimal numbers on the last assessment although this was a year ago. Smoking cessation is imperative and was discussed. I will relay the above-noted testing to her over the phone and she will return to the office if deemed necessary based on the above-noted testing. History of Present Illness:  Ms. Dalila Carlos is a 58-year-old female who presents to the office today for the evaluation of chest pain. The patient is tearful and anxious in the office today and it is somewhat difficult to obtain an accurate history. She reports having been arrested for DUI a few months ago. She also has multiple personal issues she is currently dealing with.   Shortly thereafter the DUI arrest she started to experience chest pain. She describes a constant pressure sensation and also sharp discomfort in her left chest which does radiate into her left arm. It is pretty much constant, waxing and waning in intensity. She feels better when she places her hand on her chest.  She feels that the pain is worsened with walking or moving her limbs. She also notes this is worse when her anxiety level is high. She reports shortness of breath at rest. She feels the chest pain is better with deep breathing. It is not related to food. She feels like her heart is racing particularly when she is anxious. She denies any signs of volume overload including lower extremity edema, paroxysmal nocturnal dyspnea, orthopnea, acute weight gain or increasing abdominal girth. She denies lightheadedness, syncope or presyncope. Again she reports palpitations and she feels like her heart is racing particularly when she is anxious. She denies any sudden onset or offset to palpitations. She denies symptoms of claudication. She does have a history of tobacco abuse smoking two packs of cigarettes per day and has done so since the age of 24. She does have a history of heart disease in her paternal grandfather who  at the age of 54 from an MI.     Patient Active Problem List   Diagnosis   • Other hyperlipidemia   • Dizziness   • Other specified hypothyroidism   • Brain aneurysm   • Hypokalemia   • Hypertriglyceridemia   • Low HDL (under 40)   • Elevated TSH   • Tobacco abuse   • Intractable abdominal pain   • TMJ syndrome   • Stroke-like symptoms   • Nausea   • Left chest pressure   • Hypophosphatemia   • Gastritis, acute   • Hypotension   • Tobacco use   • COPD (chronic obstructive pulmonary disease) ([facility])   • Hypertension   • Other constipation   • Ileus ([facility])   • Fibromyalgia, primary   • Bipolar 1 disorder ([facility])   • Depression   • Chronic pain syndrome   • Anxiety   • Migraine   • Syncope and collapse   • Mid back pain   • Cervical radiculopathy   • Neck pain   • Lumbar degenerative disc disease   • Lumbar radiculopathy   • Lumbar spondylosis   • Cervical disc disorder with radiculopathy of mid-cervical region   • Chronic midline low back pain without sciatica   • Sacroiliitis ([facility])   • Carotid artery aneurysm ([facility])   • Irritable bowel syndrome with both constipation and diarrhea   • Shortness of breath   • Cervical spondylosis   • Diabetic polyneuropathy associated with type 2 diabetes mellitus ([address])   • Coronary artery calcification seen on CAT scan     Past Medical History:   Diagnosis Date   • Ambulates with cane    • Anxiety    • Arthritis    • Asthma    • Bipolar 1 disorder ([facility])    • Brain aneurysm    • Chronic pain disorder     spinal stenosis   • Concussion syndrome     neurological rx and balance rx   • COPD (chronic obstructive pulmonary disease) ([facility])    • Depression    • Diabetes mellitus ([facility])     controlled w/ diet   • Disease of thyroid gland     nodules    • Family history of colon cancer     father   • Fibromyalgia, primary    • GERD (gastroesophageal reflux disease)    • History of colon polyps    • Hyperlipidemia    • Hypertension    • 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    • Irritable bowel syndrome    • Lumbar degenerative disc disease 02/16/2022   • Migraine    • Neuropathy     bilateral feet and hands   • Peripheral neuropathy    • Psychiatric disorder    • PTSD (post-traumatic stress disorder)    • Shortness of breath    • Sleep apnea     had 3 studies & last one negative   • Stroke ([address])     2012 no deficeits   • TIA (transient ischemic attack)    • Wears dentures    • Wears glasses      Social History     Socioeconomic History   • Marital status:      Spouse name: Not on file   • Number of children: Not on file   • Years of education: Not on file   • Highest education level: Not on file   Occupational History   • Not on file   Tobacco Use   • Smoking status: Every Day     Packs/day: 2.00     Types: Cigarettes   • Smokeless tobacco: Never   Vaping Use   • Vaping Use: Never used   Substance and Sexual Activity   • Alcohol use: Not Currently   • Drug use: Never     Comment: prescribed Belbuca   • Sexual activity: Yes     Partners: Male   Other Topics Concern   • Not on file   Social History Narrative   • Not on file     Social Determinants of Health     Financial Resource Strain: Not on file   Food Insecurity: Not on file   Transportation Needs: Not on file   Physical Activity: Not on file   Stress: Not on file   Social Connections: Not on file   Intimate Partner Violence: Not on file   Housing Stability: Not on file      Family History   Problem Relation Age of Onset   • Brain cancer Mother    • Alzheimer's disease Mother    • Alzheimer's disease Father    • Parkinsonism Father      Past Surgical History:   Procedure Laterality Date   • ABDOMINAL SURGERY      laproscopic/ endometriosis   • BRAIN SURGERY      aneurysm/ coiling procedure   • CARPAL TUNNEL RELEASE     • CHOLECYSTECTOMY     • COLONOSCOPY     • DENTAL SURGERY     • EPIDURAL BLOCK INJECTION Right 03/03/2022    Procedure: C7-T1 interlaminar epidural steroid injection;  Surgeon: Narayan Leger MD;  Location: [facility];  Service: Pain Management    • EPIDURAL BLOCK INJECTION N/A 04/21/2022    Procedure: C6-C7 BLOCK / INJECTION EPIDURAL STEROID CERVICAL;  Surgeon: Narayan Leger MD;  Location: [facility];  Service: Pain Management    • EPIDURAL BLOCK INJECTION Left 06/21/2022    Procedure: BLOCK / INJECTION EPIDURAL STEROID LUMBAR  left L3-4 TFESI;  Surgeon: Narayan Leger MD;  Location: [facility];  Service: Pain Management    • EYE SURGERY      cataract   • FL GUIDED NEEDLE PLAC BX/ASP/INJ  03/03/2022   • FL GUIDED NEEDLE PLAC BX/ASP/INJ  11/17/2022   • HYSTERECTOMY     • RI ESOPHAGOGASTRODUODENOSCOPY TRANSORAL DIAGNOSTIC N/A 02/08/2018    Procedure: EGD AND COLONOSCOPY;  Surgeon: Bel Churchill MD;  Location:  GI LAB; Service: Gastroenterology   • GA PRQ IMPLTJ NSTIM ELECTRODE ARRAY EPIDURAL N/A 11/17/2022    Procedure: Shweta Pierre SCS TRIAL;  Surgeon: Esthela Lock MD;  Location: [facility];  Service: Pain Management    • GA PRQ IMPLTJ NSTIM ELECTRODE ARRAY EPIDURAL N/A 2/1/2023    Procedure:  Insertion percutaneous thoracic spinal cord stimulator with left buttock implantable pulse generator;  Surgeon: Fortino Gutierrez MD;  Location:  MAIN OR;  Service: Neurosurgery   • THYROID SURGERY     • TONSILLECTOMY         Current Outpatient Medications:   •  albuterol (2.5 mg/3 mL) 0.083 % nebulizer solution, Take 3 mL (2.5 mg total) by nebulization every 4 (four) hours as needed for wheezing or shortness of breath, Disp: 180 mL, Rfl: 3  •  albuterol (PROVENTIL HFA,VENTOLIN HFA) 90 mcg/act inhaler, Inhale 2 puffs every 6 (six) hours as needed, Disp: , Rfl:   •  ammonium lactate (LAC-HYDRIN) 12 % lotion, as needed, Disp: , Rfl:   •  atorvastatin (LIPITOR) 80 mg tablet, Take 80 mg by mouth every evening, Disp: , Rfl:   •  benzonatate (TESSALON PERLES) 100 mg capsule, Take 1 capsule (100 mg total) by mouth 3 (three) times a day as needed for cough, Disp: 30 capsule, Rfl: 1  •  Buprenorphine HCl (Belbuca) 300 MCG FILM, Apply 300 mcg to cheek in the morning, Disp: , Rfl:   •  Butalbital-APAP-Caffeine (FIORICET PO), Take by mouth as needed, Disp: , Rfl:   •  carboxymethylcellulose 0.5 % SOLN, Administer 1 drop to both eyes daily as needed for dry eyes, Disp: 1 Bottle, Rfl: 0  •  clonazePAM (KlonoPIN) 0.5 mg tablet, Take 0.5 mg by mouth daily at bedtime, Disp: , Rfl:   •  clonazePAM (KlonoPIN) 1 mg tablet, Take 1 mg by mouth daily in the early morning, Disp: , Rfl:   •  dicyclomine (BENTYL) 20 mg tablet, Take 20 mg by mouth every 6 (six) hours, Disp: , Rfl:   •  fenofibrate (TRICOR) 48 mg tablet, Take 48 mg by mouth daily, Disp: , Rfl:   •  fluticasone (FLONASE) 50 mcg/act nasal spray, 2 sprays into each nostril daily, Disp: , Rfl:   •  fluticasone-umeclidinium-vilanterol (Trelegy Ellipta) 200-62.5-25 mcg/actuation AEPB inhaler, Inhale 1 puff daily Rinse mouth after use., Disp: 60 blister, Rfl: 5  •  furosemide (LASIX) 20 mg tablet, Take 20 mg by mouth as needed Taking as needed, Disp: , Rfl:   •  Galcanezumab-gnlm (Emgality) 120 MG/ML SOAJ, Inject 120 mg under the skin every 30 (thirty) days Last inj 1/19/23, Disp: , Rfl:   •  HYDROcodone-acetaminophen (NORCO)  mg per tablet, every 8 (eight) hours as needed, Disp: , Rfl:   •  lamoTRIgine (LaMICtal) 100 mg tablet, Take 100 mg by mouth 2 (two) times a day, Disp: , Rfl:   •  levalbuterol (XOPENEX) 1.25 mg/3 mL nebulizer solution, Inhale 1.25 mg every 4 (four) hours as needed, Disp: , Rfl:   •  levothyroxine 137 mcg tablet, Take 137 mcg by mouth daily, Disp: , Rfl:   •  magnesium 30 MG tablet, Take 500 mg by mouth 2 (two) times a day 1/26/23 right now not taking, Disp: , Rfl:   •  Melatonin 10 MG CAPS, Take 1 tablet by mouth daily at bedtime as needed, Disp: , Rfl:   •  omeprazole (PriLOSEC) 40 MG capsule, Take 80 mg by mouth 2 (two) times a day, Disp: , Rfl:   •  ondansetron (ZOFRAN) 4 mg tablet, Take 4 mg by mouth every 8 (eight) hours as needed, Disp: , Rfl:   •  ondansetron (ZOFRAN) 8 mg tablet, Take 1 tablet (8 mg total) by mouth every 8 (eight) hours as needed for nausea or vomiting, Disp: 20 tablet, Rfl: 0  •  pregabalin (LYRICA) 100 mg capsule, Take 100 mg by mouth 3 (three) times a day Morning-lunch-dinner, Disp: , Rfl:   •  pregabalin (LYRICA) 150 mg capsule, take 1 capsule by mouth NIGHTLY, Disp: , Rfl:   •  tiZANidine (ZANAFLEX) 4 mg tablet, take 2 tablets by mouth every 6 hours if needed for muscle spasm, Disp: , Rfl:   •  topiramate (TOPAMAX) 100 mg tablet, Take 100 mg by mouth every 6 (six) hours as needed Takes 2 tabs every 6 hours, Disp: , Rfl:   •  topiramate (TOPAMAX) 200 MG tablet, , Disp: , Rfl:   •  True Comfort Safety Lancets MISC, USE TO TEST BLOOD GLUCOSE 2-3 TIMES DAILY, Disp: , Rfl:   •  doxycycline hyclate (VIBRA-TABS) 100 mg tablet, take 1 tablet by mouth twice a day for 7 days (Patient not taking: Reported on 7/14/2023), Disp: , Rfl:   •  naloxone (Narcan) 4 mg/0.1 mL nasal spray, , Disp: , Rfl:   •  risperiDONE (RisperDAL) 0.5 mg tablet, Take 0.25 mg by mouth 2 (two) times a day (Patient not taking: Reported on 7/14/2023), Disp: , Rfl:   Allergies   Allergen Reactions   • Hydroxyzine Anaphylaxis     Claims it gives her convulsions. • Pollen Extract Itching   • Tree Extract    • Clarithromycin Rash     Pt denies   • Latex Rash   • Sulfa Antibiotics Rash     "severe skin burn"             Imaging: No results found. ECG: Normal sinus rhythm, normal ECG    Review of Systems:  Review of Systems   Respiratory: Positive for chest tightness and shortness of breath. Cardiovascular: Positive for palpitations and leg swelling. Musculoskeletal: Positive for arthralgias, back pain, neck pain and neck stiffness. All other systems reviewed and are negative.         Vitals:    07/14/23 1252   BP: 114/76   Pulse: 81   Weight: 76.2 kg (168 lb)   Height: 5' 4" (1.626 m)     Vitals:    07/14/23 1252   Weight: 76.2 kg (168 lb)     Height: 5' 4" (162.6 cm)     Physical Exam:  General appearance:  Appears stated age, alert, well appearing and in no distress  HEENT:  PERRLA, EOMI, no scleral icterus, no conjunctival pallor  NECK:  Supple, No elevated JVP, no thyromegaly, no carotid bruits  HEART:  Regular rate and rhythm, normal S1/S2, no S3/S4, no murmur or rub  LUNGS:  Clear to auscultation bilaterally  ABDOMEN:  Soft, non-tender, positive bowel sounds, no rebound or guarding, no organomegaly   EXTREMITIES:  No edema  VASCULAR:  Normal pedal pulses   SKIN: No lesions or rashes on exposed skin  NEURO:  CN II-XII intact, no focal deficits

## 2023-07-15 ENCOUNTER — HOSPITAL ENCOUNTER (EMERGENCY)
Facility: HOSPITAL | Age: 63
Discharge: HOME | End: 2023-07-15
Attending: EMERGENCY MEDICINE
Payer: COMMERCIAL

## 2023-07-15 VITALS
HEART RATE: 93 BPM | DIASTOLIC BLOOD PRESSURE: 63 MMHG | RESPIRATION RATE: 18 BRPM | TEMPERATURE: 97.6 F | OXYGEN SATURATION: 95 % | SYSTOLIC BLOOD PRESSURE: 132 MMHG

## 2023-07-15 DIAGNOSIS — L03.113 CELLULITIS OF RIGHT HAND: ICD-10-CM

## 2023-07-15 DIAGNOSIS — T63.441A BEE STING: Primary | ICD-10-CM

## 2023-07-15 PROCEDURE — 99284 EMERGENCY DEPT VISIT MOD MDM: CPT

## 2023-07-15 RX ORDER — CEPHALEXIN 250 MG/1
500 CAPSULE ORAL ONCE
Status: COMPLETED | OUTPATIENT
Start: 2023-07-15 | End: 2023-07-15

## 2023-07-15 RX ORDER — CEPHALEXIN 500 MG/1
500 CAPSULE ORAL EVERY 8 HOURS SCHEDULED
Qty: 21 CAPSULE | Refills: 0 | Status: SHIPPED | OUTPATIENT
Start: 2023-07-15 | End: 2023-07-22

## 2023-07-15 RX ADMIN — CEPHALEXIN 500 MG: 250 CAPSULE ORAL at 15:13

## 2023-07-15 NOTE — ED PROVIDER NOTES
History  Chief Complaint   Patient presents with   • Insect Bite     Unsure is she was stung by a bee or wasp on Thursday, in right hand. Pain and swelling in hand and wrist      65year old female presents ambulatory from home for evaluation of right hand pain and swelling. Pt reports she stung by either a bee or wasp on Thursday on the right hand. She notes this was sore but not really bothersome. She notes since that time has had increased swelling and pain. She notes this is now radiating into her wrist.  Denies fever, chills. Denies cough, congestion or recent illness. No wheezing or SOB. No rash or hives. There has been no reported history of anaphylaxis or epi pen use. No reported aggravating or alleviating factors. No specific treatments tried. She reports she has been feeling nauseated but has taken her prescribed zofran which helps. No prior evaluation since onset. History provided by:  Patient and medical records   used: No        Prior to Admission Medications   Prescriptions Last Dose Informant Patient Reported? Taking?    Buprenorphine HCl (Belbuca) 300 MCG FILM  Self Yes No   Sig: Apply 300 mcg to cheek in the morning   Butalbital-APAP-Caffeine (FIORICET PO)   Yes No   Sig: Take by mouth as needed   Galcanezumab-gnlm (Emgality) 120 MG/ML SOAJ  Self Yes No   Sig: Inject 120 mg under the skin every 30 (thirty) days Last inj 1/19/23   HYDROcodone-acetaminophen (NORCO)  mg per tablet   Yes No   Sig: every 8 (eight) hours as needed   Melatonin 10 MG CAPS  Self Yes No   Sig: Take 1 tablet by mouth daily at bedtime as needed   True Comfort Safety Lancets MISC   Yes No   Sig: USE TO TEST BLOOD GLUCOSE 2-3 TIMES DAILY   albuterol (2.5 mg/3 mL) 0.083 % nebulizer solution   No No   Sig: Take 3 mL (2.5 mg total) by nebulization every 4 (four) hours as needed for wheezing or shortness of breath   albuterol (PROVENTIL HFA,VENTOLIN HFA) 90 mcg/act inhaler  Self Yes No   Sig: Inhale 2 puffs every 6 (six) hours as needed   ammonium lactate (LAC-HYDRIN) 12 % lotion   Yes No   Sig: as needed   atorvastatin (LIPITOR) 80 mg tablet   Yes No   Sig: Take 80 mg by mouth every evening   benzonatate (TESSALON PERLES) 100 mg capsule   No No   Sig: Take 1 capsule (100 mg total) by mouth 3 (three) times a day as needed for cough   carboxymethylcellulose 0.5 % SOLN  Self No No   Sig: Administer 1 drop to both eyes daily as needed for dry eyes   clonazePAM (KlonoPIN) 0.5 mg tablet  Self Yes No   Sig: Take 0.5 mg by mouth daily at bedtime   clonazePAM (KlonoPIN) 1 mg tablet  Self Yes No   Sig: Take 1 mg by mouth daily in the early morning   dicyclomine (BENTYL) 20 mg tablet  Self Yes No   Sig: Take 20 mg by mouth every 6 (six) hours   doxycycline hyclate (VIBRA-TABS) 100 mg tablet   Yes No   Sig: take 1 tablet by mouth twice a day for 7 days   Patient not taking: Reported on 2023   fenofibrate (TRICOR) 48 mg tablet  Self Yes No   Sig: Take 48 mg by mouth daily   fluticasone (FLONASE) 50 mcg/act nasal spray   Yes No   Si sprays into each nostril daily   fluticasone-umeclidinium-vilanterol (Trelegy Ellipta) 200-62.5-25 mcg/actuation AEPB inhaler   No No   Sig: Inhale 1 puff daily Rinse mouth after use.    furosemide (LASIX) 20 mg tablet  Self Yes No   Sig: Take 20 mg by mouth as needed Taking as needed   lamoTRIgine (LaMICtal) 100 mg tablet   Yes No   Sig: Take 100 mg by mouth 2 (two) times a day   levalbuterol (XOPENEX) 1.25 mg/3 mL nebulizer solution   Yes No   Sig: Inhale 1.25 mg every 4 (four) hours as needed   levothyroxine 137 mcg tablet  Self Yes No   Sig: Take 137 mcg by mouth daily   magnesium 30 MG tablet  Self Yes No   Sig: Take 500 mg by mouth 2 (two) times a day 23 right now not taking   naloxone (Narcan) 4 mg/0.1 mL nasal spray   Yes No   Patient not taking: Reported on 2023   omeprazole (PriLOSEC) 40 MG capsule  Self Yes No   Sig: Take 80 mg by mouth 2 (two) times a day ondansetron (ZOFRAN) 4 mg tablet   Yes No   Sig: Take 4 mg by mouth every 8 (eight) hours as needed   ondansetron (ZOFRAN) 8 mg tablet   No No   Sig: Take 1 tablet (8 mg total) by mouth every 8 (eight) hours as needed for nausea or vomiting   pregabalin (LYRICA) 100 mg capsule  Self Yes No   Sig: Take 100 mg by mouth 3 (three) times a day Morning-lunch-dinner   pregabalin (LYRICA) 150 mg capsule  Self Yes No   Sig: take 1 capsule by mouth NIGHTLY   risperiDONE (RisperDAL) 0.5 mg tablet   Yes No   Sig: Take 0.25 mg by mouth 2 (two) times a day   Patient not taking: Reported on 7/14/2023   tiZANidine (ZANAFLEX) 4 mg tablet   Yes No   Sig: take 2 tablets by mouth every 6 hours if needed for muscle spasm   topiramate (TOPAMAX) 100 mg tablet  Self Yes No   Sig: Take 100 mg by mouth every 6 (six) hours as needed Takes 2 tabs every 6 hours   topiramate (TOPAMAX) 200 MG tablet   Yes No      Facility-Administered Medications: None       Past Medical History:   Diagnosis Date   • Ambulates with cane    • Anxiety    • Arthritis    • Asthma    • Bipolar 1 disorder (HCC)    • Brain aneurysm    • Chronic pain disorder     spinal stenosis   • Concussion syndrome     neurological rx and balance rx   • COPD (chronic obstructive pulmonary disease) [facility])    • Depression    • Diabetes mellitus (HCC)     controlled w/ diet   • Disease of thyroid gland     nodules    • Family history of colon cancer     father   • Fibromyalgia, primary    • GERD (gastroesophageal reflux disease)    • History of colon polyps    • Hyperlipidemia    • Hypertension    • 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    • Irritable bowel syndrome    • Lumbar degenerative disc disease 02/16/2022   • Migraine    • Neuropathy     bilateral feet and hands   • Peripheral neuropathy    • Psychiatric disorder    • PTSD (post-traumatic stress disorder)    • Shortness of breath    • Sleep apnea     had 3 studies & last one negative   • Stroke ([address])     2012 no deficeits   • TIA (transient ischemic attack)    • Wears dentures    • Wears glasses        Past Surgical History:   Procedure Laterality Date   • ABDOMINAL SURGERY      laproscopic/ endometriosis   • BRAIN SURGERY      aneurysm/ coiling procedure   • CARPAL TUNNEL RELEASE     • CHOLECYSTECTOMY     • COLONOSCOPY     • DENTAL SURGERY     • EPIDURAL BLOCK INJECTION Right 03/03/2022    Procedure: C7-T1 interlaminar epidural steroid injection;  Surgeon: Narayan Leger MD;  Location: [facility];  Service: Pain Management    • EPIDURAL BLOCK INJECTION N/A 04/21/2022    Procedure: C6-C7 BLOCK / INJECTION EPIDURAL STEROID CERVICAL;  Surgeon: Narayan Leger MD;  Location: [facility];  Service: Pain Management    • EPIDURAL BLOCK INJECTION Left 06/21/2022    Procedure: BLOCK / INJECTION EPIDURAL STEROID LUMBAR  left L3-4 TFESI;  Surgeon: Narayan Leger MD;  Location: [facility];  Service: Pain Management    • EYE SURGERY      cataract   • FL GUIDED NEEDLE PLAC BX/ASP/INJ  03/03/2022   • FL GUIDED NEEDLE PLAC BX/ASP/INJ  11/17/2022   • HYSTERECTOMY     • OR ESOPHAGOGASTRODUODENOSCOPY TRANSORAL DIAGNOSTIC N/A 02/08/2018    Procedure: EGD AND COLONOSCOPY;  Surgeon: Bel Churchill MD;  Location:  GI LAB; Service: Gastroenterology   • OR PRQ IMPLTJ NSTIM ELECTRODE ARRAY EPIDURAL N/A 11/17/2022    Procedure: Sherry Gaston SCS TRIAL;  Surgeon: Narayan Leger MD;  Location: [facility];  Service: Pain Management    • OR PRQ IMPLTJ NSTIM ELECTRODE ARRAY EPIDURAL N/A 2/1/2023    Procedure:  Insertion percutaneous thoracic spinal cord stimulator with left buttock implantable pulse generator;  Surgeon: Doug Orlando MD;  Location: [facility];  Service: Neurosurgery   • THYROID SURGERY     • TONSILLECTOMY         Family History   Problem Relation Age of Onset   • Brain cancer Mother    • Alzheimer's disease Mother    • Alzheimer's disease Father    • Parkinsonism Father      I have reviewed and agree with the history as documented. E-Cigarette/Vaping   • E-Cigarette Use Never User      E-Cigarette/Vaping Substances   • Nicotine No    • THC No    • CBD No    • Flavoring No    • Other No    • Unknown No      Social History     Tobacco Use   • Smoking status: Every Day     Packs/day: 2.00     Types: Cigarettes   • Smokeless tobacco: Never   Vaping Use   • Vaping Use: Never used   Substance Use Topics   • Alcohol use: Not Currently   • Drug use: Never     Comment: prescribed Belbuca       Review of Systems   Constitutional: Negative. Negative for chills, fatigue and fever. HENT: Negative. Negative for congestion, facial swelling, rhinorrhea and sore throat. Eyes: Negative. Negative for visual disturbance. Respiratory: Negative. Negative for cough, shortness of breath and wheezing. Cardiovascular: Negative. Negative for chest pain and leg swelling. Gastrointestinal: Positive for nausea. Negative for abdominal pain, constipation, diarrhea and vomiting. Genitourinary: Negative. Musculoskeletal: Positive for arthralgias and joint swelling. Skin: Negative. Negative for rash. Neurological: Negative. Negative for dizziness, light-headedness, numbness and headaches. Psychiatric/Behavioral: Negative. All other systems reviewed and are negative. Physical Exam  Physical Exam  Vitals and nursing note reviewed. Constitutional:       General: She is awake. She is not in acute distress. Appearance: She is well-developed. She is not toxic-appearing. HENT:      Head: Normocephalic and atraumatic. Right Ear: Hearing and external ear normal.      Left Ear: Hearing and external ear normal.      Mouth/Throat:      Mouth: Mucous membranes are moist.      Pharynx: Oropharynx is clear. Uvula midline. Eyes:      General: Lids are normal. No scleral icterus. Conjunctiva/sclera: Conjunctivae normal.   Neck:      Trachea: Trachea and phonation normal. No tracheal deviation. Cardiovascular:      Rate and Rhythm: Normal rate and regular rhythm. Pulses: Normal pulses. Radial pulses are 2+ on the right side and 2+ on the left side. Heart sounds: Normal heart sounds, S1 normal and S2 normal.   Pulmonary:      Effort: Pulmonary effort is normal. No tachypnea or respiratory distress. Breath sounds: Normal breath sounds. No wheezing, rhonchi or rales. Abdominal:      General: Bowel sounds are normal. There is no distension. Palpations: Abdomen is soft. Tenderness: There is no abdominal tenderness. Musculoskeletal:      Right hand: Swelling and tenderness present. No deformity. Normal range of motion. Normal sensation. Normal capillary refill. Normal pulse. Hands:       Comments: Area of reported bee sting. There is dorsal swelling of the hand. There is mild erythema and warmth. No red streaking. No discharge/drainage. No noted abscess. No bony tenderness. Full ROM. Skin:     General: Skin is warm and dry. Capillary Refill: Capillary refill takes less than 2 seconds. Findings: Erythema present. No abrasion, bruising or rash. Neurological:      General: No focal deficit present. Mental Status: She is alert and oriented to person, place, and time. GCS: GCS eye subscore is 4. GCS verbal subscore is 5. GCS motor subscore is 6. Sensory: No sensory deficit. Motor: No abnormal muscle tone. Gait: Gait normal.   Psychiatric:         Mood and Affect: Mood is anxious.          Speech: Speech normal.         Behavior: Behavior normal.         Vital Signs  ED Triage Vitals   Temperature Pulse Respirations Blood Pressure SpO2   07/15/23 1458 07/15/23 1458 07/15/23 1458 07/15/23 1500 07/15/23 1458   97.6 °F (36.4 °C) 93 18 132/63 95 %      Temp Source Heart Rate Source Patient Position - Orthostatic VS BP Location FiO2 (%)   07/15/23 1458 07/15/23 1458 07/15/23 1458 07/15/23 1458 --   Temporal Monitor Sitting Right arm Pain Score       07/15/23 1458       8           Vitals:    07/15/23 1458 07/15/23 1500   BP:  132/63   Pulse: 93    Patient Position - Orthostatic VS: Sitting          Visual Acuity      ED Medications  Medications   cephalexin (KEFLEX) capsule 500 mg (500 mg Oral Given 7/15/23 1513)       Diagnostic Studies  Results Reviewed     None                 No orders to display              Procedures  Procedures         ED Course                               SBIRT 20yo+    Flowsheet Row Most Recent Value   Initial Alcohol Screen: US AUDIT-C     1. How often do you have a drink containing alcohol? 0 Filed at: 07/15/2023 1500   2. How many drinks containing alcohol do you have on a typical day you are drinking? 0 Filed at: 07/15/2023 1500   3b. FEMALE Any Age, or MALE 65+: How often do you have 4 or more drinks on one occassion? 0 Filed at: 07/15/2023 1500   Audit-C Score 0 Filed at: 07/15/2023 1500   ABIOLA: How many times in the past year have you. .. Used an illegal drug or used a prescription medication for non-medical reasons? Never Filed at: 07/15/2023 1500                    Medical Decision Making  66 yo female presenting for evaluation of right hand pain and swelling after a bee sting which occurred a few days ago. Concern for evolving cellulitis beyond a localized reaction. There is nothing to suggest anaphylaxis. Pt afebrile, otherwise well appearing. Will start PO antibiotic and discharge with symptomatic management. Discussed continued symptomatic/supportive care. Advised rest, fluids, OTC meds as needed for symptoms. Strict return precautions outlined. Advised outpatient follow up with PCP in 2-3 days if not improving or return to ER for change in condition as outlined. Pt verbalized understanding and had no further questions. Please refer to above ER course for further details/discussion.       Bee sting: acute illness or injury  Cellulitis of right hand: acute illness or injury  Amount and/or Complexity of Data Reviewed  External Data Reviewed: notes. Risk  OTC drugs. Prescription drug management. Disposition  Final diagnoses:   Bee sting   Cellulitis of right hand     Time reflects when diagnosis was documented in both MDM as applicable and the Disposition within this note     Time User Action Codes Description Comment    7/15/2023  3:09 PM Kin Means Add [B97.206P] Bee sting     7/15/2023  3:09 PM Kin Means Add [L18.176] Cellulitis of right hand       ED Disposition     ED Disposition   Discharge    Condition   Stable    Date/Time   Sat Jul 15, 2023  3:09 PM    Comment   Duane Rumps Remmel discharge to home/self care.                Follow-up Information     Follow up With Specialties Details Why Contact Info Additional Information    Krista Weller MD Family Medicine Go in 2 days For wound re-check [address] [facility] [id number] [phone]       [facility] Emergency Department Emergency Medicine  As needed [address] [place] [id number]  [address] [place] [address] Emergency Department, [address] [place] [address], [facility]          Discharge Medication List as of 7/15/2023  3:11 PM      START taking these medications    Details   cephalexin (KEFLEX) 500 mg capsule Take 1 capsule (500 mg total) by mouth every 8 (eight) hours for 7 days, Starting Sat 7/15/2023, Until Sat 7/22/2023, Normal         CONTINUE these medications which have NOT CHANGED    Details   albuterol (2.5 mg/3 mL) 0.083 % nebulizer solution Take 3 mL (2.5 mg total) by nebulization every 4 (four) hours as needed for wheezing or shortness of breath, Starting Tue 5/2/2023, Until Mon 7/31/2023 at 2359, Normal      albuterol (PROVENTIL HFA,VENTOLIN HFA) 90 mcg/act inhaler Inhale 2 puffs every 6 (six) hours as needed, Starting Thu 12/28/2017, Historical Med      ammonium lactate (LAC-HYDRIN) 12 % lotion as needed, Historical Med      atorvastatin (LIPITOR) 80 mg tablet Take 80 mg by mouth every evening, Starting Fri 10/14/2022, Historical Med      benzonatate (TESSALON PERLES) 100 mg capsule Take 1 capsule (100 mg total) by mouth 3 (three) times a day as needed for cough, Starting Tue 5/2/2023, Normal      Buprenorphine HCl (Belbuca) 300 MCG FILM Apply 300 mcg to cheek in the morning, Historical Med      Butalbital-APAP-Caffeine (FIORICET PO) Take by mouth as needed, Historical Med      carboxymethylcellulose 0.5 % SOLN Administer 1 drop to both eyes daily as needed for dry eyes, Starting Thu 2/7/2019, Normal      !! clonazePAM (KlonoPIN) 0.5 mg tablet Take 0.5 mg by mouth daily at bedtime, Historical Med      !! clonazePAM (KlonoPIN) 1 mg tablet Take 1 mg by mouth daily in the early morning, Starting Thu 1/25/2018, Historical Med      dicyclomine (BENTYL) 20 mg tablet Take 20 mg by mouth every 6 (six) hours, Historical Med      doxycycline hyclate (VIBRA-TABS) 100 mg tablet take 1 tablet by mouth twice a day for 7 days, Historical Med      fenofibrate (TRICOR) 48 mg tablet Take 48 mg by mouth daily, Historical Med      fluticasone (FLONASE) 50 mcg/act nasal spray 2 sprays into each nostril daily, Starting Wed 1/11/2023, Historical Med      fluticasone-umeclidinium-vilanterol (Trelegy Ellipta) 200-62.5-25 mcg/actuation AEPB inhaler Inhale 1 puff daily Rinse mouth after use., Starting Thu 5/18/2023, Normal      furosemide (LASIX) 20 mg tablet Take 20 mg by mouth as needed Taking as needed, Historical Med      Galcanezumab-gnlm (Emgality) 120 MG/ML SOAJ Inject 120 mg under the skin every 30 (thirty) days Last inj 1/19/23, Historical Med      HYDROcodone-acetaminophen (NORCO)  mg per tablet every 8 (eight) hours as needed, Starting Fri 3/24/2023, Historical Med      lamoTRIgine (LaMICtal) 100 mg tablet Take 100 mg by mouth 2 (two) times a day, Starting Tue 1/16/2018, Until Fri 7/14/2023, Historical Med      levalbuterol (XOPENEX) 1.25 mg/3 mL nebulizer solution Inhale 1.25 mg every 4 (four) hours as needed, Starting Tue 1/24/2023, Until Wed 1/24/2024 at 2359, Historical Med      levothyroxine 137 mcg tablet Take 137 mcg by mouth daily, Starting Mon 1/21/2019, Historical Med      magnesium 30 MG tablet Take 500 mg by mouth 2 (two) times a day 1/26/23 right now not taking, Historical Med      Melatonin 10 MG CAPS Take 1 tablet by mouth daily at bedtime as needed, Historical Med      naloxone (Narcan) 4 mg/0.1 mL nasal spray Historical Med      omeprazole (PriLOSEC) 40 MG capsule Take 80 mg by mouth 2 (two) times a day, Starting Mon 7/5/2021, Historical Med      !! ondansetron (ZOFRAN) 4 mg tablet Take 4 mg by mouth every 8 (eight) hours as needed, Starting Tue 5/9/2023, Historical Med      !! ondansetron (ZOFRAN) 8 mg tablet Take 1 tablet (8 mg total) by mouth every 8 (eight) hours as needed for nausea or vomiting, Starting Wed 7/13/2022, Normal      !! pregabalin (LYRICA) 100 mg capsule Take 100 mg by mouth 3 (three) times a day Morning-lunch-dinner, Starting Thu 10/26/2017, Historical Med      !! pregabalin (LYRICA) 150 mg capsule take 1 capsule by mouth NIGHTLY, Historical Med      risperiDONE (RisperDAL) 0.5 mg tablet Take 0.25 mg by mouth 2 (two) times a day, Starting Sat 4/15/2023, Historical Med      tiZANidine (ZANAFLEX) 4 mg tablet take 2 tablets by mouth every 6 hours if needed for muscle spasm, Historical Med      !! topiramate (TOPAMAX) 100 mg tablet Take 100 mg by mouth every 6 (six) hours as needed Takes 2 tabs every 6 hours, Historical Med      !! topiramate (TOPAMAX) 200 MG tablet Starting Mon 6/5/2023, Historical Med      True Comfort Safety Lancets MISC USE TO TEST BLOOD GLUCOSE 2-3 TIMES DAILY, Historical Med       !! - Potential duplicate medications found. Please discuss with provider. No discharge procedures on file.     PDMP Review       Value Time User    PDMP Reviewed  Yes 7/15/2023  3:09 PM Maggie Quinones Delgado Blanton PA-C          ED Provider  Electronically Signed by           Shakeel Travis PA-C  07/15/23 1545

## 2023-07-18 ENCOUNTER — TELEPHONE (OUTPATIENT)
Dept: PAIN MEDICINE | Facility: CLINIC | Age: 63
End: 2023-07-18

## 2023-07-18 NOTE — TELEPHONE ENCOUNTER
Caller: patient    Doctor: Caity Osorio    Reason for call: patient does not have a ride back home after her MBB. She will need to reschedule unless we can get her a ride somehow.     Call back#: [phone]

## 2023-07-19 NOTE — TELEPHONE ENCOUNTER
Left message for patient to call back regarding cancelling her procedure due to no ride home tomorrow. Call back number was provided.

## 2023-07-19 NOTE — TELEPHONE ENCOUNTER
Caller: Josefa Mead   Doctor: Dash Gibson     Reason for call: patient will report tomorrow at 10:15 am her son is able to take her to procedure     Call back#: [phone]

## 2023-07-20 DIAGNOSIS — M54.16 LUMBAR RADICULOPATHY: Primary | ICD-10-CM

## 2023-07-20 DIAGNOSIS — G89.4 CHRONIC PAIN SYNDROME: ICD-10-CM

## 2023-07-20 DIAGNOSIS — M51.36 LUMBAR DEGENERATIVE DISC DISEASE: ICD-10-CM

## 2023-07-24 ENCOUNTER — OFFICE VISIT (OUTPATIENT)
Dept: PULMONOLOGY | Facility: CLINIC | Age: 63
End: 2023-07-24
Payer: COMMERCIAL

## 2023-07-24 VITALS
HEART RATE: 83 BPM | BODY MASS INDEX: 28.85 KG/M2 | DIASTOLIC BLOOD PRESSURE: 70 MMHG | HEIGHT: 64 IN | OXYGEN SATURATION: 96 % | SYSTOLIC BLOOD PRESSURE: 103 MMHG | WEIGHT: 169 LBS | TEMPERATURE: 98.6 F

## 2023-07-24 DIAGNOSIS — Z72.0 TOBACCO ABUSE: Primary | ICD-10-CM

## 2023-07-24 DIAGNOSIS — J44.9 CHRONIC OBSTRUCTIVE PULMONARY DISEASE, UNSPECIFIED COPD TYPE (HCC): ICD-10-CM

## 2023-07-24 DIAGNOSIS — R06.02 SHORTNESS OF BREATH: ICD-10-CM

## 2023-07-24 DIAGNOSIS — R91.8 PULMONARY NODULES/LESIONS, MULTIPLE: ICD-10-CM

## 2023-07-24 PROCEDURE — 99214 OFFICE O/P EST MOD 30 MIN: CPT | Performed by: INTERNAL MEDICINE

## 2023-07-24 NOTE — ASSESSMENT & PLAN NOTE
Patient smokes approximately 2 packs of cigarettes per day. She rolls her own cigarettes. We discussed smoking cessation today but she does not feel that she can quit due to multiple life stressors. We will continue to reengage during subsequent encounters.

## 2023-07-24 NOTE — ASSESSMENT & PLAN NOTE
Possibly has early copd vs bronchitis vs asthma. Pfts did show evidence of hyperinflation and aitrapping. She experiences shortness of breath but this is likely multifactorial.  She has had some benefit with the addition of Trelegy Ellipta.     Plan  Continue Trelegy Ellipta  Continue albuterol as needed  Smoking cessation strongly encouraged  Will need repeat CT chest in April, 2024 which will satisfy lung cancer screening

## 2023-07-24 NOTE — PROGRESS NOTES
Pulmonary Follow-up    Rakel Woodall 65 y.o. female MRN: [id number]  7/24/2023      Assessment:    COPD (chronic obstructive pulmonary disease) (HCC)  Possibly has early copd vs bronchitis vs asthma. Pfts did show evidence of hyperinflation and aitrapping. She experiences shortness of breath but this is likely multifactorial.  She has had some benefit with the addition of Trelegy Ellipta. Plan  Continue Trelegy Ellipta  Continue albuterol as needed  Smoking cessation strongly encouraged  Will need repeat CT chest in April, 2024 which will satisfy lung cancer screening    Tobacco abuse  Patient smokes approximately 2 packs of cigarettes per day. She rolls her own cigarettes. We discussed smoking cessation today but she does not feel that she can quit due to multiple life stressors. We will continue to reengage during subsequent encounters. Pulmonary nodules/lesions, multiple  Will need repeat CT chest in April 2024 follow-up on 0.3 cm right lower lobe pulmonary nodule. Plan:    Diagnoses and all orders for this visit:    Tobacco abuse    Shortness of breath    Chronic obstructive pulmonary disease, unspecified COPD type ([address])    Pulmonary nodules/lesions, multiple        No follow-ups on file. History of Present Illness   HPI:  Rishi Francis is a 65 y.o. female with history of COPD, fibromyalgia who was seen at [facility] [address] [facility] emergency room April 27 where she presented due to several issues to include diarrhea, right lower quadrant abdominal pain, urinary frequency, migraines, chronic left lower extremity weakness, left shoulder and left elbow pain and shortness of breath. Patient also reported cough for 2 weeks. Prior to that presentation she completed a course of doxycycline and prednisone due to coughing. Vital signs were normal in the emergency room. COVID-19/influenza/RSV PCR negative procalcitonin was less than 0.05 CBC was unremarkable.     She underwent CT PE study showed no evidence of acute pulmonary embolism. There was a 3 mm right lobe pulmonary nodule. Due to the aforementioned issues she was treated for pyelonephritis, sinusitis and COPD exacerbation. She was prescribed a 50 mg burst of prednisone and referred to pulmonary for further evaluation. Reports a variety of issues: abdominal pain, flank pain    She has a spinal stimulator placed on 2/1/2023. She has a home nebulizer for albuterol. She is here with her home health aide. States her breathing is better after prednisone but still short of breath. She is still coughing. Her sputum is still yellow at times. She still feels his upper respiratory congestion. She smokes approximately 2 packs of cigarettes per day. She is not interested in quitting or discussing smoking cessation at this time. She remains on antibiotics/ cefpodoxime (for a 10 day course) for sinusitis and pyelonephritis. Here today for follow-up     Patient was last seen in May. At that time we discussed her COPD, tobacco use, chronic shortness of breath which is felt to be multifactorial and mid back pain. PFTs were completed on 6/5 and showed normal spirometry. There was evidence of airtrapping and hyperinflation. States that she is using trelegy feels that it has helped her breathing. She also uses her albuterol. Reports episodes of shortness of breath. States she is dealing with multiple life stressors at this time. Due to this fact she does not feel that she can quit smoking. She is smoking approximately 2 packs of cigarettes per day. [place] allergy panel showed multiple low-level allergens. Total IgE was normal.    Review of Systems   Constitutional: Negative for chills, fatigue and fever. HENT: Negative for congestion, nosebleeds, postnasal drip, rhinorrhea, sinus pressure and sore throat. Eyes: Negative for discharge, redness and itching. Respiratory: Positive for shortness of breath.  Negative for cough, choking, chest tightness, wheezing and stridor. Cardiovascular: Negative for chest pain, palpitations and leg swelling. Gastrointestinal: Negative for blood in stool. Genitourinary: Negative for difficulty urinating and dysuria. Musculoskeletal: Negative for arthralgias, joint swelling and myalgias. Skin: Negative for color change and rash. Neurological: Negative for light-headedness and headaches. Hematological: Negative for adenopathy.        Historical Information   Past Medical History:   Diagnosis Date   • Ambulates with cane    • Anxiety    • Arthritis    • Asthma    • Bipolar 1 disorder ([address])    • Brain aneurysm    • Chronic pain disorder     spinal stenosis   • Concussion syndrome     neurological rx and balance rx   • COPD (chronic obstructive pulmonary disease) ([facility])    • Depression    • Diabetes mellitus ([address])     controlled w/ diet   • Disease of thyroid gland     nodules    • Family history of colon cancer     father   • Fibromyalgia, primary    • GERD (gastroesophageal reflux disease)    • History of colon polyps    • Hyperlipidemia    • Hypertension    • 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    • Irritable bowel syndrome    • Lumbar degenerative disc disease 02/16/2022   • Migraine    • Neuropathy     bilateral feet and hands   • Peripheral neuropathy    • Psychiatric disorder    • PTSD (post-traumatic stress disorder)    • Shortness of breath    • Sleep apnea     had 3 studies & last one negative   • Stroke ([address])     2012 no deficeits   • TIA (transient ischemic attack)    • Wears dentures    • Wears glasses      Past Surgical History:   Procedure Laterality Date   • ABDOMINAL SURGERY      laproscopic/ endometriosis   • BRAIN SURGERY      aneurysm/ coiling procedure   • CARPAL TUNNEL RELEASE     • CHOLECYSTECTOMY     • COLONOSCOPY     • DENTAL SURGERY     • EPIDURAL BLOCK INJECTION Right 03/03/2022    Procedure: C7-T1 interlaminar epidural steroid injection;  Surgeon: Jamel Whittington MD Rosi;  Location: [facility];  Service: Pain Management    • EPIDURAL BLOCK INJECTION N/A 04/21/2022    Procedure: C6-C7 BLOCK / INJECTION EPIDURAL STEROID CERVICAL;  Surgeon: Emma Benites MD;  Location: [facility];  Service: Pain Management    • EPIDURAL BLOCK INJECTION Left 06/21/2022    Procedure: BLOCK / INJECTION EPIDURAL STEROID LUMBAR  left L3-4 TFESI;  Surgeon: Emma Benites MD;  Location: [facility];  Service: Pain Management    • EYE SURGERY      cataract   • FL GUIDED NEEDLE PLAC BX/ASP/INJ  03/03/2022   • FL GUIDED NEEDLE PLAC BX/ASP/INJ  11/17/2022   • HYSTERECTOMY     • WY ESOPHAGOGASTRODUODENOSCOPY TRANSORAL DIAGNOSTIC N/A 02/08/2018    Procedure: EGD AND COLONOSCOPY;  Surgeon: Hernry Zapata MD;  Location:  GI LAB; Service: Gastroenterology   • WY PRQ IMPLTJ NSTIM ELECTRODE ARRAY EPIDURAL N/A 11/17/2022    Procedure: Naomy Brown SCS TRIAL;  Surgeon: Emma Benites MD;  Location: [facility];  Service: Pain Management    • [place] PRQ IMPLTJ NSTIM ELECTRODE ARRAY EPIDURAL N/A 2/1/2023    Procedure:  Insertion percutaneous thoracic spinal cord stimulator with left buttock implantable pulse generator;  Surgeon: Divina Nagel MD;  Location:  MAIN OR;  Service: Neurosurgery   • THYROID SURGERY     • TONSILLECTOMY       Family History   Problem Relation Age of Onset   • Brain cancer Mother    • Alzheimer's disease Mother    • Alzheimer's disease Father    • Parkinsonism Father        Social History   Places lived: [place], [place]  Occupation:  Tobacco: 2 pack day   Alcohol:  Illicits:    Hobbies:       Meds/Allergies     Current Outpatient Medications:   •  albuterol (2.5 mg/3 mL) 0.083 % nebulizer solution, Take 3 mL (2.5 mg total) by nebulization every 4 (four) hours as needed for wheezing or shortness of breath, Disp: 180 mL, Rfl: 3  •  albuterol (PROVENTIL HFA,VENTOLIN HFA) 90 mcg/act inhaler, Inhale 2 puffs every 6 (six) hours as needed, Disp: , Rfl:   •  ammonium lactate (LAC-HYDRIN) 12 % lotion, as needed, Disp: , Rfl:   •  atorvastatin (LIPITOR) 80 mg tablet, Take 80 mg by mouth every evening, Disp: , Rfl:   •  benzonatate (TESSALON PERLES) 100 mg capsule, Take 1 capsule (100 mg total) by mouth 3 (three) times a day as needed for cough, Disp: 30 capsule, Rfl: 1  •  Buprenorphine HCl (Belbuca) 300 MCG FILM, Apply 300 mcg to cheek in the morning, Disp: , Rfl:   •  Butalbital-APAP-Caffeine (FIORICET PO), Take by mouth as needed, Disp: , Rfl:   •  carboxymethylcellulose 0.5 % SOLN, Administer 1 drop to both eyes daily as needed for dry eyes, Disp: 1 Bottle, Rfl: 0  •  clonazePAM (KlonoPIN) 0.5 mg tablet, Take 0.5 mg by mouth daily at bedtime, Disp: , Rfl:   •  clonazePAM (KlonoPIN) 1 mg tablet, Take 1 mg by mouth daily in the early morning, Disp: , Rfl:   •  dicyclomine (BENTYL) 20 mg tablet, Take 20 mg by mouth every 6 (six) hours, Disp: , Rfl:   •  doxycycline hyclate (VIBRA-TABS) 100 mg tablet, take 1 tablet by mouth twice a day for 7 days (Patient not taking: Reported on 7/14/2023), Disp: , Rfl:   •  fenofibrate (TRICOR) 48 mg tablet, Take 48 mg by mouth daily, Disp: , Rfl:   •  fluticasone (FLONASE) 50 mcg/act nasal spray, 2 sprays into each nostril daily, Disp: , Rfl:   •  fluticasone-umeclidinium-vilanterol (Trelegy Ellipta) 200-62.5-25 mcg/actuation AEPB inhaler, Inhale 1 puff daily Rinse mouth after use., Disp: 60 blister, Rfl: 5  •  furosemide (LASIX) 20 mg tablet, Take 20 mg by mouth as needed Taking as needed, Disp: , Rfl:   •  Galcanezumab-gnlm (Emgality) 120 MG/ML SOAJ, Inject 120 mg under the skin every 30 (thirty) days Last inj 1/19/23, Disp: , Rfl:   •  HYDROcodone-acetaminophen (NORCO)  mg per tablet, every 8 (eight) hours as needed, Disp: , Rfl:   •  lamoTRIgine (LaMICtal) 100 mg tablet, Take 100 mg by mouth 2 (two) times a day, Disp: , Rfl:   •  levalbuterol (XOPENEX) 1.25 mg/3 mL nebulizer solution, Inhale 1.25 mg every 4 (four) hours as needed, Disp: , Rfl: •  levothyroxine 137 mcg tablet, Take 137 mcg by mouth daily, Disp: , Rfl:   •  magnesium 30 MG tablet, Take 500 mg by mouth 2 (two) times a day 1/26/23 right now not taking, Disp: , Rfl:   •  Melatonin 10 MG CAPS, Take 1 tablet by mouth daily at bedtime as needed, Disp: , Rfl:   •  naloxone (Narcan) 4 mg/0.1 mL nasal spray, , Disp: , Rfl:   •  omeprazole (PriLOSEC) 40 MG capsule, Take 80 mg by mouth 2 (two) times a day, Disp: , Rfl:   •  ondansetron (ZOFRAN) 4 mg tablet, Take 4 mg by mouth every 8 (eight) hours as needed, Disp: , Rfl:   •  ondansetron (ZOFRAN) 8 mg tablet, Take 1 tablet (8 mg total) by mouth every 8 (eight) hours as needed for nausea or vomiting, Disp: 20 tablet, Rfl: 0  •  pregabalin (LYRICA) 100 mg capsule, Take 100 mg by mouth 3 (three) times a day Morning-lunch-dinner, Disp: , Rfl:   •  pregabalin (LYRICA) 150 mg capsule, take 1 capsule by mouth NIGHTLY, Disp: , Rfl:   •  risperiDONE (RisperDAL) 0.5 mg tablet, Take 0.25 mg by mouth 2 (two) times a day (Patient not taking: Reported on 7/14/2023), Disp: , Rfl:   •  tiZANidine (ZANAFLEX) 4 mg tablet, take 2 tablets by mouth every 6 hours if needed for muscle spasm, Disp: , Rfl:   •  topiramate (TOPAMAX) 100 mg tablet, Take 100 mg by mouth every 6 (six) hours as needed Takes 2 tabs every 6 hours, Disp: , Rfl:   •  topiramate (TOPAMAX) 200 MG tablet, , Disp: , Rfl:   •  True Comfort Safety Lancets MISC, USE TO TEST BLOOD GLUCOSE 2-3 TIMES DAILY, Disp: , Rfl:   Allergies   Allergen Reactions   • Hydroxyzine Anaphylaxis     Claims it gives her convulsions. • Pollen Extract Itching   • Tree Extract    • Clarithromycin Rash     Pt denies   • Latex Rash   • Sulfa Antibiotics Rash     "severe skin burn"       Vitals: Blood pressure 103/70, pulse 83, temperature 98.6 °F (37 °C), height 5' 4" (1.626 m), weight 76.7 kg (169 lb), SpO2 96 %. Body mass index is 29.01 kg/m². Oxygen Therapy  SpO2: 96 %      Physical Exam  Physical Exam  Vitals reviewed. Constitutional:       General: She is not in acute distress. Appearance: She is well-developed. She is not ill-appearing, toxic-appearing or diaphoretic. HENT:      Head: Normocephalic and atraumatic. Right Ear: External ear normal.      Left Ear: External ear normal.      Nose: Nose normal. No congestion or rhinorrhea. Mouth/Throat:      Pharynx: No oropharyngeal exudate or posterior oropharyngeal erythema. Eyes:      General: No scleral icterus. Right eye: No discharge. Left eye: No discharge. Conjunctiva/sclera: Conjunctivae normal.      Pupils: Pupils are equal, round, and reactive to light. Neck:      Trachea: No tracheal deviation. Cardiovascular:      Rate and Rhythm: Normal rate and regular rhythm. Heart sounds: Normal heart sounds. No murmur heard. No friction rub. No gallop. Pulmonary:      Effort: Pulmonary effort is normal.      Breath sounds: Normal breath sounds. No stridor. No wheezing or rales. Musculoskeletal:      Cervical back: Normal range of motion. Right lower leg: No edema. Left lower leg: No edema. Lymphadenopathy:      Head:      Right side of head: No submental, submandibular, preauricular or posterior auricular adenopathy. Left side of head: No submental, submandibular, preauricular or posterior auricular adenopathy. Cervical: No cervical adenopathy. Skin:     General: Skin is warm and dry. Findings: No lesion or rash. Neurological:      Mental Status: She is alert and oriented to person, place, and time. Psychiatric:      Comments: Tangential thoughts/speech, tearful and upset at times          Labs: I have personally reviewed pertinent lab results.   Lab Results   Component Value Date    WBC 8.70 05/29/2023    HGB 13.7 05/29/2023    HCT 41.6 05/29/2023    MCV 96 05/29/2023     05/29/2023     Lab Results   Component Value Date    GLUCOSE 99 08/10/2015    CALCIUM 8.7 05/29/2023     08/10/2015    K 3.5 05/29/2023    CO2 21 05/29/2023     (H) 05/29/2023    BUN 12 05/29/2023    CREATININE 0.83 05/29/2023     Lab Results   Component Value Date    IGE 82.1 06/01/2023     Lab Results   Component Value Date    ALT 20 05/29/2023    AST 20 05/29/2023    ALKPHOS 76 05/29/2023    BILITOT 0.29 08/10/2015       Imaging and other studies: I have personally reviewed pertinent reports. and I have personally reviewed pertinent films in PACS    PFT 6/5/2023      Results:  FEV1/FVC Ratio: 75 % (95% predicted)  Forced Vital Capacity: 2.07 L    84 % predicted  FEV1: 2.75 L     87 % predicted  After administration of bronchodilator FEV1: 2.23 (7%)     Lung volumes by body plethysmography: Total Lung Capacity 133 % predicted Residual volume 200 % predicted  RV/TLC ratio 149%     DLCO corrected for patients hemoglobin level: 68 %     Interpretation:  • Normal spirometry     • No response to the administration to bronchodilator per ATS Standards     • Increased lung volumes with incraesed residual volume indicative of airtrapping and hyperinflation     • Mild diffusion defect     • Normal flow volume loops      CTA chest/ab/pelvis   FINDINGS:     CHEST     PULMONARY ARTERIAL TREE:  No pulmonary embolus is seen.     LUNGS: 0.3 cm right lower lobe nodule image 6/92 not seen on prior study of 1/10/2021. No airspace consolidation. Scattered areas of mild subsegmental atelectasis, lung bases     PLEURA:  Unremarkable.     HEART/AORTA: Coronary artery calcification. Ascending thoracic aortic diameter within normal limits at 3.0 cm. Posterior arch prominence with diameter 3.5 cm unchanged from prior study (ductus bump)     MEDIASTINUM AND RODNEY:  Unremarkable.     CHEST WALL AND LOWER NECK:  Unremarkable.     ABDOMEN     LIVER/BILIARY TREE: Intra and extrahepatic biliary ductal dilatation with common bile duct diameter of 1.6 cm unchanged from prior.  Unchanged left hepatic lobe cyst. Additional subcentimeter hypodense nodules too small to characterize.     GALLBLADDER: Status post cholecystectomy     SPLEEN:  Unremarkable.     PANCREAS:  Unremarkable.     ADRENAL GLANDS:  Unremarkable.     KIDNEYS/URETERS:  Unremarkable. No hydronephrosis.     STOMACH AND BOWEL:  Unremarkable.     APPENDIX:  No findings to suggest appendicitis.     ABDOMINOPELVIC CAVITY:  No ascites. No pneumoperitoneum. No lymphadenopathy.     VESSELS:  Unremarkable for patient's age.     PELVIS     REPRODUCTIVE ORGANS: Surgical changes of prior hysterectomy.     URINARY BLADDER:  Unremarkable.     ABDOMINAL WALL/INGUINAL REGIONS: No acute finding     OSSEOUS STRUCTURES: Spinal cord stimulator present.     IMPRESSION:     1. No evidence of pulmonary embolism  2. No acute pulmonary disease. 0.3 cm right lower lobe nodule. Based on current Fleischner Society 2017 Guidelines on incidental pulmonary nodule, no routine follow-up is needed if the patient is low risk. If the patient is high risk, optional follow-up   chest CT at 12 months can be considered. 3. Prominence of the intra and extrahepatic biliary tract is unchanged from 10/13/2021 likely reflecting postcholecystectomy state. 4. No acute inflammatory changes in the abdomen or pelvis      Pulmonary function testing:   PFTs ordered today     EKG, Pathology, and Other Studies: I have personally reviewed pertinent reports. and I have personally reviewed pertinent films in PACS     Echo 6/2020   SUMMARY     LEFT VENTRICLE:  Size was normal.  Systolic function was normal. Ejection fraction was estimated to be 60 %. There were no regional wall motion abnormalities. Wall thickness was at the upper limits of normal.     TRICUSPID VALVE:  There was trace regurgitation.     HISTORY: PRIOR HISTORY: hypertension, copd, hyperlipidemia, dizziness, hypothyroid     PROCEDURE: The procedure was performed at the bedside. This was a routine study. The transthoracic approach was used.  The study included complete 2D imaging, M-mode, complete spectral Doppler, and color Doppler. Images were obtained from  the parasternal, apical, subcostal, and suprasternal notch acoustic windows. Image quality was adequate.     LEFT VENTRICLE: Size was normal. Systolic function was normal. Ejection fraction was estimated to be 60 %. There were no regional wall motion abnormalities. Wall thickness was at the upper limits of normal.     RIGHT VENTRICLE: The size was normal. Systolic function was normal. Wall thickness was normal.     LEFT ATRIUM: Size was normal.     RIGHT ATRIUM: Size was normal.     MITRAL VALVE: Valve structure was normal. There was normal leaflet separation. DOPPLER: The transmitral velocity was within the normal range. There was no evidence for stenosis. There was no regurgitation.     AORTIC VALVE: The valve was trileaflet. Leaflets exhibited normal thickness and normal cuspal separation. DOPPLER: Transaortic velocity was within the normal range. There was no evidence for stenosis. There was no regurgitation.     TRICUSPID VALVE: The valve structure was normal. There was normal leaflet separation. DOPPLER: The transtricuspid velocity was within the normal range. There was no evidence for stenosis. There was trace regurgitation.     PULMONIC VALVE: Leaflets exhibited normal thickness, no calcification, and normal cuspal separation. DOPPLER: The transpulmonic velocity was within the normal range. There was no regurgitation.     PERICARDIUM: There was no pericardial effusion. The pericardium was normal in appearance.     AORTA: The root exhibited normal size        Cristina Velasquez M.D.   [facility]

## 2023-07-28 ENCOUNTER — HOSPITAL ENCOUNTER (OUTPATIENT)
Dept: NUCLEAR MEDICINE | Facility: HOSPITAL | Age: 63
Discharge: HOME | End: 2023-07-28
Attending: INTERNAL MEDICINE
Payer: COMMERCIAL

## 2023-07-28 DIAGNOSIS — R07.89 ATYPICAL CHEST PAIN: ICD-10-CM

## 2023-07-28 LAB
MAX HR PERCENT: 61 %
MAX HR: 97 BPM
NUC STRESS EJECTION FRACTION: 70 %
RATE PRESSURE PRODUCT: NORMAL
SL CV REST NUCLEAR ISOTOPE DOSE: 9.8 MCI
SL CV STRESS NUCLEAR ISOTOPE DOSE: 30.8 MCI
SL CV STRESS RECOVERY BP: NORMAL MMHG
SL CV STRESS RECOVERY HR: 76 BPM
SL CV STRESS RECOVERY O2 SAT: 99 %
STRESS ANGINA INDEX: 0
STRESS BASELINE BP: NORMAL MMHG
STRESS BASELINE HR: 62 BPM
STRESS O2 SAT REST: 98 %
STRESS PEAK HR: 97 BPM
STRESS POST ESTIMATED WORKLOAD: 1 METS
STRESS POST EXERCISE DUR MIN: 3 MIN
STRESS POST EXERCISE DUR SEC: 0 SEC
STRESS POST O2 SAT PEAK: 99 %
STRESS POST PEAK BP: 114 MMHG
STRESS/REST PERFUSION RATIO: 0.9

## 2023-07-28 PROCEDURE — 93017 CV STRESS TEST TRACING ONLY: CPT

## 2023-07-28 PROCEDURE — 78452 HT MUSCLE IMAGE SPECT MULT: CPT

## 2023-07-28 PROCEDURE — 78452 HT MUSCLE IMAGE SPECT MULT: CPT | Performed by: INTERNAL MEDICINE

## 2023-07-28 PROCEDURE — A9502 TC99M TETROFOSMIN: HCPCS

## 2023-07-28 PROCEDURE — G1004 CDSM NDSC: HCPCS

## 2023-07-28 PROCEDURE — 93016 CV STRESS TEST SUPVJ ONLY: CPT | Performed by: INTERNAL MEDICINE

## 2023-07-28 PROCEDURE — 93018 CV STRESS TEST I&R ONLY: CPT | Performed by: INTERNAL MEDICINE

## 2023-07-28 RX ORDER — REGADENOSON 0.08 MG/ML
0.4 INJECTION, SOLUTION INTRAVENOUS ONCE
Status: COMPLETED | OUTPATIENT
Start: 2023-07-28 | End: 2023-07-28

## 2023-07-28 RX ADMIN — REGADENOSON 0.4 MG: 0.08 INJECTION, SOLUTION INTRAVENOUS at 09:58

## 2023-08-09 ENCOUNTER — APPOINTMENT (EMERGENCY)
Dept: RADIOLOGY | Facility: HOSPITAL | Age: 63
End: 2023-08-09
Payer: COMMERCIAL

## 2023-08-09 ENCOUNTER — HOSPITAL ENCOUNTER (EMERGENCY)
Facility: HOSPITAL | Age: 63
Discharge: HOME | End: 2023-08-10
Attending: EMERGENCY MEDICINE
Payer: COMMERCIAL

## 2023-08-09 ENCOUNTER — APPOINTMENT (EMERGENCY)
Dept: CT IMAGING | Facility: HOSPITAL | Age: 63
End: 2023-08-09
Payer: COMMERCIAL

## 2023-08-09 DIAGNOSIS — M54.50 LEFT LOW BACK PAIN: ICD-10-CM

## 2023-08-09 DIAGNOSIS — S09.90XA CLOSED HEAD INJURY, INITIAL ENCOUNTER: ICD-10-CM
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
## 2023-08-09 DIAGNOSIS — K83.8 COMMON BILE DUCT DILATION: ICD-10-CM

## 2023-08-09 LAB
ALBUMIN SERPL BCP-MCNC: 4 G/DL (ref 3.5–5)
ALP SERPL-CCNC: 51 U/L (ref 34–104)
ALT SERPL W P-5'-P-CCNC: 14 U/L (ref 7–52)
ANION GAP SERPL CALCULATED.3IONS-SCNC: 9 MMOL/L
APTT PPP: 22 SECONDS (ref 23–37)
AST SERPL W P-5'-P-CCNC: 18 U/L (ref 13–39)
BASOPHILS # BLD AUTO: 0.04 THOUSANDS/ÂΜL (ref 0–0.1)
BASOPHILS NFR BLD AUTO: 0 % (ref 0–1)
BILIRUB SERPL-MCNC: 0.26 MG/DL (ref 0.2–1)
BUN SERPL-MCNC: 16 MG/DL (ref 5–25)
CALCIUM SERPL-MCNC: 9.2 MG/DL (ref 8.4–10.2)
CHLORIDE SERPL-SCNC: 112 MMOL/L (ref 96–108)
CO2 SERPL-SCNC: 21 MMOL/L (ref 21–32)
CREAT SERPL-MCNC: 1.02 MG/DL (ref 0.6–1.3)
EOSINOPHIL # BLD AUTO: 0.08 THOUSAND/ÂΜL (ref 0–0.61)
EOSINOPHIL NFR BLD AUTO: 1 % (ref 0–6)
ERYTHROCYTE [DISTWIDTH] IN BLOOD BY AUTOMATED COUNT: 12.8 % (ref 11.6–15.1)
GFR SERPL CREATININE-BSD FRML MDRD: 59 ML/MIN/1.73SQ M
GLUCOSE SERPL-MCNC: 92 MG/DL (ref 65–140)
HCT VFR BLD AUTO: 40.3 % (ref 34.8–46.1)
HGB BLD-MCNC: 13.1 G/DL (ref 11.5–15.4)
IMM GRANULOCYTES # BLD AUTO: 0.04 THOUSAND/UL (ref 0–0.2)
IMM GRANULOCYTES NFR BLD AUTO: 0 % (ref 0–2)
INR PPP: 1 (ref 0.84–1.19)
LYMPHOCYTES # BLD AUTO: 3.15 THOUSANDS/ÂΜL (ref 0.6–4.47)
LYMPHOCYTES NFR BLD AUTO: 33 % (ref 14–44)
MCH RBC QN AUTO: 31.7 PG (ref 26.8–34.3)
MCHC RBC AUTO-ENTMCNC: 32.5 G/DL (ref 31.4–37.4)
MCV RBC AUTO: 98 FL (ref 82–98)
MONOCYTES # BLD AUTO: 0.63 THOUSAND/ÂΜL (ref 0.17–1.22)
MONOCYTES NFR BLD AUTO: 7 % (ref 4–12)
NEUTROPHILS # BLD AUTO: 5.61 THOUSANDS/ÂΜL (ref 1.85–7.62)
NEUTS SEG NFR BLD AUTO: 59 % (ref 43–75)
NRBC BLD AUTO-RTO: 0 /100 WBCS
PLATELET # BLD AUTO: 187 THOUSANDS/UL (ref 149–390)
PMV BLD AUTO: 10.6 FL (ref 8.9–12.7)
POTASSIUM SERPL-SCNC: 3.6 MMOL/L (ref 3.5–5.3)
PROT SERPL-MCNC: 6.5 G/DL (ref 6.4–8.4)
PROTHROMBIN TIME: 13.4 SECONDS (ref 11.6–14.5)
RBC # BLD AUTO: 4.13 MILLION/UL (ref 3.81–5.12)
SODIUM SERPL-SCNC: 142 MMOL/L (ref 135–147)
WBC # BLD AUTO: 9.55 THOUSAND/UL (ref 4.31–10.16)

## 2023-08-09 PROCEDURE — 80053 COMPREHEN METABOLIC PANEL: CPT | Performed by: EMERGENCY MEDICINE

## 2023-08-09 PROCEDURE — 70450 CT HEAD/BRAIN W/O DYE: CPT

## 2023-08-09 PROCEDURE — 74177 CT ABD & PELVIS W/CONTRAST: CPT

## 2023-08-09 PROCEDURE — 73564 X-RAY EXAM KNEE 4 OR MORE: CPT

## 2023-08-09 PROCEDURE — G1004 CDSM NDSC: HCPCS

## 2023-08-09 PROCEDURE — 72125 CT NECK SPINE W/O DYE: CPT

## 2023-08-09 PROCEDURE — 85610 PROTHROMBIN TIME: CPT | Performed by: EMERGENCY MEDICINE

## 2023-08-09 PROCEDURE — 93005 ELECTROCARDIOGRAM TRACING: CPT

## 2023-08-09 PROCEDURE — 36415 COLL VENOUS BLD VENIPUNCTURE: CPT | Performed by: EMERGENCY MEDICINE

## 2023-08-09 PROCEDURE — 73502 X-RAY EXAM HIP UNI 2-3 VIEWS: CPT

## 2023-08-09 PROCEDURE — 71045 X-RAY EXAM CHEST 1 VIEW: CPT

## 2023-08-09 PROCEDURE — 85730 THROMBOPLASTIN TIME PARTIAL: CPT | Performed by: EMERGENCY MEDICINE

## 2023-08-09 PROCEDURE — 73030 X-RAY EXAM OF SHOULDER: CPT

## 2023-08-09 PROCEDURE — 85025 COMPLETE CBC W/AUTO DIFF WBC: CPT | Performed by: EMERGENCY MEDICINE

## 2023-08-09 RX ORDER — HYDROMORPHONE HCL/PF 1 MG/ML
0.5 SYRINGE (ML) INJECTION ONCE
Status: COMPLETED | OUTPATIENT
Start: 2023-08-09 | End: 2023-08-09

## 2023-08-09 RX ORDER — HYDROMORPHONE HCL/PF 1 MG/ML
1 SYRINGE (ML) INJECTION ONCE
Status: COMPLETED | OUTPATIENT
Start: 2023-08-09 | End: 2023-08-09

## 2023-08-09 RX ADMIN — IOHEXOL 100 ML: 350 INJECTION, SOLUTION INTRAVENOUS at 22:09

## 2023-08-09 RX ADMIN — HYDROMORPHONE HYDROCHLORIDE 0.5 MG: 1 INJECTION, SOLUTION INTRAMUSCULAR; INTRAVENOUS; SUBCUTANEOUS at 22:33

## 2023-08-09 RX ADMIN — HYDROMORPHONE HYDROCHLORIDE 0.5 MG: 1 INJECTION, SOLUTION INTRAMUSCULAR; INTRAVENOUS; SUBCUTANEOUS at 23:34

## 2023-08-09 RX ADMIN — HYDROMORPHONE HYDROCHLORIDE 1 MG: 1 INJECTION, SOLUTION INTRAMUSCULAR; INTRAVENOUS; SUBCUTANEOUS at 21:07

## 2023-08-10 VITALS
TEMPERATURE: 98.6 F | RESPIRATION RATE: 18 BRPM | WEIGHT: 169 LBS | DIASTOLIC BLOOD PRESSURE: 70 MMHG | SYSTOLIC BLOOD PRESSURE: 113 MMHG | BODY MASS INDEX: 29.01 KG/M2 | OXYGEN SATURATION: 96 % | HEART RATE: 66 BPM

## 2023-08-10 LAB
ATRIAL RATE: 76 BPM
P AXIS: 70 DEGREES
PR INTERVAL: 156 MS
QRS AXIS: 73 DEGREES
QRSD INTERVAL: 74 MS
QT INTERVAL: 390 MS
QTC INTERVAL: 438 MS
T WAVE AXIS: 68 DEGREES
VENTRICULAR RATE: 76 BPM

## 2023-08-10 PROCEDURE — NC001 PR NO CHARGE: Performed by: EMERGENCY MEDICINE

## 2023-08-10 PROCEDURE — 93010 ELECTROCARDIOGRAM REPORT: CPT | Performed by: INTERNAL MEDICINE

## 2023-08-10 NOTE — ED ATTENDING ATTESTATION
8/9/2023  IRai DO, saw and evaluated the patient. I have discussed the patient with the resident/non-physician practitioner and agree with the resident's/non-physician practitioner's findings, Plan of Care, and MDM as documented in the resident's/non-physician practitioner's note, except where noted. All available labs and Radiology studies were reviewed. I was present for key portions of any procedure(s) performed by the resident/non-physician practitioner and I was immediately available to provide assistance. At this point I agree with the current assessment done in the Emergency Department. I have conducted an independent evaluation of this patient a history and physical is as follows:    ED Course       Patient is a 73-year-old female presents emergency room after a fall at home. Patient also had an episode of vomiting. Patient states he is unclear about striking her head. No visible head trauma noted but will obtain CT scan head and given concern about possible head injury. Patient concerned because she struck her back in the area where she has here spinal stimulator placed and concerned she may have damaged her spinal stimulator. No bowel or bladder incontinence or retention. Pain is located in the right side of her back from mid thoracic to lower lumbar region. No abrasions or deformities noted. Awake alert oriented. Administer analgesia, labs, CT scan abdomen pelvis with lumbar spine recons and reassess. Labs reviewed and within normal range. CT RECON ONLY LUMBAR SPINE (NO CHARGE)     INDICATION:   fall, h/o spinal stimulator, concern for compression fracture.     COMPARISON:  None     TECHNIQUE: Axial CT examination of the lumbar spine was obtained utilizing reconstructed images from CT of the chest, abdomen and pelvis performed the same day.   Images were reformatted in the sagittal and coronal planes.     This examination, like all CT scans performed in the [facility], was performed utilizing techniques to minimize radiation dose exposure, including the use of iterative reconstruction and automated exposure control.     FINDINGS:     ALIGNMENT: Normal alignment. No spondylolisthesis. No scoliosis.     VERTEBRAE: Left L1 and L2 transverse process fractures are noted. .     DEGENERATIVE CHANGES:     PREVERTEBRAL AND PARASPINAL SOFT TISSUES: Left lumbar dorsal column stimulator seen with its leads terminating the lower thoracic spine. .     IMPRESSION:     Left L1 and L2 transverse process fractures are noted. .     CT C-spine - neg for acute traumatic injuries      CT ABDOMEN AND PELVIS WITH IV CONTRAST     INDICATION:   Nausea/vomiting  Fall, history of spinal stimulator, vomiting.     COMPARISON: 4/27/2023.     TECHNIQUE:  CT examination of the abdomen and pelvis was performed. Multiplanar 2D reformatted images were created from the source data.     This examination, like all CT scans performed in the [facility], was performed utilizing techniques to minimize radiation dose exposure, including the use of iterative reconstruction and automated exposure control. Radiation dose length   product (DLP) for this visit:  944.66 mGy-cm     IV Contrast:  100 mL of iohexol (OMNIPAQUE)  Enteric Contrast:  Enteric contrast was not administered.     FINDINGS:     ABDOMEN     LOWER CHEST:  No clinically significant abnormality identified in the visualized lower chest.     LIVER/BILIARY TREE: Intrahepatic and extrahepatic biliary ductal dilatation. The common duct measures up to 2 cm, larger than on the prior exam from April 27, 2023 when it measured 1.8 cm. Recommend MRCP for further evaluation.     Simple cysts are seen within the liver.     GALLBLADDER: Gallbladder is surgically absent.     SPLEEN:  Unremarkable.     PANCREAS:  Unremarkable.     ADRENAL GLANDS:  Unremarkable.     KIDNEYS/URETERS:  No hydronephrosis or urinary tract calculus.   One or more sharply circumscribed subcentimeter renal hypodensities are present, too small to accurately characterize, and statistically most likely benign findings. According to recent   literature (Radiology 2019) no further workup of these findings is recommended.     STOMACH AND BOWEL:  Unremarkable.     APPENDIX: A normal appendix was visualized.     ABDOMINOPELVIC CAVITY:  No ascites. No pneumoperitoneum. No lymphadenopathy.     VESSELS:  Unremarkable for patient's age.     PELVIS     REPRODUCTIVE ORGANS:  Unremarkable for patient's age.     URINARY BLADDER:  Unremarkable.     ABDOMINAL WALL/INGUINAL REGIONS: Left flank dorsal column stimulator seen with its lead terminating in the lower thoracic spine. .     OSSEOUS STRUCTURES:  No acute fracture or destructive osseous lesion.     IMPRESSION:     Intrahepatic and extrahepatic biliary ductal dilatation. The common duct measures up to 2 cm, larger than on the prior exam from April 27, 2023 when it measured 1.8 cm.  Recommend MRCP for further evaluation.     CT Head: neg    Critical Care Time  ECG 12 Lead Documentation Only    Date/Time: 8/9/2023 9:12 PM    Performed by: Daryl Ngo DO  Authorized by: Daryl Ngo DO    Indications / Diagnosis:  Trauma  ECG reviewed by me, the ED Provider: yes    Patient location:  ED  Previous ECG:     Comparison to cardiac monitor: Yes    Rate:     ECG rate:  76    ECG rate assessment: normal    Rhythm:     Rhythm: sinus rhythm    Ectopy:     Ectopy: none    QRS:     QRS axis:  Normal    QRS intervals:  Normal  Conduction:     Conduction: normal    ST segments:     ST segments:  Normal  T waves:     T waves: normal        Final Diagnosis:  Fall  Left L1 and L2 transverse process fractures  Low back strain  Nausea and vomiting    Pt to f/u with GI, PCP, Orthopedic Surgery/Pain Medicine

## 2023-08-10 NOTE — DISCHARGE INSTRUCTIONS
Incidental finding: "Intrahepatic and extrahepatic biliary ductal dilatation. The common duct measures up to 2 cm, larger than on the prior exam from April 27, 2023 when it measured 1.8 cm. Recommend MRCP for further evaluation. " - Please follow up with GI, a referral was sent, they may recommend MRCP for further evaluation of this. Return immediately if you notice yellowish skin discoloration, have severe pain in your right upper abdomen, nausea or vomiting, or anything else that concerns you. Follow all return precautions provided. Thank you.

## 2023-08-10 NOTE — QUICK NOTE
Intrahepatic and extrahepatic biliary ductal dilatation. The common duct measures up to 2 cm, larger than on the prior exam from April 27, 2023 when it measured 1.8 cm. Recommend MRCP for further evaluation. Evaluated labs and patient. Mildly increased compared to prior, normal LFTs, no focal RUQ pain, no symptoms of ascending cholangitis. Will have patient follow up outpatient for this. GI referral sent. I made patient aware of this finding and need for follow up, she is agreeable. Patient is excited to go home, will discharge.

## 2023-08-10 NOTE — ED PROVIDER NOTES
History  Chief Complaint   Patient presents with   • Fall     States fell 2 times today. No thinners, no LOC. States hit head on second fall. No LOC with fall. Has nerve stimulator in back and thinks she broke it     (Lamont Canavan) Lamont Canavan is a 65 y.o. female who identifies as female    They presented to the emergency department on August 9, 2023. Patient presents with:  Fall: States fell 2 times today. No thinners, no LOC. States hit head on second fall. No LOC with fall. Has nerve stimulator in back and thinks she broke it. The patient states that earlier today she was walking and slipped falling hitting the left side of her body. Patient is concerned at this time that she may have broken her nerve stimulator as she is having left-sided leg pain. Patient also notes that she felt nauseous attempted to go to the bathroom however lost her balance and fell striking her head. Patient denies any loss of consciousness, and does not take anti-coagulants/antiplatelets. Patient did have episodes of vomiting, however did not notice any blood in her vomit. Patient notes that she has neuropathy. Patient notes that right now her left hip as well as left shoulder hurt the most.  Patient denies new numbness, tingling, weakness, fever, chills, or any other complaint at this time. Allergies include:  -- Hydroxyzine -- Anaphylaxis   --  Claims it gives her convulsions.   -- Pollen Extract -- Itching  -- Tree Extract   -- Clarithromycin -- Rash   --  Pt denies  -- Latex -- Rash  -- Sulfa Antibiotics -- Rash   --  "severe skin burn"      Immunizations:    Immunization History  Administered            Date(s) Administered   COVID-19 MODERNA VACC 0.25 ML IM BOOSTER                         01/25/2022     COVID-19 MODERNA VACC 0.5 ML IM                         03/26/2021 04/28/2021 01/25/2022     COVID-19 Pfizer Vac BIVALENT Ever-sucrose 12 Yr+ IM (BOOSTER ONLY)                         10/05/2022     COVID-19, unspecified                         04/27/2021 04/27/2021     INFLUENZA             11/10/2014  10/01/2018     Influenza Quadrivalent Preservative Free 3 years and older IM                         09/29/2015  10/13/2016  10/17/2017                           10/01/2019  10/02/2020  10/25/2021                           10/05/2022     Influenza Split       11/07/2012  10/16/2013  09/22/2014     Pneumococcal Conjugate Vaccine 20-valent (Pcv20), Polysace                         02/13/2023     Pneumococcal Polysaccharide PPV23                         10/13/2016     Tdap                  11/03/2015     Zoster Vaccine Recombinant                         09/21/2020  12/15/2020    Immunizations Reviewed. Prior to Admission Medications   Prescriptions Last Dose Informant Patient Reported? Taking?    Buprenorphine HCl (Belbuca) 300 MCG FILM  Self Yes No   Sig: Apply 300 mcg to cheek in the morning   Butalbital-APAP-Caffeine (FIORICET PO)   Yes No   Sig: Take by mouth as needed   Galcanezumab-gnlm (Emgality) 120 MG/ML SOAJ  Self Yes No   Sig: Inject 120 mg under the skin every 30 (thirty) days Last inj 1/19/23   HYDROcodone-acetaminophen (NORCO)  mg per tablet   Yes No   Sig: every 8 (eight) hours as needed   Melatonin 10 MG CAPS  Self Yes No   Sig: Take 1 tablet by mouth daily at bedtime as needed   True Comfort Safety Lancets MISC   Yes No   Sig: USE TO TEST BLOOD GLUCOSE 2-3 TIMES DAILY   albuterol (PROVENTIL HFA,VENTOLIN HFA) 90 mcg/act inhaler  Self Yes No   Sig: Inhale 2 puffs every 6 (six) hours as needed   ammonium lactate (LAC-HYDRIN) 12 % lotion   Yes No   Sig: as needed   atorvastatin (LIPITOR) 80 mg tablet   Yes No   Sig: Take 80 mg by mouth every evening   benzonatate (TESSALON PERLES) 100 mg capsule   No No   Sig: Take 1 capsule (100 mg total) by mouth 3 (three) times a day as needed for cough   carboxymethylcellulose 0.5 % SOLN  Self No No   Sig: Administer 1 drop to both eyes daily as needed for dry eyes   clonazePAM (KlonoPIN) 0.5 mg tablet  Self Yes No   Sig: Take 0.5 mg by mouth daily at bedtime   clonazePAM (KlonoPIN) 1 mg tablet  Self Yes No   Sig: Take 1 mg by mouth daily in the early morning   dicyclomine (BENTYL) 20 mg tablet  Self Yes No   Sig: Take 20 mg by mouth every 6 (six) hours   doxycycline hyclate (VIBRA-TABS) 100 mg tablet   Yes No   Sig: take 1 tablet by mouth twice a day for 7 days   Patient not taking: Reported on 2023   fenofibrate (TRICOR) 48 mg tablet  Self Yes No   Sig: Take 48 mg by mouth daily   fluticasone (FLONASE) 50 mcg/act nasal spray   Yes No   Si sprays into each nostril daily   fluticasone-umeclidinium-vilanterol (Trelegy Ellipta) 200-62.5-25 mcg/actuation AEPB inhaler   No No   Sig: Inhale 1 puff daily Rinse mouth after use.    furosemide (LASIX) 20 mg tablet  Self Yes No   Sig: Take 20 mg by mouth as needed Taking as needed   lamoTRIgine (LaMICtal) 100 mg tablet   Yes No   Sig: Take 100 mg by mouth 2 (two) times a day   levalbuterol (XOPENEX) 1.25 mg/3 mL nebulizer solution   Yes No   Sig: Inhale 1.25 mg every 4 (four) hours as needed   levothyroxine 137 mcg tablet  Self Yes No   Sig: Take 137 mcg by mouth daily   magnesium 30 MG tablet  Self Yes No   Sig: Take 500 mg by mouth 2 (two) times a day 23 right now not taking   naloxone (Narcan) 4 mg/0.1 mL nasal spray   Yes No   Patient not taking: Reported on 2023   omeprazole (PriLOSEC) 40 MG capsule  Self Yes No   Sig: Take 80 mg by mouth 2 (two) times a day   ondansetron (ZOFRAN) 4 mg tablet   Yes No   Sig: Take 4 mg by mouth every 8 (eight) hours as needed   ondansetron (ZOFRAN) 8 mg tablet   No No   Sig: Take 1 tablet (8 mg total) by mouth every 8 (eight) hours as needed for nausea or vomiting   pregabalin (LYRICA) 100 mg capsule  Self Yes No   Sig: Take 100 mg by mouth 3 (three) times a day Morning-lunch-dinner   pregabalin (LYRICA) 150 mg capsule  Self Yes No   Sig: take 1 capsule by mouth NIGHTLY risperiDONE (RisperDAL) 0.5 mg tablet   Yes No   Sig: Take 0.25 mg by mouth 2 (two) times a day   Patient not taking: Reported on 7/14/2023   tiZANidine (ZANAFLEX) 4 mg tablet   Yes No   Sig: take 2 tablets by mouth every 6 hours if needed for muscle spasm   topiramate (TOPAMAX) 100 mg tablet  Self Yes No   Sig: Take 100 mg by mouth every 6 (six) hours as needed Takes 2 tabs every 6 hours   topiramate (TOPAMAX) 200 MG tablet   Yes No      Facility-Administered Medications: None       Past Medical History:   Diagnosis Date   • Ambulates with cane    • Anxiety    • Arthritis    • Asthma    • Bipolar 1 disorder ([facility])    • Brain aneurysm    • Chronic pain disorder     spinal stenosis   • Concussion syndrome     neurological rx and balance rx   • COPD (chronic obstructive pulmonary disease) ([facility])    • Depression    • Diabetes mellitus ([address])     controlled w/ diet   • Disease of thyroid gland     nodules    • Family history of colon cancer     father   • Fibromyalgia, primary    • GERD (gastroesophageal reflux disease)    • History of colon polyps    • Hyperlipidemia    • Hypertension    • 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    • Irritable bowel syndrome    • Lumbar degenerative disc disease 02/16/2022   • Migraine    • Neuropathy     bilateral feet and hands   • Peripheral neuropathy    • Psychiatric disorder    • PTSD (post-traumatic stress disorder)    • Shortness of breath    • Sleep apnea     had 3 studies & last one negative   • Stroke ([address])     2012 no deficeits   • TIA (transient ischemic attack)    • Wears dentures    • Wears glasses        Past Surgical History:   Procedure Laterality Date   • ABDOMINAL SURGERY      laproscopic/ endometriosis   • BRAIN SURGERY      aneurysm/ coiling procedure   • CARPAL TUNNEL RELEASE     • CHOLECYSTECTOMY     • COLONOSCOPY     • DENTAL SURGERY     • EPIDURAL BLOCK INJECTION Right 03/03/2022    Procedure: C7-T1 interlaminar epidural steroid injection;  Surgeon: Suki Jose MD;  Location: [facility];  Service: Pain Management    • EPIDURAL BLOCK INJECTION N/A 04/21/2022    Procedure: C6-C7 BLOCK / INJECTION EPIDURAL STEROID CERVICAL;  Surgeon: Suki Jose MD;  Location: [facility];  Service: Pain Management    • EPIDURAL BLOCK INJECTION Left 06/21/2022    Procedure: BLOCK / INJECTION EPIDURAL STEROID LUMBAR  left L3-4 TFESI;  Surgeon: Suki Jose MD;  Location: [facility];  Service: Pain Management    • EYE SURGERY      cataract   • FL GUIDED NEEDLE PLAC BX/ASP/INJ  03/03/2022   • FL GUIDED NEEDLE PLAC BX/ASP/INJ  11/17/2022   • HYSTERECTOMY     • UT ESOPHAGOGASTRODUODENOSCOPY TRANSORAL DIAGNOSTIC N/A 02/08/2018    Procedure: EGD AND COLONOSCOPY;  Surgeon: Andrew Floyd MD;  Location:  GI LAB; Service: Gastroenterology   • UT PRQ IMPLTJ NSTIM ELECTRODE ARRAY EPIDURAL N/A 11/17/2022    Procedure: Justin Graven SCS TRIAL;  Surgeon: Suki Jose MD;  Location: [facility];  Service: Pain Management    • UT PRQ IMPLTJ NSTIM ELECTRODE ARRAY EPIDURAL N/A 2/1/2023    Procedure: Insertion percutaneous thoracic spinal cord stimulator with left buttock implantable pulse generator;  Surgeon: Claudia Vaughn MD;  Location:  MAIN OR;  Service: Neurosurgery   • THYROID SURGERY     • TONSILLECTOMY         Family History   Problem Relation Age of Onset   • Brain cancer Mother    • Alzheimer's disease Mother    • Alzheimer's disease Father    • Parkinsonism Father      I have reviewed and agree with the history as documented.     E-Cigarette/Vaping   • E-Cigarette Use Never User      E-Cigarette/Vaping Substances   • Nicotine No    • THC No    • CBD No    • Flavoring No    • Other No    • Unknown No      Social History     Tobacco Use   • Smoking status: Every Day     Packs/day: 2.00     Types: Cigarettes   • Smokeless tobacco: Never   Vaping Use   • Vaping Use: Never used   Substance Use Topics   • Alcohol use: Not Currently   • Drug use: Never Comment: prescribed Belbuca        Review of Systems   Constitutional: Negative for chills and fever. HENT: Negative for ear pain and sore throat. Eyes: Negative for pain and visual disturbance. Respiratory: Negative for cough and shortness of breath. Cardiovascular: Positive for chest pain. Negative for palpitations. Gastrointestinal: Positive for nausea and vomiting. Negative for abdominal pain. Genitourinary: Negative for dysuria and hematuria. Musculoskeletal: Negative for arthralgias and back pain. Left hip, left shoulder   Skin: Negative for color change and rash. Neurological: Negative for seizures, syncope, weakness and numbness. All other systems reviewed and are negative. Physical Exam  ED Triage Vitals [08/09/23 2039]   Temperature Pulse Respirations Blood Pressure SpO2   98.6 °F (37 °C) 78 19 114/62 98 %      Temp Source Heart Rate Source Patient Position - Orthostatic VS BP Location FiO2 (%)   Temporal Monitor Lying Left arm --      Pain Score       10 - Worst Possible Pain             Orthostatic Vital Signs  Vitals:    08/09/23 2039 08/09/23 2100 08/09/23 2130   BP: 114/62 105/55 144/76   Pulse: 78 78 70   Patient Position - Orthostatic VS: Lying  Lying       Physical Exam  Vitals and nursing note reviewed. Constitutional:       General: She is not in acute distress. Appearance: Normal appearance. HENT:      Head: Normocephalic and atraumatic. Right Ear: External ear normal.      Left Ear: External ear normal.      Nose: Nose normal.      Mouth/Throat:      Mouth: Mucous membranes are moist.   Eyes:      Conjunctiva/sclera: Conjunctivae normal.   Cardiovascular:      Rate and Rhythm: Normal rate and regular rhythm. Pulmonary:      Effort: Pulmonary effort is normal. No respiratory distress. Breath sounds: Normal breath sounds. Chest:      Chest wall: Tenderness (Mild, anterior chest wall, no crepitus, no deformity) present.    Abdominal: General: Abdomen is flat. Bowel sounds are normal.      Tenderness: There is no abdominal tenderness. There is no guarding or rebound. Musculoskeletal:         General: Tenderness (Left humeral head, left hip, mild lumbar paraspinal) present. No swelling, deformity or signs of injury. Normal range of motion. Cervical back: Normal range of motion. Skin:     General: Skin is warm and dry. Capillary Refill: Capillary refill takes less than 2 seconds. Findings: No bruising or erythema. Neurological:      Mental Status: She is alert. Mental status is at baseline.    Psychiatric:         Mood and Affect: Mood normal.         ED Medications  Medications   HYDROmorphone (DILAUDID) injection 0.5 mg (has no administration in time range)   HYDROmorphone (DILAUDID) injection 1 mg (1 mg Intravenous Given 8/9/23 2107)       Diagnostic Studies  Results Reviewed     Procedure Component Value Units Date/Time    Protime-INR [[id number]]  (Normal) Collected: 08/09/23 2105    Lab Status: Final result Specimen: Blood from Arm, Right Updated: 08/09/23 2132     Protime 13.4 seconds      INR 1.00    APTT [[id number]]  (Abnormal) Collected: 08/09/23 2105    Lab Status: Final result Specimen: Blood from Arm, Right Updated: 08/09/23 2132     PTT 22 seconds     Comprehensive metabolic panel [[id number]]  (Abnormal) Collected: 08/09/23 2105    Lab Status: Final result Specimen: Blood from Arm, Right Updated: 08/09/23 2129     Sodium 142 mmol/L      Potassium 3.6 mmol/L      Chloride 112 mmol/L      CO2 21 mmol/L      ANION GAP 9 mmol/L      BUN 16 mg/dL      Creatinine 1.02 mg/dL      Glucose 92 mg/dL      Calcium 9.2 mg/dL      AST 18 U/L      ALT 14 U/L      Alkaline Phosphatase 51 U/L      Total Protein 6.5 g/dL      Albumin 4.0 g/dL      Total Bilirubin 0.26 mg/dL      eGFR 59 ml/min/1.73sq m     Narrative:      [place] guidelines for Chronic Kidney Disease (CKD):   •  Stage 1 with normal or high GFR (GFR > 90 mL/min/1.73 square meters)  •  Stage 2 Mild CKD (GFR = 60-89 mL/min/1.73 square meters)  •  Stage 3A Moderate CKD (GFR = 45-59 mL/min/1.73 square meters)  •  Stage 3B Moderate CKD (GFR = 30-44 mL/min/1.73 square meters)  •  Stage 4 Severe CKD (GFR = 15-29 mL/min/1.73 square meters)  •  Stage 5 End Stage CKD (GFR <15 mL/min/1.73 square meters)  Note: GFR calculation is accurate only with a steady state creatinine    CBC and differential [[id number]] Collected: 08/09/23 2105    Lab Status: Final result Specimen: Blood from Arm, Right Updated: 08/09/23 2113     WBC 9.55 Thousand/uL      RBC 4.13 Million/uL      Hemoglobin 13.1 g/dL      Hematocrit 40.3 %      MCV 98 fL      MCH 31.7 pg      MCHC 32.5 g/dL      RDW 12.8 %      MPV 10.6 fL      Platelets 778 Thousands/uL      nRBC 0 /100 WBCs      Neutrophils Relative 59 %      Immat GRANS % 0 %      Lymphocytes Relative 33 %      Monocytes Relative 7 %      Eosinophils Relative 1 %      Basophils Relative 0 %      Neutrophils Absolute 5.61 Thousands/µL      Immature Grans Absolute 0.04 Thousand/uL      Lymphocytes Absolute 3.15 Thousands/µL      Monocytes Absolute 0.63 Thousand/µL      Eosinophils Absolute 0.08 Thousand/µL      Basophils Absolute 0.04 Thousands/µL                  XR knee 4+ vw left injury   ED Interpretation by Niraj Singh MD (08/09 2146)   No acute fracture or dislocation. Interpreted independently by myself. XR hip/pelv 2-3 vws left if performed   ED Interpretation by Niraj Singh MD (08/09 2137)   No acute fracture or dislocation. Spinal stimulator device intact. interpreted independently by myself. XR shoulder 2+ views LEFT   ED Interpretation by Niraj Singh MD (08/09 2139)   No acute fracture or dislocation. Interpreted independently by myself. XR chest 1 view portable   ED Interpretation by Niraj Singh MD (08/09 2138)   No acute cardio-pulmonary disease.   Independently interpreted by myself. CT head without contrast    (Results Pending)   CT abdomen pelvis with contrast    (Results Pending)   CT recon only lumbar spine    (Results Pending)   CT spine cervical without contrast    (Results Pending)         Procedures  ECG 12 Lead Documentation Only    Date/Time: 8/9/2023 9:13 PM    Performed by: Roc Hess MD  Authorized by: Roc Hess MD    Indications / Diagnosis:  Chest pain  ECG reviewed by me, the ED Provider: yes    Patient location:  ED  Previous ECG:     Previous ECG:  Compared to current    Similarity:  No change  Interpretation:     Interpretation: normal    Rate:     ECG rate:  76    ECG rate assessment: normal    Rhythm:     Rhythm: sinus rhythm    Ectopy:     Ectopy: none    QRS:     QRS axis:  Normal    QRS intervals:  Normal  Conduction:     Conduction: normal    ST segments:     ST segments:  Normal  T waves:     T waves: normal    Comments:      Normal sinus rhythm at 76 bpm, no PACs, no PVCs, no acute ischemic changes          ED Course  ED Course as of 08/09/23 2217   Wed Aug 09, 2023   2115 CBC and differential  Reassuring, within normal limits     2134 Patient reevaluated, after stating physical exam was not performed, repalpated on patient's back, still with continued tenderness on paralumbar area. Upon leaving room as x-ray tech was to perform knee x-ray at patient's request, patient asked again for me to check on her back stating that I did not. Discussed with patient that I did press on her back at which time she said that she forgot. Patient noted at this time to be moving left lower extremity with full range of motion, no complaints of pain with movement. 2146 Chloride(!): 112  Mild elevation, unlikely related to patient's current symptomology                             SBIRT 20yo+    Flowsheet Row Most Recent Value   Initial Alcohol Screen: US AUDIT-C     1. How often do you have a drink containing alcohol? 0 Filed at: 08/09/2023 2054   2. How many drinks containing alcohol do you have on a typical day you are drinking? 0 Filed at: 08/09/2023 2054   3b. FEMALE Any Age, or MALE 65+: How often do you have 4 or more drinks on one occassion? 0 Filed at: 08/09/2023 2054   Audit-C Score 0 Filed at: 08/09/2023 2054   ABIOLA: How many times in the past year have you. .. Used an illegal drug or used a prescription medication for non-medical reasons? Never Filed at: 08/09/2023 2054                Medical Decision Making  Patient presents with:  Fall: States fell 2 times today. No thinners, no LOC. States hit head on second fall. No LOC with fall. Has nerve stimulator in back and thinks she broke it      Patient seen and examined noted to have mild anterior chest wall tenderness to palpation, mild tenderness of left humeral head, left hip, mild lumbar paraspinal,  clear lungs to auscultation bilaterally, abdomen soft non-tender without guarding or rebound, regular rate and rhythm.       Temp:  (98.6 °F (37 °C)) 98.6 °F (37 °C)  HR:  (70-78) 70  Resp:  (18-19) 18  BP: (105-144)/(55-76) 144/76      Due to patient's history and presentation the following laboratory evidence was collected:  Recent Results (from the past 12 hour(s))  -CBC and differential:   Collection Time: 08/09/23  9:05 PM       Result                      Value             Ref Range           WBC                         9.55              4.31 - 10.16*       RBC                         4.13              3.81 - 5.12 *       Hemoglobin                  13.1              11.5 - 15.4 *       Hematocrit                  40.3              34.8 - 46.1 %       MCV                         98                82 - 98 fL          MCH                         31.7              26.8 - 34.3 *       MCHC                        32.5              31.4 - 37.4 *       RDW                         12.8              11.6 - 15.1 %       MPV                         10.6              8.9 - 12.7 fL       Platelets 187               149 - 390 Th*       nRBC                        0                 /100 WBCs           Neutrophils Relative        59                43 - 75 %           Immat GRANS %               0                 0 - 2 %             Lymphocytes Relative        33                14 - 44 %           Monocytes Relative          7                 4 - 12 %            Eosinophils Relative        1                 0 - 6 %             Basophils Relative          0                 0 - 1 %             Neutrophils Absolute        5.61              1.85 - 7.62 *       Immature Grans Absolute     0.04              0.00 - 0.20 *       Lymphocytes Absolute        3.15              0.60 - 4.47 *       Monocytes Absolute          0.63              0.17 - 1.22 *       Eosinophils Absolute        0.08              0.00 - 0.61 *       Basophils Absolute          0.04              0.00 - 0.10 *  -Comprehensive metabolic panel:   Collection Time: 08/09/23  9:05 PM       Result                      Value             Ref Range           Sodium                      142               135 - 147 mm*       Potassium                   3.6               3.5 - 5.3 mm*       Chloride                    112 (H)           96 - 108 mmo*       CO2                         21                21 - 32 mmol*       ANION GAP                   9                 mmol/L              BUN                         16                5 - 25 mg/dL        Creatinine                  1.02              0.60 - 1.30 *       Glucose                     92                65 - 140 mg/*       Calcium                     9.2               8.4 - 10.2 m*       AST                         18                13 - 39 U/L         ALT                         14                7 - 52 U/L          Alkaline Phosphatase        51                34 - 104 U/L        Total Protein               6.5               6.4 - 8.4 g/*       Albumin                     4.0               3.5 - 5.0 g/*       Total Bilirubin             0.26              0.20 - 1.00 *       eGFR                        59                ml/min/1.73s*  -Protime-INR:   Collection Time: 08/09/23  9:05 PM       Result                      Value             Ref Range           Protime                     13.4              11.6 - 14.5 *       INR                         1.00              0.84 - 1.19    -APTT:   Collection Time: 08/09/23  9:05 PM       Result                      Value             Ref Range           PTT                         22 (L)            23 - 40 seco*    Due to patient's history and presentation the following imaging was collected:  XR knee 4+ vw left injury   ED Interpretation    No acute fracture or dislocation. Interpreted independently by myself. XR hip/pelv 2-3 vws left if performed   ED Interpretation    No acute fracture or dislocation. Spinal stimulator device intact. interpreted independently by myself. XR shoulder 2+ views LEFT   ED Interpretation    No acute fracture or dislocation. Interpreted independently by myself. XR chest 1 view portable   ED Interpretation    No acute cardio-pulmonary disease. Independently interpreted by myself. CT head without contrast    (Results Pending)  CT abdomen pelvis with contrast    (Results Pending)  CT recon only lumbar spine    (Results Pending)  CT spine cervical without contrast    (Results Pending)       EKG: interpreted by myself as above. Patient was administered:      Medication Administration - last 24 hours from 08/08/2023 2207 to   08/09/2023 2207       Date/Time Order Dose Route Action Action by     08/09/2023 2107 EDT HYDROmorphone (DILAUDID) injection 1 mg 1 mg   Intravenous Given Ernesto Le, RN        Patient signed out to Dr. Montsrerat Pope and/or Complexity of Data Reviewed  Labs: ordered. Decision-making details documented in ED Course.   Radiology: ordered and independent interpretation performed. Risk  Prescription drug management. Disposition  Final diagnoses:   Fall, initial encounter   Closed head injury, initial encounter     Time reflects when diagnosis was documented in both MDM as applicable and the Disposition within this note     Time User Action Codes Description Comment    8/9/2023 10:07 PM [address] [place], [address] [P55. LUZ] Fall, initial encounter     8/9/2023 10:07 PM wang Yousif Add [S09.90XA] Closed head injury, initial encounter       ED Disposition     None      Follow-up Information     Follow up With Specialties Details Why [address] [place] [id number] [phone]            Patient's Medications   Discharge Prescriptions    No medications on file     No discharge procedures on file. PDMP Review       Value Time User    PDMP Reviewed  Yes 7/15/2023  3:09 PM Estefanía Good PA-C           ED Provider  Attending physically available and evaluated Riccardo Egan. I managed the patient along with the ED Attending.     Electronically Signed by         Dustin Cuellar MD  08/09/23 [id number]       Dustin Cuellar MD  08/09/23 [id number]

## 2023-08-13 ENCOUNTER — HOSPITAL ENCOUNTER (OUTPATIENT)
Dept: RADIOLOGY | Facility: HOSPITAL | Age: 63
Discharge: HOME | End: 2023-08-13
Attending: ANESTHESIOLOGY

## 2023-08-13 DIAGNOSIS — Z96.82 SACRAL NERVE STIMULATOR PRESENT: ICD-10-CM

## 2023-08-14 ENCOUNTER — NURSE TRIAGE (OUTPATIENT)
Age: 63
End: 2023-08-14

## 2023-08-14 NOTE — TELEPHONE ENCOUNTER
Patient calling in, reports was the the hospital after a fall and had CT done- "Intrahepatic and extrahepatic biliary ductal dilatation. The common duct measures up to 2 cm, larger than on the prior exam from April 27, 2023 when it measured 1.8 cm. Recommend MRCP for further evaluation."     Patient was instructed to call office for appt to discuss CT findings. OV scheduled with PA. She is also experiencing loose stools and lower back pain where stimulator is placed.  She will follow up in office as scheduled

## 2023-08-15 ENCOUNTER — EVALUATION (OUTPATIENT)
Dept: PHYSICAL THERAPY | Age: 63
End: 2023-08-15
Payer: COMMERCIAL

## 2023-08-15 DIAGNOSIS — M51.36 LUMBAR DEGENERATIVE DISC DISEASE: ICD-10-CM

## 2023-08-15 DIAGNOSIS — M54.16 LUMBAR RADICULOPATHY: ICD-10-CM

## 2023-08-15 DIAGNOSIS — R29.90 STROKE-LIKE SYMPTOMS: Primary | ICD-10-CM

## 2023-08-15 DIAGNOSIS — M54.50 CHRONIC MIDLINE LOW BACK PAIN WITHOUT SCIATICA: ICD-10-CM

## 2023-08-15 DIAGNOSIS — G89.4 CHRONIC PAIN SYNDROME: ICD-10-CM

## 2023-08-15 DIAGNOSIS — G89.29 CHRONIC MIDLINE LOW BACK PAIN WITHOUT SCIATICA: ICD-10-CM

## 2023-08-15 DIAGNOSIS — M79.7 FIBROMYALGIA, PRIMARY: ICD-10-CM

## 2023-08-15 PROCEDURE — 97162 PT EVAL MOD COMPLEX 30 MIN: CPT | Performed by: PHYSICAL THERAPIST

## 2023-08-15 PROCEDURE — 97110 THERAPEUTIC EXERCISES: CPT | Performed by: PHYSICAL THERAPIST

## 2023-08-15 NOTE — PROGRESS NOTES
PT Evaluation     Today's date: 2023  Patient name: Bryant Palumbo  : 1960  MRN: [id number]  Referring provider: Alejandrina Womack MD  Dx:   Encounter Diagnosis     ICD-10-CM    1. Stroke-like symptoms  R29.90       2. Chronic midline low back pain without sciatica  M54.50     G89.29       3. Lumbar radiculopathy  M54.16       4. Fibromyalgia, primary  M79.7       5. Lumbar degenerative disc disease  M51.36       6. Chronic pain syndrome  G89.4           Start Time: 1009  Stop Time: 1102  Total time in clinic (min): 53 minutes    Assessment  Assessment details: Bryant Palumbo is a 65 y.o. female who presents with pain, decreased strength, decreased ROM and ambulatory dysfunction. Due to these impairments, Patient has difficulty performing a/iadls and recreational activities. Patient's clinical presentation is consistent with their referring diagnosis of chronic LBP, fibromyalgia. She has hx of stroke and chronic pain, ambulates with SPC. Patient did not bring water attire today and will begin aquatic session next visit. She has 3 consecutive doctor appts this week and wants to begin next week. Patient spoke in length about not having any help from any home health aides, no antidepressants and no physiatrist appts all since May due to insurance changes. She did drive herself to PT today. Patient would benefit from skilled physical therapy to address their aforementioned impairments, improve their level of function and to improve their overall quality of life. Impairments: abnormal gait, abnormal or restricted ROM, activity intolerance, impaired balance, impaired physical strength, lacks appropriate home exercise program, pain with function, safety issue, poor posture  and poor body mechanics    Symptom irritability: moderateUnderstanding of Dx/Px/POC: fair   Prognosis: fair    Goals  ST-4 WEEKS  1.   Decrease LB pain < 8/10 on VAS at its worst.  2.  Increase ROM by > 5 deg in all deficients planes. 3.  Increase core strength by 1/2 MMT grade. 4. Increase tolerance to activity and pool therapy > 45 min. 5 ambulate with least AD on level surfaces safely for home distances. LT-6 WEEKS  1. Patient to be independent with a/iadls. 2. Increase functional activities for leisure and home activities to previous LOF. 3. Independent with HEP and/or fitness program.    Plan  Patient would benefit from: skilled physical therapy  Planned modality interventions: cryotherapy, thermotherapy: hydrocollator packs and unattended electrical stimulation  Planned therapy interventions: activity modification, behavior modification, body mechanics training, aquatic therapy, flexibility, functional ROM exercises, home exercise program, IADL retraining, joint mobilization, manual therapy, neuromuscular re-education, patient education, postural training, strengthening, stretching, therapeutic activities, therapeutic exercise, abdominal trunk stabilization and balance  Frequency: 2-3x week. Duration in weeks: 12  Plan of Care beginning date: 8/15/2023  Plan of Care expiration date: 11/15/2023  Treatment plan discussed with: patient        Subjective Evaluation    History of Present Illness  Mechanism of injury: Patient had stroke , left sided weakness. She also has pain for years. She had spinal stimulator inserted in 2023, which helped. She was referred for aquatic therapy due to LBP and LLE weakness. She had a fall at home last Tuesday and aggravated her symptoms. She c/o LLE numbness and swelling in distal L>R. She is traveling from [facility] [address]. She did not bring water attire to begin today.            Recurrent probem    Patient Goals  Patient goals for therapy: decreased pain, improved balance, increased motion, increased strength and independence with ADLs/IADLs  Patient goal: walk better  Pain  Current pain ratin  At best pain ratin  At worst pain rating: 10  Location: LB, LLE  Quality: sharp and radiating  Relieving factors: medications, change in position and rest  Aggravating factors: walking, standing, sitting and stair climbing  Progression: worsening (since fall last week)    Social Support  Steps to enter house: yes  Stairs in house: no   Lives in: apartment  Lives with: alone    Employment status: not working    Diagnostic Tests    FCE comments: Going for MRI tomorrow of L/s      Objective     Static Posture     Comments  BP: 110/74  HR: 71    Postural Observations  Seated posture: fair  Standing posture: fair        Palpation   Left   Tenderness of the erector spinae and lumbar paraspinals. Right   Tenderness of the erector spinae and lumbar paraspinals. Tenderness     Left Hip   Tenderness in the PSIS. Right Hip   Tenderness in the PSIS. Neurological Testing     Sensation     Lumbar   Left   Intact: light touch    Right   Intact: light touch    Reflexes   Left   Patellar (L4): normal (2+)    Right   Patellar (L4): normal (2+)    Active Range of Motion     Lumbar   Flexion: Active lumbar flexion: tips to knees. with pain  Extension:  Restriction level: minimal  Left lateral flexion:  Restriction level: minimal  Right lateral flexion:  Restriction level: minimal    Strength/Myotome Testing     Left Hip   Planes of Motion   Flexion: 4  Extension: 4  Abduction: 4  Adduction: 4+    Right Hip   Planes of Motion   Flexion: 4  Extension: 4  Abduction: 4+  Adduction: 5    Left Knee   Flexion: 4+  Extension: 5    Right Knee   Flexion: 4+  Extension: 4+    Left Ankle/Foot   Dorsiflexion: 4+  Plantar flexion: 4+    Right Ankle/Foot   Dorsiflexion: 4+  Plantar flexion: 4+    Tests     Lumbar   Negative sacroiliac distraction. Left   Positive passive SLR and slump test.     Right   Negative passive SLR and slump test.     Left Hip   Negative HARSH and FADIR. Right Hip   Negative HARSH and FADIR.      Ambulation   Weight-Bearing Status   Assistive device used: single point cane    Ambulation: Level Surfaces   Ambulation with assistive device: independent    Observational Gait   Gait: asymmetric   Decreased walking speed.      General Comments:    Lower quarter screen   Hips: unremarkable  Knees: unremarkable  Foot/ankle: unremarkable    Hip Comments   ROM WFL both hips    Knee Comments  ROM WFL both knees  Left medial knee eccymosis             Precautions: spinal stimulator LLB 2/2023, c7-c8 spinal stenosis, DDD L/s, stroke 2012, Depression, anxiety, HTN, DM, TIA 2019,2020, migraines, asthma,  Daily Treatment Diary     Manuals 8/15                                                                Neuro Re-Ed                                                                                                        Ther Ex             HEP 10                                                                                                       Ther Activity                                       Gait Training                                       Modalities

## 2023-08-16 ENCOUNTER — HOSPITAL ENCOUNTER (OUTPATIENT)
Dept: RADIOLOGY | Facility: HOSPITAL | Age: 63
Discharge: HOME | End: 2023-08-16

## 2023-08-16 ENCOUNTER — TELEPHONE (OUTPATIENT)
Dept: MRI IMAGING | Facility: HOSPITAL | Age: 63
End: 2023-08-16

## 2023-08-16 ENCOUNTER — HOSPITAL ENCOUNTER (OUTPATIENT)
Dept: MRI IMAGING | Facility: HOSPITAL | Age: 63
Discharge: HOME | End: 2023-08-16
Payer: COMMERCIAL

## 2023-08-16 DIAGNOSIS — G89.4 CHRONIC PAIN SYNDROME: ICD-10-CM

## 2023-08-16 DIAGNOSIS — M51.36 LUMBAR DEGENERATIVE DISC DISEASE: ICD-10-CM

## 2023-08-16 DIAGNOSIS — Z01.89 ENCOUNTER FOR IMAGING TO SCREEN FOR METAL PRIOR TO MAGNETIC RESONANCE IMAGING (MRI): ICD-10-CM

## 2023-08-16 DIAGNOSIS — M54.16 LUMBAR RADICULOPATHY: ICD-10-CM

## 2023-08-16 PROCEDURE — 72148 MRI LUMBAR SPINE W/O DYE: CPT

## 2023-08-16 PROCEDURE — G1004 CDSM NDSC: HCPCS

## 2023-08-16 NOTE — TELEPHONE ENCOUNTER
Nevro Stim    Stim impedence check and placed in MRI by patient with phone support from [address] [place] [address]. Scans done at or below B+1 [facility] of 2 tau for total of 23:31 mins.     Alfred Anthony ([address]) in person-MW 6/16/23

## 2023-08-17 ENCOUNTER — OFFICE VISIT (OUTPATIENT)
Dept: GASTROENTEROLOGY | Facility: CLINIC | Age: 63
End: 2023-08-17
Payer: COMMERCIAL

## 2023-08-17 VITALS
TEMPERATURE: 99.4 F | HEIGHT: 64 IN | OXYGEN SATURATION: 97 % | BODY MASS INDEX: 28.58 KG/M2 | WEIGHT: 167.4 LBS | SYSTOLIC BLOOD PRESSURE: 123 MMHG | HEART RATE: 80 BPM | DIASTOLIC BLOOD PRESSURE: 79 MMHG

## 2023-08-17 DIAGNOSIS — K83.8 DILATION OF BILIARY TRACT: Primary | ICD-10-CM

## 2023-08-17 DIAGNOSIS — R12 HEARTBURN: ICD-10-CM

## 2023-08-17 DIAGNOSIS — K31.84 GASTROPARESIS: ICD-10-CM

## 2023-08-17 DIAGNOSIS — Z90.49 HISTORY OF CHOLECYSTECTOMY: ICD-10-CM

## 2023-08-17 DIAGNOSIS — R19.4 CHANGE IN BOWEL HABITS: ICD-10-CM

## 2023-08-17 DIAGNOSIS — Z86.010 HISTORY OF COLON POLYPS: ICD-10-CM

## 2023-08-17 PROCEDURE — 99214 OFFICE O/P EST MOD 30 MIN: CPT | Performed by: PHYSICIAN ASSISTANT

## 2023-08-17 RX ORDER — SUMATRIPTAN 100 MG/1
TABLET, FILM COATED ORAL
COMMUNITY

## 2023-08-17 RX ORDER — BLOOD SUGAR DIAGNOSTIC
STRIP MISCELLANEOUS
COMMUNITY
Start: 2023-07-15

## 2023-08-17 RX ORDER — FLUTICASONE PROPIONATE AND SALMETEROL 500; 50 UG/1; UG/1
POWDER RESPIRATORY (INHALATION)
COMMUNITY
Start: 2023-08-16

## 2023-08-17 RX ORDER — ZOSTER VACCINE RECOMBINANT, ADJUVANTED 50 MCG/0.5
KIT INTRAMUSCULAR
COMMUNITY

## 2023-08-17 RX ORDER — RIZATRIPTAN BENZOATE 10 MG/1
TABLET, ORALLY DISINTEGRATING ORAL
COMMUNITY

## 2023-08-17 RX ORDER — LATANOPROST 50 UG/ML
SOLUTION/ DROPS OPHTHALMIC
COMMUNITY

## 2023-08-17 RX ORDER — CEPHALEXIN 500 MG/1
1 CAPSULE ORAL EVERY 12 HOURS
COMMUNITY

## 2023-08-17 RX ORDER — IBUPROFEN 800 MG/1
TABLET ORAL
COMMUNITY

## 2023-08-17 RX ORDER — FLUCONAZOLE 150 MG/1
TABLET ORAL
COMMUNITY

## 2023-08-17 RX ORDER — OMEPRAZOLE 40 MG/1
40 CAPSULE, DELAYED RELEASE ORAL 2 TIMES DAILY
Qty: 60 CAPSULE | Refills: 5 | Status: SHIPPED | OUTPATIENT
Start: 2023-08-17

## 2023-08-17 RX ORDER — FAMOTIDINE 40 MG/1
40 TABLET, FILM COATED ORAL
Qty: 30 TABLET | Refills: 6 | Status: SHIPPED | OUTPATIENT
Start: 2023-08-17

## 2023-08-17 RX ORDER — METRONIDAZOLE 500 MG/1
1 TABLET ORAL 2 TIMES DAILY
COMMUNITY

## 2023-08-17 RX ORDER — CIPROFLOXACIN 250 MG/1
TABLET, FILM COATED ORAL
COMMUNITY

## 2023-08-17 RX ORDER — POLYETHYLENE GLYCOL 3350
POWDER (GRAM) MISCELLANEOUS
COMMUNITY

## 2023-08-17 NOTE — PROGRESS NOTES
[facility] - Outpatient Follow-up Note  Mani Woodall 65 y.o. female MRN: [id number]  Encounter: [id number]    ASSESSMENT AND PLAN:      1. Dilation of biliary tract  2. History of cholecystectomy    Patient has history of cholecystectomy. She has had imaging in the past which commented on biliary dilatation, though most recent CT from 08/2023 commented on CBD dilatation up to 2 cm, as well as intra and extrahepatic dilatation. Her LFTs are normal which argues against a complete obstruction. This may be secondary to postcholecystectomy state, we will obtain MRI/MRCP in a timely manner to evaluate in greater detail. She was counseled on any new abdominal pain, yellowing of the eyes or skin, fever,  intractable nausea or emesis, she is to seek immediate medical attention.    - MRI abdomen w wo contrast and mrcp; Future    3. Heartburn    Patient does have a history of heartburn/indigestion symptoms. She is on PPI twice daily with breakthrough symptoms. Her most recent EGD in 06/2023 was unremarkable. Recommend trial of addition of nightly H2RA for now. Continue with strict diet and lifestyle modifications for GERD. Continue with small frequent meals. Consider additional evaluation in the future to include pH testing as it is unclear at this time if she is having true reflux events versus functional dyspepsia. - famotidine (PEPCID) 40 MG tablet; Take 1 tablet (40 mg total) by mouth daily at bedtime  Dispense: 30 tablet; Refill: 6    4. Gastroparesis    We once again reviewed that this diagnosis in detail. Suspect in part may be medication induced. She is on Belbuca, Norco, and an antispasmodic. Reviewed gastroparesis diet and gave patient handout today. Offered referral to nutrition services, patient will think about it. Okay for as needed usage of antiemetics. Patient has Zofran on hand. 5. Change in bowel habits  6.  History of colon polyps    Patient notes alternating bowel habits which range from constipation to diarrhea. She had a colonoscopy in 2023 which did not demonstrate any evidence of IBD. She had stool testing completed in 04/2023 which was unremarkable. Would recommend keeping a food and stool diary to better discern details surrounding the symptoms. Recommend trial low FODMAP diet. Can look into Gas-X, IBgard, Beano. She does have antispasmodic on hand, though caution with use given this may worsen her constipation. Pertaining to her history of adenomatous colon polyps, a recall for 3 years has been made. Patient will be due in 03/2026. We will follow-up after MRI/MRCP is completed. ______________________________________________________________________    SUBJECTIVE: Patient is a 65 y.o. female who presents today for follow-up regarding abnormalities on CT scan. Past medical history significant for GERD, gastroparesis, DM2, HTN, COPD, fibromyalgia, chronic pain syndrome, bipolar disorder, tobacco abuse. Patient is new to me. She was last evaluated in our office in 11/2022. At that time, she was being evaluated for nausea and early satiety, and was noted to have gastroparesis on a gastric emptying study. She was taking PPI daily but having breakthrough symptoms. She underwent upper endoscopy in 06/2023 which was macroscopically normal.      She was most recently evaluated in the emergency department for after sustaining a fall. The CT completed of her abdomen and pelvis incidentally commented on intra and extrahepatic biliary ductal dilatation her LFTs at that time were normal.  She is postcholecystectomy. 08/17/23: At today's office visit, she is continuing to complain of chronic pain. From a GI standpoint, she is having intermittent regurgitation and dysphagia to solids. She is uncertain as to the dosing of medication she is taking. She notes noncompliance with antacids later in the day.     She historically has tended toward constipation though more recently her stool has been loose. No BRBPR or melena. NSAIDs: ibuprofen prn   Etoh: none   Tobacco: 2 PPD    11/2021: GES: T 1/2: Linear T-1/2 = 281 minutes; Gastric emptying is delayed from the 2 hour time point onward    08/2023: CT A/P: Intrahepatic and extrahepatic biliary ductal dilatation. The common duct measures up to 2 cm, larger than on the prior exam from April 27, 2023 when it measured 1.8 cm    Endoscopic history:   EGD: 06/2023: esophagus, stomach and duodenum appeared normal; No gastric polyps noted so random biopsies were taken given previous history of random gastric biopsies that showed fundic gland polyp with dysplasia  A. Stomach, random, cold forceps: Gastric antral and oxyntic mucosa with no significant histopathologic change. Negative for intestinal metaplasia and dysplasia. Negative for Helicobacter pylori-type organisms on H&E stain  EGD: 03/2023: Abnormal mucosa with plaque in the esophagus; Mild erythematous mucosa in the stomach  A. DUODENUM, RANDOM BIOPSIES: Fragments of duodenal mucosa within normal limits. No villous blunting, increased intraepithelial lymphocytes (IELs) or crypt hyperplasia to suggest celiac disease. Negative for infectious organisms, granulomata, dysplasia or malignancy. B. STOMACH, RANDOM BIOPSIES: He is on vacation this week fragments of corpus and antral-type mucosa with reactive / regenerative changes. Negative for gastritis and no H. pylori organisms identified on immunohistochemistry. Negative for granulomata, intestinal metaplasia (AB/PAS), dysplasia or malignancy. A p53 stain shows wildtype staining pattern, supporting the diagnosis. C. ESOPHAGUS, POLYP, BIOPSIES: Acute fungal esophagitis; Negative for dysplasia or malignancy; CMV and HSV stains are negative. D. STOMACH, POLYP, POLYPECTOMY: Fundic gland polyp with focal low-grade dysplasia; Intestinal (goblet cell) metaplasia is present (AB/PAS).    Negative for high-grade dysplasia or malignancy. Additional levels examined  Colon: 03/2023: Two sessile polyps measuring 5-9 mm in the transverse colon; Sessile polyp measuring 5-9 mm in the sigmoid colon; Three sessile polyps measuring smaller than 5 mm in the rectum  E. TRANSVERSE COLON, POLYPS (2): Fragments of tubular adenoma(s). Negative for high-grade dysplasia. F. SIGMOID COLON, POLYP: Fragments of tubular adenoma. Negative for high-grade dysplasia.      G. RECTUM, POLYPS (3): Serrated polyps    Review of Systems   Otherwise Per HPI    Historical Information   Past Medical History:   Diagnosis Date   • Ambulates with cane    • Anxiety    • Arthritis    • Asthma    • Bipolar 1 disorder ([address])    • Brain aneurysm    • Chronic pain disorder     spinal stenosis   • Concussion syndrome     neurological rx and balance rx   • COPD (chronic obstructive pulmonary disease) (HCC)    • Depression    • Diabetes mellitus ([address])     controlled w/ diet   • Disease of thyroid gland     nodules    • Family history of colon cancer     father   • Fibromyalgia, primary    • GERD (gastroesophageal reflux disease)    • History of colon polyps    • Hyperlipidemia    • Hypertension    • 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    • Irritable bowel syndrome    • Lumbar degenerative disc disease 02/16/2022   • Migraine    • Neuropathy     bilateral feet and hands   • Peripheral neuropathy    • Psychiatric disorder    • PTSD (post-traumatic stress disorder)    • Shortness of breath    • Sleep apnea     had 3 studies & last one negative   • Stroke ([address])     2012 no deficeits   • TIA (transient ischemic attack)    • Wears dentures    • Wears glasses      Past Surgical History:   Procedure Laterality Date   • ABDOMINAL SURGERY      laproscopic/ endometriosis   • BRAIN SURGERY      aneurysm/ coiling procedure   • CARPAL TUNNEL RELEASE     • CHOLECYSTECTOMY     • COLONOSCOPY     • DENTAL SURGERY     • EPIDURAL BLOCK INJECTION Right 03/03/2022 Procedure: C7-T1 interlaminar epidural steroid injection;  Surgeon: Hermelindo Murray MD;  Location: [facility];  Service: Pain Management    • EPIDURAL BLOCK INJECTION N/A 04/21/2022    Procedure: C6-C7 BLOCK / INJECTION EPIDURAL STEROID CERVICAL;  Surgeon: Hermelindo Murray MD;  Location: [facility];  Service: Pain Management    • EPIDURAL BLOCK INJECTION Left 06/21/2022    Procedure: BLOCK / INJECTION EPIDURAL STEROID LUMBAR  left L3-4 TFESI;  Surgeon: Hermelindo Murray MD;  Location: [facility];  Service: Pain Management    • EYE SURGERY      cataract   • FL GUIDED NEEDLE PLAC BX/ASP/INJ  03/03/2022   • FL GUIDED NEEDLE PLAC BX/ASP/INJ  11/17/2022   • HYSTERECTOMY     • NM ESOPHAGOGASTRODUODENOSCOPY TRANSORAL DIAGNOSTIC N/A 02/08/2018    Procedure: EGD AND COLONOSCOPY;  Surgeon: Bella Lockett MD;  Location:  GI LAB; Service: Gastroenterology   • NM PRQ IMPLTJ NSTIM ELECTRODE ARRAY EPIDURAL N/A 11/17/2022    Procedure: Kiersten Blunt SCS TRIAL;  Surgeon: Hermelindo Murray MD;  Location: [facility];  Service: Pain Management    • NM PRQ IMPLTJ NSTIM ELECTRODE ARRAY EPIDURAL N/A 2/1/2023    Procedure:  Insertion percutaneous thoracic spinal cord stimulator with left buttock implantable pulse generator;  Surgeon: Tiara Rios MD;  Location:  MAIN OR;  Service: Neurosurgery   • THYROID SURGERY     • TONSILLECTOMY       Social History   Social History     Substance and Sexual Activity   Alcohol Use Not Currently     Social History     Substance and Sexual Activity   Drug Use Never    Comment: prescribed Belbuca     Social History     Tobacco Use   Smoking Status Every Day   • Packs/day: 2.00   • Types: Cigarettes   Smokeless Tobacco Never     Family History   Problem Relation Age of Onset   • Brain cancer Mother    • Alzheimer's disease Mother    • Alzheimer's disease Father    • Parkinsonism Father      Meds/Allergies       Current Outpatient Medications:   •  albuterol (Luis Daniel Relic HFA) 90 mcg/act inhaler  •  ammonium lactate (LAC-HYDRIN) 12 % lotion  •  atorvastatin (LIPITOR) 80 mg tablet  •  benzonatate (TESSALON PERLES) 100 mg capsule  •  Buprenorphine HCl (Belbuca) 300 MCG FILM  •  Butalbital-APAP-Caffeine (FIORICET PO)  •  carboxymethylcellulose 0.5 % SOLN  •  clonazePAM (KlonoPIN) 0.5 mg tablet  •  clonazePAM (KlonoPIN) 1 mg tablet  •  dicyclomine (BENTYL) 20 mg tablet  •  fenofibrate (TRICOR) 48 mg tablet  •  fluticasone (FLONASE) 50 mcg/act nasal spray  •  fluticasone-umeclidinium-vilanterol (Trelegy Ellipta) 200-62.5-25 mcg/actuation AEPB inhaler  •  furosemide (LASIX) 20 mg tablet  •  Galcanezumab-gnlm (Emgality) 120 MG/ML SOAJ  •  HYDROcodone-acetaminophen (NORCO)  mg per tablet  •  levalbuterol (XOPENEX) 1.25 mg/3 mL nebulizer solution  •  levothyroxine 137 mcg tablet  •  magnesium 30 MG tablet  •  Melatonin 10 MG CAPS  •  omeprazole (PriLOSEC) 40 MG capsule  •  ondansetron (ZOFRAN) 4 mg tablet  •  ondansetron (ZOFRAN) 8 mg tablet  •  pregabalin (LYRICA) 100 mg capsule  •  pregabalin (LYRICA) 150 mg capsule  •  tiZANidine (ZANAFLEX) 4 mg tablet  •  topiramate (TOPAMAX) 100 mg tablet  •  topiramate (TOPAMAX) 200 MG tablet  •  doxycycline hyclate (VIBRA-TABS) 100 mg tablet  •  lamoTRIgine (LaMICtal) 100 mg tablet  •  naloxone (Narcan) 4 mg/0.1 mL nasal spray  •  risperiDONE (RisperDAL) 0.5 mg tablet  •  True Comfort Safety Lancets MISC    Allergies   Allergen Reactions   • Hydroxyzine Anaphylaxis     Claims it gives her convulsions. • Pollen Extract Itching   • Tree Extract    • Clarithromycin Rash     Pt denies   • Latex Rash   • Sulfa Antibiotics Rash     "severe skin burn"     Objective     Blood pressure 123/79, pulse 80, temperature 99.4 °F (37.4 °C), temperature source Tympanic, height 5' 4" (1.626 m), weight 75.9 kg (167 lb 6.4 oz), SpO2 97 %. Body mass index is 28.73 kg/m². Physical Exam  Vitals and nursing note reviewed.    Constitutional:       Appearance: Normal appearance. HENT:      Head: Normocephalic and atraumatic. Eyes:      General: No scleral icterus. Conjunctiva/sclera: Conjunctivae normal.   Cardiovascular:      Rate and Rhythm: Normal rate. Pulmonary:      Effort: Pulmonary effort is normal. No respiratory distress. Abdominal:      General: Bowel sounds are normal.      Palpations: Abdomen is soft. There is no mass. Tenderness: There is abdominal tenderness. There is no guarding or rebound. Skin:     General: Skin is warm and dry. Coloration: Skin is not jaundiced. Neurological:      General: No focal deficit present. Mental Status: She is alert. Psychiatric:      Comments: Pressured speech, at times tangential       Lab Results:   No visits with results within 1 Day(s) from this visit.    Latest known visit with results is:   Admission on 08/09/2023, Discharged on 08/10/2023   Component Date Value   • WBC 08/09/2023 9.55    • RBC 08/09/2023 4.13    • Hemoglobin 08/09/2023 13.1    • Hematocrit 08/09/2023 40.3    • MCV 08/09/2023 98    • MCH 08/09/2023 31.7    • MCHC 08/09/2023 32.5    • RDW 08/09/2023 12.8    • MPV 08/09/2023 10.6    • Platelets 32/95/6138 187    • nRBC 08/09/2023 0    • Neutrophils Relative 08/09/2023 59    • Immat GRANS % 08/09/2023 0    • Lymphocytes Relative 08/09/2023 33    • Monocytes Relative 08/09/2023 7    • Eosinophils Relative 08/09/2023 1    • Basophils Relative 08/09/2023 0    • Neutrophils Absolute 08/09/2023 5.61    • Immature Grans Absolute 08/09/2023 0.04    • Lymphocytes Absolute 08/09/2023 3.15    • Monocytes Absolute 08/09/2023 0.63    • Eosinophils Absolute 08/09/2023 0.08    • Basophils Absolute 08/09/2023 0.04    • Sodium 08/09/2023 142    • Potassium 08/09/2023 3.6    • Chloride 08/09/2023 112 (H)    • CO2 08/09/2023 21    • ANION GAP 08/09/2023 9    • BUN 08/09/2023 16    • Creatinine 08/09/2023 1.02    • Glucose 08/09/2023 92    • Calcium 08/09/2023 9.2    • AST 08/09/2023 18    • ALT 08/09/2023 14    • Alkaline Phosphatase 08/09/2023 51    • Total Protein 08/09/2023 6.5    • Albumin 08/09/2023 4.0    • Total Bilirubin 08/09/2023 0.26    • eGFR 08/09/2023 59    • Protime 08/09/2023 13.4    • INR 08/09/2023 1.00    • PTT 08/09/2023 22 (L)    • Ventricular Rate 08/09/2023 76    • Atrial Rate 08/09/2023 76    • CA Interval 08/09/2023 156    • QRSD Interval 08/09/2023 74    • QT Interval 08/09/2023 390    • QTC Interval 08/09/2023 438    • P Axis 08/09/2023 70    • QRS Axis 08/09/2023 73    • T Wave Axis 08/09/2023 68      Radiology Results:   XR follow up    Result Date: 8/16/2023  Narrative: LUMBAR SPINE INDICATION:   Z01.89: Encounter for other specified special examinations. COMPARISON: Compared with 8/13/2023 VIEWS:  XR FOLLOW UP (NO CHARGE) FINDINGS: Left flank stimulator chamber with the dual leads extending cephalad projected over T8, T9, T10 and T12. There are 5 non rib bearing lumbar vertebral bodies. There is no evidence of acute fracture or destructive osseous lesion. Alignment is unremarkable. Moderate to severe facet degenerative changes in the lumbar spine. The pedicles appear intact. Cholecystectomy clips. Soft tissues are unremarkable. Impression: Left flank stimulator chamber with 2 leads spanning T8-T12. Workstation performed: [id number]     XR follow up    Result Date: 8/13/2023  Narrative: ABDOMEN INDICATION:   Z96.82: Presence of neurostimulator. COMPARISON:  None VIEWS:  AP supine Images: 1 FINDINGS: There is a neurostimulator device noted. The  overlies the left iliac crest. The leads extend cephalad and are seen as far as L1. Additional leads extend beyond the upper edge of the image and are not visualized. The leads that are visualized are, in this single AP projection, intact and without abandoned fragments. There is a nonobstructive bowel gas pattern. Cholecystectomy clips noted. No discernible free air on this supine study.   Upright or left lateral decubitus imaging is more sensitive to detect subtle free air in the appropriate setting. No pathologic calcifications or soft tissue masses. Lung bases are nonvisualized. No acute osseous abnormalities noted. Impression: Unremarkable abdomen. Incompletely visualized neurostimulator leads. This examination is not adequate for MR clearance. The study was marked in [facility] for immediate notification. Workstation performed: [id number]     XR hip/pelv 2-3 vws left if performed    Result Date: 8/10/2023  Narrative: LEFT HIP INDICATION:   fall, pain left hip, h/o spinal stimulator. COMPARISON:  None VIEWS:  XR HIP/PELV 2-3 VWS LEFT  W PELVIS IF PERFORMED FINDINGS: There is no acute fracture or dislocation. Bilateral mild degenerative hip joints No lytic or blastic osseous lesion. Neurostimulator superimposed over left ilium. Degenerative lumbar spine. Impression: No acute osseous abnormality. Workstation performed: [id number]     XR knee 4+ vw left injury    Result Date: 8/10/2023  Narrative: LEFT KNEE INDICATION:   left knee pain, fall, no deformity. COMPARISON:  None VIEWS:  XR KNEE 4+ VW LEFT INJURY FINDINGS: There is no acute fracture or dislocation. There is no joint effusion. Tricompartment mild narrowing. No lytic or blastic osseous lesion. Soft tissues are unremarkable. Impression: No acute osseous abnormality. Workstation performed: [id number]     XR shoulder 2+ views LEFT    Result Date: 8/10/2023  Narrative: LEFT SHOULDER INDICATION:   Follow-up, tenderness to palpation, left humeral head, full range of motion. COMPARISON:  None VIEWS: 5/29/2023 FINDINGS: Positioning mildly limits evaluation. There is no acute fracture or dislocation. AC joint mild osteoarthritis. No lytic or blastic osseous lesion. Soft tissues are unremarkable. Impression: No acute osseous abnormality.  Workstation performed: [id number]     XR chest 1 view portable    Result Date: 8/10/2023  Narrative: CHEST INDICATION:   fall, anterior chest wall pain, no crepitus. COMPARISON: 5/29/2023 EXAM PERFORMED/VIEWS:  XR CHEST PORTABLE FINDINGS: Cardiomediastinal silhouette appears unremarkable. The lungs are clear. Thoracic neurostimulator electrodes no pneumothorax or pleural effusion. Osseous structures appear within normal limits for patient age. Impression: No acute cardiopulmonary disease. Workstation performed: [id number]     CT recon only lumbar spine    Result Date: 8/9/2023  Narrative: CT RECON ONLY LUMBAR SPINE (NO CHARGE) INDICATION:   fall, h/o spinal stimulator, concern for compression fracture. COMPARISON:  None TECHNIQUE: Axial CT examination of the lumbar spine was obtained utilizing reconstructed images from CT of the chest, abdomen and pelvis performed the same day. Images were reformatted in the sagittal and coronal planes. This examination, like all CT scans performed in the [facility], was performed utilizing techniques to minimize radiation dose exposure, including the use of iterative reconstruction and automated exposure control. FINDINGS: ALIGNMENT: Normal alignment. No spondylolisthesis. No scoliosis. VERTEBRAE: Left L1 and L2 transverse process fractures are noted. . DEGENERATIVE CHANGES: PREVERTEBRAL AND PARASPINAL SOFT TISSUES: Left lumbar dorsal column stimulator seen with its leads terminating the lower thoracic spine. .     Impression: Left L1 and L2 transverse process fractures are noted. . Workstation performed: [id number]     CT abdomen pelvis with contrast    Result Date: 8/9/2023  Narrative: CT ABDOMEN AND PELVIS WITH IV CONTRAST INDICATION:   Nausea/vomiting Fall, history of spinal stimulator, vomiting. COMPARISON: 4/27/2023. TECHNIQUE:  CT examination of the abdomen and pelvis was performed. Multiplanar 2D reformatted images were created from the source data.  This examination, like all CT scans performed in the [facility], was performed utilizing techniques to minimize radiation dose exposure, including the use of iterative reconstruction and automated exposure control. Radiation dose length product (DLP) for this visit:  944.66 mGy-cm IV Contrast:  100 mL of iohexol (OMNIPAQUE) Enteric Contrast:  Enteric contrast was not administered. FINDINGS: ABDOMEN LOWER CHEST:  No clinically significant abnormality identified in the visualized lower chest. LIVER/BILIARY TREE: Intrahepatic and extrahepatic biliary ductal dilatation. The common duct measures up to 2 cm, larger than on the prior exam from April 27, 2023 when it measured 1.8 cm. Recommend MRCP for further evaluation. Simple cysts are seen within the liver. GALLBLADDER: Gallbladder is surgically absent. SPLEEN:  Unremarkable. PANCREAS:  Unremarkable. ADRENAL GLANDS:  Unremarkable. KIDNEYS/URETERS:  No hydronephrosis or urinary tract calculus. One or more sharply circumscribed subcentimeter renal hypodensities are present, too small to accurately characterize, and statistically most likely benign findings. According to recent literature (Radiology 2019) no further workup of these findings is recommended. STOMACH AND BOWEL:  Unremarkable. APPENDIX: A normal appendix was visualized. ABDOMINOPELVIC CAVITY:  No ascites. No pneumoperitoneum. No lymphadenopathy. VESSELS:  Unremarkable for patient's age. PELVIS REPRODUCTIVE ORGANS:  Unremarkable for patient's age. URINARY BLADDER:  Unremarkable. ABDOMINAL WALL/INGUINAL REGIONS: Left flank dorsal column stimulator seen with its lead terminating in the lower thoracic spine. . OSSEOUS STRUCTURES:  No acute fracture or destructive osseous lesion. Impression: Intrahepatic and extrahepatic biliary ductal dilatation. The common duct measures up to 2 cm, larger than on the prior exam from April 27, 2023 when it measured 1.8 cm. Recommend MRCP for further evaluation. The study was marked in [facility] for immediate notification.  Workstation performed: [id number]     CT spine cervical without contrast    Result Date: 8/9/2023  Narrative: CT CERVICAL SPINE - WITHOUT CONTRAST INDICATION:   Fall, head strike. COMPARISON: December 12, 2021. TECHNIQUE:  CT examination of the cervical spine was performed without intravenous contrast.  Contiguous axial images were obtained. Multiplanar 2D reformatted images were created from the source data. Radiation dose length product (DLP) for this visit:  367.82 mGy-cm . This examination, like all CT scans performed in the [facility], was performed utilizing techniques to minimize radiation dose exposure, including the use of iterative  reconstruction and automated exposure control. IMAGE QUALITY:  Diagnostic. FINDINGS: ALIGNMENT:  There is straightening of normal cervical lordosis. No subluxation or compression deformity. VERTEBRAE:  No fracture. DEGENERATIVE CHANGES:  Moderate multilevel cervical degenerative changes are noted. No critical central canal stenosis. PREVERTEBRAL AND PARASPINAL SOFT TISSUES: Unremarkable THORACIC INLET:  Normal.     Impression: No cervical spine fracture or traumatic malalignment. Workstation performed: [id number]     CT head without contrast    Result Date: 8/9/2023  Narrative: CT BRAIN - WITHOUT CONTRAST INDICATION:   Head trauma, minor, normal mental status (Age 19-64y) fall, head strike, vomiting. COMPARISON:  4/27/23. TECHNIQUE:  CT examination of the brain was performed. Multiplanar 2D reformatted images were created from the source data. Radiation dose length product (DLP) for this visit:  958.87 mGy-cm . This examination, like all CT scans performed in the [facility], was performed utilizing techniques to minimize radiation dose exposure, including the use of iterative  reconstruction and automated exposure control. IMAGE QUALITY:  Diagnostic. FINDINGS: PARENCHYMA:  No intracranial mass, mass effect or midline shift. No CT signs of acute infarction. No acute parenchymal hemorrhage.  Artifact noted associated with prior aneurysm coils adjacent to the cavernous aspect of the right internal carotid artery as noted on prior CTA examination. VENTRICLES AND EXTRA-AXIAL SPACES:  Normal for the patient's age. VISUALIZED ORBITS: Normal visualized orbits. PARANASAL SINUSES: Normal visualized paranasal sinuses. CALVARIUM AND EXTRACRANIAL SOFT TISSUES:  Normal.     Impression: No acute intracranial abnormality. Workstation performed: [id number]     NM myocardial perfusion spect (rx stress and/or rest)    Result Date: 7/28/2023  Narrative: •  Stress ECG: No ST deviation is noted. The ECG was negative for ischemia. The stress ECG is negative for ischemia after pharmacologic vasodilation, without reproduction of symptoms. •  Perfusion: There are no perfusion defects. •  Stress Function: Left ventricular function post-stress is normal. Stress ejection fraction is 70 %. •  Stress Combined Conclusion: The ECG and SPECT imaging portions of the stress study are concordant with no evidence of stress induced myocardial ischemia. No evidence of ischemia or infarction by pharmacologic vasodilatory nuclear stress testing. Chadd Alvarado PA-C    **Please note:  Dictation voice to text software may have been used in the creation of this record. Occasional wrong word or “sound alike” substitutions may have occurred due to the inherent limitations of voice recognition software. Read the chart carefully and recognize, using context, where substitutions have occurred. **

## 2023-08-17 NOTE — PATIENT INSTRUCTIONS
Please take the omeprazole twice daily. You will take 1 pill with in the morning, the second pill in the afternoon or before dinner. I am also sending in high-dose Pepcid for you to take in the evening to help better control your stomach discomfort. The ducts or plumbing system that drains bile are abnormal appearing sometimes this happens after you have your gallbladder removed, though sometimes it can be from something stuck in the plumbing system. We are going to check an MRI to look at this in better detail. I feel reassured that your liver tests are normal, which argues against a blockage. You have delayed stomach emptying. This is likely from the medications you are taking for other conditions. The treatments for delayed gastric emptying typically include dietary modifications at first.  We recommend a gastroparesis like diet which is typically small amounts spread throughout the day and avoiding foods high in saturated fats and difficult to digest raw fibrous foods.

## 2023-08-21 ENCOUNTER — TELEPHONE (OUTPATIENT)
Age: 63
End: 2023-08-21

## 2023-08-21 NOTE — TELEPHONE ENCOUNTER
Last OV was 6/12 with RM   Cervical MM scheduled but cancelled pato to antibiotic use. MRI was ordered 7/20 and completed 8/16    Please review results. (Pt states PCP gave her results- no note in chart). Continues to have a lot of pain   Follow up to discuss?

## 2023-08-21 NOTE — TELEPHONE ENCOUNTER
.Caller: pt    Doctor: Maricarmen Sung     Reason for call: Pt said her pcp said she has 2 fx's in her back. They gave her the MRI results. She is still having a lot of pain. What are her next steps? Also please check to see if the Stim is still in place. Pt now has diarrhea. Pt will be starting aqua therapy soon. She had the PT eval on 8/15/23. Should she do aqua since she has 2 fx's? Should she come into the office to see him? Should she wait on the nerve block?     Call back#: [phone]

## 2023-08-22 NOTE — TELEPHONE ENCOUNTER
MRI lumbar spine shows worsening arthritic change at the multiple levels of the lumbar spine with progression of a disc bulge to a disc protrusion at L2-L3 level    This patient experiences sharp, stabbing pain in the low back

## 2023-08-23 ENCOUNTER — APPOINTMENT (OUTPATIENT)
Dept: PHYSICAL THERAPY | Age: 63
End: 2023-08-23
Payer: COMMERCIAL

## 2023-08-24 NOTE — PROGRESS NOTES
Daily Note     Today's date: 2023  Patient name: Rosaria Brittle  : 1960  MRN: [id number]  Referring provider: Matthew Acevedo MD  Dx:   Encounter Diagnosis     ICD-10-CM    1. Stroke-like symptoms  R29.90       2. Chronic midline low back pain without sciatica  M54.50     G89.29       3. Lumbar radiculopathy  M54.16       4. Chronic pain syndrome  G89.4       5. Lumbar degenerative disc disease  M51.36       6. Fibromyalgia, primary  M79.7                      Subjective: ***      Objective: See treatment diary below      Assessment: Tolerated treatment {Tolerated treatment :[id number]}.  Patient {assessment:[id number]}      Plan: {PLAN:[id number]}     Precautions: spinal stimulator LLB 2023, c7-c8 spinal stenosis, DDD L/s, stroke , Depression, anxiety, HTN, DM, TIA ,, migraines, asthma,  Daily Treatment Diary   Precautions:  Daily Treatment Diary     Date           Patient education           Water Walk (FW, BW, side) x10’            LE work at wall               Hip FF/ext swing              Toe/heel              Hip ABD/ADD              March             Knee flexion & extension             Squats           UE noodle x3             Push/pull              Rotate              Row forward & back              OH side bend            UE/Core (AROM, paddles, MB)              ABD/ADD              Horizontal Pec Fly              Alt. arm swing (shld flex/ext)              Push pull              Single paddle rotation           SLS (EO, EC, ball toss)            Aqua Step (FW, lat, eccentric)            Core work:              MF press (red, blue, blk)             Kickboard press (blue, red)              Row/ext w/ tubing (red, blue, blk)           Seated on bench             ankle DF/PF              March              ABD/ADD              Knee flexion/extension            DW TX - hang in the deep water              DW ABD/ADD              DW Bicycle              DW Scissor hip flexion/extension              DW DKTC            Stretches              HS at step              Wall stretches              Calf stretch at wall              Other:            Hot Tub - 10 minutes only

## 2023-08-25 ENCOUNTER — APPOINTMENT (OUTPATIENT)
Dept: PHYSICAL THERAPY | Age: 63
End: 2023-08-25
Payer: COMMERCIAL

## 2023-08-25 DIAGNOSIS — R29.90 STROKE-LIKE SYMPTOMS: Primary | ICD-10-CM

## 2023-08-25 DIAGNOSIS — M79.7 FIBROMYALGIA, PRIMARY: ICD-10-CM

## 2023-08-25 DIAGNOSIS — M54.16 LUMBAR RADICULOPATHY: ICD-10-CM

## 2023-08-25 DIAGNOSIS — G89.4 CHRONIC PAIN SYNDROME: ICD-10-CM

## 2023-08-25 DIAGNOSIS — G89.29 CHRONIC MIDLINE LOW BACK PAIN WITHOUT SCIATICA: ICD-10-CM

## 2023-08-25 DIAGNOSIS — M51.36 LUMBAR DEGENERATIVE DISC DISEASE: ICD-10-CM

## 2023-08-25 DIAGNOSIS — M54.50 CHRONIC MIDLINE LOW BACK PAIN WITHOUT SCIATICA: ICD-10-CM

## 2023-08-28 ENCOUNTER — OFFICE VISIT (OUTPATIENT)
Dept: PHYSICAL THERAPY | Age: 63
End: 2023-08-28
Payer: COMMERCIAL

## 2023-08-28 DIAGNOSIS — M51.36 LUMBAR DEGENERATIVE DISC DISEASE: ICD-10-CM

## 2023-08-28 DIAGNOSIS — M51.36 LUMBAR DEGENERATIVE DISC DISEASE: Primary | ICD-10-CM

## 2023-08-28 DIAGNOSIS — M54.16 LUMBAR RADICULOPATHY: ICD-10-CM

## 2023-08-28 DIAGNOSIS — M79.7 FIBROMYALGIA, PRIMARY: ICD-10-CM

## 2023-08-28 DIAGNOSIS — R29.90 STROKE-LIKE SYMPTOMS: ICD-10-CM

## 2023-08-28 DIAGNOSIS — M54.16 LUMBAR RADICULOPATHY: Primary | ICD-10-CM

## 2023-08-28 DIAGNOSIS — M54.50 CHRONIC MIDLINE LOW BACK PAIN WITHOUT SCIATICA: ICD-10-CM

## 2023-08-28 DIAGNOSIS — M47.816 LUMBAR SPONDYLOSIS: ICD-10-CM

## 2023-08-28 DIAGNOSIS — G89.4 CHRONIC PAIN SYNDROME: ICD-10-CM

## 2023-08-28 DIAGNOSIS — G89.29 CHRONIC MIDLINE LOW BACK PAIN WITHOUT SCIATICA: ICD-10-CM

## 2023-08-28 PROCEDURE — 97113 AQUATIC THERAPY/EXERCISES: CPT

## 2023-08-28 NOTE — TELEPHONE ENCOUNTER
Caller: [facility]    Doctor: Elisabeth Capps     Reason for call: Pt is calling asking in pt is able to do aqua therapy? She never received a yes or no. Pt is at her PT appt now. Pt is also saying that she has some fx's in her back.  Pt said they are not seeing fx's MRI report     Call back#: [phone]

## 2023-08-28 NOTE — PROGRESS NOTES
Daily Note     Today's date: 2023  Patient name: Mayra Tenorio  : 1960  MRN: [id number]  Referring provider: Marilou Benitez MD  Dx:   Encounter Diagnosis     ICD-10-CM    1. Lumbar degenerative disc disease  M51.36       2. Chronic midline low back pain without sciatica  M54.50     G89.29       3. Chronic pain syndrome  G89.4       4. Lumbar radiculopathy  M54.16       5. Stroke-like symptoms  R29.90       6. Fibromyalgia, primary  M79.7           Start Time: 1400  Stop Time: 1430  Total time in clinic (min): 30 minutes    Subjective: pt notes she missed last week due to stomach issues. She c/o sig LBP today. She states her PCP told her she has 2 fx's in her back. Objective: See treatment diary below  Pt seen 1:1 25 minutes     Assessment: Tolerated treatment fair. Pt returned to PT today noting she has fx's in her back. PT was brought over to talk to pt regarding new update of MRI results. PT put in a call to Dr. Alexus Ruiz for MRI results as per pt. Gentle initial session for aquatic PT included water walking w/ noodle, wall ex's for LE's, posture ex's w/ noodle and DWTX. Pt did have some sig relief in DW but after the DW she became nauseous and pt was asked to come out of pool. After sitting and resting w/ aid she felt better before she left. Pt has aid to help in changing in bathroom. Patient would benefit from continued PT      Plan: Continue per plan of care.       Precautions: spinal stimulator LLB 2023, c7-c8 spinal stenosis, DDD L/s, stroke , Depression, anxiety, HTN, DM, TIA ,, migraines, asthma,  Daily Treatment Diary     Precautions:  Daily Treatment Diary     Date           Patient education           Water Walk (FW, BW, side) x10’            LE work at wall               Hip FF/ext swing              Toe/heel              Hip ABD/ADD              March             Knee flexion & extension             Squats           UE noodle x3             Push/pull  back against wall 1'            Rotate              Row forward & back              OH side bend            UE/Core (AROM, paddles, MB)              ABD/ADD              Horizontal Pec Fly              Alt. arm swing (shld flex/ext)              Push pull              Single paddle rotation           SLS (EO, EC, ball toss)            Aqua Step (FW, lat, eccentric)            Core work:              MF press (red, blue, blk)             Kickboard press (blue, red)              Row/ext w/ tubing (red, blue, blk)           Seated on bench             ankle DF/PF              March              ABD/ADD              Knee flexion/extension            DW TX - hang in the deep water  10            DW ABD/ADD              DW Bicycle              DW Scissor hip flexion/extension              DW DKTC            Stretches              HS at step              Wall stretches              Calf stretch at wall              Other:            Hot Tub - 10 minutes only

## 2023-08-28 NOTE — TELEPHONE ENCOUNTER
RN spoke with the mother Alba and informed her of Allyson Tineo's recommendations. She appreciated the information and asked if the pt was having difficulty once they landed what should she do. RN informed her that she could continue to try the Sudafed, Afrin, and Motrin, but if the condition worsened she should come back to [facility] to be reassessed.    Yes new aqua therapy placed

## 2023-08-30 ENCOUNTER — HOSPITAL ENCOUNTER (OUTPATIENT)
Dept: MRI IMAGING | Facility: HOSPITAL | Age: 63
Discharge: HOME | End: 2023-08-30

## 2023-08-30 ENCOUNTER — APPOINTMENT (OUTPATIENT)
Dept: PHYSICAL THERAPY | Age: 63
End: 2023-08-30
Payer: COMMERCIAL

## 2023-08-30 ENCOUNTER — OFFICE VISIT (OUTPATIENT)
Dept: PAIN MEDICINE | Facility: CLINIC | Age: 63
End: 2023-08-30
Payer: COMMERCIAL

## 2023-08-30 VITALS
DIASTOLIC BLOOD PRESSURE: 69 MMHG | HEIGHT: 64 IN | BODY MASS INDEX: 28.51 KG/M2 | WEIGHT: 167 LBS | SYSTOLIC BLOOD PRESSURE: 103 MMHG | HEART RATE: 73 BPM

## 2023-08-30 DIAGNOSIS — M54.12 CERVICAL RADICULOPATHY: ICD-10-CM

## 2023-08-30 DIAGNOSIS — M54.2 NECK PAIN: ICD-10-CM

## 2023-08-30 DIAGNOSIS — M46.1 SACROILIITIS (HCC): ICD-10-CM

## 2023-08-30 DIAGNOSIS — M51.36 LUMBAR DEGENERATIVE DISC DISEASE: ICD-10-CM

## 2023-08-30 DIAGNOSIS — M47.812 CERVICAL SPONDYLOSIS: ICD-10-CM

## 2023-08-30 DIAGNOSIS — Z96.89 S/P INSERTION OF SPINAL CORD STIMULATOR: ICD-10-CM

## 2023-08-30 DIAGNOSIS — G89.4 CHRONIC PAIN SYNDROME: Primary | ICD-10-CM

## 2023-08-30 DIAGNOSIS — K83.8 DILATION OF BILIARY TRACT: ICD-10-CM

## 2023-08-30 DIAGNOSIS — M54.50 CHRONIC MIDLINE LOW BACK PAIN WITHOUT SCIATICA: ICD-10-CM

## 2023-08-30 DIAGNOSIS — M54.16 LUMBAR RADICULOPATHY: ICD-10-CM

## 2023-08-30 DIAGNOSIS — G89.29 CHRONIC MIDLINE LOW BACK PAIN WITHOUT SCIATICA: ICD-10-CM

## 2023-08-30 DIAGNOSIS — M47.816 LUMBAR SPONDYLOSIS: ICD-10-CM

## 2023-08-30 PROCEDURE — 99214 OFFICE O/P EST MOD 30 MIN: CPT | Performed by: NURSE PRACTITIONER

## 2023-08-30 RX ORDER — METHYLPREDNISOLONE 4 MG/1
TABLET ORAL
Qty: 1 EACH | Refills: 0 | Status: SHIPPED | OUTPATIENT
Start: 2023-08-30

## 2023-08-30 NOTE — PROGRESS NOTES
Assessment:  1. Chronic pain syndrome    2. Chronic midline low back pain without sciatica    3. Lumbar degenerative disc disease    4. Lumbar radiculopathy    5. Lumbar spondylosis    6. Sacroiliitis ([address])    7. S/P insertion of spinal cord stimulator    8. Neck pain    9. Cervical radiculopathy    10. Cervical spondylosis        Plan:  While the patient was in the office today, I did have a thorough conversation regarding their chronic pain syndrome, medication management, and treatment plan options. Is being seen for follow-up visit. She was scheduled for a cervical medial branch block last month it was canceled due to patient having cellulitis. Patient returns the office today complaining of increasing back and left leg pain. She fell at home 2 times on 8/9/2023. She was seen and treated in the emergency room. CT of the lumbar spine revealed left L1 and L2 transverse process fractures. MRI of the lumbar spine reveals multilevel facet arthritis worse at L3-4, L4-5, L5-S1. There are multilevel mild bulging disks. No significant disc herniations. No significant stenosis. X-ray of the lumbar spine revealed left flank stimulator chamber with 2 leads spanning T8-T12. CAT scan , x-ray and MRI results were reviewed with patient during today's visit. Patient tells me that she has been having diarrhea for the past 2 weeks. .  She is following with GI regarding this finding. I advised patient that she is not a candidate for interventional therapy due to possible infectious diarrhea. Will consider lumbar facet medial branch blocks in the future if her pain persists after diarrhea has resolved. Patient is being prescribed tizanidine, Lyrica, hydrocodone by her PCP. I discussed with the patient that at this point time since I feel that there is a significant inflammatory component to their pain symptoms, that they would benefit from a titrating dose of oral steroids over the next 7 days.  I advised the patient that while on the steroids, they should not take any other oral NSAIDs except for acetaminophen or Tylenol until they have completed the steroid taper. I also advised them that once they have completed the steroid taper, they are to give our office a follow up phone call to let us know how they are doing and if there is any improvement. The patient was agreeable and verbalized an understanding. Patient was referred to aqua therapy. She attempted aqua therapy 2 days ago. She states that it was cut short because she became dizzy and was having difficulty walking. She is scheduled to try aqua therapy again in 2 days. I advised her to reach out to aqua therapy and make them aware of the diarrhea situation. The patient will follow-up in 6 weeks for reevaluation. The patient was advised to contact the office should their symptoms worsen in the interim. The patient was agreeable and verbalized an understanding. History of Present Illness: The patient is a 65 y.o. female who presents for a follow up office visit in regards to Shoulder Pain, Arm Pain, Back Pain, Leg Pain, and Foot Pain. The patient’s current symptoms include complaints of low back and left leg pain, neck pain. Current pain level is a 9/10. Quality pain is described as sharp, throbbing, shooting. Current pain medications includes: PCP prescribes hydrocodone 10/325 every 8 hours as needed for pain, Lyrica 100 mg 3 times daily 150 mg at bedtime, tizanidine 4 mg every 6 hours if needed for spasms. I have personally reviewed and/or updated the patient's past medical history, past surgical history, family history, social history, current medications, allergies, and vital signs today. Review of Systems  Review of Systems   Constitutional: Negative for unexpected weight change. HENT: Negative for hearing loss. Eyes: Negative for visual disturbance. Respiratory: Positive for shortness of breath.     Cardiovascular: Positive for chest pain. Negative for leg swelling. Gastrointestinal: Positive for diarrhea and nausea. Negative for constipation. Endocrine: Negative for polyuria. Genitourinary: Negative for difficulty urinating. Musculoskeletal: Positive for arthralgias, gait problem and myalgias. Negative for joint swelling. Decreased range of motion  Joint stiffness  Swelling - feet  Pain in extremity- left arm, left hip, feet, neck   Skin: Negative for rash. Neurological: Positive for dizziness. Negative for weakness and headaches. Memory loss   Psychiatric/Behavioral: Negative for decreased concentration. All other systems reviewed and are negative.         Past Medical History:   Diagnosis Date   • Ambulates with cane    • Anxiety    • Arthritis    • Asthma    • Bipolar 1 disorder ([address])    • Brain aneurysm    • Chronic pain disorder     spinal stenosis   • Concussion syndrome     neurological rx and balance rx   • COPD (chronic obstructive pulmonary disease) (HCC)    • Depression    • Diabetes mellitus (HCC)     controlled w/ diet   • Disease of thyroid gland     nodules    • Family history of colon cancer     father   • Fibromyalgia, primary    • GERD (gastroesophageal reflux disease)    • History of colon polyps    • Hyperlipidemia    • Hypertension    • 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    • Irritable bowel syndrome    • Lumbar degenerative disc disease 02/16/2022   • Migraine    • Neuropathy     bilateral feet and hands   • Peripheral neuropathy    • Psychiatric disorder    • PTSD (post-traumatic stress disorder)    • Shortness of breath    • Sleep apnea     had 3 studies & last one negative   • Stroke ([address])     2012 no deficeits   • TIA (transient ischemic attack)    • Wears dentures    • Wears glasses        Past Surgical History:   Procedure Laterality Date   • ABDOMINAL SURGERY      laproscopic/ endometriosis   • BRAIN SURGERY      aneurysm/ coiling procedure   • CARPAL TUNNEL RELEASE     • CHOLECYSTECTOMY     • COLONOSCOPY     • DENTAL SURGERY     • EPIDURAL BLOCK INJECTION Right 03/03/2022    Procedure: C7-T1 interlaminar epidural steroid injection;  Surgeon: Audelia Alex MD;  Location: [facility];  Service: Pain Management    • EPIDURAL BLOCK INJECTION N/A 04/21/2022    Procedure: C6-C7 BLOCK / INJECTION EPIDURAL STEROID CERVICAL;  Surgeon: Audelia Alex MD;  Location: [facility];  Service: Pain Management    • EPIDURAL BLOCK INJECTION Left 06/21/2022    Procedure: BLOCK / INJECTION EPIDURAL STEROID LUMBAR  left L3-4 TFESI;  Surgeon: Audelia Alex MD;  Location: [facility];  Service: Pain Management    • EYE SURGERY      cataract   • FL GUIDED NEEDLE PLAC BX/ASP/INJ  03/03/2022   • FL GUIDED NEEDLE PLAC BX/ASP/INJ  11/17/2022   • HYSTERECTOMY     • CO ESOPHAGOGASTRODUODENOSCOPY TRANSORAL DIAGNOSTIC N/A 02/08/2018    Procedure: EGD AND COLONOSCOPY;  Surgeon: Brittney Anthony MD;  Location: [facility]; Service: Gastroenterology   • CO PRQ IMPLTJ NSTIM ELECTRODE ARRAY EPIDURAL N/A 11/17/2022    Procedure: Betsy Ellsworth SCS TRIAL;  Surgeon: Audelia Alex MD;  Location: [facility];  Service: Pain Management    • CO PRQ IMPLTJ NSTIM ELECTRODE ARRAY EPIDURAL N/A 2/1/2023    Procedure:  Insertion percutaneous thoracic spinal cord stimulator with left buttock implantable pulse generator;  Surgeon: Rayshawn Barton MD;  [facility];  Service: Neurosurgery   • THYROID SURGERY     • TONSILLECTOMY     • US GUIDED THYROID BIOPSY  11/30/2016   • US GUIDED THYROID BIOPSY  3/21/2018       Family History   Problem Relation Age of Onset   • Brain cancer Mother    • Alzheimer's disease Mother    • Alzheimer's disease Father    • Parkinsonism Father        Social History     Occupational History   • Not on file   Tobacco Use   • Smoking status: Every Day     Packs/day: 2.00     Types: Cigarettes   • Smokeless tobacco: Never   Vaping Use   • Vaping Use: Never used Substance and Sexual Activity   • Alcohol use: Not Currently   • Drug use: Never     Comment: prescribed Belbuca   • Sexual activity: Yes     Partners: Male         Current Outpatient Medications:   •  albuterol (PROVENTIL HFA,VENTOLIN HFA) 90 mcg/act inhaler, Inhale 2 puffs every 6 (six) hours as needed, Disp: , Rfl:   •  Alcohol Swabs (Alcohol Pads) 70 % PADS, USE TO TEST BLOOD GLUCOSE 2-3 TIMES DAILY, Disp: , Rfl:   •  ammonium lactate (LAC-HYDRIN) 12 % lotion, as needed, Disp: , Rfl:   •  atorvastatin (LIPITOR) 80 mg tablet, Take 80 mg by mouth every evening, Disp: , Rfl:   •  benzonatate (TESSALON PERLES) 100 mg capsule, Take 1 capsule (100 mg total) by mouth 3 (three) times a day as needed for cough, Disp: 30 capsule, Rfl: 1  •  Buprenorphine HCl (Belbuca) 300 MCG FILM, Apply 300 mcg to cheek in the morning, Disp: , Rfl:   •  Butalbital-APAP-Caffeine (FIORICET PO), Take by mouth as needed, Disp: , Rfl:   •  carboxymethylcellulose 0.5 % SOLN, Administer 1 drop to both eyes daily as needed for dry eyes, Disp: 1 Bottle, Rfl: 0  •  cephalexin (KEFLEX) 500 mg capsule, Take 1 capsule by mouth every 12 (twelve) hours, Disp: , Rfl:   •  ciprofloxacin (CIPRO) 250 mg tablet, take 1 tablet by mouth twice a day for 7 days, Disp: , Rfl:   •  clonazePAM (KlonoPIN) 0.5 mg tablet, Take 0.5 mg by mouth daily at bedtime, Disp: , Rfl:   •  clonazePAM (KlonoPIN) 1 mg tablet, Take 1 mg by mouth daily in the early morning, Disp: , Rfl:   •  dicyclomine (BENTYL) 20 mg tablet, Take 20 mg by mouth every 6 (six) hours, Disp: , Rfl:   •  famotidine (PEPCID) 40 MG tablet, Take 1 tablet (40 mg total) by mouth daily at bedtime, Disp: 30 tablet, Rfl: 6  •  fenofibrate (TRICOR) 48 mg tablet, Take 48 mg by mouth daily, Disp: , Rfl:   •  fluconazole (DIFLUCAN) 150 mg tablet, take 1 tablet by mouth for 1 day, Disp: , Rfl:   •  fluticasone (FLONASE) 50 mcg/act nasal spray, 2 sprays into each nostril daily, Disp: , Rfl:   • Fluticasone-Salmeterol (Advair) 500-50 mcg/dose inhaler, , Disp: , Rfl:   •  fluticasone-umeclidinium-vilanterol (Trelegy Ellipta) 200-62.5-25 mcg/actuation AEPB inhaler, Inhale 1 puff daily Rinse mouth after use., Disp: 60 blister, Rfl: 5  •  furosemide (LASIX) 20 mg tablet, Take 20 mg by mouth as needed Taking as needed, Disp: , Rfl:   •  Galcanezumab-gnlm (Emgality) 120 MG/ML SOAJ, Inject 120 mg under the skin every 30 (thirty) days Last inj 1/19/23, Disp: , Rfl:   •  HYDROcodone-acetaminophen (NORCO)  mg per tablet, every 8 (eight) hours as needed, Disp: , Rfl:   •  ibuprofen (MOTRIN) 800 mg tablet, take 1 tablet by mouth every 8 hours if needed for mild pain or fever for up to 5 days, Disp: , Rfl:   •  latanoprost (XALATAN) 0.005 % ophthalmic solution, instill 1 drop into both eyes at bedtime, Disp: , Rfl:   •  levalbuterol (XOPENEX) 1.25 mg/3 mL nebulizer solution, Inhale 1.25 mg every 4 (four) hours as needed, Disp: , Rfl:   •  levothyroxine 137 mcg tablet, Take 137 mcg by mouth daily, Disp: , Rfl:   •  magnesium 30 MG tablet, Take 500 mg by mouth 2 (two) times a day 1/26/23 right now not taking, Disp: , Rfl:   •  Melatonin 10 MG CAPS, Take 1 tablet by mouth daily at bedtime as needed, Disp: , Rfl:   •  methylPREDNISolone 4 MG tablet therapy pack, Use as directed on package, Disp: 1 each, Rfl: 0  •  metroNIDAZOLE (FLAGYL) 500 mg tablet, Take 1 tablet by mouth 2 (two) times a day, Disp: , Rfl:   •  omeprazole (PriLOSEC) 40 MG capsule, Take 1 capsule (40 mg total) by mouth 2 (two) times a day, Disp: 60 capsule, Rfl: 5  •  ondansetron (ZOFRAN) 4 mg tablet, Take 4 mg by mouth every 8 (eight) hours as needed, Disp: , Rfl:   •  ondansetron (ZOFRAN) 8 mg tablet, Take 1 tablet (8 mg total) by mouth every 8 (eight) hours as needed for nausea or vomiting, Disp: 20 tablet, Rfl: 0  •  OneTouch Ultra test strip, USE TO TEST BLOOD GLUCOSE 2-3 TIMES DAILY, Disp: , Rfl:   •  Polyethylene Glycol 3350 POWD, take 17GM (DISSOLVED IN WATER) by mouth once daily, Disp: , Rfl:   •  pregabalin (LYRICA) 100 mg capsule, Take 100 mg by mouth 3 (three) times a day Morning-lunch-dinner, Disp: , Rfl:   •  pregabalin (LYRICA) 150 mg capsule, take 1 capsule by mouth NIGHTLY, Disp: , Rfl:   •  rizatriptan (MAXALT-MLT) 10 mg disintegrating tablet, TAKE 1 TABLET BY MOUTH ONE TIME AS NEEDED FOR MIGRAINE, MAY REPEA. ..  (REFER TO PRESCRIPTION NOTES). , Disp: , Rfl:   •  SUMAtriptan (IMITREX) 100 mg tablet, , Disp: , Rfl:   •  tiZANidine (ZANAFLEX) 4 mg tablet, take 2 tablets by mouth every 6 hours if needed for muscle spasm, Disp: , Rfl:   •  topiramate (TOPAMAX) 100 mg tablet, Take 100 mg by mouth every 6 (six) hours as needed Takes 2 tabs every 6 hours, Disp: , Rfl:   •  topiramate (TOPAMAX) 200 MG tablet, , Disp: , Rfl:   •  True Comfort Safety Lancets MISC, USE TO TEST BLOOD GLUCOSE 2-3 TIMES DAILY, Disp: , Rfl:   •  Zoster Vac Recomb Adjuvanted (Shingrix) 50 MCG/0.5ML SUSR, inject 0.5 milliliter intramuscularly, Disp: , Rfl:   •  doxycycline hyclate (VIBRA-TABS) 100 mg tablet, take 1 tablet by mouth twice a day for 7 days (Patient not taking: Reported on 7/14/2023), Disp: , Rfl:   •  lamoTRIgine (LaMICtal) 100 mg tablet, Take 100 mg by mouth 2 (two) times a day, Disp: , Rfl:   •  naloxone (Narcan) 4 mg/0.1 mL nasal spray, , Disp: , Rfl:   •  risperiDONE (RisperDAL) 0.5 mg tablet, Take 0.25 mg by mouth 2 (two) times a day (Patient not taking: Reported on 7/14/2023), Disp: , Rfl:     Allergies   Allergen Reactions   • Hydroxyzine Anaphylaxis     Claims it gives her convulsions.     • Other Other (See Comments)   • Pollen Extract Itching   • Tree Extract    • Clarithromycin Rash     Pt denies   • Latex Rash   • Sulfa Antibiotics Rash     "severe skin burn"       Physical Exam:    /69   Pulse 73   Ht 5' 4" (1.626 m)   Wt 75.8 kg (167 lb)   BMI 28.67 kg/m²     Constitutional:normal, well developed, well nourished, alert, in no distress and non-toxic and no overt pain behavior. Eyes:anicteric  HEENT:grossly intact  Neck:supple, symmetric, trachea midline and no masses   Pulmonary:even and unlabored  Cardiovascular:No edema or pitting edema present  Skin:Normal without rashes or lesions and well hydrated  Psychiatric:Mood and affect appropriate  Neurologic:Cranial Nerves II-XII grossly intact  Musculoskeletal:Gait is slow and guarded. Range of motion of the lumbar spine is limited in all planes. There is tenderness bilaterally L1-S1. There is significant tenderness over bilateral SI joints. Imaging  No orders to display       No orders of the defined types were placed in this encounter.

## 2023-09-06 ENCOUNTER — TELEPHONE (OUTPATIENT)
Dept: PAIN MEDICINE | Facility: CLINIC | Age: 63
End: 2023-09-06

## 2023-09-06 NOTE — TELEPHONE ENCOUNTER
Spoke with Pt regarding frequent daily episodes of diarrhea & canceling aqua therapy. Advised Pt to cancel aqua therapy until episodes of diarrhea are under control. Pt verbalized understanding. Pt stated she was recently seen in ER, by GI & was also seen by PCP on 9/1. (Dx; Gastroparesis & no bacteria noted in stool)  Advised Pt to contact GI for a f/u OV appt r/t continued diarrhea & GI pain/discomfort. Pt stated she was waiting to hear back from [address] [place] [address] for f/u OV & Pt was verbally upset & sounded tearful over the phone. Also, advised Pt to contact [facility] again for f/u OV with Psychiatrist.   Pt verbalized understanding & was appreciative for advisement. Pt is open to recommendations.     Please advise-

## 2023-09-06 NOTE — TELEPHONE ENCOUNTER
Caller: Yaquelin Schumacher     Doctor: Chris Cruz     Reason for call: patient has Diarrhea calling to find out if she should cancel Aqua therapy moving forward? She was upset , I'm not sure why I was trying to help her understand the message to send correctly to the doctor.       Call back#: [phone]

## 2023-09-07 NOTE — PROGRESS NOTES
Patient cancelled all appts. She was only seen 2x with only one in the pool with poor tolerance. She informed us she has a perforated intestine. D/c PT at this time.

## 2023-09-13 ENCOUNTER — TELEPHONE (OUTPATIENT)
Dept: MRI IMAGING | Facility: HOSPITAL | Age: 63
End: 2023-09-13

## 2023-09-13 ENCOUNTER — HOSPITAL ENCOUNTER (OUTPATIENT)
Dept: MRI IMAGING | Facility: HOSPITAL | Age: 63
Discharge: HOME | End: 2023-09-13
Payer: COMMERCIAL

## 2023-09-13 PROCEDURE — G1004 CDSM NDSC: HCPCS

## 2023-09-13 PROCEDURE — 74183 MRI ABD W/O CNTR FLWD CNTR: CPT

## 2023-09-13 PROCEDURE — A9585 GADOBUTROL INJECTION: HCPCS | Performed by: PHYSICIAN ASSISTANT

## 2023-09-13 RX ORDER — GADOBUTROL 604.72 MG/ML
8 INJECTION INTRAVENOUS
Status: COMPLETED | OUTPATIENT
Start: 2023-09-13 | End: 2023-09-13

## 2023-09-13 RX ADMIN — GADOBUTROL 8 ML: 604.72 INJECTION INTRAVENOUS at 11:20

## 2023-09-13 NOTE — TELEPHONE ENCOUNTER
[facility] Spinal Cord Stim    B1 + [facility] 2 millitesla 30 mins or less    - 11:09 mins total scan time    Luck Majors present for exam-impedance check completed, patient's stim put in MRI mode prior to entering Zone 4.

## 2023-09-18 ENCOUNTER — APPOINTMENT (EMERGENCY)
Dept: CT IMAGING | Facility: HOSPITAL | Age: 63
End: 2023-09-18
Payer: COMMERCIAL

## 2023-09-18 ENCOUNTER — HOSPITAL ENCOUNTER (EMERGENCY)
Facility: HOSPITAL | Age: 63
Discharge: HOME | End: 2023-09-18
Attending: EMERGENCY MEDICINE | Admitting: EMERGENCY MEDICINE
Payer: COMMERCIAL

## 2023-09-18 ENCOUNTER — OFFICE VISIT (OUTPATIENT)
Dept: DENTISTRY | Facility: CLINIC | Age: 63
End: 2023-09-18

## 2023-09-18 VITALS — SYSTOLIC BLOOD PRESSURE: 113 MMHG | DIASTOLIC BLOOD PRESSURE: 74 MMHG | HEART RATE: 71 BPM

## 2023-09-18 VITALS
SYSTOLIC BLOOD PRESSURE: 111 MMHG | OXYGEN SATURATION: 95 % | TEMPERATURE: 97.7 F | RESPIRATION RATE: 17 BRPM | HEART RATE: 65 BPM | DIASTOLIC BLOOD PRESSURE: 60 MMHG

## 2023-09-18 DIAGNOSIS — Z01.20 ENCOUNTER FOR DENTAL EXAMINATION: Primary | ICD-10-CM

## 2023-09-18 DIAGNOSIS — M54.9 BACK PAIN: Primary | ICD-10-CM

## 2023-09-18 DIAGNOSIS — M54.32 SCIATICA OF LEFT SIDE: ICD-10-CM

## 2023-09-18 DIAGNOSIS — G43.909 MIGRAINE HEADACHE: ICD-10-CM

## 2023-09-18 PROCEDURE — 72128 CT CHEST SPINE W/O DYE: CPT

## 2023-09-18 PROCEDURE — 72131 CT LUMBAR SPINE W/O DYE: CPT

## 2023-09-18 PROCEDURE — 99283 EMERGENCY DEPT VISIT LOW MDM: CPT

## 2023-09-18 PROCEDURE — 96375 TX/PRO/DX INJ NEW DRUG ADDON: CPT

## 2023-09-18 PROCEDURE — G1004 CDSM NDSC: HCPCS

## 2023-09-18 PROCEDURE — 96365 THER/PROPH/DIAG IV INF INIT: CPT

## 2023-09-18 PROCEDURE — WIS5009 POST-OP DENTURE ADJUSTMENT

## 2023-09-18 RX ORDER — ACETAMINOPHEN 325 MG/1
650 TABLET ORAL ONCE
Status: COMPLETED | OUTPATIENT
Start: 2023-09-18 | End: 2023-09-18

## 2023-09-18 RX ORDER — DEXAMETHASONE SODIUM PHOSPHATE 10 MG/ML
10 INJECTION, SOLUTION INTRAMUSCULAR; INTRAVENOUS ONCE
Status: COMPLETED | OUTPATIENT
Start: 2023-09-18 | End: 2023-09-18

## 2023-09-18 RX ORDER — MAGNESIUM SULFATE HEPTAHYDRATE 40 MG/ML
2 INJECTION, SOLUTION INTRAVENOUS ONCE
Status: COMPLETED | OUTPATIENT
Start: 2023-09-18 | End: 2023-09-18

## 2023-09-18 RX ORDER — KETOROLAC TROMETHAMINE 30 MG/ML
15 INJECTION, SOLUTION INTRAMUSCULAR; INTRAVENOUS ONCE
Status: COMPLETED | OUTPATIENT
Start: 2023-09-18 | End: 2023-09-18

## 2023-09-18 RX ORDER — METHOCARBAMOL 500 MG/1
500 TABLET, FILM COATED ORAL ONCE
Status: COMPLETED | OUTPATIENT
Start: 2023-09-18 | End: 2023-09-18

## 2023-09-18 RX ORDER — DROPERIDOL 2.5 MG/ML
0.62 INJECTION, SOLUTION INTRAMUSCULAR; INTRAVENOUS ONCE
Status: COMPLETED | OUTPATIENT
Start: 2023-09-18 | End: 2023-09-18

## 2023-09-18 RX ORDER — DIPHENHYDRAMINE HYDROCHLORIDE 50 MG/ML
25 INJECTION INTRAMUSCULAR; INTRAVENOUS ONCE
Status: COMPLETED | OUTPATIENT
Start: 2023-09-18 | End: 2023-09-18

## 2023-09-18 RX ADMIN — ACETAMINOPHEN 650 MG: 325 TABLET ORAL at 16:07

## 2023-09-18 RX ADMIN — MAGNESIUM SULFATE HEPTAHYDRATE 2 G: 40 INJECTION, SOLUTION INTRAVENOUS at 16:20

## 2023-09-18 RX ADMIN — DEXAMETHASONE SODIUM PHOSPHATE 10 MG: 10 INJECTION, SOLUTION INTRAMUSCULAR; INTRAVENOUS at 16:19

## 2023-09-18 RX ADMIN — DROPERIDOL 0.62 MG: 2.5 INJECTION, SOLUTION INTRAMUSCULAR; INTRAVENOUS at 16:18

## 2023-09-18 RX ADMIN — DIPHENHYDRAMINE HYDROCHLORIDE 25 MG: 50 INJECTION, SOLUTION INTRAMUSCULAR; INTRAVENOUS at 16:16

## 2023-09-18 RX ADMIN — SODIUM CHLORIDE 1000 ML: 0.9 INJECTION, SOLUTION INTRAVENOUS at 16:14

## 2023-09-18 RX ADMIN — KETOROLAC TROMETHAMINE 15 MG: 30 INJECTION, SOLUTION INTRAMUSCULAR; INTRAVENOUS at 16:15

## 2023-09-18 RX ADMIN — METHOCARBAMOL 500 MG: 500 TABLET ORAL at 16:07

## 2023-09-18 NOTE — ED ATTENDING ATTESTATION
9/18/2023    ILai DO, saw and evaluated the patient. I have discussed the patient with Dr Mayela Peguero and agree with his findings, Plan of Care, and MDM as documented in his note, except where noted. All available labs and Radiology studies were reviewed. I was present for key portions of any procedure(s) performed by Dr Mayela Peguero and I was immediately available to provide assistance. At this point I agree with the current assessment done in the Emergency Department. I have conducted an independent evaluation of this patient. The history and physical is as follows:    45-year-old woman with complex medical history as noted presents to the emergency department with lumbar back pain, primarily on the left side, since Saturday, 16 September 2023. That day, she experienced a fall from a seated position while on the edge of her mattress striking her left lower back against the edge of a nightstand. No head injury or LOC. She was able to get up herself and did not have significant pain at the time. However, she had increasing pain particularly in the lumbar region of the back in particular the left paralumbar region extending into the left hip over the succeeding 12 hours since the injury and now quite severe today. Pain is not responsive to her usual home medications. It is worse with active range of motion of the left hip as well as weightbearing; she is normally able to walk with a cane at home when her back is particularly painful, but this does not provide any relief now. Notes left lower extremity numbness developing since the fall without any muscle weakness per se. No urinary or bowel incontinence. Normally takes buprenorphine 300 mcg daily and [place]/acetaminophen 10/325 mg 3 times daily.     She notes that her son examined the area in which her spinal stimulator was implanted and stated that it looked unusual since the injury, with the wire being visible beneath the skin in a way that it is not typically. She does have a spinal stimulator implanted as note in February 2023 at [facility] by Dr. Savannah Wilson; she states that she had significant improvement in back pain since that time. It has been functioning normally so far she is aware. Did experience a fall with lumbar spine fracture in August 2023 and had subsequent follow-up x-rays that did demonstrate good healing. Also had MRI 16 August 2023 with mild degenerative changes with results excerpted below. Concurrently has had diffuse headache since the fall on 16 September 2023 not responsive to treatment at home with butalbital. Has longstanding history of migraine headaches; current symptoms feel similar. No anticoagulant or antiplatelet medications. ROS as per HPI; otherwise negative. General: Awake/alert/moderate painful distress. Vital signs and nursing notes reviewed    HEENT:  Normocephalic/atraumatic  External ear/hearing wnl bilaterally. Neck:  Phonation normal with no stridor/dysphonia. Normal active ROM. No palpable masses or thyromegaly. No overlying skin changes. Cardiac:  Radial pulses 2+ bilaterally  DP/PT pulses 2+ bilaterally  RRR with s1/s2; no m/r/g. Pulmonary:   No respiratory distress. No accessory muscle use  Lungs CTA b/l with no w/c/r    Abdomen:  Flat. Nondistended. Nontender. No palpable masses. No guarding/rebound ttp. Skin:  Warm/dry. No diaphoresis. No visible rashes or skin changes. MSK:  Midline and left-sided paravertebral back tenderness at the T12-L4 region with significant tenderness extending into the left flank over the posterior aspect of the left iliac crest.  No crepitus. Spinal stimulator device palpable over the left gluteal region and in the posterior midline. There is a small subcutaneous nodule palpable just left of midline around T11 at the level of the incision from the stimulator electrode placement.   Patient able to spontaneously range left hip into 80 degrees of flexion and 90 degrees of flexion at the left knee. Full active range of motion in right lower extremity at hip/knee/ankle    Neuro:  GCS 15. PERRLA; EOMI. Facial expressions symmetric. Tongue/uvula midline. Shoulder shrug equal bilaterally  Strength 5/5 in UE/LE bilaterally at hip/knee/ankle; strength testing limited slightly secondary to pain  DTR 1+ bilaterally at knee/ankle  Patient reports diffusely decreased sensation to light touch in left lower extremity from the L3 dermatome inferiorly. CT lumbar spine 9 August 2023:    IMPRESSION:     Left L1 and L2 transverse process fractures are noted. .     Workstation performed: [id number]      MRI lumbar spine 16 August 2023:       FINDINGS:     VERTEBRAL BODIES:  There are 5 lumbar type vertebral bodies. Leftward convex scoliosis apex L1-2 with slight anterolisthesis L3-4. There is mild marrow edema left pedicle and facet of L3 is likely degenerative.     SACRUM:  Normal signal within the sacrum. No evidence of insufficiency or stress fracture.     DISTAL CORD AND CONUS:  Normal size and signal within the distal cord and conus.     PARASPINAL SOFT TISSUES:  Paraspinal soft tissues are unremarkable.     LOWER THORACIC DISC SPACES:  Normal disc height and signal.  No disc herniation, canal stenosis or foraminal narrowing.     LUMBAR DISC SPACES:     L1-L2: Mild annular bulge eccentric to the right with small marginal osteophyte on the right contributes to mild right lateral recess stenosis. Foramina patent.     L2-L3: Small left paramedian protrusion type disc herniation with mild facet hypertrophy. Minimal left lateral recess stenosis without foraminal narrowing.     L3-L4: Moderate facet hypertrophy. Mild annular bulge with small left foraminal protrusion type disc herniation noted. Minimal central stenosis. Foramina patent.     L4-L5: Moderate facet hypertrophy bilaterally.  Small central protrusion type disc herniation results in minimal central stenosis without foraminal narrowing.     L5-S1: Moderate left greater than right facet hypertrophy without central or foraminal narrowing.     OTHER FINDINGS:  None.     IMPRESSION:     Facet disease at L3-4 accounts for slight anterolisthesis. Mild degenerative changes as described. No compressive disease. A/P: Low energy fall with significant lumbar back pain but with area recently injured raising concern for reinjury of the area warranting advanced imaging. Possibility of mechanical displacement of the spinal stimulator is also possible although considered less likely. No signs/symptoms suggestive of a cord compression or cauda equina syndrome. Headache appears consistent with typical migraine headaches and will be treated with the usual abortive therapies. Disposition pending    ED Course  ED Course as of 09/18/23 1700   Mon Sep 18, 2023   1605 CT completed and awaiting interpretation   1631 CT spine thoracic & lumbar wo contrast  FINDINGS:     ALIGNMENT: Normal alignment of the thoracic spine. Stable upper lumbar levoscoliosis.     VERTEBRAE: No acute thoracic or lumbar spine fracture, lytic or blastic lesion. Periosteal reaction consistent with subacute to early chronic left L1, L2 and L3 transverse process fractures.     DEGENERATIVE CHANGES: Stable loss of disc height and endplate sclerosis along the right aspect of L1-2.     PARASPINAL SOFT TISSUES: Spinal stimulator leads terminate in the dorsal epidural space at the T7-8 level. No mass or fluid collection.     IMPRESSION:        1. Healing left L1, L2 and L3 transverse process fractures. No evidence of acute thoracic or lumbar spine fracture. 2. Stable disc degenerative change at L1-2. CT did not demonstrate any new traumatic findings and in fact demonstrates healing of the previously identified fractures. Patient had  improvement in back pain and migraine headache with treatment in the emergency department.   Was able to ambulate to the bathroom without difficulty. Patient very motivated to go home, stating that she has in-home nursing care as well as the assistance of her family who lives in the same house as she. She really prefers not to be admitted to the hospital.  As she has reasonably adequate pain control and no other emergent or concerning findings, discharge certainly reasonable with outpatient follow-up. I do not identify any obvious abnormalities with respect to the spinal stimulator device. She was in agreement with this plan.     Critical Care Time  Procedures

## 2023-09-18 NOTE — ED NOTES
Ambulation trial done by this RN. Patient denies worsening back pain with ambulating.  ED provider made aware     Yasmeen Rosenberg RN  09/18/23 2019

## 2023-09-18 NOTE — DENTAL PROCEDURE DETAILS
Denture Adjustment    Pt presents for denture adjustment. ASA III. Pain level: 0  CC: "For the past three or four months, I have been using lots of adhesive and the dentures fall off." Denture delivered in March 2023. Pt also states that she feels sensitivity and pain inside her gums after wearing the denture for three or four hours. Denture seated but patient noted discomfort on palatal area. Verified internal and removed any interferences that prevent complete and comfortable seating. Pt stated she liked the aesthetics, feel, and comfort. Advised patient to return if she feels any pain or discomfort when wearing dentures for adjustments. Dentures cleaned in ultrasonic. Discussed with pt that the sensitivity she feels to wearing the dentures could have something to do with her migraines and fibromyalgia. Pt understood and left in good spirits.      NV: Adjustment if necessary

## 2023-09-18 NOTE — DISCHARGE INSTRUCTIONS
Follow-up with your pcp in about a week. Be careful ambulating, use your cane as needed. Continue taking your medications as prescribed.  Please return to the ED with confusion, urinary/bowel incontinence, numbness in your groin region, night sweats, fevers, etc.

## 2023-09-18 NOTE — ED PROVIDER NOTES
History  Chief Complaint   Patient presents with   • Back Pain     Patient complains of lower back pain that started on Saturday. Patient has an extensive history with her back. Patient states "I have a nerve stimulator in my back and it doesn't look right, I'm also having worsening left leg numbness". Pain 10/10 at this time. HPI Pt is a 66 y/o f presenting with acute on chronic mid to low back pain. She was stretching out of her bed to grab something and fell onto the floor onto her low back/buttocks which caused her chronic back pain to feel much worse the past three days. Her usual hydrocodone did not help, bending over to pick things off the floor and laying flat exacerbates the pain. No urinary/bowel incontinence, no saddle anesthesia, no significant weakness, no . Also has a spine stimulator she is concerned regarding the placement. Pt has neuropathy Lt>Rt LE which has been exacerbated for the past few days. Pt had compression fracture of L1 on August first after a fall at home. Hx of recurrent migraines, currently has had her typical migraine symptoms for the past week additionally. Prior to Admission Medications   Prescriptions Last Dose Informant Patient Reported? Taking?    Alcohol Swabs (Alcohol Pads) 70 % PADS   Yes No   Sig: USE TO TEST BLOOD GLUCOSE 2-3 TIMES DAILY   Buprenorphine HCl (Belbuca) 300 MCG FILM  Self Yes No   Sig: Apply 300 mcg to cheek in the morning   Butalbital-APAP-Caffeine (FIORICET PO)   Yes No   Sig: Take by mouth as needed   Fluticasone-Salmeterol (Advair) 500-50 mcg/dose inhaler   Yes No   Galcanezumab-gnlm (Emgality) 120 MG/ML SOAJ  Self Yes No   Sig: Inject 120 mg under the skin every 30 (thirty) days Last inj 1/19/23   HYDROcodone-acetaminophen (NORCO)  mg per tablet   Yes No   Sig: every 8 (eight) hours as needed   Melatonin 10 MG CAPS  Self Yes No   Sig: Take 1 tablet by mouth daily at bedtime as needed   OneTouch Ultra test strip   Yes No   Sig: USE TO TEST BLOOD GLUCOSE 2-3 TIMES DAILY   Polyethylene Glycol 3350 POWD   Yes No   Sig: take 17GM (DISSOLVED IN WATER) by mouth once daily   SUMAtriptan (IMITREX) 100 mg tablet   Yes No   True Comfort Safety Lancets MISC   Yes No   Sig: USE TO TEST BLOOD GLUCOSE 2-3 TIMES DAILY   Zoster Vac Recomb Adjuvanted (Shingrix) 50 MCG/0.5ML SUSR   Yes No   Sig: inject 0.5 milliliter intramuscularly   albuterol (PROVENTIL HFA,VENTOLIN HFA) 90 mcg/act inhaler  Self Yes No   Sig: Inhale 2 puffs every 6 (six) hours as needed   ammonium lactate (LAC-HYDRIN) 12 % lotion   Yes No   Sig: as needed   atorvastatin (LIPITOR) 80 mg tablet   Yes No   Sig: Take 80 mg by mouth every evening   benzonatate (TESSALON PERLES) 100 mg capsule   No No   Sig: Take 1 capsule (100 mg total) by mouth 3 (three) times a day as needed for cough   carboxymethylcellulose 0.5 % SOLN  Self No No   Sig: Administer 1 drop to both eyes daily as needed for dry eyes   cephalexin (KEFLEX) 500 mg capsule   Yes No   Sig: Take 1 capsule by mouth every 12 (twelve) hours   ciprofloxacin (CIPRO) 250 mg tablet   Yes No   Sig: take 1 tablet by mouth twice a day for 7 days   clonazePAM (KlonoPIN) 0.5 mg tablet  Self Yes No   Sig: Take 0.5 mg by mouth daily at bedtime   clonazePAM (KlonoPIN) 1 mg tablet  Self Yes No   Sig: Take 1 mg by mouth daily in the early morning   dicyclomine (BENTYL) 20 mg tablet  Self Yes No   Sig: Take 20 mg by mouth every 6 (six) hours   doxycycline hyclate (VIBRA-TABS) 100 mg tablet   Yes No   Sig: take 1 tablet by mouth twice a day for 7 days   Patient not taking: Reported on 2023   famotidine (PEPCID) 40 MG tablet   No No   Sig: Take 1 tablet (40 mg total) by mouth daily at bedtime   fenofibrate (TRICOR) 48 mg tablet  Self Yes No   Sig: Take 48 mg by mouth daily   fluconazole (DIFLUCAN) 150 mg tablet   Yes No   Sig: take 1 tablet by mouth for 1 day   fluticasone (FLONASE) 50 mcg/act nasal spray   Yes No   Si sprays into each nostril daily fluticasone-umeclidinium-vilanterol (Trelegy Ellipta) 200-62.5-25 mcg/actuation AEPB inhaler   No No   Sig: Inhale 1 puff daily Rinse mouth after use.    furosemide (LASIX) 20 mg tablet  Self Yes No   Sig: Take 20 mg by mouth as needed Taking as needed   ibuprofen (MOTRIN) 800 mg tablet   Yes No   Sig: take 1 tablet by mouth every 8 hours if needed for mild pain or fever for up to 5 days   lamoTRIgine (LaMICtal) 100 mg tablet   Yes No   Sig: Take 100 mg by mouth 2 (two) times a day   latanoprost (XALATAN) 0.005 % ophthalmic solution   Yes No   Sig: instill 1 drop into both eyes at bedtime   levalbuterol (XOPENEX) 1.25 mg/3 mL nebulizer solution   Yes No   Sig: Inhale 1.25 mg every 4 (four) hours as needed   levothyroxine 137 mcg tablet  Self Yes No   Sig: Take 137 mcg by mouth daily   magnesium 30 MG tablet  Self Yes No   Sig: Take 500 mg by mouth 2 (two) times a day 1/26/23 right now not taking   methylPREDNISolone 4 MG tablet therapy pack   No No   Sig: Use as directed on package   metroNIDAZOLE (FLAGYL) 500 mg tablet   Yes No   Sig: Take 1 tablet by mouth 2 (two) times a day   naloxone (Narcan) 4 mg/0.1 mL nasal spray   Yes No   Patient not taking: Reported on 7/14/2023   omeprazole (PriLOSEC) 40 MG capsule   No No   Sig: Take 1 capsule (40 mg total) by mouth 2 (two) times a day   ondansetron (ZOFRAN) 4 mg tablet   Yes No   Sig: Take 4 mg by mouth every 8 (eight) hours as needed   ondansetron (ZOFRAN) 8 mg tablet   No No   Sig: Take 1 tablet (8 mg total) by mouth every 8 (eight) hours as needed for nausea or vomiting   pregabalin (LYRICA) 100 mg capsule  Self Yes No   Sig: Take 100 mg by mouth 3 (three) times a day Morning-lunch-dinner   pregabalin (LYRICA) 150 mg capsule  Self Yes No   Sig: take 1 capsule by mouth NIGHTLY   risperiDONE (RisperDAL) 0.5 mg tablet   Yes No   Sig: Take 0.25 mg by mouth 2 (two) times a day   Patient not taking: Reported on 7/14/2023   rizatriptan (MAXALT-MLT) 10 mg disintegrating tablet   Yes No   Sig: TAKE 1 TABLET BY MOUTH ONE TIME AS NEEDED FOR MIGRAINE, MAY REPEA. ..  (REFER TO PRESCRIPTION NOTES).    tiZANidine (ZANAFLEX) 4 mg tablet   Yes No   Sig: take 2 tablets by mouth every 6 hours if needed for muscle spasm   topiramate (TOPAMAX) 100 mg tablet  Self Yes No   Sig: Take 100 mg by mouth every 6 (six) hours as needed Takes 2 tabs every 6 hours   topiramate (TOPAMAX) 200 MG tablet   Yes No      Facility-Administered Medications: None       Past Medical History:   Diagnosis Date   • Ambulates with cane    • Anxiety    • Arthritis    • Asthma    • Bipolar 1 disorder ([facility])    • Brain aneurysm    • Chronic pain disorder     spinal stenosis   • Concussion syndrome     neurological rx and balance rx   • COPD (chronic obstructive pulmonary disease) ([facility])    • Depression    • Diabetes mellitus ([address])     controlled w/ diet   • Disease of thyroid gland     nodules    • Family history of colon cancer     father   • Fibromyalgia, primary    • GERD (gastroesophageal reflux disease)    • History of colon polyps    • Hyperlipidemia    • Hypertension    • 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    • Irritable bowel syndrome    • Lumbar degenerative disc disease 02/16/2022   • Migraine    • Neuropathy     bilateral feet and hands   • Peripheral neuropathy    • Psychiatric disorder    • PTSD (post-traumatic stress disorder)    • Shortness of breath    • Sleep apnea     had 3 studies & last one negative   • Stroke ([address])     2012 no deficeits   • TIA (transient ischemic attack)    • Wears dentures    • Wears glasses        Past Surgical History:   Procedure Laterality Date   • ABDOMINAL SURGERY      laproscopic/ endometriosis   • BRAIN SURGERY      aneurysm/ coiling procedure   • CARPAL TUNNEL RELEASE     • CHOLECYSTECTOMY     • COLONOSCOPY     • DENTAL SURGERY     • EPIDURAL BLOCK INJECTION Right 03/03/2022    Procedure: C7-T1 interlaminar epidural steroid injection;  Surgeon: Antolin Luna MD Rosi;  Location: [facility];  Service: Pain Management    • EPIDURAL BLOCK INJECTION N/A 04/21/2022    Procedure: C6-C7 BLOCK / INJECTION EPIDURAL STEROID CERVICAL;  Surgeon: Prema Jha MD;  Location: [facility];  Service: Pain Management    • EPIDURAL BLOCK INJECTION Left 06/21/2022    Procedure: BLOCK / INJECTION EPIDURAL STEROID LUMBAR  left L3-4 TFESI;  Surgeon: Prema Jha MD;  Location: [facility];  Service: Pain Management    • EYE SURGERY      cataract   • FL GUIDED NEEDLE PLAC BX/ASP/INJ  03/03/2022   • FL GUIDED NEEDLE PLAC BX/ASP/INJ  11/17/2022   • HYSTERECTOMY     • FL ESOPHAGOGASTRODUODENOSCOPY TRANSORAL DIAGNOSTIC N/A 02/08/2018    Procedure: EGD AND COLONOSCOPY;  Surgeon: Vane Lala MD;  Location:  GI LAB; Service: Gastroenterology   • FL PRQ IMPLTJ NSTIM ELECTRODE ARRAY EPIDURAL N/A 11/17/2022    Procedure: Yoana Lazo SCS TRIAL;  Surgeon: Prema Jha MD;  Location: [facility];  Service: Pain Management    • FL PRQ IMPLTJ NSTIM ELECTRODE ARRAY EPIDURAL N/A 2/1/2023    Procedure: Insertion percutaneous thoracic spinal cord stimulator with left buttock implantable pulse generator;  Surgeon: Brittany Campa MD;  Location:  MAIN OR;  Service: Neurosurgery   • THYROID SURGERY     • TONSILLECTOMY     • [address] THYROID BIOPSY  11/30/2016   • [place] GUIDED THYROID BIOPSY  3/21/2018       Family History   Problem Relation Age of Onset   • Brain cancer Mother    • Alzheimer's disease Mother    • Alzheimer's disease Father    • Parkinsonism Father      I have reviewed and agree with the history as documented.     E-Cigarette/Vaping   • E-Cigarette Use Never User      E-Cigarette/Vaping Substances   • Nicotine No    • THC No    • CBD No    • Flavoring No    • Other No    • Unknown No      Social History     Tobacco Use   • Smoking status: Every Day     Packs/day: 2.00     Types: Cigarettes   • Smokeless tobacco: Never   Vaping Use   • Vaping Use: Never used Substance Use Topics   • Alcohol use: Not Currently   • Drug use: Never     Comment: prescribed Belbuca        Review of Systems   Constitutional: Negative for fever. Eyes: Negative for photophobia and visual disturbance. Respiratory: Positive for cough (chronic). Negative for shortness of breath and wheezing. Cardiovascular: Negative for chest pain and leg swelling. Gastrointestinal: Positive for diarrhea (3 weeks). Negative for nausea and vomiting. Endocrine: Negative for polyuria. Genitourinary: Negative for difficulty urinating, enuresis, flank pain, frequency and urgency. Musculoskeletal: Positive for back pain and gait problem (able to ambulate but slightly worse than usual - requiring her cane in the house). Neurological: Positive for numbness and headaches (typical migraine). Negative for dizziness, tremors, syncope, facial asymmetry, speech difficulty, weakness and light-headedness. Psychiatric/Behavioral: Negative for confusion. All other systems reviewed and are negative. Physical Exam  ED Triage Vitals   Temperature Pulse Respirations Blood Pressure SpO2   09/18/23 1505 09/18/23 1505 09/18/23 1505 09/18/23 1506 09/18/23 1505   97.7 °F (36.5 °C) 60 18 111/60 99 %      Temp Source Heart Rate Source Patient Position - Orthostatic VS BP Location FiO2 (%)   09/18/23 1505 09/18/23 1505 -- -- --   Temporal Monitor         Pain Score       09/18/23 1505       10 - Worst Possible Pain             Orthostatic Vital Signs  Vitals:    09/18/23 1505 09/18/23 1506 09/18/23 1700   BP:  111/60    Pulse: 60  65       Physical Exam  Vitals and nursing note reviewed. Constitutional:       General: She is in acute distress (due to pain). Appearance: She is not ill-appearing, toxic-appearing or diaphoretic. HENT:      Head: Normocephalic and atraumatic. Nose: Nose normal.      Mouth/Throat:      Mouth: Mucous membranes are moist.      Pharynx: Oropharynx is clear.    Eyes: Extraocular Movements: Extraocular movements intact. Conjunctiva/sclera: Conjunctivae normal.      Pupils: Pupils are equal, round, and reactive to light. Cardiovascular:      Rate and Rhythm: Normal rate and regular rhythm. Pulses: Normal pulses. Heart sounds: Normal heart sounds. Pulmonary:      Effort: Pulmonary effort is normal. No respiratory distress. Breath sounds: Normal breath sounds. Chest:      Chest wall: No tenderness. Abdominal:      General: There is no distension. Palpations: Abdomen is soft. Tenderness: There is no abdominal tenderness. There is no guarding or rebound. Musculoskeletal:         General: Tenderness (due to chronic neuropathy in left great toe) present. No swelling, deformity or signs of injury. Normal range of motion. Cervical back: Normal range of motion and neck supple. No rigidity. Right lower leg: No edema. Left lower leg: No edema. Comments: Straight leg raise positive, LLE   Skin:     General: Skin is warm and dry. Coloration: Skin is not jaundiced or pale. Findings: No bruising, erythema or lesion. Neurological:      Mental Status: She is alert and oriented to person, place, and time. Cranial Nerves: No cranial nerve deficit. Sensory: Sensory deficit (neuropathy) present. Motor: No weakness.    Psychiatric:         Mood and Affect: Mood normal.         Behavior: Behavior normal.         ED Medications  Medications   methocarbamol (ROBAXIN) tablet 500 mg (500 mg Oral Given 9/18/23 1607)   droperidol (INAPSINE) injection 0.625 mg (0.625 mg Intravenous Given 9/18/23 1618)   ketorolac (TORADOL) injection 15 mg (15 mg Intravenous Given 9/18/23 1615)   acetaminophen (TYLENOL) tablet 650 mg (650 mg Oral Given 9/18/23 1607)   dexamethasone (PF) (DECADRON) injection 10 mg (10 mg Intravenous Given 9/18/23 1619)   diphenhydrAMINE (BENADRYL) injection 25 mg (25 mg Intravenous Given 9/18/23 1616) magnesium sulfate 2 g/50 mL IVPB (premix) 2 g (0 g Intravenous Stopped 9/18/23 1718)   sodium chloride 0.9 % bolus 1,000 mL (0 mL Intravenous Stopped 9/18/23 1718)       Diagnostic Studies  Results Reviewed     None                 CT spine thoracic & lumbar wo contrast   Final Result by Mark Cook MD (09/18 1626)         1. Healing left L1, L2 and L3 transverse process fractures. No evidence of acute thoracic or lumbar spine fracture. 2. Stable disc degenerative change at L1-2. Workstation performed: [id number]               Procedures  Procedures      ED Course       No indication of cauda equina, spinal epidural abscess, transverse myelitis, etc. Pt reassured by improvement in CT T/L spine imaging. Migraine resolved and back pain improved with migraine cocktail + robaxin. Pt able to ambulate with cane in ED, has home nursing help and does not want to be observed/admitted. Discussed return precautions and follow-up to which she is agreeable. She will continue her medications as prescribed. Hemodynamically stable, no acute distress. SBIRT 22yo+    Flowsheet Row Most Recent Value   Initial Alcohol Screen: US AUDIT-C     1. How often do you have a drink containing alcohol? 0 Filed at: 09/18/2023 1544   2. How many drinks containing alcohol do you have on a typical day you are drinking? 0 Filed at: 09/18/2023 1544   3a. Male UNDER 65: How often do you have five or more drinks on one occasion? 0 Filed at: 09/18/2023 1544   3b. FEMALE Any Age, or MALE 65+: How often do you have 4 or more drinks on one occassion? 0 Filed at: 09/18/2023 1544   Audit-C Score 0 Filed at: 09/18/2023 1544   ABIOLA: How many times in the past year have you. .. Used an illegal drug or used a prescription medication for non-medical reasons? Never Filed at: 09/18/2023 1544                Medical Decision Making  66 y/o f presenting with acute on chronic mid to low back pain.  She was stretching out of her bed to grab something and fell onto the floor onto her low back/buttocks which caused her chronic back pain to feel much worse the past three days. Her usual hydrocodone did not help, bending over to pick things off the floor and laying flat exacerbates the pain. No urinary/bowel incontinence, no saddle anesthesia, no significant weakness, no . Also has a spine stimulator she is concerned regarding the placement. Pt has neuropathy Lt>Rt LE which has been exacerbated for the past few days. Pt had compression fracture of L1 on August first after a fall at home. Hx of recurrent migraines, currently has had her typical migraine symptoms for the past week additionally. No indication of cauda equina, spinal epidural abscess, transverse myelitis, etc.  Pt reassured by improvement in CT T/L spine imaging. Migraine resolved and back pain improved with migraine cocktail + robaxin. Pt able to ambulate with cane in ED, has home nursing help and does not want to be observed/admitted. Discussed return precautions and follow-up to which she is agreeable. She will continue her medications as prescribed. Hemodynamically stable, no acute distress. Amount and/or Complexity of Data Reviewed  Radiology: ordered. Risk  OTC drugs. Prescription drug management. DDx including but not limited to: sciatica, herniated disc, arthritis, spinal stenosis, strain, sprain, fracture. Doubt: cauda equina syndrome, epidural abscess, transverse myelitis, AAA.      Disposition  Final diagnoses:   Back pain - Acute on chronic, both thoracic and lumbar   Sciatica of left side   Migraine headache     Time reflects when diagnosis was documented in both MDM as applicable and the Disposition within this note     Time User Action Codes Description Comment    9/18/2023  5:21 PM Brandie Wylie Add [M54.9] Back pain     9/18/2023  5:21 PM Brandie Wylie Modify [M54.9] Back pain Acute on chronic, both thoracic and lumbar 9/18/2023  5:22 PM Brandie Inessa Add [M54.32] Sciatica of left side     9/18/2023  5:22 PM Brandie Wylie Add [B90.484] Migraine headache       ED Disposition     ED Disposition   Discharge    Condition   Stable    Date/Time   Mon Sep 18, 2023  5:21 PM    Comment   Conrad Woodall discharge to home/self care.                Follow-up Information     Follow up With Specialties Details Why Contact Info    Radha Bains MD Family Medicine In 1 week  [address] [place] [id number] [phone]            Discharge Medication List as of 9/18/2023  5:38 PM      CONTINUE these medications which have NOT CHANGED    Details   albuterol (PROVENTIL HFA,VENTOLIN HFA) 90 mcg/act inhaler Inhale 2 puffs every 6 (six) hours as needed, Starting Thu 12/28/2017, Historical Med      Alcohol Swabs (Alcohol Pads) 70 % PADS USE TO TEST BLOOD GLUCOSE 2-3 TIMES DAILY, Historical Med      ammonium lactate (LAC-HYDRIN) 12 % lotion as needed, Historical Med      atorvastatin (LIPITOR) 80 mg tablet Take 80 mg by mouth every evening, Starting Fri 10/14/2022, Historical Med      benzonatate (TESSALON PERLES) 100 mg capsule Take 1 capsule (100 mg total) by mouth 3 (three) times a day as needed for cough, Starting Tue 5/2/2023, Normal      Buprenorphine HCl (Belbuca) 300 MCG FILM Apply 300 mcg to cheek in the morning, Historical Med      Butalbital-APAP-Caffeine (FIORICET PO) Take by mouth as needed, Historical Med      carboxymethylcellulose 0.5 % SOLN Administer 1 drop to both eyes daily as needed for dry eyes, Starting Thu 2/7/2019, Normal      cephalexin (KEFLEX) 500 mg capsule Take 1 capsule by mouth every 12 (twelve) hours, Historical Med      ciprofloxacin (CIPRO) 250 mg tablet take 1 tablet by mouth twice a day for 7 days, Historical Med      !! clonazePAM (KlonoPIN) 0.5 mg tablet Take 0.5 mg by mouth daily at bedtime, Historical Med      !! clonazePAM (KlonoPIN) 1 mg tablet Take 1 mg by mouth daily in the early morning, Starting Thu 1/25/2018, Historical Med      dicyclomine (BENTYL) 20 mg tablet Take 20 mg by mouth every 6 (six) hours, Historical Med      doxycycline hyclate (VIBRA-TABS) 100 mg tablet take 1 tablet by mouth twice a day for 7 days, Historical Med      famotidine (PEPCID) 40 MG tablet Take 1 tablet (40 mg total) by mouth daily at bedtime, Starting Thu 8/17/2023, Normal      fenofibrate (TRICOR) 48 mg tablet Take 48 mg by mouth daily, Historical Med      fluconazole (DIFLUCAN) 150 mg tablet take 1 tablet by mouth for 1 day, Historical Med      fluticasone (FLONASE) 50 mcg/act nasal spray 2 sprays into each nostril daily, Starting Wed 1/11/2023, Historical Med      Fluticasone-Salmeterol (Advair) 500-50 mcg/dose inhaler Starting Wed 8/16/2023, Historical Med      fluticasone-umeclidinium-vilanterol (Trelegy Ellipta) 200-62.5-25 mcg/actuation AEPB inhaler Inhale 1 puff daily Rinse mouth after use., Starting Thu 5/18/2023, Normal      furosemide (LASIX) 20 mg tablet Take 20 mg by mouth as needed Taking as needed, Historical Med      Galcanezumab-gnlm (Emgality) 120 MG/ML SOAJ Inject 120 mg under the skin every 30 (thirty) days Last inj 1/19/23, Historical Med      HYDROcodone-acetaminophen (NORCO)  mg per tablet every 8 (eight) hours as needed, Starting Fri 3/24/2023, Historical Med      ibuprofen (MOTRIN) 800 mg tablet take 1 tablet by mouth every 8 hours if needed for mild pain or fever for up to 5 days, Historical Med      lamoTRIgine (LaMICtal) 100 mg tablet Take 100 mg by mouth 2 (two) times a day, Starting Tue 1/16/2018, Until Fri 7/14/2023, Historical Med      latanoprost (XALATAN) 0.005 % ophthalmic solution instill 1 drop into both eyes at bedtime, Historical Med      levalbuterol (XOPENEX) 1.25 mg/3 mL nebulizer solution Inhale 1.25 mg every 4 (four) hours as needed, Starting Tue 1/24/2023, Until Wed 1/24/2024 at 2359, Historical Med      levothyroxine 137 mcg tablet Take 137 mcg by mouth daily, Starting Mon 1/21/2019, Historical Med      magnesium 30 MG tablet Take 500 mg by mouth 2 (two) times a day 1/26/23 right now not taking, Historical Med      Melatonin 10 MG CAPS Take 1 tablet by mouth daily at bedtime as needed, Historical Med      methylPREDNISolone 4 MG tablet therapy pack Use as directed on package, Normal      metroNIDAZOLE (FLAGYL) 500 mg tablet Take 1 tablet by mouth 2 (two) times a day, Historical Med      naloxone (Narcan) 4 mg/0.1 mL nasal spray Historical Med      omeprazole (PriLOSEC) 40 MG capsule Take 1 capsule (40 mg total) by mouth 2 (two) times a day, Starting Thu 8/17/2023, Normal      !! ondansetron (ZOFRAN) 4 mg tablet Take 4 mg by mouth every 8 (eight) hours as needed, Starting Tue 5/9/2023, Historical Med      !! ondansetron (ZOFRAN) 8 mg tablet Take 1 tablet (8 mg total) by mouth every 8 (eight) hours as needed for nausea or vomiting, Starting Wed 7/13/2022, Normal      OneTouch Ultra test strip USE TO TEST BLOOD GLUCOSE 2-3 TIMES DAILY, Historical Med      Polyethylene Glycol 3350 POWD take 17GM (DISSOLVED IN WATER) by mouth once daily, Historical Med      !! pregabalin (LYRICA) 100 mg capsule Take 100 mg by mouth 3 (three) times a day Morning-lunch-dinner, Starting Thu 10/26/2017, Historical Med      !! pregabalin (LYRICA) 150 mg capsule take 1 capsule by mouth NIGHTLY, Historical Med      risperiDONE (RisperDAL) 0.5 mg tablet Take 0.25 mg by mouth 2 (two) times a day, Starting Sat 4/15/2023, Historical Med      rizatriptan (MAXALT-MLT) 10 mg disintegrating tablet TAKE 1 TABLET BY MOUTH ONE TIME AS NEEDED FOR MIGRAINE, MAY REPEA. ..  (REFER TO PRESCRIPTION NOTES). , Historical Med      SUMAtriptan (IMITREX) 100 mg tablet Historical Med      tiZANidine (ZANAFLEX) 4 mg tablet take 2 tablets by mouth every 6 hours if needed for muscle spasm, Historical Med      !! topiramate (TOPAMAX) 100 mg tablet Take 100 mg by mouth every 6 (six) hours as needed Takes 2 tabs every 6 hours, Historical Med      !! topiramate (TOPAMAX) 200 MG tablet Starting Mon 6/5/2023, Historical Med      True Comfort Safety Lancets MISC USE TO TEST BLOOD GLUCOSE 2-3 TIMES DAILY, Historical Med      Zoster Vac Recomb Adjuvanted (Shingrix) 50 MCG/0.5ML SUSR inject 0.5 milliliter intramuscularly, Historical Med       !! - Potential duplicate medications found. Please discuss with provider. No discharge procedures on file. PDMP Review       Value Time User    PDMP Reviewed  Yes 7/15/2023  3:09 PM Jodi Araya PA-C           ED Provider  Attending physically available and evaluated March Overall. I managed the patient along with the ED Attending.     Electronically Signed by         Delfino Doherty DO  09/19/23 0107

## 2023-09-22 ENCOUNTER — OFFICE VISIT (OUTPATIENT)
Dept: PAIN MEDICINE | Facility: CLINIC | Age: 63
End: 2023-09-22
Payer: COMMERCIAL

## 2023-09-22 ENCOUNTER — OFFICE VISIT (OUTPATIENT)
Dept: GASTROENTEROLOGY | Facility: CLINIC | Age: 63
End: 2023-09-22
Payer: COMMERCIAL

## 2023-09-22 VITALS
SYSTOLIC BLOOD PRESSURE: 116 MMHG | TEMPERATURE: 97.7 F | HEART RATE: 71 BPM | OXYGEN SATURATION: 95 % | HEIGHT: 64 IN | BODY MASS INDEX: 28.75 KG/M2 | DIASTOLIC BLOOD PRESSURE: 72 MMHG | RESPIRATION RATE: 16 BRPM | WEIGHT: 168.4 LBS

## 2023-09-22 VITALS
DIASTOLIC BLOOD PRESSURE: 71 MMHG | HEIGHT: 64 IN | SYSTOLIC BLOOD PRESSURE: 123 MMHG | HEART RATE: 71 BPM | BODY MASS INDEX: 28.67 KG/M2

## 2023-09-22 DIAGNOSIS — K21.9 GASTROESOPHAGEAL REFLUX DISEASE, UNSPECIFIED WHETHER ESOPHAGITIS PRESENT: ICD-10-CM

## 2023-09-22 DIAGNOSIS — M51.36 LUMBAR DEGENERATIVE DISC DISEASE: ICD-10-CM

## 2023-09-22 DIAGNOSIS — M54.50 CHRONIC MIDLINE LOW BACK PAIN WITHOUT SCIATICA: ICD-10-CM

## 2023-09-22 DIAGNOSIS — M54.16 LUMBAR RADICULOPATHY: ICD-10-CM

## 2023-09-22 DIAGNOSIS — M47.812 CERVICAL SPONDYLOSIS: ICD-10-CM

## 2023-09-22 DIAGNOSIS — K58.2 IRRITABLE BOWEL SYNDROME WITH BOTH CONSTIPATION AND DIARRHEA: ICD-10-CM

## 2023-09-22 DIAGNOSIS — R10.9 INTRACTABLE ABDOMINAL PAIN: Primary | ICD-10-CM

## 2023-09-22 DIAGNOSIS — G89.29 CHRONIC MIDLINE LOW BACK PAIN WITHOUT SCIATICA: ICD-10-CM

## 2023-09-22 DIAGNOSIS — Z12.11 SCREENING FOR COLON CANCER: ICD-10-CM

## 2023-09-22 DIAGNOSIS — K83.8 COMMON BILE DUCT DILATION: ICD-10-CM

## 2023-09-22 DIAGNOSIS — K59.03 DRUG-INDUCED CONSTIPATION: ICD-10-CM

## 2023-09-22 DIAGNOSIS — M54.2 NECK PAIN: ICD-10-CM

## 2023-09-22 DIAGNOSIS — M47.816 LUMBAR SPONDYLOSIS: ICD-10-CM

## 2023-09-22 DIAGNOSIS — I72.0 CAROTID ARTERY ANEURYSM (HCC): ICD-10-CM

## 2023-09-22 DIAGNOSIS — G89.4 CHRONIC PAIN SYNDROME: Primary | ICD-10-CM

## 2023-09-22 PROBLEM — K59.00 CONSTIPATION: Status: ACTIVE | Noted: 2021-02-11

## 2023-09-22 PROCEDURE — 99214 OFFICE O/P EST MOD 30 MIN: CPT | Performed by: NURSE PRACTITIONER

## 2023-09-22 RX ORDER — LUBIPROSTONE 8 UG/1
CAPSULE ORAL
Qty: 60 CAPSULE | Refills: 2 | Status: SHIPPED | OUTPATIENT
Start: 2023-09-22

## 2023-09-22 RX ORDER — POLYETHYLENE GLYCOL 3350 17 G/17G
POWDER, FOR SOLUTION ORAL
Qty: 578 G | Refills: 3 | Status: SHIPPED | OUTPATIENT
Start: 2023-09-22

## 2023-09-22 NOTE — PROGRESS NOTES
Assessment:  1. Chronic pain syndrome    2. Neck pain    3. Cervical spondylosis    4. Chronic midline low back pain without sciatica    5. Lumbar degenerative disc disease    6. Lumbar radiculopathy    7. Lumbar spondylosis    8. Carotid artery aneurysm (HCC)        Plan:  While the patient was in the office today, I did have a thorough conversation regarding their chronic pain syndrome, medication management, and treatment plan options. Patient is being seen for a follow-up visit. She was scheduled for left sided C3-4 and C4-5 medial branch block in July. It was postponed due to cellulitis at the time. After that, she developed diarrhea. I advised that she complete work-up to rule out infectious diarrhea before we proceed with interventional therapy. Patient tells me that she did have labs and stool tested. Infectious diarrhea was ruled out. She is no longer experiencing diarrhea. She is still following up with GI regarding intermittent abdominal pain. At this point, we will proceed with left-sided C3-4 and C4-5 medial branch block #1. Continue tizanidine, Lyrica, hydrocodone as prescribed by her PCP. History of Present Illness: The patient is a 65 y.o. female who presents for a follow up office visit in regards to Headache, Neck Pain, Shoulder Pain, Chest Pain, Back Pain, Elbow Pain, Knee Pain, and Foot Pain. The patient’s current symptoms include complaints of neck pain, mid back pain, low back pain. Quality pain is described as burning, sharp, throbbing, pressure-like, shooting, numb, pins-and-needles. Current pain level is a 9/10. Current pain medications includes: PCP prescribes hydrocodone 10/325 every 8 hours as needed for pain, Lyrica 100 mg 3 times daily and 150 mg at bedtime, tizanidine 4 mg every 6 hours if needed for spasms. The patient reports that this regimen is providing 20-25% pain relief.   The patient is reporting no side effects from this pain medication regimen. I have personally reviewed and/or updated the patient's past medical history, past surgical history, family history, social history, current medications, allergies, and vital signs today. Review of Systems  Review of Systems   Constitutional: Negative for unexpected weight change. HENT: Negative for hearing loss. Eyes: Negative for visual disturbance. Respiratory: Negative for shortness of breath. Cardiovascular: Negative for leg swelling. Gastrointestinal: Positive for diarrhea and nausea. Endocrine: Negative for polyuria. Genitourinary: Negative for difficulty urinating. Musculoskeletal: Positive for arthralgias, gait problem and myalgias. Negative for joint swelling. Decreased range of motion  Joint stiffness  Swelling- foot  Pain in extremity- arm, leg, feet   Skin: Negative for rash. Neurological: Negative for weakness and headaches. Memory loss   Psychiatric/Behavioral: Negative for decreased concentration. All other systems reviewed and are negative.         Past Medical History:   Diagnosis Date   • Ambulates with cane    • Anxiety    • Arthritis    • Asthma    • Bipolar 1 disorder ([address])    • Brain aneurysm    • Chronic pain disorder     spinal stenosis   • Concussion syndrome     neurological rx and balance rx   • COPD (chronic obstructive pulmonary disease) (HCC)    • Depression    • Diabetes mellitus (HCC)     controlled w/ diet   • Disease of thyroid gland     nodules    • Family history of colon cancer     father   • Fibromyalgia, primary    • GERD (gastroesophageal reflux disease)    • History of colon polyps    • Hyperlipidemia    • Hypertension    • 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    • Irritable bowel syndrome    • Lumbar degenerative disc disease 02/16/2022   • Migraine    • Neuropathy     bilateral feet and hands   • Peripheral neuropathy    • Psychiatric disorder    • PTSD (post-traumatic stress disorder)    • Shortness of breath    • Sleep apnea     had 3 studies & last one negative   • Stroke ([address])     2012 no deficeits   • TIA (transient ischemic attack)    • Wears dentures    • Wears glasses        Past Surgical History:   Procedure Laterality Date   • ABDOMINAL SURGERY      laproscopic/ endometriosis   • BRAIN SURGERY      aneurysm/ coiling procedure   • CARPAL TUNNEL RELEASE     • CHOLECYSTECTOMY     • COLONOSCOPY     • DENTAL SURGERY     • EPIDURAL BLOCK INJECTION Right 03/03/2022    Procedure: C7-T1 interlaminar epidural steroid injection;  Surgeon: Danielito Gardiner MD;  Location: [facility];  Service: Pain Management    • EPIDURAL BLOCK INJECTION N/A 04/21/2022    Procedure: C6-C7 BLOCK / INJECTION EPIDURAL STEROID CERVICAL;  Surgeon: Danielito Gardiner MD;  Location: [facility];  Service: Pain Management    • EPIDURAL BLOCK INJECTION Left 06/21/2022    Procedure: BLOCK / INJECTION EPIDURAL STEROID LUMBAR  left L3-4 TFESI;  Surgeon: Danielito Gardiner MD;  Location: [facility];  Service: Pain Management    • EYE SURGERY      cataract   • FL GUIDED NEEDLE PLAC BX/ASP/INJ  03/03/2022   • FL GUIDED NEEDLE PLAC BX/ASP/INJ  11/17/2022   • HYSTERECTOMY     • LA ESOPHAGOGASTRODUODENOSCOPY TRANSORAL DIAGNOSTIC N/A 02/08/2018    Procedure: EGD AND COLONOSCOPY;  Surgeon: Andra Rey MD;  Location: [facility]; Service: Gastroenterology   • LA PRQ IMPLTJ NSTIM ELECTRODE ARRAY EPIDURAL N/A 11/17/2022    Procedure: [facility] SCS TRIAL;  Surgeon: Danielito Gardiner MD;  Location: [facility];  Service: Pain Management    • LA PRQ IMPLTJ NSTIM ELECTRODE ARRAY EPIDURAL N/A 2/1/2023    Procedure:  Insertion percutaneous thoracic spinal cord stimulator with left buttock implantable pulse generator;  Surgeon: Luis Alvarez MD;  [facility];  Service: Neurosurgery   • THYROID SURGERY     • TONSILLECTOMY     • [address] THYROID BIOPSY  11/30/2016   • US GUIDED THYROID BIOPSY  3/21/2018       Family History   Problem Relation Age of Onset   • Brain cancer Mother    • Alzheimer's disease Mother    • Alzheimer's disease Father    • Parkinsonism Father        Social History     Occupational History   • Not on file   Tobacco Use   • Smoking status: Every Day     Packs/day: 2.00     Types: Cigarettes   • Smokeless tobacco: Never   Vaping Use   • Vaping Use: Never used   Substance and Sexual Activity   • Alcohol use: Not Currently   • Drug use: Never     Comment: prescribed Belbuca   • Sexual activity: Yes     Partners: Male         Current Outpatient Medications:   •  albuterol (PROVENTIL HFA,VENTOLIN HFA) 90 mcg/act inhaler, Inhale 2 puffs every 6 (six) hours as needed, Disp: , Rfl:   •  Alcohol Swabs (Alcohol Pads) 70 % PADS, USE TO TEST BLOOD GLUCOSE 2-3 TIMES DAILY, Disp: , Rfl:   •  ammonium lactate (LAC-HYDRIN) 12 % lotion, as needed, Disp: , Rfl:   •  atorvastatin (LIPITOR) 80 mg tablet, Take 80 mg by mouth every evening, Disp: , Rfl:   •  benzonatate (TESSALON PERLES) 100 mg capsule, Take 1 capsule (100 mg total) by mouth 3 (three) times a day as needed for cough, Disp: 30 capsule, Rfl: 1  •  Buprenorphine HCl (Belbuca) 300 MCG FILM, Apply 300 mcg to cheek in the morning, Disp: , Rfl:   •  Butalbital-APAP-Caffeine (FIORICET PO), Take by mouth as needed, Disp: , Rfl:   •  carboxymethylcellulose 0.5 % SOLN, Administer 1 drop to both eyes daily as needed for dry eyes, Disp: 1 Bottle, Rfl: 0  •  cephalexin (KEFLEX) 500 mg capsule, Take 1 capsule by mouth every 12 (twelve) hours, Disp: , Rfl:   •  ciprofloxacin (CIPRO) 250 mg tablet, take 1 tablet by mouth twice a day for 7 days, Disp: , Rfl:   •  clonazePAM (KlonoPIN) 0.5 mg tablet, Take 0.5 mg by mouth daily at bedtime, Disp: , Rfl:   •  clonazePAM (KlonoPIN) 1 mg tablet, Take 1 mg by mouth daily in the early morning, Disp: , Rfl:   •  dicyclomine (BENTYL) 20 mg tablet, Take 20 mg by mouth every 6 (six) hours, Disp: , Rfl:   •  famotidine (PEPCID) 40 MG tablet, Take 1 tablet (40 mg total) by mouth daily at bedtime, Disp: 30 tablet, Rfl: 6  •  fenofibrate (TRICOR) 48 mg tablet, Take 48 mg by mouth daily, Disp: , Rfl:   •  fluconazole (DIFLUCAN) 150 mg tablet, take 1 tablet by mouth for 1 day, Disp: , Rfl:   •  fluticasone (FLONASE) 50 mcg/act nasal spray, 2 sprays into each nostril daily, Disp: , Rfl:   •  Fluticasone-Salmeterol (Advair) 500-50 mcg/dose inhaler, , Disp: , Rfl:   •  fluticasone-umeclidinium-vilanterol (Trelegy Ellipta) 200-62.5-25 mcg/actuation AEPB inhaler, Inhale 1 puff daily Rinse mouth after use., Disp: 60 blister, Rfl: 5  •  furosemide (LASIX) 20 mg tablet, Take 20 mg by mouth as needed Taking as needed, Disp: , Rfl:   •  Galcanezumab-gnlm (Emgality) 120 MG/ML SOAJ, Inject 120 mg under the skin every 30 (thirty) days Last inj 1/19/23, Disp: , Rfl:   •  HYDROcodone-acetaminophen (NORCO)  mg per tablet, every 8 (eight) hours as needed, Disp: , Rfl:   •  ibuprofen (MOTRIN) 800 mg tablet, take 1 tablet by mouth every 8 hours if needed for mild pain or fever for up to 5 days, Disp: , Rfl:   •  latanoprost (XALATAN) 0.005 % ophthalmic solution, instill 1 drop into both eyes at bedtime, Disp: , Rfl:   •  levalbuterol (XOPENEX) 1.25 mg/3 mL nebulizer solution, Inhale 1.25 mg every 4 (four) hours as needed, Disp: , Rfl:   •  levothyroxine 137 mcg tablet, Take 137 mcg by mouth daily, Disp: , Rfl:   •  magnesium 30 MG tablet, Take 500 mg by mouth 2 (two) times a day 1/26/23 right now not taking, Disp: , Rfl:   •  Melatonin 10 MG CAPS, Take 1 tablet by mouth daily at bedtime as needed, Disp: , Rfl:   •  methylPREDNISolone 4 MG tablet therapy pack, Use as directed on package, Disp: 1 each, Rfl: 0  •  metroNIDAZOLE (FLAGYL) 500 mg tablet, Take 1 tablet by mouth 2 (two) times a day, Disp: , Rfl:   •  omeprazole (PriLOSEC) 40 MG capsule, Take 1 capsule (40 mg total) by mouth 2 (two) times a day, Disp: 60 capsule, Rfl: 5  •  ondansetron (ZOFRAN) 4 mg tablet, Take 4 mg by mouth every 8 (eight) hours as needed, Disp: , Rfl:   •  ondansetron (ZOFRAN) 8 mg tablet, Take 1 tablet (8 mg total) by mouth every 8 (eight) hours as needed for nausea or vomiting, Disp: 20 tablet, Rfl: 0  •  OneTouch Ultra test strip, USE TO TEST BLOOD GLUCOSE 2-3 TIMES DAILY, Disp: , Rfl:   •  Polyethylene Glycol 3350 POWD, take 17GM (DISSOLVED IN WATER) by mouth once daily, Disp: , Rfl:   •  pregabalin (LYRICA) 100 mg capsule, Take 100 mg by mouth 3 (three) times a day Morning-lunch-dinner, Disp: , Rfl:   •  pregabalin (LYRICA) 150 mg capsule, take 1 capsule by mouth NIGHTLY, Disp: , Rfl:   •  rizatriptan (MAXALT-MLT) 10 mg disintegrating tablet, TAKE 1 TABLET BY MOUTH ONE TIME AS NEEDED FOR MIGRAINE, MAY REPEA. ..  (REFER TO PRESCRIPTION NOTES). , Disp: , Rfl:   •  SUMAtriptan (IMITREX) 100 mg tablet, , Disp: , Rfl:   •  tiZANidine (ZANAFLEX) 4 mg tablet, take 2 tablets by mouth every 6 hours if needed for muscle spasm, Disp: , Rfl:   •  topiramate (TOPAMAX) 100 mg tablet, Take 100 mg by mouth every 6 (six) hours as needed Takes 2 tabs every 6 hours, Disp: , Rfl:   •  topiramate (TOPAMAX) 200 MG tablet, , Disp: , Rfl:   •  True Comfort Safety Lancets MISC, USE TO TEST BLOOD GLUCOSE 2-3 TIMES DAILY, Disp: , Rfl:   •  Zoster Vac Recomb Adjuvanted (Shingrix) 50 MCG/0.5ML SUSR, inject 0.5 milliliter intramuscularly, Disp: , Rfl:   •  doxycycline hyclate (VIBRA-TABS) 100 mg tablet, take 1 tablet by mouth twice a day for 7 days (Patient not taking: Reported on 7/14/2023), Disp: , Rfl:   •  lamoTRIgine (LaMICtal) 100 mg tablet, Take 100 mg by mouth 2 (two) times a day, Disp: , Rfl:   •  naloxone (Narcan) 4 mg/0.1 mL nasal spray, , Disp: , Rfl:   •  risperiDONE (RisperDAL) 0.5 mg tablet, Take 0.25 mg by mouth 2 (two) times a day (Patient not taking: Reported on 7/14/2023), Disp: , Rfl:     Allergies   Allergen Reactions   • Hydroxyzine Anaphylaxis     Claims it gives her convulsions.     • Other Other (See Comments)   • Pollen Extract Itching   • Tree Extract    • Clarithromycin Rash     Pt denies   • Latex Rash   • Sulfa Antibiotics Rash     "severe skin burn"       Physical Exam:    /71   Pulse 71   Ht 5' 4" (1.626 m)   BMI 28.67 kg/m²     Constitutional:normal, well developed, well nourished, alert, in no distress and non-toxic and no overt pain behavior. Eyes:anicteric  HEENT:grossly intact  Neck:supple, symmetric, trachea midline and no masses   Pulmonary:even and unlabored  Cardiovascular:No edema or pitting edema present  Skin:Normal without rashes or lesions and well hydrated  Psychiatric:Mood and affect appropriate  Neurologic:Cranial Nerves II-XII grossly intact  Musculoskeletal:Gait is slow and guarded. Range of motion of the cervical spine is limited in all planes. There is tenderness bilaterally C2-C6. There are cervical paraspinal muscle spasms, bilaterally. Imaging  No orders to display       No orders of the defined types were placed in this encounter.

## 2023-09-22 NOTE — PATIENT INSTRUCTIONS
Re-start Miralax daily. Start Amitiza 8 mcg, 1 pill 2 x daily. Increase water intake to as close to 64 oz daily. F/U with psych and work on the things you can change. Schedule a follow up OV in 8-12 weeks.

## 2023-09-22 NOTE — PROGRESS NOTES
[facility] - Outpatient Follow-up Note  Rebekah Woodall 65 y.o. female MRN: [id number]  Encounter: [id number]          ASSESSMENT AND PLAN:    The patient presents today for follow-up office visit regarding her most recent MRI/MRCP results and CT scan results from a recent ER visit, along with her chronic luis pain, constipation, and irritable bowel syndrome diarrhea. Exam: Abdomen is moderately firm, distended, with moderate tenderness of the epigastric region as well as general abdominal tenderness with moderate guarding, without any rebound tenderness with very faint BS x 4. Mild edema noted of the b/l lower extremities upon exam today. Skin is non-icteric. 1. Intractable abdominal pain  2. Drug-induced constipation  3. Irritable bowel syndrome with both constipation and diarrhea  While the patient and her aide were in the office today, I had a very long thorough conversation with them regarding the patient's chronic symptoms and treatment plan options. I explained to the patient that I agree with the ER and her primary care provider that her diarrhea is most likely just overflow symptoms from her chronic constipation and that it is in her best interest for us to find a bowel regimen that keeps her having regular, relieving, nontrending bowel movements at least every other day, if not daily as I feel this would significantly help her chronic abdominal pain and relieve her chronic bloating. At this point I would like the patient to start back on the MiraLAX and take it every day and then in the meantime we are also going to add Amitiza 8 mcg, 1 p.o. twice daily for constipation. I explained to the patient that it is imperative she tries to adhere to the treatment plan recommendations that we provide for her and take the medications we prescribe as it is going to take time and that this is not something that is going to work overnight.   I explained to the patient and her caregiver that if she has any side effects or issues with the changes to the medication, to call our office. The patient was agreeable and verbalized an understanding. I thoroughly encouraged the patient to increase her overall water intake to as close to 64 ounces a day as possible. I encouraged the patient to try to slowly and steadily increase her activity, as tolerated, allowing pain to be her guide as this would be good for her overall chronic pain as well as her chronic constipation. - polyethylene glycol (GLYCOLAX) 17 GM/SCOOP powder; Take 17 g daily for constipation. Dispense: 578 g; Refill: 3  - lubiprostone (AMITIZA) 8 mcg capsule; Take 1 PO BID for constipation. Dispense: 60 capsule; Refill: 2    4. Screening for colon cancer  I discussed with the patient that at this point time since she does not have any significant changes in her bowel habits for the most part and this is a chronic issue as she just had a colonoscopy earlier in the year, for now, we will hold off any colonoscopies unless her symptoms do not improve or she starts to develop worsening red flag symptoms. The patient was agreeable and verbalized an understanding. DUE: 3/10/26    While the patient was in the office today we did review GI red flag symptoms, including, but not limited to: chronic nausea, vomiting, diarrhea, chills, fever, and unintentional weight loss and should call or contact our office with any changes or concerns. I reviewed with the patient that if they notice any blood while vomiting or in their stool they should contact or office or go to the nearest emergency room for immediate evaluation. The patient was agreeable and verbalized an understanding.     5. GERD  I discussed with the patient that at this point time since she is on the maximum dose of omeprazole, I do think it is in her best interest to restart the famotidine and to take it consistently as prescribed to try to help her chronic reflux symptoms. The patient was agreeable and verbalized an understanding. Encouraged the patient to continue to work on smoking cessation for overall health and reflux symptoms. Encouraged the patient to avoid trigger foods. 6. Common bile duct dilation  While the patient and her caregiver were in the office today, I did again review both the CT scan and MRI/MRCP results with the patient and explained that the common bile duct dilatation that was noted has been noted since 2021, is stable, and so far we have ruled out any kind of obstructive issues, tumors, and since her LFTs on her most recent blood work in August are completely normal, at this time I do not feel there is need for any other imaging or follow-up at this time and we will continue to monitor her LFTs at least every 6 months. The patient was agreeable and verbalized an understanding.    - Ambulatory Referral to Gastroenterology    The patient will schedule a follow up office visit in 8 to 12 weeks, but understands to call or contact our office if there are any issues or concerns in the mean time. ______________________________________________________________________    SUBJECTIVE: Patient is a 65 y.o. female who was previously seen in our office on 8/17/23 by LORE Ruiz PA-C and presents today for follow-up office visit regarding her most recent MRI/MRCP results and CT scan results from a recent ER visit, along with her chronic luis pain, constipation, and irritable bowel syndrome diarrhea. The patient reports that for the past 3 months she was having significant diarrhea and incontinence that was making it difficult for her to leave the house and reports that up until approximately 2 to 3 days ago this was one of her biggest issues, however, the symptoms have stopped and she is now constipated.   She reports that even when she was having the diarrhea she never felt as though she was fully relieved of her bowels and reports that at the ER they felt that her diarrhea was more most likely overflow diarrhea secondary to underlying constipation. Patient also recently had an MRI/MRCP which again showed common bile duct dilatation without any evidence of obstruction as well as the recent CT scan she had at the ER. Patient reports she is worried about the common bile duct dilatation and what it could mean with regards to her pain. The patient also has multiple other health issues and concerns she also deals with chronic pain and is on chronic opioids and muscle relaxers which are only adding to her GI issues. The patient also has a lot of mental health issues and has not been able to see a psychiatrist since last December and was finally able to to schedule an appointment with a new psychiatrist for early October. The patient reports that overall she knows that sometimes she is her own worst enemy as many times she does not follow the recommendations of her providers because she does not feel well, gets frustrated, and just gives up. The patient now has a full-time 40-hour nurses aide to help her with all of her issues and is trying hard to get everything on track so she can feel better and have a more fulfilling life. Meds: Famotidine 40 mg at bedtime, omeprazole 40 mg twice daily, Zofran as needed and was previously ordered MiraLAX, but reports she is not taking it like she should. NSAID Use: Denied    Tobacco: 2 pack/day smoker. Denied vaping. ETOH: Denied  Marijuana: Denied  Illicit Drug Use: Denied    Imaging: (9/13/23): MRI/MRCP of the abdomen: On today's exam, central intrahepatic and common duct distention appears stable since at least 2021. This is at least partially attributable to reservoir effect after cholecystectomy, though slightly more dilated than typically seen. No evidence for progressive obstructive cholangiopathy, choledocholithiasis, or compressive extrinsic mass.     Endoscopy History: EGD: 06/2023: esophagus, stomach and duodenum appeared normal; No gastric polyps noted so random biopsies were taken given previous history of random gastric biopsies that showed fundic gland polyp with dysplasia    COLONOSCOPY: (03/2023): Two sessile polyps measuring 5-9 mm in the transverse colon; Sessile polyp measuring 5-9 mm in the sigmoid colon; Three sessile polyps measuring smaller than 5 mm in the rectum. Recommendation to repeat colonoscopy in 3 years. DUE: 3/10/26     REVIEW OF SYSTEMS IS OTHERWISE NEGATIVE.       Historical Information   Past Medical History:   Diagnosis Date   • Ambulates with cane    • Anxiety    • Arthritis    • Asthma    • Bipolar 1 disorder ([address])    • Brain aneurysm    • Chronic pain disorder     spinal stenosis   • Concussion syndrome     neurological rx and balance rx   • COPD (chronic obstructive pulmonary disease) ([facility])    • Depression    • Diabetes mellitus ([address])     controlled w/ diet   • Disease of thyroid gland     nodules    • Family history of colon cancer     father   • Fibromyalgia, primary    • GERD (gastroesophageal reflux disease)    • History of colon polyps    • Hyperlipidemia    • Hypertension    • 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    • Irritable bowel syndrome    • Lumbar degenerative disc disease 02/16/2022   • Migraine    • Neuropathy     bilateral feet and hands   • Peripheral neuropathy    • Psychiatric disorder    • PTSD (post-traumatic stress disorder)    • Shortness of breath    • Sleep apnea     had 3 studies & last one negative   • Stroke ([address])     2012 no deficeits   • TIA (transient ischemic attack)    • Wears dentures    • Wears glasses      Past Surgical History:   Procedure Laterality Date   • ABDOMINAL SURGERY      laproscopic/ endometriosis   • BRAIN SURGERY      aneurysm/ coiling procedure   • CARPAL TUNNEL RELEASE     • CHOLECYSTECTOMY     • COLONOSCOPY     • DENTAL SURGERY     • EPIDURAL BLOCK INJECTION Right 03/03/2022    Procedure: C7-T1 interlaminar epidural steroid injection;  Surgeon: Jorge Villalobos MD;  Location: [facility];  Service: Pain Management    • EPIDURAL BLOCK INJECTION N/A 04/21/2022    Procedure: C6-C7 BLOCK / INJECTION EPIDURAL STEROID CERVICAL;  Surgeon: Jorge Villalobos MD;  Location: [facility];  Service: Pain Management    • EPIDURAL BLOCK INJECTION Left 06/21/2022    Procedure: BLOCK / INJECTION EPIDURAL STEROID LUMBAR  left L3-4 TFESI;  Surgeon: Jorge Villalobos MD;  Location: [facility];  Service: Pain Management    • EYE SURGERY      cataract   • FL GUIDED NEEDLE PLAC BX/ASP/INJ  03/03/2022   • FL GUIDED NEEDLE PLAC BX/ASP/INJ  11/17/2022   • HYSTERECTOMY     • NJ ESOPHAGOGASTRODUODENOSCOPY TRANSORAL DIAGNOSTIC N/A 02/08/2018    Procedure: EGD AND COLONOSCOPY;  Surgeon: Nolan Swanson MD;  Location:  GI LAB; Service: Gastroenterology   • NJ PRQ IMPLTJ NSTIM ELECTRODE ARRAY EPIDURAL N/A 11/17/2022    Procedure: Namrata Olea SCS TRIAL;  Surgeon: Jorge Villalobos MD;  Location: [facility];  Service: Pain Management    • NJ PRQ IMPLTJ NSTIM ELECTRODE ARRAY EPIDURAL N/A 2/1/2023    Procedure:  Insertion percutaneous thoracic spinal cord stimulator with left buttock implantable pulse generator;  Surgeon: Teetee Peterson MD;  Location:  MAIN OR;  Service: Neurosurgery   • THYROID SURGERY     • TONSILLECTOMY     • US GUIDED THYROID BIOPSY  11/30/2016   • US GUIDED THYROID BIOPSY  3/21/2018     Social History   Social History     Substance and Sexual Activity   Alcohol Use Not Currently     Social History     Substance and Sexual Activity   Drug Use Never    Comment: prescribed Belbuca     Social History     Tobacco Use   Smoking Status Every Day   • Packs/day: 2.00   • Types: Cigarettes   Smokeless Tobacco Never     Family History   Problem Relation Age of Onset   • Brain cancer Mother    • Alzheimer's disease Mother    • Alzheimer's disease Father    • Parkinsonism Father        Meds/Allergies Current Outpatient Medications:   •  albuterol (PROVENTIL HFA,VENTOLIN HFA) 90 mcg/act inhaler  •  Alcohol Swabs (Alcohol Pads) 70 % PADS  •  ammonium lactate (LAC-HYDRIN) 12 % lotion  •  atorvastatin (LIPITOR) 80 mg tablet  •  benzonatate (TESSALON PERLES) 100 mg capsule  •  Buprenorphine HCl (Belbuca) 300 MCG FILM  •  Butalbital-APAP-Caffeine (FIORICET PO)  •  carboxymethylcellulose 0.5 % SOLN  •  cephalexin (KEFLEX) 500 mg capsule  •  ciprofloxacin (CIPRO) 250 mg tablet  •  clonazePAM (KlonoPIN) 0.5 mg tablet  •  clonazePAM (KlonoPIN) 1 mg tablet  •  dicyclomine (BENTYL) 20 mg tablet  •  famotidine (PEPCID) 40 MG tablet  •  fenofibrate (TRICOR) 48 mg tablet  •  fluconazole (DIFLUCAN) 150 mg tablet  •  fluticasone (FLONASE) 50 mcg/act nasal spray  •  Fluticasone-Salmeterol (Advair) 500-50 mcg/dose inhaler  •  fluticasone-umeclidinium-vilanterol (Trelegy Ellipta) 200-62.5-25 mcg/actuation AEPB inhaler  •  furosemide (LASIX) 20 mg tablet  •  Galcanezumab-gnlm (Emgality) 120 MG/ML SOAJ  •  HYDROcodone-acetaminophen (NORCO)  mg per tablet  •  ibuprofen (MOTRIN) 800 mg tablet  •  latanoprost (XALATAN) 0.005 % ophthalmic solution  •  levalbuterol (XOPENEX) 1.25 mg/3 mL nebulizer solution  •  levothyroxine 137 mcg tablet  •  magnesium 30 MG tablet  •  Melatonin 10 MG CAPS  •  methylPREDNISolone 4 MG tablet therapy pack  •  metroNIDAZOLE (FLAGYL) 500 mg tablet  •  omeprazole (PriLOSEC) 40 MG capsule  •  ondansetron (ZOFRAN) 4 mg tablet  •  ondansetron (ZOFRAN) 8 mg tablet  •  OneTouch Ultra test strip  •  pregabalin (LYRICA) 100 mg capsule  •  pregabalin (LYRICA) 150 mg capsule  •  rizatriptan (MAXALT-MLT) 10 mg disintegrating tablet  •  SUMAtriptan (IMITREX) 100 mg tablet  •  tiZANidine (ZANAFLEX) 4 mg tablet  •  topiramate (TOPAMAX) 100 mg tablet  •  topiramate (TOPAMAX) 200 MG tablet  •  True Comfort Safety Lancets MISC  •  Zoster Vac Recomb Adjuvanted (Shingrix) 50 MCG/0.5ML SUSR  •  doxycycline hyclate (VIBRA-TABS) 100 mg tablet  •  lamoTRIgine (LaMICtal) 100 mg tablet  •  naloxone (Narcan) 4 mg/0.1 mL nasal spray  •  risperiDONE (RisperDAL) 0.5 mg tablet    Allergies   Allergen Reactions   • Hydroxyzine Anaphylaxis     Claims it gives her convulsions. • Other Other (See Comments)   • Pollen Extract Itching   • Tree Extract    • Clarithromycin Rash     Pt denies   • Latex Rash   • Sulfa Antibiotics Rash     "severe skin burn"           Objective     Blood pressure 116/72, pulse 71, temperature 97.7 °F (36.5 °C), temperature source Tympanic, resp. rate 16, height 5' 4" (1.626 m), weight 76.4 kg (168 lb 6.4 oz), SpO2 95 %. Body mass index is 28.91 kg/m². PHYSICAL EXAM:      General Appearance:   Alert, cooperative, no distress   HEENT:   Normocephalic, atraumatic, anicteric. Neck:  Supple, symmetrical, trachea midline   Lungs:   Clear to auscultation bilaterally; no rales, rhonchi or wheezing; respirations unlabored    Heart[de-identified]   Regular rate and rhythm; no murmur, rub, or gallop. Abdomen:   Soft, non-tender, non-distended; normal bowel sounds; no masses, no organomegaly    Genitalia:   Deferred    Rectal:   Deferred    Extremities:  No cyanosis, clubbing or edema    Pulses:  2+ and symmetric    Skin:  No jaundice, rashes, or lesions    Lymph nodes:  No palpable cervical lymphadenopathy        Lab Results:   No visits with results within 1 Day(s) from this visit.    Latest known visit with results is:   Admission on 08/09/2023, Discharged on 08/10/2023   Component Date Value   • WBC 08/09/2023 9.55    • RBC 08/09/2023 4.13    • Hemoglobin 08/09/2023 13.1    • Hematocrit 08/09/2023 40.3    • MCV 08/09/2023 98    • MCH 08/09/2023 31.7    • MCHC 08/09/2023 32.5    • RDW 08/09/2023 12.8    • MPV 08/09/2023 10.6    • Platelets 87/24/3063 187    • nRBC 08/09/2023 0    • Neutrophils Relative 08/09/2023 59    • Immat GRANS % 08/09/2023 0    • Lymphocytes Relative 08/09/2023 33    • Monocytes Relative 08/09/2023 7    • Eosinophils Relative 08/09/2023 1    • Basophils Relative 08/09/2023 0    • Neutrophils Absolute 08/09/2023 5.61    • Immature Grans Absolute 08/09/2023 0.04    • Lymphocytes Absolute 08/09/2023 3.15    • Monocytes Absolute 08/09/2023 0.63    • Eosinophils Absolute 08/09/2023 0.08    • Basophils Absolute 08/09/2023 0.04    • Sodium 08/09/2023 142    • Potassium 08/09/2023 3.6    • Chloride 08/09/2023 112 (H)    • CO2 08/09/2023 21    • ANION GAP 08/09/2023 9    • BUN 08/09/2023 16    • Creatinine 08/09/2023 1.02    • Glucose 08/09/2023 92    • Calcium 08/09/2023 9.2    • AST 08/09/2023 18    • ALT 08/09/2023 14    • Alkaline Phosphatase 08/09/2023 51    • Total Protein 08/09/2023 6.5    • Albumin 08/09/2023 4.0    • Total Bilirubin 08/09/2023 0.26    • eGFR 08/09/2023 59    • Protime 08/09/2023 13.4    • INR 08/09/2023 1.00    • PTT 08/09/2023 22 (L)    • Ventricular Rate 08/09/2023 76    • Atrial Rate 08/09/2023 76    • WI Interval 08/09/2023 156    • QRSD Interval 08/09/2023 74    • QT Interval 08/09/2023 390    • QTC Interval 08/09/2023 438    • P Axis 08/09/2023 70    • QRS Axis 08/09/2023 73    • T Wave Axis 08/09/2023 68          Radiology Results:   CT spine thoracic & lumbar wo contrast    Result Date: 9/18/2023  Narrative: CT THORACIC AND LUMBAR SPINE INDICATION:   Low back pain and trauma. COMPARISON: 8/9/2023 TECHNIQUE:  Contiguous axial images were obtained. Sagittal and coronal reconstructions were performed. Radiation dose length product (DLP) for this visit:  1134.03 mGy-cm . This examination, like all CT scans performed in the [facility], was performed utilizing techniques to minimize radiation dose exposure, including the use of iterative reconstruction and automated exposure control. IMAGE QUALITY:  Diagnostic. FINDINGS: ALIGNMENT: Normal alignment of the thoracic spine. Stable upper lumbar levoscoliosis.  VERTEBRAE: No acute thoracic or lumbar spine fracture, lytic or blastic lesion. Periosteal reaction consistent with subacute to early chronic left L1, L2 and L3 transverse process fractures. DEGENERATIVE CHANGES: Stable loss of disc height and endplate sclerosis along the right aspect of L1-2. PARASPINAL SOFT TISSUES: Spinal stimulator leads terminate in the dorsal epidural space at the T7-8 level. No mass or fluid collection. Impression: 1. Healing left L1, L2 and L3 transverse process fractures. No evidence of acute thoracic or lumbar spine fracture. 2. Stable disc degenerative change at L1-2. Workstation performed: [id number]     MRI abdomen w wo contrast and mrcp    Result Date: 9/16/2023  Narrative: MRI OF THE ABDOMEN WITH AND WITHOUT CONTRAST WITH MRCP INDICATION: 71-year-old female with right upper quadrant pain and biliary ductal dilatation. COMPARISON: Lumbar spine MRI dated 8/16/2023. CT of the abdomen/pelvis dated 8/9/2023 and 10/13/2021. CTA of the chest with abdomen/pelvis on 4/27/2023. TECHNIQUE:  Multiplanar/multisequence MRI of the abdomen with 3D MRCP was performed before and after administration of contrast. Scan performed within conditions required for the patient's spinal stimulator device. IV Contrast:  8 mL of Gadobutrol injection (SINGLE-DOSE) FINDINGS: LOWER CHEST:   Unremarkable. LIVER: Liver size and contour are normal. No steatosis or evidence for iron overload. Few benign cysts, predominantly in the left hepatic lobe. No suspicious hepatic masses. The hepatic veins and portal veins are patent. BILE DUCTS: Intrahepatic and extrahepatic biliary ductal dilatation. Upper common duct measures 16 mm and tapers distally. No discrete masses or endoluminal filling defects to suggest stones. Accounting for differences in modality, current caliber is unchanged since at least 10/13/2021. Measured increase on most recent CT may relate to measurement technique.  Though slightly more dilated than typical for post cholecystectomy appearance, stability suggests this is benign reservoir effect after cholecystectomy. GALLBLADDER: Surgically absent. PANCREAS: Mild involutional change. No masses. Pancreatic duct is normal in caliber. ADRENAL GLANDS:  Unremarkable. SPLEEN:  Normal. KIDNEYS/PROXIMAL URETERS: Upper renal collecting system is decompressed. Few Bosniak II benign subcentimeter cysts. No suspicious renal masses. No perinephric collections. BOWEL:   Normal MRI appearance of the stomach for level of nondistention. No dilated or inflamed loops of small or large bowel. PERITONEUM/RETROPERITONEUM: No ascites or encapsulated collections. LYMPH NODES:  No abdominal lymphadenopathy. VASCULAR STRUCTURES: Abdominal aorta and branch vessels appear normal in configuration and caliber. No aneurysm. No central venous thromboses demonstrated. 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 ABDOMINAL WALL: Small fat-containing umbilical hernia. Susceptibility artifact in the left gluteal region secondary to stimulator device. OSSEOUS STRUCTURES: Thoracolumbar spondylosis and scoliosis. No fractures or destructive osseous lesions. Impression: On today's exam, central intrahepatic and common duct distention appears stable since at least 2021. This is at least partially attributable to reservoir effect after cholecystectomy, though slightly more dilated than typically seen. No evidence for progressive obstructive cholangiopathy, choledocholithiasis, or compressive extrinsic mass.  Workstation performed: [id number]

## 2023-09-23 PROBLEM — K21.9 GASTROESOPHAGEAL REFLUX DISEASE: Status: ACTIVE | Noted: 2023-09-23

## 2023-09-23 PROBLEM — Z12.11 SCREENING FOR COLON CANCER: Status: ACTIVE | Noted: 2023-09-23

## 2023-09-25 ENCOUNTER — TELEPHONE (OUTPATIENT)
Dept: GASTROENTEROLOGY | Facility: CLINIC | Age: 63
End: 2023-09-25

## 2023-09-25 NOTE — TELEPHONE ENCOUNTER
Spoke with pt and relayed MRI results. She verbalized understanding. I explained EUS in depth and answered all questions. Pt is agreeable with proceeding. She also mentioned that at last OV 9/22 she was prescribed amitiza and will be picking it up and starting it today.

## 2023-09-25 NOTE — TELEPHONE ENCOUNTER
----- Message from Giacomo Howe PA-C sent at 9/22/2023  5:19 PM EDT -----  Please inform patient that her MRI showed widening of the bile ducts, which in part is likely from not having a gallbladder. However, it is still possible that there could be another reason why they are dilated. I am reassured by her liver tests remaining normal, which argues against any significant blockage. I think we should consider an endoscopic ultrasound for additional evaluation, though this should be discussed in greater detail in clinic. How is she feeling?

## 2023-09-26 ENCOUNTER — PREP FOR PROCEDURE (OUTPATIENT)
Dept: GASTROENTEROLOGY | Facility: CLINIC | Age: 63
End: 2023-09-26

## 2023-09-26 DIAGNOSIS — K83.8 COMMON BILE DUCT DILATION: Primary | ICD-10-CM

## 2023-09-29 ENCOUNTER — NURSE TRIAGE (OUTPATIENT)
Age: 63
End: 2023-09-29

## 2023-09-29 ENCOUNTER — TELEPHONE (OUTPATIENT)
Dept: GASTROENTEROLOGY | Facility: CLINIC | Age: 63
End: 2023-09-29

## 2023-09-29 NOTE — TELEPHONE ENCOUNTER
At this point she should increase the Amitiza to 8 mcg from BID TO TID and increase the Miralax to BID for the next week and then call us back next week with an update and go from there.

## 2023-09-29 NOTE — TELEPHONE ENCOUNTER
Last OV: 9/22/23     Patient calling in, reports she feels lethargic, not sleeping well, constipated- last BM soft small amount Wednesday with generalized abdominal pain/ pressure and nausea. She has a hx of bipolar does not feel well and will be seeing a new psychiatrist in a week. Patient declined ED eval   +passing gas     Patient is on amitiza 8 mcg BID and miralax daily. I advised patient to increase miralax to BID.

## 2023-09-29 NOTE — TELEPHONE ENCOUNTER
Spoke w/ pt to schedule EUS. 1st available is the end of Nov.  She asked if there was anyway it could be moved sooner. I let her know I would contact the GI lab on Monday and get back to her. She was appreciative. She stated she was in a lot of pain and she called the nurse but had not heard back. I checked the notes, the is an encounter stating pt was called at 3:09 today and extensive message was left. She said she was sleeping and must have missed the call. She will listen to the msg and call back with questions.

## 2023-10-03 NOTE — TELEPHONE ENCOUNTER
Procedure:  EUS  Scheduled date of procedure (as of today): 10/05/2023  Physician performing procedure: Dr. Elias Daly  Location of procedure: [facility]  Instructions reviewed with patient by: Jo Hooper, emailed to [email or web address]  Clearances: N/A     Pt is Diabetic

## 2023-10-05 ENCOUNTER — ANESTHESIA (OUTPATIENT)
Dept: GASTROENTEROLOGY | Facility: HOSPITAL | Age: 63
End: 2023-10-05

## 2023-10-05 ENCOUNTER — HOSPITAL ENCOUNTER (OUTPATIENT)
Dept: GASTROENTEROLOGY | Facility: HOSPITAL | Age: 63
Setting detail: OUTPATIENT SURGERY
End: 2023-10-05
Payer: COMMERCIAL

## 2023-10-05 ENCOUNTER — ANESTHESIA EVENT (OUTPATIENT)
Dept: GASTROENTEROLOGY | Facility: HOSPITAL | Age: 63
End: 2023-10-05

## 2023-10-05 VITALS
HEART RATE: 71 BPM | DIASTOLIC BLOOD PRESSURE: 61 MMHG | HEIGHT: 64 IN | OXYGEN SATURATION: 100 % | BODY MASS INDEX: 28.2 KG/M2 | WEIGHT: 165.2 LBS | RESPIRATION RATE: 18 BRPM | SYSTOLIC BLOOD PRESSURE: 110 MMHG | TEMPERATURE: 98 F

## 2023-10-05 DIAGNOSIS — B37.81 CANDIDA ESOPHAGITIS (HCC): Primary | ICD-10-CM

## 2023-10-05 DIAGNOSIS — K83.8 COMMON BILE DUCT DILATION: ICD-10-CM

## 2023-10-05 PROCEDURE — 88305 TISSUE EXAM BY PATHOLOGIST: CPT | Performed by: PATHOLOGY

## 2023-10-05 PROCEDURE — 82948 REAGENT STRIP/BLOOD GLUCOSE: CPT

## 2023-10-05 RX ORDER — SODIUM CHLORIDE, SODIUM LACTATE, POTASSIUM CHLORIDE, CALCIUM CHLORIDE 600; 310; 30; 20 MG/100ML; MG/100ML; MG/100ML; MG/100ML
125 INJECTION, SOLUTION INTRAVENOUS CONTINUOUS
Status: DISPENSED | OUTPATIENT
Start: 2023-10-05

## 2023-10-05 RX ORDER — PROPOFOL 10 MG/ML
INJECTION, EMULSION INTRAVENOUS CONTINUOUS PRN
Status: DISCONTINUED | OUTPATIENT
Start: 2023-10-05 | End: 2023-10-05

## 2023-10-05 RX ORDER — PROPOFOL 10 MG/ML
INJECTION, EMULSION INTRAVENOUS AS NEEDED
Status: DISCONTINUED | OUTPATIENT
Start: 2023-10-05 | End: 2023-10-05

## 2023-10-05 RX ORDER — ONDANSETRON 2 MG/ML
4 INJECTION INTRAMUSCULAR; INTRAVENOUS ONCE AS NEEDED
Status: ACTIVE | OUTPATIENT
Start: 2023-10-05

## 2023-10-05 RX ORDER — DIPHENHYDRAMINE HYDROCHLORIDE 50 MG/ML
12.5 INJECTION INTRAMUSCULAR; INTRAVENOUS ONCE AS NEEDED
Status: ACTIVE | OUTPATIENT
Start: 2023-10-05

## 2023-10-05 RX ORDER — SODIUM CHLORIDE, SODIUM LACTATE, POTASSIUM CHLORIDE, CALCIUM CHLORIDE 600; 310; 30; 20 MG/100ML; MG/100ML; MG/100ML; MG/100ML
INJECTION, SOLUTION INTRAVENOUS CONTINUOUS PRN
Status: DISCONTINUED | OUTPATIENT
Start: 2023-10-05 | End: 2023-10-05

## 2023-10-05 RX ORDER — METOCLOPRAMIDE HYDROCHLORIDE 5 MG/ML
10 INJECTION INTRAMUSCULAR; INTRAVENOUS ONCE AS NEEDED
Status: ACTIVE | OUTPATIENT
Start: 2023-10-05

## 2023-10-05 RX ORDER — LIDOCAINE HYDROCHLORIDE 20 MG/ML
INJECTION, SOLUTION EPIDURAL; INFILTRATION; INTRACAUDAL; PERINEURAL AS NEEDED
Status: DISCONTINUED | OUTPATIENT
Start: 2023-10-05 | End: 2023-10-05

## 2023-10-05 RX ORDER — LIDOCAINE HYDROCHLORIDE 10 MG/ML
0.5 INJECTION, SOLUTION EPIDURAL; INFILTRATION; INTRACAUDAL; PERINEURAL ONCE AS NEEDED
Status: ACTIVE | OUTPATIENT
Start: 2023-10-05

## 2023-10-05 RX ORDER — FLUCONAZOLE 200 MG/1
200 TABLET ORAL DAILY
Qty: 14 TABLET | Refills: 0 | Status: SHIPPED | OUTPATIENT
Start: 2023-10-05 | End: 2023-10-19

## 2023-10-05 RX ORDER — IPRATROPIUM BROMIDE AND ALBUTEROL SULFATE 2.5; .5 MG/3ML; MG/3ML
3 SOLUTION RESPIRATORY (INHALATION) ONCE
Status: COMPLETED | OUTPATIENT
Start: 2023-10-05 | End: 2023-10-05

## 2023-10-05 RX ORDER — GLYCOPYRROLATE 0.2 MG/ML
INJECTION INTRAMUSCULAR; INTRAVENOUS AS NEEDED
Status: DISCONTINUED | OUTPATIENT
Start: 2023-10-05 | End: 2023-10-05

## 2023-10-05 RX ADMIN — PROPOFOL 40 MG: 10 INJECTION, EMULSION INTRAVENOUS at 09:41

## 2023-10-05 RX ADMIN — SODIUM CHLORIDE, SODIUM LACTATE, POTASSIUM CHLORIDE, AND CALCIUM CHLORIDE: .6; .31; .03; .02 INJECTION, SOLUTION INTRAVENOUS at 09:30

## 2023-10-05 RX ADMIN — PROPOFOL 100 MCG/KG/MIN: 10 INJECTION, EMULSION INTRAVENOUS at 09:42

## 2023-10-05 RX ADMIN — GLYCOPYRROLATE 0.1 MG: 0.2 INJECTION, SOLUTION INTRAMUSCULAR; INTRAVENOUS at 09:38

## 2023-10-05 RX ADMIN — PROPOFOL 20 MG: 10 INJECTION, EMULSION INTRAVENOUS at 09:49

## 2023-10-05 RX ADMIN — IPRATROPIUM BROMIDE AND ALBUTEROL SULFATE 3 ML: .5; 2.5 SOLUTION RESPIRATORY (INHALATION) at 10:11

## 2023-10-05 RX ADMIN — LIDOCAINE HYDROCHLORIDE 100 MG: 20 INJECTION, SOLUTION EPIDURAL; INFILTRATION; INTRACAUDAL; PERINEURAL at 09:38

## 2023-10-05 RX ADMIN — PROPOFOL 80 MG: 10 INJECTION, EMULSION INTRAVENOUS at 09:38

## 2023-10-05 NOTE — ANESTHESIA PREPROCEDURE EVALUATION
Procedure:  ENDOSCOPIC ULTRASOUND (UPPER)    Relevant Problems   ANESTHESIA (within normal limits)      CARDIO   (+) Brain aneurysm   (+) Hypertension   (+) Hypertriglyceridemia   (+) Migraine   (+) Other hyperlipidemia      ENDO   (+) Other specified hypothyroidism      GI/HEPATIC   (+) Gastroesophageal reflux disease   (+) Ileus (HCC)      /RENAL (within normal limits)      GYN (within normal limits)      HEMATOLOGY (within normal limits)      MUSCULOSKELETAL   (+) Cervical spondylosis   (+) Chronic midline low back pain without sciatica   (+) Fibromyalgia, primary   (+) Lumbar degenerative disc disease   (+) Lumbar spondylosis   (+) Mid back pain   (+) Sacroiliitis (HCC)      NEURO/PSYCH   (+) Anxiety   (+) Chronic midline low back pain without sciatica   (+) Chronic pain syndrome   (+) Depression   (+) Diabetic polyneuropathy associated with type 2 diabetes mellitus ([facility])   (+) Fibromyalgia, primary   (+) Migraine      PULMONARY   (+) COPD (chronic obstructive pulmonary disease) ([facility])   (+) Shortness of breath      Digestive   (+) Gastritis, acute   (+) Irritable bowel syndrome with both constipation and diarrhea      Nervous and Auditory   (+) Cervical disc disorder with radiculopathy of mid-cervical region   (+) Cervical radiculopathy   (+) Lumbar radiculopathy      Musculoskeletal and Integument   (+) TMJ syndrome      Other   (+) Bipolar 1 disorder ([facility])   (+) Neck pain   (+) S/P insertion of spinal cord stimulator   (+) Tobacco abuse   (+) Tobacco use        Physical Exam    Airway    Mallampati score: II  TM Distance: >3 FB  Neck ROM: full     Dental   No notable dental hx     Cardiovascular  Rhythm: regular, Rate: normal, Cardiovascular exam normal    Pulmonary  Pulmonary exam normal Breath sounds clear to auscultation,     Other Findings        Anesthesia Plan  ASA Score- 3     Anesthesia Type- IV sedation with anesthesia with ASA Monitors.          Additional Monitors:   Airway Plan:           Plan Factors-Exercise tolerance (METS): >4 METS. Chart reviewed. EKG reviewed. Imaging results reviewed. Existing labs reviewed. Patient summary reviewed. Induction- intravenous. Postoperative Plan-     Informed Consent- Anesthetic plan and risks discussed with patient. I personally reviewed this patient with the CRNA. Discussed and agreed on the Anesthesia Plan with the CRNA. Deepti Louie

## 2023-10-06 LAB — GLUCOSE SERPL-MCNC: 104 MG/DL (ref 65–140)

## 2023-10-06 NOTE — ANESTHESIA POSTPROCEDURE EVALUATION
Post-Op Assessment Note    CV Status:  Stable  Pain Score: 0    Pain management: adequate     Mental Status:  Alert and awake   Hydration Status:  Euvolemic   PONV Controlled:  Controlled   Airway Patency:  Patent      Post Op Vitals Reviewed: Yes      Staff: Anesthesiologist, with CRNAs         No notable events documented.     BP      Temp      Pulse     Resp      SpO2

## 2023-10-10 PROCEDURE — 88305 TISSUE EXAM BY PATHOLOGIST: CPT | Performed by: PATHOLOGY

## 2023-10-12 ENCOUNTER — TELEPHONE (OUTPATIENT)
Age: 63
End: 2023-10-12

## 2023-10-12 NOTE — TELEPHONE ENCOUNTER
Caller: pt    Doctor: Mario Worrell    Reason for call: pt is on antibiotics now but will be finished by the time of her procedure. Please call pt if that will be an issue for her procedure.     Call back#: [phone]

## 2023-10-12 NOTE — TELEPHONE ENCOUNTER
Spoke with Pt regarding current antifungal medication & upcoming PRO on 10/27. Pt states she was prescribed Fluconazole on 10/5 for x14 days. Pt stated she started medication on 10/5. (Pt will complete on 10/18)  Advised Pt that as long as she does not have an active infection, prescribed antibiotics for an infection, on antibiotics for an infection, has symptoms of an infection or is experiencing fevers then she can proceed with PRO as scheduled. Advised Pt that if any of the above is present then she needs to call and re-schedule PRO. Pt verbalized understanding, was appreciative for the clarification & advisement.

## 2023-10-26 ENCOUNTER — HOSPITAL ENCOUNTER (EMERGENCY)
Facility: HOSPITAL | Age: 63
End: 2023-10-27
Attending: EMERGENCY MEDICINE
Payer: COMMERCIAL

## 2023-10-26 DIAGNOSIS — T79.A0XA COMPARTMENT SYNDROME (HCC): ICD-10-CM

## 2023-10-26 DIAGNOSIS — T50.901A OVERDOSE: ICD-10-CM
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
## 2023-10-26 DIAGNOSIS — R45.851 SUICIDAL IDEATIONS: ICD-10-CM

## 2023-10-26 PROCEDURE — 99285 EMERGENCY DEPT VISIT HI MDM: CPT

## 2023-10-27 ENCOUNTER — ANESTHESIA (INPATIENT)
Dept: PERIOP | Facility: HOSPITAL | Age: 63
DRG: 464 | End: 2023-10-27
Payer: COMMERCIAL

## 2023-10-27 ENCOUNTER — APPOINTMENT (EMERGENCY)
Dept: CT IMAGING | Facility: HOSPITAL | Age: 63
End: 2023-10-27
Payer: COMMERCIAL

## 2023-10-27 ENCOUNTER — HOSPITAL ENCOUNTER (INPATIENT)
Facility: HOSPITAL | Age: 63
LOS: 17 days | Discharge: HOME HEALTH | DRG: 464 | End: 2023-11-13
Attending: SURGERY | Admitting: SURGERY
Payer: COMMERCIAL

## 2023-10-27 ENCOUNTER — ANESTHESIA EVENT (INPATIENT)
Dept: PERIOP | Facility: HOSPITAL | Age: 63
DRG: 464 | End: 2023-10-27
Payer: COMMERCIAL

## 2023-10-27 ENCOUNTER — APPOINTMENT (EMERGENCY)
Dept: RADIOLOGY | Facility: HOSPITAL | Age: 63
End: 2023-10-27
Payer: COMMERCIAL

## 2023-10-27 VITALS
DIASTOLIC BLOOD PRESSURE: 85 MMHG | TEMPERATURE: 96.7 F | OXYGEN SATURATION: 96 % | RESPIRATION RATE: 21 BRPM | SYSTOLIC BLOOD PRESSURE: 139 MMHG | HEART RATE: 64 BPM | BODY MASS INDEX: 1.85 KG/M2 | WEIGHT: 10.8 LBS

## 2023-10-27 DIAGNOSIS — G89.4 CHRONIC PAIN SYNDROME: ICD-10-CM
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
## 2023-10-27 DIAGNOSIS — T79.A11A TRAUMATIC COMPARTMENT SYNDROME OF RIGHT UPPER EXTREMITY, INITIAL ENCOUNTER (HCC): ICD-10-CM

## 2023-10-27 PROBLEM — E11.9 TYPE 2 DIABETES MELLITUS (HCC): Status: ACTIVE | Noted: 2023-10-27

## 2023-10-27 PROBLEM — T79.A0XA COMPARTMENT SYNDROME (HCC): Status: ACTIVE | Noted: 2023-10-27

## 2023-10-27 LAB
ABO GROUP BLD: NORMAL
ALBUMIN SERPL BCP-MCNC: 3.6 G/DL (ref 3.5–5)
ALBUMIN SERPL BCP-MCNC: 4.4 G/DL (ref 3.5–5)
ALP SERPL-CCNC: 66 U/L (ref 34–104)
ALP SERPL-CCNC: 69 U/L (ref 34–104)
ALT SERPL W P-5'-P-CCNC: 47 U/L (ref 7–52)
ALT SERPL W P-5'-P-CCNC: 63 U/L (ref 7–52)
AMPHETAMINES SERPL QL SCN: NEGATIVE
ANION GAP SERPL CALCULATED.3IONS-SCNC: 11 MMOL/L
ANION GAP SERPL CALCULATED.3IONS-SCNC: 7 MMOL/L
ANION GAP SERPL CALCULATED.3IONS-SCNC: 9 MMOL/L
APAP SERPL-MCNC: <10 UG/ML (ref 10–20)
AST SERPL W P-5'-P-CCNC: 145 U/L (ref 13–39)
AST SERPL W P-5'-P-CCNC: 76 U/L (ref 13–39)
BARBITURATES UR QL: POSITIVE
BASOPHILS # BLD AUTO: 0.02 THOUSANDS/ÂΜL (ref 0–0.1)
BASOPHILS # BLD AUTO: 0.02 THOUSANDS/ÂΜL (ref 0–0.1)
BASOPHILS NFR BLD AUTO: 0 % (ref 0–1)
BASOPHILS NFR BLD AUTO: 0 % (ref 0–1)
BENZODIAZ UR QL: NEGATIVE
BILIRUB SERPL-MCNC: 0.33 MG/DL (ref 0.2–1)
BILIRUB SERPL-MCNC: 0.33 MG/DL (ref 0.2–1)
BLD GP AB SCN SERPL QL: NEGATIVE
BUN SERPL-MCNC: 12 MG/DL (ref 5–25)
BUN SERPL-MCNC: 15 MG/DL (ref 5–25)
BUN SERPL-MCNC: 20 MG/DL (ref 5–25)
CALCIUM SERPL-MCNC: 7.7 MG/DL (ref 8.4–10.2)
CALCIUM SERPL-MCNC: 8 MG/DL (ref 8.4–10.2)
CALCIUM SERPL-MCNC: 9 MG/DL (ref 8.4–10.2)
CARDIAC TROPONIN I PNL SERPL HS: 2 NG/L
CHLORIDE SERPL-SCNC: 105 MMOL/L (ref 96–108)
CHLORIDE SERPL-SCNC: 110 MMOL/L (ref 96–108)
CHLORIDE SERPL-SCNC: 111 MMOL/L (ref 96–108)
CK SERPL-CCNC: 5942 U/L (ref 26–192)
CK SERPL-CCNC: ABNORMAL U/L (ref 26–192)
CK SERPL-CCNC: ABNORMAL U/L (ref 26–192)
CO2 SERPL-SCNC: 21 MMOL/L (ref 21–32)
CO2 SERPL-SCNC: 21 MMOL/L (ref 21–32)
CO2 SERPL-SCNC: 24 MMOL/L (ref 21–32)
COCAINE UR QL: NEGATIVE
CREAT SERPL-MCNC: 0.6 MG/DL (ref 0.6–1.3)
CREAT SERPL-MCNC: 0.7 MG/DL (ref 0.6–1.3)
CREAT SERPL-MCNC: 0.87 MG/DL (ref 0.6–1.3)
EOSINOPHIL # BLD AUTO: 0 THOUSAND/ÂΜL (ref 0–0.61)
EOSINOPHIL # BLD AUTO: 0 THOUSAND/ÂΜL (ref 0–0.61)
EOSINOPHIL NFR BLD AUTO: 0 % (ref 0–6)
EOSINOPHIL NFR BLD AUTO: 0 % (ref 0–6)
ERYTHROCYTE [DISTWIDTH] IN BLOOD BY AUTOMATED COUNT: 12.2 % (ref 11.6–15.1)
ERYTHROCYTE [DISTWIDTH] IN BLOOD BY AUTOMATED COUNT: 12.4 % (ref 11.6–15.1)
ETHANOL SERPL-MCNC: <10 MG/DL
GFR SERPL CREATININE-BSD FRML MDRD: 72 ML/MIN/1.73SQ M
GFR SERPL CREATININE-BSD FRML MDRD: 93 ML/MIN/1.73SQ M
GFR SERPL CREATININE-BSD FRML MDRD: 98 ML/MIN/1.73SQ M
GLUCOSE SERPL-MCNC: 118 MG/DL (ref 65–140)
GLUCOSE SERPL-MCNC: 140 MG/DL (ref 65–140)
GLUCOSE SERPL-MCNC: 83 MG/DL (ref 65–140)
GLUCOSE SERPL-MCNC: 97 MG/DL (ref 65–140)
HCT VFR BLD AUTO: 40.6 % (ref 34.8–46.1)
HCT VFR BLD AUTO: 43.3 % (ref 34.8–46.1)
HGB BLD-MCNC: 13.9 G/DL (ref 11.5–15.4)
HGB BLD-MCNC: 14 G/DL (ref 11.5–15.4)
IMM GRANULOCYTES # BLD AUTO: 0.04 THOUSAND/UL (ref 0–0.2)
IMM GRANULOCYTES # BLD AUTO: 0.05 THOUSAND/UL (ref 0–0.2)
IMM GRANULOCYTES NFR BLD AUTO: 0 % (ref 0–2)
IMM GRANULOCYTES NFR BLD AUTO: 1 % (ref 0–2)
LYMPHOCYTES # BLD AUTO: 0.97 THOUSANDS/ÂΜL (ref 0.6–4.47)
LYMPHOCYTES # BLD AUTO: 1.25 THOUSANDS/ÂΜL (ref 0.6–4.47)
LYMPHOCYTES NFR BLD AUTO: 10 % (ref 14–44)
LYMPHOCYTES NFR BLD AUTO: 12 % (ref 14–44)
MAGNESIUM SERPL-MCNC: 1.9 MG/DL (ref 1.9–2.7)
MCH RBC QN AUTO: 31.4 PG (ref 26.8–34.3)
MCH RBC QN AUTO: 32.5 PG (ref 26.8–34.3)
MCHC RBC AUTO-ENTMCNC: 32.3 G/DL (ref 31.4–37.4)
MCHC RBC AUTO-ENTMCNC: 34.2 G/DL (ref 31.4–37.4)
MCV RBC AUTO: 95 FL (ref 82–98)
MCV RBC AUTO: 97 FL (ref 82–98)
METHADONE UR QL: NEGATIVE
MONOCYTES # BLD AUTO: 0.4 THOUSAND/ÂΜL (ref 0.17–1.22)
MONOCYTES # BLD AUTO: 0.79 THOUSAND/ÂΜL (ref 0.17–1.22)
MONOCYTES NFR BLD AUTO: 4 % (ref 4–12)
MONOCYTES NFR BLD AUTO: 7 % (ref 4–12)
NEUTROPHILS # BLD AUTO: 8.15 THOUSANDS/ÂΜL (ref 1.85–7.62)
NEUTROPHILS # BLD AUTO: 8.68 THOUSANDS/ÂΜL (ref 1.85–7.62)
NEUTS SEG NFR BLD AUTO: 80 % (ref 43–75)
NEUTS SEG NFR BLD AUTO: 86 % (ref 43–75)
NRBC BLD AUTO-RTO: 0 /100 WBCS
NRBC BLD AUTO-RTO: 0 /100 WBCS
OPIATES UR QL SCN: POSITIVE
OXYCODONE+OXYMORPHONE UR QL SCN: NEGATIVE
PCP UR QL: NEGATIVE
PHOSPHATE SERPL-MCNC: 4.2 MG/DL (ref 2.3–4.1)
PLATELET # BLD AUTO: 150 THOUSANDS/UL (ref 149–390)
PLATELET # BLD AUTO: 206 THOUSANDS/UL (ref 149–390)
PMV BLD AUTO: 10.3 FL (ref 8.9–12.7)
PMV BLD AUTO: 10.4 FL (ref 8.9–12.7)
POTASSIUM SERPL-SCNC: 3.7 MMOL/L (ref 3.5–5.3)
POTASSIUM SERPL-SCNC: 3.8 MMOL/L (ref 3.5–5.3)
POTASSIUM SERPL-SCNC: 4.2 MMOL/L (ref 3.5–5.3)
PROT SERPL-MCNC: 6.1 G/DL (ref 6.4–8.4)
PROT SERPL-MCNC: 7.3 G/DL (ref 6.4–8.4)
RBC # BLD AUTO: 4.28 MILLION/UL (ref 3.81–5.12)
RBC # BLD AUTO: 4.46 MILLION/UL (ref 3.81–5.12)
RH BLD: POSITIVE
SALICYLATES SERPL-MCNC: <5 MG/DL (ref 3–20)
SODIUM SERPL-SCNC: 137 MMOL/L (ref 135–147)
SODIUM SERPL-SCNC: 140 MMOL/L (ref 135–147)
SODIUM SERPL-SCNC: 142 MMOL/L (ref 135–147)
SPECIMEN EXPIRATION DATE: NORMAL
THC UR QL: NEGATIVE
TSH SERPL DL<=0.05 MIU/L-ACNC: 0.12 UIU/ML (ref 0.45–4.5)
WBC # BLD AUTO: 10.79 THOUSAND/UL (ref 4.31–10.16)
WBC # BLD AUTO: 9.58 THOUSAND/UL (ref 4.31–10.16)

## 2023-10-27 PROCEDURE — 80048 BASIC METABOLIC PNL TOTAL CA: CPT | Performed by: STUDENT IN AN ORGANIZED HEALTH CARE EDUCATION/TRAINING PROGRAM

## 2023-10-27 PROCEDURE — 74177 CT ABD & PELVIS W/CONTRAST: CPT

## 2023-10-27 PROCEDURE — 82550 ASSAY OF CK (CPK): CPT | Performed by: STUDENT IN AN ORGANIZED HEALTH CARE EDUCATION/TRAINING PROGRAM

## 2023-10-27 PROCEDURE — 84484 ASSAY OF TROPONIN QUANT: CPT | Performed by: EMERGENCY MEDICINE

## 2023-10-27 PROCEDURE — 83735 ASSAY OF MAGNESIUM: CPT | Performed by: STUDENT IN AN ORGANIZED HEALTH CARE EDUCATION/TRAINING PROGRAM

## 2023-10-27 PROCEDURE — 71045 X-RAY EXAM CHEST 1 VIEW: CPT

## 2023-10-27 PROCEDURE — 80307 DRUG TEST PRSMV CHEM ANLYZR: CPT | Performed by: STUDENT IN AN ORGANIZED HEALTH CARE EDUCATION/TRAINING PROGRAM

## 2023-10-27 PROCEDURE — 70486 CT MAXILLOFACIAL W/O DYE: CPT

## 2023-10-27 PROCEDURE — 73080 X-RAY EXAM OF ELBOW: CPT

## 2023-10-27 PROCEDURE — 86850 RBC ANTIBODY SCREEN: CPT | Performed by: EMERGENCY MEDICINE

## 2023-10-27 PROCEDURE — 82948 REAGENT STRIP/BLOOD GLUCOSE: CPT

## 2023-10-27 PROCEDURE — 71260 CT THORAX DX C+: CPT

## 2023-10-27 PROCEDURE — 99284 EMERGENCY DEPT VISIT MOD MDM: CPT

## 2023-10-27 PROCEDURE — 70450 CT HEAD/BRAIN W/O DYE: CPT

## 2023-10-27 PROCEDURE — 99291 CRITICAL CARE FIRST HOUR: CPT | Performed by: EMERGENCY MEDICINE

## 2023-10-27 PROCEDURE — 25020 DECOMPRESS FOREARM 1 SPACE: CPT | Performed by: SURGERY

## 2023-10-27 PROCEDURE — 97605 NEG PRS WND THER DME<=50SQCM: CPT | Performed by: SURGERY

## 2023-10-27 PROCEDURE — 86900 BLOOD TYPING SEROLOGIC ABO: CPT | Performed by: EMERGENCY MEDICINE

## 2023-10-27 PROCEDURE — 29126 APPL SHORT ARM SPLINT DYN: CPT | Performed by: EMERGENCY MEDICINE

## 2023-10-27 PROCEDURE — 99222 1ST HOSP IP/OBS MODERATE 55: CPT | Performed by: NURSE PRACTITIONER

## 2023-10-27 PROCEDURE — 85025 COMPLETE CBC W/AUTO DIFF WBC: CPT | Performed by: EMERGENCY MEDICINE

## 2023-10-27 PROCEDURE — 73130 X-RAY EXAM OF HAND: CPT

## 2023-10-27 PROCEDURE — 80179 DRUG ASSAY SALICYLATE: CPT | Performed by: EMERGENCY MEDICINE

## 2023-10-27 PROCEDURE — 80143 DRUG ASSAY ACETAMINOPHEN: CPT | Performed by: EMERGENCY MEDICINE

## 2023-10-27 PROCEDURE — 82077 ASSAY SPEC XCP UR&BREATH IA: CPT | Performed by: EMERGENCY MEDICINE

## 2023-10-27 PROCEDURE — 84443 ASSAY THYROID STIM HORMONE: CPT | Performed by: EMERGENCY MEDICINE

## 2023-10-27 PROCEDURE — 73090 X-RAY EXAM OF FOREARM: CPT

## 2023-10-27 PROCEDURE — 0KN90ZZ RELEASE RIGHT LOWER ARM AND WRIST MUSCLE, OPEN APPROACH: ICD-10-PCS | Performed by: SURGERY

## 2023-10-27 PROCEDURE — C9290 INJ, BUPIVACAINE LIPOSOME: HCPCS | Performed by: ANESTHESIOLOGY

## 2023-10-27 PROCEDURE — 72125 CT NECK SPINE W/O DYE: CPT

## 2023-10-27 PROCEDURE — 96360 HYDRATION IV INFUSION INIT: CPT

## 2023-10-27 PROCEDURE — 82550 ASSAY OF CK (CPK): CPT | Performed by: EMERGENCY MEDICINE

## 2023-10-27 PROCEDURE — 86901 BLOOD TYPING SEROLOGIC RH(D): CPT | Performed by: EMERGENCY MEDICINE

## 2023-10-27 PROCEDURE — 84100 ASSAY OF PHOSPHORUS: CPT | Performed by: STUDENT IN AN ORGANIZED HEALTH CARE EDUCATION/TRAINING PROGRAM

## 2023-10-27 PROCEDURE — 80053 COMPREHEN METABOLIC PANEL: CPT | Performed by: STUDENT IN AN ORGANIZED HEALTH CARE EDUCATION/TRAINING PROGRAM

## 2023-10-27 PROCEDURE — 85025 COMPLETE CBC W/AUTO DIFF WBC: CPT | Performed by: STUDENT IN AN ORGANIZED HEALTH CARE EDUCATION/TRAINING PROGRAM

## 2023-10-27 PROCEDURE — 99223 1ST HOSP IP/OBS HIGH 75: CPT | Performed by: SURGERY

## 2023-10-27 PROCEDURE — 96361 HYDRATE IV INFUSION ADD-ON: CPT

## 2023-10-27 PROCEDURE — NC001 PR NO CHARGE: Performed by: PHYSICIAN ASSISTANT

## 2023-10-27 PROCEDURE — 36415 COLL VENOUS BLD VENIPUNCTURE: CPT | Performed by: EMERGENCY MEDICINE

## 2023-10-27 PROCEDURE — NC001 PR NO CHARGE: Performed by: NURSE PRACTITIONER

## 2023-10-27 PROCEDURE — 80053 COMPREHEN METABOLIC PANEL: CPT | Performed by: EMERGENCY MEDICINE

## 2023-10-27 RX ORDER — NICOTINE 21 MG/24HR
14 PATCH, TRANSDERMAL 24 HOURS TRANSDERMAL DAILY
Status: DISCONTINUED | OUTPATIENT
Start: 2023-10-28 | End: 2023-10-27

## 2023-10-27 RX ORDER — ONDANSETRON 2 MG/ML
INJECTION INTRAMUSCULAR; INTRAVENOUS AS NEEDED
Status: DISCONTINUED | OUTPATIENT
Start: 2023-10-27 | End: 2023-10-27

## 2023-10-27 RX ORDER — ACETAMINOPHEN 325 MG/1
650 TABLET ORAL EVERY 4 HOURS PRN
Status: DISCONTINUED | OUTPATIENT
Start: 2023-10-27 | End: 2023-10-27

## 2023-10-27 RX ORDER — ACETAMINOPHEN 325 MG/1
975 TABLET ORAL EVERY 8 HOURS SCHEDULED
Status: DISCONTINUED | OUTPATIENT
Start: 2023-10-27 | End: 2023-10-27

## 2023-10-27 RX ORDER — CLONAZEPAM 0.5 MG/1
0.5 TABLET ORAL
Status: DISCONTINUED | OUTPATIENT
Start: 2023-10-27 | End: 2023-11-13 | Stop reason: HOSPADM

## 2023-10-27 RX ORDER — SODIUM CHLORIDE, SODIUM LACTATE, POTASSIUM CHLORIDE, CALCIUM CHLORIDE 600; 310; 30; 20 MG/100ML; MG/100ML; MG/100ML; MG/100ML
125 INJECTION, SOLUTION INTRAVENOUS CONTINUOUS
Status: DISCONTINUED | OUTPATIENT
Start: 2023-10-27 | End: 2023-10-27

## 2023-10-27 RX ORDER — CEFAZOLIN SODIUM 2 G/50ML
2000 SOLUTION INTRAVENOUS
Status: DISCONTINUED | OUTPATIENT
Start: 2023-10-27 | End: 2023-10-27

## 2023-10-27 RX ORDER — LIDOCAINE HYDROCHLORIDE 20 MG/ML
INJECTION, SOLUTION EPIDURAL; INFILTRATION; INTRACAUDAL; PERINEURAL AS NEEDED
Status: DISCONTINUED | OUTPATIENT
Start: 2023-10-27 | End: 2023-10-27

## 2023-10-27 RX ORDER — OXYCODONE HYDROCHLORIDE 10 MG/1
10 TABLET ORAL EVERY 4 HOURS PRN
Status: DISCONTINUED | OUTPATIENT
Start: 2023-10-27 | End: 2023-10-27

## 2023-10-27 RX ORDER — SODIUM CHLORIDE, SODIUM LACTATE, POTASSIUM CHLORIDE, CALCIUM CHLORIDE 600; 310; 30; 20 MG/100ML; MG/100ML; MG/100ML; MG/100ML
INJECTION, SOLUTION INTRAVENOUS CONTINUOUS PRN
Status: DISCONTINUED | OUTPATIENT
Start: 2023-10-27 | End: 2023-10-27

## 2023-10-27 RX ORDER — HYDROMORPHONE HCL/PF 1 MG/ML
0.5 SYRINGE (ML) INJECTION
Status: DISCONTINUED | OUTPATIENT
Start: 2023-10-27 | End: 2023-10-27

## 2023-10-27 RX ORDER — HYDROMORPHONE HCL/PF 1 MG/ML
0.5 SYRINGE (ML) INJECTION
Status: DISCONTINUED | OUTPATIENT
Start: 2023-10-27 | End: 2023-11-02

## 2023-10-27 RX ORDER — ALBUTEROL SULFATE 90 UG/1
2 AEROSOL, METERED RESPIRATORY (INHALATION) EVERY 4 HOURS PRN
Status: DISCONTINUED | OUTPATIENT
Start: 2023-10-27 | End: 2023-11-13 | Stop reason: HOSPADM

## 2023-10-27 RX ORDER — ONDANSETRON 2 MG/ML
4 INJECTION INTRAMUSCULAR; INTRAVENOUS ONCE AS NEEDED
Status: DISCONTINUED | OUTPATIENT
Start: 2023-10-27 | End: 2023-10-27 | Stop reason: HOSPADM

## 2023-10-27 RX ORDER — OXYCODONE HYDROCHLORIDE 10 MG/1
10 TABLET ORAL EVERY 4 HOURS PRN
Status: DISCONTINUED | OUTPATIENT
Start: 2023-10-27 | End: 2023-10-28

## 2023-10-27 RX ORDER — POLYETHYLENE GLYCOL 3350 17 G/17G
17 POWDER, FOR SOLUTION ORAL DAILY PRN
Status: DISCONTINUED | OUTPATIENT
Start: 2023-10-27 | End: 2023-11-13 | Stop reason: HOSPADM

## 2023-10-27 RX ORDER — FENTANYL CITRATE/PF 50 MCG/ML
50 SYRINGE (ML) INJECTION
Status: DISCONTINUED | OUTPATIENT
Start: 2023-10-27 | End: 2023-10-27 | Stop reason: HOSPADM

## 2023-10-27 RX ORDER — BUPIVACAINE HYDROCHLORIDE 2.5 MG/ML
INJECTION, SOLUTION EPIDURAL; INFILTRATION; INTRACAUDAL AS NEEDED
Status: DISCONTINUED | OUTPATIENT
Start: 2023-10-27 | End: 2023-10-27

## 2023-10-27 RX ORDER — ALBUTEROL SULFATE 90 UG/1
2 AEROSOL, METERED RESPIRATORY (INHALATION) EVERY 6 HOURS PRN
Status: DISCONTINUED | OUTPATIENT
Start: 2023-10-27 | End: 2023-10-27

## 2023-10-27 RX ORDER — SODIUM CHLORIDE, SODIUM LACTATE, POTASSIUM CHLORIDE, CALCIUM CHLORIDE 600; 310; 30; 20 MG/100ML; MG/100ML; MG/100ML; MG/100ML
150 INJECTION, SOLUTION INTRAVENOUS CONTINUOUS
Status: DISCONTINUED | OUTPATIENT
Start: 2023-10-27 | End: 2023-10-28

## 2023-10-27 RX ORDER — OXYCODONE HYDROCHLORIDE 5 MG/1
5 TABLET ORAL EVERY 4 HOURS PRN
Status: DISCONTINUED | OUTPATIENT
Start: 2023-10-27 | End: 2023-10-28

## 2023-10-27 RX ORDER — ENOXAPARIN SODIUM 100 MG/ML
40 INJECTION SUBCUTANEOUS EVERY 12 HOURS
Status: DISCONTINUED | OUTPATIENT
Start: 2023-10-27 | End: 2023-10-31

## 2023-10-27 RX ORDER — LIDOCAINE 50 MG/G
1 PATCH TOPICAL DAILY
Status: DISPENSED | OUTPATIENT
Start: 2023-10-27 | End: 2023-11-10

## 2023-10-27 RX ORDER — OXYCODONE HYDROCHLORIDE 5 MG/1
5 TABLET ORAL EVERY 4 HOURS PRN
Status: DISCONTINUED | OUTPATIENT
Start: 2023-10-27 | End: 2023-10-27

## 2023-10-27 RX ORDER — PROPOFOL 10 MG/ML
INJECTION, EMULSION INTRAVENOUS AS NEEDED
Status: DISCONTINUED | OUTPATIENT
Start: 2023-10-27 | End: 2023-10-27

## 2023-10-27 RX ORDER — CEFAZOLIN SODIUM 1 G/3ML
INJECTION, POWDER, FOR SOLUTION INTRAMUSCULAR; INTRAVENOUS AS NEEDED
Status: DISCONTINUED | OUTPATIENT
Start: 2023-10-27 | End: 2023-10-27

## 2023-10-27 RX ORDER — TIZANIDINE 4 MG/1
4 TABLET ORAL EVERY 6 HOURS PRN
Status: DISCONTINUED | OUTPATIENT
Start: 2023-10-27 | End: 2023-11-02

## 2023-10-27 RX ORDER — LATANOPROST 50 UG/ML
1 SOLUTION/ DROPS OPHTHALMIC
Status: DISCONTINUED | OUTPATIENT
Start: 2023-10-27 | End: 2023-11-13 | Stop reason: HOSPADM

## 2023-10-27 RX ORDER — ACETAMINOPHEN 325 MG/1
975 TABLET ORAL EVERY 8 HOURS SCHEDULED
Status: DISCONTINUED | OUTPATIENT
Start: 2023-10-27 | End: 2023-11-13 | Stop reason: HOSPADM

## 2023-10-27 RX ORDER — EPHEDRINE SULFATE 50 MG/ML
INJECTION INTRAVENOUS AS NEEDED
Status: DISCONTINUED | OUTPATIENT
Start: 2023-10-27 | End: 2023-10-27

## 2023-10-27 RX ORDER — ONDANSETRON 2 MG/ML
4 INJECTION INTRAMUSCULAR; INTRAVENOUS EVERY 4 HOURS PRN
Status: DISCONTINUED | OUTPATIENT
Start: 2023-10-27 | End: 2023-11-13 | Stop reason: HOSPADM

## 2023-10-27 RX ORDER — ATORVASTATIN CALCIUM 80 MG/1
80 TABLET, FILM COATED ORAL EVERY EVENING
Status: DISCONTINUED | OUTPATIENT
Start: 2023-10-27 | End: 2023-11-13 | Stop reason: HOSPADM

## 2023-10-27 RX ORDER — AMOXICILLIN 250 MG
1 CAPSULE ORAL
Status: DISCONTINUED | OUTPATIENT
Start: 2023-10-27 | End: 2023-10-31

## 2023-10-27 RX ORDER — NICOTINE 21 MG/24HR
1 PATCH, TRANSDERMAL 24 HOURS TRANSDERMAL DAILY
Status: DISCONTINUED | OUTPATIENT
Start: 2023-10-27 | End: 2023-10-27

## 2023-10-27 RX ORDER — MIDAZOLAM HYDROCHLORIDE 2 MG/2ML
INJECTION, SOLUTION INTRAMUSCULAR; INTRAVENOUS AS NEEDED
Status: DISCONTINUED | OUTPATIENT
Start: 2023-10-27 | End: 2023-10-27

## 2023-10-27 RX ORDER — DIPHENHYDRAMINE HCL 25 MG
25 TABLET ORAL ONCE
Status: COMPLETED | OUTPATIENT
Start: 2023-10-27 | End: 2023-10-27

## 2023-10-27 RX ORDER — FENTANYL CITRATE 50 UG/ML
INJECTION, SOLUTION INTRAMUSCULAR; INTRAVENOUS AS NEEDED
Status: DISCONTINUED | OUTPATIENT
Start: 2023-10-27 | End: 2023-10-27

## 2023-10-27 RX ORDER — NICOTINE 21 MG/24HR
21 PATCH, TRANSDERMAL 24 HOURS TRANSDERMAL DAILY
Status: DISCONTINUED | OUTPATIENT
Start: 2023-10-28 | End: 2023-11-13 | Stop reason: HOSPADM

## 2023-10-27 RX ADMIN — SODIUM CHLORIDE 1000 ML: 0.9 INJECTION, SOLUTION INTRAVENOUS at 00:10

## 2023-10-27 RX ADMIN — LIDOCAINE HYDROCHLORIDE 100 MG: 20 INJECTION, SOLUTION EPIDURAL; INFILTRATION; INTRACAUDAL; PERINEURAL at 10:11

## 2023-10-27 RX ADMIN — OXYCODONE HYDROCHLORIDE 10 MG: 10 TABLET ORAL at 20:04

## 2023-10-27 RX ADMIN — SODIUM CHLORIDE, SODIUM LACTATE, POTASSIUM CHLORIDE, AND CALCIUM CHLORIDE: .6; .31; .03; .02 INJECTION, SOLUTION INTRAVENOUS at 08:05

## 2023-10-27 RX ADMIN — LATANOPROST 1 DROP: 50 SOLUTION OPHTHALMIC at 21:48

## 2023-10-27 RX ADMIN — EPHEDRINE SULFATE 10 MG: 50 INJECTION INTRAVENOUS at 10:20

## 2023-10-27 RX ADMIN — BUPIVACAINE 10 ML: 13.3 INJECTION, SUSPENSION, LIPOSOMAL INFILTRATION at 09:55

## 2023-10-27 RX ADMIN — SODIUM CHLORIDE, SODIUM LACTATE, POTASSIUM CHLORIDE, AND CALCIUM CHLORIDE 1000 ML: .6; .31; .03; .02 INJECTION, SOLUTION INTRAVENOUS at 04:58

## 2023-10-27 RX ADMIN — SODIUM CHLORIDE, SODIUM LACTATE, POTASSIUM CHLORIDE, AND CALCIUM CHLORIDE 150 ML/HR: .6; .31; .03; .02 INJECTION, SOLUTION INTRAVENOUS at 14:03

## 2023-10-27 RX ADMIN — ENOXAPARIN SODIUM 40 MG: 40 INJECTION SUBCUTANEOUS at 18:50

## 2023-10-27 RX ADMIN — CEFAZOLIN 2000 MG: 1 INJECTION, POWDER, FOR SOLUTION INTRAMUSCULAR; INTRAVENOUS at 10:23

## 2023-10-27 RX ADMIN — NICOTINE 1 PATCH: 14 PATCH, EXTENDED RELEASE TRANSDERMAL at 08:41

## 2023-10-27 RX ADMIN — ONDANSETRON 4 MG: 2 INJECTION INTRAMUSCULAR; INTRAVENOUS at 10:36

## 2023-10-27 RX ADMIN — IOHEXOL 100 ML: 350 INJECTION, SOLUTION INTRAVENOUS at 00:37

## 2023-10-27 RX ADMIN — DIPHENHYDRAMINE HCL 25 MG: 25 TABLET, COATED ORAL at 18:50

## 2023-10-27 RX ADMIN — MIDAZOLAM 2 MG: 1 INJECTION INTRAMUSCULAR; INTRAVENOUS at 09:51

## 2023-10-27 RX ADMIN — HYDROMORPHONE HYDROCHLORIDE 0.5 MG: 1 INJECTION, SOLUTION INTRAMUSCULAR; INTRAVENOUS; SUBCUTANEOUS at 22:22

## 2023-10-27 RX ADMIN — SODIUM CHLORIDE, SODIUM LACTATE, POTASSIUM CHLORIDE, AND CALCIUM CHLORIDE 150 ML/HR: .6; .31; .03; .02 INJECTION, SOLUTION INTRAVENOUS at 21:50

## 2023-10-27 RX ADMIN — PHENYLEPHRINE HYDROCHLORIDE 30 MCG/MIN: 10 INJECTION INTRAVENOUS at 10:16

## 2023-10-27 RX ADMIN — SENNOSIDES, DOCUSATE SODIUM 1 TABLET: 8.6; 5 TABLET ORAL at 21:48

## 2023-10-27 RX ADMIN — OXYCODONE HYDROCHLORIDE 10 MG: 10 TABLET ORAL at 14:03

## 2023-10-27 RX ADMIN — BUPIVACAINE HYDROCHLORIDE 5 ML: 2.5 INJECTION, SOLUTION EPIDURAL; INFILTRATION; INTRACAUDAL at 09:55

## 2023-10-27 RX ADMIN — CLONAZEPAM 0.5 MG: 0.5 TABLET ORAL at 21:48

## 2023-10-27 RX ADMIN — SODIUM CHLORIDE, SODIUM LACTATE, POTASSIUM CHLORIDE, AND CALCIUM CHLORIDE 125 ML/HR: .6; .31; .03; .02 INJECTION, SOLUTION INTRAVENOUS at 06:20

## 2023-10-27 RX ADMIN — FENTANYL CITRATE 50 MCG: 50 INJECTION, SOLUTION INTRAMUSCULAR; INTRAVENOUS at 09:51

## 2023-10-27 RX ADMIN — PROPOFOL 150 MG: 10 INJECTION, EMULSION INTRAVENOUS at 10:11

## 2023-10-27 RX ADMIN — SODIUM CHLORIDE, SODIUM LACTATE, POTASSIUM CHLORIDE, AND CALCIUM CHLORIDE: .6; .31; .03; .02 INJECTION, SOLUTION INTRAVENOUS at 10:54

## 2023-10-27 RX ADMIN — LIDOCAINE 5% 1 PATCH: 700 PATCH TOPICAL at 15:37

## 2023-10-27 RX ADMIN — ACETAMINOPHEN 975 MG: 325 TABLET, FILM COATED ORAL at 14:39

## 2023-10-27 RX ADMIN — HYDROMORPHONE HYDROCHLORIDE 0.5 MG: 1 INJECTION, SOLUTION INTRAMUSCULAR; INTRAVENOUS; SUBCUTANEOUS at 16:53

## 2023-10-27 RX ADMIN — ACETAMINOPHEN 975 MG: 325 TABLET, FILM COATED ORAL at 21:48

## 2023-10-27 RX ADMIN — ATORVASTATIN CALCIUM 80 MG: 80 TABLET, FILM COATED ORAL at 16:54

## 2023-10-27 NOTE — ANESTHESIA POSTPROCEDURE EVALUATION
Post-Op Assessment Note    CV Status:  Stable    Pain management: adequate     Mental Status:  Awake and sleepy   Hydration Status:  Euvolemic   PONV Controlled:  Controlled   Airway Patency:  Patent      Post Op Vitals Reviewed: Yes      Staff: CRNA         No notable events documented.     /69 (10/27/23 1102)    Temp 97.9 °F (36.6 °C) (10/27/23 1102)    Pulse 76 (10/27/23 1102)   Resp (!) 8 (10/27/23 1102)    SpO2 100 % (10/27/23 1102)

## 2023-10-27 NOTE — PROGRESS NOTES
[facility] OP/ Progress Note  Name: Isha Chang  MRN: [id number]  Unit/Bed#: OR POOL I Date of Admission: 10/27/2023   Date of Service: 10/27/2023 I Hospital Day: 0    Assessment/Plan   Type 2 diabetes mellitus [facility])  Assessment & Plan  Lab Results   Component Value Date    HGBA1C 5.8 (H) 01/19/2023       Recent Labs     10/27/23  1104   POCGLU 83       Blood Sugar Average: Last 72 hrs:  (P) 83    Diet controlled  Monitor BG utilize S/S as needed    Compartment syndrome [facility])  Assessment & Plan  Patient presented with Crush injury to RUE  - initial CK 5942 increased to 13,683  - LR bolus given and placed on cont at 125 ml/hr  - Serial exam: increased paresthesia to 4 digits on Rt Hand  - Increased swelling to Dorsal aspect of forearm  -  artery via doppler +  - OR for fasciotomy due to compartment syndrome    Fall  Assessment & Plan  Patient had unwitnessed fall.  + Lightheadedness, CP  - Syncopal workup: EKG Unremarkable, Troponin's Negative  - Will order ECHO  - RUE Pain  - Right Xray of hand/forearm/elbow Negative for Osseous injury  - Concern for Compartment syndrome  - Monitor      Chronic pain  Assessment & Plan  Patient with chronic patient Fibromyalgia, chronic LLB pain, Chronic pain syndrome, peripheral neuropathy  - Consult APS  - Possible Block    Hypothyroid  Assessment & Plan  On levothyroxine will continue in hospital             Bowel Regimen: Senna colace  VTE Prophylaxis:Enoxaparin (Lovenox)     Disposition: TBD    Subjective   Chief Complaint: Numbness to fingers 4 digits, no numbness to thumb on RUE    Subjective: Patient states has increased pain to RUE. Taken to OR for fasciotomities.  Postoperatively:      Objective   Vitals:   Temp:  [96.7 °F (35.9 °C)-97.9 °F (36.6 °C)] 97.9 °F (36.6 °C)  HR:  [60-76] 76  Resp:  [8-24] 14  BP: (116-167)/(56-92) 126/62    I/O         10/25 0701  10/26 0700 10/26 0701  10/27 0700 10/27 0701  10/28 0700 I.V.   1000    IV Piggyback  1000     Total Intake  1000 1000    Net  +1000 +1000           Unmeasured Urine Occurrence   1 x             Physical Exam:   GENERAL APPEARANCE: NAD appears comfortable in bed post op  NEURO: Intact, GCS 15  HEENT: PERRL Right eye with permanent lens surgery, oblong slower to react  CV: RRR  LUNGS: CTA B/L throughout  GI: Abdomen soft, NT, ND, +BS x 4  : Voiding  MSK: Not moving right fingers, warm + Feeling to upper forearm  SKIN: Intact as above    Invasive Devices       Peripheral Intravenous Line  Duration             Peripheral IV 10/27/23 Left Antecubital <1 day              Drain  Duration             External Urinary Catheter <1 day                          Lab Results: Results: I have personally reviewed all pertinent laboratory/tests results, BMP/CMP:   Lab Results   Component Value Date    SODIUM 140 10/27/2023    K 3.7 10/27/2023     (H) 10/27/2023    CO2 21 10/27/2023    BUN 15 10/27/2023    CREATININE 0.70 10/27/2023    CALCIUM 7.7 (L) 10/27/2023     (H) 10/27/2023    ALT 63 (H) 10/27/2023    ALKPHOS 66 10/27/2023    EGFR 93 10/27/2023   , and CBC:   Lab Results   Component Value Date    WBC 10.79 (H) 10/27/2023    HGB 13.9 10/27/2023    HCT 40.6 10/27/2023    MCV 95 10/27/2023     10/27/2023    RBC 4.28 10/27/2023    MCH 32.5 10/27/2023    MCHC 34.2 10/27/2023    RDW 12.4 10/27/2023    MPV 10.3 10/27/2023    NRBC 0 10/27/2023     Imaging: I have personally reviewed pertinent reports.      Other Studies: None

## 2023-10-27 NOTE — H&P
H&P - Trauma   Reji Woodall 65 y.o. female MRN: [id number]  Unit/Bed#: ED 21 Encounter: [id number]    Trauma Alert: Other transfer    Model of Arrival: Ambulance    Trauma Team: Attending Donnie Bahena, Residents Jamal Servin, and Fellow Yessy Schmid  Consultants:     None     Assessment/Plan   Active Problems / Assessment:   Fall with unknown downtime  Rule out rhabdomyolysis  Rule out compartment syndrome of the right forearm     Plan:   -Admit to trauma service to stepdown to  - IV fluid hydration  - Compartment checks  - Palliative for assistance with chronic pain management  - Urine drug screen  - Follow-up x-ray reads  - Will engage orthopedic surgery if a fracture is identified  - Trend CK    History of Present Illness     Chief Complaint: Fall  Mechanism:Fall     HPI:    Khloe Zaragoza is a 65 y.o. female with history of anxiety, bipolar disorder, chronic pain syndrome, spinal stenosis, T-spine stimulator, fibromyalgia, upper and lower extremity neuropathy, DM, HLD, who presents as a transfer from [facility] following an unwitnessed fall. Patient states sometime early yesterday, she was in the kitchen, and blacked out. She recalls feeling lightheaded, and thinks she may have felt some chest pain. At baseline she ambulates with a cane. She was found down by family member and was noted to have altered mental status prior to being sent to [address] [place] [address]. She was transferred to [facility] after there was concern for potential RUE compartment syndrome. On exam she has a contusion to the right forehead, and swelling to the RUE, with firmness to the dorsal forearm, with overlying rubor. She has decreased sensation on exam, however the tenderness elicited seems proportional to the rest of her exam.  She complains of total body pain, and states she takes daily Vicodin to manage her pain. EKG and troponins were normal.  CK on exam was noted to be 5942.   Arm was splinted at [facility] due to concern for a right distal radius fracture. Review of Systems   Musculoskeletal:  Positive for arthralgias, back pain and neck stiffness. Neurological:  Positive for dizziness and weakness. 12-point, complete review of systems was reviewed and negative except as stated above.      Historical Information     Past Medical History:   Diagnosis Date    Ambulates with cane     Anxiety     Arthritis     Asthma     Bipolar 1 disorder (HCC)     Brain aneurysm     Chronic pain disorder     spinal stenosis    Concussion syndrome     neurological rx and balance rx    COPD (chronic obstructive pulmonary disease) ([facility])     Depression     Diabetes mellitus (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     Irritable bowel syndrome     Lumbar degenerative disc disease 02/16/2022    Migraine     Neuropathy     bilateral feet and hands    Peripheral neuropathy     Psychiatric disorder     PTSD (post-traumatic stress disorder)     Shortness of breath     Sleep apnea     had 3 studies & last one negative    Stroke ([address])     2012 no deficeits    TIA (transient ischemic attack)     Wears dentures     Wears glasses      Past Surgical History:   Procedure Laterality Date    ABDOMINAL SURGERY      laproscopic/ endometriosis    BRAIN SURGERY      aneurysm/ coiling procedure    CARPAL TUNNEL RELEASE      CHOLECYSTECTOMY      COLONOSCOPY      DENTAL SURGERY      EPIDURAL BLOCK INJECTION Right 03/03/2022    Procedure: C7-T1 interlaminar epidural steroid injection;  Surgeon: Jose Alfredo Rodarte MD;  Location: [facility] OR;  Service: Pain Management     EPIDURAL BLOCK INJECTION N/A 04/21/2022    Procedure: C6-C7 BLOCK / INJECTION EPIDURAL STEROID CERVICAL;  Surgeon: Jose Alfredo Rodarte MD;  Location: [facility] OR;  Service: Pain Management     EPIDURAL BLOCK INJECTION Left 06/21/2022    Procedure: BLOCK / INJECTION EPIDURAL STEROID LUMBAR  left L3-4 TFESI;  Surgeon: Nadege Judd MD;  Location: [facility] OR;  Service: Pain Management     EYE SURGERY      cataract    FL GUIDED NEEDLE PLAC BX/ASP/INJ  03/03/2022    FL GUIDED NEEDLE PLAC BX/ASP/INJ  11/17/2022    HYSTERECTOMY      TN ESOPHAGOGASTRODUODENOSCOPY TRANSORAL DIAGNOSTIC N/A 02/08/2018    Procedure: EGD AND COLONOSCOPY;  Surgeon: Nemo Arteaga MD;  Location:  GI LAB; Service: Gastroenterology    TN PRQ [address] NSTIM ELECTRODE ARRAY EPIDURAL N/A 11/17/2022    Procedure: Paul Alexander SCS TRIAL;  Surgeon: Nadege Judd MD;  Location: [facility] OR;  Service: Pain Management     TN PRQ IMPLTJ NSTIM ELECTRODE ARRAY EPIDURAL N/A 2/1/2023    Procedure:  Insertion percutaneous thoracic spinal cord stimulator with left buttock implantable pulse generator;  Surgeon: Carol Barbosa MD;  Location:  MAIN OR;  Service: Neurosurgery    THYROID SURGERY      TONSILLECTOMY      US GUIDED THYROID BIOPSY  11/30/2016    US GUIDED THYROID BIOPSY  3/21/2018        Social History     Tobacco Use    Smoking status: Every Day     Packs/day: 2.00     Types: Cigarettes    Smokeless tobacco: Never   Vaping Use    Vaping Use: Never used   Substance Use Topics    Alcohol use: Not Currently    Drug use: Never     Comment: prescribed Belbuca     Immunization History   Administered Date(s) Administered    COVID-19 MODERNA VACC 0.25 ML IM BOOSTER 01/25/2022    COVID-19 MODERNA VACC 0.5 ML IM 03/26/2021, 04/28/2021, 01/25/2022    COVID-19 Pfizer Vac BIVALENT Ever-sucrose 12 Yr+ IM 10/05/2022    COVID-19, unspecified 04/27/2021, 04/27/2021    INFLUENZA 11/10/2014, 10/01/2018    Influenza Quadrivalent Preservative Free 3 years and older IM 09/29/2015, 10/13/2016, 10/17/2017, 10/01/2019, 10/02/2020, 10/25/2021, 10/05/2022    Influenza Split 11/07/2012, 10/16/2013, 09/22/2014 Pneumococcal Conjugate Vaccine 20-valent (Pcv20), Polysace 02/13/2023    Pneumococcal Polysaccharide PPV23 10/13/2016    Tdap 11/03/2015    Zoster Vaccine Recombinant 09/21/2020, 12/15/2020     Last Tetanus: Unknown  Family History: Non-contributory     Meds/Allergies   PTA meds:   Prior to Admission Medications   Prescriptions Last Dose Informant Patient Reported? Taking?    Alcohol Swabs (Alcohol Pads) 70 % PADS   Yes No   Sig: USE TO TEST BLOOD GLUCOSE 2-3 TIMES DAILY   Buprenorphine HCl (Belbuca) 300 MCG FILM  Self Yes No   Sig: Apply 300 mcg to cheek in the morning   Butalbital-APAP-Caffeine (FIORICET PO)   Yes No   Sig: Take by mouth as needed   Fluticasone-Salmeterol (Advair) 500-50 mcg/dose inhaler   Yes No   Galcanezumab-gnlm (Emgality) 120 MG/ML SOAJ  Self Yes No   Sig: Inject 120 mg under the skin every 30 (thirty) days Last inj 1/19/23   HYDROcodone-acetaminophen (NORCO)  mg per tablet   Yes No   Sig: every 8 (eight) hours as needed   Melatonin 10 MG CAPS  Self Yes No   Sig: Take 1 tablet by mouth daily at bedtime as needed   OneTouch Ultra test strip   Yes No   Sig: USE TO TEST BLOOD GLUCOSE 2-3 TIMES DAILY   SUMAtriptan (IMITREX) 100 mg tablet   Yes No   True Comfort Safety Lancets MISC   Yes No   Sig: USE TO TEST BLOOD GLUCOSE 2-3 TIMES DAILY   Zoster Vac Recomb Adjuvanted (Shingrix) 50 MCG/0.5ML SUSR   Yes No   Sig: inject 0.5 milliliter intramuscularly   albuterol (PROVENTIL HFA,VENTOLIN HFA) 90 mcg/act inhaler  Self Yes No   Sig: Inhale 2 puffs every 6 (six) hours as needed   ammonium lactate (LAC-HYDRIN) 12 % lotion   Yes No   Sig: as needed   atorvastatin (LIPITOR) 80 mg tablet   Yes No   Sig: Take 80 mg by mouth every evening   benzonatate (TESSALON PERLES) 100 mg capsule   No No   Sig: Take 1 capsule (100 mg total) by mouth 3 (three) times a day as needed for cough   carboxymethylcellulose 0.5 % SOLN  Self No No   Sig: Administer 1 drop to both eyes daily as needed for dry eyes cephalexin (KEFLEX) 500 mg capsule   Yes No   Sig: Take 1 capsule by mouth every 12 (twelve) hours   ciprofloxacin (CIPRO) 250 mg tablet   Yes No   Sig: take 1 tablet by mouth twice a day for 7 days   clonazePAM (KlonoPIN) 0.5 mg tablet  Self Yes No   Sig: Take 0.5 mg by mouth daily at bedtime   clonazePAM (KlonoPIN) 1 mg tablet  Self Yes No   Sig: Take 1 mg by mouth daily in the early morning   dicyclomine (BENTYL) 20 mg tablet  Self Yes No   Sig: Take 20 mg by mouth every 6 (six) hours   doxycycline hyclate (VIBRA-TABS) 100 mg tablet   Yes No   Sig: take 1 tablet by mouth twice a day for 7 days   Patient not taking: Reported on 2023   famotidine (PEPCID) 40 MG tablet   No No   Sig: Take 1 tablet (40 mg total) by mouth daily at bedtime   fenofibrate (TRICOR) 48 mg tablet  Self Yes No   Sig: Take 48 mg by mouth daily   fluconazole (DIFLUCAN) 150 mg tablet   Yes No   Sig: take 1 tablet by mouth for 1 day   fluticasone (FLONASE) 50 mcg/act nasal spray   Yes No   Si sprays into each nostril daily   fluticasone-umeclidinium-vilanterol (Trelegy Ellipta) 200-62.5-25 mcg/actuation AEPB inhaler   No No   Sig: Inhale 1 puff daily Rinse mouth after use. furosemide (LASIX) 20 mg tablet  Self Yes No   Sig: Take 20 mg by mouth as needed Taking as needed   ibuprofen (MOTRIN) 800 mg tablet   Yes No   Sig: take 1 tablet by mouth every 8 hours if needed for mild pain or fever for up to 5 days   lamoTRIgine (LaMICtal) 100 mg tablet   Yes No   Sig: Take 100 mg by mouth 2 (two) times a day   latanoprost (XALATAN) 0.005 % ophthalmic solution   Yes No   Sig: instill 1 drop into both eyes at bedtime   levalbuterol (XOPENEX) 1.25 mg/3 mL nebulizer solution   Yes No   Sig: Inhale 1.25 mg every 4 (four) hours as needed   levothyroxine 137 mcg tablet  Self Yes No   Sig: Take 137 mcg by mouth daily   lubiprostone (AMITIZA) 8 mcg capsule   No No   Sig: Take 1 PO BID for constipation.    magnesium 30 MG tablet  Self Yes No   Sig: Take 500 mg by mouth 2 (two) times a day 1/26/23 right now not taking   methylPREDNISolone 4 MG tablet therapy pack   No No   Sig: Use as directed on package   metroNIDAZOLE (FLAGYL) 500 mg tablet   Yes No   Sig: Take 1 tablet by mouth 2 (two) times a day   naloxone (Narcan) 4 mg/0.1 mL nasal spray   Yes No   Patient not taking: Reported on 7/14/2023   omeprazole (PriLOSEC) 40 MG capsule   No No   Sig: Take 1 capsule (40 mg total) by mouth 2 (two) times a day   ondansetron (ZOFRAN) 4 mg tablet   Yes No   Sig: Take 4 mg by mouth every 8 (eight) hours as needed   ondansetron (ZOFRAN) 8 mg tablet   No No   Sig: Take 1 tablet (8 mg total) by mouth every 8 (eight) hours as needed for nausea or vomiting   polyethylene glycol (GLYCOLAX) 17 GM/SCOOP powder   No No   Sig: Take 17 g daily for constipation. pregabalin (LYRICA) 100 mg capsule  Self Yes No   Sig: Take 100 mg by mouth 3 (three) times a day Morning-lunch-dinner   pregabalin (LYRICA) 150 mg capsule  Self Yes No   Sig: take 1 capsule by mouth NIGHTLY   risperiDONE (RisperDAL) 0.5 mg tablet   Yes No   Sig: Take 0.25 mg by mouth 2 (two) times a day   Patient not taking: Reported on 7/14/2023   rizatriptan (MAXALT-MLT) 10 mg disintegrating tablet   Yes No   Sig: TAKE 1 TABLET BY MOUTH ONE TIME AS NEEDED FOR MIGRAINE, MAY REPEA. ..  (REFER TO PRESCRIPTION NOTES). tiZANidine (ZANAFLEX) 4 mg tablet   Yes No   Sig: take 2 tablets by mouth every 6 hours if needed for muscle spasm   topiramate (TOPAMAX) 100 mg tablet  Self Yes No   Sig: Take 100 mg by mouth every 6 (six) hours as needed Takes 2 tabs every 6 hours   topiramate (TOPAMAX) 200 MG tablet   Yes No      Facility-Administered Medications: None     Allergies have not been reviewed; Allergies   Allergen Reactions    Hydroxyzine Anaphylaxis     Claims it gives her convulsions.      Other Other (See Comments)    Pollen Extract Itching    Tree Extract     Clarithromycin Rash     Pt denies    Latex Rash    Sulfa Antibiotics Rash     "severe skin burn"       Objective   Initial Vitals:   Temperature: (!) 97.4 °F (36.3 °C) (10/27/23 0400)  Pulse: 60 (10/27/23 0345)  Respirations: 20 (10/27/23 0345)  Blood Pressure: 121/56 (10/27/23 0345)    Primary Survey:   Airway:        Status: patent;                  Breathing:               Effort: normal       Right breath sounds: normal       Left breath sounds: normal  Circulation:        Rhythm:           Right Pulses Left Pulses    R radial: 2+  R femoral: 2+       L radial: 2+  L femoral: 2+         Disability:        GCS: Eye: 3; Verbal: 5 Motor: 6 Total: 14       Right Pupil: round;  reactive         Left Pupil:     R Motor Strength L Motor Strength    R : 4/5  R dorsiflex: 4/5  R plantarflex: 4/5 L : 4/5  L dorsiflex: 4/5  L plantarflex: 4/5        Sensory:  Sensory deficit (Right hand)  Exposure:       Completed: Yes      Secondary Survey:  Physical Exam  Vitals reviewed. Constitutional:       Comments: Disheveled   HENT:      Head:      Comments: Frontal contusion. No bleeding or skin breakdown     Mouth/Throat:      Mouth: Mucous membranes are dry. Comments: Cracked lips  Eyes:      Pupils: Pupils are equal, round, and reactive to light. Cardiovascular:      Rate and Rhythm: Normal rate and regular rhythm. Pulmonary:      Effort: Pulmonary effort is normal.   Abdominal:      General: Abdomen is flat. Palpations: Abdomen is soft. Tenderness: There is abdominal tenderness (RLQ). There is no guarding or rebound. Musculoskeletal:         General: Swelling (RUE) present. No deformity. Cervical back: Normal range of motion. Tenderness present. Comments: Tenderness of the neck, back, bilateral upper extremities R > L, bilateral lower extremities   Skin:     General: Skin is dry. Comments: Rubor to the dorsal right forearm   Neurological:      Mental Status: She is disoriented. Sensory: Sensory deficit (Right hand) present. Invasive Devices       Peripheral Intravenous Line  Duration             Peripheral IV 10/27/23 Left Antecubital <1 day                  Lab Results: I have personally reviewed all pertinent laboratory/test results 10/27/23 and in the preceding 24 hours. Recent Labs     10/27/23  0011   WBC 9.58   HGB 14.0   HCT 43.3      SODIUM 137   K 4.2      CO2 21   BUN 20   CREATININE 0.87   GLUC 140   AST 76*   ALT 47   ALB 4.4   TBILI 0.33   ALKPHOS 69   HSTNI0 2       Imaging Results: I have personally reviewed pertinent images saved in PACS. CT scan findings (and other pertinent positive findings on images) were discussed with radiology. My interpretation of the images/reports are as follows:    TRAUMA - CT spine cervical wo contrast    Result Date: 10/27/2023  Impression: No acute cervical spine fracture or traumatic malalignment. Multilevel degenerative changes are present. The study was marked in [facility] for immediate notification. Workstation performed: [id number]     TRAUMA - CT chest abdomen pelvis w contrast    Result Date: 10/27/2023  Impression: No evidence of acute intrathoracic, intra-abdominal or intrapelvic parenchymal organ injury. No pneumothorax. There are healing fractures of the left transverse processes of L1, L2 and L3. Mild hepatomegaly. Biliary ductal dilatation unchanged. Prior cholecystectomy. Other nonemergent and chronic findings as above. The study was marked in [facility] for immediate notification. Workstation performed: [id number]     TRAUMA - CT head wo contrast    Result Date: 10/27/2023  Impression: Mild extracranial soft tissue swelling. No evidence of acute intracranial abnormality. No evidence of acute facial bone fracture. Workstation performed: [id number]     TRAUMA - CT facial bones wo contrast    Result Date: 10/27/2023  Impression: Mild extracranial soft tissue swelling. No evidence of acute intracranial abnormality. No evidence of acute facial bone fracture.  Workstation performed: [id number]        Code Status: Level 1 - Full Code  Advance Directive and Living Will:      Power of :    POLST:    I have spent 30 minutes with Patient  today in which greater than 50% of this time was spent in counseling/coordination of care regarding Instructions for management.

## 2023-10-27 NOTE — ASSESSMENT & PLAN NOTE
Right forearm CS s/p right forearm fasciotomy with wound vac placement on 10/27  S/p right infraclavicular block with Exparel on 10/27  Status post partial closure with placement of Realitos matrix on 11/4/2023. Will hold off on further regional analgesic techniques given nerve deficits of right forearm and monitoring for return of nerve function  MMA as below  Dilaudid 2 mg p.o. every 4 hours as needed moderate pain or 4 mg p.o. every 4 hours as needed severe pain. IV Dilaudid 0.5 mg IV daily as needed for dressing changes only. Continue Lidoderm patch  Continue PO Tylenol 975mg q8hr princess  Robaxin 250 mg p.o. every 6 hours scheduled. Resume home dose of Lyrica at 100 mg p.o. 3 times daily. Klonopin 0.5 mg PO QHS ordered since 10/27. At discharge, suggest the following:  Tylenol 975 mg p.o. every 8 hours as needed mild pain. Lyrica 100 mg p.o. 3 times daily. Dilaudid 2 mg p.o. every 4 hours as needed moderate to severe pain x2 days. May resume home pain regimen following discontinuation of p.o. Dilaudid. Patient is not to take p.o. Dilaudid along with home, previously prescribed hydrocodone. Patient should follow-up with her outpatient pain management specialist as soon as possible after discharge.

## 2023-10-27 NOTE — ASSESSMENT & PLAN NOTE
Patient had unwitnessed fall.  + Lightheadedness, CP  - Syncopal workup: EKG Unremarkable, Troponin's Negative  - Will order ECHO  - RUE Pain  - Right Xray of hand/forearm/elbow Negative for Osseous injury  - Concern for Compartment syndrome  - Monitor

## 2023-10-27 NOTE — ED PROVIDER NOTES
Emergency Department Trauma Note  Ja Woodall 65 y.o. female MRN: [id number]  Unit/Bed#: KQ07/CB14 Encounter: [id number]      Trauma Alert: Trauma Acuity: Trauma Evaluation  Model of Arrival: Mode of Arrival: ALS via Trauma Squad Name and Number: Ceci Fiddler Team: Current Providers  Attending Provider: Jak Hidalgo MD  Registered Nurse: Dinorah Shea RN  Consultants:     None      History of Present Illness     Chief Complaint:   Chief Complaint   Patient presents with    Fall     Found on kitchen floor. Altered. Son called EMS     HPI:  Alex Fortune is a 65 y.o. female who presents with . Mechanism:Details of Incident: pt fell on floor, unknown downtime, unknown thinners          78-year-old female with a past medical history of anxiety, bipolar disorder, PTSD, who presents for follow-up. Patient is unable to write history due to altered mental status. Per family, she just recently had her sister  this morning. After this, she expressed to multiple family members that she wishes it was her who , and expressed suicidal ideations. She denies any ingestions currently, although she is seemingly altered. She describes pain in the right upper extremity however is not clear exactly where this is, she does have red discoloration over the mid forearm down to the hand. She describes neck and back pain however it does appear that this is chronic for her, she has had a procedure scheduled today regarding this. ROS otherwise negative    Review of Systems   Constitutional:  Negative for chills and fever. HENT:  Negative for congestion, rhinorrhea and sore throat. Respiratory:  Negative for cough and shortness of breath. Cardiovascular:  Negative for chest pain and palpitations. Gastrointestinal:  Negative for abdominal pain, constipation, diarrhea, nausea and vomiting. Genitourinary:  Negative for difficulty urinating and flank pain.    Musculoskeletal:  Positive for arthralgias and myalgias. Neurological:  Negative for dizziness, weakness, light-headedness and headaches. Psychiatric/Behavioral:  Negative for agitation, behavioral problems and confusion. All other systems reviewed and are negative.       Historical Information     Immunizations:   Immunization History   Administered Date(s) Administered    COVID-19 MODERNA VACC 0.25 ML IM BOOSTER 01/25/2022    COVID-19 MODERNA VACC 0.5 ML IM 03/26/2021, 04/28/2021, 01/25/2022    COVID-19 Pfizer Vac BIVALENT Ever-sucrose 12 Yr+ IM 10/05/2022    COVID-19, unspecified 04/27/2021, 04/27/2021    INFLUENZA 11/10/2014, 10/01/2018    Influenza Quadrivalent Preservative Free 3 years and older IM 09/29/2015, 10/13/2016, 10/17/2017, 10/01/2019, 10/02/2020, 10/25/2021, 10/05/2022    Influenza Split 11/07/2012, 10/16/2013, 09/22/2014    Pneumococcal Conjugate Vaccine 20-valent (Pcv20), Polysace 02/13/2023    Pneumococcal Polysaccharide PPV23 10/13/2016    Tdap 11/03/2015    Zoster Vaccine Recombinant 09/21/2020, 12/15/2020       Past Medical History:   Diagnosis Date    Ambulates with cane     Anxiety     Arthritis     Asthma     Bipolar 1 disorder (HCC)     Brain aneurysm     Chronic pain disorder     spinal stenosis    Concussion syndrome     neurological rx and balance rx    COPD (chronic obstructive pulmonary disease) ([address])     Depression     Diabetes mellitus ([address])     controlled w/ diet    Disease of thyroid gland     nodules     Family history of colon cancer     father    Fibromyalgia, primary     GERD (gastroesophageal reflux disease)     History of colon polyps     Hyperlipidemia     Hypertension     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     Irritable bowel syndrome     Lumbar degenerative disc disease 02/16/2022    Migraine     Neuropathy     bilateral feet and hands    Peripheral neuropathy     Psychiatric disorder     PTSD (post-traumatic stress disorder)     Shortness of breath     Sleep apnea     had 3 studies & last one negative    Stroke [facility])     2012 no deficeits    TIA (transient ischemic attack)     Wears dentures     Wears glasses        Family History   Problem Relation Age of Onset    Brain cancer Mother     Alzheimer's disease Mother     Alzheimer's disease Father     Parkinsonism Father      Past Surgical History:   Procedure Laterality Date    ABDOMINAL SURGERY      laproscopic/ endometriosis    BRAIN SURGERY      aneurysm/ coiling procedure    CARPAL TUNNEL RELEASE      CHOLECYSTECTOMY      COLONOSCOPY      DENTAL SURGERY      EPIDURAL BLOCK INJECTION Right 03/03/2022    Procedure: C7-T1 interlaminar epidural steroid injection;  Surgeon: Afsaneh Pollard MD;  Location: [facility];  Service: Pain Management     EPIDURAL BLOCK INJECTION N/A 04/21/2022    Procedure: C6-C7 BLOCK / INJECTION EPIDURAL STEROID CERVICAL;  Surgeon: Afsaneh Pollard MD;  Location: [facility];  Service: Pain Management     EPIDURAL BLOCK INJECTION Left 06/21/2022    Procedure: BLOCK / INJECTION EPIDURAL STEROID LUMBAR  left L3-4 TFESI;  Surgeon: Afsaneh Pollard MD;  Location: [facility];  Service: Pain Management     EYE SURGERY      cataract    FL GUIDED NEEDLE PLAC BX/ASP/INJ  03/03/2022    FL GUIDED NEEDLE PLAC BX/ASP/INJ  11/17/2022    HYSTERECTOMY      TX ESOPHAGOGASTRODUODENOSCOPY TRANSORAL DIAGNOSTIC N/A 02/08/2018    Procedure: EGD AND COLONOSCOPY;  Surgeon: Castillo Rod MD;  Location: [facility]; Service: Gastroenterology    TX PRQ [address] NSTIM ELECTRODE ARRAY EPIDURAL N/A 11/17/2022    Procedure: Gem Dill SCS TRIAL;  Surgeon: Afsaneh Pollard MD;  Location: [facility];  Service: Pain Management     TX PRQ IMPLTJ NSTIM ELECTRODE ARRAY EPIDURAL N/A 2/1/2023    Procedure:  Insertion percutaneous thoracic spinal cord stimulator with left buttock implantable pulse generator;  Surgeon: Mason Soriano MD;  Location: [facility];  Service: Neurosurgery    [facility] GUIDED THYROID BIOPSY  11/30/2016     GUIDED THYROID BIOPSY  3/21/2018     Social History     Tobacco Use    Smoking status: Every Day     Packs/day: 2.00     Types: Cigarettes    Smokeless tobacco: Never   Vaping Use    Vaping Use: Never used   Substance Use Topics    Alcohol use: Not Currently    Drug use: Never     Comment: prescribed Belbuca     E-Cigarette/Vaping    E-Cigarette Use Never User      E-Cigarette/Vaping Substances    Nicotine No     THC No     CBD No     Flavoring No     Other No     Unknown No        Family History: non-contributory    Meds/Allergies   Prior to Admission Medications   Prescriptions Last Dose Informant Patient Reported? Taking?    Alcohol Swabs (Alcohol Pads) 70 % PADS   Yes No   Sig: USE TO TEST BLOOD GLUCOSE 2-3 TIMES DAILY   Buprenorphine HCl (Belbuca) 300 MCG FILM  Self Yes No   Sig: Apply 300 mcg to cheek in the morning   Butalbital-APAP-Caffeine (FIORICET PO)   Yes No   Sig: Take by mouth as needed   Fluticasone-Salmeterol (Advair) 500-50 mcg/dose inhaler   Yes No   Galcanezumab-gnlm (Emgality) 120 MG/ML SOAJ  Self Yes No   Sig: Inject 120 mg under the skin every 30 (thirty) days Last inj 1/19/23   HYDROcodone-acetaminophen (NORCO)  mg per tablet   Yes No   Sig: every 8 (eight) hours as needed   Melatonin 10 MG CAPS  Self Yes No   Sig: Take 1 tablet by mouth daily at bedtime as needed   OneTouch Ultra test strip   Yes No   Sig: USE TO TEST BLOOD GLUCOSE 2-3 TIMES DAILY   SUMAtriptan (IMITREX) 100 mg tablet   Yes No   True Comfort Safety Lancets MISC   Yes No   Sig: USE TO TEST BLOOD GLUCOSE 2-3 TIMES DAILY   Zoster Vac Recomb Adjuvanted (Shingrix) 50 MCG/0.5ML SUSR   Yes No   Sig: inject 0.5 milliliter intramuscularly   albuterol (PROVENTIL HFA,VENTOLIN HFA) 90 mcg/act inhaler  Self Yes No   Sig: Inhale 2 puffs every 6 (six) hours as needed   ammonium lactate (LAC-HYDRIN) 12 % lotion   Yes No   Sig: as needed   atorvastatin (LIPITOR) 80 mg tablet   Yes No   Sig: Take 80 mg by mouth every evening   benzonatate (TESSALON PERLES) 100 mg capsule   No No   Sig: Take 1 capsule (100 mg total) by mouth 3 (three) times a day as needed for cough   carboxymethylcellulose 0.5 % SOLN  Self No No   Sig: Administer 1 drop to both eyes daily as needed for dry eyes   cephalexin (KEFLEX) 500 mg capsule   Yes No   Sig: Take 1 capsule by mouth every 12 (twelve) hours   ciprofloxacin (CIPRO) 250 mg tablet   Yes No   Sig: take 1 tablet by mouth twice a day for 7 days   clonazePAM (KlonoPIN) 0.5 mg tablet  Self Yes No   Sig: Take 0.5 mg by mouth daily at bedtime   clonazePAM (KlonoPIN) 1 mg tablet  Self Yes No   Sig: Take 1 mg by mouth daily in the early morning   dicyclomine (BENTYL) 20 mg tablet  Self Yes No   Sig: Take 20 mg by mouth every 6 (six) hours   doxycycline hyclate (VIBRA-TABS) 100 mg tablet   Yes No   Sig: take 1 tablet by mouth twice a day for 7 days   Patient not taking: Reported on 2023   famotidine (PEPCID) 40 MG tablet   No No   Sig: Take 1 tablet (40 mg total) by mouth daily at bedtime   fenofibrate (TRICOR) 48 mg tablet  Self Yes No   Sig: Take 48 mg by mouth daily   fluconazole (DIFLUCAN) 150 mg tablet   Yes No   Sig: take 1 tablet by mouth for 1 day   fluticasone (FLONASE) 50 mcg/act nasal spray   Yes No   Si sprays into each nostril daily   fluticasone-umeclidinium-vilanterol (Trelegy Ellipta) 200-62.5-25 mcg/actuation AEPB inhaler   No No   Sig: Inhale 1 puff daily Rinse mouth after use.    furosemide (LASIX) 20 mg tablet  Self Yes No   Sig: Take 20 mg by mouth as needed Taking as needed   ibuprofen (MOTRIN) 800 mg tablet   Yes No   Sig: take 1 tablet by mouth every 8 hours if needed for mild pain or fever for up to 5 days   lamoTRIgine (LaMICtal) 100 mg tablet   Yes No   Sig: Take 100 mg by mouth 2 (two) times a day   latanoprost (XALATAN) 0.005 % ophthalmic solution   Yes No   Sig: instill 1 drop into both eyes at bedtime   levalbuterol (XOPENEX) 1.25 mg/3 mL nebulizer solution   Yes No   Sig: Inhale 1.25 mg every 4 (four) hours as needed   levothyroxine 137 mcg tablet  Self Yes No   Sig: Take 137 mcg by mouth daily   lubiprostone (AMITIZA) 8 mcg capsule   No No   Sig: Take 1 PO BID for constipation. magnesium 30 MG tablet  Self Yes No   Sig: Take 500 mg by mouth 2 (two) times a day 1/26/23 right now not taking   methylPREDNISolone 4 MG tablet therapy pack   No No   Sig: Use as directed on package   metroNIDAZOLE (FLAGYL) 500 mg tablet   Yes No   Sig: Take 1 tablet by mouth 2 (two) times a day   naloxone (Narcan) 4 mg/0.1 mL nasal spray   Yes No   Patient not taking: Reported on 7/14/2023   omeprazole (PriLOSEC) 40 MG capsule   No No   Sig: Take 1 capsule (40 mg total) by mouth 2 (two) times a day   ondansetron (ZOFRAN) 4 mg tablet   Yes No   Sig: Take 4 mg by mouth every 8 (eight) hours as needed   ondansetron (ZOFRAN) 8 mg tablet   No No   Sig: Take 1 tablet (8 mg total) by mouth every 8 (eight) hours as needed for nausea or vomiting   polyethylene glycol (GLYCOLAX) 17 GM/SCOOP powder   No No   Sig: Take 17 g daily for constipation. pregabalin (LYRICA) 100 mg capsule  Self Yes No   Sig: Take 100 mg by mouth 3 (three) times a day Morning-lunch-dinner   pregabalin (LYRICA) 150 mg capsule  Self Yes No   Sig: take 1 capsule by mouth NIGHTLY   risperiDONE (RisperDAL) 0.5 mg tablet   Yes No   Sig: Take 0.25 mg by mouth 2 (two) times a day   Patient not taking: Reported on 7/14/2023   rizatriptan (MAXALT-MLT) 10 mg disintegrating tablet   Yes No   Sig: TAKE 1 TABLET BY MOUTH ONE TIME AS NEEDED FOR MIGRAINE, MAY REPEA. ..  (REFER TO PRESCRIPTION NOTES).    tiZANidine (ZANAFLEX) 4 mg tablet   Yes No   Sig: take 2 tablets by mouth every 6 hours if needed for muscle spasm   topiramate (TOPAMAX) 100 mg tablet  Self Yes No   Sig: Take 100 mg by mouth every 6 (six) hours as needed Takes 2 tabs every 6 hours   topiramate (TOPAMAX) 200 MG tablet   Yes No      Facility-Administered Medications: None       Allergies   Allergen Reactions    Hydroxyzine Anaphylaxis     Claims it gives her convulsions. Other Other (See Comments)    Pollen Extract Itching    Tree Extract     Clarithromycin Rash     Pt denies    Latex Rash    Sulfa Antibiotics Rash     "severe skin burn"       PHYSICAL EXAM    PE limited by:     Objective   Vitals:   First set: Temperature: (!) 96.7 °F (35.9 °C) (10/26/23 2359)  Pulse: 72 (10/26/23 2359)  Respirations: 18 (10/26/23 2359)  Blood Pressure: 116/71 (10/26/23 2359)  SpO2: 95 % (10/26/23 2359)    Primary Survey:   (A) Airway: Intact  (B) Breathing: Equal BL  (C) Circulation: Pulses:   normal  (D) Disabliity:  GCS Total:  15  (E) Expose:  Completed    Secondary Survey: (Click on Physical Exam tab above)  Physical Exam  Physical Exam  Constitutional:       Appearance: She is well-developed. HENT:      Head: Normocephalic. Comments: She does have some bruising over the forehead, nose. Cardiovascular:      Rate and Rhythm: Normal rate and regular rhythm. Heart sounds: Normal heart sounds. No murmur heard. No friction rub. Pulmonary:      Effort: Pulmonary effort is normal. No respiratory distress. Breath sounds: Normal breath sounds. No wheezing or rales. Abdominal:      General: Bowel sounds are normal. There is no distension. Palpations: Abdomen is soft. Tenderness: There is no abdominal tenderness. Musculoskeletal:         General: Normal range of motion. Cervical back: Normal range of motion and neck supple. Comments: No midline tenderness of the C-, T-, or L-spine     No tenderness of the thorax     No instability or pain when pressing on pelvis     RUE:  Despite describing pain in RUE, and having discoloration as described in "Skin" findings on exam, she does not report tenderness. Unsure if this is due to mental status. Compartments feel soft. She reports normal sensation over this region.  Patient refuses to move RUE, particularly wrist, although she seems to tolerate passive ROM well. LUE:  No tenderness, no pain ranging joints of the shoulder, elbow, wrist, hand     RLE:   No tenderness, no pain ranging the hip, knee, ankle     LLE:   No tenderness, no pain ranging the hip, knee, ankle      Skin:     General: Skin is warm. Capillary Refill: Strong radial pulse in RUE. Comments: There is redness over extending from the right mid-forearm down to the hand. Not significantly swollen with soft compartments. She does report normal sensation over this region. Neurological:      Mental Status: She is alert and oriented to person, place, and time. Coordination: Coordination normal.      Comments: Patient AAOx3. CN II-XII intact. Normal CFTs. 5/5 strength in all extremities. Normal sensation in all extremities. Psychiatric:         Behavior: Behavior normal.         Thought Content: Thought content normal.         Judgment: Judgment normal.     Cervical spine cleared by clinical criteria?  No (imaging required)      Invasive Devices       Peripheral Intravenous Line  Duration             Peripheral IV 10/27/23 Left Antecubital <1 day                    Lab Results:   Results Reviewed       Procedure Component Value Units Date/Time    CK [[id number]]  (Abnormal) Collected: 10/27/23 0011    Lab Status: Final result Specimen: Blood Updated: 10/27/23 0118     Total CK 5,942 U/L     TSH [[id number]]  (Abnormal) Collected: 10/27/23 0011    Lab Status: Final result Specimen: Blood from Arm, Left Updated: 10/27/23 0110     TSH 3RD GENERATON 0.123 uIU/mL     HS Troponin 0hr (reflex protocol) [[id number]]  (Normal) Collected: 10/27/23 0011    Lab Status: Final result Specimen: Blood from Arm, Left Updated: 10/27/23 0040     hs TnI 0hr 2 ng/L     Salicylate level [[id number]]  (Normal) Collected: 10/27/23 0011    Lab Status: Final result Specimen: Blood from Arm, Left Updated: 85/81/42 8192     Salicylate Lvl <5 mg/dL Acetaminophen level-If concentration is detectable, please discuss with medical  on call.  [[id number]]  (Abnormal) Collected: 10/27/23 0011    Lab Status: Final result Specimen: Blood from Arm, Left Updated: 10/27/23 0035     Acetaminophen Level <10 ug/mL     Comprehensive metabolic panel [[id number]]  (Abnormal) Collected: 10/27/23 0011    Lab Status: Final result Specimen: Blood from Arm, Left Updated: 10/27/23 0034     Sodium 137 mmol/L      Potassium 4.2 mmol/L      Chloride 105 mmol/L      CO2 21 mmol/L      ANION GAP 11 mmol/L      BUN 20 mg/dL      Creatinine 0.87 mg/dL      Glucose 140 mg/dL      Calcium 9.0 mg/dL      AST 76 U/L      ALT 47 U/L      Alkaline Phosphatase 69 U/L      Total Protein 7.3 g/dL      Albumin 4.4 g/dL      Total Bilirubin 0.33 mg/dL      eGFR 72 ml/min/1.73sq m     Narrative:      [facility] guidelines for [facility] Kid[facility] Disease (CKD):     Stage 1 with normal or high GFR (GFR > 90 mL/min/1.73 square meters)    Stage 2 Mild CKD (GFR = 60-89 mL/min/1.73 square meters)    Stage 3A Moderate CKD (GFR = 45-59 mL/min/1.73 square meters)    Stage 3B Moderate CKD (GFR = 30-44 mL/min/1.73 square meters)    Stage 4 Severe CKD (GFR = 15-29 mL/min/1.73 square meters)    Stage 5 End Stage CKD (GFR <15 mL/min/1.73 square meters)  Note: GFR calculation is accurate only with a steady state creatinine    Ethanol [[id number]]  (Normal) Collected: 10/27/23 0011    Lab Status: Final result Specimen: Blood from Arm, Left Updated: 10/27/23 0032     Ethanol Lvl <10 mg/dL     CBC and differential [[id number]]  (Abnormal) Collected: 10/27/23 0011    Lab Status: Final result Specimen: Blood from Arm, Left Updated: 10/27/23 0019     WBC 9.58 Thousand/uL      RBC 4.46 Million/uL      Hemoglobin 14.0 g/dL      Hematocrit 43.3 %      MCV 97 fL      MCH 31.4 pg      MCHC 32.3 g/dL      RDW 12.2 %      MPV 10.4 fL      Platelets 530 Thousands/uL      nRBC 0 /100 WBCs      Neutrophils Relative 86 %      Immat GRANS % 0 %      Lymphocytes Relative 10 %      Monocytes Relative 4 %      Eosinophils Relative 0 %      Basophils Relative 0 %      Neutrophils Absolute 8.15 Thousands/µL      Immature Grans Absolute 0.04 Thousand/uL      Lymphocytes Absolute 0.97 Thousands/µL      Monocytes Absolute 0.40 Thousand/µL      Eosinophils Absolute 0.00 Thousand/µL      Basophils Absolute 0.02 Thousands/µL                    Imaging Studies:   Direct to CT: Yes  XR elbow 3+ vw RIGHT   Final Result by Jhonny Choudhary MD (10/27 0683)      No acute osseous abnormality. Workstation performed: [id number]         XR hand 3+ views RIGHT   Final Result by Jhonny Choudhary MD (10/27 2983)      No acute osseous abnormality. Workstation performed: [id number]         XR forearm 2 views RIGHT   Final Result by Jhonny Choudhary MD (10/27 5445)      No acute osseous abnormality. Workstation performed: [id number]         TRAUMA - CT head wo contrast   Final Result by Conrad Pierre MD (10/27 0112)      Mild extracranial soft tissue swelling. No evidence of acute intracranial abnormality. No evidence of acute facial bone fracture. Workstation performed: [id number]         TRAUMA - CT spine cervical wo contrast   Final Result by Conrad Pierre MD (10/27 0153)      No acute cervical spine fracture or traumatic malalignment. Multilevel degenerative changes are present. The study was marked in [facility] for immediate notification. Workstation performed: [id number]         TRAUMA - CT chest abdomen pelvis w contrast   Final Result by Conrad Pierre MD (10/27 0145)      No evidence of acute intrathoracic, intra-abdominal or intrapelvic parenchymal organ injury. No pneumothorax. There are healing fractures of the left transverse processes of L1, L2 and L3. Mild hepatomegaly. Biliary ductal dilatation unchanged. Prior cholecystectomy. Other nonemergent and chronic findings as above. The study was marked in [facility] for immediate notification. Workstation performed: [id number]         TRAUMA - CT facial bones wo contrast   Final Result by Dusty Gurrola MD (10/27 0112)      Mild extracranial soft tissue swelling. No evidence of acute intracranial abnormality. No evidence of acute facial bone fracture. Workstation performed: [id number]         XR Trauma chest portable   ED Interpretation by Shelley Quan MD (10/27 0017)   No acute cardiopulmonary disease. Final Result by Lio Kiran MD (10/27 1014)   No acute cardiopulmonary findings. Workstation performed: [id number]               Procedures  Procedures  ECG 12 Lead Documentation Only     Date/Time: 10/27/2023 12:45 AM     Performed by: Shelley Quan MD  Authorized by: Shelley Quan MD    Indications / Diagnosis:  AMS  ECG reviewed by me, the ED Provider: yes    Patient location:  ED  Previous ECG:     Previous ECG:  Unavailable    Comparison to cardiac monitor: No    Interpretation:     Interpretation: non-specific    Rate:     ECG rate:  71    ECG rate assessment: normal    Rhythm:     Rhythm: sinus rhythm    Ectopy:     Ectopy: none    QRS:     QRS axis:  Normal    QRS intervals:  Normal  Conduction:     Conduction: normal    ST segments:     ST segments:  Normal  T waves:     T waves: normal    Splint application     Date/Time: 10/27/2023 3:22 AM     Performed by: Shelley Quan MD  Authorized by: Shelley Quan MD  Universal Protocol:  Procedure performed by:  Consent: Verbal consent obtained.   Consent given by: patient  Patient understanding: patient states understanding of the procedure being performed  Patient identity confirmed: verbally with patient     Pre-procedure details:     Skin color:  Red  Procedure details:     Laterality:  Right Location:  Wrist    Splint type:  Short arm splint, dynamic    Supplies:  Cotton padding and Ortho-Glass (Ace bandage)  Post-procedure details:     Pain:  Unchanged    Sensation:  Unchanged    Skin color:  Red    Patient tolerance of procedure: Tolerated well, no immediate complications  Comments:      Splint was extremely light, simply placed only to avoid disruption of the suspected right distal radius fracture. Intentionally left very loose for suspect compartment syndrome. CriticalCare Time     Date/Time: 10/27/2023 3:32 AM     Performed by: Jak Hidalgo MD  Authorized by: Jak Hidalgo MD    Critical care provider statement:     Critical care time (minutes):  35    Critical care start time:  10/27/2023 2:00 AM    Critical care end time:  10/27/2023 2:35 AM    Critical care time was exclusive of:  Separately billable procedures and treating other patients and teaching time    Critical care was necessary to treat or prevent imminent or life-threatening deterioration of the following conditions:  Trauma    Critical care was time spent personally by me on the following activities:  Obtaining history from patient or surrogate, development of treatment plan with patient or surrogate, discussions with consultants, evaluation of patient's response to treatment, examination of patient, ordering and performing treatments and interventions, ordering and review of laboratory studies, ordering and review of radiographic studies, re-evaluation of patient's condition and review of old charts    I assumed direction of critical care for this patient from another provider in my specialty: no           ED Course  ED Course as of 10/27/23 0716   Fri Oct 27, 2023   0033 MEDICAL ALCOHOL: <10  Negative, ruling out alcohol intoxication. 5489 A very loose splint with minimal pressure was applied simply to ensure stability of suspected distal radius fx during transfer.  This was placed in position of comfort for patient. Medical Decision Making  I reviewed the patient's medical chart, PMHx, prior encounters, medications. My independent interpretation of ECG demonstrated:  My independent interpretation of CXR demonstrated: No acute cardiopulmonary disease  My independent interpretation of XR right elbow: Suspected distal radius fx     My DDx includes: Traumatic ICH, C-spine, T-spine, L-spine fx, right forearm, wrist, or hand fracture, intentional overdose, suicidal ideation     Cardiac labs, tox labs ordered. Normal tylenol, aspirin, and alcohol are reassuring. Patient's lips do appear dry on exam, 1L fluid given. Given unknown downtime, will order broad labs including CK. I considered compartment syndrome initially, however, compartments and arm were soft without swelling, no pain out of proportion, no pulselessness, no loss of sensation. There was some discoloration however this was to be expected as patient fell. Imaging showed distal radius fracture. I re-assessed the patient arm later in encounter after labs, imaging returned. At this time, I did feel significant swelling when compared with prior exam, compartments felt relatively hard, and redness increased. At this time, this raised suspicion for compartment syndrome (in conjunction with mildly elevated CK). Sim exam was performed at bedside, and pressures were as high as 150. Transfer order immediately placed. Before transport, splint was placed. Splint was extremely light, simply placed only to help avoid disruption of the suspected right distal radius fracture. Intentionally left very loose for suspect compartment syndrome, this was not snug and placed in a position of comfort without any restriction of compartments to allow for further swelling. Amount and/or Complexity of Data Reviewed  Labs: ordered. Decision-making details documented in ED Course. Radiology: ordered and independent interpretation performed. Risk  Prescription drug management. Amount and/or Complexity of Data Reviewed  Labs: ordered. Decision-making details documented in ED Course. Radiology: ordered and independent interpretation performed. Risk  Prescription drug management. Disposition  [facility] Transfer: Yes  Final diagnoses:   Fall, initial encounter   Compartment syndrome ([address])   Overdose   Suicidal ideations     Time reflects when diagnosis was documented in both MDM as applicable and the Disposition within this note       Time User Action Codes Description Comment    10/27/2023  2:18 AM Yodit Hayes [A74. XIXR] XQLW, initial encounter     10/27/2023  2:46 AM Helen Perez [S52.501A] Distal radius fracture, right     10/27/2023  2:46 AM Helen Perez [T79. A0XA] Compartment syndrome ([address])     10/27/2023  2:47 AM Yodit Hayes [T50.901A] Overdose     10/27/2023  2:47 AM Yodit Hayes [R45.851] Suicidal ideations     10/27/2023  5:39 AM Denis Perez [S52.501A] Distal radius fracture, right           ED Disposition       ED Disposition   Transfer to Another Facility-In Network    Condition   --    Date/Time   Fri Oct 27, 2023  2:17 AM    Comment   Guanak[place] Aburto Jose C should be transferred out to [facility].                MD Documentation      Kelli Reynolds Most Recent Value   Patient Condition The patient has been stabilized such that within reasonable medical probability, no material deterioration of the patient condition or the condition of the unborn child(miquel) is likely to result from the transfer   Reason for Transfer Level of Care needed not available at this facility   Benefits of Transfer Specialized equipment and/or services available at the receiving facility (Include comment)________________________  [Trauma]   Risks of Transfer Potential for delay in receiving treatment, Increased discomfort during transfer, Possible worsening of condition or death during transfer, Loss of IV, Potential deterioration of medical condition   Accepting Physician [address] [facility] Name, [address] [facility]   Sending MD [facility]RIST[facility]          RN Documentation      [address]  Name, [facility]          Follow-up Information    None       Discharge Medication List as of 10/27/2023  3:22 AM        CONTINUE these medications which have NOT CHANGED    Details   albuterol (PROVENTIL HFA,VENTOLIN HFA) 90 mcg/act inhaler Inhale 2 puffs every 6 (six) hours as needed, Starting Thu 12/28/2017, Historical Med      Alcohol Swabs (Alcohol Pads) 70 % PADS USE TO TEST BLOOD GLUCOSE 2-3 TIMES DAILY, Historical Med      ammonium lactate (LAC-HYDRIN) 12 % lotion as needed, Historical Med      atorvastatin (LIPITOR) 80 mg tablet Take 80 mg by mouth every evening, Starting Fri 10/14/2022, Historical Med      benzonatate (TESSALON PERLES) 100 mg capsule Take 1 capsule (100 mg total) by mouth 3 (three) times a day as needed for cough, Starting Tue 5/2/2023, Normal      Buprenorphine HCl (Belbuca) 300 MCG FILM Apply 300 mcg to cheek in the morning, Historical Med      Butalbital-APAP-Caffeine (FIORICET PO) Take by mouth as needed, Historical Med      carboxymethylcellulose 0.5 % SOLN Administer 1 drop to both eyes daily as needed for dry eyes, Starting Thu 2/7/2019, Normal      cephalexin (KEFLEX) 500 mg capsule Take 1 capsule by mouth every 12 (twelve) hours, Historical Med      ciprofloxacin (CIPRO) 250 mg tablet take 1 tablet by mouth twice a day for 7 days, Historical Med      !! clonazePAM (KlonoPIN) 0.5 mg tablet Take 0.5 mg by mouth daily at bedtime, Historical Med      !! clonazePAM (KlonoPIN) 1 mg tablet Take 1 mg by mouth daily in the early morning, Starting Thu 1/25/2018, Historical Med      dicyclomine (BENTYL) 20 mg tablet Take 20 mg by mouth every 6 (six) hours, Historical Med      doxycycline hyclate (VIBRA-TABS) 100 mg tablet take 1 tablet by mouth twice a day for 7 days, Historical Med      famotidine (PEPCID) 40 MG tablet Take 1 tablet (40 mg total) by mouth daily at bedtime, Starting Thu 8/17/2023, Normal      fenofibrate (TRICOR) 48 mg tablet Take 48 mg by mouth daily, Historical Med      fluconazole (DIFLUCAN) 150 mg tablet take 1 tablet by mouth for 1 day, Historical Med      fluticasone (FLONASE) 50 mcg/act nasal spray 2 sprays into each nostril daily, Starting Wed 1/11/2023, Historical Med      Fluticasone-Salmeterol (Advair) 500-50 mcg/dose inhaler Starting Wed 8/16/2023, Historical Med      fluticasone-umeclidinium-vilanterol (Trelegy Ellipta) 200-62.5-25 mcg/actuation AEPB inhaler Inhale 1 puff daily Rinse mouth after use., Starting Thu 5/18/2023, Normal      furosemide (LASIX) 20 mg tablet Take 20 mg by mouth as needed Taking as needed, Historical Med      Galcanezumab-gnlm (Emgality) 120 MG/ML SOAJ Inject 120 mg under the skin every 30 (thirty) days Last inj 1/19/23, Historical Med      HYDROcodone-acetaminophen (NORCO)  mg per tablet every 8 (eight) hours as needed, Starting Fri 3/24/2023, Historical Med      ibuprofen (MOTRIN) 800 mg tablet take 1 tablet by mouth every 8 hours if needed for mild pain or fever for up to 5 days, Historical Med      lamoTRIgine (LaMICtal) 100 mg tablet Take 100 mg by mouth 2 (two) times a day, Starting Tue 1/16/2018, Until Fri 7/14/2023, Historical Med      latanoprost (XALATAN) 0.005 % ophthalmic solution instill 1 drop into both eyes at bedtime, Historical Med      levalbuterol (XOPENEX) 1.25 mg/3 mL nebulizer solution Inhale 1.25 mg every 4 (four) hours as needed, Starting Tue 1/24/2023, Until Wed 1/24/2024 at 2359, Historical Med      levothyroxine 137 mcg tablet Take 137 mcg by mouth daily, Starting Mon 1/21/2019, Historical Med      lubiprostone (AMITIZA) 8 mcg capsule Take 1 PO BID for constipation. , Normal      magnesium 30 MG tablet Take 500 mg by mouth 2 (two) times a day 1/26/23 right now not taking, Historical Med      Melatonin 10 MG CAPS Take 1 tablet by mouth daily at bedtime as needed, Historical Med      methylPREDNISolone 4 MG tablet therapy pack Use as directed on package, Normal      metroNIDAZOLE (FLAGYL) 500 mg tablet Take 1 tablet by mouth 2 (two) times a day, Historical Med      naloxone (Narcan) 4 mg/0.1 mL nasal spray Historical Med      omeprazole (PriLOSEC) 40 MG capsule Take 1 capsule (40 mg total) by mouth 2 (two) times a day, Starting Thu 8/17/2023, Normal      !! ondansetron (ZOFRAN) 4 mg tablet Take 4 mg by mouth every 8 (eight) hours as needed, Starting Tue 5/9/2023, Historical Med      !! ondansetron (ZOFRAN) 8 mg tablet Take 1 tablet (8 mg total) by mouth every 8 (eight) hours as needed for nausea or vomiting, Starting Wed 7/13/2022, Normal      OneTouch Ultra test strip USE TO TEST BLOOD GLUCOSE 2-3 TIMES DAILY, Historical Med      polyethylene glycol (GLYCOLAX) 17 GM/SCOOP powder Take 17 g daily for constipation. , Normal      !! pregabalin (LYRICA) 100 mg capsule Take 100 mg by mouth 3 (three) times a day Morning-lunch-dinner, Starting Thu 10/26/2017, Historical Med      !! pregabalin (LYRICA) 150 mg capsule take 1 capsule by mouth NIGHTLY, Historical Med      risperiDONE (RisperDAL) 0.5 mg tablet Take 0.25 mg by mouth 2 (two) times a day, Starting Sat 4/15/2023, Historical Med      rizatriptan (MAXALT-MLT) 10 mg disintegrating tablet TAKE 1 TABLET BY MOUTH ONE TIME AS NEEDED FOR MIGRAINE, MAY REPEA. ..  (REFER TO PRESCRIPTION NOTES). , Historical Med      SUMAtriptan (IMITREX) 100 mg tablet Historical Med      tiZANidine (ZANAFLEX) 4 mg tablet take 2 tablets by mouth every 6 hours if needed for muscle spasm, Historical Med      !! topiramate (TOPAMAX) 100 mg tablet Take 100 mg by mouth every 6 (six) hours as needed Takes 2 tabs every 6 hours, Historical Med      !! topiramate (TOPAMAX) 200 MG tablet Starting Mon 6/5/2023, Historical Med      True Comfort Safety Lancets MISC USE TO TEST BLOOD GLUCOSE 2-3 TIMES DAILY, Historical Med      Zoster Vac Recomb Adjuvanted (Shingrix) 50 MCG/0.5ML SUSR inject 0.5 milliliter intramuscularly, Historical Med       !! - Potential duplicate medications found. Please discuss with provider. No discharge procedures on file.     PDMP Review         Value Time User    PDMP Reviewed  Yes 7/15/2023  3:09 PM John Yip PA-C            ED Provider  Electronically Signed by           Jak Hidalgo MD  10/27/23 0715       Jak Hidalgo MD  10/27/23 0102

## 2023-10-27 NOTE — ED PROVIDER NOTES
Emergency Department Trauma Note  Alonna Schlatter Remmel 65 y.o. female MRN: [id number]  Unit/Bed#: ED 21/ED 21 Encounter: [id number]      Trauma Alert: Trauma Acuity: Trauma Transfer  Model of Arrival:   via    Trauma Team: Current Providers  Attending Provider: Oriana Bernal DO  Registered Nurse: Baylee Moncada, RN  ED Technician: Jacob Hale  Consultants:     None      Expand All Collapse All  History       Chief Complaint   Patient presents with   • Fall       Found on kitchen floor. Altered. Son called EMS          Chief Complaint:   Chief Complaint   Patient presents with   • Trauma     TT from miners. Radius frax     HPI:    Expand All Collapse All  History       Chief Complaint   Patient presents with   • Fall       Found on kitchen floor. Altered. Son called EMS         Mechanism:Details of Incident: Pt fell on floor, unknown downtime. Right radius frax compartment syndrome          Expand All Collapse All  History       Chief Complaint   Patient presents with   • Fall       Found on kitchen floor. Altered. Son called EMS      70-year-old female with past medical history of anxiety, bipolar disorder, PTSD, who presents for fall. Patient is unable to provide clear history due to altered mental status. Per family, she just recently had her sister die this morning. After this, she expressed to multiple family members that she wishes it was her, and expressed other suicidal ideations. She denies any ingestions. She does describe pain in the right upper extremity, however it is not clear where exactly this is, she does have red discoloration from the mid forearm down to the hand. She describes neck and back pain however it does appear that this is often chronic for her, today she had scheduled a procedure regarding this. ROS otherwise negative. MDM:      Cardiac labs, tox labs ordered. Normal tylenol, aspirin, and alcohol are reassuring. Patient's lips do appear dry on exam, 1L fluid given. Given unknown downtime, will order broad labs including CK. I considered compartment syndrome initially, however, compartments and arm were soft without swelling, no pain out of proportion, no pulselessness, no loss of sensation. There was some discoloration however this was to be expected as patient fell. Imaging showed distal radius fracture. I re-assessed the patient arm later in encounter after labs, imaging returned. At this time, I did feel significant swelling when compared with prior exam, compartments felt relatively hard, and redness increased. At this time, this raised suspicion for compartment syndrome (in conjunction with mildly elevated CK). Sim exam was performed at bedside, and pressures were as high as 148. Transfer order immediately placed. Discussed with trauma who accepted patient as priority 1. Before transport, splint was placed. Splint was extremely light, simply placed only to help avoid disruption of the suspected right distal radius fracture. Intentionally left very loose for suspect compartment syndrome, this was not snug and placed in a position of comfort without any restriction of compartments to allow for further swelling. Review of Systems   Constitutional:  Negative for chills and fever. HENT:  Negative for congestion, rhinorrhea and sore throat. Respiratory:  Negative for cough and shortness of breath. Cardiovascular:  Negative for chest pain and palpitations. Gastrointestinal:  Negative for abdominal pain, constipation, diarrhea, nausea and vomiting. Genitourinary:  Negative for difficulty urinating and flank pain. Musculoskeletal:  Negative for arthralgias. Skin:  Positive for color change. Neurological:  Negative for dizziness, weakness, light-headedness and headaches. Psychiatric/Behavioral:  Negative for agitation, behavioral problems and confusion. All other systems reviewed and are negative.     Historical Information     Immunizations: Immunization History   Administered Date(s) Administered   • COVID-19 MODERNA VACC 0.25 ML IM BOOSTER 01/25/2022   • COVID-19 MODERNA VACC 0.5 ML IM 03/26/2021, 04/28/2021, 01/25/2022   • COVID-19 Pfizer Vac BIVALENT Ever-sucrose 12 Yr+ IM 10/05/2022   • COVID-19, unspecified 04/27/2021, 04/27/2021   • INFLUENZA 11/10/2014, 10/01/2018   • Influenza Quadrivalent Preservative Free 3 years and older IM 09/29/2015, 10/13/2016, 10/17/2017, 10/01/2019, 10/02/2020, 10/25/2021, 10/05/2022   • Influenza Split 11/07/2012, 10/16/2013, 09/22/2014   • Pneumococcal Conjugate Vaccine 20-valent (Pcv20), Polysace 02/13/2023   • Pneumococcal Polysaccharide PPV23 10/13/2016   • Tdap 11/03/2015   • Zoster Vaccine Recombinant 09/21/2020, 12/15/2020       Past Medical History:   Diagnosis Date   • Ambulates with cane    • Anxiety    • Arthritis    • Asthma    • Bipolar 1 disorder ([address])    • Brain aneurysm    • Chronic pain disorder     spinal stenosis   • Concussion syndrome     neurological rx and balance rx   • COPD (chronic obstructive pulmonary disease) ([address])    • Depression    • Diabetes mellitus ([address])     controlled w/ diet   • Disease of thyroid gland     nodules    • Family history of colon cancer     father   • Fibromyalgia, primary    • GERD (gastroesophageal reflux disease)    • History of colon polyps    • Hyperlipidemia    • Hypertension    • 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    • Irritable bowel syndrome    • Lumbar degenerative disc disease 02/16/2022   • Migraine    • Neuropathy     bilateral feet and hands   • Peripheral neuropathy    • Psychiatric disorder    • PTSD (post-traumatic stress disorder)    • Shortness of breath    • Sleep apnea     had 3 studies & last one negative   • Stroke ([address])     2012 no deficeits   • TIA (transient ischemic attack)    • Wears dentures    • Wears glasses        Family History   Problem Relation Age of Onset   • Brain cancer Mother    • Alzheimer's disease Mother    • Alzheimer's disease Father    • Parkinsonism Father      Past Surgical History:   Procedure Laterality Date   • ABDOMINAL SURGERY      laproscopic/ endometriosis   • BRAIN SURGERY      aneurysm/ coiling procedure   • CARPAL TUNNEL RELEASE     • CHOLECYSTECTOMY     • COLONOSCOPY     • DENTAL SURGERY     • EPIDURAL BLOCK INJECTION Right 03/03/2022    Procedure: C7-T1 interlaminar epidural steroid injection;  Surgeon: Jorge Villalobos MD;  Location: [facility];  Service: Pain Management    • EPIDURAL BLOCK INJECTION N/A 04/21/2022    Procedure: C6-C7 BLOCK / INJECTION EPIDURAL STEROID CERVICAL;  Surgeon: Jorge Villalobos MD;  Location: [facility];  Service: Pain Management    • EPIDURAL BLOCK INJECTION Left 06/21/2022    Procedure: BLOCK / INJECTION EPIDURAL STEROID LUMBAR  left L3-4 TFESI;  Surgeon: Jorge Villalobos MD;  Location: [facility];  Service: Pain Management    • EYE SURGERY      cataract   • FL GUIDED NEEDLE PLAC BX/ASP/INJ  03/03/2022   • FL GUIDED NEEDLE PLAC BX/ASP/INJ  11/17/2022   • HYSTERECTOMY     • MT ESOPHAGOGASTRODUODENOSCOPY TRANSORAL DIAGNOSTIC N/A 02/08/2018    Procedure: EGD AND COLONOSCOPY;  Surgeon: Nolan Swanson MD;  Location: [facility]; Service: Gastroenterology   • MT PRQ IMPLTJ NSTIM ELECTRODE ARRAY EPIDURAL N/A 11/17/2022    Procedure: Namrata Kalefrain SCS TRIAL;  Surgeon: Jorge Villalobos MD;  Location: [facility];  Service: Pain Management    • MT PRQ IMPLTJ NSTIM ELECTRODE ARRAY EPIDURAL N/A 2/1/2023    Procedure:  Insertion percutaneous thoracic spinal cord stimulator with left buttock implantable pulse generator;  Surgeon: Teetee Peterson MD;  Location: [facility];  Service: Neurosurgery   • THYROID SURGERY     • TONSILLECTOMY     • [address] THYROID BIOPSY  11/30/2016   • US GUIDED THYROID BIOPSY  3/21/2018     Social History     Tobacco Use   • Smoking status: Every Day     Packs/day: 2.00     Types: Cigarettes   • Smokeless tobacco: Never   Vaping Use   • Vaping Use: Never used   Substance Use Topics   • Alcohol use: Not Currently   • Drug use: Never     Comment: prescribed Belbuca     E-Cigarette/Vaping   • E-Cigarette Use Never User      E-Cigarette/Vaping Substances   • Nicotine No    • THC No    • CBD No    • Flavoring No    • Other No    • Unknown No        Family History: non-contributory    Meds/Allergies   Prior to Admission Medications   Prescriptions Last Dose Informant Patient Reported? Taking?    Alcohol Swabs (Alcohol Pads) 70 % PADS   Yes No   Sig: USE TO TEST BLOOD GLUCOSE 2-3 TIMES DAILY   Buprenorphine HCl (Belbuca) 300 MCG FILM  Self Yes No   Sig: Apply 300 mcg to cheek in the morning   Butalbital-APAP-Caffeine (FIORICET PO)   Yes No   Sig: Take by mouth as needed   Fluticasone-Salmeterol (Advair) 500-50 mcg/dose inhaler   Yes No   Galcanezumab-gnlm (Emgality) 120 MG/ML SOAJ  Self Yes No   Sig: Inject 120 mg under the skin every 30 (thirty) days Last inj 1/19/23   HYDROcodone-acetaminophen (NORCO)  mg per tablet   Yes No   Sig: every 8 (eight) hours as needed   Melatonin 10 MG CAPS  Self Yes No   Sig: Take 1 tablet by mouth daily at bedtime as needed   OneTouch Ultra test strip   Yes No   Sig: USE TO TEST BLOOD GLUCOSE 2-3 TIMES DAILY   SUMAtriptan (IMITREX) 100 mg tablet   Yes No   True Comfort Safety Lancets MISC   Yes No   Sig: USE TO TEST BLOOD GLUCOSE 2-3 TIMES DAILY   Zoster Vac Recomb Adjuvanted (Shingrix) 50 MCG/0.5ML SUSR   Yes No   Sig: inject 0.5 milliliter intramuscularly   albuterol (PROVENTIL HFA,VENTOLIN HFA) 90 mcg/act inhaler  Self Yes No   Sig: Inhale 2 puffs every 6 (six) hours as needed   ammonium lactate (LAC-HYDRIN) 12 % lotion   Yes No   Sig: as needed   atorvastatin (LIPITOR) 80 mg tablet   Yes No   Sig: Take 80 mg by mouth every evening   benzonatate (TESSALON PERLES) 100 mg capsule   No No   Sig: Take 1 capsule (100 mg total) by mouth 3 (three) times a day as needed for cough   carboxymethylcellulose 0.5 % SOLN  Self No No   Sig: Administer 1 drop to both eyes daily as needed for dry eyes   cephalexin (KEFLEX) 500 mg capsule   Yes No   Sig: Take 1 capsule by mouth every 12 (twelve) hours   ciprofloxacin (CIPRO) 250 mg tablet   Yes No   Sig: take 1 tablet by mouth twice a day for 7 days   clonazePAM (KlonoPIN) 0.5 mg tablet  Self Yes No   Sig: Take 0.5 mg by mouth daily at bedtime   clonazePAM (KlonoPIN) 1 mg tablet  Self Yes No   Sig: Take 1 mg by mouth daily in the early morning   dicyclomine (BENTYL) 20 mg tablet  Self Yes No   Sig: Take 20 mg by mouth every 6 (six) hours   doxycycline hyclate (VIBRA-TABS) 100 mg tablet   Yes No   Sig: take 1 tablet by mouth twice a day for 7 days   Patient not taking: Reported on 2023   famotidine (PEPCID) 40 MG tablet   No No   Sig: Take 1 tablet (40 mg total) by mouth daily at bedtime   fenofibrate (TRICOR) 48 mg tablet  Self Yes No   Sig: Take 48 mg by mouth daily   fluconazole (DIFLUCAN) 150 mg tablet   Yes No   Sig: take 1 tablet by mouth for 1 day   fluticasone (FLONASE) 50 mcg/act nasal spray   Yes No   Si sprays into each nostril daily   fluticasone-umeclidinium-vilanterol (Trelegy Ellipta) 200-62.5-25 mcg/actuation AEPB inhaler   No No   Sig: Inhale 1 puff daily Rinse mouth after use.    furosemide (LASIX) 20 mg tablet  Self Yes No   Sig: Take 20 mg by mouth as needed Taking as needed   ibuprofen (MOTRIN) 800 mg tablet   Yes No   Sig: take 1 tablet by mouth every 8 hours if needed for mild pain or fever for up to 5 days   lamoTRIgine (LaMICtal) 100 mg tablet   Yes No   Sig: Take 100 mg by mouth 2 (two) times a day   latanoprost (XALATAN) 0.005 % ophthalmic solution   Yes No   Sig: instill 1 drop into both eyes at bedtime   levalbuterol (XOPENEX) 1.25 mg/3 mL nebulizer solution   Yes No   Sig: Inhale 1.25 mg every 4 (four) hours as needed   levothyroxine 137 mcg tablet  Self Yes No   Sig: Take 137 mcg by mouth daily   lubiprostone (AMITIZA) 8 mcg capsule   No No   Sig: Take 1 PO BID for constipation. magnesium 30 MG tablet  Self Yes No   Sig: Take 500 mg by mouth 2 (two) times a day 1/26/23 right now not taking   methylPREDNISolone 4 MG tablet therapy pack   No No   Sig: Use as directed on package   metroNIDAZOLE (FLAGYL) 500 mg tablet   Yes No   Sig: Take 1 tablet by mouth 2 (two) times a day   naloxone (Narcan) 4 mg/0.1 mL nasal spray   Yes No   Patient not taking: Reported on 7/14/2023   omeprazole (PriLOSEC) 40 MG capsule   No No   Sig: Take 1 capsule (40 mg total) by mouth 2 (two) times a day   ondansetron (ZOFRAN) 4 mg tablet   Yes No   Sig: Take 4 mg by mouth every 8 (eight) hours as needed   ondansetron (ZOFRAN) 8 mg tablet   No No   Sig: Take 1 tablet (8 mg total) by mouth every 8 (eight) hours as needed for nausea or vomiting   polyethylene glycol (GLYCOLAX) 17 GM/SCOOP powder   No No   Sig: Take 17 g daily for constipation. pregabalin (LYRICA) 100 mg capsule  Self Yes No   Sig: Take 100 mg by mouth 3 (three) times a day Morning-lunch-dinner   pregabalin (LYRICA) 150 mg capsule  Self Yes No   Sig: take 1 capsule by mouth NIGHTLY   risperiDONE (RisperDAL) 0.5 mg tablet   Yes No   Sig: Take 0.25 mg by mouth 2 (two) times a day   Patient not taking: Reported on 7/14/2023   rizatriptan (MAXALT-MLT) 10 mg disintegrating tablet   Yes No   Sig: TAKE 1 TABLET BY MOUTH ONE TIME AS NEEDED FOR MIGRAINE, MAY REPEA. ..  (REFER TO PRESCRIPTION NOTES). tiZANidine (ZANAFLEX) 4 mg tablet   Yes No   Sig: take 2 tablets by mouth every 6 hours if needed for muscle spasm   topiramate (TOPAMAX) 100 mg tablet  Self Yes No   Sig: Take 100 mg by mouth every 6 (six) hours as needed Takes 2 tabs every 6 hours   topiramate (TOPAMAX) 200 MG tablet   Yes No      Facility-Administered Medications: None       Allergies   Allergen Reactions   • Hydroxyzine Anaphylaxis     Claims it gives her convulsions.     • Other Other (See Comments)   • Pollen Extract Itching   • Tree Extract    • Clarithromycin Rash Pt denies   • Latex Rash   • Sulfa Antibiotics Rash     "severe skin burn"       PHYSICAL EXAM      Objective   Vitals:   First set: Temperature: (!) 97.4 °F (36.3 °C) (10/27/23 0400)  Pulse: 60 (10/27/23 0345)  Respirations: 20 (10/27/23 0345)  Blood Pressure: 121/56 (10/27/23 0345)  SpO2: 96 % (10/27/23 0345)    Primary Survey:   (A) Airway: Intact  (B) Breathing: Equal BL  (C) Circulation: Pulses:   normal  (D) Disabliity:  GCS Total:  15  (E) Expose:  Completed    Physical Exam  Physical Exam  Constitutional:       Appearance: She is well-developed. HENT:      Head: Normocephalic. Comments: She does have some bruising over the forehead, nose. Cardiovascular:      Rate and Rhythm: Normal rate and regular rhythm. Heart sounds: Normal heart sounds. No murmur heard. No friction rub. Pulmonary:      Effort: Pulmonary effort is normal. No respiratory distress. Breath sounds: Normal breath sounds. No wheezing or rales. Abdominal:      General: Bowel sounds are normal. There is no distension. Palpations: Abdomen is soft. Tenderness: There is no abdominal tenderness. Musculoskeletal:         General: Normal range of motion. Cervical back: Normal range of motion and neck supple. Comments: No midline tenderness of the C-, T-, or L-spine     No tenderness of the thorax     No instability or pain when pressing on pelvis     RUE:  Despite describing pain in RUE, and having discoloration as described in "Skin" findings on exam, she does not report tenderness. Unsure if this is due to mental status. Compartments feel soft. She reports normal sensation over this region. Patient refuses to move RUE, particularly wrist, although she seems to tolerate passive ROM well.      LUE:  No tenderness, no pain ranging joints of the shoulder, elbow, wrist, hand     RLE:   No tenderness, no pain ranging the hip, knee, ankle     LLE:   No tenderness, no pain ranging the hip, knee, ankle Skin:     General: Skin is warm. Capillary Refill: Strong radial pulse in RUE. Comments: There is redness over extending from the right mid-forearm down to the hand. Not significantly swollen with soft compartments. She does report normal sensation over this region. Neurological:      Mental Status: She is alert and oriented to person, place, and time. Coordination: Coordination normal.      Comments: Patient AAOx3. CN II-XII intact. Normal CFTs. 5/5 strength in all extremities. Normal sensation in all extremities. Psychiatric:         Behavior: Behavior normal.         Thought Content: Thought content normal.         Judgment: Judgment normal.        Cervical spine cleared by clinical criteria?  No (imaging required)      Invasive Devices       Peripheral Intravenous Line  Duration             Peripheral IV 10/27/23 Left Antecubital <1 day                    Lab Results:   Results Reviewed       Procedure Component Value Units Date/Time    Magnesium [[id number]]  (Normal) Collected: 10/27/23 0458    Lab Status: Final result Specimen: Blood from Arm, Left Updated: 10/27/23 0546     Magnesium 1.9 mg/dL     Phosphorus [[id number]]  (Abnormal) Collected: 10/27/23 0458    Lab Status: Final result Specimen: Blood from Arm, Left Updated: 10/27/23 0546     Phosphorus 4.2 mg/dL     Comprehensive metabolic panel [[id number]]  (Abnormal) Collected: 10/27/23 0458    Lab Status: Final result Specimen: Blood from Arm, Left Updated: 10/27/23 0546     Sodium 140 mmol/L      Potassium 3.7 mmol/L      Chloride 110 mmol/L      CO2 21 mmol/L      ANION GAP 9 mmol/L      BUN 15 mg/dL      Creatinine 0.70 mg/dL      Glucose 97 mg/dL      Calcium 7.7 mg/dL       U/L      ALT 63 U/L      Alkaline Phosphatase 66 U/L      Total Protein 6.1 g/dL      Albumin 3.6 g/dL      Total Bilirubin 0.33 mg/dL      eGFR 93 ml/min/1.73sq m     Narrative:      [place] guidelines for Chronic Kidney Disease (CKD):   •  Stage 1 with normal or high GFR (GFR > 90 mL/min/1.73 square meters)  •  Stage 2 Mild CKD (GFR = 60-89 mL/min/1.73 square meters)  •  Stage 3A Moderate CKD (GFR = 45-59 mL/min/1.73 square meters)  •  Stage 3B Moderate CKD (GFR = 30-44 mL/min/1.73 square meters)  •  Stage 4 Severe CKD (GFR = 15-29 mL/min/1.73 square meters)  •  Stage 5 End Stage CKD (GFR <15 mL/min/1.73 square meters)  Note: GFR calculation is accurate only with a steady state creatinine    CK [[id number]]  (Abnormal) Collected: 10/27/23 0458    Lab Status: Final result Specimen: Blood from Arm, Left Updated: 10/27/23 0546     Total CK 13,683 U/L     CBC and differential [[id number]]  (Abnormal) Collected: 10/27/23 0458    Lab Status: Final result Specimen: Blood from Arm, Left Updated: 10/27/23 0513     WBC 10.79 Thousand/uL      RBC 4.28 Million/uL      Hemoglobin 13.9 g/dL      Hematocrit 40.6 %      MCV 95 fL      MCH 32.5 pg      MCHC 34.2 g/dL      RDW 12.4 %      MPV 10.3 fL      Platelets 398 Thousands/uL      nRBC 0 /100 WBCs      Neutrophils Relative 80 %      Immat GRANS % 1 %      Lymphocytes Relative 12 %      Monocytes Relative 7 %      Eosinophils Relative 0 %      Basophils Relative 0 %      Neutrophils Absolute 8.68 Thousands/µL      Immature Grans Absolute 0.05 Thousand/uL      Lymphocytes Absolute 1.25 Thousands/µL      Monocytes Absolute 0.79 Thousand/µL      Eosinophils Absolute 0.00 Thousand/µL      Basophils Absolute 0.02 Thousands/µL     Rapid drug screen, urine [[id number]]     Lab Status: No result Specimen: Urine                    Imaging Studies:   Direct to CT: Yes  No orders to display         Procedures  Procedures         ED Course           Medical Decision Making  I reviewed the patient's medical chart, PMHx, prior encounters, medications.      My independent interpretation of ECG demonstrated:  My independent interpretation of CXR demonstrated: No acute cardiopulmonary disease  My independent interpretation of XR right elbow: Suspected distal radius fx     My DDx includes: Traumatic ICH, C-spine, T-spine, L-spine fx, right forearm, wrist, or hand fracture, intentional overdose, suicidal ideation      Cardiac labs, tox labs ordered. Normal tylenol, aspirin, and alcohol are reassuring. Patient's lips do appear dry on exam, 1L fluid given.     Given unknown downtime, will order broad labs including CK. I considered compartment syndrome initially, however, compartments and arm were soft without swelling, no pain out of proportion, no pulselessness, no loss of sensation. There was some discoloration however this was to be expected as patient fell. Imaging showed distal radius fracture.     I re-assessed the patient arm later in encounter after labs, imaging returned. At this time, I did feel significant swelling when compared with prior exam, compartments felt relatively hard, and redness increased. At this time, this raised suspicion for compartment syndrome (in conjunction with mildly elevated CK). [place] exam was performed at bedside, and pressures were as high as 148. Transfer order immediately placed. Discussed with trauma who accepted patient as priority 1.     Before transport, splint was placed. Splint was extremely light, simply placed only to help avoid disruption of the suspected right distal radius fracture. Intentionally left very loose for suspect compartment syndrome, this was not snug and placed in a position of comfort without any restriction of compartments to allow for further swelling. Disposition  Priority One Transfer: Yes  Final diagnoses:   Fall, initial encounter   Chronic pain syndrome     Time reflects when diagnosis was documented in both MDM as applicable and the Disposition within this note       Time User Action Codes Description Comment    10/27/2023  4:46 AM Dave Zimmerman Add [A88. VNFZ] Fall, initial encounter     10/27/2023  4:47 AM Dave Zimmerman Add [G89.4] Chronic pain syndrome           ED Disposition       None          Follow-up Information    None       Patient's Medications   Discharge Prescriptions    No medications on file     No discharge procedures on file.     PDMP Review         Value Time User    PDMP Reviewed  Yes 7/15/2023  3:09 PM Crystal Emmanuel PA-C            ED Provider  Electronically Signed by           Shawna Wilson MD  10/27/23 7842

## 2023-10-27 NOTE — ASSESSMENT & PLAN NOTE
Lab Results   Component Value Date    HGBA1C 5.8 (H) 01/19/2023       Recent Labs     10/27/23  1104   POCGLU 83       Blood Sugar Average: Last 72 hrs:  (P) 83    Diet controlled  Monitor BG utilize S/S as needed

## 2023-10-27 NOTE — CONSULTS
Consultation - Acute Pain Service  Anton Woodall 65 y.o. female MRN: [id number]  Unit/Bed#: [facility] Encounter: [id number]               Carly Nguyen is a 65 y.o. female who presents to the hospital for unwitnessed fall. Concern for Right FA compartment syndrome, pending OR for fasciotomy. PMH of cervical spondylosis, lumbar degenerative disc disease with radiculopathy and spondylosis, has spinal stimulator in place. Recently seen by spine and pain Campos Johnson on 9/22 and received left-sided C3-4 and C4-5 medial branch block #1. PCP prescribes hydrocodone 10/325 every 8 hours as needed for pain, Lyrica 100 mg 3 times daily and 150 mg at bedtime, tizanidine 4 mg every 6 hours if needed for spasms. On my assessment patient expressed most of her pain in her RUE rating it 10/10. She was being prepped for the OR for RUE fasciotomy and will be receiving a single shot nerve block. Pain management as below. Will reassess post-operatively. Compartment syndrome ([address])  Assessment & Plan  S/p unwitnessed fall with concern for R forearm compartment syndrome. Taken to the OR this AM for fasciotomy. Pending single shot nerve block pre-operatively   Will reassess post-op and adjust medications PRN  Pain management as below     Chronic pain  Assessment & Plan  Patient with PMH of cervical spondylosis, lumbar degenerative disc disease with radiculopathy and spondylosis. Recently seen by spine and pain Campos Johnson on 9/22 and received left-sided C3-4 and C4-5 medial branch block #1. PCP prescribes hydrocodone 10/325 every 8 hours as needed for pain, Lyrica 100 mg 3 times daily and 150 mg at bedtime, tizanidine 4 mg every 6 hours if needed for spasms.   Resume home tizanidine 4 mg q6 hrs PRN  Start scheduled tylenol 975 mg q8 hrs   Continue PRN q4hrs PO oxycodone 5 mg for moderate pain/10 mg for severe pain   PRN IV dilaudid q 3 hrs 0.5 mg for breakthrough pain  Hold Lyrica in the setting of rising CK and concern for rhabdo/ALEX, monitor renal function closely   Lidocaine patch to lower back and neck 12hrs on/ 12 hrs off   Ice to affected area PRN         APS will continue to follow. Please contact Acute Pain Service - via SeeJay from [phone] with additional questions or concerns. See Elen or Janes for additional contacts and after hours information. History of Present Illness    Admit Date:  10/27/2023  Hospital Day:  0 days  Primary Service:  Trauma  Attending Provider:  Nakul Crockett,*  Physician Requesting Consult: Nakul Crockett,*  Reason for Consult / Principal Problem: acute on chronic back pain, RUE compartment syndrome   HPI: Khloe Zaragoza is a 65y.o. year old female who presents to the hospital for unwitnessed fall. Concern for Right FA compartment syndrome, pending OR for fasciotomy. PMH of cervical spondylosis, lumbar degenerative disc disease with radiculopathy and spondylosis, has spinal stimulator in place. Recently seen by spine and pain Get Fletcher on 9/22 and received left-sided C3-4 and C4-5 medial branch block #1. PCP prescribes hydrocodone 10/325 every 8 hours as needed for pain, Lyrica 100 mg 3 times daily and 150 mg at bedtime, tizanidine 4 mg every 6 hours if needed for spasms. Current pain location(s): Pain Score: 10  Pain Location/Orientation: Orientation: Right, Location: Arm  Pain Scale: Pain Assessment Tool: FLACC  Current Analgesic regimen:  PO oxycodone, IV dilaudid     Pain History: Chronic back pain - prescribed meds by PCP, recently seen Michael Ryan from spine and pain. Pain Management Physician:  Michael Ryan, NP    I have reviewed the patient's controlled substance dispensing history in the Prescription Drug Monitoring Program in compliance with the [facility] regulations before prescribing any controlled substances.      Inpatient consult to Acute Pain Service  Consult performed by: LAURIE Garcias  Consult ordered by: Lyndsey Holley Amandeep Valdivia MD      Inpatient consult to Acute Pain Service  Consult performed by: LAURIE Dodd  Consult ordered by: Kimberly Echavarria MD          Review of Systems   Constitutional: Negative. HENT: Negative. Eyes: Negative. Respiratory: Negative. Cardiovascular: Negative. Gastrointestinal: Negative. Endocrine: Negative. Genitourinary:  Positive for difficulty urinating. Musculoskeletal:  Positive for arthralgias, back pain, joint swelling and myalgias. Skin:  Positive for color change. Allergic/Immunologic: Negative. Neurological: Negative. Hematological: Negative. Psychiatric/Behavioral: Negative.          Historical Information   Past Medical History:   Diagnosis Date    Ambulates with cane     Anxiety     Arthritis     Asthma     Bipolar 1 disorder ([address])     Brain aneurysm     Chronic pain disorder     spinal stenosis    Concussion syndrome     neurological rx and balance rx    COPD (chronic obstructive pulmonary disease) ([facility])     Depression     Diabetes mellitus ([address])     controlled w/ diet    Disease of thyroid gland     nodules     Family history of colon cancer     father    Fibromyalgia, primary     GERD (gastroesophageal reflux disease)     History of colon polyps     Hyperlipidemia     Hypertension     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     Irritable bowel syndrome     Lumbar degenerative disc disease 02/16/2022    Migraine     Neuropathy     bilateral feet and hands    Peripheral neuropathy     Psychiatric disorder     PTSD (post-traumatic stress disorder)     Shortness of breath     Sleep apnea     had 3 studies & last one negative    Stroke ([address])     2012 no deficeits    TIA (transient ischemic attack)     Wears dentures     Wears glasses      Past Surgical History:   Procedure Laterality Date    ABDOMINAL SURGERY      laproscopic/ endometriosis    BRAIN SURGERY      aneurysm/ coiling procedure    CARPAL TUNNEL RELEASE      CHOLECYSTECTOMY COLONOSCOPY      DENTAL SURGERY      EPIDURAL BLOCK INJECTION Right 03/03/2022    Procedure: C7-T1 interlaminar epidural steroid injection;  Surgeon: Gianfranco Blum MD;  Location: [facility];  Service: Pain Management     EPIDURAL BLOCK INJECTION N/A 04/21/2022    Procedure: C6-C7 BLOCK / INJECTION EPIDURAL STEROID CERVICAL;  Surgeon: Gianfranco Blum MD;  Location: [facility];  Service: Pain Management     EPIDURAL BLOCK INJECTION Left 06/21/2022    Procedure: BLOCK / INJECTION EPIDURAL STEROID LUMBAR  left L3-4 TFESI;  Surgeon: Gianfranco Blum MD;  Location: [facility];  Service: Pain Management     EYE SURGERY      cataract    FL GUIDED NEEDLE PLAC BX/ASP/INJ  03/03/2022    FL GUIDED NEEDLE PLAC BX/ASP/INJ  11/17/2022    HYSTERECTOMY      NE ESOPHAGOGASTRODUODENOSCOPY TRANSORAL DIAGNOSTIC N/A 02/08/2018    Procedure: EGD AND COLONOSCOPY;  Surgeon: Krystal Galeano MD;  Location: [facility]; Service: Gastroenterology    NE PRQ [address] NSTIM ELECTRODE ARRAY EPIDURAL N/A 11/17/2022    Procedure: José Miguel Cutler SCS TRIAL;  Surgeon: Gianfranco Blum MD;  Location: [facility];  Service: Pain Management     NE PRQ IMPLTJ NSTIM ELECTRODE ARRAY EPIDURAL N/A 2/1/2023    Procedure:  Insertion percutaneous thoracic spinal cord stimulator with left buttock implantable pulse generator;  Surgeon: Henry Veliz MD;  Location: [facility];  Service: Neurosurgery    THYROID SURGERY      TONSILLECTOMY      US GUIDED THYROID BIOPSY  11/30/2016    US GUIDED THYROID BIOPSY  3/21/2018     Social History   Social History     Substance and Sexual Activity   Alcohol Use Not Currently     Social History     Substance and Sexual Activity   Drug Use Never    Comment: prescribed Belbuca     Social History     Tobacco Use   Smoking Status Every Day    Packs/day: 2.00    Types: Cigarettes   Smokeless Tobacco Never     Family History:   Family History   Problem Relation Age of Onset    Brain cancer Mother     Alzheimer's disease Mother     Alzheimer's disease Father     Parkinsonism Father        Meds/Allergies   all current active meds have been reviewed    Allergies   Allergen Reactions    Hydroxyzine Anaphylaxis     Claims it gives her convulsions. Other Other (See Comments)    Pollen Extract Itching    Tree Extract     Clarithromycin Rash     Pt denies    Latex Rash    Sulfa Antibiotics Rash     "severe skin burn"       Objective   Vitals:    10/27/23 1115 10/27/23 1130 10/27/23 1145 10/27/23 1200   BP: 126/62 100/68 96/64 96/59   BP Location:       Pulse: 76 76 74 70   Resp: 14 12 19 16   Temp:       TempSrc:       SpO2: 98% 97% 96% 97%         Intake/Output Summary (Last 24 hours) at 10/27/2023 1221  Last data filed at 10/27/2023 1143  Gross per 24 hour   Intake 2000 ml   Output 275 ml   Net 1725 ml       Physical Exam  Constitutional:       Appearance: Normal appearance. HENT:      Head: Normocephalic. Right Ear: External ear normal.      Left Ear: External ear normal.      Nose: Nose normal.      Mouth/Throat:      Mouth: Mucous membranes are dry. Pharynx: Oropharynx is clear. Eyes:      Conjunctiva/sclera: Conjunctivae normal.      Pupils: Pupils are equal, round, and reactive to light. Cardiovascular:      Rate and Rhythm: Normal rate and regular rhythm. Heart sounds: Normal heart sounds. Pulmonary:      Effort: Pulmonary effort is normal.      Breath sounds: Normal breath sounds. Abdominal:      General: Bowel sounds are normal. There is no distension. Palpations: Abdomen is soft. Musculoskeletal:      Cervical back: Normal range of motion. Comments: RUE redness and swelling. Limited ROM due to pain. Skin:     General: Skin is warm and dry. Capillary Refill: Capillary refill takes 2 to 3 seconds. Comments: Redness/swelling noted on R forearm. Neurological:      General: No focal deficit present.       Mental Status: She is alert and oriented to person, place, and time.   Psychiatric:         Behavior: Behavior normal.         Thought Content: Thought content normal.         Lab Results:  Estimated Creatinine Clearance: 6.5 mL/min (by C-G formula based on SCr of 0.7 mg/dL). Lab Results   Component Value Date    WBC 10.79 (H) 10/27/2023    WBC 5.90 08/10/2015    HGB 13.9 10/27/2023    HGB 11.9 08/10/2015    HCT 40.6 10/27/2023    HCT 34.9 08/10/2015     10/27/2023     (L) 08/10/2015         Component Value Date/Time     08/10/2015 0612    K 3.7 10/27/2023 0458    K 4.2 08/10/2015 0612     (H) 10/27/2023 0458     08/10/2015 0612    CO2 21 10/27/2023 0458    CO2 28 08/10/2015 0612    BUN 15 10/27/2023 0458    BUN 10 08/10/2015 0612    CREATININE 0.70 10/27/2023 0458    CREATININE 0.73 08/10/2015 0612         Component Value Date/Time    CALCIUM 7.7 (L) 10/27/2023 0458    CALCIUM 8.7 08/10/2015 0612    ALKPHOS 66 10/27/2023 0458    ALKPHOS 66 08/10/2015 0612     (H) 10/27/2023 0458    AST 19 08/10/2015 0612    ALT 63 (H) 10/27/2023 0458    ALT 31 08/10/2015 0612    BILITOT 0.29 08/10/2015 0612    TP 6.1 (L) 10/27/2023 0458    ALB 3.6 10/27/2023 0458    ALB 3.4 (L) 08/10/2015 0612       Imaging Studies/EKG: I have personally reviewed pertinent reports. Counseling / Coordination of Care  Total floor / unit time spent today 21 minutes. Greater than 50% of total time was spent with the patient and / or family counseling and / or coordination of care. A description of the counseling / coordination of care: Acute on chronic back pain, RUE compartment syndrome     Please note that the APS provides consultative services regarding pain management only. With the exception of ketamine and epidural infusions and except when indicated, final decisions regarding starting or changing doses of analgesic medications are at the discretion of the consulting service.   LAURIE Whittington  Acute Pain Service

## 2023-10-27 NOTE — PROGRESS NOTES
Acute Care Surgery   Post-Op Check Progress Note   Dutch Woodall 65 y.o. female MRN: [id number]  Unit/Bed#: [facility] 622-01 Encounter: [id number]    Assessment and Plan:    58-year-old female with right forearm compartment syndrome status post right upper extremity fasciotomy with VAC dressing application on 12/61/0374. Patient also with facial contusion and traumatic rhabdomyolysis. - Resume diet as tolerated. - Continue to monitor right upper extremity exam.   - Appreciate APS assistance with acute on chronic pain management. Continue current analgesic regimen.   - PT and OT evaluation and treatment as indicated; patient may remain weightbearing as tolerated on the right upper extremity.   - Monitor labs, particularly CK and renal function as well as electrolytes. - Encouraged incentive spirometry use. - Anticipate further operative intervention this hospital encounter for right forearm wound washout, possible closure versus need for split-thickness skin graft. Timing of  further operative intervention to be determined. Subjective/Objective     Subjective: Patient notes that she is doing okay. She notes minimal pain in her right forearm. She has no other complaints at this time. She has tolerated a little bit of oral intake following her operative intervention without nausea vomiting. Objective:     Blood pressure 117/74, pulse 70, temperature 97.7 °F (36.5 °C), resp. rate 18, SpO2 96 %. ,There is no height or weight on file to calculate BMI. Intake/Output Summary (Last 24 hours) at 10/27/2023 1535  Last data filed at 10/27/2023 1143  Gross per 24 hour   Intake 2000 ml   Output 275 ml   Net 1725 ml       Invasive Devices       Peripheral Intravenous Line  Duration             Peripheral IV 10/27/23 Left Antecubital <1 day              Drain  Duration             External Urinary Catheter <1 day                    Physical Exam:    GENERAL APPEARANCE: Patient in no acute distress.   HEENT: NC, superficial central forehead and nasal contusion/abrasion with mild tenderness and swelling; EOMs intact; Mucous membranes moist  CV: Regular rate and rhythm; + S1, S2; no murmur/gallops/rubs appreciated. LUNGS: Clear to auscultation; no wheezes/rales/rhonci. ABD: NABS; soft; non-distended; non-tender. EXT: +2 pulses bilaterally upper & lower extremities; no clubbing/cyanosis. Improved right upper extremity exam postoperatively with right forearm VAC in place and minimal serosanguineous drainage. Improved swelling and no longer firm/tense right forearm muscle compartments. Right radial and ulnar pulses are present with improved capillary refill and normal color in the right hand. Patient did undergo a peripheral nerve block in the right upper extremity which appears to be quite effective with patient having decreased sensation and motor function at this time. NEURO: GCS 15; no focal neurologic deficits; neurovascularly intact. SKIN: Warm, dry and well perfused; no rash; no jaundice.                 Mitul Chanel PA-C  10/27/2023  3:19 PM

## 2023-10-27 NOTE — ASSESSMENT & PLAN NOTE
Patient with chronic patient Fibromyalgia, chronic LLB pain, Chronic pain syndrome, peripheral neuropathy  - Consult APS  - Possible Block

## 2023-10-27 NOTE — EMTALA/ACUTE CARE TRANSFER
[address] EMER[address]ENCY DEPARTMENT  [facility] [address]  Dept: [phone]      EMTALA TRANSFER CONSENT    NAME Guanakito Woodall                                         1960                              MRN [id number]    I have been informed of my rights regarding examination, treatment, and transfer   by Dr. Anders Oakes*    Benefits: Specialized equipment and/or services available at the receiving facility (Include comment)________________________ (Trauma)    Risks: Potential for delay in receiving treatment, Increased discomfort during transfer, Possible worsening of condition or death during transfer, Loss of IV, Potential deterioration of medical condition      Consent for Transfer:  I acknowledge that my medical condition has been evaluated and explained to me by the emergency department physician or other qualified medical person and/or my attending physician, who has recommended that I be transferred to the service of  Accepting Physician: Lisa Andres at [address] [place] [address] [address] Name, [facility] [facility] [address] : [facility] the [place]. The above potential benefits of such transfer, the potential risks associated with such transfer, and the probable risks of not being transferred have been explained to me, and I fully understand them. The doctor has explained that, in my case, the benefits of transfer outweigh the risks. I agree to be transferred. I authorize the performance of emergency medical procedures and treatments upon me in both transit and upon arrival at the receiving facility. Additionally, I authorize the release of any and all medical records to the receiving facility and request they be transported with me, if possible. I understand that the safest mode of transportation during a medical emergency is an ambulance and that the Hospital advocates the use of this mode of transport.  Risks of traveling to the receiving facility by car, including absence of medical control, life sustaining equipment, such as oxygen, and medical personnel has been explained to me and I fully understand them. (TOO CORRECT BOX BELOW)  [  ]  I consent to the stated transfer and to be transported by ambulance/helicopter. [  ]  I consent to the stated transfer, but refuse transportation by ambulance and accept full responsibility for my transportation by car. I understand the risks of non-ambulance transfers and I exonerate the Hospital and its staff from any deterioration in my condition that results from this refusal.    X___________________________________________    DATE  10/27/23  TIME________  Signature of patient or legally responsible individual signing on patient behalf           RELATIONSHIP TO PATIENT_________________________          Provider Certification    NAME Kali Woodall                                         1960                              MRN [id number]    A medical screening exam was performed on the above named patient. Based on the examination:    Condition Necessitating Transfer The encounter diagnosis was Fall, initial encounter.     Patient Condition: The patient has been stabilized such that within reasonable medical probability, no material deterioration of the patient condition or the condition of the unborn child(miquel) is likely to result from the transfer    Reason for Transfer: Level of Care needed not available at this facility    Transfer Requirements: [address] available and qualified personnel available for treatment as acknowledged by    Agreed to accept transfer and to provide appropriate medical treatment as acknowledged by       [facility]  Appropriate medical records of the examination and treatment of the patient are provided at the time of transfer   [address] _______  Transfer will be performed by qualified personnel from    and appropriate transfer equipment as required, including the use of necessary and appropriate life support measures. Provider Certification: I have examined the patient and explained the following risks and benefits of being transferred/refusing transfer to the patient/family:         Based on these reasonable risks and benefits to the patient and/or the unborn child(miquel), and based upon the information available at the time of the patient’s examination, I certify that the medical benefits reasonably to be expected from the provision of appropriate medical treatments at another medical facility outweigh the increasing risks, if any, to the individual’s medical condition, and in the case of labor to the unborn child, from effecting the transfer.     X____________________________________________ DATE 10/27/23        TIME_______      ORIGINAL - SEND TO MEDICAL RECORDS   COPY - SEND WITH PATIENT DURING TRANSFER

## 2023-10-27 NOTE — ANESTHESIA PROCEDURE NOTES
Peripheral Block    Patient location during procedure: holding area  Start time: 10/27/2023 9:55 AM  Reason for block: procedure for pain, at surgeon's request and post-op pain management  Staffing  Performed by: Cooper Kaufman MD  Authorized by: Cooper Kaufman MD    Preanesthetic Checklist  Completed: patient identified, IV checked, site marked, risks and benefits discussed, surgical consent, monitors and equipment checked, pre-op evaluation and timeout performed  Peripheral Block  Patient position: supine  Prep: ChloraPrep  Patient monitoring: frequent blood pressure checks, continuous pulse oximetry and heart rate  Block type: Infraclavicular  Laterality: right  Injection technique: single-shot  Procedures: ultrasound guided, Ultrasound guidance required for the procedure to increase accuracy and safety of medication placement and decrease risk of complications.   Ultrasound permanent image saved  Needle  Needle type: Stimuplex   Needle localization: anatomical landmarks and ultrasound guidance  Assessment  Injection assessment: incremental injection, frequent aspiration, injected with ease, negative aspiration, negative for heart rate change, no paresthesia on injection, no symptoms of intraneural/intravenous injection and needle tip visualized at all times  Paresthesia pain: none  Post-procedure:  site cleaned  patient tolerated the procedure well with no immediate complications  Additional Notes  Single needle pass, needle visualized throughout, minimal resistance on injection, appropriate perineural spread

## 2023-10-27 NOTE — ANESTHESIA PREPROCEDURE EVALUATION
EF60%    Procedure:  FASCIOTOMY OF RIGHT UPPER EXTREMITY; POSSIBLE VAC DRESSING APPLICATION (Right: Knee)    Relevant Problems   ANESTHESIA (within normal limits)      CARDIO   (+) Hypertension   (+) Hypertriglyceridemia   (+) Migraine   (+) Other hyperlipidemia      ENDO   (+) Other specified hypothyroidism      GI/HEPATIC   (+) Gastroesophageal reflux disease   (+) Ileus (HCC)      MUSCULOSKELETAL   (+) Cervical spondylosis   (+) Chronic midline low back pain without sciatica   (+) Fibromyalgia, primary   (+) Lumbar degenerative disc disease   (+) Lumbar spondylosis   (+) Mid back pain   (+) Sacroiliitis (HCC)      NEURO/PSYCH   (+) Anxiety   (+) Chronic midline low back pain without sciatica   (+) Chronic pain syndrome   (+) Depression   (+) Diabetic polyneuropathy associated with type 2 diabetes mellitus (HCC)   (+) Fibromyalgia, primary   (+) Migraine      PULMONARY   (+) COPD (chronic obstructive pulmonary disease) ([facility])   (+) Shortness of breath        Physical Exam    Airway    Mallampati score: II  TM Distance: >3 FB  Neck ROM: full     Dental    upper dentures and lower dentures    Cardiovascular  Rate: normal    Pulmonary  Pulmonary exam normal     Other Findings        Anesthesia Plan  ASA Score- 3 Emergent    Anesthesia Type- general with ASA Monitors. Additional Monitors:     Airway Plan: LMA. Plan Factors-Exercise tolerance (METS): >4 METS. Chart reviewed. Patient summary reviewed. Patient is not a current smoker. Induction- intravenous. Postoperative Plan- Plan for postoperative opioid use. Informed Consent- Anesthetic plan and risks discussed with patient. I personally reviewed this patient with the CRNA. Discussed and agreed on the Anesthesia Plan with the CRNA. Antonietta Kirk

## 2023-10-27 NOTE — QUICK NOTE
Patient seen and reevaluated for reassessment of the right upper extremity. At the time my arrival, the patient was resting comfortably, but admits to continued right forearm pain. On exam, she continues to have tenderness from her distal right upper arm through her right forearm and into her right wrist and hand. She has continued skin changes over her forearm with erythema/rubor extending onto the dorsal aspect of the hand with more pale appearing fingers. Though she has an intact radial pulse, she has decreased capillary refill in all fingers. She has diminished sensation in her right thumb, but no sensation in her other right fingers. She has limited motor function and loss of dexterity in the right hand at this time. Her forearm compartments continue to be tense and she has pain with passive supination. After further discussion with the patient as well as Dr. Donte Sharma, the patient appears to clinically have a worsening physical exam consistent with compartment syndrome. Plan for urgent operative intervention for right upper extremity fasciotomy and decompression with possible VAC dressing application. Patient agreeable to proceeding with surgery at this time and provided informed consent. We will continue to attempt to update the patient's family. I attempted to call the patient's daughter, Adriana Upton without success at this time.     Jorge Castro PA-C  10/27/2023 09:19 AM

## 2023-10-27 NOTE — ASSESSMENT & PLAN NOTE
Patient presented with Crush injury to RUE  - initial CK 5942 increased to 13,683  - LR bolus given and placed on cont at 125 ml/hr  - Serial exam: increased paresthesia to 4 digits on Rt Hand  - Increased swelling to Dorsal aspect of forearm  -  artery via doppler +  - OR for fasciotomy due to compartment syndrome

## 2023-10-27 NOTE — ED PROVIDER NOTES
History  Chief Complaint   Patient presents with    Fall     Found on kitchen floor. Altered. Son called EMS     58-year-old female with past medical history of anxiety, bipolar disorder, PTSD, who presents for fall. Patient is unable to provide clear history due to altered mental status. Per family, she just recently had her sister die this morning. After this, she expressed to multiple family members that she wishes it was her, and expressed other suicidal ideations. She denies any ingestions. She does describe pain in the right upper extremity, however it is not clear where exactly this is, she does have red discoloration from the mid forearm down to the hand. She describes neck and back pain however it does appear that this is often chronic for her, today she had scheduled a procedure regarding this. ROS otherwise negative. MDM:     Cardiac labs, tox labs ordered. Normal tylenol, aspirin, and alcohol are reassuring. Patient's lips do appear dry on exam, 1L fluid given. Given unknown downtime, will order broad labs including CK. I considered compartment syndrome initially, however, compartments and arm were soft without swelling, no pain out of proportion, no pulselessness, no loss of sensation. There was some discoloration however this was to be expected as patient fell. Imaging showed distal radius fracture. I re-assessed the patient arm later in encounter after labs, imaging returned. At this time, I did feel significant swelling when compared with prior exam, compartments felt relatively hard, and redness increased. At this time, this raised suspicion for compartment syndrome (in conjunction with mildly elevated CK). Sim exam was performed at bedside, and pressures were as high as 148. Transfer order immediately placed. Discussed with trauma who accepted patient as priority 1. Before transport, splint was placed.  Splint was extremely light, simply placed only to help avoid disruption of the suspected right distal radius fracture. Intentionally left very loose for suspect compartment syndrome, this was not snug and placed in a position of comfort without any restriction of compartments to allow for further swelling. Prior to Admission Medications   Prescriptions Last Dose Informant Patient Reported? Taking?    Alcohol Swabs (Alcohol Pads) 70 % PADS   Yes No   Sig: USE TO TEST BLOOD GLUCOSE 2-3 TIMES DAILY   Buprenorphine HCl (Belbuca) 300 MCG FILM  Self Yes No   Sig: Apply 300 mcg to cheek in the morning   Butalbital-APAP-Caffeine (FIORICET PO)   Yes No   Sig: Take by mouth as needed   Fluticasone-Salmeterol (Advair) 500-50 mcg/dose inhaler   Yes No   Galcanezumab-gnlm (Emgality) 120 MG/ML SOAJ  Self Yes No   Sig: Inject 120 mg under the skin every 30 (thirty) days Last inj 1/19/23   HYDROcodone-acetaminophen (NORCO)  mg per tablet   Yes No   Sig: every 8 (eight) hours as needed   Melatonin 10 MG CAPS  Self Yes No   Sig: Take 1 tablet by mouth daily at bedtime as needed   OneTouch Ultra test strip   Yes No   Sig: USE TO TEST BLOOD GLUCOSE 2-3 TIMES DAILY   SUMAtriptan (IMITREX) 100 mg tablet   Yes No   True Comfort Safety Lancets MISC   Yes No   Sig: USE TO TEST BLOOD GLUCOSE 2-3 TIMES DAILY   Zoster Vac Recomb Adjuvanted (Shingrix) 50 MCG/0.5ML SUSR   Yes No   Sig: inject 0.5 milliliter intramuscularly   albuterol (PROVENTIL HFA,VENTOLIN HFA) 90 mcg/act inhaler  Self Yes No   Sig: Inhale 2 puffs every 6 (six) hours as needed   ammonium lactate (LAC-HYDRIN) 12 % lotion   Yes No   Sig: as needed   atorvastatin (LIPITOR) 80 mg tablet   Yes No   Sig: Take 80 mg by mouth every evening   benzonatate (TESSALON PERLES) 100 mg capsule   No No   Sig: Take 1 capsule (100 mg total) by mouth 3 (three) times a day as needed for cough   carboxymethylcellulose 0.5 % SOLN  Self No No   Sig: Administer 1 drop to both eyes daily as needed for dry eyes   cephalexin (KEFLEX) 500 mg capsule   Yes No   Sig: Take 1 capsule by mouth every 12 (twelve) hours   ciprofloxacin (CIPRO) 250 mg tablet   Yes No   Sig: take 1 tablet by mouth twice a day for 7 days   clonazePAM (KlonoPIN) 0.5 mg tablet  Self Yes No   Sig: Take 0.5 mg by mouth daily at bedtime   clonazePAM (KlonoPIN) 1 mg tablet  Self Yes No   Sig: Take 1 mg by mouth daily in the early morning   dicyclomine (BENTYL) 20 mg tablet  Self Yes No   Sig: Take 20 mg by mouth every 6 (six) hours   doxycycline hyclate (VIBRA-TABS) 100 mg tablet   Yes No   Sig: take 1 tablet by mouth twice a day for 7 days   Patient not taking: Reported on 2023   famotidine (PEPCID) 40 MG tablet   No No   Sig: Take 1 tablet (40 mg total) by mouth daily at bedtime   fenofibrate (TRICOR) 48 mg tablet  Self Yes No   Sig: Take 48 mg by mouth daily   fluconazole (DIFLUCAN) 150 mg tablet   Yes No   Sig: take 1 tablet by mouth for 1 day   fluticasone (FLONASE) 50 mcg/act nasal spray   Yes No   Si sprays into each nostril daily   fluticasone-umeclidinium-vilanterol (Trelegy Ellipta) 200-62.5-25 mcg/actuation AEPB inhaler   No No   Sig: Inhale 1 puff daily Rinse mouth after use. furosemide (LASIX) 20 mg tablet  Self Yes No   Sig: Take 20 mg by mouth as needed Taking as needed   ibuprofen (MOTRIN) 800 mg tablet   Yes No   Sig: take 1 tablet by mouth every 8 hours if needed for mild pain or fever for up to 5 days   lamoTRIgine (LaMICtal) 100 mg tablet   Yes No   Sig: Take 100 mg by mouth 2 (two) times a day   latanoprost (XALATAN) 0.005 % ophthalmic solution   Yes No   Sig: instill 1 drop into both eyes at bedtime   levalbuterol (XOPENEX) 1.25 mg/3 mL nebulizer solution   Yes No   Sig: Inhale 1.25 mg every 4 (four) hours as needed   levothyroxine 137 mcg tablet  Self Yes No   Sig: Take 137 mcg by mouth daily   lubiprostone (AMITIZA) 8 mcg capsule   No No   Sig: Take 1 PO BID for constipation.    magnesium 30 MG tablet  Self Yes No   Sig: Take 500 mg by mouth 2 (two) times a day 23 right now not taking   methylPREDNISolone 4 MG tablet therapy pack   No No   Sig: Use as directed on package   metroNIDAZOLE (FLAGYL) 500 mg tablet   Yes No   Sig: Take 1 tablet by mouth 2 (two) times a day   naloxone (Narcan) 4 mg/0.1 mL nasal spray   Yes No   Patient not taking: Reported on 7/14/2023   omeprazole (PriLOSEC) 40 MG capsule   No No   Sig: Take 1 capsule (40 mg total) by mouth 2 (two) times a day   ondansetron (ZOFRAN) 4 mg tablet   Yes No   Sig: Take 4 mg by mouth every 8 (eight) hours as needed   ondansetron (ZOFRAN) 8 mg tablet   No No   Sig: Take 1 tablet (8 mg total) by mouth every 8 (eight) hours as needed for nausea or vomiting   polyethylene glycol (GLYCOLAX) 17 GM/SCOOP powder   No No   Sig: Take 17 g daily for constipation. pregabalin (LYRICA) 100 mg capsule  Self Yes No   Sig: Take 100 mg by mouth 3 (three) times a day Morning-lunch-dinner   pregabalin (LYRICA) 150 mg capsule  Self Yes No   Sig: take 1 capsule by mouth NIGHTLY   risperiDONE (RisperDAL) 0.5 mg tablet   Yes No   Sig: Take 0.25 mg by mouth 2 (two) times a day   Patient not taking: Reported on 7/14/2023   rizatriptan (MAXALT-MLT) 10 mg disintegrating tablet   Yes No   Sig: TAKE 1 TABLET BY MOUTH ONE TIME AS NEEDED FOR MIGRAINE, MAY REPEA. ..  (REFER TO PRESCRIPTION NOTES).    tiZANidine (ZANAFLEX) 4 mg tablet   Yes No   Sig: take 2 tablets by mouth every 6 hours if needed for muscle spasm   topiramate (TOPAMAX) 100 mg tablet  Self Yes No   Sig: Take 100 mg by mouth every 6 (six) hours as needed Takes 2 tabs every 6 hours   topiramate (TOPAMAX) 200 MG tablet   Yes No      Facility-Administered Medications: None       Past Medical History:   Diagnosis Date    Ambulates with cane     Anxiety     Arthritis     Asthma     Bipolar 1 disorder ([facility])     Brain aneurysm     Chronic pain disorder     spinal stenosis    Concussion syndrome     neurological rx and balance rx    COPD (chronic obstructive pulmonary disease) ([facility])     Depression Diabetes mellitus ([address])     controlled w/ diet    Disease of thyroid gland     nodules     Family history of colon cancer     father    Fibromyalgia, primary     GERD (gastroesophageal reflux disease)     History of colon polyps     Hyperlipidemia     Hypertension     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     Irritable bowel syndrome     Lumbar degenerative disc disease 02/16/2022    Migraine     Neuropathy     bilateral feet and hands    Peripheral neuropathy     Psychiatric disorder     PTSD (post-traumatic stress disorder)     Shortness of breath     Sleep apnea     had 3 studies & last one negative    Stroke ([address])     2012 no deficeits    TIA (transient ischemic attack)     Wears dentures     Wears glasses        Past Surgical History:   Procedure Laterality Date    ABDOMINAL SURGERY      laproscopic/ endometriosis    BRAIN SURGERY      aneurysm/ coiling procedure    CARPAL TUNNEL RELEASE      CHOLECYSTECTOMY      COLONOSCOPY      DENTAL SURGERY      EPIDURAL BLOCK INJECTION Right 03/03/2022    Procedure: C7-T1 interlaminar epidural steroid injection;  Surgeon: Matthew Acevedo MD;  Location: [facility];  Service: Pain Management     EPIDURAL BLOCK INJECTION N/A 04/21/2022    Procedure: C6-C7 BLOCK / INJECTION EPIDURAL STEROID CERVICAL;  Surgeon: Matthew Acevedo MD;  Location: [facility];  Service: Pain Management     EPIDURAL BLOCK INJECTION Left 06/21/2022    Procedure: BLOCK / INJECTION EPIDURAL STEROID LUMBAR  left L3-4 TFESI;  Surgeon: Matthew Acevedo MD;  Location: [facility];  Service: Pain Management     EYE SURGERY      cataract    FL GUIDED NEEDLE PLAC BX/ASP/INJ  03/03/2022    FL GUIDED NEEDLE PLAC BX/ASP/INJ  11/17/2022    HYSTERECTOMY      VT ESOPHAGOGASTRODUODENOSCOPY TRANSORAL DIAGNOSTIC N/A 02/08/2018    Procedure: EGD AND COLONOSCOPY;  Surgeon: Jeaneth Galarza MD;  Location: [facility];   Service: Gastroenterology    VT PRQ IMPLTJ NSTIM ELECTRODE ARRAY EPIDURAL N/A 11/17/2022    Procedure: Fartun Fitch SCS TRIAL;  Surgeon: Jackie Muhammad MD;  Location: [facility];  Service: Pain Management     RI PRQ IMPLTJ NSTIM ELECTRODE ARRAY EPIDURAL N/A 2/1/2023    Procedure: Insertion percutaneous thoracic spinal cord stimulator with left buttock implantable pulse generator;  Surgeon: Liss Rosenthal MD;  Location:  MAIN OR;  Service: Neurosurgery    THYROID SURGERY      TONSILLECTOMY      US GUIDED THYROID BIOPSY  11/30/2016    US GUIDED THYROID BIOPSY  3/21/2018       Family History   Problem Relation Age of Onset    Brain cancer Mother     Alzheimer's disease Mother     Alzheimer's disease Father     Parkinsonism Father      I have reviewed and agree with the history as documented. E-Cigarette/Vaping    E-Cigarette Use Never User      E-Cigarette/Vaping Substances    Nicotine No     THC No     CBD No     Flavoring No     Other No     Unknown No      Social History     Tobacco Use    Smoking status: Every Day     Packs/day: 2.00     Types: Cigarettes    Smokeless tobacco: Never   Vaping Use    Vaping Use: Never used   Substance Use Topics    Alcohol use: Not Currently    Drug use: Never     Comment: prescribed Belbuca       Review of Systems   Constitutional:  Negative for chills and fever. HENT:  Negative for congestion, rhinorrhea and sore throat. Respiratory:  Negative for cough and shortness of breath. Cardiovascular:  Negative for chest pain and palpitations. Gastrointestinal:  Negative for abdominal pain, constipation, diarrhea, nausea and vomiting. Genitourinary:  Negative for difficulty urinating and flank pain. Musculoskeletal:  Negative for arthralgias. Skin:  Positive for color change. Neurological:  Negative for dizziness, weakness, light-headedness and headaches. Psychiatric/Behavioral:  Negative for agitation, behavioral problems and confusion. All other systems reviewed and are negative.       Physical Exam  Physical Exam  Constitutional: Appearance: She is well-developed. HENT:      Head: Normocephalic. Comments: She does have some bruising over the forehead, nose. Cardiovascular:      Rate and Rhythm: Normal rate and regular rhythm. Heart sounds: Normal heart sounds. No murmur heard. No friction rub. Pulmonary:      Effort: Pulmonary effort is normal. No respiratory distress. Breath sounds: Normal breath sounds. No wheezing or rales. Abdominal:      General: Bowel sounds are normal. There is no distension. Palpations: Abdomen is soft. Tenderness: There is no abdominal tenderness. Musculoskeletal:         General: Normal range of motion. Cervical back: Normal range of motion and neck supple. Comments: No midline tenderness of the C-, T-, or L-spine    No tenderness of the thorax    No instability or pain when pressing on pelvis    RUE:  Despite describing pain in RUE, and having discoloration as described in "Skin" findings on exam, she does not report tenderness. Unsure if this is due to mental status. Compartments feel soft. She reports normal sensation over this region. Patient refuses to move RUE, particularly wrist, although she seems to tolerate passive ROM well. LUE:  No tenderness, no pain ranging joints of the shoulder, elbow, wrist, hand    RLE:   No tenderness, no pain ranging the hip, knee, ankle    LLE:   No tenderness, no pain ranging the hip, knee, ankle      Skin:     General: Skin is warm. Capillary Refill: Strong radial pulse in RUE. Comments: There is redness over extending from the right mid-forearm down to the hand. Not significantly swollen with soft compartments. She does report normal sensation over this region. Neurological:      Mental Status: She is alert and oriented to person, place, and time. Coordination: Coordination normal.      Comments: Patient AAOx3. CN II-XII intact. Normal CFTs. 5/5 strength in all extremities.  Normal sensation in all extremities. Psychiatric:         Behavior: Behavior normal.         Thought Content: Thought content normal.         Judgment: Judgment normal.         Vital Signs  ED Triage Vitals [10/26/23 7239]   Temperature Pulse Respirations Blood Pressure SpO2   (!) 96.7 °F (35.9 °C) 72 18 116/71 95 %      Temp Source Heart Rate Source Patient Position - Orthostatic VS BP Location FiO2 (%)   Tympanic Monitor Lying Left arm --      Pain Score       --           Vitals:    10/27/23 0130 10/27/23 0200 10/27/23 0230 10/27/23 0300   BP: 137/84 139/77 142/90 139/85   Pulse: 66 64 63 64   Patient Position - Orthostatic VS:  Lying  Lying         Visual Acuity  Visual Acuity      Flowsheet Row Most Recent Value   L Pupil Size (mm) 3   R Pupil Size (mm) 3   L Pupil Shape Round   R Pupil Shape Round            ED Medications  Medications   sodium chloride 0.9 % bolus 1,000 mL (0 mL Intravenous Stopped 10/27/23 0224)   iohexol (OMNIPAQUE) 350 MG/ML injection (MULTI-DOSE) 100 mL (100 mL Intravenous Given 10/27/23 0037)       Diagnostic Studies  Results Reviewed       Procedure Component Value Units Date/Time    CK [[id number]]  (Abnormal) Collected: 10/27/23 0011    Lab Status: Final result Specimen: Blood Updated: 10/27/23 0118     Total CK 5,942 U/L     TSH [[id number]]  (Abnormal) Collected: 10/27/23 0011    Lab Status: Final result Specimen: Blood from Arm, Left Updated: 10/27/23 0110     TSH 3RD GENERATON 0.123 uIU/mL     HS Troponin 0hr (reflex protocol) [[id number]]  (Normal) Collected: 10/27/23 0011    Lab Status: Final result Specimen: Blood from Arm, Left Updated: 10/27/23 0040     hs TnI 0hr 2 ng/L     Salicylate level [[id number]]  (Normal) Collected: 10/27/23 0011    Lab Status: Final result Specimen: Blood from Arm, Left Updated: 86/64/28 0551     Salicylate Lvl <5 mg/dL     Acetaminophen level-If concentration is detectable, please discuss with medical  on call.  [[id number]]  (Abnormal) Collected: 10/27/23 0011 Lab Status: Final result Specimen: Blood from Arm, Left Updated: 10/27/23 0035     Acetaminophen Level <10 ug/mL     Comprehensive metabolic panel [[id number]]  (Abnormal) Collected: 10/27/23 0011    Lab Status: Final result Specimen: Blood from Arm, Left Updated: 10/27/23 0034     Sodium 137 mmol/L      Potassium 4.2 mmol/L      Chloride 105 mmol/L      CO2 21 mmol/L      ANION GAP 11 mmol/L      BUN 20 mg/dL      Creatinine 0.87 mg/dL      Glucose 140 mg/dL      Calcium 9.0 mg/dL      AST 76 U/L      ALT 47 U/L      Alkaline Phosphatase 69 U/L      Total Protein 7.3 g/dL      Albumin 4.4 g/dL      Total Bilirubin 0.33 mg/dL      eGFR 72 ml/min/1.73sq m     Narrative:      [facility] (CKD):     Stage 1 with normal or high GFR (GFR > 90 mL/min/1.73 square meters)    Stage 2 Mild CKD (GFR = 60-89 mL/min/1.73 square meters)    Stage 3A Moderate CKD (GFR = 45-59 mL/min/1.73 square meters)    Stage 3B Moderate CKD (GFR = 30-44 mL/min/1.73 square meters)    Stage 4 Severe CKD (GFR = 15-29 mL/min/1.73 square meters)    Stage 5 End Stage CKD (GFR <15 mL/min/1.73 square meters)  Note: GFR calculation is accurate only with a steady state creatinine    Ethanol [[id number]]  (Normal) Collected: 10/27/23 0011    Lab Status: Final result Specimen: Blood from Arm, Left Updated: 10/27/23 0032     Ethanol Lvl <10 mg/dL     CBC and differential [[id number]]  (Abnormal) Collected: 10/27/23 0011    Lab Status: Final result Specimen: Blood from Arm, Left Updated: 10/27/23 0019     WBC 9.58 Thousand/uL      RBC 4.46 Million/uL      Hemoglobin 14.0 g/dL      Hematocrit 43.3 %      MCV 97 fL      MCH 31.4 pg      MCHC 32.3 g/dL      RDW 12.2 %      MPV 10.4 fL      Platelets 471 Thousands/uL      nRBC 0 /100 WBCs      Neutrophils Relative 86 %      Immat GRANS % 0 %      Lymphocytes Relative 10 %      Monocytes Relative 4 %      Eosinophils Relative 0 %      Basophils Relative 0 %      Neutrophils Absolute 8.15 Thousands/µL      Immature Grans Absolute 0.04 Thousand/uL      Lymphocytes Absolute 0.97 Thousands/µL      Monocytes Absolute 0.40 Thousand/µL      Eosinophils Absolute 0.00 Thousand/µL      Basophils Absolute 0.02 Thousands/µL                    XR elbow 3+ vw RIGHT   Final Result by Qi Coppola MD (10/27 0534)      No acute osseous abnormality. Workstation performed: [id number]         XR hand 3+ views RIGHT   Final Result by Qi Coppola MD (10/27 2206)      No acute osseous abnormality. Workstation performed: [id number]         XR forearm 2 views RIGHT   Final Result by Qi Coppola MD (10/27 0535)      No acute osseous abnormality. Workstation performed: [id number]         TRAUMA - CT head wo contrast   Final Result by Hussain Vásquez MD (10/27 0112)      Mild extracranial soft tissue swelling. No evidence of acute intracranial abnormality. No evidence of acute facial bone fracture. Workstation performed: [id number]         TRAUMA - CT spine cervical wo contrast   Final Result by Hussain Vásquez MD (10/27 0153)      No acute cervical spine fracture or traumatic malalignment. Multilevel degenerative changes are present. The study was marked in [facility] for immediate notification. Workstation performed: [id number]         TRAUMA - CT chest abdomen pelvis w contrast   Final Result by Hussain Vásquez MD (10/27 0145)      No evidence of acute intrathoracic, intra-abdominal or intrapelvic parenchymal organ injury. No pneumothorax. There are healing fractures of the left transverse processes of L1, L2 and L3. Mild hepatomegaly. Biliary ductal dilatation unchanged. Prior cholecystectomy. Other nonemergent and chronic findings as above. The study was marked in [facility] for immediate notification.             Workstation performed: [id number]         TRAUMA - CT facial bones wo contrast   Final Result by Cheli Alvarado MD (10/27 0112)      Mild extracranial soft tissue swelling. No evidence of acute intracranial abnormality. No evidence of acute facial bone fracture. Workstation performed: [id number]         XR Trauma chest portable   ED Interpretation by Elgin Braxton MD (10/27 0017)   No acute cardiopulmonary disease. Procedures  ECG 12 Lead Documentation Only    Date/Time: 10/27/2023 12:45 AM    Performed by: Elgin Braxton MD  Authorized by: Elgin Braxton MD    Indications / Diagnosis:  AMS  ECG reviewed by me, the ED Provider: yes    Patient location:  ED  Previous ECG:     Previous ECG:  Unavailable    Comparison to cardiac monitor: No    Interpretation:     Interpretation: non-specific    Rate:     ECG rate:  71    ECG rate assessment: normal    Rhythm:     Rhythm: sinus rhythm    Ectopy:     Ectopy: none    QRS:     QRS axis:  Normal    QRS intervals:  Normal  Conduction:     Conduction: normal    ST segments:     ST segments:  Normal  T waves:     T waves: normal    Splint application    Date/Time: 10/27/2023 3:22 AM    Performed by: Elgin Braxton MD  Authorized by: Elgin Braxton MD  Universal Protocol:  Procedure performed by:  Consent: Verbal consent obtained. Consent given by: patient  Patient understanding: patient states understanding of the procedure being performed  Patient identity confirmed: verbally with patient    Pre-procedure details:     Skin color:  Red  Procedure details:     Laterality:  Right    Location:  Wrist    Splint type:  Short arm splint, dynamic    Supplies:  Cotton padding and Ortho-Glass (Ace bandage)  Post-procedure details:     Pain:  Unchanged    Sensation:  Unchanged    Skin color:  Red    Patient tolerance of procedure:   Tolerated well, no immediate complications  Comments:      Splint was extremely light, simply placed only to avoid disruption of the suspected right distal radius fracture. Intentionally left very loose for suspect compartment syndrome. CriticalCare Time    Date/Time: 10/27/2023 3:32 AM    Performed by: Elgin Braxton MD  Authorized by: Elgin Braxton MD    Critical care provider statement:     Critical care time (minutes):  35    Critical care start time:  10/27/2023 2:00 AM    Critical care end time:  10/27/2023 2:35 AM    Critical care time was exclusive of:  Separately billable procedures and treating other patients and teaching time    Critical care was necessary to treat or prevent imminent or life-threatening deterioration of the following conditions:  Trauma    Critical care was time spent personally by me on the following activities:  Obtaining history from patient or surrogate, development of treatment plan with patient or surrogate, discussions with consultants, evaluation of patient's response to treatment, examination of patient, ordering and performing treatments and interventions, ordering and review of laboratory studies, ordering and review of radiographic studies, re-evaluation of patient's condition and review of old charts    I assumed direction of critical care for this patient from another provider in my specialty: no             ED Course  ED Course as of 10/27/23 0540   Fri Oct 27, 2023   0033 MEDICAL ALCOHOL: <10  Negative, ruling out alcohol intoxication. 7437 A very loose splint with minimal pressure was applied simply to ensure stability of suspected distal radius fx during transfer. This was placed in position of comfort for patient. Medical Decision Making  I reviewed the patient's medical chart, PMHx, prior encounters, medications.     My independent interpretation of ECG demonstrated:  My independent interpretation of CXR demonstrated: No acute cardiopulmonary disease  My independent interpretation of XR right elbow: Suspected distal radius fx    My DDx includes: Traumatic ICH, C-spine, T-spine, L-spine fx, right forearm, wrist, or hand fracture, intentional overdose, suicidal ideation    Cardiac labs, tox labs ordered. Normal tylenol, aspirin, and alcohol are reassuring. Patient's lips do appear dry on exam, 1L fluid given. Given unknown downtime, will order broad labs including CK. I considered compartment syndrome initially, however, compartments and arm were soft without swelling, no pain out of proportion, no pulselessness, no loss of sensation. There was some discoloration however this was to be expected as patient fell. Imaging showed distal radius fracture. I re-assessed the patient arm later in encounter after labs, imaging returned. At this time, I did feel significant swelling when compared with prior exam, compartments felt relatively hard, and redness increased. At this time, this raised suspicion for compartment syndrome (in conjunction with mildly elevated CK). Sim exam was performed at bedside, and pressures were as high as 150. Transfer order immediately placed. Before transport, splint was placed. Splint was extremely light, simply placed only to help avoid disruption of the suspected right distal radius fracture. Intentionally left very loose for suspect compartment syndrome, this was not snug and placed in a position of comfort without any restriction of compartments to allow for further swelling. Amount and/or Complexity of Data Reviewed  Labs: ordered. Decision-making details documented in ED Course. Radiology: ordered and independent interpretation performed. Risk  Prescription drug management.              Disposition  Final diagnoses:   Fall, initial encounter   Compartment syndrome ([address])   Overdose   Suicidal ideations     Time reflects when diagnosis was documented in both MDM as applicable and the Disposition within this note       Time User Action Codes Description Comment 10/27/2023  2:18 AM AnaEdilmayolanda Meek Add [J19. SOKK] JZJD, initial encounter     10/27/2023  2:46 AM AnaEdilmayolanda Meek Add [S52.501A] Distal radius fracture, right     10/27/2023  2:46 AM Helen Perez Add [T79. A0XA] Compartment syndrome ([address])     10/27/2023  2:47 AM Yodit Alejandro Add [T50.901A] Overdose     10/27/2023  2:47 AM Yodit Alejandro Add [R45.851] Suicidal ideations     10/27/2023  5:39 AM Denis Perez [S52.501A] Distal radius fracture, right           ED Disposition       ED Disposition   Transfer to Another Facility-In Network    Condition   --    Date/Time   Fri Oct 27, 2023  2:17 AM    Comment   Guanakito Woodall should be transferred out to [facility].                MD Documentation      Kelli Reynolds Most Recent Value   Patient Condition The patient has been stabilized such that within reasonable medical probability, no material deterioration of the patient condition or the condition of the unborn child(miquel) is likely to result from the transfer   Reason for Transfer Level of Care needed not available at this facility   Benefits of Transfer Specialized equipment and/or services available at the receiving facility (Include comment)________________________  [Trauma]   Risks of Transfer Potential for delay in receiving treatment, Increased discomfort during transfer, Possible worsening of condition or death during transfer, Loss of IV, Potential deterioration of medical condition   Accepting Physician [address] Name, [address] [place] [address]   Sending MD [facility]          RN Documentation      [address] Name, [address] [facility]          Follow-up Information    None         Discharge Medication List as of 10/27/2023  3:22 AM        CONTINUE these medications which have NOT CHANGED    Details   albuterol (PROVENTIL HFA,VENTOLIN HFA) 90 mcg/act inhaler Inhale 2 puffs every 6 (six) hours as needed, Starting [place]  12/28/2017, Historical Med      Alcohol Swabs (Alcohol Pads) 70 % PADS USE TO TEST BLOOD GLUCOSE 2-3 TIMES DAILY, Historical Med      ammonium lactate (LAC-HYDRIN) 12 % lotion as needed, Historical Med      atorvastatin (LIPITOR) 80 mg tablet Take 80 mg by mouth every evening, Starting Fri 10/14/2022, Historical Med      benzonatate (TESSALON PERLES) 100 mg capsule Take 1 capsule (100 mg total) by mouth 3 (three) times a day as needed for cough, Starting Tue 5/2/2023, Normal      Buprenorphine HCl (Belbuca) 300 MCG FILM Apply 300 mcg to cheek in the morning, Historical Med      Butalbital-APAP-Caffeine (FIORICET PO) Take by mouth as needed, Historical Med      carboxymethylcellulose 0.5 % SOLN Administer 1 drop to both eyes daily as needed for dry eyes, Starting Thu 2/7/2019, Normal      cephalexin (KEFLEX) 500 mg capsule Take 1 capsule by mouth every 12 (twelve) hours, Historical Med      ciprofloxacin (CIPRO) 250 mg tablet take 1 tablet by mouth twice a day for 7 days, Historical Med      !! clonazePAM (KlonoPIN) 0.5 mg tablet Take 0.5 mg by mouth daily at bedtime, Historical Med      !! clonazePAM (KlonoPIN) 1 mg tablet Take 1 mg by mouth daily in the early morning, Starting Thu 1/25/2018, Historical Med      dicyclomine (BENTYL) 20 mg tablet Take 20 mg by mouth every 6 (six) hours, Historical Med      doxycycline hyclate (VIBRA-TABS) 100 mg tablet take 1 tablet by mouth twice a day for 7 days, Historical Med      famotidine (PEPCID) 40 MG tablet Take 1 tablet (40 mg total) by mouth daily at bedtime, Starting Thu 8/17/2023, Normal      fenofibrate (TRICOR) 48 mg tablet Take 48 mg by mouth daily, Historical Med      fluconazole (DIFLUCAN) 150 mg tablet take 1 tablet by mouth for 1 day, Historical Med      fluticasone (FLONASE) 50 mcg/act nasal spray 2 sprays into each nostril daily, Starting Wed 1/11/2023, Historical Med      Fluticasone-Salmeterol (Advair) 500-50 mcg/dose inhaler Starting Wed 8/16/2023, Historical Med      fluticasone-umeclidinium-vilanterol (Trelegy Ellipta) 200-62.5-25 mcg/actuation AEPB inhaler Inhale 1 puff daily Rinse mouth after use., Starting Thu 5/18/2023, Normal      furosemide (LASIX) 20 mg tablet Take 20 mg by mouth as needed Taking as needed, Historical Med      Galcanezumab-gnlm (Emgality) 120 MG/ML SOAJ Inject 120 mg under the skin every 30 (thirty) days Last inj 1/19/23, Historical Med      HYDROcodone-acetaminophen (NORCO)  mg per tablet every 8 (eight) hours as needed, Starting Fri 3/24/2023, Historical Med      ibuprofen (MOTRIN) 800 mg tablet take 1 tablet by mouth every 8 hours if needed for mild pain or fever for up to 5 days, Historical Med      lamoTRIgine (LaMICtal) 100 mg tablet Take 100 mg by mouth 2 (two) times a day, Starting Tue 1/16/2018, Until Fri 7/14/2023, Historical Med      latanoprost (XALATAN) 0.005 % ophthalmic solution instill 1 drop into both eyes at bedtime, Historical Med      levalbuterol (XOPENEX) 1.25 mg/3 mL nebulizer solution Inhale 1.25 mg every 4 (four) hours as needed, Starting Tue 1/24/2023, Until Wed 1/24/2024 at 2359, Historical Med      levothyroxine 137 mcg tablet Take 137 mcg by mouth daily, Starting Mon 1/21/2019, Historical Med      lubiprostone (AMITIZA) 8 mcg capsule Take 1 PO BID for constipation. , Normal      magnesium 30 MG tablet Take 500 mg by mouth 2 (two) times a day 1/26/23 right now not taking, Historical Med      Melatonin 10 MG CAPS Take 1 tablet by mouth daily at bedtime as needed, Historical Med      methylPREDNISolone 4 MG tablet therapy pack Use as directed on package, Normal      metroNIDAZOLE (FLAGYL) 500 mg tablet Take 1 tablet by mouth 2 (two) times a day, Historical Med      naloxone (Narcan) 4 mg/0.1 mL nasal spray Historical Med      omeprazole (PriLOSEC) 40 MG capsule Take 1 capsule (40 mg total) by mouth 2 (two) times a day, Starting u 8/17/2023, Normal      !! ondansetron (ZOFRAN) 4 mg tablet Take 4 mg by mouth every 8 (eight) hours as needed, Starting Tue 5/9/2023, Historical Med      !! ondansetron (ZOFRAN) 8 mg tablet Take 1 tablet (8 mg total) by mouth every 8 (eight) hours as needed for nausea or vomiting, Starting Wed 7/13/2022, Normal      OneTouch Ultra test strip USE TO TEST BLOOD GLUCOSE 2-3 TIMES DAILY, Historical Med      polyethylene glycol (GLYCOLAX) 17 GM/SCOOP powder Take 17 g daily for constipation. , Normal      !! pregabalin (LYRICA) 100 mg capsule Take 100 mg by mouth 3 (three) times a day Morning-lunch-dinner, Starting Thu 10/26/2017, Historical Med      !! pregabalin (LYRICA) 150 mg capsule take 1 capsule by mouth NIGHTLY, Historical Med      risperiDONE (RisperDAL) 0.5 mg tablet Take 0.25 mg by mouth 2 (two) times a day, Starting Sat 4/15/2023, Historical Med      rizatriptan (MAXALT-MLT) 10 mg disintegrating tablet TAKE 1 TABLET BY MOUTH ONE TIME AS NEEDED FOR MIGRAINE, MAY REPEA. ..  (REFER TO PRESCRIPTION NOTES). , Historical Med      SUMAtriptan (IMITREX) 100 mg tablet Historical Med      tiZANidine (ZANAFLEX) 4 mg tablet take 2 tablets by mouth every 6 hours if needed for muscle spasm, Historical Med      !! topiramate (TOPAMAX) 100 mg tablet Take 100 mg by mouth every 6 (six) hours as needed Takes 2 tabs every 6 hours, Historical Med      !! topiramate (TOPAMAX) 200 MG tablet Starting Mon 6/5/2023, Historical Med      True Comfort Safety Lancets MISC USE TO TEST BLOOD GLUCOSE 2-3 TIMES DAILY, Historical Med      Zoster Vac Recomb Adjuvanted (Shingrix) 50 MCG/0.5ML SUSR inject 0.5 milliliter intramuscularly, Historical Med       !! - Potential duplicate medications found. Please discuss with provider. No discharge procedures on file.     PDMP Review         Value Time User    PDMP Reviewed  Yes 7/15/2023  3:09 PM Leandro Bose PA-C            ED Provider  Electronically Signed by             Christo Hodge MD  10/27/23 [address], [place]  10/27/23 0540

## 2023-10-27 NOTE — OP NOTE
OPERATIVE REPORT  PATIENT NAME: Leonel Gallardo    :  1960  MRN: [id number]  Pt Location: BE OR ROOM 04    SURGERY DATE: 10/27/2023    Surgeon(s) and Role:     * Ramona Feldman MD - Primary     * Mitchel Vieira DO - Assisting    Preop Diagnosis:  Traumatic compartment syndrome of right upper extremity, initial encounter ([address]) Evelina Yoder. A11A]    Post-Op Diagnosis Codes:     * Traumatic compartment syndrome of right upper extremity, initial encounter ([address]) [T79. A11A]    Procedure(s):  Right - FASCIOTOMY OF RIGHT UPPER EXTREMITY; POSSIBLE VAC DRESSING APPLICATION    Specimen(s):  * No specimens in log *    Estimated Blood Loss:   Minimal    Drains:  External Urinary Catheter (Active)   Number of days: 0       Anesthesia Type:   General    Operative Indications:  Traumatic compartment syndrome of right upper extremity, initial encounter ([address]) Evelina Yoder. A11A]      Operative Findings:  Compartment syndrome of right upper extremity    Complications:   None    Procedure and Technique:  Patient was discussed in detail the risks and benefits of the procedure and she asked for us to discuss the procedure with Neyda Bello prior to and after the procedure as well. This was performed. Patient is a 65year old female was brought into the operating room theater with IV Fluids and Ancef running. She was NPO prior to the procedure. SCDs were in place. She was intubated and sedated by anesthesiology without any significant complications. Her right upper extremity was prepped and draped in the usual sterile fashion and a universal time out was performed. All participating in the procedure were encouraged to bring up any questions or concerns at that time or if any develop during the procedure. As patient had tight compartments along the dorsal aspect of her forearm, an incision was created longitudinally from the lateral epicondyle towards the radial aspect of the wrist. With a #10 scalpel.  The incision was brought down carefully with electrocautery making sure to prevent injury to surrounding  vessels and nerves. The fascia was incised and the muscle buldged from the incision. The incision through the fascia was brought down until the entire muscle belly had emerged releasing the  This was repeated on the fascia on the lateral aspect until the muscles emerged. The muscles appeared viable. The dorsal compartments were successfully released and the remainder of the compartments were soft and no further incisions were required. Electrocautery was used to maintain hemostasis and a wound VAC was applied over the wound. There was a good seal with wound VAC set at -125mmHG. All counts were correct. Patient was successfully extubated and transferred to PACU in stable condition. Dr. Kamilah Zarco was scrubbed during this case.     Patient Disposition:  PACU         SIGNATURE: Ian Hess DO  DATE: October 27, 2023  TIME: 11:03 AM

## 2023-10-28 LAB
ANION GAP SERPL CALCULATED.3IONS-SCNC: 7 MMOL/L
BUN SERPL-MCNC: 11 MG/DL (ref 5–25)
CALCIUM SERPL-MCNC: 7.6 MG/DL (ref 8.4–10.2)
CHLORIDE SERPL-SCNC: 113 MMOL/L (ref 96–108)
CK SERPL-CCNC: 5998 U/L (ref 26–192)
CO2 SERPL-SCNC: 24 MMOL/L (ref 21–32)
CREAT SERPL-MCNC: 0.59 MG/DL (ref 0.6–1.3)
ERYTHROCYTE [DISTWIDTH] IN BLOOD BY AUTOMATED COUNT: 13.1 % (ref 11.6–15.1)
GFR SERPL CREATININE-BSD FRML MDRD: 99 ML/MIN/1.73SQ M
GLUCOSE SERPL-MCNC: 93 MG/DL (ref 65–140)
HCT VFR BLD AUTO: 35.7 % (ref 34.8–46.1)
HGB BLD-MCNC: 11.9 G/DL (ref 11.5–15.4)
MCH RBC QN AUTO: 31.2 PG (ref 26.8–34.3)
MCHC RBC AUTO-ENTMCNC: 33.3 G/DL (ref 31.4–37.4)
MCV RBC AUTO: 94 FL (ref 82–98)
PLATELET # BLD AUTO: 170 THOUSANDS/UL (ref 149–390)
PMV BLD AUTO: 10.4 FL (ref 8.9–12.7)
POTASSIUM SERPL-SCNC: 3.4 MMOL/L (ref 3.5–5.3)
RBC # BLD AUTO: 3.82 MILLION/UL (ref 3.81–5.12)
SODIUM SERPL-SCNC: 144 MMOL/L (ref 135–147)
WBC # BLD AUTO: 8.14 THOUSAND/UL (ref 4.31–10.16)

## 2023-10-28 PROCEDURE — 85027 COMPLETE CBC AUTOMATED: CPT | Performed by: NURSE PRACTITIONER

## 2023-10-28 PROCEDURE — 99024 POSTOP FOLLOW-UP VISIT: CPT | Performed by: NURSE PRACTITIONER

## 2023-10-28 PROCEDURE — 97167 OT EVAL HIGH COMPLEX 60 MIN: CPT

## 2023-10-28 PROCEDURE — 99232 SBSQ HOSP IP/OBS MODERATE 35: CPT | Performed by: STUDENT IN AN ORGANIZED HEALTH CARE EDUCATION/TRAINING PROGRAM

## 2023-10-28 PROCEDURE — 82550 ASSAY OF CK (CPK): CPT | Performed by: NURSE PRACTITIONER

## 2023-10-28 PROCEDURE — 97163 PT EVAL HIGH COMPLEX 45 MIN: CPT

## 2023-10-28 PROCEDURE — 80048 BASIC METABOLIC PNL TOTAL CA: CPT | Performed by: NURSE PRACTITIONER

## 2023-10-28 RX ORDER — OXYCODONE HYDROCHLORIDE 5 MG/1
5 TABLET ORAL
Status: DISCONTINUED | OUTPATIENT
Start: 2023-10-28 | End: 2023-10-31

## 2023-10-28 RX ORDER — POTASSIUM CHLORIDE 20 MEQ/1
40 TABLET, EXTENDED RELEASE ORAL ONCE
Status: COMPLETED | OUTPATIENT
Start: 2023-10-28 | End: 2023-10-28

## 2023-10-28 RX ORDER — SODIUM CHLORIDE 9 MG/ML
75 INJECTION, SOLUTION INTRAVENOUS CONTINUOUS
Status: DISCONTINUED | OUTPATIENT
Start: 2023-10-28 | End: 2023-10-30

## 2023-10-28 RX ORDER — SODIUM CHLORIDE, SODIUM LACTATE, POTASSIUM CHLORIDE, CALCIUM CHLORIDE 600; 310; 30; 20 MG/100ML; MG/100ML; MG/100ML; MG/100ML
100 INJECTION, SOLUTION INTRAVENOUS CONTINUOUS
Status: DISCONTINUED | OUTPATIENT
Start: 2023-10-28 | End: 2023-10-28

## 2023-10-28 RX ORDER — SODIUM CHLORIDE, SODIUM LACTATE, POTASSIUM CHLORIDE, CALCIUM CHLORIDE 600; 310; 30; 20 MG/100ML; MG/100ML; MG/100ML; MG/100ML
75 INJECTION, SOLUTION INTRAVENOUS CONTINUOUS
Status: DISCONTINUED | OUTPATIENT
Start: 2023-10-28 | End: 2023-10-28

## 2023-10-28 RX ORDER — OXYCODONE HYDROCHLORIDE 10 MG/1
10 TABLET ORAL
Status: DISCONTINUED | OUTPATIENT
Start: 2023-10-28 | End: 2023-10-31

## 2023-10-28 RX ADMIN — HYDROMORPHONE HYDROCHLORIDE 0.5 MG: 1 INJECTION, SOLUTION INTRAMUSCULAR; INTRAVENOUS; SUBCUTANEOUS at 21:48

## 2023-10-28 RX ADMIN — ENOXAPARIN SODIUM 40 MG: 40 INJECTION SUBCUTANEOUS at 17:09

## 2023-10-28 RX ADMIN — KETAMINE HYDROCHLORIDE 0.1 MG/KG/HR: 50 INJECTION INTRAMUSCULAR; INTRAVENOUS at 14:46

## 2023-10-28 RX ADMIN — POTASSIUM CHLORIDE 40 MEQ: 1500 TABLET, EXTENDED RELEASE ORAL at 11:57

## 2023-10-28 RX ADMIN — SENNOSIDES, DOCUSATE SODIUM 1 TABLET: 8.6; 5 TABLET ORAL at 21:41

## 2023-10-28 RX ADMIN — OXYCODONE HYDROCHLORIDE 10 MG: 10 TABLET ORAL at 20:17

## 2023-10-28 RX ADMIN — ACETAMINOPHEN 975 MG: 325 TABLET, FILM COATED ORAL at 21:40

## 2023-10-28 RX ADMIN — ATORVASTATIN CALCIUM 80 MG: 80 TABLET, FILM COATED ORAL at 17:09

## 2023-10-28 RX ADMIN — NICOTINE 21 MG: 21 PATCH, EXTENDED RELEASE TRANSDERMAL at 08:50

## 2023-10-28 RX ADMIN — HYDROMORPHONE HYDROCHLORIDE 0.5 MG: 1 INJECTION, SOLUTION INTRAMUSCULAR; INTRAVENOUS; SUBCUTANEOUS at 13:47

## 2023-10-28 RX ADMIN — UMECLIDINIUM BROMIDE AND VILANTEROL TRIFENATATE 1 PUFF: 62.5; 25 POWDER RESPIRATORY (INHALATION) at 08:50

## 2023-10-28 RX ADMIN — SODIUM CHLORIDE 75 ML/HR: 0.9 INJECTION, SOLUTION INTRAVENOUS at 14:46

## 2023-10-28 RX ADMIN — LATANOPROST 1 DROP: 50 SOLUTION OPHTHALMIC at 21:42

## 2023-10-28 RX ADMIN — ENOXAPARIN SODIUM 40 MG: 40 INJECTION SUBCUTANEOUS at 05:36

## 2023-10-28 RX ADMIN — OXYCODONE HYDROCHLORIDE 10 MG: 10 TABLET ORAL at 08:50

## 2023-10-28 RX ADMIN — CLONAZEPAM 0.5 MG: 0.5 TABLET ORAL at 21:41

## 2023-10-28 RX ADMIN — OXYCODONE HYDROCHLORIDE 10 MG: 10 TABLET ORAL at 11:57

## 2023-10-28 RX ADMIN — SODIUM CHLORIDE, SODIUM LACTATE, POTASSIUM CHLORIDE, AND CALCIUM CHLORIDE 75 ML/HR: .6; .31; .03; .02 INJECTION, SOLUTION INTRAVENOUS at 11:47

## 2023-10-28 RX ADMIN — LIDOCAINE 5% 1 PATCH: 700 PATCH TOPICAL at 08:50

## 2023-10-28 RX ADMIN — ACETAMINOPHEN 975 MG: 325 TABLET, FILM COATED ORAL at 13:47

## 2023-10-28 RX ADMIN — SODIUM CHLORIDE, SODIUM LACTATE, POTASSIUM CHLORIDE, AND CALCIUM CHLORIDE 150 ML/HR: .6; .31; .03; .02 INJECTION, SOLUTION INTRAVENOUS at 05:39

## 2023-10-28 RX ADMIN — ACETAMINOPHEN 975 MG: 325 TABLET, FILM COATED ORAL at 05:36

## 2023-10-28 NOTE — PLAN OF CARE
Problem: HEMATOLOGIC - ADULT  Goal: Maintains hematologic stability  Description: INTERVENTIONS  - Assess for signs and symptoms of bleeding or hemorrhage  - Monitor labs  - Administer supportive blood products/factors as ordered and appropriate  Outcome: Progressing     Problem: MUSCULOSKELETAL - ADULT  Goal: Maintain or return mobility to safest level of function  Description: INTERVENTIONS:  - Assess patient's ability to carry out ADLs; assess patient's baseline for ADL function and identify physical deficits which impact ability to perform ADLs (bathing, care of mouth/teeth, toileting, grooming, dressing, etc.)  - Assess/evaluate cause of self-care deficits   - Assess range of motion  - Assess patient's mobility  - Assess patient's need for assistive devices and provide as appropriate  - Encourage maximum independence but intervene and supervise when necessary  - Involve family in performance of ADLs  - Assess for home care needs following discharge   - Consider OT consult to assist with ADL evaluation and planning for discharge  - Provide patient education as appropriate  Outcome: Progressing  Goal: Maintain proper alignment of affected body part  Description: INTERVENTIONS:  - Support, maintain and protect limb and body alignment  - Provide patient/ family with appropriate education  Outcome: Progressing     Problem: PAIN - ADULT  Goal: Verbalizes/displays adequate comfort level or baseline comfort level  Description: Interventions:  - Encourage patient to monitor pain and request assistance  - Assess pain using appropriate pain scale  - Administer analgesics based on type and severity of pain and evaluate response  - Implement non-pharmacological measures as appropriate and evaluate response  - Consider cultural and social influences on pain and pain management  - Notify physician/advanced practitioner if interventions unsuccessful or patient reports new pain  Outcome: Progressing     Problem: INFECTION - ADULT  Goal: Absence or prevention of progression during hospitalization  Description: INTERVENTIONS:  - Assess and monitor for signs and symptoms of infection  - Monitor lab/diagnostic results  - Monitor all insertion sites, i.e. indwelling lines, tubes, and drains  - Monitor endotracheal if appropriate and nasal secretions for changes in amount and color  - [place] appropriate cooling/warming therapies per order  - Administer medications as ordered  - Instruct and encourage patient and family to use good hand hygiene technique  - Identify and instruct in appropriate isolation precautions for identified infection/condition  Outcome: Progressing     Problem: SAFETY ADULT  Goal: Patient will remain free of falls  Description: INTERVENTIONS:  - Educate patient/family on patient safety including physical limitations  - Instruct patient to call for assistance with activity   - Consult OT/PT to assist with strengthening/mobility   - Keep Call bell within reach  - Keep bed low and locked with side rails adjusted as appropriate  - Keep care items and personal belongings within reach  - Initiate and maintain comfort rounds  - Make Fall Risk Sign visible to staff  - Offer Toileting every  Hours, in advance of need  - Initiate/Maintain alarm  - Obtain necessary fall risk management equipment:   - Apply yellow socks and bracelet for high fall risk patients  - Consider moving patient to room near nurses station  Outcome: Progressing  Goal: Maintain or return to baseline ADL function  Description: INTERVENTIONS:  -  Assess patient's ability to carry out ADLs; assess patient's baseline for ADL function and identify physical deficits which impact ability to perform ADLs (bathing, care of mouth/teeth, toileting, grooming, dressing, etc.)  - Assess/evaluate cause of self-care deficits   - Assess range of motion  - Assess patient's mobility; develop plan if impaired  - Assess patient's need for assistive devices and provide as appropriate  - Encourage maximum independence but intervene and supervise when necessary  - Involve family in performance of ADLs  - Assess for home care needs following discharge   - Consider OT consult to assist with ADL evaluation and planning for discharge  - Provide patient education as appropriate  Outcome: Progressing  Goal: Maintains/Returns to pre admission functional level  Description: INTERVENTIONS:  - Perform BMAT or MOVE assessment daily.   - Set and communicate daily mobility goal to care team and patient/family/caregiver. - Collaborate with rehabilitation services on mobility goals if consulted  - Perform Range of Motion  times a day. - Reposition patient every  hours. - Dangle patient  times a day  - Stand patient  times a day  - Ambulate patient  times a day  - Out of bed to chair  times a day   - Out of bed for meals  times a day  - Out of bed for toileting  - Record patient progress and toleration of activity level   Outcome: Progressing     Problem: DISCHARGE PLANNING  Goal: Discharge to home or other facility with appropriate resources  Description: INTERVENTIONS:  - Identify barriers to discharge w/patient and caregiver  - Arrange for needed discharge resources and transportation as appropriate  - Identify discharge learning needs (meds, wound care, etc.)  - Arrange for interpretive services to assist at discharge as needed  - Refer to Case Management Department for coordinating discharge planning if the patient needs post-hospital services based on physician/advanced practitioner order or complex needs related to functional status, cognitive ability, or social support system  Outcome: Progressing     Problem: Knowledge Deficit  Goal: Patient/family/caregiver demonstrates understanding of disease process, treatment plan, medications, and discharge instructions  Description: Complete learning assessment and assess knowledge base.   Interventions:  - Provide teaching at level of understanding  - Provide teaching via preferred learning methods  Outcome: Progressing     Problem: MOBILITY - ADULT  Goal: Maintain or return to baseline ADL function  Description: INTERVENTIONS:  -  Assess patient's ability to carry out ADLs; assess patient's baseline for ADL function and identify physical deficits which impact ability to perform ADLs (bathing, care of mouth/teeth, toileting, grooming, dressing, etc.)  - Assess/evaluate cause of self-care deficits   - Assess range of motion  - Assess patient's mobility; develop plan if impaired  - Assess patient's need for assistive devices and provide as appropriate  - Encourage maximum independence but intervene and supervise when necessary  - Involve family in performance of ADLs  - Assess for home care needs following discharge   - Consider OT consult to assist with ADL evaluation and planning for discharge  - Provide patient education as appropriate  Outcome: Progressing  Goal: Maintains/Returns to pre admission functional level  Description: INTERVENTIONS:  - Perform BMAT or MOVE assessment daily.   - Set and communicate daily mobility goal to care team and patient/family/caregiver. - Collaborate with rehabilitation services on mobility goals if consulted  - Perform Range of Motion  times a day. - Reposition patient every  hours.   - Dangle patient  times a day  - Stand patient  times a day  - Ambulate patient  times a day  - Out of bed to chair  times a day   - Out of bed for meal times a day  - Out of bed for toileting  - Record patient progress and toleration of activity level   Outcome: Progressing     Problem: Prexisting or High Potential for Compromised Skin Integrity  Goal: Skin integrity is maintained or improved  Description: INTERVENTIONS:  - Identify patients at risk for skin breakdown  - Assess and monitor skin integrity  - Assess and monitor nutrition and hydration status  - Monitor labs   - Assess for incontinence   - Turn and reposition patient  - Assist with mobility/ambulation  - Relieve pressure over bony prominences  - Avoid friction and shearing  - Provide appropriate hygiene as needed including keeping skin clean and dry  - Evaluate need for skin moisturizer/barrier cream  - Collaborate with interdisciplinary team   - Patient/family teaching  - Consider wound care consult   Outcome: Progressing
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
## 2023-10-28 NOTE — PLAN OF CARE
Problem: OCCUPATIONAL THERAPY ADULT  Goal: Performs self-care activities at highest level of function for planned discharge setting. See evaluation for individualized goals. Description: Treatment Interventions: ADL retraining, Functional transfer training, UE strengthening/ROM, Endurance training, Cognitive reorientation, Patient/family training, Equipment evaluation/education, Compensatory technique education, Fine motor coordination activities, Continued evaluation, Activityengagement, Energy conservation          See flowsheet documentation for full assessment, interventions and recommendations. Note: Limitation: Decreased ADL status, Decreased UE ROM, Decreased UE strength, Decreased Safe judgement during ADL, Decreased cognition, Decreased endurance, Decreased sensation, Decreased fine motor control, Decreased high-level ADLs, Decreased self-care trans  Prognosis: Fair  Assessment: Pt is a 65 y.o. female admitted to [facility] on 10/27/2023 w/ fall + RUE compartment syndrome. Pt now s/p RUE fasciotomy and wound vac placement. 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 Pt with active OT orders and  up in chair  orders. Pt seen as a co-evaluation with PT due to the patient's co-morbidities, clinically unstable presentation/clinical complexity, and present impairments. As per pt report, pta, resides alone in a 1STA, 2STE. Pt required A w/  ADLS and IADLS, (+) drove. Upon evaluation, pt currently requires MOD A x 2 for transfers and mobility.  Essentially non-functional RUE at this time 2* poor ROM and increased sensitivity/pain. Pt currently requires MOD A eating, MOD A grooming, MAX A UB ADLs, DEP LB ADLs, and MAX A toileting. Pt is limited at this time 2*: pain, endurance, activity tolerance, functional mobility, balance, functional standing tolerance, decreased I w/ ADLS/IADLS, strength, ROM, and impaired fine motor skills. The following Occupational Performance Areas to address include: eating, grooming, bathing/shower, toilet hygiene, dressing, health maintenance, functional mobility, community mobility, and clothing management. Pt to benefit from immediate acute skilled OT to address above deficits, improve overall functional independence, maximize fnxl mobility and reduce caregiver burden. From OT standpoint, recommendation at time of d/c would be STR. Pt was left in chair after session with alarm on and all current needs met. Folded wash cloths placed under R wrist in chair to promote proper positioning of wrist and hand. The patient's raw score on the AM-PAC Daily Activity Inpatient Short Form is 11. A raw score of less than 19 suggests the patient may benefit from discharge to post-acute rehabilitation services. Please refer to the recommendation of the Occupational Therapist for safe discharge planning.      OT Discharge Recommendation: Post acute rehabilitation services

## 2023-10-28 NOTE — PLAN OF CARE
Problem: HEMATOLOGIC - ADULT  Goal: Maintains hematologic stability  Description: INTERVENTIONS  - Assess for signs and symptoms of bleeding or hemorrhage  - Monitor labs  - Administer supportive blood products/factors as ordered and appropriate  Outcome: Progressing     Problem: MUSCULOSKELETAL - ADULT  Goal: Maintain or return mobility to safest level of function  Description: INTERVENTIONS:  - Assess patient's ability to carry out ADLs; assess patient's baseline for ADL function and identify physical deficits which impact ability to perform ADLs (bathing, care of mouth/teeth, toileting, grooming, dressing, etc.)  - Assess/evaluate cause of self-care deficits   - Assess range of motion  - Assess patient's mobility  - Assess patient's need for assistive devices and provide as appropriate  - Encourage maximum independence but intervene and supervise when necessary  - Involve family in performance of ADLs  - Assess for home care needs following discharge   - Consider OT consult to assist with ADL evaluation and planning for discharge  - Provide patient education as appropriate  Outcome: Progressing  Goal: Maintain proper alignment of affected body part  Description: INTERVENTIONS:  - Support, maintain and protect limb and body alignment  - Provide patient/ family with appropriate education  Outcome: Progressing     Problem: PAIN - ADULT  Goal: Verbalizes/displays adequate comfort level or baseline comfort level  Description: Interventions:  - Encourage patient to monitor pain and request assistance  - Assess pain using appropriate pain scale  - Administer analgesics based on type and severity of pain and evaluate response  - Implement non-pharmacological measures as appropriate and evaluate response  - Consider cultural and social influences on pain and pain management  - Notify physician/advanced practitioner if interventions unsuccessful or patient reports new pain  Outcome: Progressing     Problem: INFECTION - ADULT  Goal: Absence or prevention of progression during hospitalization  Description: INTERVENTIONS:  - Assess and monitor for signs and symptoms of infection  - Monitor lab/diagnostic results  - Monitor all insertion sites, i.e. indwelling lines, tubes, and drains  - Monitor endotracheal if appropriate and nasal secretions for changes in amount and color  - Reading appropriate cooling/warming therapies per order  - Administer medications as ordered  - Instruct and encourage patient and family to use good hand hygiene technique  - Identify and instruct in appropriate isolation precautions for identified infection/condition  Outcome: Progressing     Problem: SAFETY ADULT  Goal: Patient will remain free of falls  Description: INTERVENTIONS:  - Educate patient/family on patient safety including physical limitations  - Instruct patient to call for assistance with activity   - Consult OT/PT to assist with strengthening/mobility   - Keep Call bell within reach  - Keep bed low and locked with side rails adjusted as appropriate  - Keep care items and personal belongings within reach  - Initiate and maintain comfort rounds  - Apply yellow socks and bracelet for high fall risk patients  - Consider moving patient to room near nurses station  Outcome: Progressing  Goal: Maintain or return to baseline ADL function  Description: INTERVENTIONS:  -  Assess patient's ability to carry out ADLs; assess patient's baseline for ADL function and identify physical deficits which impact ability to perform ADLs (bathing, care of mouth/teeth, toileting, grooming, dressing, etc.)  - Assess/evaluate cause of self-care deficits   - Assess range of motion  - Assess patient's mobility; develop plan if impaired  - Assess patient's need for assistive devices and provide as appropriate  - Encourage maximum independence but intervene and supervise when necessary  - Involve family in performance of ADLs  - Assess for home care needs following discharge   - Consider OT consult to assist with ADL evaluation and planning for discharge  - Provide patient education as appropriate  Outcome: Progressing  Goal: Maintains/Returns to pre admission functional level  Description: INTERVENTIONS:  - Perform BMAT or MOVE assessment daily.   - Set and communicate daily mobility goal to care team and patient/family/caregiver. - Collaborate with rehabilitation services on mobility goals if consulted  - Perform Range of Motion 3 times a day. - Reposition patient every 2 hours. - Dangle patient 3 times a day  - Stand patient 2 times a day  - Ambulate patient 3 times a day  - Out of bed to chair 3 times a day   - Out of bed for meals 3 times a day  - Out of bed for toileting  - Record patient progress and toleration of activity level   Outcome: Progressing     Problem: DISCHARGE PLANNING  Goal: Discharge to home or other facility with appropriate resources  Description: INTERVENTIONS:  - Identify barriers to discharge w/patient and caregiver  - Arrange for needed discharge resources and transportation as appropriate  - Identify discharge learning needs (meds, wound care, etc.)  - Arrange for interpretive services to assist at discharge as needed  - Refer to Case Management Department for coordinating discharge planning if the patient needs post-hospital services based on physician/advanced practitioner order or complex needs related to functional status, cognitive ability, or social support system  Outcome: Progressing     Problem: Knowledge Deficit  Goal: Patient/family/caregiver demonstrates understanding of disease process, treatment plan, medications, and discharge instructions  Description: Complete learning assessment and assess knowledge base.   Interventions:  - Provide teaching at level of understanding  - Provide teaching via preferred learning methods  Outcome: Progressing

## 2023-10-28 NOTE — ASSESSMENT & PLAN NOTE
Patient presented with Crush injury to RUE  - initial CK 5942 increased to 13,683  - LR bolus given and placed on cont at 125 ml/hr  - Serial exam: increased paresthesia to 4 digits on Rt Hand  - Increased swelling to Dorsal aspect of forearm  -  artery via doppler +  - 10/27 OR for fasciotomy due to compartment syndrome  - S/P RUE fasciotomy and VAC dressing placement  - Trend CK 9 down to 5998 today  - Continue IVF at 75 cc'hr and encourage PO hydration  - AM LAbs

## 2023-10-28 NOTE — PROGRESS NOTES
Pastoral Care Progress Note    10/28/2023  Patient: Crystal Gale : 1960  Admission Date & Time: 10/27/2023 0342  MRN: [id number] CSN: [id number]        Attempted visit. Pt was sleeping. Left card and will follow up. Will pass on to department.

## 2023-10-28 NOTE — PLAN OF CARE
Problem: PHYSICAL THERAPY ADULT  Goal: Performs mobility at highest level of function for planned discharge setting. See evaluation for individualized goals. Description: Treatment/Interventions: Elevations, Functional transfer training, LE strengthening/ROM, Therapeutic exercise, Endurance training, Equipment eval/education, Bed mobility, Gait training, Spoke to nursing, OT  Equipment Recommended: Other (Comment) (TBD)       See flowsheet documentation for full assessment, interventions and recommendations. Note: Prognosis: Fair  Problem List: Decreased strength, Decreased range of motion, Decreased endurance, Impaired balance, Decreased skin integrity, Pain  Assessment: Pt is 65 y.o. female admitted with hx of fall and Dx of traumatic compartment syndrome of right upper extremity, initial encounter; pt underwent Right - FASCIOTOMY OF RIGHT UPPER EXTREMITY; POSSIBLE VAC DRESSING APPLICATION on 92/10/3109. 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 Pt's clinical presentation is currently unstable/unpredictable which is evident in ongoing telem monitoring, abn lab values, and need for mod (A)x2 w/ all phases of mobility when usually mobilizing independently. Pt presents w/ post op guarding, decreased (R) UE AROM, overall weakness, decreased (R) > (L) LE strength, decreased functional endurance, decreased activity tolerance, impaired balance, gait deviations (a few steps at bedside), and fall risk.  Will cont to follow pt in PT for progressive mobilization to address above functional deficits and to max level of (I), endurance, and safety. Currently recommend rehab upon D/C. Will cont to follow until then. Barriers to Discharge: Inaccessible home environment, Decreased caregiver support     PT Discharge Recommendation: Post acute rehabilitation services    See flowsheet documentation for full assessment.

## 2023-10-28 NOTE — UTILIZATION REVIEW
Initial Clinical Review    Admission: Date/Time/Statement:   Admission Orders (From admission, onward)       Ordered        10/27/23 0446  Inpatient Admission  Once                          Orders Placed This Encounter   Procedures    Inpatient Admission     Standing Status:   Standing     Number of Occurrences:   1     Order Specific Question:   Level of Care     Answer:   Level 2 Stepdown / HOT [14]     Order Specific Question:   Estimated length of stay     Answer:   More than 2 Midnights     Order Specific Question:   Certification     Answer:   I certify that inpatient services are medically necessary for this patient for a duration of greater than two midnights. See H&P and MD Progress Notes for additional information about the patient's course of treatment. ED Arrival Information       Expected   10/27/2023     Arrival   10/27/2023 03:42    Acuity   Urgent              Means of arrival   Helicopter    Escorted by   [address]    Admission type   Emergency              Arrival complaint   Fractured Radius             Chief Complaint   Patient presents with    Trauma     TT from miners. Radius frax       Initial Presentation: 65 y.o. female presents to [facility] via EMS from [facility] ED for higher level of care. Pt was found down by family with altered mental status.  Concern for RUE compartment syndrome Exam notes appearance disheveled disoriented  contusion right forehead, RUE is splinted with swelling and firmness distal forearm, overlying rubor, decreased sensation + tenderness seems proportional. Tenderness at neck, back, bilat UE , bilat LE's  Skin dry, RLQ abdominal tenderness  CK 5942 EKG troponin wnl  Admitted as inpatient to trauma service stepdown level of care due to fall, with unknown down time r/o rhabdomyolysis, r/o compartment syndrome Plan IV fluid Compartment checks, Urine drug screen, Check Xrays, trend CK     10/27 Worsening physical exam RUE c/w compartment syndrome Plan urgent operative intervention RUE Xray no osseous abnormality right forearm, right hand, right elbow     10/27 OR     Procedure(s):  Right - FASCIOTOMY OF RIGHT UPPER EXTREMITY; POSSIBLE VAC DRESSING APPLICATION   Anesthesia General ASA 3E    Operative Findings:  Compartment syndrome of right upper extremity       Date:  10/28    Day 2:     Volar forearm is soft. Stable erythema to forearm generally. Hand numbness which had drastically worsened pre-op is reportedly markedly improved according to patient. While her  is limited, she is able to strongly resist finger flexion of all digits on right hand when her partial  is opposed. Palpable DP pulse with warm perfused hand. Continue to monitor, CK down trending     Date: 10/29     Day 3: Has surpassed a 2nd midnight with active treatments and services, which include return to OR for . CHANGE DRESSING/VAC RIGHT UPPER EXTREMITY; WASHOUT; PARTIAL CLOSURE       ED Triage Vitals   Temperature Pulse Respirations Blood Pressure SpO2   10/27/23 0400 10/27/23 0345 10/27/23 0345 10/27/23 0345 10/27/23 0345   (!) 97.4 °F (36.3 °C) 60 20 121/56 96 %      Temp Source Heart Rate Source Patient Position - Orthostatic VS BP Location FiO2 (%)   10/27/23 0400 10/27/23 0345 10/27/23 0345 10/27/23 0615 --   Oral Monitor Lying Left arm       Pain Score       10/27/23 0941       10 - Worst Possible Pain          Wt Readings from Last 1 Encounters:   10/27/23 80.7 kg (178 lb)     Additional Vital Signs:      Date/Time Temp Pulse Resp BP MAP (mmHg) SpO2 O2 Flow Rate (L/min) O2 Device Cardiac (WDL) Patient Position - Orthostatic VS   10/29/23 1107 98.4 °F (36.9 °C) 67 15 128/87 -- 100 % -- None (Room air) -- --   10/29/23 07:36:10 98 °F (36.7 °C) 66 17 123/76 92 99 % -- None (Room air) -- Lying   10/29/23 0505 -- 60 -- 126/82 97 97 % -- -- -- --   10/28/23 22:09:08 98.1 °F (36.7 °C) 69 16 -- -- 96 % -- -- -- --   10/28/23 20:18:49 98.1 °F (36.7 °C) 73 16 -- -- 95 % -- -- -- --   10/28/23 19:02:55 98.1 °F (36.7 °C) 72 16 121/78 92 95 % -- -- -- --   10/28/23 18:01:31 -- 73 -- 120/79 93 96 % -- -- -- --   10/28/23 17:02:41 97.9 °F (36.6 °C) 68 -- 126/76 93 96 % -- -- -- --   10/28/23 1525 -- 68 -- 121/74 90 96 % -- -- -- --   10/28/23 1455 -- 69 -- 118/71 87 96 % -- -- -- --   10/28/23 14:17:29 98 °F (36.7 °C) 65 -- 114/69 84 96 % -- -- -- --   10/28/23 1417 98 °F (36.7 °C) 67 -- 114/69 84 96 % -- -- -- --   10/28/23 11:46:07 98.2 °F (36.8 °C) 67 -- 134/86 102 96 % -- -- -- --   10/28/23 07:47:56 98.1 °F (36.7 °C) 65 -- 117/73 88 96 % -- -- -- --   10/28/23 02:41:57 99.5 °F (37.5 °C) 66 16 114/69 84 98 % -- -- -- --   10/27/23 22:51:42 98 °F (36.7 °C) 71 18 111/65 80 97 % -- -- -- --   10/27/23 19:54:10 98.2 °F (36.8 °C) 73 16 -- -- 95 % -- -- -- --   10/27/23 1840 -- 75 -- 121/72 88 95 % -- -- --      Pertinent Labs/Diagnostic Test Results:       No orders to display         Results from last 7 days   Lab Units 10/27/23  0458 10/27/23  0011   WBC Thousand/uL 10.79* 9.58   HEMOGLOBIN g/dL 13.9 14.0   HEMATOCRIT % 40.6 43.3   PLATELETS Thousands/uL 150 206   NEUTROS ABS Thousands/µL 8.68* 8.15*         Results from last 7 days   Lab Units 10/27/23  1536 10/27/23  0458 10/27/23  0011   SODIUM mmol/L 142 140 137   POTASSIUM mmol/L 3.8 3.7 4.2   CHLORIDE mmol/L 111* 110* 105   CO2 mmol/L 24 21 21   ANION GAP mmol/L 7 9 11   BUN mg/dL 12 15 20   CREATININE mg/dL 0.60 0.70 0.87   EGFR ml/min/1.73sq m 98 93 72   CALCIUM mg/dL 8.0* 7.7* 9.0   MAGNESIUM mg/dL  --  1.9  --    PHOSPHORUS mg/dL  --  4.2*  --      Results from last 7 days   Lab Units 10/27/23  0458 10/27/23  0011   AST U/L 145* 76*   ALT U/L 63* 47   ALK PHOS U/L 66 69   TOTAL PROTEIN g/dL 6.1* 7.3   ALBUMIN g/dL 3.6 4.4   TOTAL BILIRUBIN mg/dL 0.33 0.33     Results from last 7 days   Lab Units 10/27/23  1104   POC GLUCOSE mg/dl 83     Results from last 7 days   Lab Units 10/27/23  1536 10/27/23  0458 10/27/23  0011   GLUCOSE RANDOM mg/dL 118 97 140             No results found for: "BETA-HYDROXYBUTYRATE"               Results from last 7 days   Lab Units 10/27/23  1536 10/27/23  0458 10/27/23  0011   CK TOTAL U/L 11,878* 13,683* 5,942*     Results from last 7 days   Lab Units 10/27/23  0011   HS TNI 0HR ng/L 2             Results from last 7 days   Lab Units 10/27/23  0011   TSH 3RD GENERATON uIU/mL 0.123*                                                                     Results from last 7 days   Lab Units 10/27/23  0804   AMPH/METH  Negative   BARBITURATE UR  Positive*   BENZODIAZEPINE UR  Negative   COCAINE UR  Negative   METHADONE URINE  Negative   OPIATE UR  Positive*   PCP UR  Negative   THC UR  Negative     Results from last 7 days   Lab Units 10/27/23  0011   ETHANOL LVL mg/dL <10   ACETAMINOPHEN LVL ug/mL <03*   SALICYLATE LVL mg/dL <5                                   ED Treatment:   Medication Administration from 10/27/2023 0233 to 10/27/2023 0933         Date/Time Order Dose Route Action Comments     10/27/2023 0458 EDT lactated ringers bolus 1,000 mL 1,000 mL Intravenous New Bag --     10/27/2023 0620 EDT lactated ringers infusion 125 mL/hr Intravenous New Bag --     10/27/2023 0933 EDT nicotine (NICODERM CQ) 14 mg/24hr TD 24 hr patch 1 patch -- Transdermal MAR Hold --     10/27/2023 0841 EDT nicotine (NICODERM CQ) 14 mg/24hr TD 24 hr patch 1 patch 1 patch Transdermal Medication Applied --     10/27/2023 0933 EDT enoxaparin (LOVENOX) subcutaneous injection 40 mg -- Subcutaneous MAR Hold --     10/27/2023 0933 EDT acetaminophen (TYLENOL) tablet 650 mg -- Oral MAR Hold --     10/27/2023 0933 EDT oxyCODONE (ROXICODONE) IR tablet 5 mg -- Oral MAR Hold --     10/27/2023 0933 EDT oxyCODONE (ROXICODONE) immediate release tablet 10 mg -- Oral MAR Hold --     10/27/2023 0933 EDT senna-docusate sodium (SENOKOT S) 8.6-50 mg per tablet 1 tablet -- Oral MAR Hold --     10/27/2023 0933 EDT ondansetron (ZOFRAN) injection 4 mg -- Intravenous MAR Hold --     10/27/2023 0933 EDT albuterol (PROVENTIL HFA,VENTOLIN HFA) inhaler 2 puff -- Inhalation MAR Hold --     10/27/2023 0933 EDT atorvastatin (LIPITOR) tablet 80 mg -- Oral MAR Hold --     10/27/2023 0933 EDT clonazePAM (KlonoPIN) tablet 0.5 mg -- Oral MAR Hold --     10/27/2023 0933 EDT bupivacaine liposomal (EXPAREL) 1.3 % injection 20 mL -- Infiltration MAR Hold --          Past Medical History:   Diagnosis Date    Ambulates with cane     Anxiety     Arthritis     Asthma     Bipolar 1 disorder ([facility])     Brain aneurysm     Chronic pain disorder     spinal stenosis    Concussion syndrome     neurological rx and balance rx    COPD (chronic obstructive pulmonary disease) ([facility])     Depression     Diabetes mellitus ([address])     controlled w/ diet    Disease of thyroid gland     nodules     Family history of colon cancer     father    Fibromyalgia, primary     GERD (gastroesophageal reflux disease)     History of colon polyps     Hyperlipidemia     Hypertension     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     Irritable bowel syndrome     Lumbar degenerative disc disease 02/16/2022    Migraine     Neuropathy     bilateral feet and hands    Peripheral neuropathy     Psychiatric disorder     PTSD (post-traumatic stress disorder)     Shortness of breath     Sleep apnea     had 3 studies & last one negative    Stroke ([address])     2012 no deficeits    TIA (transient ischemic attack)     Wears dentures     Wears glasses      Present on Admission:   Chronic pain   Type 2 diabetes mellitus ([address])   Hypothyroid      Admitting Diagnosis: Chronic pain syndrome [G89.4]  Fall, initial encounter [W19. XXXA]  Traumatic compartment syndrome of right upper extremity, initial encounter ([address]) Rosa Weldon. A11A]  Age/Sex: 65 y.o. female  Admission Orders:  Scheduled Medications:  acetaminophen, 975 mg, Oral, Q8H SNOW  atorvastatin, 80 mg, Oral, QPM  clonazePAM, 0.5 mg, Oral, HS  enoxaparin, 40 mg, Subcutaneous, Q12H  latanoprost, 1 drop, Both Eyes, HS  lidocaine, 1 patch, Topical, Daily  nicotine, 21 mg, Transdermal, Daily  senna-docusate sodium, 1 tablet, Oral, HS  umeclidinium-vilanterol, 1 puff, Inhalation, Daily      Continuous IV Infusions:  lactated ringers, 100 mL/hr, Intravenous, Continuous      PRN Meds:  albuterol, 2 puff, Inhalation, Q4H PRN  HYDROmorphone, 0.5 mg, Intravenous, Q3H PRN IV x 2 10/27, IV x 2 10/28   ondansetron, 4 mg, Intravenous, Q4H PRN  oxyCODONE, 5 mg, Oral, Q4H PRN   Or  oxyCODONE, 10 mg, Oral, Q4H PRN  PO x 7   polyethylene glycol, 17 g, Oral, Daily PRN  tiZANidine, 4 mg, Oral, Q6H PRN        IP CONSULT TO CASE MANAGEMENT  IP CONSULT TO ACUTE PAIN SERVICE  IP CONSULT TO ACUTE PAIN SERVICE    Network Utilization Review Department  ATTENTION: Please call with any questions or concerns to [phone] and carefully listen to the prompts so that you are directed to the right person. All voicemails are confidential.   For Discharge needs, contact Care Management DC Support Team at [phone] opt. 2  Send all requests for admission clinical reviews, approved or denied determinations and any other requests to dedicated fax number below belonging to the campus where the patient is receiving treatment.  List of dedicated fax numbers for the Facilities:  [place] DENIALS (Administrative/Medical Necessity) [phone]   DISCHARGE SUPPORT TEAM (NETWORK) [id number] (Maternity/NICU/Pediatrics) [phone]   [address] [facility] [address] [facility] [phone] 150[address] [facility] [address] [place] [address]   [id number] [place] [address] [facility] [address] [facility]  [address]  [phone]

## 2023-10-28 NOTE — ASSESSMENT & PLAN NOTE
Patient with chronic patient Fibromyalgia, chronic LLB pain, Chronic pain syndrome, peripheral neuropathy  - Consult APS  - Post op block  - Ketamine gtt recommended today

## 2023-10-29 ENCOUNTER — ANESTHESIA (INPATIENT)
Dept: PERIOP | Facility: HOSPITAL | Age: 63
End: 2023-10-29
Payer: COMMERCIAL

## 2023-10-29 ENCOUNTER — ANESTHESIA EVENT (INPATIENT)
Dept: PERIOP | Facility: HOSPITAL | Age: 63
End: 2023-10-29
Payer: COMMERCIAL

## 2023-10-29 LAB
ANION GAP SERPL CALCULATED.3IONS-SCNC: 6 MMOL/L
BUN SERPL-MCNC: 9 MG/DL (ref 5–25)
CALCIUM SERPL-MCNC: 7.8 MG/DL (ref 8.4–10.2)
CHLORIDE SERPL-SCNC: 114 MMOL/L (ref 96–108)
CK SERPL-CCNC: 4185 U/L (ref 26–192)
CO2 SERPL-SCNC: 24 MMOL/L (ref 21–32)
CREAT SERPL-MCNC: 0.53 MG/DL (ref 0.6–1.3)
ERYTHROCYTE [DISTWIDTH] IN BLOOD BY AUTOMATED COUNT: 13.1 % (ref 11.6–15.1)
GFR SERPL CREATININE-BSD FRML MDRD: 102 ML/MIN/1.73SQ M
GLUCOSE SERPL-MCNC: 82 MG/DL (ref 65–140)
GLUCOSE SERPL-MCNC: 85 MG/DL (ref 65–140)
HCT VFR BLD AUTO: 37.5 % (ref 34.8–46.1)
HGB BLD-MCNC: 12.2 G/DL (ref 11.5–15.4)
MCH RBC QN AUTO: 31.8 PG (ref 26.8–34.3)
MCHC RBC AUTO-ENTMCNC: 32.5 G/DL (ref 31.4–37.4)
MCV RBC AUTO: 98 FL (ref 82–98)
PLATELET # BLD AUTO: 166 THOUSANDS/UL (ref 149–390)
PMV BLD AUTO: 10.8 FL (ref 8.9–12.7)
POTASSIUM SERPL-SCNC: 3.5 MMOL/L (ref 3.5–5.3)
RBC # BLD AUTO: 3.84 MILLION/UL (ref 3.81–5.12)
SODIUM SERPL-SCNC: 144 MMOL/L (ref 135–147)
WBC # BLD AUTO: 7.94 THOUSAND/UL (ref 4.31–10.16)

## 2023-10-29 PROCEDURE — 82550 ASSAY OF CK (CPK): CPT | Performed by: NURSE PRACTITIONER

## 2023-10-29 PROCEDURE — 13160 SEC CLSR SURG WND/DEHSN XTN: CPT | Performed by: SURGERY

## 2023-10-29 PROCEDURE — 0JQG0ZZ REPAIR RIGHT LOWER ARM SUBCUTANEOUS TISSUE AND FASCIA, OPEN APPROACH: ICD-10-PCS | Performed by: SURGERY

## 2023-10-29 PROCEDURE — 2W0CX6Z CHANGE PRESSURE DRESSING ON RIGHT LOWER ARM: ICD-10-PCS | Performed by: SURGERY

## 2023-10-29 PROCEDURE — 85027 COMPLETE CBC AUTOMATED: CPT | Performed by: NURSE PRACTITIONER

## 2023-10-29 PROCEDURE — 99232 SBSQ HOSP IP/OBS MODERATE 35: CPT | Performed by: ANESTHESIOLOGY

## 2023-10-29 PROCEDURE — 82948 REAGENT STRIP/BLOOD GLUCOSE: CPT

## 2023-10-29 PROCEDURE — 80048 BASIC METABOLIC PNL TOTAL CA: CPT | Performed by: NURSE PRACTITIONER

## 2023-10-29 PROCEDURE — 97606 NEG PRS WND THER DME>50 SQCM: CPT | Performed by: SURGERY

## 2023-10-29 PROCEDURE — 99024 POSTOP FOLLOW-UP VISIT: CPT | Performed by: NURSE PRACTITIONER

## 2023-10-29 RX ORDER — PROMETHAZINE HYDROCHLORIDE 25 MG/ML
12.5 INJECTION, SOLUTION INTRAMUSCULAR; INTRAVENOUS ONCE AS NEEDED
Status: DISCONTINUED | OUTPATIENT
Start: 2023-10-29 | End: 2023-10-29 | Stop reason: HOSPADM

## 2023-10-29 RX ORDER — MIDAZOLAM HYDROCHLORIDE 2 MG/2ML
INJECTION, SOLUTION INTRAMUSCULAR; INTRAVENOUS AS NEEDED
Status: DISCONTINUED | OUTPATIENT
Start: 2023-10-29 | End: 2023-10-29

## 2023-10-29 RX ORDER — LABETALOL HYDROCHLORIDE 5 MG/ML
10 INJECTION, SOLUTION INTRAVENOUS
Status: DISCONTINUED | OUTPATIENT
Start: 2023-10-29 | End: 2023-10-29 | Stop reason: HOSPADM

## 2023-10-29 RX ORDER — DEXAMETHASONE SODIUM PHOSPHATE 10 MG/ML
INJECTION, SOLUTION INTRAMUSCULAR; INTRAVENOUS AS NEEDED
Status: DISCONTINUED | OUTPATIENT
Start: 2023-10-29 | End: 2023-10-29

## 2023-10-29 RX ORDER — ONDANSETRON 2 MG/ML
INJECTION INTRAMUSCULAR; INTRAVENOUS AS NEEDED
Status: DISCONTINUED | OUTPATIENT
Start: 2023-10-29 | End: 2023-10-29

## 2023-10-29 RX ORDER — FENTANYL CITRATE 50 UG/ML
INJECTION, SOLUTION INTRAMUSCULAR; INTRAVENOUS AS NEEDED
Status: DISCONTINUED | OUTPATIENT
Start: 2023-10-29 | End: 2023-10-29

## 2023-10-29 RX ORDER — HYDRALAZINE HYDROCHLORIDE 20 MG/ML
5 INJECTION INTRAMUSCULAR; INTRAVENOUS
Status: DISCONTINUED | OUTPATIENT
Start: 2023-10-29 | End: 2023-10-29 | Stop reason: HOSPADM

## 2023-10-29 RX ORDER — LIDOCAINE HYDROCHLORIDE 10 MG/ML
INJECTION, SOLUTION EPIDURAL; INFILTRATION; INTRACAUDAL; PERINEURAL AS NEEDED
Status: DISCONTINUED | OUTPATIENT
Start: 2023-10-29 | End: 2023-10-29

## 2023-10-29 RX ORDER — FENTANYL CITRATE/PF 50 MCG/ML
25 SYRINGE (ML) INJECTION
Status: DISCONTINUED | OUTPATIENT
Start: 2023-10-29 | End: 2023-10-29 | Stop reason: HOSPADM

## 2023-10-29 RX ORDER — MAGNESIUM HYDROXIDE 1200 MG/15ML
LIQUID ORAL AS NEEDED
Status: DISCONTINUED | OUTPATIENT
Start: 2023-10-29 | End: 2023-10-29 | Stop reason: HOSPADM

## 2023-10-29 RX ORDER — ALBUTEROL SULFATE 2.5 MG/3ML
2.5 SOLUTION RESPIRATORY (INHALATION) ONCE AS NEEDED
Status: DISCONTINUED | OUTPATIENT
Start: 2023-10-29 | End: 2023-10-29 | Stop reason: HOSPADM

## 2023-10-29 RX ORDER — HYDROMORPHONE HCL IN WATER/PF 6 MG/30 ML
0.2 PATIENT CONTROLLED ANALGESIA SYRINGE INTRAVENOUS
Status: DISCONTINUED | OUTPATIENT
Start: 2023-10-29 | End: 2023-10-29 | Stop reason: HOSPADM

## 2023-10-29 RX ORDER — CEFAZOLIN SODIUM 1 G/3ML
INJECTION, POWDER, FOR SOLUTION INTRAMUSCULAR; INTRAVENOUS AS NEEDED
Status: DISCONTINUED | OUTPATIENT
Start: 2023-10-29 | End: 2023-10-29

## 2023-10-29 RX ORDER — ONDANSETRON 2 MG/ML
4 INJECTION INTRAMUSCULAR; INTRAVENOUS ONCE AS NEEDED
Status: DISCONTINUED | OUTPATIENT
Start: 2023-10-29 | End: 2023-10-29 | Stop reason: HOSPADM

## 2023-10-29 RX ORDER — METOCLOPRAMIDE HYDROCHLORIDE 5 MG/ML
10 INJECTION INTRAMUSCULAR; INTRAVENOUS ONCE AS NEEDED
Status: DISCONTINUED | OUTPATIENT
Start: 2023-10-29 | End: 2023-10-29 | Stop reason: HOSPADM

## 2023-10-29 RX ORDER — PROPOFOL 10 MG/ML
INJECTION, EMULSION INTRAVENOUS AS NEEDED
Status: DISCONTINUED | OUTPATIENT
Start: 2023-10-29 | End: 2023-10-29

## 2023-10-29 RX ORDER — HYDROMORPHONE HCL/PF 1 MG/ML
0.5 SYRINGE (ML) INJECTION
Status: DISCONTINUED | OUTPATIENT
Start: 2023-10-29 | End: 2023-10-29 | Stop reason: HOSPADM

## 2023-10-29 RX ORDER — SODIUM CHLORIDE, SODIUM LACTATE, POTASSIUM CHLORIDE, CALCIUM CHLORIDE 600; 310; 30; 20 MG/100ML; MG/100ML; MG/100ML; MG/100ML
125 INJECTION, SOLUTION INTRAVENOUS CONTINUOUS
Status: DISCONTINUED | OUTPATIENT
Start: 2023-10-29 | End: 2023-10-30

## 2023-10-29 RX ORDER — KETAMINE HCL IN NACL, ISO-OSM 100MG/10ML
SYRINGE (ML) INJECTION AS NEEDED
Status: DISCONTINUED | OUTPATIENT
Start: 2023-10-29 | End: 2023-10-29

## 2023-10-29 RX ADMIN — PROPOFOL 150 MG: 10 INJECTION, EMULSION INTRAVENOUS at 10:08

## 2023-10-29 RX ADMIN — FENTANYL CITRATE 25 MCG: 50 INJECTION INTRAMUSCULAR; INTRAVENOUS at 10:27

## 2023-10-29 RX ADMIN — OXYCODONE HYDROCHLORIDE 10 MG: 10 TABLET ORAL at 15:03

## 2023-10-29 RX ADMIN — SENNOSIDES, DOCUSATE SODIUM 1 TABLET: 8.6; 5 TABLET ORAL at 21:42

## 2023-10-29 RX ADMIN — ONDANSETRON 4 MG: 2 INJECTION INTRAMUSCULAR; INTRAVENOUS at 10:20

## 2023-10-29 RX ADMIN — NICOTINE 21 MG: 21 PATCH, EXTENDED RELEASE TRANSDERMAL at 08:49

## 2023-10-29 RX ADMIN — LIDOCAINE 5% 1 PATCH: 700 PATCH TOPICAL at 08:49

## 2023-10-29 RX ADMIN — UMECLIDINIUM BROMIDE AND VILANTEROL TRIFENATATE 1 PUFF: 62.5; 25 POWDER RESPIRATORY (INHALATION) at 08:52

## 2023-10-29 RX ADMIN — Medication 50 MG: at 10:08

## 2023-10-29 RX ADMIN — OXYCODONE HYDROCHLORIDE 10 MG: 10 TABLET ORAL at 01:24

## 2023-10-29 RX ADMIN — DEXAMETHASONE SODIUM PHOSPHATE 10 MG: 10 INJECTION, SOLUTION INTRAMUSCULAR; INTRAVENOUS at 10:20

## 2023-10-29 RX ADMIN — OXYCODONE HYDROCHLORIDE 10 MG: 10 TABLET ORAL at 05:47

## 2023-10-29 RX ADMIN — ENOXAPARIN SODIUM 40 MG: 40 INJECTION SUBCUTANEOUS at 05:47

## 2023-10-29 RX ADMIN — HYDROMORPHONE HYDROCHLORIDE 0.5 MG: 1 INJECTION, SOLUTION INTRAMUSCULAR; INTRAVENOUS; SUBCUTANEOUS at 21:46

## 2023-10-29 RX ADMIN — OXYCODONE HYDROCHLORIDE 10 MG: 10 TABLET ORAL at 08:52

## 2023-10-29 RX ADMIN — LATANOPROST 1 DROP: 50 SOLUTION OPHTHALMIC at 21:42

## 2023-10-29 RX ADMIN — ACETAMINOPHEN 975 MG: 325 TABLET, FILM COATED ORAL at 05:48

## 2023-10-29 RX ADMIN — SODIUM CHLORIDE 75 ML/HR: 0.9 INJECTION, SOLUTION INTRAVENOUS at 08:49

## 2023-10-29 RX ADMIN — ENOXAPARIN SODIUM 40 MG: 40 INJECTION SUBCUTANEOUS at 17:38

## 2023-10-29 RX ADMIN — CEFAZOLIN 2000 MG: 1 INJECTION, POWDER, FOR SOLUTION INTRAMUSCULAR; INTRAVENOUS at 10:27

## 2023-10-29 RX ADMIN — KETAMINE HYDROCHLORIDE 0.1 MG/KG/HR: 50 INJECTION INTRAMUSCULAR; INTRAVENOUS at 13:19

## 2023-10-29 RX ADMIN — OXYCODONE HYDROCHLORIDE 10 MG: 10 TABLET ORAL at 20:20

## 2023-10-29 RX ADMIN — ATORVASTATIN CALCIUM 80 MG: 80 TABLET, FILM COATED ORAL at 17:38

## 2023-10-29 RX ADMIN — ACETAMINOPHEN 975 MG: 325 TABLET, FILM COATED ORAL at 13:19

## 2023-10-29 RX ADMIN — MIDAZOLAM 2 MG: 1 INJECTION INTRAMUSCULAR; INTRAVENOUS at 10:00

## 2023-10-29 RX ADMIN — ACETAMINOPHEN 975 MG: 325 TABLET, FILM COATED ORAL at 21:42

## 2023-10-29 RX ADMIN — LIDOCAINE HYDROCHLORIDE 50 MG: 10 INJECTION, SOLUTION EPIDURAL; INFILTRATION; INTRACAUDAL; PERINEURAL at 10:08

## 2023-10-29 RX ADMIN — SODIUM CHLORIDE 75 ML/HR: 0.9 INJECTION, SOLUTION INTRAVENOUS at 21:43

## 2023-10-29 RX ADMIN — CLONAZEPAM 0.5 MG: 0.5 TABLET ORAL at 21:42

## 2023-10-29 RX ADMIN — FENTANYL CITRATE 25 MCG: 50 INJECTION INTRAMUSCULAR; INTRAVENOUS at 10:42

## 2023-10-29 RX ADMIN — FENTANYL CITRATE 25 MCG: 50 INJECTION INTRAMUSCULAR; INTRAVENOUS at 10:08

## 2023-10-29 RX ADMIN — SODIUM CHLORIDE 75 ML/HR: 0.9 INJECTION, SOLUTION INTRAVENOUS at 03:45

## 2023-10-29 RX ADMIN — FENTANYL CITRATE 25 MCG: 50 INJECTION INTRAMUSCULAR; INTRAVENOUS at 10:35

## 2023-10-29 NOTE — ASSESSMENT & PLAN NOTE
Lab Results   Component Value Date    HGBA1C 5.8 (H) 01/19/2023       Recent Labs     10/27/23  1104 10/29/23  1107   POCGLU 83 85         Blood Sugar Average: Last 72 hrs:  (P) 84    Diet controlled  Monitor BG utilize S/S as needed

## 2023-10-29 NOTE — ANESTHESIA POSTPROCEDURE EVALUATION
Post-Op Assessment Note    CV Status:  Stable  Pain Score: 0    Pain management: adequate     Mental Status:  Alert and awake   Hydration Status:  Euvolemic   PONV Controlled:  Controlled   Airway Patency:  Patent      Post Op Vitals Reviewed: Yes      Staff: CRNA         No notable events documented.     /87 (10/29/23 1107)    Temp 98.4 °F (36.9 °C) (10/29/23 1107)    Pulse 67 (10/29/23 1107)   Resp 15 (10/29/23 1107)    SpO2 100 % (10/29/23 1107)

## 2023-10-29 NOTE — CASE MANAGEMENT
Case Management Assessment & Discharge Planning Note    Patient name Alex Fortune  Location [address] [facility] 395-34 MRN [id number]  : 1960 Date 10/29/2023       Current Admission Date: 10/27/2023  Current Admission Diagnosis:Fall   Patient Active Problem List    Diagnosis Date Noted    Fall 10/27/2023    Compartment syndrome ([address]) 10/27/2023    Type 2 diabetes mellitus ([address]) 10/27/2023    Screening for colon cancer 2023    Gastroesophageal reflux disease 2023    Common bile duct dilation 2023    S/P insertion of spinal cord stimulator 2023    Pulmonary nodules/lesions, multiple 2023    Diabetic polyneuropathy associated with type 2 diabetes mellitus ([address]) 2023    Coronary artery calcification seen on CAT scan 2023    Cervical spondylosis 2023    Shortness of breath 2023    Irritable bowel syndrome with both constipation and diarrhea 2022    Chronic pain 2022    Sacroiliitis ([address]) 2022    Carotid artery aneurysm (HCC) 2022    Lumbar degenerative disc disease 2022    Lumbar radiculopathy 2022    Lumbar spondylosis 2022    Cervical disc disorder with radiculopathy of mid-cervical region 2022    Mid back pain 2021    Cervical radiculopathy 2021    Neck pain 2021    Syncope and collapse 2021    Migraine     Bipolar 1 disorder (HCC)     Depression     Chronic pain syndrome     Anxiety     Fibromyalgia, primary     Constipation 2021    Ileus ([address]) 2021    COPD (chronic obstructive pulmonary disease) ([address])     Hypertension     Hypophosphatemia 2021    Gastritis, acute 2021    Hypotension 2021    Tobacco use 2021    Stroke-like symptoms 06/15/2020    Nausea 06/15/2020    Left chest pressure 06/15/2020    TMJ syndrome 2020    Other hyperlipidemia 2019    Dizziness 2019    Hypothyroid 2019    Brain aneurysm 2019    Hypokalemia 07/19/2019    Hypertriglyceridemia 07/19/2019    Low HDL (under 40) 07/19/2019    Elevated TSH 07/19/2019    Tobacco abuse 07/19/2019    Intractable abdominal pain 07/19/2019      LOS (days): 2  Geometric Mean LOS (GMLOS) (days): 3.10  Days to GMLOS:0.7     OBJECTIVE:    Risk of Unplanned Readmission Score: 25.98         Current admission status: Inpatient       Preferred Pharmacy:   [address]  Phone: [phone] Fax: [phone]    Primary Care Provider: Cori Rolle MD    Primary Insurance: [facility] DUAL COMPLETE [facility] HOSPITAL REP  Secondary Insurance: [facility]    ASSESSMENT:  Active Health Care Proxies       [facility] Representative - Child   Primary Phone: [phone] (Mobile)                 Patient Information  Admitted from[de-identified] Home  Mental Status: Alert  During Assessment patient was accompanied by: Sister  Assessment information provided by[de-identified] Patient  Support Systems: Son, Private Caregivers  What city do you live in?: [facility] entry access options.  Select all that apply.: Stairs  Number of steps to enter home.: 2  Type of Current Residence: Apartment  Floor Level: 1  Upon entering residence, is there a bedroom on the main floor (no further steps)?: Yes  Upon entering residence, is there a bathroom on the main floor (no further steps)?: Yes  In the last 12 months, was there a time when you were not able to pay the mortgage or rent on time?: No  In the last 12 months, how many places have you lived?: 1  In the last 12 months, was there a time when you did not have a steady place to sleep or slept in a shelter (including now)?: No  Homeless/housing insecurity resource given?: N/A  Living Arrangements: Lives Alone (Son and GF live on 3rd floor apartment)  Is patient a ?: No    Activities of Daily Living Prior to Admission  Functional Status: Assistance  Completes ADLs independently?: No  Level of ADL dependence: Assistance  Does patient currently own DME?: Yes  What DME does the patient currently own?: Tasneem Christine  Does patient have a history of Outpatient Therapy (PT/OT)?: No  Does the patient have a history of Short-Term Rehab?: No  Does patient have a history of HHC?: Yes ([facility])  Does patient currently have [address]?: Yes    Current Home Health Care  Type of Current Home Care Services: Home health aide  [address][de-identified] Other (please enter name in comment) ([facility])  [address] [employer] Provider[de-identified] PCP    Patient Information Continued  Income Source: Pension/FPC  Does patient have prescription coverage?: Yes  Within the past 12 months, you worried that your food would run out before you got the money to buy more.: Never true  Within the past 12 months, the food you bought just didn't last and you didn't have money to get more.: Never true  Food insecurity resource given?: N/A  Does patient receive dialysis treatments?: No  Does patient have a history of substance abuse?: No  Does patient have a history of Mental Health Diagnosis?: Yes (bipolar, anxiety, depression)  Is patient receiving treatment for mental health?: Yes  Has patient received inpatient treatment related to mental health in the last 2 years?: No    Means of Transportation  Means of Transport to Appts[de-identified] Drives Self  In the past 12 months, has lack of transportation kept you from medical appointments or from getting medications?: No  In the past 12 months, has lack of transportation kept you from meetings, work, or from getting things needed for daily living?: No  Was application for public transport provided?: N/A        DISCHARGE DETAILS:    Discharge planning discussed with[de-identified] Patient  Freedom of Choice: Yes  Comments - Freedom of Choice: Discussed FOC  CM contacted family/caregiver?: Yes  Were Treatment Team discharge recommendations reviewed with patient/caregiver?: Yes  Did patient/caregiver verbalize understanding of patient care needs?: N/A- going to facility  Were patient/caregiver advised of the risks associated with not following Treatment Team discharge recommendations?: Yes    Other Referral/Resources/Interventions Provided:  Interventions: Short Term Rehab  Referral Comments: This CM explained different levels of STR, would like referral to [address] [facility] [facility], agreeable to blanket SNF referral near her home, entered in [address]    CM reviewed d/c planning process including the following: identifying help at home, patient preference for d/c planning needs, Discharge Lounge, [facility] Meds to Bed program, availability of treatment team to discuss questions or concerns patient and/or family may have regarding understanding medications and recognizing signs and symptoms once discharged. CM also encouraged patient to follow up with all recommended appointments after discharge. Patient advised of importance for patient and family to participate in managing patient’s medical well being. Information obtained from patient and sister at bedside. Lives alone in 1st floor apartment, [address], son and his girlfriend live in 2rd floor apartment. Patient has a HHA through [facility] 40 hours weekly, M-F [id number], who assist with bathing, dressing, and other ADLS as needed. Has SC, cane (uses most frequently), RW at home. 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 1 fall in the past 3 months.   Reports history of bipolar, anxiety and depression, never been hospitalized, follows up with a psychiatrist and therapist at Outpatient

## 2023-10-29 NOTE — PLAN OF CARE
Problem: HEMATOLOGIC - ADULT  Goal: Maintains hematologic stability  Description: INTERVENTIONS  - Assess for signs and symptoms of bleeding or hemorrhage  - Monitor labs  - Administer supportive blood products/factors as ordered and appropriate  Outcome: Progressing     Problem: MUSCULOSKELETAL - ADULT  Goal: Maintain or return mobility to safest level of function  Description: INTERVENTIONS:  - Assess patient's ability to carry out ADLs; assess patient's baseline for ADL function and identify physical deficits which impact ability to perform ADLs (bathing, care of mouth/teeth, toileting, grooming, dressing, etc.)  - Assess/evaluate cause of self-care deficits   - Assess range of motion  - Assess patient's mobility  - Assess patient's need for assistive devices and provide as appropriate  - Encourage maximum independence but intervene and supervise when necessary  - Involve family in performance of ADLs  - Assess for home care needs following discharge   - Consider OT consult to assist with ADL evaluation and planning for discharge  - Provide patient education as appropriate  Outcome: Progressing  Goal: Maintain proper alignment of affected body part  Description: INTERVENTIONS:  - Support, maintain and protect limb and body alignment  - Provide patient/ family with appropriate education  Outcome: Progressing     Problem: PAIN - ADULT  Goal: Verbalizes/displays adequate comfort level or baseline comfort level  Description: Interventions:  - Encourage patient to monitor pain and request assistance  - Assess pain using appropriate pain scale  - Administer analgesics based on type and severity of pain and evaluate response  - Implement non-pharmacological measures as appropriate and evaluate response  - Consider cultural and social influences on pain and pain management  - Notify physician/advanced practitioner if interventions unsuccessful or patient reports new pain  Outcome: Progressing     Problem: INFECTION - ADULT  Goal: Absence or prevention of progression during hospitalization  Description: INTERVENTIONS:  - Assess and monitor for signs and symptoms of infection  - Monitor lab/diagnostic results  - Monitor all insertion sites, i.e. indwelling lines, tubes, and drains  - Monitor endotracheal if appropriate and nasal secretions for changes in amount and color  - [place] appropriate cooling/warming therapies per order  - Administer medications as ordered  - Instruct and encourage patient and family to use good hand hygiene technique  - Identify and instruct in appropriate isolation precautions for identified infection/condition  Outcome: Progressing     Problem: SAFETY ADULT  Goal: Patient will remain free of falls  Description: INTERVENTIONS:  - Educate patient/family on patient safety including physical limitations  - Instruct patient to call for assistance with activity   - Consult OT/PT to assist with strengthening/mobility   - Keep Call bell within reach  - Keep bed low and locked with side rails adjusted as appropriate  - Keep care items and personal belongings within reach  - Initiate and maintain comfort rounds  - Make Fall Risk Sign visible to staff  - Offer Toileting every 2 Hours, in advance of need  - Initiate/Maintain bed alarm  - Obtain necessary fall risk management equipment:   - Apply yellow socks and bracelet for high fall risk patients  - Consider moving patient to room near nurses station  Outcome: Progressing  Goal: Maintain or return to baseline ADL function  Description: INTERVENTIONS:  -  Assess patient's ability to carry out ADLs; assess patient's baseline for ADL function and identify physical deficits which impact ability to perform ADLs (bathing, care of mouth/teeth, toileting, grooming, dressing, etc.)  - Assess/evaluate cause of self-care deficits   - Assess range of motion  - Assess patient's mobility; develop plan if impaired  - Assess patient's need for assistive devices and provide as appropriate  - Encourage maximum independence but intervene and supervise when necessary  - Involve family in performance of ADLs  - Assess for home care needs following discharge   - Consider OT consult to assist with ADL evaluation and planning for discharge  - Provide patient education as appropriate  Outcome: Progressing  Goal: Maintains/Returns to pre admission functional level  Description: INTERVENTIONS:  - Perform BMAT or MOVE assessment daily.   - Set and communicate daily mobility goal to care team and patient/family/caregiver. - Collaborate with rehabilitation services on mobility goals if consulted  - Perform Range of Motion 3 times a day. - Reposition patient every 2 hours. - Dangle patient 3 times a day  - Stand patient 3 times a day  - Ambulate patient 3 times a day  - Out of bed to chair 3 times a day   - Out of bed for meals 3 times a day  - Out of bed for toileting  - Record patient progress and toleration of activity level   Outcome: Progressing     Problem: DISCHARGE PLANNING  Goal: Discharge to home or other facility with appropriate resources  Description: INTERVENTIONS:  - Identify barriers to discharge w/patient and caregiver  - Arrange for needed discharge resources and transportation as appropriate  - Identify discharge learning needs (meds, wound care, etc.)  - Arrange for interpretive services to assist at discharge as needed  - Refer to Case Management Department for coordinating discharge planning if the patient needs post-hospital services based on physician/advanced practitioner order or complex needs related to functional status, cognitive ability, or social support system  Outcome: Progressing     Problem: Knowledge Deficit  Goal: Patient/family/caregiver demonstrates understanding of disease process, treatment plan, medications, and discharge instructions  Description: Complete learning assessment and assess knowledge base.   Interventions:  - Provide teaching at level of understanding  - Provide teaching via preferred learning methods  Outcome: Progressing     Problem: MOBILITY - ADULT  Goal: Maintain or return to baseline ADL function  Description: INTERVENTIONS:  -  Assess patient's ability to carry out ADLs; assess patient's baseline for ADL function and identify physical deficits which impact ability to perform ADLs (bathing, care of mouth/teeth, toileting, grooming, dressing, etc.)  - Assess/evaluate cause of self-care deficits   - Assess range of motion  - Assess patient's mobility; develop plan if impaired  - Assess patient's need for assistive devices and provide as appropriate  - Encourage maximum independence but intervene and supervise when necessary  - Involve family in performance of ADLs  - Assess for home care needs following discharge   - Consider OT consult to assist with ADL evaluation and planning for discharge  - Provide patient education as appropriate  Outcome: Progressing  Goal: Maintains/Returns to pre admission functional level  Description: INTERVENTIONS:  - Perform BMAT or MOVE assessment daily.   - Set and communicate daily mobility goal to care team and patient/family/caregiver. - Collaborate with rehabilitation services on mobility goals if consulted  - Perform Range of Motion 3 times a day. - Reposition patient every 2 hours.   - Dangle patient 3 times a day  - Stand patient 3 times a day  - Ambulate patient 3 times a day  - Out of bed to chair 3 times a day   - Out of bed for meals 3 times a day  - Out of bed for toileting  - Record patient progress and toleration of activity level   Outcome: Progressing     Problem: Prexisting or High Potential for Compromised Skin Integrity  Goal: Skin integrity is maintained or improved  Description: INTERVENTIONS:  - Identify patients at risk for skin breakdown  - Assess and monitor skin integrity  - Assess and monitor nutrition and hydration status  - Monitor labs   - Assess for incontinence   - Turn and reposition patient  - Assist with mobility/ambulation  - Relieve pressure over bony prominences  - Avoid friction and shearing  - Provide appropriate hygiene as needed including keeping skin clean and dry  - Evaluate need for skin moisturizer/barrier cream  - Collaborate with interdisciplinary team   - Patient/family teaching  - Consider wound care consult   Outcome: Progressing

## 2023-10-29 NOTE — ANESTHESIA PREPROCEDURE EVALUATION
Procedure:  CHANGE DRESSING/VAC RIGHT UPPER EXTREMITY; WASHOUT; PSB CLOSURE (Right: Arm)    Relevant Problems   CARDIO   (+) Hypertension   (+) Hypertriglyceridemia   (+) Migraine   (+) Other hyperlipidemia      ENDO   (+) Hypothyroid   (+) Type 2 diabetes mellitus (HCC)      GI/HEPATIC   (+) Gastroesophageal reflux disease   (+) Ileus (HCC)      MUSCULOSKELETAL   (+) Cervical spondylosis   (+) Fibromyalgia, primary   (+) Mid back pain   (+) Sacroiliitis (HCC)      NEURO/PSYCH   (+) Anxiety   (+) Chronic pain   (+) Chronic pain syndrome   (+) Depression   (+) Diabetic polyneuropathy associated with type 2 diabetes mellitus (HCC)   (+) Fibromyalgia, primary   (+) Migraine      PULMONARY   (+) COPD (chronic obstructive pulmonary disease) ([facility])   (+) Shortness of breath      Nervous and Auditory   (+) Cervical radiculopathy   (+) Lumbar radiculopathy      Musculoskeletal and Integument   (+) Compartment syndrome (HCC)      Other   (+) Bipolar 1 disorder ([facility])   (+) Fall        Physical Exam    Airway    Mallampati score: III  TM Distance: >3 FB  Neck ROM: full     Dental    upper dentures and lower dentures    Cardiovascular  Rhythm: regular, Rate: normal, Cardiovascular exam normal    Pulmonary  Pulmonary exam normal Breath sounds clear to auscultation    Other Findings        Anesthesia Plan  ASA Score- 3     Anesthesia Type- general with ASA Monitors. Additional Monitors:     Airway Plan: LMA. Plan Factors-Exercise tolerance (METS): <4 METS. Chart reviewed. Existing labs reviewed. Patient summary reviewed. Patient is a current smoker. Patient did not smoke on day of surgery. Induction- intravenous. Postoperative Plan- Plan for postoperative opioid use. Planned trial extubation    Informed Consent- Anesthetic plan and risks discussed with patient. I personally reviewed this patient with the CRNA. Discussed and agreed on the Anesthesia Plan with the CRNA. Dixon Dumont

## 2023-10-29 NOTE — UTILIZATION REVIEW
NOTIFICATION OF INPATIENT ADMISSION   AUTHORIZATION REQUEST   SERVICING FACILITY:   [address] [place] [address]  Address: [address] [place] [address], [facility] [address] [place] [id number]  Tax ID: [id number]  NPI: [id number] ATTENDING PROVIDER:  Attending Name and NPI#: Claude Quinn [[id number]]  Address: [address] [place] [address] [address] [facility] [address] [address]  Phone: [phone]   ADMISSION INFORMATION:  Place of Service: Inpatient [address] [facility]  Place of Service Code: 21  Inpatient Admission Date/Time: 10/27/23  4:46 AM  Discharge Date/Time: No discharge date for patient encounter. Admitting Diagnosis Code/Description:  Chronic pain syndrome [G89.4]  Fall, initial encounter [W19. XXXA]  Traumatic compartment syndrome of right upper extremity, initial encounter ([address] [facility]) Nadja Stephanie. A11A]     UTILIZATION REVIEW CONTACT:  Oral Lima "Brice Lefort, Utilization   Network Utilization Review Department  Phone: [phone]  Fax: [phone]  Email: [email or web address]  Contact for approvals/pending authorizations, clinical reviews, and discharge. PHYSICIAN ADVISORY SERVICES:  Medical Necessity Denial & [facility] Review  Phone: [phone]  Fax: [phone]  Email: [email or web address]     DISCHARGE SUPPORT TEAM:  For Patients Discharge Needs & Updates  Phone: [phone] opt. 2 Fax: [phone]  Email: [email or web address]

## 2023-10-29 NOTE — PROGRESS NOTES
[address] [place] [address] [address] OP NOTE/ Progress Note  Name: Dino Guzman  MRN: [id number]  Unit/Bed#: [facility] 474-83 I Date of Admission: 10/27/2023   Date of Service: 10/29/2023 I Hospital Day: 2    Assessment/Plan   Type 2 diabetes mellitus [facility])  Assessment & Plan  Lab Results   Component Value Date    HGBA1C 5.8 (H) 01/19/2023       Recent Labs     10/27/23  1104 10/29/23  1107   POCGLU 83 85         Blood Sugar Average: Last 72 hrs:  (P) 84    Diet controlled  Monitor BG utilize S/S as needed    Compartment syndrome [facility])  Assessment & Plan  Patient presented with Crush injury to RUE  - initial CK 5942 increased to 13,683  - LR bolus given and placed on cont at 125 ml/hr  - Serial exam: increased paresthesia to 4 digits on Rt Hand  - Increased swelling to Dorsal aspect of forearm  -  artery via doppler +  - 10/27 OR for fasciotomy due to compartment syndrome  - S/P RUE fasciotomy and VAC dressing placement  - Trend CK 9 down to 5998 today  - Continue IVF at 75 cc'hr and encourage PO hydration  - AM Labs CK down to 4185  - TO OR today for [facility] change    Fall  Assessment & Plan  Patient had unwitnessed fall.  + Lightheadedness, CP  - Syncopal workup: EKG Unremarkable, Troponin's Negative  - Will order ECHO  - RUE Pain  - Right Xray of hand/forearm/elbow Negative for Osseous injury  - Concern for Compartment syndrome  - Monitor      Chronic pain  Assessment & Plan  Patient with chronic patient Fibromyalgia, chronic LLB pain, Chronic pain syndrome, peripheral neuropathy  - Consult APS  - Post op block  - Ketamine gtt recommended today    Hypothyroid  Assessment & Plan  On levothyroxine will continue in hospital       Post OP: Partial Closure of Wound, VAC replaced. Good viable muscle with early granulation noted.       Bowel Regimen: Senna colace  VTE Prophylaxis:Enoxaparin (Lovenox)     Disposition: Rehab, ARC referal    Subjective   Chief Complaint: Patient feeling overall better today, ketamine drip helping. Subjective: Patient To OR today for washout, possible partial closure and VAC change. Doing well post op. Objective   Vitals:   Temp:  [97.6 °F (36.4 °C)-98.4 °F (36.9 °C)] 98.1 °F (36.7 °C)  HR:  [60-74] 66  Resp:  [15-22] 18  BP: (120-152)/(75-93) 129/82    I/O         10/27 0701  10/28 0700 10/28 0701  10/29 0700 10/29 0701  10/30 0700    P. O. 240      I.V. (mL/kg) 1772.5 (22) 807 (10) 1984.7 (24.6)    IV Piggyback       Total Intake(mL/kg) 2012.5 (24.9) 807 (10) 1984.7 (24.6)    Urine (mL/kg/hr) 2875 (1.5) 2700 (1.4) 550 (0.7)    Drains 860 425     Total Output 3735 3125 550    Net -1722.5 -2318 +1434.7           Unmeasured Urine Occurrence 2 x 1 x              Physical Exam:   GENERAL APPEARANCE: NAD appears comfortable lying in bed  NEURO: Intact, GCS 15 alert and oriented x 3  HEENT: PERRL  CV: RRR  LUNGS: CTA B/L throughout  GI: Abdomen soft, NT, ND, +BS x 4  : Voiding  MSK: RUE with VAC intact, Moves Fingers weakly, strength 2/5  SKIN: Redness still to forearm. Swelling, good radial pulse, good cap refill    Invasive Devices       Peripheral Intravenous Line  Duration             Peripheral IV 10/27/23 Left Antecubital 2 days    Peripheral IV 10/29/23 Left Forearm <1 day                          Lab Results: Results: I have personally reviewed all pertinent laboratory/tests results, BMP/CMP:   Lab Results   Component Value Date    SODIUM 144 10/29/2023    K 3.5 10/29/2023     (H) 10/29/2023    CO2 24 10/29/2023    BUN 9 10/29/2023    CREATININE 0.53 (L) 10/29/2023    CALCIUM 7.8 (L) 10/29/2023    EGFR 102 10/29/2023   , and CBC:   Lab Results   Component Value Date    WBC 7.94 10/29/2023    HGB 12.2 10/29/2023    HCT 37.5 10/29/2023    MCV 98 10/29/2023     10/29/2023    RBC 3.84 10/29/2023    MCH 31.8 10/29/2023    MCHC 32.5 10/29/2023    RDW 13.1 10/29/2023    MPV 10.8 10/29/2023     Imaging: I have personally reviewed pertinent reports.      Other Studies: NOne

## 2023-10-29 NOTE — QUICK NOTE
Patient was seen in the postoperative setting. Washout with wound VAC was performed earlier today 10/29. Patient was doing well in the postoperative period. She denies fever/chills, chest pains, shortness of breath. She has been tolerating some food and liquids since surgery. Voiding without issue. No current complaints.   Wound VAC in place with good seal.    Vitals:    10/29/23 1841   BP: 123/70   Pulse: 73   Resp:    Temp: 98.1 °F (36.7 °C)   SpO2: 97%        Pura Mccauley DO PGY2  General Surgery

## 2023-10-29 NOTE — NURSING NOTE
Patient arrived to OR with her phone under her pillow. I have put the phone is a bag and labeld it with a patient label. The cell phone will be transported to PACU with the patient and handed to RN.

## 2023-10-29 NOTE — PLAN OF CARE
Problem: HEMATOLOGIC - ADULT  Goal: Maintains hematologic stability  Description: INTERVENTIONS  - Assess for signs and symptoms of bleeding or hemorrhage  - Monitor labs  - Administer supportive blood products/factors as ordered and appropriate  Outcome: Progressing     Problem: MUSCULOSKELETAL - ADULT  Goal: Maintain or return mobility to safest level of function  Description: INTERVENTIONS:  - Assess patient's ability to carry out ADLs; assess patient's baseline for ADL function and identify physical deficits which impact ability to perform ADLs (bathing, care of mouth/teeth, toileting, grooming, dressing, etc.)  - Assess/evaluate cause of self-care deficits   - Assess range of motion  - Assess patient's mobility  - Assess patient's need for assistive devices and provide as appropriate  - Encourage maximum independence but intervene and supervise when necessary  - Involve family in performance of ADLs  - Assess for home care needs following discharge   - Consider OT consult to assist with ADL evaluation and planning for discharge  - Provide patient education as appropriate  Outcome: Progressing  Goal: Maintain proper alignment of affected body part  Description: INTERVENTIONS:  - Support, maintain and protect limb and body alignment  - Provide patient/ family with appropriate education  Outcome: Progressing     Problem: PAIN - ADULT  Goal: Verbalizes/displays adequate comfort level or baseline comfort level  Description: Interventions:  - Encourage patient to monitor pain and request assistance  - Assess pain using appropriate pain scale  - Administer analgesics based on type and severity of pain and evaluate response  - Implement non-pharmacological measures as appropriate and evaluate response  - Consider cultural and social influences on pain and pain management  - Notify physician/advanced practitioner if interventions unsuccessful or patient reports new pain  Outcome: Progressing     Problem: INFECTION - ADULT  Goal: Absence or prevention of progression during hospitalization  Description: INTERVENTIONS:  - Assess and monitor for signs and symptoms of infection  - Monitor lab/diagnostic results  - Monitor all insertion sites, i.e. indwelling lines, tubes, and drains  - Monitor endotracheal if appropriate and nasal secretions for changes in amount and color  - Temple appropriate cooling/warming therapies per order  - Administer medications as ordered  - Instruct and encourage patient and family to use good hand hygiene technique  - Identify and instruct in appropriate isolation precautions for identified infection/condition  Outcome: Progressing     Problem: SAFETY ADULT  Goal: Patient will remain free of falls  Description: INTERVENTIONS:  - Educate patient/family on patient safety including physical limitations  - Instruct patient to call for assistance with activity   - Consult OT/PT to assist with strengthening/mobility   - Keep Call bell within reach  - Keep bed low and locked with side rails adjusted as appropriate  - Keep care items and personal belongings within reach  - Initiate and maintain comfort rounds  - Make Fall Risk Sign visible to staff  - Offer Toileting every  Hours, in advance of need  - Initiate/Maintain alarm  - Obtain necessary fall risk management equipment:   - Apply yellow socks and bracelet for high fall risk patients  - Consider moving patient to room near nurses station  Outcome: Progressing  Goal: Maintain or return to baseline ADL function  Description: INTERVENTIONS:  -  Assess patient's ability to carry out ADLs; assess patient's baseline for ADL function and identify physical deficits which impact ability to perform ADLs (bathing, care of mouth/teeth, toileting, grooming, dressing, etc.)  - Assess/evaluate cause of self-care deficits   - Assess range of motion  - Assess patient's mobility; develop plan if impaired  - Assess patient's need for assistive devices and provide as appropriate  - Encourage maximum independence but intervene and supervise when necessary  - Involve family in performance of ADLs  - Assess for home care needs following discharge   - Consider OT consult to assist with ADL evaluation and planning for discharge  - Provide patient education as appropriate  Outcome: Progressing  Goal: Maintains/Returns to pre admission functional level  Description: INTERVENTIONS:  - Perform BMAT or MOVE assessment daily.   - Set and communicate daily mobility goal to care team and patient/family/caregiver. - Collaborate with rehabilitation services on mobility goals if consulted  - Perform Range of Motion  times a day. - Reposition patient every  hours. - Dangle patient  times a day  - Stand patient  times a day  - Ambulate patient  times a day  - Out of bed to chair  times a day   - Out of bed for meals  times a day  - Out of bed for toileting  - Record patient progress and toleration of activity level   Outcome: Progressing     Problem: DISCHARGE PLANNING  Goal: Discharge to home or other facility with appropriate resources  Description: INTERVENTIONS:  - Identify barriers to discharge w/patient and caregiver  - Arrange for needed discharge resources and transportation as appropriate  - Identify discharge learning needs (meds, wound care, etc.)  - Arrange for interpretive services to assist at discharge as needed  - Refer to Case Management Department for coordinating discharge planning if the patient needs post-hospital services based on physician/advanced practitioner order or complex needs related to functional status, cognitive ability, or social support system  Outcome: Progressing     Problem: Knowledge Deficit  Goal: Patient/family/caregiver demonstrates understanding of disease process, treatment plan, medications, and discharge instructions  Description: Complete learning assessment and assess knowledge base.   Interventions:  - Provide teaching at level of understanding  - Provide teaching via preferred learning methods  Outcome: Progressing     Problem: MOBILITY - ADULT  Goal: Maintain or return to baseline ADL function  Description: INTERVENTIONS:  -  Assess patient's ability to carry out ADLs; assess patient's baseline for ADL function and identify physical deficits which impact ability to perform ADLs (bathing, care of mouth/teeth, toileting, grooming, dressing, etc.)  - Assess/evaluate cause of self-care deficits   - Assess range of motion  - Assess patient's mobility; develop plan if impaired  - Assess patient's need for assistive devices and provide as appropriate  - Encourage maximum independence but intervene and supervise when necessary  - Involve family in performance of ADLs  - Assess for home care needs following discharge   - Consider OT consult to assist with ADL evaluation and planning for discharge  - Provide patient education as appropriate  Outcome: Progressing  Goal: Maintains/Returns to pre admission functional level  Description: INTERVENTIONS:  - Perform BMAT or MOVE assessment daily.   - Set and communicate daily mobility goal to care team and patient/family/caregiver. - Collaborate with rehabilitation services on mobility goals if consulted  - Perform Range of Motion  times a day. - Reposition patient every  hours.   - Dangle patient  times a day  - Stand patient  times a day  - Ambulate patient  times a day  - Out of bed to chair  times a day   - Out of bed for meals times a day  - Out of bed for toileting  - Record patient progress and toleration of activity level   Outcome: Progressing     Problem: Prexisting or High Potential for Compromised Skin Integrity  Goal: Skin integrity is maintained or improved  Description: INTERVENTIONS:  - Identify patients at risk for skin breakdown  - Assess and monitor skin integrity  - Assess and monitor nutrition and hydration status  - Monitor labs   - Assess for incontinence   - Turn and reposition patient  - Assist with mobility/ambulation  - Relieve pressure over bony prominences  - Avoid friction and shearing  - Provide appropriate hygiene as needed including keeping skin clean and dry  - Evaluate need for skin moisturizer/barrier cream  - Collaborate with interdisciplinary team   - Patient/family teaching  - Consider wound care consult   Outcome: Progressing

## 2023-10-29 NOTE — PROGRESS NOTES
Progress Note - Acute Pain Service    Raffy Sherroning Jose C 65 y.o. female MRN: [id number]  Unit/Bed#: OR [place] Encounter: [id number]      Chapin Cuevas is a 65 y.o. female with PMHx of T2DM and chronic pain syndrome managed with home Norco 10-325mg q8hr PRN (takes 30mg per day) who originally presented with right forearm compartment syndrome after unwitnessed fall from ? opioid overdose. She is s/p RUE fasciotomy with wound vac placement on 10/27. Her course has been complicated by rhabdomyolysis with no obvious ALEX. APS consulted for post-operative pain control. Patient seen In her room. She is reclined up in bed, sleepy, but communicative. She is awaiting surgery intervention today. She does admit ketamine infusion has worked well for her pain of her right upper arm. She is comfortable to continue it for another day. She is aware she may ask for PO/IV pain regimen if needed. Will contact anesthesia staff to offer IV ketamine bolus and continue ketamine infusion for surgical intervention later today. Compartment syndrome [facility])  Assessment & Plan  Right forearm CS s/p right forearm fasciotomy with wound vac placement on 10/27  S/p right infraclavicular block with Exparel on 10/27  Will hold off on further regional analgesic techniques given nerve deficits of right forearm and monitoring for return of nerve function  MMA as below  Continue PO oxycodone to 5/10mg q3hr PRN for mod-severe pain  Continue IV ketamine @0.1mg/kg/hr for analgesic support (started 10/28, today is day 2)  Continue IV dilaudid 0.5mg q3hr PRN for breakthrough  Continue Lidoderm patch  Continue PO Tylenol 975mg q8hr princess  Agree with holding Lyrica in the setting of rising CK/monitoring for ALEX due to rhabdomyolysis  Holding NSAIDs in the setting of renal function monitoring given elevated CK/rhabdomyolysis  Klonopin 0.5 mg PO QHS ordered since 10/27.       Chronic pain  Assessment & Plan  Hx of cervical-lumbar spondylosis/DJD  Inactive spinal cord stimulator  Recent left cervical MBB with [facility]  Home pain regimen: Norco 10-325mg q8hr PRN (takes 30mg. day), Lyrica 100/150mg QID (150mg at bedtime), Tizanidine 4mg q6hr PRN for spasms  Continue home tizanidine 4mg q6hr PRN for spasms  Increase PO oxycodone 5/10mg q3hr PRN for mod-severe (given tolerance and patient noting oxycodone doesn't last beyond 2 hours)  Continue other MMA as below            APS will continue to follow. Please contact Acute Pain Service - via [employer] from [phone] with additional questions or concerns. See [employer] or [facility] for additional contacts and after hours information.      Pain History  Current pain location(s):  Pain Score: 9  Pain Location/Orientation: Location: Generalized  Pain Scale: Pain Assessment Tool: 0-10  24 hour history: ketamine infusion started    Opioid requirement previous 24 hours: oxycodone 10 mg PO x    doses, dilaudid 0.5 mg IV x   doses    Meds/Allergies   all current active meds have been reviewed, current meds:   Current Facility-Administered Medications   Medication Dose Route Frequency    acetaminophen (TYLENOL) tablet 975 mg  975 mg Oral Q8H [facility]    albuterol (PROVENTIL HFA,VENTOLIN HFA) inhaler 2 puff  2 puff Inhalation Q4H PRN    albuterol inhalation solution 2.5 mg  2.5 mg Nebulization Once PRN    atorvastatin (LIPITOR) tablet 80 mg  80 mg Oral QPM    clonazePAM (KlonoPIN) tablet 0.5 mg  0.5 mg Oral HS    enoxaparin (LOVENOX) subcutaneous injection 40 mg  40 mg Subcutaneous Q12H    fentaNYL (SUBLIMAZE) injection 25 mcg  25 mcg Intravenous Q5 Min PRN    hydrALAZINE (APRESOLINE) injection 5 mg  5 mg Intravenous Q20 Min PRN    HYDROmorphone (DILAUDID) injection 0.5 mg  0.5 mg Intravenous Q3H PRN    HYDROmorphone (DILAUDID) injection 0.5 mg  0.5 mg Intravenous Q5 Min PRN    HYDROmorphone HCl (DILAUDID) injection 0.2 mg  0.2 mg Intravenous Q5 Min PRN    ipratropium (ATROVENT) 0.02 % inhalation solution 0.5 mg  0.5 mg Nebulization Once PRN ketamine 250 mg (STANDARD CONCENTRATION) IV in sodium chloride 0.9% 250 mL  0.1 mg/kg/hr Intravenous Continuous    labetalol (NORMODYNE) injection 10 mg  10 mg Intravenous Q10 Min PRN    lactated ringers infusion  125 mL/hr Intravenous Continuous    latanoprost (XALATAN) 0.005 % ophthalmic solution 1 drop  1 drop Both Eyes HS    lidocaine (LIDODERM) 5 % patch 1 patch  1 patch Topical Daily    metoclopramide (REGLAN) injection 10 mg  10 mg Intravenous Once PRN    nicotine (NICODERM CQ) 21 mg/24 hr TD 24 hr patch 21 mg  21 mg Transdermal Daily    ondansetron (ZOFRAN) injection 4 mg  4 mg Intravenous Q4H PRN    ondansetron (ZOFRAN) injection 4 mg  4 mg Intravenous Once PRN    oxyCODONE (ROXICODONE) IR tablet 5 mg  5 mg Oral Q3H PRN    Or    oxyCODONE (ROXICODONE) immediate release tablet 10 mg  10 mg Oral Q3H PRN    polyethylene glycol (MIRALAX) packet 17 g  17 g Oral Daily PRN    promethazine (PHENERGAN) injection 12.5 mg  12.5 mg Intravenous Once PRN    senna-docusate sodium (SENOKOT S) 8.6-50 mg per tablet 1 tablet  1 tablet Oral HS    sodium chloride 0.9 % infusion  75 mL/hr Intravenous Continuous    tiZANidine (ZANAFLEX) tablet 4 mg  4 mg Oral Q6H PRN    umeclidinium-vilanterol 62.5-25 mcg/actuation inhaler 1 puff  1 puff Inhalation Daily   , and PTA meds:   Prior to Admission Medications   Prescriptions Last Dose Informant Patient Reported? Taking?    Alcohol Swabs (Alcohol Pads) 70 % PADS   Yes No   Sig: USE TO TEST BLOOD GLUCOSE 2-3 TIMES DAILY   Buprenorphine HCl (Belbuca) 300 MCG FILM  Self Yes No   Sig: Apply 300 mcg to cheek in the morning   Butalbital-APAP-Caffeine (FIORICET PO)   Yes No   Sig: Take by mouth as needed   Fluticasone-Salmeterol (Advair) 500-50 mcg/dose inhaler Not Taking  Yes No   Patient not taking: Reported on 10/27/2023   Galcanezumab-gnlm (Emgality) 120 MG/ML SOAJ  Self Yes No   Sig: Inject 120 mg under the skin every 30 (thirty) days Last inj 1/19/23   HYDROcodone-acetaminophen (NORCO)  mg per tablet   Yes No   Sig: every 8 (eight) hours as needed   Melatonin 10 MG CAPS  Self Yes No   Sig: Take 1 tablet by mouth daily at bedtime as needed   OneTouch Ultra test strip   Yes No   Sig: USE TO TEST BLOOD GLUCOSE 2-3 TIMES DAILY   SUMAtriptan (IMITREX) 100 mg tablet Not Taking  Yes No   Patient not taking: Reported on 10/27/2023   True Comfort Safety Lancets MISC   Yes No   Sig: USE TO TEST BLOOD GLUCOSE 2-3 TIMES DAILY   Zoster Vac Recomb Adjuvanted (Shingrix) 50 MCG/0.5ML SUSR   Yes No   Sig: inject 0.5 milliliter intramuscularly   albuterol (PROVENTIL HFA,VENTOLIN HFA) 90 mcg/act inhaler  Self Yes No   Sig: Inhale 2 puffs every 6 (six) hours as needed   ammonium lactate (LAC-HYDRIN) 12 % lotion   Yes No   Sig: as needed   atorvastatin (LIPITOR) 80 mg tablet   Yes No   Sig: Take 80 mg by mouth every evening   benzonatate (TESSALON PERLES) 100 mg capsule   No No   Sig: Take 1 capsule (100 mg total) by mouth 3 (three) times a day as needed for cough   carboxymethylcellulose 0.5 % SOLN  Self No No   Sig: Administer 1 drop to both eyes daily as needed for dry eyes   cephalexin (KEFLEX) 500 mg capsule Not Taking  Yes No   Sig: Take 1 capsule by mouth every 12 (twelve) hours   Patient not taking: Reported on 10/27/2023   ciprofloxacin (CIPRO) 250 mg tablet Not Taking  Yes No   Sig: take 1 tablet by mouth twice a day for 7 days   Patient not taking: Reported on 10/27/2023   clonazePAM (KlonoPIN) 0.5 mg tablet  Self Yes No   Sig: Take 0.5 mg by mouth daily at bedtime   clonazePAM (KlonoPIN) 1 mg tablet  Self Yes No   Sig: Take 1 mg by mouth daily in the early morning   dicyclomine (BENTYL) 20 mg tablet  Self Yes No   Sig: Take 20 mg by mouth every 6 (six) hours   doxycycline hyclate (VIBRA-TABS) 100 mg tablet Not Taking  Yes No   Sig: take 1 tablet by mouth twice a day for 7 days   Patient not taking: Reported on 7/14/2023   famotidine (PEPCID) 40 MG tablet   No No   Sig: Take 1 tablet (40 mg total) by mouth daily at bedtime   fenofibrate (TRICOR) 48 mg tablet  Self Yes No   Sig: Take 48 mg by mouth daily   fluconazole (DIFLUCAN) 150 mg tablet Not Taking  Yes No   Sig: take 1 tablet by mouth for 1 day   Patient not taking: Reported on 10/27/2023   fluticasone (FLONASE) 50 mcg/act nasal spray   Yes No   Si sprays into each nostril daily   fluticasone-umeclidinium-vilanterol (Trelegy Ellipta) 200-62.5-25 mcg/actuation AEPB inhaler   No No   Sig: Inhale 1 puff daily Rinse mouth after use. furosemide (LASIX) 20 mg tablet  Self Yes No   Sig: Take 20 mg by mouth as needed Taking as needed   ibuprofen (MOTRIN) 800 mg tablet   Yes No   Sig: take 1 tablet by mouth every 8 hours if needed for mild pain or fever for up to 5 days   lamoTRIgine (LaMICtal) 100 mg tablet   Yes No   Sig: Take 100 mg by mouth 2 (two) times a day   latanoprost (XALATAN) 0.005 % ophthalmic solution   Yes No   Sig: instill 1 drop into both eyes at bedtime   levalbuterol (XOPENEX) 1.25 mg/3 mL nebulizer solution   Yes No   Sig: Inhale 1.25 mg every 4 (four) hours as needed   levothyroxine 137 mcg tablet  Self Yes No   Sig: Take 137 mcg by mouth daily   lubiprostone (AMITIZA) 8 mcg capsule Not Taking  No No   Sig: Take 1 PO BID for constipation.    Patient not taking: Reported on 10/27/2023   magnesium 30 MG tablet  Self Yes No   Sig: Take 500 mg by mouth 2 (two) times a day 23 right now not taking   methylPREDNISolone 4 MG tablet therapy pack   No No   Sig: Use as directed on package   metroNIDAZOLE (FLAGYL) 500 mg tablet   Yes No   Sig: Take 1 tablet by mouth 2 (two) times a day   naloxone (Narcan) 4 mg/0.1 mL nasal spray   Yes No   Patient not taking: Reported on 2023   omeprazole (PriLOSEC) 40 MG capsule   No No   Sig: Take 1 capsule (40 mg total) by mouth 2 (two) times a day   ondansetron (ZOFRAN) 4 mg tablet Not Taking  Yes No   Sig: Take 4 mg by mouth every 8 (eight) hours as needed   Patient not taking: Reported on 10/27/2023   ondansetron (ZOFRAN) 8 mg tablet   No No   Sig: Take 1 tablet (8 mg total) by mouth every 8 (eight) hours as needed for nausea or vomiting   polyethylene glycol (GLYCOLAX) 17 GM/SCOOP powder Not Taking  No No   Sig: Take 17 g daily for constipation. Patient not taking: Reported on 10/27/2023   pregabalin (LYRICA) 100 mg capsule  Self Yes No   Sig: Take 100 mg by mouth 3 (three) times a day Morning-lunch-dinner   pregabalin (LYRICA) 150 mg capsule  Self Yes No   Sig: take 1 capsule by mouth NIGHTLY   risperiDONE (RisperDAL) 0.5 mg tablet   Yes No   Sig: Take 0.25 mg by mouth 2 (two) times a day   Patient not taking: Reported on 2023   rizatriptan (MAXALT-MLT) 10 mg disintegrating tablet   Yes No   Sig: TAKE 1 TABLET BY MOUTH ONE TIME AS NEEDED FOR MIGRAINE, MAY REPEA. ..  (REFER TO PRESCRIPTION NOTES). tiZANidine (ZANAFLEX) 4 mg tablet   Yes No   Sig: take 2 tablets by mouth every 6 hours if needed for muscle spasm   topiramate (TOPAMAX) 100 mg tablet Not Taking Self Yes No   Sig: Take 100 mg by mouth every 6 (six) hours as needed Takes 2 tabs every 6 hours   Patient not taking: Reported on 10/27/2023   topiramate (TOPAMAX) 200 MG tablet   Yes No   Si mg 2 (two) times a day      Facility-Administered Medications: None       Allergies   Allergen Reactions    Hydroxyzine Anaphylaxis     Claims it gives her convulsions. Other Other (See Comments)    Pollen Extract Itching    Tree Extract     Clarithromycin Rash     Pt denies    Latex Rash    Sulfa Antibiotics Rash     "severe skin burn"       Objective        Vitals:    10/29/23 0505 10/29/23 0736 10/29/23 1102 10/29/23 1107   BP: 126/82 123/76  128/87   BP Location:  Left arm     Pulse: 60 66 68 67   Resp:  17  15   Temp:  98 °F (36.7 °C) 98.4 °F (36.9 °C) 98.4 °F (36.9 °C)   TempSrc:  Oral     SpO2: 97% 99%  100%   Weight:       Height:             Physical Exam  Vitals reviewed. Constitutional:       Appearance: Normal appearance. HENT:      Head: Normocephalic and atraumatic. Nose: Nose normal.   Eyes:      Extraocular Movements: Extraocular movements intact. Pupils: Pupils are equal, round, and reactive to light. Cardiovascular:      Rate and Rhythm: Normal rate. Pulses: Normal pulses. Pulmonary:      Effort: Pulmonary effort is normal.   Musculoskeletal:      Cervical back: Normal range of motion. Comments: Right arm with vac in place. Skin:     General: Skin is warm. Neurological:      General: No focal deficit present. Mental Status: She is alert and oriented to person, place, and time. Mental status is at baseline. Psychiatric:         Mood and Affect: Mood normal.         Behavior: Behavior normal.         Thought Content: Thought content normal.         Judgment: Judgment normal.         Lab Results:   Estimated Creatinine Clearance: 114.6 mL/min (A) (by C-G formula based on SCr of 0.53 mg/dL (L)). Lab Results   Component Value Date    WBC 7.94 10/29/2023    WBC 5.90 08/10/2015    HGB 12.2 10/29/2023    HGB 11.9 08/10/2015    HCT 37.5 10/29/2023    HCT 34.9 08/10/2015     10/29/2023     (L) 08/10/2015         Component Value Date/Time     08/10/2015 0612    K 3.5 10/29/2023 0546    K 4.2 08/10/2015 0612     (H) 10/29/2023 0546     08/10/2015 0612    CO2 24 10/29/2023 0546    CO2 28 08/10/2015 0612    BUN 9 10/29/2023 0546    BUN 10 08/10/2015 0612    CREATININE 0.53 (L) 10/29/2023 0546    CREATININE 0.73 08/10/2015 0612         Component Value Date/Time    CALCIUM 7.8 (L) 10/29/2023 0546    CALCIUM 8.7 08/10/2015 0612    ALKPHOS 66 10/27/2023 0458    ALKPHOS 66 08/10/2015 0612     (H) 10/27/2023 0458    AST 19 08/10/2015 0612    ALT 63 (H) 10/27/2023 0458    ALT 31 08/10/2015 0612    BILITOT 0.29 08/10/2015 0612    TP 6.1 (L) 10/27/2023 0458    ALB 3.6 10/27/2023 0458    ALB 3.4 (L) 08/10/2015 0612       Imaging Studies/EKG:  I have personally reviewed pertinent reports. Counseling / Coordination of Care  Total floor / unit time spent today 15 minutes minutes. Greater than 50% of total time was spent with the patient and / or family counseling and / or coordination of care. A description of the counseling / coordination of care: reviewed use of ketamine infusion and reinforced IV/PO pain regimen to utilize    Please note that the APS provides consultative services regarding pain management only. With the exception of ketamine and epidural infusions and except when indicated, final decisions regarding starting or changing doses of analgesic medications are at the discretion of the consulting service.     Seun Vera MD   Acute Pain Service

## 2023-10-29 NOTE — OP NOTE
OPERATIVE REPORT  PATIENT NAME: Nina See    :  1960  MRN: [id number]  Pt Location: [facility] OR ROOM 07    SURGERY DATE: 10/29/2023    Surgeon(s) and Role:     * Lucio Khan,  - Primary     * Analia Tolbert DO - Assisting     * Carlyn Galvin MD - Assisting    Preop Diagnosis:  Traumatic compartment syndrome of right upper extremity, initial encounter ([address]) Cara Morales. A11A]    Post-Op Diagnosis Codes:     * Traumatic compartment syndrome of right upper extremity, initial encounter ([address]) [T79. A11A]    Procedure(s):  Right - CHANGE DRESSING/VAC RIGHT UPPER EXTREMITY; WASHOUT; PARTIAL CLOSURE    Specimen(s):  * No specimens in log *    Estimated Blood Loss:   Minimal    Drains:  * No LDAs found *    Anesthesia Type:   General    Operative Indications:  Traumatic compartment syndrome of right upper extremity, initial encounter ([address]) Cara Morales. A11A]      Operative Findings:  1 wound of right upper extremity, viable muscle with early granulation tissue. Partial wound closure at proximal and distal apices, residual elliptical wound measuring 18 x 8.5 x 0.5 cm to the muscle    Complications:   None    Procedure and Technique:  Patient was identified in the preoperative holding area where consent and laterality/site for procedure were confirmed. She was then taken to the operating room, transferred to the operating table, and placed under anesthesia with laryngeal mask airway. Perioperative antibiotics consisting of intravenous cefazolin were dosed appropriately. The right arm was exposed on an armboard and she was positioned supine. The existing negative pressure dressing was removed and both black granular foam dressing sponges were accounted for. The arm was prepped and draped in the usual sterile fashion with Betadine.   Prior to commencement of the operation, a timeout checklist was satisfactorily completed and agreed upon by all members of the surgical team.    Wound was inspected and contained viable muscle tissue without evidence of hematoma or necrosis. Skin bridges at the proximal and distal apices of the wound were raised in the subdermal plane using cautery. The wound was copiously irrigated with warm saline solution. All muscle was reactive. Hemostasis of the wound bed was achieved with cautery where necessary and modest early granulation tissue was noted. Apices of the wound were closed using dermal sutures of 3-0 Vicryl and 2-0 nylon sutures placed in a vertical mattress fashion. The final wound dimensions were 18 x 8.5 x 0.5 cm with exposed muscle and granulation tissue in the wound bed. The decision was made to replace the negative pressure vacuum dressing. A medium Black GranuFoam dressing was trimmed to size and 1 sponge was placed into the wound. This was reinforced in the usual sterile fashion with plastic drapes and returned to suction at -125 mmHg without any evidence of leakage. Patient tolerated the procedure well, was awakened without incident, airway removed, and was transferred to the PACU in her hospital bed in stable condition for further recovery. Dr. Ara Mensah  was present for the entire procedure.     Patient Disposition:  PACU  and extubated and stable        SIGNATURE: Rock Colette MD  DATE: October 29, 2023  TIME: 11:06 AM

## 2023-10-29 NOTE — ARC ADMISSION
Referral received for consideration of patient for inpatient acute rehab. Will review patient's case with [facility] physician and update CM as able.

## 2023-10-29 NOTE — ASSESSMENT & PLAN NOTE
Patient presented with Crush injury to RUE  - initial CK 5942 increased to 13,683  - LR bolus given and placed on cont at 125 ml/hr  - Serial exam: increased paresthesia to 4 digits on Rt Hand  - Increased swelling to Dorsal aspect of forearm  -  artery via doppler +  - 10/27 OR for fasciotomy due to compartment syndrome  - S/P RUE fasciotomy and VAC dressing placement  - Trend CK 9 down to 5998 today  - Continue IVF at 75 cc'hr and encourage PO hydration  - AM Labs CK down to 4185  - TO OR today for VAC change

## 2023-10-30 LAB
ATRIAL RATE: 72 BPM
CK SERPL-CCNC: 2966 U/L (ref 26–192)
P AXIS: 68 DEGREES
PR INTERVAL: 164 MS
QRS AXIS: 66 DEGREES
QRSD INTERVAL: 76 MS
QT INTERVAL: 398 MS
QTC INTERVAL: 435 MS
T WAVE AXIS: 50 DEGREES
VENTRICULAR RATE: 72 BPM

## 2023-10-30 PROCEDURE — 99232 SBSQ HOSP IP/OBS MODERATE 35: CPT | Performed by: PHYSICIAN ASSISTANT

## 2023-10-30 PROCEDURE — 99024 POSTOP FOLLOW-UP VISIT: CPT | Performed by: STUDENT IN AN ORGANIZED HEALTH CARE EDUCATION/TRAINING PROGRAM

## 2023-10-30 PROCEDURE — 93010 ELECTROCARDIOGRAM REPORT: CPT | Performed by: INTERNAL MEDICINE

## 2023-10-30 PROCEDURE — 93005 ELECTROCARDIOGRAM TRACING: CPT

## 2023-10-30 PROCEDURE — 82550 ASSAY OF CK (CPK): CPT | Performed by: NURSE PRACTITIONER

## 2023-10-30 RX ORDER — BISACODYL 10 MG
10 SUPPOSITORY, RECTAL RECTAL DAILY PRN
Status: DISCONTINUED | OUTPATIENT
Start: 2023-10-30 | End: 2023-11-13 | Stop reason: HOSPADM

## 2023-10-30 RX ADMIN — NICOTINE 21 MG: 21 PATCH, EXTENDED RELEASE TRANSDERMAL at 09:24

## 2023-10-30 RX ADMIN — CLONAZEPAM 0.5 MG: 0.5 TABLET ORAL at 22:18

## 2023-10-30 RX ADMIN — ACETAMINOPHEN 975 MG: 325 TABLET, FILM COATED ORAL at 13:28

## 2023-10-30 RX ADMIN — LATANOPROST 1 DROP: 50 SOLUTION OPHTHALMIC at 22:18

## 2023-10-30 RX ADMIN — OXYCODONE HYDROCHLORIDE 10 MG: 10 TABLET ORAL at 02:58

## 2023-10-30 RX ADMIN — ATORVASTATIN CALCIUM 80 MG: 80 TABLET, FILM COATED ORAL at 17:40

## 2023-10-30 RX ADMIN — HYDROMORPHONE HYDROCHLORIDE 0.5 MG: 1 INJECTION, SOLUTION INTRAMUSCULAR; INTRAVENOUS; SUBCUTANEOUS at 19:58

## 2023-10-30 RX ADMIN — OXYCODONE HYDROCHLORIDE 10 MG: 10 TABLET ORAL at 09:24

## 2023-10-30 RX ADMIN — OXYCODONE HYDROCHLORIDE 10 MG: 10 TABLET ORAL at 13:28

## 2023-10-30 RX ADMIN — UMECLIDINIUM BROMIDE AND VILANTEROL TRIFENATATE 1 PUFF: 62.5; 25 POWDER RESPIRATORY (INHALATION) at 09:30

## 2023-10-30 RX ADMIN — KETAMINE HYDROCHLORIDE 0.2 MG/KG/HR: 50 INJECTION INTRAMUSCULAR; INTRAVENOUS at 17:40

## 2023-10-30 RX ADMIN — OXYCODONE HYDROCHLORIDE 10 MG: 10 TABLET ORAL at 17:40

## 2023-10-30 RX ADMIN — ACETAMINOPHEN 975 MG: 325 TABLET, FILM COATED ORAL at 05:00

## 2023-10-30 RX ADMIN — ACETAMINOPHEN 975 MG: 325 TABLET, FILM COATED ORAL at 22:18

## 2023-10-30 RX ADMIN — ENOXAPARIN SODIUM 40 MG: 40 INJECTION SUBCUTANEOUS at 17:40

## 2023-10-30 RX ADMIN — ENOXAPARIN SODIUM 40 MG: 40 INJECTION SUBCUTANEOUS at 04:54

## 2023-10-30 RX ADMIN — SENNOSIDES, DOCUSATE SODIUM 1 TABLET: 8.6; 5 TABLET ORAL at 22:18

## 2023-10-30 NOTE — PLAN OF CARE
Problem: HEMATOLOGIC - ADULT  Goal: Maintains hematologic stability  Description: INTERVENTIONS  - Assess for signs and symptoms of bleeding or hemorrhage  - Monitor labs  - Administer supportive blood products/factors as ordered and appropriate  Outcome: Progressing     Problem: MUSCULOSKELETAL - ADULT  Goal: Maintain or return mobility to safest level of function  Description: INTERVENTIONS:  - Assess patient's ability to carry out ADLs; assess patient's baseline for ADL function and identify physical deficits which impact ability to perform ADLs (bathing, care of mouth/teeth, toileting, grooming, dressing, etc.)  - Assess/evaluate cause of self-care deficits   - Assess range of motion  - Assess patient's mobility  - Assess patient's need for assistive devices and provide as appropriate  - Encourage maximum independence but intervene and supervise when necessary  - Involve family in performance of ADLs  - Assess for home care needs following discharge   - Consider OT consult to assist with ADL evaluation and planning for discharge  - Provide patient education as appropriate  Outcome: Progressing  Goal: Maintain proper alignment of affected body part  Description: INTERVENTIONS:  - Support, maintain and protect limb and body alignment  - Provide patient/ family with appropriate education  Outcome: Progressing     Problem: PAIN - ADULT  Goal: Verbalizes/displays adequate comfort level or baseline comfort level  Description: Interventions:  - Encourage patient to monitor pain and request assistance  - Assess pain using appropriate pain scale  - Administer analgesics based on type and severity of pain and evaluate response  - Implement non-pharmacological measures as appropriate and evaluate response  - Consider cultural and social influences on pain and pain management  - Notify physician/advanced practitioner if interventions unsuccessful or patient reports new pain  Outcome: Progressing     Problem: INFECTION - ADULT  Goal: Absence or prevention of progression during hospitalization  Description: INTERVENTIONS:  - Assess and monitor for signs and symptoms of infection  - Monitor lab/diagnostic results  - Monitor all insertion sites, i.e. indwelling lines, tubes, and drains  - Monitor endotracheal if appropriate and nasal secretions for changes in amount and color  - [place] appropriate cooling/warming therapies per order  - Administer medications as ordered  - Instruct and encourage patient and family to use good hand hygiene technique  - Identify and instruct in appropriate isolation precautions for identified infection/condition  Outcome: Progressing     Problem: SAFETY ADULT  Goal: Patient will remain free of falls  Description: INTERVENTIONS:  - Educate patient/family on patient safety including physical limitations  - Instruct patient to call for assistance with activity   - Consult OT/PT to assist with strengthening/mobility   - Keep Call bell within reach  - Keep bed low and locked with side rails adjusted as appropriate  - Keep care items and personal belongings within reach  - Initiate and maintain comfort rounds  - Make Fall Risk Sign visible to staff  - Offer Toileting every 2 Hours, in advance of need  - Initiate/Maintain bed alarm  - Obtain necessary fall risk management equipment:   - Apply yellow socks and bracelet for high fall risk patients  - Consider moving patient to room near nurses station  Outcome: Progressing  Goal: Maintain or return to baseline ADL function  Description: INTERVENTIONS:  -  Assess patient's ability to carry out ADLs; assess patient's baseline for ADL function and identify physical deficits which impact ability to perform ADLs (bathing, care of mouth/teeth, toileting, grooming, dressing, etc.)  - Assess/evaluate cause of self-care deficits   - Assess range of motion  - Assess patient's mobility; develop plan if impaired  - Assess patient's need for assistive devices and provide as appropriate  - Encourage maximum independence but intervene and supervise when necessary  - Involve family in performance of ADLs  - Assess for home care needs following discharge   - Consider OT consult to assist with ADL evaluation and planning for discharge  - Provide patient education as appropriate  Outcome: Progressing  Goal: Maintains/Returns to pre admission functional level  Description: INTERVENTIONS:  - Perform BMAT or MOVE assessment daily.   - Set and communicate daily mobility goal to care team and patient/family/caregiver. - Collaborate with rehabilitation services on mobility goals if consulted  - Perform Range of Motion 3 times a day. - Reposition patient every 2 hours. - Dangle patient 3 times a day  - Stand patient 3 times a day  - Ambulate patient 3 times a day  - Out of bed to chair 3 times a day   - Out of bed for meals 3 times a day  - Out of bed for toileting  - Record patient progress and toleration of activity level   Outcome: Progressing     Problem: DISCHARGE PLANNING  Goal: Discharge to home or other facility with appropriate resources  Description: INTERVENTIONS:  - Identify barriers to discharge w/patient and caregiver  - Arrange for needed discharge resources and transportation as appropriate  - Identify discharge learning needs (meds, wound care, etc.)  - Arrange for interpretive services to assist at discharge as needed  - Refer to Case Management Department for coordinating discharge planning if the patient needs post-hospital services based on physician/advanced practitioner order or complex needs related to functional status, cognitive ability, or social support system  Outcome: Progressing     Problem: Knowledge Deficit  Goal: Patient/family/caregiver demonstrates understanding of disease process, treatment plan, medications, and discharge instructions  Description: Complete learning assessment and assess knowledge base.   Interventions:  - Provide teaching at level of understanding  - Provide teaching via preferred learning methods  Outcome: Progressing     Problem: MOBILITY - ADULT  Goal: Maintain or return to baseline ADL function  Description: INTERVENTIONS:  -  Assess patient's ability to carry out ADLs; assess patient's baseline for ADL function and identify physical deficits which impact ability to perform ADLs (bathing, care of mouth/teeth, toileting, grooming, dressing, etc.)  - Assess/evaluate cause of self-care deficits   - Assess range of motion  - Assess patient's mobility; develop plan if impaired  - Assess patient's need for assistive devices and provide as appropriate  - Encourage maximum independence but intervene and supervise when necessary  - Involve family in performance of ADLs  - Assess for home care needs following discharge   - Consider OT consult to assist with ADL evaluation and planning for discharge  - Provide patient education as appropriate  Outcome: Progressing  Goal: Maintains/Returns to pre admission functional level  Description: INTERVENTIONS:  - Perform BMAT or MOVE assessment daily.   - Set and communicate daily mobility goal to care team and patient/family/caregiver. - Collaborate with rehabilitation services on mobility goals if consulted  - Perform Range of Motion 3 times a day. - Reposition patient every 2 hours.   - Dangle patient 3 times a day  - Stand patient 3 times a day  - Ambulate patient 3 times a day  - Out of bed to chair 3 times a day   - Out of bed for meals 3 times a day  - Out of bed for toileting  - Record patient progress and toleration of activity level   Outcome: Progressing     Problem: Prexisting or High Potential for Compromised Skin Integrity  Goal: Skin integrity is maintained or improved  Description: INTERVENTIONS:  - Identify patients at risk for skin breakdown  - Assess and monitor skin integrity  - Assess and monitor nutrition and hydration status  - Monitor labs   - Assess for incontinence   - Turn and reposition patient  - Assist with mobility/ambulation  - Relieve pressure over bony prominences  - Avoid friction and shearing  - Provide appropriate hygiene as needed including keeping skin clean and dry  - Evaluate need for skin moisturizer/barrier cream  - Collaborate with interdisciplinary team   - Patient/family teaching  - Consider wound care consult   Outcome: Progressing

## 2023-10-30 NOTE — PROGRESS NOTES
[address] [place] [address]  Progress Note  Name: Darell Contreras  MRN: [id number]  Unit/Bed#: [facility] 585-88 I Date of Admission: 10/27/2023   Date of Service: 10/30/2023 I Hospital Day: 3    Assessment/Plan   Compartment syndrome [facility])  Assessment & Plan  Patient presented with Crush injury to RUE  - initial CK 5942 increased to 13,683  - LR bolus given and placed on cont at 125 ml/hr  - Serial exam: increased paresthesia to 4 digits on Rt Hand  - Increased swelling to Dorsal aspect of forearm  -  artery via doppler +  - 10/27 OR for fasciotomy due to compartment syndrome  - S/P RUE fasciotomy and VAC dressing placement  - Trend CK 9 down to 4185 yesterday  - OR partial closure and VAC change 10/29    Fall  Assessment & Plan  Patient had unwitnessed fall.  + Lightheadedness, CP  - Syncopal workup: EKG pending, Troponin Negative  - Will order ECHO- pending  - RUE Pain  - Right Xray of hand/forearm/elbow Negative for Osseous injury  - Concern for Compartment syndrome  - Monitor      Chronic pain  Assessment & Plan  Patient with chronic patient Fibromyalgia, chronic LLB pain, Chronic pain syndrome, peripheral neuropathy  - Consult APS  - Post op block  - Ketamine gtt started by APS on 10/28     Hypothyroid  Assessment & Plan  On levothyroxine will continue in hospital             Bowel Regimen: Senna, Colace, + BM today  VTE Prophylaxis:Enoxaparin (Lovenox)     Disposition: PT/OT recommending rehab    Subjective   Chief Complaint: Arm pain    Subjective: Patient stated this am that her arm still is painful however is much improved with the Ketamine. Objective   Vitals:   Temp:  [97.6 °F (36.4 °C)-98.4 °F (36.9 °C)] 97.8 °F (36.6 °C)  HR:  [60-74] 73  Resp:  [15-22] 16  BP: (119-152)/(70-93) 119/73    I/O         10/28 0701  10/29 0700 10/29 0701  10/30 0700 10/30 0701  10/31 0700    P. O. 350 240     I.V. (mL/kg) 807 (10) 1984.7 (24.6)     Total Intake(mL/kg) 1157 (14.3) 2224.7 (27.6) Urine (mL/kg/hr) 2700 (1.4) 1300 (0.7)     Drains 425 200     Total Output 3125 1500     Net -1968 +724.7            Unmeasured Urine Occurrence 1 x 1 x              Physical Exam:   GENERAL APPEARANCE: NAD appears comfortable lying in bed  NEURO: Intact, GCS 15 alert and oriented x 3  HEENT: PERRL  CV: RRR   LUNGS: CTA B/L throughout  GI: Abdomen soft, NT, ND, +BS x 4  : Voiding on own, clear yellow urine  MSK: RUE Strength 2-5,  is improving still not able to fully close hand, sensation has improved full sensation in all 5 digits. SKIN: Swelling is improving. Skin still with some blisters on inner volar side. VAC in place     Invasive Devices       Peripheral Intravenous Line  Duration             Peripheral IV 10/27/23 Left Antecubital 3 days    Peripheral IV 10/29/23 Left Forearm <1 day                          Lab Results: Results: I have personally reviewed all pertinent laboratory/tests results, BMP/CMP: No results found for: "SODIUM", "K", "CL", "CO2", "ANIONGAP", "BUN", "CREATININE", "GLUCOSE", "CALCIUM", "AST", "ALT", "ALKPHOS", "PROT", "BILITOT", "EGFR", and CBC: No results found for: "WBC", "HGB", "HCT", "MCV", "PLT", "ADJUSTEDWBC", "RBC", "MCH", "MCHC", "RDW", "MPV", "NRBC"  CK down to 2,966 no need for continued check  Imaging: I have personally reviewed pertinent reports.      Other Studies: None

## 2023-10-30 NOTE — ASSESSMENT & PLAN NOTE
Patient had unwitnessed fall.  + Lightheadedness, CP  - Syncopal workup: EKG Unremarkable, Troponin's Negative  - Will order ECHO- pending  - RUE Pain  - Right Xray of hand/forearm/elbow Negative for Osseous injury  - Concern for Compartment syndrome  - Monitor

## 2023-10-30 NOTE — PROGRESS NOTES
Progress Note - Acute Pain Service    Kirti Woodall 65 y.o. female MRN: [id number]  Unit/Bed#: [facility] 622-01 Encounter: [id number]      Selam Ward is a 65 y.o. female with compartment syndrome of the right forearm status post fasciotomy. Compartment syndrome [facility])  Assessment & Plan  Right forearm CS s/p right forearm fasciotomy with wound vac placement on 10/27  S/p right infraclavicular block with Exparel on 10/27  Will hold off on further regional analgesic techniques given nerve deficits of right forearm and monitoring for return of nerve function  MMA as below  Continue PO oxycodone to 5/10mg q3hr PRN for mod-severe pain  Increase IV ketamine to 0.2mg/kg/hr for analgesic support (started 10/28)  Continue IV dilaudid 0.5mg q3hr PRN for breakthrough  Continue Lidoderm patch  Continue PO Tylenol 975mg q8hr princess  Continue tizanidine 4 mg p.o. every 6 hours as needed muscle spasm. Agree with holding Lyrica in the setting of rising CK/monitoring for ALEX due to rhabdomyolysis  Holding NSAIDs in the setting of renal function monitoring given elevated CK/rhabdomyolysis  Klonopin 0.5 mg PO QHS ordered since 10/27. Chronic pain  Assessment & Plan  Hx of cervical-lumbar spondylosis/DJD  Inactive spinal cord stimulator  Recent left cervical MBB with [facility]  Home pain regimen: Norco 10-325mg q8hr PRN (takes 30mg. day), Lyrica 100/150mg QID (150mg at bedtime), Tizanidine 4mg q6hr PRN for spasms              APS will continue to follow. Please contact Acute Pain Service - via [employer] from [phone] with additional questions or concerns. See [employer] or Janes for additional contacts and after hours information. Pain History  Current pain location(s):  Pain Score: 10  Pain Location/Orientation: Location: Generalized  Pain Scale: Pain Assessment Tool: 0-10  24 hour history: Patient states pain not fully controlled on current regimen. Complains of a burning, pulling sensation in the right arm.   States that since initiation of ketamine symptoms have improved. Opioid requirement previous 24 hours: Oxycodone 40 mg p.o., Dilaudid 0.5 mg IV. Meds/Allergies   all current active meds have been reviewed, current meds:   Current Facility-Administered Medications   Medication Dose Route Frequency    acetaminophen (TYLENOL) tablet 975 mg  975 mg Oral Q8H [facility]    albuterol (PROVENTIL HFA,VENTOLIN HFA) inhaler 2 puff  2 puff Inhalation Q4H PRN    atorvastatin (LIPITOR) tablet 80 mg  80 mg Oral QPM    bisacodyl (DULCOLAX) rectal suppository 10 mg  10 mg Rectal Daily PRN    clonazePAM (KlonoPIN) tablet 0.5 mg  0.5 mg Oral HS    enoxaparin (LOVENOX) subcutaneous injection 40 mg  40 mg Subcutaneous Q12H    HYDROmorphone (DILAUDID) injection 0.5 mg  0.5 mg Intravenous Q3H PRN    ketamine 250 mg (STANDARD CONCENTRATION) IV in sodium chloride 0.9% 250 mL  0.2 mg/kg/hr Intravenous Continuous    latanoprost (XALATAN) 0.005 % ophthalmic solution 1 drop  1 drop Both Eyes HS    lidocaine (LIDODERM) 5 % patch 1 patch  1 patch Topical Daily    nicotine (NICODERM CQ) 21 mg/24 hr TD 24 hr patch 21 mg  21 mg Transdermal Daily    ondansetron (ZOFRAN) injection 4 mg  4 mg Intravenous Q4H PRN    oxyCODONE (ROXICODONE) IR tablet 5 mg  5 mg Oral Q3H PRN    Or    oxyCODONE (ROXICODONE) immediate release tablet 10 mg  10 mg Oral Q3H PRN    polyethylene glycol (MIRALAX) packet 17 g  17 g Oral Daily PRN    senna-docusate sodium (SENOKOT S) 8.6-50 mg per tablet 1 tablet  1 tablet Oral HS    tiZANidine (ZANAFLEX) tablet 4 mg  4 mg Oral Q6H PRN    umeclidinium-vilanterol 62.5-25 mcg/actuation inhaler 1 puff  1 puff Inhalation Daily   , and PTA meds:   Prior to Admission Medications   Prescriptions Last Dose Informant Patient Reported? Taking?    Alcohol Swabs (Alcohol Pads) 70 % PADS   Yes No   Sig: USE TO TEST BLOOD GLUCOSE 2-3 TIMES DAILY   Buprenorphine HCl (Belbuca) 300 MCG FILM  Self Yes No   Sig: Apply 300 mcg to cheek in the morning Butalbital-APAP-Caffeine (FIORICET PO)   Yes No   Sig: Take by mouth as needed   Fluticasone-Salmeterol (Advair) 500-50 mcg/dose inhaler Not Taking  Yes No   Patient not taking: Reported on 10/27/2023   Galcanezumab-gnlm (Emgality) 120 MG/ML SOAJ  Self Yes No   Sig: Inject 120 mg under the skin every 30 (thirty) days Last inj 1/19/23   HYDROcodone-acetaminophen (NORCO)  mg per tablet   Yes No   Sig: every 8 (eight) hours as needed   Melatonin 10 MG CAPS  Self Yes No   Sig: Take 1 tablet by mouth daily at bedtime as needed   OneTouch Ultra test strip   Yes No   Sig: USE TO TEST BLOOD GLUCOSE 2-3 TIMES DAILY   SUMAtriptan (IMITREX) 100 mg tablet Not Taking  Yes No   Patient not taking: Reported on 10/27/2023   True Comfort Safety Lancets MISC   Yes No   Sig: USE TO TEST BLOOD GLUCOSE 2-3 TIMES DAILY   Zoster Vac Recomb Adjuvanted (Shingrix) 50 MCG/0.5ML SUSR   Yes No   Sig: inject 0.5 milliliter intramuscularly   albuterol (PROVENTIL HFA,VENTOLIN HFA) 90 mcg/act inhaler  Self Yes No   Sig: Inhale 2 puffs every 6 (six) hours as needed   ammonium lactate (LAC-HYDRIN) 12 % lotion   Yes No   Sig: as needed   atorvastatin (LIPITOR) 80 mg tablet   Yes No   Sig: Take 80 mg by mouth every evening   benzonatate (TESSALON PERLES) 100 mg capsule   No No   Sig: Take 1 capsule (100 mg total) by mouth 3 (three) times a day as needed for cough   carboxymethylcellulose 0.5 % SOLN  Self No No   Sig: Administer 1 drop to both eyes daily as needed for dry eyes   cephalexin (KEFLEX) 500 mg capsule Not Taking  Yes No   Sig: Take 1 capsule by mouth every 12 (twelve) hours   Patient not taking: Reported on 10/27/2023   ciprofloxacin (CIPRO) 250 mg tablet Not Taking  Yes No   Sig: take 1 tablet by mouth twice a day for 7 days   Patient not taking: Reported on 10/27/2023   clonazePAM (KlonoPIN) 0.5 mg tablet  Self Yes No   Sig: Take 0.5 mg by mouth daily at bedtime   clonazePAM (KlonoPIN) 1 mg tablet  Self Yes No   Sig: Take 1 mg by mouth daily in the early morning   dicyclomine (BENTYL) 20 mg tablet  Self Yes No   Sig: Take 20 mg by mouth every 6 (six) hours   doxycycline hyclate (VIBRA-TABS) 100 mg tablet Not Taking  Yes No   Sig: take 1 tablet by mouth twice a day for 7 days   Patient not taking: Reported on 2023   famotidine (PEPCID) 40 MG tablet   No No   Sig: Take 1 tablet (40 mg total) by mouth daily at bedtime   fenofibrate (TRICOR) 48 mg tablet  Self Yes No   Sig: Take 48 mg by mouth daily   fluconazole (DIFLUCAN) 150 mg tablet Not Taking  Yes No   Sig: take 1 tablet by mouth for 1 day   Patient not taking: Reported on 10/27/2023   fluticasone (FLONASE) 50 mcg/act nasal spray   Yes No   Si sprays into each nostril daily   fluticasone-umeclidinium-vilanterol (Trelegy Ellipta) 200-62.5-25 mcg/actuation AEPB inhaler   No No   Sig: Inhale 1 puff daily Rinse mouth after use. furosemide (LASIX) 20 mg tablet  Self Yes No   Sig: Take 20 mg by mouth as needed Taking as needed   ibuprofen (MOTRIN) 800 mg tablet   Yes No   Sig: take 1 tablet by mouth every 8 hours if needed for mild pain or fever for up to 5 days   lamoTRIgine (LaMICtal) 100 mg tablet   Yes No   Sig: Take 100 mg by mouth 2 (two) times a day   latanoprost (XALATAN) 0.005 % ophthalmic solution   Yes No   Sig: instill 1 drop into both eyes at bedtime   levalbuterol (XOPENEX) 1.25 mg/3 mL nebulizer solution   Yes No   Sig: Inhale 1.25 mg every 4 (four) hours as needed   levothyroxine 137 mcg tablet  Self Yes No   Sig: Take 137 mcg by mouth daily   lubiprostone (AMITIZA) 8 mcg capsule Not Taking  No No   Sig: Take 1 PO BID for constipation.    Patient not taking: Reported on 10/27/2023   magnesium 30 MG tablet  Self Yes No   Sig: Take 500 mg by mouth 2 (two) times a day 23 right now not taking   methylPREDNISolone 4 MG tablet therapy pack   No No   Sig: Use as directed on package   metroNIDAZOLE (FLAGYL) 500 mg tablet   Yes No   Sig: Take 1 tablet by mouth 2 (two) times a day   naloxone (Narcan) 4 mg/0.1 mL nasal spray   Yes No   Patient not taking: Reported on 2023   omeprazole (PriLOSEC) 40 MG capsule   No No   Sig: Take 1 capsule (40 mg total) by mouth 2 (two) times a day   ondansetron (ZOFRAN) 4 mg tablet Not Taking  Yes No   Sig: Take 4 mg by mouth every 8 (eight) hours as needed   Patient not taking: Reported on 10/27/2023   ondansetron (ZOFRAN) 8 mg tablet   No No   Sig: Take 1 tablet (8 mg total) by mouth every 8 (eight) hours as needed for nausea or vomiting   polyethylene glycol (GLYCOLAX) 17 GM/SCOOP powder Not Taking  No No   Sig: Take 17 g daily for constipation. Patient not taking: Reported on 10/27/2023   pregabalin (LYRICA) 100 mg capsule  Self Yes No   Sig: Take 100 mg by mouth 3 (three) times a day Morning-lunch-dinner   pregabalin (LYRICA) 150 mg capsule  Self Yes No   Sig: take 1 capsule by mouth NIGHTLY   risperiDONE (RisperDAL) 0.5 mg tablet   Yes No   Sig: Take 0.25 mg by mouth 2 (two) times a day   Patient not taking: Reported on 2023   rizatriptan (MAXALT-MLT) 10 mg disintegrating tablet   Yes No   Sig: TAKE 1 TABLET BY MOUTH ONE TIME AS NEEDED FOR MIGRAINE, MAY REPEA. ..  (REFER TO PRESCRIPTION NOTES). tiZANidine (ZANAFLEX) 4 mg tablet   Yes No   Sig: take 2 tablets by mouth every 6 hours if needed for muscle spasm   topiramate (TOPAMAX) 100 mg tablet Not Taking Self Yes No   Sig: Take 100 mg by mouth every 6 (six) hours as needed Takes 2 tabs every 6 hours   Patient not taking: Reported on 10/27/2023   topiramate (TOPAMAX) 200 MG tablet   Yes No   Si mg 2 (two) times a day      Facility-Administered Medications: None       Allergies   Allergen Reactions    Hydroxyzine Anaphylaxis     Claims it gives her convulsions.      Other Other (See Comments)    Pollen Extract Itching    Tree Extract     Clarithromycin Rash     Pt denies    Latex Rash    Sulfa Antibiotics Rash     "severe skin burn"       Objective        Vitals:    10/29/23 9093 10/30/23 0251 10/30/23 0744 10/30/23 1115   BP: 134/88 135/88 119/73 122/75   BP Location:       Pulse: 64 68 73 69   Resp:   16    Temp: 98.3 °F (36.8 °C) 97.9 °F (36.6 °C) 97.8 °F (36.6 °C) 98.2 °F (36.8 °C)   TempSrc:       SpO2: 98% 99% 97% 97%   Weight:       Height:             Physical Exam  Vitals and nursing note reviewed. Constitutional:       General: She is awake. She is not in acute distress. Appearance: She is not ill-appearing, toxic-appearing or diaphoretic. Skin:     General: Skin is warm and dry. Neurological:      Mental Status: She is alert and oriented to person, place, and time. GCS: GCS eye subscore is 4. GCS verbal subscore is 5. GCS motor subscore is 6. Psychiatric:         Attention and Perception: Attention normal.         Speech: Speech normal.         Behavior: Behavior normal. Behavior is cooperative. Lab Results:   Estimated Creatinine Clearance: 114.6 mL/min (A) (by C-G formula based on SCr of 0.53 mg/dL (L)).   Lab Results   Component Value Date    WBC 7.94 10/29/2023    WBC 5.90 08/10/2015    HGB 12.2 10/29/2023    HGB 11.9 08/10/2015    HCT 37.5 10/29/2023    HCT 34.9 08/10/2015     10/29/2023     (L) 08/10/2015         Component Value Date/Time     08/10/2015 0612    K 3.5 10/29/2023 0546    K 4.2 08/10/2015 0612     (H) 10/29/2023 0546     08/10/2015 0612    CO2 24 10/29/2023 0546    CO2 28 08/10/2015 0612    BUN 9 10/29/2023 0546    BUN 10 08/10/2015 0612    CREATININE 0.53 (L) 10/29/2023 0546    CREATININE 0.73 08/10/2015 0612         Component Value Date/Time    CALCIUM 7.8 (L) 10/29/2023 0546    CALCIUM 8.7 08/10/2015 0612    ALKPHOS 66 10/27/2023 0458    ALKPHOS 66 08/10/2015 0612     (H) 10/27/2023 0458    AST 19 08/10/2015 0612    ALT 63 (H) 10/27/2023 0458    ALT 31 08/10/2015 0612    BILITOT 0.29 08/10/2015 0612    TP 6.1 (L) 10/27/2023 0458    ALB 3.6 10/27/2023 0458    ALB 3.4 (L) 08/10/2015 6856 Imaging Studies/EKG: I have personally reviewed pertinent reports. Counseling / Coordination of Care  Total floor / unit time spent today 30 minutes minutes. Greater than 50% of total time was spent with the patient and / or family counseling and / or coordination of care. A description of the counseling / coordination of care: Patient interview, physical examination, review of medical records, review of imaging and laboratory data, development of pain management plan, discussion of pain management plan with patient and primary service. Please note that the APS provides consultative services regarding pain management only. With the exception of ketamine and epidural infusions and except when indicated, final decisions regarding starting or changing doses of analgesic medications are at the discretion of the consulting service.     Tenisha Scott PA-C   Acute Pain Service

## 2023-10-30 NOTE — ASSESSMENT & PLAN NOTE
Patient presented with Crush injury to RUE  - initial CK 5942 increased to 13,683  - LR bolus given and placed on cont at 125 ml/hr  - Serial exam: increased paresthesia to 4 digits on Rt Hand  - Increased swelling to Dorsal aspect of forearm  -  artery via doppler +  - 10/27 OR for fasciotomy due to compartment syndrome  - S/P RUE fasciotomy and VAC dressing placement  - Trend CK 9 down to 4185 yesterday  - OR yesterday for partial closure and VAC change 10/29

## 2023-10-31 ENCOUNTER — APPOINTMENT (INPATIENT)
Dept: NON INVASIVE DIAGNOSTICS | Facility: HOSPITAL | Age: 63
DRG: 464 | End: 2023-10-31
Payer: COMMERCIAL

## 2023-10-31 PROBLEM — R19.7 DIARRHEA: Status: ACTIVE | Noted: 2023-10-31

## 2023-10-31 LAB
ANION GAP SERPL CALCULATED.3IONS-SCNC: 6 MMOL/L
AORTIC ROOT: 3 CM
APICAL FOUR CHAMBER EJECTION FRACTION: 59 %
ASCENDING AORTA: 3.3 CM
BUN SERPL-MCNC: 12 MG/DL (ref 5–25)
CALCIUM SERPL-MCNC: 7.8 MG/DL (ref 8.4–10.2)
CHLORIDE SERPL-SCNC: 113 MMOL/L (ref 96–108)
CO2 SERPL-SCNC: 23 MMOL/L (ref 21–32)
CREAT SERPL-MCNC: 0.4 MG/DL (ref 0.6–1.3)
E WAVE DECELERATION TIME: 318 MS
E/A RATIO: 0.75
ERYTHROCYTE [DISTWIDTH] IN BLOOD BY AUTOMATED COUNT: 12.8 % (ref 11.6–15.1)
FRACTIONAL SHORTENING: 26 (ref 28–44)
GFR SERPL CREATININE-BSD FRML MDRD: 112 ML/MIN/1.73SQ M
GLUCOSE SERPL-MCNC: 120 MG/DL (ref 65–140)
HCT VFR BLD AUTO: 36.9 % (ref 34.8–46.1)
HGB BLD-MCNC: 12.5 G/DL (ref 11.5–15.4)
INTERVENTRICULAR SEPTUM IN DIASTOLE (PARASTERNAL SHORT AXIS VIEW): 1 CM
INTERVENTRICULAR SEPTUM: 1 CM (ref 0.6–1.1)
LAAS-AP2: 10.7 CM2
LAAS-AP4: 7.1 CM2
LEFT ATRIUM SIZE: 2.6 CM
LEFT ATRIUM VOLUME (MOD BIPLANE): 17 ML
LEFT ATRIUM VOLUME INDEX (MOD BIPLANE): 9.1 ML/M2
LEFT INTERNAL DIMENSION IN SYSTOLE: 2.8 CM (ref 2.1–4)
LEFT VENTRICULAR INTERNAL DIMENSION IN DIASTOLE: 3.8 CM (ref 3.5–6)
LEFT VENTRICULAR POSTERIOR WALL IN END DIASTOLE: 0.8 CM
LEFT VENTRICULAR STROKE VOLUME: 32 ML
LVSV (TEICH): 32 ML
MCH RBC QN AUTO: 32 PG (ref 26.8–34.3)
MCHC RBC AUTO-ENTMCNC: 33.9 G/DL (ref 31.4–37.4)
MCV RBC AUTO: 94 FL (ref 82–98)
MV PEAK A VEL: 0.95 M/S
MV PEAK E VEL: 71 CM/S
MV STENOSIS PRESSURE HALF TIME: 92 MS
MV VALVE AREA P 1/2 METHOD: 2.39
PLATELET # BLD AUTO: 168 THOUSANDS/UL (ref 149–390)
PMV BLD AUTO: 10.1 FL (ref 8.9–12.7)
POTASSIUM SERPL-SCNC: 3.3 MMOL/L (ref 3.5–5.3)
RBC # BLD AUTO: 3.91 MILLION/UL (ref 3.81–5.12)
SL CV LEFT ATRIUM LENGTH A2C: 3.8 CM
SL CV LV EF: 60
SL CV PED ECHO LEFT VENTRICLE DIASTOLIC VOLUME (MOD BIPLANE) 2D: 60 ML
SL CV PED ECHO LEFT VENTRICLE SYSTOLIC VOLUME (MOD BIPLANE) 2D: 29 ML
SODIUM SERPL-SCNC: 142 MMOL/L (ref 135–147)
TRICUSPID ANNULAR PLANE SYSTOLIC EXCURSION: 1.9 CM
WBC # BLD AUTO: 7.04 THOUSAND/UL (ref 4.31–10.16)

## 2023-10-31 PROCEDURE — 93306 TTE W/DOPPLER COMPLETE: CPT | Performed by: INTERNAL MEDICINE

## 2023-10-31 PROCEDURE — 97606 NEG PRS WND THER DME>50 SQCM: CPT | Performed by: PHYSICIAN ASSISTANT

## 2023-10-31 PROCEDURE — 85027 COMPLETE CBC AUTOMATED: CPT | Performed by: PHYSICIAN ASSISTANT

## 2023-10-31 PROCEDURE — 93306 TTE W/DOPPLER COMPLETE: CPT

## 2023-10-31 PROCEDURE — 99233 SBSQ HOSP IP/OBS HIGH 50: CPT | Performed by: PHYSICIAN ASSISTANT

## 2023-10-31 PROCEDURE — 99024 POSTOP FOLLOW-UP VISIT: CPT | Performed by: STUDENT IN AN ORGANIZED HEALTH CARE EDUCATION/TRAINING PROGRAM

## 2023-10-31 PROCEDURE — 80048 BASIC METABOLIC PNL TOTAL CA: CPT | Performed by: PHYSICIAN ASSISTANT

## 2023-10-31 PROCEDURE — 2W0CX6Z CHANGE PRESSURE DRESSING ON RIGHT LOWER ARM: ICD-10-PCS | Performed by: STUDENT IN AN ORGANIZED HEALTH CARE EDUCATION/TRAINING PROGRAM

## 2023-10-31 RX ORDER — HYDROMORPHONE HYDROCHLORIDE 2 MG/1
2 TABLET ORAL EVERY 4 HOURS PRN
Status: DISCONTINUED | OUTPATIENT
Start: 2023-10-31 | End: 2023-11-13 | Stop reason: HOSPADM

## 2023-10-31 RX ORDER — ENOXAPARIN SODIUM 100 MG/ML
30 INJECTION SUBCUTANEOUS EVERY 12 HOURS
Status: DISCONTINUED | OUTPATIENT
Start: 2023-10-31 | End: 2023-11-13 | Stop reason: HOSPADM

## 2023-10-31 RX ORDER — HYDROMORPHONE HYDROCHLORIDE 2 MG/1
4 TABLET ORAL EVERY 4 HOURS PRN
Status: DISCONTINUED | OUTPATIENT
Start: 2023-10-31 | End: 2023-11-13 | Stop reason: HOSPADM

## 2023-10-31 RX ADMIN — HYDROMORPHONE HYDROCHLORIDE 4 MG: 2 TABLET ORAL at 21:20

## 2023-10-31 RX ADMIN — UMECLIDINIUM BROMIDE AND VILANTEROL TRIFENATATE 1 PUFF: 62.5; 25 POWDER RESPIRATORY (INHALATION) at 09:07

## 2023-10-31 RX ADMIN — HYDROMORPHONE HYDROCHLORIDE 4 MG: 2 TABLET ORAL at 12:20

## 2023-10-31 RX ADMIN — NICOTINE 21 MG: 21 PATCH, EXTENDED RELEASE TRANSDERMAL at 09:04

## 2023-10-31 RX ADMIN — ACETAMINOPHEN 975 MG: 325 TABLET, FILM COATED ORAL at 05:08

## 2023-10-31 RX ADMIN — ENOXAPARIN SODIUM 40 MG: 40 INJECTION SUBCUTANEOUS at 05:08

## 2023-10-31 RX ADMIN — LIDOCAINE 5% 1 PATCH: 700 PATCH TOPICAL at 09:05

## 2023-10-31 RX ADMIN — ACETAMINOPHEN 975 MG: 325 TABLET, FILM COATED ORAL at 21:20

## 2023-10-31 RX ADMIN — ENOXAPARIN SODIUM 30 MG: 30 INJECTION SUBCUTANEOUS at 17:33

## 2023-10-31 RX ADMIN — HYDROMORPHONE HYDROCHLORIDE 4 MG: 2 TABLET ORAL at 16:58

## 2023-10-31 RX ADMIN — ACETAMINOPHEN 975 MG: 325 TABLET, FILM COATED ORAL at 14:46

## 2023-10-31 RX ADMIN — OXYCODONE HYDROCHLORIDE 10 MG: 10 TABLET ORAL at 09:06

## 2023-10-31 RX ADMIN — ATORVASTATIN CALCIUM 80 MG: 80 TABLET, FILM COATED ORAL at 17:33

## 2023-10-31 RX ADMIN — KETAMINE HYDROCHLORIDE 0.2 MG/KG/HR: 50 INJECTION INTRAMUSCULAR; INTRAVENOUS at 12:20

## 2023-10-31 RX ADMIN — LATANOPROST 1 DROP: 50 SOLUTION OPHTHALMIC at 21:21

## 2023-10-31 RX ADMIN — CLONAZEPAM 0.5 MG: 0.5 TABLET ORAL at 21:20

## 2023-10-31 NOTE — PLAN OF CARE
Problem: HEMATOLOGIC - ADULT  Goal: Maintains hematologic stability  Description: INTERVENTIONS  - Assess for signs and symptoms of bleeding or hemorrhage  - Monitor labs  - Administer supportive blood products/factors as ordered and appropriate  Outcome: Progressing     Problem: MUSCULOSKELETAL - ADULT  Goal: Maintain or return mobility to safest level of function  Description: INTERVENTIONS:  - Assess patient's ability to carry out ADLs; assess patient's baseline for ADL function and identify physical deficits which impact ability to perform ADLs (bathing, care of mouth/teeth, toileting, grooming, dressing, etc.)  - Assess/evaluate cause of self-care deficits   - Assess range of motion  - Assess patient's mobility  - Assess patient's need for assistive devices and provide as appropriate  - Encourage maximum independence but intervene and supervise when necessary  - Involve family in performance of ADLs  - Assess for home care needs following discharge   - Consider OT consult to assist with ADL evaluation and planning for discharge  - Provide patient education as appropriate  Outcome: Progressing  Goal: Maintain proper alignment of affected body part  Description: INTERVENTIONS:  - Support, maintain and protect limb and body alignment  - Provide patient/ family with appropriate education  Outcome: Progressing     Problem: PAIN - ADULT  Goal: Verbalizes/displays adequate comfort level or baseline comfort level  Description: Interventions:  - Encourage patient to monitor pain and request assistance  - Assess pain using appropriate pain scale  - Administer analgesics based on type and severity of pain and evaluate response  - Implement non-pharmacological measures as appropriate and evaluate response  - Consider cultural and social influences on pain and pain management  - Notify physician/advanced practitioner if interventions unsuccessful or patient reports new pain  Outcome: Progressing     Problem: INFECTION - ADULT  Goal: Absence or prevention of progression during hospitalization  Description: INTERVENTIONS:  - Assess and monitor for signs and symptoms of infection  - Monitor lab/diagnostic results  - Monitor all insertion sites, i.e. indwelling lines, tubes, and drains  - Monitor endotracheal if appropriate and nasal secretions for changes in amount and color  - Critz appropriate cooling/warming therapies per order  - Administer medications as ordered  - Instruct and encourage patient and family to use good hand hygiene technique  - Identify and instruct in appropriate isolation precautions for identified infection/condition  Outcome: Progressing     Problem: SAFETY ADULT  Goal: Patient will remain free of falls  Description: INTERVENTIONS:  - Educate patient/family on patient safety including physical limitations  - Instruct patient to call for assistance with activity   - Consult OT/PT to assist with strengthening/mobility   - Keep Call bell within reach  - Keep bed low and locked with side rails adjusted as appropriate  - Keep care items and personal belongings within reach  - Initiate and maintain comfort rounds  - Make Fall Risk Sign visible to staff  - Offer Toileting every 2 Hours, in advance of need  - Initiate/Maintain bed/chair alarm  - Obtain necessary fall risk management equipment:   - Apply yellow socks and bracelet for high fall risk patients  - Consider moving patient to room near nurses station  Outcome: Progressing  Goal: Maintain or return to baseline ADL function  Description: INTERVENTIONS:  -  Assess patient's ability to carry out ADLs; assess patient's baseline for ADL function and identify physical deficits which impact ability to perform ADLs (bathing, care of mouth/teeth, toileting, grooming, dressing, etc.)  - Assess/evaluate cause of self-care deficits   - Assess range of motion  - Assess patient's mobility; develop plan if impaired  - Assess patient's need for assistive devices and provide as appropriate  - Encourage maximum independence but intervene and supervise when necessary  - Involve family in performance of ADLs  - Assess for home care needs following discharge   - Consider OT consult to assist with ADL evaluation and planning for discharge  - Provide patient education as appropriate  Outcome: Progressing  Goal: Maintains/Returns to pre admission functional level  Description: INTERVENTIONS:  - Perform BMAT or MOVE assessment daily.   - Set and communicate daily mobility goal to care team and patient/family/caregiver. - Collaborate with rehabilitation services on mobility goals if consulted  - Perform Range of Motion 3 times a day. - Reposition patient every 2 hours. - Dangle patient 3 times a day  - Stand patient 3 times a day  - Ambulate patient 3 times a day  - Out of bed to chair 3 times a day   - Out of bed for meals 3 times a day  - Out of bed for toileting  - Record patient progress and toleration of activity level   Outcome: Progressing     Problem: DISCHARGE PLANNING  Goal: Discharge to home or other facility with appropriate resources  Description: INTERVENTIONS:  - Identify barriers to discharge w/patient and caregiver  - Arrange for needed discharge resources and transportation as appropriate  - Identify discharge learning needs (meds, wound care, etc.)  - Arrange for interpretive services to assist at discharge as needed  - Refer to Case Management Department for coordinating discharge planning if the patient needs post-hospital services based on physician/advanced practitioner order or complex needs related to functional status, cognitive ability, or social support system  Outcome: Progressing     Problem: Knowledge Deficit  Goal: Patient/family/caregiver demonstrates understanding of disease process, treatment plan, medications, and discharge instructions  Description: Complete learning assessment and assess knowledge base.   Interventions:  - Provide teaching at level of understanding  - Provide teaching via preferred learning methods  Outcome: Progressing     Problem: MOBILITY - ADULT  Goal: Maintain or return to baseline ADL function  Description: INTERVENTIONS:  -  Assess patient's ability to carry out ADLs; assess patient's baseline for ADL function and identify physical deficits which impact ability to perform ADLs (bathing, care of mouth/teeth, toileting, grooming, dressing, etc.)  - Assess/evaluate cause of self-care deficits   - Assess range of motion  - Assess patient's mobility; develop plan if impaired  - Assess patient's need for assistive devices and provide as appropriate  - Encourage maximum independence but intervene and supervise when necessary  - Involve family in performance of ADLs  - Assess for home care needs following discharge   - Consider OT consult to assist with ADL evaluation and planning for discharge  - Provide patient education as appropriate  Outcome: Progressing  Goal: Maintains/Returns to pre admission functional level  Description: INTERVENTIONS:  - Perform BMAT or MOVE assessment daily.   - Set and communicate daily mobility goal to care team and patient/family/caregiver. - Collaborate with rehabilitation services on mobility goals if consulted  - Perform Range of Motion 3 times a day. - Reposition patient every 2 hours.   - Dangle patient 3 times a day  - Stand patient 3 times a day  - Ambulate patient 3 times a day  - Out of bed to chair 3 times a day   - Out of bed for meals 3 times a day  - Out of bed for toileting  - Record patient progress and toleration of activity level   Outcome: Progressing     Problem: Prexisting or High Potential for Compromised Skin Integrity  Goal: Skin integrity is maintained or improved  Description: INTERVENTIONS:  - Identify patients at risk for skin breakdown  - Assess and monitor skin integrity  - Assess and monitor nutrition and hydration status  - Monitor labs   - Assess for incontinence   - Turn and reposition patient  - Assist with mobility/ambulation  - Relieve pressure over bony prominences  - Avoid friction and shearing  - Provide appropriate hygiene as needed including keeping skin clean and dry  - Evaluate need for skin moisturizer/barrier cream  - Collaborate with interdisciplinary team   - Patient/family teaching  - Consider wound care consult   Outcome: Progressing

## 2023-10-31 NOTE — UTILIZATION REVIEW
NOTIFICATION OF INPATIENT ADMISSION   AUTHORIZATION REQUEST   SERVICING FACILITY:   [address] [place] [address]  Address: [address] [place] [address], [facility] [address] [facility] [address] [id number]  Tax ID: [id number]  NPI: [id number] ATTENDING PROVIDER:  Attending Name and NPI#: Vidal Newell [[id number]]  Address: [address] [place] [address] [address] [facility] [address] [address]  Phone: [phone]   ADMISSION INFORMATION:  Place of Service: Inpatient [address] [facility]  Place of Service Code: 21  Inpatient Admission Date/Time: 10/27/23  4:46 AM  Discharge Date/Time: No discharge date for patient encounter. Admitting Diagnosis Code/Description:  Chronic pain syndrome [G89.4]  Fall, initial encounter [W19. XXXA]  Traumatic compartment syndrome of right upper extremity, initial encounter ([address]) Katerine Elder. A11A]     UTILIZATION REVIEW CONTACT:  Yandy José Utilization   Network Utilization Review Department  Phone: [phone]  Fax: [phone]  Email: [email or web address]  Contact for approvals/pending authorizations, clinical reviews, and discharge. PHYSICIAN ADVISORY SERVICES:  Medical Necessity Denial & Buwl-qo-Nsjy Review  Phone: [phone]  Fax: [phone]  Email: [email or web address]     DISCHARGE SUPPORT TEAM:  For Patients Discharge Needs & Updates  Phone: [phone] opt. 2 Fax: [phone]  Email: [email or web address]

## 2023-10-31 NOTE — ASSESSMENT & PLAN NOTE
- New onset loose stools worse with eating  - Has been on laxative - will hold now  - WBC WNL without fevers or abdominal pain  - Continue to monitor

## 2023-10-31 NOTE — ASSESSMENT & PLAN NOTE
Patient presented with Crush injury to RUE  - Initial CK 5942 increased to 13,683  - 10/27 RUE Fasciotomy due to compartment syndrome with application of vac dressing  - 10/29 washout and partial closure   - continue to monitor neurovascular status  - Vac take down today to evaluate for possible further closure on 11/2

## 2023-10-31 NOTE — CASE MANAGEMENT
Case Management Discharge Planning Note    Patient name Crystal Sport  [facility] [address] [facility] [id number]  : 1960 Date 10/31/2023       Current Admission Date: 10/27/2023  Current Admission Diagnosis:Fall   Patient Active Problem List    Diagnosis Date Noted    Diarrhea 10/31/2023    Fall 10/27/2023    Compartment syndrome ([address]) 10/27/2023    Type 2 diabetes mellitus ([address]) 10/27/2023    Screening for colon cancer 2023    Gastroesophageal reflux disease 2023    Common bile duct dilation 2023    S/P insertion of spinal cord stimulator 2023    Pulmonary nodules/lesions, multiple 2023    Diabetic polyneuropathy associated with type 2 diabetes mellitus ([address]) 2023    Coronary artery calcification seen on CAT scan 2023    Cervical spondylosis 2023    Shortness of breath 2023    Irritable bowel syndrome with both constipation and diarrhea 2022    Chronic pain 2022    Sacroiliitis ([address]) 2022    Carotid artery aneurysm (HCC) 2022    Lumbar degenerative disc disease 2022    Lumbar radiculopathy 2022    Lumbar spondylosis 2022    Cervical disc disorder with radiculopathy of mid-cervical region 2022    Mid back pain 2021    Cervical radiculopathy 2021    Neck pain 2021    Syncope and collapse 2021    Migraine     Bipolar 1 disorder (HCC)     Depression     Chronic pain syndrome     Anxiety     Fibromyalgia, primary     Constipation 2021    Ileus ([address]) 2021    COPD (chronic obstructive pulmonary disease) ([address])     Hypertension     Hypophosphatemia 2021    Gastritis, acute 2021    Hypotension 2021    Tobacco use 2021    Stroke-like symptoms 06/15/2020    Nausea 06/15/2020    Left chest pressure 06/15/2020    TMJ syndrome 2020    Other hyperlipidemia 2019    Dizziness 2019    Hypothyroid 2019    Brain aneurysm 2019 Hypokalemia 07/19/2019    Hypertriglyceridemia 07/19/2019    Low HDL (under 40) 07/19/2019    Elevated TSH 07/19/2019    Tobacco abuse 07/19/2019    Intractable abdominal pain 07/19/2019      LOS (days): 4  Geometric Mean LOS (GMLOS) (days): 1.90  Days to GMLOS:-2.5     OBJECTIVE:  Risk of Unplanned Readmission Score: 24.13         Current admission status: Inpatient   Preferred Pharmacy:   [address] [place] [address],[address]  Phone: [phone] Fax: [phone]    Primary Care Provider: Yifan Awad MD    Primary Insurance: [facility] DUAL COMPLETE [facility] REP  Secondary Insurance: Oliva Danielson    DISCHARGE DETAILS:    Pt being followed by [facility] and other SNFs for possible admission  Noted that pt isn't stable for d/c at this moment in time but will continue to monitor.

## 2023-10-31 NOTE — PROGRESS NOTES
Progress Note - Acute Pain Service    Guanakito Woodall 65 y.o. female MRN: [id number]  Unit/Bed#: [facility] 622-01 Encounter: [id number]      Juana Selby is a 65 y.o. female with compartment syndrome in the right forearm status post fasciotomy    Compartment syndrome [facility])  Assessment & Plan  Right forearm CS s/p right forearm fasciotomy with wound vac placement on 10/27  S/p right infraclavicular block with Exparel on 10/27  Will hold off on further regional analgesic techniques given nerve deficits of right forearm and monitoring for return of nerve function  MMA as below  Discontinue oxycodone. Start Dilaudid 2 mg p.o. every 4 hours as needed moderate pain or 4 mg p.o. every 4 hours as needed severe pain. Continue IV ketamine at 0.2mg/kg/hr for analgesic support (started 10/28)  Continue IV dilaudid 0.5mg q3hr PRN for breakthrough  Continue Lidoderm patch  Continue PO Tylenol 975mg q8hr princess  Continue tizanidine 4 mg p.o. every 6 hours as needed muscle spasm. Agree with holding Lyrica in the setting of rising CK/monitoring for ALEX due to rhabdomyolysis  Holding NSAIDs in the setting of renal function monitoring given elevated CK/rhabdomyolysis  Klonopin 0.5 mg PO QHS ordered since 10/27. Chronic pain  Assessment & Plan  Hx of cervical-lumbar spondylosis/DJD  Inactive spinal cord stimulator  Recent left cervical MBB with [facility]  Home pain regimen: Norco 10-325mg q8hr PRN (takes 30mg. day), Lyrica 100/150mg QID (150mg at bedtime), Tizanidine 4mg q6hr PRN for spasms              APS will continue to follow. Please contact Acute Pain Service - via [employer] from [phone] with additional questions or concerns. See [employer] or Janes for additional contacts and after hours information.      Pain History  Current pain location(s):  Pain Score: 9  Pain Location/Orientation: Orientation: Right, Location: Arm  Pain Scale: Pain Assessment Tool: 0-10  24 hour history: Patient states pain is only slightly better than yesterday after increasing ketamine infusion. Opioid requirement previous 24 hours: Oxycodone 40 mg p.o., Dilaudid 0.5 mg IV x1. Meds/Allergies   all current active meds have been reviewed, current meds:   Current Facility-Administered Medications   Medication Dose Route Frequency    acetaminophen (TYLENOL) tablet 975 mg  975 mg Oral Q8H [address]    albuterol (PROVENTIL HFA,VENTOLIN HFA) inhaler 2 puff  2 puff Inhalation Q4H PRN    atorvastatin (LIPITOR) tablet 80 mg  80 mg Oral QPM    bisacodyl (DULCOLAX) rectal suppository 10 mg  10 mg Rectal Daily PRN    clonazePAM (KlonoPIN) tablet 0.5 mg  0.5 mg Oral HS    enoxaparin (LOVENOX) subcutaneous injection 30 mg  30 mg Subcutaneous Q12H    HYDROmorphone (DILAUDID) injection 0.5 mg  0.5 mg Intravenous Q3H PRN    HYDROmorphone (DILAUDID) tablet 2 mg  2 mg Oral Q4H PRN    Or    HYDROmorphone (DILAUDID) tablet 4 mg  4 mg Oral Q4H PRN    ketamine 250 mg (STANDARD CONCENTRATION) IV in sodium chloride 0.9% 250 mL  0.2 mg/kg/hr Intravenous Continuous    latanoprost (XALATAN) 0.005 % ophthalmic solution 1 drop  1 drop Both Eyes HS    lidocaine (LIDODERM) 5 % patch 1 patch  1 patch Topical Daily    nicotine (NICODERM CQ) 21 mg/24 hr TD 24 hr patch 21 mg  21 mg Transdermal Daily    ondansetron (ZOFRAN) injection 4 mg  4 mg Intravenous Q4H PRN    polyethylene glycol (MIRALAX) packet 17 g  17 g Oral Daily PRN    tiZANidine (ZANAFLEX) tablet 4 mg  4 mg Oral Q6H PRN    umeclidinium-vilanterol 62.5-25 mcg/actuation inhaler 1 puff  1 puff Inhalation Daily   , and PTA meds:   Prior to Admission Medications   Prescriptions Last Dose Informant Patient Reported? Taking?    Alcohol Swabs (Alcohol Pads) 70 % PADS   Yes No   Sig: USE TO TEST BLOOD GLUCOSE 2-3 TIMES DAILY   Buprenorphine HCl (Belbuca) 300 MCG FILM  Self Yes No   Sig: Apply 300 mcg to cheek in the morning   Butalbital-APAP-Caffeine (FIORICET PO)   Yes No   Sig: Take by mouth as needed   Fluticasone-Salmeterol (Advair) 500-50 mcg/dose inhaler Not Taking  Yes No   Patient not taking: Reported on 10/27/2023   Galcanezumab-gnlm (Emgality) 120 MG/ML SOAJ  Self Yes No   Sig: Inject 120 mg under the skin every 30 (thirty) days Last inj 1/19/23   HYDROcodone-acetaminophen (NORCO)  mg per tablet   Yes No   Sig: every 8 (eight) hours as needed   Melatonin 10 MG CAPS  Self Yes No   Sig: Take 1 tablet by mouth daily at bedtime as needed   OneTouch Ultra test strip   Yes No   Sig: USE TO TEST BLOOD GLUCOSE 2-3 TIMES DAILY   SUMAtriptan (IMITREX) 100 mg tablet Not Taking  Yes No   Patient not taking: Reported on 10/27/2023   True Comfort Safety Lancets MISC   Yes No   Sig: USE TO TEST BLOOD GLUCOSE 2-3 TIMES DAILY   Zoster Vac Recomb Adjuvanted (Shingrix) 50 MCG/0.5ML SUSR   Yes No   Sig: inject 0.5 milliliter intramuscularly   albuterol (PROVENTIL HFA,VENTOLIN HFA) 90 mcg/act inhaler  Self Yes No   Sig: Inhale 2 puffs every 6 (six) hours as needed   ammonium lactate (LAC-HYDRIN) 12 % lotion   Yes No   Sig: as needed   atorvastatin (LIPITOR) 80 mg tablet   Yes No   Sig: Take 80 mg by mouth every evening   benzonatate (TESSALON PERLES) 100 mg capsule   No No   Sig: Take 1 capsule (100 mg total) by mouth 3 (three) times a day as needed for cough   carboxymethylcellulose 0.5 % SOLN  Self No No   Sig: Administer 1 drop to both eyes daily as needed for dry eyes   cephalexin (KEFLEX) 500 mg capsule Not Taking  Yes No   Sig: Take 1 capsule by mouth every 12 (twelve) hours   Patient not taking: Reported on 10/27/2023   ciprofloxacin (CIPRO) 250 mg tablet Not Taking  Yes No   Sig: take 1 tablet by mouth twice a day for 7 days   Patient not taking: Reported on 10/27/2023   clonazePAM (KlonoPIN) 0.5 mg tablet  Self Yes No   Sig: Take 0.5 mg by mouth daily at bedtime   clonazePAM (KlonoPIN) 1 mg tablet  Self Yes No   Sig: Take 1 mg by mouth daily in the early morning   dicyclomine (BENTYL) 20 mg tablet  Self Yes No   Sig: Take 20 mg by mouth every 6 (six) hours   doxycycline hyclate (VIBRA-TABS) 100 mg tablet Not Taking  Yes No   Sig: take 1 tablet by mouth twice a day for 7 days   Patient not taking: Reported on 2023   famotidine (PEPCID) 40 MG tablet   No No   Sig: Take 1 tablet (40 mg total) by mouth daily at bedtime   fenofibrate (TRICOR) 48 mg tablet  Self Yes No   Sig: Take 48 mg by mouth daily   fluconazole (DIFLUCAN) 150 mg tablet Not Taking  Yes No   Sig: take 1 tablet by mouth for 1 day   Patient not taking: Reported on 10/27/2023   fluticasone (FLONASE) 50 mcg/act nasal spray   Yes No   Si sprays into each nostril daily   fluticasone-umeclidinium-vilanterol (Trelegy Ellipta) 200-62.5-25 mcg/actuation AEPB inhaler   No No   Sig: Inhale 1 puff daily Rinse mouth after use. furosemide (LASIX) 20 mg tablet  Self Yes No   Sig: Take 20 mg by mouth as needed Taking as needed   ibuprofen (MOTRIN) 800 mg tablet   Yes No   Sig: take 1 tablet by mouth every 8 hours if needed for mild pain or fever for up to 5 days   lamoTRIgine (LaMICtal) 100 mg tablet   Yes No   Sig: Take 100 mg by mouth 2 (two) times a day   latanoprost (XALATAN) 0.005 % ophthalmic solution   Yes No   Sig: instill 1 drop into both eyes at bedtime   levalbuterol (XOPENEX) 1.25 mg/3 mL nebulizer solution   Yes No   Sig: Inhale 1.25 mg every 4 (four) hours as needed   levothyroxine 137 mcg tablet  Self Yes No   Sig: Take 137 mcg by mouth daily   lubiprostone (AMITIZA) 8 mcg capsule Not Taking  No No   Sig: Take 1 PO BID for constipation.    Patient not taking: Reported on 10/27/2023   magnesium 30 MG tablet  Self Yes No   Sig: Take 500 mg by mouth 2 (two) times a day 23 right now not taking   methylPREDNISolone 4 MG tablet therapy pack   No No   Sig: Use as directed on package   metroNIDAZOLE (FLAGYL) 500 mg tablet   Yes No   Sig: Take 1 tablet by mouth 2 (two) times a day   naloxone (Narcan) 4 mg/0.1 mL nasal spray   Yes No   Patient not taking: Reported on 2023   omeprazole (PriLOSEC) 40 MG capsule   No No   Sig: Take 1 capsule (40 mg total) by mouth 2 (two) times a day   ondansetron (ZOFRAN) 4 mg tablet Not Taking  Yes No   Sig: Take 4 mg by mouth every 8 (eight) hours as needed   Patient not taking: Reported on 10/27/2023   ondansetron (ZOFRAN) 8 mg tablet   No No   Sig: Take 1 tablet (8 mg total) by mouth every 8 (eight) hours as needed for nausea or vomiting   polyethylene glycol (GLYCOLAX) 17 GM/SCOOP powder Not Taking  No No   Sig: Take 17 g daily for constipation. Patient not taking: Reported on 10/27/2023   pregabalin (LYRICA) 100 mg capsule  Self Yes No   Sig: Take 100 mg by mouth 3 (three) times a day Morning-lunch-dinner   pregabalin (LYRICA) 150 mg capsule  Self Yes No   Sig: take 1 capsule by mouth NIGHTLY   risperiDONE (RisperDAL) 0.5 mg tablet   Yes No   Sig: Take 0.25 mg by mouth 2 (two) times a day   Patient not taking: Reported on 2023   rizatriptan (MAXALT-MLT) 10 mg disintegrating tablet   Yes No   Sig: TAKE 1 TABLET BY MOUTH ONE TIME AS NEEDED FOR MIGRAINE, MAY REPEA. ..  (REFER TO PRESCRIPTION NOTES). tiZANidine (ZANAFLEX) 4 mg tablet   Yes No   Sig: take 2 tablets by mouth every 6 hours if needed for muscle spasm   topiramate (TOPAMAX) 100 mg tablet Not Taking Self Yes No   Sig: Take 100 mg by mouth every 6 (six) hours as needed Takes 2 tabs every 6 hours   Patient not taking: Reported on 10/27/2023   topiramate (TOPAMAX) 200 MG tablet   Yes No   Si mg 2 (two) times a day      Facility-Administered Medications: None       Allergies   Allergen Reactions    Hydroxyzine Anaphylaxis     Claims it gives her convulsions.      Other Other (See Comments)    Pollen Extract Itching    Tree Extract     Clarithromycin Rash     Pt denies    Latex Rash    Sulfa Antibiotics Rash     "severe skin burn"       Objective        Vitals:    10/30/23 2206 10/31/23 0717 10/31/23 1011 10/31/23 1120   BP: 131/75 130/77 132/78 132/78   Pulse: 68 67 66 66   Resp: 17 16 16 Temp: 98 °F (36.7 °C) 97.9 °F (36.6 °C) 97.9 °F (36.6 °C)    TempSrc:       SpO2: 98% 96% 96%    Weight:    80.7 kg (178 lb)   Height:    5' 4" (1.626 m)         Physical Exam  Vitals and nursing note reviewed. Constitutional:       General: She is awake. She is not in acute distress. Appearance: She is not ill-appearing, toxic-appearing or diaphoretic. Skin:     General: Skin is warm and dry. Neurological:      Mental Status: She is alert and oriented to person, place, and time. GCS: GCS eye subscore is 4. GCS verbal subscore is 5. GCS motor subscore is 6. Psychiatric:         Attention and Perception: Attention normal.         Speech: Speech normal.         Behavior: Behavior normal. Behavior is cooperative. Lab Results:   Estimated Creatinine Clearance: 114.6 mL/min (A) (by C-G formula based on SCr of 0.53 mg/dL (L)). Lab Results   Component Value Date    WBC 7.94 10/29/2023    WBC 5.90 08/10/2015    HGB 12.2 10/29/2023    HGB 11.9 08/10/2015    HCT 37.5 10/29/2023    HCT 34.9 08/10/2015     10/29/2023     (L) 08/10/2015         Component Value Date/Time     08/10/2015 0612    K 3.5 10/29/2023 0546    K 4.2 08/10/2015 0612     (H) 10/29/2023 0546     08/10/2015 0612    CO2 24 10/29/2023 0546    CO2 28 08/10/2015 0612    BUN 9 10/29/2023 0546    BUN 10 08/10/2015 0612    CREATININE 0.53 (L) 10/29/2023 0546    CREATININE 0.73 08/10/2015 0612         Component Value Date/Time    CALCIUM 7.8 (L) 10/29/2023 0546    CALCIUM 8.7 08/10/2015 0612    ALKPHOS 66 10/27/2023 0458    ALKPHOS 66 08/10/2015 0612     (H) 10/27/2023 0458    AST 19 08/10/2015 0612    ALT 63 (H) 10/27/2023 0458    ALT 31 08/10/2015 0612    BILITOT 0.29 08/10/2015 0612    TP 6.1 (L) 10/27/2023 0458    ALB 3.6 10/27/2023 0458    ALB 3.4 (L) 08/10/2015 0612       Imaging Studies/EKG: I have personally reviewed pertinent reports.        Counseling / Coordination of Care  Total floor / unit time spent today 30 minutes minutes. Greater than 50% of total time was spent with the patient and / or family counseling and / or coordination of care. A description of the counseling / coordination of care: Patient interview, physical examination, review of medical records, review of imaging and laboratory data, development of pain management plan, discussion of pain management plan with patient and primary service. Please note that the APS provides consultative services regarding pain management only. With the exception of ketamine and epidural infusions and except when indicated, final decisions regarding starting or changing doses of analgesic medications are at the discretion of the consulting service.     Dione Licea PA-C   Acute Pain Service

## 2023-10-31 NOTE — PROGRESS NOTES
[address] [place] [address]  Progress Note  Name: Robe Arceo  MRN: [id number]  Unit/Bed#: [facility] 185-96 I Date of Admission: 10/27/2023   Date of Service: 10/31/2023 I Hospital Day: 4    Assessment/Plan   Fall  Assessment & Plan  Patient had unwitnessed fall.  + Lightheadedness, CP  - Syncopal workup: EKG Unremarkable, Troponin's Negative, Echo: pending  - PT/OT evaluation and treatment as indicated    Compartment syndrome [facility])  Assessment & Plan  Patient presented with Crush injury to RUE  - Initial CK 5942 increased to 13,683  - 10/27 RUE Fasciotomy due to compartment syndrome with application of vac dressing  - 10/29 washout and partial closure   - continue to monitor neurovascular status  - Vac take down today to evaluate for possible further closure on 11/2    Type 2 diabetes mellitus ([address])  Assessment & Plan  Lab Results   Component Value Date    HGBA1C 5.8 (H) 01/19/2023       Recent Labs     10/29/23  1107   POCGLU 85         Blood Sugar Average: Last 72 hrs:  (P) 85    Diet controlled  Monitor BG utilize S/S as needed    Chronic pain  Assessment & Plan  Patient with chronic patient Fibromyalgia, chronic LLB pain, Chronic pain syndrome, peripheral neuropathy  - APS consult appreciated    - S/p right infraclavicular block with Exparel on 10/27    - Ketamine gtt started 10/28, Increased to 0.2mg/kg/hr on 10/30  - continue multimodal pain regimen    Hypothyroid  Assessment & Plan  On levothyroxine will continue in hospital         Bowel Regimen: Senokot S - on hold with diarrhea  VTE Prophylaxis:Enoxaparin (Lovenox)     Disposition: Continue med/surg status with ongoing medical management    Subjective   Chief Complaint: "I'm having shooting pain down my left leg"     Subjective: Patient reports shooting pain down her left leg which is chronic for her. She is having a flair of her chronic back and neck pain while she is charging her stimulator.  She reports loose liquid stools which started this AM and makes her worried to eat. She denies abdominal pain, nausea, or vomiting. Objective   Vitals:   Temp:  [97.9 °F (36.6 °C)-98.2 °F (36.8 °C)] 97.9 °F (36.6 °C)  HR:  [63-72] 67  Resp:  [16-18] 16  BP: (119-131)/(73-77) 130/77    I/O         10/29 0701  10/30 0700 10/30 0701  10/31 0700 10/31 0701 11/01 0700    P. O. 240 415.5     I.V. (mL/kg) 1984.7 (24.6)      Total Intake(mL/kg) 2224.7 (27.6) 415.5 (5.1)     Urine (mL/kg/hr) 1300 (0.7) 525 (0.3)     Drains 200 225     Stool  0     Total Output 1500 750     Net +724.7 -334.5            Unmeasured Urine Occurrence 1 x      Unmeasured Stool Occurrence  1 x 1 x             Physical Exam:   Gen: No acute distress resting comfortably in bed  Neuro: AAOx3, GCS 15, Right hand strength 4/5 , absent sensation over ulnar hand and decreased sensation over radial hand. HEENT: PERRLA, EOMI, mucous membranes moist  Cards: RRR, S1, S2 without murmur rub or gallop  Pulm: Clear to auscultation bilaterally without wheezes rales or rhonchi  GI: Soft, nontender, nondistended  : Voiding independently  MSK: Extremities nontender without deformity  Skin: Right forearm vac in place. Invasive Devices       Peripheral Intravenous Line  Duration             Peripheral IV 10/27/23 Left Antecubital 4 days    Peripheral IV 10/29/23 Left Forearm 1 day                          Lab Results: BMP/CMP: No results found for: "SODIUM", "K", "CL", "CO2", "ANIONGAP", "BUN", "CREATININE", "GLUCOSE", "CALCIUM", "AST", "ALT", "ALKPHOS", "PROT", "BILITOT", "EGFR" and CBC: No results found for: "WBC", "HGB", "HCT", "MCV", "PLT", "ADJUSTEDWBC", "RBC", "MCH", "MCHC", "RDW", "MPV", "NRBC"  Imaging: I have personally reviewed pertinent reports.      Other Studies: none

## 2023-10-31 NOTE — PROCEDURES
General Surgery  Lorena Woodall 65 y.o. female MRN: [id number]  Unit/Bed#: [facility] 622-01 Encounter: [id number]    STEPHIE HALL  Procedure Note    Date: 10/31  Time: 1200    Location of wound: Right forearm    Sponges removed:  1 Black Sponges  0 White Sponges    Dimensions of wound: 145 mm x 40 mm x 5 mm    Description of wound: exposed muscle with clean wound edges, no necrosis, minimal muscle edema    Sponges placed:  1 Black Sponges      VAC settings:  125 mmHg  Continuous    Pt tolerated procedure well  VAC sticker placed to wound dressing, per protocol      Shawanda Sotelo PA-C  10/31/2023

## 2023-10-31 NOTE — ASSESSMENT & PLAN NOTE
Patient had unwitnessed fall.  + Lightheadedness, CP  - Syncopal workup: EKG Unremarkable, Troponin's Negative, Echo: pending  - PT/OT evaluation and treatment as indicated

## 2023-10-31 NOTE — ASSESSMENT & PLAN NOTE
Lab Results   Component Value Date    HGBA1C 5.8 (H) 01/19/2023       Recent Labs     10/29/23  1107   POCGLU 85         Blood Sugar Average: Last 72 hrs:  (P) 85    Diet controlled  Monitor BG utilize S/S as needed

## 2023-10-31 NOTE — ASSESSMENT & PLAN NOTE
Patient with chronic patient Fibromyalgia, chronic LLB pain, Chronic pain syndrome, peripheral neuropathy  - APS consult appreciated    - S/p right infraclavicular block with Exparel on 10/27    - Ketamine gtt started 10/28, Increased to 0.2mg/kg/hr on 10/30  - continue multimodal pain regimen

## 2023-10-31 NOTE — OCCUPATIONAL THERAPY NOTE
Occupational Therapy Treatment Note:      10/31/23 1139   Note Type   Cancel Reasons Patient off floor/test  (pt having a bedside echo and was unavailable for tx at this time)     April A Storm

## 2023-11-01 LAB
ANION GAP SERPL CALCULATED.3IONS-SCNC: 5 MMOL/L
BASOPHILS # BLD AUTO: 0.03 THOUSANDS/ÂΜL (ref 0–0.1)
BASOPHILS NFR BLD AUTO: 0 % (ref 0–1)
BUN SERPL-MCNC: 11 MG/DL (ref 5–25)
CALCIUM SERPL-MCNC: 7.9 MG/DL (ref 8.4–10.2)
CHLORIDE SERPL-SCNC: 110 MMOL/L (ref 96–108)
CO2 SERPL-SCNC: 26 MMOL/L (ref 21–32)
CREAT SERPL-MCNC: 0.43 MG/DL (ref 0.6–1.3)
EOSINOPHIL # BLD AUTO: 0.24 THOUSAND/ÂΜL (ref 0–0.61)
EOSINOPHIL NFR BLD AUTO: 3 % (ref 0–6)
ERYTHROCYTE [DISTWIDTH] IN BLOOD BY AUTOMATED COUNT: 12.7 % (ref 11.6–15.1)
GFR SERPL CREATININE-BSD FRML MDRD: 110 ML/MIN/1.73SQ M
GLUCOSE SERPL-MCNC: 96 MG/DL (ref 65–140)
HCT VFR BLD AUTO: 37.2 % (ref 34.8–46.1)
HGB BLD-MCNC: 12.7 G/DL (ref 11.5–15.4)
IMM GRANULOCYTES # BLD AUTO: 0.05 THOUSAND/UL (ref 0–0.2)
IMM GRANULOCYTES NFR BLD AUTO: 1 % (ref 0–2)
LYMPHOCYTES # BLD AUTO: 2.4 THOUSANDS/ÂΜL (ref 0.6–4.47)
LYMPHOCYTES NFR BLD AUTO: 32 % (ref 14–44)
MCH RBC QN AUTO: 31.8 PG (ref 26.8–34.3)
MCHC RBC AUTO-ENTMCNC: 34.1 G/DL (ref 31.4–37.4)
MCV RBC AUTO: 93 FL (ref 82–98)
MONOCYTES # BLD AUTO: 0.49 THOUSAND/ÂΜL (ref 0.17–1.22)
MONOCYTES NFR BLD AUTO: 7 % (ref 4–12)
NEUTROPHILS # BLD AUTO: 4.28 THOUSANDS/ÂΜL (ref 1.85–7.62)
NEUTS SEG NFR BLD AUTO: 57 % (ref 43–75)
NRBC BLD AUTO-RTO: 0 /100 WBCS
PLATELET # BLD AUTO: 170 THOUSANDS/UL (ref 149–390)
PMV BLD AUTO: 10.3 FL (ref 8.9–12.7)
POTASSIUM SERPL-SCNC: 3.1 MMOL/L (ref 3.5–5.3)
RBC # BLD AUTO: 3.99 MILLION/UL (ref 3.81–5.12)
SODIUM SERPL-SCNC: 141 MMOL/L (ref 135–147)
WBC # BLD AUTO: 7.49 THOUSAND/UL (ref 4.31–10.16)

## 2023-11-01 PROCEDURE — 85025 COMPLETE CBC W/AUTO DIFF WBC: CPT | Performed by: PHYSICIAN ASSISTANT

## 2023-11-01 PROCEDURE — 97530 THERAPEUTIC ACTIVITIES: CPT

## 2023-11-01 PROCEDURE — 99024 POSTOP FOLLOW-UP VISIT: CPT | Performed by: STUDENT IN AN ORGANIZED HEALTH CARE EDUCATION/TRAINING PROGRAM

## 2023-11-01 PROCEDURE — 97535 SELF CARE MNGMENT TRAINING: CPT

## 2023-11-01 PROCEDURE — 97116 GAIT TRAINING THERAPY: CPT

## 2023-11-01 PROCEDURE — 99232 SBSQ HOSP IP/OBS MODERATE 35: CPT | Performed by: PHYSICIAN ASSISTANT

## 2023-11-01 PROCEDURE — 97110 THERAPEUTIC EXERCISES: CPT

## 2023-11-01 PROCEDURE — 80048 BASIC METABOLIC PNL TOTAL CA: CPT | Performed by: PHYSICIAN ASSISTANT

## 2023-11-01 RX ORDER — POTASSIUM CHLORIDE 20 MEQ/1
40 TABLET, EXTENDED RELEASE ORAL ONCE
Status: COMPLETED | OUTPATIENT
Start: 2023-11-01 | End: 2023-11-01

## 2023-11-01 RX ADMIN — HYDROMORPHONE HYDROCHLORIDE 4 MG: 2 TABLET ORAL at 10:27

## 2023-11-01 RX ADMIN — NICOTINE 21 MG: 21 PATCH, EXTENDED RELEASE TRANSDERMAL at 09:13

## 2023-11-01 RX ADMIN — ENOXAPARIN SODIUM 30 MG: 30 INJECTION SUBCUTANEOUS at 06:06

## 2023-11-01 RX ADMIN — POTASSIUM CHLORIDE 40 MEQ: 1500 TABLET, EXTENDED RELEASE ORAL at 11:51

## 2023-11-01 RX ADMIN — KETAMINE HYDROCHLORIDE 0.2 MG/KG/HR: 50 INJECTION INTRAMUSCULAR; INTRAVENOUS at 04:00

## 2023-11-01 RX ADMIN — LATANOPROST 1 DROP: 50 SOLUTION OPHTHALMIC at 21:36

## 2023-11-01 RX ADMIN — HYDROMORPHONE HYDROCHLORIDE 4 MG: 2 TABLET ORAL at 23:43

## 2023-11-01 RX ADMIN — CLONAZEPAM 0.5 MG: 0.5 TABLET ORAL at 21:35

## 2023-11-01 RX ADMIN — ATORVASTATIN CALCIUM 80 MG: 80 TABLET, FILM COATED ORAL at 17:15

## 2023-11-01 RX ADMIN — HYDROMORPHONE HYDROCHLORIDE 4 MG: 2 TABLET ORAL at 03:34

## 2023-11-01 RX ADMIN — HYDROMORPHONE HYDROCHLORIDE 4 MG: 2 TABLET ORAL at 19:14

## 2023-11-01 RX ADMIN — HYDROMORPHONE HYDROCHLORIDE 4 MG: 2 TABLET ORAL at 14:40

## 2023-11-01 RX ADMIN — ACETAMINOPHEN 975 MG: 325 TABLET, FILM COATED ORAL at 06:06

## 2023-11-01 RX ADMIN — UMECLIDINIUM BROMIDE AND VILANTEROL TRIFENATATE 1 PUFF: 62.5; 25 POWDER RESPIRATORY (INHALATION) at 09:14

## 2023-11-01 RX ADMIN — ACETAMINOPHEN 975 MG: 325 TABLET, FILM COATED ORAL at 13:34

## 2023-11-01 RX ADMIN — ACETAMINOPHEN 975 MG: 325 TABLET, FILM COATED ORAL at 21:35

## 2023-11-01 RX ADMIN — LIDOCAINE 5% 1 PATCH: 700 PATCH TOPICAL at 09:13

## 2023-11-01 RX ADMIN — ENOXAPARIN SODIUM 30 MG: 30 INJECTION SUBCUTANEOUS at 17:14

## 2023-11-01 NOTE — PHYSICAL THERAPY NOTE
Physical Therapy Progress Note     11/01/23 1355   PT Last Visit   PT Visit Date 11/01/23   Note Type   Note Type Treatment   Pain Assessment   Pain Assessment Tool 0-10   Pain Score 8   Pain Location/Orientation Orientation: Right;Location: Arm   Restrictions/Precautions   RUE Weight Bearing Per Order WBAT   Other Precautions Chair Alarm;Pain; Fall Risk;Multiple lines  (RUE wound vac)   Subjective   Subjective Pt encountered supine in bed, pleasant and ageeable to treatment. Reports controlled pain overall, and no change in ststus with activity. Bed Mobility   Supine to Sit 4  Minimal assistance   Additional items Assist x 1;Bedrails; Increased time required;HOB elevated   Transfers   Sit to Stand 4  Minimal assistance   Additional items Assist x 1; Armrests; Increased time required   Stand to Sit 4  Minimal assistance   Additional items Assist x 1; Armrests; Increased time required   Toilet transfer 4  Minimal assistance   Additional items Assist x 1; Armrests; Increased time required;Verbal cues   Ambulation/Elevation   Gait pattern Short stride; Inconsistent yue; Shuffling;Decreased foot clearance;Narrow RADHA; Improper Weight shift   Gait Assistance 4  Minimal assist   Additional items Assist x 1   Assistive Device Other (Comment); None  (IV pole)   Distance 10', 40' x 2   Balance   Static Sitting Fair +   Static Standing Fair   Ambulatory Poor +   Activity Tolerance   Activity Tolerance Patient tolerated treatment well;Patient limited by fatigue;Patient limited by pain   Nurse [address], RN   Assessment   Prognosis Fair   Problem List Decreased strength;Decreased range of motion;Decreased endurance; Impaired balance;Decreased skin integrity;Pain   Assessment Pt demonstrated significantly improved functional endurance & mobility today, performing all transfers with decreased assist, then ambulating total household distances, initially with use of IV pole, then without AD.  would likely benefit from use of her own SPC vs quad cane in upcoming trials to provide increased lateral support given postural guarding & short, shuffling stride at this time, but anticipate she will continue to make steady progress pending ongoing pain management & medical POC as it pertains to treatment of RUE wound vac site. although she may benefit from use of RW, she likely won't tolerate it's use well with RUE, and likely does not possess sufficient R hand strength & RUE coordination to manage AD appropriately. PT POC & d/c recommendations remain appropriate at this time pending LOS & progress made during remainder of hospital stay. Goals   Patient Goals to keep getting better   LTG Expiration Date 11/11/23   PT Treatment Day 1   Plan   Treatment/Interventions Functional transfer training;LE strengthening/ROM; Elevations; Therapeutic exercise; Endurance training;Patient/family training;Equipment eval/education; Bed mobility;Gait training   Progress Progressing toward goals   PT Frequency 3-5x/wk   Discharge Recommendation   Rehab Resource Intensity Level, PT II (Moderate Resource Intensity)   Equipment Recommended   (trial SPC vs quad cane)   AM-PAC Basic Mobility Inpatient   Turning in Flat Bed Without Bedrails 2   Lying on Back to Sitting on Edge of Flat Bed Without Bedrails 3   Moving Bed to Chair 3   Standing Up From Chair Using Arms 3   Walk in Room 3   Climb 3-5 Stairs With Railing 2   Basic Mobility Inpatient Raw Score 16   Basic Mobility Standardized Score 38.32   Turning Head Towards Sound 4   Follow Simple Instructions 4   Low Function Basic Mobility Raw Score  24   Low Function Basic Mobility Standardized Score  38.53   Highest Level Of Mobility   JH-HLM Goal 5: Stand one or more mins   JH-HLM Achieved 7: Walk 25 feet or more       Brenda Toney PTA    An [facility] Basic Mobility Raw Score less than 17 suggests pt would benefit from post acute rehab.   Please also refer to the recommendation of the Physical Therapist for safe discharge planning.

## 2023-11-01 NOTE — PROGRESS NOTES
Progress Note - Acute Pain Service    Kirti Woodall 65 y.o. female MRN: [id number]  Unit/Bed#: [facility] 622-01 Encounter: [id number]      Selam Wrad is a 65 y.o. female status post right arm fasciotomy    Chronic pain  Assessment & Plan  Hx of cervical-lumbar spondylosis/DJD  Inactive spinal cord stimulator  Recent left cervical MBB with [facility]  Home pain regimen: Norco 10-325mg q8hr PRN (takes 30mg. day), Lyrica 100/150mg QID (150mg at bedtime), Tizanidine 4mg q6hr PRN for spasms          * Compartment syndrome [facility])  Assessment & Plan  Right forearm CS s/p right forearm fasciotomy with wound vac placement on 10/27  S/p right infraclavicular block with Exparel on 10/27  Will hold off on further regional analgesic techniques given nerve deficits of right forearm and monitoring for return of nerve function  MMA as below  Dilaudid 2 mg p.o. every 4 hours as needed moderate pain or 4 mg p.o. every 4 hours as needed severe pain. Decrease IV ketamine to 0.1 mg/kg/hr for analgesic support (started 10/28) and discontinue tomorrow a.m. Decrease IV Dilaudid to 0.5 mg IV every 3 hours as needed breakthrough pain and discontinue tomorrow AM.  Continue Lidoderm patch  Continue PO Tylenol 975mg q8hr princess  Continue tizanidine 4 mg p.o. every 6 hours as needed muscle spasm. Agree with holding Lyrica in the setting of rising CK/monitoring for ALEX due to rhabdomyolysis  Holding NSAIDs in the setting of renal function monitoring given elevated CK/rhabdomyolysis  Klonopin 0.5 mg PO QHS ordered since 10/27. APS will continue to follow. Please contact Acute Pain Service - via [employer] from [phone] with additional questions or concerns. See [email or web address] or Janes for additional contacts and after hours information.      Pain History  Current pain location(s):  Pain Score: 10  Pain Location/Orientation: Orientation: Right, Location: Arm, Orientation: Bilateral, Location: Leg, Location: Neck  Pain Scale: Pain Assessment Tool: 0-10  24 hour history: Patient states right arm pain somewhat improved since yesterday. No new complaints. Opioid requirement previous 24 hours: Dilaudid 4 mg p.o. x5.    Meds/Allergies   all current active meds have been reviewed, current meds:   Current Facility-Administered Medications   Medication Dose Route Frequency    acetaminophen (TYLENOL) tablet 975 mg  975 mg Oral Q8H [address]    albuterol (PROVENTIL HFA,VENTOLIN HFA) inhaler 2 puff  2 puff Inhalation Q4H PRN    atorvastatin (LIPITOR) tablet 80 mg  80 mg Oral QPM    bisacodyl (DULCOLAX) rectal suppository 10 mg  10 mg Rectal Daily PRN    clonazePAM (KlonoPIN) tablet 0.5 mg  0.5 mg Oral HS    enoxaparin (LOVENOX) subcutaneous injection 30 mg  30 mg Subcutaneous Q12H    HYDROmorphone (DILAUDID) injection 0.5 mg  0.5 mg Intravenous Q3H PRN    HYDROmorphone (DILAUDID) tablet 2 mg  2 mg Oral Q4H PRN    Or    HYDROmorphone (DILAUDID) tablet 4 mg  4 mg Oral Q4H PRN    ketamine 250 mg (STANDARD CONCENTRATION) IV in sodium chloride 0.9% 250 mL  0.1 mg/kg/hr Intravenous Continuous    latanoprost (XALATAN) 0.005 % ophthalmic solution 1 drop  1 drop Both Eyes HS    lidocaine (LIDODERM) 5 % patch 1 patch  1 patch Topical Daily    naloxone (NARCAN) 0.04 mg/mL syringe 0.04 mg  0.04 mg Intravenous Q1MIN PRN    nicotine (NICODERM CQ) 21 mg/24 hr TD 24 hr patch 21 mg  21 mg Transdermal Daily    ondansetron (ZOFRAN) injection 4 mg  4 mg Intravenous Q4H PRN    polyethylene glycol (MIRALAX) packet 17 g  17 g Oral Daily PRN    potassium chloride (K-DUR,KLOR-CON) CR tablet 40 mEq  40 mEq Oral Once    tiZANidine (ZANAFLEX) tablet 4 mg  4 mg Oral Q6H PRN    umeclidinium-vilanterol 62.5-25 mcg/actuation inhaler 1 puff  1 puff Inhalation Daily   , and PTA meds:   Prior to Admission Medications   Prescriptions Last Dose Informant Patient Reported? Taking?    Alcohol Swabs (Alcohol Pads) 70 % PADS   Yes No   Sig: USE TO TEST BLOOD GLUCOSE 2-3 TIMES DAILY   Buprenorphine HCl (Belbuca) 300 MCG FILM  Self Yes No   Sig: Apply 300 mcg to cheek in the morning   Butalbital-APAP-Caffeine (FIORICET PO)   Yes No   Sig: Take by mouth as needed   Fluticasone-Salmeterol (Advair) 500-50 mcg/dose inhaler Not Taking  Yes No   Patient not taking: Reported on 10/27/2023   Galcanezumab-gnlm (Emgality) 120 MG/ML SOAJ  Self Yes No   Sig: Inject 120 mg under the skin every 30 (thirty) days Last inj 1/19/23   HYDROcodone-acetaminophen (NORCO)  mg per tablet   Yes No   Sig: every 8 (eight) hours as needed   Melatonin 10 MG CAPS  Self Yes No   Sig: Take 1 tablet by mouth daily at bedtime as needed   OneTouch Ultra test strip   Yes No   Sig: USE TO TEST BLOOD GLUCOSE 2-3 TIMES DAILY   SUMAtriptan (IMITREX) 100 mg tablet Not Taking  Yes No   Patient not taking: Reported on 10/27/2023   True Comfort Safety Lancets MISC   Yes No   Sig: USE TO TEST BLOOD GLUCOSE 2-3 TIMES DAILY   Zoster Vac Recomb Adjuvanted (Shingrix) 50 MCG/0.5ML SUSR   Yes No   Sig: inject 0.5 milliliter intramuscularly   albuterol (PROVENTIL HFA,VENTOLIN HFA) 90 mcg/act inhaler  Self Yes No   Sig: Inhale 2 puffs every 6 (six) hours as needed   ammonium lactate (LAC-HYDRIN) 12 % lotion   Yes No   Sig: as needed   atorvastatin (LIPITOR) 80 mg tablet   Yes No   Sig: Take 80 mg by mouth every evening   benzonatate (TESSALON PERLES) 100 mg capsule   No No   Sig: Take 1 capsule (100 mg total) by mouth 3 (three) times a day as needed for cough   carboxymethylcellulose 0.5 % SOLN  Self No No   Sig: Administer 1 drop to both eyes daily as needed for dry eyes   cephalexin (KEFLEX) 500 mg capsule Not Taking  Yes No   Sig: Take 1 capsule by mouth every 12 (twelve) hours   Patient not taking: Reported on 10/27/2023   ciprofloxacin (CIPRO) 250 mg tablet Not Taking  Yes No   Sig: take 1 tablet by mouth twice a day for 7 days   Patient not taking: Reported on 10/27/2023   clonazePAM (KlonoPIN) 0.5 mg tablet  Self Yes No   Sig: Take 0.5 mg by mouth daily at bedtime   clonazePAM (KlonoPIN) 1 mg tablet  Self Yes No   Sig: Take 1 mg by mouth daily in the early morning   dicyclomine (BENTYL) 20 mg tablet  Self Yes No   Sig: Take 20 mg by mouth every 6 (six) hours   doxycycline hyclate (VIBRA-TABS) 100 mg tablet Not Taking  Yes No   Sig: take 1 tablet by mouth twice a day for 7 days   Patient not taking: Reported on 2023   famotidine (PEPCID) 40 MG tablet   No No   Sig: Take 1 tablet (40 mg total) by mouth daily at bedtime   fenofibrate (TRICOR) 48 mg tablet  Self Yes No   Sig: Take 48 mg by mouth daily   fluconazole (DIFLUCAN) 150 mg tablet Not Taking  Yes No   Sig: take 1 tablet by mouth for 1 day   Patient not taking: Reported on 10/27/2023   fluticasone (FLONASE) 50 mcg/act nasal spray   Yes No   Si sprays into each nostril daily   fluticasone-umeclidinium-vilanterol (Trelegy Ellipta) 200-62.5-25 mcg/actuation AEPB inhaler   No No   Sig: Inhale 1 puff daily Rinse mouth after use. furosemide (LASIX) 20 mg tablet  Self Yes No   Sig: Take 20 mg by mouth as needed Taking as needed   ibuprofen (MOTRIN) 800 mg tablet   Yes No   Sig: take 1 tablet by mouth every 8 hours if needed for mild pain or fever for up to 5 days   lamoTRIgine (LaMICtal) 100 mg tablet   Yes No   Sig: Take 100 mg by mouth 2 (two) times a day   latanoprost (XALATAN) 0.005 % ophthalmic solution   Yes No   Sig: instill 1 drop into both eyes at bedtime   levalbuterol (XOPENEX) 1.25 mg/3 mL nebulizer solution   Yes No   Sig: Inhale 1.25 mg every 4 (four) hours as needed   levothyroxine 137 mcg tablet  Self Yes No   Sig: Take 137 mcg by mouth daily   lubiprostone (AMITIZA) 8 mcg capsule Not Taking  No No   Sig: Take 1 PO BID for constipation.    Patient not taking: Reported on 10/27/2023   magnesium 30 MG tablet  Self Yes No   Sig: Take 500 mg by mouth 2 (two) times a day 23 right now not taking   methylPREDNISolone 4 MG tablet therapy pack   No No   Sig: Use as directed on package metroNIDAZOLE (FLAGYL) 500 mg tablet   Yes No   Sig: Take 1 tablet by mouth 2 (two) times a day   naloxone (Narcan) 4 mg/0.1 mL nasal spray   Yes No   Patient not taking: Reported on 2023   omeprazole (PriLOSEC) 40 MG capsule   No No   Sig: Take 1 capsule (40 mg total) by mouth 2 (two) times a day   ondansetron (ZOFRAN) 4 mg tablet Not Taking  Yes No   Sig: Take 4 mg by mouth every 8 (eight) hours as needed   Patient not taking: Reported on 10/27/2023   ondansetron (ZOFRAN) 8 mg tablet   No No   Sig: Take 1 tablet (8 mg total) by mouth every 8 (eight) hours as needed for nausea or vomiting   polyethylene glycol (GLYCOLAX) 17 GM/SCOOP powder Not Taking  No No   Sig: Take 17 g daily for constipation. Patient not taking: Reported on 10/27/2023   pregabalin (LYRICA) 100 mg capsule  Self Yes No   Sig: Take 100 mg by mouth 3 (three) times a day Morning-lunch-dinner   pregabalin (LYRICA) 150 mg capsule  Self Yes No   Sig: take 1 capsule by mouth NIGHTLY   risperiDONE (RisperDAL) 0.5 mg tablet   Yes No   Sig: Take 0.25 mg by mouth 2 (two) times a day   Patient not taking: Reported on 2023   rizatriptan (MAXALT-MLT) 10 mg disintegrating tablet   Yes No   Sig: TAKE 1 TABLET BY MOUTH ONE TIME AS NEEDED FOR MIGRAINE, MAY REPEA. ..  (REFER TO PRESCRIPTION NOTES). tiZANidine (ZANAFLEX) 4 mg tablet   Yes No   Sig: take 2 tablets by mouth every 6 hours if needed for muscle spasm   topiramate (TOPAMAX) 100 mg tablet Not Taking Self Yes No   Sig: Take 100 mg by mouth every 6 (six) hours as needed Takes 2 tabs every 6 hours   Patient not taking: Reported on 10/27/2023   topiramate (TOPAMAX) 200 MG tablet   Yes No   Si mg 2 (two) times a day      Facility-Administered Medications: None       Allergies   Allergen Reactions    Hydroxyzine Anaphylaxis     Claims it gives her convulsions.      Other Other (See Comments)    Pollen Extract Itching    Tree Extract     Clarithromycin Rash     Pt denies    Latex Rash Sulfa Antibiotics Rash     "severe skin burn"       Objective        Vitals:    10/31/23 2221 11/01/23 0249 11/01/23 0734 11/01/23 1013   BP: 124/75 119/78 140/88 134/78   Pulse: 64 59 62 71   Resp: 16  16 17   Temp: 98 °F (36.7 °C) 97.8 °F (36.6 °C) 97.9 °F (36.6 °C) 98 °F (36.7 °C)   TempSrc:       SpO2: 97% 97% 98% 96%   Weight:       Height:             Physical Exam  Vitals and nursing note reviewed. Constitutional:       General: She is awake. She is not in acute distress. Appearance: She is not ill-appearing, toxic-appearing or diaphoretic. Skin:     General: Skin is warm and dry. Neurological:      Mental Status: She is alert and oriented to person, place, and time. GCS: GCS eye subscore is 4. GCS verbal subscore is 5. GCS motor subscore is 6. Psychiatric:         Attention and Perception: Attention normal.         Speech: Speech normal.         Behavior: Behavior normal. Behavior is cooperative. Lab Results:   Estimated Creatinine Clearance: 141.2 mL/min (A) (by C-G formula based on SCr of 0.43 mg/dL (L)).   Lab Results   Component Value Date    WBC 7.49 11/01/2023    WBC 5.90 08/10/2015    HGB 12.7 11/01/2023    HGB 11.9 08/10/2015    HCT 37.2 11/01/2023    HCT 34.9 08/10/2015     11/01/2023     (L) 08/10/2015         Component Value Date/Time     08/10/2015 0612    K 3.1 (L) 11/01/2023 0608    K 4.2 08/10/2015 0612     (H) 11/01/2023 0608     08/10/2015 0612    CO2 26 11/01/2023 0608    CO2 28 08/10/2015 0612    BUN 11 11/01/2023 0608    BUN 10 08/10/2015 0612    CREATININE 0.43 (L) 11/01/2023 0608    CREATININE 0.73 08/10/2015 0612         Component Value Date/Time    CALCIUM 7.9 (L) 11/01/2023 0608    CALCIUM 8.7 08/10/2015 0612    ALKPHOS 66 10/27/2023 0458    ALKPHOS 66 08/10/2015 0612     (H) 10/27/2023 0458    AST 19 08/10/2015 0612    ALT 63 (H) 10/27/2023 0458    ALT 31 08/10/2015 0612    BILITOT 0.29 08/10/2015 0612    TP 6.1 (L) 10/27/2023 0458    ALB 3.6 10/27/2023 0458    ALB 3.4 (L) 08/10/2015 0612       Imaging Studies/EKG: I have personally reviewed pertinent reports. Counseling / Coordination of Care  Total floor / unit time spent today 30 minutes minutes. Greater than 50% of total time was spent with the patient and / or family counseling and / or coordination of care. A description of the counseling / coordination of care: Patient interview, physical examination, review of medical records, review of imaging and laboratory data, development of pain management plan, discussion of pain management plan with patient and primary service. Please note that the APS provides consultative services regarding pain management only. With the exception of ketamine and epidural infusions and except when indicated, final decisions regarding starting or changing doses of analgesic medications are at the discretion of the consulting service.     Winter Mcleod PA-C   Acute Pain Service

## 2023-11-01 NOTE — PROGRESS NOTES
[facility]  Progress Note  Name: Darlene Alexander  MRN: [id number]  [facility] Date of Admission: 10/27/2023   Date of Service: 11/1/2023 I Hospital Day: 5    Assessment/Plan   * Compartment syndrome [facility])  Assessment & Plan  Patient presented with Crush injury to RUE  - Initial CK 5942 increased to 13,683  - 10/27 RUE Fasciotomy due to compartment syndrome with application of vac dressing  - 10/29 washout and partial closure   - 10/31 vac change  - continue to monitor neurovascular status  - pending closure    Fall  Assessment & Plan  Patient had unwitnessed fall.  + Lightheadedness, CP  - Syncopal workup: EKG Unremarkable, Troponin's Negative, Echo: unremarkable  - PT/OT evaluation and treatment as indicated    Chronic pain  Assessment & Plan  Patient with chronic patient Fibromyalgia, chronic LLB pain, Chronic pain syndrome, peripheral neuropathy  - APS consult appreciated    - S/p right infraclavicular block with Exparel on 10/27    - Ketamine gtt started 10/28, Increased to 0.2mg/kg/hr on 10/30  - continue multimodal pain regimen    Diarrhea  Assessment & Plan  - New onset loose stools worse with eating  - Has been on laxative - will hold now  - WBC WNL without fevers or abdominal pain  - Continue to monitor     Type 2 diabetes mellitus ([address])  Assessment & Plan  Lab Results   Component Value Date    HGBA1C 5.8 (H) 01/19/2023       Recent Labs     10/29/23  1107   POCGLU 85         Blood Sugar Average: Last 72 hrs:  (P) 85    Diet controlled  Monitor BG utilize S/S as needed    Hypothyroid  Assessment & Plan  On levothyroxine will continue in hospital             Bowel Regimen: miralax PRN, dulcolax PRN  VTE Prophylaxis:Enoxaparin (Lovenox)     Disposition: continue inpatient    Subjective   Chief Complaint: pain    Subjective: Patient reports feeling okay this morning.  She continues to have chronic pain in the back and extremities, but feels some improvement from yesterday. Objective   Vitals:   Temp:  [97.8 °F (36.6 °C)-98 °F (36.7 °C)] 98 °F (36.7 °C)  HR:  [59-75] 71  Resp:  [16-18] 17  BP: (119-140)/(64-88) 134/78    I/O         10/30 0701  10/31 0700 10/31 0701  11/01 0700 11/01 0701 11/02 0700    P. O. 415.5 240     I.V. (mL/kg)  128.8 (1.6)     Total Intake(mL/kg) 415.5 (5.1) 368.8 (4.6)     Urine (mL/kg/hr) 525 (0.3) 0 (0)     Drains 225 250     Stool 0 0     Total Output 750 250     Net -334.5 +118.8            Unmeasured Urine Occurrence  3 x     Unmeasured Stool Occurrence 1 x 4 x              Physical Exam:   GENERAL APPEARANCE: no acute distress  NEURO: awake and alert  HEENT: normocephalic  CV: RRR  LUNGS: CTAB  GI: nondistended, nontender  MSK: RUE vac in place, compartments soft, able to flex/extend fingers 5/5, subjectively decreased sensation along 5th finger, intact throughout remainder of fingers  SKIN: warm and dry    Invasive Devices       Peripheral Intravenous Line  Duration             Peripheral IV 10/29/23 Left Forearm 3 days                          Lab Results: Results: I have personally reviewed all pertinent laboratory/tests results  Imaging: I have personally reviewed pertinent reports.      Other Studies: N/A

## 2023-11-01 NOTE — ASSESSMENT & PLAN NOTE
Patient had unwitnessed fall.  + Lightheadedness, CP  - Syncopal workup: EKG Unremarkable, Troponin's Negative, Echo: unremarkable  - PT/OT evaluation and treatment as indicated

## 2023-11-01 NOTE — OCCUPATIONAL THERAPY NOTE
Occupational Therapy Progress Note     Patient Name: Geovany Gardiner  [facility] Date: 11/1/2023  Problem List  Principal Problem:    Compartment syndrome [facility])  Active Problems:    Hypothyroid    Chronic pain    Fall    Type 2 diabetes mellitus ([address] )    Diarrhea        11/01/23 1353   OT Last Visit   OT Visit Date 11/01/23   Note Type   Note Type Treatment   Pain Assessment   Pain Assessment Tool 0-10   Pain Score 8   Pain Location/Orientation Orientation: Right;Location: Arm;Location: Generalized   Patient's Stated Pain Goal No pain   Hospital Pain Intervention(s) Repositioned; Ambulation/increased activity; Elevated; Emotional support   Restrictions/Precautions   Weight Bearing Precautions Per Order Yes   RUE Weight Bearing Per Order WBAT   Other Precautions Chair Alarm;Multiple lines; Fall Risk;Pain  (RUE WOUND VAC)   Lifestyle   Autonomy PTA, pt reports needing A with ADLs/IADLs, SPC for fnxl mobility, (+)    Reciprocal Relationships HHA 40 hrs/wk, meals on wheels, supportive family   Service to Others On disability   Intrinsic Gratification Singing, spending time with her 2 twin cats   ADL   Eating Assistance 5  Supervision/Setup  (WITH USE OF L HAND ONLY)   Eating Deficit Setup; Increased time to complete   Grooming Assistance 4  Minimal Assistance   UB Bathing Assistance 3  Moderate Assistance   LB Bathing Assistance 3  Moderate Assistance   UB Dressing Assistance 2  Maximal Assistance   UB Dressing Deficit Thread RUE; Thread LUE;Setup;Supervision/safety; Increased time to complete;Pull over head;Pull around back; Fasteners   LB Dressing Assistance 3  Moderate Assistance   Toileting Assistance  3  Moderate Assistance   Bed Mobility   Supine to Sit 5  Supervision   Additional items Increased time required;Verbal cues   Sit to Supine Unable to assess  (PT LEFT OOB WITH ALL NEEDS IN REACH + ALARM ON)   Transfers   Sit to Stand 4  Minimal assistance   Additional items Assist x 1; Increased time required;Verbal cues Stand to Sit 4  Minimal assistance   Additional items Assist x 1; Increased time required;Verbal cues   Functional Mobility   Functional Mobility 4  Minimal assistance   Toilet Transfers   Toilet Transfer From Bed   Toilet Transfer Type To and from   Toilet Transfer to Standard toilet   Toilet Transfer Technique Ambulating   Toilet Transfers Minimal assistance   Cognition   Overall Cognitive Status [facility]   Arousal/Participation Alert; Cooperative   Attention Attends with cues to redirect   Orientation Level Oriented X4   Memory Decreased short term memory   Following Commands Follows one step commands without difficulty   Comments PT IS PLEASANT, COOPERATIVE AND APPROPRIATELY TEARFUL REGARDING OVERALL SITUATION. MOTIVATED TO PARTICIPATE   Activity Tolerance   Activity Tolerance Patient tolerated treatment well;Patient limited by pain   Medical Staff Made Aware PT SEEN FOR PORTION OF CO-SESSION WITH SKILLED PTA 2' CLINICALLY UNSTABLE PRESENTATION, TRAUMATIC INJURIES, NEW PRECAUTIONS/LIMITATIONS, LIMITED ACTIVITY TOLERANCE AND PRESENT IMPAIRMENTS WHICH ARE A REGRESSION FROM THE PT'S BASELINE AND IMPACTING OVERALL OCCUPATIONAL PERFORMANCE. Assessment   Assessment PT SEEN FOR OT TX SESSION WITH FOCUS ON FUNCTIONAL TXFS/MOBILITY, ADLS AND PT EDUCATION. UPON ARRIVAL, PT SUPINE IN BED AND MOTIVATED/AGREEABLE TO PARTICIPATE. SIGNIFICANT IMPROVEMENT NOTED IN BED MOBILITY TO SUPERVISION LEVEL AND SIT<->STAND TXFS/ FUNCTIONAL MOBILITY TO/FROM BATHROOM TO MIN A LEVEL. PT COMPLETED TOILETING ON STANDARD TOILET WITH MOD A INCLUDING ASSIST FOR TXFS AND CLOTHING MANAGEMENT. PT ABLE TO COMPLETE WAGNER HYGIENE WHILE SEATED AT S LEVEL. IMPROVEMENT NOTED IN UB ADLS TO MOD-MAX A AND LB ADLS TO MOD A 2' RUE RESTRICTIONS AND IMPAIRED BALANCE. PT EDUCATED ON RUE AROM/AAROM AS TOLERATED IN ORDER TO INCREASE FUNCTIONAL STATUS. PT DEMONSTRATED G CARRY OVER OF HEP. ALL CURRENT QUESTIONS/CONCERNS REGARDING D/C PLAN ADDRESSED AT THIS TIME.  PT IS PROGRESSING HOWEVER REMAINS SIGNIFICANTLY LIMITED. CONT TO RECOMMEND LEVEL 1 RESOURCES UPON D/C. WILL CONT TO FOLLOW TO ADDRESS THE INITIALLY ESTABLISHED OT GOALS. Plan   Treatment Interventions ADL retraining;Functional transfer training;UE strengthening/ROM; Endurance training;Cognitive reorientation;Patient/family training;Equipment evaluation/education; Compensatory technique education; Energy conservation; Activityengagement   Goal Expiration Date 11/11/23   OT Treatment Day 1   OT Frequency 2-3x/wk   Discharge Recommendation   Rehab Resource Intensity Level, OT I (Maximum Resource Intensity)   AM-PAC Daily Activity Inpatient   Lower Body Dressing 2   Bathing 2   Toileting 2   Upper Body Dressing 2   Grooming 3   Eating 3   Daily Activity Raw Score 14   Daily Activity Standardized Score (Calc for Raw Score >=11) 33.39   AM-PAC Applied Cognition Inpatient   Following a Speech/Presentation 4   Understanding Ordinary Conversation 4   Taking Medications 4   Remembering Where Things Are Placed or Put Away 3   Remembering List of 4-5 Errands 3   Taking Care of Complicated Tasks 3   Applied Cognition Raw Score 21   Applied Cognition Standardized Score 44.3       Documentation completed by MICHAEL Cabrera, OTR/L  [address] [place] [address] Certified ID# [id number]

## 2023-11-01 NOTE — ASSESSMENT & PLAN NOTE
Patient presented with Crush injury to RUE  - Initial CK 5942 increased to 13,683  - 10/27 RUE Fasciotomy due to compartment syndrome with application of vac dressing  - 10/29 washout and partial closure   - 10/31 vac change  - continue to monitor neurovascular status  - pending closure

## 2023-11-01 NOTE — PLAN OF CARE
Problem: OCCUPATIONAL THERAPY ADULT  Goal: Performs self-care activities at highest level of function for planned discharge setting. See evaluation for individualized goals. Description: Treatment Interventions: ADL retraining, Functional transfer training, UE strengthening/ROM, Endurance training, Cognitive reorientation, Patient/family training, Equipment evaluation/education, Compensatory technique education, Fine motor coordination activities, Continued evaluation, Activityengagement, Energy conservation          See flowsheet documentation for full assessment, interventions and recommendations. Outcome: Progressing  Note: Limitation: Decreased ADL status, Decreased UE ROM, Decreased UE strength, Decreased Safe judgement during ADL, Decreased cognition, Decreased endurance, Decreased sensation, Decreased fine motor control, Decreased high-level ADLs, Decreased self-care trans  Prognosis: Fair  Assessment: PT SEEN FOR OT TX SESSION WITH FOCUS ON FUNCTIONAL TXFS/MOBILITY, ADLS AND PT EDUCATION. UPON ARRIVAL, PT SUPINE IN BED AND MOTIVATED/AGREEABLE TO PARTICIPATE. SIGNIFICANT IMPROVEMENT NOTED IN BED MOBILITY TO SUPERVISION LEVEL AND SIT<->STAND TXFS/ FUNCTIONAL MOBILITY TO/FROM BATHROOM TO MIN A LEVEL. PT COMPLETED TOILETING ON STANDARD TOILET WITH MOD A INCLUDING ASSIST FOR TXFS AND CLOTHING MANAGEMENT. PT ABLE TO COMPLETE WAGNER HYGIENE WHILE SEATED AT S LEVEL. IMPROVEMENT NOTED IN UB ADLS TO MOD-MAX A AND LB ADLS TO MOD A 2' RUE RESTRICTIONS AND IMPAIRED BALANCE. PT EDUCATED ON RUE AROM/AAROM AS TOLERATED IN ORDER TO INCREASE FUNCTIONAL STATUS. PT DEMONSTRATED G CARRY OVER OF HEP. ALL CURRENT QUESTIONS/CONCERNS REGARDING D/C PLAN ADDRESSED AT THIS TIME. PT IS PROGRESSING HOWEVER REMAINS SIGNIFICANTLY LIMITED. CONT TO RECOMMEND LEVEL 1 RESOURCES UPON D/C. WILL CONT TO FOLLOW TO ADDRESS THE INITIALLY ESTABLISHED OT GOALS.      Rehab Resource Intensity Level, OT: I (Maximum Resource Intensity)

## 2023-11-01 NOTE — CASE MANAGEMENT
Case Management Discharge Planning Note    Patient name Maira Hartman  Location [address] 622/[facility] 107-56 MRN [id number]  : 1960 Date 2023       Current Admission Date: 10/27/2023  Current Admission Diagnosis:Compartment syndrome [facility])   Patient Active Problem List    Diagnosis Date Noted    Diarrhea 10/31/2023    Fall 10/27/2023    Compartment syndrome ([address]) 10/27/2023    Type 2 diabetes mellitus ([address]) 10/27/2023    Screening for colon cancer 2023    Gastroesophageal reflux disease 2023    Common bile duct dilation 2023    S/P insertion of spinal cord stimulator 2023    Pulmonary nodules/lesions, multiple 2023    Diabetic polyneuropathy associated with type 2 diabetes mellitus ([address]) 2023    Coronary artery calcification seen on CAT scan 2023    Cervical spondylosis 2023    Shortness of breath 2023    Irritable bowel syndrome with both constipation and diarrhea 2022    Chronic pain 2022    Sacroiliitis ([address]) 2022    Carotid artery aneurysm (HCC) 2022    Lumbar degenerative disc disease 2022    Lumbar radiculopathy 2022    Lumbar spondylosis 2022    Cervical disc disorder with radiculopathy of mid-cervical region 2022    Mid back pain 2021    Cervical radiculopathy 2021    Neck pain 2021    Syncope and collapse 2021    Migraine     Bipolar 1 disorder (HCC)     Depression     Chronic pain syndrome     Anxiety     Fibromyalgia, primary     Constipation 2021    Ileus ([address]) 2021    COPD (chronic obstructive pulmonary disease) ([address])     Hypertension     Hypophosphatemia 2021    Gastritis, acute 2021    Hypotension 2021    Tobacco use 2021    Stroke-like symptoms 06/15/2020    Nausea 06/15/2020    Left chest pressure 06/15/2020    TMJ syndrome 2020    Other hyperlipidemia 2019    Dizziness 2019    Hypothyroid 2019    Brain aneurysm 07/19/2019    Hypokalemia 07/19/2019    Hypertriglyceridemia 07/19/2019    Low HDL (under 40) 07/19/2019    Elevated TSH 07/19/2019    Tobacco abuse 07/19/2019    Intractable abdominal pain 07/19/2019      LOS (days): 5  Geometric Mean LOS (GMLOS) (days): 1.90  Days to GMLOS:-3.5     OBJECTIVE:  Risk of Unplanned Readmission Score: 24.92         Current admission status: Inpatient   Preferred Pharmacy:   [address] [place] [address] [address]  Phone: [phone] Fax: [phone]    Primary Care Provider: Navi Sy MD    Primary Insurance: [facility] D[facility] COMPLETE [facility]  Secondary Insurance: [facility]    DISCHARGE DETAILS:    Updated notes sent to [facility] to review.   Pt's Ketamine gtt is on and they would like the pt off Ketamine before making decision

## 2023-11-01 NOTE — PLAN OF CARE
Problem: PHYSICAL THERAPY ADULT  Goal: Performs mobility at highest level of function for planned discharge setting. See evaluation for individualized goals. Description: Treatment/Interventions: Elevations, Functional transfer training, LE strengthening/ROM, Therapeutic exercise, Endurance training, Equipment eval/education, Bed mobility, Gait training, Spoke to nursing, OT  Equipment Recommended: Other (Comment) (TBD)       See flowsheet documentation for full assessment, interventions and recommendations. Outcome: Progressing  Note: Prognosis: Fair  Problem List: Decreased strength, Decreased range of motion, Decreased endurance, Impaired balance, Decreased skin integrity, Pain  Assessment: Pt demonstrated significantly improved functional endurance & mobility today, performing all transfers with decreased assist, then ambulating total household distances, initially with use of IV pole, then without AD.  would likely benefit from use of her own SPC vs quad cane in upcoming trials to provide increased lateral support given postural guarding & short, shuffling stride at this time, but anticipate she will continue to make steady progress pending ongoing pain management & medical POC as it pertains to treatment of RUE wound vac site. although she may benefit from use of RW, she likely won't tolerate it's use well with RUE, and likely does not possess sufficient R hand strength & RUE coordination to manage AD appropriately. PT POC & d/c recommendations remain appropriate at this time pending LOS & progress made during remainder of hospital stay. Barriers to Discharge: Inaccessible home environment, Decreased caregiver support     Rehab Resource Intensity Level, PT: II (Moderate Resource Intensity)    See flowsheet documentation for full assessment.

## 2023-11-01 NOTE — PLAN OF CARE
Problem: HEMATOLOGIC - ADULT  Goal: Maintains hematologic stability  Description: INTERVENTIONS  - Assess for signs and symptoms of bleeding or hemorrhage  - Monitor labs  - Administer supportive blood products/factors as ordered and appropriate  Outcome: Progressing     Problem: MUSCULOSKELETAL - ADULT  Goal: Maintain or return mobility to safest level of function  Description: INTERVENTIONS:  - Assess patient's ability to carry out ADLs; assess patient's baseline for ADL function and identify physical deficits which impact ability to perform ADLs (bathing, care of mouth/teeth, toileting, grooming, dressing, etc.)  - Assess/evaluate cause of self-care deficits   - Assess range of motion  - Assess patient's mobility  - Assess patient's need for assistive devices and provide as appropriate  - Encourage maximum independence but intervene and supervise when necessary  - Involve family in performance of ADLs  - Assess for home care needs following discharge   - Consider OT consult to assist with ADL evaluation and planning for discharge  - Provide patient education as appropriate  Outcome: Progressing  Goal: Maintain proper alignment of affected body part  Description: INTERVENTIONS:  - Support, maintain and protect limb and body alignment  - Provide patient/ family with appropriate education  Outcome: Progressing     Problem: PAIN - ADULT  Goal: Verbalizes/displays adequate comfort level or baseline comfort level  Description: Interventions:  - Encourage patient to monitor pain and request assistance  - Assess pain using appropriate pain scale  - Administer analgesics based on type and severity of pain and evaluate response  - Implement non-pharmacological measures as appropriate and evaluate response  - Consider cultural and social influences on pain and pain management  - Notify physician/advanced practitioner if interventions unsuccessful or patient reports new pain  Outcome: Progressing     Problem: INFECTION - ADULT  Goal: Absence or prevention of progression during hospitalization  Description: INTERVENTIONS:  - Assess and monitor for signs and symptoms of infection  - Monitor lab/diagnostic results  - Monitor all insertion sites, i.e. indwelling lines, tubes, and drains  - Monitor endotracheal if appropriate and nasal secretions for changes in amount and color  - [place] appropriate cooling/warming therapies per order  - Administer medications as ordered  - Instruct and encourage patient and family to use good hand hygiene technique  - Identify and instruct in appropriate isolation precautions for identified infection/condition  Outcome: Progressing     Problem: SAFETY ADULT  Goal: Patient will remain free of falls  Description: INTERVENTIONS:  - Educate patient/family on patient safety including physical limitations  - Instruct patient to call for assistance with activity   - Consult OT/PT to assist with strengthening/mobility   - Keep Call bell within reach  - Keep bed low and locked with side rails adjusted as appropriate  - Keep care items and personal belongings within reach  - Initiate and maintain comfort rounds  - Make Fall Risk Sign visible to staff  - Offer Toileting every 2 Hours, in advance of need  - Initiate/Maintain alarm  - Obtain necessary fall risk management equipment:   - Apply yellow socks and bracelet for high fall risk patients  - Consider moving patient to room near nurses station  Outcome: Progressing  Goal: Maintain or return to baseline ADL function  Description: INTERVENTIONS:  -  Assess patient's ability to carry out ADLs; assess patient's baseline for ADL function and identify physical deficits which impact ability to perform ADLs (bathing, care of mouth/teeth, toileting, grooming, dressing, etc.)  - Assess/evaluate cause of self-care deficits   - Assess range of motion  - Assess patient's mobility; develop plan if impaired  - Assess patient's need for assistive devices and provide as appropriate  - Encourage maximum independence but intervene and supervise when necessary  - Involve family in performance of ADLs  - Assess for home care needs following discharge   - Consider OT consult to assist with ADL evaluation and planning for discharge  - Provide patient education as appropriate  Outcome: Progressing  Goal: Maintains/Returns to pre admission functional level  Description: INTERVENTIONS:  - Perform BMAT or MOVE assessment daily.   - Set and communicate daily mobility goal to care team and patient/family/caregiver. - Collaborate with rehabilitation services on mobility goals if consulted  - Perform Range of Motion 3 times a day. - Reposition patient every 2 hours. - Dangle patient 2 times a day  - Stand patient 3 times a day  - Ambulate patient 3 times a day  - Out of bed to chair 3 times a day   - Out of bed for meals 3 times a day  - Out of bed for toileting  - Record patient progress and toleration of activity level   Outcome: Progressing     Problem: DISCHARGE PLANNING  Goal: Discharge to home or other facility with appropriate resources  Description: INTERVENTIONS:  - Identify barriers to discharge w/patient and caregiver  - Arrange for needed discharge resources and transportation as appropriate  - Identify discharge learning needs (meds, wound care, etc.)  - Arrange for interpretive services to assist at discharge as needed  - Refer to Case Management Department for coordinating discharge planning if the patient needs post-hospital services based on physician/advanced practitioner order or complex needs related to functional status, cognitive ability, or social support system  Outcome: Progressing     Problem: Knowledge Deficit  Goal: Patient/family/caregiver demonstrates understanding of disease process, treatment plan, medications, and discharge instructions  Description: Complete learning assessment and assess knowledge base.   Interventions:  - Provide teaching at level of understanding  - Provide teaching via preferred learning methods  Outcome: Progressing     Problem: MOBILITY - ADULT  Goal: Maintain or return to baseline ADL function  Description: INTERVENTIONS:  -  Assess patient's ability to carry out ADLs; assess patient's baseline for ADL function and identify physical deficits which impact ability to perform ADLs (bathing, care of mouth/teeth, toileting, grooming, dressing, etc.)  - Assess/evaluate cause of self-care deficits   - Assess range of motion  - Assess patient's mobility; develop plan if impaired  - Assess patient's need for assistive devices and provide as appropriate  - Encourage maximum independence but intervene and supervise when necessary  - Involve family in performance of ADLs  - Assess for home care needs following discharge   - Consider OT consult to assist with ADL evaluation and planning for discharge  - Provide patient education as appropriate  Outcome: Progressing  Goal: Maintains/Returns to pre admission functional level  Description: INTERVENTIONS:  - Perform BMAT or MOVE assessment daily.   - Set and communicate daily mobility goal to care team and patient/family/caregiver. - Collaborate with rehabilitation services on mobility goals if consulted  - Perform Range of Motion 3 times a day. - Reposition patient every 2 hours.   - Dangle patient 3 times a day  - Stand patient 3 times a day  - Ambulate patient 3 times a day  - Out of bed to chair 3 times a day   - Out of bed for meals 3 times a day  - Out of bed for toileting  - Record patient progress and toleration of activity level   Outcome: Progressing     Problem: Prexisting or High Potential for Compromised Skin Integrity  Goal: Skin integrity is maintained or improved  Description: INTERVENTIONS:  - Identify patients at risk for skin breakdown  - Assess and monitor skin integrity  - Assess and monitor nutrition and hydration status  - Monitor labs   - Assess for incontinence   - Turn and reposition patient  - Assist with mobility/ambulation  - Relieve pressure over bony prominences  - Avoid friction and shearing  - Provide appropriate hygiene as needed including keeping skin clean and dry  - Evaluate need for skin moisturizer/barrier cream  - Collaborate with interdisciplinary team   - Patient/family teaching  - Consider wound care consult   Outcome: Progressing

## 2023-11-02 LAB
ANION GAP SERPL CALCULATED.3IONS-SCNC: 6 MMOL/L
BUN SERPL-MCNC: 12 MG/DL (ref 5–25)
CALCIUM SERPL-MCNC: 8 MG/DL (ref 8.4–10.2)
CHLORIDE SERPL-SCNC: 108 MMOL/L (ref 96–108)
CO2 SERPL-SCNC: 26 MMOL/L (ref 21–32)
CREAT SERPL-MCNC: 0.45 MG/DL (ref 0.6–1.3)
ERYTHROCYTE [DISTWIDTH] IN BLOOD BY AUTOMATED COUNT: 12.8 % (ref 11.6–15.1)
GFR SERPL CREATININE-BSD FRML MDRD: 108 ML/MIN/1.73SQ M
GLUCOSE SERPL-MCNC: 115 MG/DL (ref 65–140)
HCT VFR BLD AUTO: 41.1 % (ref 34.8–46.1)
HGB BLD-MCNC: 14 G/DL (ref 11.5–15.4)
MCH RBC QN AUTO: 31.7 PG (ref 26.8–34.3)
MCHC RBC AUTO-ENTMCNC: 34.1 G/DL (ref 31.4–37.4)
MCV RBC AUTO: 93 FL (ref 82–98)
PLATELET # BLD AUTO: 202 THOUSANDS/UL (ref 149–390)
PMV BLD AUTO: 10.3 FL (ref 8.9–12.7)
POTASSIUM SERPL-SCNC: 3.7 MMOL/L (ref 3.5–5.3)
RBC # BLD AUTO: 4.41 MILLION/UL (ref 3.81–5.12)
SODIUM SERPL-SCNC: 140 MMOL/L (ref 135–147)
WBC # BLD AUTO: 9.38 THOUSAND/UL (ref 4.31–10.16)

## 2023-11-02 PROCEDURE — 85027 COMPLETE CBC AUTOMATED: CPT

## 2023-11-02 PROCEDURE — 2W0CX6Z CHANGE PRESSURE DRESSING ON RIGHT LOWER ARM: ICD-10-PCS | Performed by: STUDENT IN AN ORGANIZED HEALTH CARE EDUCATION/TRAINING PROGRAM

## 2023-11-02 PROCEDURE — 99233 SBSQ HOSP IP/OBS HIGH 50: CPT | Performed by: PHYSICIAN ASSISTANT

## 2023-11-02 PROCEDURE — 99024 POSTOP FOLLOW-UP VISIT: CPT | Performed by: STUDENT IN AN ORGANIZED HEALTH CARE EDUCATION/TRAINING PROGRAM

## 2023-11-02 PROCEDURE — 80048 BASIC METABOLIC PNL TOTAL CA: CPT

## 2023-11-02 PROCEDURE — 97606 NEG PRS WND THER DME>50 SQCM: CPT | Performed by: PHYSICIAN ASSISTANT

## 2023-11-02 RX ORDER — METHOCARBAMOL 500 MG/1
250 TABLET, FILM COATED ORAL EVERY 6 HOURS SCHEDULED
Status: DISCONTINUED | OUTPATIENT
Start: 2023-11-02 | End: 2023-11-13 | Stop reason: HOSPADM

## 2023-11-02 RX ORDER — HYDROMORPHONE HCL/PF 1 MG/ML
0.5 SYRINGE (ML) INJECTION EVERY 6 HOURS PRN
Status: DISCONTINUED | OUTPATIENT
Start: 2023-11-02 | End: 2023-11-03

## 2023-11-02 RX ADMIN — LIDOCAINE 5% 1 PATCH: 700 PATCH TOPICAL at 09:23

## 2023-11-02 RX ADMIN — ENOXAPARIN SODIUM 30 MG: 30 INJECTION SUBCUTANEOUS at 17:21

## 2023-11-02 RX ADMIN — HYDROMORPHONE HYDROCHLORIDE 4 MG: 2 TABLET ORAL at 12:22

## 2023-11-02 RX ADMIN — HYDROMORPHONE HYDROCHLORIDE 4 MG: 2 TABLET ORAL at 03:34

## 2023-11-02 RX ADMIN — ATORVASTATIN CALCIUM 80 MG: 80 TABLET, FILM COATED ORAL at 17:21

## 2023-11-02 RX ADMIN — HYDROMORPHONE HYDROCHLORIDE 4 MG: 2 TABLET ORAL at 23:23

## 2023-11-02 RX ADMIN — ACETAMINOPHEN 975 MG: 325 TABLET, FILM COATED ORAL at 05:53

## 2023-11-02 RX ADMIN — CLONAZEPAM 0.5 MG: 0.5 TABLET ORAL at 21:37

## 2023-11-02 RX ADMIN — LATANOPROST 1 DROP: 50 SOLUTION OPHTHALMIC at 21:37

## 2023-11-02 RX ADMIN — HYDROMORPHONE HYDROCHLORIDE 0.5 MG: 1 INJECTION, SOLUTION INTRAMUSCULAR; INTRAVENOUS; SUBCUTANEOUS at 09:59

## 2023-11-02 RX ADMIN — KETAMINE HYDROCHLORIDE 0.1 MG/KG/HR: 50 INJECTION INTRAMUSCULAR; INTRAVENOUS at 00:32

## 2023-11-02 RX ADMIN — ENOXAPARIN SODIUM 30 MG: 30 INJECTION SUBCUTANEOUS at 05:53

## 2023-11-02 RX ADMIN — METHOCARBAMOL 250 MG: 750 TABLET ORAL at 12:23

## 2023-11-02 RX ADMIN — HYDROMORPHONE HYDROCHLORIDE 4 MG: 2 TABLET ORAL at 16:29

## 2023-11-02 RX ADMIN — ACETAMINOPHEN 975 MG: 325 TABLET, FILM COATED ORAL at 13:27

## 2023-11-02 RX ADMIN — METHOCARBAMOL 250 MG: 750 TABLET ORAL at 17:21

## 2023-11-02 RX ADMIN — NICOTINE 21 MG: 21 PATCH, EXTENDED RELEASE TRANSDERMAL at 09:23

## 2023-11-02 RX ADMIN — ACETAMINOPHEN 975 MG: 325 TABLET, FILM COATED ORAL at 21:37

## 2023-11-02 RX ADMIN — UMECLIDINIUM BROMIDE AND VILANTEROL TRIFENATATE 1 PUFF: 62.5; 25 POWDER RESPIRATORY (INHALATION) at 09:24

## 2023-11-02 RX ADMIN — METHOCARBAMOL 250 MG: 750 TABLET ORAL at 23:16

## 2023-11-02 RX ADMIN — TIZANIDINE 4 MG: 4 TABLET ORAL at 03:26

## 2023-11-02 NOTE — PROGRESS NOTES
Progress Note - Acute Pain Service    Power Woodall 65 y.o. female MRN: [id number]  Unit/Bed#: [facility] 622-01 Encounter: [id number]      Harry Osorio is a 65 y.o. female with right arm fasciotomy. Chronic pain  Assessment & Plan  Hx of cervical-lumbar spondylosis/DJD  Inactive spinal cord stimulator  Recent left cervical MBB with [facility]  Home pain regimen: Norco 10-325mg q8hr PRN (takes 30mg. day), Lyrica 100/150mg QID (150mg at bedtime), Tizanidine 4mg q6hr PRN for spasms          * Compartment syndrome [facility])  Assessment & Plan  Right forearm CS s/p right forearm fasciotomy with wound vac placement on 10/27  S/p right infraclavicular block with Exparel on 10/27  Will hold off on further regional analgesic techniques given nerve deficits of right forearm and monitoring for return of nerve function  MMA as below  Dilaudid 2 mg p.o. every 4 hours as needed moderate pain or 4 mg p.o. every 4 hours as needed severe pain. Ketamine infusion discontinued this morning. Decrease IV Dilaudid to 0.5 mg IV every 6 hours as needed breakthrough pain. Discontinue tomorrow if unused in 24 hours. Continue Lidoderm patch  Continue PO Tylenol 975mg q8hr princess  Discontinue tizanidine and start Robaxin 250 mg p.o. every 6 hours scheduled. Agree with holding Lyrica in the setting of rising CK/monitoring for ALEX due to rhabdomyolysis  Holding NSAIDs in the setting of renal function monitoring given elevated CK/rhabdomyolysis  Klonopin 0.5 mg PO QHS ordered since 10/27. APS will continue to follow. Please contact Acute Pain Service - via Enubila from [phone] with additional questions or concerns. See Enubila or Janes for additional contacts and after hours information.      Pain History  Current pain location(s):  Pain Score: 10  Pain Location/Orientation: Orientation: Right, Location: Arm, Orientation: Bilateral, Location: Back, Location: Foot, Location: Leg  Pain Scale: Pain Assessment Tool: 0-10  24 hour history: Pain continues to wax and wane but generally improved. Opioid requirement previous 24 hours: Dilaudid 4 mg p.o. x5.    Meds/Allergies   all current active meds have been reviewed, current meds:   Current Facility-Administered Medications   Medication Dose Route Frequency    acetaminophen (TYLENOL) tablet 975 mg  975 mg Oral Q8H [address]    albuterol (PROVENTIL HFA,VENTOLIN HFA) inhaler 2 puff  2 puff Inhalation Q4H PRN    atorvastatin (LIPITOR) tablet 80 mg  80 mg Oral QPM    bisacodyl (DULCOLAX) rectal suppository 10 mg  10 mg Rectal Daily PRN    clonazePAM (KlonoPIN) tablet 0.5 mg  0.5 mg Oral HS    enoxaparin (LOVENOX) subcutaneous injection 30 mg  30 mg Subcutaneous Q12H    HYDROmorphone (DILAUDID) injection 0.5 mg  0.5 mg Intravenous Q6H PRN    HYDROmorphone (DILAUDID) tablet 2 mg  2 mg Oral Q4H PRN    Or    HYDROmorphone (DILAUDID) tablet 4 mg  4 mg Oral Q4H PRN    latanoprost (XALATAN) 0.005 % ophthalmic solution 1 drop  1 drop Both Eyes HS    lidocaine (LIDODERM) 5 % patch 1 patch  1 patch Topical Daily    methocarbamol (ROBAXIN) tablet 250 mg  250 mg Oral Q6H SNOW    naloxone (NARCAN) 0.04 mg/mL syringe 0.04 mg  0.04 mg Intravenous Q1MIN PRN    nicotine (NICODERM CQ) 21 mg/24 hr TD 24 hr patch 21 mg  21 mg Transdermal Daily    ondansetron (ZOFRAN) injection 4 mg  4 mg Intravenous Q4H PRN    polyethylene glycol (MIRALAX) packet 17 g  17 g Oral Daily PRN    umeclidinium-vilanterol 62.5-25 mcg/actuation inhaler 1 puff  1 puff Inhalation Daily   , and PTA meds:   Prior to Admission Medications   Prescriptions Last Dose Informant Patient Reported? Taking?    Alcohol Swabs (Alcohol Pads) 70 % PADS   Yes No   Sig: USE TO TEST BLOOD GLUCOSE 2-3 TIMES DAILY   Buprenorphine HCl (Belbuca) 300 MCG FILM  Self Yes No   Sig: Apply 300 mcg to cheek in the morning   Butalbital-APAP-Caffeine (FIORICET PO)   Yes No   Sig: Take by mouth as needed   Fluticasone-Salmeterol (Advair) 500-50 mcg/dose inhaler Not Taking  Yes No Patient not taking: Reported on 10/27/2023   Galcanezumab-gnlm (Emgality) 120 MG/ML SOAJ  Self Yes No   Sig: Inject 120 mg under the skin every 30 (thirty) days Last inj 1/19/23   HYDROcodone-acetaminophen (NORCO)  mg per tablet   Yes No   Sig: every 8 (eight) hours as needed   Melatonin 10 MG CAPS  Self Yes No   Sig: Take 1 tablet by mouth daily at bedtime as needed   OneTouch Ultra test strip   Yes No   Sig: USE TO TEST BLOOD GLUCOSE 2-3 TIMES DAILY   SUMAtriptan (IMITREX) 100 mg tablet Not Taking  Yes No   Patient not taking: Reported on 10/27/2023   True Comfort Safety Lancets MISC   Yes No   Sig: USE TO TEST BLOOD GLUCOSE 2-3 TIMES DAILY   Zoster Vac Recomb Adjuvanted (Shingrix) 50 MCG/0.5ML SUSR   Yes No   Sig: inject 0.5 milliliter intramuscularly   albuterol (PROVENTIL HFA,VENTOLIN HFA) 90 mcg/act inhaler  Self Yes No   Sig: Inhale 2 puffs every 6 (six) hours as needed   ammonium lactate (LAC-HYDRIN) 12 % lotion   Yes No   Sig: as needed   atorvastatin (LIPITOR) 80 mg tablet   Yes No   Sig: Take 80 mg by mouth every evening   benzonatate (TESSALON PERLES) 100 mg capsule   No No   Sig: Take 1 capsule (100 mg total) by mouth 3 (three) times a day as needed for cough   carboxymethylcellulose 0.5 % SOLN  Self No No   Sig: Administer 1 drop to both eyes daily as needed for dry eyes   cephalexin (KEFLEX) 500 mg capsule Not Taking  Yes No   Sig: Take 1 capsule by mouth every 12 (twelve) hours   Patient not taking: Reported on 10/27/2023   ciprofloxacin (CIPRO) 250 mg tablet Not Taking  Yes No   Sig: take 1 tablet by mouth twice a day for 7 days   Patient not taking: Reported on 10/27/2023   clonazePAM (KlonoPIN) 0.5 mg tablet  Self Yes No   Sig: Take 0.5 mg by mouth daily at bedtime   clonazePAM (KlonoPIN) 1 mg tablet  Self Yes No   Sig: Take 1 mg by mouth daily in the early morning   dicyclomine (BENTYL) 20 mg tablet  Self Yes No   Sig: Take 20 mg by mouth every 6 (six) hours   doxycycline hyclate (VIBRA-TABS) 100 mg tablet Not Taking  Yes No   Sig: take 1 tablet by mouth twice a day for 7 days   Patient not taking: Reported on 2023   famotidine (PEPCID) 40 MG tablet   No No   Sig: Take 1 tablet (40 mg total) by mouth daily at bedtime   fenofibrate (TRICOR) 48 mg tablet  Self Yes No   Sig: Take 48 mg by mouth daily   fluconazole (DIFLUCAN) 150 mg tablet Not Taking  Yes No   Sig: take 1 tablet by mouth for 1 day   Patient not taking: Reported on 10/27/2023   fluticasone (FLONASE) 50 mcg/act nasal spray   Yes No   Si sprays into each nostril daily   fluticasone-umeclidinium-vilanterol (Trelegy Ellipta) 200-62.5-25 mcg/actuation AEPB inhaler   No No   Sig: Inhale 1 puff daily Rinse mouth after use. furosemide (LASIX) 20 mg tablet  Self Yes No   Sig: Take 20 mg by mouth as needed Taking as needed   ibuprofen (MOTRIN) 800 mg tablet   Yes No   Sig: take 1 tablet by mouth every 8 hours if needed for mild pain or fever for up to 5 days   lamoTRIgine (LaMICtal) 100 mg tablet   Yes No   Sig: Take 100 mg by mouth 2 (two) times a day   latanoprost (XALATAN) 0.005 % ophthalmic solution   Yes No   Sig: instill 1 drop into both eyes at bedtime   levalbuterol (XOPENEX) 1.25 mg/3 mL nebulizer solution   Yes No   Sig: Inhale 1.25 mg every 4 (four) hours as needed   levothyroxine 137 mcg tablet  Self Yes No   Sig: Take 137 mcg by mouth daily   lubiprostone (AMITIZA) 8 mcg capsule Not Taking  No No   Sig: Take 1 PO BID for constipation.    Patient not taking: Reported on 10/27/2023   magnesium 30 MG tablet  Self Yes No   Sig: Take 500 mg by mouth 2 (two) times a day 23 right now not taking   methylPREDNISolone 4 MG tablet therapy pack   No No   Sig: Use as directed on package   metroNIDAZOLE (FLAGYL) 500 mg tablet   Yes No   Sig: Take 1 tablet by mouth 2 (two) times a day   naloxone (Narcan) 4 mg/0.1 mL nasal spray   Yes No   Patient not taking: Reported on 2023   omeprazole (PriLOSEC) 40 MG capsule   No No Sig: Take 1 capsule (40 mg total) by mouth 2 (two) times a day   ondansetron (ZOFRAN) 4 mg tablet Not Taking  Yes No   Sig: Take 4 mg by mouth every 8 (eight) hours as needed   Patient not taking: Reported on 10/27/2023   ondansetron (ZOFRAN) 8 mg tablet   No No   Sig: Take 1 tablet (8 mg total) by mouth every 8 (eight) hours as needed for nausea or vomiting   polyethylene glycol (GLYCOLAX) 17 GM/SCOOP powder Not Taking  No No   Sig: Take 17 g daily for constipation. Patient not taking: Reported on 10/27/2023   pregabalin (LYRICA) 100 mg capsule  Self Yes No   Sig: Take 100 mg by mouth 3 (three) times a day Morning-lunch-dinner   pregabalin (LYRICA) 150 mg capsule  Self Yes No   Sig: take 1 capsule by mouth NIGHTLY   risperiDONE (RisperDAL) 0.5 mg tablet   Yes No   Sig: Take 0.25 mg by mouth 2 (two) times a day   Patient not taking: Reported on 2023   rizatriptan (MAXALT-MLT) 10 mg disintegrating tablet   Yes No   Sig: TAKE 1 TABLET BY MOUTH ONE TIME AS NEEDED FOR MIGRAINE, MAY REPEA. ..  (REFER TO PRESCRIPTION NOTES). tiZANidine (ZANAFLEX) 4 mg tablet   Yes No   Sig: take 2 tablets by mouth every 6 hours if needed for muscle spasm   topiramate (TOPAMAX) 100 mg tablet Not Taking Self Yes No   Sig: Take 100 mg by mouth every 6 (six) hours as needed Takes 2 tabs every 6 hours   Patient not taking: Reported on 10/27/2023   topiramate (TOPAMAX) 200 MG tablet   Yes No   Si mg 2 (two) times a day      Facility-Administered Medications: None       Allergies   Allergen Reactions    Hydroxyzine Anaphylaxis     Claims it gives her convulsions.      Other Other (See Comments)    Pollen Extract Itching    Tree Extract     Clarithromycin Rash     Pt denies    Latex Rash    Sulfa Antibiotics Rash     "severe skin burn"       Objective        Vitals:    234 235 23 0734   BP: 124/78 133/79 127/77 128/77   Pulse:  67 63 65   Resp: 18   18   Temp: 97.9 °F (36.6 °C) (!) 97.4 °F (36.3 °C)     TempSrc:       SpO2:  97% 98% 97%   Weight:       Height:             Physical Exam  Vitals and nursing note reviewed. Constitutional:       General: She is awake. She is not in acute distress. Appearance: She is not ill-appearing, toxic-appearing or diaphoretic. Skin:     General: Skin is warm and dry. Neurological:      Mental Status: She is alert and oriented to person, place, and time. GCS: GCS eye subscore is 4. GCS verbal subscore is 5. GCS motor subscore is 6. Psychiatric:         Attention and Perception: Attention normal.         Speech: Speech normal.         Behavior: Behavior normal. Behavior is cooperative. Lab Results:   Estimated Creatinine Clearance: 141.2 mL/min (A) (by C-G formula based on SCr of 0.43 mg/dL (L)). Lab Results   Component Value Date    WBC 7.49 11/01/2023    WBC 5.90 08/10/2015    HGB 12.7 11/01/2023    HGB 11.9 08/10/2015    HCT 37.2 11/01/2023    HCT 34.9 08/10/2015     11/01/2023     (L) 08/10/2015         Component Value Date/Time     08/10/2015 0612    K 3.1 (L) 11/01/2023 0608    K 4.2 08/10/2015 0612     (H) 11/01/2023 0608     08/10/2015 0612    CO2 26 11/01/2023 0608    CO2 28 08/10/2015 0612    BUN 11 11/01/2023 0608    BUN 10 08/10/2015 0612    CREATININE 0.43 (L) 11/01/2023 0608    CREATININE 0.73 08/10/2015 0612         Component Value Date/Time    CALCIUM 7.9 (L) 11/01/2023 0608    CALCIUM 8.7 08/10/2015 0612    ALKPHOS 66 10/27/2023 0458    ALKPHOS 66 08/10/2015 0612     (H) 10/27/2023 0458    AST 19 08/10/2015 0612    ALT 63 (H) 10/27/2023 0458    ALT 31 08/10/2015 0612    BILITOT 0.29 08/10/2015 0612    TP 6.1 (L) 10/27/2023 0458    ALB 3.6 10/27/2023 0458    ALB 3.4 (L) 08/10/2015 0612       Imaging Studies/EKG: I have personally reviewed pertinent reports. Counseling / Coordination of Care  Total floor / unit time spent today 30 minutes minutes.  Greater than 50% of total time was spent with the patient and / or family counseling and / or coordination of care. A description of the counseling / coordination of care: Patient interview, physical examination, review of medical records, review of imaging and laboratory data, development of pain management plan, discussion of pain management plan with patient and primary service. Please note that the APS provides consultative services regarding pain management only. With the exception of ketamine and epidural infusions and except when indicated, final decisions regarding starting or changing doses of analgesic medications are at the discretion of the consulting service.     Sharon Villareal PA-C   Acute Pain Service

## 2023-11-02 NOTE — ASSESSMENT & PLAN NOTE
Patient with chronic patient Fibromyalgia, chronic LLB pain, Chronic pain syndrome, peripheral neuropathy  - APS consult appreciated    - S/p right infraclavicular block with Exparel on 10/27    - wean off ketamine  - continue multimodal pain regimen

## 2023-11-02 NOTE — ASSESSMENT & PLAN NOTE
Lab Results   Component Value Date    HGBA1C 5.8 (H) 01/19/2023       No results for input(s): "POCGLU" in the last 72 hours.       Blood Sugar Average: Last 72 hrs:  ?    Diet controlled  Monitor BG utilize S/S as needed

## 2023-11-02 NOTE — ASSESSMENT & PLAN NOTE
Patient presented with Crush injury to RUE  - Initial CK 5942 increased to 13,683  - 10/27 RUE Fasciotomy due to compartment syndrome with application of vac dressing  - 10/29 washout and partial closure   - 10/31 vac change  - continue to monitor neurovascular status  - vac change today, will likely need graft for wound closure

## 2023-11-02 NOTE — PROGRESS NOTES
[facility]  Progress Note  Name: Luis Gale  MRN: [id number]  Unit/Bed#: [facility] 170-78 I Date of Admission: 10/27/2023   Date of Service: 11/2/2023 I Hospital Day: 6    Assessment/Plan   * Compartment syndrome [facility])  Assessment & Plan  Patient presented with Crush injury to RUE  - Initial CK 5942 increased to 13,683  - 10/27 RUE Fasciotomy due to compartment syndrome with application of vac dressing  - 10/29 washout and partial closure   - 10/31 vac change  - continue to monitor neurovascular status  - vac change today, will likely need graft for wound closure    Fall  Assessment & Plan  Patient had unwitnessed fall.  + Lightheadedness, CP  - Syncopal workup: EKG Unremarkable, Troponin's Negative, Echo: unremarkable  - PT/OT evaluation and treatment as indicated    Chronic pain  Assessment & Plan  Patient with chronic patient Fibromyalgia, chronic LLB pain, Chronic pain syndrome, peripheral neuropathy  - APS consult appreciated    - S/p right infraclavicular block with Exparel on 10/27    - wean off ketamine  - continue multimodal pain regimen    Type 2 diabetes mellitus (HCC)  Assessment & Plan  Lab Results   Component Value Date    HGBA1C 5.8 (H) 01/19/2023       No results for input(s): "POCGLU" in the last 72 hours. Blood Sugar Average: Last 72 hrs:      Diet controlled  Monitor BG utilize S/S as needed    Hypothyroid  Assessment & Plan  On levothyroxine will continue in hospital       Bowel Regimen: miralax, dulcolax  VTE Prophylaxis:Enoxaparin (Lovenox)     Disposition: continue inpatient    Subjective   Chief Complaint: fall    Subjective: This morning she reports feeling okay. She has had some intermittent pain in the right arm, but overall has had gradual improvement.         Objective   Vitals:   Temp:  [97.4 °F (36.3 °C)-98.3 °F (36.8 °C)] 97.4 °F (36.3 °C)  HR:  [63-67] 65  Resp:  [18] 18  BP: (124-135)/(77-83) 128/77    I/O         10/31 0701  11/01 0700 11/01 0701  11/02 0700 11/02 0701  11/03 0700    P. O. 240 640     I.V. (mL/kg) 128.8 (1.6)      Total Intake(mL/kg) 368.8 (4.6) 640 (7.9)     Urine (mL/kg/hr) 0 (0) 0 (0)     Drains 250 150     Stool 0      Total Output 250 150     Net +118.8 +490            Unmeasured Urine Occurrence 3 x 2 x     Unmeasured Stool Occurrence 4 x               Physical Exam:   GENERAL APPEARANCE: no acute distress  NEURO: awake and alert  HEENT: normocephalic  CV: RRR  LUNGS: CTAB  GI: nondistended, nontender  MSK: RUE wound vac, able to flex/extend fingers 5/5, subjectively decreased sensation over 5th finger, compartments soft  SKIN: warm and dry    Invasive Devices       Peripheral Intravenous Line  Duration             Peripheral IV 10/29/23 Left Forearm 4 days    Peripheral IV 11/02/23 Dorsal (posterior); Left Forearm <1 day                   V.A.C. Procedure Note     Date: 11/2  Time: 10:00AM     Location of wound: Right forearm     Sponges removed:  1 Black Sponges  0 White Sponges     Dimensions of wound: 17 cm x 8 cm x 0.5 cm     Description of wound: exposed muscle with clean wound edges, no necrosis, minimal muscle edema     Sponges placed:  1 Black Sponges        VAC settings:  125 mmHg  Continuous     Pt tolerated procedure well  VAC sticker placed to wound dressing, per protocol         Lab Results: pending  Imaging: I have personally reviewed pertinent reports.      Other Studies: N/A

## 2023-11-02 NOTE — PROCEDURES
General Surgery  Larissa Woodall 65 y.o. female MRN: [id number]  Unit/Bed#: [facility] 622-01 Encounter: [id number]    STEPHIE HALL Procedure Note    Date: 11/2  Time: 1272    Location of wound: Right forearm    Sponges removed:  1 Black Sponges  0 White Sponges    Dimensions of wound: 17 cm x 7 cm x 0.5 cm    Description of wound: Wound is clean with minimal hematoma under skin edges which were lifted at the time of the wound vac change. Muscle exposed, clean, pink, contracts with hand motion. No granulation tissue present.      Sponges placed:  1 Black Sponges  0 White Sponges    VAC settings:  125 mmHg  Continuous    Pt tolerated procedure well  VAC sticker placed to wound dressing, per protocol          Mya Grant PA-C  11/2/2023

## 2023-11-02 NOTE — PLAN OF CARE
Problem: HEMATOLOGIC - ADULT  Goal: Maintains hematologic stability  Description: INTERVENTIONS  - Assess for signs and symptoms of bleeding or hemorrhage  - Monitor labs  - Administer supportive blood products/factors as ordered and appropriate  Outcome: Progressing     Problem: MUSCULOSKELETAL - ADULT  Goal: Maintain or return mobility to safest level of function  Description: INTERVENTIONS:  - Assess patient's ability to carry out ADLs; assess patient's baseline for ADL function and identify physical deficits which impact ability to perform ADLs (bathing, care of mouth/teeth, toileting, grooming, dressing, etc.)  - Assess/evaluate cause of self-care deficits   - Assess range of motion  - Assess patient's mobility  - Assess patient's need for assistive devices and provide as appropriate  - Encourage maximum independence but intervene and supervise when necessary  - Involve family in performance of ADLs  - Assess for home care needs following discharge   - Consider OT consult to assist with ADL evaluation and planning for discharge  - Provide patient education as appropriate  Outcome: Progressing  Goal: Maintain proper alignment of affected body part  Description: INTERVENTIONS:  - Support, maintain and protect limb and body alignment  - Provide patient/ family with appropriate education  Outcome: Progressing     Problem: PAIN - ADULT  Goal: Verbalizes/displays adequate comfort level or baseline comfort level  Description: Interventions:  - Encourage patient to monitor pain and request assistance  - Assess pain using appropriate pain scale  - Administer analgesics based on type and severity of pain and evaluate response  - Implement non-pharmacological measures as appropriate and evaluate response  - Consider cultural and social influences on pain and pain management  - Notify physician/advanced practitioner if interventions unsuccessful or patient reports new pain  Outcome: Progressing     Problem: INFECTION - ADULT  Goal: Absence or prevention of progression during hospitalization  Description: INTERVENTIONS:  - Assess and monitor for signs and symptoms of infection  - Monitor lab/diagnostic results  - Monitor all insertion sites, i.e. indwelling lines, tubes, and drains  - Monitor endotracheal if appropriate and nasal secretions for changes in amount and color  - [place] appropriate cooling/warming therapies per order  - Administer medications as ordered  - Instruct and encourage patient and family to use good hand hygiene technique  - Identify and instruct in appropriate isolation precautions for identified infection/condition  Outcome: Progressing

## 2023-11-02 NOTE — PLAN OF CARE
Problem: HEMATOLOGIC - ADULT  Goal: Maintains hematologic stability  Description: INTERVENTIONS  - Assess for signs and symptoms of bleeding or hemorrhage  - Monitor labs  - Administer supportive blood products/factors as ordered and appropriate  Outcome: Progressing     Problem: MUSCULOSKELETAL - ADULT  Goal: Maintain or return mobility to safest level of function  Description: INTERVENTIONS:  - Assess patient's ability to carry out ADLs; assess patient's baseline for ADL function and identify physical deficits which impact ability to perform ADLs (bathing, care of mouth/teeth, toileting, grooming, dressing, etc.)  - Assess/evaluate cause of self-care deficits   - Assess range of motion  - Assess patient's mobility  - Assess patient's need for assistive devices and provide as appropriate  - Encourage maximum independence but intervene and supervise when necessary  - Involve family in performance of ADLs  - Assess for home care needs following discharge   - Consider OT consult to assist with ADL evaluation and planning for discharge  - Provide patient education as appropriate  Outcome: Progressing  Goal: Maintain proper alignment of affected body part  Description: INTERVENTIONS:  - Support, maintain and protect limb and body alignment  - Provide patient/ family with appropriate education  Outcome: Progressing     Problem: PAIN - ADULT  Goal: Verbalizes/displays adequate comfort level or baseline comfort level  Description: Interventions:  - Encourage patient to monitor pain and request assistance  - Assess pain using appropriate pain scale  - Administer analgesics based on type and severity of pain and evaluate response  - Implement non-pharmacological measures as appropriate and evaluate response  - Consider cultural and social influences on pain and pain management  - Notify physician/advanced practitioner if interventions unsuccessful or patient reports new pain  Outcome: Progressing     Problem: INFECTION - ADULT  Goal: Absence or prevention of progression during hospitalization  Description: INTERVENTIONS:  - Assess and monitor for signs and symptoms of infection  - Monitor lab/diagnostic results  - Monitor all insertion sites, i.e. indwelling lines, tubes, and drains  - Monitor endotracheal if appropriate and nasal secretions for changes in amount and color  - [place] appropriate cooling/warming therapies per order  - Administer medications as ordered  - Instruct and encourage patient and family to use good hand hygiene technique  - Identify and instruct in appropriate isolation precautions for identified infection/condition  Outcome: Progressing     Problem: SAFETY ADULT  Goal: Patient will remain free of falls  Description: INTERVENTIONS:  - Educate patient/family on patient safety including physical limitations  - Instruct patient to call for assistance with activity   - Consult OT/PT to assist with strengthening/mobility   - Keep Call bell within reach  - Keep bed low and locked with side rails adjusted as appropriate  - Keep care items and personal belongings within reach  - Initiate and maintain comfort rounds  - Make Fall Risk Sign visible to staff  - Offer Toileting every 2 Hours, in advance of need  - Initiate/Maintain alarm  - Obtain necessary fall risk management equipment:   - Apply yellow socks and bracelet for high fall risk patients  - Consider moving patient to room near nurses station  Outcome: Progressing  Goal: Maintain or return to baseline ADL function  Description: INTERVENTIONS:  -  Assess patient's ability to carry out ADLs; assess patient's baseline for ADL function and identify physical deficits which impact ability to perform ADLs (bathing, care of mouth/teeth, toileting, grooming, dressing, etc.)  - Assess/evaluate cause of self-care deficits   - Assess range of motion  - Assess patient's mobility; develop plan if impaired  - Assess patient's need for assistive devices and provide as appropriate  - Encourage maximum independence but intervene and supervise when necessary  - Involve family in performance of ADLs  - Assess for home care needs following discharge   - Consider OT consult to assist with ADL evaluation and planning for discharge  - Provide patient education as appropriate  Outcome: Progressing  Goal: Maintains/Returns to pre admission functional level  Description: INTERVENTIONS:  - Perform BMAT or MOVE assessment daily.   - Set and communicate daily mobility goal to care team and patient/family/caregiver. - Collaborate with rehabilitation services on mobility goals if consulted  - Perform Range of Motion 3 times a day. - Reposition patient every 2 hours. - Dangle patient 3 times a day  - Stand patient 3 times a day  - Ambulate patient 3 times a day  - Out of bed to chair 3 times a day   - Out of bed for meals 3 times a day  - Out of bed for toileting  - Record patient progress and toleration of activity level   Outcome: Progressing     Problem: DISCHARGE PLANNING  Goal: Discharge to home or other facility with appropriate resources  Description: INTERVENTIONS:  - Identify barriers to discharge w/patient and caregiver  - Arrange for needed discharge resources and transportation as appropriate  - Identify discharge learning needs (meds, wound care, etc.)  - Arrange for interpretive services to assist at discharge as needed  - Refer to Case Management Department for coordinating discharge planning if the patient needs post-hospital services based on physician/advanced practitioner order or complex needs related to functional status, cognitive ability, or social support system  Outcome: Progressing     Problem: Knowledge Deficit  Goal: Patient/family/caregiver demonstrates understanding of disease process, treatment plan, medications, and discharge instructions  Description: Complete learning assessment and assess knowledge base.   Interventions:  - Provide teaching at level of understanding  - Provide teaching via preferred learning methods  Outcome: Progressing     Problem: MOBILITY - ADULT  Goal: Maintain or return to baseline ADL function  Description: INTERVENTIONS:  -  Assess patient's ability to carry out ADLs; assess patient's baseline for ADL function and identify physical deficits which impact ability to perform ADLs (bathing, care of mouth/teeth, toileting, grooming, dressing, etc.)  - Assess/evaluate cause of self-care deficits   - Assess range of motion  - Assess patient's mobility; develop plan if impaired  - Assess patient's need for assistive devices and provide as appropriate  - Encourage maximum independence but intervene and supervise when necessary  - Involve family in performance of ADLs  - Assess for home care needs following discharge   - Consider OT consult to assist with ADL evaluation and planning for discharge  - Provide patient education as appropriate  Outcome: Progressing  Goal: Maintains/Returns to pre admission functional level  Description: INTERVENTIONS:  - Perform BMAT or MOVE assessment daily.   - Set and communicate daily mobility goal to care team and patient/family/caregiver. - Collaborate with rehabilitation services on mobility goals if consulted  - Perform Range of Motion 3 times a day. - Reposition patient every 2 hours.   - Dangle patient 3 times a day  - Stand patient 3 times a day  - Ambulate patient 3 times a day  - Out of bed to chair 3 times a day   - Out of bed for meals 3 times a day  - Out of bed for toileting  - Record patient progress and toleration of activity level   Outcome: Progressing     Problem: Prexisting or High Potential for Compromised Skin Integrity  Goal: Skin integrity is maintained or improved  Description: INTERVENTIONS:  - Identify patients at risk for skin breakdown  - Assess and monitor skin integrity  - Assess and monitor nutrition and hydration status  - Monitor labs   - Assess for incontinence   - Turn and reposition patient  - Assist with mobility/ambulation  - Relieve pressure over bony prominences  - Avoid friction and shearing  - Provide appropriate hygiene as needed including keeping skin clean and dry  - Evaluate need for skin moisturizer/barrier cream  - Collaborate with interdisciplinary team   - Patient/family teaching  - Consider wound care consult   Outcome: Progressing

## 2023-11-03 ENCOUNTER — ANESTHESIA EVENT (INPATIENT)
Dept: PERIOP | Facility: HOSPITAL | Age: 63
DRG: 464 | End: 2023-11-03
Payer: COMMERCIAL

## 2023-11-03 PROCEDURE — 97535 SELF CARE MNGMENT TRAINING: CPT

## 2023-11-03 PROCEDURE — 97530 THERAPEUTIC ACTIVITIES: CPT

## 2023-11-03 PROCEDURE — 97112 NEUROMUSCULAR REEDUCATION: CPT

## 2023-11-03 PROCEDURE — 99232 SBSQ HOSP IP/OBS MODERATE 35: CPT | Performed by: PHYSICIAN ASSISTANT

## 2023-11-03 PROCEDURE — 99024 POSTOP FOLLOW-UP VISIT: CPT | Performed by: STUDENT IN AN ORGANIZED HEALTH CARE EDUCATION/TRAINING PROGRAM

## 2023-11-03 PROCEDURE — 97116 GAIT TRAINING THERAPY: CPT

## 2023-11-03 RX ORDER — HYDROMORPHONE HCL/PF 1 MG/ML
0.5 SYRINGE (ML) INJECTION DAILY PRN
Status: DISCONTINUED | OUTPATIENT
Start: 2023-11-03 | End: 2023-11-13 | Stop reason: HOSPADM

## 2023-11-03 RX ORDER — PREGABALIN 50 MG/1
50 CAPSULE ORAL 3 TIMES DAILY
Status: DISCONTINUED | OUTPATIENT
Start: 2023-11-03 | End: 2023-11-09

## 2023-11-03 RX ADMIN — PREGABALIN 50 MG: 50 CAPSULE ORAL at 17:28

## 2023-11-03 RX ADMIN — LIDOCAINE 5% 1 PATCH: 700 PATCH TOPICAL at 08:55

## 2023-11-03 RX ADMIN — PREGABALIN 50 MG: 50 CAPSULE ORAL at 21:17

## 2023-11-03 RX ADMIN — HYDROMORPHONE HYDROCHLORIDE 4 MG: 2 TABLET ORAL at 08:56

## 2023-11-03 RX ADMIN — METHOCARBAMOL 250 MG: 750 TABLET ORAL at 23:49

## 2023-11-03 RX ADMIN — METHOCARBAMOL 250 MG: 750 TABLET ORAL at 12:29

## 2023-11-03 RX ADMIN — ENOXAPARIN SODIUM 30 MG: 30 INJECTION SUBCUTANEOUS at 05:01

## 2023-11-03 RX ADMIN — UMECLIDINIUM BROMIDE AND VILANTEROL TRIFENATATE 1 PUFF: 62.5; 25 POWDER RESPIRATORY (INHALATION) at 08:56

## 2023-11-03 RX ADMIN — METHOCARBAMOL 250 MG: 750 TABLET ORAL at 05:01

## 2023-11-03 RX ADMIN — HYDROMORPHONE HYDROCHLORIDE 4 MG: 2 TABLET ORAL at 13:28

## 2023-11-03 RX ADMIN — LATANOPROST 1 DROP: 50 SOLUTION OPHTHALMIC at 21:16

## 2023-11-03 RX ADMIN — HYDROMORPHONE HYDROCHLORIDE 4 MG: 2 TABLET ORAL at 05:01

## 2023-11-03 RX ADMIN — CLONAZEPAM 0.5 MG: 0.5 TABLET ORAL at 21:16

## 2023-11-03 RX ADMIN — METHOCARBAMOL 250 MG: 750 TABLET ORAL at 17:29

## 2023-11-03 RX ADMIN — ENOXAPARIN SODIUM 30 MG: 30 INJECTION SUBCUTANEOUS at 17:28

## 2023-11-03 RX ADMIN — ATORVASTATIN CALCIUM 80 MG: 80 TABLET, FILM COATED ORAL at 17:28

## 2023-11-03 RX ADMIN — HYDROMORPHONE HYDROCHLORIDE 4 MG: 2 TABLET ORAL at 17:29

## 2023-11-03 RX ADMIN — ACETAMINOPHEN 975 MG: 325 TABLET, FILM COATED ORAL at 05:01

## 2023-11-03 RX ADMIN — ACETAMINOPHEN 975 MG: 325 TABLET, FILM COATED ORAL at 13:25

## 2023-11-03 RX ADMIN — PREGABALIN 50 MG: 50 CAPSULE ORAL at 12:29

## 2023-11-03 RX ADMIN — ACETAMINOPHEN 975 MG: 325 TABLET, FILM COATED ORAL at 21:17

## 2023-11-03 RX ADMIN — HYDROMORPHONE HYDROCHLORIDE 4 MG: 2 TABLET ORAL at 21:17

## 2023-11-03 RX ADMIN — NICOTINE 21 MG: 21 PATCH, EXTENDED RELEASE TRANSDERMAL at 08:56

## 2023-11-03 NOTE — PLAN OF CARE
Problem: OCCUPATIONAL THERAPY ADULT  Goal: Performs self-care activities at highest level of function for planned discharge setting. See evaluation for individualized goals. Description: Treatment Interventions: ADL retraining, Functional transfer training, UE strengthening/ROM, Endurance training, Cognitive reorientation, Patient/family training, Equipment evaluation/education, Compensatory technique education, Fine motor coordination activities, Continued evaluation, Activityengagement, Energy conservation          See flowsheet documentation for full assessment, interventions and recommendations. Outcome: Progressing  Note: Limitation: Decreased ADL status, Decreased UE ROM, Decreased UE strength, Decreased Safe judgement during ADL, Decreased cognition, Decreased endurance, Decreased sensation, Decreased fine motor control, Decreased high-level ADLs, Decreased self-care trans  Prognosis: Fair  Assessment: pt participated in am ot sessionand was seen focusing on adl tasks, bed mobility and r ue arom of uninvolved joints. pt tolerated session well. balance was fair and activity tolerence was fair +. she demonstrated improvements in toileting and adls. pt was noted to have minimal arom in r fingers /wrist. she needed min cues to place onto pillows when in chair.   pt needs to increase independence with adls and iadls therefore at this time recommend in pt rehab at d/c.     Rehab Resource Intensity Level, OT: II (Moderate Resource Intensity)     April A Storm

## 2023-11-03 NOTE — PROGRESS NOTES
[address] [facility]  Progress Note  Name: Gayle Kaur  MRN: [id number]  Unit/Bed#: [facility] 989-01 I Date of Admission: 10/27/2023   Date of Service: 11/3/2023 I Hospital Day: 7    Assessment/Plan   * Compartment syndrome [facility])  Assessment & Plan  Patient presented with Crush injury to RUE  - Initial CK 5942 increased to 13,683  - 10/27 RUE Fasciotomy due to compartment syndrome with application of vac dressing  - 10/29 washout and partial closure   - 10/31 vac change  - 11/2 vac change  - continue to monitor neurovascular status  - will likely need graft for wound closure    Fall  Assessment & Plan  Patient had unwitnessed fall.  + Lightheadedness, CP  - Syncopal workup: EKG Unremarkable, Troponin's Negative, Echo: unremarkable  - PT/OT evaluation and treatment as indicated    Chronic pain  Assessment & Plan  Patient with chronic patient Fibromyalgia, chronic LLB pain, Chronic pain syndrome, peripheral neuropathy  - APS consult appreciated    - S/p right infraclavicular block with Exparel on 10/27    - weaned off ketamine 11/2   - continue to wean pain meds  - continue multimodal pain regimen    Type 2 diabetes mellitus ([address])  Assessment & Plan  Lab Results   Component Value Date    HGBA1C 5.8 (H) 01/19/2023       No results for input(s): "POCGLU" in the last 72 hours. Blood Sugar Average: Last 72 hrs:      Diet controlled  Monitor BG utilize S/S as needed    Hypothyroid  Assessment & Plan  On levothyroxine will continue in hospital             Bowel Regimen: miralax, dulcolax  VTE Prophylaxis:Enoxaparin (Lovenox)     Disposition: continue inpatient    Subjective   Chief Complaint: pain    Subjective: This morning she reports doing well. She continues to have some pain in the arm, however it feels a bit better today and has had gradual improvements each day.        Objective   Vitals:   Temp:  [97.5 °F (36.4 °C)-98.3 °F (36.8 °C)] 97.5 °F (36.4 °C)  HR:  [71-76] 72  Resp:  [16-18] 16  BP: (111-124)/(78-84) 124/84    I/O         11/01 0701  11/02 0700 11/02 0701  11/03 0700 11/03 0701  11/04 0700    P. O. 640 90     I.V. (mL/kg)       Total Intake(mL/kg) 640 (7.9) 90 (1.1)     Urine (mL/kg/hr) 0 (0)      Drains 150 60     Stool       Total Output 150 60     Net +490 +30            Unmeasured Urine Occurrence 2 x               Physical Exam:   GENERAL APPEARANCE: no acute distress  NEURO: awake and alert  HEENT: normocephalic  CV: RRR  LUNGS: CTAB  GI: nondistended, nontender  MSK: RUE wound vac in place, compartments soft, fingers flexion/extension 5/5, light touch sensation subjectively decreased in 5th finger  SKIN: warm and dry    Invasive Devices       Peripheral Intravenous Line  Duration             Peripheral IV 11/02/23 Dorsal (posterior); Left Forearm 1 day                          Lab Results: Results: I have personally reviewed all pertinent laboratory/tests results  Imaging: I have personally reviewed pertinent reports.      Other Studies: N/A

## 2023-11-03 NOTE — PLAN OF CARE
Problem: HEMATOLOGIC - ADULT  Goal: Maintains hematologic stability  Description: INTERVENTIONS  - Assess for signs and symptoms of bleeding or hemorrhage  - Monitor labs  - Administer supportive blood products/factors as ordered and appropriate  Outcome: Progressing     Problem: MUSCULOSKELETAL - ADULT  Goal: Maintain or return mobility to safest level of function  Description: INTERVENTIONS:  - Assess patient's ability to carry out ADLs; assess patient's baseline for ADL function and identify physical deficits which impact ability to perform ADLs (bathing, care of mouth/teeth, toileting, grooming, dressing, etc.)  - Assess/evaluate cause of self-care deficits   - Assess range of motion  - Assess patient's mobility  - Assess patient's need for assistive devices and provide as appropriate  - Encourage maximum independence but intervene and supervise when necessary  - Involve family in performance of ADLs  - Assess for home care needs following discharge   - Consider OT consult to assist with ADL evaluation and planning for discharge  - Provide patient education as appropriate  Outcome: Progressing  Goal: Maintain proper alignment of affected body part  Description: INTERVENTIONS:  - Support, maintain and protect limb and body alignment  - Provide patient/ family with appropriate education  Outcome: Progressing     Problem: PAIN - ADULT  Goal: Verbalizes/displays adequate comfort level or baseline comfort level  Description: Interventions:  - Encourage patient to monitor pain and request assistance  - Assess pain using appropriate pain scale  - Administer analgesics based on type and severity of pain and evaluate response  - Implement non-pharmacological measures as appropriate and evaluate response  - Consider cultural and social influences on pain and pain management  - Notify physician/advanced practitioner if interventions unsuccessful or patient reports new pain  Outcome: Progressing     Problem: INFECTION - ADULT  Goal: Absence or prevention of progression during hospitalization  Description: INTERVENTIONS:  - Assess and monitor for signs and symptoms of infection  - Monitor lab/diagnostic results  - Monitor all insertion sites, i.e. indwelling lines, tubes, and drains  - Monitor endotracheal if appropriate and nasal secretions for changes in amount and color  - [place] appropriate cooling/warming therapies per order  - Administer medications as ordered  - Instruct and encourage patient and family to use good hand hygiene technique  - Identify and instruct in appropriate isolation precautions for identified infection/condition  Outcome: Progressing     Problem: SAFETY ADULT  Goal: Patient will remain free of falls  Description: INTERVENTIONS:  - Educate patient/family on patient safety including physical limitations  - Instruct patient to call for assistance with activity   - Consult OT/PT to assist with strengthening/mobility   - Keep Call bell within reach  - Keep bed low and locked with side rails adjusted as appropriate  - Keep care items and personal belongings within reach  - Initiate and maintain comfort rounds  - Make Fall Risk Sign visible to staff  - Offer Toileting every 2 Hours, in advance of need  - Initiate/Maintain alarm  - Obtain necessary fall risk management equipment:   - Apply yellow socks and bracelet for high fall risk patients  - Consider moving patient to room near nurses station  Outcome: Progressing  Goal: Maintain or return to baseline ADL function  Description: INTERVENTIONS:  -  Assess patient's ability to carry out ADLs; assess patient's baseline for ADL function and identify physical deficits which impact ability to perform ADLs (bathing, care of mouth/teeth, toileting, grooming, dressing, etc.)  - Assess/evaluate cause of self-care deficits   - Assess range of motion  - Assess patient's mobility; develop plan if impaired  - Assess patient's need for assistive devices and provide as appropriate  - Encourage maximum independence but intervene and supervise when necessary  - Involve family in performance of ADLs  - Assess for home care needs following discharge   - Consider OT consult to assist with ADL evaluation and planning for discharge  - Provide patient education as appropriate  Outcome: Progressing  Goal: Maintains/Returns to pre admission functional level  Description: INTERVENTIONS:  - Perform BMAT or MOVE assessment daily.   - Set and communicate daily mobility goal to care team and patient/family/caregiver. - Collaborate with rehabilitation services on mobility goals if consulted  - Perform Range of Motion 3 times a day. - Reposition patient every 2 hours. - Dangle patient 3 times a day  - Stand patient 3 times a day  - Ambulate patient 3 times a day  - Out of bed to chair 3 times a day   - Out of bed for meals 3 times a day  - Out of bed for toileting  - Record patient progress and toleration of activity level   Outcome: Progressing     Problem: DISCHARGE PLANNING  Goal: Discharge to home or other facility with appropriate resources  Description: INTERVENTIONS:  - Identify barriers to discharge w/patient and caregiver  - Arrange for needed discharge resources and transportation as appropriate  - Identify discharge learning needs (meds, wound care, etc.)  - Arrange for interpretive services to assist at discharge as needed  - Refer to Case Management Department for coordinating discharge planning if the patient needs post-hospital services based on physician/advanced practitioner order or complex needs related to functional status, cognitive ability, or social support system  Outcome: Progressing     Problem: Knowledge Deficit  Goal: Patient/family/caregiver demonstrates understanding of disease process, treatment plan, medications, and discharge instructions  Description: Complete learning assessment and assess knowledge base.   Interventions:  - Provide teaching at level of understanding  - Provide teaching via preferred learning methods  Outcome: Progressing     Problem: MOBILITY - ADULT  Goal: Maintain or return to baseline ADL function  Description: INTERVENTIONS:  -  Assess patient's ability to carry out ADLs; assess patient's baseline for ADL function and identify physical deficits which impact ability to perform ADLs (bathing, care of mouth/teeth, toileting, grooming, dressing, etc.)  - Assess/evaluate cause of self-care deficits   - Assess range of motion  - Assess patient's mobility; develop plan if impaired  - Assess patient's need for assistive devices and provide as appropriate  - Encourage maximum independence but intervene and supervise when necessary  - Involve family in performance of ADLs  - Assess for home care needs following discharge   - Consider OT consult to assist with ADL evaluation and planning for discharge  - Provide patient education as appropriate  Outcome: Progressing  Goal: Maintains/Returns to pre admission functional level  Description: INTERVENTIONS:  - Perform BMAT or MOVE assessment daily.   - Set and communicate daily mobility goal to care team and patient/family/caregiver. - Collaborate with rehabilitation services on mobility goals if consulted  - Perform Range of Motion 3 times a day. - Reposition patient every 2 hours.   - Dangle patient 3 times a day  - Stand patient 3 times a day  - Ambulate patient 3 times a day  - Out of bed to chair 3 times a day   - Out of bed for meals 3 times a day  - Out of bed for toileting  - Record patient progress and toleration of activity level   Outcome: Progressing     Problem: Prexisting or High Potential for Compromised Skin Integrity  Goal: Skin integrity is maintained or improved  Description: INTERVENTIONS:  - Identify patients at risk for skin breakdown  - Assess and monitor skin integrity  - Assess and monitor nutrition and hydration status  - Monitor labs   - Assess for incontinence   - Turn and reposition patient  - Assist with mobility/ambulation  - Relieve pressure over bony prominences  - Avoid friction and shearing  - Provide appropriate hygiene as needed including keeping skin clean and dry  - Evaluate need for skin moisturizer/barrier cream  - Collaborate with interdisciplinary team   - Patient/family teaching  - Consider wound care consult   Outcome: Progressing

## 2023-11-03 NOTE — PHYSICAL THERAPY NOTE
Physical Therapy Progress Note     11/03/23 1020   PT Last Visit   PT Visit Date 11/03/23   Note Type   Note Type Treatment   Pain Assessment   Pain Assessment Tool FLACC   Pain Rating: FLACC (Rest) - Face 1   Pain Rating: FLACC (Rest) - Legs 0   Pain Rating: FLACC (Rest) - Activity 1   Pain Rating: FLACC (Rest) - Cry 1   Pain Rating: FLACC (Rest) - Consolability 0   Score: FLACC (Rest) 3   Pain Rating: FLACC (Activity) - Face 2   Pain Rating: FLACC (Activity) - Legs 1   Pain Rating: FLACC (Activity) - Activity 1   Pain Rating: FLACC (Activity) - Cry 1   Pain Rating: FLACC (Activity) - Consolability 1   Score: FLACC (Activity) 6   Restrictions/Precautions   RUE Weight Bearing Per Order WBAT   Other Precautions WBS; Fall Risk;Pain;Multiple lines  (wound vac RUE)   Subjective   Subjective pt encountered supine in bed, pleasant and agreeable to treatment. Reports controlled pain today. NO change in status with activity. Bed Mobility   Supine to Sit 5  Supervision   Additional items Assist x 1;HOB elevated; Increased time required   Transfers   Sit to Stand 5  Supervision   Additional items Assist x 1; Armrests; Increased time required   Stand to Sit 5  Supervision   Additional items Assist x 1; Armrests; Increased time required   Toilet transfer 5  Supervision   Additional items Assist x 1; Increased time required   Ambulation/Elevation   Gait pattern Short stride; Excessively slow; Shuffling;Decreased foot clearance;Narrow RADHA; Improper Weight shift   Gait Assistance 5  Supervision  (min A initially)   Additional items Assist x 1   Assistive Device SPC   Distance 10', 30', 120', 80'   Balance   Static Sitting Fair +   Static Standing Fair   Ambulatory Fair -   Activity Tolerance   Activity Tolerance Patient tolerated treatment well   Nurse Made Aware yes   Assessment   Prognosis Fair   Problem List Decreased strength;Decreased range of motion;Decreased endurance; Impaired balance;Decreased skin integrity;Pain   Assessment pt demonstrated improved functional endurance & mobility today, performing all transfers with decreased assist, then ambulating near community distances prior to taking brief standing rests in hallway. She did so with baseline use of SPC without incident, demonstrating good awareness of RUE position to avoid obstacles. Anticipate she will continue to ambulate further with daily activity outside of PT & ongoing good management of pain as she demosntrated today. Upon return to room, pt encouraged to participate in mobiilty with staff & address deficits to promote quicker recovery. At this time, PT recommendation for services remains unchagned, but she may be able to progress, espeically given accessible home, availability of home health aides, and family who lives in same building that can provide increased assist prn at d/c. Will likely defer to OT for ADL management pending LOS for furhter medical management of RUE. Goals   Patient Goals to go home & be with her family   LTG Expiration Date 11/11/23   PT Treatment Day 2   Plan   Treatment/Interventions Functional transfer training;LE strengthening/ROM; Elevations; Therapeutic exercise; Endurance training;Patient/family training;Equipment eval/education; Bed mobility;Gait training   Progress Progressing toward goals   PT Frequency 3-5x/wk   Discharge Recommendation   Rehab Resource Intensity Level, PT II (Moderate Resource Intensity)   Equipment Recommended Cane   AM-PAC Basic Mobility Inpatient   Turning in Flat Bed Without Bedrails 3   Lying on Back to Sitting on Edge of Flat Bed Without Bedrails 3   Moving Bed to Chair 3   Standing Up From Chair Using Arms 3   Walk in Room 3   Climb 3-5 Stairs With Railing 3   Basic Mobility Inpatient Raw Score 18   Basic Mobility Standardized Score 41.05   Highest Level Of Mobility   JH-HLM Goal 6: Walk 10 steps or more   JH-HLM Achieved 8: Walk 250 feet ot more       Eusebio Paulino PTA    An [facility] Basic Mobility Raw Score less than 17 suggests pt would benefit from post acute rehab. Please also refer to the recommendation of the Physical Therapist for safe discharge planning.

## 2023-11-03 NOTE — ASSESSMENT & PLAN NOTE
Patient presented with Crush injury to RUE  - Initial CK 5942 increased to 13,683  - 10/27 RUE Fasciotomy due to compartment syndrome with application of vac dressing  - 10/29 washout and partial closure   - 10/31 vac change  - 11/2 vac change  - continue to monitor neurovascular status  - will likely need graft for wound closure

## 2023-11-03 NOTE — ANESTHESIA PREPROCEDURE EVALUATION
Procedure:  DEBRIDEMENT WOUND AND DRESSING CHANGE [facility]) (Right: Arm)    Relevant Problems   CARDIO   (+) Hypertension   (+) Hypertriglyceridemia   (+) Migraine   (+) Other hyperlipidemia      ENDO   (+) Hypothyroid   (+) Type 2 diabetes mellitus (HCC)      GI/HEPATIC   (+) Gastroesophageal reflux disease   (+) Ileus (HCC)      MUSCULOSKELETAL   (+) Cervical spondylosis   (+) Fibromyalgia, primary   (+) Lumbar degenerative disc disease   (+) Lumbar spondylosis   (+) Mid back pain   (+) Sacroiliitis (HCC)      NEURO/PSYCH   (+) Anxiety   (+) Chronic pain   (+) Chronic pain syndrome   (+) Depression   (+) Diabetic polyneuropathy associated with type 2 diabetes mellitus ([facility])   (+) Fibromyalgia, primary   (+) Migraine      PULMONARY   (+) COPD (chronic obstructive pulmonary disease) ([facility])   (+) Shortness of breath      Right forearm CS s/p right forearm fasciotomy with wound vac placement on 10/27  S/p right infraclavicular block with Exparel on 10/27      Physical Exam    Airway    Mallampati score: II  TM Distance: >3 FB  Neck ROM: full     Dental   No notable dental hx     Cardiovascular  Rate: normal    Pulmonary   Breath sounds clear to auscultation    Other Findings      eft Ventricle Left ventricular cavity size is normal. Wall thickness is normal. The left ventricular ejection fraction is 60%. Systolic function is normal.  Wall motion is normal. Diastolic function is normal.   Right Ventricle Right ventricular cavity size is normal. Systolic function is normal. Wall thickness is normal.   Left Atrium The atrium is normal in size. Right Atrium The atrium is normal in size. Aortic Valve The aortic valve is trileaflet. The leaflets are not thickened. The leaflets are not calcified. The leaflets exhibit normal mobility. There is no evidence of regurgitation. The aortic valve has no significant stenosis. Mitral Valve Mitral valve structure is normal.  There is trace regurgitation.  There is no evidence of stenosis. Tricuspid Valve Tricuspid valve structure is normal. There is trace regurgitation. There is no evidence of stenosis. Pulmonic Valve Pulmonic valve structure is normal. There is trace regurgitation. There is no evidence of stenosis. Anesthesia Plan  ASA Score- 3     Anesthesia Type- general with ASA Monitors. Additional Monitors:     Airway Plan: LMA. Plan Factors-Exercise tolerance (METS): >4 METS. Chart reviewed. EKG reviewed. Imaging results reviewed. Existing labs reviewed. Patient summary reviewed. Patient is not a current smoker. Patient not instructed to abstain from smoking on day of procedure. Patient did not smoke on day of surgery. Obstructive sleep apnea risk education given perioperatively. Induction-     Postoperative Plan-     Informed Consent- Anesthetic plan and risks discussed with patient. I personally reviewed this patient with the CRNA. Discussed and agreed on the Anesthesia Plan with the CRNA. Gabe Estrada

## 2023-11-03 NOTE — PLAN OF CARE
Problem: MUSCULOSKELETAL - ADULT  Goal: Maintain proper alignment of affected body part  Description: INTERVENTIONS:  - Support, maintain and protect limb and body alignment  - Provide patient/ family with appropriate education  Outcome: Progressing     Problem: INFECTION - ADULT  Goal: Absence or prevention of progression during hospitalization  Description: INTERVENTIONS:  - Assess and monitor for signs and symptoms of infection  - Monitor lab/diagnostic results  - Monitor all insertion sites, i.e. indwelling lines, tubes, and drains  - Monitor endotracheal if appropriate and nasal secretions for changes in amount and color  - [place] appropriate cooling/warming therapies per order  - Administer medications as ordered  - Instruct and encourage patient and family to use good hand hygiene technique  - Identify and instruct in appropriate isolation precautions for identified infection/condition  Outcome: Progressing     Problem: DISCHARGE PLANNING  Goal: Discharge to home or other facility with appropriate resources  Description: INTERVENTIONS:  - Identify barriers to discharge w/patient and caregiver  - Arrange for needed discharge resources and transportation as appropriate  - Identify discharge learning needs (meds, wound care, etc.)  - Arrange for interpretive services to assist at discharge as needed  - Refer to Case Management Department for coordinating discharge planning if the patient needs post-hospital services based on physician/advanced practitioner order or complex needs related to functional status, cognitive ability, or social support system  Outcome: Progressing     Problem: MOBILITY - ADULT  Goal: Maintain or return to baseline ADL function  Description: INTERVENTIONS:  -  Assess patient's ability to carry out ADLs; assess patient's baseline for ADL function and identify physical deficits which impact ability to perform ADLs (bathing, care of mouth/teeth, toileting, grooming, dressing, etc.)  - Assess/evaluate cause of self-care deficits   - Assess range of motion  - Assess patient's mobility; develop plan if impaired  - Assess patient's need for assistive devices and provide as appropriate  - Encourage maximum independence but intervene and supervise when necessary  - Involve family in performance of ADLs  - Assess for home care needs following discharge   - Consider OT consult to assist with ADL evaluation and planning for discharge  - Provide patient education as appropriate  Outcome: Progressing     Problem: Prexisting or High Potential for Compromised Skin Integrity  Goal: Skin integrity is maintained or improved  Description: INTERVENTIONS:  - Identify patients at risk for skin breakdown  - Assess and monitor skin integrity  - Assess and monitor nutrition and hydration status  - Monitor labs   - Assess for incontinence   - Turn and reposition patient  - Assist with mobility/ambulation  - Relieve pressure over bony prominences  - Avoid friction and shearing  - Provide appropriate hygiene as needed including keeping skin clean and dry  - Evaluate need for skin moisturizer/barrier cream  - Collaborate with interdisciplinary team   - Patient/family teaching  - Consider wound care consult   Outcome: Progressing

## 2023-11-03 NOTE — PLAN OF CARE
Problem: PHYSICAL THERAPY ADULT  Goal: Performs mobility at highest level of function for planned discharge setting. See evaluation for individualized goals. Description: Treatment/Interventions: Elevations, Functional transfer training, LE strengthening/ROM, Therapeutic exercise, Endurance training, Equipment eval/education, Bed mobility, Gait training, Spoke to nursing, OT  Equipment Recommended: Other (Comment) (TBD)       See flowsheet documentation for full assessment, interventions and recommendations. Outcome: Progressing  Note: Prognosis: Fair  Problem List: Decreased strength, Decreased range of motion, Decreased endurance, Impaired balance, Decreased skin integrity, Pain  Assessment: pt demonstrated improved functional endurance & mobility today, performing all transfers with decreased assist, then ambulating near community distances prior to taking brief standing rests in hallway. She did so with baseline use of SPC without incident, demonstrating good awareness of RUE position to avoid obstacles. Anticipate she will continue to ambulate further with daily activity outside of PT & ongoing good management of pain as she demosntrated today. Upon return to room, pt encouraged to participate in mobiilty with staff & address deficits to promote quicker recovery. At this time, PT recommendation for services remains unchagned, but she may be able to progress, espeically given accessible home, availability of home health aides, and family who lives in same building that can provide increased assist prn at d/c. Will likely defer to OT for ADL management pending LOS for furhter medical management of RUE. Barriers to Discharge: Inaccessible home environment, Decreased caregiver support     Rehab Resource Intensity Level, PT: II (Moderate Resource Intensity)    See flowsheet documentation for full assessment.

## 2023-11-03 NOTE — ASSESSMENT & PLAN NOTE
Patient with chronic patient Fibromyalgia, chronic LLB pain, Chronic pain syndrome, peripheral neuropathy  - APS consult appreciated    - S/p right infraclavicular block with Exparel on 10/27    - weaned off ketamine 11/2   - continue to wean pain meds  - continue multimodal pain regimen

## 2023-11-03 NOTE — CASE MANAGEMENT
Case Management Discharge Planning Note    Patient name Ahmet Pemberton  Location [address] 622/[facility] [id number] MRN [id number]  : 1960 Date 11/3/2023       Current Admission Date: 10/27/2023  Current Admission Diagnosis:Compartment syndrome [facility])   Patient Active Problem List    Diagnosis Date Noted    Diarrhea 10/31/2023    Fall 10/27/2023    Compartment syndrome ([address]) 10/27/2023    Type 2 diabetes mellitus ([address]) 10/27/2023    Screening for colon cancer 2023    Gastroesophageal reflux disease 2023    Common bile duct dilation 2023    S/P insertion of spinal cord stimulator 2023    Pulmonary nodules/lesions, multiple 2023    Diabetic polyneuropathy associated with type 2 diabetes mellitus ([address]) 2023    Coronary artery calcification seen on CAT scan 2023    Cervical spondylosis 2023    Shortness of breath 2023    Irritable bowel syndrome with both constipation and diarrhea 2022    Chronic pain 2022    Sacroiliitis ([address]) 2022    Carotid artery aneurysm (HCC) 2022    Lumbar degenerative disc disease 2022    Lumbar radiculopathy 2022    Lumbar spondylosis 2022    Cervical disc disorder with radiculopathy of mid-cervical region 2022    Mid back pain 2021    Cervical radiculopathy 2021    Neck pain 2021    Syncope and collapse 2021    Migraine     Bipolar 1 disorder (HCC)     Depression     Chronic pain syndrome     Anxiety     Fibromyalgia, primary     Constipation 2021    Ileus ([address]) 2021    COPD (chronic obstructive pulmonary disease) ([address])     Hypertension     Hypophosphatemia 2021    Gastritis, acute 2021    Hypotension 2021    Tobacco use 2021    Stroke-like symptoms 06/15/2020    Nausea 06/15/2020    Left chest pressure 06/15/2020    TMJ syndrome 2020    Other hyperlipidemia 2019    Dizziness 2019    Hypothyroid 2019    Brain aneurysm 07/19/2019    Hypokalemia 07/19/2019    Hypertriglyceridemia 07/19/2019    Low HDL (under 40) 07/19/2019    Elevated TSH 07/19/2019    Tobacco abuse 07/19/2019    Intractable abdominal pain 07/19/2019      LOS (days): 7  Geometric Mean LOS (GMLOS) (days): 1.90  Days to GMLOS:-5.6     OBJECTIVE:  Risk of Unplanned Readmission Score: 25.4         Current admission status: Inpatient   Preferred Pharmacy:   [address] [place] [address] [address]  Phone: [phone] Fax: [phone]    Primary Care Provider: Mario Moreno MD    Primary Insurance: [facility] DUAL COM[facility]TE [facility]  Secondary Insurance: [facility]    DISCHARGE DETAILS:    CM spoke at length to [facility] liaison regarding pt's d/c plan and desire to d/c to their facility  Ultimately, [facility] doesn't feel comfortable accepting the pt to their facility until their is definitive decision on the pt's arm, as well as any surgeries or grafts completed. [facility] prefers not to bring the pt in, if the pt may get a graft 5 days later. They're willing to follow the pt but won't give an answer until that is resolved.

## 2023-11-03 NOTE — OCCUPATIONAL THERAPY NOTE
Occupational Therapy Treatment Note:      11/03/23 1105   OT Last Visit   OT Visit Date 11/03/23   Note Type   Note Type Treatment   Pain Assessment   Pain Assessment Tool FLACC   Pain Score 3   Pain Rating: FLACC (Rest) - Face 0   Pain Rating: FLACC (Rest) - Legs 0   Pain Rating: FLACC (Rest) - Activity 0   Pain Rating: FLACC (Rest) - Cry 0   Pain Rating: FLACC (Rest) - Consolability 0   Score: FLACC (Rest) 0   Pain Rating: FLACC (Activity) - Face 0   Pain Rating: FLACC (Activity) - Legs 0   Pain Rating: FLACC (Activity) - Activity 1   Pain Rating: FLACC (Activity) - Cry 1   Pain Rating: FLACC (Activity) - Consolability 0   Score: FLACC (Activity) 2   Restrictions/Precautions   RUE Weight Bearing Per Order WBAT   Other Precautions Chair Alarm; Bed Alarm; Fall Risk;Multiple lines;WBS   ADL   Eating Assistance 5  Supervision/Setup   Grooming Assistance 5  Supervision/Setup   UB Bathing Assistance 4  Minimal Assistance   LB Bathing Assistance 4  Minimal Assistance   UB Dressing Assistance 4  Minimal Assistance   LB Dressing Assistance 3  Moderate Assistance   Toileting Assistance  5  Supervision/Setup   Functional Standing Tolerance   Time fair plus balance during toileting   Bed Mobility   Supine to Sit 5  Supervision   Sit to Supine 5  Supervision   Transfers   Sit to Stand 4  Minimal assistance   Stand to Sit 5  Supervision   Stand pivot 4  Minimal assistance   Additional items Assist x 1   Toilet transfer 5  Supervision   Functional Mobility   Functional Mobility 4  Minimal assistance   Additional Comments ax1   Additional items   (spoon)   Cognition   Overall Cognitive Status WFL   Activity Tolerance   Activity Tolerance Patient tolerated treatment well   Assessment   Assessment pt participated in am ot sessionand was seen focusing on adl tasks, bed mobility and r ue arom of uninvolved joints.  pt tolerated session well. balance was fair and activity tolerence was fair +. she demonstrated improvements in toileting and adls. pt was noted to have minimal arom in r fingers /wrist. she needed min cues to place onto pillows when in chair. pt needs to increase independence with adls and iadls therefore at this time recommend in pt rehab at d/c.   Plan   Treatment Interventions ADL retraining;Functional transfer training; Endurance training; Activityengagement;Equipment evaluation/education;Patient/family training   Goal Expiration Date 11/11/23   OT Treatment Day 2   OT Frequency 2-3x/wk   Discharge Recommendation   Rehab Resource Intensity Level, OT II (Moderate Resource Intensity)   AM-PAC Daily Activity Inpatient   Lower Body Dressing 3   Bathing 3   Toileting 3   Upper Body Dressing 3   Grooming 3   Eating 4   Daily Activity Raw Score 19   Daily Activity Standardized Score (Calc for Raw Score >=11) 40.22   AM-PAC Applied Cognition Inpatient   Following a Speech/Presentation 4   Understanding Ordinary Conversation 4   Taking Medications 4   Remembering Where Things Are Placed or Put Away 4   Remembering List of 4-5 Errands 3   Taking Care of Complicated Tasks 3   Applied Cognition Raw Score 22   Applied Cognition Standardized Score 47.83     April A Storm

## 2023-11-03 NOTE — PROGRESS NOTES
Progress Note - Acute Pain Service    Elizabeth Woodall 65 y.o. female MRN: [id number]  Unit/Bed#: [facility] 622-01 Encounter: [id number]      Nina See is a 65 y.o. female with right upper extremity fasciotomy    Chronic pain  Assessment & Plan  Hx of cervical-lumbar spondylosis/DJD  Inactive spinal cord stimulator  Recent left cervical MBB with [facility]  Home pain regimen: Norco 10-325mg q8hr PRN (takes 30mg. day), Lyrica 100/150mg QID (150mg at bedtime), Tizanidine 4mg q6hr PRN for spasms          * Compartment syndrome [facility])  Assessment & Plan  Right forearm CS s/p right forearm fasciotomy with wound vac placement on 10/27  S/p right infraclavicular block with Exparel on 10/27  Will hold off on further regional analgesic techniques given nerve deficits of right forearm and monitoring for return of nerve function  MMA as below  Dilaudid 2 mg p.o. every 4 hours as needed moderate pain or 4 mg p.o. every 4 hours as needed severe pain. IV Dilaudid changed to 0.5 mg IV daily as needed for dressing changes only. Continue Lidoderm patch  Continue PO Tylenol 975mg q8hr princess  Robaxin 250 mg p.o. every 6 hours scheduled. Restart Lyrica but at reduced dose of 50 mg p.o. 3 times daily. Will increase to home dose of 100 mg 3 times daily and 150 mg at bedtime in 2 to 3 days provided renal function remains normal.  (Estimated Creatinine Clearance: 134.9 mL/min (A) (by C-G formula based on SCr of 0.45 mg/dL (L)). Klonopin 0.5 mg PO QHS ordered since 10/27. APS will continue to follow. Please contact Acute Pain Service - via Scurri from [phone] with additional questions or concerns. See Zidishachirag or Janes for additional contacts and after hours information. Pain History  Current pain location(s):  Pain Score: 9  Pain Location/Orientation: Orientation: Right, Location: Arm  Pain Scale: Pain Assessment Tool: 0-10  24 hour history: Right upper extremity pain continues to improve.   Patient may require STSG for definitive closure. Opioid requirement previous 24 hours: Dilaudid 4 mg p.o. x5, Dilaudid 0.5 mg IV x1. Meds/Allergies   all current active meds have been reviewed, current meds:   Current Facility-Administered Medications   Medication Dose Route Frequency    acetaminophen (TYLENOL) tablet 975 mg  975 mg Oral Q8H [address]    albuterol (PROVENTIL HFA,VENTOLIN HFA) inhaler 2 puff  2 puff Inhalation Q4H PRN    atorvastatin (LIPITOR) tablet 80 mg  80 mg Oral QPM    bisacodyl (DULCOLAX) rectal suppository 10 mg  10 mg Rectal Daily PRN    clonazePAM (KlonoPIN) tablet 0.5 mg  0.5 mg Oral HS    enoxaparin (LOVENOX) subcutaneous injection 30 mg  30 mg Subcutaneous Q12H    HYDROmorphone (DILAUDID) injection 0.5 mg  0.5 mg Intravenous Daily PRN    HYDROmorphone (DILAUDID) tablet 2 mg  2 mg Oral Q4H PRN    Or    HYDROmorphone (DILAUDID) tablet 4 mg  4 mg Oral Q4H PRN    latanoprost (XALATAN) 0.005 % ophthalmic solution 1 drop  1 drop Both Eyes HS    lidocaine (LIDODERM) 5 % patch 1 patch  1 patch Topical Daily    methocarbamol (ROBAXIN) tablet 250 mg  250 mg Oral Q6H SNOW    naloxone (NARCAN) 0.04 mg/mL syringe 0.04 mg  0.04 mg Intravenous Q1MIN PRN    nicotine (NICODERM CQ) 21 mg/24 hr TD 24 hr patch 21 mg  21 mg Transdermal Daily    ondansetron (ZOFRAN) injection 4 mg  4 mg Intravenous Q4H PRN    polyethylene glycol (MIRALAX) packet 17 g  17 g Oral Daily PRN    pregabalin (LYRICA) capsule 50 mg  50 mg Oral TID    umeclidinium-vilanterol 62.5-25 mcg/actuation inhaler 1 puff  1 puff Inhalation Daily   , and PTA meds:   Prior to Admission Medications   Prescriptions Last Dose Informant Patient Reported? Taking?    Alcohol Swabs (Alcohol Pads) 70 % PADS   Yes No   Sig: USE TO TEST BLOOD GLUCOSE 2-3 TIMES DAILY   Buprenorphine HCl (Belbuca) 300 MCG FILM  Self Yes No   Sig: Apply 300 mcg to cheek in the morning   Butalbital-APAP-Caffeine (FIORICET PO)   Yes No   Sig: Take by mouth as needed   Fluticasone-Salmeterol (Advair) 500-50 mcg/dose inhaler Not Taking  Yes No   Patient not taking: Reported on 10/27/2023   Galcanezumab-gnlm (Emgality) 120 MG/ML SOAJ  Self Yes No   Sig: Inject 120 mg under the skin every 30 (thirty) days Last inj 1/19/23   HYDROcodone-acetaminophen (NORCO)  mg per tablet   Yes No   Sig: every 8 (eight) hours as needed   Melatonin 10 MG CAPS  Self Yes No   Sig: Take 1 tablet by mouth daily at bedtime as needed   OneTouch Ultra test strip   Yes No   Sig: USE TO TEST BLOOD GLUCOSE 2-3 TIMES DAILY   SUMAtriptan (IMITREX) 100 mg tablet Not Taking  Yes No   Patient not taking: Reported on 10/27/2023   True Comfort Safety Lancets MISC   Yes No   Sig: USE TO TEST BLOOD GLUCOSE 2-3 TIMES DAILY   Zoster Vac Recomb Adjuvanted (Shingrix) 50 MCG/0.5ML SUSR   Yes No   Sig: inject 0.5 milliliter intramuscularly   albuterol (PROVENTIL HFA,VENTOLIN HFA) 90 mcg/act inhaler  Self Yes No   Sig: Inhale 2 puffs every 6 (six) hours as needed   ammonium lactate (LAC-HYDRIN) 12 % lotion   Yes No   Sig: as needed   atorvastatin (LIPITOR) 80 mg tablet   Yes No   Sig: Take 80 mg by mouth every evening   benzonatate (TESSALON PERLES) 100 mg capsule   No No   Sig: Take 1 capsule (100 mg total) by mouth 3 (three) times a day as needed for cough   carboxymethylcellulose 0.5 % SOLN  Self No No   Sig: Administer 1 drop to both eyes daily as needed for dry eyes   cephalexin (KEFLEX) 500 mg capsule Not Taking  Yes No   Sig: Take 1 capsule by mouth every 12 (twelve) hours   Patient not taking: Reported on 10/27/2023   ciprofloxacin (CIPRO) 250 mg tablet Not Taking  Yes No   Sig: take 1 tablet by mouth twice a day for 7 days   Patient not taking: Reported on 10/27/2023   clonazePAM (KlonoPIN) 0.5 mg tablet  Self Yes No   Sig: Take 0.5 mg by mouth daily at bedtime   clonazePAM (KlonoPIN) 1 mg tablet  Self Yes No   Sig: Take 1 mg by mouth daily in the early morning   dicyclomine (BENTYL) 20 mg tablet  Self Yes No   Sig: Take 20 mg by mouth every 6 (six) hours   doxycycline hyclate (VIBRA-TABS) 100 mg tablet Not Taking  Yes No   Sig: take 1 tablet by mouth twice a day for 7 days   Patient not taking: Reported on 2023   famotidine (PEPCID) 40 MG tablet   No No   Sig: Take 1 tablet (40 mg total) by mouth daily at bedtime   fenofibrate (TRICOR) 48 mg tablet  Self Yes No   Sig: Take 48 mg by mouth daily   fluconazole (DIFLUCAN) 150 mg tablet Not Taking  Yes No   Sig: take 1 tablet by mouth for 1 day   Patient not taking: Reported on 10/27/2023   fluticasone (FLONASE) 50 mcg/act nasal spray   Yes No   Si sprays into each nostril daily   fluticasone-umeclidinium-vilanterol (Trelegy Ellipta) 200-62.5-25 mcg/actuation AEPB inhaler   No No   Sig: Inhale 1 puff daily Rinse mouth after use. furosemide (LASIX) 20 mg tablet  Self Yes No   Sig: Take 20 mg by mouth as needed Taking as needed   ibuprofen (MOTRIN) 800 mg tablet   Yes No   Sig: take 1 tablet by mouth every 8 hours if needed for mild pain or fever for up to 5 days   lamoTRIgine (LaMICtal) 100 mg tablet   Yes No   Sig: Take 100 mg by mouth 2 (two) times a day   latanoprost (XALATAN) 0.005 % ophthalmic solution   Yes No   Sig: instill 1 drop into both eyes at bedtime   levalbuterol (XOPENEX) 1.25 mg/3 mL nebulizer solution   Yes No   Sig: Inhale 1.25 mg every 4 (four) hours as needed   levothyroxine 137 mcg tablet  Self Yes No   Sig: Take 137 mcg by mouth daily   lubiprostone (AMITIZA) 8 mcg capsule Not Taking  No No   Sig: Take 1 PO BID for constipation.    Patient not taking: Reported on 10/27/2023   magnesium 30 MG tablet  Self Yes No   Sig: Take 500 mg by mouth 2 (two) times a day 23 right now not taking   methylPREDNISolone 4 MG tablet therapy pack   No No   Sig: Use as directed on package   metroNIDAZOLE (FLAGYL) 500 mg tablet   Yes No   Sig: Take 1 tablet by mouth 2 (two) times a day   naloxone (Narcan) 4 mg/0.1 mL nasal spray   Yes No   Patient not taking: Reported on 2023   omeprazole (PriLOSEC) 40 MG capsule   No No   Sig: Take 1 capsule (40 mg total) by mouth 2 (two) times a day   ondansetron (ZOFRAN) 4 mg tablet Not Taking  Yes No   Sig: Take 4 mg by mouth every 8 (eight) hours as needed   Patient not taking: Reported on 10/27/2023   ondansetron (ZOFRAN) 8 mg tablet   No No   Sig: Take 1 tablet (8 mg total) by mouth every 8 (eight) hours as needed for nausea or vomiting   polyethylene glycol (GLYCOLAX) 17 GM/SCOOP powder Not Taking  No No   Sig: Take 17 g daily for constipation. Patient not taking: Reported on 10/27/2023   pregabalin (LYRICA) 100 mg capsule  Self Yes No   Sig: Take 100 mg by mouth 3 (three) times a day Morning-lunch-dinner   pregabalin (LYRICA) 150 mg capsule  Self Yes No   Sig: take 1 capsule by mouth NIGHTLY   risperiDONE (RisperDAL) 0.5 mg tablet   Yes No   Sig: Take 0.25 mg by mouth 2 (two) times a day   Patient not taking: Reported on 2023   rizatriptan (MAXALT-MLT) 10 mg disintegrating tablet   Yes No   Sig: TAKE 1 TABLET BY MOUTH ONE TIME AS NEEDED FOR MIGRAINE, MAY REPEA. ..  (REFER TO PRESCRIPTION NOTES). tiZANidine (ZANAFLEX) 4 mg tablet   Yes No   Sig: take 2 tablets by mouth every 6 hours if needed for muscle spasm   topiramate (TOPAMAX) 100 mg tablet Not Taking Self Yes No   Sig: Take 100 mg by mouth every 6 (six) hours as needed Takes 2 tabs every 6 hours   Patient not taking: Reported on 10/27/2023   topiramate (TOPAMAX) 200 MG tablet   Yes No   Si mg 2 (two) times a day      Facility-Administered Medications: None       Allergies   Allergen Reactions    Hydroxyzine Anaphylaxis     Claims it gives her convulsions.      Other Other (See Comments)    Pollen Extract Itching    Tree Extract     Clarithromycin Rash     Pt denies    Latex Rash    Sulfa Antibiotics Rash     "severe skin burn"       Objective        Vitals:    23 1418 23 2149 23 0727 23 1028   BP: 111/78 124/84 124/84 117/84   Pulse: 71 76 72 87   Resp: 17 18 16 18 Temp: 98.3 °F (36.8 °C) 98 °F (36.7 °C) 97.5 °F (36.4 °C)    TempSrc:       SpO2: 96% 94% 96% 95%   Weight:       Height:             Physical Exam  Vitals and nursing note reviewed. Constitutional:       General: She is awake. She is not in acute distress. Appearance: She is not ill-appearing, toxic-appearing or diaphoretic. Skin:     General: Skin is warm and dry. Neurological:      Mental Status: She is alert and oriented to person, place, and time. GCS: GCS eye subscore is 4. GCS verbal subscore is 5. GCS motor subscore is 6. Psychiatric:         Attention and Perception: Attention normal.         Speech: Speech normal.         Behavior: Behavior normal. Behavior is cooperative. Lab Results:   Estimated Creatinine Clearance: 134.9 mL/min (A) (by C-G formula based on SCr of 0.45 mg/dL (L)). Lab Results   Component Value Date    WBC 9.38 11/02/2023    WBC 5.90 08/10/2015    HGB 14.0 11/02/2023    HGB 11.9 08/10/2015    HCT 41.1 11/02/2023    HCT 34.9 08/10/2015     11/02/2023     (L) 08/10/2015         Component Value Date/Time     08/10/2015 0612    K 3.7 11/02/2023 1258    K 4.2 08/10/2015 0612     11/02/2023 1258     08/10/2015 0612    CO2 26 11/02/2023 1258    CO2 28 08/10/2015 0612    BUN 12 11/02/2023 1258    BUN 10 08/10/2015 0612    CREATININE 0.45 (L) 11/02/2023 1258    CREATININE 0.73 08/10/2015 0612         Component Value Date/Time    CALCIUM 8.0 (L) 11/02/2023 1258    CALCIUM 8.7 08/10/2015 0612    ALKPHOS 66 10/27/2023 0458    ALKPHOS 66 08/10/2015 0612     (H) 10/27/2023 0458    AST 19 08/10/2015 0612    ALT 63 (H) 10/27/2023 0458    ALT 31 08/10/2015 0612    BILITOT 0.29 08/10/2015 0612    TP 6.1 (L) 10/27/2023 0458    ALB 3.6 10/27/2023 0458    ALB 3.4 (L) 08/10/2015 0612       Imaging Studies/EKG: I have personally reviewed pertinent reports.        Counseling / Coordination of Care  Total floor / unit time spent today 30 minutes minutes. Greater than 50% of total time was spent with the patient and / or family counseling and / or coordination of care. A description of the counseling / coordination of care: Patient interview, physical examination, review of medical records, review of imaging and laboratory data, development of pain management plan, discussion of pain management plan with patient and primary service. Please note that the APS provides consultative services regarding pain management only. With the exception of ketamine and epidural infusions and except when indicated, final decisions regarding starting or changing doses of analgesic medications are at the discretion of the consulting service.     Scarlet Washington PA-C   Acute Pain Service

## 2023-11-04 ENCOUNTER — ANESTHESIA (INPATIENT)
Dept: PERIOP | Facility: HOSPITAL | Age: 63
DRG: 464 | End: 2023-11-04
Payer: COMMERCIAL

## 2023-11-04 LAB
ANION GAP SERPL CALCULATED.3IONS-SCNC: 6 MMOL/L
BUN SERPL-MCNC: 14 MG/DL (ref 5–25)
CALCIUM SERPL-MCNC: 7.7 MG/DL (ref 8.4–10.2)
CHLORIDE SERPL-SCNC: 106 MMOL/L (ref 96–108)
CO2 SERPL-SCNC: 28 MMOL/L (ref 21–32)
CREAT SERPL-MCNC: 0.53 MG/DL (ref 0.6–1.3)
ERYTHROCYTE [DISTWIDTH] IN BLOOD BY AUTOMATED COUNT: 12.8 % (ref 11.6–15.1)
GFR SERPL CREATININE-BSD FRML MDRD: 102 ML/MIN/1.73SQ M
GLUCOSE SERPL-MCNC: 122 MG/DL (ref 65–140)
GLUCOSE SERPL-MCNC: 95 MG/DL (ref 65–140)
HCT VFR BLD AUTO: 39.4 % (ref 34.8–46.1)
HGB BLD-MCNC: 13.1 G/DL (ref 11.5–15.4)
MCH RBC QN AUTO: 31.8 PG (ref 26.8–34.3)
MCHC RBC AUTO-ENTMCNC: 33.2 G/DL (ref 31.4–37.4)
MCV RBC AUTO: 96 FL (ref 82–98)
PLATELET # BLD AUTO: 204 THOUSANDS/UL (ref 149–390)
PMV BLD AUTO: 10.6 FL (ref 8.9–12.7)
POTASSIUM SERPL-SCNC: 3.2 MMOL/L (ref 3.5–5.3)
RBC # BLD AUTO: 4.12 MILLION/UL (ref 3.81–5.12)
SODIUM SERPL-SCNC: 140 MMOL/L (ref 135–147)
WBC # BLD AUTO: 10.4 THOUSAND/UL (ref 4.31–10.16)

## 2023-11-04 PROCEDURE — 0KU90KZ SUPPLEMENT RIGHT LOWER ARM AND WRIST MUSCLE WITH NONAUTOLOGOUS TISSUE SUBSTITUTE, OPEN APPROACH: ICD-10-PCS | Performed by: SURGERY

## 2023-11-04 PROCEDURE — 15273 SKIN SUB GRFT T/ARM/LG CHILD: CPT | Performed by: SURGERY

## 2023-11-04 PROCEDURE — 0JQG0ZZ REPAIR RIGHT LOWER ARM SUBCUTANEOUS TISSUE AND FASCIA, OPEN APPROACH: ICD-10-PCS | Performed by: SURGERY

## 2023-11-04 PROCEDURE — C1781 MESH (IMPLANTABLE): HCPCS | Performed by: SURGERY

## 2023-11-04 PROCEDURE — 82948 REAGENT STRIP/BLOOD GLUCOSE: CPT

## 2023-11-04 PROCEDURE — 12002 RPR S/N/AX/GEN/TRNK2.6-7.5CM: CPT | Performed by: SURGERY

## 2023-11-04 PROCEDURE — 85027 COMPLETE CBC AUTOMATED: CPT

## 2023-11-04 PROCEDURE — 15274 SKN SUB GRFT T/A/L CHILD ADD: CPT | Performed by: SURGERY

## 2023-11-04 PROCEDURE — 80048 BASIC METABOLIC PNL TOTAL CA: CPT

## 2023-11-04 PROCEDURE — 99024 POSTOP FOLLOW-UP VISIT: CPT | Performed by: SURGERY

## 2023-11-04 DEVICE — OASIS ULTRA TRI-LAYER MATRIX
Type: IMPLANTABLE DEVICE | Site: ARM | Status: FUNCTIONAL
Brand: OASIS

## 2023-11-04 RX ORDER — FENTANYL CITRATE/PF 50 MCG/ML
25 SYRINGE (ML) INJECTION
Status: DISCONTINUED | OUTPATIENT
Start: 2023-11-04 | End: 2023-11-04 | Stop reason: HOSPADM

## 2023-11-04 RX ORDER — DEXAMETHASONE SODIUM PHOSPHATE 10 MG/ML
INJECTION, SOLUTION INTRAMUSCULAR; INTRAVENOUS AS NEEDED
Status: DISCONTINUED | OUTPATIENT
Start: 2023-11-04 | End: 2023-11-04

## 2023-11-04 RX ORDER — MAGNESIUM HYDROXIDE 1200 MG/15ML
LIQUID ORAL AS NEEDED
Status: DISCONTINUED | OUTPATIENT
Start: 2023-11-04 | End: 2023-11-04 | Stop reason: HOSPADM

## 2023-11-04 RX ORDER — ONDANSETRON 2 MG/ML
4 INJECTION INTRAMUSCULAR; INTRAVENOUS ONCE AS NEEDED
Status: DISCONTINUED | OUTPATIENT
Start: 2023-11-04 | End: 2023-11-04 | Stop reason: HOSPADM

## 2023-11-04 RX ORDER — KETAMINE HCL IN NACL, ISO-OSM 100MG/10ML
SYRINGE (ML) INJECTION AS NEEDED
Status: DISCONTINUED | OUTPATIENT
Start: 2023-11-04 | End: 2023-11-04

## 2023-11-04 RX ORDER — ONDANSETRON 2 MG/ML
INJECTION INTRAMUSCULAR; INTRAVENOUS AS NEEDED
Status: DISCONTINUED | OUTPATIENT
Start: 2023-11-04 | End: 2023-11-04

## 2023-11-04 RX ORDER — MIDAZOLAM HYDROCHLORIDE 2 MG/2ML
INJECTION, SOLUTION INTRAMUSCULAR; INTRAVENOUS AS NEEDED
Status: DISCONTINUED | OUTPATIENT
Start: 2023-11-04 | End: 2023-11-04

## 2023-11-04 RX ORDER — FENTANYL CITRATE 50 UG/ML
INJECTION, SOLUTION INTRAMUSCULAR; INTRAVENOUS AS NEEDED
Status: DISCONTINUED | OUTPATIENT
Start: 2023-11-04 | End: 2023-11-04

## 2023-11-04 RX ORDER — SODIUM CHLORIDE, SODIUM LACTATE, POTASSIUM CHLORIDE, CALCIUM CHLORIDE 600; 310; 30; 20 MG/100ML; MG/100ML; MG/100ML; MG/100ML
INJECTION, SOLUTION INTRAVENOUS CONTINUOUS PRN
Status: DISCONTINUED | OUTPATIENT
Start: 2023-11-04 | End: 2023-11-04

## 2023-11-04 RX ORDER — LIDOCAINE HCL/PF 100 MG/5ML
SYRINGE (ML) INJECTION AS NEEDED
Status: DISCONTINUED | OUTPATIENT
Start: 2023-11-04 | End: 2023-11-04

## 2023-11-04 RX ORDER — PROPOFOL 10 MG/ML
INJECTION, EMULSION INTRAVENOUS AS NEEDED
Status: DISCONTINUED | OUTPATIENT
Start: 2023-11-04 | End: 2023-11-04

## 2023-11-04 RX ORDER — HYDROMORPHONE HCL/PF 1 MG/ML
0.5 SYRINGE (ML) INJECTION
Status: DISCONTINUED | OUTPATIENT
Start: 2023-11-04 | End: 2023-11-04 | Stop reason: HOSPADM

## 2023-11-04 RX ORDER — EPHEDRINE SULFATE 50 MG/ML
INJECTION INTRAVENOUS AS NEEDED
Status: DISCONTINUED | OUTPATIENT
Start: 2023-11-04 | End: 2023-11-04

## 2023-11-04 RX ADMIN — DEXAMETHASONE SODIUM PHOSPHATE 10 MG: 10 INJECTION, SOLUTION INTRAMUSCULAR; INTRAVENOUS at 09:11

## 2023-11-04 RX ADMIN — ACETAMINOPHEN 975 MG: 325 TABLET, FILM COATED ORAL at 13:56

## 2023-11-04 RX ADMIN — ENOXAPARIN SODIUM 30 MG: 30 INJECTION SUBCUTANEOUS at 18:14

## 2023-11-04 RX ADMIN — FENTANYL CITRATE 25 MCG: 50 INJECTION, SOLUTION INTRAMUSCULAR; INTRAVENOUS at 09:53

## 2023-11-04 RX ADMIN — PREGABALIN 50 MG: 50 CAPSULE ORAL at 21:07

## 2023-11-04 RX ADMIN — NICOTINE 21 MG: 21 PATCH, EXTENDED RELEASE TRANSDERMAL at 11:57

## 2023-11-04 RX ADMIN — EPHEDRINE SULFATE 10 MG: 50 INJECTION INTRAVENOUS at 09:28

## 2023-11-04 RX ADMIN — LIDOCAINE 5% 1 PATCH: 700 PATCH TOPICAL at 11:57

## 2023-11-04 RX ADMIN — CLONAZEPAM 0.5 MG: 0.5 TABLET ORAL at 21:07

## 2023-11-04 RX ADMIN — LIDOCAINE HYDROCHLORIDE 100 MG: 20 INJECTION INTRAVENOUS at 09:07

## 2023-11-04 RX ADMIN — UMECLIDINIUM BROMIDE AND VILANTEROL TRIFENATATE 1 PUFF: 62.5; 25 POWDER RESPIRATORY (INHALATION) at 08:01

## 2023-11-04 RX ADMIN — ACETAMINOPHEN 975 MG: 325 TABLET, FILM COATED ORAL at 05:39

## 2023-11-04 RX ADMIN — EPHEDRINE SULFATE 10 MG: 50 INJECTION INTRAVENOUS at 09:16

## 2023-11-04 RX ADMIN — Medication 50 MG: at 09:07

## 2023-11-04 RX ADMIN — ENOXAPARIN SODIUM 30 MG: 30 INJECTION SUBCUTANEOUS at 05:40

## 2023-11-04 RX ADMIN — PROPOFOL 140 MG: 10 INJECTION, EMULSION INTRAVENOUS at 09:07

## 2023-11-04 RX ADMIN — FENTANYL CITRATE 25 MCG: 50 INJECTION, SOLUTION INTRAMUSCULAR; INTRAVENOUS at 09:34

## 2023-11-04 RX ADMIN — METHOCARBAMOL 250 MG: 750 TABLET ORAL at 18:14

## 2023-11-04 RX ADMIN — EPHEDRINE SULFATE 10 MG: 50 INJECTION INTRAVENOUS at 09:40

## 2023-11-04 RX ADMIN — SODIUM CHLORIDE, SODIUM LACTATE, POTASSIUM CHLORIDE, AND CALCIUM CHLORIDE: .6; .31; .03; .02 INJECTION, SOLUTION INTRAVENOUS at 08:45

## 2023-11-04 RX ADMIN — HYDROMORPHONE HYDROCHLORIDE 4 MG: 2 TABLET ORAL at 11:58

## 2023-11-04 RX ADMIN — FENTANYL CITRATE 25 MCG: 50 INJECTION, SOLUTION INTRAMUSCULAR; INTRAVENOUS at 09:13

## 2023-11-04 RX ADMIN — ONDANSETRON 4 MG: 2 INJECTION INTRAMUSCULAR; INTRAVENOUS at 09:11

## 2023-11-04 RX ADMIN — PREGABALIN 50 MG: 50 CAPSULE ORAL at 16:27

## 2023-11-04 RX ADMIN — METHOCARBAMOL 250 MG: 750 TABLET ORAL at 11:57

## 2023-11-04 RX ADMIN — HYDROMORPHONE HYDROCHLORIDE 4 MG: 2 TABLET ORAL at 21:07

## 2023-11-04 RX ADMIN — MIDAZOLAM 2 MG: 1 INJECTION INTRAMUSCULAR; INTRAVENOUS at 09:02

## 2023-11-04 RX ADMIN — ATORVASTATIN CALCIUM 80 MG: 80 TABLET, FILM COATED ORAL at 18:14

## 2023-11-04 RX ADMIN — FENTANYL CITRATE 25 MCG: 50 INJECTION, SOLUTION INTRAMUSCULAR; INTRAVENOUS at 10:06

## 2023-11-04 RX ADMIN — METHOCARBAMOL 250 MG: 750 TABLET ORAL at 05:40

## 2023-11-04 RX ADMIN — HYDROMORPHONE HYDROCHLORIDE 4 MG: 2 TABLET ORAL at 03:59

## 2023-11-04 RX ADMIN — ACETAMINOPHEN 975 MG: 325 TABLET, FILM COATED ORAL at 21:07

## 2023-11-04 RX ADMIN — LATANOPROST 1 DROP: 50 SOLUTION OPHTHALMIC at 21:08

## 2023-11-04 RX ADMIN — PREGABALIN 50 MG: 50 CAPSULE ORAL at 08:01

## 2023-11-04 RX ADMIN — HYDROMORPHONE HYDROCHLORIDE 4 MG: 2 TABLET ORAL at 16:27

## 2023-11-04 RX ADMIN — HYDROMORPHONE HYDROCHLORIDE 4 MG: 2 TABLET ORAL at 07:55

## 2023-11-04 NOTE — OP NOTE
OPERATIVE REPORT  PATIENT NAME: Teto Joseph    :  1960  MRN: [id number]  Pt Location:  OR ROOM 07    SURGERY DATE: 2023    Surgeon(s) and Role:     * Patricia Ceja MD - Primary     * Tami Bond MD - Assisting    Preop Diagnosis:  Traumatic compartment syndrome of right upper extremity, initial encounter ([address]) Katerine Elder. A11A]    Post-Op Diagnosis Codes:     * Traumatic compartment syndrome of right upper extremity, initial encounter ([address]) [T79. A11A]    Procedure(s):  Right - SIMPLE CLOSURE 3.5CM. OASIS APPLICATION 12Z3PS. WOUND VAC APPLICATION    Specimen(s):  * No specimens in log *    Estimated Blood Loss:   Minimal    Drains:  Wound VAC    Anesthesia Type:   Choice    Operative Indications:  Traumatic compartment syndrome of right upper extremity, initial encounter ([address]) Katerine Jael. A11A]      Operative Findings:  Healthy muscles of the right forearm. Significant degree of edema. 1    Complications:   None    Procedure and Technique:  Patient was brought to the OR and intubated in usual fashion. The previous wound VAC was taken down, revealing healthy muscle with a significant degree of edema. She was prepped and draped in the usual sterile fashion. Timeout was performed to confirm the procedure took place. An Esmarch was used to wrap the RUE to help decrease the edema burden. Approximately 3-4 nylon sutures were placed in a vertical mattress to the proximal portion of the wound to aid with partial closure. Final wound measurements were 15 x 7 cm. Analysis graft was placed on the muscle bed, and secured in place with a 2-0 chromic. Adaptic was placed over the graft, and a wound VAC with 2 small black sponges was applied. Suction was set to 125 mmHg. Patient tolerated procedure well. Dr. Messi Ring was present for the entire procedure.     Patient Disposition:  PACU         SIGNATURE: Tami Bond MD  DATE: 2023  TIME: 10:06 AM

## 2023-11-04 NOTE — PROGRESS NOTES
[address] [facility]  Progress Note  Name: Paul Elizalde  MRN: [id number]  Unit/Bed#: OR POOL I Date of Admission: 10/27/2023   Date of Service: 11/4/2023 I Hospital Day: 8    Assessment/Plan   Type 2 diabetes mellitus [facility])  Assessment & Plan  Lab Results   Component Value Date    HGBA1C 5.8 (H) 01/19/2023       Recent Labs     11/04/23  1012   POCGLU 122         Blood Sugar Average: Last 72 hrs:  (P) 122    Diet controlled  Monitor BG utilize S/S as needed    [address]  Patient had unwitnessed fall.  + Lightheadedness, CP  - Syncopal workup: EKG Unremarkable, Troponin's Negative, Echo: unremarkable  - PT/OT evaluation and treatment as indicated    Chronic pain  Assessment & Plan  Patient with chronic patient Fibromyalgia, chronic LLB pain, Chronic pain syndrome, peripheral neuropathy  - APS consult appreciated    - S/p right infraclavicular block with Exparel on 10/27    - weaned off ketamine 11/2   - continue to wean pain meds  - continue multimodal pain regimen    Hypothyroid  Assessment & Plan  On levothyroxine will continue in hospital    * Compartment syndrome [facility])  Assessment & Plan  Patient presented with Crush injury to RUE  - Initial CK 5942 increased to 13,683  - 10/27 RUE Fasciotomy due to compartment syndrome with application of vac dressing  - 10/29 washout and partial closure   - 10/31 vac change  - 11/2 vac change  - continue to monitor neurovascular status  -Plan for application of graft and partial wound closure today, 11/4             Bowel Regimen: Ordered  VTE Prophylaxis:Enoxaparin (Lovenox)     [facility]    Subjective   Chief Complaint: Fall with RUE compartment syndrome    Subjective: Reports pain radiating to shoulder. Still endorses weakness, with numbness and tingling of the RUE.      Objective   Vitals:   Temp:  [97.4 °F (36.3 °C)-98.4 °F (36.9 °C)] 97.4 °F (36.3 °C)  HR:  [64-94] 94  Resp:  [16-21] 21  BP: (111-137)/(69-85) 137/85    I/O         11/02 0701  11/03 0700 11/03 0701  11/04 0700 11/04 0701 11/05 0700    P. O. 90 480     I.V. (mL/kg)   700 (8.7)    Total Intake(mL/kg) 90 (1.1) 480 (5.9) 700 (8.7)    Urine (mL/kg/hr)  0 (0)     Drains 60 240     Total Output 60 240     Net +30 +240 +700           Unmeasured Urine Occurrence  4 x 1 x    Unmeasured Stool Occurrence  0 x              Physical Exam:   Gen: NAD, Comfortable  Neuro: A&O, Weakness to the RUE with mildly decreased sensation. Head: Normal Cephalic, Atraumatic  Eye: EOMI, PERRLA, No scleral icterus  Neck: Supple, No JVD, Midline trachea  CV: RRR, Cap refill <2 sec  Pulm: Normal work of breathing, no respiratory distress  Abd: Soft, Non-Distended, Non-Tender   Ext: RUE is tender at the site of the wound VAC. Significant amount of edema. Skin: warm, dry, intact      Invasive Devices       Peripheral Intravenous Line  Duration             Peripheral IV 11/02/23 Dorsal (posterior); Left Forearm 2 days                          Lab Results: Results: I have personally reviewed all pertinent laboratory/tests results, BMP/CMP:   Lab Results   Component Value Date    SODIUM 140 11/04/2023    K 3.2 (L) 11/04/2023     11/04/2023    CO2 28 11/04/2023    BUN 14 11/04/2023    CREATININE 0.53 (L) 11/04/2023    CALCIUM 7.7 (L) 11/04/2023    EGFR 102 11/04/2023   , and CBC:   Lab Results   Component Value Date    WBC 10.40 (H) 11/04/2023    HGB 13.1 11/04/2023    HCT 39.4 11/04/2023    MCV 96 11/04/2023     11/04/2023    RBC 4.12 11/04/2023    MCH 31.8 11/04/2023    MCHC 33.2 11/04/2023    RDW 12.8 11/04/2023    MPV 10.6 11/04/2023

## 2023-11-04 NOTE — ASSESSMENT & PLAN NOTE
Patient presented with Crush injury to RUE  - Initial CK 5942 increased to 13,683.   It has normalized postoperatively  - 10/27 RUE Fasciotomy due to compartment syndrome with application of vac dressing  - 10/29 washout and partial closure   - 10/31 vac change  - 11/2 vac change  - 11/4 Partial wx closure, Oasis graft, Vac  - continue to monitor neurovascular status  - Vac change 11/7

## 2023-11-04 NOTE — CASE MANAGEMENT
Case Management Discharge Planning Note    Patient name Aleta Palma  Location [address] [place] [address] [address] 910-38 MRN [id number]  : 1960 Date 2023       Current Admission Date: 10/27/2023  Current Admission Diagnosis:Compartment syndrome [facility])   Patient Active Problem List    Diagnosis Date Noted    Diarrhea 10/31/2023    Fall 10/27/2023    Compartment syndrome ([address]) 10/27/2023    Type 2 diabetes mellitus ([address]) 10/27/2023    Screening for colon cancer 2023    Gastroesophageal reflux disease 2023    Common bile duct dilation 2023    S/P insertion of spinal cord stimulator 2023    Pulmonary nodules/lesions, multiple 2023    Diabetic polyneuropathy associated with type 2 diabetes mellitus ([address]) 2023    Coronary artery calcification seen on CAT scan 2023    Cervical spondylosis 2023    Shortness of breath 2023    Irritable bowel syndrome with both constipation and diarrhea 2022    Chronic pain 2022    Sacroiliitis ([address]) 2022    Carotid artery aneurysm (HCC) 2022    Lumbar degenerative disc disease 2022    Lumbar radiculopathy 2022    Lumbar spondylosis 2022    Cervical disc disorder with radiculopathy of mid-cervical region 2022    Mid back pain 2021    Cervical radiculopathy 2021    Neck pain 2021    Syncope and collapse 2021    Migraine     Bipolar 1 disorder (HCC)     Depression     Chronic pain syndrome     Anxiety     Fibromyalgia, primary     Constipation 2021    Ileus ([address]) 2021    COPD (chronic obstructive pulmonary disease) ([address])     Hypertension     Hypophosphatemia 2021    Gastritis, acute 2021    Hypotension 2021    Tobacco use 2021    Stroke-like symptoms 06/15/2020    Nausea 06/15/2020    Left chest pressure 06/15/2020    TMJ syndrome 2020    Other hyperlipidemia 2019    Dizziness 2019    Hypothyroid 2019    Brain aneurysm 07/19/2019    Hypokalemia 07/19/2019    Hypertriglyceridemia 07/19/2019    Low HDL (under 40) 07/19/2019    Elevated TSH 07/19/2019    Tobacco abuse 07/19/2019    Intractable abdominal pain 07/19/2019      LOS (days): 8  Geometric Mean LOS (GMLOS) (days): 1.90  Days to GMLOS:-6.4     OBJECTIVE:  Risk of Unplanned Readmission Score: 25.62         Current admission status: Inpatient   Preferred Pharmacy:   [address] [place], [place] [address] [address]  Phone: [phone] Fax: [phone]    Primary Care Provider: Crystal Clark MD    Primary Insurance: [facility] DUAL COMPLETE [facility]  Secondary Insurance: [facility]    DISCHARGE DETAILS:       Pt was in OR today with Surgical team.   Pt's improved from a therapy standpoint and is recommended for Moderate Resource intensity (Level II). Cm discussed with pt and her preference is to d/c home with VNA.   Cm placed referrals  Pt has an [facility] and will use that for mobility

## 2023-11-04 NOTE — ASSESSMENT & PLAN NOTE
Patient presented with Crush injury to RUE  - Initial CK 5942 increased to 13,683  - 10/27 RUE Fasciotomy due to compartment syndrome with application of vac dressing  - 10/29 washout and partial closure   - 10/31 vac change  - 11/2 vac change  - continue to monitor neurovascular status  -Plan for application of graft and partial wound closure today, 11/4

## 2023-11-04 NOTE — ANESTHESIA POSTPROCEDURE EVALUATION
Post-Op Assessment Note    CV Status:  Stable  Pain Score: 0    Pain management: adequate     Mental Status:  Alert and awake   Hydration Status:  Euvolemic   PONV Controlled:  Controlled   Airway Patency:  Patent      Post Op Vitals Reviewed: Yes      Staff: CRNA         No notable events documented.     BP   97.4   Temp   137/85   Pulse  96   Resp   16   SpO2   100% RA

## 2023-11-04 NOTE — PLAN OF CARE
Problem: PAIN - ADULT  Goal: Verbalizes/displays adequate comfort level or baseline comfort level  Description: Interventions:  - Encourage patient to monitor pain and request assistance  - Assess pain using appropriate pain scale  - Administer analgesics based on type and severity of pain and evaluate response  - Implement non-pharmacological measures as appropriate and evaluate response  - Consider cultural and social influences on pain and pain management  - Notify physician/advanced practitioner if interventions unsuccessful or patient reports new pain  Outcome: Progressing     Problem: INFECTION - ADULT  Goal: Absence or prevention of progression during hospitalization  Description: INTERVENTIONS:  - Assess and monitor for signs and symptoms of infection  - Monitor lab/diagnostic results  - Monitor all insertion sites, i.e. indwelling lines, tubes, and drains  - Monitor endotracheal if appropriate and nasal secretions for changes in amount and color  - [place] appropriate cooling/warming therapies per order  - Administer medications as ordered  - Instruct and encourage patient and family to use good hand hygiene technique  - Identify and instruct in appropriate isolation precautions for identified infection/condition  Outcome: Progressing     Problem: SAFETY ADULT  Goal: Patient will remain free of falls  Description: INTERVENTIONS:  - Educate patient/family on patient safety including physical limitations  - Instruct patient to call for assistance with activity   - Consult OT/PT to assist with strengthening/mobility   - Keep Call bell within reach  - Keep bed low and locked with side rails adjusted as appropriate  - Keep care items and personal belongings within reach  - Initiate and maintain comfort rounds  - Make Fall Risk Sign visible to staff  - Offer Toileting every 2 Hours, in advance of need  - Initiate/Maintain alarm  - Obtain necessary fall risk management equipment:   - Apply yellow socks and bracelet for high fall risk patients  - Consider moving patient to room near nurses station  Outcome: Progressing     Problem: Prexisting or High Potential for Compromised Skin Integrity  Goal: Skin integrity is maintained or improved  Description: INTERVENTIONS:  - Identify patients at risk for skin breakdown  - Assess and monitor skin integrity  - Assess and monitor nutrition and hydration status  - Monitor labs   - Assess for incontinence   - Turn and reposition patient  - Assist with mobility/ambulation  - Relieve pressure over bony prominences  - Avoid friction and shearing  - Provide appropriate hygiene as needed including keeping skin clean and dry  - Evaluate need for skin moisturizer/barrier cream  - Collaborate with interdisciplinary team   - Patient/family teaching  - Consider wound care consult   Outcome: Progressing

## 2023-11-04 NOTE — ASSESSMENT & PLAN NOTE
Lab Results   Component Value Date    HGBA1C 5.8 (H) 01/19/2023       Recent Labs     11/04/23  1012   POCGLU 122         Blood Sugar Average: Last 72 hrs:  (P) 122    Diet controlled  Monitor BG utilize S/S as needed

## 2023-11-05 LAB
ANION GAP SERPL CALCULATED.3IONS-SCNC: 5 MMOL/L
BUN SERPL-MCNC: 12 MG/DL (ref 5–25)
CALCIUM SERPL-MCNC: 7.8 MG/DL (ref 8.4–10.2)
CARDIAC TROPONIN I PNL SERPL HS: 4 NG/L (ref 8–18)
CHLORIDE SERPL-SCNC: 106 MMOL/L (ref 96–108)
CO2 SERPL-SCNC: 30 MMOL/L (ref 21–32)
CREAT SERPL-MCNC: 0.59 MG/DL (ref 0.6–1.3)
GFR SERPL CREATININE-BSD FRML MDRD: 99 ML/MIN/1.73SQ M
GLUCOSE SERPL-MCNC: 91 MG/DL (ref 65–140)
MAGNESIUM SERPL-MCNC: 1.7 MG/DL (ref 1.9–2.7)
PHOSPHATE SERPL-MCNC: 3.9 MG/DL (ref 2.3–4.1)
POTASSIUM SERPL-SCNC: 3.3 MMOL/L (ref 3.5–5.3)
SODIUM SERPL-SCNC: 141 MMOL/L (ref 135–147)

## 2023-11-05 PROCEDURE — 84484 ASSAY OF TROPONIN QUANT: CPT

## 2023-11-05 PROCEDURE — 84100 ASSAY OF PHOSPHORUS: CPT | Performed by: STUDENT IN AN ORGANIZED HEALTH CARE EDUCATION/TRAINING PROGRAM

## 2023-11-05 PROCEDURE — 80048 BASIC METABOLIC PNL TOTAL CA: CPT | Performed by: STUDENT IN AN ORGANIZED HEALTH CARE EDUCATION/TRAINING PROGRAM

## 2023-11-05 PROCEDURE — 93005 ELECTROCARDIOGRAM TRACING: CPT

## 2023-11-05 PROCEDURE — 99024 POSTOP FOLLOW-UP VISIT: CPT | Performed by: SURGERY

## 2023-11-05 PROCEDURE — 83735 ASSAY OF MAGNESIUM: CPT | Performed by: STUDENT IN AN ORGANIZED HEALTH CARE EDUCATION/TRAINING PROGRAM

## 2023-11-05 RX ADMIN — NICOTINE 21 MG: 21 PATCH, EXTENDED RELEASE TRANSDERMAL at 08:02

## 2023-11-05 RX ADMIN — HYDROMORPHONE HYDROCHLORIDE 4 MG: 2 TABLET ORAL at 05:42

## 2023-11-05 RX ADMIN — ATORVASTATIN CALCIUM 80 MG: 80 TABLET, FILM COATED ORAL at 17:56

## 2023-11-05 RX ADMIN — HYDROMORPHONE HYDROCHLORIDE 4 MG: 2 TABLET ORAL at 01:03

## 2023-11-05 RX ADMIN — ACETAMINOPHEN 975 MG: 325 TABLET, FILM COATED ORAL at 21:33

## 2023-11-05 RX ADMIN — CLONAZEPAM 0.5 MG: 0.5 TABLET ORAL at 21:33

## 2023-11-05 RX ADMIN — METHOCARBAMOL 250 MG: 750 TABLET ORAL at 00:00

## 2023-11-05 RX ADMIN — ACETAMINOPHEN 975 MG: 325 TABLET, FILM COATED ORAL at 14:03

## 2023-11-05 RX ADMIN — HYDROMORPHONE HYDROCHLORIDE 4 MG: 2 TABLET ORAL at 17:56

## 2023-11-05 RX ADMIN — ENOXAPARIN SODIUM 30 MG: 30 INJECTION SUBCUTANEOUS at 17:56

## 2023-11-05 RX ADMIN — ENOXAPARIN SODIUM 30 MG: 30 INJECTION SUBCUTANEOUS at 05:42

## 2023-11-05 RX ADMIN — METHOCARBAMOL 250 MG: 750 TABLET ORAL at 12:13

## 2023-11-05 RX ADMIN — METHOCARBAMOL 250 MG: 750 TABLET ORAL at 05:42

## 2023-11-05 RX ADMIN — HYDROMORPHONE HYDROCHLORIDE 4 MG: 2 TABLET ORAL at 14:02

## 2023-11-05 RX ADMIN — LIDOCAINE 5% 1 PATCH: 700 PATCH TOPICAL at 08:02

## 2023-11-05 RX ADMIN — PREGABALIN 50 MG: 50 CAPSULE ORAL at 21:33

## 2023-11-05 RX ADMIN — LATANOPROST 1 DROP: 50 SOLUTION OPHTHALMIC at 21:36

## 2023-11-05 RX ADMIN — HYDROMORPHONE HYDROCHLORIDE 4 MG: 2 TABLET ORAL at 21:33

## 2023-11-05 RX ADMIN — PREGABALIN 50 MG: 50 CAPSULE ORAL at 16:33

## 2023-11-05 RX ADMIN — ACETAMINOPHEN 975 MG: 325 TABLET, FILM COATED ORAL at 05:43

## 2023-11-05 RX ADMIN — METHOCARBAMOL 250 MG: 750 TABLET ORAL at 17:55

## 2023-11-05 RX ADMIN — HYDROMORPHONE HYDROCHLORIDE 0.5 MG: 1 INJECTION, SOLUTION INTRAMUSCULAR; INTRAVENOUS; SUBCUTANEOUS at 04:23

## 2023-11-05 RX ADMIN — PREGABALIN 50 MG: 50 CAPSULE ORAL at 08:02

## 2023-11-05 RX ADMIN — HYDROMORPHONE HYDROCHLORIDE 4 MG: 2 TABLET ORAL at 09:59

## 2023-11-05 RX ADMIN — UMECLIDINIUM BROMIDE AND VILANTEROL TRIFENATATE 1 PUFF: 62.5; 25 POWDER RESPIRATORY (INHALATION) at 08:02

## 2023-11-05 NOTE — PLAN OF CARE
Problem: HEMATOLOGIC - ADULT  Goal: Maintains hematologic stability  Description: INTERVENTIONS  - Assess for signs and symptoms of bleeding or hemorrhage  - Monitor labs  - Administer supportive blood products/factors as ordered and appropriate  Outcome: Completed     Problem: MUSCULOSKELETAL - ADULT  Goal: Maintain or return mobility to safest level of function  Description: INTERVENTIONS:  - Assess patient's ability to carry out ADLs; assess patient's baseline for ADL function and identify physical deficits which impact ability to perform ADLs (bathing, care of mouth/teeth, toileting, grooming, dressing, etc.)  - Assess/evaluate cause of self-care deficits   - Assess range of motion  - Assess patient's mobility  - Assess patient's need for assistive devices and provide as appropriate  - Encourage maximum independence but intervene and supervise when necessary  - Involve family in performance of ADLs  - Assess for home care needs following discharge   - Consider OT consult to assist with ADL evaluation and planning for discharge  - Provide patient education as appropriate  Outcome: Adequate for Discharge  Goal: Maintain proper alignment of affected body part  Description: INTERVENTIONS:  - Support, maintain and protect limb and body alignment  - Provide patient/ family with appropriate education  Outcome: Adequate for Discharge     Problem: PAIN - ADULT  Goal: Verbalizes/displays adequate comfort level or baseline comfort level  Description: Interventions:  - Encourage patient to monitor pain and request assistance  - Assess pain using appropriate pain scale  - Administer analgesics based on type and severity of pain and evaluate response  - Implement non-pharmacological measures as appropriate and evaluate response  - Consider cultural and social influences on pain and pain management  - Notify physician/advanced practitioner if interventions unsuccessful or patient reports new pain  Outcome: Progressing Problem: INFECTION - ADULT  Goal: Absence or prevention of progression during hospitalization  Description: INTERVENTIONS:  - Assess and monitor for signs and symptoms of infection  - Monitor lab/diagnostic results  - Monitor all insertion sites, i.e. indwelling lines, tubes, and drains  - Monitor endotracheal if appropriate and nasal secretions for changes in amount and color  - [place] appropriate cooling/warming therapies per order  - Administer medications as ordered  - Instruct and encourage patient and family to use good hand hygiene technique  - Identify and instruct in appropriate isolation precautions for identified infection/condition  Outcome: Progressing     Problem: SAFETY ADULT  Goal: Patient will remain free of falls  Description: INTERVENTIONS:  - Educate patient/family on patient safety including physical limitations  - Instruct patient to call for assistance with activity   - Consult OT/PT to assist with strengthening/mobility   - Keep Call bell within reach  - Keep bed low and locked with side rails adjusted as appropriate  - Keep care items and personal belongings within reach  - Initiate and maintain comfort rounds  - Make Fall Risk Sign visible to staff  - Offer Toileting every 2 Hours, in advance of need  - Initiate/Maintain alarm  - Obtain necessary fall risk management equipment:   - Apply yellow socks and bracelet for high fall risk patients  - Consider moving patient to room near nurses station  Outcome: Progressing  Goal: Maintain or return to baseline ADL function  Description: INTERVENTIONS:  -  Assess patient's ability to carry out ADLs; assess patient's baseline for ADL function and identify physical deficits which impact ability to perform ADLs (bathing, care of mouth/teeth, toileting, grooming, dressing, etc.)  - Assess/evaluate cause of self-care deficits   - Assess range of motion  - Assess patient's mobility; develop plan if impaired  - Assess patient's need for assistive devices and provide as appropriate  - Encourage maximum independence but intervene and supervise when necessary  - Involve family in performance of ADLs  - Assess for home care needs following discharge   - Consider OT consult to assist with ADL evaluation and planning for discharge  - Provide patient education as appropriate  Outcome: Adequate for Discharge  Goal: Maintains/Returns to pre admission functional level  Description: INTERVENTIONS:  - Perform BMAT or MOVE assessment daily.   - Set and communicate daily mobility goal to care team and patient/family/caregiver. - Collaborate with rehabilitation services on mobility goals if consulted  - Perform Range of Motion 3 times a day. - Reposition patient every 2 hours.   - Dangle patient 3 times a day  - Stand patient 3 times a day  - Ambulate patient 3 times a day  - Out of bed to chair 3 times a day   - Out of bed for meals 3 times a day  - Out of bed for toileting  - Record patient progress and toleration of activity level   Outcome: Adequate for Discharge     Problem: DISCHARGE PLANNING  Goal: Discharge to home or other facility with appropriate resources  Description: INTERVENTIONS:  - Identify barriers to discharge w/patient and caregiver  - Arrange for needed discharge resources and transportation as appropriate  - Identify discharge learning needs (meds, wound care, etc.)  - Arrange for interpretive services to assist at discharge as needed  - Refer to Case Management Department for coordinating discharge planning if the patient needs post-hospital services based on physician/advanced practitioner order or complex needs related to functional status, cognitive ability, or social support system  Outcome: Progressing     Problem: Knowledge Deficit  Goal: Patient/family/caregiver demonstrates understanding of disease process, treatment plan, medications, and discharge instructions  Description: Complete learning assessment and assess knowledge base.  Interventions:  - Provide teaching at level of understanding  - Provide teaching via preferred learning methods  Outcome: Adequate for Discharge     Problem: MOBILITY - ADULT  Goal: Maintain or return to baseline ADL function  Description: INTERVENTIONS:  -  Assess patient's ability to carry out ADLs; assess patient's baseline for ADL function and identify physical deficits which impact ability to perform ADLs (bathing, care of mouth/teeth, toileting, grooming, dressing, etc.)  - Assess/evaluate cause of self-care deficits   - Assess range of motion  - Assess patient's mobility; develop plan if impaired  - Assess patient's need for assistive devices and provide as appropriate  - Encourage maximum independence but intervene and supervise when necessary  - Involve family in performance of ADLs  - Assess for home care needs following discharge   - Consider OT consult to assist with ADL evaluation and planning for discharge  - Provide patient education as appropriate  Outcome: Adequate for Discharge  Goal: Maintains/Returns to pre admission functional level  Description: INTERVENTIONS:  - Perform BMAT or MOVE assessment daily.   - Set and communicate daily mobility goal to care team and patient/family/caregiver. - Collaborate with rehabilitation services on mobility goals if consulted  - Perform Range of Motion 3 times a day. - Reposition patient every 2 hours.   - Dangle patient 3 times a day  - Stand patient 3 times a day  - Ambulate patient 3 times a day  - Out of bed to chair 3 times a day   - Out of bed for meals 3 times a day  - Out of bed for toileting  - Record patient progress and toleration of activity level   Outcome: Adequate for Discharge     Problem: Prexisting or High Potential for Compromised Skin Integrity  Goal: Skin integrity is maintained or improved  Description: INTERVENTIONS:  - Identify patients at risk for skin breakdown  - Assess and monitor skin integrity  - Assess and monitor nutrition and hydration status  - Monitor labs   - Assess for incontinence   - Turn and reposition patient  - Assist with mobility/ambulation  - Relieve pressure over bony prominences  - Avoid friction and shearing  - Provide appropriate hygiene as needed including keeping skin clean and dry  - Evaluate need for skin moisturizer/barrier cream  - Collaborate with interdisciplinary team   - Patient/family teaching  - Consider wound care consult   Outcome: Progressing

## 2023-11-05 NOTE — PROGRESS NOTES
Progress Note - General Surgery   Sharda Woodall 65 y.o. female MRN: [id number]  Unit/Bed#: [facility] 622-01 Encounter: [id number]    Assessment:  Esequiel Lopez is a 65 y.o. female s/p trauma right arm  10/27 RUE fasciotomy, vac - Gen  11/4 Partial wx closure, Oasis graft, Vac    Vac good seal    Plan:  Regular house diet  Continue vac  Plan for vac change 11/7        Subjective/Objective     Subjective:   No acute events overnight. Objective:     Blood pressure 102/71, pulse 69, temperature 98.1 °F (36.7 °C), resp. rate 18, height 5' 4" (1.626 m), weight 80.7 kg (178 lb), SpO2 98 %. ,Body mass index is 30.55 kg/m². Intake/Output Summary (Last 24 hours) at 11/5/2023 0704  Last data filed at 11/4/2023 2000  Gross per 24 hour   Intake 1507 ml   Output 50 ml   Net 1457 ml       Invasive Devices       Peripheral Intravenous Line  Duration             Peripheral IV 11/02/23 Dorsal (posterior); Left Forearm 3 days                    Physical Exam:   Gen: NAD, Comfortable  Neuro: A&O, decreased motor right hand  Head: Normal Cephalic, Atraumatic  Eye: EOMI, PERRLA, No scleral icterus  Neck: Supple, No JVD, Midline trachea  CV: RRR, Cap refill <2 sec  Pulm: Normal work of breathing, no respiratory distress  Abd: Soft, Non-Distended, Non-Tender  Ext: vac good seal, decreased motor right hand, sensation intact  Skin: warm, dry, intact

## 2023-11-05 NOTE — PROGRESS NOTES
[address] [facility]  Progress Note  Name: Catrachita Torres  MRN: [id number]  Unit/Bed#: [facility] 579-27 I Date of Admission: 10/27/2023   Date of Service: 11/5/2023 I Hospital Day: 9    Assessment/Plan   Type 2 diabetes mellitus [facility])  Assessment & Plan  Lab Results   Component Value Date    HGBA1C 5.8 (H) 01/19/2023       Recent Labs     11/04/23  1012   POCGLU 122         Blood Sugar Average: Last 72 hrs:  (P) 122    Diet controlled  Monitor BG utilize S/S as needed    [address]  Patient had unwitnessed fall.  + Lightheadedness, CP  - Syncopal workup: EKG Unremarkable, Troponin's Negative, Echo: unremarkable  - PT/OT evaluation and treatment as indicated    Chronic pain  Assessment & Plan  Patient with chronic patient Fibromyalgia, chronic LLB pain, Chronic pain syndrome, peripheral neuropathy  - APS consult appreciated    - S/p right infraclavicular block with Exparel on 10/27    - weaned off ketamine 11/2   - continue to wean pain meds  - continue multimodal pain regimen    Hypothyroid  Assessment & Plan  On levothyroxine will continue in hospital    * Compartment syndrome [facility])  Assessment & Plan  Patient presented with Crush injury to RUE  - Initial CK 5942 increased to 13,683. It has normalized postoperatively  - 10/27 RUE Fasciotomy due to compartment syndrome with application of vac dressing  - 10/29 washout and partial closure   - 10/31 vac change  - 11/2 vac change  - 11/4 Partial wx closure, Oasis graft, Vac  - continue to monitor neurovascular status  - Vac change 11/7             Bowel Regimen: Ordered  VTE Prophylaxis:Enoxaparin (Lovenox)     Disposition: MedSur    Subjective   Chief Complaint: Fall    Subjective: Reports decreased weakness in R UE with decreased sensation only in the right fifth digit.      Objective   Vitals:   Temp:  [97.2 °F (36.2 °C)-98.4 °F (36.9 °C)] 97.6 °F (36.4 °C)  HR:  [64-94] 64  Resp:  [12-22] 18  BP: ()/(65-85) 104/72    I/O 11/03 0701  11/04 0700 11/04 0701 11/05 0700 11/05 0701 11/06 0700    P. O. 480 582     I.V. (mL/kg)  925 (11.5)     Total Intake(mL/kg) 480 (5.9) 1507 (18.7)     Urine (mL/kg/hr) 0 (0) 0 (0)     Drains 240 50     Stool  0     Total Output 240 50     Net +240 +1457            Unmeasured Urine Occurrence 4 x 4 x     Unmeasured Stool Occurrence 0 x 0 x              Physical Exam:   Gen: NAD, Comfortable  Neuro: A&O, No focal deficits  Head: Normal Cephalic, Atraumatic  Eye: EOMI, PERRLA, No scleral icterus  Neck: Supple, No JVD, Midline trachea  CV: RRR, Cap refill <2 sec  Pulm: Normal work of breathing, no respiratory distress  Abd: Soft, Non-Distended, Non-Tender   Ext: Decreased edema in R forearm. Forearm is wrapped with Ace bandage with mild tenderness. Wound VAC is in place with 0 drainage since application yesterday  Skin: warm, dry, intact      Invasive Devices       Peripheral Intravenous Line  Duration             Peripheral IV 11/02/23 Dorsal (posterior); Left Forearm 3 days                          Lab Results: Results: I have personally reviewed all pertinent laboratory/tests results,

## 2023-11-06 ENCOUNTER — ANESTHESIA EVENT (INPATIENT)
Dept: PERIOP | Facility: HOSPITAL | Age: 63
DRG: 464 | End: 2023-11-06
Payer: COMMERCIAL

## 2023-11-06 LAB
ATRIAL RATE: 62 BPM
QRS AXIS: 72 DEGREES
QRSD INTERVAL: 66 MS
QT INTERVAL: 480 MS
QTC INTERVAL: 487 MS
T WAVE AXIS: 75 DEGREES
VENTRICULAR RATE: 62 BPM

## 2023-11-06 PROCEDURE — 93010 ELECTROCARDIOGRAM REPORT: CPT | Performed by: INTERNAL MEDICINE

## 2023-11-06 PROCEDURE — 99232 SBSQ HOSP IP/OBS MODERATE 35: CPT | Performed by: EMERGENCY MEDICINE

## 2023-11-06 PROCEDURE — 99232 SBSQ HOSP IP/OBS MODERATE 35: CPT | Performed by: PHYSICIAN ASSISTANT

## 2023-11-06 RX ORDER — CEFAZOLIN SODIUM 2 G/50ML
2000 SOLUTION INTRAVENOUS
Status: DISCONTINUED | OUTPATIENT
Start: 2023-11-07 | End: 2023-11-07

## 2023-11-06 RX ORDER — DEXTROSE MONOHYDRATE, SODIUM CHLORIDE, AND POTASSIUM CHLORIDE 50; 1.49; 4.5 G/1000ML; G/1000ML; G/1000ML
125 INJECTION, SOLUTION INTRAVENOUS CONTINUOUS
Status: DISCONTINUED | OUTPATIENT
Start: 2023-11-07 | End: 2023-11-07

## 2023-11-06 RX ADMIN — METHOCARBAMOL 250 MG: 750 TABLET ORAL at 00:11

## 2023-11-06 RX ADMIN — METHOCARBAMOL 250 MG: 750 TABLET ORAL at 11:56

## 2023-11-06 RX ADMIN — ACETAMINOPHEN 975 MG: 325 TABLET, FILM COATED ORAL at 13:40

## 2023-11-06 RX ADMIN — HYDROMORPHONE HYDROCHLORIDE 4 MG: 2 TABLET ORAL at 02:39

## 2023-11-06 RX ADMIN — NICOTINE 21 MG: 21 PATCH, EXTENDED RELEASE TRANSDERMAL at 08:58

## 2023-11-06 RX ADMIN — PREGABALIN 50 MG: 50 CAPSULE ORAL at 08:55

## 2023-11-06 RX ADMIN — METHOCARBAMOL 250 MG: 750 TABLET ORAL at 17:01

## 2023-11-06 RX ADMIN — METHOCARBAMOL 250 MG: 750 TABLET ORAL at 05:10

## 2023-11-06 RX ADMIN — ENOXAPARIN SODIUM 30 MG: 30 INJECTION SUBCUTANEOUS at 17:01

## 2023-11-06 RX ADMIN — POLYETHYLENE GLYCOL 3350 17 G: 17 POWDER, FOR SOLUTION ORAL at 08:56

## 2023-11-06 RX ADMIN — ACETAMINOPHEN 975 MG: 325 TABLET, FILM COATED ORAL at 05:10

## 2023-11-06 RX ADMIN — LIDOCAINE 5% 1 PATCH: 700 PATCH TOPICAL at 08:55

## 2023-11-06 RX ADMIN — PREGABALIN 50 MG: 50 CAPSULE ORAL at 16:55

## 2023-11-06 RX ADMIN — ATORVASTATIN CALCIUM 80 MG: 80 TABLET, FILM COATED ORAL at 17:01

## 2023-11-06 RX ADMIN — ENOXAPARIN SODIUM 30 MG: 30 INJECTION SUBCUTANEOUS at 05:10

## 2023-11-06 RX ADMIN — ACETAMINOPHEN 975 MG: 325 TABLET, FILM COATED ORAL at 21:07

## 2023-11-06 RX ADMIN — UMECLIDINIUM BROMIDE AND VILANTEROL TRIFENATATE 1 PUFF: 62.5; 25 POWDER RESPIRATORY (INHALATION) at 08:55

## 2023-11-06 RX ADMIN — CLONAZEPAM 0.5 MG: 0.5 TABLET ORAL at 21:08

## 2023-11-06 RX ADMIN — HYDROMORPHONE HYDROCHLORIDE 4 MG: 2 TABLET ORAL at 08:56

## 2023-11-06 RX ADMIN — HYDROMORPHONE HYDROCHLORIDE 4 MG: 2 TABLET ORAL at 16:55

## 2023-11-06 RX ADMIN — PREGABALIN 50 MG: 50 CAPSULE ORAL at 21:08

## 2023-11-06 RX ADMIN — HYDROMORPHONE HYDROCHLORIDE 4 MG: 2 TABLET ORAL at 21:07

## 2023-11-06 RX ADMIN — LATANOPROST 1 DROP: 50 SOLUTION OPHTHALMIC at 21:08

## 2023-11-06 NOTE — PLAN OF CARE
Problem: PAIN - ADULT  Goal: Verbalizes/displays adequate comfort level or baseline comfort level  Description: Interventions:  - Encourage patient to monitor pain and request assistance  - Assess pain using appropriate pain scale  - Administer analgesics based on type and severity of pain and evaluate response  - Implement non-pharmacological measures as appropriate and evaluate response  - Consider cultural and social influences on pain and pain management  - Notify physician/advanced practitioner if interventions unsuccessful or patient reports new pain  Outcome: Progressing     Problem: INFECTION - ADULT  Goal: Absence or prevention of progression during hospitalization  Description: INTERVENTIONS:  - Assess and monitor for signs and symptoms of infection  - Monitor lab/diagnostic results  - Monitor all insertion sites, i.e. indwelling lines, tubes, and drains  - Monitor endotracheal if appropriate and nasal secretions for changes in amount and color  - [place] appropriate cooling/warming therapies per order  - Administer medications as ordered  - Instruct and encourage patient and family to use good hand hygiene technique  - Identify and instruct in appropriate isolation precautions for identified infection/condition  Outcome: Progressing

## 2023-11-06 NOTE — PROGRESS NOTES
[address] [facility]  Progress Note  Name: Darell Contreras  MRN: [id number]  Unit/Bed#: [facility] 847-04 I Date of Admission: 10/27/2023   Date of Service: 11/6/2023 I Hospital Day: 10    Assessment/Plan   Diarrhea  Assessment & Plan  - New onset loose stools worse with eating  - Has been on laxative - will hold now  - WBC WNL without fevers or abdominal pain  - Continue to monitor     Type 2 diabetes mellitus (HCC)  Assessment & Plan  Lab Results   Component Value Date    HGBA1C 5.8 (H) 01/19/2023       Recent Labs     11/04/23  1012   POCGLU 122         Blood Sugar Average: Last 72 hrs:  (P) 122    Diet controlled  Monitor BG utilize S/S as needed    [address]  Patient had unwitnessed fall.  + Lightheadedness, CP  - Syncopal workup: EKG Unremarkable, Troponin's Negative, Echo: unremarkable  - PT/OT evaluation and treatment as indicated    Chronic pain  Assessment & Plan  Patient with chronic patient Fibromyalgia, chronic LLB pain, Chronic pain syndrome, peripheral neuropathy  - APS consult appreciated    - S/p right infraclavicular block with Exparel on 10/27    - weaned off ketamine 11/2   - continue to wean pain meds  - continue multimodal pain regimen    Hypothyroid  Assessment & Plan  On levothyroxine will continue in hospital    * Compartment syndrome [facility])  Assessment & Plan  Patient presented with Crush injury to RUE  - Initial CK 5942 increased to 13,683.   It has normalized postoperatively  - 10/27 RUE Fasciotomy due to compartment syndrome with application of vac dressing  - 10/29 washout and partial closure   - 10/31 vac change  - 11/2 vac change  - 11/4 Partial wx closure, Oasis graft, Vac  - continue to monitor neurovascular status  - Vac change 11/7    Bowel Regimen: dulcolax  VTE Prophylaxis:Enoxaparin (Lovenox)     Disposition: continued medical / surgical care at this time    Subjective   Chief Complaint: Right arm pain    Subjective: overnight patient had complaints of chest pain. EKG and troponin were obtained which were unchanged. Patient still complaining of having shooting pain into her right pinky and over the wound vac which are unchanged at this time. No other complaints currently. Objective   Vitals:   Temp:  [97.4 °F (36.3 °C)-98.1 °F (36.7 °C)] 98.1 °F (36.7 °C)  HR:  [62-70] 64  Resp:  [16] 16  BP: ()/(60-70) 107/70    I/O         11/04 0701 11/05 0700 11/05 0701 11/06 0700 11/06 0701  11/07 0700    P. O. 582 345     I.V. (mL/kg) 925 (11.5)      Total Intake(mL/kg) 1507 (18.7) 345 (4.3)     Urine (mL/kg/hr) 0 (0) 0 (0)     Drains 50 100     Stool 0 0     Total Output 50 100     Net +1457 +245            Unmeasured Urine Occurrence 4 x 5 x     Unmeasured Stool Occurrence 0 x 0 x              Physical Exam:   GENERAL APPEARANCE: Patient in no acute distress. HEENT: NCAT; PERRL, EOMs intact; Mucous membranes moist  CV: Regular rate and rhythm; no murmur/gallops/rubs appreciated. CHEST / LUNGS: Clear to auscultation; no wheezes/rales/rhonci. ABD: NABS; soft; non-distended; non-tender. EXT: +2 pulses bilaterally upper & lower extremities; wound vac RUE  NEURO: GCS 15; no focal neurologic deficits; neurovascularly intact. SKIN: Warm, dry and well perfused; no rash; no jaundice. Invasive Devices       Peripheral Intravenous Line  Duration             Peripheral IV 11/05/23 Left;Ventral (anterior) Forearm <1 day                          Lab Results: Overnight troponin 4.  No new labs this morning  Imaging:  No new imaging in last 24 hours  Other Studies: N/A

## 2023-11-06 NOTE — CASE MANAGEMENT
Case Management Discharge Planning Note    Patient name Danyelle Álvarez  Location [address] [place] [address] [id number]  : 1960 Date 2023       Current Admission Date: 10/27/2023  Current Admission Diagnosis:Compartment syndrome [facility])   Patient Active Problem List    Diagnosis Date Noted    Diarrhea 10/31/2023    Fall 10/27/2023    Compartment syndrome ([address]) 10/27/2023    Type 2 diabetes mellitus ([address]) 10/27/2023    Screening for colon cancer 2023    Gastroesophageal reflux disease 2023    Common bile duct dilation 2023    S/P insertion of spinal cord stimulator 2023    Pulmonary nodules/lesions, multiple 2023    Diabetic polyneuropathy associated with type 2 diabetes mellitus ([address]) 2023    Coronary artery calcification seen on CAT scan 2023    Cervical spondylosis 2023    Shortness of breath 2023    Irritable bowel syndrome with both constipation and diarrhea 2022    Chronic pain 2022    Sacroiliitis ([address]) 2022    Carotid artery aneurysm (HCC) 2022    Lumbar degenerative disc disease 2022    Lumbar radiculopathy 2022    Lumbar spondylosis 2022    Cervical disc disorder with radiculopathy of mid-cervical region 2022    Mid back pain 2021    Cervical radiculopathy 2021    Neck pain 2021    Syncope and collapse 2021    Migraine     Bipolar 1 disorder (HCC)     Depression     Chronic pain syndrome     Anxiety     Fibromyalgia, primary     Constipation 2021    Ileus ([address]) 2021    COPD (chronic obstructive pulmonary disease) ([address])     Hypertension     Hypophosphatemia 2021    Gastritis, acute 2021    Hypotension 2021    Tobacco use 2021    Stroke-like symptoms 06/15/2020    Nausea 06/15/2020    Left chest pressure 06/15/2020    TMJ syndrome 2020    Other hyperlipidemia 2019    Dizziness 2019    Hypothyroid 2019    Brain aneurysm 07/19/2019    Hypokalemia 07/19/2019    Hypertriglyceridemia 07/19/2019    Low HDL (under 40) 07/19/2019    Elevated TSH 07/19/2019    Tobacco abuse 07/19/2019    Intractable abdominal pain 07/19/2019      LOS (days): 10  Geometric Mean LOS (GMLOS) (days): 1.90  Days to GMLOS:-8.3     OBJECTIVE:  Risk of Unplanned Readmission Score: 28.45         Current admission status: Inpatient   Preferred Pharmacy:   [address] [place] [address] - [address] [place] [address]  Phone: [phone] Fax: [phone]    Primary Care Provider: Carlin Saxena MD    Primary Insurance: [facility] DUAL COMPLETE [facility] H[facility] REP  Secondary Insurance: [facility]    DISCHARGE DETAILS:    Plan for [facility] take down tomorrow. At which point, surgical team will assess pt's wound. Plan is for a home d/c with [facility].  Referrals were placed for [facility]

## 2023-11-06 NOTE — PROGRESS NOTES
Progress Note - Acute Pain Service    Sully Woodall 65 y.o. female MRN: [id number]  Unit/Bed#: [facility] 622-01 Encounter: [id number]      Cedric Chris is a 65 y.o. female with right arm fasciotomy    Chronic pain  Assessment & Plan  Hx of cervical-lumbar spondylosis/DJD  Inactive spinal cord stimulator  Recent left cervical MBB with [facility]  Home pain regimen: Norco 10-325mg q8hr PRN (takes 30mg. day), Lyrica 100/150mg QID (150mg at bedtime), Tizanidine 4mg q6hr PRN for spasms          * Compartment syndrome [facility])  Assessment & Plan  Right forearm CS s/p right forearm fasciotomy with wound vac placement on 10/27  S/p right infraclavicular block with Exparel on 10/27  Status post partial closure with placement of [place] matrix on 11/4/2023. Will hold off on further regional analgesic techniques given nerve deficits of right forearm and monitoring for return of nerve function  MMA as below  Dilaudid 2 mg p.o. every 4 hours as needed moderate pain or 4 mg p.o. every 4 hours as needed severe pain. IV Dilaudid 0.5 mg IV daily as needed for dressing changes only. Continue Lidoderm patch  Continue PO Tylenol 975mg q8hr princess  Robaxin 250 mg p.o. every 6 hours scheduled. Resume home dose of Lyrica at 100 mg p.o. 3 times daily. Klonopin 0.5 mg PO QHS ordered since 10/27. At discharge, suggest the following:  Tylenol 975 mg p.o. every 8 hours as needed mild pain. Lyrica 100 mg p.o. 3 times daily. Dilaudid 2 mg p.o. every 4 hours as needed moderate to severe pain x2 days. May resume home pain regimen following discontinuation of p.o. Dilaudid. Patient is not to take p.o. Dilaudid along with home, previously prescribed hydrocodone. Patient should follow-up with her outpatient pain management specialist as soon as possible after discharge. APS will sign off at this time. Thank you for the consult.  All opioids and other analgesics to be written at discretion of primary team. Please contact Acute Pain Service - via Cirro from [phone] with additional questions or concerns. See Cirro or Janes for additional contacts and after hours information. Pain History  Current pain location(s):  Pain Score: 9  Pain Location/Orientation: Orientation: Right, Location: Arm, Location: Back, Location: Leg  Pain Scale: Pain Assessment Tool: 0-10  24 hour history: Patient's pain remains well controlled on current regimen. Will likely have skin graft later this week. Still complains of occasional mild to moderate shooting pain in the right wrist and hand.     Opioid requirement previous 24 hours: Dilaudid 4 mg p.o. x6    Meds/Allergies   all current active meds have been reviewed, current meds:   Current Facility-Administered Medications   Medication Dose Route Frequency    acetaminophen (TYLENOL) tablet 975 mg  975 mg Oral Q8H [address]    albuterol (PROVENTIL HFA,VENTOLIN HFA) inhaler 2 puff  2 puff Inhalation Q4H PRN    atorvastatin (LIPITOR) tablet 80 mg  80 mg Oral QPM    bisacodyl (DULCOLAX) rectal suppository 10 mg  10 mg Rectal Daily PRN    clonazePAM (KlonoPIN) tablet 0.5 mg  0.5 mg Oral HS    enoxaparin (LOVENOX) subcutaneous injection 30 mg  30 mg Subcutaneous Q12H    HYDROmorphone (DILAUDID) injection 0.5 mg  0.5 mg Intravenous Daily PRN    HYDROmorphone (DILAUDID) tablet 2 mg  2 mg Oral Q4H PRN    Or    HYDROmorphone (DILAUDID) tablet 4 mg  4 mg Oral Q4H PRN    latanoprost (XALATAN) 0.005 % ophthalmic solution 1 drop  1 drop Both Eyes HS    lidocaine (LIDODERM) 5 % patch 1 patch  1 patch Topical Daily    methocarbamol (ROBAXIN) tablet 250 mg  250 mg Oral Q6H SNOW    naloxone (NARCAN) 0.04 mg/mL syringe 0.04 mg  0.04 mg Intravenous Q1MIN PRN    nicotine (NICODERM CQ) 21 mg/24 hr TD 24 hr patch 21 mg  21 mg Transdermal Daily    ondansetron (ZOFRAN) injection 4 mg  4 mg Intravenous Q4H PRN    polyethylene glycol (MIRALAX) packet 17 g  17 g Oral Daily PRN    pregabalin (LYRICA) capsule 50 mg  50 mg Oral TID umeclidinium-vilanterol 62.5-25 mcg/actuation inhaler 1 puff  1 puff Inhalation Daily   , and PTA meds:   Prior to Admission Medications   Prescriptions Last Dose Informant Patient Reported? Taking?    Alcohol Swabs (Alcohol Pads) 70 % PADS   Yes No   Sig: USE TO TEST BLOOD GLUCOSE 2-3 TIMES DAILY   Buprenorphine HCl (Belbuca) 300 MCG FILM  Self Yes No   Sig: Apply 300 mcg to cheek in the morning   Butalbital-APAP-Caffeine (FIORICET PO)   Yes No   Sig: Take by mouth as needed   Fluticasone-Salmeterol (Advair) 500-50 mcg/dose inhaler Not Taking  Yes No   Patient not taking: Reported on 10/27/2023   Galcanezumab-gnlm (Emgality) 120 MG/ML SOAJ  Self Yes No   Sig: Inject 120 mg under the skin every 30 (thirty) days Last inj 1/19/23   HYDROcodone-acetaminophen (NORCO)  mg per tablet   Yes No   Sig: every 8 (eight) hours as needed   Melatonin 10 MG CAPS  Self Yes No   Sig: Take 1 tablet by mouth daily at bedtime as needed   OneTouch Ultra test strip   Yes No   Sig: USE TO TEST BLOOD GLUCOSE 2-3 TIMES DAILY   SUMAtriptan (IMITREX) 100 mg tablet Not Taking  Yes No   Patient not taking: Reported on 10/27/2023   True Comfort Safety Lancets MISC   Yes No   Sig: USE TO TEST BLOOD GLUCOSE 2-3 TIMES DAILY   Zoster Vac Recomb Adjuvanted (Shingrix) 50 MCG/0.5ML SUSR   Yes No   Sig: inject 0.5 milliliter intramuscularly   albuterol (PROVENTIL HFA,VENTOLIN HFA) 90 mcg/act inhaler  Self Yes No   Sig: Inhale 2 puffs every 6 (six) hours as needed   ammonium lactate (LAC-HYDRIN) 12 % lotion   Yes No   Sig: as needed   atorvastatin (LIPITOR) 80 mg tablet   Yes No   Sig: Take 80 mg by mouth every evening   benzonatate (TESSALON PERLES) 100 mg capsule   No No   Sig: Take 1 capsule (100 mg total) by mouth 3 (three) times a day as needed for cough   carboxymethylcellulose 0.5 % SOLN  Self No No   Sig: Administer 1 drop to both eyes daily as needed for dry eyes   cephalexin (KEFLEX) 500 mg capsule Not Taking  Yes No   Sig: Take 1 capsule by mouth every 12 (twelve) hours   Patient not taking: Reported on 10/27/2023   ciprofloxacin (CIPRO) 250 mg tablet Not Taking  Yes No   Sig: take 1 tablet by mouth twice a day for 7 days   Patient not taking: Reported on 10/27/2023   clonazePAM (KlonoPIN) 0.5 mg tablet  Self Yes No   Sig: Take 0.5 mg by mouth daily at bedtime   clonazePAM (KlonoPIN) 1 mg tablet  Self Yes No   Sig: Take 1 mg by mouth daily in the early morning   dicyclomine (BENTYL) 20 mg tablet  Self Yes No   Sig: Take 20 mg by mouth every 6 (six) hours   doxycycline hyclate (VIBRA-TABS) 100 mg tablet Not Taking  Yes No   Sig: take 1 tablet by mouth twice a day for 7 days   Patient not taking: Reported on 2023   famotidine (PEPCID) 40 MG tablet   No No   Sig: Take 1 tablet (40 mg total) by mouth daily at bedtime   fenofibrate (TRICOR) 48 mg tablet  Self Yes No   Sig: Take 48 mg by mouth daily   fluconazole (DIFLUCAN) 150 mg tablet Not Taking  Yes No   Sig: take 1 tablet by mouth for 1 day   Patient not taking: Reported on 10/27/2023   fluticasone (FLONASE) 50 mcg/act nasal spray   Yes No   Si sprays into each nostril daily   fluticasone-umeclidinium-vilanterol (Trelegy Ellipta) 200-62.5-25 mcg/actuation AEPB inhaler   No No   Sig: Inhale 1 puff daily Rinse mouth after use.    furosemide (LASIX) 20 mg tablet  Self Yes No   Sig: Take 20 mg by mouth as needed Taking as needed   ibuprofen (MOTRIN) 800 mg tablet   Yes No   Sig: take 1 tablet by mouth every 8 hours if needed for mild pain or fever for up to 5 days   lamoTRIgine (LaMICtal) 100 mg tablet   Yes No   Sig: Take 100 mg by mouth 2 (two) times a day   latanoprost (XALATAN) 0.005 % ophthalmic solution   Yes No   Sig: instill 1 drop into both eyes at bedtime   levalbuterol (XOPENEX) 1.25 mg/3 mL nebulizer solution   Yes No   Sig: Inhale 1.25 mg every 4 (four) hours as needed   levothyroxine 137 mcg tablet  Self Yes No   Sig: Take 137 mcg by mouth daily   lubiprostone (AMITIZA) 8 mcg capsule Not Taking  No No   Sig: Take 1 PO BID for constipation. Patient not taking: Reported on 10/27/2023   magnesium 30 MG tablet  Self Yes No   Sig: Take 500 mg by mouth 2 (two) times a day 1/26/23 right now not taking   methylPREDNISolone 4 MG tablet therapy pack   No No   Sig: Use as directed on package   metroNIDAZOLE (FLAGYL) 500 mg tablet   Yes No   Sig: Take 1 tablet by mouth 2 (two) times a day   naloxone (Narcan) 4 mg/0.1 mL nasal spray   Yes No   Patient not taking: Reported on 7/14/2023   omeprazole (PriLOSEC) 40 MG capsule   No No   Sig: Take 1 capsule (40 mg total) by mouth 2 (two) times a day   ondansetron (ZOFRAN) 4 mg tablet Not Taking  Yes No   Sig: Take 4 mg by mouth every 8 (eight) hours as needed   Patient not taking: Reported on 10/27/2023   ondansetron (ZOFRAN) 8 mg tablet   No No   Sig: Take 1 tablet (8 mg total) by mouth every 8 (eight) hours as needed for nausea or vomiting   polyethylene glycol (GLYCOLAX) 17 GM/SCOOP powder Not Taking  No No   Sig: Take 17 g daily for constipation. Patient not taking: Reported on 10/27/2023   pregabalin (LYRICA) 100 mg capsule  Self Yes No   Sig: Take 100 mg by mouth 3 (three) times a day Morning-lunch-dinner   pregabalin (LYRICA) 150 mg capsule  Self Yes No   Sig: take 1 capsule by mouth NIGHTLY   risperiDONE (RisperDAL) 0.5 mg tablet   Yes No   Sig: Take 0.25 mg by mouth 2 (two) times a day   Patient not taking: Reported on 7/14/2023   rizatriptan (MAXALT-MLT) 10 mg disintegrating tablet   Yes No   Sig: TAKE 1 TABLET BY MOUTH ONE TIME AS NEEDED FOR MIGRAINE, MAY REPEA. ..  (REFER TO PRESCRIPTION NOTES).    tiZANidine (ZANAFLEX) 4 mg tablet   Yes No   Sig: take 2 tablets by mouth every 6 hours if needed for muscle spasm   topiramate (TOPAMAX) 100 mg tablet Not Taking Self Yes No   Sig: Take 100 mg by mouth every 6 (six) hours as needed Takes 2 tabs every 6 hours   Patient not taking: Reported on 10/27/2023   topiramate (TOPAMAX) 200 MG tablet   Yes No Si mg 2 (two) times a day      Facility-Administered Medications: None       Allergies   Allergen Reactions    Hydroxyzine Anaphylaxis     Claims it gives her convulsions. Other Other (See Comments)    Pollen Extract Itching    Tree Extract     Clarithromycin Rash     Pt denies    Latex Rash    Sulfa Antibiotics Rash     "severe skin burn"       Objective        Vitals:    23 2202 23 0233 23 0739 23 1051   BP: 101/65 104/66 107/70 112/72   Pulse: 62 66 64 71   Resp:  16     Temp: 98 °F (36.7 °C) 97.9 °F (36.6 °C) 98.1 °F (36.7 °C) 98.3 °F (36.8 °C)   TempSrc:       SpO2: 97% 97% 96% 91%   Weight:       Height:             Physical Exam  Vitals and nursing note reviewed. Constitutional:       General: She is awake. She is not in acute distress. Appearance: She is not ill-appearing, toxic-appearing or diaphoretic. Skin:     General: Skin is warm and dry. Neurological:      Mental Status: She is alert and oriented to person, place, and time. GCS: GCS eye subscore is 4. GCS verbal subscore is 5. GCS motor subscore is 6. Psychiatric:         Attention and Perception: Attention normal.         Speech: Speech normal.         Behavior: Behavior normal. Behavior is cooperative. Lab Results:   Estimated Creatinine Clearance: 102.9 mL/min (A) (by C-G formula based on SCr of 0.59 mg/dL (L)).   Lab Results   Component Value Date    WBC 10.40 (H) 2023    WBC 5.90 08/10/2015    HGB 13.1 2023    HGB 11.9 08/10/2015    HCT 39.4 2023    HCT 34.9 08/10/2015     2023     (L) 08/10/2015         Component Value Date/Time     08/10/2015 0612    K 3.3 (L) 2023 0829    K 4.2 08/10/2015 0612     2023 0829     08/10/2015 0612    CO2 30 2023 0829    CO2 28 08/10/2015 0612    BUN 12 2023 0829    BUN 10 08/10/2015 0612    CREATININE 0.59 (L) 2023    CREATININE 0.73 08/10/2015 0612 Component Value Date/Time    CALCIUM 7.8 (L) 11/05/2023 0829    CALCIUM 8.7 08/10/2015 0612    ALKPHOS 66 10/27/2023 0458    ALKPHOS 66 08/10/2015 0612     (H) 10/27/2023 0458    AST 19 08/10/2015 0612    ALT 63 (H) 10/27/2023 0458    ALT 31 08/10/2015 0612    BILITOT 0.29 08/10/2015 0612    TP 6.1 (L) 10/27/2023 0458    ALB 3.6 10/27/2023 0458    ALB 3.4 (L) 08/10/2015 0612       Imaging Studies/EKG: I have personally reviewed pertinent reports. Counseling / Coordination of Care  Total floor / unit time spent today 30 minutes minutes. Greater than 50% of total time was spent with the patient and / or family counseling and / or coordination of care. A description of the counseling / coordination of care: Patient interview, physical examination, review of medical records, review of imaging and laboratory data, development of pain management plan, discussion of pain management plan with patient and primary service. Please note that the APS provides consultative services regarding pain management only. With the exception of ketamine and epidural infusions and except when indicated, final decisions regarding starting or changing doses of analgesic medications are at the discretion of the consulting service.     Sharon Villareal PA-C   Acute Pain Service

## 2023-11-06 NOTE — PROGRESS NOTES
Progress Note - Acute Pain Service    Elizabeth Woodall 65 y.o. female MRN: [id number]  Unit/Bed#: [facility] 622-01 Encounter: [id number]      Nina See is a 65 y.o. female with PMHx of T2DM and chronic pain syndrome managed with home Norco 10-325mg q8hr PRN (takes 30mg per day) who originally presented with right forearm compartment syndrome after unwitnessed fall from ? opioid overdose. She is s/p RUE fasciotomy with wound vac placement on 10/27. Her course has been complicated by rhabdomyolysis with no obvious ALEX. APS consulted for post-operative pain control. Upon bedside evaluation, Gaston Real, was sitting in the chair in no obvious distress. She reports her pain to be at least a 8/10 but no obvious distress. The nerve block she received yesterday has worn off this morning. Still unable to wiggle her right fingers. Denies opioid-induced side effects including nausea/vomiting/itching/constipation. Oxycodone somewhat alleviates the pain but doesn't last very long. Compartment syndrome [facility])  Assessment & Plan  Right forearm CS s/p right forearm fasciotomy with wound vac placement on 10/27  S/p right infraclavicular block with Exparel on 10/27  Will hold off on further regional analgesic techniques given nerve deficits of right forearm and monitoring for return of nerve function  MMA as below  Increase PO oxycodone to 5/10mg q3hr PRN for mod-severe pain  Will start IV ketamine @0.1mg/kg/hr for analgesic support (10/28-)  IV dilaudid 0.5mg q3hr PRN for breakthrough  Lidoderm  PO Tylenol 975mg q8hr princess  Agree with holding Lyrica in the setting of rising CK/monitoring for ALEX due to rhabdomyolysis  Holding NSAIDs in the setting of renal function monitoring given elevated CK/rhabdomyolysis      Chronic pain  Assessment & Plan  Hx of cervical-lumbar spondylosis/DJD  Inactive spinal cord stimulator  Recent left cervical MBB with [facility]  Home pain regimen: Norco 10-325mg q8hr PRN (takes 30mg. day), Lyrica 100/150mg QID (150mg at bedtime), Tizanidine 4mg q6hr PRN for spasms  Continue home tizanidine 4mg q6hr PRN for spasms  Increase PO oxycodone 5/10mg q3hr PRN for mod-severe (given tolerance and patient noting oxycodone doesn't last beyond 2 hours)  Continue other MMA as below            APS will continue to follow. Please contact Acute Pain Service - via Liquid Light from [phone] with additional questions or concerns. See Liquid Light or Skipoon for additional contacts and after hours information. Pain History  Current pain location(s):  Pain Score: 10  Pain Location/Orientation: Location: Generalized  Pain Scale: Pain Assessment Tool: 0-10  24 hour history: See above    Opioid requirement previous 24 hours: PO oxycodone 10mg    Meds/Allergies   all current active meds have been reviewed    Allergies   Allergen Reactions    Hydroxyzine Anaphylaxis     Claims it gives her convulsions. Other Other (See Comments)    Pollen Extract Itching    Tree Extract     Clarithromycin Rash     Pt denies    Latex Rash    Sulfa Antibiotics Rash     "severe skin burn"       Objective        Vitals:    10/27/23 2251 10/28/23 0241 10/28/23 0747 10/28/23 1146   BP: 111/65 114/69 117/73 134/86   BP Location:       Pulse: 71 66 65 67   Resp: 18 16     Temp: 98 °F (36.7 °C) 99.5 °F (37.5 °C) 98.1 °F (36.7 °C) 98.2 °F (36.8 °C)   TempSrc:       SpO2: 97% 98% 96% 96%   Weight:       Height:             Physical Exam  HENT:      Head: Atraumatic. Mouth/Throat:      Mouth: Mucous membranes are dry. Eyes:      Pupils: Pupils are equal, round, and reactive to light. Cardiovascular:      Rate and Rhythm: Regular rhythm. Pulses: Normal pulses. Pulmonary:      Effort: Pulmonary effort is normal.   Abdominal:      Palpations: Abdomen is soft. Musculoskeletal:      Comments: [place] present on right forearm   Skin:     General: Skin is dry. Findings: Lesion (Right necrotic lesion over right forearm) present.    Neurological: General: No focal deficit present. Mental Status: She is alert and oriented to person, place, and time. Sensory: Sensory deficit present. Motor: Weakness (Right hand) present. Psychiatric:         Mood and Affect: Mood normal.         Lab Results:   Estimated Creatinine Clearance: 102.9 mL/min (A) (by C-G formula based on SCr of 0.59 mg/dL (L)). Lab Results   Component Value Date    WBC 8.14 10/28/2023    WBC 5.90 08/10/2015    HGB 11.9 10/28/2023    HGB 11.9 08/10/2015    HCT 35.7 10/28/2023    HCT 34.9 08/10/2015     10/28/2023     (L) 08/10/2015         Component Value Date/Time     08/10/2015 0612    K 3.4 (L) 10/28/2023 0851    K 4.2 08/10/2015 0612     (H) 10/28/2023 0851     08/10/2015 0612    CO2 24 10/28/2023 0851    CO2 28 08/10/2015 0612    BUN 11 10/28/2023 0851    BUN 10 08/10/2015 0612    CREATININE 0.59 (L) 10/28/2023 0851    CREATININE 0.73 08/10/2015 0612         Component Value Date/Time    CALCIUM 7.6 (L) 10/28/2023 0851    CALCIUM 8.7 08/10/2015 0612    ALKPHOS 66 10/27/2023 0458    ALKPHOS 66 08/10/2015 0612     (H) 10/27/2023 0458    AST 19 08/10/2015 0612    ALT 63 (H) 10/27/2023 0458    ALT 31 08/10/2015 0612    BILITOT 0.29 08/10/2015 0612    TP 6.1 (L) 10/27/2023 0458    ALB 3.6 10/27/2023 0458    ALB 3.4 (L) 08/10/2015 0612       Imaging Studies/EKG: I have personally reviewed pertinent reports. Counseling / Coordination of Care  Total floor / unit time spent today 20 minutes minutes. Greater than 50% of total time was spent with the patient and / or family counseling and / or coordination of care. Please note that the APS provides consultative services regarding pain management only. With the exception of ketamine and epidural infusions and except when indicated, final decisions regarding starting or changing doses of analgesic medications are at the discretion of the consulting service.     Kristal Duggan MD   Acute Pain Service No

## 2023-11-07 ENCOUNTER — ANESTHESIA (INPATIENT)
Dept: PERIOP | Facility: HOSPITAL | Age: 63
DRG: 464 | End: 2023-11-07
Payer: COMMERCIAL

## 2023-11-07 PROBLEM — R19.7 DIARRHEA: Status: RESOLVED | Noted: 2023-10-31 | Resolved: 2023-11-07

## 2023-11-07 LAB
ANION GAP SERPL CALCULATED.3IONS-SCNC: 5 MMOL/L
BASOPHILS # BLD AUTO: 0.09 THOUSANDS/ÂΜL (ref 0–0.1)
BASOPHILS NFR BLD AUTO: 1 % (ref 0–1)
BUN SERPL-MCNC: 10 MG/DL (ref 5–25)
CALCIUM SERPL-MCNC: 7.9 MG/DL (ref 8.4–10.2)
CHLORIDE SERPL-SCNC: 105 MMOL/L (ref 96–108)
CO2 SERPL-SCNC: 32 MMOL/L (ref 21–32)
CREAT SERPL-MCNC: 0.63 MG/DL (ref 0.6–1.3)
EOSINOPHIL # BLD AUTO: 0.37 THOUSAND/ÂΜL (ref 0–0.61)
EOSINOPHIL NFR BLD AUTO: 4 % (ref 0–6)
ERYTHROCYTE [DISTWIDTH] IN BLOOD BY AUTOMATED COUNT: 13 % (ref 11.6–15.1)
GFR SERPL CREATININE-BSD FRML MDRD: 97 ML/MIN/1.73SQ M
GLUCOSE SERPL-MCNC: 117 MG/DL (ref 65–140)
GLUCOSE SERPL-MCNC: 122 MG/DL (ref 65–140)
GLUCOSE SERPL-MCNC: 138 MG/DL (ref 65–140)
GLUCOSE SERPL-MCNC: 92 MG/DL (ref 65–140)
GLUCOSE SERPL-MCNC: 94 MG/DL (ref 65–140)
HCT VFR BLD AUTO: 36.4 % (ref 34.8–46.1)
HGB BLD-MCNC: 11.7 G/DL (ref 11.5–15.4)
IMM GRANULOCYTES # BLD AUTO: 0.08 THOUSAND/UL (ref 0–0.2)
IMM GRANULOCYTES NFR BLD AUTO: 1 % (ref 0–2)
LYMPHOCYTES # BLD AUTO: 4.3 THOUSANDS/ÂΜL (ref 0.6–4.47)
LYMPHOCYTES NFR BLD AUTO: 47 % (ref 14–44)
MAGNESIUM SERPL-MCNC: 1.7 MG/DL (ref 1.9–2.7)
MCH RBC QN AUTO: 31.5 PG (ref 26.8–34.3)
MCHC RBC AUTO-ENTMCNC: 32.1 G/DL (ref 31.4–37.4)
MCV RBC AUTO: 98 FL (ref 82–98)
MONOCYTES # BLD AUTO: 0.58 THOUSAND/ÂΜL (ref 0.17–1.22)
MONOCYTES NFR BLD AUTO: 6 % (ref 4–12)
NEUTROPHILS # BLD AUTO: 3.72 THOUSANDS/ÂΜL (ref 1.85–7.62)
NEUTS SEG NFR BLD AUTO: 41 % (ref 43–75)
NRBC BLD AUTO-RTO: 0 /100 WBCS
PHOSPHATE SERPL-MCNC: 3.8 MG/DL (ref 2.3–4.1)
PLATELET # BLD AUTO: 240 THOUSANDS/UL (ref 149–390)
PMV BLD AUTO: 10 FL (ref 8.9–12.7)
POTASSIUM SERPL-SCNC: 3.6 MMOL/L (ref 3.5–5.3)
RBC # BLD AUTO: 3.71 MILLION/UL (ref 3.81–5.12)
SODIUM SERPL-SCNC: 142 MMOL/L (ref 135–147)
WBC # BLD AUTO: 9.14 THOUSAND/UL (ref 4.31–10.16)

## 2023-11-07 PROCEDURE — 82948 REAGENT STRIP/BLOOD GLUCOSE: CPT

## 2023-11-07 PROCEDURE — 84100 ASSAY OF PHOSPHORUS: CPT | Performed by: SURGERY

## 2023-11-07 PROCEDURE — 99024 POSTOP FOLLOW-UP VISIT: CPT | Performed by: SURGERY

## 2023-11-07 PROCEDURE — 85025 COMPLETE CBC W/AUTO DIFF WBC: CPT | Performed by: SURGERY

## 2023-11-07 PROCEDURE — 83735 ASSAY OF MAGNESIUM: CPT | Performed by: SURGERY

## 2023-11-07 PROCEDURE — 0HRDX74 REPLACEMENT OF RIGHT LOWER ARM SKIN WITH AUTOLOGOUS TISSUE SUBSTITUTE, PARTIAL THICKNESS, EXTERNAL APPROACH: ICD-10-PCS | Performed by: SURGERY

## 2023-11-07 PROCEDURE — 15100 SPLT AGRFT T/A/L 1ST 100SQCM: CPT | Performed by: SURGERY

## 2023-11-07 PROCEDURE — 99024 POSTOP FOLLOW-UP VISIT: CPT | Performed by: EMERGENCY MEDICINE

## 2023-11-07 PROCEDURE — 0HBHXZZ EXCISION OF RIGHT UPPER LEG SKIN, EXTERNAL APPROACH: ICD-10-PCS | Performed by: SURGERY

## 2023-11-07 PROCEDURE — 80048 BASIC METABOLIC PNL TOTAL CA: CPT | Performed by: SURGERY

## 2023-11-07 PROCEDURE — 15101 SPLT AGRFT T/A/L EA ADDL 100: CPT | Performed by: SURGERY

## 2023-11-07 RX ORDER — DIPHENHYDRAMINE HYDROCHLORIDE 50 MG/ML
12.5 INJECTION INTRAMUSCULAR; INTRAVENOUS ONCE AS NEEDED
Status: DISCONTINUED | OUTPATIENT
Start: 2023-11-07 | End: 2023-11-07 | Stop reason: HOSPADM

## 2023-11-07 RX ORDER — EPHEDRINE SULFATE 50 MG/ML
INJECTION INTRAVENOUS AS NEEDED
Status: DISCONTINUED | OUTPATIENT
Start: 2023-11-07 | End: 2023-11-07

## 2023-11-07 RX ORDER — GLYCOPYRROLATE 0.2 MG/ML
INJECTION INTRAMUSCULAR; INTRAVENOUS AS NEEDED
Status: DISCONTINUED | OUTPATIENT
Start: 2023-11-07 | End: 2023-11-07

## 2023-11-07 RX ORDER — HYDROMORPHONE HCL/PF 1 MG/ML
0.5 SYRINGE (ML) INJECTION
Status: DISCONTINUED | OUTPATIENT
Start: 2023-11-07 | End: 2023-11-07 | Stop reason: HOSPADM

## 2023-11-07 RX ORDER — ALBUTEROL SULFATE 2.5 MG/3ML
2.5 SOLUTION RESPIRATORY (INHALATION) ONCE AS NEEDED
Status: DISCONTINUED | OUTPATIENT
Start: 2023-11-07 | End: 2023-11-07 | Stop reason: HOSPADM

## 2023-11-07 RX ORDER — MAGNESIUM SULFATE HEPTAHYDRATE 40 MG/ML
2 INJECTION, SOLUTION INTRAVENOUS ONCE
Status: COMPLETED | OUTPATIENT
Start: 2023-11-07 | End: 2023-11-07

## 2023-11-07 RX ORDER — MINERAL OIL
OIL (ML) MISCELLANEOUS AS NEEDED
Status: DISCONTINUED | OUTPATIENT
Start: 2023-11-07 | End: 2023-11-07 | Stop reason: HOSPADM

## 2023-11-07 RX ORDER — KETAMINE HCL IN NACL, ISO-OSM 100MG/10ML
SYRINGE (ML) INJECTION AS NEEDED
Status: DISCONTINUED | OUTPATIENT
Start: 2023-11-07 | End: 2023-11-07

## 2023-11-07 RX ORDER — MIDAZOLAM HYDROCHLORIDE 2 MG/2ML
INJECTION, SOLUTION INTRAMUSCULAR; INTRAVENOUS AS NEEDED
Status: DISCONTINUED | OUTPATIENT
Start: 2023-11-07 | End: 2023-11-07

## 2023-11-07 RX ORDER — INSULIN LISPRO 100 [IU]/ML
1-6 INJECTION, SOLUTION INTRAVENOUS; SUBCUTANEOUS
Status: DISCONTINUED | OUTPATIENT
Start: 2023-11-07 | End: 2023-11-08

## 2023-11-07 RX ORDER — DEXAMETHASONE SODIUM PHOSPHATE 10 MG/ML
INJECTION, SOLUTION INTRAMUSCULAR; INTRAVENOUS AS NEEDED
Status: DISCONTINUED | OUTPATIENT
Start: 2023-11-07 | End: 2023-11-07

## 2023-11-07 RX ORDER — ONDANSETRON 2 MG/ML
4 INJECTION INTRAMUSCULAR; INTRAVENOUS ONCE AS NEEDED
Status: DISCONTINUED | OUTPATIENT
Start: 2023-11-07 | End: 2023-11-07 | Stop reason: HOSPADM

## 2023-11-07 RX ORDER — SODIUM CHLORIDE, SODIUM LACTATE, POTASSIUM CHLORIDE, CALCIUM CHLORIDE 600; 310; 30; 20 MG/100ML; MG/100ML; MG/100ML; MG/100ML
INJECTION, SOLUTION INTRAVENOUS CONTINUOUS PRN
Status: DISCONTINUED | OUTPATIENT
Start: 2023-11-07 | End: 2023-11-07

## 2023-11-07 RX ORDER — CEFAZOLIN SODIUM 1 G/3ML
INJECTION, POWDER, FOR SOLUTION INTRAMUSCULAR; INTRAVENOUS AS NEEDED
Status: DISCONTINUED | OUTPATIENT
Start: 2023-11-07 | End: 2023-11-07

## 2023-11-07 RX ORDER — SENNOSIDES 8.6 MG
1 TABLET ORAL
Status: DISCONTINUED | OUTPATIENT
Start: 2023-11-07 | End: 2023-11-13 | Stop reason: HOSPADM

## 2023-11-07 RX ORDER — HYDROMORPHONE HCL/PF 1 MG/ML
SYRINGE (ML) INJECTION AS NEEDED
Status: DISCONTINUED | OUTPATIENT
Start: 2023-11-07 | End: 2023-11-07

## 2023-11-07 RX ORDER — LIDOCAINE HYDROCHLORIDE 10 MG/ML
INJECTION, SOLUTION EPIDURAL; INFILTRATION; INTRACAUDAL; PERINEURAL AS NEEDED
Status: DISCONTINUED | OUTPATIENT
Start: 2023-11-07 | End: 2023-11-07

## 2023-11-07 RX ORDER — PROPOFOL 10 MG/ML
INJECTION, EMULSION INTRAVENOUS AS NEEDED
Status: DISCONTINUED | OUTPATIENT
Start: 2023-11-07 | End: 2023-11-07

## 2023-11-07 RX ORDER — FENTANYL CITRATE 50 UG/ML
INJECTION, SOLUTION INTRAMUSCULAR; INTRAVENOUS AS NEEDED
Status: DISCONTINUED | OUTPATIENT
Start: 2023-11-07 | End: 2023-11-07

## 2023-11-07 RX ORDER — ONDANSETRON 2 MG/ML
INJECTION INTRAMUSCULAR; INTRAVENOUS AS NEEDED
Status: DISCONTINUED | OUTPATIENT
Start: 2023-11-07 | End: 2023-11-07

## 2023-11-07 RX ORDER — FENTANYL CITRATE/PF 50 MCG/ML
25 SYRINGE (ML) INJECTION
Status: DISCONTINUED | OUTPATIENT
Start: 2023-11-07 | End: 2023-11-07 | Stop reason: HOSPADM

## 2023-11-07 RX ADMIN — CEFAZOLIN 2000 MG: 1 INJECTION, POWDER, FOR SOLUTION INTRAMUSCULAR; INTRAVENOUS at 15:40

## 2023-11-07 RX ADMIN — METHOCARBAMOL 250 MG: 750 TABLET ORAL at 00:34

## 2023-11-07 RX ADMIN — POTASSIUM CHLORIDE, DEXTROSE MONOHYDRATE AND SODIUM CHLORIDE 125 ML/HR: 150; 5; 450 INJECTION, SOLUTION INTRAVENOUS at 08:31

## 2023-11-07 RX ADMIN — METHOCARBAMOL 250 MG: 750 TABLET ORAL at 18:33

## 2023-11-07 RX ADMIN — UMECLIDINIUM BROMIDE AND VILANTEROL TRIFENATATE 1 PUFF: 62.5; 25 POWDER RESPIRATORY (INHALATION) at 08:35

## 2023-11-07 RX ADMIN — ACETAMINOPHEN 975 MG: 325 TABLET, FILM COATED ORAL at 21:00

## 2023-11-07 RX ADMIN — PREGABALIN 50 MG: 50 CAPSULE ORAL at 21:00

## 2023-11-07 RX ADMIN — METHOCARBAMOL 250 MG: 750 TABLET ORAL at 11:25

## 2023-11-07 RX ADMIN — NICOTINE 21 MG: 21 PATCH, EXTENDED RELEASE TRANSDERMAL at 08:32

## 2023-11-07 RX ADMIN — ENOXAPARIN SODIUM 30 MG: 30 INJECTION SUBCUTANEOUS at 18:34

## 2023-11-07 RX ADMIN — SODIUM CHLORIDE, SODIUM LACTATE, POTASSIUM CHLORIDE, AND CALCIUM CHLORIDE: .6; .31; .03; .02 INJECTION, SOLUTION INTRAVENOUS at 15:31

## 2023-11-07 RX ADMIN — GLYCOPYRROLATE 0.2 MG: 0.2 INJECTION, SOLUTION INTRAMUSCULAR; INTRAVENOUS at 15:47

## 2023-11-07 RX ADMIN — EPHEDRINE SULFATE 10 MG: 50 INJECTION INTRAVENOUS at 15:46

## 2023-11-07 RX ADMIN — EPHEDRINE SULFATE 5 MG: 50 INJECTION INTRAVENOUS at 16:01

## 2023-11-07 RX ADMIN — SODIUM BICARBONATE: 84 INJECTION, SOLUTION INTRAVENOUS at 15:00

## 2023-11-07 RX ADMIN — LATANOPROST 1 DROP: 50 SOLUTION OPHTHALMIC at 21:02

## 2023-11-07 RX ADMIN — EPHEDRINE SULFATE 2.5 MG: 50 INJECTION INTRAVENOUS at 16:32

## 2023-11-07 RX ADMIN — HYDROMORPHONE HYDROCHLORIDE 0.5 MG: 1 INJECTION, SOLUTION INTRAMUSCULAR; INTRAVENOUS; SUBCUTANEOUS at 17:06

## 2023-11-07 RX ADMIN — FENTANYL CITRATE 25 MCG: 50 INJECTION, SOLUTION INTRAMUSCULAR; INTRAVENOUS at 16:55

## 2023-11-07 RX ADMIN — DEXAMETHASONE SODIUM PHOSPHATE 10 MG: 10 INJECTION, SOLUTION INTRAMUSCULAR; INTRAVENOUS at 15:47

## 2023-11-07 RX ADMIN — FENTANYL CITRATE 25 MCG: 50 INJECTION, SOLUTION INTRAMUSCULAR; INTRAVENOUS at 16:52

## 2023-11-07 RX ADMIN — EPHEDRINE SULFATE 5 MG: 50 INJECTION INTRAVENOUS at 16:13

## 2023-11-07 RX ADMIN — PREGABALIN 50 MG: 50 CAPSULE ORAL at 08:33

## 2023-11-07 RX ADMIN — HYDROMORPHONE HYDROCHLORIDE 4 MG: 2 TABLET ORAL at 22:55

## 2023-11-07 RX ADMIN — Medication 30 MG: at 15:32

## 2023-11-07 RX ADMIN — PROPOFOL 150 MG: 10 INJECTION, EMULSION INTRAVENOUS at 15:32

## 2023-11-07 RX ADMIN — METHOCARBAMOL 250 MG: 750 TABLET ORAL at 23:45

## 2023-11-07 RX ADMIN — METHOCARBAMOL 250 MG: 750 TABLET ORAL at 05:45

## 2023-11-07 RX ADMIN — MAGNESIUM SULFATE HEPTAHYDRATE 2 G: 40 INJECTION, SOLUTION INTRAVENOUS at 11:25

## 2023-11-07 RX ADMIN — GLYCOPYRROLATE 0.2 MG: 0.2 INJECTION, SOLUTION INTRAMUSCULAR; INTRAVENOUS at 15:32

## 2023-11-07 RX ADMIN — ACETAMINOPHEN 975 MG: 325 TABLET, FILM COATED ORAL at 05:45

## 2023-11-07 RX ADMIN — MIDAZOLAM 2 MG: 1 INJECTION INTRAMUSCULAR; INTRAVENOUS at 15:31

## 2023-11-07 RX ADMIN — CLONAZEPAM 0.5 MG: 0.5 TABLET ORAL at 21:00

## 2023-11-07 RX ADMIN — HYDROMORPHONE HYDROCHLORIDE 4 MG: 2 TABLET ORAL at 12:28

## 2023-11-07 RX ADMIN — HYDROMORPHONE HYDROCHLORIDE 4 MG: 2 TABLET ORAL at 00:34

## 2023-11-07 RX ADMIN — FENTANYL CITRATE 50 MCG: 50 INJECTION, SOLUTION INTRAMUSCULAR; INTRAVENOUS at 15:32

## 2023-11-07 RX ADMIN — ATORVASTATIN CALCIUM 80 MG: 80 TABLET, FILM COATED ORAL at 18:33

## 2023-11-07 RX ADMIN — EPHEDRINE SULFATE 2.5 MG: 50 INJECTION INTRAVENOUS at 16:21

## 2023-11-07 RX ADMIN — LIDOCAINE 5% 1 PATCH: 700 PATCH TOPICAL at 08:33

## 2023-11-07 RX ADMIN — LIDOCAINE HYDROCHLORIDE 50 MG: 10 INJECTION, SOLUTION EPIDURAL; INFILTRATION; INTRACAUDAL; PERINEURAL at 15:32

## 2023-11-07 RX ADMIN — ONDANSETRON 4 MG: 2 INJECTION INTRAMUSCULAR; INTRAVENOUS at 16:47

## 2023-11-07 RX ADMIN — POTASSIUM CHLORIDE, DEXTROSE MONOHYDRATE AND SODIUM CHLORIDE 125 ML/HR: 150; 5; 450 INJECTION, SOLUTION INTRAVENOUS at 00:30

## 2023-11-07 RX ADMIN — HYDROMORPHONE HYDROCHLORIDE 4 MG: 2 TABLET ORAL at 08:33

## 2023-11-07 NOTE — ASSESSMENT & PLAN NOTE
Lab Results   Component Value Date    HGBA1C 5.8 (H) 01/19/2023       Blood Sugar Average: Last 72 hrs:  (P) 122    - Diet controlled at home  - SSI with POCT glc

## 2023-11-07 NOTE — PLAN OF CARE
Problem: MUSCULOSKELETAL - ADULT  Goal: Maintain or return mobility to safest level of function  Description: INTERVENTIONS:  - Assess patient's ability to carry out ADLs; assess patient's baseline for ADL function and identify physical deficits which impact ability to perform ADLs (bathing, care of mouth/teeth, toileting, grooming, dressing, etc.)  - Assess/evaluate cause of self-care deficits   - Assess range of motion  - Assess patient's mobility  - Assess patient's need for assistive devices and provide as appropriate  - Encourage maximum independence but intervene and supervise when necessary  - Involve family in performance of ADLs  - Assess for home care needs following discharge   - Consider OT consult to assist with ADL evaluation and planning for discharge  - Provide patient education as appropriate  Outcome: Progressing  Goal: Maintain proper alignment of affected body part  Description: INTERVENTIONS:  - Support, maintain and protect limb and body alignment  - Provide patient/ family with appropriate education  Outcome: Progressing     Problem: PAIN - ADULT  Goal: Verbalizes/displays adequate comfort level or baseline comfort level  Description: Interventions:  - Encourage patient to monitor pain and request assistance  - Assess pain using appropriate pain scale  - Administer analgesics based on type and severity of pain and evaluate response  - Implement non-pharmacological measures as appropriate and evaluate response  - Consider cultural and social influences on pain and pain management  - Notify physician/advanced practitioner if interventions unsuccessful or patient reports new pain  Outcome: Progressing     Problem: INFECTION - ADULT  Goal: Absence or prevention of progression during hospitalization  Description: INTERVENTIONS:  - Assess and monitor for signs and symptoms of infection  - Monitor lab/diagnostic results  - Monitor all insertion sites, i.e. indwelling lines, tubes, and drains  - Monitor endotracheal if appropriate and nasal secretions for changes in amount and color  - [place] appropriate cooling/warming therapies per order  - Administer medications as ordered  - Instruct and encourage patient and family to use good hand hygiene technique  - Identify and instruct in appropriate isolation precautions for identified infection/condition  Outcome: Progressing     Problem: SAFETY ADULT  Goal: Patient will remain free of falls  Description: INTERVENTIONS:  - Educate patient/family on patient safety including physical limitations  - Instruct patient to call for assistance with activity   - Consult OT/PT to assist with strengthening/mobility   - Keep Call bell within reach  - Keep bed low and locked with side rails adjusted as appropriate  - Keep care items and personal belongings within reach  - Initiate and maintain comfort rounds  - Make Fall Risk Sign visible to staff  - Offer Toileting every 2 Hours, in advance of need  - Initiate/Maintain alarm  - Obtain necessary fall risk management equipment:   - Apply yellow socks and bracelet for high fall risk patients  - Consider moving patient to room near nurses station  Outcome: Progressing  Goal: Maintain or return to baseline ADL function  Description: INTERVENTIONS:  -  Assess patient's ability to carry out ADLs; assess patient's baseline for ADL function and identify physical deficits which impact ability to perform ADLs (bathing, care of mouth/teeth, toileting, grooming, dressing, etc.)  - Assess/evaluate cause of self-care deficits   - Assess range of motion  - Assess patient's mobility; develop plan if impaired  - Assess patient's need for assistive devices and provide as appropriate  - Encourage maximum independence but intervene and supervise when necessary  - Involve family in performance of ADLs  - Assess for home care needs following discharge   - Consider OT consult to assist with ADL evaluation and planning for discharge  - Provide patient education as appropriate  Outcome: Progressing  Goal: Maintains/Returns to pre admission functional level  Description: INTERVENTIONS:  - Perform BMAT or MOVE assessment daily.   - Set and communicate daily mobility goal to care team and patient/family/caregiver. - Collaborate with rehabilitation services on mobility goals if consulted  - Perform Range of Motion 3 times a day. - Reposition patient every 2 hours. - Dangle patient 3 times a day  - Stand patient 3 times a day  - Ambulate patient 3 times a day  - Out of bed to chair 3 times a day   - Out of bed for meals 3 times a day  - Out of bed for toileting  - Record patient progress and toleration of activity level   Outcome: Progressing     Problem: DISCHARGE PLANNING  Goal: Discharge to home or other facility with appropriate resources  Description: INTERVENTIONS:  - Identify barriers to discharge w/patient and caregiver  - Arrange for needed discharge resources and transportation as appropriate  - Identify discharge learning needs (meds, wound care, etc.)  - Arrange for interpretive services to assist at discharge as needed  - Refer to Case Management Department for coordinating discharge planning if the patient needs post-hospital services based on physician/advanced practitioner order or complex needs related to functional status, cognitive ability, or social support system  Outcome: Progressing     Problem: Knowledge Deficit  Goal: Patient/family/caregiver demonstrates understanding of disease process, treatment plan, medications, and discharge instructions  Description: Complete learning assessment and assess knowledge base.   Interventions:  - Provide teaching at level of understanding  - Provide teaching via preferred learning methods  Outcome: Progressing     Problem: MOBILITY - ADULT  Goal: Maintain or return to baseline ADL function  Description: INTERVENTIONS:  -  Assess patient's ability to carry out ADLs; assess patient's baseline for ADL function and identify physical deficits which impact ability to perform ADLs (bathing, care of mouth/teeth, toileting, grooming, dressing, etc.)  - Assess/evaluate cause of self-care deficits   - Assess range of motion  - Assess patient's mobility; develop plan if impaired  - Assess patient's need for assistive devices and provide as appropriate  - Encourage maximum independence but intervene and supervise when necessary  - Involve family in performance of ADLs  - Assess for home care needs following discharge   - Consider OT consult to assist with ADL evaluation and planning for discharge  - Provide patient education as appropriate  Outcome: Progressing  Goal: Maintains/Returns to pre admission functional level  Description: INTERVENTIONS:  - Perform BMAT or MOVE assessment daily.   - Set and communicate daily mobility goal to care team and patient/family/caregiver. - Collaborate with rehabilitation services on mobility goals if consulted  - Perform Range of Motion 3 times a day. - Reposition patient every 2 hours.   - Dangle patient 3 times a day  - Stand patient 3 times a day  - Ambulate patient 3 times a day  - Out of bed to chair 3 times a day   - Out of bed for meals 3 times a day  - Out of bed for toileting  - Record patient progress and toleration of activity level   Outcome: Progressing     Problem: Prexisting or High Potential for Compromised Skin Integrity  Goal: Skin integrity is maintained or improved  Description: INTERVENTIONS:  - Identify patients at risk for skin breakdown  - Assess and monitor skin integrity  - Assess and monitor nutrition and hydration status  - Monitor labs   - Assess for incontinence   - Turn and reposition patient  - Assist with mobility/ambulation  - Relieve pressure over bony prominences  - Avoid friction and shearing  - Provide appropriate hygiene as needed including keeping skin clean and dry  - Evaluate need for skin moisturizer/barrier cream  - Collaborate with interdisciplinary team   - Patient/family teaching  - Consider wound care consult   Outcome: Progressing     Problem: Nutrition/Hydration-ADULT  Goal: Nutrient/Hydration intake appropriate for improving, restoring or maintaining nutritional needs  Description: Monitor and assess patient's nutrition/hydration status for malnutrition. Collaborate with interdisciplinary team and initiate plan and interventions as ordered. Monitor patient's weight and dietary intake as ordered or per policy. Utilize nutrition screening tool and intervene as necessary. Determine patient's food preferences and provide high-protein, high-caloric foods as appropriate.      INTERVENTIONS:  - Monitor oral intake, urinary output, labs, and treatment plans  - Assess nutrition and hydration status and recommend course of action  - Evaluate amount of meals eaten  - Assist patient with eating if necessary   - Allow adequate time for meals  - Recommend/ encourage appropriate diets, oral nutritional supplements, and vitamin/mineral supplements  - Order, calculate, and assess calorie counts as needed  - Recommend, monitor, and adjust tube feedings and TPN/PPN based on assessed needs  - Assess need for intravenous fluids  - Provide specific nutrition/hydration education as appropriate  - Include patient/family/caregiver in decisions related to nutrition  Outcome: Progressing

## 2023-11-07 NOTE — ANESTHESIA POSTPROCEDURE EVALUATION
Post-Op Assessment Note    CV Status:  Stable  Pain Score: 5    Pain management: adequate     Mental Status:  Alert and awake   Hydration Status:  Euvolemic   PONV Controlled:  Controlled   Airway Patency:  Patent      Post Op Vitals Reviewed: Yes      Staff: CRNA   Comments: Pt awake, alert, able to maintain own airway, VSS, report to recovery RN        No notable events documented.     BP   106/82   Temp (!) 97.3 °F (36.3 °C) (11/07/23 1709)    Pulse 72 (11/07/23 1709)   Resp   14   SpO2   100% RA

## 2023-11-07 NOTE — ASSESSMENT & PLAN NOTE
- Previously had new onset loose stools worse with eating  - WBC WNL without fevers or abdominal pain  - Reports 11/7 diarrhea has resolved; senna restarted for bowel regimen while on opioid pain medications

## 2023-11-07 NOTE — ASSESSMENT & PLAN NOTE
Patient presented with crush injury to RUE  - Initial CK 5942 increased to 13,683, normalized postoperatively  - 10/27 RUE fasciotomy due to compartment syndrome, VAC placement  - 10/29 washout and partial closure   - 10/31 VAC change  - 11/2 VAC change  - 11/4 partial wx closure, Oasis placement, VAC  - 11/7 plan for OR, VAC takedown, psb skin graft   - Continue to monitor neurovascular status

## 2023-11-07 NOTE — PLAN OF CARE
Problem: MUSCULOSKELETAL - ADULT  Goal: Maintain or return mobility to safest level of function  Description: INTERVENTIONS:  - Assess patient's ability to carry out ADLs; assess patient's baseline for ADL function and identify physical deficits which impact ability to perform ADLs (bathing, care of mouth/teeth, toileting, grooming, dressing, etc.)  - Assess/evaluate cause of self-care deficits   - Assess range of motion  - Assess patient's mobility  - Assess patient's need for assistive devices and provide as appropriate  - Encourage maximum independence but intervene and supervise when necessary  - Involve family in performance of ADLs  - Assess for home care needs following discharge   - Consider OT consult to assist with ADL evaluation and planning for discharge  - Provide patient education as appropriate  Outcome: Progressing  Goal: Maintain proper alignment of affected body part  Description: INTERVENTIONS:  - Support, maintain and protect limb and body alignment  - Provide patient/ family with appropriate education  Outcome: Progressing     Problem: PAIN - ADULT  Goal: Verbalizes/displays adequate comfort level or baseline comfort level  Description: Interventions:  - Encourage patient to monitor pain and request assistance  - Assess pain using appropriate pain scale  - Administer analgesics based on type and severity of pain and evaluate response  - Implement non-pharmacological measures as appropriate and evaluate response  - Consider cultural and social influences on pain and pain management  - Notify physician/advanced practitioner if interventions unsuccessful or patient reports new pain  Outcome: Progressing     Problem: INFECTION - ADULT  Goal: Absence or prevention of progression during hospitalization  Description: INTERVENTIONS:  - Assess and monitor for signs and symptoms of infection  - Monitor lab/diagnostic results  - Monitor all insertion sites, i.e. indwelling lines, tubes, and drains  - Monitor endotracheal if appropriate and nasal secretions for changes in amount and color  - [place] appropriate cooling/warming therapies per order  - Administer medications as ordered  - Instruct and encourage patient and family to use good hand hygiene technique  - Identify and instruct in appropriate isolation precautions for identified infection/condition  Outcome: Progressing

## 2023-11-07 NOTE — OP NOTE
OPERATIVE REPORT  PATIENT NAME: Paul Barbosa    :  1960  MRN: [id number]  Pt Location: [facility] OR ROOM 07    SURGERY DATE: 2023    Surgeon(s) and Role:     * Monica Kerr MD - Primary     * Ray Johnson MD - Assisting     * Mike He. MD [place] - Assisting    Preop Diagnosis:  Fall, initial encounter [W19. XXXA]    Post-Op Diagnosis Codes:     * Fall, initial encounter [W19. XXXA]    Procedure(s):  Right - SKIN GRAFT SPLIT THICKNESS (STSG)  EXTREMITY    Specimen(s):  * No specimens in log *    Estimated Blood Loss:   Minimal    Drains:  * No LDAs found *    Anesthesia Type:   Choice    Operative Indications:  Fall, initial encounter [W19. XXXA]    Operative Findings:  Incomplete incorporation of [place] which was removed  Good underlying granulation tissue  Successful right upper extremity split-thickness skin graft, 15 x 7 cm, 0.14 depth, 2:1 mesh    Complications:   None    Procedure and Technique:    Patient was identified in preoperative holding and right upper extremity was marked. Patient was transferred to the operating table supine, secured with a safety strap. Anesthesia was induced and the airway was secured via LMA. Right upper extremity VAC was taken down, confirmed that the correct number of black VAC foam pieces were removed. Selected right thigh for donor site. Patient was prepped in the usual sterile fashion with Betadine for the right upper extremity which was prepped circumferentially, and chlorhexidine for the right thigh. Groin towel was used. Timeout was held per protocol. We turned our attention to the right upper extremity, Oasis was found to be incompletely incorporated. Oasis was removed revealing good underlying granulation tissue. Scant blunt debridement was performed with a lap sponge to remove slough, revealing good healthy bleeding granulation tissue. Direct pressure was applied with a lap and attention was turned to the right thigh.   We measured out an area on the anterior thigh of appropriate size for the skin graft and applied mineral oil lubricant. We checked that the Fauzia was set up and working properly. We selected a 2 inch width size plate and set the dep to 0. 14. Donor skin was successfully taken in one confluent piece. This was brought out on the plastic mesher plate, confirmed that the dermis  was down and the epidermis was up. Confirmed that the mesher was set up with a 2: 1 blade. Successfully meshed the donor skin. We brought the donor graft to the right upper extremity. Secured the donor skin graft to the recipient site, confirming that the dermis was down to the epidermis was facing out. 3-0 chromic's were used in interrupted fashion and to secure the graft. We noted that the width of the skin graft was slightly less than the width of the wound bed. Excess donor skin from the corners of the graft were trimmed with Metzenbaum scissors and used to cover the final area of wound bed there was not covered by the primary graft. This piece was also secured with 3-0 chromic. Final wound dimensions were 15 x 7 cm, depth to level of muscle/fascia, depth approximately 0.7cm. Hemostasis was achieved with direct pressure. No electrocautery was used. We cut 1 piece of Adaptic to size and placed over the wound bed. We got 1 piece of black VAC sponge to size and placed over the Adaptic. Prep to the periwound skin with Betadine and applied VAC drape. Confirmed the the [facility] had good seal.  Confirmed the patient's right extremity radial pulse was +2. Dressed anterior right thigh donor site with Xeroform, gauze soaked with epinephrine, and Tegaderm. Hemostasis was excellent. Sterile field was broken down, right upper extremity was wrapped with Kerlix and Ace wrap. Patient emerged from anesthesia and LMA was removed uneventfully. Patient was accompanied to PACU in good condition.   In person signout was given to the trauma team.    Dr. Nati Moses and I were present and scrubbed for the entire procedure. Dr. Gabrielle Florez was present for all critical portions of this procedure.     Patient Disposition:  PACU         SIGNATURE: Marissa Bustamante MD  DATE: November 7, 2023  TIME: 5:04 PM

## 2023-11-07 NOTE — PROGRESS NOTES
Progress Note - General Surgery   Mariah Woodall 65 y.o. female MRN: [id number]  Unit/Bed#: [facility] 622-01 Encounter: [id number]    Assessment:  64-year-old female initially presenting with crush injury to R UE s/p R UE fasciotomy due to compartment syndrome with application of VAC dressing on 10/27   -10/29 washout and partial closure  - 10/31 VAC change  - 11/2 VAC change  - 11/4 partial closure, Oasis graft, VAC change    Plan:  -Tentatively planning for OR with application of [place] today  -Analgesia and antiemetic as needed  -N.p.o. for procedure; diet as tolerated afterwards  -Monitor labs and hemodynamics    Subjective/Objective   Chief Complaint:     Subjective: Patient seen and examined. NAEO. No acute complaints at this time. Patient states that she is ready for procedure today. Objective:     Blood pressure 109/69, pulse 59, temperature 97.7 °F (36.5 °C), resp. rate 16, height 5' 4" (1.626 m), weight 80.7 kg (178 lb), SpO2 98 %. ,Body mass index is 30.55 kg/m².       Intake/Output Summary (Last 24 hours) at 11/7/2023 9142  Last data filed at 11/7/2023 0830  Gross per 24 hour   Intake 1000 ml   Output 100 ml   Net 900 ml       Invasive Devices       Peripheral Intravenous Line  Duration             Peripheral IV 11/05/23 Left;Ventral (anterior) Forearm 1 day                    Physical Exam:   General - no acute distress, responsive and cooperative  CV - warm, regular rate  Pulm - normal work of breathing, no respiratory distress  Abd - soft, nondistended, nontender; no palpable masses; no guarding or rebound; appears non-peritoneal on exam   Neuro - m/s grossly intact, cn grossly intact  Ext -VAC dressing on RUE with good seal

## 2023-11-07 NOTE — ANESTHESIA PREPROCEDURE EVALUATION
EF60%    Procedure:  SKIN GRAFT SPLIT THICKNESS (STSG)  EXTREMITY (Arm)  Anes wnl  Relevant Problems   CARDIO   (+) Hypertension   (+) Hypertriglyceridemia   (+) Migraine   (+) Other hyperlipidemia      ENDO   (+) Hypothyroid   (+) Type 2 diabetes mellitus (HCC)      GI/HEPATIC   (+) Gastroesophageal reflux disease   (+) Ileus (HCC)      MUSCULOSKELETAL   (+) Cervical spondylosis   (+) Fibromyalgia, primary   (+) Lumbar degenerative disc disease   (+) Lumbar spondylosis   (+) Mid back pain   (+) Sacroiliitis (HCC)      NEURO/PSYCH   (+) Anxiety   (+) Chronic pain   (+) Chronic pain syndrome   (+) Depression   (+) Diabetic polyneuropathy associated with type 2 diabetes mellitus (HCC)   (+) Fibromyalgia, primary   (+) Migraine      PULMONARY   (+) COPD (chronic obstructive pulmonary disease) ([facility])   (+) Shortness of breath        Physical Exam    Airway    Mallampati score: II  TM Distance: >3 FB  Neck ROM: full     Dental    upper dentures and lower dentures    Cardiovascular  Rate: normal    Pulmonary  Pulmonary exam normal     Other Findings        Anesthesia Plan  ASA Score- 3     Anesthesia Type- general with ASA Monitors. Additional Monitors:     Airway Plan: ETT and LMA. Plan Factors-Exercise tolerance (METS): >4 METS. Chart reviewed. Patient summary reviewed. Patient is not a current smoker. Induction- intravenous. Postoperative Plan- Plan for postoperative opioid use. Informed Consent- Anesthetic plan and risks discussed with patient. I personally reviewed this patient with the CRNA. Discussed and agreed on the Anesthesia Plan with the CRNA. Thalia Pulido

## 2023-11-07 NOTE — PROGRESS NOTES
[address] [facility]  Progress Note  Name: Diana Carter  MRN: [id number]  Unit/Bed#: [facility] 187-46 I Date of Admission: 10/27/2023   Date of Service: 11/7/2023 I Hospital Day: 11    Assessment/Plan   * Compartment syndrome [facility])  Assessment & Plan  Patient presented with crush injury to RUE  - Initial CK 5942 increased to 13,683, normalized postoperatively  - 10/27 RUE fasciotomy due to compartment syndrome, VAC placement  - 10/29 washout and partial closure   - 10/31 VAC change  - 11/2 VAC change  - 11/4 partial wx closure, Oasis placement, VAC  - 11/7 plan for OR, VAC takedown, psb skin graft   - Continue to monitor neurovascular status    Type 2 diabetes mellitus ([address])  Assessment & Plan  Lab Results   Component Value Date    HGBA1C 5.8 (H) 01/19/2023       Blood Sugar Average: Last 72 hrs:  (P) 122    - Diet controlled at home  - SSI with POCT glc    Fall  Assessment & Plan  Patient had unwitnessed fall.  + Lightheadedness, CP  - Syncopal workup: EKG Unremarkable, Troponin Negative, Echo unremarkable  - PT/OT evaluation and treatment as indicated    Chronic pain  Assessment & Plan  Patient with chronic patient Fibromyalgia, chronic LLB pain, chronic pain syndrome, peripheral neuropathy  - APS consult appreciated    - S/p right infraclavicular block with Exparel on 10/27    - weaned off ketamine 11/2   - continue to wean pain meds  - Continue multimodal pain regimen    Hypothyroid  Assessment & Plan  - Continue home levothyroxine    Diarrhea-resolved as of 11/7/2023  Assessment & Plan  - Previously had new onset loose stools worse with eating  - WBC WNL without fevers or abdominal pain  - Reports 11/7 diarrhea has resolved; senna restarted for bowel regimen while on opioid pain medications         Bowel Regimen: Scheduled senna, PRN dulcolax suppository   VTE Prophylaxis:Enoxaparin (Lovenox)     Disposition: Accepted for [facility]    Subjective   Chief Complaint: Right arm pain, status post fall, compartment syndrome, fasciotomy    Subjective: Endorsing controlled pain in right forearm. Reports swelling of right arm, feels it has improved. Patient denies nausea, vomiting, fever, chills, shortness of breath, chest pain. Reports diarrhea has resolved, voiding. Has been n.p.o. for the OR today. Objective   Vitals:   Temp:  [97.7 °F (36.5 °C)-98.4 °F (36.9 °C)] 97.7 °F (36.5 °C)  HR:  [59-67] 59  Resp:  [16-18] 16  BP: (109-112)/(69-74) 109/69    I/O         11/05 0701 11/06 0700 11/06 0701 11/07 0700 11/07 0701 11/08 0700    P. O. 345  0    I.V. (mL/kg)  660.4 (8.2) 339.6 (4.2)    Total Intake(mL/kg) 345 (4.3) 660.4 (8.2) 339.6 (4.2)    Urine (mL/kg/hr) 0 (0) 0 (0)     Drains 100 100     Stool 0 0     Total Output 100 100     Net +245 +560.4 +339.6           Unmeasured Urine Occurrence 5 x 2 x 2 x    Unmeasured Stool Occurrence 0 x 1 x 0 x           Physical Exam:   Gen: NAD, Resting in bed  Neuro: A&O, No focal deficits  Head: Normal Cephalic, Atraumatic  Eye: EOMI, No scleral icterus  CV: Regular rate, Cap refill <2 sec  Pulm: Normal work of breathing, No respiratory distress  Abd: Soft, Non-Distended, Non-Tender   Ext: No edema bilateral lower extremities, Non-tender. Right upper extremity with wound VAC in place. Good seal on VAC, serosanguineous in canister. Edema noted right upper extremity, nontender, soft.   Skin: Warm, Dry, Intact    Invasive Devices       Peripheral Intravenous Line  Duration             Peripheral IV 11/05/23 Left;Ventral (anterior) Forearm 1 day                     Lab Results:   Recent Labs     11/05/23  0829 11/07/23  0449   WBC  --  9.14   HGB  --  11.7   PLT  --  240   SODIUM 141 142   K 3.3* 3.6    105   CO2 30 32   BUN 12 10   CREATININE 0.59* 0.63   GLUC 91 117   CALCIUM 7.8* 7.9*   MG 1.7* 1.7*   PHOS 3.9 3.8       ---  Bandar Blanco MD  General Surgery PGY-I

## 2023-11-07 NOTE — ASSESSMENT & PLAN NOTE
Patient had unwitnessed fall.  + Lightheadedness, CP  - Syncopal workup: EKG Unremarkable, Troponin Negative, Echo unremarkable  - PT/OT evaluation and treatment as indicated

## 2023-11-08 LAB
ANION GAP SERPL CALCULATED.3IONS-SCNC: 4 MMOL/L
BASOPHILS # BLD AUTO: 0.04 THOUSANDS/ÂΜL (ref 0–0.1)
BASOPHILS NFR BLD AUTO: 0 % (ref 0–1)
BUN SERPL-MCNC: 11 MG/DL (ref 5–25)
CALCIUM SERPL-MCNC: 8.3 MG/DL (ref 8.4–10.2)
CHLORIDE SERPL-SCNC: 104 MMOL/L (ref 96–108)
CO2 SERPL-SCNC: 30 MMOL/L (ref 21–32)
CREAT SERPL-MCNC: 0.62 MG/DL (ref 0.6–1.3)
EOSINOPHIL # BLD AUTO: 0.01 THOUSAND/ÂΜL (ref 0–0.61)
EOSINOPHIL NFR BLD AUTO: 0 % (ref 0–6)
ERYTHROCYTE [DISTWIDTH] IN BLOOD BY AUTOMATED COUNT: 12.9 % (ref 11.6–15.1)
GFR SERPL CREATININE-BSD FRML MDRD: 97 ML/MIN/1.73SQ M
GLUCOSE SERPL-MCNC: 105 MG/DL (ref 65–140)
GLUCOSE SERPL-MCNC: 106 MG/DL (ref 65–140)
GLUCOSE SERPL-MCNC: 108 MG/DL (ref 65–140)
HCT VFR BLD AUTO: 37.7 % (ref 34.8–46.1)
HGB BLD-MCNC: 12.7 G/DL (ref 11.5–15.4)
IMM GRANULOCYTES # BLD AUTO: 0.09 THOUSAND/UL (ref 0–0.2)
IMM GRANULOCYTES NFR BLD AUTO: 1 % (ref 0–2)
LYMPHOCYTES # BLD AUTO: 2.57 THOUSANDS/ÂΜL (ref 0.6–4.47)
LYMPHOCYTES NFR BLD AUTO: 22 % (ref 14–44)
MCH RBC QN AUTO: 32.2 PG (ref 26.8–34.3)
MCHC RBC AUTO-ENTMCNC: 33.7 G/DL (ref 31.4–37.4)
MCV RBC AUTO: 96 FL (ref 82–98)
MONOCYTES # BLD AUTO: 0.56 THOUSAND/ÂΜL (ref 0.17–1.22)
MONOCYTES NFR BLD AUTO: 5 % (ref 4–12)
NEUTROPHILS # BLD AUTO: 8.18 THOUSANDS/ÂΜL (ref 1.85–7.62)
NEUTS SEG NFR BLD AUTO: 72 % (ref 43–75)
NRBC BLD AUTO-RTO: 0 /100 WBCS
PLATELET # BLD AUTO: 304 THOUSANDS/UL (ref 149–390)
PMV BLD AUTO: 9.9 FL (ref 8.9–12.7)
POTASSIUM SERPL-SCNC: 4.4 MMOL/L (ref 3.5–5.3)
RBC # BLD AUTO: 3.94 MILLION/UL (ref 3.81–5.12)
SODIUM SERPL-SCNC: 138 MMOL/L (ref 135–147)
WBC # BLD AUTO: 11.45 THOUSAND/UL (ref 4.31–10.16)

## 2023-11-08 PROCEDURE — 97116 GAIT TRAINING THERAPY: CPT

## 2023-11-08 PROCEDURE — 99024 POSTOP FOLLOW-UP VISIT: CPT | Performed by: SURGERY

## 2023-11-08 PROCEDURE — 97535 SELF CARE MNGMENT TRAINING: CPT

## 2023-11-08 PROCEDURE — 97530 THERAPEUTIC ACTIVITIES: CPT

## 2023-11-08 PROCEDURE — 82948 REAGENT STRIP/BLOOD GLUCOSE: CPT

## 2023-11-08 PROCEDURE — 80048 BASIC METABOLIC PNL TOTAL CA: CPT

## 2023-11-08 PROCEDURE — 85025 COMPLETE CBC W/AUTO DIFF WBC: CPT

## 2023-11-08 PROCEDURE — 99024 POSTOP FOLLOW-UP VISIT: CPT | Performed by: EMERGENCY MEDICINE

## 2023-11-08 RX ORDER — HYDROMORPHONE HCL/PF 1 MG/ML
0.5 SYRINGE (ML) INJECTION ONCE
Status: COMPLETED | OUTPATIENT
Start: 2023-11-08 | End: 2023-11-08

## 2023-11-08 RX ADMIN — HYDROMORPHONE HYDROCHLORIDE 4 MG: 2 TABLET ORAL at 02:59

## 2023-11-08 RX ADMIN — SENNOSIDES 8.6 MG: 8.6 TABLET, FILM COATED ORAL at 22:07

## 2023-11-08 RX ADMIN — LEVOTHYROXINE SODIUM 137 MCG: 25 TABLET ORAL at 05:34

## 2023-11-08 RX ADMIN — HYDROMORPHONE HYDROCHLORIDE 4 MG: 2 TABLET ORAL at 06:39

## 2023-11-08 RX ADMIN — HYDROMORPHONE HYDROCHLORIDE 4 MG: 2 TABLET ORAL at 15:40

## 2023-11-08 RX ADMIN — PREGABALIN 50 MG: 50 CAPSULE ORAL at 15:40

## 2023-11-08 RX ADMIN — ATORVASTATIN CALCIUM 80 MG: 80 TABLET, FILM COATED ORAL at 17:06

## 2023-11-08 RX ADMIN — PREGABALIN 50 MG: 50 CAPSULE ORAL at 08:53

## 2023-11-08 RX ADMIN — ACETAMINOPHEN 975 MG: 325 TABLET, FILM COATED ORAL at 22:07

## 2023-11-08 RX ADMIN — LATANOPROST 1 DROP: 50 SOLUTION OPHTHALMIC at 22:09

## 2023-11-08 RX ADMIN — ENOXAPARIN SODIUM 30 MG: 30 INJECTION SUBCUTANEOUS at 17:06

## 2023-11-08 RX ADMIN — HYDROMORPHONE HYDROCHLORIDE 4 MG: 2 TABLET ORAL at 10:18

## 2023-11-08 RX ADMIN — ACETAMINOPHEN 975 MG: 325 TABLET, FILM COATED ORAL at 12:56

## 2023-11-08 RX ADMIN — ENOXAPARIN SODIUM 30 MG: 30 INJECTION SUBCUTANEOUS at 05:35

## 2023-11-08 RX ADMIN — METHOCARBAMOL 250 MG: 750 TABLET ORAL at 05:34

## 2023-11-08 RX ADMIN — HYDROMORPHONE HYDROCHLORIDE 4 MG: 2 TABLET ORAL at 20:36

## 2023-11-08 RX ADMIN — UMECLIDINIUM BROMIDE AND VILANTEROL TRIFENATATE 1 PUFF: 62.5; 25 POWDER RESPIRATORY (INHALATION) at 08:54

## 2023-11-08 RX ADMIN — ACETAMINOPHEN 975 MG: 325 TABLET, FILM COATED ORAL at 05:34

## 2023-11-08 RX ADMIN — LIDOCAINE 5% 1 PATCH: 700 PATCH TOPICAL at 08:52

## 2023-11-08 RX ADMIN — METHOCARBAMOL 250 MG: 750 TABLET ORAL at 12:56

## 2023-11-08 RX ADMIN — HYDROMORPHONE HYDROCHLORIDE 0.5 MG: 1 INJECTION, SOLUTION INTRAMUSCULAR; INTRAVENOUS; SUBCUTANEOUS at 23:34

## 2023-11-08 RX ADMIN — METHOCARBAMOL 250 MG: 750 TABLET ORAL at 23:17

## 2023-11-08 RX ADMIN — NICOTINE 21 MG: 21 PATCH, EXTENDED RELEASE TRANSDERMAL at 08:52

## 2023-11-08 RX ADMIN — CLONAZEPAM 0.5 MG: 0.5 TABLET ORAL at 22:07

## 2023-11-08 RX ADMIN — PREGABALIN 50 MG: 50 CAPSULE ORAL at 22:08

## 2023-11-08 RX ADMIN — METHOCARBAMOL 250 MG: 750 TABLET ORAL at 17:06

## 2023-11-08 NOTE — QUICK NOTE
Post-Op Assessment     Subjective  Patient reports feeling fine. She states her pain has not been too bad postop as long as her arm is not being manipulated, and the pain medications have been working well. Objective  General appearance: No acute distress  Neuro: Awake and alert  Cardiac: RRR  Pulmonary: CTAB  Abdomen: Nondistended, nontender to palpation  MSK: RUE wound VAC in place and wrapped, finger flexion/extension 5/5 throughout all fingers, light touch sensation subjectively decreased in fifth finger. Hand warm and well-perfused.   Skin: Warm and dry    Vitals:    11/07/23 1900   BP: 116/74   Pulse: 66   Resp: 18   Temp: 97.9 °F (36.6 °C)   SpO2: 96%     Assessment and plan  -Neurovascular checks q4h  -Wound VAC management  -Surgery recs

## 2023-11-08 NOTE — ASSESSMENT & PLAN NOTE
Lab Results   Component Value Date    HGBA1C 5.8 (H) 01/19/2023       Blood Sugar Average: Last 72 hrs:  (P) 110    - Diet controlled at home  - Glucose well controlled inpatient, no SSI needed

## 2023-11-08 NOTE — OCCUPATIONAL THERAPY NOTE
Occupational Therapy Progress Note     Patient Name: Javon Phillips  CPOUK'B Date: 11/8/2023  Problem List  Principal Problem:    Compartment syndrome [facility])  Active Problems:    Hypothyroid    Chronic pain    Fall    Type 2 diabetes mellitus [facility])         Occupational Therapy Treatment Note     11/08/23 1216   OT Last Visit   OT Visit Date 11/08/23   Note Type   Note Type Treatment   Pain Assessment   Pain Assessment Tool 0-10   Pain Score 8   Restrictions/Precautions   Weight Bearing Precautions Per Order Yes   RUE Weight Bearing Per Order NWB   Other Precautions Pain; Fall Risk;Bed Alarm;Multiple lines   Lifestyle   Autonomy PTA, pt reports needing A with ADLs/IADLs, SPC for fnxl mobility, (+)    Reciprocal Relationships HHA 40 hrs/wk, meals on wheels, supportive family   Service to Others On disability   Intrinsic Gratification Singing, spending time with her 2 twin cats   ADL   Grooming Assistance 5  Supervision/Setup   UB Bathing Assistance 4  Minimal Assistance   LB Bathing Assistance 4  Minimal Assistance   UB Dressing Assistance 4  Minimal Assistance   LB [address]/Moustapha Kessler Final 3  Moderate Assistance   Toileting Assistance  5  Supervision/Setup   Toileting Deficit Perineal hygiene;Clothing management down;Clothing management up   Bed Mobility   Supine to Sit 5  Supervision   Additional items Assist x 1   Sit to Supine 5  Supervision   Additional items Assist x 1   Transfers   Sit to Stand 4  Minimal assistance   Additional items Assist x 1   Stand to Sit 5  Supervision   Additional items Assist x 1   Toilet Transfers   Toilet Transfer From Bed   Toilet Transfer Type To and from   Toilet Transfer to Standard toilet   Toilet Transfer Technique Ambulating   Toilet Transfers Minimal assistance   Subjective   Subjective pt stated "I want to walk"   Cognition   Overall Cognitive Status [facility]   Arousal/Participation Alert; Cooperative   Attention Attends with cues to redirect   Orientation Level Oriented X4 Memory Decreased short term memory   Following Commands Follows one step commands without difficulty   Activity Tolerance   Activity Tolerance Patient tolerated treatment well   Assessment   Assessment Pt seen for Occupational Therapy session with focus on activity tolerance, bed mob, functional transfers/mob, standing tolerance and balance for pt engagement in toileting/toilet transfers and pt  IADL tasks. Pt cleared by RN/ Power Rodriguez for pt participated in OT session. Pt presented supine/HOB raised pt awake/alert and agreeable to participate in therapy following  pt identifiers confirmed. Pt reported her therapy goal to get out of bed to take a walk. Pt reported that she enjoys taking care of her plants on her porch at home. Pt required assist for bed mob, functional transfers/mob with a SPC and assist to complete toilet transfers and water the unit's plants 2* pt limited by NWB restrictions to her R UE. She was able to tolerate walking on floor unit hallway for participation in watering plants. Pt would benefit from post acute rehab service to continue to address these above noted pt deficit which currently impair pt ADL and functional mob. Pt return to bed post session bed alarm activated and all needs within reach.    Plan   Treatment Interventions ADL retraining   Goal Expiration Date 11/11/23   OT Treatment Day 3   OT Frequency 2-3x/wk   Discharge Recommendation   Rehab Resource Intensity Level, OT II (Moderate Resource Intensity)   AM-PAC Daily Activity Inpatient   Lower Body Dressing 3   Bathing 3   Toileting 3   Upper Body Dressing 3   Grooming 3   Eating 4   Daily Activity Raw Score 19   Daily Activity Standardized Score (Calc for Raw Score >=11) 40.22   AM-PAC Applied Cognition Inpatient   Following a Speech/Presentation 4   Understanding Ordinary Conversation 4   Taking Medications 4   Remembering Where Things Are Placed or Put Away 4   Remembering List of 4-5 Errands 3   Taking Care of Complicated Tasks 3 Applied Cognition Raw Score 22   Applied Cognition Standardized Score 47.83   Barthel Index   Feeding 10   Bathing 0   Grooming Score 0   Dressing Score 5   Bladder Score 10   Bowels Score 10   Toilet Use Score 5   Transfers (Bed/Chair) Score 10   Mobility (Level Surface) Score 0   Stairs Score 0   Barthel Index Score 50       Blake ANDERSON

## 2023-11-08 NOTE — PLAN OF CARE
Problem: PHYSICAL THERAPY ADULT  Goal: Performs mobility at highest level of function for planned discharge setting. See evaluation for individualized goals. Description: Treatment/Interventions: Elevations, Functional transfer training, LE strengthening/ROM, Therapeutic exercise, Endurance training, Equipment eval/education, Bed mobility, Gait training, Spoke to nursing, OT  Equipment Recommended: Other (Comment) (TBD)       See flowsheet documentation for full assessment, interventions and recommendations. Outcome: Progressing  Note: Prognosis: Good  Problem List: Decreased strength, Decreased range of motion, Decreased endurance, Impaired balance, Decreased skin integrity, Pain  Assessment: The patient is progressing well as she is ambulating near community distances. She does require increased time, and she is able to demonstrate and explain her safety considerations. She does remain limited due to pain, but she was able to perform all mobility without any losses of balance. She did have some difficulty navigating turns, but she did so with increased time. She will benefit from continued therapies in order to maximize her functional independence and safety. Barriers to Discharge: Inaccessible home environment     Rehab Resource Intensity Level, PT: III (Minimum Resource Intensity)    See flowsheet documentation for full assessment.

## 2023-11-08 NOTE — QUICK NOTE
Post Op Check Note - Surgery Resident  Reji Woodall 65 y.o. female MRN: [id number]  Unit/Bed#: [facility] 622-01 Encounter: [id number]    ASSESSMENT:  Khloe Zaragoza is a 65 y.o. female who is status post R forearm STSG    Subjective: Endorses some right upper extremity pain, endorses some stable L 5th digit numbness that she states is ongoing (not new)    Physical Exam:  GEN: NAD  CV: RRR  Lung: Normal effort  Ab: Soft, NT/ND  Neuro: A+Ox3  Incisions: Vac sucked down, no appreciable output in vac tubing, no vac leak    RUE finger movement intact, fingers warm/pink, light touch sensation intact except for 5th finger no light touch sensation which patient states is ongoing since her injury, and stable    Blood pressure 116/74, pulse 66, temperature 97.9 °F (36.6 °C), resp. rate 18, height 5' 4" (1.626 m), weight 80.7 kg (178 lb), SpO2 96 %. ,Body mass index is 30.55 kg/m².       Intake/Output Summary (Last 24 hours) at 11/7/2023 2029  Last data filed at 11/7/2023 1655  Gross per 24 hour   Intake 1800 ml   Output 75 ml   Net 1725 ml       Invasive Devices       Peripheral Intravenous Line  Duration             Peripheral IV 11/05/23 Left;Ventral (anterior) Forearm 1 day

## 2023-11-08 NOTE — PLAN OF CARE
Problem: INFECTION - ADULT  Goal: Absence or prevention of progression during hospitalization  Description: INTERVENTIONS:  - Assess and monitor for signs and symptoms of infection  - Monitor lab/diagnostic results  - Monitor all insertion sites, i.e. indwelling lines, tubes, and drains  - Monitor endotracheal if appropriate and nasal secretions for changes in amount and color  - [place] appropriate cooling/warming therapies per order  - Administer medications as ordered  - Instruct and encourage patient and family to use good hand hygiene technique  - Identify and instruct in appropriate isolation precautions for identified infection/condition  Outcome: Progressing

## 2023-11-08 NOTE — PROGRESS NOTES
[address] [place] [address]  Progress Note  Name: Marcus Denny  MRN: [id number]  Unit/Bed#: [facility] 503-88 I Date of Admission: 10/27/2023   Date of Service: 11/8/2023 I Hospital Day: 12    Assessment/Plan   * Compartment syndrome [facility])  Assessment & Plan  Patient presented with crush injury to RUE  - Initial CK 5942 increased to 13,683, normalized postoperatively  - 10/27 RUE fasciotomy due to compartment syndrome, VAC placement  - 10/29 washout and partial closure   - 10/31 VAC change  - 11/2 VAC change  - 11/4 partial wx closure, Oasis placement, VAC  - 11/7 VAC takedown, STSG, VAC replacement   - Plan for VAC takedown POD 4, 11/11  - Continue to monitor neurovascular status    Type 2 diabetes mellitus ([address])  Assessment & Plan  Lab Results   Component Value Date    HGBA1C 5.8 (H) 01/19/2023       Blood Sugar Average: Last 72 hrs:  (P) 110    - Diet controlled at home  - Glucose well controlled inpatient, no SSI needed    Fall  Assessment & Plan  Patient had unwitnessed fall.  + Lightheadedness, CP  - Syncopal workup: EKG Unremarkable, Troponin Negative, Echo unremarkable  - PT/OT evaluation and treatment as indicated    Chronic pain  Assessment & Plan  Patient with chronic patient Fibromyalgia, chronic LLB pain, chronic pain syndrome, peripheral neuropathy  - APS consult appreciated    - S/p right infraclavicular block with Exparel on 10/27    - Weaned off ketamine 11/2   - Continue to wean pain meds   - Discharge recommendations noted  - Continue multimodal pain regimen    Hypothyroid  Assessment & Plan  - Continue home levothyroxine      Bowel Regimen: Senna, Dulcolax suppository as needed  VTE Prophylaxis:Enoxaparin (Lovenox)     Disposition: Accepted for [facility] with [facility]     Subjective   Chief Complaint: Right arm pain    Subjective: Patient endorsing pain in right forearm at site of skin graft. Relieved by pain medications. Tolerating p.o. intake.  Patient denies nausea, vomiting, fever, chills, shortness of breath, chest pain. Objective   Vitals:   Temp:  [97.3 °F (36.3 °C)-98.1 °F (36.7 °C)] 98.1 °F (36.7 °C)  HR:  [61-81] 69  Resp:  [16-20] 16  BP: (100-119)/(56-82) 106/69    I/O         11/06 0701 11/07 0700 11/07 0701 11/08 0700 11/08 0701 11/09 0700    P. O.  200 940    I.V. (mL/kg) 660.4 (8.2) 1139.6 (14.1)     Total Intake(mL/kg) 660.4 (8.2) 1339.6 (16.6) 940 (11.6)    Urine (mL/kg/hr) 0 (0) 900 (0.5) 750 (1.8)    Drains 100 25     Stool 0 0 0    Total Output 100 925 750    Net +560.4 +414.6 +190           Unmeasured Urine Occurrence 2 x 4 x 1 x    Unmeasured Stool Occurrence 1 x 0 x 0 x             Physical Exam:   Gen: NAD, Resting in bed  Neuro: A&O  Head: Normal Cephalic, Atraumatic  Eye: EOMI, No scleral icterus  CV: Regular rate, Cap refill <2 sec  Pulm: Normal work of breathing, No respiratory distress  Abd: Soft, Non-Distended, Non-Tender   Ext: No edema bilateral lower extremities, Non-tender  Right upper extremity with VAC, wrapped with Kerlix and ACE. VAC with good seal, serosanguineous output. Strength intact bilateral upper extremities. Decreased sensation in right fifth digit.   Skin: Warm, Dry, Intact      Invasive Devices       Peripheral Intravenous Line  Duration             Peripheral IV 11/05/23 Left;Ventral (anterior) Forearm 2 days                          Lab Results:   Recent Labs     11/07/23  0449 11/08/23  0757   WBC 9.14 11.45*   HGB 11.7 12.7    304   SODIUM 142 138   K 3.6 4.4    104   CO2 32 30   BUN 10 11   CREATININE 0.63 0.62   GLUC 117 105   CALCIUM 7.9* 8.3*   MG 1.7*  --    PHOS 3.8  --        ---  Ray Johnson MD  General Surgery PGY-I

## 2023-11-08 NOTE — PHYSICAL THERAPY NOTE
Physical Therapy Progress Note     11/08/23 1210   PT Last Visit   PT Visit Date 11/08/23   Note Type   Note Type Treatment   Pain Assessment   Pain Assessment Tool 0-10   Pain Score 8   Pain Location/Orientation Orientation: Right;Location: Arm;Orientation: Lower; Location: Back   Hospital Pain Intervention(s) Repositioned; Ambulation/increased activity   Restrictions/Precautions   RUE Weight Bearing Per Order NWB   Other Precautions Pain; Fall Risk;WBS   Subjective   Subjective The patient is eager to get better, and to return home. Transfers   Sit to Stand 4  Minimal assistance  (To the low toilet.)   Additional items Assist x 1   Stand to Sit 5  Supervision   Ambulation/Elevation   Gait pattern Excessively slow; Step to;Short stride; Inconsistent yue   Gait Assistance 5  Supervision   Additional items Verbal cues   Assistive Device [facility]   Distance 110 feet x 2. Balance   Static Sitting Good   Dynamic Sitting Fair   Static Standing Fair -   Dynamic Standing Poor +   Ambulatory Poor +   Activity Tolerance   Activity Tolerance Patient tolerated treatment well   Nurse Made Aware Yes. Assessment   Prognosis Good   Problem List Decreased strength;Decreased range of motion;Decreased endurance; Impaired balance;Decreased skin integrity;Pain   Assessment The patient is progressing well as she is ambulating near community distances. She does require increased time, and she is able to demonstrate and explain her safety considerations. She does remain limited due to pain, but she was able to perform all mobility without any losses of balance. She did have some difficulty navigating turns, but she did so with increased time. She will benefit from continued therapies in order to maximize her functional independence and safety. Barriers to Discharge Inaccessible home environment   Goals   Patient Goals To get back home.    LTG Expiration Date 11/11/23   PT Treatment Day 3   Plan   Treatment/Interventions Functional transfer training;LE strengthening/ROM; Therapeutic exercise; Endurance training;Patient/family training;Bed mobility;Gait training;Elevations   Progress Progressing toward goals   PT Frequency 3-5x/wk   Discharge Recommendation   Rehab Resource Intensity Level, PT III (Minimum Resource Intensity)   AM-PAC Basic Mobility Inpatient   Turning in Flat Bed Without Bedrails 3   Lying on Back to Sitting on Edge of Flat Bed Without Bedrails 3   Moving Bed to Chair 3   Standing Up From Chair Using Arms 3   Walk in Room 3   Climb 3-5 Stairs With Railing 3   Basic Mobility Inpatient Raw Score 18   Basic Mobility Standardized Score 41.05   Highest Level Of Mobility   JH-HLM Goal 6: Walk 10 steps or more   JH-HLM Achieved 7: Walk 25 feet or more         An AM-PAC Basic Mobility raw score less than 16 suggests the patient may benefit from discharge to post-acute rehab services.     Cassie Baldwin, PTA

## 2023-11-08 NOTE — ASSESSMENT & PLAN NOTE
Patient with chronic patient Fibromyalgia, chronic LLB pain, chronic pain syndrome, peripheral neuropathy  - APS consult appreciated    - S/p right infraclavicular block with Exparel on 10/27    - Weaned off ketamine 11/2   - Continue to wean pain meds   - Discharge recommendations noted  - Continue multimodal pain regimen

## 2023-11-08 NOTE — PROGRESS NOTES
Progress Note - General Surgery  : JOELLE Red Surgery Resident on [facility] Ana Woodall 65 y.o. female MRN: [id number]  Unit/Bed#: [facility] 622-01 Encounter: [id number]      Assessment:  65 y.o. female POD 1 s/p RUE STSG for compartment syndrome       Plan:  Vac to RUE until POD 4 (11/11 Saturday), then vac down  Otherwise care per trauma team      Subjective: No acute events overnight, pain fairly well-controlled      Objective:     Physical Exam:  GEN: NAD   Ab: Soft, NT/ND  Lung: Normal effort   CV: RRR   Extrem: RUE vac sucked down without leak, SS in tubing. RUE neuro exam intact except for stable sensory deficit to R small finger  Neuro: A+Ox3, RUE exam as above       I/O         11/06 0701 11/07 0700 11/07 0701 11/08 0700    P. O.  0    I.V. (mL/kg) 660.4 (8.2) 1139.6 (14.1)    Total Intake(mL/kg) 660.4 (8.2) 1139.6 (14.1)    Urine (mL/kg/hr) 0 (0) 900 (0.5)    Drains 100 25    Stool 0 0    Total Output 100 925    Net +560.4 +214.6          Unmeasured Urine Occurrence 2 x 3 x    Unmeasured Stool Occurrence 1 x 0 x            Lab, Imaging and other studies: I have personally reviewed pertinent reports.   , CBC with diff:   Lab Results   Component Value Date    WBC 9.14 11/07/2023    HGB 11.7 11/07/2023    HCT 36.4 11/07/2023    MCV 98 11/07/2023     11/07/2023    RBC 3.71 (L) 11/07/2023    MCH 31.5 11/07/2023    MCHC 32.1 11/07/2023    RDW 13.0 11/07/2023    MPV 10.0 11/07/2023    NRBC 0 11/07/2023   , BMP/CMP:   Lab Results   Component Value Date    SODIUM 142 11/07/2023    K 3.6 11/07/2023     11/07/2023    CO2 32 11/07/2023    BUN 10 11/07/2023    CREATININE 0.63 11/07/2023    CALCIUM 7.9 (L) 11/07/2023    EGFR 97 11/07/2023           Prabha Mendes MD  11/8/2023 12:48 AM

## 2023-11-08 NOTE — PLAN OF CARE
Problem: OCCUPATIONAL THERAPY ADULT  Goal: Performs self-care activities at highest level of function for planned discharge setting. See evaluation for individualized goals. Description: Treatment Interventions: ADL retraining, Functional transfer training, UE strengthening/ROM, Endurance training, Cognitive reorientation, Patient/family training, Equipment evaluation/education, Compensatory technique education, Fine motor coordination activities, Continued evaluation, Activityengagement, Energy conservation          See flowsheet documentation for full assessment, interventions and recommendations. Outcome: Progressing  Note: Limitation: Decreased ADL status, Decreased UE ROM, Decreased UE strength, Decreased Safe judgement during ADL, Decreased cognition, Decreased endurance, Decreased sensation, Decreased fine motor control, Decreased high-level ADLs, Decreased self-care trans  Prognosis: Fair  Assessment: Pt seen for Occupational Therapy session with focus on activity tolerance, bed mob, functional transfers/mob, standing tolerance and balance for pt engagement in toileting/toilet transfers and pt  IADL tasks. Pt cleared by RN/ Kayley for pt participated in OT session. Pt presented supine/HOB raised pt awake/alert and agreeable to participate in therapy following  pt identifiers confirmed. Pt reported her therapy goal to get out of bed to take a walk. Pt reported that she enjoys taking care of her plants on her porch at home. Pt required assist for bed mob, functional transfers/mob with a SPC and assist to complete toilet transfers and water the unit's plants 2* pt limited by NWB restrictions to her R UE. She was able to tolerate walking on floor unit hallway for participation in watering plants. Pt would benefit from post acute rehab service to continue to address these above noted pt deficit which currently impair pt ADL and functional mob.  Pt return to bed post session bed alarm activated and all needs within reach.      Rehab Resource Intensity Level, OT: II (Moderate Resource Intensity)

## 2023-11-09 PROCEDURE — 99024 POSTOP FOLLOW-UP VISIT: CPT | Performed by: EMERGENCY MEDICINE

## 2023-11-09 PROCEDURE — 94664 DEMO&/EVAL PT USE INHALER: CPT

## 2023-11-09 RX ORDER — LIDOCAINE 50 MG/G
1 PATCH TOPICAL DAILY
Status: DISCONTINUED | OUTPATIENT
Start: 2023-11-10 | End: 2023-11-13 | Stop reason: HOSPADM

## 2023-11-09 RX ORDER — PREGABALIN 100 MG/1
100 CAPSULE ORAL 3 TIMES DAILY
Status: DISCONTINUED | OUTPATIENT
Start: 2023-11-10 | End: 2023-11-13 | Stop reason: HOSPADM

## 2023-11-09 RX ADMIN — UMECLIDINIUM BROMIDE AND VILANTEROL TRIFENATATE 1 PUFF: 62.5; 25 POWDER RESPIRATORY (INHALATION) at 07:59

## 2023-11-09 RX ADMIN — CLONAZEPAM 0.5 MG: 0.5 TABLET ORAL at 21:24

## 2023-11-09 RX ADMIN — HYDROMORPHONE HYDROCHLORIDE 4 MG: 2 TABLET ORAL at 13:16

## 2023-11-09 RX ADMIN — PREGABALIN 50 MG: 50 CAPSULE ORAL at 07:57

## 2023-11-09 RX ADMIN — HYDROMORPHONE HYDROCHLORIDE 4 MG: 2 TABLET ORAL at 03:17

## 2023-11-09 RX ADMIN — ACETAMINOPHEN 975 MG: 325 TABLET, FILM COATED ORAL at 21:25

## 2023-11-09 RX ADMIN — LATANOPROST 1 DROP: 50 SOLUTION OPHTHALMIC at 21:25

## 2023-11-09 RX ADMIN — SENNOSIDES 8.6 MG: 8.6 TABLET, FILM COATED ORAL at 21:25

## 2023-11-09 RX ADMIN — ATORVASTATIN CALCIUM 80 MG: 80 TABLET, FILM COATED ORAL at 17:11

## 2023-11-09 RX ADMIN — HYDROMORPHONE HYDROCHLORIDE 4 MG: 2 TABLET ORAL at 21:24

## 2023-11-09 RX ADMIN — PREGABALIN 50 MG: 50 CAPSULE ORAL at 15:19

## 2023-11-09 RX ADMIN — HYDROMORPHONE HYDROCHLORIDE 4 MG: 2 TABLET ORAL at 17:11

## 2023-11-09 RX ADMIN — LEVOTHYROXINE SODIUM 137 MCG: 25 TABLET ORAL at 05:01

## 2023-11-09 RX ADMIN — ENOXAPARIN SODIUM 30 MG: 30 INJECTION SUBCUTANEOUS at 05:02

## 2023-11-09 RX ADMIN — HYDROMORPHONE HYDROCHLORIDE 4 MG: 2 TABLET ORAL at 07:55

## 2023-11-09 RX ADMIN — ACETAMINOPHEN 975 MG: 325 TABLET, FILM COATED ORAL at 05:00

## 2023-11-09 RX ADMIN — METHOCARBAMOL 250 MG: 750 TABLET ORAL at 17:11

## 2023-11-09 RX ADMIN — LIDOCAINE 5% 1 PATCH: 700 PATCH TOPICAL at 07:58

## 2023-11-09 RX ADMIN — METHOCARBAMOL 250 MG: 750 TABLET ORAL at 13:17

## 2023-11-09 RX ADMIN — METHOCARBAMOL 250 MG: 750 TABLET ORAL at 05:00

## 2023-11-09 RX ADMIN — PREGABALIN 50 MG: 50 CAPSULE ORAL at 21:24

## 2023-11-09 RX ADMIN — NICOTINE 21 MG: 21 PATCH, EXTENDED RELEASE TRANSDERMAL at 07:58

## 2023-11-09 RX ADMIN — ACETAMINOPHEN 975 MG: 325 TABLET, FILM COATED ORAL at 13:16

## 2023-11-09 RX ADMIN — ENOXAPARIN SODIUM 30 MG: 30 INJECTION SUBCUTANEOUS at 17:12

## 2023-11-09 NOTE — PROGRESS NOTES
[facility]  Progress Note  Name: Romayne Litter  MRN: [id number]  Unit/Bed#: [facility] 407-32 I Date of Admission: 10/27/2023   Date of Service: 11/9/2023 I Hospital Day: 13    Assessment/Plan   * Compartment syndrome [facility])  Assessment & Plan  Patient presented with crush injury to RUE  - Initial CK 5942 increased to 13,683, normalized postoperatively  - 10/27 RUE fasciotomy due to compartment syndrome, VAC placement  - 10/29 washout and partial closure   - 10/31 VAC change  - 11/2 VAC change  - 11/4 partial wx closure, Oasis placement, VAC  - 11/7 VAC takedown, STSG, VAC replacement   - Plan for VAC takedown POD 4, 11/11  - Continue to monitor neurovascular status    Type 2 diabetes mellitus ([address])  Assessment & Plan  Lab Results   Component Value Date    HGBA1C 5.8 (H) 01/19/2023       Blood Sugar Average: Last 72 hrs:  (P) 110    - Diet controlled at home  - Glucose well controlled inpatient, no SSI needed    Fall  Assessment & Plan  Patient had unwitnessed fall.  + Lightheadedness, CP  - Syncopal workup: EKG Unremarkable, Troponin Negative, Echo unremarkable  - PT/OT evaluation and treatment as indicated    Chronic pain  Assessment & Plan  Patient with chronic patient Fibromyalgia, chronic LLB pain, chronic pain syndrome, peripheral neuropathy  - APS consult appreciated    - S/p right infraclavicular block with Exparel on 10/27    - Weaned off ketamine 11/2   - Continue to wean pain meds   - Discharge recommendations noted  - Continue multimodal pain regimen    Hypothyroid  Assessment & Plan  - Continue home levothyroxine       Bowel Regimen: Scheduled senna  VTE Prophylaxis:Enoxaparin (Lovenox)     Disposition: Home with [facility]    Subjective   Chief Complaint: Status post right arm compartment syndrome, fasciotomy, STSG    Subjective: Patient endorsing pain in right arm this morning, reports trying to move hand more. Reporting tingling in feet, chronic neuropathy. Patient denies nausea, vomiting, fever, chills, shortness of breath, chest pain. Objective   Vitals:   Temp:  [97.6 °F (36.4 °C)-98.1 °F (36.7 °C)] 97.7 °F (36.5 °C)  HR:  [60-72] 64  Resp:  [16-19] 18  BP: (103-107)/(64-70) 105/67    I/O         11/07 0701  11/08 0700 11/08 0701 11/09 0700 11/09 0701  11/10 0700    P. O. 200 1162 480    I.V. (mL/kg) 1139.6 (14.1)      Total Intake(mL/kg) 1339.6 (16.6) 1162 (14.4) 480 (5.9)    Urine (mL/kg/hr) 900 (0.5) 1750 (0.9) 800 (3.2)    Drains 25      Stool 0 0     Total Output 925 1750 800    Net +414.6 -588 -320           Unmeasured Urine Occurrence 4 x 2 x     Unmeasured Stool Occurrence 0 x 0 x            Physical Exam:   Gen: NAD, Resting in bed  Neuro: A&O  Head: Normal Cephalic, Atraumatic  Eye: EOMI, No scleral icterus  CV: Regular rate, Cap refill <2 sec  Pulm: Normal work of breathing, No respiratory distress on RA  Abd: Soft, Non-Distended, Non-Tender   Ext: No edema bilateral lower extremities. Pain in bilateral feet, pulses and motor intact. Right arm with VAC in place, motor intact, sensory deficit in fifth digit.   Skin: Warm, Dry, Intact    Invasive Devices       Peripheral Intravenous Line  Duration             Peripheral IV 11/05/23 Left;Ventral (anterior) Forearm 3 days                     ---  Nichole Figueroa MD  General Surgery PGY-I

## 2023-11-09 NOTE — PLAN OF CARE
Problem: MUSCULOSKELETAL - ADULT  Goal: Maintain or return mobility to safest level of function  Description: INTERVENTIONS:  - Assess patient's ability to carry out ADLs; assess patient's baseline for ADL function and identify physical deficits which impact ability to perform ADLs (bathing, care of mouth/teeth, toileting, grooming, dressing, etc.)  - Assess/evaluate cause of self-care deficits   - Assess range of motion  - Assess patient's mobility  - Assess patient's need for assistive devices and provide as appropriate  - Encourage maximum independence but intervene and supervise when necessary  - Involve family in performance of ADLs  - Assess for home care needs following discharge   - Consider OT consult to assist with ADL evaluation and planning for discharge  - Provide patient education as appropriate  Outcome: Progressing  Goal: Maintain proper alignment of affected body part  Description: INTERVENTIONS:  - Support, maintain and protect limb and body alignment  - Provide patient/ family with appropriate education  Outcome: Progressing     Problem: PAIN - ADULT  Goal: Verbalizes/displays adequate comfort level or baseline comfort level  Description: Interventions:  - Encourage patient to monitor pain and request assistance  - Assess pain using appropriate pain scale  - Administer analgesics based on type and severity of pain and evaluate response  - Implement non-pharmacological measures as appropriate and evaluate response  - Consider cultural and social influences on pain and pain management  - Notify physician/advanced practitioner if interventions unsuccessful or patient reports new pain  Outcome: Progressing     Problem: INFECTION - ADULT  Goal: Absence or prevention of progression during hospitalization  Description: INTERVENTIONS:  - Assess and monitor for signs and symptoms of infection  - Monitor lab/diagnostic results  - Monitor all insertion sites, i.e. indwelling lines, tubes, and drains  - Monitor endotracheal if appropriate and nasal secretions for changes in amount and color  - [place] appropriate cooling/warming therapies per order  - Administer medications as ordered  - Instruct and encourage patient and family to use good hand hygiene technique  - Identify and instruct in appropriate isolation precautions for identified infection/condition  Outcome: Progressing     Problem: SAFETY ADULT  Goal: Patient will remain free of falls  Description: INTERVENTIONS:  - Educate patient/family on patient safety including physical limitations  - Instruct patient to call for assistance with activity   - Consult OT/PT to assist with strengthening/mobility   - Keep Call bell within reach  - Keep bed low and locked with side rails adjusted as appropriate  - Keep care items and personal belongings within reach  - Initiate and maintain comfort rounds  - Make Fall Risk Sign visible to staff  - Offer Toileting every 2 Hours, in advance of need  - Initiate/Maintain alarm  - Obtain necessary fall risk management equipment:   - Apply yellow socks and bracelet for high fall risk patients  - Consider moving patient to room near nurses station  Outcome: Progressing  Goal: Maintain or return to baseline ADL function  Description: INTERVENTIONS:  -  Assess patient's ability to carry out ADLs; assess patient's baseline for ADL function and identify physical deficits which impact ability to perform ADLs (bathing, care of mouth/teeth, toileting, grooming, dressing, etc.)  - Assess/evaluate cause of self-care deficits   - Assess range of motion  - Assess patient's mobility; develop plan if impaired  - Assess patient's need for assistive devices and provide as appropriate  - Encourage maximum independence but intervene and supervise when necessary  - Involve family in performance of ADLs  - Assess for home care needs following discharge   - Consider OT consult to assist with ADL evaluation and planning for discharge  - Provide patient education as appropriate  Outcome: Progressing  Goal: Maintains/Returns to pre admission functional level  Description: INTERVENTIONS:  - Perform BMAT or MOVE assessment daily.   - Set and communicate daily mobility goal to care team and patient/family/caregiver. - Collaborate with rehabilitation services on mobility goals if consulted  - Perform Range of Motion 3 times a day. - Reposition patient every 2 hours. - Dangle patient 3 times a day  - Stand patient 3 times a day  - Ambulate patient 3 times a day  - Out of bed to chair 3 times a day   - Out of bed for meals 3 times a day  - Out of bed for toileting  - Record patient progress and toleration of activity level   Outcome: Progressing     Problem: DISCHARGE PLANNING  Goal: Discharge to home or other facility with appropriate resources  Description: INTERVENTIONS:  - Identify barriers to discharge w/patient and caregiver  - Arrange for needed discharge resources and transportation as appropriate  - Identify discharge learning needs (meds, wound care, etc.)  - Arrange for interpretive services to assist at discharge as needed  - Refer to Case Management Department for coordinating discharge planning if the patient needs post-hospital services based on physician/advanced practitioner order or complex needs related to functional status, cognitive ability, or social support system  Outcome: Progressing     Problem: Knowledge Deficit  Goal: Patient/family/caregiver demonstrates understanding of disease process, treatment plan, medications, and discharge instructions  Description: Complete learning assessment and assess knowledge base.   Interventions:  - Provide teaching at level of understanding  - Provide teaching via preferred learning methods  Outcome: Progressing     Problem: MOBILITY - ADULT  Goal: Maintain or return to baseline ADL function  Description: INTERVENTIONS:  -  Assess patient's ability to carry out ADLs; assess patient's baseline for ADL function and identify physical deficits which impact ability to perform ADLs (bathing, care of mouth/teeth, toileting, grooming, dressing, etc.)  - Assess/evaluate cause of self-care deficits   - Assess range of motion  - Assess patient's mobility; develop plan if impaired  - Assess patient's need for assistive devices and provide as appropriate  - Encourage maximum independence but intervene and supervise when necessary  - Involve family in performance of ADLs  - Assess for home care needs following discharge   - Consider OT consult to assist with ADL evaluation and planning for discharge  - Provide patient education as appropriate  Outcome: Progressing  Goal: Maintains/Returns to pre admission functional level  Description: INTERVENTIONS:  - Perform BMAT or MOVE assessment daily.   - Set and communicate daily mobility goal to care team and patient/family/caregiver. - Collaborate with rehabilitation services on mobility goals if consulted  - Perform Range of Motion 3 times a day. - Reposition patient every 2 hours.   - Dangle patient 3 times a day  - Stand patient 3 times a day  - Ambulate patient 3 times a day  - Out of bed to chair 3 times a day   - Out of bed for meals 3 times a day  - Out of bed for toileting  - Record patient progress and toleration of activity level   Outcome: Progressing     Problem: Prexisting or High Potential for Compromised Skin Integrity  Goal: Skin integrity is maintained or improved  Description: INTERVENTIONS:  - Identify patients at risk for skin breakdown  - Assess and monitor skin integrity  - Assess and monitor nutrition and hydration status  - Monitor labs   - Assess for incontinence   - Turn and reposition patient  - Assist with mobility/ambulation  - Relieve pressure over bony prominences  - Avoid friction and shearing  - Provide appropriate hygiene as needed including keeping skin clean and dry  - Evaluate need for skin moisturizer/barrier cream  - Collaborate with interdisciplinary team   - Patient/family teaching  - Consider wound care consult   Outcome: Progressing     Problem: Nutrition/Hydration-ADULT  Goal: Nutrient/Hydration intake appropriate for improving, restoring or maintaining nutritional needs  Description: Monitor and assess patient's nutrition/hydration status for malnutrition. Collaborate with interdisciplinary team and initiate plan and interventions as ordered. Monitor patient's weight and dietary intake as ordered or per policy. Utilize nutrition screening tool and intervene as necessary. Determine patient's food preferences and provide high-protein, high-caloric foods as appropriate.      INTERVENTIONS:  - Monitor oral intake, urinary output, labs, and treatment plans  - Assess nutrition and hydration status and recommend course of action  - Evaluate amount of meals eaten  - Assist patient with eating if necessary   - Allow adequate time for meals  - Recommend/ encourage appropriate diets, oral nutritional supplements, and vitamin/mineral supplements  - Order, calculate, and assess calorie counts as needed  - Recommend, monitor, and adjust tube feedings and TPN/PPN based on assessed needs  - Assess need for intravenous fluids  - Provide specific nutrition/hydration education as appropriate  - Include patient/family/caregiver in decisions related to nutrition  Outcome: Progressing O-Z Flap Text: The defect edges were debeveled with a #15 scalpel blade.  Given the location of the defect, shape of the defect and the proximity to free margins an O-Z flap was deemed most appropriate.  Using a sterile surgical marker, an appropriate transposition flap was drawn incorporating the defect and placing the expected incisions within the relaxed skin tension lines where possible. The area thus outlined was incised deep to adipose tissue with a #15 scalpel blade.  The skin margins were undermined to an appropriate distance in all directions utilizing iris scissors.

## 2023-11-09 NOTE — ASSESSMENT & PLAN NOTE
Patient presented with crush injury to RUE  - Initial CK 5942 increased to 13,683, normalized postoperatively  - 10/27 RUE fasciotomy due to compartment syndrome, VAC placement  - 10/29 washout and partial closure   - 10/31 VAC change  - 11/2 VAC change  - 11/4 partial wx closure, Oasis placement, VAC  - 11/7 VAC takedown, STSG, VAC replacement   - Plan for VAC takedown POD 4, 11/11  - Continue to monitor neurovascular status

## 2023-11-10 LAB
BASOPHILS # BLD AUTO: 0.08 THOUSANDS/ÂΜL (ref 0–0.1)
BASOPHILS NFR BLD AUTO: 1 % (ref 0–1)
EOSINOPHIL # BLD AUTO: 0.27 THOUSAND/ÂΜL (ref 0–0.61)
EOSINOPHIL NFR BLD AUTO: 3 % (ref 0–6)
ERYTHROCYTE [DISTWIDTH] IN BLOOD BY AUTOMATED COUNT: 13.2 % (ref 11.6–15.1)
HCT VFR BLD AUTO: 36 % (ref 34.8–46.1)
HGB BLD-MCNC: 11.9 G/DL (ref 11.5–15.4)
IMM GRANULOCYTES # BLD AUTO: 0.08 THOUSAND/UL (ref 0–0.2)
IMM GRANULOCYTES NFR BLD AUTO: 1 % (ref 0–2)
LYMPHOCYTES # BLD AUTO: 3.78 THOUSANDS/ÂΜL (ref 0.6–4.47)
LYMPHOCYTES NFR BLD AUTO: 39 % (ref 14–44)
MCH RBC QN AUTO: 32.6 PG (ref 26.8–34.3)
MCHC RBC AUTO-ENTMCNC: 33.1 G/DL (ref 31.4–37.4)
MCV RBC AUTO: 99 FL (ref 82–98)
MONOCYTES # BLD AUTO: 0.6 THOUSAND/ÂΜL (ref 0.17–1.22)
MONOCYTES NFR BLD AUTO: 6 % (ref 4–12)
NEUTROPHILS # BLD AUTO: 4.92 THOUSANDS/ÂΜL (ref 1.85–7.62)
NEUTS SEG NFR BLD AUTO: 50 % (ref 43–75)
NRBC BLD AUTO-RTO: 0 /100 WBCS
PLATELET # BLD AUTO: 291 THOUSANDS/UL (ref 149–390)
PMV BLD AUTO: 9.8 FL (ref 8.9–12.7)
RBC # BLD AUTO: 3.65 MILLION/UL (ref 3.81–5.12)
WBC # BLD AUTO: 9.73 THOUSAND/UL (ref 4.31–10.16)

## 2023-11-10 PROCEDURE — 97535 SELF CARE MNGMENT TRAINING: CPT

## 2023-11-10 PROCEDURE — 99024 POSTOP FOLLOW-UP VISIT: CPT | Performed by: EMERGENCY MEDICINE

## 2023-11-10 PROCEDURE — 97116 GAIT TRAINING THERAPY: CPT

## 2023-11-10 PROCEDURE — 85025 COMPLETE CBC W/AUTO DIFF WBC: CPT | Performed by: STUDENT IN AN ORGANIZED HEALTH CARE EDUCATION/TRAINING PROGRAM

## 2023-11-10 PROCEDURE — 97530 THERAPEUTIC ACTIVITIES: CPT

## 2023-11-10 RX ADMIN — PREGABALIN 100 MG: 100 CAPSULE ORAL at 17:02

## 2023-11-10 RX ADMIN — ACETAMINOPHEN 975 MG: 325 TABLET, FILM COATED ORAL at 22:25

## 2023-11-10 RX ADMIN — HYDROMORPHONE HYDROCHLORIDE 4 MG: 2 TABLET ORAL at 15:09

## 2023-11-10 RX ADMIN — HYDROMORPHONE HYDROCHLORIDE 4 MG: 2 TABLET ORAL at 01:58

## 2023-11-10 RX ADMIN — HYDROMORPHONE HYDROCHLORIDE 4 MG: 2 TABLET ORAL at 19:54

## 2023-11-10 RX ADMIN — SENNOSIDES 8.6 MG: 8.6 TABLET, FILM COATED ORAL at 22:25

## 2023-11-10 RX ADMIN — POLYETHYLENE GLYCOL 3350 17 G: 17 POWDER, FOR SOLUTION ORAL at 11:50

## 2023-11-10 RX ADMIN — LEVOTHYROXINE SODIUM 137 MCG: 25 TABLET ORAL at 06:55

## 2023-11-10 RX ADMIN — LIDOCAINE 5% 1 PATCH: 700 PATCH TOPICAL at 09:10

## 2023-11-10 RX ADMIN — CLONAZEPAM 0.5 MG: 0.5 TABLET ORAL at 22:25

## 2023-11-10 RX ADMIN — LATANOPROST 1 DROP: 50 SOLUTION OPHTHALMIC at 22:25

## 2023-11-10 RX ADMIN — METHOCARBAMOL 250 MG: 750 TABLET ORAL at 06:56

## 2023-11-10 RX ADMIN — ATORVASTATIN CALCIUM 80 MG: 80 TABLET, FILM COATED ORAL at 17:02

## 2023-11-10 RX ADMIN — METHOCARBAMOL 250 MG: 750 TABLET ORAL at 23:53

## 2023-11-10 RX ADMIN — HYDROMORPHONE HYDROCHLORIDE 4 MG: 2 TABLET ORAL at 23:53

## 2023-11-10 RX ADMIN — ACETAMINOPHEN 975 MG: 325 TABLET, FILM COATED ORAL at 06:55

## 2023-11-10 RX ADMIN — ENOXAPARIN SODIUM 30 MG: 30 INJECTION SUBCUTANEOUS at 06:55

## 2023-11-10 RX ADMIN — PREGABALIN 100 MG: 100 CAPSULE ORAL at 22:25

## 2023-11-10 RX ADMIN — HYDROMORPHONE HYDROCHLORIDE 4 MG: 2 TABLET ORAL at 06:56

## 2023-11-10 RX ADMIN — METHOCARBAMOL 250 MG: 750 TABLET ORAL at 17:02

## 2023-11-10 RX ADMIN — HYDROMORPHONE HYDROCHLORIDE 4 MG: 2 TABLET ORAL at 11:33

## 2023-11-10 RX ADMIN — METHOCARBAMOL 250 MG: 750 TABLET ORAL at 11:33

## 2023-11-10 RX ADMIN — PREGABALIN 100 MG: 100 CAPSULE ORAL at 09:10

## 2023-11-10 RX ADMIN — NICOTINE 21 MG: 21 PATCH, EXTENDED RELEASE TRANSDERMAL at 09:12

## 2023-11-10 RX ADMIN — ACETAMINOPHEN 975 MG: 325 TABLET, FILM COATED ORAL at 15:09

## 2023-11-10 RX ADMIN — ENOXAPARIN SODIUM 30 MG: 30 INJECTION SUBCUTANEOUS at 17:02

## 2023-11-10 RX ADMIN — METHOCARBAMOL 250 MG: 750 TABLET ORAL at 01:58

## 2023-11-10 RX ADMIN — UMECLIDINIUM BROMIDE AND VILANTEROL TRIFENATATE 1 PUFF: 62.5; 25 POWDER RESPIRATORY (INHALATION) at 09:08

## 2023-11-10 NOTE — PLAN OF CARE
Problem: PHYSICAL THERAPY ADULT  Goal: Performs mobility at highest level of function for planned discharge setting. See evaluation for individualized goals. Description: Treatment/Interventions: Elevations, Functional transfer training, LE strengthening/ROM, Therapeutic exercise, Endurance training, Equipment eval/education, Bed mobility, Gait training, Spoke to nursing, OT  Equipment Recommended: Other (Comment) (TBD)       See flowsheet documentation for full assessment, interventions and recommendations. Outcome: Progressing  Note: Prognosis: Good  Problem List: Decreased strength, Decreased range of motion, Decreased endurance, Impaired balance, Decreased skin integrity, Pain  Assessment: pt continues to make steady progress in all aspects of mobiilty today, performing transfers without need for assist, then ambulating on levels & steps with baseline equipment & simulated set up without LOB. She demosntrates appropriate sequencing of tasks & safety awareness throughout. Seated rest only taken to safely address noted increase in drainage escaping from under dressing on skin graft donor site. Pt was able to walk total community distances without complaints of excessive fatigue & reports she has been walking with staff outside of PT sessions as well. Discussion held with pt regarding role of PT, d/c planning, and available home support at d/c. From PT standpoint, pt has demosntrated suffcieint support to d/c home with social support available to her from [facility] & family , and recommend follow up PT services to progress to PLOF. Will benefit from dedicated hand therapy once medically cleared for activity by RUE managing team.  Discussed pt's progress with supervising PT, Melania Montoya DPT, and will d/c acute hospital PT at this time since pt no longer requires skilled interventions to enter & negotiate home environment at d/c.   Pt instructed to mobilize with staff during remainder of hospital stay to prevent rsisk associated with immobility at this time. Barriers to Discharge: Inaccessible home environment     Rehab Resource Intensity Level, PT: III (Minimum Resource Intensity)    See flowsheet documentation for full assessment.

## 2023-11-10 NOTE — ASSESSMENT & PLAN NOTE
Patient presented with crush injury to RUE  - Initial CK 5942 increased to 13,683, normalized postoperatively  - 10/27 RUE fasciotomy due to compartment syndrome, VAC placement  - 10/29 washout and partial closure   - 10/31 VAC change  - 11/2 VAC change  - 11/4 partial wx closure, Oasis placement, VAC  - 11/7 VAC takedown, STSG, VAC replacement   - Plan for VAC takedown POD 4, 11/11  - Continue to monitor neurovascular status  -Monitor VAC output  -Apply Ace bandage from the hand to the right axilla

## 2023-11-10 NOTE — PLAN OF CARE
Problem: OCCUPATIONAL THERAPY ADULT  Goal: Performs self-care activities at highest level of function for planned discharge setting. See evaluation for individualized goals. Description: Treatment Interventions: ADL retraining, Functional transfer training, UE strengthening/ROM, Endurance training, Cognitive reorientation, Patient/family training, Equipment evaluation/education, Compensatory technique education, Fine motor coordination activities, Continued evaluation, Activityengagement, Energy conservation          See flowsheet documentation for full assessment, interventions and recommendations. Outcome: Progressing  Note: Limitation: Decreased ADL status, Decreased UE ROM, Decreased UE strength, Decreased Safe judgement during ADL, Decreased cognition, Decreased endurance, Decreased sensation, Decreased fine motor control, Decreased high-level ADLs, Decreased self-care trans  Prognosis: Fair  Assessment: Pt seen for Occupational Therapy session with focus on activity tolerance, functional transfers/mob, sitting balance and tolerance and standing tolerance and balance for pt engagement in LB self-care tasks and toileting/toilet transfers. Pt cleared by Jeff Palacios for pt participated in OT session. Pt presented sitting out of bed to bedside chair pt identifiers confirmed. Pt reported her therapy goal to take a walk, use the bathroom and get back to bed. Pt required assist for one handed dressing 2*  She was able to tolerate one handed dressing however continues to require assist for LB dressing/donning socks to her R LB Pt will require post acute rehab service to continue to address these above noted pt deficit which currently impair pt ADL and functional mob. Pt return to bed post session bed alarm activated and all needs within reach.      Rehab Resource Intensity Level, OT: II (Moderate Resource Intensity)

## 2023-11-10 NOTE — PLAN OF CARE
Problem: MUSCULOSKELETAL - ADULT  Goal: Maintain or return mobility to safest level of function  Description: INTERVENTIONS:  - Assess patient's ability to carry out ADLs; assess patient's baseline for ADL function and identify physical deficits which impact ability to perform ADLs (bathing, care of mouth/teeth, toileting, grooming, dressing, etc.)  - Assess/evaluate cause of self-care deficits   - Assess range of motion  - Assess patient's mobility  - Assess patient's need for assistive devices and provide as appropriate  - Encourage maximum independence but intervene and supervise when necessary  - Involve family in performance of ADLs  - Assess for home care needs following discharge   - Consider OT consult to assist with ADL evaluation and planning for discharge  - Provide patient education as appropriate  Outcome: Progressing  Goal: Maintain proper alignment of affected body part  Description: INTERVENTIONS:  - Support, maintain and protect limb and body alignment  - Provide patient/ family with appropriate education  Outcome: Progressing     Problem: PAIN - ADULT  Goal: Verbalizes/displays adequate comfort level or baseline comfort level  Description: Interventions:  - Encourage patient to monitor pain and request assistance  - Assess pain using appropriate pain scale  - Administer analgesics based on type and severity of pain and evaluate response  - Implement non-pharmacological measures as appropriate and evaluate response  - Consider cultural and social influences on pain and pain management  - Notify physician/advanced practitioner if interventions unsuccessful or patient reports new pain  Outcome: Progressing     Problem: INFECTION - ADULT  Goal: Absence or prevention of progression during hospitalization  Description: INTERVENTIONS:  - Assess and monitor for signs and symptoms of infection  - Monitor lab/diagnostic results  - Monitor all insertion sites, i.e. indwelling lines, tubes, and drains  - Monitor endotracheal if appropriate and nasal secretions for changes in amount and color  - [place] appropriate cooling/warming therapies per order  - Administer medications as ordered  - Instruct and encourage patient and family to use good hand hygiene technique  - Identify and instruct in appropriate isolation precautions for identified infection/condition  Outcome: Progressing     Problem: SAFETY ADULT  Goal: Patient will remain free of falls  Description: INTERVENTIONS:  - Educate patient/family on patient safety including physical limitations  - Instruct patient to call for assistance with activity   - Consult OT/PT to assist with strengthening/mobility   - Keep Call bell within reach  - Keep bed low and locked with side rails adjusted as appropriate  - Keep care items and personal belongings within reach  - Initiate and maintain comfort rounds  - Make Fall Risk Sign visible to staff  - Offer Toileting every 2 Hours, in advance of need  - Initiate/Maintain alarm  - Obtain necessary fall risk management equipment:   - Apply yellow socks and bracelet for high fall risk patients  - Consider moving patient to room near nurses station  Outcome: Progressing  Goal: Maintain or return to baseline ADL function  Description: INTERVENTIONS:  -  Assess patient's ability to carry out ADLs; assess patient's baseline for ADL function and identify physical deficits which impact ability to perform ADLs (bathing, care of mouth/teeth, toileting, grooming, dressing, etc.)  - Assess/evaluate cause of self-care deficits   - Assess range of motion  - Assess patient's mobility; develop plan if impaired  - Assess patient's need for assistive devices and provide as appropriate  - Encourage maximum independence but intervene and supervise when necessary  - Involve family in performance of ADLs  - Assess for home care needs following discharge   - Consider OT consult to assist with ADL evaluation and planning for discharge  - Provide patient education as appropriate  Outcome: Progressing  Goal: Maintains/Returns to pre admission functional level  Description: INTERVENTIONS:  - Perform BMAT or MOVE assessment daily.   - Set and communicate daily mobility goal to care team and patient/family/caregiver. - Collaborate with rehabilitation services on mobility goals if consulted  - Perform Range of Motion 3 times a day. - Reposition patient every 2 hours. - Dangle patient 3 times a day  - Stand patient 3 times a day  - Ambulate patient 3 times a day  - Out of bed to chair 3 times a day   - Out of bed for meals 3 times a day  - Out of bed for toileting  - Record patient progress and toleration of activity level   Outcome: Progressing     Problem: DISCHARGE PLANNING  Goal: Discharge to home or other facility with appropriate resources  Description: INTERVENTIONS:  - Identify barriers to discharge w/patient and caregiver  - Arrange for needed discharge resources and transportation as appropriate  - Identify discharge learning needs (meds, wound care, etc.)  - Arrange for interpretive services to assist at discharge as needed  - Refer to Case Management Department for coordinating discharge planning if the patient needs post-hospital services based on physician/advanced practitioner order or complex needs related to functional status, cognitive ability, or social support system  Outcome: Progressing     Problem: Knowledge Deficit  Goal: Patient/family/caregiver demonstrates understanding of disease process, treatment plan, medications, and discharge instructions  Description: Complete learning assessment and assess knowledge base.   Interventions:  - Provide teaching at level of understanding  - Provide teaching via preferred learning methods  Outcome: Progressing     Problem: MOBILITY - ADULT  Goal: Maintain or return to baseline ADL function  Description: INTERVENTIONS:  -  Assess patient's ability to carry out ADLs; assess patient's baseline for ADL function and identify physical deficits which impact ability to perform ADLs (bathing, care of mouth/teeth, toileting, grooming, dressing, etc.)  - Assess/evaluate cause of self-care deficits   - Assess range of motion  - Assess patient's mobility; develop plan if impaired  - Assess patient's need for assistive devices and provide as appropriate  - Encourage maximum independence but intervene and supervise when necessary  - Involve family in performance of ADLs  - Assess for home care needs following discharge   - Consider OT consult to assist with ADL evaluation and planning for discharge  - Provide patient education as appropriate  Outcome: Progressing  Goal: Maintains/Returns to pre admission functional level  Description: INTERVENTIONS:  - Perform BMAT or MOVE assessment daily.   - Set and communicate daily mobility goal to care team and patient/family/caregiver. - Collaborate with rehabilitation services on mobility goals if consulted  - Perform Range of Motion 3 times a day. - Reposition patient every 2 hours.   - Dangle patient 3 times a day  - Stand patient 3 times a day  - Ambulate patient 3 times a day  - Out of bed to chair 3 times a day   - Out of bed for meals 3 times a day  - Out of bed for toileting  - Record patient progress and toleration of activity level   Outcome: Progressing     Problem: Prexisting or High Potential for Compromised Skin Integrity  Goal: Skin integrity is maintained or improved  Description: INTERVENTIONS:  - Identify patients at risk for skin breakdown  - Assess and monitor skin integrity  - Assess and monitor nutrition and hydration status  - Monitor labs   - Assess for incontinence   - Turn and reposition patient  - Assist with mobility/ambulation  - Relieve pressure over bony prominences  - Avoid friction and shearing  - Provide appropriate hygiene as needed including keeping skin clean and dry  - Evaluate need for skin moisturizer/barrier cream  - Collaborate with interdisciplinary team   - Patient/family teaching  - Consider wound care consult   Outcome: Progressing     Problem: Nutrition/Hydration-ADULT  Goal: Nutrient/Hydration intake appropriate for improving, restoring or maintaining nutritional needs  Description: Monitor and assess patient's nutrition/hydration status for malnutrition. Collaborate with interdisciplinary team and initiate plan and interventions as ordered. Monitor patient's weight and dietary intake as ordered or per policy. Utilize nutrition screening tool and intervene as necessary. Determine patient's food preferences and provide high-protein, high-caloric foods as appropriate.      INTERVENTIONS:  - Monitor oral intake, urinary output, labs, and treatment plans  - Assess nutrition and hydration status and recommend course of action  - Evaluate amount of meals eaten  - Assist patient with eating if necessary   - Allow adequate time for meals  - Recommend/ encourage appropriate diets, oral nutritional supplements, and vitamin/mineral supplements  - Order, calculate, and assess calorie counts as needed  - Recommend, monitor, and adjust tube feedings and TPN/PPN based on assessed needs  - Assess need for intravenous fluids  - Provide specific nutrition/hydration education as appropriate  - Include patient/family/caregiver in decisions related to nutrition  Outcome: Progressing

## 2023-11-10 NOTE — OCCUPATIONAL THERAPY NOTE
Occupational Therapy Progress Note     Patient Name: Devora Matson  [id number] Date: 11/10/2023  Problem List  Principal Problem:    Compartment syndrome [facility])  Active Problems:    Hypothyroid    Chronic pain    Fall    Type 2 diabetes mellitus ([address] [facility])         Occupational Therapy Treatment Note     11/10/23 1330   OT Last Visit   OT Visit Date 11/10/23   Note Type   Note Type Treatment   Pain Assessment   Pain Assessment Tool 0-10   Restrictions/Precautions   Weight Bearing Precautions Per Order Yes   RUE Weight Bearing Per Order NWB   Braces or Orthoses   (shoes but unable to specify what kind)   Lifestyle   Autonomy PTA, pt reports needing A with ADLs/IADLs, SPC for fnxl mobility, (+)    Reciprocal Relationships HHA 40 hrs/wk, meals on wheels, supportive family   Service to Others On disability   Intrinsic Gratification Singing, spending time with her 2 twin cats   ADL   Where Assessed Standing at sink   Grooming Assistance 5  Supervision/Setup   Grooming Deficit Wash/dry hands   UB Dressing Assistance 4  Minimal Assistance   LB Dressing Assistance 3  Moderate Assistance   Toileting Assistance  5  Supervision/Setup   Bed Mobility   Additional Comments presented sitting out of bed to bedside chair upon initiation of OT session   Transfers   Sit to Stand 5  Supervision   Additional items Assist x 1   Stand to Sit 5  Supervision   Additional items Assist x 1   Toilet transfer 4  Minimal assistance   Additional items Assist x 1; Increased time required   Functional Mobility   Functional Mobility 5  Supervision   Additional Comments assist to manage wound vac   Toilet Transfers   Toilet Transfer From Bed   Toilet Transfer Type To and from   Toilet Transfer to Standard toilet   Toilet Transfer Technique Ambulating   Toilet Transfers Minimal assistance   Subjective   Subjective pt stated " they said they are thinking of taking this off me"   Cognition   Overall Cognitive Status [facility]   Arousal/Participation Alert; Cooperative   Attention Attends with cues to redirect   Orientation Level Oriented X4   Memory Decreased short term memory   Following Commands Follows one step commands without difficulty   Activity Tolerance   Activity Tolerance Patient tolerated treatment well   Assessment   Assessment Pt seen for Occupational Therapy session with focus on activity tolerance, functional transfers/mob, sitting balance and tolerance and standing tolerance and balance for pt engagement in LB self-care tasks and toileting/toilet transfers. Pt cleared by Jeff Palacios for pt participated in OT session. Pt presented sitting out of bed to bedside chair pt identifiers confirmed. Pt reported her therapy goal to take a walk, use the bathroom and get back to bed. Pt required assist for one handed dressing 2* pt coordination and limited by pt wound. She was able to tolerate one handed dressing however continues to require assist for LB dressing/donning socks to her R LB Pt will require post acute rehab service to continue to address these above noted pt deficit which currently impair pt ADL and functional mob. Pt return to bed post session bed alarm activated and all needs within reach. Plan   Treatment Interventions ADL retraining;Functional transfer training;UE strengthening/ROM; Patient/family training   Goal Expiration Date 11/11/23   OT Treatment Day 4   OT Frequency 2-3x/wk   Discharge Recommendation   Rehab Resource Intensity Level, OT II (Moderate Resource Intensity)   AM-PAC Daily Activity Inpatient   Lower Body Dressing 3   Bathing 3   Toileting 3   Upper Body Dressing 3   Grooming 3   Eating 4   Daily Activity Raw Score 19   Daily Activity Standardized Score (Calc for Raw Score >=11) 40.22   AM-PAC Applied Cognition Inpatient   Following a Speech/Presentation 4   Understanding Ordinary Conversation 4   Taking Medications 4   Remembering Where Things Are Placed or Put Away 4   Remembering List of 4-5 Errands 3   Taking Care of Complicated Tasks 3   Applied Cognition Raw Score 22   Applied Cognition Standardized Score 47.83   Barthel Index   Grooming Score 0   Dressing Score 5   Toilet Use Score 5   Transfers (Bed/Chair) Score 10   Mobility (Level Surface) Score 0       Monalisa MAX/REMIGIO

## 2023-11-10 NOTE — PHYSICAL THERAPY NOTE
Physical Therapy Progress Note     11/10/23 0905   PT Last Visit   PT Visit Date 11/10/23   Note Type   Note Type Treatment   Pain Assessment   Pain Assessment Tool FLACC   Pain Rating: FLACC (Rest) - Face 1   Pain Rating: FLACC (Rest) - Legs 1   Pain Rating: FLACC (Rest) - Activity 1   Pain Rating: FLACC (Rest) - Cry 1   Pain Rating: FLACC (Rest) - Consolability 0   Score: FLACC (Rest) 4   Pain Rating: FLACC (Activity) - Face 2   Pain Rating: FLACC (Activity) - Legs 1   Pain Rating: FLACC (Activity) - Activity 1   Pain Rating: FLACC (Activity) - Cry 1   Pain Rating: FLACC (Activity) - Consolability 0   Score: FLACC (Activity) 5   Restrictions/Precautions   RUE Weight Bearing Per Order NWB   Other Precautions Pain; Fall Risk;Multiple lines  (wound vac RUE)   Subjective   Subjective Pt encountered supine in bed, pleasant and agreeable to treatment. Reports controlled pain at this time, although this has not necessarily always been the case recently given surgical procedure & baseline pain disorder. No change in status noted compared to previous sessions. Minor bloody drainage leaking from graft donor site, which was reinforced with gauze during session. RN informed of the same post activity. Bed Mobility   Supine to Sit 5  Supervision   Sit to Supine 5  Supervision   Transfers   Sit to Stand 5  Supervision   Additional items Assist x 1   Stand to Sit 5  Supervision   Additional items Assist x 1   Ambulation/Elevation   Gait pattern Excessively slow; Short stride;Circumduction;Decreased foot clearance;Narrow RADHA; Improper Weight shift  (guarded posture with R shoulder elevation & abduction throughout)   Gait Assistance 5  Supervision   Additional items Assist x 1   Assistive Device [facility]   Distance 90', 130'   Stair Management Assistance 5  Supervision   Additional items Assist x 1   Stair Management Technique One rail L;Step to pattern; Foreward; Sideways   Number of Stairs 3   Balance   Static Sitting Good   Static Standing Fair +   Ambulatory Fair   Activity Tolerance   Activity Tolerance Patient tolerated treatment well;Patient limited by fatigue;Patient limited by pain   Nurse [address] RN   Assessment   Prognosis Good   Problem List Decreased strength;Decreased range of motion;Decreased endurance; Impaired balance;Decreased skin integrity;Pain   Assessment pt continues to make steady progress in all aspects of mobiilty today, performing transfers without need for assist, then ambulating on levels & steps with baseline equipment & simulated set up without LOB. She demosntrates appropriate sequencing of tasks & safety awareness throughout. Seated rest only taken to safely address noted increase in drainage escaping from under dressing on skin graft donor site. Pt was able to walk total community distances without complaints of excessive fatigue & reports she has been walking with staff outside of PT sessions as well. Discussion held with pt regarding role of PT, d/c planning, and available home support at d/c. From PT standpoint, pt has demosntrated suffcieint support to d/c home with social support available to her from [facility] & family , and recommend follow up PT services to progress to PLOF. Will benefit from dedicated hand therapy once medically cleared for activity by RUE managing team.  Discussed pt's progress with supervising PT, Holly Solo DPT, and will d/c acute hospital PT at this time since pt no longer requires skilled interventions to enter & negotiate home environment at d/c. Pt instructed to mobilize with staff during remainder of hospital stay to prevent rsisk associated with immobility at this time. Goals   Patient Goals to get better & go home   LTG Expiration Date 11/11/23   PT Treatment Day 4   Plan   Treatment/Interventions Functional transfer training;LE strengthening/ROM; Elevations; Endurance training; Therapeutic exercise;Patient/family training;Equipment eval/education; Bed mobility;Gait training   Progress Progressing toward goals   PT Frequency 3-5x/wk   Discharge Recommendation   Rehab Resource Intensity Level, PT III (Minimum Resource Intensity)   Equipment Recommended   (pt owns DME)   AM-PAC Basic Mobility Inpatient   Turning in Flat Bed Without Bedrails 4   Lying on Back to Sitting on Edge of Flat Bed Without Bedrails 4   Moving Bed to Chair 4   Standing Up From Chair Using Arms 4   Walk in Room 4   Climb 3-5 Stairs With Railing 3   Basic Mobility Inpatient Raw Score 23   Basic Mobility Standardized Score 50.88   Highest Level Of Mobility   JH-HLM Goal 7: Walk 25 feet or more   JH-HLM Achieved 7: Walk 25 feet or more       Brenda Toney PTA    An [facility] Basic Mobility Raw Score less than 17 suggests pt would benefit from post acute rehab. Please also refer to the recommendation of the Physical Therapist for safe discharge planning.

## 2023-11-10 NOTE — PROGRESS NOTES
[address]  Progress Note  Name: Skip Giron  MRN: [id number]  [facility] Date of Admission: 10/27/2023   Date of Service: 11/10/2023 I Hospital Day: 14    Assessment/Plan   Type 2 diabetes mellitus [facility])  Assessment & Plan  Lab Results   Component Value Date    HGBA1C 5.8 (H) 01/19/2023       Blood Sugar Average: Last 72 hrs:  (P) 110    - Diet controlled at home  - Glucose well controlled inpatient, no SSI needed    [address]  Patient had unwitnessed fall.  + Lightheadedness, CP  - Syncopal workup: EKG Unremarkable, Troponin Negative, Echo unremarkable  - PT/OT evaluation and treatment as indicated    Chronic pain  Assessment & Plan  Patient with chronic patient Fibromyalgia, chronic LLB pain, chronic pain syndrome, peripheral neuropathy  - APS consult appreciated    - S/p right infraclavicular block with Exparel on 10/27    - Weaned off ketamine 11/2   - Continue to wean pain meds   - Discharge recommendations noted  - Continue multimodal pain regimen    Hypothyroid  Assessment & Plan  - Continue home levothyroxine    * Compartment syndrome [facility])  Assessment & Plan  Patient presented with crush injury to RUE  - Initial CK 5942 increased to 13,683, normalized postoperatively  - 10/27 RUE fasciotomy due to compartment syndrome, VAC placement  - 10/29 washout and partial closure   - 10/31 VAC change  - 11/2 VAC change  - 11/4 partial wx closure, Oasis placement, VAC  - 11/7 VAC takedown, STSG, VAC replacement   - Plan for VAC takedown POD 4, 11/11  - Continue to monitor neurovascular status  -Monitor VAC output  -Apply Ace bandage from the hand to the right axilla             Bowel Regimen: Ordered  VTE Prophylaxis:Enoxaparin (Lovenox)     Disposition: MedSur    Subjective   Chief Complaint: Found down    Subjective: Pain is well controlled.    strength appears to be improving daily, but still has significantly decreased sensation in the right fifth digit. Objective   Vitals:   Temp:  [97.4 °F (36.3 °C)-98.1 °F (36.7 °C)] 97.6 °F (36.4 °C)  HR:  [63-69] 65  Resp:  [16-18] 16  BP: (103-134)/(72-84) 110/76    I/O         11/08 0701 11/09 0700 11/09 0701  11/10 0700 11/10 0701 11/11 0700    P. O. 1162 480     I.V. (mL/kg)       Total Intake(mL/kg) 1162 (14.4) 480 (5.9)     Urine (mL/kg/hr) 1750 (0.9) 3700 (1.9)     Drains  50     Stool 0      Total Output 1750 3750     Net -588 -3270            Unmeasured Urine Occurrence 2 x      Unmeasured Stool Occurrence 0 x               Physical Exam:   Gen: NAD, Comfortable  Neuro: A&O, No focal deficits  Head: Normal Cephalic, Atraumatic  Eye: EOMI, PERRLA, No scleral icterus  Neck: Supple, No JVD, Midline trachea  CV: RRR, Cap refill <2 sec  Pulm: Normal work of breathing, no respiratory distress  Abd: Soft, Non-Distended, Non-Tender   Ext: Decrease edema to the right forearm, however upper portion of RUE is still edematous. Pain is well controlled. Wound VAC in place with no output over the last 24 hours.   Skin: warm, dry, intact      Invasive Devices       Peripheral Intravenous Line  Duration             Peripheral IV 11/09/23 Left;Distal Forearm <1 day                          Lab Results: Results: I have personally reviewed all pertinent laboratory/tests results and CBC:   Lab Results   Component Value Date    WBC 9.73 11/10/2023    HGB 11.9 11/10/2023    HCT 36.0 11/10/2023    MCV 99 (H) 11/10/2023     11/10/2023    RBC 3.65 (L) 11/10/2023    MCH 32.6 11/10/2023    MCHC 33.1 11/10/2023    RDW 13.2 11/10/2023    MPV 9.8 11/10/2023    NRBC 0 11/10/2023

## 2023-11-11 LAB — GLUCOSE SERPL-MCNC: 93 MG/DL (ref 65–140)

## 2023-11-11 PROCEDURE — 99024 POSTOP FOLLOW-UP VISIT: CPT | Performed by: SURGERY

## 2023-11-11 PROCEDURE — 82948 REAGENT STRIP/BLOOD GLUCOSE: CPT

## 2023-11-11 RX ADMIN — LEVOTHYROXINE SODIUM 137 MCG: 25 TABLET ORAL at 05:03

## 2023-11-11 RX ADMIN — ENOXAPARIN SODIUM 30 MG: 30 INJECTION SUBCUTANEOUS at 05:03

## 2023-11-11 RX ADMIN — PREGABALIN 100 MG: 100 CAPSULE ORAL at 21:49

## 2023-11-11 RX ADMIN — UMECLIDINIUM BROMIDE AND VILANTEROL TRIFENATATE 1 PUFF: 62.5; 25 POWDER RESPIRATORY (INHALATION) at 08:40

## 2023-11-11 RX ADMIN — LIDOCAINE 5% 1 PATCH: 700 PATCH TOPICAL at 08:40

## 2023-11-11 RX ADMIN — HYDROMORPHONE HYDROCHLORIDE 4 MG: 2 TABLET ORAL at 05:03

## 2023-11-11 RX ADMIN — HYDROMORPHONE HYDROCHLORIDE 4 MG: 2 TABLET ORAL at 14:30

## 2023-11-11 RX ADMIN — LATANOPROST 1 DROP: 50 SOLUTION OPHTHALMIC at 22:49

## 2023-11-11 RX ADMIN — METHOCARBAMOL 250 MG: 750 TABLET ORAL at 16:57

## 2023-11-11 RX ADMIN — HYDROMORPHONE HYDROCHLORIDE 4 MG: 2 TABLET ORAL at 11:10

## 2023-11-11 RX ADMIN — HYDROMORPHONE HYDROCHLORIDE 4 MG: 2 TABLET ORAL at 19:48

## 2023-11-11 RX ADMIN — SENNOSIDES 8.6 MG: 8.6 TABLET, FILM COATED ORAL at 22:48

## 2023-11-11 RX ADMIN — METHOCARBAMOL 250 MG: 750 TABLET ORAL at 05:03

## 2023-11-11 RX ADMIN — METHOCARBAMOL 250 MG: 750 TABLET ORAL at 11:10

## 2023-11-11 RX ADMIN — ATORVASTATIN CALCIUM 80 MG: 80 TABLET, FILM COATED ORAL at 16:59

## 2023-11-11 RX ADMIN — ENOXAPARIN SODIUM 30 MG: 30 INJECTION SUBCUTANEOUS at 16:58

## 2023-11-11 RX ADMIN — PREGABALIN 100 MG: 100 CAPSULE ORAL at 15:56

## 2023-11-11 RX ADMIN — ACETAMINOPHEN 975 MG: 325 TABLET, FILM COATED ORAL at 14:20

## 2023-11-11 RX ADMIN — ACETAMINOPHEN 975 MG: 325 TABLET, FILM COATED ORAL at 05:03

## 2023-11-11 RX ADMIN — PREGABALIN 100 MG: 100 CAPSULE ORAL at 08:40

## 2023-11-11 RX ADMIN — ACETAMINOPHEN 975 MG: 325 TABLET, FILM COATED ORAL at 22:48

## 2023-11-11 RX ADMIN — METHOCARBAMOL 250 MG: 750 TABLET ORAL at 23:27

## 2023-11-11 RX ADMIN — CLONAZEPAM 0.5 MG: 0.5 TABLET ORAL at 22:48

## 2023-11-11 RX ADMIN — NICOTINE 21 MG: 21 PATCH, EXTENDED RELEASE TRANSDERMAL at 08:40

## 2023-11-11 NOTE — ASSESSMENT & PLAN NOTE
Lab Results   Component Value Date    HGBA1C 5.8 (H) 01/19/2023       Blood Sugar Average: Last 72 hrs:  (P) 107    - Diet controlled at home  - Glucose well controlled inpatient, no SSI needed

## 2023-11-11 NOTE — QUICK NOTE
RUE STSG vac taken down gently, using NSS to loosen sponge and minimize risk of graft tearing/shear. Vac / adaptic taken down successfully, STSG is healthy, 100% take. Redressed with Xeroform, gauze, sidney, ACE. Wound care orders place for nursing to change dressing daily. R anterior thigh tegaderm/gauze take down, xeroform left in place. No sign of infection or bleeding, redressed with gauze and ABD, secured with tape.

## 2023-11-11 NOTE — PLAN OF CARE
Problem: PAIN - ADULT  Goal: Verbalizes/displays adequate comfort level or baseline comfort level  Description: Interventions:  - Encourage patient to monitor pain and request assistance  - Assess pain using appropriate pain scale  - Administer analgesics based on type and severity of pain and evaluate response  - Implement non-pharmacological measures as appropriate and evaluate response  - Consider cultural and social influences on pain and pain management  - Notify physician/advanced practitioner if interventions unsuccessful or patient reports new pain  Outcome: Progressing     Problem: INFECTION - ADULT  Goal: Absence or prevention of progression during hospitalization  Description: INTERVENTIONS:  - Assess and monitor for signs and symptoms of infection  - Monitor lab/diagnostic results  - Monitor all insertion sites, i.e. indwelling lines, tubes, and drains  - Monitor endotracheal if appropriate and nasal secretions for changes in amount and color  - [place] appropriate cooling/warming therapies per order  - Administer medications as ordered  - Instruct and encourage patient and family to use good hand hygiene technique  - Identify and instruct in appropriate isolation precautions for identified infection/condition  Outcome: Progressing

## 2023-11-11 NOTE — ASSESSMENT & PLAN NOTE
Patient presented with crush injury to RUE  - Initial CK 5942 increased to 13,683, normalized postoperatively  - 10/27 RUE fasciotomy due to compartment syndrome, VAC placement  - 10/29 washout and partial closure   - 10/31 VAC change  - 11/2 VAC change  - 11/4 partial wx closure, Oasis placement, VAC  - 11/7 VAC takedown, STSG, VAC replacement   - 11/11 VAC takedown  - wound care  - Continue to monitor neurovascular status

## 2023-11-12 PROCEDURE — 99024 POSTOP FOLLOW-UP VISIT: CPT | Performed by: STUDENT IN AN ORGANIZED HEALTH CARE EDUCATION/TRAINING PROGRAM

## 2023-11-12 RX ADMIN — ACETAMINOPHEN 975 MG: 325 TABLET, FILM COATED ORAL at 16:12

## 2023-11-12 RX ADMIN — NICOTINE 21 MG: 21 PATCH, EXTENDED RELEASE TRANSDERMAL at 09:44

## 2023-11-12 RX ADMIN — PREGABALIN 100 MG: 100 CAPSULE ORAL at 09:44

## 2023-11-12 RX ADMIN — HYDROMORPHONE HYDROCHLORIDE 0.5 MG: 1 INJECTION, SOLUTION INTRAMUSCULAR; INTRAVENOUS; SUBCUTANEOUS at 09:47

## 2023-11-12 RX ADMIN — METHOCARBAMOL 250 MG: 750 TABLET ORAL at 23:06

## 2023-11-12 RX ADMIN — SENNOSIDES 8.6 MG: 8.6 TABLET, FILM COATED ORAL at 21:38

## 2023-11-12 RX ADMIN — ENOXAPARIN SODIUM 30 MG: 30 INJECTION SUBCUTANEOUS at 17:33

## 2023-11-12 RX ADMIN — METHOCARBAMOL 250 MG: 750 TABLET ORAL at 12:09

## 2023-11-12 RX ADMIN — CLONAZEPAM 0.5 MG: 0.5 TABLET ORAL at 21:38

## 2023-11-12 RX ADMIN — HYDROMORPHONE HYDROCHLORIDE 4 MG: 2 TABLET ORAL at 21:39

## 2023-11-12 RX ADMIN — LIDOCAINE 5% 1 PATCH: 700 PATCH TOPICAL at 09:44

## 2023-11-12 RX ADMIN — PREGABALIN 100 MG: 100 CAPSULE ORAL at 21:38

## 2023-11-12 RX ADMIN — ATORVASTATIN CALCIUM 80 MG: 80 TABLET, FILM COATED ORAL at 17:33

## 2023-11-12 RX ADMIN — METHOCARBAMOL 250 MG: 750 TABLET ORAL at 06:21

## 2023-11-12 RX ADMIN — UMECLIDINIUM BROMIDE AND VILANTEROL TRIFENATATE 1 PUFF: 62.5; 25 POWDER RESPIRATORY (INHALATION) at 12:09

## 2023-11-12 RX ADMIN — ACETAMINOPHEN 975 MG: 325 TABLET, FILM COATED ORAL at 06:20

## 2023-11-12 RX ADMIN — ENOXAPARIN SODIUM 30 MG: 30 INJECTION SUBCUTANEOUS at 06:20

## 2023-11-12 RX ADMIN — HYDROMORPHONE HYDROCHLORIDE 4 MG: 2 TABLET ORAL at 06:26

## 2023-11-12 RX ADMIN — HYDROMORPHONE HYDROCHLORIDE 0.5 MG: 1 INJECTION, SOLUTION INTRAMUSCULAR; INTRAVENOUS; SUBCUTANEOUS at 16:13

## 2023-11-12 RX ADMIN — LEVOTHYROXINE SODIUM 137 MCG: 25 TABLET ORAL at 06:21

## 2023-11-12 RX ADMIN — PREGABALIN 100 MG: 100 CAPSULE ORAL at 16:12

## 2023-11-12 RX ADMIN — HYDROMORPHONE HYDROCHLORIDE 4 MG: 2 TABLET ORAL at 00:01

## 2023-11-12 RX ADMIN — ACETAMINOPHEN 975 MG: 325 TABLET, FILM COATED ORAL at 21:38

## 2023-11-12 RX ADMIN — LATANOPROST 1 DROP: 50 SOLUTION OPHTHALMIC at 21:39

## 2023-11-12 RX ADMIN — METHOCARBAMOL 250 MG: 750 TABLET ORAL at 17:33

## 2023-11-12 NOTE — PLAN OF CARE
Problem: PAIN - ADULT  Goal: Verbalizes/displays adequate comfort level or baseline comfort level  Description: Interventions:  - Encourage patient to monitor pain and request assistance  - Assess pain using appropriate pain scale  - Administer analgesics based on type and severity of pain and evaluate response  - Implement non-pharmacological measures as appropriate and evaluate response  - Consider cultural and social influences on pain and pain management  - Notify physician/advanced practitioner if interventions unsuccessful or patient reports new pain  Outcome: Progressing     Problem: INFECTION - ADULT  Goal: Absence or prevention of progression during hospitalization  Description: INTERVENTIONS:  - Assess and monitor for signs and symptoms of infection  - Monitor lab/diagnostic results  - Monitor all insertion sites, i.e. indwelling lines, tubes, and drains  - Monitor endotracheal if appropriate and nasal secretions for changes in amount and color  - [place] appropriate cooling/warming therapies per order  - Administer medications as ordered  - Instruct and encourage patient and family to use good hand hygiene technique  - Identify and instruct in appropriate isolation precautions for identified infection/condition  Outcome: Progressing

## 2023-11-13 VITALS
OXYGEN SATURATION: 96 % | TEMPERATURE: 98 F | DIASTOLIC BLOOD PRESSURE: 74 MMHG | HEART RATE: 86 BPM | WEIGHT: 178 LBS | RESPIRATION RATE: 16 BRPM | HEIGHT: 64 IN | SYSTOLIC BLOOD PRESSURE: 108 MMHG | BODY MASS INDEX: 30.39 KG/M2

## 2023-11-13 PROCEDURE — 99238 HOSP IP/OBS DSCHRG MGMT 30/<: CPT | Performed by: STUDENT IN AN ORGANIZED HEALTH CARE EDUCATION/TRAINING PROGRAM

## 2023-11-13 RX ORDER — SENNOSIDES 8.6 MG
8.6 TABLET ORAL
Qty: 14 TABLET | Refills: 0 | Status: SHIPPED | OUTPATIENT
Start: 2023-11-13 | End: 2023-11-27

## 2023-11-13 RX ORDER — METHOCARBAMOL 500 MG/1
250 TABLET, FILM COATED ORAL EVERY 6 HOURS SCHEDULED
Qty: 28 TABLET | Refills: 0 | Status: SHIPPED | OUTPATIENT
Start: 2023-11-13 | End: 2023-11-27

## 2023-11-13 RX ORDER — HYDROMORPHONE HYDROCHLORIDE 4 MG/1
4 TABLET ORAL EVERY 6 HOURS PRN
Qty: 25 TABLET | Refills: 0 | Status: SHIPPED | OUTPATIENT
Start: 2023-11-13 | End: 2023-11-23

## 2023-11-13 RX ORDER — ACETAMINOPHEN 325 MG/1
975 TABLET ORAL EVERY 8 HOURS SCHEDULED
Refills: 0
Start: 2023-11-13

## 2023-11-13 RX ORDER — LIDOCAINE 4 G/G
1 PATCH TOPICAL DAILY
Refills: 0
Start: 2023-11-13

## 2023-11-13 RX ADMIN — LIDOCAINE 5% 1 PATCH: 700 PATCH TOPICAL at 09:03

## 2023-11-13 RX ADMIN — PREGABALIN 100 MG: 100 CAPSULE ORAL at 09:04

## 2023-11-13 RX ADMIN — ACETAMINOPHEN 975 MG: 325 TABLET, FILM COATED ORAL at 05:51

## 2023-11-13 RX ADMIN — HYDROMORPHONE HYDROCHLORIDE 2 MG: 2 TABLET ORAL at 12:07

## 2023-11-13 RX ADMIN — NICOTINE 21 MG: 21 PATCH, EXTENDED RELEASE TRANSDERMAL at 09:03

## 2023-11-13 RX ADMIN — LEVOTHYROXINE SODIUM 137 MCG: 25 TABLET ORAL at 05:52

## 2023-11-13 RX ADMIN — HYDROMORPHONE HYDROCHLORIDE 4 MG: 2 TABLET ORAL at 07:58

## 2023-11-13 RX ADMIN — HYDROMORPHONE HYDROCHLORIDE 4 MG: 2 TABLET ORAL at 03:06

## 2023-11-13 RX ADMIN — ENOXAPARIN SODIUM 30 MG: 30 INJECTION SUBCUTANEOUS at 05:51

## 2023-11-13 RX ADMIN — METHOCARBAMOL 250 MG: 750 TABLET ORAL at 11:49

## 2023-11-13 RX ADMIN — UMECLIDINIUM BROMIDE AND VILANTEROL TRIFENATATE 1 PUFF: 62.5; 25 POWDER RESPIRATORY (INHALATION) at 09:01

## 2023-11-13 RX ADMIN — METHOCARBAMOL 250 MG: 750 TABLET ORAL at 05:52

## 2023-11-13 RX ADMIN — ACETAMINOPHEN 975 MG: 325 TABLET, FILM COATED ORAL at 13:50

## 2023-11-13 NOTE — ASSESSMENT & PLAN NOTE
Patient presented with crush injury to RUE  - Initial CK 5942 increased to 13,683, normalized postoperatively  - 10/27 RUE fasciotomy due to compartment syndrome, VAC placement  - 10/29 washout and partial closure   - 10/31 VAC change  - 11/2 VAC change  - 11/4 partial wx closure, Oasis placement, VAC  - 11/7 VAC takedown, STSG, VAC replacement   - 11/11 VAC takedown  - wound care  - Continue daily dressing changes  - Continue to monitor neurovascular status

## 2023-11-13 NOTE — CASE MANAGEMENT
Case Management Discharge Planning Note    Patient name Jelly Cisneros  Location [address] [place] [address] [address] [id number] MRN [id number]  : 1960 Date 2023       Current Admission Date: 10/27/2023  Current Admission Diagnosis:Compartment syndrome [facility])   Patient Active Problem List    Diagnosis Date Noted    Fall 10/27/2023    Compartment syndrome ([address]) 10/27/2023    Type 2 diabetes mellitus ([address]) 10/27/2023    Screening for colon cancer 2023    Gastroesophageal reflux disease 2023    Common bile duct dilation 2023    S/P insertion of spinal cord stimulator 2023    Pulmonary nodules/lesions, multiple 2023    Diabetic polyneuropathy associated with type 2 diabetes mellitus ([address]) 2023    Coronary artery calcification seen on CAT scan 2023    Cervical spondylosis 2023    Shortness of breath 2023    Irritable bowel syndrome with both constipation and diarrhea 2022    Chronic pain 2022    Sacroiliitis ([address]) 2022    Carotid artery aneurysm (HCC) 2022    Lumbar degenerative disc disease 2022    Lumbar radiculopathy 2022    Lumbar spondylosis 2022    Cervical disc disorder with radiculopathy of mid-cervical region 2022    Mid back pain 2021    Cervical radiculopathy 2021    Neck pain 2021    Syncope and collapse 2021    Migraine     Bipolar 1 disorder (HCC)     Depression     Chronic pain syndrome     Anxiety     Fibromyalgia, primary     Constipation 2021    Ileus ([address]) 2021    COPD (chronic obstructive pulmonary disease) ([address])     Hypertension     Hypophosphatemia 2021    Gastritis, acute 2021    Hypotension 2021    Tobacco use 2021    Stroke-like symptoms 06/15/2020    Nausea 06/15/2020    Left chest pressure 06/15/2020    TMJ syndrome 2020    Other hyperlipidemia 2019    Dizziness 2019    Hypothyroid 2019    Brain aneurysm 2019 Hypokalemia 07/19/2019    Hypertriglyceridemia 07/19/2019    Low HDL (under 40) 07/19/2019    Elevated TSH 07/19/2019    Tobacco abuse 07/19/2019    Intractable abdominal pain 07/19/2019      LOS (days): 17  Geometric Mean LOS (GMLOS) (days): 1.90  Days to GMLOS:-15.3     OBJECTIVE:  Risk of Unplanned Readmission Score: 25.93         Current admission status: Inpatient   Preferred Pharmacy:   [address] [place] [address],[address]  Phone: [phone] Fax: [phone]    Primary Care Provider: Joana Fisher MD    Primary Insurance: [facility] DUAL COMPLETE [facility] H[facility] REP  Secondary Insurance: [facility]    DISCHARGE DETAILS:    Pt's plan is for d/c home today with [facility]. Pt in agreement with this plan. Pt's son will transport home.

## 2023-11-13 NOTE — PLAN OF CARE
Problem: PAIN - ADULT  Goal: Verbalizes/displays adequate comfort level or baseline comfort level  Description: Interventions:  - Encourage patient to monitor pain and request assistance  - Assess pain using appropriate pain scale  - Administer analgesics based on type and severity of pain and evaluate response  - Implement non-pharmacological measures as appropriate and evaluate response  - Consider cultural and social influences on pain and pain management  - Notify physician/advanced practitioner if interventions unsuccessful or patient reports new pain  Outcome: Progressing     Problem: SAFETY ADULT  Goal: Patient will remain free of falls  Description: INTERVENTIONS:  - Educate patient/family on patient safety including physical limitations  - Instruct patient to call for assistance with activity   - Consult OT/PT to assist with strengthening/mobility   - Keep Call bell within reach  - Keep bed low and locked with side rails adjusted as appropriate  - Keep care items and personal belongings within reach  - Initiate and maintain comfort rounds  - Make Fall Risk Sign visible to staff  - Offer Toileting every 2 Hours, in advance of need  - Initiate/Maintain alarm  - Obtain necessary fall risk management equipment:   - Apply yellow socks and bracelet for high fall risk patients  - Consider moving patient to room near nurses station  Outcome: Progressing     Problem: DISCHARGE PLANNING  Goal: Discharge to home or other facility with appropriate resources  Description: INTERVENTIONS:  - Identify barriers to discharge w/patient and caregiver  - Arrange for needed discharge resources and transportation as appropriate  - Identify discharge learning needs (meds, wound care, etc.)  - Arrange for interpretive services to assist at discharge as needed  - Refer to Case Management Department for coordinating discharge planning if the patient needs post-hospital services based on physician/advanced practitioner order or complex needs related to functional status, cognitive ability, or social support system  Outcome: Progressing

## 2023-11-13 NOTE — RESTORATIVE TECHNICIAN NOTE
Restorative Technician Note      Patient Name: Ahmet Pemberton     Restorative Tech Visit Date: 11/13/23  Note Type: Mobility  Patient Position Upon Consult: Bedside chair  Activity Performed: Ambulated  Assistive Device: Cane  Patient Position at End of Consult: Supine;  All needs within reach    BERNARDO Nuno

## 2023-11-13 NOTE — ASSESSMENT & PLAN NOTE
Lab Results   Component Value Date    HGBA1C 5.8 (H) 01/19/2023       Blood Sugar Average: Last 72 hrs:  (P) 93    - Diet controlled at home  - Glucose well controlled inpatient, no SSI needed

## 2023-11-13 NOTE — DISCHARGE INSTR - AVS FIRST PAGE
Skin graft site discharge instructions: For the skin graft site please change dressing daily. May shower with gentle pre-cautions when washing wound. Please keep skin graft site clean and pad dry after completion. Dress daily until seen in clinic with adaptic, abd pad, kerlix, and ACE wraps. Please use tape to secure in place. For the donor site; please keep piece of xeroform on wound site. Keep in place until Xeroform begins to scab. You do not need to cover this area with anything else. When placing on clothes, can use abd pad to prevent drainage on clothing. Otherwise when at home leave open to air to assist with healing process. Also okay to get wet. DO NOT SOAK ANY WOUNDS UNDER WATER.

## 2023-11-13 NOTE — PROGRESS NOTES
[address] [place] [address]  Progress Note  Name: Jacqueline Romero  MRN: [id number]  [facility] Date of Admission: 10/27/2023   Date of Service: 11/13/2023 I Hospital Day: 17    Assessment/Plan   Type 2 diabetes mellitus [facility])  Assessment & Plan  Lab Results   Component Value Date    HGBA1C 5.8 (H) 01/19/2023       Blood Sugar Average: Last 72 hrs:  (P) 93    - Diet controlled at home  - Glucose well controlled inpatient, no SSI needed    [address]  Patient had unwitnessed fall.  + Lightheadedness, CP  - Syncopal workup: EKG Unremarkable, Troponin Negative, Echo unremarkable  - PT/OT evaluation and treatment as indicated    Chronic pain  Assessment & Plan  Patient with chronic patient Fibromyalgia, chronic LLB pain, chronic pain syndrome, peripheral neuropathy  - APS consult appreciated    - S/p right infraclavicular block with Exparel on 10/27    - Weaned off ketamine 11/2   - Continue to wean pain meds   - Discharge recommendations noted  - Continue multimodal pain regimen    Hypothyroid  Assessment & Plan  - Continue home levothyroxine    * Compartment syndrome [facility])  Assessment & Plan  Patient presented with crush injury to RUE  - Initial CK 5942 increased to 13,683, normalized postoperatively  - 10/27 RUE fasciotomy due to compartment syndrome, VAC placement  - 10/29 washout and partial closure   - 10/31 VAC change  - 11/2 VAC change  - 11/4 partial wx closure, Oasis placement, VAC  - 11/7 VAC takedown, STSG, VAC replacement   - 11/11 VAC takedown  - wound care  - Continue to monitor neurovascular status             Bowel Regimen: Senokot  VTE Prophylaxis:Enoxaparin (Lovenox)     Disposition: Pending placement per case management    Subjective   Chief Complaint: Pain secondary to fall     Subjective: patient did well this morning, reports her pain is under control. No new acute complaints.      Objective   Vitals:   Temp:  [97.8 °F (36.6 °C)-99 °F (37.2 °C)] 97.9 °F (36.6 °C)  HR:  [66-89] 88  Resp:  [16-19] 16  BP: (109-111)/(73-76) 110/73    I/O         11/11 0701  11/12 0700 11/12 0701  11/13 0700    P. O. 120 420    Total Intake(mL/kg) 120 (1.5) 420 (5.2)    Urine (mL/kg/hr) 500 (0.3) 1400 (0.7)    Total Output 500 1400    Net -380 -980          Unmeasured Urine Occurrence  2 x             Physical Exam:   GENERAL APPEARANCE: Patient in no acute distress. HEENT: NCAT; PERRL, EOMs intact; Mucous membranes moist  CV: Regular rate and rhythm; no murmur/gallops/rubs appreciated. CHEST / LUNGS: Clear to auscultation; no wheezes/rales/rhonci. ABD: NABS; soft; non-distended; non-tender. EXT: +2 pulses bilaterally upper & lower extremities; no edema. NEURO: GCS 15; no focal neurologic deficits; neurovascularly intact. SKIN: Warm, dry and well perfused; no rash; no jaundice. Invasive Devices       Peripheral Intravenous Line  Duration             Peripheral IV 11/09/23 Left;Distal Forearm 3 days                          Lab Results: Results: I have personally reviewed all pertinent laboratory/tests results, BMP/CMP: No results found for: "SODIUM", "K", "CL", "CO2", "ANIONGAP", "BUN", "CREATININE", "GLUCOSE", "CALCIUM", "AST", "ALT", "ALKPHOS", "PROT", "BILITOT", "EGFR", and CBC: No results found for: "WBC", "HGB", "HCT", "MCV", "PLT", "ADJUSTEDWBC", "RBC", "MCH", "MCHC", "RDW", "MPV", "NRBC"  Imaging: I have personally reviewed pertinent reports.

## 2023-11-13 NOTE — INCIDENTAL FINDINGS
The following findings require follow up:  Radiographic finding   Finding:   HEART/GREAT VESSELS: Coronary artery calcification. Ascending thoracic aorta diameter within normal limits. Prominence of the posterior arch of the thoracic aorta measuring up to 3.7 cm diameter appears unchanged when measured by the same radiologist at   the same position. LIVER/BILIARY TREE: The liver is mildly enlarged, measuring approximately 19.5 cm craniocaudal dimension. Hepatic cysts unchanged. KIDNEYS/URETERS: There is a tiny punctate nonobstructing calculus in the upper pole right kidney. One or more sharply circumscribed subcentimeter renal hypodensities are present, too small to accurately characterize, and statistically most likely benign findings. According to recent literature (Radiology 2019) no further workup of these findings is recommended. ABDOMINAL WALL/INGUINAL REGIONS: Tiny fat-containing umbilical hernia. Discussed findings and follow up with patient. Patient verbalized understanding.       Follow up should be done within 1 month(s)    Please notify the following clinician to assist with the follow up:   Dr. Lamar Connolly

## 2023-11-13 NOTE — DISCHARGE SUMMARY
[address] [facility]  Discharge- Josey Woodall 1960, 65 y.o. female MRN: [id number]  Unit/Bed#: [facility] 622-01 Encounter: [id number]  Primary Care Provider: Christin Joshi MD   Date and time admitted to hospital: 10/27/2023  3:42 AM    Type 2 diabetes mellitus [facility])  Assessment & Plan  Lab Results   Component Value Date    HGBA1C 5.8 (H) 01/19/2023       Blood Sugar Average: Last 72 hrs:  (P) 93    - Diet controlled at home  - Glucose well controlled inpatient, no SSI needed    [address]  Patient had unwitnessed fall.  + Lightheadedness, CP  - Syncopal workup: EKG Unremarkable, Troponin Negative, Echo unremarkable  - PT/OT evaluation and treatment as indicated    Chronic pain  Assessment & Plan  Patient with chronic patient Fibromyalgia, chronic LLB pain, chronic pain syndrome, peripheral neuropathy  - APS consult appreciated    - S/p right infraclavicular block with Exparel on 10/27    - Weaned off ketamine 11/2   - Continue to wean pain meds   - Discharge recommendations noted  - Continue multimodal pain regimen    Hypothyroid  Assessment & Plan  - Continue home levothyroxine    * Compartment syndrome [facility])  Assessment & Plan  Patient presented with crush injury to RUE  - Initial CK 5942 increased to 13,683, normalized postoperatively  - 10/27 RUE fasciotomy due to compartment syndrome, VAC placement  - 10/29 washout and partial closure   - 10/31 VAC change  - 11/2 VAC change  - 11/4 partial wx closure, Oasis placement, VAC  - 11/7 VAC takedown, STSG, VAC replacement   - 11/11 VAC takedown  - wound care  - Continue daily dressing changes  - Continue to monitor neurovascular status        Bowel Regimen: Senna  VTE Prophylaxis:Enoxaparin (Lovenox)     Disposition: Medically stable for discharge home with VNA    Subjective   Chief Complaint: No new complaints    Subjective: Patient reports she is doing well this morning and is excited for discharge home.  She reports some increased swelling in her right hand which she believes is due to the forearm dressing being wrapped too tight. Dressing was taken down and rewrapped for comfort. Objective   Vitals:   Temp:  [97.9 °F (36.6 °C)-98 °F (36.7 °C)] 98 °F (36.7 °C)  HR:  [76-88] 86  Resp:  [16] 16  BP: (108-110)/(73-74) 108/74    I/O         11/11 0701  11/12 0700 11/12 0701 11/13 0700 11/13 0701  11/14 0700    P. O. 120 420 240    Total Intake(mL/kg) 120 (1.5) 420 (5.2) 240 (3)    Urine (mL/kg/hr) 500 (0.3) 2300 (1.2) 800 (1)    Stool   0    Total Output 500 2300 800    Net -380 -1880 -560           Unmeasured Urine Occurrence  2 x 2 x    Unmeasured Stool Occurrence   0 x             Physical Exam:   GENERAL APPEARANCE: Patient in no acute distress. HEENT: NCAT; PERRL, EOMs intact; Mucous membranes moist  NECK / BACK: ROM normal  CV: Regular rate and rhythm; no murmur/gallops/rubs appreciated. CHEST / LUNGS: Clear to auscultation; no wheezes/rales/rhonci. ABD: NABS; soft; non-distended; non-tender. : voiding  EXT: right forearm dressing clean, dry, intact; RUE NVI, all compartments soft, nontender; +2 pulses bilaterally upper & lower extremities; no edema. NEURO: GCS 15; no focal neurologic deficits; neurovascularly intact. SKIN: Warm, dry and well perfused; no rash; no jaundice. Invasive Devices       None                         Lab Results: Results: I have personally reviewed all pertinent laboratory/tests results  Imaging: I have personally reviewed pertinent reports. Other Studies: None         Medical Problems       Resolved Problems  Date Reviewed: 11/13/2023            Resolved    Diarrhea 11/7/2023     Resolved by  Danielle Doty MD          Admission Date:   Admission Orders (From admission, onward)       Ordered        10/27/23 0446  Inpatient Admission  Once                            Admitting Diagnosis: Chronic pain syndrome [G89.4]  Fall, initial encounter [W19. XXXA]  Traumatic compartment syndrome of right upper extremity, initial encounter ([facility] Malka Johnson. A11A]    HPI: Documented by Natalia Redmond MD on 10/27/2023, "Mikaela Nation is a 65 y.o. female with history of anxiety, bipolar disorder, chronic pain syndrome, spinal stenosis, T-spine stimulator, fibromyalgia, upper and lower extremity neuropathy, DM, HLD, who presents as a transfer from [address] [place] [address] following an unwitnessed fall. Patient states sometime early yesterday, she was in the kitchen, and blacked out. She recalls feeling lightheaded, and thinks she may have felt some chest pain. At baseline she ambulates with a cane. She was found down by family member and was noted to have altered mental status prior to being sent to [address] [place] [address]. She was transferred to [facility] after there was concern for potential RUE compartment syndrome. On exam she has a contusion to the right forehead, and swelling to the RUE, with firmness to the dorsal forearm, with overlying rubor. She has decreased sensation on exam, however the tenderness elicited seems proportional to the rest of her exam.  She complains of total body pain, and states she takes daily Vicodin to manage her pain. EKG and troponins were normal.  CK on exam was noted to be 5942. Arm was splinted at [facility] due to concern for a right distal radius fracture."    Procedures Performed: No orders of the defined types were placed in this encounter. Summary of Hospital Course: Patient was seen and evaluated by the trauma team and admitted with findings of right upper extremity compartment syndrome following an unwitnessed fall. She was taken to the OR on admission for a fasciotomy of the right upper extremity and wound vac placement. She was then taken back to the OR on 10/29 for washout and partial closure of the surgical incision. On 11/04, patient was taken back to the OR for oasis application and wound vac placement. Patient was then taken for a skin graft on 11/07. Patient tolerated all procedures well and her pain was well managed while inpatient. PT/OT evaluated patient and recommended discharge home with VNA. Patient was stable for discharge on 11/13/2023. She will follow up in the surgery clinic in approximately 1 week. Significant Findings, Care, Treatment and Services Provided: Right upper extremity compartment syndrome    Complications: None    Condition at Discharge: good         Discharge instructions/Information to patient and family:   See after visit summary for information provided to patient and family. Provisions for Follow-Up Care:  See after visit summary for information related to follow-up care and any pertinent home health orders. PCP: Jewels Flaherty MD    Disposition: Home    Planned Readmission: No    Discharge Statement   I spent 26 minutes discharging the patient. This time was spent on the day of discharge. I had direct contact with the patient on the day of discharge. Additional documentation is required if more than 30 minutes were spent on discharge. Discharge Medications:  See after visit summary for reconciled discharge medications provided to patient and family.

## 2023-11-20 ENCOUNTER — HOSPITAL ENCOUNTER (EMERGENCY)
Facility: HOSPITAL | Age: 63
Discharge: HOME | End: 2023-11-20
Attending: EMERGENCY MEDICINE
Payer: COMMERCIAL

## 2023-11-20 ENCOUNTER — APPOINTMENT (EMERGENCY)
Dept: RADIOLOGY | Facility: HOSPITAL | Age: 63
End: 2023-11-20
Payer: COMMERCIAL

## 2023-11-20 VITALS
TEMPERATURE: 97.9 F | DIASTOLIC BLOOD PRESSURE: 65 MMHG | OXYGEN SATURATION: 99 % | HEART RATE: 74 BPM | RESPIRATION RATE: 20 BRPM | SYSTOLIC BLOOD PRESSURE: 129 MMHG
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
## 2023-11-20 PROCEDURE — 70450 CT HEAD/BRAIN W/O DYE: CPT

## 2023-11-20 PROCEDURE — G1004 CDSM NDSC: HCPCS

## 2023-11-20 PROCEDURE — 93005 ELECTROCARDIOGRAM TRACING: CPT

## 2023-11-20 PROCEDURE — 99284 EMERGENCY DEPT VISIT MOD MDM: CPT

## 2023-11-20 PROCEDURE — 99284 EMERGENCY DEPT VISIT MOD MDM: CPT | Performed by: EMERGENCY MEDICINE

## 2023-11-20 RX ORDER — GINSENG 100 MG
1 CAPSULE ORAL ONCE
Status: COMPLETED | OUTPATIENT
Start: 2023-11-20 | End: 2023-11-20

## 2023-11-20 RX ADMIN — BACITRACIN ZINC 1 SMALL APPLICATION: 500 OINTMENT TOPICAL at 12:36

## 2023-11-20 NOTE — ED NOTES
Wheel chair pickup for [facility]5 [address] [place] [address]  11/20/23 [address] [facility]  11/20/23 5607

## 2023-11-20 NOTE — DISCHARGE INSTRUCTIONS
The scan of your head looks normal.  It does not appear that you have any injuries from your fall other than some small skin abrasions. Please keep these areas clean and dry. You should return to the emergency room if you have sudden onset of headache, if you are unable to walk, or if you feel like you are going to pass out.

## 2023-11-20 NOTE — ED PROVIDER NOTES
History  Chief Complaint   Patient presents with    Fall     Pt states that she [facility] of [facility], fell and hit back of  head. Abrasion on foot, toe and knee. Pt recently had compartment syndrome on right arm. HPI  43-year-old female hypertension, hypothyroidism, hyperlipidemia, diabetes, known brain aneurysm that is monitored with MRI presents the ED for evaluation after a fall. She says she was standing outside of the [employer] today and turned around and got caught on her cane and fell onto her right side. She says that the right side of her body hit the concrete sidewalk, but her head hit the mulch. She was helped up and was able to bear weight after the fall with her cane. She says that she was told that because she has the aneurysm that she needs to get a scan of her head. She is also complaining of a superficial abrasion on her right knee as well as some right knee pain. She denies numbness, weakness, lacerations, gait problem. She was admitted to trauma service at the end of October for a fall during which she had to get a fasciotomy of her right forearm with skin grafting. Prior to Admission Medications   Prescriptions Last Dose Informant Patient Reported? Taking?    Alcohol Swabs (Alcohol Pads) 70 % PADS   Yes No   Sig: USE TO TEST BLOOD GLUCOSE 2-3 TIMES DAILY   Buprenorphine HCl (Belbuca) 300 MCG FILM  Self Yes No   Sig: Apply 300 mcg to cheek in the morning   Butalbital-APAP-Caffeine (FIORICET PO)   Yes No   Sig: Take by mouth as needed   Galcanezumab-gnlm (Emgality) 120 MG/ML SOAJ  Self Yes No   Sig: Inject 120 mg under the skin every 30 (thirty) days Last inj 1/19/23   HYDROmorphone (DILAUDID) 4 mg tablet   No No   Sig: Take 1 tablet (4 mg total) by mouth every 6 (six) hours as needed for severe pain for up to 10 days Max Daily Amount: 16 mg   Lidocaine 4 % PTCH   No No   Sig: Apply 1 patch topically daily   Melatonin 10 MG CAPS  Self Yes No   Sig: Take 1 tablet by mouth daily at bedtime as needed   OneTouch Ultra test strip   Yes No   Sig: USE TO TEST BLOOD GLUCOSE 2-3 TIMES DAILY   True Comfort Safety Lancets MISC   Yes No   Sig: USE TO TEST BLOOD GLUCOSE 2-3 TIMES DAILY   acetaminophen (TYLENOL) 325 mg tablet   No No   Sig: Take 3 tablets (975 mg total) by mouth every 8 (eight) hours   albuterol (PROVENTIL HFA,VENTOLIN HFA) 90 mcg/act inhaler  Self Yes No   Sig: Inhale 2 puffs every 6 (six) hours as needed   ammonium lactate (LAC-HYDRIN) 12 % lotion   Yes No   Sig: as needed   atorvastatin (LIPITOR) 80 mg tablet   Yes No   Sig: Take 80 mg by mouth every evening   benzonatate (TESSALON PERLES) 100 mg capsule   No No   Sig: Take 1 capsule (100 mg total) by mouth 3 (three) times a day as needed for cough   carboxymethylcellulose 0.5 % SOLN  Self No No   Sig: Administer 1 drop to both eyes daily as needed for dry eyes   clonazePAM (KlonoPIN) 0.5 mg tablet  Self Yes No   Sig: Take 0.5 mg by mouth daily at bedtime   clonazePAM (KlonoPIN) 1 mg tablet  Self Yes No   Sig: Take 1 mg by mouth daily in the early morning   dicyclomine (BENTYL) 20 mg tablet  Self Yes No   Sig: Take 20 mg by mouth every 6 (six) hours   famotidine (PEPCID) 40 MG tablet   No No   Sig: Take 1 tablet (40 mg total) by mouth daily at bedtime   fenofibrate (TRICOR) 48 mg tablet  Self Yes No   Sig: Take 48 mg by mouth daily   fluticasone (FLONASE) 50 mcg/act nasal spray   Yes No   Si sprays into each nostril daily   fluticasone-umeclidinium-vilanterol (Trelegy Ellipta) 200-62.5-25 mcg/actuation AEPB inhaler   No No   Sig: Inhale 1 puff daily Rinse mouth after use.    furosemide (LASIX) 20 mg tablet  Self Yes No   Sig: Take 20 mg by mouth as needed Taking as needed   lamoTRIgine (LaMICtal) 100 mg tablet   Yes No   Sig: Take 100 mg by mouth 2 (two) times a day   latanoprost (XALATAN) 0.005 % ophthalmic solution   Yes No   Sig: instill 1 drop into both eyes at bedtime   levalbuterol (XOPENEX) 1.25 mg/3 mL nebulizer solution   Yes No   Sig: Inhale 1.25 mg every 4 (four) hours as needed   levothyroxine 137 mcg tablet  Self Yes No   Sig: Take 137 mcg by mouth daily   magnesium 30 MG tablet  Self Yes No   Sig: Take 500 mg by mouth 2 (two) times a day 23 right now not taking   methocarbamol (ROBAXIN) 500 mg tablet   No No   Sig: Take 0.5 tablets (250 mg total) by mouth every 6 (six) hours for 14 days   methylPREDNISolone 4 MG tablet therapy pack   No No   Sig: Use as directed on package   metroNIDAZOLE (FLAGYL) 500 mg tablet   Yes No   Sig: Take 1 tablet by mouth 2 (two) times a day   omeprazole (PriLOSEC) 40 MG capsule   No No   Sig: Take 1 capsule (40 mg total) by mouth 2 (two) times a day   ondansetron (ZOFRAN) 8 mg tablet   No No   Sig: Take 1 tablet (8 mg total) by mouth every 8 (eight) hours as needed for nausea or vomiting   polyethylene glycol (GLYCOLAX) 17 GM/SCOOP powder   No No   Sig: Take 17 g daily for constipation. pregabalin (LYRICA) 100 mg capsule  Self Yes No   Sig: Take 100 mg by mouth 3 (three) times a day Morning-lunch-dinner   pregabalin (LYRICA) 150 mg capsule  Self Yes No   Sig: take 1 capsule by mouth NIGHTLY   rizatriptan (MAXALT-MLT) 10 mg disintegrating tablet   Yes No   Sig: TAKE 1 TABLET BY MOUTH ONE TIME AS NEEDED FOR MIGRAINE, MAY REPEA. ..  (REFER TO PRESCRIPTION NOTES).    senna (SENOKOT) 8.6 mg   No No   Sig: Take 1 tablet (8.6 mg total) by mouth daily at bedtime for 14 days   tiZANidine (ZANAFLEX) 4 mg tablet   Yes No   Sig: take 2 tablets by mouth every 6 hours if needed for muscle spasm   topiramate (TOPAMAX) 200 MG tablet   Yes No   Si mg 2 (two) times a day      Facility-Administered Medications: None       Past Medical History:   Diagnosis Date    Ambulates with cane     Anxiety     Arthritis     Asthma     Bipolar 1 disorder (HCC)     Brain aneurysm     Chronic pain disorder     spinal stenosis    Concussion syndrome     neurological rx and balance rx    COPD (chronic obstructive pulmonary disease) ([address])     Depression     Diabetes mellitus ([address])     controlled w/ diet    Disease of thyroid gland     nodules     Family history of colon cancer     father    Fibromyalgia, primary     GERD (gastroesophageal reflux disease)     History of colon polyps     Hyperlipidemia     Hypertension     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     Irritable bowel syndrome     Lumbar degenerative disc disease 02/16/2022    Migraine     Neuropathy     bilateral feet and hands    Peripheral neuropathy     Psychiatric disorder     PTSD (post-traumatic stress disorder)     Shortness of breath     Sleep apnea     had 3 studies & last one negative    Stroke ([address])     2012 no deficeits    TIA (transient ischemic attack)     Wears dentures     Wears glasses        Past Surgical History:   Procedure Laterality Date    ABDOMINAL SURGERY      laproscopic/ endometriosis    BRAIN SURGERY      aneurysm/ coiling procedure    CARPAL TUNNEL RELEASE      CHOLECYSTECTOMY      COLONOSCOPY      DENTAL SURGERY      EPIDURAL BLOCK INJECTION Right 03/03/2022    Procedure: C7-T1 interlaminar epidural steroid injection;  Surgeon: Danielito Gardiner MD;  Location: [facility];  Service: Pain Management     EPIDURAL BLOCK INJECTION N/A 04/21/2022    Procedure: C6-C7 BLOCK / INJECTION EPIDURAL STEROID CERVICAL;  Surgeon: Danielito Gardiner MD;  Location: [facility];  Service: Pain Management     EPIDURAL BLOCK INJECTION Left 06/21/2022    Procedure: BLOCK / INJECTION EPIDURAL STEROID LUMBAR  left L3-4 TFESI;  Surgeon: Danielito Gardiner MD;  Location: [facility];  Service: Pain Management     EYE SURGERY      cataract    FASCIOTOMY Right 10/27/2023    Procedure: FASCIOTOMY OF RIGHT UPPER EXTREMITY; POSSIBLE VAC DRESSING APPLICATION;  Surgeon: Juan Andrew MD;  [facility]: [facility];  Service: General    FL GUIDED NEEDLE PLAC BX/ASP/INJ  03/03/2022    FL GUIDED NEEDLE PLAC BX/ASP/INJ  11/17/2022    HYSTERECTOMY ID ESOPHAGOGASTRODUODENOSCOPY TRANSORAL DIAGNOSTIC N/A 02/08/2018    Procedure: EGD AND COLONOSCOPY;  Surgeon: Lynne Murdock MD;  Location: [facility]; Service: Gastroenterology    ID PRQ [address] NSTIM ELECTRODE ARRAY EPIDURAL N/A 11/17/2022    Procedure: Petra Mortensen SCS TRIAL;  Surgeon: Rory Dodson MD;  Location: [facility];  Service: Pain Management     ID PRQ [id number] NSTIM ELECTRODE ARRAY EPIDURAL N/A 2/1/2023    Procedure: Insertion percutaneous thoracic spinal cord stimulator with left buttock implantable pulse generator;  Surgeon: Quinn Brenner MD;  Location:  MAIN OR;  Service: Neurosurgery    SPLIT THICKNESS SKIN GRAFT Right 11/7/2023    Procedure: SKIN GRAFT SPLIT THICKNESS (STSG)  EXTREMITY;  Surgeon: Nikky Alvarez MD;  [facility];  Service: General    THYROID SURGERY      TONSILLECTOMY      US GUIDED THYROID BIOPSY  11/30/2016    US GUIDED THYROID BIOPSY  3/21/2018    VAC DRESSING APPLICATION Right 16/25/9336    Procedure: CHANGE DRESSING/VAC RIGHT UPPER EXTREMITY; WASHOUT; PARTIAL CLOSURE;  Surgeon: Benita Nash DO;  [facility];  Service: General    WOUND DEBRIDEMENT Right 11/4/2023    Procedure: SIMPLE CLOSURE 2.5DY, OASIS APPLICATION [id number], WOUND VAC APPLICATION;  Surgeon: Yunier Walton MD;  [facility];  Service: General       Family History   Problem Relation Age of Onset    Brain cancer Mother     Alzheimer's disease Mother     Alzheimer's disease Father     Parkinsonism Father      I have reviewed and agree with the history as documented.     E-Cigarette/Vaping    E-Cigarette Use Never User      E-Cigarette/Vaping Substances    Nicotine No     THC No     CBD No     Flavoring No     Other No     Unknown No      Social History     Tobacco Use    Smoking status: Every Day     Packs/day: 2.00     Types: Cigarettes    Smokeless tobacco: Never   Vaping Use    Vaping Use: Never used   Substance Use Topics    Alcohol use: Not Currently Drug use: Never     Comment: prescribed Belbuca        Review of Systems  See HPI    Physical Exam  ED Triage Vitals [11/20/23 1108]   Temperature Pulse Respirations Blood Pressure SpO2   97.9 °F (36.6 °C) 74 18 (!) 89/72 99 %      Temp Source Heart Rate Source Patient Position - Orthostatic VS BP Location FiO2 (%)   Oral Monitor Sitting Left arm --      Pain Score       9             Orthostatic Vital Signs  Vitals:    11/20/23 1108 11/20/23 1137 11/20/23 1230 11/20/23 1330   BP: (!) 89/72 93/63 94/67 129/65   Pulse: 74  69 74   Patient Position - Orthostatic VS: Sitting          Physical Exam  Constitutional:       General: She is not in acute distress. Appearance: She is not ill-appearing. HENT:      Head: Normocephalic and atraumatic. Eyes:      Extraocular Movements: Extraocular movements intact. Conjunctiva/sclera: Conjunctivae normal.   Cardiovascular:      Rate and Rhythm: Normal rate and regular rhythm. Pulses: Normal pulses. Heart sounds: Normal heart sounds. Pulmonary:      Effort: Pulmonary effort is normal.      Breath sounds: Normal breath sounds. Abdominal:      General: There is no distension. Palpations: Abdomen is soft. Tenderness: There is no abdominal tenderness. Musculoskeletal:         General: No tenderness (No C, T, L spine tenderness. Mild greater trochanteric tenderness on the right. Mild soft tissue tenderness around the right knee. ). Normal range of motion. Cervical back: Normal range of motion and neck supple. No rigidity or tenderness. Right lower leg: No edema. Left lower leg: No edema. Comments: Patient has normal active and passive range of motion of right lower extremity   Skin:     General: Skin is warm and dry. Capillary Refill: Capillary refill takes less than 2 seconds. Comments: Superficial abrasion to right knee   Neurological:      Mental Status: She is alert and oriented to person, place, and time. Sensory: No sensory deficit. Motor: No weakness. Gait: Gait normal.         ED Medications  Medications   bacitracin topical ointment 1 small application (1 small application Topical Given 11/20/23 1236)       Diagnostic Studies  Results Reviewed       None                   CT head without contrast   ED Interpretation by Luz uV MD (11/20 1332)   CT ordered by myself and the report from radiologist has been reviewed. Final Result by Jorge Luis Mulligan MD (11/20 1322)      No acute intracranial abnormality within the limitations of metallic density (likely aneurysm coiling). Workstation performed: [id number]               Procedures  Procedures      ED Course  ED Course as of 11/20/23 1647   Mon Nov 20, 2023   1337 CT head without contrast  No acute findings. [address] patient and discussed negative imaging. Patient was able to ambulate with her four-point cane without issue. Patient voiced understanding of the plan and all questions were answered. Strict return precautions given. Patient is hemodynamically stable and safe for discharge at this time. SBIRT 20yo+      Flowsheet Row Most Recent Value   Initial Alcohol Screen: US AUDIT-C     1. How often do you have a drink containing alcohol? 0 Filed at: 11/20/2023 1107   2. How many drinks containing alcohol do you have on a typical day you are drinking? 0 Filed at: 11/20/2023 1107   3a. Male UNDER 65: How often do you have five or more drinks on one occasion? 0 Filed at: 11/20/2023 1107   3b. FEMALE Any Age, or MALE 65+: How often do you have 4 or more drinks on one occassion? 0 Filed at: 11/20/2023 1107   Audit-C Score 0 Filed at: 11/20/2023 1107   ABIOLA: How many times in the past year have you. .. Used an illegal drug or used a prescription medication for non-medical reasons?  Never Filed at: 11/20/2023 1107                  Medical Decision Making  43-year-old female with known brain aneurysm presents after fall with head strike onto mulch. Patient currently has no complaints but was told that she needs head imaging given her history of brain aneurysm. She denies numbness, weakness, lacerations, gait problem. Patient with normal vital signs on presentation. Patient is overall well-appearing and in no acute distress. Head is atraumatic. There is no C, T, L-spine tenderness. Patient has normal range of motion of the neck and no neurologic symptoms of the upper or lower extremities. She does not have any significant bony tenderness. She has normal active and passive range of motion of the right lower extremity. She has normal gait. Upper and lower extremities are neurovascularly intact. There is a superficial abrasion to the right knee. Doubt any bony injuries. Given history of intracranial abnormality, I will get noncontrast head CT scan. Patient does not require any analgesic at this time. Please see ED course for additional MDM. Amount and/or Complexity of Data Reviewed  Radiology: ordered. Decision-making details documented in ED Course. Risk  OTC drugs. Disposition  Final diagnoses:   Fall, initial encounter     Time reflects when diagnosis was documented in both MDM as applicable and the Disposition within this note       Time User Action Codes Description Comment    11/20/2023  1:38 PM Lizz Bacon Add [K39. Reshma Ko, initial encounter           ED Disposition       ED Disposition   Discharge    Condition   Stable    Date/Time   Mon Nov 20, 2023 46900 Daysi Woodall discharge to home/self care. Follow-up Information    None         Patient's Medications   Discharge Prescriptions    No medications on file     No discharge procedures on file.     PDMP Review         Value Time User    PDMP Reviewed  Yes 11/6/2023 11:20 AM Guero Clarke PA-C             ED Provider  Attending physically available and evaluated Ravinder Sylvester Jose C. I managed the patient along with the ED Attending.     Electronically Signed by           Arslan Ramirez DO  11/20/23 6228

## 2023-11-20 NOTE — ED ATTENDING ATTESTATION
Final Diagnoses:     1. Fall, initial encounter      ED Course as of 11/20/23 1351   Mon Nov 20, 2023   1332 Will DC given reassuring imaging. Ambulatory trial first with her 4-point cane. Derek Colby MD, saw and evaluated the patient. All available labs and X-rays were ordered by me or the resident / non-physician and have been reviewed by myself. I discussed the patient with the resident / non-physician and agree with the resident's / non-physician practitioner's findings and plan as documented in the resident's / non-physician practicitioner's note, except where noted. At this point, I agree with the current assessment done in the ED. I was present during key portions of all procedures performed unless otherwise stated. HPI:  NURSING TRIAGE:    This is a 65 y.o. female presenting for evaluation of fall. She was going to a f/u appointment for her fasciotomy / skin graft she had a few weeks ago. She had to smoke a cigarette, turned around, fell onto the RIGHT side. Hit her head on mulch, rest of body hit cement/side-walk. Had to be assisted due to RIGHT arm pain. Used cane to assist gait. She came in for head imaging. Also RIGHT knee pain. Abrasion present. No dizziness/LH. Says she doesn't like the cane (4 point)  Has nursing aid for morning and evening. PMH: HTN Hyperthyroidism COPD. Hx of aneurysm, monitored Q6H MRI. Chief Complaint   Patient presents with    Fall     Pt states that she lost balance [facility], fell and hit back of  head. Abrasion on foot, toe and knee. Pt recently had compartment syndrome on right arm. PHYSICAL: ASSESSMENT + PLAN:   Pertinent: normal speech, speaking normally. Abrasions on the RIGHT knee  No C-spine tenderness (midline)  No T/L-spine tenderness. RIGHT greater troch tenderness. EHL/FHL/PF/DF/KF/KE/HF/HE 5/5 spotnaneously ranging  Palpable pulses, BCR, bilaterally equal.   No saddle anesthesia  SLR negative. General: VS reviewed  Appears in NAD  awake, alert. Well-nourished, well-developed. Appears stated age. Speaking normally in full sentences. Head: Normocephalic, atraumatic  Eyes: EOM-I. No diplopia. No hyphema. No subconjunctival hemorrhages. Symmetrical lids. ENT: Atraumatic external nose and ears. MMM  No malocclusion. No stridor. Normal phonation. No drooling. Normal swallowing. Neck: No JVD. CV: No pallor noted  Lungs:   No tachypnea  No respiratory distress  Abd: soft nt nd no rebound/guarding  MSK:   FROM spontaneously  As above otherwise. Skin: Dry, intact. Abrasion on RIGHT. Aspect of anterior knee  RIGHT anterior forearm has skin graft, looks well healing without cellulitis  RIGHT thihg donor site appears not infected. Neuro: Awake, alert, GCS15, CN II-XII grossly intact. Motor grossly intact. Psychiatric/Behavioral: interacting normally; appropriate mood/affect.  Exam: deferred    Vitals:    23 1108 23 1137 23 1230 23 1330   BP: (!) 89/72 93/63 94/67 129/65   BP Location: Left arm      Pulse: 74  69 74   Resp: 18  20 20   Temp: 97.9 °F (36.6 °C)      TempSrc: Oral      SpO2: 99%  99% 99%    CTH given fall, impact. The patient has none of the followin. Focal neurologic deficit  2. Midline spinal tenderness  3. AMS  4. Intoxication  5. Distracting injury  The C-Spine can be cleared clinically by these criteria; imaging is not required. XR hip I think is unnecessary given she is spontaneously ranging it, and minimal lateral tenderness. Tetanus up to date within 10 years. Home dose of dilaudid. Local wound care: bacitracin     There are no obvious limitations to social determinants of care. Nursing note reviewed. Vitals reviewed. Orders placed by myself and/or advanced practitioner / resident. Previous chart was reviewed  No language barrier. History obtained from patient.     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 has a past surgical history that includes Hysterectomy; Thyroid surgery; Cholecystectomy; Carpal tunnel release; Eye surgery; Dental surgery; Abdominal surgery; Brain surgery; pr esophagogastroduodenoscopy transoral diagnostic (N/A, 02/08/2018); Tonsillectomy; Epidural block injection (Right, 03/03/2022); FL guided needle plac bx/asp/inj (03/03/2022); Epidural block injection (N/A, 04/21/2022); Epidural block injection (Left, 06/21/2022); Colonoscopy; FL guided needle plac bx/asp/inj (11/17/2022); pr prq impltj nstim electrode array epidural (N/A, 11/17/2022); pr prq impltj nstim electrode array epidural (N/A, 2/1/2023); US guided thyroid biopsy (11/30/2016); US guided thyroid biopsy (3/21/2018); VAC DRESSING APPLICATION (Right, 01/37/3412); Wound debridement (Right, 11/4/2023); SPLIT THICKNESS SKIN GRAFT (Right, 11/7/2023); and Fasciotomy (Right, 10/27/2023).    Social: Cardiac (Echo/Cath)   Social History     Substance and Sexual Activity   Alcohol Use Not Currently     Social History     Tobacco Use   Smoking Status Every Day    Packs/day: 2.00    Types: Cigarettes   Smokeless Tobacco Never     Social History     Substance and Sexual Activity   Drug Use Never    Comment: prescribed Belbuca    Results for orders placed during the hospital encounter of 06/15/20    Echo complete with contrast if indicated    Narrative  [address]  [address] [facility], [address]  [phone]    Transthoracic Echocardiogram  2D, M-mode, Doppler, and Color Doppler    Study date:  2020    Patient: Milbert Pallas  MR number: [id number]  Account number: [de-identified]  : 1960  Age: 63 years  Gender: Female  Status: Outpatient  Location: Bedside  Height: 64 in  Weight: 186.6 lb  BP: 113/ 74 mmHg    Indications: transient ischemic attack    Diagnoses: G45.9 - Transient cerebral ischemic attack, unspecified    Sonographer:  Alonso Packer RDCS  Primary Physician: Nino Washburn MD  Referring Physician:  LAURIE Gunn  Group:  [facility]  Interpreting Physician:  Haydee Camacho MD    SUMMARY    LEFT VENTRICLE:  Size was normal.  Systolic function was normal. Ejection fraction was estimated to be 60 %. There were no regional wall motion abnormalities. Wall thickness was at the upper limits of normal.    TRICUSPID VALVE:  There was trace regurgitation. HISTORY: PRIOR HISTORY: hypertension, copd, hyperlipidemia, dizziness, hypothyroid    PROCEDURE: The procedure was performed at the bedside. This was a routine study. The transthoracic approach was used. The study included complete 2D imaging, M-mode, complete spectral Doppler, and color Doppler. Images were obtained from  the parasternal, apical, subcostal, and suprasternal notch acoustic windows. Image quality was adequate. LEFT VENTRICLE: Size was normal. Systolic function was normal. Ejection fraction was estimated to be 60 %. There were no regional wall motion abnormalities.  Wall thickness was at the upper limits of normal.    RIGHT VENTRICLE: The size was normal. Systolic function was normal. Wall thickness was normal.    LEFT ATRIUM: Size was normal.    RIGHT ATRIUM: Size was normal.    MITRAL VALVE: Valve structure was normal. There was normal leaflet separation. DOPPLER: The transmitral velocity was within the normal range. There was no evidence for stenosis. There was no regurgitation. AORTIC VALVE: The valve was trileaflet. Leaflets exhibited normal thickness and normal cuspal separation. DOPPLER: Transaortic velocity was within the normal range. There was no evidence for stenosis. There was no regurgitation. TRICUSPID VALVE: The valve structure was normal. There was normal leaflet separation. DOPPLER: The transtricuspid velocity was within the normal range. There was no evidence for stenosis. There was trace regurgitation. PULMONIC VALVE: Leaflets exhibited normal thickness, no calcification, and normal cuspal separation. DOPPLER: The transpulmonic velocity was within the normal range. There was no regurgitation. PERICARDIUM: There was no pericardial effusion. The pericardium was normal in appearance. AORTA: The root exhibited normal size.     SYSTEM MEASUREMENT TABLES    2D  %FS: 36.87 %  Ao Diam: 3.02 cm  EDV(Teich): 81.74 ml  EF(Teich): 67.06 %  ESV(Teich): 26.92 ml  IVSd: 1.14 cm  LA Area: 10.34 cm2  LA Diam: 2.86 cm  LVEDV MOD A4C: 69.88 ml  LVEF MOD A4C: 61.23 %  LVESV MOD A4C: 27.09 ml  LVIDd: 4.27 cm  LVIDs: 2.7 cm  LVLd A4C: 7.62 cm  LVLs A4C: 6.85 cm  LVPWd: 1.05 cm  RA Area: 16.71 cm2  RVIDd: 2.49 cm  RWT: 0.49  SV MOD A4C: 42.79 ml  SV(Teich): 54.82 ml    CW  AV Vmax: 1.15 m/s  AV maxP.32 mmHg  PV Vmax: 0.68 m/s  PV maxP.83 mmHg  TR Vmax: 2.13 m/s  TR maxP.07 mmHg    MM  TAPSE: 2.51 cm    PW  E' Lat: 0.1 m/s  E' Sept: 0.11 m/s  E/E' Lat: 7.6  E/E' Sept: 7.35  LVOT Vmax: 0.85 m/s  LVOT maxP.93 mmHg  MV A Bradley: 0.68 m/s  MV Dec [place]: 3.25 m/s2  MV DecT: 244.61 ms  MV E Bradley: 0.79 m/s  MV E/A Ratio: 1.17  RVOT Vmax: 0.59 m/s  RVOT maxP.4 mmHg    [employer] Commission Accredited Echocardiography Laboratory    Prepared and electronically signed by    Trish Stout MD  Signed 16-Jun-2020 08:48:30    No results found for this or any previous visit. No results found for this or any previous visit. Labs: Imaging:   Labs Reviewed - No data to display CT head without contrast   ED Interpretation   CT ordered by myself and the report from radiologist has been reviewed. Final Result      No acute intracranial abnormality within the limitations of metallic density (likely aneurysm coiling). Workstation performed: [id number]            Medications: Code Status:   Medications   bacitracin topical ointment 1 small application (1 small application Topical Given 11/20/23 1236)    Code Status: Prior  Advance Directive and Living Will:      Power of :    POLST:       Orders Placed This Encounter   Procedures    CT head without contrast    ECG 12 lead     Time reflects when diagnosis was documented in both MDM as applicable and the Disposition within this note       Time User Action Codes Description Comment    11/20/2023  1:38 PM Rebekah Bacon Add [X70. Blackcarlos Sureshludivina, initial encounter           ED Disposition       ED Disposition   Discharge    Condition   Stable    Date/Time   Mon Nov 20, 2023  1:37 PM    Comment   Elizabeth Woodall discharge to home/self care. Follow-up Information    None       Patient's Medications   Discharge Prescriptions    No medications on file     No discharge procedures on file. Prior to Admission Medications   Prescriptions Last Dose Informant Patient Reported? Taking?    Alcohol Swabs (Alcohol Pads) 70 % PADS   Yes No   Sig: USE TO TEST BLOOD GLUCOSE 2-3 TIMES DAILY   Buprenorphine HCl (Belbuca) 300 MCG FILM  Self Yes No   Sig: Apply 300 mcg to cheek in the morning   Butalbital-APAP-Caffeine (FIORICET PO)   Yes No   Sig: Take by mouth as needed   Galcanezumab-gnlm (Emgality) 120 MG/ML SOAJ  Self Yes No   Sig: Inject 120 mg under the skin every 30 (thirty) days Last inj 23   HYDROmorphone (DILAUDID) 4 mg tablet   No No   Sig: Take 1 tablet (4 mg total) by mouth every 6 (six) hours as needed for severe pain for up to 10 days Max Daily Amount: 16 mg   Lidocaine 4 % PTCH   No No   Sig: Apply 1 patch topically daily   Melatonin 10 MG CAPS  Self Yes No   Sig: Take 1 tablet by mouth daily at bedtime as needed   OneTouch Ultra test strip   Yes No   Sig: USE TO TEST BLOOD GLUCOSE 2-3 TIMES DAILY   True Comfort Safety Lancets MISC   Yes No   Sig: USE TO TEST BLOOD GLUCOSE 2-3 TIMES DAILY   acetaminophen (TYLENOL) 325 mg tablet   No No   Sig: Take 3 tablets (975 mg total) by mouth every 8 (eight) hours   albuterol (PROVENTIL HFA,VENTOLIN HFA) 90 mcg/act inhaler  Self Yes No   Sig: Inhale 2 puffs every 6 (six) hours as needed   ammonium lactate (LAC-HYDRIN) 12 % lotion   Yes No   Sig: as needed   atorvastatin (LIPITOR) 80 mg tablet   Yes No   Sig: Take 80 mg by mouth every evening   benzonatate (TESSALON PERLES) 100 mg capsule   No No   Sig: Take 1 capsule (100 mg total) by mouth 3 (three) times a day as needed for cough   carboxymethylcellulose 0.5 % SOLN  Self No No   Sig: Administer 1 drop to both eyes daily as needed for dry eyes   clonazePAM (KlonoPIN) 0.5 mg tablet  Self Yes No   Sig: Take 0.5 mg by mouth daily at bedtime   clonazePAM (KlonoPIN) 1 mg tablet  Self Yes No   Sig: Take 1 mg by mouth daily in the early morning   dicyclomine (BENTYL) 20 mg tablet  Self Yes No   Sig: Take 20 mg by mouth every 6 (six) hours   famotidine (PEPCID) 40 MG tablet   No No   Sig: Take 1 tablet (40 mg total) by mouth daily at bedtime   fenofibrate (TRICOR) 48 mg tablet  Self Yes No   Sig: Take 48 mg by mouth daily   fluticasone (FLONASE) 50 mcg/act nasal spray   Yes No   Si sprays into each nostril daily   fluticasone-umeclidinium-vilanterol (Trelegy Ellipta) 200-62.5-25 mcg/actuation AEPB inhaler   No No   Sig: Inhale 1 puff daily Rinse mouth after use. furosemide (LASIX) 20 mg tablet  Self Yes No   Sig: Take 20 mg by mouth as needed Taking as needed   lamoTRIgine (LaMICtal) 100 mg tablet   Yes No   Sig: Take 100 mg by mouth 2 (two) times a day   latanoprost (XALATAN) 0.005 % ophthalmic solution   Yes No   Sig: instill 1 drop into both eyes at bedtime   levalbuterol (XOPENEX) 1.25 mg/3 mL nebulizer solution   Yes No   Sig: Inhale 1.25 mg every 4 (four) hours as needed   levothyroxine 137 mcg tablet  Self Yes No   Sig: Take 137 mcg by mouth daily   magnesium 30 MG tablet  Self Yes No   Sig: Take 500 mg by mouth 2 (two) times a day 1/26/23 right now not taking   methocarbamol (ROBAXIN) 500 mg tablet   No No   Sig: Take 0.5 tablets (250 mg total) by mouth every 6 (six) hours for 14 days   methylPREDNISolone 4 MG tablet therapy pack   No No   Sig: Use as directed on package   metroNIDAZOLE (FLAGYL) 500 mg tablet   Yes No   Sig: Take 1 tablet by mouth 2 (two) times a day   omeprazole (PriLOSEC) 40 MG capsule   No No   Sig: Take 1 capsule (40 mg total) by mouth 2 (two) times a day   ondansetron (ZOFRAN) 8 mg tablet   No No   Sig: Take 1 tablet (8 mg total) by mouth every 8 (eight) hours as needed for nausea or vomiting   polyethylene glycol (GLYCOLAX) 17 GM/SCOOP powder   No No   Sig: Take 17 g daily for constipation. pregabalin (LYRICA) 100 mg capsule  Self Yes No   Sig: Take 100 mg by mouth 3 (three) times a day Morning-lunch-dinner   pregabalin (LYRICA) 150 mg capsule  Self Yes No   Sig: take 1 capsule by mouth NIGHTLY   rizatriptan (MAXALT-MLT) 10 mg disintegrating tablet   Yes No   Sig: TAKE 1 TABLET BY MOUTH ONE TIME AS NEEDED FOR MIGRAINE, MAY REPEA. ..  (REFER TO PRESCRIPTION NOTES).    senna (SENOKOT) 8.6 mg   No No   Sig: Take 1 tablet (8.6 mg total) by mouth daily at bedtime for 14 days   tiZANidine (ZANAFLEX) 4 mg tablet   Yes No   Sig: take 2 tablets by mouth every 6 hours if needed for muscle spasm   topiramate (TOPAMAX) 200 MG tablet   Yes No   Si mg 2 (two) times a day      Facility-Administered Medications: None                        Portions of the record may have been created with voice recognition software. Occasional wrong word or "sound a like" substitutions may have occurred due to the inherent limitations of voice recognition software. Read the chart carefully and recognize, using context, where substitutions have occurred.     Electronically signed by:  Singh Holland

## 2023-11-21 LAB
ATRIAL RATE: 66 BPM
P AXIS: 72 DEGREES
PR INTERVAL: 166 MS
QRS AXIS: 67 DEGREES
QRSD INTERVAL: 76 MS
QT INTERVAL: 428 MS
QTC INTERVAL: 448 MS
T WAVE AXIS: 44 DEGREES
VENTRICULAR RATE: 66 BPM

## 2023-11-21 PROCEDURE — 93010 ELECTROCARDIOGRAM REPORT: CPT | Performed by: INTERNAL MEDICINE

## 2023-11-22 ENCOUNTER — APPOINTMENT (EMERGENCY)
Dept: CT IMAGING | Facility: HOSPITAL | Age: 63
End: 2023-11-22
Payer: COMMERCIAL

## 2023-11-22 ENCOUNTER — HOSPITAL ENCOUNTER (EMERGENCY)
Facility: HOSPITAL | Age: 63
Discharge: HOME | End: 2023-11-22
Attending: EMERGENCY MEDICINE
Payer: COMMERCIAL

## 2023-11-22 VITALS
OXYGEN SATURATION: 96 % | SYSTOLIC BLOOD PRESSURE: 122 MMHG | RESPIRATION RATE: 14 BRPM | HEART RATE: 64 BPM | TEMPERATURE: 97.1 F | DIASTOLIC BLOOD PRESSURE: 73 MMHG

## 2023-11-22 DIAGNOSIS — Z79.899 POLYPHARMACY: ICD-10-CM

## 2023-11-22 DIAGNOSIS — T50.901A OVERDOSE: Primary | ICD-10-CM

## 2023-11-22 DIAGNOSIS — R41.82 ALTERED MENTAL STATUS: ICD-10-CM

## 2023-11-22 PROBLEM — Z12.11 SCREENING FOR COLON CANCER: Status: RESOLVED | Noted: 2023-09-23 | Resolved: 2023-11-22

## 2023-11-22 LAB
ALBUMIN SERPL BCP-MCNC: 3.7 G/DL (ref 3.5–5)
ALP SERPL-CCNC: 71 U/L (ref 34–104)
ALT SERPL W P-5'-P-CCNC: 16 U/L (ref 7–52)
AMMONIA PLAS-SCNC: 38 UMOL/L (ref 18–72)
AMPHETAMINES SERPL QL SCN: NEGATIVE
ANION GAP SERPL CALCULATED.3IONS-SCNC: 9 MMOL/L
APAP SERPL-MCNC: 15 UG/ML (ref 10–20)
AST SERPL W P-5'-P-CCNC: 18 U/L (ref 13–39)
BARBITURATES UR QL: POSITIVE
BASOPHILS # BLD AUTO: 0.03 THOUSANDS/ÂΜL (ref 0–0.1)
BASOPHILS NFR BLD AUTO: 1 % (ref 0–1)
BENZODIAZ UR QL: NEGATIVE
BILIRUB SERPL-MCNC: 0.22 MG/DL (ref 0.2–1)
BILIRUB UR QL STRIP: NEGATIVE
BUN SERPL-MCNC: 12 MG/DL (ref 5–25)
CALCIUM SERPL-MCNC: 8 MG/DL (ref 8.4–10.2)
CHLORIDE SERPL-SCNC: 109 MMOL/L (ref 96–108)
CLARITY UR: CLEAR
CO2 SERPL-SCNC: 24 MMOL/L (ref 21–32)
COCAINE UR QL: NEGATIVE
COLOR UR: YELLOW
CREAT SERPL-MCNC: 0.98 MG/DL (ref 0.6–1.3)
EOSINOPHIL # BLD AUTO: 0.13 THOUSAND/ÂΜL (ref 0–0.61)
EOSINOPHIL NFR BLD AUTO: 2 % (ref 0–6)
ERYTHROCYTE [DISTWIDTH] IN BLOOD BY AUTOMATED COUNT: 13 % (ref 11.6–15.1)
ETHANOL SERPL-MCNC: <10 MG/DL
GFR SERPL CREATININE-BSD FRML MDRD: 62 ML/MIN/1.73SQ M
GLUCOSE SERPL-MCNC: 73 MG/DL (ref 65–140)
GLUCOSE UR STRIP-MCNC: NEGATIVE MG/DL
HCT VFR BLD AUTO: 32.8 % (ref 34.8–46.1)
HGB BLD-MCNC: 10.2 G/DL (ref 11.5–15.4)
HGB UR QL STRIP.AUTO: NEGATIVE
IMM GRANULOCYTES # BLD AUTO: 0.02 THOUSAND/UL (ref 0–0.2)
IMM GRANULOCYTES NFR BLD AUTO: 0 % (ref 0–2)
KETONES UR STRIP-MCNC: NEGATIVE MG/DL
LEUKOCYTE ESTERASE UR QL STRIP: NEGATIVE
LIPASE SERPL-CCNC: 42 U/L (ref 11–82)
LYMPHOCYTES # BLD AUTO: 2.4 THOUSANDS/ÂΜL (ref 0.6–4.47)
LYMPHOCYTES NFR BLD AUTO: 38 % (ref 14–44)
MCH RBC QN AUTO: 31.6 PG (ref 26.8–34.3)
MCHC RBC AUTO-ENTMCNC: 31.1 G/DL (ref 31.4–37.4)
MCV RBC AUTO: 102 FL (ref 82–98)
MONOCYTES # BLD AUTO: 0.52 THOUSAND/ÂΜL (ref 0.17–1.22)
MONOCYTES NFR BLD AUTO: 8 % (ref 4–12)
NEUTROPHILS # BLD AUTO: 3.19 THOUSANDS/ÂΜL (ref 1.85–7.62)
NEUTS SEG NFR BLD AUTO: 51 % (ref 43–75)
NITRITE UR QL STRIP: NEGATIVE
NRBC BLD AUTO-RTO: 0 /100 WBCS
OPIATES UR QL SCN: POSITIVE
OXYCODONE+OXYMORPHONE UR QL SCN: NEGATIVE
PCP UR QL: NEGATIVE
PH UR STRIP.AUTO: 7.5 [PH]
PLATELET # BLD AUTO: 185 THOUSANDS/UL (ref 149–390)
PMV BLD AUTO: 10.2 FL (ref 8.9–12.7)
POTASSIUM SERPL-SCNC: 3.5 MMOL/L (ref 3.5–5.3)
PROT SERPL-MCNC: 5.4 G/DL (ref 6.4–8.4)
PROT UR STRIP-MCNC: NEGATIVE MG/DL
RBC # BLD AUTO: 3.23 MILLION/UL (ref 3.81–5.12)
SALICYLATES SERPL-MCNC: <5 MG/DL (ref 3–20)
SODIUM SERPL-SCNC: 142 MMOL/L (ref 135–147)
SP GR UR STRIP.AUTO: 1.01
THC UR QL: NEGATIVE
UROBILINOGEN UR QL STRIP.AUTO: 0.2 E.U./DL
WBC # BLD AUTO: 6.29 THOUSAND/UL (ref 4.31–10.16)

## 2023-11-22 PROCEDURE — 80143 DRUG ASSAY ACETAMINOPHEN: CPT

## 2023-11-22 PROCEDURE — 96360 HYDRATION IV INFUSION INIT: CPT

## 2023-11-22 PROCEDURE — 80053 COMPREHEN METABOLIC PANEL: CPT

## 2023-11-22 PROCEDURE — 99285 EMERGENCY DEPT VISIT HI MDM: CPT

## 2023-11-22 PROCEDURE — 82077 ASSAY SPEC XCP UR&BREATH IA: CPT

## 2023-11-22 PROCEDURE — 36415 COLL VENOUS BLD VENIPUNCTURE: CPT

## 2023-11-22 PROCEDURE — 80307 DRUG TEST PRSMV CHEM ANLYZR: CPT

## 2023-11-22 PROCEDURE — 99285 EMERGENCY DEPT VISIT HI MDM: CPT | Performed by: EMERGENCY MEDICINE

## 2023-11-22 PROCEDURE — 81003 URINALYSIS AUTO W/O SCOPE: CPT

## 2023-11-22 PROCEDURE — G1004 CDSM NDSC: HCPCS

## 2023-11-22 PROCEDURE — 85025 COMPLETE CBC W/AUTO DIFF WBC: CPT

## 2023-11-22 PROCEDURE — 83690 ASSAY OF LIPASE: CPT

## 2023-11-22 PROCEDURE — 82140 ASSAY OF AMMONIA: CPT

## 2023-11-22 PROCEDURE — 70450 CT HEAD/BRAIN W/O DYE: CPT

## 2023-11-22 PROCEDURE — 80179 DRUG ASSAY SALICYLATE: CPT

## 2023-11-22 PROCEDURE — 93005 ELECTROCARDIOGRAM TRACING: CPT

## 2023-11-22 RX ADMIN — SODIUM CHLORIDE 1000 ML: 0.9 INJECTION, SOLUTION INTRAVENOUS at 11:15

## 2023-11-22 RX ADMIN — NALOXONE HYDROCHLORIDE 4 MG: 4 SPRAY NASAL at 14:17

## 2023-11-22 NOTE — CONSULTS
INTERPROFESSIONAL (PHONE) [id number] Toxicology  Lupillo Woodall 65 y.o. female MRN: [id number]  Unit/Bed#: ED 23 Encounter: [id number]      Reason for Consult / Principal Problem: Encephalopathy  Inpatient consult to Toxicology  Consult performed by: Rosemary Manning MD  Consult ordered by: Rebecca Owens,         11/22/23      ASSESSMENT:  52-year-old female senting to the emergency department with encephalopathy, hypotension, CNS depression, pinpoint pupils, responsive to naloxone    RECOMMENDATIONS:  In the patient's chart, she has multiple occasions that can lead to CNS depression including hydromorphone, methocarbamol, tizanidine, lamotrigine, clonazepam, pregabalin, dicyclomine. With her hypotension, pinpoint pupils, low normal heart rate, CNS depression, the most likely culprits would include hydromorphone, clonazepam, tizanidine. Tizanidine specifically is an alpha-2 agonist that can lead to hypotension, bradycardia, CNS depression. Both opioids and tizanidine may respond to naloxone reversal agent, as this patient demonstrated. I would consider readministration of naloxone should the patient develop respiratory depression again. Summary of recommendations:  - Supportive care including IV fluids  - Pressors for refractory hypotension, norepinephrine first-line, vasopressin second line  - Monitor for opioid, benzodiazepine withdrawal  - Seizure precautions  - Repeat CMP and APAP level in AM    For further questions, please contact the medical  on call via Slater Text between 8am and 9pm. If between 9pm and 8am, please reach out to the [facility] at [phone]. Please see additional teaching note below:    Medical Toxicology Teaching Note  [facility] [address] [facility]  Clonidine and Other Alpha Agonist Toxicity  Updated 02/28/2019    Introduction:  Clonidine is a centrally acting adrenergic inhibitor.   Other drugs in this class include guanfacine, tizanidine, and topical nasal decongestants, such as oxymetazoline and tetrahydrozoline. These agents have similar toxicity. Clonidine is often used as an antihypertensive agent, but like guanfacine, can also be used to facilitate REM sleep patterns in patients who utilize stimulant medications for various reasons including attention deficit hyperactivity disorder. Toxicokinetics:   Clonidine’s primary mechanism of action is stimulation of central alpha2  receptors, leading to decreased catecholamine output, resulting in bradycardia and hypotension. In overdose, there is also transient stimulation of alpha1 receptors, resulting in short-lived, paradoxical hypertension. The onset of symptoms is usually between 30-60 minutes, although peak effects can occur more than 6-12 hours after ingestion. Recovery generally occurs within 24 hours. Clinical Presentation:   Symptoms result from sympathetic depression and include bradycardia, hypotension, miosis, somnolence/lethargy, and in more severe cases, hypothermia, respiratory depression/apnea and coma. Diagnosis:  Diagnosis is clinical.  Central alpha2 agonists are not detectable in standard urine drug screen and specific drug concentrations are not routinely available. Concentrations can be obtained through specialized laboratories but are usually not indicated. Management:  Supportive care, airway management and cardiac monitoring are the mainstay of treatment. Patients are usually responsive to stimulation and intravenous fluid administration. Persistent bradycardia and/or hypotension are usually very responsive to vasopressor administration, which serves to replace the inhibited catecholamine release. Naloxone is not a proven antidote for clonidine toxicity and is not routinely advised. Naloxone can be trialed if opioid toxicity is in the differential due to the overlap of symptoms.    Patients should not be toxicologically cleared until return to baseline with normal mental status and vital signs. Decontamination: gastric emptying is not proven to be useful and should be discussed with a medical .   Enhanced elimination: Dialysis is not shown to be useful. References:  Rodney DRAKE. Alpha Receptor Agonist Toxicity. [Updated 2018 Dec 24]. In: built.io [Internet]. [facility]): built.io Publishing; 2018 Jan-. Available from: epacube.DisabledPark.pt  Spenser Cuellar. Poisoning & Drug Overdose. 2012    Medical Toxicology  [address] [place] [address]  Benzodiazepines & Benzodiazpine-like Drugs      Background: Benzodiazepines are the most commonly prescribed sedatives. They are used frequently for the treatment of anxiety, stress reactions, insomnia, muscle spasms, alcohol withdrawal, seizure control and other psychiatric disorders. Benzodiazepines include a wide array of compounds which vary in potency, duration of effect, presence or absence of active metabolites and clinical use. Other non-benzodiazepines include zolpidem and zaleplon, which are benzodiazepine-like. Death from isolated benzodiazepine overdose is rare. Mechanism of Toxicity: Benzodiazepines enhance the action of the inhibitory neurotransmitter gamma-aminobutyric acid (RANI) and inhibit other neuronal systems by poorly defined mechanisms. This results in generalized depression of spinal reflexes and the reticular activating system causing CNS and respiratory depression. Respiratory arrest is more common with shorter-acting benzodiazepines such as triazolam (Halcion), alprazolam (Xanax), and midazolam (Versed). Cardiopulmonary arrest has been documented post rapid injection of diazepam, this was thought to be secondary to either its CNS depressant effects or because of the toxic effects of its diluent propylene glycol.   Propylene glycol has also been implicated in multiple case reports of metabolic acidosis, hyperosmolar states and cardiovascular compromise in patients who have received prolonged sedation with lorazepam.     Pharmacokinetics: Most of these agents are highly protein bound (%). Variables such as time to peak blood level, elimination half-life, and the presence or absence of active metabolites vary widely among different compounds. Some benzodiazepines, like diazepam, undergo demethylation in the liver and yield active metabolites which have a prolonged half-life compared to the parent compound. Often there is no correlation between therapeutic and biological half-lives. A few commonly used agents are listed below. Remember, with supra-therapeutic ingestions these half-lives are unpredictable. Drug Half-life (hours) Active Metabolite   Alprazolam 6.3-26.9 No   Clonazepam 18-50 No   Diazepam  Yes   Lorazepam 10-20 No   Midazolam 2.2-6.8 Yes     Toxic Dose: Benzodiazepines in general have a very wide therapeutic window; however, co-ingestion of other drugs with CNS with depressant effects like ethanol, barbiturates, antipsychotics or opioids, results in a synergistic effect. Clinical Presentation: Onset of CNS depression may be observed within  minutes of ingestion, depending upon the compound. Lethargy, slurred speech, incoordination, ataxia, stupor, coma and respiratory arrest may occur. Often patients with benzodiazepine-induced coma have hyporeflexia and mid-range or small pupils. Hypothermia can also occur in overdose. Complications after ingestion are more likely when short-acting or other depressant agents are involved. Children often present with lethargy and CNS depression; however, in a case series in children with a mean age of 43 months, 17% presented with ataxia alone. Diagnostic Testing: Consider benzdiazepine ingestion based upon history & presentation.   The differential should include other sedative-hypnotics, antidepressants, antipsychotics, and narcotics. Coma and small pupils will not respond to naloxone administration, but may reverse with flumazenil. Serum levels are often available from commercial laboratories, but are rarely of value. Urine and blood qualitative screening may provide rapid confirmation of exposure. However, immunoassays do not detect all benzodiazepines so, a negative screen does not exclude significant exposure. Other useful tests include glucose, ABG/VBG or pulse oximetry. Decontamination:  Administer activated charcoal with caution. If the patient is sleepy, avoid administration and support symptomatically. Treatment:  Symptomatic and supportive treatment is the mainstay of therapy. Intubate immediately if the patient is not protecting his or her airway. Hypotensive patients should be fluid resuscitated with volume expansion. Profound hypotension is rare and should lead the provider to consider co-ingestions. Use vasopressors when patients do not respond to IVF or in patients with a history of or clinical presentation consistent with CHF, cardiomyopathy or pulmonary edema. Flumazenil is a specific benzodiazepine receptor antagonist that can rapidly reverse benzodiazepine effect; however, because overdose with this class of drug is rarely fatal, the role of flumazenil in routine management has yet to be established. It is administered with a starting dose of 0.1-0.2mg IV and repeated as needed up to a maximum of 2mg. Continuous IV infusion from 0.1-1mg/h in 0.9%NaCl or D5W may also be utilized. The most important drawback to its administration is that flumazenil may induce seizures and dysrhythmias in patients with co-intoxication with cocaine or tricyclic antidepressants. It does not reliably reverse respiratory depression, but does reverse CNS depression. Flumazenil also effectively reverses the depressive effects caused by the non-benzodiazepines zolpidem and zaleplon.   Administration may induce acute withdrawal, including seizures and autonomic instability in those patients who are addicted to benzodiazepines. Repeated doses or a continuous infusion are often required as the duration of action for most benzodiazepines is longer than flumazenil. There is no role for diuresis, dialysis, or hemoperfusion. References:    Yamile Cristina. Poisoning & Drug Overdose, 5th Ed (2007)  Omar. Omar’s Toxicologic Emergencies, 8th Ed (2006)    Medical Toxicology Teaching Note  [facility]  Lamotrigine Overdose  Updated 10/14/2017    Introduction: Lamotrigine is a broad spectrum antiepileptic typically used as an adjuctive medication for seizures. It has also been approved for the treatment of bipolar mood disorder. Pharmacokinetics: Lamotrigine is glucuronidated to lamotrigine 2-N-glucuronide. The elimination half-life is 25 hours but can be halved in the presence of phenytoin and carbamazepine or doubled in the present of valproic acid. Lamotrigine does not affect the  system. Clinical manifestations: Neurologic symptoms are the predominate findings and include ataxia, nystagmus, seizures, and coma. GI symptoms and QRS prolongation may occur. Chronic overdoses can present with a rash, rhabdomyolysis, elevated hepatic aminotransferases, and elevated CK suggesting a hypersensitivity reaction which resolves with discontinuing the medication. Diagnosis: A lamotrigine level greater than 14 mg/L is potentially toxic, however, lamotrigine levels are not available at all facilities. Management:      Activated charcoal should be administered if the patient presents within 1-2 hours of ingestion, however, it should not be given to patients who are at risk for aspiration secondary to AMS induced by the lamotrigine or a coingestant. ECGs should be monitored for QRS widening.   If QRS widening develops is should be treated with 2 amps of bicarb, repeated as necessary while taking caution to avoid hypernatremia (goal Na: 145-155 mmol/L) and too much alkalemia (goal pH 7.5-7.55). Lamotrigine induced seizures should be treated with benzodiazepines. While lamotrigine is theoretically dializable given its low protein binding and small volume of distribution, hemodialysis should be reserved for severe life-threatening overdoses. References:  Rick Daniels. Poisoning & Drug Overdose. 2007. Omar LR. Omar’s Toxicologic Emergencies. 2006. Hx and PE limited by the dynamics of a phone consultation. I have not personally interviewed or evaluated the patient, but only advised based on the information provided to me. Primary provider is responsible for all clinical decisions. Pertinent history, physical exam and clinical findings and course discussed: Rosaria Brittle is a 65y.o. year old female who presents with altered mental status and hypotension. Past medical history significant for bipolar disorder, hypertension. Patient was found by her visiting nurse today to be sleepy, difficult to arouse. Blood pressure was noted to be 60s over 40s, prompting call to EMS. On EMS arrival, it was noted that patient had constricted pupils and depressed respiratory rate. 2 mg IV naloxone was given prehospital.  On arrival to the ED, patient had normal blood pressure and normal heart rate. Remains sleepy, with dilated pupils, confused. Acetaminophen level 15 with unknown time of ingestion, LFTs within normal limits. Review of systems and physical exam not performed by me.     Historical Information   Past Medical History:   Diagnosis Date    Ambulates with cane     Anxiety     Arthritis     Asthma     Bipolar 1 disorder ([address])     Brain aneurysm     Chronic pain disorder     spinal stenosis    Concussion syndrome     neurological rx and balance rx    COPD (chronic obstructive pulmonary disease) ([address])     Depression     Diabetes mellitus ([address])     controlled w/ diet    Disease of thyroid gland     nodules     Family history of colon cancer     father    Fibromyalgia, primary     GERD (gastroesophageal reflux disease)     History of colon polyps     Hyperlipidemia     Hypertension     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     Irritable bowel syndrome     Lumbar degenerative disc disease 02/16/2022    Migraine     Neuropathy     bilateral feet and hands    Peripheral neuropathy     Psychiatric disorder     PTSD (post-traumatic stress disorder)     Shortness of breath     Sleep apnea     had 3 studies & last one negative    Stroke ([address])     2012 no deficeits    TIA (transient ischemic attack)     Wears dentures     Wears glasses      Past Surgical History:   Procedure Laterality Date    ABDOMINAL SURGERY      laproscopic/ endometriosis    BRAIN SURGERY      aneurysm/ coiling procedure    CARPAL TUNNEL RELEASE      CHOLECYSTECTOMY      COLONOSCOPY      DENTAL SURGERY      EPIDURAL BLOCK INJECTION Right 03/03/2022    Procedure: C7-T1 interlaminar epidural steroid injection;  Surgeon: Danielito Gardiner MD;  Location: [facility];  Service: Pain Management     EPIDURAL BLOCK INJECTION N/A 04/21/2022    Procedure: C6-C7 BLOCK / INJECTION EPIDURAL STEROID CERVICAL;  Surgeon: Danielito Gardiner MD;  [facility];  Service: Pain Management     EPIDURAL BLOCK INJECTION Left 06/21/2022    Procedure: BLOCK / INJECTION EPIDURAL STEROID LUMBAR  left L3-4 TFESI;  Surgeon: Danielito Gardiner MD;  [facility]: [facility];  Service: Pain Management     EYE SURGERY      cataract    FASCIOTOMY Right 10/27/2023    Procedure: FASCIOTOMY OF RIGHT UPPER EXTREMITY; POSSIBLE VAC DRESSING APPLICATION;  Surgeon: Juan Andrew MD;  [facility]: [facility];  Service: General    FL GUIDED NEEDLE PLAC BX/ASP/INJ  03/03/2022    FL GUIDED NEEDLE PLAC BX/ASP/INJ  11/17/2022    HYSTERECTOMY      MS ESOPHAGOGASTRODUODENOSCOPY TRANSORAL DIAGNOSTIC N/A 02/08/2018    Procedure: EGD AND COLONOSCOPY;  Surgeon: Clotilde Smith MD;  Location: [facility]; Service: Gastroenterology    WI PRQ [address] NSTIM ELECTRODE ARRAY EPIDURAL N/A 11/17/2022    Procedure: Kateryna Uribe SCS TRIAL;  Surgeon: Kaylyn Rodriguez MD;  Location: [facility];  Service: Pain Management     WI PRQ IMPLTJ NSTIM ELECTRODE ARRAY EPIDURAL N/A 2/1/2023    Procedure: Insertion percutaneous thoracic spinal cord stimulator with left buttock implantable pulse generator;  Surgeon: Boyd Fung MD;  [facility];  Service: Neurosurgery    SPLIT THICKNESS SKIN GRAFT Right 11/7/2023    Procedure: SKIN GRAFT SPLIT THICKNESS (STSG)  EXTREMITY;  Surgeon: Brianna Garrett MD;  [facility];  Service: General    THYROID SURGERY      TONSILLECTOMY      US GUIDED THYROID BIOPSY  11/30/2016    US GUIDED THYROID BIOPSY  3/21/2018    VAC DRESSING APPLICATION Right 57/28/8434    Procedure: CHANGE DRESSING/VAC RIGHT UPPER EXTREMITY; WASHOUT; PARTIAL CLOSURE;  Surgeon: Hilary Carlos DO;  [facility];  Service: General    WOUND DEBRIDEMENT Right 11/4/2023    Procedure: SIMPLE CLOSURE 1.0WK, OASIS APPLICATION [id number], WOUND VAC APPLICATION;  Surgeon: Alyn Sandifer, MD;  [facility];  Service: General     Social History   Social History     Substance and Sexual Activity   Alcohol Use Not Currently     Social History     Substance and Sexual Activity   Drug Use Never    Comment: prescribed Belbuca     Social History     Tobacco Use   Smoking Status Every Day    Packs/day: 2.00    Types: Cigarettes   Smokeless Tobacco Never     Family History   Problem Relation Age of Onset    Brain cancer Mother     Alzheimer's disease Mother     Alzheimer's disease Father     Parkinsonism Father         Prior to Admission medications    Medication Sig Start Date End Date Taking?  Authorizing Provider   acetaminophen (TYLENOL) 325 mg tablet Take 3 tablets (975 mg total) by mouth every 8 (eight) hours 11/13/23   Jalyn Hester PA-C   albuterol (PROVENTIL HFA,VENTOLIN HFA) 90 mcg/act inhaler Inhale 2 puffs every 6 (six) hours as needed 12/28/17   Historical Provider, MD   Alcohol Swabs (Alcohol Pads) 70 % PADS USE TO TEST BLOOD GLUCOSE 2-3 TIMES DAILY 7/15/23   Historical Provider, MD   ammonium lactate (LAC-HYDRIN) 12 % lotion as needed    Historical Provider, MD   atorvastatin (LIPITOR) 80 mg tablet Take 80 mg by mouth every evening 10/14/22   Historical Provider, MD   benzonatate (TESSALON PERLES) 100 mg capsule Take 1 capsule (100 mg total) by mouth 3 (three) times a day as needed for cough 5/2/23   Isaias Jordan MD   Buprenorphine HCl (Belbuca) 300 MCG FILM Apply 300 mcg to cheek in the morning    Historical Provider, MD   Butalbital-APAP-Caffeine (FIORICET PO) Take by mouth as needed    Historical Provider, MD   carboxymethylcellulose 0.5 % SOLN Administer 1 drop to both eyes daily as needed for dry eyes 2/7/19   Roxanna Mchugh DO   clonazePAM (KlonoPIN) 0.5 mg tablet Take 0.5 mg by mouth daily at bedtime    Historical Provider, MD   clonazePAM (KlonoPIN) 1 mg tablet Take 1 mg by mouth daily in the early morning 1/25/18   Historical Provider, MD   dicyclomine (BENTYL) 20 mg tablet Take 20 mg by mouth every 6 (six) hours    Historical Provider, MD   famotidine (PEPCID) 40 MG tablet Take 1 tablet (40 mg total) by mouth daily at bedtime 8/17/23   [facility], ENEDINA   fenofibrate (TRICOR) 48 mg tablet Take 48 mg by mouth daily    Historical Provider, MD   fluticasone (FLONASE) 50 mcg/act nasal spray 2 sprays into each nostril daily 1/11/23   Historical Provider, MD   fluticasone-umeclidinium-vilanterol (Trelegy Ellipta) 200-62.5-25 mcg/actuation AEPB inhaler Inhale 1 puff daily Rinse mouth after use. 5/18/23   LAURIE Reece   furosemide (LASIX) 20 mg tablet Take 20 mg by mouth as needed Taking as needed    Historical Provider, MD   Galcanezumab-gnlm (Emgality) 120 MG/ML SOAJ Inject 120 mg under the skin every 30 (thirty) days Last inj 1/19/23    Historical Provider, MD   HYDROmorphone (DILAUDID) 4 mg tablet Take 1 tablet (4 mg total) by mouth every 6 (six) hours as needed for severe pain for up to 10 days Max Daily Amount: 16 mg 11/13/23 11/23/23  Halima Ojeda PA-C   lamoTRIgine (LaMICtal) 100 mg tablet Take 100 mg by mouth 2 (two) times a day 1/16/18 7/14/23  Historical Provider, MD   latanoprost (XALATAN) 0.005 % ophthalmic solution instill 1 drop into both eyes at bedtime    Historical Provider, MD   levalbuterol (XOPENEX) 1.25 mg/3 mL nebulizer solution Inhale 1.25 mg every 4 (four) hours as needed 1/24/23 1/24/24  Historical Provider, MD   levothyroxine 137 mcg tablet Take 137 mcg by mouth daily 1/21/19   Historical Provider, MD   Lidocaine 4 % PTCH Apply 1 patch topically daily 11/13/23   Halima Ojeda PA-C   magnesium 30 MG tablet Take 500 mg by mouth 2 (two) times a day 1/26/23 right now not taking    Historical Provider, MD   Melatonin 10 MG CAPS Take 1 tablet by mouth daily at bedtime as needed    Historical Provider, MD   methocarbamol (ROBAXIN) 500 mg tablet Take 0.5 tablets (250 mg total) by mouth every 6 (six) hours for 14 days 11/13/23 11/27/23  Halima Ojeda PA-C   methylPREDNISolone 4 MG tablet therapy pack Use as directed on package 8/30/23   LAURIE Black   metroNIDAZOLE (FLAGYL) 500 mg tablet Take 1 tablet by mouth 2 (two) times a day    Historical Provider, MD   omeprazole (PriLOSEC) 40 MG capsule Take 1 capsule (40 mg total) by mouth 2 (two) times a day 8/17/23   Desmond Bowers PA-C   ondansetron (ZOFRAN) 8 mg tablet Take 1 tablet (8 mg total) by mouth every 8 (eight) hours as needed for nausea or vomiting 7/13/22   Lilibeth Tony MD   OneTouch Ultra test strip USE TO TEST BLOOD GLUCOSE 2-3 TIMES DAILY 7/15/23   Historical Provider, MD   polyethylene glycol (GLYCOLAX) 17 GM/SCOOP powder Take 17 g daily for constipation.  9/22/23   LAURIE Manjarrez   pregabalin (LYRICA) 100 mg capsule Take 100 mg by mouth 3 (three) times a day Morning-lunch-dinner 10/26/17   Historical Provider, MD   pregabalin (LYRICA) 150 mg capsule take 1 capsule by mouth NIGHTLY 10/26/17   Historical Provider, MD   rizatriptan (MAXALT-MLT) 10 mg disintegrating tablet TAKE 1 TABLET BY MOUTH ONE TIME AS NEEDED FOR MIGRAINE, MAY REPEA. ..  (REFER TO PRESCRIPTION NOTES). Historical Provider, MD   senna (SENOKOT) 8.6 mg Take 1 tablet (8.6 mg total) by mouth daily at bedtime for 14 days 11/13/23 11/27/23  Raine Bejarano PA-C   tiZANidine (ZANAFLEX) 4 mg tablet take 2 tablets by mouth every 6 hours if needed for muscle spasm 10/8/22   Historical Provider, MD   topiramate (TOPAMAX) 200 MG tablet 200 mg 2 (two) times a day 6/5/23   Historical Provider, MD   True Comfort Safety Lancets MISC USE TO TEST BLOOD GLUCOSE 2-3 TIMES DAILY 4/11/23   Historical Provider, MD       No current facility-administered medications for this encounter. Allergies   Allergen Reactions    Hydroxyzine Anaphylaxis     Claims it gives her convulsions. Other Other (See Comments)    Pollen Extract Itching    Tree Extract     Clarithromycin Rash     Pt denies    Latex Rash    Sulfa Antibiotics Rash     "severe skin burn"       Objective       Intake/Output Summary (Last 24 hours) at 11/22/2023 1307  Last data filed at 11/22/2023 1210  Gross per 24 hour   Intake 1000 ml   Output --   Net 1000 ml       Invasive Devices:   Peripheral IV 11/22/23 Left Antecubital (Active)       Vitals   Vitals:    11/22/23 1105 11/22/23 1306   BP: 112/70 131/79   TempSrc: Temporal    Pulse: 64    Resp: 20    Patient Position - Orthostatic VS: Lying    Temp: (!) 97.1 °F (36.2 °C)          EKG, Pathology, and/or Other Studies: I have personally reviewed pertinent reports. Sinus rhythm 64, QRS 78, QTc 441, no ST elevation or depression, no ectopy, no terminal.  Overall impression: No toxicologic findings. Lab Results: I have personally reviewed pertinent reports. Labs:    Results from last 7 days   Lab Units 11/22/23  1113   WBC Thousand/uL 6.29   HEMOGLOBIN g/dL 10.2*   HEMATOCRIT % 32.8*   PLATELETS Thousands/uL 185   NEUTROS PCT % 51   LYMPHS PCT % 38   MONOS PCT % 8   EOS PCT % 2      Results from last 7 days   Lab Units 11/22/23  1113   SODIUM mmol/L 142   POTASSIUM mmol/L 3.5   CHLORIDE mmol/L 109*   CO2 mmol/L 24   BUN mg/dL 12   CREATININE mg/dL 0.98   CALCIUM mg/dL 8.0*   ALK PHOS U/L 71   ALT U/L 16   AST U/L 18              0   Lab Value Date/Time    TROPONINI <0.02 10/13/2021 1023    TROPONINI <0.02 10/05/2021 0824    TROPONINI <0.02 01/10/2021 1110    TROPONINI <0.02 06/16/2020 1234    TROPONINI <0.02 06/15/2020 1817    TROPONINI <0.02 06/15/2020 1552    TROPONINI <0.02 06/15/2020 1129    TROPONINI <0.02 02/04/2020 1439    TROPONINI <0.02 09/13/2019 1035    TROPONINI <0.02 07/18/2019 2205    TROPONINI <0.02 05/08/2019 0308    TROPONINI <0.02 03/14/2019 1726    TROPONINI <0.04 08/11/2015 0841    TROPONINI <0.04 08/10/2015 2106    TROPONINI <0.04 08/10/2015 1645         Results from last 7 days   Lab Units 11/22/23  1113   ACETAMINOPHEN LVL ug/mL 15   ETHANOL LVL mg/dL <34   SALICYLATE LVL mg/dL <5     Invalid input(s): "EXTPREGUR"      Imaging Studies: I have personally reviewed pertinent reports. Counseling / Coordination of Care  Total time spent today 35 minutes. This was a phone consultation.

## 2023-11-22 NOTE — ED PROVIDER NOTES
History  Chief Complaint   Patient presents with    Hypotension     2 narcan, hx-compartment syndrome-right arm, found by home health aid    Overdose - Accidental     78-year-old female is presenting with concern for altered mental status and hypotension. Patient was found by her visiting nurse assistant today to be sleepy and barely arousable, blood pressure taken at the time was reportedly very low (60s over 40s?)  EMS was called and patient was found again to have hypotension with blood pressure reported to be 70/40 on manual cuff. Patient was also noted to have pinpoint pupils and depressed respiratory rate, was administered 2 mg of Narcan while IV access was obtained. IV fluid bolus was begun. Recheck demonstrated resolution of hypotension and patient became arousable. She remains sleepy at time of patient in the emergency department though she will respond to painful stimuli and answers questions occasionally. She has a recent past medical history of compartment syndrome after right upper extremity crush injury status post fasciotomy on 10/27 with closure on 10/29. She was admitted until 11/13 and discharged with visiting nurse assistance at home. She reportedly ran out of her prescription opiates had of schedule on 11/20 and was refilled just 2 days ago for oxycodone-acetaminophen 5mg/325mg tablets 24. Patient is unable to offer much subjective history currently due to ongoing somnolence. Prior to Admission Medications   Prescriptions Last Dose Informant Patient Reported? Taking?    Alcohol Swabs (Alcohol Pads) 70 % PADS   Yes No   Sig: USE TO TEST BLOOD GLUCOSE 2-3 TIMES DAILY   Buprenorphine HCl (Belbuca) 300 MCG FILM  Self Yes No   Sig: Apply 300 mcg to cheek in the morning   Butalbital-APAP-Caffeine (FIORICET PO)   Yes No   Sig: Take by mouth as needed   Galcanezumab-gnlm (Emgality) 120 MG/ML SOAJ  Self Yes No   Sig: Inject 120 mg under the skin every 30 (thirty) days Last inj 1/19/23 HYDROmorphone (DILAUDID) 4 mg tablet   No No   Sig: Take 1 tablet (4 mg total) by mouth every 6 (six) hours as needed for severe pain for up to 10 days Max Daily Amount: 16 mg   Lidocaine 4 % PTCH   No No   Sig: Apply 1 patch topically daily   Melatonin 10 MG CAPS  Self Yes No   Sig: Take 1 tablet by mouth daily at bedtime as needed   OneTouch Ultra test strip   Yes No   Sig: USE TO TEST BLOOD GLUCOSE 2-3 TIMES DAILY   True Comfort Safety Lancets MISC   Yes No   Sig: USE TO TEST BLOOD GLUCOSE 2-3 TIMES DAILY   acetaminophen (TYLENOL) 325 mg tablet   No No   Sig: Take 3 tablets (975 mg total) by mouth every 8 (eight) hours   albuterol (PROVENTIL HFA,VENTOLIN HFA) 90 mcg/act inhaler  Self Yes No   Sig: Inhale 2 puffs every 6 (six) hours as needed   ammonium lactate (LAC-HYDRIN) 12 % lotion   Yes No   Sig: as needed   atorvastatin (LIPITOR) 80 mg tablet   Yes No   Sig: Take 80 mg by mouth every evening   benzonatate (TESSALON PERLES) 100 mg capsule   No No   Sig: Take 1 capsule (100 mg total) by mouth 3 (three) times a day as needed for cough   carboxymethylcellulose 0.5 % SOLN  Self No No   Sig: Administer 1 drop to both eyes daily as needed for dry eyes   clonazePAM (KlonoPIN) 0.5 mg tablet  Self Yes No   Sig: Take 0.5 mg by mouth daily at bedtime   clonazePAM (KlonoPIN) 1 mg tablet  Self Yes No   Sig: Take 1 mg by mouth daily in the early morning   dicyclomine (BENTYL) 20 mg tablet  Self Yes No   Sig: Take 20 mg by mouth every 6 (six) hours   famotidine (PEPCID) 40 MG tablet   No No   Sig: Take 1 tablet (40 mg total) by mouth daily at bedtime   fenofibrate (TRICOR) 48 mg tablet  Self Yes No   Sig: Take 48 mg by mouth daily   fluticasone (FLONASE) 50 mcg/act nasal spray   Yes No   Si sprays into each nostril daily   fluticasone-umeclidinium-vilanterol (Trelegy Ellipta) 200-62.5-25 mcg/actuation AEPB inhaler   No No   Sig: Inhale 1 puff daily Rinse mouth after use.    furosemide (LASIX) 20 mg tablet  Self Yes No Sig: Take 20 mg by mouth as needed Taking as needed   lamoTRIgine (LaMICtal) 100 mg tablet   Yes No   Sig: Take 100 mg by mouth 2 (two) times a day   latanoprost (XALATAN) 0.005 % ophthalmic solution   Yes No   Sig: instill 1 drop into both eyes at bedtime   levalbuterol (XOPENEX) 1.25 mg/3 mL nebulizer solution   Yes No   Sig: Inhale 1.25 mg every 4 (four) hours as needed   levothyroxine 137 mcg tablet  Self Yes No   Sig: Take 137 mcg by mouth daily   magnesium 30 MG tablet  Self Yes No   Sig: Take 500 mg by mouth 2 (two) times a day 23 right now not taking   methocarbamol (ROBAXIN) 500 mg tablet   No No   Sig: Take 0.5 tablets (250 mg total) by mouth every 6 (six) hours for 14 days   methylPREDNISolone 4 MG tablet therapy pack   No No   Sig: Use as directed on package   metroNIDAZOLE (FLAGYL) 500 mg tablet   Yes No   Sig: Take 1 tablet by mouth 2 (two) times a day   omeprazole (PriLOSEC) 40 MG capsule   No No   Sig: Take 1 capsule (40 mg total) by mouth 2 (two) times a day   ondansetron (ZOFRAN) 8 mg tablet   No No   Sig: Take 1 tablet (8 mg total) by mouth every 8 (eight) hours as needed for nausea or vomiting   polyethylene glycol (GLYCOLAX) 17 GM/SCOOP powder   No No   Sig: Take 17 g daily for constipation. pregabalin (LYRICA) 100 mg capsule  Self Yes No   Sig: Take 100 mg by mouth 3 (three) times a day Morning-lunch-dinner   pregabalin (LYRICA) 150 mg capsule  Self Yes No   Sig: take 1 capsule by mouth NIGHTLY   rizatriptan (MAXALT-MLT) 10 mg disintegrating tablet   Yes No   Sig: TAKE 1 TABLET BY MOUTH ONE TIME AS NEEDED FOR MIGRAINE, MAY REPEA. ..  (REFER TO PRESCRIPTION NOTES).    senna (SENOKOT) 8.6 mg   No No   Sig: Take 1 tablet (8.6 mg total) by mouth daily at bedtime for 14 days   tiZANidine (ZANAFLEX) 4 mg tablet   Yes No   Sig: take 2 tablets by mouth every 6 hours if needed for muscle spasm   topiramate (TOPAMAX) 200 MG tablet   Yes No   Si mg 2 (two) times a day Facility-Administered Medications: None       Past Medical History:   Diagnosis Date    Ambulates with cane     Anxiety     Arthritis     Asthma     Bipolar 1 disorder (HCC)     Brain aneurysm     Chronic pain disorder     spinal stenosis    Concussion syndrome     neurological rx and balance rx    COPD (chronic obstructive pulmonary disease) (HCC)     Depression     Diabetes mellitus (HCC)     controlled w/ diet    Disease of thyroid gland     nodules     Family history of colon cancer     father    Fibromyalgia, primary     GERD (gastroesophageal reflux disease)     History of colon polyps     Hyperlipidemia     Hypertension     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     Irritable bowel syndrome     Lumbar degenerative disc disease 02/16/2022    Migraine     Neuropathy     bilateral feet and hands    Peripheral neuropathy     Psychiatric disorder     PTSD (post-traumatic stress disorder)     Shortness of breath     Sleep apnea     had 3 studies & last one negative    Stroke ([address])     2012 no deficeits    TIA (transient ischemic attack)     Wears dentures     Wears glasses        Past Surgical History:   Procedure Laterality Date    ABDOMINAL SURGERY      laproscopic/ endometriosis    BRAIN SURGERY      aneurysm/ coiling procedure    CARPAL TUNNEL RELEASE      CHOLECYSTECTOMY      COLONOSCOPY      DENTAL SURGERY      EPIDURAL BLOCK INJECTION Right 03/03/2022    Procedure: C7-T1 interlaminar epidural steroid injection;  Surgeon: Audelia Alex MD;  [facility];  Service: Pain Management     EPIDURAL BLOCK INJECTION N/A 04/21/2022    Procedure: C6-C7 BLOCK / INJECTION EPIDURAL STEROID CERVICAL;  Surgeon: Audelia Alex MD;  [facility];  Service: Pain Management     EPIDURAL BLOCK INJECTION Left 06/21/2022    Procedure: BLOCK / INJECTION EPIDURAL STEROID LUMBAR  left L3-4 TFESI;  Surgeon: Audelia Alex MD;  [facility];  Service: Pain Management     EYE SURGERY      cataract    FASCIOTOMY Right 10/27/2023    Procedure: FASCIOTOMY OF RIGHT UPPER EXTREMITY; POSSIBLE VAC DRESSING APPLICATION;  Surgeon: Robin Perdomo MD;  [facility];  Service: General    FL GUIDED NEEDLE PLAC BX/ASP/INJ  03/03/2022    FL GUIDED NEEDLE PLAC BX/ASP/INJ  11/17/2022    HYSTERECTOMY      TX ESOPHAGOGASTRODUODENOSCOPY TRANSORAL DIAGNOSTIC N/A 02/08/2018    Procedure: EGD AND COLONOSCOPY;  Surgeon: Florentina Mohs, MD;  Location: [facility]; Service: Gastroenterology    TX PRQ [address] NSTIM ELECTRODE ARRAY EPIDURAL N/A 11/17/2022    Procedure: Lilia Crystal SCS TRIAL;  Surgeon: Will Carrero MD;  [facility];  Service: Pain Management     TX PRQ IMPLTJ NSTIM ELECTRODE ARRAY EPIDURAL N/A 2/1/2023    Procedure: Insertion percutaneous thoracic spinal cord stimulator with left buttock implantable pulse generator;  Surgeon: Nano Roman MD;  [facility];  Service: Neurosurgery    SPLIT THICKNESS SKIN GRAFT Right 11/7/2023    Procedure: SKIN GRAFT SPLIT THICKNESS (STSG)  EXTREMITY;  Surgeon: Aung Sims MD;  [facility];  Service: General    THYROID SURGERY      TONSILLECTOMY      US GUIDED THYROID BIOPSY  11/30/2016    US GUIDED THYROID BIOPSY  3/21/2018    VAC DRESSING APPLICATION Right 58/85/6536    Procedure: CHANGE DRESSING/VAC RIGHT UPPER EXTREMITY; WASHOUT; PARTIAL CLOSURE;  Surgeon: Ata Davison DO;  [facility];  Service: General    WOUND DEBRIDEMENT Right 11/4/2023    Procedure: SIMPLE CLOSURE 1.5JS, OASIS APPLICATION [id number], WOUND VAC APPLICATION;  Surgeon: Roger Alexandra MD;  [facility];  Service: General       Family History   Problem Relation Age of Onset    Brain cancer Mother     Alzheimer's disease Mother     Alzheimer's disease Father     Parkinsonism Father      I have reviewed and agree with the history as documented.     E-Cigarette/Vaping    E-Cigarette Use Never User      E-Cigarette/Vaping Substances    Nicotine No     THC No     CBD No     Flavoring No     Other No     Unknown No      Social History     Tobacco Use    Smoking status: Every Day     Packs/day: 2.00     Types: Cigarettes    Smokeless tobacco: Never   Vaping Use    Vaping Use: Never used   Substance Use Topics    Alcohol use: Not Currently    Drug use: Never     Comment: prescribed Belbuca        Review of Systems   Unable to perform ROS: Other (Intoxication)       Physical Exam  ED Triage Vitals [11/22/23 1105]   Temperature Pulse Respirations Blood Pressure SpO2   (!) 97.1 °F (36.2 °C) 64 20 112/70 100 %      Temp Source Heart Rate Source Patient Position - Orthostatic VS BP Location FiO2 (%)   Temporal Monitor Lying Left arm --      Pain Score       --             Orthostatic Vital Signs  Vitals:    11/22/23 1306 11/22/23 1315 11/22/23 1330 11/22/23 1420   BP: 131/79 116/63  122/73   Pulse:  64 70 64   Patient Position - Orthostatic VS:    Sitting       Physical Exam  Vitals and nursing note reviewed. Constitutional:       General: She is sleeping. Comments: Patient is very sleepy/lethargic but arousable. She is protecting her airway, no s/s of respiratory distress. She occasionally answers questions with intelligible short answers but does not hold attention for long. She slurs her speech but has no facial droop and her slurred speech is overcome with noxious stimuli   HENT:      Head: Normocephalic and atraumatic. Right Ear: Tympanic membrane and external ear normal.      Left Ear: Tympanic membrane and external ear normal.      Nose: No congestion or rhinorrhea. Mouth/Throat:      Mouth: Mucous membranes are moist.      Pharynx: No oropharyngeal exudate or posterior oropharyngeal erythema. Eyes:      General: No scleral icterus. Extraocular Movements: Extraocular movements intact. Conjunctiva/sclera: Conjunctivae normal.      Pupils: Pupils are equal, round, and reactive to light.    Cardiovascular: Rate and Rhythm: Normal rate and regular rhythm. Heart sounds: No murmur heard. Pulmonary:      Effort: Pulmonary effort is normal. No respiratory distress. Breath sounds: Normal breath sounds. No wheezing. Abdominal:      General: There is no distension. Palpations: Abdomen is soft. Tenderness: There is no abdominal tenderness. There is no guarding. Musculoskeletal:         General: No swelling, tenderness or signs of injury. Cervical back: Neck supple. No rigidity or tenderness. Comments: RUE fasciotomy incision site now closed with sutures in place, no surrounding ertythema, no underlying fluctuance, no drainage, dressings are C/D/I   Lymphadenopathy:      Cervical: No cervical adenopathy. Skin:     General: Skin is warm and dry. Capillary Refill: Capillary refill takes less than 2 seconds. Findings: No bruising. Neurological:      Mental Status: She is lethargic. Motor: No weakness. ED Medications  Medications   sodium chloride 0.9 % bolus 1,000 mL (0 mL Intravenous Stopped 11/22/23 1210)   naloxone nasal- Given to patient by provider at discharge. (NARCAN) 4 mg/0.1 mL nasal spray 4 mg (4 mg Does not apply Given by Other 11/22/23 1417)       Diagnostic Studies  Results Reviewed       Procedure Component Value Units Date/Time    Rapid drug screen, urine [[id number]]  (Abnormal) Collected: 11/22/23 1345    Lab Status: Final result Specimen: Urine, Clean Catch Updated: 11/22/23 1506     Amph/Meth UR Negative     Barbiturate Ur Positive     Benzodiazepine Urine Negative     Cocaine Urine Negative     Methadone Urine --     Opiate Urine Positive     PCP Ur Negative     THC Urine Negative     Oxycodone Urine Negative    Narrative:      Presumptive report. If requested, specimen will be sent to reference lab for confirmation. FOR MEDICAL PURPOSES ONLY. IF CONFIRMATION NEEDED PLEASE CONTACT THE LAB WITHIN 5 DAYS.     Drug Screen Cutoff Levels:  AMPHETAMINE/METHAMPHETAMINES  1000 ng/mL  BARBITURATES     200 ng/mL  BENZODIAZEPINES     200 ng/mL  COCAINE      300 ng/mL  METHADONE      300 ng/mL  OPIATES      300 ng/mL  PHENCYCLIDINE     25 ng/mL  THC       50 ng/mL  OXYCODONE      100 ng/mL    UA w Reflex to Microscopic w Reflex to Culture [[id number]]  (Normal) Collected: 11/22/23 1346    Lab Status: Final result Specimen: Urine, Clean Catch Updated: 11/22/23 1415     Color, UA Yellow     Clarity, UA Clear     Specific Gravity, UA 1.010     pH, UA 7.5     Leukocytes, UA Negative     Nitrite, UA Negative     Protein, UA Negative mg/dl      Glucose, UA Negative mg/dl      Ketones, UA Negative mg/dl      Urobilinogen, UA 0.2 E.U./dl      Bilirubin, UA Negative     Occult Blood, UA Negative    Ethanol [[id number]]  (Normal) Collected: 11/22/23 1113    Lab Status: Final result Specimen: Blood from Arm, Left Updated: 11/22/23 1158     Ethanol Lvl <10 mg/dL     Comprehensive metabolic panel [[id number]]  (Abnormal) Collected: 11/22/23 1113    Lab Status: Final result Specimen: Blood from Arm, Left Updated: 11/22/23 1157     Sodium 142 mmol/L      Potassium 3.5 mmol/L      Chloride 109 mmol/L      CO2 24 mmol/L      ANION GAP 9 mmol/L      BUN 12 mg/dL      Creatinine 0.98 mg/dL      Glucose 73 mg/dL      Calcium 8.0 mg/dL      AST 18 U/L      ALT 16 U/L      Alkaline Phosphatase 71 U/L      Total Protein 5.4 g/dL      Albumin 3.7 g/dL      Total Bilirubin 0.22 mg/dL      eGFR 62 ml/min/1.73sq m     Narrative:      [place] guidelines for Chronic Kidney Disease (CKD):     Stage 1 with normal or high GFR (GFR > 90 mL/min/1.73 square meters)    Stage 2 Mild CKD (GFR = 60-89 mL/min/1.73 square meters)    Stage 3A Moderate CKD (GFR = 45-59 mL/min/1.73 square meters)    Stage 3B Moderate CKD (GFR = 30-44 mL/min/1.73 square meters)    Stage 4 Severe CKD (GFR = 15-29 mL/min/1.73 square meters)    Stage 5 End Stage CKD (GFR <15 mL/min/1.73 square meters)  Note: GFR calculation is accurate only with a steady state creatinine    Lipase [[id number]]  (Normal) Collected: 11/22/23 1113    Lab Status: Final result Specimen: Blood from Arm, Left Updated: 11/22/23 1157     Lipase 42 u/L     Salicylate level [[id number]]  (Normal) Collected: 11/22/23 1113    Lab Status: Final result Specimen: Blood from Arm, Left Updated: 92/38/55 6323     Salicylate Lvl <5 mg/dL     Acetaminophen level-If concentration is detectable, please discuss with medical  on call. [[id number]]  (Normal) Collected: 11/22/23 1113    Lab Status: Final result Specimen: Blood from Arm, Left Updated: 11/22/23 1157     Acetaminophen Level 15 ug/mL     Ammonia [[id number]]  (Normal) Collected: 11/22/23 1113    Lab Status: Final result Specimen: Blood from Arm, Left Updated: 11/22/23 1156     Ammonia 38 umol/L     CBC and differential [[id number]]  (Abnormal) Collected: 11/22/23 1113    Lab Status: Final result Specimen: Blood from Arm, Left Updated: 11/22/23 1139     WBC 6.29 Thousand/uL      RBC 3.23 Million/uL      Hemoglobin 10.2 g/dL      Hematocrit 32.8 %       fL      MCH 31.6 pg      MCHC 31.1 g/dL      RDW 13.0 %      MPV 10.2 fL      Platelets 553 Thousands/uL      nRBC 0 /100 WBCs      Neutrophils Relative 51 %      Immat GRANS % 0 %      Lymphocytes Relative 38 %      Monocytes Relative 8 %      Eosinophils Relative 2 %      Basophils Relative 1 %      Neutrophils Absolute 3.19 Thousands/µL      Immature Grans Absolute 0.02 Thousand/uL      Lymphocytes Absolute 2.40 Thousands/µL      Monocytes Absolute 0.52 Thousand/µL      Eosinophils Absolute 0.13 Thousand/µL      Basophils Absolute 0.03 Thousands/µL                    CT head without contrast   Final Result by Stalin Cordon MD (11/22 1209)      No acute intracranial abnormality with findings detailed above.  No significant interval change compared to the prior exam.                  Workstation performed: [id number] Procedures  Procedures      ED Course  ED Course as of 11/23/23 1524   Wed Nov 22, 2023   1202 ECG interpreted by me. Date: 11/22/2023. Time: 1202. Rate: 64 bpm.  Axis: Normal.  Rhythm: Regular. This is sinus rhythm with P waves preceding every QRS. There are no T wave inversions or flattening evident. Interpretation: This is a normal ECG with normal intervals. 1251 Patient reassessed at bedside, still somewhat sleepy but much more arousable. Discussed case with toxicology on-call. No indication for 4-hour acetaminophen level due to unknown ingestion time, normal LFTs, and only mild elevation. Toxicology states that at this time care is supportive only. SBIRT 22yo+      Flowsheet Row Most Recent Value   Initial Alcohol Screen: US AUDIT-C     1. How often do you have a drink containing alcohol? 0 Filed at: 11/22/2023 1253   2. How many drinks containing alcohol do you have on a typical day you are drinking? 0 Filed at: 11/22/2023 1253   3b. FEMALE Any Age, or MALE 65+: How often do you have 4 or more drinks on one occassion? 0 Filed at: 11/22/2023 1253   Audit-C Score 0 Filed at: 11/22/2023 1253   ABIOLA: How many times in the past year have you. .. Used an illegal drug or used a prescription medication for non-medical reasons? Never Filed at: 11/22/2023 1253                  Medical Decision Making  DDX: Acute encephalopathy very likely d/t polypharmacy or overdose. R/o metabolic encephalopathy, intentional self-harm, hepatic encephalopathy, uremia, anemia, occult infection, intracranial trauma. Reassessment/disposition: shortly after I reached out to toxicology, patient began to wake up and act more like her normal self. I observed her closely until she returned to her normal baseline self. Then I confirmed that her events today were not due to an intentional overdose as the patient denied any HI/SI/AH/VH.  We discussed her medication use and I again advised her to not use her PRN medications unless she really needs it. I provided her a IN Narcan box to keep with her and offered her resources for opioid abuse which she declined at this time. She was discharged home but with instructions to come back if she changes her mind or has new severe symptoms or if she has new SI/HI/AH or VH. Amount and/or Complexity of Data Reviewed  Labs: ordered. Radiology: ordered. Risk  Prescription drug management. Disposition  Final diagnoses: Altered mental status   Overdose   Polypharmacy     Time reflects when diagnosis was documented in both MDM as applicable and the Disposition within this note       Time User Action Codes Description Comment    11/22/2023 12:06 PM Ardelia Angelucci Add [R41.82] Altered mental status     11/22/2023  1:26 PM Vinny [facility] Overdose     11/22/2023  1:26 PM Rose Mary Roberts [Z79.899] Polypharmacy     11/22/2023  1:55 PM Ardelia Angelucci Modify [R41.82] Altered mental status     11/22/2023  1:55 PM Juliana Commander Overdose     11/22/2023  1:55 PM Ardelia Angelucci Modify [N95.589] Polypharmacy           ED Disposition       ED Disposition   Discharge    Condition   Stable    Date/Time   Wed Nov 22, 2023 [address] [place] [address] discharge to home/self care.                    Follow-up Information       Follow up With Specialties Details Why Contact Info Additional Information    Michaela Wetzel MD Family Medicine  Call to discuss your Tizanidine and Klonopin doses in the setting of your severe presentation today [address] [facility] [id number]  [facility] Emergency Department Emergency Medicine Go to  If symptoms worsen, As needed [address] [address]  [address] [place] [address] Emergency Department, [address] [facility] AFFILIATED WITH [address]            Discharge Medication List as of 11/22/2023  1:56 PM        CONTINUE these medications which have NOT CHANGED    Details   acetaminophen (TYLENOL) 325 mg tablet Take 3 tablets (975 mg total) by mouth every 8 (eight) hours, Starting Mon 11/13/2023, No Print      albuterol (PROVENTIL HFA,VENTOLIN HFA) 90 mcg/act inhaler Inhale 2 puffs every 6 (six) hours as needed, Starting Thu 12/28/2017, Historical Med      Alcohol Swabs (Alcohol Pads) 70 % PADS USE TO TEST BLOOD GLUCOSE 2-3 TIMES DAILY, Historical Med      ammonium lactate (LAC-HYDRIN) 12 % lotion as needed, Historical Med      atorvastatin (LIPITOR) 80 mg tablet Take 80 mg by mouth every evening, Starting Fri 10/14/2022, Historical Med      benzonatate (TESSALON PERLES) 100 mg capsule Take 1 capsule (100 mg total) by mouth 3 (three) times a day as needed for cough, Starting Tue 5/2/2023, Normal      Buprenorphine HCl (Belbuca) 300 MCG FILM Apply 300 mcg to cheek in the morning, Historical Med      Butalbital-APAP-Caffeine (FIORICET PO) Take by mouth as needed, Historical Med      carboxymethylcellulose 0.5 % SOLN Administer 1 drop to both eyes daily as needed for dry eyes, Starting Thu 2/7/2019, Normal      !! clonazePAM (KlonoPIN) 0.5 mg tablet Take 0.5 mg by mouth daily at bedtime, Historical Med      !! clonazePAM (KlonoPIN) 1 mg tablet Take 1 mg by mouth daily in the early morning, Starting Thu 1/25/2018, Historical Med      dicyclomine (BENTYL) 20 mg tablet Take 20 mg by mouth every 6 (six) hours, Historical Med      famotidine (PEPCID) 40 MG tablet Take 1 tablet (40 mg total) by mouth daily at bedtime, Starting Thu 8/17/2023, Normal      fenofibrate (TRICOR) 48 mg tablet Take 48 mg by mouth daily, Historical Med      fluticasone (FLONASE) 50 mcg/act nasal spray 2 sprays into each nostril daily, Starting Wed 1/11/2023, Historical Med      fluticasone-umeclidinium-vilanterol (Trelegy Ellipta) 200-62.5-25 mcg/actuation AEPB inhaler Inhale 1 puff daily Rinse mouth after use., Starting Thu 5/18/2023, Normal      furosemide (LASIX) 20 mg tablet Take 20 mg by mouth as needed Taking as needed, Historical Med      Galcanezumab-gnlm (Emgality) 120 MG/ML SOAJ Inject 120 mg under the skin every 30 (thirty) days Last inj 1/19/23, Historical Med      HYDROmorphone (DILAUDID) 4 mg tablet Take 1 tablet (4 mg total) by mouth every 6 (six) hours as needed for severe pain for up to 10 days Max Daily Amount: 16 mg, Starting Mon 11/13/2023, Until Thu 11/23/2023 at 2359, Normal      lamoTRIgine (LaMICtal) 100 mg tablet Take 100 mg by mouth 2 (two) times a day, Starting Tue 1/16/2018, Until Fri 7/14/2023, Historical Med      latanoprost (XALATAN) 0.005 % ophthalmic solution instill 1 drop into both eyes at bedtime, Historical Med      levalbuterol (XOPENEX) 1.25 mg/3 mL nebulizer solution Inhale 1.25 mg every 4 (four) hours as needed, Starting Tue 1/24/2023, Until Wed 1/24/2024 at 2359, Historical Med      levothyroxine 137 mcg tablet Take 137 mcg by mouth daily, Starting Mon 1/21/2019, Historical Med      Lidocaine 4 % PTCH Apply 1 patch topically daily, Starting Mon 11/13/2023, No Print      magnesium 30 MG tablet Take 500 mg by mouth 2 (two) times a day 1/26/23 right now not taking, Historical Med      Melatonin 10 MG CAPS Take 1 tablet by mouth daily at bedtime as needed, Historical Med      methocarbamol (ROBAXIN) 500 mg tablet Take 0.5 tablets (250 mg total) by mouth every 6 (six) hours for 14 days, Starting Mon 11/13/2023, Until Mon 11/27/2023, Normal      methylPREDNISolone 4 MG tablet therapy pack Use as directed on package, Normal      metroNIDAZOLE (FLAGYL) 500 mg tablet Take 1 tablet by mouth 2 (two) times a day, Historical Med      omeprazole (PriLOSEC) 40 MG capsule Take 1 capsule (40 mg total) by mouth 2 (two) times a day, Starting Thu 8/17/2023, Normal      ondansetron (ZOFRAN) 8 mg tablet Take 1 tablet (8 mg total) by mouth every 8 (eight) hours as needed for nausea or vomiting, Starting Wed 7/13/2022, Normal      OneTouch Ultra test strip USE TO TEST BLOOD GLUCOSE 2-3 TIMES DAILY, Historical Med      polyethylene glycol (GLYCOLAX) 17 GM/SCOOP powder Take 17 g daily for constipation. , Normal      !! pregabalin (LYRICA) 100 mg capsule Take 100 mg by mouth 3 (three) times a day Morning-lunch-dinner, Starting Thu 10/26/2017, Historical Med      !! pregabalin (LYRICA) 150 mg capsule take 1 capsule by mouth NIGHTLY, Historical Med      rizatriptan (MAXALT-MLT) 10 mg disintegrating tablet TAKE 1 TABLET BY MOUTH ONE TIME AS NEEDED FOR MIGRAINE, MAY REPEA. ..  (REFER TO PRESCRIPTION NOTES). , Historical Med      senna (SENOKOT) 8.6 mg Take 1 tablet (8.6 mg total) by mouth daily at bedtime for 14 days, Starting Mon 11/13/2023, Until Mon 11/27/2023, Normal      tiZANidine (ZANAFLEX) 4 mg tablet take 2 tablets by mouth every 6 hours if needed for muscle spasm, Historical Med      topiramate (TOPAMAX) 200 MG tablet 200 mg 2 (two) times a day, Starting Mon 6/5/2023, Historical Med      True Comfort Safety Lancets MISC USE TO TEST BLOOD GLUCOSE 2-3 TIMES DAILY, Historical Med       !! - Potential duplicate medications found. Please discuss with provider. Outpatient Discharge Orders   Acetaminophen level-"If concentration is detectable, please discuss with medical  on call."   Standing Status: Future Standing Exp. Date: 11/22/24     Comprehensive metabolic panel   Standing Status: Future Standing Exp. Date: 11/22/24       PDMP Review         Value Time User    PDMP Reviewed  Yes 11/6/2023 11:20 AM Wendi Simmons PA-C             ED Provider  Attending physically available and evaluated Crystal Gale. I managed the patient along with the ED Attending.     Electronically Signed by           Ayan Medellin MD  11/23/23 [id number]

## 2023-11-22 NOTE — ED ATTENDING ATTESTATION
11/22/2023  Arlette Hand DO, saw and evaluated the patient. I have discussed the patient with the resident/non-physician practitioner and agree with the resident's/non-physician practitioner's findings, Plan of Care, and MDM as documented in the resident's/non-physician practitioner's note, except where noted. All available labs and Radiology studies were reviewed. I was present for key portions of any procedure(s) performed by the resident/non-physician practitioner and I was immediately available to provide assistance. At this point I agree with the current assessment done in the Emergency Department. I have conducted an independent evaluation of this patient a history and physical is as follows:    Patient presents for evaluation of hypotension and AMS. Per EMS, they were called by the [facility], because the patient was "sleepy, hard to arrouse" and found to be hypotensive when they took her BP. The patient is prescribed narcotics due to fasciotomy at the end of October. According to [address] [place] [address], she had 25, 4mg hydromorphone tablets prescribed on 11/13 and another 21 percocet on 11/21. The patient is sleepy but arousable    Physical Exam  Vitals and nursing note reviewed. Constitutional:       General: She is sleeping. She is not in acute distress. Appearance: She is well-developed. HENT:      Head: Normocephalic and atraumatic. Right Ear: External ear normal.      Left Ear: External ear normal.      Nose: Nose normal.      Mouth/Throat:      Mouth: Mucous membranes are moist.      Pharynx: No oropharyngeal exudate. Eyes:      Conjunctiva/sclera: Conjunctivae normal.      Pupils: Pupils are equal, round, and reactive to light. Cardiovascular:      Rate and Rhythm: Normal rate and regular rhythm. Heart sounds: Normal heart sounds. No murmur heard. No friction rub. No gallop. Pulmonary:      Effort: Pulmonary effort is normal. No respiratory distress.       Breath sounds: Normal breath sounds. No wheezing or rales. Abdominal:      General: There is no distension. Palpations: Abdomen is soft. Tenderness: There is no abdominal tenderness. There is no guarding. Musculoskeletal:         General: No swelling, tenderness or deformity. Normal range of motion. Cervical back: Normal range of motion and neck supple. Lymphadenopathy:      Cervical: No cervical adenopathy. Skin:     General: Skin is warm and dry. Neurological:      General: No focal deficit present. Mental Status: She is oriented to person, place, and time. Mental status is at baseline. Cranial Nerves: No cranial nerve deficit. Sensory: No sensory deficit. Motor: No weakness or abnormal muscle tone. Coordination: Coordination normal.        Believe symptoms are likely secondary to opioid overdose. Vitals are within normal limits. Will continue to monitor    Obtain CT head to rule out intracranial abnormality, will obtain CBC, CMP to rule out uremia, ammonia level, coma panel given Percocet prescription. Labs within normal limits. Acetaminophen level normal.  Ammonia level normal.  CT head for normal limits. Toxicology was consulted who said that a 4-hour acetaminophen level was not required. Patient's vitals within normal limits while in the department. Had no more episodes of low blood pressure. After observation, patient more awake and alert. Able to answer questions without any difficulty, has no complaints. Is able to ambulate on her own. Patient given Narcan for home if needed.       ED Course         Critical Care Time  Procedures

## 2023-11-22 NOTE — DISCHARGE INSTRUCTIONS
Your blood pressure was very low today and you were very sleepy. You did respond to Narcan which means that your symptoms were likely related to Hydrocodone, Tizanidine or Klonopin or some combination of these medications. You need to call your primary doctor to discuss your symptoms and medication regimen. In the meantime, please only take medications exactly as prescribed- DO NOT TAKE ANY EXTRA DOSES. And only take medication if needed, taking more or more frequently than prescribed can be very dangerous and even deadly.      You have 2 labs to complete within the next 24 hours- please find attached lab slips and complete wherever able to tomorrow or Friday morning at the latest.    If you are feeling any weakness/fatigue/ or are having nausea/vomiting please come back to the ED

## 2023-11-23 LAB
ATRIAL RATE: 64 BPM
P AXIS: 62 DEGREES
PR INTERVAL: 184 MS
QRS AXIS: 64 DEGREES
QRSD INTERVAL: 78 MS
QT INTERVAL: 428 MS
QTC INTERVAL: 441 MS
T WAVE AXIS: 54 DEGREES
VENTRICULAR RATE: 64 BPM

## 2023-11-23 PROCEDURE — 93010 ELECTROCARDIOGRAM REPORT: CPT | Performed by: INTERNAL MEDICINE

## 2023-11-24 ENCOUNTER — HOSPITAL ENCOUNTER (EMERGENCY)
Facility: HOSPITAL | Age: 63
Discharge: HOME | End: 2023-11-24
Attending: EMERGENCY MEDICINE
Payer: COMMERCIAL

## 2023-11-24 VITALS
OXYGEN SATURATION: 94 % | TEMPERATURE: 97.2 F | HEART RATE: 66 BPM | SYSTOLIC BLOOD PRESSURE: 123 MMHG | RESPIRATION RATE: 18 BRPM | DIASTOLIC BLOOD PRESSURE: 63 MMHG

## 2023-11-24 DIAGNOSIS — Z51.89 VISIT FOR WOUND CHECK: Primary | ICD-10-CM

## 2023-11-24 PROCEDURE — 99284 EMERGENCY DEPT VISIT MOD MDM: CPT

## 2023-11-24 PROCEDURE — 99283 EMERGENCY DEPT VISIT LOW MDM: CPT | Performed by: EMERGENCY MEDICINE

## 2023-11-24 NOTE — ED PROVIDER NOTES
History  Chief Complaint   Patient presents with    Arm Pain     Had compartment syndrome of the right arm on 10/27/23, pain in right arm since last night. 64 yo female with complex past medical including chronic pain syndrome hypertension hypothyroidism type 2 diabetes brain aneurysm recent right upper extremity compartment syndrome with fasciotomy and skin grafting done on 11/7/2023 by Dr. Cristiano Chen. Patient arrives by ambulance with concern of right arm pain she is concerned about the possibility of infection to the skin graft she complains of difficulty extending her fingers but no new numbness or tingling no fevers complains of some chills no drainage from the wound no upper arm or elbow pain no wrist pain. She had a recent episode of  hypotension and accidental overdose for which she was evaluated in 11/2/2023 she had a fall onto her right side where she was evaluated in 11/20/2023. Upon my arrival to the room she is resting comfortably easily awakened and complains of right knee pain because it was resting against the railing that resolved shortly later. Past medical and surgical history reviewed. Prior to Admission Medications   Prescriptions Last Dose Informant Patient Reported? Taking?    Alcohol Swabs (Alcohol Pads) 70 % PADS   Yes No   Sig: USE TO TEST BLOOD GLUCOSE 2-3 TIMES DAILY   Buprenorphine HCl (Belbuca) 300 MCG FILM  Self Yes No   Sig: Apply 300 mcg to cheek in the morning   Butalbital-APAP-Caffeine (FIORICET PO)   Yes No   Sig: Take by mouth as needed   Galcanezumab-gnlm (Emgality) 120 MG/ML SOAJ  Self Yes No   Sig: Inject 120 mg under the skin every 30 (thirty) days Last inj 1/19/23   HYDROmorphone (DILAUDID) 4 mg tablet   No No   Sig: Take 1 tablet (4 mg total) by mouth every 6 (six) hours as needed for severe pain for up to 10 days Max Daily Amount: 16 mg   Lidocaine 4 % PTCH   No No   Sig: Apply 1 patch topically daily   Melatonin 10 MG CAPS  Self Yes No   Sig: Take 1 tablet by mouth daily at bedtime as needed   OneTouch Ultra test strip   Yes No   Sig: USE TO TEST BLOOD GLUCOSE 2-3 TIMES DAILY   True Comfort Safety Lancets MISC   Yes No   Sig: USE TO TEST BLOOD GLUCOSE 2-3 TIMES DAILY   acetaminophen (TYLENOL) 325 mg tablet   No No   Sig: Take 3 tablets (975 mg total) by mouth every 8 (eight) hours   albuterol (PROVENTIL HFA,VENTOLIN HFA) 90 mcg/act inhaler  Self Yes No   Sig: Inhale 2 puffs every 6 (six) hours as needed   ammonium lactate (LAC-HYDRIN) 12 % lotion   Yes No   Sig: as needed   atorvastatin (LIPITOR) 80 mg tablet   Yes No   Sig: Take 80 mg by mouth every evening   benzonatate (TESSALON PERLES) 100 mg capsule   No No   Sig: Take 1 capsule (100 mg total) by mouth 3 (three) times a day as needed for cough   carboxymethylcellulose 0.5 % SOLN  Self No No   Sig: Administer 1 drop to both eyes daily as needed for dry eyes   clonazePAM (KlonoPIN) 0.5 mg tablet  Self Yes No   Sig: Take 0.5 mg by mouth daily at bedtime   clonazePAM (KlonoPIN) 1 mg tablet  Self Yes No   Sig: Take 1 mg by mouth daily in the early morning   dicyclomine (BENTYL) 20 mg tablet  Self Yes No   Sig: Take 20 mg by mouth every 6 (six) hours   famotidine (PEPCID) 40 MG tablet   No No   Sig: Take 1 tablet (40 mg total) by mouth daily at bedtime   fenofibrate (TRICOR) 48 mg tablet  Self Yes No   Sig: Take 48 mg by mouth daily   fluticasone (FLONASE) 50 mcg/act nasal spray   Yes No   Si sprays into each nostril daily   fluticasone-umeclidinium-vilanterol (Trelegy Ellipta) 200-62.5-25 mcg/actuation AEPB inhaler   No No   Sig: Inhale 1 puff daily Rinse mouth after use.    furosemide (LASIX) 20 mg tablet  Self Yes No   Sig: Take 20 mg by mouth as needed Taking as needed   lamoTRIgine (LaMICtal) 100 mg tablet   Yes No   Sig: Take 100 mg by mouth 2 (two) times a day   latanoprost (XALATAN) 0.005 % ophthalmic solution   Yes No   Sig: instill 1 drop into both eyes at bedtime   levalbuterol (XOPENEX) 1.25 mg/3 mL nebulizer solution   Yes No   Sig: Inhale 1.25 mg every 4 (four) hours as needed   levothyroxine 137 mcg tablet  Self Yes No   Sig: Take 137 mcg by mouth daily   magnesium 30 MG tablet  Self Yes No   Sig: Take 500 mg by mouth 2 (two) times a day 23 right now not taking   methocarbamol (ROBAXIN) 500 mg tablet   No No   Sig: Take 0.5 tablets (250 mg total) by mouth every 6 (six) hours for 14 days   methylPREDNISolone 4 MG tablet therapy pack   No No   Sig: Use as directed on package   metroNIDAZOLE (FLAGYL) 500 mg tablet   Yes No   Sig: Take 1 tablet by mouth 2 (two) times a day   omeprazole (PriLOSEC) 40 MG capsule   No No   Sig: Take 1 capsule (40 mg total) by mouth 2 (two) times a day   ondansetron (ZOFRAN) 8 mg tablet   No No   Sig: Take 1 tablet (8 mg total) by mouth every 8 (eight) hours as needed for nausea or vomiting   polyethylene glycol (GLYCOLAX) 17 GM/SCOOP powder   No No   Sig: Take 17 g daily for constipation. pregabalin (LYRICA) 100 mg capsule  Self Yes No   Sig: Take 100 mg by mouth 3 (three) times a day Morning-lunch-dinner   pregabalin (LYRICA) 150 mg capsule  Self Yes No   Sig: take 1 capsule by mouth NIGHTLY   rizatriptan (MAXALT-MLT) 10 mg disintegrating tablet   Yes No   Sig: TAKE 1 TABLET BY MOUTH ONE TIME AS NEEDED FOR MIGRAINE, MAY REPEA. ..  (REFER TO PRESCRIPTION NOTES).    senna (SENOKOT) 8.6 mg   No No   Sig: Take 1 tablet (8.6 mg total) by mouth daily at bedtime for 14 days   tiZANidine (ZANAFLEX) 4 mg tablet   Yes No   Sig: take 2 tablets by mouth every 6 hours if needed for muscle spasm   topiramate (TOPAMAX) 200 MG tablet   Yes No   Si mg 2 (two) times a day      Facility-Administered Medications: None       Past Medical History:   Diagnosis Date    Ambulates with cane     Anxiety     Arthritis     Asthma     Bipolar 1 disorder (HCC)     Brain aneurysm     Chronic pain disorder     spinal stenosis    Concussion syndrome     neurological rx and balance rx    COPD (chronic obstructive pulmonary disease) ([address])     Depression     Diabetes mellitus ([address])     controlled w/ diet    Disease of thyroid gland     nodules     Family history of colon cancer     father    Fibromyalgia, primary     GERD (gastroesophageal reflux disease)     History of colon polyps     Hyperlipidemia     Hypertension     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     Irritable bowel syndrome     Lumbar degenerative disc disease 02/16/2022    Migraine     Neuropathy     bilateral feet and hands    Peripheral neuropathy     Psychiatric disorder     PTSD (post-traumatic stress disorder)     Shortness of breath     Sleep apnea     had 3 studies & last one negative    Stroke ([address])     2012 no deficeits    TIA (transient ischemic attack)     Wears dentures     Wears glasses        Past Surgical History:   Procedure Laterality Date    ABDOMINAL SURGERY      laproscopic/ endometriosis    BRAIN SURGERY      aneurysm/ coiling procedure    CARPAL TUNNEL RELEASE      CHOLECYSTECTOMY      COLONOSCOPY      DENTAL SURGERY      EPIDURAL BLOCK INJECTION Right 03/03/2022    Procedure: C7-T1 interlaminar epidural steroid injection;  Surgeon: Afsaneh Pollard MD;  Location: [facility];  Service: Pain Management     EPIDURAL BLOCK INJECTION N/A 04/21/2022    Procedure: C6-C7 BLOCK / INJECTION EPIDURAL STEROID CERVICAL;  Surgeon: Afsaneh Pollard MD;  Location: [facility];  Service: Pain Management     EPIDURAL BLOCK INJECTION Left 06/21/2022    Procedure: BLOCK / INJECTION EPIDURAL STEROID LUMBAR  left L3-4 TFESI;  Surgeon: Afsaneh Pollard MD;  Location: [facility];  Service: Pain Management     EYE SURGERY      cataract    FASCIOTOMY Right 10/27/2023    Procedure: FASCIOTOMY OF RIGHT UPPER EXTREMITY; POSSIBLE VAC DRESSING APPLICATION;  Surgeon: Stacey Lisa MD;  [facility];  Service: General    FL GUIDED NEEDLE PLAC BX/ASP/INJ  03/03/2022    FL GUIDED NEEDLE PLAC BX/ASP/INJ  11/17/2022    HYSTERECTOMY NM ESOPHAGOGASTRODUODENOSCOPY TRANSORAL DIAGNOSTIC N/A 02/08/2018    Procedure: EGD AND COLONOSCOPY;  Surgeon: Dillan Easton MD;  Location: [facility]; Service: Gastroenterology    NM PRQ [address] NSTIM ELECTRODE ARRAY EPIDURAL N/A 11/17/2022    Procedure: Celine Lee SCS TRIAL;  Surgeon: Malgorzata Cevrantes MD;  Location: [facility];  Service: Pain Management     NM PRQ IMPLTJ NSTIM ELECTRODE ARRAY EPIDURAL N/A 2/1/2023    Procedure: Insertion percutaneous thoracic spinal cord stimulator with left buttock implantable pulse generator;  Surgeon: Aurora Valdez MD;  [facility];  Service: Neurosurgery    SPLIT THICKNESS SKIN GRAFT Right 11/7/2023    Procedure: SKIN GRAFT SPLIT THICKNESS (STSG)  EXTREMITY;  Surgeon: Belinda Duron MD;  [facility];  Service: General    THYROID SURGERY      TONSILLECTOMY      US GUIDED THYROID BIOPSY  11/30/2016    US GUIDED THYROID BIOPSY  3/21/2018    VAC DRESSING APPLICATION Right 70/58/6938    Procedure: CHANGE DRESSING/VAC RIGHT UPPER EXTREMITY; WASHOUT; PARTIAL CLOSURE;  Surgeon: Fidel Saunders DO;  [facility];  Service: General    WOUND DEBRIDEMENT Right 11/4/2023    Procedure: SIMPLE CLOSURE 2.3UM, OASIS APPLICATION [id number], WOUND VAC APPLICATION;  Surgeon: Kourtney Troy MD;  [facility];  Service: General       Family History   Problem Relation Age of Onset    Brain cancer Mother     Alzheimer's disease Mother     Alzheimer's disease Father     Parkinsonism Father      I have reviewed and agree with the history as documented.     E-Cigarette/Vaping    E-Cigarette Use Never User      E-Cigarette/Vaping Substances    Nicotine No     THC No     CBD No     Flavoring No     Other No     Unknown No      Social History     Tobacco Use    Smoking status: Every Day     Packs/day: 2.00     Types: Cigarettes    Smokeless tobacco: Never   Vaping Use    Vaping Use: Never used   Substance Use Topics    Alcohol use: Not Currently Drug use: Never     Comment: prescribed Belbuca       Review of Systems   Constitutional:  Positive for chills. Negative for activity change, appetite change and fever. HENT:  Negative for congestion and trouble swallowing. Respiratory:  Negative for cough and shortness of breath. Gastrointestinal:  Negative for abdominal pain, nausea and vomiting. Genitourinary:  Negative for difficulty urinating. Skin:  Positive for wound. Neurological:  Negative for weakness, light-headedness, numbness and headaches. All other systems reviewed and are negative. Physical Exam  Physical Exam  Constitutional:       General: She is not in acute distress. Appearance: She is not ill-appearing, toxic-appearing or diaphoretic. HENT:      Head: Normocephalic. Right Ear: Tympanic membrane and external ear normal.      Left Ear: Tympanic membrane and external ear normal.      Nose: Nose normal. No congestion or rhinorrhea. Mouth/Throat:      Mouth: Mucous membranes are dry. Eyes:      General:         Right eye: No discharge. Left eye: No discharge. Extraocular Movements: Extraocular movements intact. Conjunctiva/sclera: Conjunctivae normal.      Pupils: Pupils are equal, round, and reactive to light. Cardiovascular:      Rate and Rhythm: Normal rate and regular rhythm. Pulses: Normal pulses. Pulmonary:      Effort: Pulmonary effort is normal. No respiratory distress. Breath sounds: No stridor. No wheezing, rhonchi or rales. Comments: Distant BS sats 94% on RA  Chest:      Chest wall: No tenderness. Abdominal:      General: Bowel sounds are normal. There is no distension. Palpations: Abdomen is soft. Tenderness: There is no abdominal tenderness. There is no right CVA tenderness, left CVA tenderness or guarding. Musculoskeletal:         General: Normal range of motion. Cervical back: Normal range of motion and neck supple.  No rigidity or tenderness. Comments: Trace bilateral edema; right upper extremity /forearm was wrapped in an Ace wrap with a knot tied into the Ace wrap at the proximal forearm making an indentation into the soft tissues. This was taken down as well as the Curlex gauze and Xeroform. Wounds are clean dry and intact patient has intact range of motion of the right shoulder rt elbow ight forearm all the soft tissues are soft. The right forearm has no increased induration erythema drainage or fluid collection. There is some localized edema under the area of the Ace wrap that was tied into a knot please see media tab for further details patient has intact 2+ radial 1+ ulnar pulses she can make an okay sign and play the piano with her finger takes the FDP and FDS were isolated found to be intact light touch is intact to all the digits and she can extend digits against resistance. Range of motion of the wrist is intact and does not trigger any discomfort. Right ankle was examined she has some trace edema but no increased erythema. Anterior drawer of the ankle is negative. She has no reproducible tenderness to the medial lateral malleolus. Posadas's test reveals an intact Achilles tendon. She has no tenderness to the right foot L4-S1 dermatomes are intact. A 1+ anterior tibial pulse. Skin:     General: Skin is warm and dry. Findings: No rash. Neurological:      General: No focal deficit present. Mental Status: She is alert.          Vital Signs  ED Triage Vitals [11/24/23 1452]   Temperature Pulse Respirations Blood Pressure SpO2   (!) 97.2 °F (36.2 °C) 72 18 141/74 99 %      Temp Source Heart Rate Source Patient Position - Orthostatic VS BP Location FiO2 (%)   Temporal Monitor Lying Left arm --      Pain Score       10 - Worst Possible Pain           Vitals:    11/24/23 1452 11/24/23 1600   BP: 141/74 123/63   Pulse: 72 66   Patient Position - Orthostatic VS: Lying Lying         Visual Acuity      ED Medications  Medications - No data to display    Diagnostic Studies  Results Reviewed       None                   No orders to display              Procedures  Procedures         ED Course  ED Course as of 11/25/23 0217   Fri Nov 24, 2023   1638 Patient able to ambulate to the bathroom with cane                               SBIRT 20yo+      Flowsheet Row Most Recent Value   Initial Alcohol Screen: US AUDIT-C     1. How often do you have a drink containing alcohol? 0 Filed at: 11/24/2023 1454   2. How many drinks containing alcohol do you have on a typical day you are drinking? 0 Filed at: 11/24/2023 1454   3b. FEMALE Any Age, or MALE 65+: How often do you have 4 or more drinks on one occassion? 0 Filed at: 11/24/2023 1454   Audit-C Score 0 Filed at: 11/24/2023 1450   ABIOLA: How many times in the past year have you. .. Used an illegal drug or used a prescription medication for non-medical reasons? Never Filed at: 11/24/2023 145                      Medical Decision Making  Mdm: Patient had concerns of a wound infection to her right forearm. I reviewed her discomfort is likely due to the dressing being too tight there is no no clinical signs of wound infection. There is no clinical signs of compartment syndrome she does not have pain out of proportion. She is able to play the piano and flex and extend her digits comfortably as well as place her wrist through range of motion. Area will be redressed with Xeroform and Curlex gauze only by the nurse. Recommend she follow-up with her surgical team Dr. Coy Jefferson. Patient wanted me to examine her right ankle there is no evidence of reproducible tenderness and her right leg is neurovascularly intact. Recommend she keep her follow-up appointments she is provided with dressing supplies.       ED visits from 11/22/23 and 11/20/23 reviewed as well as d/c summary from 10/27 - 11/23 2023             Disposition  Final diagnoses:   Visit for wound check - right forearm Time reflects when diagnosis was documented in both MDM as applicable and the Disposition within this note       Time User Action Codes Description Comment    11/24/2023  4:11 PM Artemio Ramirez Add [Z51.89] Visit for wound check     11/24/2023  4:11 PM [address] [N01.98] Visit for wound check right forearm          ED Disposition       ED Disposition   Discharge    Condition   Stable    Date/Time   Fri Nov 24, 2023 [address] discharge to home/self care.                    Follow-up Information       Follow up With Specialties Details Why Contact Info    Alysa Oh MD Family Medicine In 3 days recheck of symptoms [address] [facility] [id number] [address], MD Trauma Surgery, General Surgery Call in 3 days recheck of skin graft [address] [facility]  [phone]              Discharge Medication List as of 11/24/2023  4:51 PM        CONTINUE these medications which have NOT CHANGED    Details   acetaminophen (TYLENOL) 325 mg tablet Take 3 tablets (975 mg total) by mouth every 8 (eight) hours, Starting Mon 11/13/2023, No Print      albuterol (PROVENTIL HFA,VENTOLIN HFA) 90 mcg/act inhaler Inhale 2 puffs every 6 (six) hours as needed, Starting Thu 12/28/2017, Historical Med      Alcohol Swabs (Alcohol Pads) 70 % PADS USE TO TEST BLOOD GLUCOSE 2-3 TIMES DAILY, Historical Med      ammonium lactate (LAC-HYDRIN) 12 % lotion as needed, Historical Med      atorvastatin (LIPITOR) 80 mg tablet Take 80 mg by mouth every evening, Starting Fri 10/14/2022, Historical Med      benzonatate (TESSALON PERLES) 100 mg capsule Take 1 capsule (100 mg total) by mouth 3 (three) times a day as needed for cough, Starting Tue 5/2/2023, Normal      Buprenorphine HCl (Belbuca) 300 MCG FILM Apply 300 mcg to cheek in the morning, Historical Med      Butalbital-APAP-Caffeine (FIORICET PO) Take by mouth as needed, Historical Med      carboxymethylcellulose 0.5 % SOLN Administer 1 drop to both eyes daily as needed for dry eyes, Starting Thu 2/7/2019, Normal      !! clonazePAM (KlonoPIN) 0.5 mg tablet Take 0.5 mg by mouth daily at bedtime, Historical Med      !! clonazePAM (KlonoPIN) 1 mg tablet Take 1 mg by mouth daily in the early morning, Starting Thu 1/25/2018, Historical Med      dicyclomine (BENTYL) 20 mg tablet Take 20 mg by mouth every 6 (six) hours, Historical Med      famotidine (PEPCID) 40 MG tablet Take 1 tablet (40 mg total) by mouth daily at bedtime, Starting Thu 8/17/2023, Normal      fenofibrate (TRICOR) 48 mg tablet Take 48 mg by mouth daily, Historical Med      fluticasone (FLONASE) 50 mcg/act nasal spray 2 sprays into each nostril daily, Starting Wed 1/11/2023, Historical Med      fluticasone-umeclidinium-vilanterol (Trelegy Ellipta) 200-62.5-25 mcg/actuation AEPB inhaler Inhale 1 puff daily Rinse mouth after use., Starting Thu 5/18/2023, Normal      furosemide (LASIX) 20 mg tablet Take 20 mg by mouth as needed Taking as needed, Historical Med      Galcanezumab-gnlm (Emgality) 120 MG/ML SOAJ Inject 120 mg under the skin every 30 (thirty) days Last inj 1/19/23, Historical Med      lamoTRIgine (LaMICtal) 100 mg tablet Take 100 mg by mouth 2 (two) times a day, Starting Tue 1/16/2018, Until Fri 7/14/2023, Historical Med      latanoprost (XALATAN) 0.005 % ophthalmic solution instill 1 drop into both eyes at bedtime, Historical Med      levalbuterol (XOPENEX) 1.25 mg/3 mL nebulizer solution Inhale 1.25 mg every 4 (four) hours as needed, Starting Tue 1/24/2023, Until Wed 1/24/2024 at 2359, Historical Med      levothyroxine 137 mcg tablet Take 137 mcg by mouth daily, Starting Mon 1/21/2019, Historical Med      Lidocaine 4 % PTCH Apply 1 patch topically daily, Starting Mon 11/13/2023, No Print      magnesium 30 MG tablet Take 500 mg by mouth 2 (two) times a day 1/26/23 right now not taking, Historical Med      Melatonin 10 MG CAPS Take 1 tablet by mouth daily at bedtime as needed, Historical Med      methocarbamol (ROBAXIN) 500 mg tablet Take 0.5 tablets (250 mg total) by mouth every 6 (six) hours for 14 days, Starting Mon 11/13/2023, Until Mon 11/27/2023, Normal      methylPREDNISolone 4 MG tablet therapy pack Use as directed on package, Normal      metroNIDAZOLE (FLAGYL) 500 mg tablet Take 1 tablet by mouth 2 (two) times a day, Historical Med      omeprazole (PriLOSEC) 40 MG capsule Take 1 capsule (40 mg total) by mouth 2 (two) times a day, Starting u 8/17/2023, Normal      ondansetron (ZOFRAN) 8 mg tablet Take 1 tablet (8 mg total) by mouth every 8 (eight) hours as needed for nausea or vomiting, Starting Wed 7/13/2022, Normal      OneTouch Ultra test strip USE TO TEST BLOOD GLUCOSE 2-3 TIMES DAILY, Historical Med      polyethylene glycol (GLYCOLAX) 17 GM/SCOOP powder Take 17 g daily for constipation. , Normal      !! pregabalin (LYRICA) 100 mg capsule Take 100 mg by mouth 3 (three) times a day Morning-lunch-dinner, Starting u 10/26/2017, Historical Med      !! pregabalin (LYRICA) 150 mg capsule take 1 capsule by mouth NIGHTLY, Historical Med      rizatriptan (MAXALT-MLT) 10 mg disintegrating tablet TAKE 1 TABLET BY MOUTH ONE TIME AS NEEDED FOR MIGRAINE, MAY REPEA. ..  (REFER TO PRESCRIPTION NOTES). , Historical Med      senna (SENOKOT) 8.6 mg Take 1 tablet (8.6 mg total) by mouth daily at bedtime for 14 days, Starting Mon 11/13/2023, Until Mon 11/27/2023, Normal      tiZANidine (ZANAFLEX) 4 mg tablet take 2 tablets by mouth every 6 hours if needed for muscle spasm, Historical Med      topiramate (TOPAMAX) 200 MG tablet 200 mg 2 (two) times a day, Starting Mon 6/5/2023, Historical Med      True Comfort Safety Lancets MISC USE TO TEST BLOOD GLUCOSE 2-3 TIMES DAILY, Historical Med       !! - Potential duplicate medications found. Please discuss with provider.         STOP taking these medications       HYDROmorphone (DILAUDID) 4 mg tablet Comments:   Reason for Stopping:               No discharge procedures on file.     PDMP Review         Value Time User    PDMP Reviewed  Yes 11/6/2023 11:20 AM Rafat Terrell PA-C            ED Provider  Electronically Signed by             Marquis Daily MD  11/25/23 9805

## 2023-11-24 NOTE — DISCHARGE INSTRUCTIONS
Xeroform dressing  daily to wound with kerlex gauze and tape no knot tying with any of the dressings no ace wrap  Return with increased redness pus drainage or any new or worsening symptosm

## 2023-11-28 ENCOUNTER — TELEPHONE (OUTPATIENT)
Age: 63
End: 2023-11-28

## 2023-11-28 NOTE — TELEPHONE ENCOUNTER
Caller:      Doctor: Eddie Lucas     Reason for call: Saw patient yesterday and they had conversation about her medications. She was recently seen at the ED and is looking to speak with provider to discuss how to manage the pain going forward. She is managing belbuca and vicodin. She is available tomorrow before 8am and Wed after 430 to discuss     Call back#: [phone] or tiger text would be good as well.

## 2023-11-30 ENCOUNTER — OFFICE VISIT (OUTPATIENT)
Dept: PAIN MEDICINE | Facility: CLINIC | Age: 63
End: 2023-11-30
Payer: COMMERCIAL

## 2023-11-30 VITALS
HEART RATE: 69 BPM | BODY MASS INDEX: 27.59 KG/M2 | HEIGHT: 64 IN | DIASTOLIC BLOOD PRESSURE: 65 MMHG | WEIGHT: 161.6 LBS | SYSTOLIC BLOOD PRESSURE: 95 MMHG

## 2023-11-30 DIAGNOSIS — M46.1 SACROILIITIS (HCC): ICD-10-CM

## 2023-11-30 DIAGNOSIS — M47.816 LUMBAR SPONDYLOSIS: ICD-10-CM

## 2023-11-30 DIAGNOSIS — M47.812 CERVICAL SPONDYLOSIS: ICD-10-CM

## 2023-11-30 DIAGNOSIS — M54.16 LUMBAR RADICULOPATHY: ICD-10-CM

## 2023-11-30 DIAGNOSIS — E11.42 DIABETIC POLYNEUROPATHY ASSOCIATED WITH TYPE 2 DIABETES MELLITUS (HCC): ICD-10-CM

## 2023-11-30 DIAGNOSIS — M51.36 LUMBAR DEGENERATIVE DISC DISEASE: ICD-10-CM

## 2023-11-30 DIAGNOSIS — G89.4 CHRONIC PAIN SYNDROME: Primary | ICD-10-CM

## 2023-11-30 DIAGNOSIS — M54.12 CERVICAL RADICULOPATHY: ICD-10-CM

## 2023-11-30 DIAGNOSIS — M54.2 NECK PAIN: ICD-10-CM

## 2023-11-30 DIAGNOSIS — Z96.89 S/P INSERTION OF SPINAL CORD STIMULATOR: ICD-10-CM

## 2023-11-30 PROCEDURE — 99213 OFFICE O/P EST LOW 20 MIN: CPT | Performed by: NURSE PRACTITIONER

## 2023-11-30 RX ORDER — HYDROMORPHONE HYDROCHLORIDE 4 MG/1
4 TABLET ORAL EVERY 6 HOURS PRN
COMMUNITY
End: 2023-12-07 | Stop reason: ALTCHOICE

## 2023-11-30 NOTE — PROGRESS NOTES
Assessment:  1. Chronic pain syndrome    2. Neck pain    3. Cervical radiculopathy    4. Cervical spondylosis    5. Diabetic polyneuropathy associated with type 2 diabetes mellitus (HCC)    6. Lumbar degenerative disc disease    7. Lumbar radiculopathy    8. S/P insertion of spinal cord stimulator    9. Lumbar spondylosis    10. Sacroiliitis ([address])        Plan:  While the patient was in the office today, I did have a thorough conversation regarding their chronic pain syndrome, medication management, and treatment plan options. Patient is being seen for a follow-up visit. She was last seen here on 9/22/2023 at which time the plan was to proceed with a left-sided C3-4 and C4-5 medial branch block. Unfortunately, patient fell at home and developed compartment syndrome of the right upper extremity requiring 3 surgeries. The last surgery was performed on 11/7/2023. It was a skin graft. Patient has home care coming to the home daily to do dressing changes. She is receiving occupational therapy at home. Patient is reporting slow, but good improvement. At this point, we will not plan to reschedule the medial branch block until after the upper extremity wounds have fully healed. Patient has a spinal cord stimulator for back and leg pain from which she reports about 60% improvement in her back and leg pain. PCP prescribes Belbuca 300 mcg film which she uses daily as well as hydrocodone 10/325 3 times daily if needed for pain. She also uses Lyrica 100 mg 3 times daily and 150 mg at bedtime as well as tizanidine 4 mg every 6 hours as needed for spasms. Initially, patient asked if I would prescribe Dilaudid for her to use at bedtime. Advised patient that she has an opioid agreement with her PCP who is prescribing opiates. I would not add additional opiate medication to her regimen. That she should try to alternate all of her medications instead of taking them together.   Her home health aide accompanied her to this visit. They are planning to go home and write out a schedule of her medications so that she is alternating all of her medications more instead of taking them together. This should provide her with more sustained relief/pain control. Follow-up in 6 weeks. My impressions and treatment recommendations were discussed in detail with the patient who verbalized understanding and had no further questions. Discharge instructions were provided. I personally saw and examined the patient and I agree with the above discussed plan of care. No orders of the defined types were placed in this encounter. New Medications Ordered This Visit   Medications    HYDROmorphone (DILAUDID) 4 mg tablet     Sig: Take 4 mg by mouth every 6 (six) hours as needed for moderate pain       History of Present Illness:  Carly Nguyen is a 65 y.o. female who presents for a follow up office visit in regards to Shoulder Pain, Neck Pain, Back Pain, Arm Pain, Hand Pain, Hip Pain, Knee Pain, Leg Pain, and Foot Pain. The patient’s current symptoms include complaints of neck pain, right upper extremity pain, low back and lower extremity pain. Current pain level is a 7/10. Quality pain is described as burning, sharp, throbbing, pressure-like, shooting, numb, pins-and-needles. PCP prescribes Belbuca 300 mcg film which she uses daily, hydrocodone 10/325 every 8 hours as needed for pain, Lyrica 100 mg 3 times daily and 150 mg at bedtime, tizanidine 4 mg every 6 hours if needed for spasms. Medications reduce her pain by 45%. She denies significant side effects from meds. I have personally reviewed and/or updated the patient's past medical history, past surgical history, family history, social history, current medications, allergies, and vital signs today. Review of Systems   Eyes:  Negative for visual disturbance. Respiratory:  Negative for shortness of breath. Cardiovascular:  Negative for chest pain.    Gastrointestinal: Negative for constipation, diarrhea and nausea. Musculoskeletal:  Positive for arthralgias, gait problem and myalgias. Negative for joint swelling. Decreased range of motion  Joint stiffness  Swelling  Pain in Extremity    Skin:  Negative for rash. Neurological:  Positive for dizziness. Negative for seizures and weakness.        Patient Active Problem List   Diagnosis    Other hyperlipidemia    Dizziness    Hypothyroid    Brain aneurysm    Hypokalemia    Hypertriglyceridemia    Low HDL (under 40)    Elevated TSH    Tobacco abuse    Intractable abdominal pain    TMJ syndrome    Stroke-like symptoms    Nausea    Left chest pressure    Hypophosphatemia    Gastritis, acute    Hypotension    Tobacco use    COPD (chronic obstructive pulmonary disease) ([facility])    Hypertension    Constipation    Ileus ([facility])    Fibromyalgia, primary    Bipolar 1 disorder ([facility])    Depression    Chronic pain syndrome    Anxiety    Migraine    Syncope and collapse    Mid back pain    Cervical radiculopathy    Neck pain    Lumbar degenerative disc disease    Lumbar radiculopathy    Lumbar spondylosis    Cervical disc disorder with radiculopathy of mid-cervical region    Chronic pain    Sacroiliitis ([facility])    Carotid artery aneurysm ([facility])    Irritable bowel syndrome with both constipation and diarrhea    Shortness of breath    Cervical spondylosis    Diabetic polyneuropathy associated with type 2 diabetes mellitus ([facility])    Coronary artery calcification seen on CAT scan    Pulmonary nodules/lesions, multiple    S/P insertion of spinal cord stimulator    Common bile duct dilation    Gastroesophageal reflux disease    Fall    Compartment syndrome ([address])    Type 2 diabetes mellitus ([address])       Past Medical History:   Diagnosis Date    Ambulates with cane     Anxiety     Arthritis     Asthma     Bipolar 1 disorder ([address])     Brain aneurysm     Chronic pain disorder     spinal stenosis    Concussion syndrome     neurological rx and balance rx COPD (chronic obstructive pulmonary disease) (HCC)     Depression     Diabetes mellitus ([address])     controlled w/ diet    Disease of thyroid gland     nodules     Family history of colon cancer     father    Fibromyalgia, primary     GERD (gastroesophageal reflux disease)     History of colon polyps     Hyperlipidemia     Hypertension     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     Irritable bowel syndrome     Lumbar degenerative disc disease 02/16/2022    Migraine     Neuropathy     bilateral feet and hands    Peripheral neuropathy     Psychiatric disorder     PTSD (post-traumatic stress disorder)     Shortness of breath     Sleep apnea     had 3 studies & last one negative    Stroke ([address])     2012 no deficeits    TIA (transient ischemic attack)     Wears dentures     Wears glasses        Past Surgical History:   Procedure Laterality Date    ABDOMINAL SURGERY      laproscopic/ endometriosis    BRAIN SURGERY      aneurysm/ coiling procedure    CARPAL TUNNEL RELEASE      CHOLECYSTECTOMY      COLONOSCOPY      DENTAL SURGERY      EPIDURAL BLOCK INJECTION Right 03/03/2022    Procedure: C7-T1 interlaminar epidural steroid injection;  Surgeon: Nikki Gunn MD;  Location: [facility];  Service: Pain Management     EPIDURAL BLOCK INJECTION N/A 04/21/2022    Procedure: C6-C7 BLOCK / INJECTION EPIDURAL STEROID CERVICAL;  Surgeon: Nikki Gunn MD;  Location: [facility];  Service: Pain Management     EPIDURAL BLOCK INJECTION Left 06/21/2022    Procedure: BLOCK / INJECTION EPIDURAL STEROID LUMBAR  left L3-4 TFESI;  Surgeon: Nikki Gunn MD;  Location: [facility];  Service: Pain Management     EYE SURGERY      cataract    FASCIOTOMY Right 10/27/2023    Procedure: FASCIOTOMY OF RIGHT UPPER EXTREMITY; POSSIBLE VAC DRESSING APPLICATION;  Surgeon: Lucio Joyce MD;  [facility]: [facility];  Service: General    FL GUIDED NEEDLE PLAC BX/ASP/INJ  03/03/2022    FL GUIDED NEEDLE PLAC BX/ASP/INJ  11/17/2022 HYSTERECTOMY      IA ESOPHAGOGASTRODUODENOSCOPY TRANSORAL DIAGNOSTIC N/A 02/08/2018    Procedure: EGD AND COLONOSCOPY;  Surgeon: Brittney Anthony MD;  Location: [facility]; Service: Gastroenterology    IA PRQ [address] NSTIM ELECTRODE ARRAY EPIDURAL N/A 11/17/2022    Procedure: Betsy Ellsworth SCS TRIAL;  Surgeon: Audelia Alex MD;  Location: [facility];  Service: Pain Management     IA PRQ IMPLTJ NSTIM ELECTRODE ARRAY EPIDURAL N/A 2/1/2023    Procedure:  Insertion percutaneous thoracic spinal cord stimulator with left buttock implantable pulse generator;  Surgeon: Rayshawn Barton MD;  [facility];  Service: Neurosurgery    SPLIT THICKNESS SKIN GRAFT Right 11/7/2023    Procedure: SKIN GRAFT SPLIT THICKNESS (STSG)  EXTREMITY;  Surgeon: Kay Flannery MD;  [facility];  Service: General    THYROID [address] [place] [address] THYROID BIOPSY  11/30/2016    US GUIDED THYROID BIOPSY  3/21/2018    VAC DRESSING APPLICATION Right 94/54/5790    Procedure: CHANGE DRESSING/VAC RIGHT UPPER EXTREMITY; WASHOUT; PARTIAL CLOSURE;  Surgeon: Americo Blanton DO;  [facility];  Service: General    WOUND DEBRIDEMENT Right 11/4/2023    Procedure: SIMPLE CLOSURE 6.7XP, OASIS APPLICATION 74N1IE, WOUND VAC APPLICATION;  Surgeon: Melissa Lau MD;  [facility]: [facility];  Service: General       Family History   Problem Relation Age of Onset    Brain cancer Mother     Alzheimer's disease Mother     Alzheimer's disease Father     Parkinsonism Father        Social History     Occupational History    Not on file   Tobacco Use    Smoking status: Every Day     Packs/day: 2.00     Types: Cigarettes    Smokeless tobacco: Never   Vaping Use    Vaping Use: Never used   Substance and Sexual Activity    Alcohol use: Not Currently    Drug use: Never     Comment: prescribed Belbuca    Sexual activity: Yes     Partners: Male       Current Outpatient Medications on File Prior to Visit   Medication Sig acetaminophen (TYLENOL) 325 mg tablet Take 3 tablets (975 mg total) by mouth every 8 (eight) hours    albuterol (PROVENTIL HFA,VENTOLIN HFA) 90 mcg/act inhaler Inhale 2 puffs every 6 (six) hours as needed    Alcohol Swabs (Alcohol Pads) 70 % PADS USE TO TEST BLOOD GLUCOSE 2-3 TIMES DAILY    ammonium lactate (LAC-HYDRIN) 12 % lotion as needed    atorvastatin (LIPITOR) 80 mg tablet Take 80 mg by mouth every evening    benzonatate (TESSALON PERLES) 100 mg capsule Take 1 capsule (100 mg total) by mouth 3 (three) times a day as needed for cough    Buprenorphine HCl (Belbuca) 300 MCG FILM Apply 300 mcg to cheek in the morning    carboxymethylcellulose 0.5 % SOLN Administer 1 drop to both eyes daily as needed for dry eyes    clonazePAM (KlonoPIN) 0.5 mg tablet Take 0.5 mg by mouth daily at bedtime    clonazePAM (KlonoPIN) 1 mg tablet Take 1 mg by mouth daily in the early morning    dicyclomine (BENTYL) 20 mg tablet Take 20 mg by mouth every 6 (six) hours    famotidine (PEPCID) 40 MG tablet Take 1 tablet (40 mg total) by mouth daily at bedtime    fenofibrate (TRICOR) 48 mg tablet Take 48 mg by mouth daily    fluticasone (FLONASE) 50 mcg/act nasal spray 2 sprays into each nostril daily    fluticasone-umeclidinium-vilanterol (Trelegy Ellipta) 200-62.5-25 mcg/actuation AEPB inhaler Inhale 1 puff daily Rinse mouth after use.     furosemide (LASIX) 20 mg tablet Take 20 mg by mouth as needed Taking as needed    Galcanezumab-gnlm (Emgality) 120 MG/ML SOAJ Inject 120 mg under the skin every 30 (thirty) days Last inj 1/19/23    HYDROmorphone (DILAUDID) 4 mg tablet Take 4 mg by mouth every 6 (six) hours as needed for moderate pain    lamoTRIgine (LaMICtal) 100 mg tablet Take 100 mg by mouth 2 (two) times a day    latanoprost (XALATAN) 0.005 % ophthalmic solution instill 1 drop into both eyes at bedtime    levalbuterol (XOPENEX) 1.25 mg/3 mL nebulizer solution Inhale 1.25 mg every 4 (four) hours as needed    levothyroxine 137 mcg tablet Take 137 mcg by mouth daily    Lidocaine 4 % PTCH Apply 1 patch topically daily    magnesium 30 MG tablet Take 500 mg by mouth 2 (two) times a day 1/26/23 right now not taking    Melatonin 10 MG CAPS Take 1 tablet by mouth daily at bedtime as needed    methocarbamol (ROBAXIN) 500 mg tablet Take 0.5 tablets (250 mg total) by mouth every 6 (six) hours for 14 days    methylPREDNISolone 4 MG tablet therapy pack Use as directed on package    metroNIDAZOLE (FLAGYL) 500 mg tablet Take 1 tablet by mouth 2 (two) times a day    omeprazole (PriLOSEC) 40 MG capsule Take 1 capsule (40 mg total) by mouth 2 (two) times a day    ondansetron (ZOFRAN) 8 mg tablet Take 1 tablet (8 mg total) by mouth every 8 (eight) hours as needed for nausea or vomiting    OneTouch Ultra test strip USE TO TEST BLOOD GLUCOSE 2-3 TIMES DAILY    polyethylene glycol (GLYCOLAX) 17 GM/SCOOP powder Take 17 g daily for constipation. pregabalin (LYRICA) 100 mg capsule Take 100 mg by mouth 3 (three) times a day Morning-lunch-dinner    pregabalin (LYRICA) 150 mg capsule take 1 capsule by mouth NIGHTLY    senna (SENOKOT) 8.6 mg Take 1 tablet (8.6 mg total) by mouth daily at bedtime for 14 days    tiZANidine (ZANAFLEX) 4 mg tablet take 2 tablets by mouth every 6 hours if needed for muscle spasm    topiramate (TOPAMAX) 200 MG tablet 200 mg 2 (two) times a day    True Comfort Safety Lancets MISC USE TO TEST BLOOD GLUCOSE 2-3 TIMES DAILY    Butalbital-APAP-Caffeine (FIORICET PO) Take by mouth as needed (Patient not taking: Reported on 11/30/2023)    rizatriptan (MAXALT-MLT) 10 mg disintegrating tablet TAKE 1 TABLET BY MOUTH ONE TIME AS NEEDED FOR MIGRAINE, MAY REPEA. ..  (REFER TO PRESCRIPTION NOTES). (Patient not taking: Reported on 11/30/2023)     No current facility-administered medications on file prior to visit. Allergies   Allergen Reactions    Hydroxyzine Anaphylaxis     Claims it gives her convulsions.      Other Other (See Comments)    Pollen Extract Itching    Tree Extract     Clarithromycin Rash     Pt denies    Latex Rash    Sulfa Antibiotics Rash     "severe skin burn"       Physical Exam:    BP 95/65 (BP Location: Left arm, Patient Position: Sitting, Cuff Size: Large)   Pulse 69   Ht 5' 4" (1.626 m)   Wt 73.3 kg (161 lb 9.6 oz)   BMI 27.74 kg/m²     Constitutional:normal, well developed, well nourished, alert, in no distress and non-toxic and no overt pain behavior. Eyes:anicteric  HEENT:grossly intact  Neck:supple, symmetric, trachea midline and no masses   Pulmonary:even and unlabored  Cardiovascular:No edema or pitting edema present  Skin:Normal without rashes or lesions and well hydrated and  dry sterile dressing applied to right upper extremity. Psychiatric:Mood and affect appropriate  Neurologic:Cranial Nerves II-XII grossly intact  Musculoskeletal: Gait is slow and guarded.     Imaging

## 2023-12-04 ENCOUNTER — OFFICE VISIT (OUTPATIENT)
Dept: SURGERY | Facility: CLINIC | Age: 63
End: 2023-12-04

## 2023-12-04 VITALS
HEIGHT: 64 IN | WEIGHT: 162 LBS | TEMPERATURE: 98.5 F | OXYGEN SATURATION: 99 % | DIASTOLIC BLOOD PRESSURE: 86 MMHG | HEART RATE: 80 BPM | SYSTOLIC BLOOD PRESSURE: 134 MMHG | BODY MASS INDEX: 27.66 KG/M2

## 2023-12-04 DIAGNOSIS — T79.A11A TRAUMATIC COMPARTMENT SYNDROME OF RIGHT UPPER EXTREMITY, INITIAL ENCOUNTER (HCC): Primary | ICD-10-CM

## 2023-12-04 PROCEDURE — 99024 POSTOP FOLLOW-UP VISIT: CPT | Performed by: SURGERY

## 2023-12-06 NOTE — PROGRESS NOTES
Assessment/Plan:    Compartment syndrome (HCC)  Compartment syndrome of the right upper extremity status post fasciotomy and release. Complicated by nonhealing and inability to close the incision. Incision was partially closed and the remainder was fitted with a split-thickness skin graft with what appears to be nearly 95% adherence and overall good healing. Remaining sutures which were nearly 1 months old were removed. Incision healed well. Recommend continue physical therapy\. Follow-up as needed. Diagnoses and all orders for this visit:    Traumatic compartment syndrome of right upper extremity, initial encounter ([address])          Subjective:      Patient ID: Teto Joseph is a 65 y.o. female. 20-year-old female who was found down and noted to have significant swelling and subsequent compartment syndrome of the right upper extremity status post fasciotomy and release of the compartments, with complex closure requiring multiple washouts abbreviated closure with remainder split-thickness skin graft presents today for postop check. Patient does still complain of pain in the right arm and difficulty with range of motion. Currently undergoing physical therapy and making improvements. She presents today for suture removal as her sutures have been in for nearly a month. Denies any redness or drainage since the wounds. She has been keeping it covered. No nausea vomiting fevers chills.         The following portions of the patient's history were reviewed and updated as appropriate: She  has a past medical history of Ambulates with cane, Anxiety, Arthritis, Asthma, Bipolar 1 disorder ([address]), Brain aneurysm, Chronic pain disorder, Concussion syndrome, COPD (chronic obstructive pulmonary disease) ([address]), Depression, Diabetes mellitus ([address]), Disease of thyroid gland, Family history of colon cancer, Fibromyalgia, primary, GERD (gastroesophageal reflux disease), History of colon polyps, Hyperlipidemia, Hypertension, 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, Irritable bowel syndrome, Lumbar degenerative disc disease (02/16/2022), Migraine, Neuropathy, Peripheral neuropathy, Psychiatric disorder, PTSD (post-traumatic stress disorder), Shortness of breath, Sleep apnea, Stroke ([address]), TIA (transient ischemic attack), Wears dentures, and Wears glasses.   She   Patient Active Problem List    Diagnosis Date Noted    Fall 10/27/2023    Compartment syndrome ([address]) 10/27/2023    Type 2 diabetes mellitus ([address]) 10/27/2023    Gastroesophageal reflux disease 09/23/2023    Common bile duct dilation 09/22/2023    S/P insertion of spinal cord stimulator 08/30/2023    Pulmonary nodules/lesions, multiple 07/24/2023    Diabetic polyneuropathy associated with type 2 diabetes mellitus ([address]) 07/14/2023    Coronary artery calcification seen on CAT scan 07/14/2023    Cervical spondylosis 06/12/2023    Shortness of breath 05/02/2023    Irritable bowel syndrome with both constipation and diarrhea 11/02/2022    Chronic pain 05/24/2022    Sacroiliitis ([address]) 05/24/2022    Carotid artery aneurysm (HCC) 05/24/2022    Lumbar degenerative disc disease 02/16/2022    Lumbar radiculopathy 02/16/2022    Lumbar spondylosis 02/16/2022    Cervical disc disorder with radiculopathy of mid-cervical region 02/16/2022    Mid back pain 12/14/2021    Cervical radiculopathy 12/14/2021    Neck pain 12/14/2021    Syncope and collapse 12/13/2021    Migraine     Bipolar 1 disorder (HCC)     Depression     Chronic pain syndrome     Anxiety     Fibromyalgia, primary     Constipation 02/11/2021    Ileus ([address]) 02/11/2021    COPD (chronic obstructive pulmonary disease) ([address])     Hypertension     Hypophosphatemia 01/25/2021    Gastritis, acute 01/25/2021    Hypotension 01/25/2021    Tobacco use 01/25/2021    Stroke-like symptoms 06/15/2020    Nausea 06/15/2020    Left chest pressure 06/15/2020    TMJ syndrome 05/22/2020    Other hyperlipidemia 07/19/2019 Dizziness 07/19/2019    Hypothyroid 07/19/2019    Brain aneurysm 07/19/2019    Hypokalemia 07/19/2019    Hypertriglyceridemia 07/19/2019    Low HDL (under 40) 07/19/2019    Elevated TSH 07/19/2019    Tobacco abuse 07/19/2019    Intractable abdominal pain 07/19/2019     She  has a past surgical history that includes Hysterectomy; Thyroid surgery; Cholecystectomy; Carpal tunnel release; Eye surgery; Dental surgery; Abdominal surgery; Brain surgery; pr esophagogastroduodenoscopy transoral diagnostic (N/A, 02/08/2018); Tonsillectomy; Epidural block injection (Right, 03/03/2022); FL guided needle plac bx/asp/inj (03/03/2022); Epidural block injection (N/A, 04/21/2022); Epidural block injection (Left, 06/21/2022); Colonoscopy; FL guided needle plac bx/asp/inj (11/17/2022); pr prq impltj nstim electrode array epidural (N/A, 11/17/2022); pr prq impltj nstim electrode array epidural (N/A, 2/1/2023); US guided thyroid biopsy (11/30/2016); US guided thyroid biopsy (3/21/2018); VAC DRESSING APPLICATION (Right, 83/55/9775); Wound debridement (Right, 11/4/2023); Fasciotomy (Right, 10/27/2023); and SPLIT THICKNESS SKIN GRAFT (Right, 11/7/2023). Her family history includes Alzheimer's disease in her father and mother; Brain cancer in her mother; Parkinsonism in her father. She  reports that she has been smoking cigarettes. She has been smoking an average of 2 packs per day. She has never used smokeless tobacco. She reports that she does not currently use alcohol. She reports that she does not use drugs.   Current Outpatient Medications   Medication Sig Dispense Refill    acetaminophen (TYLENOL) 325 mg tablet Take 3 tablets (975 mg total) by mouth every 8 (eight) hours  0    albuterol (PROVENTIL HFA,VENTOLIN HFA) 90 mcg/act inhaler Inhale 2 puffs every 6 (six) hours as needed      Alcohol Swabs (Alcohol Pads) 70 % PADS USE TO TEST BLOOD GLUCOSE 2-3 TIMES DAILY      ammonium lactate (LAC-HYDRIN) 12 % lotion as needed atorvastatin (LIPITOR) 80 mg tablet Take 80 mg by mouth every evening      benzonatate (TESSALON PERLES) 100 mg capsule Take 1 capsule (100 mg total) by mouth 3 (three) times a day as needed for cough 30 capsule 1    Buprenorphine HCl (Belbuca) 300 MCG FILM Apply 300 mcg to cheek in the morning      Butalbital-APAP-Caffeine (FIORICET PO) Take by mouth as needed (Patient not taking: Reported on 11/30/2023)      carboxymethylcellulose 0.5 % SOLN Administer 1 drop to both eyes daily as needed for dry eyes 1 Bottle 0    clonazePAM (KlonoPIN) 0.5 mg tablet Take 0.5 mg by mouth daily at bedtime      clonazePAM (KlonoPIN) 1 mg tablet Take 1 mg by mouth daily in the early morning      dicyclomine (BENTYL) 20 mg tablet Take 20 mg by mouth every 6 (six) hours      famotidine (PEPCID) 40 MG tablet Take 1 tablet (40 mg total) by mouth daily at bedtime 30 tablet 6    fenofibrate (TRICOR) 48 mg tablet Take 48 mg by mouth daily      fluticasone (FLONASE) 50 mcg/act nasal spray 2 sprays into each nostril daily      fluticasone-umeclidinium-vilanterol (Trelegy Ellipta) 200-62.5-25 mcg/actuation AEPB inhaler Inhale 1 puff daily Rinse mouth after use.  60 blister 5    furosemide (LASIX) 20 mg tablet Take 20 mg by mouth as needed Taking as needed      Galcanezumab-gnlm (Emgality) 120 MG/ML SOAJ Inject 120 mg under the skin every 30 (thirty) days Last inj 1/19/23      HYDROmorphone (DILAUDID) 4 mg tablet Take 4 mg by mouth every 6 (six) hours as needed for moderate pain (Patient not taking: Reported on 12/4/2023)      lamoTRIgine (LaMICtal) 100 mg tablet Take 100 mg by mouth 2 (two) times a day      latanoprost (XALATAN) 0.005 % ophthalmic solution instill 1 drop into both eyes at bedtime      levalbuterol (XOPENEX) 1.25 mg/3 mL nebulizer solution Inhale 1.25 mg every 4 (four) hours as needed      levothyroxine 137 mcg tablet Take 137 mcg by mouth daily      Lidocaine 4 % PTCH Apply 1 patch topically daily  0    magnesium 30 MG tablet Take 500 mg by mouth 2 (two) times a day 1/26/23 right now not taking      Melatonin 10 MG CAPS Take 1 tablet by mouth daily at bedtime as needed      methocarbamol (ROBAXIN) 500 mg tablet Take 0.5 tablets (250 mg total) by mouth every 6 (six) hours for 14 days 28 tablet 0    methylPREDNISolone 4 MG tablet therapy pack Use as directed on package 1 each 0    metroNIDAZOLE (FLAGYL) 500 mg tablet Take 1 tablet by mouth 2 (two) times a day      omeprazole (PriLOSEC) 40 MG capsule Take 1 capsule (40 mg total) by mouth 2 (two) times a day 60 capsule 5    ondansetron (ZOFRAN) 8 mg tablet Take 1 tablet (8 mg total) by mouth every 8 (eight) hours as needed for nausea or vomiting 20 tablet 0    OneTouch Ultra test strip USE TO TEST BLOOD GLUCOSE 2-3 TIMES DAILY      polyethylene glycol (GLYCOLAX) 17 GM/SCOOP powder Take 17 g daily for constipation. 578 g 3    pregabalin (LYRICA) 100 mg capsule Take 100 mg by mouth 3 (three) times a day Morning-lunch-dinner      pregabalin (LYRICA) 150 mg capsule take 1 capsule by mouth NIGHTLY      rizatriptan (MAXALT-MLT) 10 mg disintegrating tablet TAKE 1 TABLET BY MOUTH ONE TIME AS NEEDED FOR MIGRAINE, MAY REPEA. ..  (REFER TO PRESCRIPTION NOTES). (Patient not taking: Reported on 11/30/2023)      senna (SENOKOT) 8.6 mg Take 1 tablet (8.6 mg total) by mouth daily at bedtime for 14 days 14 tablet 0    tiZANidine (ZANAFLEX) 4 mg tablet take 2 tablets by mouth every 6 hours if needed for muscle spasm      topiramate (TOPAMAX) 200 MG tablet 200 mg 2 (two) times a day      True Comfort Safety Lancets MISC USE TO TEST BLOOD GLUCOSE 2-3 TIMES DAILY       No current facility-administered medications for this visit. She is allergic to hydroxyzine, other, pollen extract, tree extract, clarithromycin, latex, and sulfa antibiotics. .    Review of Systems   Constitutional:  Negative for activity change, appetite change, chills, diaphoresis, fatigue, fever and unexpected weight change.    Skin: Positive for wound (RUE). Negative for color change and pallor. Objective:      /86 (BP Location: Left arm, Patient Position: Sitting, Cuff Size: Standard)   Pulse 80   Temp 98.5 °F (36.9 °C) (Temporal)   Ht 5' 4" (1.626 m)   Wt 73.5 kg (162 lb)   SpO2 99%   BMI 27.81 kg/m²          Physical Exam  Constitutional:       General: She is not in acute distress. Appearance: Normal appearance. She is not ill-appearing, toxic-appearing or diaphoretic. HENT:      Head: Normocephalic and atraumatic. Musculoskeletal:         General: No swelling. Right lower leg: No edema. Left lower leg: No edema. Comments: Range of motion. More or less normal on the right upper extremity. Patient able to move her upper extremity, wrist, hand and fingers   Skin:     General: Skin is warm. Coloration: Skin is not jaundiced or pale. Findings: No bruising or erythema. Comments: Right upper extremity with well-healed split-thickness skin graft which looks fantastic. Nearly 95+ percent well-healed and graft adhered and taken. There were approximately 11 sutures which were removed from the close portion of the incision. Neurological:      Mental Status: She is alert.

## 2023-12-06 NOTE — ASSESSMENT & PLAN NOTE
Compartment syndrome of the right upper extremity status post fasciotomy and release. Complicated by nonhealing and inability to close the incision. Incision was partially closed and the remainder was fitted with a split-thickness skin graft with what appears to be nearly 95% adherence and overall good healing. Remaining sutures which were nearly 1 months old were removed. Incision healed well. Recommend continue physical therapy\. Follow-up as needed.

## 2023-12-07 ENCOUNTER — TELEPHONE (OUTPATIENT)
Age: 63
End: 2023-12-07

## 2023-12-07 NOTE — TELEPHONE ENCOUNTER
Caller: pt    Doctor: Elisabeth Capps    Reason for call: in pts chart it states she she is taking Dilaudid she needs that removed from her chart b/c she is not taking that. [facility] is giving her a hard time. She is having increased pain in her hand from healing.     Call back#: [phone]

## 2023-12-08 ENCOUNTER — APPOINTMENT (OUTPATIENT)
Dept: LAB | Facility: HOSPITAL | Age: 63
End: 2023-12-08
Payer: COMMERCIAL

## 2023-12-08 DIAGNOSIS — T50.901A OVERDOSE: ICD-10-CM

## 2023-12-08 LAB
ALBUMIN SERPL BCP-MCNC: 4.1 G/DL (ref 3.5–5)
ALP SERPL-CCNC: 61 U/L (ref 34–104)
ALT SERPL W P-5'-P-CCNC: 11 U/L (ref 7–52)
ANION GAP SERPL CALCULATED.3IONS-SCNC: 9 MMOL/L
APAP SERPL-MCNC: 2 UG/ML (ref 10–20)
AST SERPL W P-5'-P-CCNC: 15 U/L (ref 13–39)
BILIRUB SERPL-MCNC: 0.22 MG/DL (ref 0.2–1)
BUN SERPL-MCNC: 15 MG/DL (ref 5–25)
CALCIUM SERPL-MCNC: 8.6 MG/DL (ref 8.4–10.2)
CHLORIDE SERPL-SCNC: 110 MMOL/L (ref 96–108)
CO2 SERPL-SCNC: 23 MMOL/L (ref 21–32)
CREAT SERPL-MCNC: 0.84 MG/DL (ref 0.6–1.3)
GFR SERPL CREATININE-BSD FRML MDRD: 75 ML/MIN/1.73SQ M
GLUCOSE P FAST SERPL-MCNC: 88 MG/DL (ref 65–99)
POTASSIUM SERPL-SCNC: 3.7 MMOL/L (ref 3.5–5.3)
PROT SERPL-MCNC: 6.4 G/DL (ref 6.4–8.4)
SODIUM SERPL-SCNC: 142 MMOL/L (ref 135–147)

## 2023-12-08 PROCEDURE — 80143 DRUG ASSAY ACETAMINOPHEN: CPT

## 2023-12-08 PROCEDURE — 36415 COLL VENOUS BLD VENIPUNCTURE: CPT

## 2023-12-08 PROCEDURE — 80053 COMPREHEN METABOLIC PANEL: CPT

## 2023-12-21 ENCOUNTER — OFFICE VISIT (OUTPATIENT)
Dept: PULMONOLOGY | Facility: CLINIC | Age: 63
End: 2023-12-21
Payer: COMMERCIAL

## 2023-12-21 VITALS
SYSTOLIC BLOOD PRESSURE: 105 MMHG | BODY MASS INDEX: 27.66 KG/M2 | TEMPERATURE: 97.6 F | HEART RATE: 69 BPM | WEIGHT: 162 LBS | OXYGEN SATURATION: 95 % | HEIGHT: 64 IN | DIASTOLIC BLOOD PRESSURE: 72 MMHG

## 2023-12-21 DIAGNOSIS — Z72.0 TOBACCO ABUSE: ICD-10-CM

## 2023-12-21 DIAGNOSIS — F17.218 NICOTINE DEPENDENCE, CIGARETTES, WITH OTHER NICOTINE-INDUCED DISORDERS: ICD-10-CM

## 2023-12-21 DIAGNOSIS — J44.9 CHRONIC OBSTRUCTIVE PULMONARY DISEASE, UNSPECIFIED COPD TYPE (HCC): Primary | ICD-10-CM

## 2023-12-21 PROCEDURE — 99214 OFFICE O/P EST MOD 30 MIN: CPT

## 2023-12-21 RX ORDER — ALBUTEROL SULFATE 90 UG/1
2 AEROSOL, METERED RESPIRATORY (INHALATION) EVERY 6 HOURS PRN
Qty: 18 G | Refills: 5 | Status: SHIPPED | OUTPATIENT
Start: 2023-12-21

## 2023-12-21 RX ORDER — DULOXETIN HYDROCHLORIDE 30 MG/1
30 CAPSULE, DELAYED RELEASE ORAL DAILY
COMMUNITY

## 2023-12-21 RX ORDER — FLUTICASONE FUROATE, UMECLIDINIUM BROMIDE AND VILANTEROL TRIFENATATE 100; 62.5; 25 UG/1; UG/1; UG/1
1 POWDER RESPIRATORY (INHALATION) DAILY
Qty: 60 BLISTER | Refills: 3 | Status: SHIPPED | OUTPATIENT
Start: 2023-12-21

## 2023-12-21 RX ORDER — HYDROCODONE BITARTRATE AND ACETAMINOPHEN 10; 325 MG/1; MG/1
1 TABLET ORAL EVERY 6 HOURS PRN
COMMUNITY

## 2023-12-21 NOTE — ASSESSMENT & PLAN NOTE
-Greater than 100-pack-year smoking history, continues to smoke 2 packs/day, rolls her own cigarettes  -Discussed with patient need for smoking cessation to improve her pulmonary symptoms and reduce progression of lung disease  -Patient became upset and tearful, she is pretty adamant she is not ready/does not want to quit smoking  -Due for yearly CT lung cancer screening 4/2024, we will follow-up with patient with those results    -Risks vs benefits of LDCT screening was discussed with patient including risks vs benefits where risks including false positive test, radiation exposure, risk of overdiagnosis and benefits included early detection in potentially malignant lesions therefore improving the rate of intervention and therefore improving overall survival/mortality.

## 2023-12-21 NOTE — PROGRESS NOTES
Pulmonary Follow Up Note  Jennifer Woodall 62 y.o. female MRN: [id number]  12/21/2023    Assessment:    COPD (chronic obstructive pulmonary disease) (Formerly [facility])  -PFTs in June with evidence of hyperinflation and air trapping, however no evidence of obstruction  -Patient may have early COPD versus bronchitis versus asthma.  Patient was on Anoro, however requiring frequent use of albuterol, had more symptomatic relief with Trelegy 200 and requesting to go back on it  -She has had no recent exacerbations requiring prednisone/antibiotics  -Will start patient on Trelegy 100, continue albuterol every 6 hours as needed  -Advised patient if she still not finding relief and requiring frequent use of albuterol, will escalate to Trelegy 200  -Encouraged smoking cessation  -Follow-up in 4 months or sooner if needed    Tobacco abuse  -Greater than 100-pack-year smoking history, continues to smoke 2 packs/day, rolls her own cigarettes  -Discussed with patient need for smoking cessation to improve her pulmonary symptoms and reduce progression of lung disease  -Patient became upset and tearful, she is pretty adamant she is not ready/does not want to quit smoking  -Due for yearly CT lung cancer screening 4/2024, we will follow-up with patient with those results    -Risks vs benefits of LDCT screening was discussed with patient including risks vs benefits where risks including false positive test, radiation exposure, risk of overdiagnosis and benefits included early detection in potentially malignant lesions therefore improving the rate of intervention and therefore improving overall survival/mortality.      Plan:    Diagnoses and all orders for this visit:    Chronic obstructive pulmonary disease, unspecified COPD type (Formerly [facility])  -     fluticasone-umeclidinium-vilanterol (Trelegy Ellipta) 100-62.5-25 mcg/actuation inhaler; Inhale 1 puff daily Rinse mouth after use.  -     albuterol (PROVENTIL HFA,VENTOLIN HFA) 90 mcg/act inhaler; Inhale 2 puffs  every 6 (six) hours as needed for wheezing or shortness of breath    Tobacco abuse  -     CT lung screening program; Future    Nicotine dependence, cigarettes, with other nicotine-induced disorders  -     CT lung screening program; Future    Other orders  -     DULoxetine (CYMBALTA) 30 mg delayed release capsule; Take 30 mg by mouth daily  -     HYDROcodone-acetaminophen (NORCO)  mg per tablet; Take 1 tablet by mouth every 6 (six) hours as needed for moderate pain  -     Discontinue: umeclidinium-vilanterol 62.5-25 mcg/actuation inhaler; Inhale 1 puff daily        Return in about 4 months (around 4/21/2024).      History of Present Illness     Chief Complaint: No chief complaint on file.      Patient ID: Jennifer is a 62 y.o. y.o. 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      12/21/2023  HPI: Jennifer Woodall is a 62 y.o. female who presents to the office today for a follow-up visit.  She has a past medical history of COPD, tobacco abuse, chronic low back pain with spinal stimulator, anxiety, bipolar, glaucoma, GERD, diabetes, andallergic rhinitis.  She was previously seen by [facility] pulmonary in July 2023.  Seen by [facility] pulmonary in October 2023.  At [facility] visit, she was taken off  Trelegy and placed on Anoro.    Since her last pulmonary visits, she's had multiple ED visits/hospitalizations for falls, polypharmacy, overdose, pain, and psychiatric issues, but does not appear she's had exacerbations of her COPD with these visits.  She returns today requesting to go back on her Trelegy stating she is requiring use of her albuterol 4-5 times per day.  Experiencing shortness of breath, occasional wheezing and occasional cough.  No significant mucus production.  No chest pain, lower extremity swelling.  She continues to smoke 2 packs/day of her own rolled cigarettes.  She is adamant she is not going to quit smoking.        Review of Systems   Constitutional:  Negative for activity change, chills, diaphoresis, fever and unexpected weight change.   HENT:  Negative for congestion, postnasal drip, rhinorrhea, sore throat, trouble swallowing and voice change.    Respiratory:  Positive for cough, shortness of breath and wheezing. Negative for chest tightness.    Cardiovascular:  Negative for chest pain, palpitations and leg swelling.   Allergic/Immunologic: Negative.        Historical Information   Past Medical History:   Diagnosis Date    Ambulates with cane     Anxiety     Arthritis     Asthma     Bipolar 1 disorder ([facility])     Brain aneurysm     Chronic pain disorder     spinal stenosis    Concussion syndrome     neurological rx and balance rx    COPD (chronic obstructive pulmonary disease) ([facility])     Depression     Diabetes mellitus (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     Irritable bowel syndrome     Lumbar degenerative disc disease 02/16/2022    Migraine     Neuropathy     bilateral feet and hands    Peripheral neuropathy     Psychiatric  disorder     PTSD (post-traumatic stress disorder)     Shortness of breath     Sleep apnea     had 3 studies & last one negative    Stroke (HCC)     2012 no deficeits    TIA (transient ischemic attack)     Wears dentures     Wears glasses      Past Surgical History:   Procedure Laterality Date    ABDOMINAL SURGERY      laproscopic/ endometriosis    BRAIN SURGERY      aneurysm/ coiling procedure    CARPAL TUNNEL RELEASE      CHOLECYSTECTOMY      COLONOSCOPY      DENTAL SURGERY      EPIDURAL BLOCK INJECTION Right 03/03/2022    Procedure: C7-T1 interlaminar epidural steroid injection;  Surgeon: Johnson Aranda MD;  Location: [facility];  Service: Pain Management     EPIDURAL BLOCK INJECTION N/A 04/21/2022    Procedure: C6-C7 BLOCK / INJECTION EPIDURAL STEROID CERVICAL;  Surgeon: Johnson Aranda MD;  Location: [facility];  Service: Pain Management     EPIDURAL BLOCK INJECTION Left 06/21/2022    Procedure: BLOCK / INJECTION EPIDURAL STEROID LUMBAR  left L3-4 TFESI;  Surgeon: Johnson Aranda MD;  Location: [facility];  Service: Pain Management     EYE SURGERY      cataract    FASCIOTOMY Right 10/27/2023    Procedure: FASCIOTOMY OF RIGHT UPPER EXTREMITY; POSSIBLE VAC DRESSING APPLICATION;  Surgeon: Bruno Blevins MD;  Location: [facility];  Service: General    FL GUIDED NEEDLE PLAC BX/ASP/INJ  03/03/2022    FL GUIDED NEEDLE PLAC BX/ASP/INJ  11/17/2022    HYSTERECTOMY      MI ESOPHAGOGASTRODUODENOSCOPY TRANSORAL DIAGNOSTIC N/A 02/08/2018    Procedure: EGD AND COLONOSCOPY;  Surgeon: Caleb Pete MD;  Location: [facility];  Service: Gastroenterology    MI PRQ IMPLTJ NSTIM ELECTRODE ARRAY EPIDURAL N/A 11/17/2022    Procedure: NEVRO SCS TRIAL;  Surgeon: Johnson Aranda MD;  [facility]: [facility];  Service: Pain Management     MI PRQ IMPLTJ NSTIM ELECTRODE ARRAY EPIDURAL N/A 2/1/2023    Procedure: Insertion percutaneous thoracic spinal cord stimulator with left buttock implantable pulse generator;   Surgeon: Craig Robert Goldberg, MD;  [facility]: [facility];  Service: Neurosurgery    SPLIT THICKNESS SKIN GRAFT Right 11/7/2023    Procedure: SKIN GRAFT SPLIT THICKNESS (STSG)  EXTREMITY;  Surgeon: Samm Sims MD;  [facility];  Service: General    THYROID SURGERY      TONSILLECTOMY      US GUIDED THYROID BIOPSY  11/30/2016    US GUIDED THYROID BIOPSY  3/21/2018    VAC DRESSING APPLICATION Right 10/29/2023    Procedure: CHANGE DRESSING/VAC RIGHT UPPER EXTREMITY; WASHOUT; PARTIAL CLOSURE;  Surgeon: Irina Day DO;  [facility];  Service: General    WOUND DEBRIDEMENT Right 11/4/2023    Procedure: SIMPLE CLOSURE 3.5CM, OASIS APPLICATION 15X7CM, WOUND VAC APPLICATION;  Surgeon: Maryuri Goldstein MD;  [facility];  Service: General     Family History   Problem Relation Age of Onset    Brain cancer Mother     Alzheimer's disease Mother     Alzheimer's disease Father     Parkinsonism Father        Smoking history: She reports that she has been smoking cigarettes. She has never used smokeless tobacco.    Occupational History:     Immunization History   Administered Date(s) Administered    COVID-19 MODERNA VACC 0.25 ML IM BOOSTER 01/25/2022    COVID-19 MODERNA VACC 0.5 ML IM 03/26/2021, 04/28/2021, 01/25/2022    COVID-19 Pfizer Vac BIVALENT Ever-sucrose 12 Yr+ IM 10/05/2022    COVID-19, unspecified 04/27/2021, 04/27/2021    INFLUENZA 11/10/2014, 10/01/2018    Influenza Quadrivalent Preservative Free 3 years and older IM 09/29/2015, 10/13/2016, 10/17/2017, 10/01/2019, 10/02/2020, 10/25/2021, 10/05/2022    Influenza Split 11/07/2012, 10/16/2013, 09/22/2014    Pneumococcal Conjugate Vaccine 20-valent (Pcv20), Polysace 02/13/2023    Pneumococcal Polysaccharide PPV23 10/13/2016    Tdap 11/03/2015    Zoster Vaccine Recombinant 09/21/2020, 12/15/2020       Meds/Allergies     Current Outpatient Medications:     albuterol (PROVENTIL HFA,VENTOLIN HFA) 90 mcg/act inhaler, Inhale 2 puffs every 6  (six) hours as needed for wheezing or shortness of breath, Disp: 18 g, Rfl: 5    Alcohol Swabs (Alcohol Pads) 70 % PADS, USE TO TEST BLOOD GLUCOSE 2-3 TIMES DAILY, Disp: , Rfl:     ammonium lactate (LAC-HYDRIN) 12 % lotion, as needed, Disp: , Rfl:     atorvastatin (LIPITOR) 80 mg tablet, Take 80 mg by mouth every evening, Disp: , Rfl:     benzonatate (TESSALON PERLES) 100 mg capsule, Take 1 capsule (100 mg total) by mouth 3 (three) times a day as needed for cough, Disp: 30 capsule, Rfl: 1    Buprenorphine HCl (Belbuca) 300 MCG FILM, Apply 300 mcg to cheek in the morning, Disp: , Rfl:     carboxymethylcellulose 0.5 % SOLN, Administer 1 drop to both eyes daily as needed for dry eyes, Disp: 1 Bottle, Rfl: 0    clonazePAM (KlonoPIN) 0.5 mg tablet, Take 0.5 mg by mouth daily at bedtime, Disp: , Rfl:     clonazePAM (KlonoPIN) 1 mg tablet, Take 1 mg by mouth daily in the early morning, Disp: , Rfl:     dicyclomine (BENTYL) 20 mg tablet, Take 20 mg by mouth every 6 (six) hours, Disp: , Rfl:     DULoxetine (CYMBALTA) 30 mg delayed release capsule, Take 30 mg by mouth daily, Disp: , Rfl:     famotidine (PEPCID) 40 MG tablet, Take 1 tablet (40 mg total) by mouth daily at bedtime, Disp: 30 tablet, Rfl: 6    fenofibrate (TRICOR) 48 mg tablet, Take 48 mg by mouth daily, Disp: , Rfl:     fluticasone (FLONASE) 50 mcg/act nasal spray, 2 sprays into each nostril daily, Disp: , Rfl:     fluticasone-umeclidinium-vilanterol (Trelegy Ellipta) 100-62.5-25 mcg/actuation inhaler, Inhale 1 puff daily Rinse mouth after use., Disp: 60 blister, Rfl: 3    furosemide (LASIX) 20 mg tablet, Take 20 mg by mouth as needed Taking as needed, Disp: , Rfl:     Galcanezumab-gnlm (Emgality) 120 MG/ML SOAJ, Inject 120 mg under the skin every 30 (thirty) days Last inj 1/19/23, Disp: , Rfl:     HYDROcodone-acetaminophen (NORCO)  mg per tablet, Take 1 tablet by mouth every 6 (six) hours as needed for moderate pain, Disp: , Rfl:     lamoTRIgine (LaMICtal)  100 mg tablet, Take 100 mg by mouth 2 (two) times a day, Disp: , Rfl:     latanoprost (XALATAN) 0.005 % ophthalmic solution, instill 1 drop into both eyes at bedtime, Disp: , Rfl:     levalbuterol (XOPENEX) 1.25 mg/3 mL nebulizer solution, Inhale 1.25 mg every 4 (four) hours as needed, Disp: , Rfl:     levothyroxine 137 mcg tablet, Take 137 mcg by mouth daily, Disp: , Rfl:     Lidocaine 4 % PTCH, Apply 1 patch topically daily, Disp: , Rfl: 0    magnesium 30 MG tablet, Take 500 mg by mouth 2 (two) times a day 1/26/23 right now not taking, Disp: , Rfl:     Melatonin 10 MG CAPS, Take 1 tablet by mouth daily at bedtime as needed, Disp: , Rfl:     ondansetron (ZOFRAN) 8 mg tablet, Take 1 tablet (8 mg total) by mouth every 8 (eight) hours as needed for nausea or vomiting, Disp: 20 tablet, Rfl: 0    OneTouch Ultra test strip, USE TO TEST BLOOD GLUCOSE 2-3 TIMES DAILY, Disp: , Rfl:     pregabalin (LYRICA) 100 mg capsule, Take 100 mg by mouth 3 (three) times a day Morning-lunch-dinner, Disp: , Rfl:     pregabalin (LYRICA) 150 mg capsule, take 1 capsule by mouth NIGHTLY, Disp: , Rfl:     tiZANidine (ZANAFLEX) 4 mg tablet, take 2 tablets by mouth every 6 hours if needed for muscle spasm, Disp: , Rfl:     topiramate (TOPAMAX) 200 MG tablet, 200 mg 2 (two) times a day, Disp: , Rfl:     True Comfort Safety Lancets MISC, USE TO TEST BLOOD GLUCOSE 2-3 TIMES DAILY, Disp: , Rfl:     acetaminophen (TYLENOL) 325 mg tablet, Take 3 tablets (975 mg total) by mouth every 8 (eight) hours (Patient not taking: Reported on 12/21/2023), Disp: , Rfl: 0    Butalbital-APAP-Caffeine (FIORICET PO), Take by mouth as needed (Patient not taking: Reported on 11/30/2023), Disp: , Rfl:     methocarbamol (ROBAXIN) 500 mg tablet, Take 0.5 tablets (250 mg total) by mouth every 6 (six) hours for 14 days, Disp: 28 tablet, Rfl: 0    methylPREDNISolone 4 MG tablet therapy pack, Use as directed on package (Patient not taking: Reported on 12/21/2023), Disp: 1  "each, Rfl: 0    metroNIDAZOLE (FLAGYL) 500 mg tablet, Take 1 tablet by mouth 2 (two) times a day (Patient not taking: Reported on 12/21/2023), Disp: , Rfl:     omeprazole (PriLOSEC) 40 MG capsule, Take 1 capsule (40 mg total) by mouth 2 (two) times a day (Patient not taking: Reported on 12/21/2023), Disp: 60 capsule, Rfl: 5    polyethylene glycol (GLYCOLAX) 17 GM/SCOOP powder, Take 17 g daily for constipation., Disp: 578 g, Rfl: 3    rizatriptan (MAXALT-MLT) 10 mg disintegrating tablet, TAKE 1 TABLET BY MOUTH ONE TIME AS NEEDED FOR MIGRAINE, MAY REPEA...  (REFER TO PRESCRIPTION NOTES). (Patient not taking: Reported on 11/30/2023), Disp: , Rfl:     senna (SENOKOT) 8.6 mg, Take 1 tablet (8.6 mg total) by mouth daily at bedtime for 14 days, Disp: 14 tablet, Rfl: 0  Allergies:   Allergies   Allergen Reactions    Hydroxyzine Anaphylaxis     Claims it gives her convulsions.     Other Other (See Comments)    Pollen Extract Itching    Tree Extract     Clarithromycin Rash     Pt denies    Latex Rash    Sulfa Antibiotics Rash     \"severe skin burn\"         Vitals:  Vitals:    12/21/23 1448   BP: 105/72   Pulse: 69   Temp: 97.6 °F (36.4 °C)   TempSrc: Tympanic   SpO2: 95%   Weight: 73.5 kg (162 lb)   Height: 5' 4\" (1.626 m)   Oxygen Therapy  SpO2: 95 %  .  Wt Readings from Last 3 Encounters:   12/21/23 73.5 kg (162 lb)   12/04/23 73.5 kg (162 lb)   11/30/23 73.3 kg (161 lb 9.6 oz)     Body mass index is 27.81 kg/m².    Physical Exam  Vitals and nursing note reviewed.   Constitutional:       General: She is not in acute distress.     Appearance: Normal appearance. She is well-developed.   Cardiovascular:      Rate and Rhythm: Normal rate and regular rhythm.      Heart sounds: Normal heart sounds, S1 normal and S2 normal. No murmur heard.  Pulmonary:      Effort: Pulmonary effort is normal.      Breath sounds: Normal breath sounds. No decreased breath sounds, wheezing, rhonchi or rales.   Musculoskeletal:         General: No " swelling.      Right lower leg: No edema.      Left lower leg: No edema.   Neurological:      Mental Status: She is alert.   Psychiatric:         Mood and Affect: Mood and affect normal.         Behavior: Behavior normal. Behavior is cooperative.      Comments: Tearful and angry with discussion of smoking cessation           Labs: I have personally reviewed pertinent lab results.  Lab Results   Component Value Date    WBC 6.29 11/22/2023    HGB 10.2 (L) 11/22/2023    HCT 32.8 (L) 11/22/2023     (H) 11/22/2023     11/22/2023     Lab Results   Component Value Date    GLUCOSE 99 08/10/2015    CALCIUM 8.6 12/08/2023     08/10/2015    K 3.7 12/08/2023    CO2 23 12/08/2023     (H) 12/08/2023    BUN 15 12/08/2023    CREATININE 0.84 12/08/2023     Lab Results   Component Value Date    IGE 82.1 06/01/2023     Lab Results   Component Value Date    ALT 11 12/08/2023    AST 15 12/08/2023    ALKPHOS 61 12/08/2023    BILITOT 0.29 08/10/2015       Imaging and other studies: I have personally reviewed pertinent reports and I have personally reviewed pertinent films in PACS     CT nodule follow[facility]  No discrete pulmonary nodules or mass lesions.  Previously seen 3 mm nodule in right lower lobe has resolved.      Pulmonary function testing:     Pulmonary Functions Testing Results: 6/6/2023    FEV1/FVC ratio 75%    FEV1 87% predicted  FVC 84% predicted  (-) response to bronchodilators   % predicted   % predicted  DLCO corrected for hemoglobin 68 % predicted    Impression: Normal spirometry.  No bronchodilator response.  Increased lung volumes with increased residual volume indicative of air trapping and hyperinflation.  Mild diffusion defect.  Normal flow volume loops

## 2023-12-21 NOTE — ASSESSMENT & PLAN NOTE
-PFTs in June with evidence of hyperinflation and air trapping, however no evidence of obstruction  -Patient may have early COPD versus bronchitis versus asthma.  Patient was on Anoro, however requiring frequent use of albuterol, had more symptomatic relief with Trelegy 200 and requesting to go back on it  -She has had no recent exacerbations requiring prednisone/antibiotics  -Will start patient on Trelegy 100, continue albuterol every 6 hours as needed  -Advised patient if she still not finding relief and requiring frequent use of albuterol, will escalate to Trelegy 200  -Encouraged smoking cessation  -Follow-up in 4 months or sooner if needed

## 2024-01-08 ENCOUNTER — TELEPHONE (OUTPATIENT)
Age: 64
End: 2024-01-08

## 2024-01-09 ENCOUNTER — TELEPHONE (OUTPATIENT)
Age: 64
End: 2024-01-09

## 2024-01-09 DIAGNOSIS — R10.9 INTRACTABLE ABDOMINAL PAIN: ICD-10-CM

## 2024-01-09 DIAGNOSIS — K58.2 IRRITABLE BOWEL SYNDROME WITH BOTH CONSTIPATION AND DIARRHEA: ICD-10-CM

## 2024-01-09 DIAGNOSIS — T79.A11A TRAUMATIC COMPARTMENT SYNDROME OF RIGHT UPPER EXTREMITY, INITIAL ENCOUNTER (HCC): ICD-10-CM

## 2024-01-09 DIAGNOSIS — K59.03 DRUG-INDUCED CONSTIPATION: ICD-10-CM

## 2024-01-09 RX ORDER — SENNOSIDES 8.6 MG
8.6 TABLET ORAL
Qty: 30 TABLET | Refills: 1 | Status: SHIPPED | OUTPATIENT
Start: 2024-01-09 | End: 2024-01-12 | Stop reason: SDUPTHER

## 2024-01-09 RX ORDER — POLYETHYLENE GLYCOL 3350 17 G/17G
POWDER, FOR SOLUTION ORAL
Qty: 578 G | Refills: 3 | Status: SHIPPED | OUTPATIENT
Start: 2024-01-09 | End: 2024-01-12 | Stop reason: SDUPTHER

## 2024-01-09 NOTE — TELEPHONE ENCOUNTER
As per the patients request she is to re-start the miralax and she can re-start the senokot, 1 pill daily at bed time and should f/u as scheduled.

## 2024-01-09 NOTE — TELEPHONE ENCOUNTER
I had previously prescribed Miralax daily. Is she taking that? If not, I resent a script for that and she should re-start the miralax daily and make sure that she is drinking as close to 64 oz of water daily.     If, she is taking the Miralax, let me know and I will re-evaluate and consider adding something else.

## 2024-01-09 NOTE — TELEPHONE ENCOUNTER
Patients GI provider:  ENEDINA Dela Cruz    Number to return call: [phone]    Reason for call: Patient states she is having constipation for the past four days. States that provider prescribed a medication previously that helped. Is wondering if the medication would need to be prescribed again.     Scheduled procedure/appointment date if applicable: Apt 1/12/24

## 2024-01-12 ENCOUNTER — OFFICE VISIT (OUTPATIENT)
Dept: GASTROENTEROLOGY | Facility: CLINIC | Age: 64
End: 2024-01-12
Payer: COMMERCIAL

## 2024-01-12 VITALS
HEART RATE: 67 BPM | TEMPERATURE: 98.1 F | HEIGHT: 64 IN | BODY MASS INDEX: 28.13 KG/M2 | DIASTOLIC BLOOD PRESSURE: 72 MMHG | WEIGHT: 164.8 LBS | OXYGEN SATURATION: 98 % | SYSTOLIC BLOOD PRESSURE: 107 MMHG

## 2024-01-12 DIAGNOSIS — R10.9 INTRACTABLE ABDOMINAL PAIN: Primary | ICD-10-CM

## 2024-01-12 DIAGNOSIS — K21.9 GASTROESOPHAGEAL REFLUX DISEASE, UNSPECIFIED WHETHER ESOPHAGITIS PRESENT: ICD-10-CM

## 2024-01-12 DIAGNOSIS — K83.8 COMMON BILE DUCT DILATION: ICD-10-CM

## 2024-01-12 DIAGNOSIS — K59.03 DRUG-INDUCED CONSTIPATION: ICD-10-CM

## 2024-01-12 DIAGNOSIS — K58.2 IRRITABLE BOWEL SYNDROME WITH BOTH CONSTIPATION AND DIARRHEA: ICD-10-CM

## 2024-01-12 DIAGNOSIS — Z12.11 SCREENING FOR COLON CANCER: ICD-10-CM

## 2024-01-12 PROBLEM — K29.00 GASTRITIS, ACUTE: Status: RESOLVED | Noted: 2021-01-25 | Resolved: 2024-01-12

## 2024-01-12 PROBLEM — K56.7 ILEUS (HCC): Status: RESOLVED | Noted: 2021-02-11 | Resolved: 2024-01-12

## 2024-01-12 PROCEDURE — 99214 OFFICE O/P EST MOD 30 MIN: CPT | Performed by: NURSE PRACTITIONER

## 2024-01-12 RX ORDER — SENNOSIDES 8.6 MG
8.6 TABLET ORAL
Qty: 30 TABLET | Refills: 3 | Status: SHIPPED | OUTPATIENT
Start: 2024-01-12 | End: 2024-01-12

## 2024-01-12 RX ORDER — OMEPRAZOLE 40 MG/1
40 CAPSULE, DELAYED RELEASE ORAL 2 TIMES DAILY
Qty: 60 CAPSULE | Refills: 3 | Status: SHIPPED | OUTPATIENT
Start: 2024-01-12

## 2024-01-12 RX ORDER — SENNOSIDES 8.6 MG
TABLET ORAL
Qty: 15 TABLET | Refills: 3 | Status: SHIPPED | OUTPATIENT
Start: 2024-01-12

## 2024-01-12 RX ORDER — POLYETHYLENE GLYCOL 3350 17 G/17G
POWDER, FOR SOLUTION ORAL
Qty: 578 G | Refills: 3 | Status: SHIPPED | OUTPATIENT
Start: 2024-01-12

## 2024-01-12 NOTE — PATIENT INSTRUCTIONS
Re-start miralax for 1/2 capful daily.   Senokot every other day.   Continue omeprazole as prescribed.   Can use prune juice as needed.   Continue to monitor for red flag symptoms.   Schedule a f/u OV in 4 months.     While the patient was in the office today we did review GI red flag symptoms, including, but not limited to: chronic nausea, vomiting, diarrhea, chills, fever, and unintentional weight loss and should call or contact our office with any changes or concerns. I reviewed with the patient that if they notice any blood while vomiting or in their stool they should contact or office or go to the nearest emergency room for immediate evaluation. The patient was agreeable and verbalized an understanding.

## 2024-01-12 NOTE — PROGRESS NOTES
[facility] - Outpatient Follow-up Note  Jennifer Woodall 63 y.o. female MRN: [id number]  Encounter: [id number]          ASSESSMENT AND PLAN:    The patient presents today for follow-up office visit regarding her continued abdominal pain, GERD symptoms, constipation, irritable bowel syndrome with both constipation and diarrhea, and common bile duct dilatation.    Exam: Abdomen is softly distended, with mild to moderate tenderness, especially in the bilateral lower quadrants with mild guarding, but without any rebound tenderness on upon exam, with hypoactive bowel sounds x 4. No edema noted of the b/l lower extremities upon exam today. Skin is non-icteric.      1. Intractable abdominal pain  2. Irritable bowel syndrome with both constipation and diarrhea  3. Drug-induced constipation  While the patient and her caretaker were in the office today, I did have a thorough conversation with them regarding her medication regimen and treatment plan.  I explained to the patient that from here on out if she is having any issues with her reflux or bowels, she is to contact our office as we are managing those symptoms for her and that hopefully we can find a regimen that helps keep things stable, steady, and manageable.  The patient was agreeable and verbalized an understanding.    Discussed with the patient that at this point time since she feels the MiraLAX was helpful, however, has not been taking it regularly and stopped it because she was told to by her primary care provider, I would like her to restart the MiraLAX, but for now, only take a half a capful a day so as not to cause the diarrhea that she is fearful of and in the meantime also just take the Senokot every other day at bedtime.  Explained the patient that hopefully we can start getting her on a regular bowel regimen which will keep things manageable and predictable.  She is to contact our office if she has any side effects or  issues with the changes in her medication regimen.    Urged the patient to continue to try to drink at least 64 ounces of water daily.  The patient was agreeable and verbalized an understanding.    - polyethylene glycol (GLYCOLAX) 17 GM/SCOOP powder; Take 17 g daily for constipation.  Dispense: 578 g; Refill: 3  - senna (SENOKOT) 8.6 mg; Take 1 PO HS every other day.  Dispense: 15 tablet; Refill: 3   powder; Take 17 g daily for constipation.  Dispense: 578 g; Refill: 3    4. Screening for colon cancer  I discussed with the patient today that since they are not currently experiencing any red flag symptoms, we will hold off on any repeat colonoscopies until the recommended surveillance date unless the patient would start to develop red flag symptoms in the future.  The patient was agreeable and verbalized an understanding.  DUE: 3/10/26     While the patient was in the office today we did review GI red flag symptoms, including, but not limited to: chronic nausea, vomiting, diarrhea, chills, fever, and unintentional weight loss and should call or contact our office with any changes or concerns. I reviewed with the patient that if they notice any blood while vomiting or in their stool they should contact or office or go to the nearest emergency room for immediate evaluation. The patient was agreeable and verbalized an understanding.     5. Gastroesophageal reflux disease, unspecified whether esophagitis present  Stable    Continue omeprazole as prescribed.  Continue to avoid trigger foods is much as possible.    - omeprazole (PriLOSEC) 40 MG capsule; Take 1 capsule (40 mg total) by mouth 2 (two) times a day  Dispense: 60 capsule; Refill: 3    6. Common bile duct dilation  Stable    At this point time since the recent EUS confirmed that the common bile duct dilatation is most likely secondary to the previous cholecystectomy, there is no further imaging or monitoring required this time.  The patient was agreeable and  verbalized an understanding.    The patient will schedule a follow up office visit in 4 months, but understands to call or contact our office if there are any issues or concerns in the mean time.  ______________________________________________________________________    SUBJECTIVE: Patient is a 63 y.o. female who was previously seen in our office on 9/22/23 and presents today for follow-up office visit regarding her continued abdominal pain, GERD symptoms, constipation, irritable bowel syndrome with both constipation and diarrhea, and common bile duct dilatation.  The patient reports that since her last office visit she has had many other health issues as she was hospitalized for several weeks secondary to compartment syndrome of the right upper extremity and continued issues with her GERD symptoms, abdominal pain, constipation, and diarrhea.  Patient reports that she has been getting mixed information about what she should do with her medications because she gets to a point where she has significant lower abdominal pain and distention and then will take lots of MiraLAX and Senokot which then relieves the constipation, but then she is having horrible bouts of diarrhea and then stopped taking the medication and goes back to constipation and just is not sure what to do.  She reports she continues to take the omeprazole for GERD symptoms with moderate relief and management overall.    Since her last office visit she also followed up for the EGD/EUS on October 5, 2023 which did not show any type of stone or blockage that would be causing her CBD and that most likely it was related to her cholecystectomy but did diagnose candidiasis and was started on Diflucan.    The patient also continues to have lots of other extraneous family life stressors and does have a personal aide to help her with her activities of daily living, medications, and getting her to and from appointments.    Meds: Omeprazole 40 mg twice daily with  Senokot and MiraLAX as needed.    Imaging: (10/27/23): CT of the chest, abdomen, and pelvis with contrast:No evidence of acute intrathoracic, intra-abdominal or intrapelvic parenchymal organ injury. No pneumothorax.     There are healing fractures of the left transverse processes of L1, L2 and L3.     Mild hepatomegaly. Biliary ductal dilatation unchanged. Prior cholecystectomy.     Endoscopy History: EGD: 06/2023: esophagus, stomach and duodenum appeared normal; No gastric polyps noted so random biopsies were taken given previous history of random gastric biopsies that showed fundic gland polyp with dysplasia     COLONOSCOPY: (03/2023): Two sessile polyps measuring 5-9 mm in the transverse colon; Sessile polyp measuring 5-9 mm in the sigmoid colon; Three sessile polyps measuring smaller than 5 mm in the rectum. Recommendation to repeat colonoscopy in 3 years.      DUE: 3/10/26     REVIEW OF SYSTEMS IS OTHERWISE NEGATIVE.      Historical Information   Past Medical History:   Diagnosis Date    Ambulates with cane     Anxiety     Arthritis     Asthma     Bipolar 1 disorder (HCC)     Brain aneurysm     Chronic pain disorder     spinal stenosis    Concussion syndrome     neurological rx and balance rx    COPD (chronic obstructive pulmonary disease) (HCC)     Depression     Diabetes mellitus (HCC)     controlled w/ diet    Disease of thyroid gland     nodules     Family history of colon cancer     father    Fibromyalgia, primary     GERD (gastroesophageal reflux disease)     History of colon polyps     Hyperlipidemia     Hypertension     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     Irritable bowel syndrome     Lumbar degenerative disc disease 02/16/2022    Migraine     Neuropathy     bilateral feet and hands    Peripheral neuropathy     Psychiatric disorder     PTSD (post-traumatic stress disorder)     Shortness of breath     Sleep apnea     had 3 studies & last one negative    Stroke (HCC)     2012 no deficeits     TIA (transient ischemic attack)     Wears dentures     Wears glasses      Past Surgical History:   Procedure Laterality Date    ABDOMINAL SURGERY      laproscopic/ endometriosis    BRAIN SURGERY      aneurysm/ coiling procedure    CARPAL TUNNEL RELEASE      CHOLECYSTECTOMY      COLONOSCOPY      DENTAL SURGERY      EPIDURAL BLOCK INJECTION Right 03/03/2022    Procedure: C7-T1 interlaminar epidural steroid injection;  Surgeon: Johnson Aranda MD;  Location: [facility];  Service: Pain Management     EPIDURAL BLOCK INJECTION N/A 04/21/2022    Procedure: C6-C7 BLOCK / INJECTION EPIDURAL STEROID CERVICAL;  Surgeon: Johnson Aranda MD;  Location: [facility];  Service: Pain Management     EPIDURAL BLOCK INJECTION Left 06/21/2022    Procedure: BLOCK / INJECTION EPIDURAL STEROID LUMBAR  left L3-4 TFESI;  Surgeon: Johnson Aranda MD;  Location: [facility];  Service: Pain Management     EYE SURGERY      cataract    FASCIOTOMY Right 10/27/2023    Procedure: FASCIOTOMY OF RIGHT UPPER EXTREMITY; POSSIBLE VAC DRESSING APPLICATION;  Surgeon: Bruno Blevins MD;  Location: [facility];  Service: General    FL GUIDED NEEDLE PLAC BX/ASP/INJ  03/03/2022    FL GUIDED NEEDLE PLAC BX/ASP/INJ  11/17/2022    HYSTERECTOMY      WY ESOPHAGOGASTRODUODENOSCOPY TRANSORAL DIAGNOSTIC N/A 02/08/2018    Procedure: EGD AND COLONOSCOPY;  Surgeon: Caleb Pete MD;  Location: [facility];  Service: Gastroenterology    WY PRQ IMPLTJ NSTIM ELECTRODE ARRAY EPIDURAL N/A 11/17/2022    Procedure: NEVRO SCS TRIAL;  Surgeon: Johnson Aranda MD;  Location: [facility];  Service: Pain Management     WY PRQ IMPLTJ NSTIM ELECTRODE ARRAY EPIDURAL N/A 2/1/2023    Procedure: Insertion percutaneous thoracic spinal cord stimulator with left buttock implantable pulse generator;  Surgeon: Craig Robert Goldberg, MD;  Location: [facility];  Service: Neurosurgery    SPLIT THICKNESS SKIN GRAFT Right 11/7/2023    Procedure: SKIN GRAFT SPLIT THICKNESS  (STSG)  EXTREMITY;  Surgeon: Samm Sims MD;  [facility]: [facility];  Service: General    THYROID SURGERY      TONSILLECTOMY      US GUIDED THYROID BIOPSY  11/30/2016    US GUIDED THYROID BIOPSY  3/21/2018    VAC DRESSING APPLICATION Right 10/29/2023    Procedure: CHANGE DRESSING/VAC RIGHT UPPER EXTREMITY; WASHOUT; PARTIAL CLOSURE;  Surgeon: Irina Day DO;  [facility]: [facility];  Service: General    WOUND DEBRIDEMENT Right 11/4/2023    Procedure: SIMPLE CLOSURE 3.5CM, OASIS APPLICATION 15X7CM, WOUND VAC APPLICATION;  Surgeon: Maryuri Goldstein MD;  [facility]: [facility];  Service: General     Social History   Social History     Substance and Sexual Activity   Alcohol Use Not Currently     Social History     Substance and Sexual Activity   Drug Use Never    Comment: prescribed Belbuca     Social History     Tobacco Use   Smoking Status Every Day    Current packs/day: 2.00    Types: Cigarettes   Smokeless Tobacco Never     Family History   Problem Relation Age of Onset    Brain cancer Mother     Alzheimer's disease Mother     Alzheimer's disease Father     Parkinsonism Father        Meds/Allergies       Current Outpatient Medications:     acetaminophen (TYLENOL) 325 mg tablet    albuterol (PROVENTIL HFA,VENTOLIN HFA) 90 mcg/act inhaler    Alcohol Swabs (Alcohol Pads) 70 % PADS    ammonium lactate (LAC-HYDRIN) 12 % lotion    atorvastatin (LIPITOR) 80 mg tablet    benzonatate (TESSALON PERLES) 100 mg capsule    Buprenorphine HCl (Belbuca) 300 MCG FILM    Butalbital-APAP-Caffeine (FIORICET PO)    carboxymethylcellulose 0.5 % SOLN    clonazePAM (KlonoPIN) 0.5 mg tablet    clonazePAM (KlonoPIN) 1 mg tablet    dicyclomine (BENTYL) 20 mg tablet    DULoxetine (CYMBALTA) 30 mg delayed release capsule    famotidine (PEPCID) 40 MG tablet    fenofibrate (TRICOR) 48 mg tablet    fluticasone (FLONASE) 50 mcg/act nasal spray    fluticasone-umeclidinium-vilanterol (Trelegy Ellipta) 100-62.5-25 mcg/actuation inhaler     "furosemide (LASIX) 20 mg tablet    Galcanezumab-gnlm (Emgality) 120 MG/ML SOAJ    HYDROcodone-acetaminophen (NORCO)  mg per tablet    lamoTRIgine (LaMICtal) 100 mg tablet    latanoprost (XALATAN) 0.005 % ophthalmic solution    levalbuterol (XOPENEX) 1.25 mg/3 mL nebulizer solution    levothyroxine 137 mcg tablet    Lidocaine 4 % PTCH    magnesium 30 MG tablet    Melatonin 10 MG CAPS    methocarbamol (ROBAXIN) 500 mg tablet    methylPREDNISolone 4 MG tablet therapy pack    metroNIDAZOLE (FLAGYL) 500 mg tablet    omeprazole (PriLOSEC) 40 MG capsule    ondansetron (ZOFRAN) 8 mg tablet    OneTouch Ultra test strip    polyethylene glycol (GLYCOLAX) 17 GM/SCOOP powder    pregabalin (LYRICA) 100 mg capsule    pregabalin (LYRICA) 150 mg capsule    rizatriptan (MAXALT-MLT) 10 mg disintegrating tablet    senna (SENOKOT) 8.6 mg    tiZANidine (ZANAFLEX) 4 mg tablet    topiramate (TOPAMAX) 200 MG tablet    True Comfort Safety Lancets MISC    Allergies   Allergen Reactions    Hydroxyzine Anaphylaxis     Claims it gives her convulsions.     Other Other (See Comments)    Pollen Extract Itching    Tree Extract     Clarithromycin Rash     Pt denies    Latex Rash    Sulfa Antibiotics Rash     \"severe skin burn\"           Objective     Blood pressure 107/72, pulse 67, temperature 98.1 °F (36.7 °C), temperature source Temporal, height 5' 4\" (1.626 m), weight 74.8 kg (164 lb 12.8 oz), SpO2 98%. Body mass index is 28.29 kg/m².      PHYSICAL EXAM:      General Appearance:   Alert, cooperative, no distress   HEENT:   Normocephalic, atraumatic, anicteric.     Neck:  Supple, symmetrical, trachea midline   Lungs:   Clear to auscultation bilaterally; no rales, rhonchi or wheezing; respirations unlabored    Heart::   Regular rate and rhythm; no murmur, rub, or gallop.   Abdomen:   Soft, non-tender, non-distended; normal bowel sounds; no masses, no organomegaly    Genitalia:   Deferred    Rectal:   Deferred    Extremities:  No cyanosis, " clubbing or edema    Pulses:  2+ and symmetric    Skin:  No jaundice, rashes, or lesions    Lymph nodes:  No palpable cervical lymphadenopathy        Lab Results:   No visits with results within 1 Day(s) from this visit.   Latest known visit with results is:   Appointment on 12/08/2023   Component Date Value    Acetaminophen Level 12/08/2023 2 (L)     Sodium 12/08/2023 142     Potassium 12/08/2023 3.7     Chloride 12/08/2023 110 (H)     CO2 12/08/2023 23     ANION GAP 12/08/2023 9     BUN 12/08/2023 15     Creatinine 12/08/2023 0.84     Glucose, Fasting 12/08/2023 88     Calcium 12/08/2023 8.6     AST 12/08/2023 15     ALT 12/08/2023 11     Alkaline Phosphatase 12/08/2023 61     Total Protein 12/08/2023 6.4     Albumin 12/08/2023 4.1     Total Bilirubin 12/08/2023 0.22     eGFR 12/08/2023 75          Radiology Results:   No results found.

## 2024-01-18 ENCOUNTER — TELEPHONE (OUTPATIENT)
Age: 64
End: 2024-01-18

## 2024-01-18 NOTE — TELEPHONE ENCOUNTER
Caller: patient    Doctor: MARIA DEL CARMEN    Reason for call: has been in a lot of pain, was admitted and has not been able to get help. Canceled OV will not have a caregiver as of now. Was diagnose with compartment syndrome. Would like to know if she can do a telephone visit    Call back#:

## 2024-01-19 NOTE — TELEPHONE ENCOUNTER
Caller: alexx Rodriguez    Doctor: Kocher    Reason for call: pt returning call in reference to virtual visit    Call back#: [phone]

## 2024-01-19 NOTE — TELEPHONE ENCOUNTER
Can do a virtual visit if I have a 30-minute slot.  He is make sure that she has MyChart so that she can log on for visit.  Otherwise, she is not on opiates so it would also be okay to reschedule her to next available in person visit.

## 2024-01-25 ENCOUNTER — OFFICE VISIT (OUTPATIENT)
Age: 64
End: 2024-01-25
Payer: COMMERCIAL

## 2024-01-25 VITALS
SYSTOLIC BLOOD PRESSURE: 97 MMHG | WEIGHT: 164 LBS | BODY MASS INDEX: 28 KG/M2 | DIASTOLIC BLOOD PRESSURE: 64 MMHG | HEIGHT: 64 IN | HEART RATE: 60 BPM

## 2024-01-25 DIAGNOSIS — M46.1 SACROILIITIS (HCC): ICD-10-CM

## 2024-01-25 DIAGNOSIS — M51.36 LUMBAR DEGENERATIVE DISC DISEASE: ICD-10-CM

## 2024-01-25 DIAGNOSIS — M54.16 LUMBAR RADICULOPATHY: ICD-10-CM

## 2024-01-25 DIAGNOSIS — G89.4 CHRONIC PAIN SYNDROME: Primary | ICD-10-CM

## 2024-01-25 DIAGNOSIS — M54.2 NECK PAIN: ICD-10-CM

## 2024-01-25 DIAGNOSIS — M47.812 CERVICAL SPONDYLOSIS: ICD-10-CM

## 2024-01-25 DIAGNOSIS — Z96.89 S/P INSERTION OF SPINAL CORD STIMULATOR: ICD-10-CM

## 2024-01-25 DIAGNOSIS — M47.816 LUMBAR SPONDYLOSIS: ICD-10-CM

## 2024-01-25 PROBLEM — G89.29 CHRONIC PAIN: Status: RESOLVED | Noted: 2022-05-24 | Resolved: 2024-01-25

## 2024-01-25 PROCEDURE — 99214 OFFICE O/P EST MOD 30 MIN: CPT | Performed by: NURSE PRACTITIONER

## 2024-01-25 RX ORDER — TRAZODONE HYDROCHLORIDE 100 MG/1
100 TABLET ORAL
COMMUNITY

## 2024-01-25 NOTE — H&P (VIEW-ONLY)
Assessment:  1. Chronic pain syndrome    2. Neck pain    3. Cervical spondylosis    4. Lumbar spondylosis    5. Lumbar degenerative disc disease    6. Lumbar radiculopathy    7. Sacroiliitis (HCC)    8. S/P insertion of spinal cord stimulator        Plan:  While the patient was in the office today, I did have a thorough conversation regarding their chronic pain syndrome, medication management, and treatment plan options.  Patient is being seen for a follow-up visit.  She continues to complain of neck pain.  At this point, we will plan to proceed with left-sided C3-4 and C4-5 medial branch blocks to determine if she is a candidate for radiofrequency ablation.    Patient has a spinal cord stimulator for back and leg pain which really reportedly reduces her back and leg pain by about 60%.    PCP prescribes Belbuca 300 mcg film which she uses daily as well as hydrocodone 10/325 3-4 times daily if needed for pain.  She also uses Lyrica 100 mg 3 times daily and 150 mg at bedtime as well as tizanidine 4 mg every 6 hours as needed for spasms.      History of Present Illness:  The patient is a 63 y.o. female who presents for a follow up office visit in regards to Headache, Neck Pain, Shoulder Pain, Arm Pain, Hand Pain, Leg Pain, and Foot Pain.   The patient’s current symptoms include complaints of neck pain that can radiate to the left upper extremity.  Low back pain that can radiate to the right lower extremity.  Current pain level is 7/10.  Quality pain is described as burning, dull, aching, sharp, throbbing, pressure-like, shooting, numb, pins-and-needles.    Current pain medications includes: PCP prescribes Belbuca 300 mcg film daily, hydrocodone 10/325 3-4 times daily if needed for pain, Lyrica 100 mg 3 times daily and 150 mg at bedtime, tizanidine 4 mg every 6 hours as needed for spasms.  The patient reports that this regimen is providing 50-60% pain relief.  The patient is reporting no side effects from this pain  medication regimen.    I have personally reviewed and/or updated the patient's past medical history, past surgical history, family history, social history, current medications, allergies, and vital signs today.         Review of Systems  Review of Systems   Constitutional:  Negative for unexpected weight change.   HENT:  Negative for hearing loss.    Eyes:  Negative for visual disturbance.   Respiratory:  Negative for shortness of breath.    Cardiovascular:  Negative for leg swelling.   Gastrointestinal:  Positive for diarrhea, nausea and vomiting. Negative for constipation.   Endocrine: Negative for polyuria.   Genitourinary:  Negative for difficulty urinating.   Musculoskeletal:  Positive for arthralgias and myalgias. Negative for gait problem and joint swelling.        Decreased range of motion  Joint stiffness  Swelling  Pain in extremity   Skin:  Negative for rash.   Neurological:  Positive for dizziness. Negative for weakness and headaches.        Memory loss   Psychiatric/Behavioral:  Negative for decreased concentration.    All other systems reviewed and are negative.        Past Medical History:   Diagnosis Date    Ambulates with cane     Anxiety     Arthritis     Asthma     Bipolar 1 disorder (HCC)     Brain aneurysm     Chronic pain disorder     spinal stenosis    Concussion syndrome     neurological rx and balance rx    COPD (chronic obstructive pulmonary disease) ([facility])     Depression     Diabetes mellitus (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     Irritable bowel syndrome     Lumbar degenerative disc disease 02/16/2022    Migraine     Neuropathy     bilateral feet and hands    Peripheral neuropathy     Psychiatric disorder     PTSD  (post-traumatic stress disorder)     Shortness of breath     Sleep apnea     had 3 studies & last one negative    Stroke (HCC)     2012 no deficeits    TIA (transient ischemic attack)     Wears dentures     Wears glasses        Past Surgical History:   Procedure Laterality Date    ABDOMINAL SURGERY      laproscopic/ endometriosis    BRAIN SURGERY      aneurysm/ coiling procedure    CARPAL TUNNEL RELEASE      CHOLECYSTECTOMY      COLONOSCOPY      DENTAL SURGERY      EPIDURAL BLOCK INJECTION Right 03/03/2022    Procedure: C7-T1 interlaminar epidural steroid injection;  Surgeon: Johnson Aranda MD;  Location: [facility];  Service: Pain Management     EPIDURAL BLOCK INJECTION N/A 04/21/2022    Procedure: C6-C7 BLOCK / INJECTION EPIDURAL STEROID CERVICAL;  Surgeon: Johnson Aranda MD;  Location: [facility];  Service: Pain Management     EPIDURAL BLOCK INJECTION Left 06/21/2022    Procedure: BLOCK / INJECTION EPIDURAL STEROID LUMBAR  left L3-4 TFESI;  Surgeon: Johnson Aranda MD;  Location: [facility];  Service: Pain Management     EYE SURGERY      cataract    FASCIOTOMY Right 10/27/2023    Procedure: FASCIOTOMY OF RIGHT UPPER EXTREMITY; POSSIBLE VAC DRESSING APPLICATION;  Surgeon: Bruno Blevins MD;  [facility]: [facility];  Service: General    FL GUIDED NEEDLE PLAC BX/ASP/INJ  03/03/2022    FL GUIDED NEEDLE PLAC BX/ASP/INJ  11/17/2022    HYSTERECTOMY      ND ESOPHAGOGASTRODUODENOSCOPY TRANSORAL DIAGNOSTIC N/A 02/08/2018    Procedure: EGD AND COLONOSCOPY;  Surgeon: Caleb Pete MD;  Location: [facility];  Service: Gastroenterology    ND PRQ IMPLTJ NSTIM ELECTRODE ARRAY EPIDURAL N/A 11/17/2022    Procedure: NEVRO SCS TRIAL;  Surgeon: Johnson Aranda MD;  Location: [facility];  Service: Pain Management     ND PRQ IMPLTJ NSTIM ELECTRODE ARRAY EPIDURAL N/A 2/1/2023    Procedure: Insertion percutaneous thoracic spinal cord stimulator with left buttock implantable pulse generator;  Surgeon: Fausto  Robert Goldberg, MD;  [facility]: [facility];  Service: Neurosurgery    SPLIT THICKNESS SKIN GRAFT Right 11/7/2023    Procedure: SKIN GRAFT SPLIT THICKNESS (STSG)  EXTREMITY;  Surgeon: Samm Sims MD;  [facility]: [facility];  Service: General    THYROID SURGERY      TONSILLECTOMY      US GUIDED THYROID BIOPSY  11/30/2016    US GUIDED THYROID BIOPSY  3/21/2018    VAC DRESSING APPLICATION Right 10/29/2023    Procedure: CHANGE DRESSING/VAC RIGHT UPPER EXTREMITY; WASHOUT; PARTIAL CLOSURE;  Surgeon: Irina Day DO;  [facility];  Service: General    WOUND DEBRIDEMENT Right 11/4/2023    Procedure: SIMPLE CLOSURE 3.5CM, OASIS APPLICATION 15X7CM, WOUND VAC APPLICATION;  Surgeon: Maryuri Goldstein MD;  [facility];  Service: General       Family History   Problem Relation Age of Onset    Brain cancer Mother     Alzheimer's disease Mother     Alzheimer's disease Father     Parkinsonism Father        Social History     Occupational History    Not on file   Tobacco Use    Smoking status: Every Day     Current packs/day: 2.00     Types: Cigarettes    Smokeless tobacco: Never   Vaping Use    Vaping status: Never Used   Substance and Sexual Activity    Alcohol use: Not Currently    Drug use: Never     Comment: prescribed Belbuca    Sexual activity: Yes     Partners: Male         Current Outpatient Medications:     albuterol (PROVENTIL HFA,VENTOLIN HFA) 90 mcg/act inhaler, Inhale 2 puffs every 6 (six) hours as needed for wheezing or shortness of breath, Disp: 18 g, Rfl: 5    Alcohol Swabs (Alcohol Pads) 70 % PADS, USE TO TEST BLOOD GLUCOSE 2-3 TIMES DAILY, Disp: , Rfl:     ammonium lactate (LAC-HYDRIN) 12 % lotion, as needed, Disp: , Rfl:     atorvastatin (LIPITOR) 80 mg tablet, Take 80 mg by mouth every evening, Disp: , Rfl:     benzonatate (TESSALON PERLES) 100 mg capsule, Take 1 capsule (100 mg total) by mouth 3 (three) times a day as needed for cough, Disp: 30 capsule, Rfl: 1    Buprenorphine HCl  (Belbuca) 300 MCG FILM, Apply 300 mcg to cheek in the morning, Disp: , Rfl:     carboxymethylcellulose 0.5 % SOLN, Administer 1 drop to both eyes daily as needed for dry eyes, Disp: 1 Bottle, Rfl: 0    clonazePAM (KlonoPIN) 0.5 mg tablet, Take 0.5 mg by mouth daily at bedtime, Disp: , Rfl:     clonazePAM (KlonoPIN) 1 mg tablet, Take 1 mg by mouth daily in the early morning, Disp: , Rfl:     dicyclomine (BENTYL) 20 mg tablet, Take 20 mg by mouth every 6 (six) hours, Disp: , Rfl:     DULoxetine (CYMBALTA) 30 mg delayed release capsule, Take 60 mg by mouth daily, Disp: , Rfl:     famotidine (PEPCID) 40 MG tablet, Take 1 tablet (40 mg total) by mouth daily at bedtime, Disp: 30 tablet, Rfl: 6    fenofibrate (TRICOR) 48 mg tablet, Take 48 mg by mouth daily, Disp: , Rfl:     fluticasone (FLONASE) 50 mcg/act nasal spray, 2 sprays into each nostril daily, Disp: , Rfl:     fluticasone-umeclidinium-vilanterol (Trelegy Ellipta) 100-62.5-25 mcg/actuation inhaler, Inhale 1 puff daily Rinse mouth after use., Disp: 60 blister, Rfl: 3    furosemide (LASIX) 20 mg tablet, Take 20 mg by mouth as needed Taking as needed, Disp: , Rfl:     Galcanezumab-gnlm (Emgality) 120 MG/ML SOAJ, Inject 120 mg under the skin every 30 (thirty) days Last inj 1/19/23, Disp: , Rfl:     HYDROcodone-acetaminophen (NORCO)  mg per tablet, Take 1 tablet by mouth every 6 (six) hours as needed for moderate pain, Disp: , Rfl:     latanoprost (XALATAN) 0.005 % ophthalmic solution, instill 1 drop into both eyes at bedtime, Disp: , Rfl:     levothyroxine 137 mcg tablet, Take 137 mcg by mouth daily, Disp: , Rfl:     Lidocaine 4 % PTCH, Apply 1 patch topically daily, Disp: , Rfl: 0    magnesium 30 MG tablet, Take 500 mg by mouth 2 (two) times a day 1/26/23 right now not taking, Disp: , Rfl:     Melatonin 10 MG CAPS, Take 1 tablet by mouth daily at bedtime as needed, Disp: , Rfl:     omeprazole (PriLOSEC) 40 MG capsule, Take 1 capsule (40 mg total) by mouth 2  (two) times a day, Disp: 60 capsule, Rfl: 3    ondansetron (ZOFRAN) 8 mg tablet, Take 1 tablet (8 mg total) by mouth every 8 (eight) hours as needed for nausea or vomiting, Disp: 20 tablet, Rfl: 0    OneTouch Ultra test strip, USE TO TEST BLOOD GLUCOSE 2-3 TIMES DAILY, Disp: , Rfl:     polyethylene glycol (GLYCOLAX) 17 GM/SCOOP powder, Take 17 g daily for constipation., Disp: 578 g, Rfl: 3    pregabalin (LYRICA) 100 mg capsule, Take 100 mg by mouth 3 (three) times a day Morning-lunch-dinner, Disp: , Rfl:     pregabalin (LYRICA) 150 mg capsule, take 1 capsule by mouth NIGHTLY, Disp: , Rfl:     senna (SENOKOT) 8.6 mg, Take 1 PO HS every other day., Disp: 15 tablet, Rfl: 3    tiZANidine (ZANAFLEX) 4 mg tablet, take 2 tablets by mouth every 6 hours if needed for muscle spasm, Disp: , Rfl:     topiramate (TOPAMAX) 200 MG tablet, 200 mg 2 (two) times a day, Disp: , Rfl:     traZODone (DESYREL) 100 mg tablet, Take 100 mg by mouth daily at bedtime, Disp: , Rfl:     True Comfort Safety Lancets MISC, USE TO TEST BLOOD GLUCOSE 2-3 TIMES DAILY, Disp: , Rfl:     acetaminophen (TYLENOL) 325 mg tablet, Take 3 tablets (975 mg total) by mouth every 8 (eight) hours (Patient not taking: Reported on 12/21/2023), Disp: , Rfl: 0    Butalbital-APAP-Caffeine (FIORICET PO), Take by mouth as needed (Patient not taking: Reported on 11/30/2023), Disp: , Rfl:     lamoTRIgine (LaMICtal) 100 mg tablet, Take 100 mg by mouth 2 (two) times a day, Disp: , Rfl:     methocarbamol (ROBAXIN) 500 mg tablet, Take 0.5 tablets (250 mg total) by mouth every 6 (six) hours for 14 days, Disp: 28 tablet, Rfl: 0    rizatriptan (MAXALT-MLT) 10 mg disintegrating tablet, TAKE 1 TABLET BY MOUTH ONE TIME AS NEEDED FOR MIGRAINE, MAY REPEA...  (REFER TO PRESCRIPTION NOTES). (Patient not taking: Reported on 11/30/2023), Disp: , Rfl:     Allergies   Allergen Reactions    Hydroxyzine Anaphylaxis     Claims it gives her convulsions.     Other Other (See Comments)    Pollen  "Extract Itching    Tree Extract     Clarithromycin Rash     Pt denies    Latex Rash    Sulfa Antibiotics Rash     \"severe skin burn\"       Physical Exam:    BP 97/64   Pulse 60   Ht 5' 4\" (1.626 m)   Wt 74.4 kg (164 lb)   BMI 28.15 kg/m²     Constitutional:normal, well developed, well nourished, alert, in no distress and non-toxic and no overt pain behavior.  Eyes:anicteric  HEENT:grossly intact  Neck:supple, symmetric, trachea midline and no masses   Pulmonary:even and unlabored  Cardiovascular:No edema or pitting edema present  Skin:Normal without rashes or lesions and well hydrated  Psychiatric:Mood and affect appropriate  Neurologic:Cranial Nerves II-XII grossly intact  Musculoskeletal: Range of motion of the cervical spine is limited in all planes.  There is tenderness bilaterally C2-C6.    Imaging  No orders to display       No orders of the defined types were placed in this encounter.        "

## 2024-01-25 NOTE — PROGRESS NOTES
Assessment:  1. Chronic pain syndrome    2. Neck pain    3. Cervical spondylosis    4. Lumbar spondylosis    5. Lumbar degenerative disc disease    6. Lumbar radiculopathy    7. Sacroiliitis (HCC)    8. S/P insertion of spinal cord stimulator        Plan:  While the patient was in the office today, I did have a thorough conversation regarding their chronic pain syndrome, medication management, and treatment plan options.  Patient is being seen for a follow-up visit.  She continues to complain of neck pain.  At this point, we will plan to proceed with left-sided C3-4 and C4-5 medial branch blocks to determine if she is a candidate for radiofrequency ablation.    Patient has a spinal cord stimulator for back and leg pain which really reportedly reduces her back and leg pain by about 60%.    PCP prescribes Belbuca 300 mcg film which she uses daily as well as hydrocodone 10/325 3-4 times daily if needed for pain.  She also uses Lyrica 100 mg 3 times daily and 150 mg at bedtime as well as tizanidine 4 mg every 6 hours as needed for spasms.      History of Present Illness:  The patient is a 63 y.o. female who presents for a follow up office visit in regards to Headache, Neck Pain, Shoulder Pain, Arm Pain, Hand Pain, Leg Pain, and Foot Pain.   The patient’s current symptoms include complaints of neck pain that can radiate to the left upper extremity.  Low back pain that can radiate to the right lower extremity.  Current pain level is 7/10.  Quality pain is described as burning, dull, aching, sharp, throbbing, pressure-like, shooting, numb, pins-and-needles.    Current pain medications includes: PCP prescribes Belbuca 300 mcg film daily, hydrocodone 10/325 3-4 times daily if needed for pain, Lyrica 100 mg 3 times daily and 150 mg at bedtime, tizanidine 4 mg every 6 hours as needed for spasms.  The patient reports that this regimen is providing 50-60% pain relief.  The patient is reporting no side effects from this pain  medication regimen.    I have personally reviewed and/or updated the patient's past medical history, past surgical history, family history, social history, current medications, allergies, and vital signs today.         Review of Systems  Review of Systems   Constitutional:  Negative for unexpected weight change.   HENT:  Negative for hearing loss.    Eyes:  Negative for visual disturbance.   Respiratory:  Negative for shortness of breath.    Cardiovascular:  Negative for leg swelling.   Gastrointestinal:  Positive for diarrhea, nausea and vomiting. Negative for constipation.   Endocrine: Negative for polyuria.   Genitourinary:  Negative for difficulty urinating.   Musculoskeletal:  Positive for arthralgias and myalgias. Negative for gait problem and joint swelling.        Decreased range of motion  Joint stiffness  Swelling  Pain in extremity   Skin:  Negative for rash.   Neurological:  Positive for dizziness. Negative for weakness and headaches.        Memory loss   Psychiatric/Behavioral:  Negative for decreased concentration.    All other systems reviewed and are negative.        Past Medical History:   Diagnosis Date    Ambulates with cane     Anxiety     Arthritis     Asthma     Bipolar 1 disorder (HCC)     Brain aneurysm     Chronic pain disorder     spinal stenosis    Concussion syndrome     neurological rx and balance rx    COPD (chronic obstructive pulmonary disease) ([facility])     Depression     Diabetes mellitus ([facility])     controlled w/ diet    Disease of thyroid gland     nodules     Family history of colon cancer     father    Fibromyalgia, primary     GERD (gastroesophageal reflux disease)     History of colon polyps     Hyperlipidemia     Hypertension     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     Irritable bowel syndrome     Lumbar degenerative disc disease 02/16/2022    Migraine     Neuropathy     bilateral feet and hands    Peripheral neuropathy     Psychiatric disorder     PTSD  (post-traumatic stress disorder)     Shortness of breath     Sleep apnea     had 3 studies & last one negative    Stroke (HCC)     2012 no deficeits    TIA (transient ischemic attack)     Wears dentures     Wears glasses        Past Surgical History:   Procedure Laterality Date    ABDOMINAL SURGERY      laproscopic/ endometriosis    BRAIN SURGERY      aneurysm/ coiling procedure    CARPAL TUNNEL RELEASE      CHOLECYSTECTOMY      COLONOSCOPY      DENTAL SURGERY      EPIDURAL BLOCK INJECTION Right 03/03/2022    Procedure: C7-T1 interlaminar epidural steroid injection;  Surgeon: Johnson Aranda MD;  [facility]: [facility];  Service: Pain Management     EPIDURAL BLOCK INJECTION N/A 04/21/2022    Procedure: C6-C7 BLOCK / INJECTION EPIDURAL STEROID CERVICAL;  Surgeon: Johnson Aranda MD;  [facility];  Service: Pain Management     EPIDURAL BLOCK INJECTION Left 06/21/2022    Procedure: BLOCK / INJECTION EPIDURAL STEROID LUMBAR  left L3-4 TFESI;  Surgeon: Johnson Aranda MD;  [facility];  Service: Pain Management     EYE SURGERY      cataract    FASCIOTOMY Right 10/27/2023    Procedure: FASCIOTOMY OF RIGHT UPPER EXTREMITY; POSSIBLE VAC DRESSING APPLICATION;  Surgeon: Bruno Blevins MD;  [facility]: [facility];  Service: General    FL GUIDED NEEDLE PLAC BX/ASP/INJ  03/03/2022    FL GUIDED NEEDLE PLAC BX/ASP/INJ  11/17/2022    HYSTERECTOMY      RI ESOPHAGOGASTRODUODENOSCOPY TRANSORAL DIAGNOSTIC N/A 02/08/2018    Procedure: EGD AND COLONOSCOPY;  Surgeon: Caleb Pete MD;  Location: [facility];  Service: Gastroenterology    RI PRQ IMPLTJ NSTIM ELECTRODE ARRAY EPIDURAL N/A 11/17/2022    Procedure: NEVRO SCS TRIAL;  Surgeon: Johnson Aranda MD;  [facility];  Service: Pain Management     RI PRQ IMPLTJ NSTIM ELECTRODE ARRAY EPIDURAL N/A 2/1/2023    Procedure: Insertion percutaneous thoracic spinal cord stimulator with left buttock implantable pulse generator;  Surgeon: Fausto  Robert Goldberg, MD;  [facility];  Service: Neurosurgery    SPLIT THICKNESS SKIN GRAFT Right 11/7/2023    Procedure: SKIN GRAFT SPLIT THICKNESS (STSG)  EXTREMITY;  Surgeon: Samm Sims MD;  [facility];  Service: General    THYROID SURGERY      TONSILLECTOMY      US GUIDED THYROID BIOPSY  11/30/2016    US GUIDED THYROID BIOPSY  3/21/2018    VAC DRESSING APPLICATION Right 10/29/2023    Procedure: CHANGE DRESSING/VAC RIGHT UPPER EXTREMITY; WASHOUT; PARTIAL CLOSURE;  Surgeon: Irina Day DO;  [facility];  Service: General    WOUND DEBRIDEMENT Right 11/4/2023    Procedure: SIMPLE CLOSURE 3.5CM, OASIS APPLICATION 15X7CM, WOUND VAC APPLICATION;  Surgeon: Maryuri Goldstein MD;  [facility];  Service: General       Family History   Problem Relation Age of Onset    Brain cancer Mother     Alzheimer's disease Mother     Alzheimer's disease Father     Parkinsonism Father        Social History     Occupational History    Not on file   Tobacco Use    Smoking status: Every Day     Current packs/day: 2.00     Types: Cigarettes    Smokeless tobacco: Never   Vaping Use    Vaping status: Never Used   Substance and Sexual Activity    Alcohol use: Not Currently    Drug use: Never     Comment: prescribed Belbuca    Sexual activity: Yes     Partners: Male         Current Outpatient Medications:     albuterol (PROVENTIL HFA,VENTOLIN HFA) 90 mcg/act inhaler, Inhale 2 puffs every 6 (six) hours as needed for wheezing or shortness of breath, Disp: 18 g, Rfl: 5    Alcohol Swabs (Alcohol Pads) 70 % PADS, USE TO TEST BLOOD GLUCOSE 2-3 TIMES DAILY, Disp: , Rfl:     ammonium lactate (LAC-HYDRIN) 12 % lotion, as needed, Disp: , Rfl:     atorvastatin (LIPITOR) 80 mg tablet, Take 80 mg by mouth every evening, Disp: , Rfl:     benzonatate (TESSALON PERLES) 100 mg capsule, Take 1 capsule (100 mg total) by mouth 3 (three) times a day as needed for cough, Disp: 30 capsule, Rfl: 1    Buprenorphine HCl  (Belbuca) 300 MCG FILM, Apply 300 mcg to cheek in the morning, Disp: , Rfl:     carboxymethylcellulose 0.5 % SOLN, Administer 1 drop to both eyes daily as needed for dry eyes, Disp: 1 Bottle, Rfl: 0    clonazePAM (KlonoPIN) 0.5 mg tablet, Take 0.5 mg by mouth daily at bedtime, Disp: , Rfl:     clonazePAM (KlonoPIN) 1 mg tablet, Take 1 mg by mouth daily in the early morning, Disp: , Rfl:     dicyclomine (BENTYL) 20 mg tablet, Take 20 mg by mouth every 6 (six) hours, Disp: , Rfl:     DULoxetine (CYMBALTA) 30 mg delayed release capsule, Take 60 mg by mouth daily, Disp: , Rfl:     famotidine (PEPCID) 40 MG tablet, Take 1 tablet (40 mg total) by mouth daily at bedtime, Disp: 30 tablet, Rfl: 6    fenofibrate (TRICOR) 48 mg tablet, Take 48 mg by mouth daily, Disp: , Rfl:     fluticasone (FLONASE) 50 mcg/act nasal spray, 2 sprays into each nostril daily, Disp: , Rfl:     fluticasone-umeclidinium-vilanterol (Trelegy Ellipta) 100-62.5-25 mcg/actuation inhaler, Inhale 1 puff daily Rinse mouth after use., Disp: 60 blister, Rfl: 3    furosemide (LASIX) 20 mg tablet, Take 20 mg by mouth as needed Taking as needed, Disp: , Rfl:     Galcanezumab-gnlm (Emgality) 120 MG/ML SOAJ, Inject 120 mg under the skin every 30 (thirty) days Last inj 1/19/23, Disp: , Rfl:     HYDROcodone-acetaminophen (NORCO)  mg per tablet, Take 1 tablet by mouth every 6 (six) hours as needed for moderate pain, Disp: , Rfl:     latanoprost (XALATAN) 0.005 % ophthalmic solution, instill 1 drop into both eyes at bedtime, Disp: , Rfl:     levothyroxine 137 mcg tablet, Take 137 mcg by mouth daily, Disp: , Rfl:     Lidocaine 4 % PTCH, Apply 1 patch topically daily, Disp: , Rfl: 0    magnesium 30 MG tablet, Take 500 mg by mouth 2 (two) times a day 1/26/23 right now not taking, Disp: , Rfl:     Melatonin 10 MG CAPS, Take 1 tablet by mouth daily at bedtime as needed, Disp: , Rfl:     omeprazole (PriLOSEC) 40 MG capsule, Take 1 capsule (40 mg total) by mouth 2  (two) times a day, Disp: 60 capsule, Rfl: 3    ondansetron (ZOFRAN) 8 mg tablet, Take 1 tablet (8 mg total) by mouth every 8 (eight) hours as needed for nausea or vomiting, Disp: 20 tablet, Rfl: 0    OneTouch Ultra test strip, USE TO TEST BLOOD GLUCOSE 2-3 TIMES DAILY, Disp: , Rfl:     polyethylene glycol (GLYCOLAX) 17 GM/SCOOP powder, Take 17 g daily for constipation., Disp: 578 g, Rfl: 3    pregabalin (LYRICA) 100 mg capsule, Take 100 mg by mouth 3 (three) times a day Morning-lunch-dinner, Disp: , Rfl:     pregabalin (LYRICA) 150 mg capsule, take 1 capsule by mouth NIGHTLY, Disp: , Rfl:     senna (SENOKOT) 8.6 mg, Take 1 PO HS every other day., Disp: 15 tablet, Rfl: 3    tiZANidine (ZANAFLEX) 4 mg tablet, take 2 tablets by mouth every 6 hours if needed for muscle spasm, Disp: , Rfl:     topiramate (TOPAMAX) 200 MG tablet, 200 mg 2 (two) times a day, Disp: , Rfl:     traZODone (DESYREL) 100 mg tablet, Take 100 mg by mouth daily at bedtime, Disp: , Rfl:     True Comfort Safety Lancets MISC, USE TO TEST BLOOD GLUCOSE 2-3 TIMES DAILY, Disp: , Rfl:     acetaminophen (TYLENOL) 325 mg tablet, Take 3 tablets (975 mg total) by mouth every 8 (eight) hours (Patient not taking: Reported on 12/21/2023), Disp: , Rfl: 0    Butalbital-APAP-Caffeine (FIORICET PO), Take by mouth as needed (Patient not taking: Reported on 11/30/2023), Disp: , Rfl:     lamoTRIgine (LaMICtal) 100 mg tablet, Take 100 mg by mouth 2 (two) times a day, Disp: , Rfl:     methocarbamol (ROBAXIN) 500 mg tablet, Take 0.5 tablets (250 mg total) by mouth every 6 (six) hours for 14 days, Disp: 28 tablet, Rfl: 0    rizatriptan (MAXALT-MLT) 10 mg disintegrating tablet, TAKE 1 TABLET BY MOUTH ONE TIME AS NEEDED FOR MIGRAINE, MAY REPEA...  (REFER TO PRESCRIPTION NOTES). (Patient not taking: Reported on 11/30/2023), Disp: , Rfl:     Allergies   Allergen Reactions    Hydroxyzine Anaphylaxis     Claims it gives her convulsions.     Other Other (See Comments)    Pollen  "Extract Itching    Tree Extract     Clarithromycin Rash     Pt denies    Latex Rash    Sulfa Antibiotics Rash     \"severe skin burn\"       Physical Exam:    BP 97/64   Pulse 60   Ht 5' 4\" (1.626 m)   Wt 74.4 kg (164 lb)   BMI 28.15 kg/m²     Constitutional:normal, well developed, well nourished, alert, in no distress and non-toxic and no overt pain behavior.  Eyes:anicteric  HEENT:grossly intact  Neck:supple, symmetric, trachea midline and no masses   Pulmonary:even and unlabored  Cardiovascular:No edema or pitting edema present  Skin:Normal without rashes or lesions and well hydrated  Psychiatric:Mood and affect appropriate  Neurologic:Cranial Nerves II-XII grossly intact  Musculoskeletal: Range of motion of the cervical spine is limited in all planes.  There is tenderness bilaterally C2-C6.    Imaging  No orders to display       No orders of the defined types were placed in this encounter.        "

## 2024-01-30 NOTE — CASE MANAGEMENT
Case Management Discharge Planning Note    Patient name Khloe Zaragoza  Location [address] 555[address]42 MRN [id number]  : 1960 Date 2023       Current Admission Date: 10/27/2023  Current Admission Diagnosis:Compartment syndrome [facility])   Patient Active Problem List    Diagnosis Date Noted    Diarrhea 10/31/2023    Fall 10/27/2023    Compartment syndrome ([address]) 10/27/2023    Type 2 diabetes mellitus ([address]) 10/27/2023    Screening for colon cancer 2023    Gastroesophageal reflux disease 2023    Common bile duct dilation 2023    S/P insertion of spinal cord stimulator 2023    Pulmonary nodules/lesions, multiple 2023    Diabetic polyneuropathy associated with type 2 diabetes mellitus ([address]) 2023    Coronary artery calcification seen on CAT scan 2023    Cervical spondylosis 2023    Shortness of breath 2023    Irritable bowel syndrome with both constipation and diarrhea 2022    Chronic pain 2022    Sacroiliitis ([address]) 2022    Carotid artery aneurysm (HCC) 2022    Lumbar degenerative disc disease 2022    Lumbar radiculopathy 2022    Lumbar spondylosis 2022    Cervical disc disorder with radiculopathy of mid-cervical region 2022    Mid back pain 2021    Cervical radiculopathy 2021    Neck pain 2021    Syncope and collapse 2021    Migraine     Bipolar 1 disorder (HCC)     Depression     Chronic pain syndrome     Anxiety     Fibromyalgia, primary     Constipation 2021    Ileus ([address]) 2021    COPD (chronic obstructive pulmonary disease) ([address])     Hypertension     Hypophosphatemia 2021    Gastritis, acute 2021    Hypotension 2021    Tobacco use 2021    Stroke-like symptoms 06/15/2020    Nausea 06/15/2020    Left chest pressure 06/15/2020    TMJ syndrome 2020    Other hyperlipidemia 2019    Dizziness 2019    Hypothyroid 2019    Brain aneurysm 07/19/2019    Hypokalemia 07/19/2019    Hypertriglyceridemia 07/19/2019    Low HDL (under 40) 07/19/2019    Elevated TSH 07/19/2019    Tobacco abuse 07/19/2019    Intractable abdominal pain 07/19/2019      LOS (days): 10  Geometric Mean LOS (GMLOS) (days): 1.90  Days to GMLOS:-8.4     OBJECTIVE:  Risk of Unplanned Readmission Score: 28.45         Current admission status: Inpatient   Preferred Pharmacy:   [address], [place] - [address] [facility] [address]  Phone: [phone] Fax: [phone]    Primary Care Provider:  Tasha Milligan MD    Primary Insurance: [facility] [place] DUAL COMPLETE [facility] REP  Secondary Insurance: [facility]    DISCHARGE DETAILS:      Requested [address]         Is the patient interested in [facility] at discharge?: Yes  [facility] requested[de-identified] Occupational Therapy, Physical Therapy, Nursing  Home Health Agency Name[de-identified] [facility] External Referral Reason (only applicable if external HHA name selected): Scheduling access issues  [address] Provider[de-identified] PCP  Home Health Services Needed[de-identified] Strengthening/Theraputic Exercises to Improve Function, Post-Op Care and Assessment, Wound/Ostomy Care, Gait/ADL Training, Evaluate Functional Status and Safety  Homebound Criteria Met[de-identified] Uses an Assist Device (i.e. cane, walker, etc)  Supporting Clincal Findings[de-identified] Limited Endurance, Fatigues Easliy in [employer]    DME Referral Provided  Referral made for DME?: No    Other Referral/Resources/Interventions Provided:  Interventions: [facility] AT [facility]    Treatment Team Recommendation: Home with [address]  Discharge Destination Plan[de-identified] Home with [address]         Pt accepted by [facility] DISPLAY PLAN FREE TEXT

## 2024-02-02 ENCOUNTER — TELEPHONE (OUTPATIENT)
Age: 64
End: 2024-02-02

## 2024-02-02 ENCOUNTER — NURSE TRIAGE (OUTPATIENT)
Age: 64
End: 2024-02-02

## 2024-02-02 NOTE — TELEPHONE ENCOUNTER
"Does not have full function of right hand.  Numbness to pinky.  Sharp pain to fingers in hand and wrist.  Sometimes pounding pain which radiates into elbow.  Also having migraines and fatigues.  Saw pcp today who said to call us.  Lexis signed off PT and OT.  PCP increased Vicodin from 2x's daily to 3 in December.  Patient wants to be asap.      Reason for Disposition   MODERATE pain (e.g., interferes with normal activities) and present > 3 days    Answer Assessment - Initial Assessment Questions  1. ONSET: \"When did the pain start?\"      Pain started getting worse again 2 weeks ago after surgery in Nov.   2. LOCATION: \"Where is the pain located?\"      Right wrist  3. PAIN: \"How bad is the pain?\" (Scale 1-10; or mild, moderate, severe)    - MILD (1-3): doesn't interfere with normal activities    - MODERATE (4-7): interferes with normal activities (e.g., work or school) or awakens from sleep    - SEVERE (8-10): excruciating pain, unable to use hand at all      10/10 - Waking up from sleep.   4. WORK OR EXERCISE: \"Has there been any recent work or exercise that involved this part of the body?\"     Sometimes does over use hand.  Worse now that doing less.   5. CAUSE: \"What do you think is causing the pain?\"      unsure  6. AGGRAVATING FACTORS: \"What makes the pain worse?\" (e.g., using computer)      Elevate makes better and so does rubbing, and taking  vicodin.    Worse - when using.    Protocols used: Hand and Wrist Pain-ADULT-[place]    "

## 2024-02-02 NOTE — TELEPHONE ENCOUNTER
Patient called in to potentially schedule a follow up appointment. During call patient explained she had surgery in November and has been having pain since but it seems to be getting worse and keeping her up at night. Transferred call to CTS for further assistance

## 2024-02-05 ENCOUNTER — TELEPHONE (OUTPATIENT)
Age: 64
End: 2024-02-05

## 2024-02-05 NOTE — TELEPHONE ENCOUNTER
Pt called back because she is still havinig a lot of issues in regards to her fall in 10/23. She was seen by trauma .  Transferred call to [facility] in [facility]

## 2024-02-12 ENCOUNTER — NURSE TRIAGE (OUTPATIENT)
Age: 64
End: 2024-02-12

## 2024-02-12 NOTE — TELEPHONE ENCOUNTER
Pt advised that her fingers are curling and she's in a lot of pain. Pt advised that she had 3 surgeries on her arm and a skin graph. She's asking if someone can call her and advise if she can come in sooner. Pt callback [phone]

## 2024-02-12 NOTE — TELEPHONE ENCOUNTER
"Message transferred to Gen Surg.Trauma [place] [address].    Additional Information   Negative: Nursing judgment   Negative: Nursing judgment   Negative: Nursing judgment   Negative: Nursing judgment   Negative: Information only question and nurse able to answer    Answer Assessment - Initial Assessment Questions  1. REASON FOR CALL or QUESTION: \"What is your reason for calling today?\" or \"How can I best help you?\" or \"What question do you have that I can help answer?\"      Gen surg question.  Fingers/hand pain    Protocols used: Information Only Call - No Triage-ADULT-[place]    "

## 2024-02-12 NOTE — TELEPHONE ENCOUNTER
"Message transferred to [facility].    Additional Information   Negative: Nursing judgment   Negative: Nursing judgment   Negative: Nursing judgment   Negative: Nursing judgment   Negative: Information only question and nurse able to answer    Answer Assessment - Initial Assessment Questions  1. REASON FOR CALL or QUESTION: \"What is your reason for calling today?\" or \"How can I best help you?\" or \"What question do you have that I can help answer?\"      Pain in fingers/ hand.  Call for trauma.    Protocols used: Information Only Call - No Triage-ADULT-[place]    "

## 2024-02-12 NOTE — TELEPHONE ENCOUNTER
Dr Montoya is encouraging patient to keep her scheduled appointment.  Spoke with patient and confirmed appointment.

## 2024-02-14 ENCOUNTER — TELEPHONE (OUTPATIENT)
Age: 64
End: 2024-02-14

## 2024-02-14 ENCOUNTER — OFFICE VISIT (OUTPATIENT)
Dept: SURGERY | Facility: CLINIC | Age: 64
End: 2024-02-14
Payer: COMMERCIAL

## 2024-02-14 VITALS
HEART RATE: 95 BPM | SYSTOLIC BLOOD PRESSURE: 124 MMHG | BODY MASS INDEX: 28 KG/M2 | WEIGHT: 164 LBS | TEMPERATURE: 97.9 F | DIASTOLIC BLOOD PRESSURE: 66 MMHG | OXYGEN SATURATION: 98 % | HEIGHT: 64 IN

## 2024-02-14 DIAGNOSIS — T79.A11A TRAUMATIC COMPARTMENT SYNDROME OF RIGHT UPPER EXTREMITY, INITIAL ENCOUNTER (HCC): Primary | ICD-10-CM

## 2024-02-14 DIAGNOSIS — E11.42 DIABETIC POLYNEUROPATHY ASSOCIATED WITH TYPE 2 DIABETES MELLITUS (HCC): ICD-10-CM

## 2024-02-14 DIAGNOSIS — J44.9 CHRONIC OBSTRUCTIVE PULMONARY DISEASE, UNSPECIFIED COPD TYPE (HCC): Primary | ICD-10-CM

## 2024-02-14 DIAGNOSIS — R22.31 SUBCUTANEOUS MASS OF RIGHT FOREARM: ICD-10-CM

## 2024-02-14 PROCEDURE — 99213 OFFICE O/P EST LOW 20 MIN: CPT | Performed by: SURGERY

## 2024-02-14 RX ORDER — FLUTICASONE FUROATE, UMECLIDINIUM BROMIDE AND VILANTEROL TRIFENATATE 200; 62.5; 25 UG/1; UG/1; UG/1
1 POWDER RESPIRATORY (INHALATION) DAILY
Qty: 60 BLISTER | Refills: 5 | Status: SHIPPED | OUTPATIENT
Start: 2024-02-14 | End: 2024-08-12

## 2024-02-14 NOTE — TELEPHONE ENCOUNTER
Patients PCP office called, the patient had contacted them stating she has not been able to get a hold of our office. The office advised the patient is having difficulty with her Trelegy 100 inhaler. She does not seem to be benefiting from it enough and is asking if she can be switched to Trelegy 200. Please advise

## 2024-02-15 ENCOUNTER — TELEPHONE (OUTPATIENT)
Age: 64
End: 2024-02-15

## 2024-02-15 ENCOUNTER — TELEPHONE (OUTPATIENT)
Dept: PSYCHIATRY | Facility: CLINIC | Age: 64
End: 2024-02-15

## 2024-02-15 NOTE — TELEPHONE ENCOUNTER
Contacted patient off of NON-REFERRAL medication management to verify needs of services in attempts to offer patient an appointment at [facility] . LVM for patient to contact intake dept  in regards to wait list

## 2024-02-15 NOTE — TELEPHONE ENCOUNTER
Pain patient is in a lot of pain and does not want to wait until 2/22 to have the tests done to find out what is wrong.  She is asking if the Dr would be able to put SAT on her scripts so they can schedule her sooner.    I told her I would ask and have someone give her a call back at [phone] and let her know when it has been done

## 2024-02-18 PROBLEM — R22.31 SUBCUTANEOUS MASS OF RIGHT FOREARM: Status: ACTIVE | Noted: 2024-02-18

## 2024-02-19 NOTE — PROGRESS NOTES
Assessment/Plan:    Compartment syndrome (HCC)  Compartment syndrome of the right upper extremity status post fasciotomy release.  This was complicated by nonhealing inability to close the incision.  Incision was partially closed and the remaining was covered with a split-thickness skin graft.  On patient's last visit sutures were removed.  Currently the arm is healed very nicely.  Patient now with worsening pain along the right forearm and hand.  No obvious infections noted.  There is some muscle wasting of the right hand noted.  Strength is intact and slightly less in comparison to the left upper extremity.  No crepitus no other red flag signs or symptoms to suggest recurrent infection.  Patient does have a subcutaneous mass along the medial aspect near the elbow of unclear etiology.  This could be possible lipoma.  Will obtain ultrasound for this.  With regards to the pain in the hand and forearm have no good explanation.  The this could be nerve related unclear.  Will obtain CT scan to rule out any deeper seeded infection or unhealed injuries.  Patient urged to follow-up with pain management.  Patient has finished with PT but would like to return therefore referral was placed.  I will call patient back with the results of her scans.    Subcutaneous mass of right forearm  Palpable subcutaneous lesion.  Most likely resents lipoma.  But currently unclear and given recent injury and subsequent fasciotomy I do feel a ultrasound evaluation is warranted.  This will be ordered.  Will call patient with results.     Diagnoses and all orders for this visit:    Traumatic compartment syndrome of right upper extremity, initial encounter (HCC)  -     Ambulatory Referral to Physical Therapy; Future  -     CT upper extremity w contrast right; Future  -     US extremity soft tissue; Future    Diabetic polyneuropathy associated with type 2 diabetes mellitus (HCC)    Subcutaneous mass of right forearm          Notes:    Operative  notes dating October 27, 2023, October 29, 2023, November 4, 2023 and November 7, 2000 [place] all postreview only.    Subjective:      Patient ID: Jennifer Woodall is a 63 y.o. female.    63-year-old female with numerous comorbidities including GERD, diabetes, COPD, compartment syndrome of the right forearm status post fasciotomy with delayed closure and placement of split-thickness skin graft, presents today for follow-up.  Patient states overall she is doing well.  Still some chronic discomfort in the right forearm and hand but this acutely has gotten worse recently.  She also states that her fingers are starting to curl and she is having issues with her strength.  Denies any redness or drainage from any previous incisions.  No fevers or chills.  Patient states overall she just does not feel right        The following portions of the patient's history were reviewed and updated as appropriate: She  has a past medical history of Ambulates with cane, Anxiety, Arthritis, Asthma, Bipolar 1 disorder ([facility]), Brain aneurysm, Chronic pain disorder, Concussion syndrome, COPD (chronic obstructive pulmonary disease) (Former[facility]), Depression, Diabetes mellitus ([facility]), Disease of thyroid gland, Family history of colon cancer, Fibromyalgia, primary, GERD (gastroesophageal reflux disease), History of colon polyps, Hyperlipidemia, Hypertension, 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, Irritable bowel syndrome, Lumbar degenerative disc disease (02/16/2022), Migraine, Neuropathy, Peripheral neuropathy, Psychiatric disorder, PTSD (post-traumatic stress disorder), Shortness of breath, Sleep apnea, Stroke (Former[facility]), TIA (transient ischemic attack), Wears dentures, and Wears glasses.  She   Patient Active Problem List    Diagnosis Date Noted    Subcutaneous mass of right forearm 02/18/2024    Fall 10/27/2023    Compartment syndrome (HCC) 10/27/2023    Type 2 diabetes mellitus (HCC) 10/27/2023    Screening for colon cancer 09/23/2023     Gastroesophageal reflux disease 09/23/2023    Common bile duct dilation 09/22/2023    S/P insertion of spinal cord stimulator 08/30/2023    Pulmonary nodules/lesions, multiple 07/24/2023    Diabetic polyneuropathy associated with type 2 diabetes mellitus (HCC) 07/14/2023    Coronary artery calcification seen on CAT scan 07/14/2023    Cervical spondylosis 06/12/2023    Shortness of breath 05/02/2023    Irritable bowel syndrome with both constipation and diarrhea 11/02/2022    Sacroiliitis (HCC) 05/24/2022    Carotid artery aneurysm (HCC) 05/24/2022    Lumbar degenerative disc disease 02/16/2022    Lumbar radiculopathy 02/16/2022    Lumbar spondylosis 02/16/2022    Cervical disc disorder with radiculopathy of mid-cervical region 02/16/2022    Mid back pain 12/14/2021    Cervical radiculopathy 12/14/2021    Neck pain 12/14/2021    Syncope and collapse 12/13/2021    Migraine     Bipolar 1 disorder ([facility])     Depression     Chronic pain syndrome     Anxiety     Fibromyalgia, primary     Constipation 02/11/2021    COPD (chronic obstructive pulmonary disease) ([facility])     Hypertension     Hypophosphatemia 01/25/2021    Hypotension 01/25/2021    Tobacco use 01/25/2021    Stroke-like symptoms 06/15/2020    Nausea 06/15/2020    Left chest pressure 06/15/2020    TMJ syndrome 05/22/2020    Other hyperlipidemia 07/19/2019    Dizziness 07/19/2019    Hypothyroid 07/19/2019    Brain aneurysm 07/19/2019    Hypokalemia 07/19/2019    Hypertriglyceridemia 07/19/2019    Low HDL (under 40) 07/19/2019    Elevated TSH 07/19/2019    Tobacco abuse 07/19/2019    Intractable abdominal pain 07/19/2019     She  has a past surgical history that includes Hysterectomy; Thyroid surgery; Cholecystectomy; Carpal tunnel release; Eye surgery; Dental surgery; Abdominal surgery; Brain surgery; pr esophagogastroduodenoscopy transoral diagnostic (N/A, 02/08/2018); Tonsillectomy; Epidural block injection (Right, 03/03/2022); FL guided needle plac bx/asp/inj  (03/03/2022); Epidural block injection (N/A, 04/21/2022); Epidural block injection (Left, 06/21/2022); Colonoscopy; FL guided needle plac bx/asp/inj (11/17/2022); pr prq impltj nstim electrode array epidural (N/A, 11/17/2022); pr prq impltj nstim electrode array epidural (N/A, 2/1/2023); US guided thyroid biopsy (11/30/2016); US guided thyroid biopsy (3/21/2018); VAC DRESSING APPLICATION (Right, 10/29/2023); Wound debridement (Right, 11/4/2023); Fasciotomy (Right, 10/27/2023); and SPLIT THICKNESS SKIN GRAFT (Right, 11/7/2023).  Her family history includes Alzheimer's disease in her father and mother; Brain cancer in her mother; Parkinsonism in her father.  She  reports that she has been smoking cigarettes. She has never used smokeless tobacco. She reports that she does not currently use alcohol. She reports that she does not use drugs.  Current Outpatient Medications   Medication Sig Dispense Refill    tiZANidine (ZANAFLEX) 4 mg tablet 1 tablet      acetaminophen (TYLENOL) 325 mg tablet Take 3 tablets (975 mg total) by mouth every 8 (eight) hours (Patient not taking: Reported on 12/21/2023)  0    albuterol (PROVENTIL HFA,VENTOLIN HFA) 90 mcg/act inhaler Inhale 2 puffs every 6 (six) hours as needed for wheezing or shortness of breath 18 g 5    Alcohol Swabs (Alcohol Pads) 70 % PADS USE TO TEST BLOOD GLUCOSE 2-3 TIMES DAILY      ammonium lactate (LAC-HYDRIN) 12 % lotion as needed      atorvastatin (LIPITOR) 80 mg tablet Take 80 mg by mouth every evening      benzonatate (TESSALON PERLES) 100 mg capsule Take 1 capsule (100 mg total) by mouth 3 (three) times a day as needed for cough 30 capsule 1    Buprenorphine HCl (Belbuca) 300 MCG FILM Apply 300 mcg to cheek in the morning      Butalbital-APAP-Caffeine (FIORICET PO) Take by mouth as needed (Patient not taking: Reported on 11/30/2023)      carboxymethylcellulose 0.5 % SOLN Administer 1 drop to both eyes daily as needed for dry eyes 1 Bottle 0    clonazePAM (KlonoPIN)  0.5 mg tablet Take 0.5 mg by mouth daily at bedtime      clonazePAM (KlonoPIN) 1 mg tablet Take 1 mg by mouth daily in the early morning      dicyclomine (BENTYL) 20 mg tablet Take 20 mg by mouth every 6 (six) hours      DULoxetine (CYMBALTA) 30 mg delayed release capsule Take 60 mg by mouth daily      famotidine (PEPCID) 40 MG tablet Take 1 tablet (40 mg total) by mouth daily at bedtime 30 tablet 6    fenofibrate (TRICOR) 48 mg tablet Take 48 mg by mouth daily      fluticasone (FLONASE) 50 mcg/act nasal spray 2 sprays into each nostril daily      fluticasone-umeclidinium-vilanterol (Trelegy Ellipta) 200-62.5-25 mcg/actuation AEPB inhaler Inhale 1 puff daily Rinse mouth after use. 60 blister 5    furosemide (LASIX) 20 mg tablet Take 20 mg by mouth as needed Taking as needed      Galcanezumab-gnlm (Emgality) 120 MG/ML SOAJ Inject 120 mg under the skin every 30 (thirty) days Last inj 1/19/23      HYDROcodone-acetaminophen (NORCO)  mg per tablet Take 1 tablet by mouth every 6 (six) hours as needed for moderate pain      lamoTRIgine (LaMICtal) 100 mg tablet Take 100 mg by mouth 2 (two) times a day      latanoprost (XALATAN) 0.005 % ophthalmic solution instill 1 drop into both eyes at bedtime      levothyroxine 137 mcg tablet Take 137 mcg by mouth daily      Lidocaine 4 % PTCH Apply 1 patch topically daily  0    magnesium 30 MG tablet Take 500 mg by mouth 2 (two) times a day 1/26/23 right now not taking      Melatonin 10 MG CAPS Take 1 tablet by mouth daily at bedtime as needed      methocarbamol (ROBAXIN) 500 mg tablet Take 0.5 tablets (250 mg total) by mouth every 6 (six) hours for 14 days 28 tablet 0    omeprazole (PriLOSEC) 40 MG capsule Take 1 capsule (40 mg total) by mouth 2 (two) times a day 60 capsule 3    ondansetron (ZOFRAN) 8 mg tablet Take 1 tablet (8 mg total) by mouth every 8 (eight) hours as needed for nausea or vomiting 20 tablet 0    OneTouch Ultra test strip USE TO TEST BLOOD GLUCOSE 2-3 TIMES  "DAILY      polyethylene glycol (GLYCOLAX) 17 GM/SCOOP powder Take 17 g daily for constipation. 578 g 3    pregabalin (LYRICA) 100 mg capsule Take 100 mg by mouth 3 (three) times a day Morning-lunch-dinner      pregabalin (LYRICA) 150 mg capsule take 1 capsule by mouth NIGHTLY      rizatriptan (MAXALT-MLT) 10 mg disintegrating tablet TAKE 1 TABLET BY MOUTH ONE TIME AS NEEDED FOR MIGRAINE, MAY REPEA...  (REFER TO PRESCRIPTION NOTES). (Patient not taking: Reported on 11/30/2023)      senna (SENOKOT) 8.6 mg Take 1 PO HS every other day. 15 tablet 3    topiramate (TOPAMAX) 200 MG tablet 200 mg 2 (two) times a day      traZODone (DESYREL) 100 mg tablet Take 100 mg by mouth daily at bedtime      True Comfort Safety Lancets MISC USE TO TEST BLOOD GLUCOSE 2-3 TIMES DAILY       No current facility-administered medications for this visit.     She is allergic to hydroxyzine, other, pollen extract, tree extract, clarithromycin, latex, and sulfa antibiotics..    Review of Systems      Review of systems completed, all negative except as noted HPI.    Objective:      /66 (BP Location: Left arm, Patient Position: Sitting, Cuff Size: Standard)   Pulse 95   Temp 97.9 °F (36.6 °C) (Temporal)   Ht 5' 4\" (1.626 m)   Wt 74.4 kg (164 lb)   SpO2 98%   BMI 28.15 kg/m²          Physical Exam  Vitals reviewed.   Constitutional:       General: She is not in acute distress.     Appearance: Normal appearance. She is not ill-appearing, toxic-appearing or diaphoretic.   HENT:      Head: Normocephalic and atraumatic.      Right Ear: External ear normal.      Left Ear: External ear normal.   Eyes:      General: No scleral icterus.        Right eye: No discharge.         Left eye: No discharge.   Cardiovascular:      Rate and Rhythm: Normal rate.   Pulmonary:      Effort: Pulmonary effort is normal. No respiratory distress.   Musculoskeletal:         General: Tenderness and signs of injury present.      Cervical back: Normal range of " motion.      Comments: Right forearm with well-healed incision and subsequent well-healed split-thickness skin graft.  Patient's fingers are slightly curled and there is some muscle wasting along the hand in the thenar eminence.    Strength exam showing adequate screening for bilaterally.  Slightly decreased in the right likely 4/5 compared to the left.   Skin:     General: Skin is warm.      Coloration: Skin is not jaundiced or pale.      Findings: No bruising or erythema.      Comments: Well-healed incision and skin graft of the right forearm   Neurological:      General: No focal deficit present.      Mental Status: She is alert and oriented to person, place, and time. Mental status is at baseline.      Motor: Weakness (slight decrease in strength in the right hand /forearm when compared to the left) present.   Psychiatric:         Mood and Affect: Mood normal.         Behavior: Behavior normal.         Thought Content: Thought content normal.         Judgment: Judgment normal.

## 2024-02-19 NOTE — ASSESSMENT & PLAN NOTE
Compartment syndrome of the right upper extremity status post fasciotomy release.  This was complicated by nonhealing inability to close the incision.  Incision was partially closed and the remaining was covered with a split-thickness skin graft.  On patient's last visit sutures were removed.  Currently the arm is healed very nicely.  Patient now with worsening pain along the right forearm and hand.  No obvious infections noted.  There is some muscle wasting of the right hand noted.  Strength is intact and slightly less in comparison to the left upper extremity.  No crepitus no other red flag signs or symptoms to suggest recurrent infection.  Patient does have a subcutaneous mass along the medial aspect near the elbow of unclear etiology.  This could be possible lipoma.  Will obtain ultrasound for this.  With regards to the pain in the hand and forearm have no good explanation.  The this could be nerve related unclear.  Will obtain CT scan to rule out any deeper seeded infection or unhealed injuries.  Patient urged to follow-up with pain management.  Patient has finished with PT but would like to return therefore referral was placed.  I will call patient back with the results of her scans.

## 2024-02-19 NOTE — ASSESSMENT & PLAN NOTE
Palpable subcutaneous lesion.  Most likely resents lipoma.  But currently unclear and given recent injury and subsequent fasciotomy I do feel a ultrasound evaluation is warranted.  This will be ordered.  Will call patient with results.

## 2024-02-20 ENCOUNTER — NURSE TRIAGE (OUTPATIENT)
Age: 64
End: 2024-02-20

## 2024-02-20 ENCOUNTER — HOSPITAL ENCOUNTER (OUTPATIENT)
Facility: HOSPITAL | Age: 64
Setting detail: OUTPATIENT SURGERY
Discharge: HOME | End: 2024-02-20
Attending: ANESTHESIOLOGY | Admitting: ANESTHESIOLOGY
Payer: COMMERCIAL

## 2024-02-20 ENCOUNTER — APPOINTMENT (OUTPATIENT)
Dept: RADIOLOGY | Facility: HOSPITAL | Age: 64
End: 2024-02-20
Payer: COMMERCIAL

## 2024-02-20 VITALS
WEIGHT: 164 LBS | TEMPERATURE: 97.8 F | DIASTOLIC BLOOD PRESSURE: 71 MMHG | HEIGHT: 64 IN | BODY MASS INDEX: 28 KG/M2 | OXYGEN SATURATION: 95 % | SYSTOLIC BLOOD PRESSURE: 106 MMHG | HEART RATE: 67 BPM | RESPIRATION RATE: 16 BRPM

## 2024-02-20 DIAGNOSIS — M50.120 CERVICAL DISC DISORDER WITH RADICULOPATHY OF MID-CERVICAL REGION: Primary | ICD-10-CM

## 2024-02-20 PROCEDURE — 64490 INJ PARAVERT F JNT C/T 1 LEV: CPT | Performed by: ANESTHESIOLOGY

## 2024-02-20 PROCEDURE — 64491 INJ PARAVERT F JNT C/T 2 LEV: CPT | Performed by: ANESTHESIOLOGY

## 2024-02-20 RX ORDER — LIDOCAINE HYDROCHLORIDE 10 MG/ML
INJECTION, SOLUTION EPIDURAL; INFILTRATION; INTRACAUDAL; PERINEURAL AS NEEDED
Status: DISCONTINUED | OUTPATIENT
Start: 2024-02-20 | End: 2024-02-20 | Stop reason: HOSPADM

## 2024-02-20 RX ORDER — ETHYL CHLORIDE 100 %
AEROSOL, SPRAY (ML) TOPICAL AS NEEDED
Status: DISCONTINUED | OUTPATIENT
Start: 2024-02-20 | End: 2024-02-20 | Stop reason: HOSPADM

## 2024-02-20 RX ORDER — LIDOCAINE HYDROCHLORIDE 20 MG/ML
INJECTION, SOLUTION EPIDURAL; INFILTRATION; INTRACAUDAL; PERINEURAL AS NEEDED
Status: DISCONTINUED | OUTPATIENT
Start: 2024-02-20 | End: 2024-02-20 | Stop reason: HOSPADM

## 2024-02-20 NOTE — DISCHARGE INSTR - AVS FIRST PAGE

## 2024-02-20 NOTE — INTERVAL H&P NOTE
H&P reviewed. After examining the patient I find no changes in the patients condition since the H&P had been written.    Vitals:    02/20/24 1245   BP: 101/66   Pulse: 71   Resp: 18   Temp: 97.5 °F (36.4 °C)   SpO2: 96%

## 2024-02-20 NOTE — OP NOTE
OPERATIVE REPORT  PATIENT NAME: Jennifer Woodall    :  1960  MRN: [id number]  Pt Location: [facility] OR ROOM 01    SURGERY DATE: 2024    Surgeons and Role:     * Johnson Aranda MD - Primary    Preop Diagnosis:  Cervical spondylosis [M47.812]  Neck pain [M54.2]    Post-Op Diagnosis Codes:     * Cervical spondylosis [M47.812]     * Neck pain [M54.2]    Procedure(s):  Left - BLOCK MEDIAL BRANCH  left C3-4 and C4-5 MBB #1    Specimen(s):  * No specimens in log *    Estimated Blood Loss:   Minimal    Drains:  * No LDAs found *    Anesthesia Type:   Local    Operative Indications:  Cervical spondylosis [M47.812]  Neck pain [M54.2]      Operative Findings:  same    Complications:   None    Procedure and Technique:  Fluoroscopically-guided Medial Branch Nerve Blocks of the left C3-C4 and C4-C5 facet joint(s) using 2% lidocaine      After discussing the risks, benefits, and alternatives to the procedure, the patient expressed understanding and wished to proceed.  The patient was brought to the fluoroscopy suite and placed in the prone position.  Procedural pause conducted to verify:  correct patient identity, procedure to be performed and as applicable, correct side and site, correct patient position, and availability of implants, special equipment and special requirements.  Using fluoroscopy, the mid-body of the articular pillar at the left C3, C4, C5 levels were identified.  The skin was sterilely prepped and draped in the usual fashion using Chloraprep skin prep.  Using fluoroscopic guidance, a 3.5 inch 25 gauge spinal needle was advanced to each target.  After negative aspiration, 0.5cc of 2% lidocaine was injected at each site and the needles were then removed. The patient tolerated the procedure well and there were no apparent complications.  After appropriate observation, the patient was dismissed from the clinic in good condition under their own power.  The patient was instructed to keep a pain diary and  report the results at her follow up appointment.              I was present for the entire procedure.    Patient Disposition:  hemodynamically stable        SIGNATURE: Johnson Aranda MD  DATE: February 20, 2024  TIME: 1:08 PM

## 2024-02-20 NOTE — TELEPHONE ENCOUNTER
Patient of Dr Montoya concerned about her pain medication prescription decreasing to 90 pills vs 120 pills this month by her PCP. Hydrocodone-Acetaminophen 10/325mg    PCP Dr Guadalupe Beatty with [facility]. Pt would like to know if Dr Montoya can reach out to PCP to inform of severity of patients arm condition in addition to her other pain management concerns.    Nervous she will run out of medication because her PCP is going on medical leave.    Informed patient that Dr Montoya may not be able to impact this decision.    PCP#[phone]    Please advise  CB# [phone]

## 2024-02-21 ENCOUNTER — NURSE TRIAGE (OUTPATIENT)
Age: 64
End: 2024-02-21

## 2024-02-21 PROBLEM — Z12.11 SCREENING FOR COLON CANCER: Status: RESOLVED | Noted: 2023-09-23 | Resolved: 2024-02-21

## 2024-02-21 NOTE — TELEPHONE ENCOUNTER
"Patient of Dr. Montoya. Last office visit was 2/14/24. Very upset about her pain mediation being decreased. Also upset that she had to wait an extra week to have the US that is being done today to evaluate the subcutaneous mass of right forearm. She has been \"circling the area\" and feels that it is becoming larger. It is \"going outside of the [place]\" she states.   "

## 2024-02-21 NOTE — TELEPHONE ENCOUNTER
Pt calling.  States she is upset about previous phone call and that Dr. Montoya won't call and discuss with her PCP.  Pt then stated she is receiving a call from her doctor and ended call.

## 2024-02-22 ENCOUNTER — HOSPITAL ENCOUNTER (OUTPATIENT)
Dept: CT IMAGING | Facility: HOSPITAL | Age: 64
Discharge: HOME | End: 2024-02-22
Attending: SURGERY
Payer: COMMERCIAL

## 2024-02-22 ENCOUNTER — HOSPITAL ENCOUNTER (OUTPATIENT)
Dept: ULTRASOUND IMAGING | Facility: HOSPITAL | Age: 64
Discharge: HOME | End: 2024-02-22
Attending: SURGERY
Payer: COMMERCIAL

## 2024-02-22 ENCOUNTER — PREP FOR PROCEDURE (OUTPATIENT)
Age: 64
End: 2024-02-22

## 2024-02-22 DIAGNOSIS — T79.A11A TRAUMATIC COMPARTMENT SYNDROME OF RIGHT UPPER EXTREMITY, INITIAL ENCOUNTER (HCC): ICD-10-CM

## 2024-02-22 DIAGNOSIS — M54.12 CERVICAL RADICULOPATHY: Primary | ICD-10-CM

## 2024-02-22 PROCEDURE — 73201 CT UPPER EXTREMITY W/DYE: CPT

## 2024-02-22 PROCEDURE — G1004 CDSM NDSC: HCPCS

## 2024-02-22 PROCEDURE — 76882 US LMTD JT/FCL EVL NVASC XTR: CPT

## 2024-02-22 RX ADMIN — IOHEXOL 100 ML: 350 INJECTION, SOLUTION INTRAVENOUS at 10:29

## 2024-02-26 ENCOUNTER — TELEPHONE (OUTPATIENT)
Dept: SURGERY | Facility: CLINIC | Age: 64
End: 2024-02-26

## 2024-02-26 ENCOUNTER — TELEPHONE (OUTPATIENT)
Age: 64
End: 2024-02-26

## 2024-02-26 NOTE — TELEPHONE ENCOUNTER
----- Message from Valdo Montoya DO sent at 2/26/2024  9:50 AM EST -----  Hey lee,    So the CT scan just came back and was read as normal.  Please let the patient know this. The ultrasound is pending still. Sorry the result for the CT came back almost right after I sent the messages to you guys.    Thanks.

## 2024-02-26 NOTE — TELEPHONE ENCOUNTER
Spoke with Elmo, patients caregiver.   Patient would like to discuss results of US and CT scan from 2/22/24 once reviewed by Dr Montoya.    States mass in arm has gotten larger, some swelling in hand and knuckles. Would like to know if she should be seen in office or guidance on how to proceed once results are reviewed.    Please advise  CB#[phone]

## 2024-02-27 ENCOUNTER — TELEPHONE (OUTPATIENT)
Age: 64
End: 2024-02-27

## 2024-02-27 NOTE — TELEPHONE ENCOUNTER
Sahra from Dr Butcher office call requesting we follow up with patient regarding the referral that Dr Montoya send for home physical therapy [facility], no one contact the patient in regards to this referral, Please call patient at [phone].

## 2024-03-04 NOTE — DISCHARGE INSTRUCTIONS
Hermelindo Garza III presents for post-operative evaluation of   Encounter Diagnoses   Name Primary?    Follow-up examination after orthopedic surgery Yes    Partial tear of radial collateral ligament of elbow     Left lateral epicondylitis    The patient is now 12weeks 6 day s/p 12.5.23 Left elbow RCL repair and ECRB debridement with tendon repair.     Patient was discharged to supervised home exercise program 01/24/2024.  He has been progressing well with minimal soreness over the lateral elbow.  Patient states he is ready to get back to the gym    Vitals:    03/04/24 0932   PainSc: 0-No pain   PainLoc: Elbow         PE:    AA&O x 4.  NAD  HEENT:  NCAT, sclera nonicteric  Lungs:  Respirations are equal and unlabored.  CV:  2+ bilateral upper and lower extremity pulses.  MSK: The incision is healed  Nontender to palpation left elbow. Full wrist and finger motion.  Neurovascularly intact and has 5/5 thenar and intrinsic musculature strength.      Diagnostic studies and other clinical records review:  No new imaging today.    A/P: Status post above, doing well  1) Continue with supervised HEP, can begin light progressionweight training program as tolerated.  2) F/U prn  3) Call with any questions/concerns in the interim        Christen Marshall MD    Please be aware that this note has been generated with the assistance of MModal voice-to-text.  Please excuse any spelling or grammatical errors.     Rest, ice, elevation. Take antibiotic as directed for the full duration. Continue OTC medications/home prescriptions for pain. Zofran as needed for nausea. Follow up with PCP in 2-3 days for recheck or return to ER as needed.

## 2024-03-05 ENCOUNTER — TELEPHONE (OUTPATIENT)
Dept: SURGERY | Facility: CLINIC | Age: 64
End: 2024-03-05

## 2024-03-05 ENCOUNTER — TELEPHONE (OUTPATIENT)
Age: 64
End: 2024-03-05

## 2024-03-05 ENCOUNTER — TELEPHONE (OUTPATIENT)
Dept: PAIN MEDICINE | Facility: CLINIC | Age: 64
End: 2024-03-05

## 2024-03-05 NOTE — TELEPHONE ENCOUNTER
Spoke with patient. Told her availability on the 7th is taken. She will need to keep the date she is scheduled for.

## 2024-03-05 NOTE — TELEPHONE ENCOUNTER
Caller: Jennifer    Doctor: Rosi    Reason for call: Pt states she was told there was availability for the 3/7 but was scheduled for the 19th and wanted to know if she can be scheduled for the 7th instead     Please advise    Call back#: [phone]

## 2024-03-05 NOTE — TELEPHONE ENCOUNTER
Pt called to see if the US results came in and to let us know that the lump is getting larger. Warm transfer to Shellie in Dr. Montoya's office

## 2024-03-05 NOTE — TELEPHONE ENCOUNTER
Patient called for results of her recent US, 2/22/2024.  She did state the lipoma is considerably larger.    Jennifer wanted to leave Angle's, Physical Therapist, number in case you needed information from her re: her wrist.   Ph #[phone]

## 2024-03-06 ENCOUNTER — TELEPHONE (OUTPATIENT)
Age: 64
End: 2024-03-06

## 2024-03-06 NOTE — TELEPHONE ENCOUNTER
Informed patient of result.  She has a few things going on right now so she will call in the future if she decides she would like it removed.

## 2024-03-08 ENCOUNTER — TELEPHONE (OUTPATIENT)
Dept: SURGERY | Facility: CLINIC | Age: 64
End: 2024-03-08

## 2024-03-08 NOTE — TELEPHONE ENCOUNTER
Moris,  with [facility] attempted to reach out to patient for evaluation. Patient did not answer phone. Will re attempt to reach her again next week.    Moris CB# [phone]

## 2024-03-12 ENCOUNTER — APPOINTMENT (EMERGENCY)
Dept: RADIOLOGY | Facility: HOSPITAL | Age: 64
End: 2024-03-12
Payer: COMMERCIAL

## 2024-03-12 ENCOUNTER — HOSPITAL ENCOUNTER (EMERGENCY)
Facility: HOSPITAL | Age: 64
Discharge: HOME | End: 2024-03-12
Attending: EMERGENCY MEDICINE
Payer: COMMERCIAL

## 2024-03-12 ENCOUNTER — TELEPHONE (OUTPATIENT)
Age: 64
End: 2024-03-12

## 2024-03-12 VITALS
RESPIRATION RATE: 16 BRPM | DIASTOLIC BLOOD PRESSURE: 55 MMHG | SYSTOLIC BLOOD PRESSURE: 99 MMHG | TEMPERATURE: 97.2 F | HEART RATE: 74 BPM | OXYGEN SATURATION: 92 %

## 2024-03-12 DIAGNOSIS — J40 BRONCHITIS: Primary | ICD-10-CM

## 2024-03-12 DIAGNOSIS — Z72.0 TOBACCO ABUSE: ICD-10-CM

## 2024-03-12 LAB
ALBUMIN SERPL BCP-MCNC: 4.1 G/DL (ref 3.5–5)
ALP SERPL-CCNC: 71 U/L (ref 34–104)
ALT SERPL W P-5'-P-CCNC: 13 U/L (ref 7–52)
ANION GAP SERPL CALCULATED.3IONS-SCNC: 9 MMOL/L (ref 4–13)
AST SERPL W P-5'-P-CCNC: 14 U/L (ref 13–39)
ATRIAL RATE: 80 BPM
BASOPHILS # BLD AUTO: 0.04 THOUSANDS/ÂΜL (ref 0–0.1)
BASOPHILS NFR BLD AUTO: 0 % (ref 0–1)
BILIRUB SERPL-MCNC: 0.22 MG/DL (ref 0.2–1)
BUN SERPL-MCNC: 15 MG/DL (ref 5–25)
CALCIUM SERPL-MCNC: 8.8 MG/DL (ref 8.4–10.2)
CARDIAC TROPONIN I PNL SERPL HS: <2 NG/L
CHLORIDE SERPL-SCNC: 106 MMOL/L (ref 96–108)
CO2 SERPL-SCNC: 25 MMOL/L (ref 21–32)
CREAT SERPL-MCNC: 0.81 MG/DL (ref 0.6–1.3)
EOSINOPHIL # BLD AUTO: 0.1 THOUSAND/ÂΜL (ref 0–0.61)
EOSINOPHIL NFR BLD AUTO: 1 % (ref 0–6)
ERYTHROCYTE [DISTWIDTH] IN BLOOD BY AUTOMATED COUNT: 13.4 % (ref 11.6–15.1)
GFR SERPL CREATININE-BSD FRML MDRD: 78 ML/MIN/1.73SQ M
GLUCOSE SERPL-MCNC: 75 MG/DL (ref 65–140)
HCT VFR BLD AUTO: 39.3 % (ref 34.8–46.1)
HGB BLD-MCNC: 12.6 G/DL (ref 11.5–15.4)
IMM GRANULOCYTES # BLD AUTO: 0.03 THOUSAND/UL (ref 0–0.2)
IMM GRANULOCYTES NFR BLD AUTO: 0 % (ref 0–2)
LYMPHOCYTES # BLD AUTO: 3.83 THOUSANDS/ÂΜL (ref 0.6–4.47)
LYMPHOCYTES NFR BLD AUTO: 42 % (ref 14–44)
MCH RBC QN AUTO: 30.6 PG (ref 26.8–34.3)
MCHC RBC AUTO-ENTMCNC: 32.1 G/DL (ref 31.4–37.4)
MCV RBC AUTO: 95 FL (ref 82–98)
MONOCYTES # BLD AUTO: 0.56 THOUSAND/ÂΜL (ref 0.17–1.22)
MONOCYTES NFR BLD AUTO: 6 % (ref 4–12)
NEUTROPHILS # BLD AUTO: 4.47 THOUSANDS/ÂΜL (ref 1.85–7.62)
NEUTS SEG NFR BLD AUTO: 51 % (ref 43–75)
NRBC BLD AUTO-RTO: 0 /100 WBCS
P AXIS: 76 DEGREES
PLATELET # BLD AUTO: 198 THOUSANDS/UL (ref 149–390)
PMV BLD AUTO: 10.2 FL (ref 8.9–12.7)
POTASSIUM SERPL-SCNC: 3.4 MMOL/L (ref 3.5–5.3)
PR INTERVAL: 152 MS
PROT SERPL-MCNC: 6.6 G/DL (ref 6.4–8.4)
QRS AXIS: 75 DEGREES
QRSD INTERVAL: 68 MS
QT INTERVAL: 374 MS
QTC INTERVAL: 431 MS
RBC # BLD AUTO: 4.12 MILLION/UL (ref 3.81–5.12)
SODIUM SERPL-SCNC: 140 MMOL/L (ref 135–147)
T WAVE AXIS: 58 DEGREES
VENTRICULAR RATE: 80 BPM
WBC # BLD AUTO: 9.03 THOUSAND/UL (ref 4.31–10.16)

## 2024-03-12 PROCEDURE — 99285 EMERGENCY DEPT VISIT HI MDM: CPT

## 2024-03-12 PROCEDURE — 84484 ASSAY OF TROPONIN QUANT: CPT

## 2024-03-12 PROCEDURE — 80053 COMPREHEN METABOLIC PANEL: CPT

## 2024-03-12 PROCEDURE — 94640 AIRWAY INHALATION TREATMENT: CPT

## 2024-03-12 PROCEDURE — 96374 THER/PROPH/DIAG INJ IV PUSH: CPT

## 2024-03-12 PROCEDURE — 36415 COLL VENOUS BLD VENIPUNCTURE: CPT

## 2024-03-12 PROCEDURE — 85025 COMPLETE CBC W/AUTO DIFF WBC: CPT

## 2024-03-12 PROCEDURE — 71045 X-RAY EXAM CHEST 1 VIEW: CPT

## 2024-03-12 PROCEDURE — 93005 ELECTROCARDIOGRAM TRACING: CPT

## 2024-03-12 PROCEDURE — 93010 ELECTROCARDIOGRAM REPORT: CPT | Performed by: INTERNAL MEDICINE

## 2024-03-12 PROCEDURE — 99284 EMERGENCY DEPT VISIT MOD MDM: CPT | Performed by: EMERGENCY MEDICINE

## 2024-03-12 RX ORDER — DULOXETIN HYDROCHLORIDE 60 MG/1
60 CAPSULE, DELAYED RELEASE ORAL DAILY
COMMUNITY
Start: 2023-12-27

## 2024-03-12 RX ORDER — NICOTINE 21 MG/24HR
1 PATCH, TRANSDERMAL 24 HOURS TRANSDERMAL EVERY 24 HOURS
COMMUNITY
Start: 2023-12-21

## 2024-03-12 RX ORDER — PREDNISONE 20 MG/1
TABLET ORAL
COMMUNITY
Start: 2023-12-20

## 2024-03-12 RX ORDER — ONDANSETRON 4 MG/1
TABLET, FILM COATED ORAL
COMMUNITY
Start: 2024-02-01

## 2024-03-12 RX ORDER — PREDNISONE 20 MG/1
20 TABLET ORAL DAILY
Qty: 15 TABLET | Refills: 0 | Status: SHIPPED | OUTPATIENT
Start: 2024-03-12

## 2024-03-12 RX ORDER — DULOXETIN HYDROCHLORIDE 60 MG/1
CAPSULE, DELAYED RELEASE ORAL
COMMUNITY
Start: 2024-02-28

## 2024-03-12 RX ORDER — METHYLPREDNISOLONE SODIUM SUCCINATE 125 MG/2ML
100 INJECTION, POWDER, LYOPHILIZED, FOR SOLUTION INTRAMUSCULAR; INTRAVENOUS ONCE
Status: COMPLETED | OUTPATIENT
Start: 2024-03-12 | End: 2024-03-12

## 2024-03-12 RX ORDER — TRAZODONE HYDROCHLORIDE 100 MG/1
100 TABLET ORAL
COMMUNITY
Start: 2023-12-27

## 2024-03-12 RX ORDER — VARENICLINE TARTRATE 0.5 (11)-1
KIT ORAL
COMMUNITY
Start: 2023-12-21

## 2024-03-12 RX ORDER — UMECLIDINIUM BROMIDE AND VILANTEROL TRIFENATATE 62.5; 25 UG/1; UG/1
POWDER RESPIRATORY (INHALATION)
COMMUNITY
Start: 2024-02-19

## 2024-03-12 RX ORDER — BENZONATATE 100 MG/1
100 CAPSULE ORAL 3 TIMES DAILY PRN
Qty: 20 CAPSULE | Refills: 0 | Status: SHIPPED | OUTPATIENT
Start: 2024-03-12

## 2024-03-12 RX ORDER — DOXYCYCLINE HYCLATE 100 MG/1
100 CAPSULE ORAL 2 TIMES DAILY
Qty: 14 CAPSULE | Refills: 0 | Status: SHIPPED | OUTPATIENT
Start: 2024-03-12 | End: 2024-03-19

## 2024-03-12 RX ADMIN — METHYLPREDNISOLONE SODIUM SUCCINATE 100 MG: 125 INJECTION, POWDER, FOR SOLUTION INTRAMUSCULAR; INTRAVENOUS at 10:10

## 2024-03-12 RX ADMIN — ALBUTEROL SULFATE 5 MG: 2.5 SOLUTION RESPIRATORY (INHALATION) at 10:13

## 2024-03-12 RX ADMIN — IPRATROPIUM BROMIDE 0.5 MG: 0.5 SOLUTION RESPIRATORY (INHALATION) at 10:13

## 2024-03-12 NOTE — ED PROVIDER NOTES
"History  Chief Complaint   Patient presents with    Shortness of Breath     Patient reports woke up this morning with SOB that is worse than normal. Reports not feeling well for the past 3 days.      63-year-old female complaining of cough and shortness of breath.  Patient states began this morning upon awakening.  Patient took her nebulizer treatment with no improvement.  Because she had sputum stuck in her \"windpipe\" that would not come out.  This caused her breathing to worsen.  Denies any recent fevers or chills.  Patient states he normally smokes approximate 1 pack of cigarettes daily but has recently increase that to 2 packs of cigarettes daily due to increased stress.  No recent travel.  No recent change in medications.  Denies fevers or chills.  Amatory without difficulty.      Shortness of Breath  Associated symptoms: neck pain    Associated symptoms: no abdominal pain, no chest pain, no cough, no fever, no headaches, no sore throat, no vomiting and no wheezing        Prior to Admission Medications   Prescriptions Last Dose Informant Patient Reported? Taking?   Alcohol Swabs (Alcohol Pads) 70 % PADS  Care Giver, Self Yes No   Sig: USE TO TEST BLOOD GLUCOSE 2-3 TIMES DAILY   Anoro Ellipta 62.5-25 MCG/ACT inhaler   Yes No   Buprenorphine HCl (Belbuca) 300 MCG FILM  Self, Care Giver Yes No   Sig: Apply 300 mcg to cheek in the morning   DULoxetine (CYMBALTA) 30 mg delayed release capsule   Yes No   Sig: Take 60 mg by mouth daily   DULoxetine (CYMBALTA) 60 mg delayed release capsule   Yes No   Sig: Take 60 mg by mouth daily   DULoxetine (CYMBALTA) 60 mg delayed release capsule   Yes No   Galcanezumab-gnlm (Emgality) 120 MG/ML SOAJ  Self, Care Giver Yes No   Sig: Inject 120 mg under the skin every 30 (thirty) days Last inj 1/19/23   HYDROcodone-acetaminophen (NORCO)  mg per tablet   Yes No   Sig: Take 1 tablet by mouth every 6 (six) hours as needed for moderate pain   Lidocaine 4 % PTCH  Care Giver, Self No " No   Sig: Apply 1 patch topically daily   Melatonin 10 MG CAPS  Self, Care Giver Yes No   Sig: Take 1 tablet by mouth daily at bedtime as needed   OneTouch Ultra test strip  Care Giver, Self Yes No   Sig: USE TO TEST BLOOD GLUCOSE 2-3 TIMES DAILY   True Comfort Safety Lancets MISC  Care Giver, Self Yes No   Sig: USE TO TEST BLOOD GLUCOSE 2-3 TIMES DAILY   Varenicline Tartrate, Starter, 0.5 MG X 11 & 1 MG X 42 TBPK   Yes No   Sig: USE AS DIRECTED. GIVE WITH MEALS AND WITH A FULL GLASS OF WATER.   albuterol (PROVENTIL HFA,VENTOLIN HFA) 90 mcg/act inhaler   No No   Sig: Inhale 2 puffs every 6 (six) hours as needed for wheezing or shortness of breath   ammonium lactate (LAC-HYDRIN) 12 % lotion  Care Giver, Self Yes No   Sig: as needed   atorvastatin (LIPITOR) 80 mg tablet  Care Giver, Self Yes No   Sig: Take 80 mg by mouth every evening   benzonatate (TESSALON PERLES) 100 mg capsule  Care Giver, Self No No   Sig: Take 1 capsule (100 mg total) by mouth 3 (three) times a day as needed for cough   carboxymethylcellulose 0.5 % SOLN  Self, Care Giver No No   Sig: Administer 1 drop to both eyes daily as needed for dry eyes   clonazePAM (KlonoPIN) 0.5 mg tablet  Self, Care Giver Yes No   Sig: Take 0.5 mg by mouth daily at bedtime   clonazePAM (KlonoPIN) 1 mg tablet  Self, Care Giver Yes No   Sig: Take 1 mg by mouth daily in the early morning   dicyclomine (BENTYL) 20 mg tablet  Self, Care Giver Yes No   Sig: Take 20 mg by mouth every 6 (six) hours   famotidine (PEPCID) 40 MG tablet  Care Giver, Self No No   Sig: Take 1 tablet (40 mg total) by mouth daily at bedtime   fenofibrate (TRICOR) 48 mg tablet  Self, Care Giver Yes No   Sig: Take 48 mg by mouth daily   fluticasone (FLONASE) 50 mcg/act nasal spray  Care Giver, Self Yes No   Si sprays into each nostril daily   fluticasone-umeclidinium-vilanterol (Trelegy Ellipta) 200-62.5-25 mcg/actuation AEPB inhaler   No No   Sig: Inhale 1 puff daily Rinse mouth after use.    furosemide (LASIX) 20 mg tablet  Self, Care Giver Yes No   Sig: Take 20 mg by mouth as needed Taking as needed   lamoTRIgine (LaMICtal) 100 mg tablet  Care Giver, Self Yes No   Sig: Take 100 mg by mouth 2 (two) times a day   latanoprost (XALATAN) 0.005 % ophthalmic solution  Care Giver, Self Yes No   Sig: instill 1 drop into both eyes at bedtime   levothyroxine 137 mcg tablet  Self, Care Giver Yes No   Sig: Take 137 mcg by mouth daily   magnesium 30 MG tablet  Self, Care Giver Yes No   Sig: Take 500 mg by mouth 2 (two) times a day 23 right now not taking   methocarbamol (ROBAXIN) 500 mg tablet  Care Giver, Self No No   Sig: Take 0.5 tablets (250 mg total) by mouth every 6 (six) hours for 14 days   nicotine (NICODERM CQ) 21 mg/24 hr TD 24 hr patch   Yes No   Sig: Place 1 patch on the skin every 24 hours   nicotine polacrilex (COMMIT) 4 MG lozenge   Yes No   Sig: Apply 1 lozenge to cheek every 2 (two) hours   omeprazole (PriLOSEC) 40 MG capsule   No No   Sig: Take 1 capsule (40 mg total) by mouth 2 (two) times a day   ondansetron (ZOFRAN) 4 mg tablet   Yes No   ondansetron (ZOFRAN) 8 mg tablet  Care Giver, Self No No   Sig: Take 1 tablet (8 mg total) by mouth every 8 (eight) hours as needed for nausea or vomiting   polyethylene glycol (GLYCOLAX) 17 GM/SCOOP powder   No No   Sig: Take 17 g daily for constipation.   predniSONE 20 mg tablet   Yes No   pregabalin (LYRICA) 100 mg capsule  Self, Care Giver Yes No   Sig: Take 100 mg by mouth 3 (three) times a day Morning-lunch-dinner   pregabalin (LYRICA) 150 mg capsule  Self, Care Giver Yes No   Sig: take 1 capsule by mouth NIGHTLY   senna (SENOKOT) 8.6 mg   No No   Sig: Take 1 PO HS every other day.   tiZANidine (ZANAFLEX) 4 mg tablet  Care Giver, Self Yes No   Si tablet   topiramate (TOPAMAX) 200 MG tablet  Care Giver, Self Yes No   Si mg 2 (two) times a day   traZODone (DESYREL) 100 mg tablet   Yes No   Sig: Take 100 mg by mouth daily at bedtime    traZODone (DESYREL) 100 mg tablet   Yes No   Sig: Take 100 mg by mouth      Facility-Administered Medications: None       Past Medical History:   Diagnosis Date    Ambulates with cane     Anxiety     Arthritis     Asthma     Bipolar 1 disorder ([facility])     Brain aneurysm     Chronic pain disorder     spinal stenosis    Concussion syndrome     neurological rx and balance rx    COPD (chronic obstructive pulmonary disease) ([facility])     Depression     Diabetes mellitus ([facility])     controlled w/ diet    Disease of thyroid gland     nodules     Family history of colon cancer     father    Fibromyalgia, primary     GERD (gastroesophageal reflux disease)     History of colon polyps     Hyperlipidemia     Hypertension     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     Irritable bowel syndrome     Lumbar degenerative disc disease 02/16/2022    Migraine     Neuropathy     bilateral feet and hands    Peripheral neuropathy     Psychiatric disorder     PTSD (post-traumatic stress disorder)     Shortness of breath     Sleep apnea     had 3 studies & last one negative    Stroke ([facility])     2012 no deficeits    TIA (transient ischemic attack)     Wears dentures     Wears glasses        Past Surgical History:   Procedure Laterality Date    ABDOMINAL SURGERY      laproscopic/ endometriosis    BRAIN SURGERY      aneurysm/ coiling procedure    CARPAL TUNNEL RELEASE      CHOLECYSTECTOMY      COLONOSCOPY      DENTAL SURGERY      EPIDURAL BLOCK INJECTION Right 03/03/2022    Procedure: C7-T1 interlaminar epidural steroid injection;  Surgeon: Johnson Aranda MD;  [facility]: [facility];  Service: Pain Management     EPIDURAL BLOCK INJECTION N/A 04/21/2022    Procedure: C6-C7 BLOCK / INJECTION EPIDURAL STEROID CERVICAL;  Surgeon: Johnson Aranda MD;  [facility];  Service: Pain Management     EPIDURAL BLOCK INJECTION Left 06/21/2022    Procedure: BLOCK / INJECTION EPIDURAL STEROID LUMBAR  left L3-4 TFESI;  Surgeon: Johnson GARCIA  MD Rosi;  Location: [facility];  Service: Pain Management     EYE SURGERY      cataract    FASCIOTOMY Right 10/27/2023    Procedure: FASCIOTOMY OF RIGHT UPPER EXTREMITY; POSSIBLE VAC DRESSING APPLICATION;  Surgeon: Bruno Blevins MD;  [facility];  Service: General    FL GUIDED NEEDLE PLAC BX/ASP/INJ  03/03/2022    FL GUIDED NEEDLE PLAC BX/ASP/INJ  11/17/2022    HYSTERECTOMY      NERVE BLOCK Left 2/20/2024    Procedure: BLOCK MEDIAL BRANCH  left C3-4 and C4-5 MBB #1;  Surgeon: Johnson Aranda MD;  [facility];  Service: Pain Management     NV ESOPHAGOGASTRODUODENOSCOPY TRANSORAL DIAGNOSTIC N/A 02/08/2018    Procedure: EGD AND COLONOSCOPY;  Surgeon: Caleb Pete MD;  Location:  GI LAB;  Service: Gastroenterology    NV PRQ IMPLTJ NSTIM ELECTRODE ARRAY EPIDURAL N/A 11/17/2022    Procedure: NEVRO SCS TRIAL;  Surgeon: Johnson Aranda MD;  [facility];  Service: Pain Management     NV PRQ IMPLTJ NSTIM ELECTRODE ARRAY EPIDURAL N/A 2/1/2023    Procedure: Insertion percutaneous thoracic spinal cord stimulator with left buttock implantable pulse generator;  Surgeon: Craig Robert Goldberg, MD;  [facility];  Service: Neurosurgery    SPLIT THICKNESS SKIN GRAFT Right 11/7/2023    Procedure: SKIN GRAFT SPLIT THICKNESS (STSG)  EXTREMITY;  Surgeon: Samm Sims MD;  [facility];  Service: General    THYROID SURGERY      TONSILLECTOMY      US GUIDED THYROID BIOPSY  11/30/2016    US GUIDED THYROID BIOPSY  3/21/2018    VAC DRESSING APPLICATION Right 10/29/2023    Procedure: CHANGE DRESSING/VAC RIGHT UPPER EXTREMITY; WASHOUT; PARTIAL CLOSURE;  Surgeon: Irina Day DO;  [facility];  Service: General    WOUND DEBRIDEMENT Right 11/4/2023    Procedure: SIMPLE CLOSURE 3.5CM, OASIS APPLICATION 15X7CM, WOUND VAC APPLICATION;  Surgeon: Maryuri Goldstein MD;  [facility];  Service: General       Family History   Problem Relation Age of Onset     Brain cancer Mother     Alzheimer's disease Mother     Alzheimer's disease Father     Parkinsonism Father      I have reviewed and agree with the history as documented.    E-Cigarette/Vaping    E-Cigarette Use Never User      E-Cigarette/Vaping Substances    Nicotine No     THC No     CBD No     Flavoring No     Other No     Unknown No      Social History     Tobacco Use    Smoking status: Every Day     Current packs/day: 2.00     Types: Cigarettes    Smokeless tobacco: Never   Vaping Use    Vaping status: Never Used   Substance Use Topics    Alcohol use: Not Currently    Drug use: Never     Comment: prescribed Belbuca       Review of Systems   Constitutional:  Negative for appetite change, fatigue, fever and unexpected weight change.   HENT:  Negative for congestion, rhinorrhea and sore throat.    Eyes:  Negative for visual disturbance.   Respiratory:  Positive for shortness of breath. Negative for cough, chest tightness and wheezing.    Cardiovascular:  Negative for chest pain, palpitations and leg swelling.   Gastrointestinal:  Negative for abdominal pain, constipation, diarrhea, nausea and vomiting.   Genitourinary:  Negative for difficulty urinating, dysuria, frequency, menstrual problem, pelvic pain, vaginal bleeding and vaginal discharge.   Musculoskeletal:  Positive for back pain and neck pain.        Right arm pain  Chronic back and neck pain     Neurological:  Negative for dizziness, syncope, light-headedness and headaches.   Psychiatric/Behavioral:  Negative for agitation and sleep disturbance.        Physical Exam  Physical Exam  Vitals and nursing note reviewed.   Constitutional:       General: She is not in acute distress.     Appearance: Normal appearance. She is well-developed and normal weight. She is not ill-appearing, toxic-appearing or diaphoretic.   HENT:      Head: Normocephalic and atraumatic.      Nose: Nose normal.      Mouth/Throat:      Mouth: Mucous membranes are moist.      Pharynx:  Oropharynx is clear.   Eyes:      General: No scleral icterus.     Extraocular Movements: Extraocular movements intact.      Conjunctiva/sclera: Conjunctivae normal.   Cardiovascular:      Rate and Rhythm: Normal rate and regular rhythm.      Pulses: Normal pulses.      Heart sounds: Normal heart sounds. No murmur heard.     No friction rub. No gallop.   Pulmonary:      Effort: Pulmonary effort is normal. No respiratory distress.      Breath sounds: Examination of the right-upper field reveals wheezing. Examination of the left-upper field reveals wheezing. Examination of the right-middle field reveals wheezing. Examination of the left-middle field reveals wheezing. Examination of the right-lower field reveals wheezing. Examination of the left-lower field reveals wheezing. Wheezing present. No decreased breath sounds, rhonchi or rales.      Comments: Mild end expiratory wheeze present  Abdominal:      Palpations: Abdomen is soft. There is no mass.      Tenderness: There is no abdominal tenderness. There is no right CVA tenderness, left CVA tenderness, guarding or rebound.      Hernia: No hernia is present.   Musculoskeletal:         General: Normal range of motion.      Cervical back: Normal range of motion and neck supple. No rigidity or tenderness.      Right lower leg: No tenderness. No edema.      Left lower leg: No tenderness. No edema.      Comments: Postsurgical changes right forearm secondary to previous compartment syndrome   Lymphadenopathy:      Cervical: No cervical adenopathy.   Skin:     General: Skin is warm and dry.      Capillary Refill: Capillary refill takes less than 2 seconds.      Coloration: Skin is not jaundiced or pale.      Findings: No lesion or rash.   Neurological:      General: No focal deficit present.      Mental Status: She is alert and oriented to person, place, and time.   Psychiatric:         Mood and Affect: Mood normal.         Behavior: Behavior normal.         Vital Signs  ED  Triage Vitals   Temperature Pulse Respirations Blood Pressure SpO2   03/12/24 0928 03/12/24 0930 03/12/24 0930 03/12/24 0930 03/12/24 0930   (!) 97.2 °F (36.2 °C) 78 20 120/82 99 %      Temp Source Heart Rate Source Patient Position - Orthostatic VS BP Location FiO2 (%)   03/12/24 0928 03/12/24 0930 03/12/24 0930 03/12/24 0930 --   Temporal Monitor Sitting Left arm       Pain Score       --                  Vitals:    03/12/24 0930 03/12/24 1000 03/12/24 1030 03/12/24 1130   BP: 120/82 129/74 129/67 99/55   Pulse: 78 74 74 74   Patient Position - Orthostatic VS: Sitting Sitting           Visual Acuity      ED Medications  Medications   albuterol inhalation solution 5 mg (5 mg Nebulization Given 3/12/24 1013)   ipratropium (ATROVENT) 0.02 % inhalation solution 0.5 mg (0.5 mg Nebulization Given 3/12/24 1013)   methylPREDNISolone sodium succinate (Solu-MEDROL) injection 100 mg (100 mg Intravenous Given 3/12/24 1010)       Diagnostic Studies  Results Reviewed       Procedure Component Value Units Date/Time    HS Troponin 0hr (reflex protocol) [[id number]]  (Normal) Collected: 03/12/24 0943    Lab Status: Final result Specimen: Blood from Arm, Left Updated: 03/12/24 1013     hs TnI 0hr <2 ng/L     Comprehensive metabolic panel [[id number]]  (Abnormal) Collected: 03/12/24 0943    Lab Status: Final result Specimen: Blood from Arm, Left Updated: 03/12/24 1006     Sodium 140 mmol/L      Potassium 3.4 mmol/L      Chloride 106 mmol/L      CO2 25 mmol/L      ANION GAP 9 mmol/L      BUN 15 mg/dL      Creatinine 0.81 mg/dL      Glucose 75 mg/dL      Calcium 8.8 mg/dL      AST 14 U/L      ALT 13 U/L      Alkaline Phosphatase 71 U/L      Total Protein 6.6 g/dL      Albumin 4.1 g/dL      Total Bilirubin 0.22 mg/dL      eGFR 78 ml/min/1.73sq m     Narrative:      National Kid[employer] Foundation guidelines for Chronic Kidney Disease (CKD):     Stage 1 with normal or high GFR (GFR > 90 mL/min/1.73 square meters)    Stage 2 Mild CKD  (GFR = 60-89 mL/min/1.73 square meters)    Stage 3A Moderate CKD (GFR = 45-59 mL/min/1.73 square meters)    Stage 3B Moderate CKD (GFR = 30-44 mL/min/1.73 square meters)    Stage 4 Severe CKD (GFR = 15-29 mL/min/1.73 square meters)    Stage 5 End Stage CKD (GFR <15 mL/min/1.73 square meters)  Note: GFR calculation is accurate only with a steady state creatinine    CBC and differential [[id number]] Collected: 03/12/24 0943    Lab Status: Final result Specimen: Blood from Arm, Left Updated: 03/12/24 0949     WBC 9.03 Thousand/uL      RBC 4.12 Million/uL      Hemoglobin 12.6 g/dL      Hematocrit 39.3 %      MCV 95 fL      MCH 30.6 pg      MCHC 32.1 g/dL      RDW 13.4 %      MPV 10.2 fL      Platelets 198 Thousands/uL      nRBC 0 /100 WBCs      Neutrophils Relative 51 %      Immature Grans % 0 %      Lymphocytes Relative 42 %      Monocytes Relative 6 %      Eosinophils Relative 1 %      Basophils Relative 0 %      Neutrophils Absolute 4.47 Thousands/µL      Absolute Immature Grans 0.03 Thousand/uL      Absolute Lymphocytes 3.83 Thousands/µL      Absolute Monocytes 0.56 Thousand/µL      Eosinophils Absolute 0.10 Thousand/µL      Basophils Absolute 0.04 Thousands/µL                    XR chest 1 view portable   Final Result by Precious Mathis MD (03/12 1643)      Question mild pulmonary venous congestion.            Workstation performed: [id number]                Wet read: No acute disease.  No infiltrate or pneumothorax    Procedures  ECG 12 Lead Documentation Only    Date/Time: 3/12/2024 10:31 AM    Performed by: Kevin Escoto DO  Authorized by: Kevin Escoto DO    Indications / Diagnosis:  SOB  ECG reviewed by me, the ED Provider: yes    Patient location:  ED  Previous ECG:     Comparison to cardiac monitor: Yes    Rate:     ECG rate:  80    ECG rate assessment: normal    Rhythm:     Rhythm: sinus rhythm    Ectopy:     Ectopy: none    QRS:     QRS axis:  Normal    QRS intervals:   Normal  Conduction:     Conduction: normal    ST segments:     ST segments:  Normal  T waves:     T waves: normal    Comments:      Low voltage           ED Course         Smoking cessation discussion with patient:  3-5 minute conversation with the patient regarding smoking cessation. I explained at length risks of long term smoking, associated health conditions, benefits of quitting, as well as quitting methods. Patient receptive and understands                      SBIRT 20yo+      Flowsheet Row Most Recent Value   Initial Alcohol Screen: US AUDIT-C     1. How often do you have a drink containing alcohol? 0 Filed at: 03/12/2024 0928   2. How many drinks containing alcohol do you have on a typical day you are drinking?  0 Filed at: 03/12/2024 0928   3a. Male UNDER 65: How often do you have five or more drinks on one occasion? 0 Filed at: 03/12/2024 0928   3b. FEMALE Any Age, or MALE 65+: How often do you have 4 or more drinks on one occassion? 0 Filed at: 03/12/2024 0928   Audit-C Score 0 Filed at: 03/12/2024 0928   ABIOLA: How many times in the past year have you...    Used an illegal drug or used a prescription medication for non-medical reasons? Never Filed at: 03/12/2024 0928                      Medical Decision Making  63-year-old female complaining of shortness of breath, wheezing.    Problems Addressed:  Bronchitis: acute illness or injury  Tobacco abuse: chronic illness or injury    Amount and/or Complexity of Data Reviewed  External Data Reviewed: labs, radiology and notes.  Labs: ordered. Decision-making details documented in ED Course.  Radiology: ordered and independent interpretation performed. Decision-making details documented in ED Course.  ECG/medicine tests: ordered and independent interpretation performed. Decision-making details documented in ED Course.    Risk  OTC drugs.  Prescription drug management.  Decision regarding hospitalization.  Risk Details: Patient feels better.  Ambulatory upon  discharge without difficulty.  Breathing has improved.  Treat with steroids, antibiotics, cough medication.  Follow-up with primary care physician.  Smoking cessation reviewed.  Patient not tachycardic tachypneic or hypoxic.  No history of DVT or PE.  No evidence for sepsis, severe sepsis or septic shock.             Disposition  Final diagnoses:   Bronchitis   Tobacco abuse     Time reflects when diagnosis was documented in both MDM as applicable and the Disposition within this note       Time User Action Codes Description Comment    3/12/2024 11:53 AM Kevin Escoto Add [J40] Bronchitis     3/12/2024 11:54 AM Kevin Escoto Add [Z72.0] Tobacco abuse           ED Disposition       ED Disposition   Discharge    Condition   Stable    Date/Time   Tue Mar 12, 2024 1153    Comment   Jennifer Woodall discharge to home/self care.                   Follow-up Information       Follow up With Specialties Details Why Contact Info    Elaine Talamantes MD Family Medicine Schedule an appointment as soon as possible for a visit   [address] [place] [id number] [phone]              Discharge Medication List as of 3/12/2024 11:54 AM        START taking these medications    Details   !! benzonatate (TESSALON PERLES) 100 mg capsule Take 1 capsule (100 mg total) by mouth 3 (three) times a day as needed for cough, Starting Tue 3/12/2024, Normal      doxycycline hyclate (VIBRAMYCIN) 100 mg capsule Take 1 capsule (100 mg total) by mouth 2 (two) times a day for 7 days, Starting Tue 3/12/2024, Until Tue 3/19/2024, Normal      !! predniSONE 20 mg tablet Take 1 tablet (20 mg total) by mouth daily, Starting Tue 3/12/2024, Normal       !! - Potential duplicate medications found. Please discuss with provider.        CONTINUE these medications which have NOT CHANGED    Details   albuterol (PROVENTIL HFA,VENTOLIN HFA) 90 mcg/act inhaler Inhale 2 puffs every 6 (six) hours as needed for wheezing or shortness of  breath, Starting Thu 12/21/2023, Normal      Alcohol Swabs (Alcohol Pads) 70 % PADS USE TO TEST BLOOD GLUCOSE 2-3 TIMES DAILY, Historical Med      ammonium lactate (LAC-HYDRIN) 12 % lotion as needed, Historical Med      Anoro Ellipta 62.5-25 MCG/ACT inhaler Historical Med      atorvastatin (LIPITOR) 80 mg tablet Take 80 mg by mouth every evening, Starting Fri 10/14/2022, Historical Med      !! benzonatate (TESSALON PERLES) 100 mg capsule Take 1 capsule (100 mg total) by mouth 3 (three) times a day as needed for cough, Starting Tue 5/2/2023, Normal      Buprenorphine HCl (Belbuca) 300 MCG FILM Apply 300 mcg to cheek in the morning, Historical Med      carboxymethylcellulose 0.5 % SOLN Administer 1 drop to both eyes daily as needed for dry eyes, Starting Thu 2/7/2019, Normal      !! clonazePAM (KlonoPIN) 0.5 mg tablet Take 0.5 mg by mouth daily at bedtime, Historical Med      !! clonazePAM (KlonoPIN) 1 mg tablet Take 1 mg by mouth daily in the early morning, Starting Thu 1/25/2018, Historical Med      dicyclomine (BENTYL) 20 mg tablet Take 20 mg by mouth every 6 (six) hours, Historical Med      !! DULoxetine (CYMBALTA) 30 mg delayed release capsule Take 60 mg by mouth daily, Historical Med      !! DULoxetine (CYMBALTA) 60 mg delayed release capsule Take 60 mg by mouth daily, Starting Wed 12/27/2023, Historical Med      !! DULoxetine (CYMBALTA) 60 mg delayed release capsule Historical Med      famotidine (PEPCID) 40 MG tablet Take 1 tablet (40 mg total) by mouth daily at bedtime, Starting Thu 8/17/2023, Normal      fenofibrate (TRICOR) 48 mg tablet Take 48 mg by mouth daily, Historical Med      fluticasone (FLONASE) 50 mcg/act nasal spray 2 sprays into each nostril daily, Starting Wed 1/11/2023, Historical Med      fluticasone-umeclidinium-vilanterol (Trelegy Ellipta) 200-62.5-25 mcg/actuation AEPB inhaler Inhale 1 puff daily Rinse mouth after use., Starting Wed 2/14/2024, Until Mon 8/12/2024, Normal      furosemide  (LASIX) 20 mg tablet Take 20 mg by mouth as needed Taking as needed, Historical Med      Galcanezumab-gnlm (Emgality) 120 MG/ML SOAJ Inject 120 mg under the skin every 30 (thirty) days Last inj 1/19/23, Historical Med      HYDROcodone-acetaminophen (NORCO)  mg per tablet Take 1 tablet by mouth every 6 (six) hours as needed for moderate pain, Historical Med      lamoTRIgine (LaMICtal) 100 mg tablet Take 100 mg by mouth 2 (two) times a day, Starting Tue 1/16/2018, Until Tue 2/20/2024, Historical Med      latanoprost (XALATAN) 0.005 % ophthalmic solution instill 1 drop into both eyes at bedtime, Historical Med      levothyroxine 137 mcg tablet Take 137 mcg by mouth daily, Starting Mon 1/21/2019, Historical Med      Lidocaine 4 % PTCH Apply 1 patch topically daily, Starting Mon 11/13/2023, No Print      magnesium 30 MG tablet Take 500 mg by mouth 2 (two) times a day 1/26/23 right now not taking, Historical Med      Melatonin 10 MG CAPS Take 1 tablet by mouth daily at bedtime as needed, Historical Med      methocarbamol (ROBAXIN) 500 mg tablet Take 0.5 tablets (250 mg total) by mouth every 6 (six) hours for 14 days, Starting Mon 11/13/2023, Until Thu 11/30/2023, Normal      nicotine (NICODERM CQ) 21 mg/24 hr TD 24 hr patch Place 1 patch on the skin every 24 hours, Starting Thu 12/21/2023, Historical Med      nicotine polacrilex (COMMIT) 4 MG lozenge Apply 1 lozenge to cheek every 2 (two) hours, Starting Thu 10/19/2023, Historical Med      omeprazole (PriLOSEC) 40 MG capsule Take 1 capsule (40 mg total) by mouth 2 (two) times a day, Starting Fri 1/12/2024, Normal      !! ondansetron (ZOFRAN) 4 mg tablet Historical Med      !! ondansetron (ZOFRAN) 8 mg tablet Take 1 tablet (8 mg total) by mouth every 8 (eight) hours as needed for nausea or vomiting, Starting Wed 7/13/2022, Normal      OneTouch Ultra test strip USE TO TEST BLOOD GLUCOSE 2-3 TIMES DAILY, Historical Med      polyethylene glycol (GLYCOLAX) 17 GM/SCOOP  powder Take 17 g daily for constipation., Normal      !! predniSONE 20 mg tablet Historical Med      !! pregabalin (LYRICA) 100 mg capsule Take 100 mg by mouth 3 (three) times a day Morning-lunch-dinner, Starting Thu 10/26/2017, Historical Med      !! pregabalin (LYRICA) 150 mg capsule take 1 capsule by mouth NIGHTLY, Historical Med      senna (SENOKOT) 8.6 mg Take 1 PO HS every other day., Normal      tiZANidine (ZANAFLEX) 4 mg tablet 1 tablet, Historical Med      topiramate (TOPAMAX) 200 MG tablet 200 mg 2 (two) times a day, Starting Mon 6/5/2023, Historical Med      !! traZODone (DESYREL) 100 mg tablet Take 100 mg by mouth daily at bedtime, Historical Med      !! traZODone (DESYREL) 100 mg tablet Take 100 mg by mouth, Starting Wed 12/27/2023, Historical Med      True Comfort Safety Lancets MISC USE TO TEST BLOOD GLUCOSE 2-3 TIMES DAILY, Historical Med      Varenicline Tartrate, Starter, 0.5 MG X 11 & 1 MG X 42 TBPK USE AS DIRECTED. GIVE WITH MEALS AND WITH A FULL GLASS OF WATER., Historical Med       !! - Potential duplicate medications found. Please discuss with provider.          No discharge procedures on file.    PDMP Review         Value Time User    PDMP Reviewed  Yes 11/6/2023 11:20 AM Omid Mcgraw PA-C            ED Provider  Electronically Signed by             Kevin Escoto,   03/12/24 [id number]

## 2024-03-12 NOTE — TELEPHONE ENCOUNTER
S/w pt who was seen in ED today for Bronchiolitis. Pt was placed on Vibramycin x 7 days, steroid IV and oral prednisone x 15 days. Pt has proc sched (cervical MBB #2) on 3/19/24 RM. RN advised pt this inj would need to be r/s due to abx not being compl until 3/20/24 (pt has not p/u med from pharm as of 1511 today) and prednisone noty compl until 3/28. Pt upset that her proc would need to be r/s. RN advised pt we are unable to place an inj near the spine w/ an active infect for risk of spreading infect.     Pt has f/u appt tomorrow 3/13/24 RM.     Sched --> Pls r/s pt for cervical MBB #2 after completion of abx/steroids. Thank you!

## 2024-03-12 NOTE — TELEPHONE ENCOUNTER
Caller: Jennifer    Doctor: Rosi    Reason for call: Pt is calling because she was in the ED and was given medication that she feels can interfere with her upcoming procedure and she wants to speak to a nurse about it.     Please advise    Call back#: [phone]

## 2024-03-13 ENCOUNTER — TELEPHONE (OUTPATIENT)
Dept: PAIN MEDICINE | Facility: CLINIC | Age: 64
End: 2024-03-13

## 2024-03-13 ENCOUNTER — OFFICE VISIT (OUTPATIENT)
Dept: PAIN MEDICINE | Facility: CLINIC | Age: 64
End: 2024-03-13
Payer: COMMERCIAL

## 2024-03-13 VITALS
HEART RATE: 99 BPM | HEIGHT: 64 IN | WEIGHT: 169 LBS | BODY MASS INDEX: 28.85 KG/M2 | SYSTOLIC BLOOD PRESSURE: 116 MMHG | RESPIRATION RATE: 16 BRPM | DIASTOLIC BLOOD PRESSURE: 59 MMHG

## 2024-03-13 DIAGNOSIS — M47.812 CERVICAL SPONDYLOSIS: Primary | ICD-10-CM

## 2024-03-13 DIAGNOSIS — M54.16 LUMBAR RADICULOPATHY: ICD-10-CM

## 2024-03-13 DIAGNOSIS — M46.1 SACROILIITIS (HCC): ICD-10-CM

## 2024-03-13 DIAGNOSIS — M54.12 CERVICAL RADICULOPATHY: ICD-10-CM

## 2024-03-13 DIAGNOSIS — M51.36 LUMBAR DEGENERATIVE DISC DISEASE: ICD-10-CM

## 2024-03-13 DIAGNOSIS — Z96.89 S/P INSERTION OF SPINAL CORD STIMULATOR: ICD-10-CM

## 2024-03-13 DIAGNOSIS — M47.816 LUMBAR SPONDYLOSIS: ICD-10-CM

## 2024-03-13 DIAGNOSIS — T79.A11A TRAUMATIC COMPARTMENT SYNDROME OF RIGHT UPPER EXTREMITY, INITIAL ENCOUNTER (HCC): ICD-10-CM

## 2024-03-13 DIAGNOSIS — G89.4 CHRONIC PAIN SYNDROME: ICD-10-CM

## 2024-03-13 DIAGNOSIS — R10.9 INTRACTABLE ABDOMINAL PAIN: ICD-10-CM

## 2024-03-13 PROCEDURE — 99214 OFFICE O/P EST MOD 30 MIN: CPT | Performed by: ANESTHESIOLOGY

## 2024-03-13 NOTE — PROGRESS NOTES
Assessment:  1. Cervical spondylosis    2. Cervical radiculopathy    3. Traumatic compartment syndrome of right upper extremity, initial encounter ([facility])    4. Lumbar radiculopathy    5. Lumbar spondylosis    6. Lumbar degenerative disc disease    7. Sacroiliitis (HCC)    8. Chronic pain syndrome    9. S/P insertion of spinal cord stimulator    10. Intractable abdominal pain        Plan:  Patient is a 63-year-old female complains of neck pain, bilateral shoulder pain, low back pain, bilateral feet pain with palpation secondary to peripheral neuropathy, cervical spondylosis, lumbar degenerative disease, lumbar radiculopathy status post lumbar spinal cord stimulator presents to office for follow-up visit.  Patient was in the emergency department secondary to bronchitis and is currently on antibiotics for management.  Due to this we will have to reschedule patient's procedure left C3-C4 and C4-C5 medial branch block #2.  1.  We will reschedule left C3-C4 and C4-C5 medial branch block #2  2.  We will follow-up 1 month after injection to then schedule the radiofrequency ablation    Complete risks and benefits including bleeding, infection, tissue reaction, nerve injury and allergic reaction were discussed. The approach was demonstrated using models and literature was provided. Verbal and written consent was obtained.        History of Present Illness:  The patient is a 63 y.o. female who presents for a follow up office visit in regards to Back Pain.   The patient’s current symptoms include 8/10 constant burning, sharp, throbbing, shooting, numbness, pins-and-needles which is worse in the morning time.    Current pain medications includes: Hydrocodone 10 mg p.o. 3 times daily, Lyrica 150 mg p.o. 3 times daily.  The patient reports that this regimen is providing 30% pain relief.  The patient is reporting no side effects from this pain medication regimen.    I have personally reviewed and/or updated the patient's past  medical history, past surgical history, family history, social history, current medications, allergies, and vital signs today.         Review of Systems  Review of Systems   Respiratory:  Positive for cough, shortness of breath and wheezing.    Gastrointestinal:  Positive for nausea.   Musculoskeletal:  Positive for back pain, gait problem, joint swelling and myalgias.        Decreased ROM  Joint stiffness  Pain  Swelling     Neurological:         Memory loss   All other systems reviewed and are negative.          Past Medical History:   Diagnosis Date    Ambulates with cane     Anxiety     Arthritis     Asthma     Bipolar 1 disorder ([facility])     Brain aneurysm     Chronic pain disorder     spinal stenosis    Concussion syndrome     neurological rx and balance rx    COPD (chronic obstructive pulmonary disease) ([facility])     Depression     Diabetes mellitus ([facility])     controlled w/ diet    Disease of thyroid gland     nodules     Family history of colon cancer     father    Fibromyalgia, primary     GERD (gastroesophageal reflux disease)     History of colon polyps     Hyperlipidemia     Hypertension     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     Irritable bowel syndrome     Lumbar degenerative disc disease 02/16/2022    Migraine     Neuropathy     bilateral feet and hands    Peripheral neuropathy     Psychiatric disorder     PTSD (post-traumatic stress disorder)     Shortness of breath     Sleep apnea     had 3 studies & last one negative    Stroke ([facility])     2012 no deficeits    TIA (transient ischemic attack)     Wears dentures     Wears glasses        Past Surgical History:   Procedure Laterality Date    ABDOMINAL SURGERY      laproscopic/ endometriosis    BRAIN SURGERY      aneurysm/ coiling procedure    CARPAL TUNNEL RELEASE      CHOLECYSTECTOMY      COLONOSCOPY      DENTAL SURGERY      EPIDURAL BLOCK INJECTION Right 03/03/2022    Procedure: C7-T1 interlaminar epidural steroid injection;  Surgeon: Johnson GARCIA  MD Rosi;  Location: [facility];  Service: Pain Management     EPIDURAL BLOCK INJECTION N/A 04/21/2022    Procedure: C6-C7 BLOCK / INJECTION EPIDURAL STEROID CERVICAL;  Surgeon: Johnson Aranda MD;  Location: [facility];  Service: Pain Management     EPIDURAL BLOCK INJECTION Left 06/21/2022    Procedure: BLOCK / INJECTION EPIDURAL STEROID LUMBAR  left L3-4 TFESI;  Surgeon: Johnson Aranda MD;  Location: [facility];  Service: Pain Management     EYE SURGERY      cataract    FASCIOTOMY Right 10/27/2023    Procedure: FASCIOTOMY OF RIGHT UPPER EXTREMITY; POSSIBLE VAC DRESSING APPLICATION;  Surgeon: Bruno Blevins MD;  [facility]: [facility];  Service: General    FL GUIDED NEEDLE PLAC BX/ASP/INJ  03/03/2022    FL GUIDED NEEDLE PLAC BX/ASP/INJ  11/17/2022    HYSTERECTOMY      NERVE BLOCK Left 2/20/2024    Procedure: BLOCK MEDIAL BRANCH  left C3-4 and C4-5 MBB #1;  Surgeon: oJhnson rAanda MD;  Location: [facility];  Service: Pain Management     ND ESOPHAGOGASTRODUODENOSCOPY TRANSORAL DIAGNOSTIC N/A 02/08/2018    Procedure: EGD AND COLONOSCOPY;  Surgeon: Caleb Pete MD;  Location:  GI LAB;  Service: Gastroenterology    ND PRQ IMPLTJ NSTIM ELECTRODE ARRAY EPIDURAL N/A 11/17/2022    Procedure: NEVRO SCS TRIAL;  Surgeon: Johnson Aranda MD;  [facility]: [facility];  Service: Pain Management     ND PRQ IMPLTJ NSTIM ELECTRODE ARRAY EPIDURAL N/A 2/1/2023    Procedure: Insertion percutaneous thoracic spinal cord stimulator with left buttock implantable pulse generator;  Surgeon: Craig Robert Goldberg, MD;  [facility];  Service: Neurosurgery    SPLIT THICKNESS SKIN GRAFT Right 11/7/2023    Procedure: SKIN GRAFT SPLIT THICKNESS (STSG)  EXTREMITY;  Surgeon: Samm Sims MD;  [facility];  Service: General    THYROID SURGERY      TONSILLECTOMY      US GUIDED THYROID BIOPSY  11/30/2016    US GUIDED THYROID BIOPSY  3/21/2018    VAC DRESSING APPLICATION Right 10/29/2023     Procedure: CHANGE DRESSING/VAC RIGHT UPPER EXTREMITY; WASHOUT; PARTIAL CLOSURE;  Surgeon: Irina Day DO;  [facility]: [facility];  Service: General    WOUND DEBRIDEMENT Right 11/4/2023    Procedure: SIMPLE CLOSURE 3.5CM, OASIS APPLICATION 15X7CM, WOUND VAC APPLICATION;  Surgeon: Maryuri Goldstein MD;  [facility];  Service: General       Family History   Problem Relation Age of Onset    Brain cancer Mother     Alzheimer's disease Mother     Alzheimer's disease Father     Parkinsonism Father        Social History     Occupational History    Not on file   Tobacco Use    Smoking status: Every Day     Current packs/day: 2.00     Types: Cigarettes    Smokeless tobacco: Never   Vaping Use    Vaping status: Never Used   Substance and Sexual Activity    Alcohol use: Not Currently    Drug use: Never     Comment: prescribed Belbuca    Sexual activity: Yes     Partners: Male         Current Outpatient Medications:     albuterol (PROVENTIL HFA,VENTOLIN HFA) 90 mcg/act inhaler, Inhale 2 puffs every 6 (six) hours as needed for wheezing or shortness of breath, Disp: 18 g, Rfl: 5    Alcohol Swabs (Alcohol Pads) 70 % PADS, USE TO TEST BLOOD GLUCOSE 2-3 TIMES DAILY, Disp: , Rfl:     ammonium lactate (LAC-HYDRIN) 12 % lotion, as needed, Disp: , Rfl:     Anoro Ellipta 62.5-25 MCG/ACT inhaler, , Disp: , Rfl:     atorvastatin (LIPITOR) 80 mg tablet, Take 80 mg by mouth every evening, Disp: , Rfl:     benzonatate (TESSALON PERLES) 100 mg capsule, Take 1 capsule (100 mg total) by mouth 3 (three) times a day as needed for cough, Disp: 30 capsule, Rfl: 1    benzonatate (TESSALON PERLES) 100 mg capsule, Take 1 capsule (100 mg total) by mouth 3 (three) times a day as needed for cough, Disp: 20 capsule, Rfl: 0    Buprenorphine HCl (Belbuca) 300 MCG FILM, Apply 300 mcg to cheek in the morning, Disp: , Rfl:     carboxymethylcellulose 0.5 % SOLN, Administer 1 drop to both eyes daily as needed for dry eyes, Disp: 1 Bottle, Rfl: 0     clonazePAM (KlonoPIN) 0.5 mg tablet, Take 0.5 mg by mouth daily at bedtime, Disp: , Rfl:     clonazePAM (KlonoPIN) 1 mg tablet, Take 1 mg by mouth daily in the early morning, Disp: , Rfl:     dicyclomine (BENTYL) 20 mg tablet, Take 20 mg by mouth every 6 (six) hours, Disp: , Rfl:     doxycycline hyclate (VIBRAMYCIN) 100 mg capsule, Take 1 capsule (100 mg total) by mouth 2 (two) times a day for 7 days, Disp: 14 capsule, Rfl: 0    DULoxetine (CYMBALTA) 30 mg delayed release capsule, Take 60 mg by mouth daily, Disp: , Rfl:     DULoxetine (CYMBALTA) 60 mg delayed release capsule, Take 60 mg by mouth daily, Disp: , Rfl:     DULoxetine (CYMBALTA) 60 mg delayed release capsule, , Disp: , Rfl:     famotidine (PEPCID) 40 MG tablet, Take 1 tablet (40 mg total) by mouth daily at bedtime, Disp: 30 tablet, Rfl: 6    fenofibrate (TRICOR) 48 mg tablet, Take 48 mg by mouth daily, Disp: , Rfl:     fluticasone (FLONASE) 50 mcg/act nasal spray, 2 sprays into each nostril daily, Disp: , Rfl:     fluticasone-umeclidinium-vilanterol (Trelegy Ellipta) 200-62.5-25 mcg/actuation AEPB inhaler, Inhale 1 puff daily Rinse mouth after use., Disp: 60 blister, Rfl: 5    furosemide (LASIX) 20 mg tablet, Take 20 mg by mouth as needed Taking as needed, Disp: , Rfl:     Galcanezumab-gnlm (Emgality) 120 MG/ML SOAJ, Inject 120 mg under the skin every 30 (thirty) days Last inj 1/19/23, Disp: , Rfl:     HYDROcodone-acetaminophen (NORCO)  mg per tablet, Take 1 tablet by mouth every 6 (six) hours as needed for moderate pain, Disp: , Rfl:     lamoTRIgine (LaMICtal) 100 mg tablet, Take 100 mg by mouth 2 (two) times a day, Disp: , Rfl:     latanoprost (XALATAN) 0.005 % ophthalmic solution, instill 1 drop into both eyes at bedtime, Disp: , Rfl:     levothyroxine 137 mcg tablet, Take 137 mcg by mouth daily, Disp: , Rfl:     Lidocaine 4 % PTCH, Apply 1 patch topically daily, Disp: , Rfl: 0    magnesium 30 MG tablet, Take 500 mg by mouth 2 (two) times a day  1/26/23 right now not taking, Disp: , Rfl:     Melatonin 10 MG CAPS, Take 1 tablet by mouth daily at bedtime as needed, Disp: , Rfl:     methocarbamol (ROBAXIN) 500 mg tablet, Take 0.5 tablets (250 mg total) by mouth every 6 (six) hours for 14 days, Disp: 28 tablet, Rfl: 0    nicotine (NICODERM CQ) 21 mg/24 hr TD 24 hr patch, Place 1 patch on the skin every 24 hours, Disp: , Rfl:     nicotine polacrilex (COMMIT) 4 MG lozenge, Apply 1 lozenge to cheek every 2 (two) hours, Disp: , Rfl:     omeprazole (PriLOSEC) 40 MG capsule, Take 1 capsule (40 mg total) by mouth 2 (two) times a day, Disp: 60 capsule, Rfl: 3    ondansetron (ZOFRAN) 4 mg tablet, , Disp: , Rfl:     ondansetron (ZOFRAN) 8 mg tablet, Take 1 tablet (8 mg total) by mouth every 8 (eight) hours as needed for nausea or vomiting, Disp: 20 tablet, Rfl: 0    OneTouch Ultra test strip, USE TO TEST BLOOD GLUCOSE 2-3 TIMES DAILY, Disp: , Rfl:     polyethylene glycol (GLYCOLAX) 17 GM/SCOOP powder, Take 17 g daily for constipation., Disp: 578 g, Rfl: 3    predniSONE 20 mg tablet, , Disp: , Rfl:     predniSONE 20 mg tablet, Take 1 tablet (20 mg total) by mouth daily, Disp: 15 tablet, Rfl: 0    pregabalin (LYRICA) 100 mg capsule, Take 100 mg by mouth 3 (three) times a day Morning-lunch-dinner, Disp: , Rfl:     pregabalin (LYRICA) 150 mg capsule, take 1 capsule by mouth NIGHTLY, Disp: , Rfl:     senna (SENOKOT) 8.6 mg, Take 1 PO HS every other day., Disp: 15 tablet, Rfl: 3    tiZANidine (ZANAFLEX) 4 mg tablet, 1 tablet, Disp: , Rfl:     topiramate (TOPAMAX) 200 MG tablet, 200 mg 2 (two) times a day, Disp: , Rfl:     traZODone (DESYREL) 100 mg tablet, Take 100 mg by mouth daily at bedtime, Disp: , Rfl:     traZODone (DESYREL) 100 mg tablet, Take 100 mg by mouth, Disp: , Rfl:     True Comfort Safety Lancets MISC, USE TO TEST BLOOD GLUCOSE 2-3 TIMES DAILY, Disp: , Rfl:     Varenicline Tartrate, Starter, 0.5 MG X 11 & 1 MG X 42 TBPK, USE AS DIRECTED. GIVE WITH MEALS AND WITH  "A FULL GLASS OF WATER., Disp: , Rfl:   No current facility-administered medications for this visit.    Allergies   Allergen Reactions    Hydroxyzine Anaphylaxis     Claims it gives her convulsions.     Bee Pollen Other (See Comments)    Cinnamon - Food Allergy Other (See Comments)    Diphenhydramine Other (See Comments)    Ibuprofen Other (See Comments)    Other Other (See Comments)    Pollen Extract Itching    Tree Extract     Clarithromycin Rash     Pt denies    Latex Rash    Sulfa Antibiotics Rash     \"severe skin burn\"       Physical Exam:    /59   Pulse 99   Resp 16   Ht 5' 4\" (1.626 m)   Wt 76.7 kg (169 lb)   BMI 29.01 kg/m²     Constitutional:normal, well developed, well nourished, alert, in no distress and non-toxic and no overt pain behavior.  Eyes:anicteric  HEENT:grossly intact  Neck:supple, symmetric, trachea midline and no masses   Pulmonary:even and unlabored  Cardiovascular:No edema or pitting edema present  Skin:Normal without rashes or lesions and well hydrated  Psychiatric:Mood and affect appropriate  Neurologic:Cranial Nerves II-XII grossly intact  Musculoskeletal:antalgic    Cervical Spine examination demonstrates. Decreased ROM secondary to pain with lateral rotation to the left/right and bending to the left/right, in addition to neck flexion. 5/5 upper extremity strength in all muscle groups bilaterally. Negative Spurling's maneuver to the b/l Ue, sensitivity to light touch intact b/l Ue.    Imaging  No orders to display       No orders of the defined types were placed in this encounter.     "

## 2024-03-13 NOTE — LETTER
Date: 3/13/2024    To whom it may concern:     This is to certify that Jennifer Woodall has been under my care for the following diagnosis:   1. Cervical spondylosis    2. Cervical radiculopathy    3. Traumatic compartment syndrome of right upper extremity, initial encounter (HCC)    4. Lumbar radiculopathy    5. Lumbar spondylosis    6. Lumbar degenerative disc disease    7. Sacroiliitis (HCC)    8. Chronic pain syndrome    9. S/P insertion of spinal cord stimulator    10. Intractable abdominal pain      Patient is a 63-year-old female complains of neck pain, bilateral shoulder pain, low back pain, bilateral feet pain with palpation secondary to peripheral neuropathy, cervical spondylosis, lumbar degenerative disease, lumbar radiculopathy status post lumbar spinal cord stimulator presents to office for follow-up visit.  Patient was in the emergency department secondary to bronchitis and is currently on antibiotics for management.  Due to this we will have to reschedule patient's procedure left C3-C4 and C4-C5 medial branch block #2.  After which we will then have to reschedule patient's follow-up procedures radiofrequency ablation for the patient's joints of the cervical spine.          I feel that Jennifer Woodall is unable to make her flight on April 11, 2024 to [place] at this time for the above mentioned medical reasons.                  Sincerely,  Johnson Aranda MD

## 2024-03-13 NOTE — H&P (VIEW-ONLY)
Assessment:  1. Cervical spondylosis    2. Cervical radiculopathy    3. Traumatic compartment syndrome of right upper extremity, initial encounter ([facility], located within [facility])    4. Lumbar radiculopathy    5. Lumbar spondylosis    6. Lumbar degenerative disc disease    7. Sacroiliitis (HCC)    8. Chronic pain syndrome    9. S/P insertion of spinal cord stimulator    10. Intractable abdominal pain        Plan:  Patient is a 63-year-old female complains of neck pain, bilateral shoulder pain, low back pain, bilateral feet pain with palpation secondary to peripheral neuropathy, cervical spondylosis, lumbar degenerative disease, lumbar radiculopathy status post lumbar spinal cord stimulator presents to office for follow-up visit.  Patient was in the emergency department secondary to bronchitis and is currently on antibiotics for management.  Due to this we will have to reschedule patient's procedure left C3-C4 and C4-C5 medial branch block #2.  1.  We will reschedule left C3-C4 and C4-C5 medial branch block #2  2.  We will follow-up 1 month after injection to then schedule the radiofrequency ablation    Complete risks and benefits including bleeding, infection, tissue reaction, nerve injury and allergic reaction were discussed. The approach was demonstrated using models and literature was provided. Verbal and written consent was obtained.        History of Present Illness:  The patient is a 63 y.o. female who presents for a follow up office visit in regards to Back Pain.   The patient’s current symptoms include 8/10 constant burning, sharp, throbbing, shooting, numbness, pins-and-needles which is worse in the morning time.    Current pain medications includes: Hydrocodone 10 mg p.o. 3 times daily, Lyrica 150 mg p.o. 3 times daily.  The patient reports that this regimen is providing 30% pain relief.  The patient is reporting no side effects from this pain medication regimen.    I have personally reviewed and/or updated the patient's past  medical history, past surgical history, family history, social history, current medications, allergies, and vital signs today.         Review of Systems  Review of Systems   Respiratory:  Positive for cough, shortness of breath and wheezing.    Gastrointestinal:  Positive for nausea.   Musculoskeletal:  Positive for back pain, gait problem, joint swelling and myalgias.        Decreased ROM  Joint stiffness  Pain  Swelling     Neurological:         Memory loss   All other systems reviewed and are negative.          Past Medical History:   Diagnosis Date    Ambulates with cane     Anxiety     Arthritis     Asthma     Bipolar 1 disorder ([facility])     Brain aneurysm     Chronic pain disorder     spinal stenosis    Concussion syndrome     neurological rx and balance rx    COPD (chronic obstructive pulmonary disease) ([facility])     Depression     Diabetes mellitus ([facility])     controlled w/ diet    Disease of thyroid gland     nodules     Family history of colon cancer     father    Fibromyalgia, primary     GERD (gastroesophageal reflux disease)     History of colon polyps     Hyperlipidemia     Hypertension     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     Irritable bowel syndrome     Lumbar degenerative disc disease 02/16/2022    Migraine     Neuropathy     bilateral feet and hands    Peripheral neuropathy     Psychiatric disorder     PTSD (post-traumatic stress disorder)     Shortness of breath     Sleep apnea     had 3 studies & last one negative    Stroke ([facility])     2012 no deficeits    TIA (transient ischemic attack)     Wears dentures     Wears glasses        Past Surgical History:   Procedure Laterality Date    ABDOMINAL SURGERY      laproscopic/ endometriosis    BRAIN SURGERY      aneurysm/ coiling procedure    CARPAL TUNNEL RELEASE      CHOLECYSTECTOMY      COLONOSCOPY      DENTAL SURGERY      EPIDURAL BLOCK INJECTION Right 03/03/2022    Procedure: C7-T1 interlaminar epidural steroid injection;  Surgeon: Johnson GARCIA  MD Rosi;  Location: [facility];  Service: Pain Management     EPIDURAL BLOCK INJECTION N/A 04/21/2022    Procedure: C6-C7 BLOCK / INJECTION EPIDURAL STEROID CERVICAL;  Surgeon: Johnson Aranda MD;  [facility]: [facility];  Service: Pain Management     EPIDURAL BLOCK INJECTION Left 06/21/2022    Procedure: BLOCK / INJECTION EPIDURAL STEROID LUMBAR  left L3-4 TFESI;  Surgeon: Johnson Aranda MD;  Location: [facility];  Service: Pain Management     EYE SURGERY      cataract    FASCIOTOMY Right 10/27/2023    Procedure: FASCIOTOMY OF RIGHT UPPER EXTREMITY; POSSIBLE VAC DRESSING APPLICATION;  Surgeon: Bruno Blevins MD;  [facility]: [facility];  Service: General    FL GUIDED NEEDLE PLAC BX/ASP/INJ  03/03/2022    FL GUIDED NEEDLE PLAC BX/ASP/INJ  11/17/2022    HYSTERECTOMY      NERVE BLOCK Left 2/20/2024    Procedure: BLOCK MEDIAL BRANCH  left C3-4 and C4-5 MBB #1;  Surgeon: Johnson Aranda MD;  [facility]: [facility];  Service: Pain Management     RI ESOPHAGOGASTRODUODENOSCOPY TRANSORAL DIAGNOSTIC N/A 02/08/2018    Procedure: EGD AND COLONOSCOPY;  Surgeon: Caleb Pete MD;  Location:  GI LAB;  Service: Gastroenterology    RI PRQ IMPLTJ NSTIM ELECTRODE ARRAY EPIDURAL N/A 11/17/2022    Procedure: NEVRO SCS TRIAL;  Surgeon: Johnson Aranda MD;  [facility]: [facility];  Service: Pain Management     RI PRQ IMPLTJ NSTIM ELECTRODE ARRAY EPIDURAL N/A 2/1/2023    Procedure: Insertion percutaneous thoracic spinal cord stimulator with left buttock implantable pulse generator;  Surgeon: Craig Robert Goldberg, MD;  [facility];  Service: Neurosurgery    SPLIT THICKNESS SKIN GRAFT Right 11/7/2023    Procedure: SKIN GRAFT SPLIT THICKNESS (STSG)  EXTREMITY;  Surgeon: Samm Sims MD;  [facility];  Service: General    THYROID SURGERY      TONSILLECTOMY      US GUIDED THYROID BIOPSY  11/30/2016    US GUIDED THYROID BIOPSY  3/21/2018    VAC DRESSING APPLICATION Right 10/29/2023     Procedure: CHANGE DRESSING/VAC RIGHT UPPER EXTREMITY; WASHOUT; PARTIAL CLOSURE;  Surgeon: Irina Day DO;  [facility];  Service: General    WOUND DEBRIDEMENT Right 11/4/2023    Procedure: SIMPLE CLOSURE 3.5CM, OASIS APPLICATION 15X7CM, WOUND VAC APPLICATION;  Surgeon: Maryuri Goldstein MD;  [facility];  Service: General       Family History   Problem Relation Age of Onset    Brain cancer Mother     Alzheimer's disease Mother     Alzheimer's disease Father     Parkinsonism Father        Social History     Occupational History    Not on file   Tobacco Use    Smoking status: Every Day     Current packs/day: 2.00     Types: Cigarettes    Smokeless tobacco: Never   Vaping Use    Vaping status: Never Used   Substance and Sexual Activity    Alcohol use: Not Currently    Drug use: Never     Comment: prescribed Belbuca    Sexual activity: Yes     Partners: Male         Current Outpatient Medications:     albuterol (PROVENTIL HFA,VENTOLIN HFA) 90 mcg/act inhaler, Inhale 2 puffs every 6 (six) hours as needed for wheezing or shortness of breath, Disp: 18 g, Rfl: 5    Alcohol Swabs (Alcohol Pads) 70 % PADS, USE TO TEST BLOOD GLUCOSE 2-3 TIMES DAILY, Disp: , Rfl:     ammonium lactate (LAC-HYDRIN) 12 % lotion, as needed, Disp: , Rfl:     Anoro Ellipta 62.5-25 MCG/ACT inhaler, , Disp: , Rfl:     atorvastatin (LIPITOR) 80 mg tablet, Take 80 mg by mouth every evening, Disp: , Rfl:     benzonatate (TESSALON PERLES) 100 mg capsule, Take 1 capsule (100 mg total) by mouth 3 (three) times a day as needed for cough, Disp: 30 capsule, Rfl: 1    benzonatate (TESSALON PERLES) 100 mg capsule, Take 1 capsule (100 mg total) by mouth 3 (three) times a day as needed for cough, Disp: 20 capsule, Rfl: 0    Buprenorphine HCl (Belbuca) 300 MCG FILM, Apply 300 mcg to cheek in the morning, Disp: , Rfl:     carboxymethylcellulose 0.5 % SOLN, Administer 1 drop to both eyes daily as needed for dry eyes, Disp: 1 Bottle, Rfl: 0     clonazePAM (KlonoPIN) 0.5 mg tablet, Take 0.5 mg by mouth daily at bedtime, Disp: , Rfl:     clonazePAM (KlonoPIN) 1 mg tablet, Take 1 mg by mouth daily in the early morning, Disp: , Rfl:     dicyclomine (BENTYL) 20 mg tablet, Take 20 mg by mouth every 6 (six) hours, Disp: , Rfl:     doxycycline hyclate (VIBRAMYCIN) 100 mg capsule, Take 1 capsule (100 mg total) by mouth 2 (two) times a day for 7 days, Disp: 14 capsule, Rfl: 0    DULoxetine (CYMBALTA) 30 mg delayed release capsule, Take 60 mg by mouth daily, Disp: , Rfl:     DULoxetine (CYMBALTA) 60 mg delayed release capsule, Take 60 mg by mouth daily, Disp: , Rfl:     DULoxetine (CYMBALTA) 60 mg delayed release capsule, , Disp: , Rfl:     famotidine (PEPCID) 40 MG tablet, Take 1 tablet (40 mg total) by mouth daily at bedtime, Disp: 30 tablet, Rfl: 6    fenofibrate (TRICOR) 48 mg tablet, Take 48 mg by mouth daily, Disp: , Rfl:     fluticasone (FLONASE) 50 mcg/act nasal spray, 2 sprays into each nostril daily, Disp: , Rfl:     fluticasone-umeclidinium-vilanterol (Trelegy Ellipta) 200-62.5-25 mcg/actuation AEPB inhaler, Inhale 1 puff daily Rinse mouth after use., Disp: 60 blister, Rfl: 5    furosemide (LASIX) 20 mg tablet, Take 20 mg by mouth as needed Taking as needed, Disp: , Rfl:     Galcanezumab-gnlm (Emgality) 120 MG/ML SOAJ, Inject 120 mg under the skin every 30 (thirty) days Last inj 1/19/23, Disp: , Rfl:     HYDROcodone-acetaminophen (NORCO)  mg per tablet, Take 1 tablet by mouth every 6 (six) hours as needed for moderate pain, Disp: , Rfl:     lamoTRIgine (LaMICtal) 100 mg tablet, Take 100 mg by mouth 2 (two) times a day, Disp: , Rfl:     latanoprost (XALATAN) 0.005 % ophthalmic solution, instill 1 drop into both eyes at bedtime, Disp: , Rfl:     levothyroxine 137 mcg tablet, Take 137 mcg by mouth daily, Disp: , Rfl:     Lidocaine 4 % PTCH, Apply 1 patch topically daily, Disp: , Rfl: 0    magnesium 30 MG tablet, Take 500 mg by mouth 2 (two) times a day  1/26/23 right now not taking, Disp: , Rfl:     Melatonin 10 MG CAPS, Take 1 tablet by mouth daily at bedtime as needed, Disp: , Rfl:     methocarbamol (ROBAXIN) 500 mg tablet, Take 0.5 tablets (250 mg total) by mouth every 6 (six) hours for 14 days, Disp: 28 tablet, Rfl: 0    nicotine (NICODERM CQ) 21 mg/24 hr TD 24 hr patch, Place 1 patch on the skin every 24 hours, Disp: , Rfl:     nicotine polacrilex (COMMIT) 4 MG lozenge, Apply 1 lozenge to cheek every 2 (two) hours, Disp: , Rfl:     omeprazole (PriLOSEC) 40 MG capsule, Take 1 capsule (40 mg total) by mouth 2 (two) times a day, Disp: 60 capsule, Rfl: 3    ondansetron (ZOFRAN) 4 mg tablet, , Disp: , Rfl:     ondansetron (ZOFRAN) 8 mg tablet, Take 1 tablet (8 mg total) by mouth every 8 (eight) hours as needed for nausea or vomiting, Disp: 20 tablet, Rfl: 0    OneTouch Ultra test strip, USE TO TEST BLOOD GLUCOSE 2-3 TIMES DAILY, Disp: , Rfl:     polyethylene glycol (GLYCOLAX) 17 GM/SCOOP powder, Take 17 g daily for constipation., Disp: 578 g, Rfl: 3    predniSONE 20 mg tablet, , Disp: , Rfl:     predniSONE 20 mg tablet, Take 1 tablet (20 mg total) by mouth daily, Disp: 15 tablet, Rfl: 0    pregabalin (LYRICA) 100 mg capsule, Take 100 mg by mouth 3 (three) times a day Morning-lunch-dinner, Disp: , Rfl:     pregabalin (LYRICA) 150 mg capsule, take 1 capsule by mouth NIGHTLY, Disp: , Rfl:     senna (SENOKOT) 8.6 mg, Take 1 PO HS every other day., Disp: 15 tablet, Rfl: 3    tiZANidine (ZANAFLEX) 4 mg tablet, 1 tablet, Disp: , Rfl:     topiramate (TOPAMAX) 200 MG tablet, 200 mg 2 (two) times a day, Disp: , Rfl:     traZODone (DESYREL) 100 mg tablet, Take 100 mg by mouth daily at bedtime, Disp: , Rfl:     traZODone (DESYREL) 100 mg tablet, Take 100 mg by mouth, Disp: , Rfl:     True Comfort Safety Lancets MISC, USE TO TEST BLOOD GLUCOSE 2-3 TIMES DAILY, Disp: , Rfl:     Varenicline Tartrate, Starter, 0.5 MG X 11 & 1 MG X 42 TBPK, USE AS DIRECTED. GIVE WITH MEALS AND WITH  "A FULL GLASS OF WATER., Disp: , Rfl:   No current facility-administered medications for this visit.    Allergies   Allergen Reactions    Hydroxyzine Anaphylaxis     Claims it gives her convulsions.     Bee Pollen Other (See Comments)    Cinnamon - Food Allergy Other (See Comments)    Diphenhydramine Other (See Comments)    Ibuprofen Other (See Comments)    Other Other (See Comments)    Pollen Extract Itching    Tree Extract     Clarithromycin Rash     Pt denies    Latex Rash    Sulfa Antibiotics Rash     \"severe skin burn\"       Physical Exam:    /59   Pulse 99   Resp 16   Ht 5' 4\" (1.626 m)   Wt 76.7 kg (169 lb)   BMI 29.01 kg/m²     Constitutional:normal, well developed, well nourished, alert, in no distress and non-toxic and no overt pain behavior.  Eyes:anicteric  HEENT:grossly intact  Neck:supple, symmetric, trachea midline and no masses   Pulmonary:even and unlabored  Cardiovascular:No edema or pitting edema present  Skin:Normal without rashes or lesions and well hydrated  Psychiatric:Mood and affect appropriate  Neurologic:Cranial Nerves II-XII grossly intact  Musculoskeletal:antalgic    Cervical Spine examination demonstrates. Decreased ROM secondary to pain with lateral rotation to the left/right and bending to the left/right, in addition to neck flexion. 5/5 upper extremity strength in all muscle groups bilaterally. Negative Spurling's maneuver to the b/l Ue, sensitivity to light touch intact b/l Ue.    Imaging  No orders to display       No orders of the defined types were placed in this encounter.     "

## 2024-03-20 ENCOUNTER — NURSE TRIAGE (OUTPATIENT)
Age: 64
End: 2024-03-20

## 2024-03-20 NOTE — TELEPHONE ENCOUNTER
"Rep from [facility] called in requesting signed orders be faxed back. Reports that orders were originally faxed to [facility] on 2/29. Verified fax number orders were sent to.    Teams message sent to Shellie Ji; not available at this time.    Please fax back signed orders to [facility], fax: [phone].    Answer Assessment - Initial Assessment Questions  1. REASON FOR CALL or QUESTION: \"What is your reason for calling today?\" or \"How can I best help you?\" or \"What question do you have that I can help answer?\"      Looking for signed orders.    Protocols used: Information Only Call - No Triage-ADULT-[place]    "

## 2024-03-26 ENCOUNTER — TELEPHONE (OUTPATIENT)
Age: 64
End: 2024-03-26

## 2024-03-26 NOTE — TELEPHONE ENCOUNTER
Jessica from [facility] called to see if we have pt care orders for 02/29/24 signed by Dr. Montoya. Warm transfer to Shellie Ji

## 2024-03-29 ENCOUNTER — APPOINTMENT (EMERGENCY)
Dept: CT IMAGING | Facility: HOSPITAL | Age: 64
End: 2024-03-29
Payer: COMMERCIAL

## 2024-03-29 ENCOUNTER — HOSPITAL ENCOUNTER (OUTPATIENT)
Facility: HOSPITAL | Age: 64
Setting detail: OBSERVATION
Discharge: HOME | End: 2024-03-30
Attending: EMERGENCY MEDICINE | Admitting: INTERNAL MEDICINE
Payer: COMMERCIAL

## 2024-03-29 DIAGNOSIS — R29.90 STROKE-LIKE SYMPTOMS: Primary | ICD-10-CM

## 2024-03-29 DIAGNOSIS — Z79.899 POLYPHARMACY: ICD-10-CM

## 2024-03-29 LAB
ALBUMIN SERPL BCP-MCNC: 4.4 G/DL (ref 3.5–5)
ALP SERPL-CCNC: 79 U/L (ref 34–104)
ALT SERPL W P-5'-P-CCNC: 20 U/L (ref 7–52)
AMPHETAMINES SERPL QL SCN: NEGATIVE
ANION GAP SERPL CALCULATED.3IONS-SCNC: 12 MMOL/L (ref 4–13)
APAP SERPL-MCNC: <2 UG/ML (ref 10–20)
APTT PPP: 27 SECONDS (ref 23–37)
AST SERPL W P-5'-P-CCNC: 22 U/L (ref 13–39)
ATRIAL RATE: 56 BPM
BARBITURATES UR QL: POSITIVE
BENZODIAZ UR QL: NEGATIVE
BILIRUB DIRECT SERPL-MCNC: 0.03 MG/DL (ref 0–0.2)
BILIRUB SERPL-MCNC: 0.28 MG/DL (ref 0.2–1)
BUN SERPL-MCNC: 20 MG/DL (ref 5–25)
CALCIUM SERPL-MCNC: 9.2 MG/DL (ref 8.4–10.2)
CARDIAC TROPONIN I PNL SERPL HS: 4 NG/L
CHLORIDE SERPL-SCNC: 106 MMOL/L (ref 96–108)
CO2 SERPL-SCNC: 23 MMOL/L (ref 21–32)
COCAINE UR QL: NEGATIVE
CREAT SERPL-MCNC: 1.09 MG/DL (ref 0.6–1.3)
ERYTHROCYTE [DISTWIDTH] IN BLOOD BY AUTOMATED COUNT: 14.4 % (ref 11.6–15.1)
ETHANOL SERPL-MCNC: <10 MG/DL
GFR SERPL CREATININE-BSD FRML MDRD: 54 ML/MIN/1.73SQ M
GLUCOSE SERPL-MCNC: 76 MG/DL (ref 65–140)
GLUCOSE SERPL-MCNC: 76 MG/DL (ref 65–140)
HCT VFR BLD AUTO: 42.5 % (ref 34.8–46.1)
HGB BLD-MCNC: 13.6 G/DL (ref 11.5–15.4)
INR PPP: 1.04 (ref 0.84–1.19)
MCH RBC QN AUTO: 30.9 PG (ref 26.8–34.3)
MCHC RBC AUTO-ENTMCNC: 32 G/DL (ref 31.4–37.4)
MCV RBC AUTO: 97 FL (ref 82–98)
METHADONE UR QL: NEGATIVE
OPIATES UR QL SCN: POSITIVE
OXYCODONE+OXYMORPHONE UR QL SCN: NEGATIVE
P AXIS: 67 DEGREES
PCP UR QL: NEGATIVE
PLATELET # BLD AUTO: 191 THOUSANDS/UL (ref 149–390)
PMV BLD AUTO: 10.8 FL (ref 8.9–12.7)
POTASSIUM SERPL-SCNC: 3.2 MMOL/L (ref 3.5–5.3)
PR INTERVAL: 182 MS
PROT SERPL-MCNC: 7.2 G/DL (ref 6.4–8.4)
PROTHROMBIN TIME: 13.5 SECONDS (ref 11.6–14.5)
QRS AXIS: 80 DEGREES
QRSD INTERVAL: 68 MS
QT INTERVAL: 452 MS
QTC INTERVAL: 436 MS
RBC # BLD AUTO: 4.4 MILLION/UL (ref 3.81–5.12)
SALICYLATES SERPL-MCNC: <5 MG/DL (ref 3–20)
SODIUM SERPL-SCNC: 141 MMOL/L (ref 135–147)
T WAVE AXIS: 62 DEGREES
THC UR QL: NEGATIVE
VENTRICULAR RATE: 56 BPM
WBC # BLD AUTO: 8.08 THOUSAND/UL (ref 4.31–10.16)

## 2024-03-29 PROCEDURE — 85027 COMPLETE CBC AUTOMATED: CPT

## 2024-03-29 PROCEDURE — 80076 HEPATIC FUNCTION PANEL: CPT

## 2024-03-29 PROCEDURE — 70498 CT ANGIOGRAPHY NECK: CPT

## 2024-03-29 PROCEDURE — 85730 THROMBOPLASTIN TIME PARTIAL: CPT

## 2024-03-29 PROCEDURE — 80179 DRUG ASSAY SALICYLATE: CPT

## 2024-03-29 PROCEDURE — 70496 CT ANGIOGRAPHY HEAD: CPT

## 2024-03-29 PROCEDURE — 80048 BASIC METABOLIC PNL TOTAL CA: CPT

## 2024-03-29 PROCEDURE — 80143 DRUG ASSAY ACETAMINOPHEN: CPT

## 2024-03-29 PROCEDURE — 84484 ASSAY OF TROPONIN QUANT: CPT

## 2024-03-29 PROCEDURE — 99223 1ST HOSP IP/OBS HIGH 75: CPT | Performed by: INTERNAL MEDICINE

## 2024-03-29 PROCEDURE — 93005 ELECTROCARDIOGRAM TRACING: CPT

## 2024-03-29 PROCEDURE — 82077 ASSAY SPEC XCP UR&BREATH IA: CPT

## 2024-03-29 PROCEDURE — 99285 EMERGENCY DEPT VISIT HI MDM: CPT

## 2024-03-29 PROCEDURE — 99285 EMERGENCY DEPT VISIT HI MDM: CPT | Performed by: EMERGENCY MEDICINE

## 2024-03-29 PROCEDURE — 80307 DRUG TEST PRSMV CHEM ANLYZR: CPT

## 2024-03-29 PROCEDURE — G0508 CRIT CARE TELEHEA CONSULT 60: HCPCS | Performed by: STUDENT IN AN ORGANIZED HEALTH CARE EDUCATION/TRAINING PROGRAM

## 2024-03-29 PROCEDURE — 36415 COLL VENOUS BLD VENIPUNCTURE: CPT

## 2024-03-29 PROCEDURE — 82948 REAGENT STRIP/BLOOD GLUCOSE: CPT

## 2024-03-29 PROCEDURE — 85610 PROTHROMBIN TIME: CPT

## 2024-03-29 RX ORDER — CLONAZEPAM 0.5 MG/1
0.5 TABLET ORAL EVERY EVENING
Status: DISCONTINUED | OUTPATIENT
Start: 2024-03-29 | End: 2024-03-30 | Stop reason: HOSPADM

## 2024-03-29 RX ORDER — ACETAMINOPHEN 325 MG/1
650 TABLET ORAL EVERY 6 HOURS PRN
Status: DISCONTINUED | OUTPATIENT
Start: 2024-03-29 | End: 2024-03-30 | Stop reason: HOSPADM

## 2024-03-29 RX ORDER — FLUTICASONE FUROATE AND VILANTEROL 200; 25 UG/1; UG/1
1 POWDER RESPIRATORY (INHALATION) DAILY
Status: DISCONTINUED | OUTPATIENT
Start: 2024-03-30 | End: 2024-03-30 | Stop reason: HOSPADM

## 2024-03-29 RX ORDER — ATORVASTATIN CALCIUM 40 MG/1
80 TABLET, FILM COATED ORAL
Status: DISCONTINUED | OUTPATIENT
Start: 2024-03-29 | End: 2024-03-30 | Stop reason: HOSPADM

## 2024-03-29 RX ORDER — BUPRENORPHINE 2 MG/1
300 TABLET SUBLINGUAL DAILY
Status: DISCONTINUED | OUTPATIENT
Start: 2024-03-30 | End: 2024-03-30

## 2024-03-29 RX ORDER — FAMOTIDINE 20 MG/1
40 TABLET, FILM COATED ORAL
Status: DISCONTINUED | OUTPATIENT
Start: 2024-03-29 | End: 2024-03-30 | Stop reason: HOSPADM

## 2024-03-29 RX ORDER — TOPIRAMATE 100 MG/1
200 TABLET, FILM COATED ORAL 2 TIMES DAILY
Status: DISCONTINUED | OUTPATIENT
Start: 2024-03-29 | End: 2024-03-30 | Stop reason: HOSPADM

## 2024-03-29 RX ORDER — PREGABALIN 75 MG/1
75 CAPSULE ORAL 3 TIMES DAILY
Status: DISCONTINUED | OUTPATIENT
Start: 2024-03-29 | End: 2024-03-30 | Stop reason: HOSPADM

## 2024-03-29 RX ORDER — ALBUTEROL SULFATE 90 UG/1
2 AEROSOL, METERED RESPIRATORY (INHALATION) ONCE
Status: COMPLETED | OUTPATIENT
Start: 2024-03-29 | End: 2024-03-29

## 2024-03-29 RX ORDER — SODIUM CHLORIDE, SODIUM GLUCONATE, SODIUM ACETATE, POTASSIUM CHLORIDE, MAGNESIUM CHLORIDE, SODIUM PHOSPHATE, DIBASIC, AND POTASSIUM PHOSPHATE .53; .5; .37; .037; .03; .012; .00082 G/100ML; G/100ML; G/100ML; G/100ML; G/100ML; G/100ML; G/100ML
100 INJECTION, SOLUTION INTRAVENOUS CONTINUOUS
Status: DISCONTINUED | OUTPATIENT
Start: 2024-03-29 | End: 2024-03-30 | Stop reason: HOSPADM

## 2024-03-29 RX ORDER — FLUTICASONE PROPIONATE 50 MCG
1 SPRAY, SUSPENSION (ML) NASAL DAILY
Status: DISCONTINUED | OUTPATIENT
Start: 2024-03-30 | End: 2024-03-30 | Stop reason: HOSPADM

## 2024-03-29 RX ORDER — ONDANSETRON 2 MG/ML
4 INJECTION INTRAMUSCULAR; INTRAVENOUS EVERY 6 HOURS PRN
Status: DISCONTINUED | OUTPATIENT
Start: 2024-03-29 | End: 2024-03-30 | Stop reason: HOSPADM

## 2024-03-29 RX ORDER — NICOTINE 21 MG/24HR
21 PATCH, TRANSDERMAL 24 HOURS TRANSDERMAL DAILY
Status: DISCONTINUED | OUTPATIENT
Start: 2024-03-29 | End: 2024-03-30 | Stop reason: HOSPADM

## 2024-03-29 RX ORDER — CLONAZEPAM 0.5 MG/1
1 TABLET ORAL EVERY MORNING
Status: DISCONTINUED | OUTPATIENT
Start: 2024-03-30 | End: 2024-03-30 | Stop reason: HOSPADM

## 2024-03-29 RX ORDER — PREGABALIN 50 MG/1
100 CAPSULE ORAL
Status: DISCONTINUED | OUTPATIENT
Start: 2024-03-29 | End: 2024-03-30 | Stop reason: HOSPADM

## 2024-03-29 RX ORDER — ALBUTEROL SULFATE 90 UG/1
2 AEROSOL, METERED RESPIRATORY (INHALATION) EVERY 4 HOURS PRN
Status: DISCONTINUED | OUTPATIENT
Start: 2024-03-29 | End: 2024-03-30 | Stop reason: HOSPADM

## 2024-03-29 RX ORDER — FENOFIBRATE 48 MG/1
48 TABLET, COATED ORAL DAILY
Status: DISCONTINUED | OUTPATIENT
Start: 2024-03-30 | End: 2024-03-30 | Stop reason: HOSPADM

## 2024-03-29 RX ORDER — LEVOTHYROXINE SODIUM 88 UG/1
88 TABLET ORAL
Status: DISCONTINUED | OUTPATIENT
Start: 2024-03-30 | End: 2024-03-30

## 2024-03-29 RX ORDER — HYDROCODONE BITARTRATE AND ACETAMINOPHEN 5; 325 MG/1; MG/1
1 TABLET ORAL ONCE
Status: COMPLETED | OUTPATIENT
Start: 2024-03-29 | End: 2024-03-29

## 2024-03-29 RX ORDER — DULOXETIN HYDROCHLORIDE 30 MG/1
60 CAPSULE, DELAYED RELEASE ORAL DAILY
Status: DISCONTINUED | OUTPATIENT
Start: 2024-03-30 | End: 2024-03-30 | Stop reason: HOSPADM

## 2024-03-29 RX ORDER — LAMOTRIGINE 100 MG/1
100 TABLET ORAL 2 TIMES DAILY
Status: DISCONTINUED | OUTPATIENT
Start: 2024-03-29 | End: 2024-03-30 | Stop reason: HOSPADM

## 2024-03-29 RX ORDER — ENOXAPARIN SODIUM 100 MG/ML
40 INJECTION SUBCUTANEOUS DAILY
Status: DISCONTINUED | OUTPATIENT
Start: 2024-03-30 | End: 2024-03-30 | Stop reason: HOSPADM

## 2024-03-29 RX ADMIN — PREGABALIN 75 MG: 75 CAPSULE ORAL at 20:01

## 2024-03-29 RX ADMIN — LAMOTRIGINE 100 MG: 100 TABLET ORAL at 20:01

## 2024-03-29 RX ADMIN — CLONAZEPAM 0.5 MG: 0.5 TABLET ORAL at 20:01

## 2024-03-29 RX ADMIN — SODIUM CHLORIDE, SODIUM GLUCONATE, SODIUM ACETATE, POTASSIUM CHLORIDE, MAGNESIUM CHLORIDE, SODIUM PHOSPHATE, DIBASIC, AND POTASSIUM PHOSPHATE 100 ML/HR: .53; .5; .37; .037; .03; .012; .00082 INJECTION, SOLUTION INTRAVENOUS at 20:07

## 2024-03-29 RX ADMIN — NICOTINE 21 MG: 21 PATCH, EXTENDED RELEASE TRANSDERMAL at 18:23

## 2024-03-29 RX ADMIN — SODIUM CHLORIDE, SODIUM LACTATE, POTASSIUM CHLORIDE, AND CALCIUM CHLORIDE 500 ML: .6; .31; .03; .02 INJECTION, SOLUTION INTRAVENOUS at 20:07

## 2024-03-29 RX ADMIN — TOPIRAMATE 200 MG: 100 TABLET, FILM COATED ORAL at 20:01

## 2024-03-29 RX ADMIN — ALBUTEROL SULFATE 2 PUFF: 90 AEROSOL, METERED RESPIRATORY (INHALATION) at 18:23

## 2024-03-29 RX ADMIN — IOHEXOL 100 ML: 350 INJECTION, SOLUTION INTRAVENOUS at 15:30

## 2024-03-29 RX ADMIN — FAMOTIDINE 40 MG: 20 TABLET, FILM COATED ORAL at 23:10

## 2024-03-29 RX ADMIN — ATORVASTATIN CALCIUM 80 MG: 40 TABLET, FILM COATED ORAL at 20:01

## 2024-03-29 RX ADMIN — HYDROCODONE BITARTRATE AND ACETAMINOPHEN 1 TABLET: 5; 325 TABLET ORAL at 23:09

## 2024-03-29 NOTE — H&P
[facility]  H&P  Name: Jennifer Woodall 63 y.o. female I MRN: [id number]  Unit/Bed#: 405-01 I Date of Admission: 3/29/2024   Date of Service: 3/29/2024 I Hospital Day: 0      Assessment/Plan   * Polypharmacy  Assessment & Plan  Presents with bilateral upper extremity ataxia, and ataxia with ambulation, and sleepiness  Neurologic workup in the emergency room negative and no further neurologic workup recommended from the neurology team  Symptoms are suspected to be due to polypharmacy  Patient is on rather large number of medications a lot of which are psychoactive including but not limited to Lamictal, Cymbalta, Lyrica, trazodone, buprenorphine, clonazepam, Topamax, and Robaxin.    Difficult to know where to start and regarding trying to reduce her medications or start tapering.  Some of these will require months of tapering to safely come off of.  Will place on IV fluid overnight  Of all the medications listed I feel that trazodone decreasing discontinuing trazodone and decreasing her Lyrica dose may be the safest at this time.  Hopefully over the next 24 to 48 hours she will improve with hydration  She needs close outpatient follow-up with psychiatry, [facility] neurology, and PCP to help tapering these medications however will be a difficult process as she is on this for numerous reasons including fibromyalgia, chronic migraines, chronic pain syndrome, and cervical radiculopathy.    COPD (chronic obstructive pulmonary disease) ([facility])  Assessment & Plan  Not in exacerbation  Continue home Trelegy  As needed albuterol    Tobacco use  Assessment & Plan  Nicotine patch; cessation advised    Brain aneurysm  Assessment & Plan  History of brain aneurysm status post coil embolization  No acute concerns from the standpoint, stable on imaging    Hypothyroid  Assessment & Plan  Continue home Synthroid      VTE Pharmacologic Prophylaxis:   Moderate Risk (Score 3-4) - Pharmacological DVT Prophylaxis Ordered:  enoxaparin (Lovenox).  Code Status: Level 1 - Full Code   Discussion with family: Patient declined call to .     Anticipated Length of Stay: Patient will be admitted on an inpatient basis with an anticipated length of stay of greater than 2 midnights secondary to polypharmacy.    Total Time Spent on Date of Encounter in care of patient: 70 mins. This time was spent on one or more of the following: performing physical exam; counseling and coordination of care; obtaining or reviewing history; documenting in the medical record; reviewing/ordering tests, medications or procedures; communicating with other healthcare professionals and discussing with patient's family/caregivers.    Chief Complaint: sleepiness and balance issues    History of Present Illness:  Jennifer Woodall is a 63 y.o. female with a PMH of cervical radiculopathy, spinal stenosis, fibromyalgia, chronic headaches, chronic pain syndrome, chronic migraines, history of cerebral coil embolization of an aneurysm, GERD, hyperlipidemia, hypertension, knee, active smoking, who presents with sleepiness, and ataxia.  Strokelike symptoms in the emergency room stroke alert was called.  Imaging was stable with no acute findings.  Neurology was consulted who felt no further neurologic workup was recommended.  However her ongoing issues with GYN she was referred to the patient.  At the time of admission it is felt that her symptoms are due to polypharmacy as collected medications.  Patient's mood during my evaluation it was quite labile alternating between 2 years quite frequently.  Patient was hesitant for admission.  I was quite honest with her that titrating her medications will be difficult and did not have a nonetheless she was agreeable for inpatient admission in hopes to improve her symptomatology.  She will be admitted to the hospital service.    REVIEW OF SYSTEMS  General Denies fevers or chills. Denies generalized weakness or fatigue.    HEENT  Denies hearing or vision changes.   Cardiovascular Denies chest pain. Denies LE swelling. Denies palpitations. Denies dyspnea on exertion.   Respiratory Denies cough. Denies difficulty breathing. Denies shortness of breath.   Genitourinary Denies hematuria. Denies dysuria. Denies difficulty voiding.Denies incontinence.   Gastrointestinal Denies nausea, vomiting, or diarrhea. Denies hematochezia, melena, or hematemesis.    Musculoskeletal Denies arthralgias or myalgias. Denies joint swelling.   Psychiatric  Denies changes in mood. Denies anxiety or depression.   Neurologic Admits to chronic headaches.  Admits to weakness.  Admits to difficulty ambulating.  Admits to chronic pain.   Endocrine Denies weight loss or weight gain. Denies excessive thirst, sweating, urination.       Past Medical and Surgical History:   Past Medical History:   Diagnosis Date    Ambulates with cane     Anxiety     Arthritis     Asthma     Bipolar 1 disorder ([facility])     Brain aneurysm     Chronic pain disorder     spinal stenosis    Concussion syndrome     neurological rx and balance rx    COPD (chronic obstructive pulmonary disease) ([facility])     Depression     Diabetes mellitus ([facility])     controlled w/ diet    Disease of thyroid gland     nodules     Family history of colon cancer     father    Fibromyalgia, primary     GERD (gastroesophageal reflux disease)     History of colon polyps     Hyperlipidemia     Hypertension     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     Irritable bowel syndrome     Lumbar degenerative disc disease 02/16/2022    Migraine     Neuropathy     bilateral feet and hands    Peripheral neuropathy     Psychiatric disorder     PTSD (post-traumatic stress disorder)     Shortness of breath     Sleep apnea     had 3 studies & last one negative    Stroke ([facility])     2012 no deficeits    TIA (transient ischemic attack)     Wears dentures     Wears glasses        Past Surgical History:   Procedure Laterality Date     ABDOMINAL SURGERY      laproscopic/ endometriosis    BRAIN SURGERY      aneurysm/ coiling procedure    CARPAL TUNNEL RELEASE      CHOLECYSTECTOMY      COLONOSCOPY      DENTAL SURGERY      EPIDURAL BLOCK INJECTION Right 03/03/2022    Procedure: C7-T1 interlaminar epidural steroid injection;  Surgeon: Johnson Aranda MD;  Location: [facility];  Service: Pain Management     EPIDURAL BLOCK INJECTION N/A 04/21/2022    Procedure: C6-C7 BLOCK / INJECTION EPIDURAL STEROID CERVICAL;  Surgeon: Johnson Aranda MD;  [facility]: [facility];  Service: Pain Management     EPIDURAL BLOCK INJECTION Left 06/21/2022    Procedure: BLOCK / INJECTION EPIDURAL STEROID LUMBAR  left L3-4 TFESI;  Surgeon: Johnson Aranda MD;  Location: [facility];  Service: Pain Management     EYE SURGERY      cataract    FASCIOTOMY Right 10/27/2023    Procedure: FASCIOTOMY OF RIGHT UPPER EXTREMITY; POSSIBLE VAC DRESSING APPLICATION;  Surgeon: Bruno Blevins MD;  [facility]: [facility];  Service: General    FL GUIDED NEEDLE PLAC BX/ASP/INJ  03/03/2022    FL GUIDED NEEDLE PLAC BX/ASP/INJ  11/17/2022    HYSTERECTOMY      NERVE BLOCK Left 2/20/2024    Procedure: BLOCK MEDIAL BRANCH  left C3-4 and C4-5 MBB #1;  Surgeon: Johnson Aranda MD;  [facility]: [facility];  Service: Pain Management     WY ESOPHAGOGASTRODUODENOSCOPY TRANSORAL DIAGNOSTIC N/A 02/08/2018    Procedure: EGD AND COLONOSCOPY;  Surgeon: Caleb Pete MD;  Location: [facility];  Service: Gastroenterology    WY PRQ IMPLTJ NSTIM ELECTRODE ARRAY EPIDURAL N/A 11/17/2022    Procedure: NEVRO SCS TRIAL;  Surgeon: Johnson Aranda MD;  [facility]: [facility];  Service: Pain Management     WY PRQ IMPLTJ NSTIM ELECTRODE ARRAY EPIDURAL N/A 2/1/2023    Procedure: Insertion percutaneous thoracic spinal cord stimulator with left buttock implantable pulse generator;  Surgeon: Craig Robert Goldberg, MD;  [facility];  Service: Neurosurgery    SPLIT THICKNESS SKIN GRAFT Right  11/7/2023    Procedure: SKIN GRAFT SPLIT THICKNESS (STSG)  EXTREMITY;  Surgeon: Samm Sims MD;  [facility];  Service: General    THYROID SURGERY      TONSILLECTOMY      US GUIDED THYROID BIOPSY  11/30/2016    US GUIDED THYROID BIOPSY  3/21/2018    VAC DRESSING APPLICATION Right 10/29/2023    Procedure: CHANGE DRESSING/VAC RIGHT UPPER EXTREMITY; WASHOUT; PARTIAL CLOSURE;  Surgeon: Irina Day DO;  [facility];  Service: General    WOUND DEBRIDEMENT Right 11/4/2023    Procedure: SIMPLE CLOSURE 3.5CM, OASIS APPLICATION 15X7CM, WOUND VAC APPLICATION;  Surgeon: Maryuri Goldstein MD;  [facility];  Service: General       Meds/Allergies:  Prior to Admission medications    Medication Sig Start Date End Date Taking? Authorizing Provider   albuterol (PROVENTIL HFA,VENTOLIN HFA) 90 mcg/act inhaler Inhale 2 puffs every 6 (six) hours as needed for wheezing or shortness of breath 12/21/23   LAURIE Maki   Alcohol Swabs (Alcohol Pads) 70 % PADS USE TO TEST BLOOD GLUCOSE 2-3 TIMES DAILY 7/15/23   Historical Provider, MD   ammonium lactate (LAC-HYDRIN) 12 % lotion as needed    Historical Provider, MD   Anoro Ellipta 62.5-25 MCG/ACT inhaler  2/19/24   Historical Provider, MD   atorvastatin (LIPITOR) 80 mg tablet Take 80 mg by mouth every evening 10/14/22   Historical Provider, MD   benzonatate (TESSALON PERLES) 100 mg capsule Take 1 capsule (100 mg total) by mouth 3 (three) times a day as needed for cough 5/2/23   Debi Farias MD   benzonatate (TESSALON PERLES) 100 mg capsule Take 1 capsule (100 mg total) by mouth 3 (three) times a day as needed for cough 3/12/24   Kevin Escoto, DO   Buprenorphine HCl (Belbuca) 300 MCG FILM Apply 300 mcg to cheek in the morning    Historical Provider, MD   carboxymethylcellulose 0.5 % SOLN Administer 1 drop to both eyes daily as needed for dry eyes 2/7/19   Watson Gentile DO   clonazePAM (KlonoPIN) 0.5 mg tablet Take 0.5 mg  by mouth daily at bedtime    Historical Provider, MD   clonazePAM (KlonoPIN) 1 mg tablet Take 1 mg by mouth daily in the early morning 1/25/18   Historical Provider, MD   dicyclomine (BENTYL) 20 mg tablet Take 20 mg by mouth every 6 (six) hours    Historical Provider, MD   DULoxetine (CYMBALTA) 30 mg delayed release capsule Take 60 mg by mouth daily    Historical Provider, MD   DULoxetine (CYMBALTA) 60 mg delayed release capsule Take 60 mg by mouth daily 12/27/23   Historical Provider, MD   DULoxetine (CYMBALTA) 60 mg delayed release capsule  2/28/24   Historical Provider, MD   famotidine (PEPCID) 40 MG tablet Take 1 tablet (40 mg total) by mouth daily at bedtime 8/17/23   Halima Ruiz PA-C   fenofibrate (TRICOR) 48 mg tablet Take 48 mg by mouth daily    Historical Provider, MD   fluticasone (FLONASE) 50 mcg/act nasal spray 2 sprays into each nostril daily 1/11/23   Historical Provider, MD   fluticasone-umeclidinium-vilanterol (Trelegy Ellipta) 200-62.5-25 mcg/actuation AEPB inhaler Inhale 1 puff daily Rinse mouth after use. 2/14/24 8/12/24  LAURIE Maki   furosemide (LASIX) 20 mg tablet Take 20 mg by mouth as needed Taking as needed    Historical Provider, MD   Galcanezumab-gnlm (Emgality) 120 MG/ML SOAJ Inject 120 mg under the skin every 30 (thirty) days Last inj 1/19/23    Historical Provider, MD   HYDROcodone-acetaminophen (NORCO)  mg per tablet Take 1 tablet by mouth every 6 (six) hours as needed for moderate pain    Historical Provider, MD   lamoTRIgine (LaMICtal) 100 mg tablet Take 100 mg by mouth 2 (two) times a day 1/16/18 3/13/24  Historical Provider, MD   latanoprost (XALATAN) 0.005 % ophthalmic solution instill 1 drop into both eyes at bedtime    Historical Provider, MD   levothyroxine 137 mcg tablet Take 137 mcg by mouth daily 1/21/19   Historical Provider, MD   Lidocaine 4 % PTCH Apply 1 patch topically daily 11/13/23   Viviana Mosley PA-C   magnesium 30 MG tablet Take 500  mg by mouth 2 (two) times a day 1/26/23 right now not taking    Historical Provider, MD   Melatonin 10 MG CAPS Take 1 tablet by mouth daily at bedtime as needed    Historical Provider, MD   methocarbamol (ROBAXIN) 500 mg tablet Take 0.5 tablets (250 mg total) by mouth every 6 (six) hours for 14 days 11/13/23 3/13/24  Viviana Mosley PA-C   nicotine (NICODERM CQ) 21 mg/24 hr TD 24 hr patch Place 1 patch on the skin every 24 hours 12/21/23   Historical Provider, MD   nicotine polacrilex (COMMIT) 4 MG lozenge Apply 1 lozenge to cheek every 2 (two) hours 10/19/23   Historical Provider, MD   omeprazole (PriLOSEC) 40 MG capsule Take 1 capsule (40 mg total) by mouth 2 (two) times a day 1/12/24   LAURIE Gtz   ondansetron (ZOFRAN) 4 mg tablet  2/1/24   Historical Provider, MD   ondansetron (ZOFRAN) 8 mg tablet Take 1 tablet (8 mg total) by mouth every 8 (eight) hours as needed for nausea or vomiting 7/13/22   Dionisio Cleary MD   OneTouch Ultra test strip USE TO TEST BLOOD GLUCOSE 2-3 TIMES DAILY 7/15/23   Historical Provider, MD   polyethylene glycol (GLYCOLAX) 17 GM/SCOOP powder Take 17 g daily for constipation. 1/12/24   LAURIE Gtz   predniSONE 20 mg tablet  12/20/23   Historical Provider, MD   predniSONE 20 mg tablet Take 1 tablet (20 mg total) by mouth daily 3/12/24   Kevin Escoto DO   pregabalin (LYRICA) 100 mg capsule Take 100 mg by mouth 3 (three) times a day Morning-lunch-dinner 10/26/17   Historical Provider, MD   pregabalin (LYRICA) 150 mg capsule take 1 capsule by mouth NIGHTLY 10/26/17   Historical Provider, MD   senna (SENOKOT) 8.6 mg Take 1 PO HS every other day. 1/12/24   LAURIE Gtz   tiZANidine (ZANAFLEX) 4 mg tablet 1 tablet 10/8/22   Historical Provider, MD   topiramate (TOPAMAX) 200 MG tablet 200 mg 2 (two) times a day 6/5/23   Historical Provider, MD   traZODone (DESYREL) 100 mg tablet Take 100 mg by mouth daily at bedtime    Historical Provider, MD   traZODone  "(DESYREL) 100 mg tablet Take 100 mg by mouth 12/27/23   Historical Provider, MD   True Comfort Safety Lancets MISC USE TO TEST BLOOD GLUCOSE 2-3 TIMES DAILY 4/11/23   Historical Provider, MD   Varenicline Tartrate, Starter, 0.5 MG X 11 & 1 MG X 42 TBPK USE AS DIRECTED. GIVE WITH MEALS AND WITH A FULL GLASS OF WATER. 12/21/23   Historical Provider, MD     I have reviewed home medications using recent Epic encounter.    Allergies:   Allergies   Allergen Reactions    Hydroxyzine Anaphylaxis     Claims it gives her convulsions.     Bee Pollen Other (See Comments)    Cinnamon - Food Allergy Other (See Comments)    Diphenhydramine Other (See Comments)    Ibuprofen Other (See Comments)    Other Other (See Comments)    Pollen Extract Itching    Tree Extract     Clarithromycin Rash     Pt denies    Latex Rash    Sulfa Antibiotics Rash     \"severe skin burn\"       Social History:  Marital Status:    Occupation:   Patient Pre-hospital Living Situation: Home  Patient Pre-hospital Level of Mobility: walks  Patient Pre-hospital Diet Restrictions: none  Substance Use History:   Social History     Substance and Sexual Activity   Alcohol Use Not Currently     Social History     Tobacco Use   Smoking Status Every Day    Current packs/day: 2.00    Types: Cigarettes   Smokeless Tobacco Never     Social History     Substance and Sexual Activity   Drug Use Never    Comment: prescribed Belbuca       Family History:  Family History   Problem Relation Age of Onset    Brain cancer Mother     Alzheimer's disease Mother     Alzheimer's disease Father     Parkinsonism Father        Physical Exam:     Vitals:   Blood Pressure: 150/97 (03/29/24 1800)  Pulse: 63 (03/29/24 1800)  Temperature: 98.2 °F (36.8 °C) (03/29/24 1800)  Temp Source: Temporal (03/29/24 1800)  Respirations: 18 (03/29/24 1800)  Weight - Scale: 75.9 kg (167 lb 5.3 oz) (03/29/24 1513)  SpO2: 96 % (03/29/24 1800)    PHYSICAL EXAM:    Vitals signs reviewed  Constitutional   " Awake and cooperative. NAD.   Head/Neck   Normocephalic. Atraumatic.   HEENT   No scleral icterus. EOMI.   Heart   Regular rate and rhythm. No murmers.   Lungs   Clear to auscultation bilaterally. Respirations unlaboured.   Abdomen   Soft. Nontender. Nondistended.    Skin   Skin color normal. No rashes.   Extremities   No deformities. No peripheral edema.   Neuro   Alert and oriented. No new deficits.   Psych   Mood labile. Affect normal.         Additional Data:     Lab Results:  Results from last 7 days   Lab Units 03/29/24  1525   WBC Thousand/uL 8.08   HEMOGLOBIN g/dL 13.6   HEMATOCRIT % 42.5   PLATELETS Thousands/uL 191     Results from last 7 days   Lab Units 03/29/24  1525   SODIUM mmol/L 141   POTASSIUM mmol/L 3.2*   CHLORIDE mmol/L 106   CO2 mmol/L 23   BUN mg/dL 20   CREATININE mg/dL 1.09   ANION GAP mmol/L 12   CALCIUM mg/dL 9.2   ALBUMIN g/dL 4.4   TOTAL BILIRUBIN mg/dL 0.28   ALK PHOS U/L 79   ALT U/L 20   AST U/L 22   GLUCOSE RANDOM mg/dL 76     Results from last 7 days   Lab Units 03/29/24  1525   INR  1.04     Results from last 7 days   Lab Units 03/29/24  1515   POC GLUCOSE mg/dl 76               Lines/Drains:  Invasive Devices       Peripheral Intravenous Line  Duration             Peripheral IV 03/29/24 Left Antecubital <1 day                        Imaging: Reviewed radiology reports from this admission including: CT head  CTA stroke alert (head/neck)   Final Result by Marcus Oleary MD (03/29 1605)      CTA head:   -Coil embolization in the region of the right cavernous ICA. No definite residual aneurysm is identified. Cavernous ICA is grossly patent within limitations of streak artifact.   -Otherwise no high-grade stenosis, or large vessel occlusion.      CTA neck:   -No high-grade stenosis, dissection or aneurysm.      Other:   -The distal aortic arch measures up to 3.6 cm and is unchanged. Recommend follow-up chest CT in 1 year.   -Stable size of partially calcified left parotid gland lesions  measuring up to 0.7 cm in back to 2019.            I personally communicated the findings via telephone with Pierce Sheikh at 3:58 p.m. on 3/29/2024.                           Workstation performed: [id number]         CT stroke alert brain   Final Result by Marcus Oleary MD (03/29 1607)   -Sequelae of aneurysmal coiling in the region of the right cavernous ICA. Associated streak artifact limits evaluation.   -No acute intracranial abnormality. Chronic microangiopathic changes.         I personally communicated the findings via telephone with Pierce Sheikh at 3:58 p.m. on 3/29/2024.         Workstation performed: [id number]               ** Please Note: This note has been constructed using a voice recognition system. **

## 2024-03-29 NOTE — ASSESSMENT & PLAN NOTE
History of brain aneurysm status post coil embolization  No acute concerns from the standpoint, stable on imaging

## 2024-03-29 NOTE — CONSULTS
TeleConsultation - Stroke   Jennifer Woodall 63 y.o. female MRN: [id number]  Unit/Bed#: RM04 Encounter: [id number]        REQUIRED DOCUMENTATION:     1. This service was provided via Telemedicine.  2. Provider located at [facility].  3. TeleMed provider: Pierce Sheikh MD.  4. Identify all parties in room with patient during tele consult:  None  5.Patient was then informed that this was a Telemedicine visit and that the exam was being conducted confidentially over secure lines. My office door was closed. No one else was in the room.  Patient acknowledged consent and understanding of privacy and security of the Telemedicine visit, and gave us permission to have the assistant stay in the room in order to assist with the history and to conduct the exam.  I informed the patient that I have reviewed their record in Epic and presented the opportunity for them to ask any questions regarding the visit today.  The patient agreed to participate.           Assessment/Plan   Assessment: 63 y.o. female with past medical history pertinent for HTN, HLD, T2DM, prior stroke, cerebral aneurysm s/p coiling, migraine, anxiety, depression, bipolar 1 disorder, PTSD, fibromyalgia who presents to [facility] ED with vertigo, dysarthria, and sensory impairment. nitial /87.  BG 76.  No reported AP/AC at home.  Initial NIHSS per onsite ED provider reportedly 3 for mild dysarthria, finger-to-nose impaired bilaterally, and alternating sensory impairment.  CT head showed no acute abnormality.  CTA head/neck showed no LVO and no significant posterior circulation disease. TPA Decision: Patient not a candidate. Unclear time of onset outside appropriate time window. No thrombectomy due to no LVO.    Tele neuro exam notable for poor attention, distractibility, lethargy, mild dysarthria, and subjective left facial sensory impairment, but otherwise nonfocal.  Presentation appears most consistent with toxic metabolic encephalopathy, specifically suspicious  for polypharmacy in the setting of her multiple centrally acting medications.  Given her history of similar presentations with unremarkable workups and current nonfocal neurologic exam, I do not feel that she needs any further inpatient neurologic testing (neuroimaging, electrodiagnostics).  Recommend broad toxic metabolic encephalopathy lab workup and consideration of reconciliation of her outpatient meds.  Patient's main complaint is falling asleep randomly over the past several days.  I witnessed 1 such event during my evaluation and while she did not wake to my voice through the television, she quickly woke up to a provider in person providing mild stimuli.  She has been evaluated in the past for periods of unresponsiveness with multiple EEGs and has never been found to have electrographic evidence of seizures.    Plan:  -No further inpatient neuroimaging indicated  -No acute antithrombotic changes needed  -Can consider outpatient sleep medicine referral    Jennifer Woodall will need follow-up with her [facility] established neurologist.    History of Present Illness     Reason for Consult / Principal Problem: Vertigo, dysarthria, unspecified laterality sensory impairment  Hx and PE limited by: None  Patient last known well: 05:00  Stroke alert called: 15:13  Neurology time of arrival: Via telephone immediate, via telemedicine 15:50  HPI: Jennifer Woodall is a 63 y.o. female with past medical history pertinent for HTN, HLD, T2DM, prior stroke, cerebral aneurysm s/p coiling, intractable chronic migraine, medication overuse headache, anxiety, depression, bipolar 1 disorder, PTSD, fibromyalgia who presents to [facility] ED with vertigo, dysarthria, and sensory impairment.  Initial /87.  BG 76.  No reported AP/AC at home.  Initial NIHSS per onsite ED provider reportedly 3 for mild dysarthria, finger-to-nose impaired bilaterally, and alternating sensory impairment.  CT head showed no acute abnormality.  CTA head/neck showed no  "LVO and no significant posterior circulation disease.    Patient is a poor historian but reports that over the past 2 days she has been unintentionally falling asleep at random times, even in the midst of conversation.  She has felt exhausted for no reason, has had decreased appetite, blurry vision, and her migraines have increased.  States that she woke up at 130 this morning and kept randomly falling back asleep.  Finally woke up around 5:00 this morning still feeling fatigued and generalized weakness.  Around 1230 she took a shower and felt off balance.  Her home health aide noticed that she kept falling asleep during conversations and reported that she had slurred speech and called EMS.  Patient reports feeling \"empty\".  LLE feels somewhat heavy, though she has had that feeling in the past.  Describes recent congestion and runny nose.  Endorses recent bad stomach and body pains.  Also endorses recent short-term memory issues.  Patient feels that her current symptoms are probably related to stress.    Per EMR review, patient has presented to the ED with neurologic symptoms including dizziness many times in the past all with negative neurovascular workups:  -3/15/2019 presented to ED with headache, binocular blurry vision, slurred speech, episodes of loss time, and dizziness/lightheadedness.  Ultimately transferred to [facility] where she had neuroimaging workup which revealed no acute abnormality.  Also had 24-hour EEG there which ruled out seizure activity.    -7/18/2019 presented to ED with dizziness, slurred speech, diplopia, and left-sided weakness.  Tele neuro exam at that time notable for subjectively diminished sensation in the left face, arm, and leg.  Neuroimaging workup revealed no acute abnormality.  Symptoms were felt to either represent complicated migraine or conversion disorder.  -6/15/2020 presented to ED with dizziness and left-sided paresthesias.  CT/CTA/MRI all without acute " abnormality.  -12/12/2021 presented to ED with syncope, headache, RUE paresthesias.  CTA head/neck unremarkable.  -4/27/2023 presented with cough, Abdo pain, and diarrhea.  On admission also reported baseline left lower extremity weakness and several weeks of LUE numbness.  CT/CTA unremarkable.    Last seen by outpatient [facility] neurology 3/27/2024.  Reported chronic daily headaches.  Exam notable for wasting and weakness of RUE, normal sensory exam, slightly unsteady gait, no appendicular ataxia.    Inpatient consult to Neurology  Consult performed by: Pierce Sheikh MD  Consult ordered by: Munira Ramirez MD        Review of Systems: Negative or noncontributory unless otherwise stated above    Historical Information   Past Medical History:   Diagnosis Date    Ambulates with cane     Anxiety     Arthritis     Asthma     Bipolar 1 disorder ([facility])     Brain aneurysm     Chronic pain disorder     spinal stenosis    Concussion syndrome     neurological rx and balance rx    COPD (chronic obstructive pulmonary disease) (Formerly [facility])     Depression     Diabetes mellitus ([facility])     controlled w/ diet    Disease of thyroid gland     nodules     Family history of colon cancer     father    Fibromyalgia, primary     GERD (gastroesophageal reflux disease)     History of colon polyps     Hyperlipidemia     Hypertension     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     Irritable bowel syndrome     Lumbar degenerative disc disease 02/16/2022    Migraine     Neuropathy     bilateral feet and hands    Peripheral neuropathy     Psychiatric disorder     PTSD (post-traumatic stress disorder)     Shortness of breath     Sleep apnea     had 3 studies & last one negative    Stroke ([facility])     2012 no deficeits    TIA (transient ischemic attack)     Wears dentures     Wears glasses      Past Surgical History:   Procedure Laterality Date    ABDOMINAL SURGERY      laproscopic/ endometriosis    BRAIN SURGERY      aneurysm/ coiling procedure     CARPAL TUNNEL RELEASE      CHOLECYSTECTOMY      COLONOSCOPY      DENTAL SURGERY      EPIDURAL BLOCK INJECTION Right 03/03/2022    Procedure: C7-T1 interlaminar epidural steroid injection;  Surgeon: Johnson Aranda MD;  [facility]: [facility];  Service: Pain Management     EPIDURAL BLOCK INJECTION N/A 04/21/2022    Procedure: C6-C7 BLOCK / INJECTION EPIDURAL STEROID CERVICAL;  Surgeon: Johnson Aranda MD;  [facility];  Service: Pain Management     EPIDURAL BLOCK INJECTION Left 06/21/2022    Procedure: BLOCK / INJECTION EPIDURAL STEROID LUMBAR  left L3-4 TFESI;  Surgeon: Johnson Aranda MD;  [facility]: [facility];  Service: Pain Management     EYE SURGERY      cataract    FASCIOTOMY Right 10/27/2023    Procedure: FASCIOTOMY OF RIGHT UPPER EXTREMITY; POSSIBLE VAC DRESSING APPLICATION;  Surgeon: Bruno Blevins MD;  [facility];  Service: General    FL GUIDED NEEDLE PLAC BX/ASP/INJ  03/03/2022    FL GUIDED NEEDLE PLAC BX/ASP/INJ  11/17/2022    HYSTERECTOMY      NERVE BLOCK Left 2/20/2024    Procedure: BLOCK MEDIAL BRANCH  left C3-4 and C4-5 MBB #1;  Surgeon: Johnson Aranda MD;  [facility];  Service: Pain Management     PA ESOPHAGOGASTRODUODENOSCOPY TRANSORAL DIAGNOSTIC N/A 02/08/2018    Procedure: EGD AND COLONOSCOPY;  Surgeon: Caleb Pete MD;  Location: [facility];  Service: Gastroenterology    PA PRQ IMPLTJ NSTIM ELECTRODE ARRAY EPIDURAL N/A 11/17/2022    Procedure: NEVRO SCS TRIAL;  Surgeon: Johnson Aranda MD;  [facility];  Service: Pain Management     PA PRQ IMPLTJ NSTIM ELECTRODE ARRAY EPIDURAL N/A 2/1/2023    Procedure: Insertion percutaneous thoracic spinal cord stimulator with left buttock implantable pulse generator;  Surgeon: Craig Robert Goldberg, MD;  [facility];  Service: Neurosurgery    SPLIT THICKNESS SKIN GRAFT Right 11/7/2023    Procedure: SKIN GRAFT SPLIT THICKNESS (STSG)  EXTREMITY;  Surgeon: Samm iSms MD;   "Location: [facility];  Service: General    THYROID SURGERY      TONSILLECTOMY      US GUIDED THYROID BIOPSY  11/30/2016    US GUIDED THYROID BIOPSY  3/21/2018    VAC DRESSING APPLICATION Right 10/29/2023    Procedure: CHANGE DRESSING/VAC RIGHT UPPER EXTREMITY; WASHOUT; PARTIAL CLOSURE;  Surgeon: Irina Day DO;  [facility];  Service: General    WOUND DEBRIDEMENT Right 11/4/2023    Procedure: SIMPLE CLOSURE 3.5CM, OASIS APPLICATION 15X7CM, WOUND VAC APPLICATION;  Surgeon: Maryuri Goldstein MD;  [facility];  Service: General     Social History   Social History     Substance and Sexual Activity   Alcohol Use Not Currently     Social History     Substance and Sexual Activity   Drug Use Never    Comment: prescribed Belbuca     E-Cigarette/Vaping    E-Cigarette Use Never User      E-Cigarette/Vaping Substances    Nicotine No     THC No     CBD No     Flavoring No     Other No     Unknown No      Social History     Tobacco Use   Smoking Status Every Day    Current packs/day: 2.00    Types: Cigarettes   Smokeless Tobacco Never     Family History: non-contributory    Review of previous medical records was completed.  Prior inpatient neurology notes, outpatient neurology notes, EMR search function for \"dizziness\", \"left-sided numbness/tingling/paresthesias\"    Meds/Allergies   all current active meds have been reviewed and PTA meds:   Prior to Admission Medications   Prescriptions Last Dose Informant Patient Reported? Taking?   Alcohol Swabs (Alcohol Pads) 70 % PADS  Care Giver, Self Yes No   Sig: USE TO TEST BLOOD GLUCOSE 2-3 TIMES DAILY   Anoro Ellipta 62.5-25 MCG/ACT inhaler  Self Yes No   Buprenorphine HCl (Belbuca) 300 MCG FILM  Self, Care Giver Yes No   Sig: Apply 300 mcg to cheek in the morning   DULoxetine (CYMBALTA) 30 mg delayed release capsule  Self Yes No   Sig: Take 60 mg by mouth daily   DULoxetine (CYMBALTA) 60 mg delayed release capsule  Self Yes No   Sig: Take 60 mg by mouth daily "   DULoxetine (CYMBALTA) 60 mg delayed release capsule  Self Yes No   Galcanezumab-gnlm (Emgality) 120 MG/ML SOAJ  Self, Care Giver Yes No   Sig: Inject 120 mg under the skin every 30 (thirty) days Last inj 1/19/23   HYDROcodone-acetaminophen (NORCO)  mg per tablet  Self Yes No   Sig: Take 1 tablet by mouth every 6 (six) hours as needed for moderate pain   Lidocaine 4 % PTCH  Care Giver, Self No No   Sig: Apply 1 patch topically daily   Melatonin 10 MG CAPS  Self, Care Giver Yes No   Sig: Take 1 tablet by mouth daily at bedtime as needed   OneTouch Ultra test strip  Care Giver, Self Yes No   Sig: USE TO TEST BLOOD GLUCOSE 2-3 TIMES DAILY   True Comfort Safety Lancets MISC  Care Giver, Self Yes No   Sig: USE TO TEST BLOOD GLUCOSE 2-3 TIMES DAILY   Varenicline Tartrate, Starter, 0.5 MG X 11 & 1 MG X 42 TBPK  Self Yes No   Sig: USE AS DIRECTED. GIVE WITH MEALS AND WITH A FULL GLASS OF WATER.   albuterol (PROVENTIL HFA,VENTOLIN HFA) 90 mcg/act inhaler  Self No No   Sig: Inhale 2 puffs every 6 (six) hours as needed for wheezing or shortness of breath   ammonium lactate (LAC-HYDRIN) 12 % lotion  Care Giver, Self Yes No   Sig: as needed   atorvastatin (LIPITOR) 80 mg tablet  Care Giver, Self Yes No   Sig: Take 80 mg by mouth every evening   benzonatate (TESSALON PERLES) 100 mg capsule  Care Giver, Self No No   Sig: Take 1 capsule (100 mg total) by mouth 3 (three) times a day as needed for cough   benzonatate (TESSALON PERLES) 100 mg capsule  Self No No   Sig: Take 1 capsule (100 mg total) by mouth 3 (three) times a day as needed for cough   carboxymethylcellulose 0.5 % SOLN  Self, Care Giver No No   Sig: Administer 1 drop to both eyes daily as needed for dry eyes   clonazePAM (KlonoPIN) 0.5 mg tablet  Self, Care Giver Yes No   Sig: Take 0.5 mg by mouth daily at bedtime   clonazePAM (KlonoPIN) 1 mg tablet  Self, Care Giver Yes No   Sig: Take 1 mg by mouth daily in the early morning   dicyclomine (BENTYL) 20 mg tablet   Self, Care Giver Yes No   Sig: Take 20 mg by mouth every 6 (six) hours   famotidine (PEPCID) 40 MG tablet  Care Giver, Self No No   Sig: Take 1 tablet (40 mg total) by mouth daily at bedtime   fenofibrate (TRICOR) 48 mg tablet  Self, Care Giver Yes No   Sig: Take 48 mg by mouth daily   fluticasone (FLONASE) 50 mcg/act nasal spray  Care Giver, Self Yes No   Si sprays into each nostril daily   fluticasone-umeclidinium-vilanterol (Trelegy Ellipta) 200-62.5-25 mcg/actuation AEPB inhaler  Self No No   Sig: Inhale 1 puff daily Rinse mouth after use.   furosemide (LASIX) 20 mg tablet  Self, Care Giver Yes No   Sig: Take 20 mg by mouth as needed Taking as needed   lamoTRIgine (LaMICtal) 100 mg tablet  Care Giver, Self Yes No   Sig: Take 100 mg by mouth 2 (two) times a day   latanoprost (XALATAN) 0.005 % ophthalmic solution  Care Giver, Self Yes No   Sig: instill 1 drop into both eyes at bedtime   levothyroxine 137 mcg tablet  Self, Care Giver Yes No   Sig: Take 137 mcg by mouth daily   magnesium 30 MG tablet  Self, Care Giver Yes No   Sig: Take 500 mg by mouth 2 (two) times a day 23 right now not taking   methocarbamol (ROBAXIN) 500 mg tablet  Care Giver, Self No No   Sig: Take 0.5 tablets (250 mg total) by mouth every 6 (six) hours for 14 days   nicotine (NICODERM CQ) 21 mg/24 hr TD 24 hr patch  Self Yes No   Sig: Place 1 patch on the skin every 24 hours   nicotine polacrilex (COMMIT) 4 MG lozenge  Self Yes No   Sig: Apply 1 lozenge to cheek every 2 (two) hours   omeprazole (PriLOSEC) 40 MG capsule  Self No No   Sig: Take 1 capsule (40 mg total) by mouth 2 (two) times a day   ondansetron (ZOFRAN) 4 mg tablet  Self Yes No   ondansetron (ZOFRAN) 8 mg tablet  Care Giver, Self No No   Sig: Take 1 tablet (8 mg total) by mouth every 8 (eight) hours as needed for nausea or vomiting   polyethylene glycol (GLYCOLAX) 17 GM/SCOOP powder  Self No No   Sig: Take 17 g daily for constipation.   predniSONE 20 mg tablet  Self Yes  "No   predniSONE 20 mg tablet  Self No No   Sig: Take 1 tablet (20 mg total) by mouth daily   pregabalin (LYRICA) 100 mg capsule  Self, Care Giver Yes No   Sig: Take 100 mg by mouth 3 (three) times a day Morning-lunch-dinner   pregabalin (LYRICA) 150 mg capsule  Self, Care Giver Yes No   Sig: take 1 capsule by mouth NIGHTLY   senna (SENOKOT) 8.6 mg  Self No No   Sig: Take 1 PO HS every other day.   tiZANidine (ZANAFLEX) 4 mg tablet  Care Giver, Self Yes No   Si tablet   topiramate (TOPAMAX) 200 MG tablet  Care Giver, Self Yes No   Si mg 2 (two) times a day   traZODone (DESYREL) 100 mg tablet  Self Yes No   Sig: Take 100 mg by mouth daily at bedtime   traZODone (DESYREL) 100 mg tablet  Self Yes No   Sig: Take 100 mg by mouth      Facility-Administered Medications: None       Allergies   Allergen Reactions    Hydroxyzine Anaphylaxis     Claims it gives her convulsions.     Bee Pollen Other (See Comments)    Cinnamon - Food Allergy Other (See Comments)    Diphenhydramine Other (See Comments)    Ibuprofen Other (See Comments)    Other Other (See Comments)    Pollen Extract Itching    Tree Extract     Clarithromycin Rash     Pt denies    Latex Rash    Sulfa Antibiotics Rash     \"severe skin burn\"       Objective   Vitals:Blood pressure 126/87, pulse 58, temperature 98.4 °F (36.9 °C), temperature source Temporal, resp. rate 18, weight 75.9 kg (167 lb 5.3 oz), SpO2 95%.,Body mass index is 28.72 kg/m².  No intake or output data in the 24 hours ending 24 1524    Invasive Devices:   Invasive Devices       Peripheral Intravenous Line  Duration             Peripheral IV 24 Left Antecubital <1 day                  Tele Neurologic Exam:  MENTAL STATUS: Somewhat lethargic, but awake, alert, attending to examiner on television.  She has poor attention and is easily distractible.  Oriented to self, place, month, date, year, current and past president.  Follows complex multistep commands.  Tangential and " perseverative thought content.  Mostly flat affect but intermittently jovial.  Towards the end of the tele evaluation patient reported that she was feeling very tired again, closed her eyes, and stopped responding to my voice.  While in this state, there were no facial automatisms, eyes were closed, normal respirations, and no witnessed seizure-like activity.  When ED provider entered the room and provided mild stimuli, she awoke right away.    LANGUAGE: No aphasia -  naming, repetition, comprehension intact.  Mild dysarthria, but grossly intelligible speech.    CRANIAL NERVES:  CN II: Visual acuity grossly intact. No obvious visual field deficit.   CN III, IV, VI: EOM's intact w/o nystagmus, gaze palsy, or preference.   CN V: Normal masseter bulk.  Subjectively diminished sensation to light touch over left V1-V3.   CN VII: Face movement symmetric. Smile, eyebrow raise, eyelid bury symmetric. Nasolabial folds and palpebral folds symmetric.   CN VIII: Hearing grossly intact bilaterally.   CN IX-X: Mild dysarthria.   CN XI: Shoulder shrug symmetric.   CN XII: Tongue midline.    MOTOR: Normal muscle bulk. No pronator drift or orbiting.  BL UE mildly tremulous with arms outstretched.  Hold BL UE antigravity without drift.  Holds BL LE antigravity without drift, though LLE antigravity appears more effortful and she grimaces while doing this.    SENSORY:  Light touch: Subjectively intact and symmetric throughout.    COORDINATION: Finger-to-nose w/ eyes closed intact bilaterally. Heel-to-shin intact bilaterally w/o ataxia. No truncal ataxia.    NIHSS:  1a.Level of Consciousness: 1 = Not alert, but arousable with minimal stimulation   1b. LOC Questions: 0 = Answers both correctly   1c. LOC Commands: 0 = Obeys both correctly   2. Best Gaze: 0 = Normal   3. Visual: 0 = No visual field loss   4. Facial Palsy: 0=Normal symmetric movement   5a. Motor Right Arm: 0=No drift, limb holds 90 (or 45) degrees for full 10 seconds   5b.  "Motor Left Arm: 0=No drift, limb holds 90 (or 45) degrees for full 10 seconds   6a. Motor Right Le=No drift, limb holds 90 (or 45) degrees for full 10 seconds   6b. Motor Left Le=No drift, limb holds 90 (or 45) degrees for full 10 seconds   7. Limb Ataxia:  0=Absent   8. Sensory: 1=Mild to moderate sensory loss; patient feels pinprick is less sharp or is dull on the affected side; there is a loss of superficial pain with pinprick but patient is aware She is being touched   9. Best Language:  0=No aphasia, normal   10. Dysarthria: 1=Mild to moderate, patient slurs at least some words and at worst, can be understood with some difficulty   11. Extinction and Inattention (formerly Neglect): 0=No abnormality   Total Score: 2     Time NIHSS was completed: 15:50    Modified Lomax Score:  2 (Unable to carry out all previous activities, but able to look after own affairs without assistance)    Lab Results: I have personally reviewed pertinent reports.  , CBC:   Results from last 7 days   Lab Units 24  1525   WBC Thousand/uL 8.08   RBC Million/uL 4.40   HEMOGLOBIN g/dL 13.6   HEMATOCRIT % 42.5   MCV fL 97   PLATELETS Thousands/uL 191   , BMP/CMP:       Invalid input(s): \"ALBUMIN\"  Imaging Studies: I have personally reviewed pertinent reports.  , I have personally reviewed pertinent films in PACS, and I have personally reviewed pertinent films in PACS with a Radiologist.  CT STROKE ALERT BRAIN 3/29/2024  IMPRESSION:  -Sequelae of aneurysmal coiling in the region of the right cavernous ICA. Associated streak artifact limits evaluation.  -No acute intracranial abnormality. Chronic microangiopathic changes.    CTA STROKE ALERT HEAD NECK 3/29/2024  IMPRESSION:  CTA head:  -Coil embolization in the region of the right cavernous ICA. No definite residual aneurysm is identified. Cavernous ICA is grossly patent within limitations of streak artifact.  -Otherwise no high-grade stenosis, or large vessel occlusion.     CTA " neck:  -No high-grade stenosis, dissection or aneurysm.     Other:  -The distal aortic arch measures up to 3.6 cm and is unchanged. Recommend follow-up chest CT in 1 year.  -Stable size of partially calcified left parotid gland lesions measuring up to 0.7 cm in back to 2019.    CT HEAD WO CONTRAST 11/22/2023  IMPRESSION:  No acute intracranial abnormality with findings detailed above. No significant interval change compared to the prior exam.    MRI BRAIN WITHOUT CONTRAST 6/16/2020  IMPRESSION:  Stable MRI of the brain with no acute intracranial abnormality identified.  There is mild chronic microangiopathic change with no acute ischemia.  Previous aneurysm coiling.    EKG, Pathology, and Other Studies: I have personally reviewed pertinent reports.      Counseling / Coordination of Care  Total Critical Care time spent 60 minutes excluding procedures, teaching and family updates.

## 2024-03-29 NOTE — Clinical Note
Case was discussed with Dr. Yap and the patient's admission status was agreed to be Admission Status: inpatient status to the service of Dr. Yap .

## 2024-03-29 NOTE — ASSESSMENT & PLAN NOTE
Presents with bilateral upper extremity ataxia, and ataxia with ambulation, and sleepiness  Neurologic workup in the emergency room negative and no further neurologic workup recommended from the neurology team  Symptoms are suspected to be due to polypharmacy  Patient is on rather large number of medications a lot of which are psychoactive including but not limited to Lamictal, Cymbalta, Lyrica, trazodone, buprenorphine, clonazepam, Topamax, and Robaxin.    Difficult to know where to start and regarding trying to reduce her medications or start tapering.  Some of these will require months of tapering to safely come off of.  Will place on IV fluid overnight  Of all the medications listed I feel that trazodone decreasing discontinuing trazodone and decreasing her Lyrica dose may be the safest at this time.  Hopefully over the next 24 to 48 hours she will improve with hydration  She needs close outpatient follow-up with psychiatry, [facility] neurology, and PCP to help tapering these medications however will be a difficult process as she is on this for numerous reasons including fibromyalgia, chronic migraines, chronic pain syndrome, and cervical radiculopathy.

## 2024-03-29 NOTE — PLAN OF CARE
Problem: PAIN - ADULT  Goal: Verbalizes/displays adequate comfort level or baseline comfort level  Description: Interventions:  - Encourage patient to monitor pain and request assistance  - Assess pain using appropriate pain scale  - Administer analgesics based on type and severity of pain and evaluate response  - Implement non-pharmacological measures as appropriate and evaluate response  - Consider cultural and social influences on pain and pain management  - Notify physician/advanced practitioner if interventions unsuccessful or patient reports new pain  Outcome: Progressing     Problem: INFECTION - ADULT  Goal: Absence or prevention of progression during hospitalization  Description: INTERVENTIONS:  - Assess and monitor for signs and symptoms of infection  - Monitor lab/diagnostic results  - Monitor all insertion sites, i.e. indwelling lines, tubes, and drains  - Monitor endotracheal if appropriate and nasal secretions for changes in amount and color  - [place] appropriate cooling/warming therapies per order  - Administer medications as ordered  - Instruct and encourage patient and family to use good hand hygiene technique  - Identify and instruct in appropriate isolation precautions for identified infection/condition  Outcome: Progressing  Goal: Absence of fever/infection during neutropenic period  Description: INTERVENTIONS:  - Monitor WBC    Outcome: Progressing     Problem: SAFETY ADULT  Goal: Patient will remain free of falls  Description: INTERVENTIONS:  - Educate patient/family on patient safety including physical limitations  - Instruct patient to call for assistance with activity   - Consult OT/PT to assist with strengthening/mobility   - Keep Call bell within reach  - Keep bed low and locked with side rails adjusted as appropriate  - Keep care items and personal belongings within reach  - Initiate and maintain comfort rounds  - Make Fall Risk Sign visible to staff  - Offer Toileting every 2 Hours,  in advance of need  - Initiate/Maintain bed/chair alarm  - Obtain necessary fall risk management equipment: non skid socks  - Apply yellow socks and bracelet for high fall risk patients  - Consider moving patient to room near nurses station  Outcome: Progressing  Goal: Maintain or return to baseline ADL function  Description: INTERVENTIONS:  -  Assess patient's ability to carry out ADLs; assess patient's baseline for ADL function and identify physical deficits which impact ability to perform ADLs (bathing, care of mouth/teeth, toileting, grooming, dressing, etc.)  - Assess/evaluate cause of self-care deficits   - Assess range of motion  - Assess patient's mobility; develop plan if impaired  - Assess patient's need for assistive devices and provide as appropriate  - Encourage maximum independence but intervene and supervise when necessary  - Involve family in performance of ADLs  - Assess for home care needs following discharge   - Consider OT consult to assist with ADL evaluation and planning for discharge  - Provide patient education as appropriate  Outcome: Progressing  Goal: Maintains/Returns to pre admission functional level  Description: INTERVENTIONS:  - Perform AM-PAC 6 Click Basic Mobility/ Daily Activity assessment daily.  - Set and communicate daily mobility goal to care team and patient/family/caregiver.   - Collaborate with rehabilitation services on mobility goals if consulted  - Perform Range of Motion 3 times a day.  - Reposition patient every 2 hours.  - Dangle patient 3 times a day  - Stand patient 3 times a day  - Ambulate patient 3 times a day  - Out of bed to chair 3 times a day   - Out of bed for meals 3 times a day  - Out of bed for toileting  - Record patient progress and toleration of activity level   Outcome: Progressing     Problem: METABOLIC, FLUID AND ELECTROLYTES - ADULT  Goal: Electrolytes maintained within normal limits  Description: INTERVENTIONS:  - Monitor labs and assess patient  for signs and symptoms of electrolyte imbalances  - Administer electrolyte replacement as ordered  - Monitor response to electrolyte replacements, including repeat lab results as appropriate  - Instruct patient on fluid and nutrition as appropriate  Outcome: Progressing  Goal: Fluid balance maintained  Description: INTERVENTIONS:  - Monitor labs   - Monitor I/O and WT  - Instruct patient on fluid and nutrition as appropriate  - Assess for signs & symptoms of volume excess or deficit  Outcome: Progressing

## 2024-03-29 NOTE — ED PROVIDER NOTES
"History  Chief Complaint   Patient presents with    STROKE Alert     Patient got up at 0500 feeling dizzy and uncoordinated and states it has been getting worse throughout the day. Reports decrease sensations on one side of face. Also reports two days of generalized weakness and headache.     Jennifer is a 63-year-old female with history of fibromyalgia, prior TIA, migraines, anxiety, bipolar disorder, and prior brain aneurysm who presents with ataxic gait and dysarthria.  States that her caretaker has been telling her that she has been falling asleep randomly for the past few days and was hard to wake up at times, then woke up \"wide awake\" at 0130 this morning and could not fall back asleep.  At 0500 this morning, felt dizzy with ambulation and at rest, felt her balance was off but was able to walk without difficulty.  Took klonopin for anxiety around noon, shortly thereafter developed mild slurred speech and an ataxic gait, is unable to walk without significant assistance due to imbalance.  Feels as though one of her arms and one of her legs is numb or weaker than the other, is not sure which arm or leg, is also unsure whether they are numb or weak, also has a very dry mouth.  She has a chronic headache which is at baseline and for which she was seen by neurology on 3/27/24, also has chronic neck and back pain which are at baseline.  Denies blurred vision, tinnitus, URI symptoms, chest pain, dyspnea, abdominal pain, nausea, vomiting, or any other complaints.  Lives alone.          Prior to Admission Medications   Prescriptions Last Dose Informant Patient Reported? Taking?   Alcohol Swabs (Alcohol Pads) 70 % PADS  Care Giver, Self Yes No   Sig: USE TO TEST BLOOD GLUCOSE 2-3 TIMES DAILY   Anoro Ellipta 62.5-25 MCG/ACT inhaler  Self Yes No   Buprenorphine HCl (Belbuca) 300 MCG FILM  Self, Care Giver Yes No   Sig: Apply 300 mcg to cheek in the morning   DULoxetine (CYMBALTA) 30 mg delayed release capsule  Self Yes No "   Sig: Take 60 mg by mouth daily   DULoxetine (CYMBALTA) 60 mg delayed release capsule  Self Yes No   Sig: Take 60 mg by mouth daily   DULoxetine (CYMBALTA) 60 mg delayed release capsule  Self Yes No   Galcanezumab-gnlm (Emgality) 120 MG/ML SOAJ  Self, Care Giver Yes No   Sig: Inject 120 mg under the skin every 30 (thirty) days Last inj 1/19/23   HYDROcodone-acetaminophen (NORCO)  mg per tablet  Self Yes No   Sig: Take 1 tablet by mouth every 6 (six) hours as needed for moderate pain   Lidocaine 4 % PTCH  Care Giver, Self No No   Sig: Apply 1 patch topically daily   Melatonin 10 MG CAPS  Self, Care Giver Yes No   Sig: Take 1 tablet by mouth daily at bedtime as needed   OneTouch Ultra test strip  Care Giver, Self Yes No   Sig: USE TO TEST BLOOD GLUCOSE 2-3 TIMES DAILY   True Comfort Safety Lancets MISC  Care Giver, Self Yes No   Sig: USE TO TEST BLOOD GLUCOSE 2-3 TIMES DAILY   Varenicline Tartrate, Starter, 0.5 MG X 11 & 1 MG X 42 TBPK  Self Yes No   Sig: USE AS DIRECTED. GIVE WITH MEALS AND WITH A FULL GLASS OF WATER.   albuterol (PROVENTIL HFA,VENTOLIN HFA) 90 mcg/act inhaler  Self No No   Sig: Inhale 2 puffs every 6 (six) hours as needed for wheezing or shortness of breath   ammonium lactate (LAC-HYDRIN) 12 % lotion  Care Giver, Self Yes No   Sig: as needed   atorvastatin (LIPITOR) 80 mg tablet  Care Giver, Self Yes No   Sig: Take 80 mg by mouth every evening   benzonatate (TESSALON PERLES) 100 mg capsule  Care Giver, Self No No   Sig: Take 1 capsule (100 mg total) by mouth 3 (three) times a day as needed for cough   benzonatate (TESSALON PERLES) 100 mg capsule  Self No No   Sig: Take 1 capsule (100 mg total) by mouth 3 (three) times a day as needed for cough   carboxymethylcellulose 0.5 % SOLN  Self, Care Giver No No   Sig: Administer 1 drop to both eyes daily as needed for dry eyes   clonazePAM (KlonoPIN) 0.5 mg tablet  Self, Care Giver Yes No   Sig: Take 0.5 mg by mouth daily at bedtime   clonazePAM  (KlonoPIN) 1 mg tablet  Self, Care Giver Yes No   Sig: Take 1 mg by mouth daily in the early morning   dicyclomine (BENTYL) 20 mg tablet  Self, Care Giver Yes No   Sig: Take 20 mg by mouth every 6 (six) hours   famotidine (PEPCID) 40 MG tablet  Care Giver, Self No No   Sig: Take 1 tablet (40 mg total) by mouth daily at bedtime   fenofibrate (TRICOR) 48 mg tablet  Self, Care Giver Yes No   Sig: Take 48 mg by mouth daily   fluticasone (FLONASE) 50 mcg/act nasal spray  Care Giver, Self Yes No   Si sprays into each nostril daily   fluticasone-umeclidinium-vilanterol (Trelegy Ellipta) 200-62.5-25 mcg/actuation AEPB inhaler  Self No No   Sig: Inhale 1 puff daily Rinse mouth after use.   furosemide (LASIX) 20 mg tablet  Self, Care Giver Yes No   Sig: Take 20 mg by mouth as needed Taking as needed   lamoTRIgine (LaMICtal) 100 mg tablet  Care Giver, Self Yes No   Sig: Take 100 mg by mouth 2 (two) times a day   latanoprost (XALATAN) 0.005 % ophthalmic solution  Care Giver, Self Yes No   Sig: instill 1 drop into both eyes at bedtime   levothyroxine 137 mcg tablet  Self, Care Giver Yes No   Sig: Take 137 mcg by mouth daily   magnesium 30 MG tablet  Self, Care Giver Yes No   Sig: Take 500 mg by mouth 2 (two) times a day 23 right now not taking   methocarbamol (ROBAXIN) 500 mg tablet  Care Giver, Self No No   Sig: Take 0.5 tablets (250 mg total) by mouth every 6 (six) hours for 14 days   nicotine (NICODERM CQ) 21 mg/24 hr TD 24 hr patch  Self Yes No   Sig: Place 1 patch on the skin every 24 hours   nicotine polacrilex (COMMIT) 4 MG lozenge  Self Yes No   Sig: Apply 1 lozenge to cheek every 2 (two) hours   omeprazole (PriLOSEC) 40 MG capsule  Self No No   Sig: Take 1 capsule (40 mg total) by mouth 2 (two) times a day   ondansetron (ZOFRAN) 4 mg tablet  Self Yes No   ondansetron (ZOFRAN) 8 mg tablet  Care Giver, Self No No   Sig: Take 1 tablet (8 mg total) by mouth every 8 (eight) hours as needed for nausea or vomiting    polyethylene glycol (GLYCOLAX) 17 GM/SCOOP powder  Self No No   Sig: Take 17 g daily for constipation.   predniSONE 20 mg tablet  Self Yes No   predniSONE 20 mg tablet  Self No No   Sig: Take 1 tablet (20 mg total) by mouth daily   pregabalin (LYRICA) 100 mg capsule  Self, Care Giver Yes No   Sig: Take 100 mg by mouth 3 (three) times a day Morning-lunch-dinner   pregabalin (LYRICA) 150 mg capsule  Self, Care Giver Yes No   Sig: take 1 capsule by mouth NIGHTLY   senna (SENOKOT) 8.6 mg  Self No No   Sig: Take 1 PO HS every other day.   tiZANidine (ZANAFLEX) 4 mg tablet  Care Giver, Self Yes No   Si tablet   topiramate (TOPAMAX) 200 MG tablet  Care Giver, Self Yes No   Si mg 2 (two) times a day   traZODone (DESYREL) 100 mg tablet  Self Yes No   Sig: Take 100 mg by mouth daily at bedtime   traZODone (DESYREL) 100 mg tablet  Self Yes No   Sig: Take 100 mg by mouth      Facility-Administered Medications: None       Past Medical History:   Diagnosis Date    Ambulates with cane     Anxiety     Arthritis     Asthma     Bipolar 1 disorder ([facility])     Brain aneurysm     Chronic pain disorder     spinal stenosis    Concussion syndrome     neurological rx and balance rx    COPD (chronic obstructive pulmonary disease) ([facility])     Depression     Diabetes mellitus ([facility])     controlled w/ diet    Disease of thyroid gland     nodules     Family history of colon cancer     father    Fibromyalgia, primary     GERD (gastroesophageal reflux disease)     History of colon polyps     Hyperlipidemia     Hypertension     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     Irritable bowel syndrome     Lumbar degenerative disc disease 2022    Migraine     Neuropathy     bilateral feet and hands    Peripheral neuropathy     Psychiatric disorder     PTSD (post-traumatic stress disorder)     Shortness of breath     Sleep apnea     had 3 studies & last one negative    Stroke ([facility])      no deficeits    TIA (transient ischemic  attack)     Wears dentures     Wears glasses        Past Surgical History:   Procedure Laterality Date    ABDOMINAL SURGERY      laproscopic/ endometriosis    BRAIN SURGERY      aneurysm/ coiling procedure    CARPAL TUNNEL RELEASE      CHOLECYSTECTOMY      COLONOSCOPY      DENTAL SURGERY      EPIDURAL BLOCK INJECTION Right 03/03/2022    Procedure: C7-T1 interlaminar epidural steroid injection;  Surgeon: Johnson Aranda MD;  [facility]: [facility];  Service: Pain Management     EPIDURAL BLOCK INJECTION N/A 04/21/2022    Procedure: C6-C7 BLOCK / INJECTION EPIDURAL STEROID CERVICAL;  Surgeon: Johnson Aranda MD;  [facility];  Service: Pain Management     EPIDURAL BLOCK INJECTION Left 06/21/2022    Procedure: BLOCK / INJECTION EPIDURAL STEROID LUMBAR  left L3-4 TFESI;  Surgeon: Johnson Aranda MD;  [facility]: [facility];  Service: Pain Management     EYE SURGERY      cataract    FASCIOTOMY Right 10/27/2023    Procedure: FASCIOTOMY OF RIGHT UPPER EXTREMITY; POSSIBLE VAC DRESSING APPLICATION;  Surgeon: Bruno Blevins MD;  [facility];  Service: General    FL GUIDED NEEDLE PLAC BX/ASP/INJ  03/03/2022    FL GUIDED NEEDLE PLAC BX/ASP/INJ  11/17/2022    HYSTERECTOMY      NERVE BLOCK Left 2/20/2024    Procedure: BLOCK MEDIAL BRANCH  left C3-4 and C4-5 MBB #1;  Surgeon: Johnson Aranda MD;  [facility];  Service: Pain Management     IN ESOPHAGOGASTRODUODENOSCOPY TRANSORAL DIAGNOSTIC N/A 02/08/2018    Procedure: EGD AND COLONOSCOPY;  Surgeon: Caleb Pete MD;  [facility]: [facility];  Service: Gastroenterology    IN PRQ IMPLTJ NSTIM ELECTRODE ARRAY EPIDURAL N/A 11/17/2022    Procedure: NEVRO SCS TRIAL;  Surgeon: Johnson Aranda MD;  [facility];  Service: Pain Management     IN PRQ IMPLTJ NSTIM ELECTRODE ARRAY EPIDURAL N/A 2/1/2023    Procedure: Insertion percutaneous thoracic spinal cord stimulator with left buttock implantable pulse generator;  Surgeon: Fausto  Robert Goldberg, MD;  [facility];  Service: Neurosurgery    SPLIT THICKNESS SKIN GRAFT Right 11/7/2023    Procedure: SKIN GRAFT SPLIT THICKNESS (STSG)  EXTREMITY;  Surgeon: Samm Sims MD;  [facility];  Service: General    THYROID SURGERY      TONSILLECTOMY      US GUIDED THYROID BIOPSY  11/30/2016    US GUIDED THYROID BIOPSY  3/21/2018    VAC DRESSING APPLICATION Right 10/29/2023    Procedure: CHANGE DRESSING/VAC RIGHT UPPER EXTREMITY; WASHOUT; PARTIAL CLOSURE;  Surgeon: Irina Day DO;  [facility];  Service: General    WOUND DEBRIDEMENT Right 11/4/2023    Procedure: SIMPLE CLOSURE 3.5CM, OASIS APPLICATION 15X7CM, WOUND VAC APPLICATION;  Surgeon: Maryuri Goldstein MD;  [facility];  Service: General       Family History   Problem Relation Age of Onset    Brain cancer Mother     Alzheimer's disease Mother     Alzheimer's disease Father     Parkinsonism Father      I have reviewed and agree with the history as documented.    E-Cigarette/Vaping    E-Cigarette Use Never User      E-Cigarette/Vaping Substances    Nicotine No     THC No     CBD No     Flavoring No     Other No     Unknown No      Social History     Tobacco Use    Smoking status: Every Day     Current packs/day: 2.00     Types: Cigarettes    Smokeless tobacco: Never   Vaping Use    Vaping status: Never Used   Substance Use Topics    Alcohol use: Not Currently    Drug use: Never     Comment: prescribed Belbuca        Review of Systems   Constitutional:  Positive for fatigue. Negative for appetite change, chills, diaphoresis and fever.   HENT:  Negative for congestion, ear pain, rhinorrhea, sneezing, sore throat, tinnitus and trouble swallowing.    Eyes: Negative.  Negative for visual disturbance.   Respiratory: Negative.  Negative for cough and shortness of breath.    Cardiovascular: Negative.  Negative for chest pain, palpitations and leg swelling.   Gastrointestinal: Negative.  Negative for abdominal  distention, abdominal pain, nausea and vomiting.   Endocrine: Negative.    Genitourinary: Negative.  Negative for difficulty urinating, dysuria and hematuria.   Musculoskeletal:  Positive for back pain and neck pain.        Chronic neck and back pain, at baseline.   Skin: Negative.    Allergic/Immunologic: Negative.    Neurological:  Positive for dizziness, speech difficulty, weakness, numbness and headaches. Negative for tremors, syncope and facial asymmetry.        Chronic headache, at baseline.   Hematological: Negative.    Psychiatric/Behavioral: Negative.  Negative for confusion.    All other systems reviewed and are negative.      Physical Exam  ED Triage Vitals   Temperature Pulse Respirations Blood Pressure SpO2   03/29/24 1513 03/29/24 1513 03/29/24 1513 03/29/24 1513 03/29/24 1513   98.4 °F (36.9 °C) 58 18 126/87 97 %      Temp Source Heart Rate Source Patient Position - Orthostatic VS BP Location FiO2 (%)   03/29/24 1513 03/29/24 1513 03/29/24 1513 -- --   Temporal Monitor Lying        Pain Score       03/29/24 1530       8             Orthostatic Vital Signs  Vitals:    03/29/24 1600 03/29/24 1630 03/29/24 1700 03/29/24 1730   BP: 127/77 121/80 118/72 122/78   Pulse: 58 58 57 55   Patient Position - Orthostatic VS:           Physical Exam  Vitals and nursing note reviewed. Exam conducted with a chaperone present.   Constitutional:       Appearance: She is not diaphoretic.      Comments: Appears fatigued.   HENT:      Head: Normocephalic.      Right Ear: External ear normal.      Left Ear: External ear normal.      Nose: Nose normal.      Mouth/Throat:      Mouth: Mucous membranes are dry.      Pharynx: Oropharynx is clear.   Eyes:      General: No visual field deficit or scleral icterus.        Right eye: No discharge.         Left eye: No discharge.      Extraocular Movements: Extraocular movements intact.      Conjunctiva/sclera: Conjunctivae normal.      Pupils: Pupils are equal, round, and reactive  to light.   Cardiovascular:      Rate and Rhythm: Normal rate and regular rhythm.      Pulses: Normal pulses.      Heart sounds: Normal heart sounds.   Pulmonary:      Effort: Pulmonary effort is normal. No respiratory distress.      Breath sounds: Normal breath sounds.   Abdominal:      General: Abdomen is flat. Bowel sounds are normal. There is no distension.      Palpations: Abdomen is soft.      Tenderness: There is no abdominal tenderness.   Musculoskeletal:         General: Normal range of motion.      Cervical back: Normal range of motion and neck supple. No tenderness.      Right lower leg: No edema.      Left lower leg: No edema.   Skin:     General: Skin is warm and dry.      Capillary Refill: Capillary refill takes less than 2 seconds.   Neurological:      Cranial Nerves: Dysarthria present. No cranial nerve deficit or facial asymmetry.      Motor: Motor function is intact. No tremor or pronator drift.      Coordination: Coordination normal. Finger-Nose-Finger Test abnormal. Heel to Abarca Test normal.      Comments: Impaired finger to nose bilaterally.  Mild dysarthria.  States that one side of her face, one arm, and one leg feels either weak or numb to her, however she is not sure which is the abnormal side on testing, sensation exam very inconsistent.         ED Medications  Medications   lactated ringers bolus 500 mL (has no administration in time range)   multi-electrolyte (PLASMALYTE-A/ISOLYTE-S PH 7.4) IV solution (has no administration in time range)   albuterol (PROVENTIL HFA,VENTOLIN HFA) inhaler 2 puff (has no administration in time range)   nicotine (NICODERM CQ) 21 mg/24 hr TD 24 hr patch 21 mg (has no administration in time range)   iohexol (OMNIPAQUE) 350 MG/ML injection (MULTI-DOSE) 100 mL (100 mL Intravenous Given 3/29/24 1530)       Diagnostic Studies  Results Reviewed       Procedure Component Value Units Date/Time    Hepatic function panel [[id number]]  (Normal) Collected: 03/29/24 1523     Lab Status: Final result Specimen: Blood from Arm, Right Updated: 03/29/24 1706     Total Bilirubin 0.28 mg/dL      Bilirubin, Direct 0.03 mg/dL      Alkaline Phosphatase 79 U/L      AST 22 U/L      ALT 20 U/L      Total Protein 7.2 g/dL      Albumin 4.4 g/dL     Basic metabolic panel [[id number]]  (Abnormal) Collected: 03/29/24 1525    Lab Status: Final result Specimen: Blood from Arm, Right Updated: 03/29/24 1705     Sodium 141 mmol/L      Potassium 3.2 mmol/L      Chloride 106 mmol/L      CO2 23 mmol/L      ANION GAP 12 mmol/L      BUN 20 mg/dL      Creatinine 1.09 mg/dL      Glucose 76 mg/dL      Calcium 9.2 mg/dL      eGFR 54 ml/min/1.73sq m     Narrative:      National Kid[employer] Foundation guidelines for Chronic Kidney Disease (CKD):     Stage 1 with normal or high GFR (GFR > 90 mL/min/1.73 square meters)    Stage 2 Mild CKD (GFR = 60-89 mL/min/1.73 square meters)    Stage 3A Moderate CKD (GFR = 45-59 mL/min/1.73 square meters)    Stage 3B Moderate CKD (GFR = 30-44 mL/min/1.73 square meters)    Stage 4 Severe CKD (GFR = 15-29 mL/min/1.73 square meters)    Stage 5 End Stage CKD (GFR <15 mL/min/1.73 square meters)  Note: GFR calculation is accurate only with a steady state creatinine    Acetaminophen level-If concentration is detectable, please discuss with medical  on call. [[id number]]  (Abnormal) Collected: 03/29/24 1525    Lab Status: Final result Specimen: Blood from Arm, Right Updated: 03/29/24 1645     Acetaminophen Level <2 ug/mL     Salicylate level [[id number]]  (Normal) Collected: 03/29/24 1525    Lab Status: Final result Specimen: Blood from Arm, Right Updated: 03/29/24 1644     Salicylate Lvl <5 mg/dL     HS Troponin 0hr (reflex protocol) [[id number]]  (Normal) Collected: 03/29/24 1525    Lab Status: Final result Specimen: Blood from Arm, Right Updated: 03/29/24 1555     hs TnI 0hr 4 ng/L     Ethanol [[id number]]  (Normal) Collected: 03/29/24 1525    Lab Status: Final result Specimen:  Blood from Arm, Right Updated: 03/29/24 1548     Ethanol Lvl <10 mg/dL     Protime-INR [[id number]]  (Normal) Collected: 03/29/24 1525    Lab Status: Final result Specimen: Blood from Arm, Right Updated: 03/29/24 1543     Protime 13.5 seconds      INR 1.04    APTT [[id number]]  (Normal) Collected: 03/29/24 1525    Lab Status: Final result Specimen: Blood from Arm, Right Updated: 03/29/24 1543     PTT 27 seconds     CBC and Platelet [[id number]]  (Normal) Collected: 03/29/24 1525    Lab Status: Final result Specimen: Blood from Arm, Right Updated: 03/29/24 1533     WBC 8.08 Thousand/uL      RBC 4.40 Million/uL      Hemoglobin 13.6 g/dL      Hematocrit 42.5 %      MCV 97 fL      MCH 30.9 pg      MCHC 32.0 g/dL      RDW 14.4 %      Platelets 191 Thousands/uL      MPV 10.8 fL     Rapid drug screen, urine [[id number]]     Lab Status: No result Specimen: Urine     Fingerstick Glucose (POCT) [[id number]]  (Normal) Collected: 03/29/24 1515    Lab Status: Final result Specimen: Blood Updated: 03/29/24 1516     POC Glucose 76 mg/dl                    CTA stroke alert (head/neck)   Final Result by Marcus Oleary MD (03/29 1605)      CTA head:   -Coil embolization in the region of the right cavernous ICA. No definite residual aneurysm is identified. Cavernous ICA is grossly patent within limitations of streak artifact.   -Otherwise no high-grade stenosis, or large vessel occlusion.      CTA neck:   -No high-grade stenosis, dissection or aneurysm.      Other:   -The distal aortic arch measures up to 3.6 cm and is unchanged. Recommend follow-up chest CT in 1 year.   -Stable size of partially calcified left parotid gland lesions measuring up to 0.7 cm in back to 2019.            I personally communicated the findings via telephone with Pierce Sheikh at 3:58 p.m. on 3/29/2024.                           Workstation performed: [id number]         CT stroke alert brain   Final Result by Marcus Oleary MD (03/29 1607)   -Sequelae of aneurysmal  coiling in the region of the right cavernous ICA. Associated streak artifact limits evaluation.   -No acute intracranial abnormality. Chronic microangiopathic changes.         I personally communicated the findings via telephone with Pierce Sheikh at 3:58 p.m. on 3/29/2024.         Workstation performed: [id number]               Procedures  ECG 12 Lead Documentation Only    Date/Time: 3/29/2024 3:20 PM    Performed by: Beryl Bowling DO  Authorized by: Beryl Bowling DO    Indications / Diagnosis:  Stroke alert  ECG reviewed by me, the ED Provider: yes    Patient location:  ED  Previous ECG:     Previous ECG:  Compared to current    Comparison ECG info:  3/12/24    Similarity:  Changes noted  Interpretation:     Interpretation: non-specific    Rate:     ECG rate:  56    ECG rate assessment: bradycardic    Rhythm:     Rhythm: sinus bradycardia    Ectopy:     Ectopy: none    QRS:     QRS axis:  Normal    QRS intervals:  Normal  Conduction:     Conduction: normal    ST segments:     ST segments:  Normal  T waves:     T waves: normal          ED Course  ED Course as of 03/29/24 1817   Fri Mar 29, 2024   1610 CTA stroke alert (head/neck)  IMPRESSION:     CTA head:  -Coil embolization in the region of the right cavernous ICA. No definite residual aneurysm is identified. Cavernous ICA is grossly patent within limitations of streak artifact.  -Otherwise no high-grade stenosis, or large vessel occlusion.     CTA neck:  -No high-grade stenosis, dissection or aneurysm.     Other:  -The distal aortic arch measures up to 3.6 cm and is unchanged. Recommend follow-up chest CT in 1 year.  -Stable size of partially calcified left parotid gland lesions measuring up to 0.7 cm in back to 2019.     1610 CT stroke alert brain    IMPRESSION:  -Sequelae of aneurysmal coiling in the region of the right cavernous ICA. Associated streak artifact limits evaluation.  -No acute intracranial abnormality. Chronic microangiopathic  changes.     1610 POC Glucose: 76   1610 CBC and Platelet  Normal.   1610 POCT INR: 1.04   1611 PTT: 27   1611 ETHANOL: <10   1611 hs TnI 0hr: 4  Given onset of symptoms at 0500 this morning, doubt ACS.  Will cancel repeat troponins.   1649 Coma Panel(!)  Normal.   1719 Basic metabolic panel(!)  Mild hypokalemia, otherwise unremarkable.   1719 Hepatic function panel  Normal.   1719 Per neurology, no indication for admission on stroke pathway.  However, given patient falls asleep frequently and is not back to baseline, is too off balance to attempt to walk without assistance, will admit for encephalopathy which is likely 2/2 polypharmacy. Will request obs admission.   1743 Spoke with patient's son, Stefan, with her permission.  Stefan states that she lives alone but he lives close by.  Has been fatigued and falling asleep while eating and doing various tasks for the past several weeks, however he became very concerned with her lack of balance today(which is new to her).  He is concerned that she may fall and would prefer that she be admitted.  He is currently at work and would not be able to stay with her this weekend.                  Stroke Assessment       Row Name 03/29/24 1516             NIH Stroke Scale    Interval Baseline      Level of Consciousness (1a.) 0      LOC Questions (1b.) 0      LOC Commands (1c.) 0      Best Gaze (2.) 0      Visual (3.) 0      Facial Palsy (4.) 0      Motor Arm, Left (5a.) 0      Motor Arm, Right (5b.) 0      Motor Leg, Left (6a.) 0      Motor Leg, Right (6b.) 0      Limb Ataxia (7.) 2      Sensory (8.) 0      Best Language (9.) 0      Dysarthria (10.) 1      Extinction and Inattention (11.) (Formerly Neglect) 0      Total 3                              SBIRT 22yo+      Flowsheet Row Most Recent Value   Initial Alcohol Screen: US AUDIT-C     1. How often do you have a drink containing alcohol? 0 Filed at: 03/29/2024 1518   2. How many drinks containing alcohol do you have on a typical  day you are drinking?  0 Filed at: 03/29/2024 1518   3a. Male UNDER 65: How often do you have five or more drinks on one occasion? 0 Filed at: 03/29/2024 1518   3b. FEMALE Any Age, or MALE 65+: How often do you have 4 or more drinks on one occassion? 0 Filed at: 03/29/2024 1518   Audit-C Score 0 Filed at: 03/29/2024 1518   ABIOLA: How many times in the past year have you...    Used an illegal drug or used a prescription medication for non-medical reasons? Never Filed at: 03/29/2024 1518                  Medical Decision Making  Pt presents with symptoms as above, onset at 0500 this morning.   VS and blood glucose WNL.  Appears sleepy on exam, has a dry mouth, and has ataxia and dysarthria.  NIH 3.  Though this could represent stroke, polypharmacy may also explain her current symptoms given the numerous sedative/anxiolytic type medications she currently takes.   Stroke alert called, appropriate labs and imaging ordered.  Will also obtain COMA panel and UDS.  See ED course for remainder of discussion.    Amount and/or Complexity of Data Reviewed  Independent Historian: caregiver  External Data Reviewed: labs, radiology, ECG and notes.  Labs: ordered. Decision-making details documented in ED Course.  Radiology: ordered. Decision-making details documented in ED Course.  ECG/medicine tests: ordered and independent interpretation performed.    Risk  Prescription drug management.  Decision regarding hospitalization.          Disposition  Final diagnoses:   Stroke-like symptoms   Polypharmacy     Time reflects when diagnosis was documented in both MDM as applicable and the Disposition within this note       Time User Action Codes Description Comment    3/29/2024  3:20 PM Beryl Bowling Add [R29.90] Stroke-like symptoms     3/29/2024  6:13 PM Beryl Bowling Add [Z79.899] Polypharmacy           ED Disposition       ED Disposition   Admit    Condition   Stable    Date/Time   Fri Mar 29, 2024 3386    Comment   Case was  discussed with Dr. Yap and the patient's admission status was agreed to be Admission Status: observation status to the service of Dr. Yap .               Follow-up Information    None         Patient's Medications   Discharge Prescriptions    No medications on file     No discharge procedures on file.    PDMP Review         Value Time User    PDMP Reviewed  Yes 3/13/2024  8:03 AM Johnson Aranda MD             ED Provider  Attending physically available and evaluated Jennifer Woodall. I managed the patient along with the ED Attending.    Electronically Signed by           Beryl Bowling DO  03/29/24 1808

## 2024-03-29 NOTE — ED ATTENDING ATTESTATION
3/29/2024  I, Munira Ramirez MD, saw and evaluated the patient. I have discussed the patient with the resident/non-physician practitioner and agree with the resident's/non-physician practitioner's findings, Plan of Care, and MDM as documented in the resident's/non-physician practitioner's note, except where noted. All available labs and Radiology studies were reviewed.  I was present for key portions of any procedure(s) performed by the resident/non-physician practitioner and I was immediately available to provide assistance.       At this point I agree with the current assessment done in the Emergency Department.  I have conducted an independent evaluation of this patient a history and physical is as follows:    63-year-old female with past medical history of COPD, diabetes, bipolar disorder, history of TIA, fibromyalgia who presents for evaluation of slurred speech.  Patient has been more sleepy for the past few days.  She states she started with dizziness feeling like vertigo sensation at 5 AM this morning.  Did take Klonopin at around noon today.  She then developed slurred speech and difficulty walking.  She also states she has some numbness in her arms and legs but is unable to state which side feels numb.     Physical exam:  Vital signs reviewed, within normal limits.  Patient is awake and alert, no acute distress, head normocephalic, atraumatic, mucous membranes moist, neck supple, heart regular rate and rhythm, no murmurs/rubs/gallops, lungs clear to auscultation bilaterally, abdomen soft, non tender, non distended, no rebound or guarding, no peripheral edema, no skin rashes, mild dysarthria, ataxia in bilateral upper extremities, decreased sensation in right upper extremity and left lower extremity.    Assessment/plan:  63-year-old female with past medical history of COPD, diabetes, bipolar disorder, history of TIA, fibromyalgia who presents for evaluation of slurred speech.   Patient started with  slurred speech earlier today, also with vertigo sensation and ataxic gait.  She does have mild dysarthria, also ataxia in bilateral upper extremities.  Given new onset neuro symptoms, will call stroke alert although symptoms may be secondary to polypharmacy.   Will obtain CT and CTA.  Will obtain CBC to evaluate for anemia, CMP to evaluate for metabolic abnormality, coma panel to evaluate for ethanol level or polypharmacy.        ED Course     Reviewed CT and CTA, negative for any acute abnormalities.  Reviewed labs, no marked abnormalities.  Discussed with neurology, they think this is likely toxic metabolic encephalopathy secondary to polypharmacy.  Discussed with patient's family, patient has had increasing ataxia and difficulty walking over the past 1 to 2 days, she lives alone, concerned that she will fall.  Will discuss with [facility] for admission for observation.     Critical Care Time  Procedures

## 2024-03-29 NOTE — PLAN OF CARE
Problem: PAIN - ADULT  Goal: Verbalizes/displays adequate comfort level or baseline comfort level  Description: Interventions:  - Encourage patient to monitor pain and request assistance  - Assess pain using appropriate pain scale  - Administer analgesics based on type and severity of pain and evaluate response  - Implement non-pharmacological measures as appropriate and evaluate response  - Consider cultural and social influences on pain and pain management  - Notify physician/advanced practitioner if interventions unsuccessful or patient reports new pain  Outcome: Progressing     Problem: INFECTION - ADULT  Goal: Absence or prevention of progression during hospitalization  Description: INTERVENTIONS:  - Assess and monitor for signs and symptoms of infection  - Monitor lab/diagnostic results  - Monitor all insertion sites, i.e. indwelling lines, tubes, and drains  - Monitor endotracheal if appropriate and nasal secretions for changes in amount and color  - Wayne appropriate cooling/warming therapies per order  - Administer medications as ordered  - Instruct and encourage patient and family to use good hand hygiene technique  - Identify and instruct in appropriate isolation precautions for identified infection/condition  Outcome: Progressing  Goal: Absence of fever/infection during neutropenic period  Description: INTERVENTIONS:  - Monitor WBC    Outcome: Progressing     Problem: SAFETY ADULT  Goal: Patient will remain free of falls  Description: INTERVENTIONS:  - Educate patient/family on patient safety including physical limitations  - Instruct patient to call for assistance with activity   - Consult OT/PT to assist with strengthening/mobility   - Keep Call bell within reach  - Keep bed low and locked with side rails adjusted as appropriate  - Keep care items and personal belongings within reach  - Initiate and maintain comfort rounds  - Make Fall Risk Sign visible to staff  - Offer Toileting every 2 Hours,  in advance of need  - Initiate/Maintain bed/chair alarm  - Obtain necessary fall risk management equipment: non skid socks  - Apply yellow socks and bracelet for high fall risk patients  - Consider moving patient to room near nurses station  Outcome: Progressing  Goal: Maintain or return to baseline ADL function  Description: INTERVENTIONS:  -  Assess patient's ability to carry out ADLs; assess patient's baseline for ADL function and identify physical deficits which impact ability to perform ADLs (bathing, care of mouth/teeth, toileting, grooming, dressing, etc.)  - Assess/evaluate cause of self-care deficits   - Assess range of motion  - Assess patient's mobility; develop plan if impaired  - Assess patient's need for assistive devices and provide as appropriate  - Encourage maximum independence but intervene and supervise when necessary  - Involve family in performance of ADLs  - Assess for home care needs following discharge   - Consider OT consult to assist with ADL evaluation and planning for discharge  - Provide patient education as appropriate  Outcome: Progressing  Goal: Maintains/Returns to pre admission functional level  Description: INTERVENTIONS:  - Perform AM-PAC 6 Click Basic Mobility/ Daily Activity assessment daily.  - Set and communicate daily mobility goal to care team and patient/family/caregiver.   - Collaborate with rehabilitation services on mobility goals if consulted  - Perform Range of Motion 3 times a day.  - Reposition patient every 2 hours.  - Dangle patient 3 times a day  - Stand patient 3 times a day  - Ambulate patient 3 times a day  - Out of bed to chair 3 times a day   - Out of bed for meals 3 times a day  - Out of bed for toileting  - Record patient progress and toleration of activity level   Outcome: Progressing

## 2024-03-30 VITALS
OXYGEN SATURATION: 97 % | DIASTOLIC BLOOD PRESSURE: 65 MMHG | SYSTOLIC BLOOD PRESSURE: 101 MMHG | WEIGHT: 167.33 LBS | HEIGHT: 64 IN | HEART RATE: 63 BPM | RESPIRATION RATE: 18 BRPM | TEMPERATURE: 97.8 F | BODY MASS INDEX: 28.57 KG/M2

## 2024-03-30 LAB
ANION GAP SERPL CALCULATED.3IONS-SCNC: 8 MMOL/L (ref 4–13)
BUN SERPL-MCNC: 15 MG/DL (ref 5–25)
CALCIUM SERPL-MCNC: 8.2 MG/DL (ref 8.4–10.2)
CHLORIDE SERPL-SCNC: 109 MMOL/L (ref 96–108)
CO2 SERPL-SCNC: 24 MMOL/L (ref 21–32)
CREAT SERPL-MCNC: 0.89 MG/DL (ref 0.6–1.3)
ERYTHROCYTE [DISTWIDTH] IN BLOOD BY AUTOMATED COUNT: 14.4 % (ref 11.6–15.1)
GFR SERPL CREATININE-BSD FRML MDRD: 69 ML/MIN/1.73SQ M
GLUCOSE SERPL-MCNC: 87 MG/DL (ref 65–140)
HCT VFR BLD AUTO: 35.4 % (ref 34.8–46.1)
HGB BLD-MCNC: 11.5 G/DL (ref 11.5–15.4)
MAGNESIUM SERPL-MCNC: 2.2 MG/DL (ref 1.9–2.7)
MCH RBC QN AUTO: 31.2 PG (ref 26.8–34.3)
MCHC RBC AUTO-ENTMCNC: 32.5 G/DL (ref 31.4–37.4)
MCV RBC AUTO: 96 FL (ref 82–98)
PLATELET # BLD AUTO: 156 THOUSANDS/UL (ref 149–390)
PMV BLD AUTO: 10.7 FL (ref 8.9–12.7)
POTASSIUM SERPL-SCNC: 3.6 MMOL/L (ref 3.5–5.3)
RBC # BLD AUTO: 3.69 MILLION/UL (ref 3.81–5.12)
SODIUM SERPL-SCNC: 141 MMOL/L (ref 135–147)
WBC # BLD AUTO: 6.42 THOUSAND/UL (ref 4.31–10.16)

## 2024-03-30 PROCEDURE — 83735 ASSAY OF MAGNESIUM: CPT | Performed by: INTERNAL MEDICINE

## 2024-03-30 PROCEDURE — 97163 PT EVAL HIGH COMPLEX 45 MIN: CPT | Performed by: PHYSICAL THERAPIST

## 2024-03-30 PROCEDURE — 85027 COMPLETE CBC AUTOMATED: CPT | Performed by: INTERNAL MEDICINE

## 2024-03-30 PROCEDURE — 99239 HOSP IP/OBS DSCHRG MGMT >30: CPT | Performed by: INTERNAL MEDICINE

## 2024-03-30 PROCEDURE — 80048 BASIC METABOLIC PNL TOTAL CA: CPT | Performed by: INTERNAL MEDICINE

## 2024-03-30 RX ADMIN — PREGABALIN 75 MG: 75 CAPSULE ORAL at 09:23

## 2024-03-30 RX ADMIN — LAMOTRIGINE 100 MG: 100 TABLET ORAL at 09:23

## 2024-03-30 RX ADMIN — DULOXETINE HYDROCHLORIDE 60 MG: 30 CAPSULE, DELAYED RELEASE ORAL at 09:22

## 2024-03-30 RX ADMIN — FLUTICASONE FUROATE AND VILANTEROL TRIFENATATE 1 PUFF: 200; 25 POWDER RESPIRATORY (INHALATION) at 10:25

## 2024-03-30 RX ADMIN — ENOXAPARIN SODIUM 40 MG: 40 INJECTION SUBCUTANEOUS at 09:23

## 2024-03-30 RX ADMIN — TOPIRAMATE 200 MG: 100 TABLET, FILM COATED ORAL at 09:23

## 2024-03-30 RX ADMIN — NICOTINE 21 MG: 21 PATCH, EXTENDED RELEASE TRANSDERMAL at 09:23

## 2024-03-30 RX ADMIN — LEVOTHYROXINE SODIUM 137 MCG: 25 TABLET ORAL at 09:22

## 2024-03-30 RX ADMIN — FENOFIBRATE 48 MG: 48 TABLET ORAL at 09:23

## 2024-03-30 RX ADMIN — ACETAMINOPHEN 650 MG: 325 TABLET, FILM COATED ORAL at 06:02

## 2024-03-30 RX ADMIN — PREGABALIN 100 MG: 50 CAPSULE ORAL at 00:13

## 2024-03-30 RX ADMIN — CLONAZEPAM 1 MG: 0.5 TABLET ORAL at 09:23

## 2024-03-30 RX ADMIN — SODIUM CHLORIDE, SODIUM GLUCONATE, SODIUM ACETATE, POTASSIUM CHLORIDE, MAGNESIUM CHLORIDE, SODIUM PHOSPHATE, DIBASIC, AND POTASSIUM PHOSPHATE 100 ML/HR: .53; .5; .37; .037; .03; .012; .00082 INJECTION, SOLUTION INTRAVENOUS at 06:04

## 2024-03-30 RX ADMIN — UMECLIDINIUM 1 PUFF: 62.5 AEROSOL, POWDER ORAL at 09:24

## 2024-03-30 NOTE — CASE MANAGEMENT
Case Management Discharge Planning Note    Patient name Jennifer Woodall  Location /405-01 MRN [id number]  : 1960 Date 3/30/2024       Current Admission Date: 3/29/2024  Current Admission Diagnosis:Polypharmacy   Patient Active Problem List    Diagnosis Date Noted    Polypharmacy 2024    Subcutaneous mass of right forearm 2024    Fall 10/27/2023    Compartment syndrome (HCC) 10/27/2023    Type 2 diabetes mellitus (HCC) 10/27/2023    Gastroesophageal reflux disease 2023    Common bile duct dilation 2023    S/P insertion of spinal cord stimulator 2023    Pulmonary nodules/lesions, multiple 2023    Diabetic polyneuropathy associated with type 2 diabetes mellitus (HCC) 2023    Coronary artery calcification seen on CAT scan 2023    Cervical spondylosis 2023    Shortness of breath 2023    Irritable bowel syndrome with both constipation and diarrhea 2022    Sacroiliitis (HCC) 2022    Carotid artery aneurysm (HCC) 2022    Lumbar degenerative disc disease 2022    Lumbar radiculopathy 2022    Lumbar spondylosis 2022    Cervical disc disorder with radiculopathy of mid-cervical region 2022    Mid back pain 2021    Cervical radiculopathy 2021    Neck pain 2021    Syncope and collapse 2021    Migraine     Bipolar 1 disorder ([facility])     Depression     Chronic pain syndrome     Anxiety     Fibromyalgia, primary     Constipation 2021    COPD (chronic obstructive pulmonary disease) ([facility])     Hypertension     Hypophosphatemia 2021    Hypotension 2021    Tobacco use 2021    Stroke-like symptoms 06/15/2020    Nausea 06/15/2020    Left chest pressure 06/15/2020    TMJ syndrome 2020    Other hyperlipidemia 2019    Dizziness 2019    Hypothyroid 2019    Brain aneurysm 2019    Hypokalemia 2019    Hypertriglyceridemia 2019    Low HDL (under  40) 07/19/2019    Elevated TSH 07/19/2019    Tobacco abuse 07/19/2019    Intractable abdominal pain 07/19/2019      LOS (days): 0  Geometric Mean LOS (GMLOS) (days):   Days to GMLOS:     OBJECTIVE:            Current admission status: Observation   Preferred Pharmacy:   [facility] [place] [place] - [address]  [address]  [facility] [address]  Phone: [phone] Fax: [phone]    Primary Care Provider: Elaine Talamantes MD    Primary Insurance: [facility] DUAL COMPLETE  REP  Secondary Insurance: [facility]    DISCHARGE DETAILS:pt discharging home today. Pt states she is already active with [facility] therapy services. No new orders needed as pt was only observation status. She states they already have a appt scheduled with her next week. Pt states she has a aide 9-5 daily through [employer]. Her phone battery is out and she would like me to call her aide listed on chart to let her know she will be home later today. Call placed to [place] and updated her on same. Pt states her son will transport her home. Nurse will review AVS, all follow up providers listed.

## 2024-03-30 NOTE — DISCHARGE SUMMARY
[facility]  Discharge- Jennifer Woodall 1960, 63 y.o. female MRN: [id number]  Unit/Bed#: 405-01 Encounter: [id number]  Primary Care Provider: Elaine Talamantes MD   Date and time admitted to hospital: 3/29/2024  3:11 PM    * Polypharmacy  Assessment & Plan  Presents with bilateral upper extremity ataxia, and ataxia with ambulation, and sleepiness  Neurologic workup in the emergency room negative and no further neurologic workup recommended from the neurology team  Symptoms are suspected to be due to polypharmacy  Patient is on rather large number of medications a lot of which are psychoactive including but not limited to Lamictal, Cymbalta, Lyrica, trazodone, buprenorphine, clonazepam, Topamax, and Robaxin.    Difficult to know where to start and regarding trying to reduce her medications or start tapering.  Some of these will require months of tapering to safely come off of.  She needs close outpatient follow-up with psychiatry, [facility] neurology, and PCP to help tapering these medications however will be a difficult process as she is on this for numerous reasons including fibromyalgia, chronic migraines, chronic pain syndrome, and cervical radiculopathy.  With IV fluid hydration, and discontinuation of trazodone.  Again I emphasized with the patient and her son that she will likely continue to have complications related to polypharmacy, and recommended strongly that she follow-up with her PCP, neurologist, pain management specialist, psychiatrist to help with tapering off some of these medications.    COPD (chronic obstructive pulmonary disease) (HCC)  Assessment & Plan  Not in exacerbation  Continue home Trelegy  As needed albuterol    Tobacco use  Assessment & Plan  Nicotine patch; cessation advised    Brain aneurysm  Assessment & Plan  History of brain aneurysm status post coil embolization  No acute concerns from the standpoint, stable on imaging    Hypothyroid  Assessment &  Plan  Continue home Synthroid      Medical Problems       Resolved Problems  Date Reviewed: 3/30/2024   None       Discharging Physician / Practitioner: Marcelo Yap DO  PCP: Elaine Talamantes MD  Admission Date:   Admission Orders (From admission, onward)       Ordered        03/29/24 1816  Place in Observation  Once                          Discharge Date: 03/30/24    Consultations During Hospital Stay:  none    Procedures Performed:   none    Significant Findings / Test Results:   polypharmacy    Incidental Findings:   none     Test Results Pending at Discharge (will require follow up):   none     Outpatient Tests Requested:  none    Complications:  none    Reason for Admission: polypharmacy    Hospital Course:   Jennifer Woodall is a 63 y.o. female patient who originally presented to the hospital on 3/29/2024 due to sleepiness and lethargy, and ataxia.  Initially a stroke alert was called in the ER, however imaging was negative and neurology did not recommend any further testing.  Stroke was ruled out.  However given her ongoing sleepiness and lethargy she was admitted to the hospital service for observation.  She was treated with IV fluid hydration overnight and her trazodone was withheld.  She appears to be back at baseline mental status prior to discharge and is feeling well.  She will return home.  Long discussion with patient and son regarding concerns for polypharmacy.  She is on numerous medications that are psychoactive in nature and likely contributing to her symptoms.  She will need close outpatient follow-up with PCP, neurology, psychiatry, pain management to help wean some of these medications.  Otherwise she was felt well at the time of discharge.  All questions and concerns were addressed.  She was eager to return home.    Please see above list of diagnoses and related plan for additional information.     Condition at Discharge: good    Discharge Day Visit / Exam:   Subjective:  Patient  "seen and examined on day of discharge.  Much more alert and responsive.  Speech is coherent and fluent.  No events overnight.  Vitals: Blood Pressure: 101/65 (03/30/24 0722)  Pulse: 63 (03/30/24 0722)  Temperature: 97.8 °F (36.6 °C) (03/30/24 0722)  Temp Source: Oral (03/30/24 0722)  Respirations: 18 (03/30/24 0722)  Height: 5' 4\" (162.6 cm) (03/29/24 1900)  Weight - Scale: 75.9 kg (167 lb 5.3 oz) (03/29/24 1900)  SpO2: 97 % (03/30/24 0923)  PHYSICAL EXAM:    Vitals signs reviewed  Constitutional   Awake and cooperative. NAD.   Head/Neck   Normocephalic. Atraumatic.   HEENT   No scleral icterus. EOMI.   Heart   Regular rate and rhythm. No murmers.   Lungs   Clear to auscultation bilaterally. Respirations unlaboured.   Abdomen   Soft. Nontender. Nondistended.    Skin   Skin color normal. No rashes.   Extremities   No deformities. No peripheral edema.   Neuro   Alert and oriented. No new deficits.   Psych   Mood stable. Affect normal.         Discussion with Family: Updated  (son) via phone.    Discharge instructions/Information to patient and family:   See after visit summary for information provided to patient and family.      Provisions for Follow-Up Care:  See after visit summary for information related to follow-up care and any pertinent home health orders.      Mobility at time of Discharge:   Basic Mobility Inpatient Raw Score: 18  JH-HLM Goal: 6: Walk 10 steps or more  JH-HLM Achieved: 4: Move to chair/commode  HLM Goal achieved. Continue to encourage appropriate mobility.     Disposition:   Home with [facility] Services (Reminder: Complete face to face encounter)    Planned Readmission: no     Discharge Statement:  I spent 40 minutes discharging the patient. This time was spent on the day of discharge. I had direct contact with the patient on the day of discharge. Greater than 50% of the total time was spent examining patient, answering all patient questions, arranging and discussing plan of care with " patient as well as directly providing post-discharge instructions.  Additional time then spent on discharge activities.    Discharge Medications:  See after visit summary for reconciled discharge medications provided to patient and/or family.      **Please Note: This note may have been constructed using a voice recognition system**

## 2024-03-30 NOTE — UTILIZATION REVIEW
Initial Clinical Review    Admission: Date/Time/Statement:   Admission Orders (From admission, onward)       Ordered        03/29/24 1816  Place in Observation  Once                          Orders Placed This Encounter   Procedures    Place in Observation     Standing Status:   Standing     Number of Occurrences:   1     Order Specific Question:   Level of Care     Answer:   Med Surg [16]     ED Arrival Information       Expected   -    Arrival   3/29/2024 15:11    Acuity   Emergent              Means of arrival   Ambulance    Escorted by   [facility]    Service   Hospitalist    Admission type   Emergency              Arrival complaint   Stroke ALERT             Chief Complaint   Patient presents with    STROKE Alert     Patient got up at 0500 feeling dizzy and uncoordinated and states it has been getting worse throughout the day. Reports decrease sensations on one side of face. Also reports two days of generalized weakness and headache.       Initial Presentation: 63 y.o. female to ED via EMS from home  Present to ED with bilateral upper extremity ataxia, and ataxia with ambulation, and sleepiness. Neurologic workup in the emergency room negative and no further neurologic workup recommended from the neurology team. Symptoms are suspected to be due to polypharmacy - Patient is on rather large number of medications a lot of which are psychoactive including but not limited to Lamictal, Cymbalta, Lyrica, trazodone, buprenorphine, clonazepam, Topamax, and Robaxin.  PMHX : cervical radiculopathy, spinal stenosis, fibromyalgia, chronic headaches, chronic pain syndrome, chronic migraines, history of cerebral coil embolization of an aneurysm, GERD, hyperlipidemia, hypertension, knee, active smoking   Admitted to OBS with DX: Polypharmacy   on exam:  K 3.2  PLAN: cont ivf; pain / nausea control; rec'd ivf bolus 500; monitor labs; neuro checks; trazodone decreasing discontinuing trazodone and decreasing her Lyrica  dose may be the safest at this time.         ED Triage Vitals   Temperature Pulse Respirations Blood Pressure SpO2   03/29/24 1513 03/29/24 1513 03/29/24 1513 03/29/24 1513 03/29/24 1513   98.4 °F (36.9 °C) 58 18 126/87 97 %      Temp Source Heart Rate Source Patient Position - Orthostatic VS BP Location FiO2 (%)   03/29/24 1513 03/29/24 1513 03/29/24 1513 03/29/24 1900 --   Temporal Monitor Lying Right arm       Pain Score       03/29/24 1530       8          Wt Readings from Last 1 Encounters:   03/29/24 75.9 kg (167 lb 5.3 oz)     Additional Vital Signs:   Date/Time Temp Pulse Resp BP MAP (mmHg) SpO2 Calculated FIO2 (%) - Nasal Cannula Nasal Cannula O2 Flow Rate (L/min) O2 Device Patient Position - Orthostatic VS   03/30/24 07:22:46 97.8 °F (36.6 °C) 63 18 101/65 77 96 % -- -- None (Room air) --   03/30/24 05:39:40 98.2 °F (36.8 °C) 64 18 101/66 78 93 % -- -- -- --   03/30/24 01:10:25 97.5 °F (36.4 °C) 62 19 101/69 80 97 % -- -- -- --   03/29/24 23:08:30 97.8 °F (36.6 °C) 65 18 102/67 79 97 % -- -- -- Lying   03/29/24 23:08:13 97.8 °F (36.6 °C) 62 -- 102/67 79 97 % -- -- -- --   03/29/24 2200 -- 71 18 -- -- -- -- -- -- --   03/29/24 21:01:18 97.4 °F (36.3 °C) Abnormal  70 -- 99/68 78 96 % -- -- -- --   03/29/24 20:57:06 97.4 °F (36.3 °C) Abnormal  63 -- 99/68 78 97 % -- -- -- --   03/29/24 1905 -- -- -- -- -- -- -- -- None (Room air) --   03/29/24 1900 97.4 °F (36.3 °C) Abnormal  -- 18 -- -- -- 28 2 L/min Nasal cannula Lying   03/29/24 18:49:16 -- 61 -- 138/82 101 97 % -- -- -- --   03/29/24 1800 98.2 °F (36.8 °C) 63 18 150/97 -- 96 % -- -- None (Room air) --   03/29/24 1730 98.3 °F (36.8 °C) 55 15 122/78 -- 96 % -- -- None (Room air) --   03/29/24 1700 98.4 °F (36.9 °C) 57 17 118/72 -- 95 % -- -- None (Room air) --   03/29/24 1630 98.4 °F (36.9 °C) 58 17 121/80 -- 99 % -- -- None (Room air) --   03/29/24 1600 98.4 °F (36.9 °C) 58 15 127/77 -- 93 % -- -- None (Room air) --   03/29/24 1545 98.4 °F (36.9 °C) 59 17  116/70 -- 92 % -- -- None (Room air) --   03/29/24 1530 98.4 °F (36.9 °C) 57 17 110/62 -- 96 % -- -- None (Room air) --   03/29/24 1515 -- 58 18 126/87 -- 95 % -- -- None (Room air) Lying   03/29/24 15:13:51 98.4 °F (36.9 °C) 58 18 126/87 -- 97 % -- -- None (Room air) Lying       EKG: Not read    Pertinent Labs/Diagnostic Test Results:   CTA stroke alert (head/neck)   Final Result by Marcus Oleary MD (03/29 1605)      CTA head:   -Coil embolization in the region of the right cavernous ICA. No definite residual aneurysm is identified. Cavernous ICA is grossly patent within limitations of streak artifact.   -Otherwise no high-grade stenosis, or large vessel occlusion.      CTA neck:   -No high-grade stenosis, dissection or aneurysm.      Other:   -The distal aortic arch measures up to 3.6 cm and is unchanged. Recommend follow-up chest CT in 1 year.   -Stable size of partially calcified left parotid gland lesions measuring up to 0.7 cm in back to 2019.            I personally communicated the findings via telephone with Pierce Sheikh at 3:58 p.m. on 3/29/2024.                           Workstation performed: [id number]         CT stroke alert brain   Final Result by Marcus Oleary MD (03/29 6012)   -Sequelae of aneurysmal coiling in the region of the right cavernous ICA. Associated streak artifact limits evaluation.   -No acute intracranial abnormality. Chronic microangiopathic changes.         I personally communicated the findings via telephone with Pierce Sheikh at 3:58 p.m. on 3/29/2024.         Workstation performed: [id number]              Results from last 7 days   Lab Units 03/30/24  0536 03/29/24  1525   WBC Thousand/uL 6.42 8.08   HEMOGLOBIN g/dL 11.5 13.6   HEMATOCRIT % 35.4 42.5   PLATELETS Thousands/uL 156 191        Results from last 7 days   Lab Units 03/30/24  0536 03/29/24  1525   SODIUM mmol/L 141 141   POTASSIUM mmol/L 3.6 3.2*   CHLORIDE mmol/L 109* 106   CO2 mmol/L 24 23   ANION GAP mmol/L 8 12   BUN mg/dL  15 20   CREATININE mg/dL 0.89 1.09   EGFR ml/min/1.73sq m 69 54   CALCIUM mg/dL 8.2* 9.2   MAGNESIUM mg/dL 2.2  --      Results from last 7 days   Lab Units 03/29/24  1525   AST U/L 22   ALT U/L 20   ALK PHOS U/L 79   TOTAL PROTEIN g/dL 7.2   ALBUMIN g/dL 4.4   TOTAL BILIRUBIN mg/dL 0.28   BILIRUBIN DIRECT mg/dL 0.03     Results from last 7 days   Lab Units 03/29/24  1515   POC GLUCOSE mg/dl 76     Results from last 7 days   Lab Units 03/30/24  0536 03/29/24  1525   GLUCOSE RANDOM mg/dL 87 76        Results from last 7 days   Lab Units 03/29/24  1525   HS TNI 0HR ng/L 4        Results from last 7 days   Lab Units 03/29/24  1525   PROTIME seconds 13.5   INR  1.04   PTT seconds 27        Results from last 7 days   Lab Units 03/29/24  2115   AMPH/METH  Negative   BARBITURATE UR  Positive*   BENZODIAZEPINE UR  Negative   COCAINE UR  Negative   METHADONE URINE  Negative   OPIATE UR  Positive*   PCP UR  Negative   THC UR  Negative     Results from last 7 days   Lab Units 03/29/24  1525   ETHANOL LVL mg/dL <10   ACETAMINOPHEN LVL ug/mL <2*   SALICYLATE LVL mg/dL <5          ED Treatment:   Medication Administration from 03/29/2024 1511 to 03/29/2024 1831         Date/Time Order Dose Route Action     03/29/2024 1530 EDT iohexol (OMNIPAQUE) 350 MG/ML injection (MULTI-DOSE) 100 mL 100 mL Intravenous Given     03/29/2024 1823 EDT albuterol (PROVENTIL HFA,VENTOLIN HFA) inhaler 2 puff 2 puff Inhalation Given     03/29/2024 1823 EDT nicotine (NICODERM CQ) 21 mg/24 hr TD 24 hr patch 21 mg 21 mg Transdermal Medication Applied            Present on Admission:   Hypothyroid   Brain aneurysm   COPD (chronic obstructive pulmonary disease) (HCC)   Tobacco use      Admitting Diagnosis: Polypharmacy [Z79.899]  Stroke-like symptoms [R29.90]    Age/Sex: 63 y.o. female    Admission Orders: SCDs; neuro checks; regular diet    Scheduled Medications:  atorvastatin, 80 mg, Oral, Daily With Dinner  clonazePAM, 0.5 mg, Oral, QPM  clonazePAM, 1 mg,  Oral, QAM  DULoxetine, 60 mg, Oral, Daily  enoxaparin, 40 mg, Subcutaneous, Daily  famotidine, 40 mg, Oral, HS  fenofibrate, 48 mg, Oral, Daily  fluticasone, 1 spray, Each Nare, Daily  fluticasone-vilanterol, 1 puff, Inhalation, Daily   And  umeclidinium, 1 puff, Inhalation, Daily  lamoTRIgine, 100 mg, Oral, BID  levothyroxine, 137 mcg, Oral, Early Morning  nicotine, 21 mg, Transdermal, Daily  pregabalin, 100 mg, Oral, HS  pregabalin, 75 mg, Oral, TID  topiramate, 200 mg, Oral, BID    lactated ringers bolus 500 mL  Dose: 500 mL  Freq: Once Route: IV  Last Dose: Stopped (03/29/24 2304)  Start: 03/29/24 1815 End: 03/29/24 230     Continuous IV Infusions:  multi-electrolyte, 100 mL/hr, Intravenous, Continuous      PRN Meds:  acetaminophen, 650 mg, Oral, Q6H PRN  albuterol, 2 puff, Inhalation, Q4H PRN  ondansetron, 4 mg, Intravenous, Q6H PRN  HYDROcodone-acetaminophen (NORCO) 5-325 mg per tablet 1 tablet  Dose: 1 tablet  Freq: Once Route: PO  Start: 03/29/24 2315 End: 03/29/24 2309       IP CONSULT TO NEUROLOGY    Network Utilization Review Department  ATTENTION: Please call with any questions or concerns to [phone] and carefully listen to the prompts so that you are directed to the right person. All voicemails are confidential.   For Discharge needs, contact Care Management DC Support Team at [phone] opt. 2  Send all requests for admission clinical reviews, approved or denied determinations and any other requests to dedicated fax number below belonging to the campus where the patient is receiving treatment. List of dedicated fax numbers for the Facilities:  FACILITY NAME UR FAX NUMBER   ADMISSION DENIALS (Administrative/Medical Necessity) [phone]   DISCHARGE SUPPORT TEAM (NETWORK) [phone]   PARENT CHILD HEALTH (Maternity/NICU/Pediatrics) [phone]   [facility] [phone]   [facility] [phone]   [facility]  [phone]   [facility] [phone]   [facility] [phone]   General acute hospital [phone]   [facility] [phone]   [facility] [phone]   [facility] [phone]   [facility] [phone]   [facility] [phone]   [facility] [phone]

## 2024-03-30 NOTE — ASSESSMENT & PLAN NOTE
Presents with bilateral upper extremity ataxia, and ataxia with ambulation, and sleepiness  Neurologic workup in the emergency room negative and no further neurologic workup recommended from the neurology team  Symptoms are suspected to be due to polypharmacy  Patient is on rather large number of medications a lot of which are psychoactive including but not limited to Lamictal, Cymbalta, Lyrica, trazodone, buprenorphine, clonazepam, Topamax, and Robaxin.    Difficult to know where to start and regarding trying to reduce her medications or start tapering.  Some of these will require months of tapering to safely come off of.  She needs close outpatient follow-up with psychiatry, [facility] neurology, and PCP to help tapering these medications however will be a difficult process as she is on this for numerous reasons including fibromyalgia, chronic migraines, chronic pain syndrome, and cervical radiculopathy.  With IV fluid hydration, and discontinuation of trazodone.  Again I emphasized with the patient and her son that she will likely continue to have complications related to polypharmacy, and recommended strongly that she follow-up with her PCP, neurologist, pain management specialist, psychiatrist to help with tapering off some of these medications.

## 2024-03-30 NOTE — PLAN OF CARE
Problem: PHYSICAL THERAPY ADULT  Goal: Performs mobility at highest level of function for planned discharge setting.  See evaluation for individualized goals.  Description: Treatment/Interventions: ADL retraining, Functional transfer training, LE strengthening/ROM, Elevations, Therapeutic exercise, Endurance training, Bed mobility, Gait training          See flowsheet documentation for full assessment, interventions and recommendations.  Note: Prognosis: Good  Problem List: Decreased strength, Decreased range of motion, Decreased endurance, Impaired balance, Decreased mobility  Assessment: Pt is 63 y.o. female seen for PT evaluation s/p admit to [facility] on 3/29/2024 w/ Polypharmacy. PT consulted to assess pt's functional mobility and d/c needs. Order placed for PT eval and tx, w/ up and OOB as tolerated order. Comorbidities affecting pt's physical performance at time of assessment include: polypharmacy. PTA, pt was independent w/ all functional mobility w/ No AD  . Personal factors affecting pt at time of IE include: inability to navigate community distances, inability to navigate level surfaces w/o external assistance, unable to perform dynamic tasks in community, unable to perform physical activity, and limited insight into impairments. Please find objective findings from PT assessment regarding body systems outlined above with impairments and limitations including weakness, decreased ROM, impaired balance, decreased endurance, gait deviations, decreased activity tolerance, decreased functional mobility tolerance, and fall risk. Pt to benefit from continued PT tx to address deficits as defined above and maximize level of functional independent mobility and consistency. From PT/mobility standpoint, recommendation at time of d/c would be no rehabilitation needs pending progress in order to facilitate return to PLOF.        Rehab Resource Intensity Level, PT: No post-acute rehabilitation needs    See  flowsheet documentation for full assessment.

## 2024-03-30 NOTE — PLAN OF CARE
Problem: PAIN - ADULT  Goal: Verbalizes/displays adequate comfort level or baseline comfort level  Description: Interventions:  - Encourage patient to monitor pain and request assistance  - Assess pain using appropriate pain scale  - Administer analgesics based on type and severity of pain and evaluate response  - Implement non-pharmacological measures as appropriate and evaluate response  - Consider cultural and social influences on pain and pain management  - Notify physician/advanced practitioner if interventions unsuccessful or patient reports new pain  Outcome: Progressing     Problem: INFECTION - ADULT  Goal: Absence or prevention of progression during hospitalization  Description: INTERVENTIONS:  - Assess and monitor for signs and symptoms of infection  - Monitor lab/diagnostic results  - Monitor all insertion sites, i.e. indwelling lines, tubes, and drains  - Monitor endotracheal if appropriate and nasal secretions for changes in amount and color  - [place] appropriate cooling/warming therapies per order  - Administer medications as ordered  - Instruct and encourage patient and family to use good hand hygiene technique  - Identify and instruct in appropriate isolation precautions for identified infection/condition  Outcome: Progressing  Goal: Absence of fever/infection during neutropenic period  Description: INTERVENTIONS:  - Monitor WBC    Outcome: Progressing     Problem: SAFETY ADULT  Goal: Patient will remain free of falls  Description: INTERVENTIONS:  - Educate patient/family on patient safety including physical limitations  - Instruct patient to call for assistance with activity   - Consult OT/PT to assist with strengthening/mobility   - Keep Call bell within reach  - Keep bed low and locked with side rails adjusted as appropriate  - Keep care items and personal belongings within reach  - Initiate and maintain comfort rounds  - Make Fall Risk Sign visible to staff  - Offer Toileting every 2 Hours,  in advance of need  - Initiate/Maintain bed/chair alarm  - Obtain necessary fall risk management equipment: non skid socks  - Apply yellow socks and bracelet for high fall risk patients  - Consider moving patient to room near nurses station  Outcome: Progressing  Goal: Maintain or return to baseline ADL function  Description: INTERVENTIONS:  -  Assess patient's ability to carry out ADLs; assess patient's baseline for ADL function and identify physical deficits which impact ability to perform ADLs (bathing, care of mouth/teeth, toileting, grooming, dressing, etc.)  - Assess/evaluate cause of self-care deficits   - Assess range of motion  - Assess patient's mobility; develop plan if impaired  - Assess patient's need for assistive devices and provide as appropriate  - Encourage maximum independence but intervene and supervise when necessary  - Involve family in performance of ADLs  - Assess for home care needs following discharge   - Consider OT consult to assist with ADL evaluation and planning for discharge  - Provide patient education as appropriate  Outcome: Progressing  Goal: Maintains/Returns to pre admission functional level  Description: INTERVENTIONS:  - Perform AM-PAC 6 Click Basic Mobility/ Daily Activity assessment daily.  - Set and communicate daily mobility goal to care team and patient/family/caregiver.   - Collaborate with rehabilitation services on mobility goals if consulted  - Perform Range of Motion 3 times a day.  - Reposition patient every 2 hours.  - Dangle patient 3 times a day  - Stand patient 3 times a day  - Ambulate patient 3 times a day  - Out of bed to chair 3 times a day   - Out of bed for meals 3 times a day  - Out of bed for toileting  - Record patient progress and toleration of activity level   Outcome: Progressing     Problem: METABOLIC, FLUID AND ELECTROLYTES - ADULT  Goal: Electrolytes maintained within normal limits  Description: INTERVENTIONS:  - Monitor labs and assess patient  for signs and symptoms of electrolyte imbalances  - Administer electrolyte replacement as ordered  - Monitor response to electrolyte replacements, including repeat lab results as appropriate  - Instruct patient on fluid and nutrition as appropriate  Outcome: Progressing  Goal: Fluid balance maintained  Description: INTERVENTIONS:  - Monitor labs   - Monitor I/O and WT  - Instruct patient on fluid and nutrition as appropriate  - Assess for signs & symptoms of volume excess or deficit  Outcome: Progressing     Problem: Prexisting or High Potential for Compromised Skin Integrity  Goal: Skin integrity is maintained or improved  Description: INTERVENTIONS:  - Identify patients at risk for skin breakdown  - Assess and monitor skin integrity  - Assess and monitor nutrition and hydration status  - Monitor labs   - Assess for incontinence   - Turn and reposition patient  - Assist with mobility/ambulation  - Relieve pressure over bony prominences  - Avoid friction and shearing  - Provide appropriate hygiene as needed including keeping skin clean and dry  - Evaluate need for skin moisturizer/barrier cream  - Collaborate with interdisciplinary team   - Patient/family teaching  - Consider wound care consult   Outcome: Progressing

## 2024-03-30 NOTE — NURSING NOTE
Pt discharged from [facility] in error; D/c on 3/30/2024 at 1220 via wheelchair; Pt accompanied by son with all belongings; AVS provided and educated via virtual nurse prior to d/c;

## 2024-03-30 NOTE — PHYSICAL THERAPY NOTE
Physical Therapy Evaluation     Patient Name: Jennifer Woodall    Today's Date: 3/30/2024     Problem List  Principal Problem:    Polypharmacy  Active Problems:    Hypothyroid    Brain aneurysm    Tobacco use    COPD (chronic obstructive pulmonary disease) (Formerly [facility])       Past Medical History  Past Medical History:   Diagnosis Date    Ambulates with cane     Anxiety     Arthritis     Asthma     Bipolar 1 disorder (Formerly [facility] [place])     Brain aneurysm     Chronic pain disorder     spinal stenosis    Concussion syndrome     neurological rx and balance rx    COPD (chronic obstructive pulmonary disease) (Formerly [facility] [place])     Depression     Diabetes mellitus (Formerly [facility])     controlled w/ diet    Disease of thyroid gland     nodules     Family history of colon cancer     father    Fibromyalgia, primary     GERD (gastroesophageal reflux disease)     History of colon polyps     Hyperlipidemia     Hypertension     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     Irritable bowel syndrome     Lumbar degenerative disc disease 02/16/2022    Migraine     Neuropathy     bilateral feet and hands    Peripheral neuropathy     Psychiatric disorder     PTSD (post-traumatic stress disorder)     Shortness of breath     Sleep apnea     had 3 studies & last one negative    Stroke (Formerly [facility])     2012 no deficeits    TIA (transient ischemic attack)     Wears dentures     Wears glasses         Past Surgical History  Past Surgical History:   Procedure Laterality Date    ABDOMINAL SURGERY      laproscopic/ endometriosis    BRAIN SURGERY      aneurysm/ coiling procedure    CARPAL TUNNEL RELEASE      CHOLECYSTECTOMY      COLONOSCOPY      DENTAL SURGERY      EPIDURAL BLOCK INJECTION Right 03/03/2022    Procedure: C7-T1 interlaminar epidural steroid injection;  Surgeon: Johnson Aranda MD;  [facility];  Service: Pain Management     EPIDURAL BLOCK INJECTION N/A 04/21/2022    Procedure: C6-C7 BLOCK / INJECTION EPIDURAL  STEROID CERVICAL;  Surgeon: Johnson Aranda MD;  [facility];  Service: Pain Management     EPIDURAL BLOCK INJECTION Left 06/21/2022    Procedure: BLOCK / INJECTION EPIDURAL STEROID LUMBAR  left L3-4 TFESI;  Surgeon: Johnson Aranda MD;  [facility];  Service: Pain Management     EYE SURGERY      cataract    FASCIOTOMY Right 10/27/2023    Procedure: FASCIOTOMY OF RIGHT UPPER EXTREMITY; POSSIBLE VAC DRESSING APPLICATION;  Surgeon: Bruno Blevins MD;  [facility];  Service: General    FL GUIDED NEEDLE PLAC BX/ASP/INJ  03/03/2022    FL GUIDED NEEDLE PLAC BX/ASP/INJ  11/17/2022    HYSTERECTOMY      NERVE BLOCK Left 2/20/2024    Procedure: BLOCK MEDIAL BRANCH  left C3-4 and C4-5 MBB #1;  Surgeon: Johnson Aranda MD;  [facility];  Service: Pain Management     WV ESOPHAGOGASTRODUODENOSCOPY TRANSORAL DIAGNOSTIC N/A 02/08/2018    Procedure: EGD AND COLONOSCOPY;  Surgeon: Caleb Pete MD;  [facility]: [facility];  Service: Gastroenterology    WV PRQ IMPLTJ NSTIM ELECTRODE ARRAY EPIDURAL N/A 11/17/2022    Procedure: NEVRO SCS TRIAL;  Surgeon: Johnson Aranda MD;  [facility];  Service: Pain Management     WV PRQ IMPLTJ NSTIM ELECTRODE ARRAY EPIDURAL N/A 2/1/2023    Procedure: Insertion percutaneous thoracic spinal cord stimulator with left buttock implantable pulse generator;  Surgeon: Craig Robert Goldberg, MD;  [facility];  Service: Neurosurgery    SPLIT THICKNESS SKIN GRAFT Right 11/7/2023    Procedure: SKIN GRAFT SPLIT THICKNESS (STSG)  EXTREMITY;  Surgeon: Samm Sims MD;  [facility];  Service: General    THYROID SURGERY      TONSILLECTOMY      US GUIDED THYROID BIOPSY  11/30/2016    US GUIDED THYROID BIOPSY  3/21/2018    VAC DRESSING APPLICATION Right 10/29/2023    Procedure: CHANGE DRESSING/VAC RIGHT UPPER EXTREMITY; WASHOUT; PARTIAL CLOSURE;  Surgeon: Irina Day DO;  [facility];  Service: General    WOUND  DEBRIDEMENT Right 11/4/2023    Procedure: SIMPLE CLOSURE 3.5CM, OASIS APPLICATION 15X7CM, WOUND VAC APPLICATION;  Surgeon: Maryuri Goldstein MD;  [facility];  Service: General         03/30/24 1010   Note Type   Note type Evaluation   Pain Assessment   Pain Assessment Tool 0-10   Restrictions/Precautions   Other Precautions Chair Alarm;Bed Alarm;Multiple lines   Home Living   Type of Home House   Home Layout One level;Performs ADLs on one level   Bathroom Shower/Tub Tub/shower unit   Bathroom Toilet Standard   Bathroom Accessibility Accessible   Home Equipment Walker;Cane   Additional Comments Pt reports HHA t/o the day monday-friday. also reports her son lives upstairs and checks on her frequently   Prior Function   Level of Bertie Independent with ADLs;Independent with functional mobility;Independent with IADLS   Lives With Alone   Receives Help From Family  (HHA)   Falls in the last 6 months 0   Comments Drives on occasion   Cognition   Overall Cognitive Status WFL   Arousal/Participation Alert   Orientation Level Oriented X4   Memory Within functional limits   Following Commands Follows all commands and directions without difficulty   Subjective   Subjective Pt is agreeable to PT. Would like to know when she will be D/C   RLE Assessment   RLE Assessment WFL   LLE Assessment   LLE Assessment WFL   Bed Mobility   Supine to Sit 5  Supervision   Additional items Increased time required   Additional Comments Seated in chair at the end of the session, chair alarm on   Transfers   Sit to Stand 5  Supervision   Additional items Increased time required   Stand to Sit 5  Supervision   Additional items Increased time required   Additional Comments No Device   Ambulation/Elevation   Gait pattern Short stride;WNL   Gait Assistance 5  Supervision   Assistive Device None   Distance 200'   Balance   Static Sitting Fair +   Dynamic Sitting Fair +   Static Standing Fair   Dynamic Standing Fair   Ambulatory Fair    Activity Tolerance   Activity Tolerance Patient tolerated treatment well   Medical Staff Made Aware RN Jim   Assessment   Prognosis Good   Problem List Decreased strength;Decreased range of motion;Decreased endurance;Impaired balance;Decreased mobility   Assessment Pt is 63 y.o. female seen for PT evaluation s/p admit to [facility] on 3/29/2024 w/ Polypharmacy. PT consulted to assess pt's functional mobility and d/c needs. Order placed for PT eval and tx, w/ up and OOB as tolerated order. Comorbidities affecting pt's physical performance at time of assessment include: polypharmacy. PTA, pt was independent w/ all functional mobility w/ No AD  . Personal factors affecting pt at time of IE include: inability to navigate community distances, inability to navigate level surfaces w/o external assistance, unable to perform dynamic tasks in community, unable to perform physical activity, and limited insight into impairments. Please find objective findings from PT assessment regarding body systems outlined above with impairments and limitations including weakness, decreased ROM, impaired balance, decreased endurance, gait deviations, decreased activity tolerance, decreased functional mobility tolerance, and fall risk. Pt to benefit from continued PT tx to address deficits as defined above and maximize level of functional independent mobility and consistency. From PT/mobility standpoint, recommendation at time of d/c would be no rehabilitation needs pending progress in order to facilitate return to PLOF.   Goals   Patient Goals to go home   LTG Expiration Date 04/12/24   Long Term Goal #1 Pt will be (I) with all transfers and bed mobility   Long Term Goal #2 Patient will ambulate 400 feet (I) to help facilitate safe return to PLOF   Plan   Treatment/Interventions ADL retraining;Functional transfer training;LE strengthening/ROM;Elevations;Therapeutic exercise;Endurance training;Bed mobility;Gait training   PT  Frequency 3-5x/wk   Discharge Recommendation   Rehab Resource Intensity Level, PT No post-acute rehabilitation needs   AM-PAC Basic Mobility Inpatient   Turning in Flat Bed Without Bedrails 4   Lying on Back to Sitting on Edge of Flat Bed Without Bedrails 4   Moving Bed to Chair 4   Standing Up From Chair Using Arms 4   Walk in Room 4   Climb 3-5 Stairs With Railing 3   Basic Mobility Inpatient Raw Score 23   Basic Mobility Standardized Score 50.88   [facility] Highest Level Of Mobility   -HLM Goal 7: Walk 25 feet or more   -HLM Achieved 7: Walk 25 feet or more     Pt seated in chair at the end of the session, all lines intact, all needs within reach. Patients raw score on the AM-PAC Basic Mobility inpatient short form is 23, standardized score is 50.88, (greater than) 42.9. patient's at this level are likely to benefit from D/C to home services, however, please refer to therapist recommendation for safe D/C Plan.

## 2024-04-01 LAB
ATRIAL RATE: 56 BPM
P AXIS: 67 DEGREES
PR INTERVAL: 182 MS
QRS AXIS: 80 DEGREES
QRSD INTERVAL: 68 MS
QT INTERVAL: 452 MS
QTC INTERVAL: 436 MS
T WAVE AXIS: 62 DEGREES
VENTRICULAR RATE: 56 BPM

## 2024-04-01 PROCEDURE — 93010 ELECTROCARDIOGRAM REPORT: CPT | Performed by: INTERNAL MEDICINE

## 2024-04-04 ENCOUNTER — TELEPHONE (OUTPATIENT)
Dept: SURGERY | Facility: CLINIC | Age: 64
End: 2024-04-04

## 2024-04-04 ENCOUNTER — HOSPITAL ENCOUNTER (OUTPATIENT)
Facility: HOSPITAL | Age: 64
Setting detail: OUTPATIENT SURGERY
Discharge: HOME | End: 2024-04-04
Attending: ANESTHESIOLOGY | Admitting: ANESTHESIOLOGY
Payer: COMMERCIAL

## 2024-04-04 ENCOUNTER — APPOINTMENT (OUTPATIENT)
Dept: RADIOLOGY | Facility: HOSPITAL | Age: 64
End: 2024-04-04
Payer: COMMERCIAL

## 2024-04-04 VITALS
TEMPERATURE: 97 F | RESPIRATION RATE: 18 BRPM | HEART RATE: 71 BPM | BODY MASS INDEX: 28.51 KG/M2 | SYSTOLIC BLOOD PRESSURE: 109 MMHG | HEIGHT: 64 IN | WEIGHT: 167 LBS | OXYGEN SATURATION: 96 % | DIASTOLIC BLOOD PRESSURE: 75 MMHG

## 2024-04-04 DIAGNOSIS — M47.812 CERVICAL SPONDYLOSIS: ICD-10-CM

## 2024-04-04 DIAGNOSIS — M50.120 CERVICAL DISC DISORDER WITH RADICULOPATHY OF MID-CERVICAL REGION: Primary | ICD-10-CM

## 2024-04-04 DIAGNOSIS — M54.12 CERVICAL RADICULOPATHY: ICD-10-CM

## 2024-04-04 PROCEDURE — 64491 INJ PARAVERT F JNT C/T 2 LEV: CPT | Performed by: ANESTHESIOLOGY

## 2024-04-04 PROCEDURE — 64490 INJ PARAVERT F JNT C/T 1 LEV: CPT | Performed by: ANESTHESIOLOGY

## 2024-04-04 RX ORDER — LIDOCAINE HYDROCHLORIDE 10 MG/ML
INJECTION, SOLUTION EPIDURAL; INFILTRATION; INTRACAUDAL; PERINEURAL AS NEEDED
Status: DISCONTINUED | OUTPATIENT
Start: 2024-04-04 | End: 2024-04-04 | Stop reason: HOSPADM

## 2024-04-04 RX ORDER — BUPIVACAINE HYDROCHLORIDE 5 MG/ML
INJECTION, SOLUTION EPIDURAL; INTRACAUDAL AS NEEDED
Status: DISCONTINUED | OUTPATIENT
Start: 2024-04-04 | End: 2024-04-04 | Stop reason: HOSPADM

## 2024-04-04 NOTE — DISCHARGE INSTR - AVS FIRST PAGE

## 2024-04-04 NOTE — INTERVAL H&P NOTE
H&P reviewed. After examining the patient I find no changes in the patients condition since the H&P had been written.    Vitals:    04/04/24 0810   BP: 100/65   Pulse: 74   Resp: 20   Temp: (!) 96.3 °F (35.7 °C)   SpO2: 93%

## 2024-04-04 NOTE — OP NOTE
OPERATIVE REPORT  PATIENT NAME: Jennifer Woodall    :  1960  MRN: [id number]  Pt Location: [facility] ROOM 01    SURGERY DATE: 2024    Surgeons and Role:     * Johnson Aranda MD - Primary    Preop Diagnosis:  Cervical radiculopathy [M54.12]    Post-Op Diagnosis Codes:     * Cervical radiculopathy [M54.12]    Procedure(s):  Left - BLOCK MEDIAL BRANCH Left C3-C4 and C4-5 MBB2    Specimen(s):  * No specimens in log *    Estimated Blood Loss:   Minimal    Drains:  * No LDAs found *    Anesthesia Type:   Local    Operative Indications:  Cervical radiculopathy [M54.12]      Operative Findings:  same    Complications:   None    Procedure and Technique:  Fluoroscopically-guided Medial Branch Nerve Blocks of the left C3-C4 and C4-C5 facet joint(s) using 0.5% bupivacaine      After discussing the risks, benefits, and alternatives to the procedure, the patient expressed understanding and wished to proceed.  The patient was brought to the fluoroscopy suite and placed in the prone position.  Procedural pause conducted to verify:  correct patient identity, procedure to be performed and as applicable, correct side and site, correct patient position, and availability of implants, special equipment and special requirements.  Using fluoroscopy, the mid-body of the articular pillar at the left C3, C4, C5 levels were identified.  The skin was sterilely prepped and draped in the usual fashion using Chloraprep skin prep.  Using fluoroscopic guidance, a 3.5 inch 25 gauge spinal needle was advanced to each target.  After negative aspiration, 0.5cc of 0.5% bupivacaine was injected at each site and the needles were then removed. The patient tolerated the procedure well and there were no apparent complications.  After appropriate observation, the patient was dismissed from the clinic in good condition under their own power.  The patient was instructed to keep a pain diary and report the results at her follow up  appointment.              I was present for the entire procedure.    Patient Disposition:  hemodynamically stable        SIGNATURE: Johnson Aranda MD  DATE: April 4, 2024  TIME: 8:40 AM

## 2024-04-11 NOTE — TELEPHONE ENCOUNTER
"Pt of Dr. Montoya.    Ez from [facility] calling to check if orders were faxed to [facility].  RN reviewed chart, do not see note that they were faxed.  Ez provided a different fax number - [phone].  Please fax orders to this number.       Answer Assessment - Initial Assessment Questions  1. REASON FOR CALL or QUESTION: \"What is your reason for calling today?\" or \"How can I best help you?\" or \"What question do you have that I can help answer?\"      [facility] calling for documents to be faxed    Protocols used: Information Only Call - No Triage-ADULT-[place]    "
Ez from [facility] calling on orders faxed to office on 3/26/24. States they have not received them. Will be re faxing over five pages at #[phone].  
Orders faxed to  #[phone]   
Statement Selected

## 2024-04-15 ENCOUNTER — TELEPHONE (OUTPATIENT)
Dept: PAIN MEDICINE | Facility: CLINIC | Age: 64
End: 2024-04-15

## 2024-04-16 ENCOUNTER — TELEPHONE (OUTPATIENT)
Age: 64
End: 2024-04-16

## 2024-04-16 NOTE — TELEPHONE ENCOUNTER
Caller: Jennifer    Doctor/Office: [facility]    Call regarding :  appointment     Call was transferred to: [facility]

## 2024-04-16 NOTE — TELEPHONE ENCOUNTER
Caller: alexx Rodriguez    Doctor: Rosi    Reason for call: pt returning schedulers call    Call back#: [phone]

## 2024-04-17 ENCOUNTER — PREP FOR PROCEDURE (OUTPATIENT)
Age: 64
End: 2024-04-17

## 2024-04-17 DIAGNOSIS — M54.12 CERVICAL RADICULOPATHY: Primary | ICD-10-CM

## 2024-04-22 ENCOUNTER — OFFICE VISIT (OUTPATIENT)
Dept: SURGERY | Facility: CLINIC | Age: 64
End: 2024-04-22
Payer: COMMERCIAL

## 2024-04-22 VITALS — BODY MASS INDEX: 28.68 KG/M2 | HEIGHT: 64 IN | WEIGHT: 168 LBS

## 2024-04-22 DIAGNOSIS — D17.21 LIPOMA OF RIGHT UPPER EXTREMITY: Primary | ICD-10-CM

## 2024-04-22 PROCEDURE — 99214 OFFICE O/P EST MOD 30 MIN: CPT | Performed by: SURGERY

## 2024-04-22 RX ORDER — HEPARIN SODIUM 5000 [USP'U]/ML
5000 INJECTION, SOLUTION INTRAVENOUS; SUBCUTANEOUS ONCE
OUTPATIENT
Start: 2024-05-07 | End: 2024-04-22

## 2024-04-22 RX ORDER — CEFAZOLIN SODIUM 1 G/50ML
1000 SOLUTION INTRAVENOUS ONCE
OUTPATIENT
Start: 2024-05-07 | End: 2024-04-22

## 2024-04-22 RX ORDER — RIMEGEPANT SULFATE 75 MG/75MG
75 TABLET, ORALLY DISINTEGRATING ORAL ONCE
COMMUNITY

## 2024-04-22 NOTE — PROGRESS NOTES
Assessment/Plan:    Lipoma of right upper extremity  - Will proceed with surgical removal of the lipoma of the right upper extremity  - Consent obtained  - Patient will schedule surgery at her mutual convenience  - Will need to obtain pre-operative EKG given patient not currently on psych meds  - All additional questions and concerns appropriately addressed       Diagnoses and all orders for this visit:    Lipoma of right upper extremity  -     ECG 12 lead; Future    Other orders  -     rimegepant sulfate (Nurtec) 75 mg TBDP; Take 75 mg by mouth once        Notes:  - Patient not currently on her psych meds  - Will need to get preoperative EKG  - Consent for surgery obtained  - Reviewed 2/22 Ultrasound of right upper extremity with patient which demonstrated 3.2 x 3.1 x 0.5 cm lipoma    Subjective:      Patient ID: Jennifer Woodall is a 63 y.o. female.    HPI    Patient well know to the surgery service. Presents for further evaluation of her right upper extremity lipoma after recently obtaining an ultrasound and CT of the right upper extremity on 2/22. Patient states she still has pain in the right upper extremity. STSG healing well. Tolerating PO. No fevers or chills. Voiding without difficulty. See above for assessment and plan:    The following portions of the patient's history were reviewed and updated as appropriate: allergies, current medications, past family history, past medical history, past social history, past surgical history, and problem list.    Review of Systems   Constitutional:  Negative for activity change, appetite change, chills and fever.   HENT:  Negative for congestion, sinus pressure and sinus pain.    Eyes:  Negative for visual disturbance.   Respiratory:  Negative for chest tightness and shortness of breath.    Cardiovascular:  Negative for chest pain and palpitations.   Gastrointestinal:  Negative for nausea and vomiting.   Endocrine: Negative for polydipsia and polyuria.   Genitourinary:   "Negative for dyspareunia and flank pain.   Musculoskeletal:  Positive for arthralgias and joint swelling. Negative for back pain.        Right upper extremity pain.   Skin:  Positive for wound.        Previous right upper extremity fasciotomy site well healing, STSG well healing.   Allergic/Immunologic: Negative for immunocompromised state.   Neurological:  Negative for dizziness and headaches.   Psychiatric/Behavioral:  Negative for agitation and confusion.          Objective:      Ht 5' 4\" (1.626 m)   Wt 76.2 kg (168 lb)   BMI 28.84 kg/m²          Physical Exam  Constitutional:       Appearance: She is normal weight.   HENT:      Head: Normocephalic.      Nose: Nose normal.      Mouth/Throat:      Mouth: Mucous membranes are moist.      Pharynx: Oropharynx is clear.   Eyes:      Conjunctiva/sclera: Conjunctivae normal.      Pupils: Pupils are equal, round, and reactive to light.   Cardiovascular:      Rate and Rhythm: Normal rate.      Pulses: Normal pulses.   Pulmonary:      Effort: Pulmonary effort is normal.   Abdominal:      General: Abdomen is flat.      Palpations: Abdomen is soft.   Musculoskeletal:         General: Swelling, tenderness and signs of injury present.      Cervical back: Normal range of motion.      Comments: Right forearm with STSG that is healing well. Some minor motor deficits in the right upper extremity. Lipoma appreciated on ventral aspect of forearm near the elbow. Lipoma tender to palpation. Also tenderness to palpation of the right wrist.   Skin:     General: Skin is warm.   Neurological:      General: No focal deficit present.      Mental Status: She is alert.      Motor: Weakness present.           "

## 2024-04-22 NOTE — ASSESSMENT & PLAN NOTE
- Will proceed with surgical removal of the lipoma of the right upper extremity  - Consent obtained  - Patient will schedule surgery at her mutual convenience  - Will need to obtain pre-operative EKG given patient not currently on psych meds  - All additional questions and concerns appropriately addressed   No

## 2024-04-26 ENCOUNTER — OFFICE VISIT (OUTPATIENT)
Dept: LAB | Facility: HOSPITAL | Age: 64
End: 2024-04-26
Payer: COMMERCIAL

## 2024-04-26 DIAGNOSIS — D17.21 LIPOMA OF RIGHT UPPER EXTREMITY: ICD-10-CM

## 2024-04-26 LAB
ATRIAL RATE: 71 BPM
P AXIS: 58 DEGREES
PR INTERVAL: 160 MS
QRS AXIS: 62 DEGREES
QRSD INTERVAL: 66 MS
QT INTERVAL: 428 MS
QTC INTERVAL: 465 MS
T WAVE AXIS: 69 DEGREES
VENTRICULAR RATE: 71 BPM

## 2024-04-26 PROCEDURE — 93005 ELECTROCARDIOGRAM TRACING: CPT

## 2024-04-29 ENCOUNTER — APPOINTMENT (EMERGENCY)
Dept: CT IMAGING | Facility: HOSPITAL | Age: 64
End: 2024-04-29
Payer: COMMERCIAL

## 2024-04-29 ENCOUNTER — HOSPITAL ENCOUNTER (EMERGENCY)
Facility: HOSPITAL | Age: 64
Discharge: HOME | End: 2024-04-29
Attending: EMERGENCY MEDICINE
Payer: COMMERCIAL

## 2024-04-29 VITALS
HEART RATE: 61 BPM | SYSTOLIC BLOOD PRESSURE: 117 MMHG | RESPIRATION RATE: 18 BRPM | DIASTOLIC BLOOD PRESSURE: 58 MMHG | OXYGEN SATURATION: 93 % | BODY MASS INDEX: 29.55 KG/M2 | WEIGHT: 172.18 LBS | TEMPERATURE: 98.4 F

## 2024-04-29 DIAGNOSIS — R29.90 STROKE-LIKE SYMPTOMS: ICD-10-CM

## 2024-04-29 DIAGNOSIS — K11.9 LESION OF PAROTID GLAND: ICD-10-CM

## 2024-04-29 DIAGNOSIS — R47.81 SLURRED SPEECH: Primary | ICD-10-CM

## 2024-04-29 DIAGNOSIS — R91.8 PULMONARY NODULES/LESIONS, MULTIPLE: ICD-10-CM

## 2024-04-29 LAB
ANION GAP SERPL CALCULATED.3IONS-SCNC: 8 MMOL/L (ref 4–13)
APAP SERPL-MCNC: <2 UG/ML (ref 10–20)
APTT PPP: 27 SECONDS (ref 23–37)
ATRIAL RATE: 71 BPM
BUN SERPL-MCNC: 13 MG/DL (ref 5–25)
CALCIUM SERPL-MCNC: 8.8 MG/DL (ref 8.4–10.2)
CARDIAC TROPONIN I PNL SERPL HS: 2 NG/L
CHLORIDE SERPL-SCNC: 104 MMOL/L (ref 96–108)
CO2 SERPL-SCNC: 28 MMOL/L (ref 21–32)
CREAT SERPL-MCNC: 0.73 MG/DL (ref 0.6–1.3)
ERYTHROCYTE [DISTWIDTH] IN BLOOD BY AUTOMATED COUNT: 13.7 % (ref 11.6–15.1)
ETHANOL SERPL-MCNC: <10 MG/DL
GFR SERPL CREATININE-BSD FRML MDRD: 88 ML/MIN/1.73SQ M
GLUCOSE SERPL-MCNC: 108 MG/DL (ref 65–140)
GLUCOSE SERPL-MCNC: 125 MG/DL (ref 65–140)
HCT VFR BLD AUTO: 36.9 % (ref 34.8–46.1)
HGB BLD-MCNC: 11.8 G/DL (ref 11.5–15.4)
INR PPP: 1.03 (ref 0.84–1.19)
MCH RBC QN AUTO: 30.9 PG (ref 26.8–34.3)
MCHC RBC AUTO-ENTMCNC: 32 G/DL (ref 31.4–37.4)
MCV RBC AUTO: 97 FL (ref 82–98)
P AXIS: 58 DEGREES
PLATELET # BLD AUTO: 169 THOUSANDS/UL (ref 149–390)
PMV BLD AUTO: 10.6 FL (ref 8.9–12.7)
POTASSIUM SERPL-SCNC: 3.5 MMOL/L (ref 3.5–5.3)
PR INTERVAL: 160 MS
PROTHROMBIN TIME: 13.4 SECONDS (ref 11.6–14.5)
QRS AXIS: 62 DEGREES
QRSD INTERVAL: 66 MS
QT INTERVAL: 428 MS
QTC INTERVAL: 465 MS
RBC # BLD AUTO: 3.82 MILLION/UL (ref 3.81–5.12)
SALICYLATES SERPL-MCNC: <5 MG/DL (ref 3–20)
SODIUM SERPL-SCNC: 140 MMOL/L (ref 135–147)
T WAVE AXIS: 69 DEGREES
VENTRICULAR RATE: 71 BPM
WBC # BLD AUTO: 7.9 THOUSAND/UL (ref 4.31–10.16)

## 2024-04-29 PROCEDURE — 70498 CT ANGIOGRAPHY NECK: CPT

## 2024-04-29 PROCEDURE — 99285 EMERGENCY DEPT VISIT HI MDM: CPT

## 2024-04-29 PROCEDURE — 80179 DRUG ASSAY SALICYLATE: CPT | Performed by: EMERGENCY MEDICINE

## 2024-04-29 PROCEDURE — 85610 PROTHROMBIN TIME: CPT | Performed by: EMERGENCY MEDICINE

## 2024-04-29 PROCEDURE — 82077 ASSAY SPEC XCP UR&BREATH IA: CPT | Performed by: EMERGENCY MEDICINE

## 2024-04-29 PROCEDURE — 36415 COLL VENOUS BLD VENIPUNCTURE: CPT | Performed by: EMERGENCY MEDICINE

## 2024-04-29 PROCEDURE — 84484 ASSAY OF TROPONIN QUANT: CPT | Performed by: EMERGENCY MEDICINE

## 2024-04-29 PROCEDURE — 85027 COMPLETE CBC AUTOMATED: CPT | Performed by: EMERGENCY MEDICINE

## 2024-04-29 PROCEDURE — 80048 BASIC METABOLIC PNL TOTAL CA: CPT | Performed by: EMERGENCY MEDICINE

## 2024-04-29 PROCEDURE — 99285 EMERGENCY DEPT VISIT HI MDM: CPT | Performed by: EMERGENCY MEDICINE

## 2024-04-29 PROCEDURE — 70496 CT ANGIOGRAPHY HEAD: CPT

## 2024-04-29 PROCEDURE — 80143 DRUG ASSAY ACETAMINOPHEN: CPT | Performed by: EMERGENCY MEDICINE

## 2024-04-29 PROCEDURE — 82948 REAGENT STRIP/BLOOD GLUCOSE: CPT

## 2024-04-29 PROCEDURE — 85730 THROMBOPLASTIN TIME PARTIAL: CPT | Performed by: EMERGENCY MEDICINE

## 2024-04-29 PROCEDURE — 93010 ELECTROCARDIOGRAM REPORT: CPT | Performed by: INTERNAL MEDICINE

## 2024-04-29 PROCEDURE — 93005 ELECTROCARDIOGRAM TRACING: CPT

## 2024-04-29 RX ORDER — HYDROCODONE BITARTRATE AND ACETAMINOPHEN 5; 325 MG/1; MG/1
1 TABLET ORAL ONCE
Status: COMPLETED | OUTPATIENT
Start: 2024-04-29 | End: 2024-04-29

## 2024-04-29 RX ORDER — NICOTINE 21 MG/24HR
21 PATCH, TRANSDERMAL 24 HOURS TRANSDERMAL ONCE
Status: DISCONTINUED | OUTPATIENT
Start: 2024-04-29 | End: 2024-04-29 | Stop reason: HOSPADM

## 2024-04-29 RX ADMIN — IOHEXOL 85 ML: 350 INJECTION, SOLUTION INTRAVENOUS at 11:39

## 2024-04-29 RX ADMIN — NICOTINE 21 MG: 21 PATCH, EXTENDED RELEASE TRANSDERMAL at 13:57

## 2024-04-29 RX ADMIN — HYDROCODONE BITARTRATE AND ACETAMINOPHEN 1 TABLET: 5; 325 TABLET ORAL at 13:56

## 2024-04-29 NOTE — ED PROVIDER NOTES
History  Chief Complaint   Patient presents with    STROKE Alert     Per EMS the patient's aide stated she was at her baseline when she developed slurred speech. Patient complains of a migraine, lower neck pain, and decreased sensation on the R side of her face.       Prehospital stroke alert    Patient is a 63-year-old male brought in by EMS after her caregiver stated her speech seemed slurred and she may have noted a right-sided facial droop.  She summoned EMS.  She was able to ambulate patient back into her residence.  She states she noticed the speech did improve at that time.  Previous records reviewed.  Patient proceeded directly to CT scan.  Neurology consulted via stroke order set.  Previous records reviewed.      STROKE Alert  Associated symptoms: no abdominal pain, no chest pain, no congestion, no cough, no diarrhea, no fatigue, no fever, no headaches, no nausea, no rhinorrhea, no shortness of breath, no sore throat, no vomiting and no wheezing        Prior to Admission Medications   Prescriptions Last Dose Informant Patient Reported? Taking?   Alcohol Swabs (Alcohol Pads) 70 % PADS  Care Giver, Self Yes No   Sig: USE TO TEST BLOOD GLUCOSE 2-3 TIMES DAILY   Buprenorphine HCl (Belbuca) 300 MCG FILM  Self, Care Giver Yes No   Sig: Apply 300 mcg to cheek in the morning   DULoxetine (CYMBALTA) 60 mg delayed release capsule  Self Yes No   Patient not taking: Reported on 4/22/2024   Galcanezumab-gnlm (Emgality) 120 MG/ML SOAJ  Self, Care Giver Yes No   Sig: Inject 120 mg under the skin every 30 (thirty) days Last inj 1/19/23   HYDROcodone-acetaminophen (NORCO)  mg per tablet  Self Yes No   Sig: Take 1 tablet by mouth 3 (three) times a day   OneTouch Ultra test strip  Care Giver, Self Yes No   Sig: USE TO TEST BLOOD GLUCOSE 2-3 TIMES DAILY   True Comfort Safety Lancets MISC  Care Giver, Self Yes No   Sig: USE TO TEST BLOOD GLUCOSE 2-3 TIMES DAILY   Varenicline Tartrate, Starter, 0.5 MG X 11 & 1 MG X 42 TBPK   Self Yes No   Sig: USE AS DIRECTED. GIVE WITH MEALS AND WITH A FULL GLASS OF WATER.   Patient not taking: Reported on 2024   albuterol (PROVENTIL HFA,VENTOLIN HFA) 90 mcg/act inhaler  Self No No   Sig: Inhale 2 puffs every 6 (six) hours as needed for wheezing or shortness of breath   ammonium lactate (LAC-HYDRIN) 12 % lotion  Care Giver, Self Yes No   Sig: as needed   atorvastatin (LIPITOR) 80 mg tablet  Care Giver, Self Yes No   Sig: Take 80 mg by mouth every evening   benzonatate (TESSALON PERLES) 100 mg capsule  Care Giver, Self No No   Sig: Take 1 capsule (100 mg total) by mouth 3 (three) times a day as needed for cough   carboxymethylcellulose 0.5 % SOLN  Self, Care Giver No No   Sig: Administer 1 drop to both eyes daily as needed for dry eyes   clonazePAM (KlonoPIN) 0.5 mg tablet  Self, Care Giver Yes No   Sig: Take 0.5 mg by mouth daily at bedtime   clonazePAM (KlonoPIN) 1 mg tablet  Self, Care Giver Yes No   Sig: Take 1 mg by mouth daily in the early morning   dicyclomine (BENTYL) 20 mg tablet  Self, Care Giver Yes No   Sig: Take 20 mg by mouth every 6 (six) hours   famotidine (PEPCID) 40 MG tablet  Care Giver, Self No No   Sig: Take 1 tablet (40 mg total) by mouth daily at bedtime   fenofibrate (TRICOR) 48 mg tablet  Self, Care Giver Yes No   Sig: Take 48 mg by mouth daily   fluticasone (FLONASE) 50 mcg/act nasal spray  Care Giver, Self Yes No   Si sprays into each nostril daily   fluticasone-umeclidinium-vilanterol (Trelegy Ellipta) 200-62.5-25 mcg/actuation AEPB inhaler  Self No No   Sig: Inhale 1 puff daily Rinse mouth after use.   lamoTRIgine (LaMICtal) 100 mg tablet  Care Giver, Self Yes No   Sig: Take 100 mg by mouth 2 (two) times a day   latanoprost (XALATAN) 0.005 % ophthalmic solution  Care Giver, Self Yes No   Sig: instill 1 drop into both eyes at bedtime   levothyroxine 137 mcg tablet  Self, Care Giver Yes No   Sig: Take 137 mcg by mouth daily   ondansetron (ZOFRAN) 8 mg tablet  Care  Giver, Self No No   Sig: Take 1 tablet (8 mg total) by mouth every 8 (eight) hours as needed for nausea or vomiting   pregabalin (LYRICA) 100 mg capsule  Self, Care Giver Yes No   Sig: Take 100 mg by mouth 3 (three) times a day Morning-lunch-dinner   pregabalin (LYRICA) 150 mg capsule  Self, Care Giver Yes No   Sig: take 1 capsule by mouth NIGHTLY   rimegepant sulfate (Nurtec) 75 mg TBDP   Yes No   Sig: Take 75 mg by mouth once   tiZANidine (ZANAFLEX) 4 mg tablet  Care Giver, Self Yes No   Si tablet   topiramate (TOPAMAX) 200 MG tablet  Care Giver, Self Yes No   Si mg 2 (two) times a day      Facility-Administered Medications: None       Past Medical History:   Diagnosis Date    Ambulates with cane     Anxiety     Arthritis     Asthma     Bipolar 1 disorder ([facility])     Brain aneurysm     Chronic pain disorder     spinal stenosis    Concussion syndrome     neurological rx and balance rx    COPD (chronic obstructive pulmonary disease) ([facility])     Depression     Diabetes mellitus ([facility])     controlled w/ diet    Disease of thyroid gland     nodules     Family history of colon cancer     father    Fibromyalgia, primary     GERD (gastroesophageal reflux disease)     History of colon polyps     Hyperlipidemia     Hypertension     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     Irritable bowel syndrome     Lumbar degenerative disc disease 2022    Migraine     Neuropathy     bilateral feet and hands    Peripheral neuropathy     Psychiatric disorder     PTSD (post-traumatic stress disorder)     Shortness of breath     Sleep apnea     had 3 studies & last one negative    Stroke ([facility])      no deficeits    TIA (transient ischemic attack)     Wears dentures     Wears glasses        Past Surgical History:   Procedure Laterality Date    ABDOMINAL SURGERY      laproscopic/ endometriosis    BRAIN SURGERY      aneurysm/ coiling procedure    CARPAL TUNNEL RELEASE      CHOLECYSTECTOMY      COLONOSCOPY      DENTAL  SURGERY      EPIDURAL BLOCK INJECTION Right 03/03/2022    Procedure: C7-T1 interlaminar epidural steroid injection;  Surgeon: Johnson Aranda MD;  [facility];  Service: Pain Management     EPIDURAL BLOCK INJECTION N/A 04/21/2022    Procedure: C6-C7 BLOCK / INJECTION EPIDURAL STEROID CERVICAL;  Surgeon: Johnson Aranda MD;  [facility];  Service: Pain Management     EPIDURAL BLOCK INJECTION Left 06/21/2022    Procedure: BLOCK / INJECTION EPIDURAL STEROID LUMBAR  left L3-4 TFESI;  Surgeon: Johnson Aranda MD;  [facility];  Service: Pain Management     EYE SURGERY      cataract    FASCIOTOMY Right 10/27/2023    Procedure: FASCIOTOMY OF RIGHT UPPER EXTREMITY; POSSIBLE VAC DRESSING APPLICATION;  Surgeon: Bruno Blevins MD;  [facility];  Service: General    FL GUIDED NEEDLE PLAC BX/ASP/INJ  03/03/2022    FL GUIDED NEEDLE PLAC BX/ASP/INJ  11/17/2022    HYSTERECTOMY      NERVE BLOCK Left 2/20/2024    Procedure: BLOCK MEDIAL BRANCH  left C3-4 and C4-5 MBB #1;  Surgeon: Johnson Aranda MD;  [facility];  Service: Pain Management     NERVE BLOCK Left 4/4/2024    Procedure: BLOCK MEDIAL BRANCH Left C3-C4 and C4-5 MBB2;  Surgeon: Johnson Aranda MD;  [facility];  Service: Pain Management     LA ESOPHAGOGASTRODUODENOSCOPY TRANSORAL DIAGNOSTIC N/A 02/08/2018    Procedure: EGD AND COLONOSCOPY;  Surgeon: Caleb Pete MD;  [facility]: [facility];  Service: Gastroenterology    LA PRQ IMPLTJ NSTIM ELECTRODE ARRAY EPIDURAL N/A 11/17/2022    Procedure: NEVRO SCS TRIAL;  Surgeon: Johnson Aranda MD;  [facility];  Service: Pain Management     LA PRQ IMPLTJ NSTIM ELECTRODE ARRAY EPIDURAL N/A 2/1/2023    Procedure: Insertion percutaneous thoracic spinal cord stimulator with left buttock implantable pulse generator;  Surgeon: Craig Robert Goldberg, MD;  [facility];  Service: Neurosurgery    SPLIT THICKNESS SKIN GRAFT Right  11/7/2023    Procedure: SKIN GRAFT SPLIT THICKNESS (STSG)  EXTREMITY;  Surgeon: Samm Sims MD;  [facility];  Service: General    THYROID SURGERY      TONSILLECTOMY      US GUIDED THYROID BIOPSY  11/30/2016    US GUIDED THYROID BIOPSY  3/21/2018    VAC DRESSING APPLICATION Right 10/29/2023    Procedure: CHANGE DRESSING/VAC RIGHT UPPER EXTREMITY; WASHOUT; PARTIAL CLOSURE;  Surgeon: Irina Day DO;  [facility];  Service: General    WOUND DEBRIDEMENT Right 11/4/2023    Procedure: SIMPLE CLOSURE 3.5CM, OASIS APPLICATION 15X7CM, WOUND VAC APPLICATION;  Surgeon: Maryuri Goldstein MD;  [facility];  Service: General       Family History   Problem Relation Age of Onset    Brain cancer Mother     Alzheimer's disease Mother     Alzheimer's disease Father     Parkinsonism Father      I have reviewed and agree with the history as documented.    E-Cigarette/Vaping    E-Cigarette Use Never User      E-Cigarette/Vaping Substances    Nicotine No     THC No     CBD No     Flavoring No     Other No     Unknown No      Social History     Tobacco Use    Smoking status: Every Day     Current packs/day: 2.00     Types: Cigarettes    Smokeless tobacco: Never   Vaping Use    Vaping status: Never Used   Substance Use Topics    Alcohol use: Not Currently    Drug use: Never     Comment: prescribed Belbuca       Review of Systems   Constitutional:  Negative for appetite change, fatigue, fever and unexpected weight change.   HENT:  Negative for congestion, rhinorrhea and sore throat.    Eyes:  Negative for visual disturbance.   Respiratory:  Negative for cough, chest tightness, shortness of breath and wheezing.    Cardiovascular:  Negative for chest pain, palpitations and leg swelling.   Gastrointestinal:  Negative for abdominal pain, constipation, diarrhea, nausea and vomiting.   Genitourinary:  Negative for difficulty urinating, dysuria, frequency, menstrual problem, pelvic pain, vaginal bleeding and  vaginal discharge.   Musculoskeletal:  Positive for arthralgias and back pain. Negative for neck pain.   Neurological:  Positive for speech difficulty. Negative for dizziness, syncope, light-headedness and headaches.   Psychiatric/Behavioral:  Negative for sleep disturbance.        Physical Exam  Physical Exam  Vitals and nursing note reviewed.   Constitutional:       General: She is not in acute distress.     Appearance: Normal appearance. She is well-developed and normal weight. She is not ill-appearing, toxic-appearing or diaphoretic.   HENT:      Head: Normocephalic and atraumatic.      Nose: Nose normal.      Mouth/Throat:      Mouth: Mucous membranes are moist.      Pharynx: Oropharynx is clear.   Eyes:      General: No scleral icterus.     Extraocular Movements: Extraocular movements intact.      Conjunctiva/sclera: Conjunctivae normal.   Cardiovascular:      Rate and Rhythm: Normal rate and regular rhythm.      Pulses: Normal pulses.      Heart sounds: Normal heart sounds. No murmur heard.     No friction rub. No gallop.   Pulmonary:      Effort: Pulmonary effort is normal. No respiratory distress.      Breath sounds: Normal breath sounds. No wheezing or rales.   Chest:      Chest wall: No tenderness.   Abdominal:      General: Bowel sounds are normal. There is no distension.      Palpations: Abdomen is soft. There is no mass.      Tenderness: There is no abdominal tenderness. There is no right CVA tenderness, left CVA tenderness, guarding or rebound.      Hernia: No hernia is present.   Musculoskeletal:         General: No tenderness or deformity. Normal range of motion.      Cervical back: Normal range of motion and neck supple. No rigidity or tenderness.      Right lower leg: No edema.      Left lower leg: No edema.   Lymphadenopathy:      Cervical: No cervical adenopathy.   Skin:     General: Skin is warm and dry.      Capillary Refill: Capillary refill takes less than 2 seconds.      Coloration: Skin is  not jaundiced or pale.      Findings: No bruising, erythema, lesion or rash.   Neurological:      General: No focal deficit present.      Mental Status: She is alert and oriented to person, place, and time. Mental status is at baseline.      Sensory: Sensory deficit present.      Comments: Subjective numbness right side of face.  No facial asymmetry noted   Psychiatric:         Mood and Affect: Mood normal.         Behavior: Behavior normal.         Vital Signs  ED Triage Vitals   Temperature Pulse Respirations Blood Pressure SpO2   04/29/24 1152 04/29/24 1145 04/29/24 1145 04/29/24 1145 04/29/24 1145   98.4 °F (36.9 °C) 66 18 101/63 94 %      Temp Source Heart Rate Source Patient Position - Orthostatic VS BP Location FiO2 (%)   04/29/24 1152 04/29/24 1145 04/29/24 1145 -- --   Temporal Monitor Lying        Pain Score       04/29/24 1145       8           Vitals:    04/29/24 1300 04/29/24 1315 04/29/24 1330 04/29/24 1345   BP: 113/61 111/65 112/65 122/60   Pulse: 66 67 68 69   Patient Position - Orthostatic VS:             Visual Acuity  Visual Acuity      Flowsheet Row Most Recent Value   L Pupil Size (mm) 3   R Pupil Size (mm) 3            ED Medications  Medications   nicotine (NICODERM CQ) 21 mg/24 hr TD 24 hr patch 21 mg (21 mg Transdermal Medication Applied 4/29/24 1357)   iohexol (OMNIPAQUE) 350 MG/ML injection (MULTI-DOSE) 85 mL (85 mL Intravenous Given 4/29/24 1139)   HYDROcodone-acetaminophen (NORCO) 5-325 mg per tablet 1 tablet (1 tablet Oral Given 4/29/24 1356)       Diagnostic Studies  Results Reviewed       Procedure Component Value Units Date/Time    Ethanol [[id number]]  (Normal) Collected: 04/29/24 1144    Lab Status: Final result Specimen: Blood Updated: 04/29/24 1232     Ethanol Lvl <10 mg/dL     Salicylate level [[id number]]  (Normal) Collected: 04/29/24 1144    Lab Status: Final result Specimen: Blood Updated: 04/29/24 1232     Salicylate Lvl <5 mg/dL     Acetaminophen level-If concentration is  detectable, please discuss with medical  on call. [[id number]]  (Abnormal) Collected: 04/29/24 1144    Lab Status: Final result Specimen: Blood Updated: 04/29/24 1232     Acetaminophen Level <2 ug/mL     HS Troponin 0hr (reflex protocol) [[id number]]  (Normal) Collected: 04/29/24 1144    Lab Status: Final result Specimen: Blood from Arm, Left Updated: 04/29/24 1211     hs TnI 0hr 2 ng/L     Protime-INR [[id number]]  (Normal) Collected: 04/29/24 1144    Lab Status: Final result Specimen: Blood from Arm, Left Updated: 04/29/24 1207     Protime 13.4 seconds      INR 1.03    APTT [[id number]]  (Normal) Collected: 04/29/24 1144    Lab Status: Final result Specimen: Blood from Arm, Left Updated: 04/29/24 1207     PTT 27 seconds     Basic metabolic panel [[id number]] Collected: 04/29/24 1144    Lab Status: Final result Specimen: Blood from Arm, Left Updated: 04/29/24 1203     Sodium 140 mmol/L      Potassium 3.5 mmol/L      Chloride 104 mmol/L      CO2 28 mmol/L      ANION GAP 8 mmol/L      BUN 13 mg/dL      Creatinine 0.73 mg/dL      Glucose 108 mg/dL      Calcium 8.8 mg/dL      eGFR 88 ml/min/1.73sq m     Narrative:      National Kidney Disease Foundation guidelines for Chronic Kidney Disease (CKD):     Stage 1 with normal or high GFR (GFR > 90 mL/min/1.73 square meters)    Stage 2 Mild CKD (GFR = 60-89 mL/min/1.73 square meters)    Stage 3A Moderate CKD (GFR = 45-59 mL/min/1.73 square meters)    Stage 3B Moderate CKD (GFR = 30-44 mL/min/1.73 square meters)    Stage 4 Severe CKD (GFR = 15-29 mL/min/1.73 square meters)    Stage 5 End Stage CKD (GFR <15 mL/min/1.73 square meters)  Note: GFR calculation is accurate only with a steady state creatinine    Rapid drug screen, urine [[id number]]     Lab Status: No result Specimen: Urine     CBC and Platelet [[id number]]  (Normal) Collected: 04/29/24 1144    Lab Status: Final result Specimen: Blood from Arm, Left Updated: 04/29/24 1149     WBC 7.90 Thousand/uL      RBC 3.82  Million/uL      Hemoglobin 11.8 g/dL      Hematocrit 36.9 %      MCV 97 fL      MCH 30.9 pg      MCHC 32.0 g/dL      RDW 13.7 %      Platelets 169 Thousands/uL      MPV 10.6 fL     Fingerstick Glucose (POCT) [[id number]]  (Normal) Collected: 04/29/24 1142    Lab Status: Final result Specimen: Blood Updated: 04/29/24 1142     POC Glucose 125 mg/dl                    CTA stroke alert (head/neck)   Final Result by Marcus Oleary MD (04/29 1206)      CTA head:   -No high-grade stenosis or large vessel occlusion.   - Sequelae of right cavernous ICA aneurysm coiling. No residual aneurysm is identified within limitations.      CTA neck:   -Proximal left common carotid artery cannot be evaluated. Suboptimal evaluation of proximal left vertebral artery.   -Otherwise no high-grade stenosis, dissection or aneurysm.      Other:   -Stable partially calcified left parotid gland lesions measuring up to 0.8 cm. Correlate with ultrasound.   -Partially solid right upper lobe pulmonary nodule measuring 0.4 cm. Based on current Fleischner Society 2017 Guidelines on incidental pulmonary nodule, follow-up noncontrast CT is recommended at 3-6 months.            Findings were directly discussed with Wai Schumacher at 12:05 p.m.      The study was marked in EPIC for immediate notification.                           Workstation performed: [id number]         CT stroke alert brain   Final Result by Marcus Oleary MD (04/29 1207)      -No acute intracranial abnormality. Chronic microangiopathic changes.   -Sequelae of aneurysmal coiling in the region of the right cavernous ICA. Associated streak artifact limits evaluation      Findings were directly discussed with Wai Schumacher at 12:05 p.m.         Workstation performed: [id number]                    Procedures  ECG 12 Lead Documentation Only    Date/Time: 4/29/2024 1:31 PM    Performed by: Kevin Escoto DO  Authorized by: Kevin Escoto DO    Indications / Diagnosis:   STROKE ALERT  ECG reviewed by me, the ED Provider: yes    Patient location:  ED  Previous ECG:     Comparison to cardiac monitor: Yes    Rate:     ECG rate:  66    ECG rate assessment: normal    Rhythm:     Rhythm: sinus rhythm    Ectopy:     Ectopy: none    QRS:     QRS axis:  Normal    QRS intervals:  Normal  Conduction:     Conduction: normal    ST segments:     ST segments:  Normal  T waves:     T waves: normal             ED Course                  Stroke Assessment       Row Name 04/29/24 1510             NIH Stroke Scale    Interval --      Level of Consciousness (1a.) 0      LOC Questions (1b.) 0      LOC Commands (1c.) 0      Best Gaze (2.) 0      Visual (3.) 0      Facial Palsy (4.) 0      Motor Arm, Left (5a.) 0      Motor Arm, Right (5b.) 0      Motor Leg, Left (6a.) 0      Motor Leg, Right (6b.) 0      Limb Ataxia (7.) 0      Sensory (8.) 1  Subjective numbness sensation right side of face      Best Language (9.) 0      Dysarthria (10.) 0      Extinction and Inattention (11.) (Formerly Neglect) 0      Total 1                    Flowsheet Row Most Recent Value   Thrombolytic Decision Options    Thrombolytic Decision Patient not a candidate.   Patient is not a candidate options Symptoms resolved/clearly non disabling.        Smoking cessation discussion with patient:  3-5 minute conversation with the patient regarding smoking cessation. I explained at length risks of long term smoking, associated health conditions, benefits of quitting, as well as quitting methods. Patient receptive and understands              SBIRT 20yo+      Flowsheet Row Most Recent Value   Initial Alcohol Screen: US AUDIT-C     1. How often do you have a drink containing alcohol? 0 Filed at: 04/29/2024 1142   2. How many drinks containing alcohol do you have on a typical day you are drinking?  0 Filed at: 04/29/2024 1142   3a. Male UNDER 65: How often do you have five or more drinks on one occasion? 0 Filed at: 04/29/2024 1142   3b.  FEMALE Any Age, or MALE 65+: How often do you have 4 or more drinks on one occassion? 0 Filed at: 04/29/2024 1142   Audit-C Score 0 Filed at: 04/29/2024 1142   ABIOLA: How many times in the past year have you...    Used an illegal drug or used a prescription medication for non-medical reasons? Never Filed at: 04/29/2024 1142                      Medical Decision Making  63-year-old female presenting with strokelike symptoms.    Problems Addressed:  Slurred speech: acute illness or injury  Stroke-like symptoms: acute illness or injury    Amount and/or Complexity of Data Reviewed  External Data Reviewed: labs, radiology and notes.  Labs: ordered. Decision-making details documented in ED Course.  Radiology: ordered and independent interpretation performed. Decision-making details documented in ED Course.  ECG/medicine tests: ordered and independent interpretation performed. Decision-making details documented in ED Course.  Discussion of management or test interpretation with external provider(s): Discussed with Dr. Toledo of neurology.  Imaging reviewed with no acute occlusive thrombus or other acute abnormalities.  Patient remains asymptomatic.  Multiple discussions with patient trying to elicit more information as this could have indicated possible TIA versus medication interaction/side effect/overusage versus behavioral.  I had a lengthy discussion with patient regarding my recommendation to monitor patient overnight in the hospital and observe for strokelike symptoms however after multiple discussions patient wishes to leave the hospital.  I did inform patient to have to leave AGAINST MEDICAL ADVICE.  Patient understands and acknowledges he will be leaving AGAINST MEDICAL ADVICE.  Patient's son will be coming to get her in the emergency department.    Risk  OTC drugs.  Prescription drug management.  Decision regarding hospitalization.             Disposition  Final diagnoses:   Slurred speech   Stroke-like symptoms      Time reflects when diagnosis was documented in both MDM as applicable and the Disposition within this note       Time User Action Codes Description Comment    4/29/2024 11:22 AM Kevin Escoto [R47.81] Slurred speech     4/29/2024  2:59 PM Kevin Escoto [R29.90] Stroke-like symptoms     4/29/2024  3:07 PM Wai Treadwell [K11.9] Lesion of parotid gland     4/29/2024  3:07 PM Wai Treadwell [R91.8] Pulmonary nodules/lesions, multiple           ED Disposition       ED Disposition   AMA    Condition   --    Date/Time   Mon Apr 29, 2024 1502    Comment   Date: 4/29/2024  Patient: Jennifer Woodall  Admitted: 4/29/2024 11:21 AM  Attending Provider: Kevin Escoto,*    Jennifer Woodall or her authorized caregiver has made the decision for the patient to leave the emergency department against the ad vice of her attending physician. She or her authorized caregiver has been informed and understands the inherent risks, including death, permanent neurological deficits.  She or her authorized caregiver has decided to accept the responsibility for thi s decision. Jennifer Woodall and all necessary parties have been advised that she may return for further evaluation or treatment. Her condition at time of discharge was stable.  Jennifer Woodall had current vital signs as follows:  /60   Pulse 69    Temp 98.4 °F (36.9 °C) (Temporal)   Resp 20   Wt 78.1 kg (172 lb 2.9 oz)                Follow-up Information       Follow up With Specialties Details Why Contact Info    Elaine Talamantes MD Family Medicine Schedule an appointment as soon as possible for a visit   [address] [place] [id number] [phone]              Current Discharge Medication List        CONTINUE these medications which have NOT CHANGED    Details   albuterol (PROVENTIL HFA,VENTOLIN HFA) 90 mcg/act inhaler Inhale 2 puffs every 6 (six) hours as needed for wheezing or shortness of breath  Qty:  18 g, Refills: 5    Comments: Substitution to a formulary equivalent within the same pharmaceutical class is authorized.  Associated Diagnoses: Chronic obstructive pulmonary disease, unspecified COPD type (HCC)      Alcohol Swabs (Alcohol Pads) 70 % PADS USE TO TEST BLOOD GLUCOSE 2-3 TIMES DAILY      ammonium lactate (LAC-HYDRIN) 12 % lotion as needed      atorvastatin (LIPITOR) 80 mg tablet Take 80 mg by mouth every evening      benzonatate (TESSALON PERLES) 100 mg capsule Take 1 capsule (100 mg total) by mouth 3 (three) times a day as needed for cough  Qty: 30 capsule, Refills: 1    Associated Diagnoses: Chronic obstructive pulmonary disease, unspecified COPD type (HCC)      Buprenorphine HCl (Belbuca) 300 MCG FILM Apply 300 mcg to cheek in the morning      carboxymethylcellulose 0.5 % SOLN Administer 1 drop to both eyes daily as needed for dry eyes  Qty: 1 Bottle, Refills: 0    Associated Diagnoses: Bilateral dry eyes; Itchy eyes      !! clonazePAM (KlonoPIN) 0.5 mg tablet Take 0.5 mg by mouth daily at bedtime      !! clonazePAM (KlonoPIN) 1 mg tablet Take 1 mg by mouth daily in the early morning      dicyclomine (BENTYL) 20 mg tablet Take 20 mg by mouth every 6 (six) hours      DULoxetine (CYMBALTA) 60 mg delayed release capsule       famotidine (PEPCID) 40 MG tablet Take 1 tablet (40 mg total) by mouth daily at bedtime  Qty: 30 tablet, Refills: 6    Associated Diagnoses: Heartburn      fenofibrate (TRICOR) 48 mg tablet Take 48 mg by mouth daily      fluticasone (FLONASE) 50 mcg/act nasal spray 2 sprays into each nostril daily      fluticasone-umeclidinium-vilanterol (Trelegy Ellipta) 200-62.5-25 mcg/actuation AEPB inhaler Inhale 1 puff daily Rinse mouth after use.  Qty: 60 blister, Refills: 5    Associated Diagnoses: Chronic obstructive pulmonary disease, unspecified COPD type (HCC)      Galcanezumab-gnlm (Emgality) 120 MG/ML SOAJ Inject 120 mg under the skin every 30 (thirty) days Last inj 1/19/23       HYDROcodone-acetaminophen (NORCO)  mg per tablet Take 1 tablet by mouth 3 (three) times a day      lamoTRIgine (LaMICtal) 100 mg tablet Take 100 mg by mouth 2 (two) times a day      latanoprost (XALATAN) 0.005 % ophthalmic solution instill 1 drop into both eyes at bedtime      levothyroxine 137 mcg tablet Take 137 mcg by mouth daily      ondansetron (ZOFRAN) 8 mg tablet Take 1 tablet (8 mg total) by mouth every 8 (eight) hours as needed for nausea or vomiting  Qty: 20 tablet, Refills: 0    Associated Diagnoses: Nausea      OneTouch Ultra test strip USE TO TEST BLOOD GLUCOSE 2-3 TIMES DAILY      !! pregabalin (LYRICA) 100 mg capsule Take 100 mg by mouth 3 (three) times a day Morning-lunch-dinner      !! pregabalin (LYRICA) 150 mg capsule take 1 capsule by mouth NIGHTLY      rimegepant sulfate (Nurtec) 75 mg TBDP Take 75 mg by mouth once      tiZANidine (ZANAFLEX) 4 mg tablet 1 tablet      topiramate (TOPAMAX) 200 MG tablet 200 mg 2 (two) times a day      True Comfort Safety Lancets MISC USE TO TEST BLOOD GLUCOSE 2-3 TIMES DAILY      Varenicline Tartrate, Starter, 0.5 MG X 11 & 1 MG X 42 TBPK USE AS DIRECTED. GIVE WITH MEALS AND WITH A FULL GLASS OF WATER.       !! - Potential duplicate medications found. Please discuss with provider.              PDMP Review         Value Time User    PDMP Reviewed  Yes 3/13/2024  8:03 AM Johnson Aranda MD            ED Provider  Electronically Signed by             Kevin Escoto DO  04/29/24 2055

## 2024-04-29 NOTE — QUICK NOTE
Stroke Alert Note   Jennifer Woodall 63 y.o. female  MRN: [id number]   Unit/Bed#:  Encounter: [id number]     Stroke alert text received at: 11:19am.  Pre-hospital stroke alert.  Call from  received at: 11:20am  Neurology response by phone was immediate, however, no one was able to discuss the patient until after that patient was seen, so I was on hold for approximately 15 minutes.    She was with a visiting aid who knows her well, and while in mid conversation she developed slurred speech, difficulty getting words out, possibly right facial droop.  Speech improved a bit and then worsened again.  Almost sounded like she was drinking EtOH.  Appears to have had a similar presentation exactly a month ago, on 3/29/2024.    Currently her speech is more deliberate/slow/heavy than slurred, maybe a little word-finding difficulty.  Numbness on the right sie of her face, no droop.  No weakness, answering questions correctly.    NIHSS 2 for speech changes (?word-finding difficulty) and facial numbness     CT head wo: stable from 1 month ago  CTA head/neck: stable from 1 month ago    Thrombolytic decision: {Thrombolytic decision:55778}   ***IV TNK was not given due to ***  - Admit on stroke pathway  - Recommend loading with aspirin 325mg once, then continuing 81 mg daily, and with plavix 300mg once, followed by 75mg daily.  - start lipitor 40mg Qday  - MRI brain wo, echo, lipid panel, HbA1c  - permissive HTN to 220/110 for 24 hours, monitor on telemetry  - normothermia, euglycemia  - avoid hypotonic fluids.        Wai Schumacher MD

## 2024-04-29 NOTE — DISCHARGE INSTRUCTIONS
Please contact your neurosurgeon and ask if it is okay to take a baby aspirin with history of clipping of your aneurysm.  If there is no contraindication according to your neurosurgeon regarding aspirin therapy, would recommend adding an aspirin of 81 mg daily to your medication regimen.  Please follow-up with your neurologist and her primary care physician next available appointment.  Please return to the emergency department immediately if condition returns or worsens.

## 2024-04-30 ENCOUNTER — ANESTHESIA EVENT (OUTPATIENT)
Dept: PERIOP | Facility: HOSPITAL | Age: 64
End: 2024-04-30
Payer: COMMERCIAL

## 2024-04-30 LAB
ATRIAL RATE: 66 BPM
PR INTERVAL: 160 MS
QRS AXIS: 109 DEGREES
QRSD INTERVAL: 74 MS
QT INTERVAL: 442 MS
QTC INTERVAL: 463 MS
T WAVE AXIS: 128 DEGREES
VENTRICULAR RATE: 66 BPM

## 2024-04-30 PROCEDURE — 93010 ELECTROCARDIOGRAM REPORT: CPT | Performed by: INTERNAL MEDICINE

## 2024-05-02 ENCOUNTER — TELEPHONE (OUTPATIENT)
Age: 64
End: 2024-05-02

## 2024-05-02 ENCOUNTER — TELEPHONE (OUTPATIENT)
Dept: SURGERY | Facility: CLINIC | Age: 64
End: 2024-05-02

## 2024-05-02 NOTE — TELEPHONE ENCOUNTER
Addended by: PINKY MERINO on: 4/18/2022 08:24 AM     Modules accepted: Orders     Patient has recent visit to ER (4/30/24).  It was recommended that she have a stress test prior to scheduled procedure 5/7/24.  Does patient have this set up?  Does patient see cardiology?

## 2024-05-02 NOTE — TELEPHONE ENCOUNTER
Caller: alexx    Doctor: heidi    Reason for call: pt is stating that she is scheduled for an RFA. It needs to pre auth for [facility]. [phone].    Call back#: [phone]

## 2024-05-02 NOTE — TELEPHONE ENCOUNTER
Patient called in to let us know that Dr Oleary was ordering a stress test to be done before her surgery. Central scheduling said that the soonest they have is for 5/22/24. She did not schedule yet because she wants to talk to Dr Oleary about why he is requiring this test. She wanted us to be aware that she is working on getting this sorted because she does not want to have to postpone surgery.

## 2024-05-02 NOTE — TELEPHONE ENCOUNTER
Pt has surgery sched'd 5/7 and switched insurances to [facility]; needs pre-auth    Member # [id number]  Phone # [phone] - or [phone]

## 2024-05-03 NOTE — PRE-PROCEDURE INSTRUCTIONS
Pre-Surgery Instructions:   Medication Instructions    albuterol (PROVENTIL HFA,VENTOLIN HFA) 90 mcg/act inhaler Uses PRN- OK to take day of surgery    ammonium lactate (LAC-HYDRIN) 12 % lotion Hold day of surgery.    atorvastatin (LIPITOR) 80 mg tablet Take night before surgery    benzonatate (TESSALON PERLES) 100 mg capsule Uses PRN- OK to take day of surgery    Buprenorphine HCl (Belbuca) 300 MCG FILM Take night before surgery    carboxymethylcellulose 0.5 % SOLN Take day of surgery.    clonazePAM (KlonoPIN) 0.5 mg tablet Take day of surgery.    clonazePAM (KlonoPIN) 1 mg tablet Take night before surgery    dicyclomine (BENTYL) 20 mg tablet Uses PRN- OK to take day of surgery    famotidine (PEPCID) 40 MG tablet Take day of surgery.    fenofibrate (TRICOR) 48 mg tablet Take night before surgery    fluticasone (FLONASE) 50 mcg/act nasal spray Uses PRN- OK to take day of surgery    fluticasone-umeclidinium-vilanterol (Trelegy Ellipta) 200-62.5-25 mcg/actuation AEPB inhaler Take day of surgery.    Galcanezumab-gnlm (Emgality) 120 MG/ML SOAJ monthly    HYDROcodone-acetaminophen (NORCO)  mg per tablet Uses PRN- OK to take day of surgery    lamoTRIgine (LaMICtal) 100 mg tablet Take day of surgery.    latanoprost (XALATAN) 0.005 % ophthalmic solution Take day of surgery.    levothyroxine 137 mcg tablet Take day of surgery.    ondansetron (ZOFRAN) 8 mg tablet Uses PRN- OK to take day of surgery    pregabalin (LYRICA) 100 mg capsule Take day of surgery.    pregabalin (LYRICA) 150 mg capsule Take night before surgery    rimegepant sulfate (Nurtec) 75 mg TBDP Uses PRN- OK to take day of surgery    tiZANidine (ZANAFLEX) 4 mg tablet Uses PRN- OK to take day of surgery    topiramate (TOPAMAX) 200 MG tablet Take day of surgery.   Medication instructions for day surgery reviewed. Please use only a sip of water to take your instructed medications. Avoid all over the counter vitamins, supplements and NSAIDS for one week prior  to surgery per anesthesia guidelines. Tylenol is ok to take as needed.     You will receive a call one business day prior to surgery with an arrival time and hospital directions. If your surgery is scheduled on a Monday, the hospital will be calling you on the Friday prior to your surgery. If you have not heard from anyone by 8pm, please call the hospital supervisor through the hospital  at [phone]  or Lena [phone]).    Do not eat or drink anything after midnight the night before your surgery, including candy, mints, lifesavers, or chewing gum. Do not drink alcohol 24hrs before your surgery. Try not to smoke at least 24hrs before your surgery.       Follow the pre surgery showering instructions as listed in the “My Surgical Experience Booklet” or otherwise provided by your surgeon's office. Do not use a blade to shave the surgical area 1 week before surgery. It is okay to use a clean electric clippers up to 24 hours before surgery. Do not apply any lotions, creams, including makeup, cologne, deodorant, or perfumes after showering on the day of your surgery. Do not use dry shampoo, hair spray, hair gel, or any type of hair products.     No contact lenses, eye make-up, or artificial eyelashes. Remove nail polish, including gel polish, and any artificial, gel, or acrylic nails if possible. Remove all jewelry including rings and body piercing jewelry.     Wear causal clothing that is easy to take on and off. Consider your type of surgery.    Keep any valuables, jewelry, piercings at home. Please bring any specially ordered equipment (sling, braces) if indicated.    Arrange for a responsible person to drive you to and from the hospital on the day of your surgery. Please confirm the visitor policy for the day of your procedure when you receive your phone call with an arrival time.     Call the surgeon's office with any new illnesses, exposures, or additional questions prior to surgery.    Please  reference your “My Surgical Experience Booklet” for additional information to prepare for your upcoming surgery.

## 2024-05-06 ENCOUNTER — TELEPHONE (OUTPATIENT)
Age: 64
End: 2024-05-06

## 2024-05-06 NOTE — TELEPHONE ENCOUNTER
I spoke with pt and moved her surgery to 5/28 along with her post op due not getting additional cardiac testing until 5/22.

## 2024-05-06 NOTE — TELEPHONE ENCOUNTER
Pt states her surgery was cancelled due to the results of a stress test, but would like to have the surgery anyway; has good stress tests from the last year on file; requesting to s/w coordinator to go through w/ surgery; transferred to [facility].

## 2024-05-07 ENCOUNTER — ANESTHESIA (OUTPATIENT)
Dept: PERIOP | Facility: HOSPITAL | Age: 64
End: 2024-05-07
Payer: COMMERCIAL

## 2024-05-20 ENCOUNTER — OFFICE VISIT (OUTPATIENT)
Dept: GASTROENTEROLOGY | Facility: CLINIC | Age: 64
End: 2024-05-20
Payer: COMMERCIAL

## 2024-05-20 ENCOUNTER — NURSE TRIAGE (OUTPATIENT)
Age: 64
End: 2024-05-20

## 2024-05-20 VITALS
SYSTOLIC BLOOD PRESSURE: 100 MMHG | WEIGHT: 168.4 LBS | BODY MASS INDEX: 28.75 KG/M2 | DIASTOLIC BLOOD PRESSURE: 62 MMHG | HEART RATE: 78 BPM | TEMPERATURE: 97.6 F | HEIGHT: 64 IN | OXYGEN SATURATION: 97 %

## 2024-05-20 DIAGNOSIS — K21.9 GASTROESOPHAGEAL REFLUX DISEASE, UNSPECIFIED WHETHER ESOPHAGITIS PRESENT: ICD-10-CM

## 2024-05-20 DIAGNOSIS — R12 HEARTBURN: ICD-10-CM

## 2024-05-20 DIAGNOSIS — Z12.11 SCREENING FOR COLON CANCER: ICD-10-CM

## 2024-05-20 DIAGNOSIS — R11.0 NAUSEA: Primary | ICD-10-CM

## 2024-05-20 DIAGNOSIS — K59.03 DRUG-INDUCED CONSTIPATION: ICD-10-CM

## 2024-05-20 DIAGNOSIS — K58.2 IRRITABLE BOWEL SYNDROME WITH BOTH CONSTIPATION AND DIARRHEA: ICD-10-CM

## 2024-05-20 PROCEDURE — 99214 OFFICE O/P EST MOD 30 MIN: CPT | Performed by: NURSE PRACTITIONER

## 2024-05-20 RX ORDER — FLUTICASONE FUROATE, UMECLIDINIUM BROMIDE AND VILANTEROL TRIFENATATE 100; 62.5; 25 UG/1; UG/1; UG/1
POWDER RESPIRATORY (INHALATION)
COMMUNITY
Start: 2024-04-26

## 2024-05-20 RX ORDER — ONDANSETRON HYDROCHLORIDE 8 MG/1
8 TABLET, FILM COATED ORAL EVERY 8 HOURS PRN
Qty: 20 TABLET | Refills: 5 | Status: SHIPPED | OUTPATIENT
Start: 2024-05-20

## 2024-05-20 RX ORDER — LEVALBUTEROL INHALATION SOLUTION 1.25 MG/3ML
SOLUTION RESPIRATORY (INHALATION)
COMMUNITY
Start: 2024-05-08

## 2024-05-20 RX ORDER — FAMOTIDINE 40 MG/1
40 TABLET, FILM COATED ORAL
Qty: 30 TABLET | Refills: 5 | Status: SHIPPED | OUTPATIENT
Start: 2024-05-20

## 2024-05-20 RX ORDER — TRAZODONE HYDROCHLORIDE 100 MG/1
100 TABLET ORAL
COMMUNITY
Start: 2024-05-10

## 2024-05-20 RX ORDER — OMEPRAZOLE 40 MG/1
CAPSULE, DELAYED RELEASE ORAL
Qty: 30 CAPSULE | Refills: 5 | Status: SHIPPED | OUTPATIENT
Start: 2024-05-20

## 2024-05-20 RX ORDER — POLYETHYLENE GLYCOL 3350 17 G/17G
POWDER, FOR SOLUTION ORAL
Qty: 578 G | Refills: 5 | Status: SHIPPED | OUTPATIENT
Start: 2024-05-20

## 2024-05-20 NOTE — PROGRESS NOTES
[facility] - Outpatient Follow-up Note  Jennifer Woodall 63 y.o. female MRN: [id number]  Encounter: [id number]          ASSESSMENT AND PLAN:    The patient presents today for follow-up office visit regarding her chronic nausea, heartburn, GERD symptoms, and drug-induced constipation.    Exam: Abdomen is softly distended, with mild tenderness in the left lower quadrant without any significant guarding or rebound tenderness on upon exam, with hypoactive bowel sounds x 4. No edema noted of the b/l lower extremities upon exam today. Skin is non-icteric.      1. Nausea  -     ondansetron (ZOFRAN) 8 mg tablet; Take 1 tablet (8 mg total) by mouth every 8 (eight) hours as needed for nausea or vomiting  -     omeprazole (PriLOSEC) 40 MG capsule; Take 1 PO QD in AM prior to a meal.  2. Heartburn  -     famotidine (PEPCID) 40 MG tablet; Take 1 tablet (40 mg total) by mouth daily at bedtime  -     omeprazole (PriLOSEC) 40 MG capsule; Take 1 PO QD in AM prior to a meal.  3. Gastroesophageal reflux disease, unspecified whether esophagitis present  Assessment & Plan:  While the patient and her caregiver were in the office today, I again reiterated to the patient that it is important that she take the medications as prescribed as they deftly seem to be helping every time she keeps discontinuing the medications randomly her symptoms get worse.  The patient was agreeable and verbalized an understanding.    Restart omeprazole 40 mg daily in the a.m. prior to a meal.  Continue famotidine 40 mg at bedtime.  Continue Zofran as needed for nausea.  Orders:  -     omeprazole (PriLOSEC) 40 MG capsule; Take 1 PO QD in AM prior to a meal.  4. Drug-induced constipation  -     polyethylene glycol (GLYCOLAX) 17 GM/SCOOP powder; Take 1/2-1 capful every other day to daily.  5. Irritable bowel syndrome with both constipation and diarrhea  Assessment & Plan:  Again, I reiterated to the patient it is important to  make sure she is having regular bowel movements at least every other day.    The patient is to restart MiraLAX half to a full capful at least every other day, if not daily.  Encouraged the patient to try to drink at least 64 ounces of water daily.  6. Screening for colon cancer   We will hold off on any repeat colonoscopies until the recommended surveillance date unless the patient would start to develop red flag symptoms in the future.  The patient was agreeable and verbalized an understanding.  DUE: 3/10/26    Reviewed GI red flag symptoms, including, but not limited to: chronic nausea, vomiting, diarrhea, chills, fever, and unintentional weight loss and should call or contact our office with any changes or concerns. I reviewed with the patient that if they notice any blood while vomiting or in their stool they should contact or office or go to the nearest emergency room for immediate evaluation. The patient was agreeable and verbalized an understanding.     The patient will schedule a follow up office visit in 6 months, but understands to call or contact our office if there are any issues or concerns in the mean time.  ______________________________________________________________________    SUBJECTIVE: Patient is a 63 y.o. female who was previously seen in our office on 1/12/24 and presents today for follow-up office visit regarding her chronic nausea, heartburn, GERD symptoms, and drug-induced constipation.  The patient reports that since her last office that she continues with intermittent nausea but no vomiting as well as worsening stomach pain and decreased appetite.  She reports that she just stopped her omeprazole as she does that sometimes because she just does not like to keep taking medicines but does notice that there is a difference in her upper GI symptoms since she stopped taking the omeprazole.  She at least continues with the famotidine 40 mg at bedtime.  She continues to utilize the Zofran as  needed for nausea.    She also reports she continues with intermittent constipation and has not been taking the MiraLAX daily because she was afraid to cause loose stools even though she found the MiraLAX helpful with her constipation.  She reports that at this point has been at least 4 days since her last bowel movement.    The patient denies any dysphagia, vomiting, unplanned weight loss. Water Intake: Minimal per day.    The patient reports that they have a BM every 3-4 days and reports that it is not relieving, without anyconsistent diarrhea, nocturnal BMs, constipation, straining, melena or bloody stools.  Last BM: 3 days ago. Flatus: Yes, excessive.    Meds: Famotidine 40 mg at bedtime.  Daily NSAID Use: Denied    Imaging: (None):     Endoscopy History: EGD:  06/2023: esophagus, stomach and duodenum appeared normal; No gastric polyps noted so random biopsies were taken given previous history of random gastric biopsies that showed fundic gland polyp with dysplasia     COLONOSCOPY: (03/2023): Two sessile polyps measuring 5-9 mm in the transverse colon; Sessile polyp measuring 5-9 mm in the sigmoid colon; Three sessile polyps measuring smaller than 5 mm in the rectum. Recommendation to repeat colonoscopy in 3 years.      DUE: 3/10/26     REVIEW OF SYSTEMS IS OTHERWISE NEGATIVE.      Historical Information   Past Medical History:   Diagnosis Date    Ambulates with cane     Anxiety     Arthritis     Asthma     Bipolar 1 disorder (HCC)     Brain aneurysm     Chronic pain disorder     spinal stenosis    Concussion syndrome     neurological rx and balance rx    COPD (chronic obstructive pulmonary disease) (HCC)     Depression     Diabetes mellitus (HCC)     controlled w/ diet    Disease of thyroid gland     nodules     Family history of colon cancer     father    Fibromyalgia, primary     GERD (gastroesophageal reflux disease)     History of colon polyps     Hyperlipidemia     Hypertension     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     Irritable bowel syndrome     Lumbar degenerative disc disease 02/16/2022    Migraine     Neuropathy     bilateral feet and hands    Peripheral neuropathy     Psychiatric disorder     PTSD (post-traumatic stress disorder)     Shortness of breath     Sleep apnea     had 3 studies & last one negative    Stroke (HCC)     2012 no deficeits 2018 2019 TIA    TIA (transient ischemic attack)     Wears dentures     Wears glasses      Past Surgical History:   Procedure Laterality Date    ABDOMINAL SURGERY      laproscopic/ endometriosis    BRAIN SURGERY  2017    aneurysm/ coiling procedure    CARPAL TUNNEL RELEASE      CHOLECYSTECTOMY      COLONOSCOPY      DENTAL SURGERY      EPIDURAL BLOCK INJECTION Right 03/03/2022    Procedure: C7-T1 interlaminar epidural steroid injection;  Surgeon: Johnson Aranda MD;  Location: [facility];  Service: Pain Management     EPIDURAL BLOCK INJECTION N/A 04/21/2022    Procedure: C6-C7 BLOCK / INJECTION EPIDURAL STEROID CERVICAL;  Surgeon: Johnson Aranda MD;  [facility]: [facility];  Service: Pain Management     EPIDURAL BLOCK INJECTION Left 06/21/2022    Procedure: BLOCK / INJECTION EPIDURAL STEROID LUMBAR  left L3-4 TFESI;  Surgeon: Johnson Aranda MD;  [facility]: [facility];  Service: Pain Management     EYE SURGERY      cataract    FASCIOTOMY Right 10/27/2023    Procedure: FASCIOTOMY OF RIGHT UPPER EXTREMITY; POSSIBLE VAC DRESSING APPLICATION;  Surgeon: Bruno Blevins MD;  [facility];  Service: General    FL GUIDED NEEDLE PLAC BX/ASP/INJ  03/03/2022    FL GUIDED NEEDLE PLAC BX/ASP/INJ  11/17/2022    HYSTERECTOMY      NERVE BLOCK Left 02/20/2024    Procedure: BLOCK MEDIAL BRANCH  left C3-4 and C4-5 MBB #1;  Surgeon: Johnson Aranda MD;  [facility];  Service: Pain Management     NERVE BLOCK Left 04/04/2024    Procedure: BLOCK MEDIAL BRANCH Left C3-C4 and C4-5 MBB2;  Surgeon: Johnson Aranda MD;  [facility];   Service: Pain Management     AK ESOPHAGOGASTRODUODENOSCOPY TRANSORAL DIAGNOSTIC N/A 02/08/2018    Procedure: EGD AND COLONOSCOPY;  Surgeon: Caleb Pete MD;  [facility]: [facility];  Service: Gastroenterology    AK PRQ IMPLTJ NSTIM ELECTRODE ARRAY EPIDURAL N/A 11/17/2022    Procedure: NEVRO SCS TRIAL;  Surgeon: Johnson Aranda MD;  [facility];  Service: Pain Management     AK PRQ IMPLTJ NSTIM ELECTRODE ARRAY EPIDURAL N/A 02/01/2023    Procedure: Insertion percutaneous thoracic spinal cord stimulator with left buttock implantable pulse generator;  Surgeon: Craig Robert Goldberg, MD;  [facility];  Service: Neurosurgery    SPLIT THICKNESS SKIN GRAFT Right 11/07/2023    Procedure: SKIN GRAFT SPLIT THICKNESS (STSG)  EXTREMITY;  Surgeon: Samm Sims MD;  [facility];  Service: General    THYROID SURGERY      TONSILLECTOMY      US GUIDED THYROID BIOPSY  11/30/2016    US GUIDED THYROID BIOPSY  03/21/2018    VAC DRESSING APPLICATION Right 10/29/2023    Procedure: CHANGE DRESSING/VAC RIGHT UPPER EXTREMITY; WASHOUT; PARTIAL CLOSURE;  Surgeon: Irina Day DO;  [facility];  Service: General    WOUND DEBRIDEMENT Right 11/04/2023    Procedure: SIMPLE CLOSURE 3.5CM, OASIS APPLICATION 15X7CM, WOUND VAC APPLICATION;  Surgeon: Maryuri Goldstein MD;  [facility];  Service: General     Social History   Social History     Substance and Sexual Activity   Alcohol Use Not Currently     Social History     Substance and Sexual Activity   Drug Use Never    Comment: prescribed Belbuca     Social History     Tobacco Use   Smoking Status Every Day    Current packs/day: 2.00    Types: Cigarettes   Smokeless Tobacco Never     Family History   Problem Relation Age of Onset    Brain cancer Mother     Alzheimer's disease Mother     Alzheimer's disease Father     Parkinsonism Father        Meds/Allergies       Current Outpatient Medications:     levalbuterol (XOPENEX) 1.25 mg/3 mL  "nebulizer solution    traZODone (DESYREL) 100 mg tablet    Trelegy Ellipta 100-62.5-25 MCG/ACT inhaler    albuterol (PROVENTIL HFA,VENTOLIN HFA) 90 mcg/act inhaler    Alcohol Swabs (Alcohol Pads) 70 % PADS    ammonium lactate (LAC-HYDRIN) 12 % lotion    atorvastatin (LIPITOR) 80 mg tablet    benzonatate (TESSALON PERLES) 100 mg capsule    Buprenorphine HCl (Belbuca) 300 MCG FILM    carboxymethylcellulose 0.5 % SOLN    clonazePAM (KlonoPIN) 0.5 mg tablet    clonazePAM (KlonoPIN) 1 mg tablet    dicyclomine (BENTYL) 20 mg tablet    famotidine (PEPCID) 40 MG tablet    fenofibrate (TRICOR) 48 mg tablet    fluticasone (FLONASE) 50 mcg/act nasal spray    fluticasone-umeclidinium-vilanterol (Trelegy Ellipta) 200-62.5-25 mcg/actuation AEPB inhaler    Galcanezumab-gnlm (Emgality) 120 MG/ML SOAJ    HYDROcodone-acetaminophen (NORCO)  mg per tablet    lamoTRIgine (LaMICtal) 100 mg tablet    latanoprost (XALATAN) 0.005 % ophthalmic solution    levothyroxine 137 mcg tablet    ondansetron (ZOFRAN) 8 mg tablet    OneTouch Ultra test strip    pregabalin (LYRICA) 100 mg capsule    pregabalin (LYRICA) 150 mg capsule    rimegepant sulfate (Nurtec) 75 mg TBDP    tiZANidine (ZANAFLEX) 4 mg tablet    topiramate (TOPAMAX) 200 MG tablet    True Comfort Safety Lancets MISC    Allergies   Allergen Reactions    Hydroxyzine Anaphylaxis     Claims it gives her convulsions.     Bee Pollen Other (See Comments)     familial    Pollen Extract Itching    Tree Extract     Clarithromycin Rash     Pt denies    Ibuprofen Nausea Only    Latex Rash    Sulfa Antibiotics Rash     \"severe skin burn\"           Objective     There were no vitals taken for this visit. There is no height or weight on file to calculate BMI.      PHYSICAL EXAM:      General Appearance:   Alert, cooperative, no distress   HEENT:   Normocephalic, atraumatic, anicteric.     Neck:  Supple, symmetrical, trachea midline   Lungs:   Clear to auscultation bilaterally; no rales, rhonchi " or wheezing; respirations unlabored    Heart::   Regular rate and rhythm; no murmur, rub, or gallop.   Abdomen:   Soft, non-tender, non-distended; normal bowel sounds; no masses, no organomegaly    Genitalia:   Deferred    Rectal:   Deferred    Extremities:  No cyanosis, clubbing or edema    Pulses:  2+ and symmetric    Skin:  No jaundice, rashes, or lesions    Lymph nodes:  No palpable cervical lymphadenopathy        Lab Results:   No visits with results within 1 Day(s) from this visit.   Latest known visit with results is:   Admission on 04/29/2024, Discharged on 04/29/2024   Component Date Value    Sodium 04/29/2024 140     Potassium 04/29/2024 3.5     Chloride 04/29/2024 104     CO2 04/29/2024 28     ANION GAP 04/29/2024 8     BUN 04/29/2024 13     Creatinine 04/29/2024 0.73     Glucose 04/29/2024 108     Calcium 04/29/2024 8.8     eGFR 04/29/2024 88     WBC 04/29/2024 7.90     RBC 04/29/2024 3.82     Hemoglobin 04/29/2024 11.8     Hematocrit 04/29/2024 36.9     MCV 04/29/2024 97     MCH 04/29/2024 30.9     MCHC 04/29/2024 32.0     RDW 04/29/2024 13.7     Platelets 04/29/2024 169     MPV 04/29/2024 10.6     Protime 04/29/2024 13.4     INR 04/29/2024 1.03     PTT 04/29/2024 27     hs TnI 0hr 04/29/2024 2     POC Glucose 04/29/2024 125     Ethanol Lvl 04/29/2024 <10     Salicylate Lvl 04/29/2024 <5     Acetaminophen Level 04/29/2024 <2 (L)     Ventricular Rate 04/29/2024 66     Atrial Rate 04/29/2024 66     NV Interval 04/29/2024 160     QRSD Interval 04/29/2024 74     QT Interval 04/29/2024 442     QTC Interval 04/29/2024 463     QRS Axis 04/29/2024 109     T Wave Axis 04/29/2024 128          Radiology Results:   CT stroke alert brain    Result Date: 4/29/2024  Narrative: CT BRAIN - STROKE ALERT PROTOCOL INDICATION:   Stroke Alert. COMPARISON: CT head and CTA head and neck from 3/29/2024 TECHNIQUE:  CT examination of the brain was performed.  In addition to axial images, coronal reformatted images were created  and submitted for interpretation. Radiation dose length product (DLP) for this visit:  963.68 mGy-cm .  This examination, like all CT scans performed in the [facility], was performed utilizing techniques to minimize radiation dose exposure, including the use of iterative  reconstruction and automated exposure control. IMAGE QUALITY:  Diagnostic. FINDINGS: PARENCHYMA: Sequelae of aneurysm coiling in the region of the right cavernous ICA with associated streak artifact limiting evaluation. Decreased attenuation is noted in periventricular and subcortical white matter demonstrating an appearance that is statistically most likely to represent mild microangiopathic change. Stable probable chronic lacunar infarct within the left frontoparietal  region. No CT signs of acute infarction.  No intracranial mass, mass effect or midline shift.  No acute parenchymal hemorrhage. VENTRICLES AND EXTRA-AXIAL SPACES:  Normal for the patient's age. VISUALIZED ORBITS: Sequela bilateral cataract surgery. PARANASAL SINUSES: Paranasal sinuses are clear. Stable 5 mm osteoma within the right frontal sinus. CALVARIUM AND EXTRACRANIAL SOFT TISSUES:   Normal.     Impression: -No acute intracranial abnormality. Chronic microangiopathic changes. -Sequelae of aneurysmal coiling in the region of the right cavernous ICA. Associated streak artifact limits evaluation Findings were directly discussed with Wai Schumacher at 12:05 p.m. Workstation performed: [id number]     CTA stroke alert (head/neck)    Result Date: 4/29/2024  Narrative: CTA NECK AND BRAIN WITH CONTRAST INDICATION: Stroke Alert COMPARISON:   CTA head and neck from 3/29/2024 TECHNIQUE:   Post contrast imaging was performed after administration of iodinated contrast through the neck and brain. Post contrast axial 0.625 mm images timed to opacify the arterial system. 3D rendering was performed on an independent workstation.   MIP reconstructions performed. Coronal  reconstructions were performed of the noncontrast portion of the brain. Radiation dose length product (DLP) for this visit:  433.45 mGy-cm .  This examination, like all CT scans performed in the [facility], was performed utilizing techniques to minimize radiation dose exposure, including the use of iterative  reconstruction and automated exposure control. IV Contrast:  85 mL of iohexol (OMNIPAQUE) IMAGE QUALITY:   Diagnostic FINDINGS: CERVICAL VASCULATURE AORTIC ARCH AND GREAT VESSELS:  Normal aortic arch and great vessel origins. Normal visualized subclavian vessels. RIGHT VERTEBRAL ARTERY CERVICAL SEGMENT:  Normal origin. The vessel is normal in caliber throughout the neck. LEFT VERTEBRAL ARTERY CERVICAL SEGMENT:  Normal origin. Streak artifact from adjacent contrast bolus somewhat limits evaluation of the proximal vertebral artery which is grossly patent. Otherwise the vessel is normal in caliber throughout the neck. RIGHT EXTRACRANIAL CAROTID SEGMENT: Minimal atherosclerotic disease of the distal common carotid artery and proximal cervical internal carotid artery without significant stenosis compared to the more distal ICA. LEFT EXTRACRANIAL CAROTID SEGMENT: Proximal common carotid artery cannot be evaluated due to streak artifact from adjacent contrast bolus. Mild atherosclerotic disease of the distal common carotid artery and proximal cervical internal carotid artery without significant stenosis compared to the more distal ICA. NASCET criteria was used to determine the degree of internal carotid artery diameter stenosis. INTRACRANIAL VASCULATURE INTERNAL CAROTID ARTERIES: Sequelae of right cavernous ICA aneurysm coiling without definite residual aneurysm identified within limitations of streak artifact. No high-grade stenosis of the intracranial ICAs. Proximal ophthalmic arteries are patent. ANTERIOR CIRCULATION:  Symmetric A1 segments and anterior cerebral arteries with normal enhancement.   Normal anterior communicating artery. MIDDLE CEREBRAL ARTERY CIRCULATION:  M1 segment and middle cerebral artery branches demonstrate normal enhancement bilaterally. DISTAL VERTEBRAL ARTERIES:  Normal distal vertebral arteries.  Posterior inferior cerebellar artery origins are normal. Normal vertebral basilar junction. BASILAR ARTERY:  Basilar artery is normal in caliber.  Normal superior cerebellar arteries. POSTERIOR CEREBRAL ARTERIES: Both posterior cerebral arteries arises from the basilar tip.  Both arteries demonstrate normal enhancement.   Small left posterior communicating artery. VENOUS STRUCTURES:  Normal. NON VASCULAR ANATOMY BONY STRUCTURES:  No acute osseous abnormality. Spondylosis of the cervical spine. SOFT TISSUES OF THE NECK: Stable enhancing lesion within the left parotid gland the larger of which measures 0.8 cm, unchanged. THORACIC INLET: Right upper lobe calcified granuloma. Partially calcified pulmonary nodule within the right upper lobe measuring 0.4 cm (2:297).     Impression: CTA head: -No high-grade stenosis or large vessel occlusion. - Sequelae of right cavernous ICA aneurysm coiling. No residual aneurysm is identified within limitations. CTA neck: -Proximal left common carotid artery cannot be evaluated. Suboptimal evaluation of proximal left vertebral artery. -Otherwise no high-grade stenosis, dissection or aneurysm. Other: -Stable partially calcified left parotid gland lesions measuring up to 0.8 cm. Correlate with ultrasound. -Partially solid right upper lobe pulmonary nodule measuring 0.4 cm. Based on current Fleischner Society 2017 Guidelines on incidental pulmonary nodule, follow-up noncontrast CT is recommended at 3-6 months. Findings were directly discussed with Wai Schumacher at 12:05 p.m. The study was marked in EPIC for immediate notification. Workstation performed: [id number]     XR spine lumbar 2 or 3 views injury    Result Date: 4/23/2024  Narrative: STUDY: Lumbar spine, 3  views. INDICATION: Tenderness. Bilateral flank pain. COMPARISON: Lumbar spine MRI 4/21/2021 ACCESSION NUMBER(S): [id number] ORDERING CLINICIAN: MUNIR JOYCE FINDINGS: There is mild levoscoliosis centered in the upper lumbar region. There is mild focal kyphosis centered at L1-2. Moderate to severe spondylosis at L1-2 with disc height loss and endplate sclerosis with osteophyte formation. Mild facet joint degenerative changes are noted from L2-3 to L5-S1. Vertebral body heights are preserved. Posterior elements are intact. Spinal stimulator leads are visualized terminating in the lower thoracic region.    Impression: IMPRESSION: 1.  Moderate to severe spondylosis at L1-2. 2.  Mild facet joint arthropathy from L2-3 to L5-S1. 3.  Mild levoscoliosis centered in the upper lumbar region. Workstation:[id number]

## 2024-05-20 NOTE — PATIENT INSTRUCTIONS
Re-start Omeprazole 40 mg daily in AM prior to a meal.   Continue Famotidine 40 mg at bed time.   Continue Zofran PRN for nausea.   Try to drink more water.   Re-start Mirlax 1/2-1 capful at least every other day, if not daily.   Schedule a f/u OV in 6 months.   Continue to watch for red flag symptoms.     We did review GI red flag symptoms, including, but not limited to: chronic nausea, vomiting, diarrhea, chills, fever, and unintentional weight loss and should call or contact our office with any changes or concerns. I reviewed with the patient that if they notice any blood while vomiting or in their stool they should contact or office or go to the nearest emergency room for immediate evaluation. The patient was agreeable and verbalized an understanding.

## 2024-05-20 NOTE — TELEPHONE ENCOUNTER
"Pt called in. Pt scheduled for RUE lipoma excision on 5/28/24 with Dr. Montoya.    Pt wants Dr. 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 Reports a new lump on elbow. Pt endorses increased RUE pain, decreased hand mobility, and numbness/tingling in 3/4/5th fingers. Pt has been using stimulations with different surfaces for nerve regeneration as suggested by [facility] PT. Pt stated, \"this new lump at my elbow is compressing my nerve and causing this numbness and pain.\"    Pt tangential about how Dr. Oleary ([facility] Pulmonologist) cancelled lipoma surgery and that he is delaying the surgery now scheduled with Dr. Montoya. Pt angry at Dr. Oleary that he is the reason why she is now \"getting worse.\" Confirmed with Pt that she is scheduled for 5/28; Pt verbalized understanding.     Pt continued being tangential about Dr. Oleary's role in date of Pt's scheduled surgery. RN attempted to reorient Pt that she this is [facility]. Pt started yelling at RN, \"now you are just like the rest of them brushing me off and acting like you don't care.\" Pt hung up on RN.     Answer Assessment - Initial Assessment Questions  1. APPEARANCE of SWELLING: \"What does it look like?\" (e.g., lymph node, insect bite, mole)      lipoma  2. SIZE: \"How large is the swelling?\" (inches, cm or compare to coins)      prune  3. LOCATION: \"Where is the swelling located?\"      RUE  4. ONSET: \"When did the swelling start?\"      Ongoing   5. PAIN: \"Is it painful?\" If Yes, ask: \"How much?\"      yes  6. ITCH: \"Does it itch?\" If Yes, ask: \"How much?\"      denies  7. CAUSE: \"What do you think caused the swelling?\"      lipoma  8. OTHER SYMPTOMS: \"Do you have any other symptoms?\" (e.g., fever)      Increasing pain/new lump    Protocols used: Skin Lump or Localized Swelling-ADULT-[place]    "

## 2024-05-21 ENCOUNTER — TELEPHONE (OUTPATIENT)
Age: 64
End: 2024-05-21

## 2024-05-21 NOTE — TELEPHONE ENCOUNTER
Patient called in with Caregiver to confirm surgery is still scheduled for 5/28/24. Patient states she was very confused by her conversation with Dr. Oleary. Advised patient of Dr. Montoya's message that surgery will continue as scheduled. Patient is satisfied with that response.

## 2024-05-21 NOTE — TELEPHONE ENCOUNTER
Kassidy with [facility] calling on behalf of patient.    Patient questioned a letter she received stating she needs a preop ECG prior to her surgery with Dr Montoya on 5/28.    It appears she had ECG on 4/26 and 4/29 completed. Would like to confirm that these are acceptable for surgical clearance.    I explained she recently had ECG completed to Kassidy from [facility]. She will contact patient and have her call office with additional questions    #[phone]

## 2024-05-21 NOTE — TELEPHONE ENCOUNTER
Pt called in to let us know she had an EKG back at the end of April with  ([facility]) .  As I reviewed the patient chart , Patient is not an pulm pt , I tired to explain that to patient and they patient disconnected from the line

## 2024-05-21 NOTE — ASSESSMENT & PLAN NOTE
Again, I reiterated to the patient it is important to make sure she is having regular bowel movements at least every other day.    The patient is to restart MiraLAX half to a full capful at least every other day, if not daily.  Encouraged the patient to try to drink at least 64 ounces of water daily.

## 2024-05-21 NOTE — ASSESSMENT & PLAN NOTE
While the patient and her caregiver were in the office today, I again reiterated to the patient that it is important that she take the medications as prescribed as they deftly seem to be helping every time she keeps discontinuing the medications randomly her symptoms get worse.  The patient was agreeable and verbalized an understanding.    Restart omeprazole 40 mg daily in the a.m. prior to a meal.  Continue famotidine 40 mg at bedtime.  Continue Zofran as needed for nausea.

## 2024-05-28 ENCOUNTER — HOSPITAL ENCOUNTER (OUTPATIENT)
Facility: HOSPITAL | Age: 64
Setting detail: OUTPATIENT SURGERY
Discharge: HOME | End: 2024-05-28
Attending: SURGERY | Admitting: SURGERY
Payer: COMMERCIAL

## 2024-05-28 VITALS
DIASTOLIC BLOOD PRESSURE: 76 MMHG | BODY MASS INDEX: 29.02 KG/M2 | TEMPERATURE: 95.4 F | HEIGHT: 64 IN | OXYGEN SATURATION: 99 % | HEART RATE: 67 BPM | WEIGHT: 170 LBS | RESPIRATION RATE: 16 BRPM | SYSTOLIC BLOOD PRESSURE: 112 MMHG

## 2024-05-28 DIAGNOSIS — D17.21 LIPOMA OF RIGHT UPPER EXTREMITY: Primary | ICD-10-CM

## 2024-05-28 LAB — GLUCOSE SERPL-MCNC: 119 MG/DL (ref 65–140)

## 2024-05-28 PROCEDURE — 88304 TISSUE EXAM BY PATHOLOGIST: CPT | Performed by: PATHOLOGY

## 2024-05-28 PROCEDURE — NC001 PR NO CHARGE: Performed by: SURGERY

## 2024-05-28 PROCEDURE — 12032 INTMD RPR S/A/T/EXT 2.6-7.5: CPT | Performed by: SURGERY

## 2024-05-28 PROCEDURE — 82948 REAGENT STRIP/BLOOD GLUCOSE: CPT

## 2024-05-28 PROCEDURE — 11403 EXC TR-EXT B9+MARG 2.1-3CM: CPT | Performed by: SURGERY

## 2024-05-28 RX ORDER — MAGNESIUM HYDROXIDE 1200 MG/15ML
LIQUID ORAL AS NEEDED
Status: DISCONTINUED | OUTPATIENT
Start: 2024-05-28 | End: 2024-05-28 | Stop reason: HOSPADM

## 2024-05-28 RX ORDER — PROPOFOL 10 MG/ML
INJECTION, EMULSION INTRAVENOUS AS NEEDED
Status: DISCONTINUED | OUTPATIENT
Start: 2024-05-28 | End: 2024-05-28

## 2024-05-28 RX ORDER — LIDOCAINE HYDROCHLORIDE 10 MG/ML
INJECTION, SOLUTION EPIDURAL; INFILTRATION; INTRACAUDAL; PERINEURAL AS NEEDED
Status: DISCONTINUED | OUTPATIENT
Start: 2024-05-28 | End: 2024-05-28

## 2024-05-28 RX ORDER — FENTANYL CITRATE/PF 50 MCG/ML
25 SYRINGE (ML) INJECTION
Status: DISCONTINUED | OUTPATIENT
Start: 2024-05-28 | End: 2024-05-28 | Stop reason: HOSPADM

## 2024-05-28 RX ORDER — OXYCODONE HYDROCHLORIDE 5 MG/1
5 TABLET ORAL EVERY 4 HOURS PRN
Status: DISCONTINUED | OUTPATIENT
Start: 2024-05-28 | End: 2024-05-28 | Stop reason: HOSPADM

## 2024-05-28 RX ORDER — EPHEDRINE SULFATE 50 MG/ML
INJECTION INTRAVENOUS AS NEEDED
Status: DISCONTINUED | OUTPATIENT
Start: 2024-05-28 | End: 2024-05-28

## 2024-05-28 RX ORDER — BUPIVACAINE HYDROCHLORIDE 5 MG/ML
INJECTION, SOLUTION EPIDURAL; INTRACAUDAL AS NEEDED
Status: DISCONTINUED | OUTPATIENT
Start: 2024-05-28 | End: 2024-05-28 | Stop reason: HOSPADM

## 2024-05-28 RX ORDER — CEFAZOLIN SODIUM 1 G/50ML
1000 SOLUTION INTRAVENOUS ONCE
Status: COMPLETED | OUTPATIENT
Start: 2024-05-28 | End: 2024-05-28

## 2024-05-28 RX ORDER — SODIUM CHLORIDE, SODIUM LACTATE, POTASSIUM CHLORIDE, CALCIUM CHLORIDE 600; 310; 30; 20 MG/100ML; MG/100ML; MG/100ML; MG/100ML
INJECTION, SOLUTION INTRAVENOUS CONTINUOUS PRN
Status: DISCONTINUED | OUTPATIENT
Start: 2024-05-28 | End: 2024-05-28

## 2024-05-28 RX ORDER — MIDAZOLAM HYDROCHLORIDE 2 MG/2ML
INJECTION, SOLUTION INTRAMUSCULAR; INTRAVENOUS AS NEEDED
Status: DISCONTINUED | OUTPATIENT
Start: 2024-05-28 | End: 2024-05-28

## 2024-05-28 RX ORDER — ONDANSETRON 2 MG/ML
INJECTION INTRAMUSCULAR; INTRAVENOUS AS NEEDED
Status: DISCONTINUED | OUTPATIENT
Start: 2024-05-28 | End: 2024-05-28

## 2024-05-28 RX ORDER — ONDANSETRON 2 MG/ML
4 INJECTION INTRAMUSCULAR; INTRAVENOUS ONCE AS NEEDED
Status: DISCONTINUED | OUTPATIENT
Start: 2024-05-28 | End: 2024-05-28 | Stop reason: HOSPADM

## 2024-05-28 RX ORDER — ACETAMINOPHEN 325 MG/1
975 TABLET ORAL EVERY 6 HOURS SCHEDULED
Status: DISCONTINUED | OUTPATIENT
Start: 2024-05-28 | End: 2024-05-28 | Stop reason: HOSPADM

## 2024-05-28 RX ORDER — OXYCODONE HYDROCHLORIDE 5 MG/1
10 TABLET ORAL EVERY 4 HOURS PRN
Status: DISCONTINUED | OUTPATIENT
Start: 2024-05-28 | End: 2024-05-28 | Stop reason: HOSPADM

## 2024-05-28 RX ORDER — FENTANYL CITRATE 50 UG/ML
INJECTION, SOLUTION INTRAMUSCULAR; INTRAVENOUS CONTINUOUS PRN
Status: DISCONTINUED | OUTPATIENT
Start: 2024-05-28 | End: 2024-05-28

## 2024-05-28 RX ORDER — OXYCODONE AND ACETAMINOPHEN 10; 325 MG/1; MG/1
1 TABLET ORAL EVERY 4 HOURS PRN
Qty: 12 TABLET | Refills: 0 | Status: SHIPPED | OUTPATIENT
Start: 2024-05-28 | End: 2024-06-03 | Stop reason: SDUPTHER

## 2024-05-28 RX ORDER — HEPARIN SODIUM 5000 [USP'U]/ML
5000 INJECTION, SOLUTION INTRAVENOUS; SUBCUTANEOUS ONCE
Status: COMPLETED | OUTPATIENT
Start: 2024-05-28 | End: 2024-05-28

## 2024-05-28 RX ADMIN — MIDAZOLAM HYDROCHLORIDE 1 MG: 1 INJECTION, SOLUTION INTRAMUSCULAR; INTRAVENOUS at 13:56

## 2024-05-28 RX ADMIN — CEFAZOLIN SODIUM 1000 MG: 1 SOLUTION INTRAVENOUS at 14:05

## 2024-05-28 RX ADMIN — LIDOCAINE HYDROCHLORIDE 50 MG: 10 INJECTION, SOLUTION EPIDURAL; INFILTRATION; INTRACAUDAL; PERINEURAL at 14:00

## 2024-05-28 RX ADMIN — HEPARIN SODIUM 5000 UNITS: 5000 INJECTION, SOLUTION INTRAVENOUS; SUBCUTANEOUS at 11:56

## 2024-05-28 RX ADMIN — EPHEDRINE SULFATE 10 MG: 50 INJECTION, SOLUTION INTRAVENOUS at 14:08

## 2024-05-28 RX ADMIN — ONDANSETRON 4 MG: 2 INJECTION INTRAMUSCULAR; INTRAVENOUS at 14:23

## 2024-05-28 RX ADMIN — EPHEDRINE SULFATE 10 MG: 50 INJECTION, SOLUTION INTRAVENOUS at 14:10

## 2024-05-28 RX ADMIN — SODIUM CHLORIDE, SODIUM LACTATE, POTASSIUM CHLORIDE, AND CALCIUM CHLORIDE: .6; .31; .03; .02 INJECTION, SOLUTION INTRAVENOUS at 12:28

## 2024-05-28 RX ADMIN — MIDAZOLAM HYDROCHLORIDE 1 MG: 1 INJECTION, SOLUTION INTRAMUSCULAR; INTRAVENOUS at 13:35

## 2024-05-28 RX ADMIN — PROPOFOL 160 MG: 10 INJECTION, EMULSION INTRAVENOUS at 14:00

## 2024-05-28 NOTE — ANESTHESIA PREPROCEDURE EVALUATION
"Procedure:  EXCISION LIPOMA (Right: Arm Lower)    Relevant Problems   CARDIO   (+) Hypertension   (+) Hypertriglyceridemia   (+) Migraine   (+) Other hyperlipidemia      ENDO   (+) Hypothyroid   (+) Type 2 diabetes mellitus (HCC)      GI/HEPATIC   (+) Gastroesophageal reflux disease      MUSCULOSKELETAL   (+) Cervical spondylosis   (+) Fibromyalgia, primary   (+) Lumbar degenerative disc disease   (+) Lumbar spondylosis   (+) Mid back pain   (+) Sacroiliitis (HCC)      NEURO/PSYCH   (+) Anxiety   (+) Chronic pain syndrome   (+) Depression   (+) Diabetic polyneuropathy associated with type 2 diabetes mellitus (HCC)   (+) Fibromyalgia, primary   (+) Migraine      PULMONARY   (+) COPD (chronic obstructive pulmonary disease) (HCC)   (+) Shortness of breath        Physical Exam    Airway    Mallampati score: II  TM Distance: >3 FB  Neck ROM: full     Dental       Cardiovascular      Pulmonary      Other Findings  post-pubertal.      Anesthesia Plan  ASA Score- 3     Anesthesia Type- general with ASA Monitors.         Additional Monitors:     Airway Plan: LMA.    Comment: Patient is very distracted and anxious - almost tearful. Very tangential in her conversations and thoughts. She was upset and felt\"disrespected\" by a physician who came in and asked her to lower her voice. I amunsure who she is referring to and what happened with this interaction. She answered all of my questions appropriately and verbalized her understanding of anesthesia consent..       Plan Factors-Exercise tolerance (METS): >4 METS.    Chart reviewed.                      Induction- intravenous.    Postoperative Plan- Plan for postoperative opioid use. Planned trial extubation    Perioperative Resuscitation Plan - Level 1 - Full Code.       Informed Consent- Anesthetic plan and risks discussed with patient.  I personally reviewed this patient with the CRNA. Discussed and agreed on the Anesthesia Plan with the CRNA..    Anesthesia plan and consent " discussed with Jennifer who expressed understanding and agreement. Risks/benefits and alternatives discussed with patient including possible PONV, sore throat, damage to teeth/lips/gums and possibility of rare anesthetic and surgical emergencies.

## 2024-05-28 NOTE — DISCHARGE INSTR - AVS FIRST PAGE
Day of Surgery Discharge Instructions    1. General: You can apply ice to incisions, this will help with swelling.  Please make sure you are scheduled for 2-week post op appointment.     2. Wound care:  Your skin incisions has skin glue. You may shower the day after surgery (no baths or swimming until you are seen back in the office for your post op). Keep the incision clean and dry. The glue will fall off on its own. Do not scrub roughly, let soap and water run over the incision. Do not submerge incision in pool or bath for 2 weeks Please contact your surgeon if your incisions have redness, extreme tenderness, or signs of infections (Pain, swelling redness, odor or green/yellow discharge around incisions. You should also contact your surgeon if the wound has persistent, active bleeding.       3. Water: You may shower with staples, glue or steri-strips and rinse wound with soapy water but do not scrub incision pat dry when you are done.  Do not bathe or use a pool or hot tub until cleared by the physician.     4. Activity: As tolerated, no strenuous activity for the first 2-4 weeks (unless advised differently by a provider).  Please take it easy for the first few days.       5. Diet: You may resume a regular diet as tolerated.     6. Medications: Resume all your previous medications, unless told otherwise by the doctor. Tylenol and ibuprofen are ok to use, unless you are taking any narcotic pain medication containing Tylenol (such as Percocet, Vicodin, or anything containing acetaminophen). Do not take Tylenol if you're taking these medications. If you become constipated, you may take Miralax or a stool softener.  If you continue to have constipation you may take Milk of Magnesium.  All of these remedies are over the counter. If taking narcotic pain medication, do not take on an empty stomach.      7. Driving: You will need a  home from the hospital. Do not drive or make any important decisions while on  narcotic pain medication or 24 hours after surgery. Generally, you may drive when you are off all narcotic pain medications, and you can turn in your seat comfortably to check your blind spot.      8. Constipation: Patients often experience constipation after surgery.  You may take Miralax or a stool softener (Colace).  If you continue to have constipation you may take Milk of Magnesium, Senokot, Dulcolax, etc. You may also take a suppository unless you have had anorectal surgery such as a procedure on your hemorrhoids. If you experience significant nausea or vomiting after abdominal surgery, call the office before trying any of these medications.     9. Call the office: If you are experiencing any of the following: fevers above 100.5, if the wound develops drainage and/or is excessive redness around the wound, other worsening symptoms, or severe pain. A provider is on call 24 hours a day.

## 2024-05-28 NOTE — OP NOTE
OPERATIVE REPORT  PATIENT NAME: Jennifer Woodall    :  1960  MRN: [id number]  Pt Location: [facility] OR ROOM 01    SURGERY DATE: 2024    Surgeons and Role:     * Valdo Montoya DO - Primary     * Eric Roach MD - Assisting    Preop Diagnosis:  Lipoma of right upper extremity [D17.21]    Post-Op Diagnosis Codes:     * Lipoma of right upper extremity [D17.21]    Procedure(s):  Right - EXCISION LIPOMA    Specimen(s):  ID Type Source Tests Collected by Time Destination   1 : intramuscular lipoma of right forearm Tissue Soft Tissue, Lipoma TISSUE EXAM Valdo Montoya DO 2024 1419        Estimated Blood Loss:   2 mL    Drains:  * No LDAs found *    Anesthesia Type:   Choice    Operative Indications:  Lipoma of right upper extremity [D17.21]    Operative Findings:  3  cm likely lipoma-muscular component  Final Incision 3.5x0.5x1.5cm       Complications:   None    Procedure and Technique:  Patient was brought to preop holding area and identified both verbally by name and by armband. Patient was brought to the operating room, and placed supine on the operating room table. General anesthesia was induced, airway was secured with LMA.  Patient was prepped and draped in the usual sterile fashion.  Time-out was called, and all were in agreement begin the procedure.    A linear longitudinal incision with a 15 blade scalpel was made along the area of the palpable defect on the volar aspect of the right arm.  The layers were dissected sharply with a 15 blade scalpel and electrocautery.  The lipoma had a intramuscular component.  Attachments were freed.,  The lipoma was with lateral pressure the lipomatous mass expelled from the wound cavity and sent for pathology.  The muscle layer was closed with 3-0 Vicryl in a running fashion.  The deep dermal layers were closed with interrupted buried 3-0 Vicryl sutures.  Skin was closed with 4-0 Monocryl.  Skin glue was applied.    All sponge and instrument counts x2 were correct.    The patient was awakened from anesthesia the patient was extubated and transported to PACU in stable condition.     Dr. Montoya was present for the entire procedure.    Patient Disposition:  PACU     This procedure was not performed to treat primary cutaneous melanoma through wide local excision      SIGNATURE: Eric Roach MD  DATE: May 28, 2024  TIME: 3:03 PM

## 2024-05-28 NOTE — ANESTHESIA POSTPROCEDURE EVALUATION
Post-Op Assessment Note    CV Status:  Stable    Pain management: satisfactory to patient       Mental Status:  Sleepy   Hydration Status:  Euvolemic   PONV Controlled:  Controlled   Airway Patency:  Patent     Post Op Vitals Reviewed: Yes    No anethesia notable event occurred.    Staff: CRNA               BP   97/55   Temp   97.8   Pulse  64   Resp   16   SpO2   100

## 2024-05-28 NOTE — H&P
"  Patient seen and examined. Prior history reviewed. Patient examined and no change from prior. Will proceed to OR as planned.      Height 5' 4\" (1.626 m), weight 78 kg (172 lb).      ssessment/Plan:     Lipoma of right upper extremity  - Will proceed with surgical removal of the lipoma of the right upper extremity  - Consent obtained  - Patient will schedule surgery at her mutual convenience  - Will need to obtain pre-operative EKG given patient not currently on psych meds  - All additional questions and concerns appropriately addressed         Diagnoses and all orders for this visit:     Lipoma of right upper extremity  -     ECG 12 lead; Future     Other orders  -     rimegepant sulfate (Nurtec) 75 mg TBDP; Take 75 mg by mouth once         Notes:  - Patient not currently on her psych meds  - Will need to get preoperative EKG  - Consent for surgery obtained  - Reviewed 2/22 Ultrasound of right upper extremity with patient which demonstrated 3.2 x 3.1 x 0.5 cm lipoma     Subjective:       Patient ID: Jennifer Woodall is a 63 y.o. female.     HPI     Patient well know to the surgery service. Presents for further evaluation of her right upper extremity lipoma after recently obtaining an ultrasound and CT of the right upper extremity on 2/22. Patient states she still has pain in the right upper extremity. STSG healing well. Tolerating PO. No fevers or chills. Voiding without difficulty. See above for assessment and plan:     The following portions of the patient's history were reviewed and updated as appropriate: allergies, current medications, past family history, past medical history, past social history, past surgical history, and problem list.     Review of Systems   Constitutional:  Negative for activity change, appetite change, chills and fever.   HENT:  Negative for congestion, sinus pressure and sinus pain.    Eyes:  Negative for visual disturbance.   Respiratory:  Negative for chest tightness and shortness of " "breath.    Cardiovascular:  Negative for chest pain and palpitations.   Gastrointestinal:  Negative for nausea and vomiting.   Endocrine: Negative for polydipsia and polyuria.   Genitourinary:  Negative for dyspareunia and flank pain.   Musculoskeletal:  Positive for arthralgias and joint swelling. Negative for back pain.        Right upper extremity pain.   Skin:  Positive for wound.        Previous right upper extremity fasciotomy site well healing, STSG well healing.   Allergic/Immunologic: Negative for immunocompromised state.   Neurological:  Negative for dizziness and headaches.   Psychiatric/Behavioral:  Negative for agitation and confusion.           Objective:        Ht 5' 4\" (1.626 m)   Wt 76.2 kg (168 lb)   BMI 28.84 kg/m²             Physical Exam  Constitutional:       Appearance: She is normal weight.   HENT:      Head: Normocephalic.      Nose: Nose normal.      Mouth/Throat:      Mouth: Mucous membranes are moist.      Pharynx: Oropharynx is clear.   Eyes:      Conjunctiva/sclera: Conjunctivae normal.      Pupils: Pupils are equal, round, and reactive to light.   Cardiovascular:      Rate and Rhythm: Normal rate.      Pulses: Normal pulses.   Pulmonary:      Effort: Pulmonary effort is normal.   Abdominal:      General: Abdomen is flat.      Palpations: Abdomen is soft.   Musculoskeletal:         General: Swelling, tenderness and signs of injury present.      Cervical back: Normal range of motion.      Comments: Right forearm with STSG that is healing well. Some minor motor deficits in the right upper extremity. Lipoma appreciated on ventral aspect of forearm near the elbow. Lipoma tender to palpation. Also tenderness to palpation of the right wrist.   Skin:     General: Skin is warm.   Neurological:      General: No focal deficit present.      Mental Status: She is alert.      Motor: Weakness present.      "

## 2024-05-29 ENCOUNTER — HOSPITAL ENCOUNTER (EMERGENCY)
Facility: HOSPITAL | Age: 64
Discharge: HOME | End: 2024-05-30
Attending: EMERGENCY MEDICINE
Payer: COMMERCIAL

## 2024-05-29 DIAGNOSIS — R20.0 POST-OPERATIVE NUMBNESS: ICD-10-CM

## 2024-05-29 DIAGNOSIS — M79.631 PAIN AND SWELLING OF FOREARM, RIGHT: ICD-10-CM

## 2024-05-29 DIAGNOSIS — G89.18 ACUTE POST-OPERATIVE PAIN: ICD-10-CM

## 2024-05-29 DIAGNOSIS — M79.89 PAIN AND SWELLING OF FOREARM, RIGHT: ICD-10-CM

## 2024-05-29 DIAGNOSIS — G97.82 POST-OPERATIVE NUMBNESS: ICD-10-CM

## 2024-05-29 DIAGNOSIS — Z51.89 VISIT FOR WOUND CHECK: Primary | ICD-10-CM

## 2024-05-29 PROCEDURE — 99284 EMERGENCY DEPT VISIT MOD MDM: CPT

## 2024-05-30 ENCOUNTER — APPOINTMENT (EMERGENCY)
Dept: CT IMAGING | Facility: HOSPITAL | Age: 64
End: 2024-05-30
Payer: COMMERCIAL

## 2024-05-30 ENCOUNTER — NURSE TRIAGE (OUTPATIENT)
Age: 64
End: 2024-05-30

## 2024-05-30 VITALS
DIASTOLIC BLOOD PRESSURE: 72 MMHG | OXYGEN SATURATION: 99 % | RESPIRATION RATE: 16 BRPM | HEART RATE: 69 BPM | TEMPERATURE: 98.4 F | SYSTOLIC BLOOD PRESSURE: 109 MMHG | WEIGHT: 170.64 LBS | HEIGHT: 64 IN | BODY MASS INDEX: 29.13 KG/M2

## 2024-05-30 LAB
ALBUMIN SERPL BCP-MCNC: 3.7 G/DL (ref 3.5–5)
ALP SERPL-CCNC: 67 U/L (ref 34–104)
ALT SERPL W P-5'-P-CCNC: 11 U/L (ref 7–52)
ANION GAP SERPL CALCULATED.3IONS-SCNC: 8 MMOL/L (ref 4–13)
AST SERPL W P-5'-P-CCNC: 15 U/L (ref 13–39)
BASOPHILS # BLD AUTO: 0.02 THOUSANDS/ÂΜL (ref 0–0.1)
BASOPHILS NFR BLD AUTO: 0 % (ref 0–1)
BILIRUB SERPL-MCNC: 0.19 MG/DL (ref 0.2–1)
BUN SERPL-MCNC: 12 MG/DL (ref 5–25)
CALCIUM SERPL-MCNC: 8.8 MG/DL (ref 8.4–10.2)
CHLORIDE SERPL-SCNC: 106 MMOL/L (ref 96–108)
CK SERPL-CCNC: 72 U/L (ref 26–192)
CO2 SERPL-SCNC: 28 MMOL/L (ref 21–32)
CREAT SERPL-MCNC: 0.83 MG/DL (ref 0.6–1.3)
EOSINOPHIL # BLD AUTO: 0.19 THOUSAND/ÂΜL (ref 0–0.61)
EOSINOPHIL NFR BLD AUTO: 3 % (ref 0–6)
ERYTHROCYTE [DISTWIDTH] IN BLOOD BY AUTOMATED COUNT: 13.1 % (ref 11.6–15.1)
GFR SERPL CREATININE-BSD FRML MDRD: 75 ML/MIN/1.73SQ M
GLUCOSE SERPL-MCNC: 86 MG/DL (ref 65–140)
HCT VFR BLD AUTO: 36.7 % (ref 34.8–46.1)
HGB BLD-MCNC: 11.8 G/DL (ref 11.5–15.4)
IMM GRANULOCYTES # BLD AUTO: 0.01 THOUSAND/UL (ref 0–0.2)
IMM GRANULOCYTES NFR BLD AUTO: 0 % (ref 0–2)
LYMPHOCYTES # BLD AUTO: 2.4 THOUSANDS/ÂΜL (ref 0.6–4.47)
LYMPHOCYTES NFR BLD AUTO: 37 % (ref 14–44)
MCH RBC QN AUTO: 30.8 PG (ref 26.8–34.3)
MCHC RBC AUTO-ENTMCNC: 32.2 G/DL (ref 31.4–37.4)
MCV RBC AUTO: 96 FL (ref 82–98)
MONOCYTES # BLD AUTO: 0.42 THOUSAND/ÂΜL (ref 0.17–1.22)
MONOCYTES NFR BLD AUTO: 7 % (ref 4–12)
NEUTROPHILS # BLD AUTO: 3.41 THOUSANDS/ÂΜL (ref 1.85–7.62)
NEUTS SEG NFR BLD AUTO: 53 % (ref 43–75)
NRBC BLD AUTO-RTO: 0 /100 WBCS
PLATELET # BLD AUTO: 154 THOUSANDS/UL (ref 149–390)
PMV BLD AUTO: 10.3 FL (ref 8.9–12.7)
POTASSIUM SERPL-SCNC: 3.8 MMOL/L (ref 3.5–5.3)
PROT SERPL-MCNC: 6.1 G/DL (ref 6.4–8.4)
RBC # BLD AUTO: 3.83 MILLION/UL (ref 3.81–5.12)
SODIUM SERPL-SCNC: 142 MMOL/L (ref 135–147)
WBC # BLD AUTO: 6.45 THOUSAND/UL (ref 4.31–10.16)

## 2024-05-30 PROCEDURE — 73201 CT UPPER EXTREMITY W/DYE: CPT

## 2024-05-30 PROCEDURE — 36415 COLL VENOUS BLD VENIPUNCTURE: CPT | Performed by: EMERGENCY MEDICINE

## 2024-05-30 PROCEDURE — 96374 THER/PROPH/DIAG INJ IV PUSH: CPT

## 2024-05-30 PROCEDURE — 85025 COMPLETE CBC W/AUTO DIFF WBC: CPT | Performed by: EMERGENCY MEDICINE

## 2024-05-30 PROCEDURE — 80053 COMPREHEN METABOLIC PANEL: CPT | Performed by: EMERGENCY MEDICINE

## 2024-05-30 PROCEDURE — 20950 MNTR INTRSTITIAL FLUID PRESS: CPT | Performed by: EMERGENCY MEDICINE

## 2024-05-30 PROCEDURE — 82550 ASSAY OF CK (CPK): CPT | Performed by: EMERGENCY MEDICINE

## 2024-05-30 PROCEDURE — 99285 EMERGENCY DEPT VISIT HI MDM: CPT | Performed by: EMERGENCY MEDICINE

## 2024-05-30 PROCEDURE — 96375 TX/PRO/DX INJ NEW DRUG ADDON: CPT

## 2024-05-30 RX ORDER — METOCLOPRAMIDE HYDROCHLORIDE 5 MG/ML
10 INJECTION INTRAMUSCULAR; INTRAVENOUS ONCE
Status: COMPLETED | OUTPATIENT
Start: 2024-05-30 | End: 2024-05-30

## 2024-05-30 RX ORDER — LIDOCAINE HYDROCHLORIDE 10 MG/ML
5 INJECTION, SOLUTION EPIDURAL; INFILTRATION; INTRACAUDAL; PERINEURAL ONCE
Status: COMPLETED | OUTPATIENT
Start: 2024-05-30 | End: 2024-05-30

## 2024-05-30 RX ORDER — KETOROLAC TROMETHAMINE 30 MG/ML
15 INJECTION, SOLUTION INTRAMUSCULAR; INTRAVENOUS ONCE
Status: COMPLETED | OUTPATIENT
Start: 2024-05-30 | End: 2024-05-30

## 2024-05-30 RX ORDER — FENTANYL CITRATE 50 UG/ML
25 INJECTION, SOLUTION INTRAMUSCULAR; INTRAVENOUS ONCE
Status: COMPLETED | OUTPATIENT
Start: 2024-05-30 | End: 2024-05-30

## 2024-05-30 RX ORDER — DIPHENHYDRAMINE HYDROCHLORIDE 50 MG/ML
25 INJECTION INTRAMUSCULAR; INTRAVENOUS ONCE
Status: COMPLETED | OUTPATIENT
Start: 2024-05-30 | End: 2024-05-30

## 2024-05-30 RX ADMIN — FENTANYL CITRATE 25 MCG: 50 INJECTION INTRAMUSCULAR; INTRAVENOUS at 00:49

## 2024-05-30 RX ADMIN — DIPHENHYDRAMINE HYDROCHLORIDE 25 MG: 50 INJECTION, SOLUTION INTRAMUSCULAR; INTRAVENOUS at 00:41

## 2024-05-30 RX ADMIN — KETOROLAC TROMETHAMINE 15 MG: 30 INJECTION, SOLUTION INTRAMUSCULAR at 00:39

## 2024-05-30 RX ADMIN — METOCLOPRAMIDE 10 MG: 5 INJECTION, SOLUTION INTRAMUSCULAR; INTRAVENOUS at 00:41

## 2024-05-30 RX ADMIN — IOHEXOL 100 ML: 350 INJECTION, SOLUTION INTRAVENOUS at 01:28

## 2024-05-30 RX ADMIN — LIDOCAINE HYDROCHLORIDE 5 ML: 10 INJECTION, SOLUTION EPIDURAL; INFILTRATION; INTRACAUDAL; PERINEURAL at 00:14

## 2024-05-30 NOTE — TELEPHONE ENCOUNTER
"Pt called in.    Pt POD 2 Procedure: EXCISION LIPOMA (Right: Arm Lower) with Dr. Montoya.    Pt went to ED last night for swelling & pain in R lower arm. CT completed & Pt tested for compartment syndrome.     Pt endorses 7/10 pain, relieved with prescribed percocets and RUE numbness since 5/29/24.     Pt was instructed by ED to reach out to surgeon for follow-up appt. Next available appt with Shawn Dow PA-C 6/6/24.    Answer Assessment - Initial Assessment Questions  1. SYMPTOM: \"What's the main symptom you're concerned about?\" (e.g., pain, fever, vomiting)      swelling  2. ONSET: \"When did swelling  start?\"      5/29/24  3. SURGERY: \"What surgery was performed?\"      Procedure: EXCISION LIPOMA (Right: Arm Lower)  4. DATE of SURGERY: \"When was surgery performed?\"       5/28/24  5. ANESTHESIA: \" What type of anesthesia did you have?\" (e.g., general, spinal, epidural, local)      general  6. PAIN: \"Is there any pain?\" If Yes, ask: \"How bad is it?\"  (Scale 1-10; or mild, moderate, severe)      Yes, 7/10, taking prescribed percocets  7. FEVER: \"Do you have a fever?\" If Yes, ask: \"What is your temperature, how was it measured, and when did it start?\"      chills  8. VOMITING: \"Is there any vomiting?\" If yes, ask: \"How many times?\"      Nausea 2/2 migraines  9. BLEEDING: \"Is there any bleeding?\" If Yes, ask: \"How much?\" and \"Where?\"      denies  10. OTHER SYMPTOMS: \"Do you have any other symptoms?\" (e.g., drainage from wound, painful urination, constipation)        denies    Protocols used: Post-Op Symptoms and Questions-ADULT-[place]    "

## 2024-05-30 NOTE — ED PROVIDER NOTES
History  Chief Complaint   Patient presents with    Wound Check     Pt presents with wound check following tumor removal on Tuesday, pt states are is painful with numbness and tingling and chills       Wound Check     63-year-old female past medical history includes chronic pain, migraines, fibromyalgia, HTN, HLD, polyneuropathy, low back pain, recently underwent excision of a lipoma to the volar forearm on 5/28/2024, 1 day ago presenting here for evaluation of swelling and postoperative course.    Patient offers limited history she endorses that she has been sleeping throughout the day.  She does have a home health aide who came to the house.  She noted gradual onset swelling throughout the day.  She cannot specify if there is any inciting cause but reports she feels are gradually worsened.  She shows me pictures throughout the day of worsening swelling.  She notes some paresthesias in the distal hand and forearm and feels like the compartment is hard to touch.  She notes pain with palpation and movement at the wrist.  She does note a fasciotomy scar on the dorsal compartment of the forearm from a prior history of compartment syndrome which sounds like was in the context of traumatic rhabdomyolysis due to prolonged downtime on that arm but this was in the remote past.  She describes paresthesias predominantly in the ulnar distribution.    Prior to Admission Medications   Prescriptions Last Dose Informant Patient Reported? Taking?   Alcohol Swabs (Alcohol Pads) 70 % PADS  Care Giver, Self Yes No   Sig: USE TO TEST BLOOD GLUCOSE 2-3 TIMES DAILY   Buprenorphine HCl (Belbuca) 300 MCG FILM  Self, Care Giver Yes No   Sig: Apply 300 mcg to cheek daily at bedtime   HYDROcodone-acetaminophen (NORCO)  mg per tablet  Self, Care Giver Yes No   Sig: Take 1 tablet by mouth 3 (three) times a day   OneTouch Ultra test strip  Care Giver, Self Yes No   Sig: USE TO TEST BLOOD GLUCOSE 2-3 TIMES DAILY   Jorgito Hinojosa  100-62.5-25 MCG/ACT inhaler  Care Giver Yes No   True Comfort Safety Lancets MISC  Care Giver, Self Yes No   Sig: USE TO TEST BLOOD GLUCOSE 2-3 TIMES DAILY   albuterol (PROVENTIL HFA,VENTOLIN HFA) 90 mcg/act inhaler  Self, Care Giver No No   Sig: Inhale 2 puffs every 6 (six) hours as needed for wheezing or shortness of breath   ammonium lactate (LAC-HYDRIN) 12 % lotion  Care Giver, Self Yes No   Sig: as needed   atorvastatin (LIPITOR) 80 mg tablet  Care Giver, Self Yes No   Sig: Take 80 mg by mouth every evening   benzonatate (TESSALON PERLES) 100 mg capsule  Care Giver, Self No No   Sig: Take 1 capsule (100 mg total) by mouth 3 (three) times a day as needed for cough   carboxymethylcellulose 0.5 % SOLN  Self, Care Giver No No   Sig: Administer 1 drop to both eyes daily as needed for dry eyes   clonazePAM (KlonoPIN) 0.5 mg tablet  Self, Care Giver Yes No   Sig: Take 0.5 mg by mouth daily at bedtime   clonazePAM (KlonoPIN) 1 mg tablet  Self, Care Giver Yes No   Sig: Take 1 mg by mouth daily in the early morning   dicyclomine (BENTYL) 20 mg tablet  Self, Care Giver Yes No   Sig: Take 20 mg by mouth every 6 (six) hours   famotidine (PEPCID) 40 MG tablet   No No   Sig: Take 1 tablet (40 mg total) by mouth daily at bedtime   fenofibrate (TRICOR) 48 mg tablet  Self, Care Giver Yes No   Sig: Take 48 mg by mouth daily   fluticasone (FLONASE) 50 mcg/act nasal spray  Care Giver, Self Yes No   Si sprays into each nostril daily   fluticasone-umeclidinium-vilanterol (Trelegy Ellipta) 200-62.5-25 mcg/actuation AEPB inhaler  Self, Care Giver No No   Sig: Inhale 1 puff daily Rinse mouth after use.   lamoTRIgine (LaMICtal) 100 mg tablet  Care Giver, Self Yes No   Sig: Take 100 mg by mouth 2 (two) times a day   latanoprost (XALATAN) 0.005 % ophthalmic solution  Care Giver, Self Yes No   Sig: instill 1 drop into both eyes at bedtime   levalbuterol (XOPENEX) 1.25 mg/3 mL nebulizer solution  Care Giver Yes No   levothyroxine 137 mcg  tablet  Self, Care Giver Yes No   Sig: Take 137 mcg by mouth daily   omeprazole (PriLOSEC) 40 MG capsule   No No   Sig: Take 1 PO QD in AM prior to a meal.   ondansetron (ZOFRAN) 8 mg tablet   No No   Sig: Take 1 tablet (8 mg total) by mouth every 8 (eight) hours as needed for nausea or vomiting   oxyCODONE-acetaminophen (Percocet)  mg per tablet   No No   Sig: Take 1 tablet by mouth every 4 (four) hours as needed for moderate pain Max Daily Amount: 6 tablets   polyethylene glycol (GLYCOLAX) 17 GM/SCOOP powder   No No   Sig: Take 1/2-1 capful every other day to daily.   pregabalin (LYRICA) 100 mg capsule  Self, Care Giver Yes No   Sig: Take 100 mg by mouth 3 (three) times a day Morning-lunch-dinner   pregabalin (LYRICA) 150 mg capsule  Self, Care Giver Yes No   Sig: take 1 capsule by mouth NIGHTLY   rimegepant sulfate (Nurtec) 75 mg TBDP  Care Giver Yes No   Sig: Take 75 mg by mouth once   tiZANidine (ZANAFLEX) 4 mg tablet  Care Giver, Self Yes No   Sig: Take 8 mg by mouth once 1 tablet   topiramate (TOPAMAX) 200 MG tablet  Care Giver, Self Yes No   Si mg 2 (two) times a day   traZODone (DESYREL) 100 mg tablet  Care Giver Yes No   Sig: Take 100 mg by mouth      Facility-Administered Medications: None       Past Medical History:   Diagnosis Date    Ambulates with cane     Anxiety     Arthritis     Asthma     Bipolar 1 disorder (Formerly [facility])     Brain aneurysm     Chronic pain disorder     spinal stenosis    Concussion syndrome     neurological rx and balance rx    COPD (chronic obstructive pulmonary disease) (Formerly [facility])     Depression     Diabetes mellitus (Formerly [facility])     controlled w/ diet    Disease of thyroid gland     nodules     Family history of colon cancer     father    Fibromyalgia, primary     GERD (gastroesophageal reflux disease)     History of colon polyps     Hyperlipidemia     Hypertension     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     Irritable bowel syndrome     Lumbar degenerative disc disease  02/16/2022    Migraine     Neuropathy     bilateral feet and hands    Peripheral neuropathy     Psychiatric disorder     PTSD (post-traumatic stress disorder)     Shortness of breath     Sleep apnea     had 3 studies & last one negative    Stroke (HCC)     2012 no deficeits 2018 2019 TIA    TIA (transient ischemic attack)     Wears dentures     Wears glasses        Past Surgical History:   Procedure Laterality Date    ABDOMINAL SURGERY      laproscopic/ endometriosis    BRAIN SURGERY  2017    aneurysm/ coiling procedure    CARPAL TUNNEL RELEASE      CHOLECYSTECTOMY      COLONOSCOPY      DENTAL SURGERY      EPIDURAL BLOCK INJECTION Right 03/03/2022    Procedure: C7-T1 interlaminar epidural steroid injection;  Surgeon: Johnson Aranda MD;  [facility];  Service: Pain Management     EPIDURAL BLOCK INJECTION N/A 04/21/2022    Procedure: C6-C7 BLOCK / INJECTION EPIDURAL STEROID CERVICAL;  Surgeon: Johnson Aranda MD;  [facility];  Service: Pain Management     EPIDURAL BLOCK INJECTION Left 06/21/2022    Procedure: BLOCK / INJECTION EPIDURAL STEROID LUMBAR  left L3-4 TFESI;  Surgeon: Johnson Aranda MD;  [facility];  Service: Pain Management     EYE SURGERY      cataract    FASCIOTOMY Right 10/27/2023    Procedure: FASCIOTOMY OF RIGHT UPPER EXTREMITY; POSSIBLE VAC DRESSING APPLICATION;  Surgeon: Bruno Blevins MD;  [facility];  Service: General    FL GUIDED NEEDLE PLAC BX/ASP/INJ  03/03/2022    FL GUIDED NEEDLE PLAC BX/ASP/INJ  11/17/2022    HYSTERECTOMY      NERVE BLOCK Left 02/20/2024    Procedure: BLOCK MEDIAL BRANCH  left C3-4 and C4-5 MBB #1;  Surgeon: Johnson Aranda MD;  [facility];  Service: Pain Management     NERVE BLOCK Left 04/04/2024    Procedure: BLOCK MEDIAL BRANCH Left C3-C4 and C4-5 MBB2;  Surgeon: Johnson Aranda MD;  [facility];  Service: Pain Management     SD ESOPHAGOGASTRODUODENOSCOPY TRANSORAL DIAGNOSTIC N/A  02/08/2018    Procedure: EGD AND COLONOSCOPY;  Surgeon: Caleb Pete MD;  [facility];  Service: Gastroenterology    DC EXC B9 LESION MRGN XCP SK TG F/E/E/N/L/M > 4.0CM Right 5/28/2024    Procedure: EXCISION LIPOMA;  Surgeon: Valdo Montoya DO;  [facility];  Service: General    DC PRQ IMPLTJ NSTIM ELECTRODE ARRAY EPIDURAL N/A 11/17/2022    Procedure: NEVRO SCS TRIAL;  Surgeon: Johnson Aranda MD;  [facility];  Service: Pain Management     DC PRQ IMPLTJ NSTIM ELECTRODE ARRAY EPIDURAL N/A 02/01/2023    Procedure: Insertion percutaneous thoracic spinal cord stimulator with left buttock implantable pulse generator;  Surgeon: Craig Robert Goldberg, MD;  [facility];  Service: Neurosurgery    SPLIT THICKNESS SKIN GRAFT Right 11/07/2023    Procedure: SKIN GRAFT SPLIT THICKNESS (STSG)  EXTREMITY;  Surgeon: Samm Sims MD;  [facility];  Service: General    THYROID SURGERY      TONSILLECTOMY      US GUIDED THYROID BIOPSY  11/30/2016    US GUIDED THYROID BIOPSY  03/21/2018    VAC DRESSING APPLICATION Right 10/29/2023    Procedure: CHANGE DRESSING/VAC RIGHT UPPER EXTREMITY; WASHOUT; PARTIAL CLOSURE;  Surgeon: Irina Day DO;  [facility];  Service: General    WOUND DEBRIDEMENT Right 11/04/2023    Procedure: SIMPLE CLOSURE 3.5CM, OASIS APPLICATION 15X7CM, WOUND VAC APPLICATION;  Surgeon: Maryuri Goldstein MD;  [facility];  Service: General       Family History   Problem Relation Age of Onset    Brain cancer Mother     Alzheimer's disease Mother     Alzheimer's disease Father     Parkinsonism Father      I have reviewed and agree with the history as documented.    E-Cigarette/Vaping    E-Cigarette Use Never User      E-Cigarette/Vaping Substances    Nicotine No     THC No     CBD No     Flavoring No     Other No     Unknown No      Social History     Tobacco Use    Smoking status: Every Day     Current packs/day: 2.00     Types: Cigarettes     Smokeless tobacco: Never   Vaping Use    Vaping status: Never Used   Substance Use Topics    Alcohol use: Not Currently    Drug use: Never     Comment: prescribed Belbuca       Review of Systems   Constitutional:  Negative for chills and fever.   HENT:  Negative for ear pain and sore throat.    Eyes:  Negative for pain and visual disturbance.   Respiratory:  Negative for cough and shortness of breath.    Cardiovascular:  Negative for chest pain and palpitations.   Gastrointestinal:  Negative for abdominal pain, nausea and vomiting.   Genitourinary:  Negative for dysuria and hematuria.   Musculoskeletal:  Negative for arthralgias and back pain.        Right wrist pain, right wrist swelling.   Skin:  Negative for color change and rash.   Neurological:  Positive for numbness. Negative for seizures and syncope.   All other systems reviewed and are negative.      Physical Exam  Physical Exam  Vitals and nursing note reviewed. Exam conducted with a chaperone present.   Constitutional:       Appearance: Normal appearance.   HENT:      Head: Normocephalic and atraumatic.      Nose: Nose normal.      Mouth/Throat:      Mouth: Mucous membranes are moist.      Pharynx: Oropharynx is clear.   Eyes:      Conjunctiva/sclera: Conjunctivae normal.   Cardiovascular:      Rate and Rhythm: Normal rate and regular rhythm.      Pulses: Normal pulses.      Heart sounds: Normal heart sounds.   Pulmonary:      Effort: Pulmonary effort is normal.      Breath sounds: Normal breath sounds.   Abdominal:      Palpations: Abdomen is soft.      Tenderness: There is no abdominal tenderness. There is no guarding or rebound.   Musculoskeletal:      Right forearm: Swelling and tenderness present.      Cervical back: Neck supple.      Comments: The patient presents with significant soft tissue swelling to the volar compartment of the proximal forearm.  The area is firmer but I would describe it is still soft it is not hard/rocklike/woody.  The  distal forearm and wrist and hand are cooler to the touch.  There are no obvious skin color changes.  There is significant tenderness with palpation of the area of swelling.  In the center is a several centimeter long incision site which is sutured shut and dressed with skin glue there is no evidence of dehiscence or drainage.  There is no crepitus to palpation.   Skin:     General: Skin is warm and dry.      Capillary Refill: Capillary refill takes less than 2 seconds.   Neurological:      Mental Status: She is alert and oriented to person, place, and time. Mental status is at baseline.      Sensory: Sensory deficit present.      Motor: No weakness.      Comments: Patient endorses paresthesias described as numbness and tingling from the proximal forearm distally.  On further examination it appears these paresthesias are mostly in an ulnar distribution.  There is no paralysis.  Patient does seem to have some reduced strength overall in the right hand/wrist but this is attributed to pain as patient has significant pain with movement.         Vital Signs  ED Triage Vitals [05/29/24 2308]   Temperature Pulse Respirations Blood Pressure SpO2   98.4 °F (36.9 °C) 74 18 149/80 97 %      Temp Source Heart Rate Source Patient Position - Orthostatic VS BP Location FiO2 (%)   Temporal Monitor Lying Left arm --      Pain Score       8           Vitals:    05/29/24 2308 05/30/24 0249   BP: 149/80 109/72   Pulse: 74 69   Patient Position - Orthostatic VS: Lying Sitting         Visual Acuity      ED Medications  Medications   lidocaine (PF) (XYLOCAINE-MPF) 1 % injection 5 mL (5 mL Infiltration Given 5/30/24 0014)   metoclopramide (REGLAN) injection 10 mg (10 mg Intravenous Given 5/30/24 0041)   ketorolac (TORADOL) injection 15 mg (15 mg Intravenous Given 5/30/24 0039)   diphenhydrAMINE (BENADRYL) injection 25 mg (25 mg Intravenous Given 5/30/24 0041)   fentaNYL injection 25 mcg (25 mcg Intravenous Given 5/30/24 0049)   iohexol  (OMNIPAQUE) 350 MG/ML injection (MULTI-DOSE) 100 mL (100 mL Intravenous Given 5/30/24 0128)       Diagnostic Studies  Results Reviewed       Procedure Component Value Units Date/Time    Comprehensive metabolic panel [[id number]]  (Abnormal) Collected: 05/30/24 0002    Lab Status: Final result Specimen: Blood from Arm, Left Updated: 05/30/24 0025     Sodium 142 mmol/L      Potassium 3.8 mmol/L      Chloride 106 mmol/L      CO2 28 mmol/L      ANION GAP 8 mmol/L      BUN 12 mg/dL      Creatinine 0.83 mg/dL      Glucose 86 mg/dL      Calcium 8.8 mg/dL      AST 15 U/L      ALT 11 U/L      Alkaline Phosphatase 67 U/L      Total Protein 6.1 g/dL      Albumin 3.7 g/dL      Total Bilirubin 0.19 mg/dL      eGFR 75 ml/min/1.73sq m     Narrative:      National Kid[employer] Disease Foundation guidelines for Chronic Kidney Disease (CKD):     Stage 1 with normal or high GFR (GFR > 90 mL/min/1.73 square meters)    Stage 2 Mild CKD (GFR = 60-89 mL/min/1.73 square meters)    Stage 3A Moderate CKD (GFR = 45-59 mL/min/1.73 square meters)    Stage 3B Moderate CKD (GFR = 30-44 mL/min/1.73 square meters)    Stage 4 Severe CKD (GFR = 15-29 mL/min/1.73 square meters)    Stage 5 End Stage CKD (GFR <15 mL/min/1.73 square meters)  Note: GFR calculation is accurate only with a steady state creatinine    CK [[id number]]  (Normal) Collected: 05/30/24 0002    Lab Status: Final result Specimen: Blood from Arm, Left Updated: 05/30/24 0025     Total CK 72 U/L     CBC and differential [[id number]] Collected: 05/30/24 0002    Lab Status: Final result Specimen: Blood from Arm, Left Updated: 05/30/24 0008     WBC 6.45 Thousand/uL      RBC 3.83 Million/uL      Hemoglobin 11.8 g/dL      Hematocrit 36.7 %      MCV 96 fL      MCH 30.8 pg      MCHC 32.2 g/dL      RDW 13.1 %      MPV 10.3 fL      Platelets 154 Thousands/uL      nRBC 0 /100 WBCs      Segmented % 53 %      Immature Grans % 0 %      Lymphocytes % 37 %      Monocytes % 7 %      Eosinophils Relative 3 %       Basophils Relative 0 %      Absolute Neutrophils 3.41 Thousands/µL      Absolute Immature Grans 0.01 Thousand/uL      Absolute Lymphocytes 2.40 Thousands/µL      Absolute Monocytes 0.42 Thousand/µL      Eosinophils Absolute 0.19 Thousand/µL      Basophils Absolute 0.02 Thousands/µL                    CT upper extremity w contrast right   Final Result by Efren Galloway MD (05/30 9975)      Postsurgical changes of the mid forearm status post lipoma excision. No fluid collection.      Findings are consistent with the preliminary report from [facility] which was provided shortly after completion of the exam.            Workstation performed: [id number]                    Procedures  Procedures         ED Course  ED Course as of 05/31/24 0024   Thu May 30, 2024   0114 Intra compartment pressure monitoring was performed.  Patient was sterilized with ChloraPrep and anesthetized with 1% lidocaine without, 1.5cc.  Palmaris longus tendon was identified with patient supinated at the wrist and flex at the elbow 90 degrees and thumb to little finger opposition while flexing at the wrist against resistance and the tendon was tracked medially to the junction between the medial and posterior third of the forearm.  The compartment monitoring device was set up and zeroed and after calibration the needle was inserted into the medial compartment perpendicular to the skin towards the posterior ulna and 1/3 cc of saline injected and pressure read on 3 occasions with compartment pressures measuring 10 mmHg, 10 mmHg, 11 mmHg.  Patient tolerated without difficulty.  The area was bandaged after the fact.  No complications.  The assessment was that this was not consistent with compartment syndrome.   0116 PROCEDURE LATERALITY RIGHT FOREARM                               SBIRT 20yo+      Flowsheet Row Most Recent Value   Initial Alcohol Screen: US AUDIT-C     1. How often do you have a drink containing alcohol? 0 Filed at:  "05/29/2024 2311   2. How many drinks containing alcohol do you have on a typical day you are drinking?  0 Filed at: 05/29/2024 2311   3b. FEMALE Any Age, or MALE 65+: How often do you have 4 or more drinks on one occassion? 0 Filed at: 05/29/2024 2311   Audit-C Score 0 Filed at: 05/29/2024 2311   ABIOLA: How many times in the past year have you...    Used an illegal drug or used a prescription medication for non-medical reasons? Never Filed at: 05/29/2024 2311                      Medical Decision Making  This 63-year-old female presenting for severe swelling after recent excision of a lipoma in the proximal forearm.  She presents with significant localized swelling of the volar compartment of the right forearm and endorses paresthesias, severe pain, temperature changes and disagrees with my assessment that the compartment is swollen but soft, reporting \"no it feels rock hard\" when trying to better evaluate the history of the swelling the patient could offer limited details it appears she is under the influence of polypharmacy and was drowsy throughout the day and cannot reliably recall how and when the progression of swelling occurred.  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    Therefore, we proceeded with intra compartment pressure monitoring which fortunately did not reveal evidence of compartment syndrome.  CT scan was obtained as well as labs CK was normal CT demonstrated expected postoperative changes without any large fluid collections and the patient was discharged with outpatient follow-up with her general surgeon.    Problems Addressed:  Acute post-operative pain: acute illness or injury  Pain and swelling of forearm, right: acute illness or injury  Post-operative numbness: acute illness or injury     Details: Attributed to peripheral nerve compression.  This is in an ulnar nerve " distribution.  Visit for wound check: acute illness or injury    Amount and/or Complexity of Data Reviewed  Labs: ordered.  Radiology: ordered.    Risk  Prescription drug management.             Disposition  Final diagnoses:   Visit for wound check   Pain and swelling of forearm, right   Post-operative numbness   Acute post-operative pain     Time reflects when diagnosis was documented in both MDM as applicable and the Disposition within this note       Time User Action Codes Description Comment    5/30/2024  1:16 AM Eric Mayo [Z51.89] Visit for wound check     5/30/2024  1:16 AM Eric Mayo [M79.631,  M79.89] Pain and swelling of forearm, right     5/30/2024  1:17 AM Eric Mayo [G97.82,  R20.0] Post-operative numbness     5/30/2024  1:18 AM Eric Mayo [G89.18] Acute post-operative pain           ED Disposition       ED Disposition   Discharge    Condition   Stable    Date/Time   Thu May 30, 2024 0227    Comment   Jennifer Woodall discharge to home/self care.                   Follow-up Information       Follow up With Specialties Details Why Contact Info Additional Information    Valdo Montoya, DO General Surgery, Wound Care Schedule an appointment as soon as possible for a visit   [address] [place] [id number]  [phone]       [facility] Emergency Medicine Go to  If symptoms worsen [address] [facility] [id number] [phone] [facility], [address], [place], [address]            Discharge Medication List as of 5/30/2024  2:27 AM        CONTINUE these medications which have NOT CHANGED    Details   albuterol (PROVENTIL HFA,VENTOLIN HFA) 90 mcg/act inhaler Inhale 2 puffs every 6 (six) hours as needed for wheezing or shortness of breath, Starting Thu 12/21/2023, Normal      Alcohol Swabs (Alcohol Pads) 70 % PADS USE TO TEST BLOOD GLUCOSE 2-3 TIMES DAILY,  Historical Med      ammonium lactate (LAC-HYDRIN) 12 % lotion as needed, Historical Med      atorvastatin (LIPITOR) 80 mg tablet Take 80 mg by mouth every evening, Starting Fri 10/14/2022, Historical Med      benzonatate (TESSALON PERLES) 100 mg capsule Take 1 capsule (100 mg total) by mouth 3 (three) times a day as needed for cough, Starting Tue 5/2/2023, Normal      Buprenorphine HCl (Belbuca) 300 MCG FILM Apply 300 mcg to cheek daily at bedtime, Historical Med      carboxymethylcellulose 0.5 % SOLN Administer 1 drop to both eyes daily as needed for dry eyes, Starting Thu 2/7/2019, Normal      !! clonazePAM (KlonoPIN) 0.5 mg tablet Take 0.5 mg by mouth daily at bedtime, Historical Med      !! clonazePAM (KlonoPIN) 1 mg tablet Take 1 mg by mouth daily in the early morning, Starting Thu 1/25/2018, Historical Med      dicyclomine (BENTYL) 20 mg tablet Take 20 mg by mouth every 6 (six) hours, Historical Med      famotidine (PEPCID) 40 MG tablet Take 1 tablet (40 mg total) by mouth daily at bedtime, Starting Mon 5/20/2024, Normal      fenofibrate (TRICOR) 48 mg tablet Take 48 mg by mouth daily, Historical Med      fluticasone (FLONASE) 50 mcg/act nasal spray 2 sprays into each nostril daily, Starting Wed 1/11/2023, Historical Med      fluticasone-umeclidinium-vilanterol (Trelegy Ellipta) 200-62.5-25 mcg/actuation AEPB inhaler Inhale 1 puff daily Rinse mouth after use., Starting Wed 2/14/2024, Until Mon 8/12/2024, Normal      HYDROcodone-acetaminophen (NORCO)  mg per tablet Take 1 tablet by mouth 3 (three) times a day, Historical Med      lamoTRIgine (LaMICtal) 100 mg tablet Take 100 mg by mouth 2 (two) times a day, Starting Tue 1/16/2018, Until Tue 5/28/2024, Historical Med      latanoprost (XALATAN) 0.005 % ophthalmic solution instill 1 drop into both eyes at bedtime, Historical Med      levalbuterol (XOPENEX) 1.25 mg/3 mL nebulizer solution Historical Med      levothyroxine 137 mcg tablet Take 137 mcg by mouth  daily, Starting Mon 1/21/2019, Historical Med      omeprazole (PriLOSEC) 40 MG capsule Take 1 PO QD in AM prior to a meal., Normal      ondansetron (ZOFRAN) 8 mg tablet Take 1 tablet (8 mg total) by mouth every 8 (eight) hours as needed for nausea or vomiting, Starting Mon 5/20/2024, Normal      OneTouch Ultra test strip USE TO TEST BLOOD GLUCOSE 2-3 TIMES DAILY, Historical Med      oxyCODONE-acetaminophen (Percocet)  mg per tablet Take 1 tablet by mouth every 4 (four) hours as needed for moderate pain Max Daily Amount: 6 tablets, Starting Tue 5/28/2024, Normal      polyethylene glycol (GLYCOLAX) 17 GM/SCOOP powder Take 1/2-1 capful every other day to daily., Normal      !! pregabalin (LYRICA) 100 mg capsule Take 100 mg by mouth 3 (three) times a day Morning-lunch-dinner, Starting Thu 10/26/2017, Historical Med      !! pregabalin (LYRICA) 150 mg capsule take 1 capsule by mouth NIGHTLY, Historical Med      rimegepant sulfate (Nurtec) 75 mg TBDP Take 75 mg by mouth once, Historical Med      tiZANidine (ZANAFLEX) 4 mg tablet Take 8 mg by mouth once 1 tablet, Starting Sat 10/8/2022, Historical Med      topiramate (TOPAMAX) 200 MG tablet 200 mg 2 (two) times a day, Starting Mon 6/5/2023, Historical Med      traZODone (DESYREL) 100 mg tablet Take 100 mg by mouth, Starting Fri 5/10/2024, Historical Med      Trelegy Ellipta 100-62.5-25 MCG/ACT inhaler Historical Med      True Comfort Safety Lancets MISC USE TO TEST BLOOD GLUCOSE 2-3 TIMES DAILY, Historical Med       !! - Potential duplicate medications found. Please discuss with provider.          No discharge procedures on file.    PDMP Review         Value Time User    PDMP Reviewed  Yes 3/13/2024  8:03 AM Johnson Aranda MD            ED Provider  Electronically Signed by             Eric Mayo DO  05/31/24 0024

## 2024-05-31 ENCOUNTER — NURSE TRIAGE (OUTPATIENT)
Age: 64
End: 2024-05-31

## 2024-05-31 ENCOUNTER — NURSE TRIAGE (OUTPATIENT)
Dept: OTHER | Facility: OTHER | Age: 64
End: 2024-05-31

## 2024-05-31 PROCEDURE — 88304 TISSUE EXAM BY PATHOLOGIST: CPT | Performed by: PATHOLOGY

## 2024-05-31 NOTE — TELEPHONE ENCOUNTER
"Regarding: post op issues / severe pain/black and blue forming to the left side of the incision /inflamed  ----- Message from Joceline DUNBAR sent at 5/31/2024  6:32 PM EDT -----  \"I had sx on Tuesday  on my Right Lower Arm and I am having severe pain. I  have black and blue forming to the left side of the incision it is hard and inflamed. My hand and arm are both numb and tingling in the fingers at times. I would like something to help with the pain\"    "

## 2024-05-31 NOTE — TELEPHONE ENCOUNTER
had excision of lipoma on her right lower arm with Dr. Montoya on 5/28. She notes she is having 8/10 right arm pain and is requesting a refill of her Oxycodone 10-325mg    On call provider notified

## 2024-05-31 NOTE — TELEPHONE ENCOUNTER
"Patient of Dr. Montoya.  Patient was in ED on Wed for swelling from wrist to elbow.  State  was in home prior to going.  Patient states arm is not in good shape. Patient has appointment on Monday with Dr. Montoya.  Tested patient for compartment syndrome - was negative.  Patient states no one went over blood work or CT results.  Patient is out of pain meds and they never gave abx.  Patient states darker around incision, pain worse, swelling is down.  Patient states hard around incision.  Not much use of right hand.  Patient canceled home health aide for today.  No appetite but has food in crockpot.  Patient now states having stomach pains.  Patient states having different kinds of pain.  Patient hoping doctor can call in oxycodone script in to pharmacy to last her through weekend.  Patient does state overall better than when in ED with exception of pain.  Patient would like callback before end of day. Did discuss if worsening swelling, pain, drainage or any other symptoms to call back or go to ED.   CB:  [phone]    Reason for Disposition   Patient wants to be seen    Answer Assessment - Initial Assessment Questions  1. ONSET: \"When did the pain start?\"      Has been there for unknown amount of time but now worse.    2. LOCATION: \"Where is the pain located?\"      Right lower arm  3. PAIN: \"How bad is the pain?\" (Scale 1-10; or mild, moderate, severe)    - MILD (1-3): doesn't interfere with normal activities    - MODERATE (4-7): interferes with normal activities (e.g., work or school) or awakens from sleep    - SEVERE (8-10): excruciating pain, unable to do any normal activities, unable to hold a cup of water      7/10.    4. WORK OR EXERCISE: \"Has there been any recent work or exercise that involved this part of the body?\"      denies  5. CAUSE: \"What do you think is causing the arm pain?\"      Has hx of  compartment syndrome per patient.    6. OTHER SYMPTOMS: \"Do you have any other symptoms?\" " (e.g., neck pain, swelling, rash, fever, numbness, weakness)      Swelling, more red around incision, numbness in hand.    Protocols used: Arm Pain-ADULT-[place]

## 2024-05-31 NOTE — TELEPHONE ENCOUNTER
"Reason for Disposition  • [1] Prescription refill request for ESSENTIAL medicine (i.e., likelihood of harm to patient if not taken) AND [2] triager unable to refill per department policy    Answer Assessment - Initial Assessment Questions  1. DRUG NAME: \"What medicine do you need to have refilled?\"      Oxycodone 10-325mg  2. REFILLS REMAINING: \"How many refills are remaining?\" (Note: The label on the medicine or pill bottle will show how many refills are remaining. If there are no refills remaining, then a renewal may be needed.)      0  4. PRESCRIBING HCP: \"Who prescribed it?\" Reason: If prescribed by specialist, call should be referred to that group.      General  5. SYMPTOMS: \"Do you have any symptoms?\"      Severe pain    Protocols used: Medication Refill and Renewal Call-ADULT-    "

## 2024-05-31 NOTE — TELEPHONE ENCOUNTER
Per on call-  use an ice pack and extra strength tylenol.    Patient was advised and verbalized understanding. However notes she is not able to take tylenol due to the other medications she is on. She will continue to use ice.

## 2024-06-02 ENCOUNTER — HOSPITAL ENCOUNTER (EMERGENCY)
Facility: HOSPITAL | Age: 64
Discharge: HOME | End: 2024-06-02
Attending: EMERGENCY MEDICINE
Payer: COMMERCIAL

## 2024-06-02 VITALS
HEART RATE: 60 BPM | BODY MASS INDEX: 29.02 KG/M2 | TEMPERATURE: 98.7 F | DIASTOLIC BLOOD PRESSURE: 81 MMHG | WEIGHT: 170 LBS | OXYGEN SATURATION: 96 % | SYSTOLIC BLOOD PRESSURE: 137 MMHG | RESPIRATION RATE: 16 BRPM | HEIGHT: 64 IN

## 2024-06-02 DIAGNOSIS — M79.601 RIGHT ARM PAIN: Primary | ICD-10-CM

## 2024-06-02 PROCEDURE — 99284 EMERGENCY DEPT VISIT MOD MDM: CPT | Performed by: EMERGENCY MEDICINE

## 2024-06-02 PROCEDURE — 99283 EMERGENCY DEPT VISIT LOW MDM: CPT

## 2024-06-02 RX ORDER — OXYCODONE HYDROCHLORIDE 10 MG/1
10 TABLET ORAL ONCE
Status: COMPLETED | OUTPATIENT
Start: 2024-06-02 | End: 2024-06-02

## 2024-06-02 RX ORDER — OXYCODONE HYDROCHLORIDE 10 MG/1
10 TABLET ORAL ONCE
Status: DISCONTINUED | OUTPATIENT
Start: 2024-06-02 | End: 2024-06-02 | Stop reason: HOSPADM

## 2024-06-02 RX ADMIN — OXYCODONE HYDROCHLORIDE 10 MG: 10 TABLET ORAL at 19:06

## 2024-06-02 NOTE — ED ATTENDING ATTESTATION
Final Diagnosis:  1. Right arm pain           I, Shar Islas MD, saw and evaluated the patient. All available labs and X-rays were ordered by me or the resident and have been reviewed by myself. I discussed the patient with the resident / non-physician and agree with the resident's / non-physician practitioner's findings and plan as documented in the resident's / non-physician practicitioner's note, except where noted.   At this point, I agree with the current assessment done in the ED.   I was present during key portions of all procedures performed unless otherwise stated.     Chief Complaint   Patient presents with    Arm Pain     Patient reports pain in right hand that radiates up forearm. Recent surgery to have a tumor removed. Also hx of compartment syndrome in same arm.      This is a 63 y.o. female presenting for evaluation of right hand and arm pain.  Patient states that she recently had a lipoma removed and since then she has been having some increased pain in her right forearm.  This is worse with manipulation of her right hand.  She states that she has been having increasing pain that is poorly controlled at home so she presents now for further evaluation.    Review of records show that the patient did have a lipoma removal on the 28th.  She was then seen here in the emergency department on the 29th at which time she had a CT performed which showed only postsurgical changes.  Compartment pressures were tested at that time which were normal as well.  Patient was instructed follow-up with surgery.  Since then she has had multiple calls to surgery.  It appears that she was concerned because she ran out of her pain medicine.  Overall she states that her pain is somewhat better than it was when she was originally seen here.  This correlates to her current story where she is concerned about her pain management overnight.  She states that she does have close follow-up tomorrow with surgery.    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    PSH:   has a past surgical history that includes Hysterectomy; Thyroid surgery; Cholecystectomy; Carpal tunnel release; Eye surgery; Dental surgery; Abdominal surgery; Brain surgery (2017); pr esophagogastroduodenoscopy transoral diagnostic (N/A, 02/08/2018); Tonsillectomy; Epidural block injection (Right, 03/03/2022); FL guided needle plac bx/asp/inj (03/03/2022); Epidural block injection (N/A, 04/21/2022); Epidural block injection (Left, 06/21/2022); Colonoscopy; FL guided needle plac bx/asp/inj (11/17/2022); pr prq impltj nstim electrode array epidural (N/A, 11/17/2022); pr prq impltj nstim electrode array epidural (N/A, 02/01/2023); US guided thyroid biopsy (11/30/2016); US guided thyroid biopsy (03/21/2018); VAC DRESSING APPLICATION (Right, 10/29/2023); Wound debridement (Right, 11/04/2023); Fasciotomy (Right, 10/27/2023); SPLIT THICKNESS SKIN GRAFT (Right, 11/07/2023); NERVE BLOCK (Left, 02/20/2024); NERVE BLOCK (Left, 04/04/2024); and pr exc b9 lesion mrgn xcp sk tg f/e/e/n/l/m > 4.0cm (Right, 5/28/2024).    Procedures     Social:  Social History     Substance and Sexual Activity   Alcohol Use Not Currently     Social History     Tobacco Use   Smoking  "Status Every Day    Current packs/day: 2.00    Types: Cigarettes   Smokeless Tobacco Never     Social History     Substance and Sexual Activity   Drug Use Never    Comment: prescribed Belbuca     PE:  Vitals:    06/02/24 1729 06/02/24 1800 06/02/24 1830 06/02/24 1915   BP: 146/87 135/86 130/81 137/81   BP Location: Left arm      Pulse: 81 67 65 60   Resp: 20 16 16 16   Temp: 98.7 °F (37.1 °C)      TempSrc: Temporal      SpO2: 94% 93% 95% 96%   Weight: 77.1 kg (170 lb)      Height: 5' 4\" (1.626 m)          A:    Unless otherwise specified above:     General: VS reviewed  Appears in NAD     Head: Normocephalic, atraumatic     CV: No pallor noted  Lungs:   No respiratory distress     Abdomen:  Soft, non-tender, non-distended     MSK:   No obvious deformity     Skin: No obvious rash.     Neuro: Awake, alert, GCS15, CN II-XII grossly intact. Speaking in full sentences.   Motor grossly intact.     Psychiatric/Behavioral: Appropriate mood and affect   Exam: deferred    P:  -Right forearm has incision over the palmar aspect just distal to the antecubital fossa.  This appears to be healing well.  Nonfluctuant.  Not indurated.  Not firm.  Palpation throughout her entire forearm is soft.  I do not appreciate any firmness.  She does have an old fasciotomy scar on the dorsal aspect of this arm.  No obvious neurosensation changes.  - I discussed options with patient bedside.  I discussed with her repeat evaluation that she had a couple days ago if her symptoms are significantly worsening including CT scan, blood work, discussion of possible compartment pressures.  Patient states that she would like to defer this at this time.  She states that she believes that she only needs some pain medication to help her get into see her surgeon tomorrow.  Review of PDMP shows that the patient is on chronic medications provided by another provider that would have ran out yesterday.  Narcotics were provided by surgery for only 2 days.  " Patient is concerned that her pharmacy will be closed at this time.  I discussed that given that her pharmacy will be closed we can provide her 1 dose here and then 1 dose for usage at home but will not provide a further prescription.  I discussed with her that she should only use this additional dose when her pain becomes extremely severe.  Continue her other management.  Patient is comfortable with this plan.  - Patient felt improved after symptomatic management here in the emergency department.  Return precautions reviewed.  - 13 point ROS was performed and all are normal unless stated in the history above.   - Nursing note reviewed. Vitals reviewed.   - Orders placed by myself and/or advanced practitioner / resident.    - Previous chart was reviewed  - No language barrier.   - History obtained from patient.   - There are no limitations to the history obtained.     Unless otherwise specified:  CC is exclusive from any separately billable procedures  CC is exclusive of treating other patients  CC is exclusive of teaching time     Code Status: Prior  Advance Directive and Living Will:      Power of :    POLST:      Medications   oxyCODONE (ROXICODONE) immediate release tablet 10 mg (10 mg Oral Handoff 6/2/24 2001)   oxyCODONE (ROXICODONE) immediate release tablet 10 mg (10 mg Oral Given 6/2/24 1906)     No orders to display     No orders of the defined types were placed in this encounter.    Labs Reviewed - No data to display  Time reflects when diagnosis was documented in both MDM as applicable and the Disposition within this note       Time User Action Codes Description Comment    6/2/2024  7:57 PM Jose Gibbs [M79.601] Right arm pain           ED Disposition       ED Disposition   Discharge    Condition   Stable    Date/Time   Sun Jun 2, 2024  7:58 PM    Comment   Jennifer Woodall discharge to home/self care.                   Follow-up Information       Follow up With Specialties Details Why  Contact Info Additional Information    [facility] Emergency Medicine Go to  If symptoms worsen [address] [address] [phone] [facility], [address], [place], [address]          Patient's Medications   Discharge Prescriptions    No medications on file     No discharge procedures on file.  Prior to Admission Medications   Prescriptions Last Dose Informant Patient Reported? Taking?   Alcohol Swabs (Alcohol Pads) 70 % PADS  Care Giver, Self Yes No   Sig: USE TO TEST BLOOD GLUCOSE 2-3 TIMES DAILY   Buprenorphine HCl (Belbuca) 300 MCG FILM  Self, Care Giver Yes No   Sig: Apply 300 mcg to cheek daily at bedtime   HYDROcodone-acetaminophen (NORCO)  mg per tablet  Self, Care Giver Yes No   Sig: Take 1 tablet by mouth 3 (three) times a day   OneTouch Ultra test strip  Care Giver, Self Yes No   Sig: USE TO TEST BLOOD GLUCOSE 2-3 TIMES DAILY   Trelegy Ellipta 100-62.5-25 MCG/ACT inhaler  Care Giver Yes No   True Comfort Safety Lancets MISC  Care Giver, Self Yes No   Sig: USE TO TEST BLOOD GLUCOSE 2-3 TIMES DAILY   albuterol (PROVENTIL HFA,VENTOLIN HFA) 90 mcg/act inhaler  Self, Care Giver No No   Sig: Inhale 2 puffs every 6 (six) hours as needed for wheezing or shortness of breath   ammonium lactate (LAC-HYDRIN) 12 % lotion  Care Giver, Self Yes No   Sig: as needed   atorvastatin (LIPITOR) 80 mg tablet  Care Giver, Self Yes No   Sig: Take 80 mg by mouth every evening   benzonatate (TESSALON PERLES) 100 mg capsule  Care Giver, Self No No   Sig: Take 1 capsule (100 mg total) by mouth 3 (three) times a day as needed for cough   carboxymethylcellulose 0.5 % SOLN  Self, Care Giver No No   Sig: Administer 1 drop to both eyes daily as needed for dry eyes   clonazePAM (KlonoPIN) 0.5 mg tablet  Self, Care Giver Yes No   Sig: Take 0.5 mg by mouth daily at bedtime   clonazePAM (KlonoPIN) 1 mg tablet  Self, Care  Giver Yes No   Sig: Take 1 mg by mouth daily in the early morning   dicyclomine (BENTYL) 20 mg tablet  Self, Care Giver Yes No   Sig: Take 20 mg by mouth every 6 (six) hours   famotidine (PEPCID) 40 MG tablet   No No   Sig: Take 1 tablet (40 mg total) by mouth daily at bedtime   fenofibrate (TRICOR) 48 mg tablet  Self, Care Giver Yes No   Sig: Take 48 mg by mouth daily   fluticasone (FLONASE) 50 mcg/act nasal spray  Care Giver, Self Yes No   Si sprays into each nostril daily   fluticasone-umeclidinium-vilanterol (Trelegy Ellipta) 200-62.5-25 mcg/actuation AEPB inhaler  Self, Care Giver No No   Sig: Inhale 1 puff daily Rinse mouth after use.   lamoTRIgine (LaMICtal) 100 mg tablet  Care Giver, Self Yes No   Sig: Take 100 mg by mouth 2 (two) times a day   latanoprost (XALATAN) 0.005 % ophthalmic solution  Care Giver, Self Yes No   Sig: instill 1 drop into both eyes at bedtime   levalbuterol (XOPENEX) 1.25 mg/3 mL nebulizer solution  Care Giver Yes No   levothyroxine 137 mcg tablet  Self, Care Giver Yes No   Sig: Take 137 mcg by mouth daily   omeprazole (PriLOSEC) 40 MG capsule   No No   Sig: Take 1 PO QD in AM prior to a meal.   ondansetron (ZOFRAN) 8 mg tablet   No No   Sig: Take 1 tablet (8 mg total) by mouth every 8 (eight) hours as needed for nausea or vomiting   oxyCODONE-acetaminophen (Percocet)  mg per tablet   No No   Sig: Take 1 tablet by mouth every 4 (four) hours as needed for moderate pain Max Daily Amount: 6 tablets   polyethylene glycol (GLYCOLAX) 17 GM/SCOOP powder   No No   Sig: Take 1/2-1 capful every other day to daily.   pregabalin (LYRICA) 100 mg capsule  Self, Care Giver Yes No   Sig: Take 100 mg by mouth 3 (three) times a day Morning-lunch-dinner   pregabalin (LYRICA) 150 mg capsule  Self, Care Giver Yes No   Sig: take 1 capsule by mouth NIGHTLY   rimegepant sulfate (Nurtec) 75 mg TBDP  Care Giver Yes No   Sig: Take 75 mg by mouth once   tiZANidine (ZANAFLEX) 4 mg tablet  Care Giver,  "Self Yes No   Sig: Take 8 mg by mouth once 1 tablet   topiramate (TOPAMAX) 200 MG tablet  Care Giver, Self Yes No   Si mg 2 (two) times a day   traZODone (DESYREL) 100 mg tablet  Care Giver Yes No   Sig: Take 100 mg by mouth      Facility-Administered Medications: None       Portions of the record may have been created with voice recognition software. Occasional wrong word or \"sound a like\" substitutions may have occurred due to the inherent limitations of voice recognition software. Read the chart carefully and recognize, using context, where substitutions have occurred.    Electronically signed by:  Shar Islas    "

## 2024-06-02 NOTE — DISCHARGE INSTRUCTIONS
Follow up with general surgery tomorrow as planned.    Return to the ER if you develop:    Worsening pain, swelling, loss of sensation, hardening of the muscle, inability to move the arm, fever.

## 2024-06-02 NOTE — ED PROVIDER NOTES
History  Chief Complaint   Patient presents with    Arm Pain     Patient reports pain in right hand that radiates up forearm. Recent surgery to have a tumor removed. Also hx of compartment syndrome in same arm.      63 y.o F w/ hx significant for RUE compartment syndrome (s/p fasciotomy) presenting for right arm pain. 5 days ago Pt had a lipoma removed. The following day she presented to our ED for severe arm pain, swelling, endorsing that the muscle is hard, numbness, weakness. Her workup was negative for seroma, abscess, or compartment syndrome. She was d/c with oxycodone, which has helped, however, she states she ran out because she's been using then around the clock. She has an appointment with her surgeon tomorrow, however, she is having severe pain and has no medication to treat it with. She otherwise states that the swelling and hardness has improved, and that overall there isn't any new symptoms.      History provided by:  Patient      Prior to Admission Medications   Prescriptions Last Dose Informant Patient Reported? Taking?   Alcohol Swabs (Alcohol Pads) 70 % PADS  Care Giver, Self Yes No   Sig: USE TO TEST BLOOD GLUCOSE 2-3 TIMES DAILY   Buprenorphine HCl (Belbuca) 300 MCG FILM  Self, Care Giver Yes No   Sig: Apply 300 mcg to cheek daily at bedtime   HYDROcodone-acetaminophen (NORCO)  mg per tablet  Self, Care Giver Yes No   Sig: Take 1 tablet by mouth 3 (three) times a day   OneTouch Ultra test strip  Care Giver, Self Yes No   Sig: USE TO TEST BLOOD GLUCOSE 2-3 TIMES DAILY   Trelegy Ellipta 100-62.5-25 MCG/ACT inhaler  Care Giver Yes No   True Comfort Safety Lancets MISC  Care Giver, Self Yes No   Sig: USE TO TEST BLOOD GLUCOSE 2-3 TIMES DAILY   albuterol (PROVENTIL HFA,VENTOLIN HFA) 90 mcg/act inhaler  Self, Care Giver No No   Sig: Inhale 2 puffs every 6 (six) hours as needed for wheezing or shortness of breath   ammonium lactate (LAC-HYDRIN) 12 % lotion  Care Giver, Self Yes No   Sig: as needed    atorvastatin (LIPITOR) 80 mg tablet  Care Giver, Self Yes No   Sig: Take 80 mg by mouth every evening   benzonatate (TESSALON PERLES) 100 mg capsule  Care Giver, Self No No   Sig: Take 1 capsule (100 mg total) by mouth 3 (three) times a day as needed for cough   carboxymethylcellulose 0.5 % SOLN  Self, Care Giver No No   Sig: Administer 1 drop to both eyes daily as needed for dry eyes   clonazePAM (KlonoPIN) 0.5 mg tablet  Self, Care Giver Yes No   Sig: Take 0.5 mg by mouth daily at bedtime   clonazePAM (KlonoPIN) 1 mg tablet  Self, Care Giver Yes No   Sig: Take 1 mg by mouth daily in the early morning   dicyclomine (BENTYL) 20 mg tablet  Self, Care Giver Yes No   Sig: Take 20 mg by mouth every 6 (six) hours   famotidine (PEPCID) 40 MG tablet   No No   Sig: Take 1 tablet (40 mg total) by mouth daily at bedtime   fenofibrate (TRICOR) 48 mg tablet  Self, Care Giver Yes No   Sig: Take 48 mg by mouth daily   fluticasone (FLONASE) 50 mcg/act nasal spray  Care Giver, Self Yes No   Si sprays into each nostril daily   fluticasone-umeclidinium-vilanterol (Trelegy Ellipta) 200-62.5-25 mcg/actuation AEPB inhaler  Self, Care Giver No No   Sig: Inhale 1 puff daily Rinse mouth after use.   lamoTRIgine (LaMICtal) 100 mg tablet  Care Giver, Self Yes No   Sig: Take 100 mg by mouth 2 (two) times a day   latanoprost (XALATAN) 0.005 % ophthalmic solution  Care Giver, Self Yes No   Sig: instill 1 drop into both eyes at bedtime   levalbuterol (XOPENEX) 1.25 mg/3 mL nebulizer solution  Care Giver Yes No   levothyroxine 137 mcg tablet  Self, Care Giver Yes No   Sig: Take 137 mcg by mouth daily   omeprazole (PriLOSEC) 40 MG capsule   No No   Sig: Take 1 PO QD in AM prior to a meal.   ondansetron (ZOFRAN) 8 mg tablet   No No   Sig: Take 1 tablet (8 mg total) by mouth every 8 (eight) hours as needed for nausea or vomiting   oxyCODONE-acetaminophen (Percocet)  mg per tablet   No No   Sig: Take 1 tablet by mouth every 4 (four) hours  as needed for moderate pain Max Daily Amount: 6 tablets   polyethylene glycol (GLYCOLAX) 17 GM/SCOOP powder   No No   Sig: Take 1/2-1 capful every other day to daily.   pregabalin (LYRICA) 100 mg capsule  Self, Care Giver Yes No   Sig: Take 100 mg by mouth 3 (three) times a day Morning-lunch-dinner   pregabalin (LYRICA) 150 mg capsule  Self, Care Giver Yes No   Sig: take 1 capsule by mouth NIGHTLY   rimegepant sulfate (Nurtec) 75 mg TBDP  Care Giver Yes No   Sig: Take 75 mg by mouth once   tiZANidine (ZANAFLEX) 4 mg tablet  Care Giver, Self Yes No   Sig: Take 8 mg by mouth once 1 tablet   topiramate (TOPAMAX) 200 MG tablet  Care Giver, Self Yes No   Si mg 2 (two) times a day   traZODone (DESYREL) 100 mg tablet  Care Giver Yes No   Sig: Take 100 mg by mouth      Facility-Administered Medications: None       Past Medical History:   Diagnosis Date    Ambulates with cane     Anxiety     Arthritis     Asthma     Bipolar 1 disorder ([facility])     Brain aneurysm     Chronic pain disorder     spinal stenosis    Concussion syndrome     neurological rx and balance rx    COPD (chronic obstructive pulmonary disease) ([facility])     Depression     Diabetes mellitus (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)      no deficeits 2018 TIA    TIA (transient ischemic attack)     Wears dentures     Wears glasses        Past Surgical History:   Procedure Laterality  Date    ABDOMINAL SURGERY      laproscopic/ endometriosis    BRAIN SURGERY  2017    aneurysm/ coiling procedure    CARPAL TUNNEL RELEASE      CHOLECYSTECTOMY      COLONOSCOPY      DENTAL SURGERY      EPIDURAL BLOCK INJECTION Right 03/03/2022    Procedure: C7-T1 interlaminar epidural steroid injection;  Surgeon: Johnson Aranda MD;  [facility];  Service: Pain Management     EPIDURAL BLOCK INJECTION N/A 04/21/2022    Procedure: C6-C7 BLOCK / INJECTION EPIDURAL STEROID CERVICAL;  Surgeon: Johnson Aranda MD;  [facility];  Service: Pain Management     EPIDURAL BLOCK INJECTION Left 06/21/2022    Procedure: BLOCK / INJECTION EPIDURAL STEROID LUMBAR  left L3-4 TFESI;  Surgeon: Jonhson Aranda MD;  [facility];  Service: Pain Management     EYE SURGERY      cataract    FASCIOTOMY Right 10/27/2023    Procedure: FASCIOTOMY OF RIGHT UPPER EXTREMITY; POSSIBLE VAC DRESSING APPLICATION;  Surgeon: Bruno Blevins MD;  [facility];  Service: General    FL GUIDED NEEDLE PLAC BX/ASP/INJ  03/03/2022    FL GUIDED NEEDLE PLAC BX/ASP/INJ  11/17/2022    HYSTERECTOMY      NERVE BLOCK Left 02/20/2024    Procedure: BLOCK MEDIAL BRANCH  left C3-4 and C4-5 MBB #1;  Surgeon: Johnson Aranda MD;  [facility];  Service: Pain Management     NERVE BLOCK Left 04/04/2024    Procedure: BLOCK MEDIAL BRANCH Left C3-C4 and C4-5 MBB2;  Surgeon: Johnson Aranda MD;  [facility];  Service: Pain Management     NY ESOPHAGOGASTRODUODENOSCOPY TRANSORAL DIAGNOSTIC N/A 02/08/2018    Procedure: EGD AND COLONOSCOPY;  Surgeon: Caleb Pete MD;  [facility];  Service: Gastroenterology    NY EXC B9 LESION MRGN XCP SK TG F/E/E/N/L/M > 4.0CM Right 5/28/2024    Procedure: EXCISION LIPOMA;  Surgeon: Valdo Montoya DO;  [facility];  Service: General    NY PRQ IMPLTJ NSTIM ELECTRODE ARRAY EPIDURAL N/A 11/17/2022    Procedure: NEVRO SCS TRIAL;  Surgeon: Johnson GARCIA  MD Rosi;  [facility];  Service: Pain Management     TN PRQ IMPLTJ NSTIM ELECTRODE ARRAY EPIDURAL N/A 02/01/2023    Procedure: Insertion percutaneous thoracic spinal cord stimulator with left buttock implantable pulse generator;  Surgeon: Craig Robert Goldberg, MD;  [facility];  Service: Neurosurgery    SPLIT THICKNESS SKIN GRAFT Right 11/07/2023    Procedure: SKIN GRAFT SPLIT THICKNESS (STSG)  EXTREMITY;  Surgeon: Samm Sims MD;  [facility];  Service: General    THYROID SURGERY      TONSILLECTOMY      US GUIDED THYROID BIOPSY  11/30/2016    US GUIDED THYROID BIOPSY  03/21/2018    VAC DRESSING APPLICATION Right 10/29/2023    Procedure: CHANGE DRESSING/VAC RIGHT UPPER EXTREMITY; WASHOUT; PARTIAL CLOSURE;  Surgeon: Irina Day DO;  [facility];  Service: General    WOUND DEBRIDEMENT Right 11/04/2023    Procedure: SIMPLE CLOSURE 3.5CM, OASIS APPLICATION 15X7CM, WOUND VAC APPLICATION;  Surgeon: Maryuri Goldstein MD;  [facility];  Service: General       Family History   Problem Relation Age of Onset    Brain cancer Mother     Alzheimer's disease Mother     Alzheimer's disease Father     Parkinsonism Father      I have reviewed and agree with the history as documented.    E-Cigarette/Vaping    E-Cigarette Use Never User      E-Cigarette/Vaping Substances    Nicotine No     THC No     CBD No     Flavoring No     Other No     Unknown No      Social History     Tobacco Use    Smoking status: Every Day     Current packs/day: 2.00     Types: Cigarettes    Smokeless tobacco: Never   Vaping Use    Vaping status: Never Used   Substance Use Topics    Alcohol use: Not Currently    Drug use: Never     Comment: prescribed Belbuca        Review of Systems   Constitutional:  Negative for chills and fever.   Musculoskeletal:  Positive for myalgias. Negative for joint swelling.   Skin:  Positive for wound. Negative for rash.   Neurological:  Negative for weakness and  numbness.       Physical Exam  ED Triage Vitals [06/02/24 1729]   Temperature Pulse Respirations Blood Pressure SpO2   98.7 °F (37.1 °C) 81 20 146/87 94 %      Temp Source Heart Rate Source Patient Position - Orthostatic VS BP Location FiO2 (%)   Temporal Monitor Sitting Left arm --      Pain Score       10 - Worst Possible Pain             Orthostatic Vital Signs  Vitals:    06/02/24 1729 06/02/24 1800 06/02/24 1830 06/02/24 1915   BP: 146/87 135/86 130/81 137/81   Pulse: 81 67 65 60   Patient Position - Orthostatic VS: Sitting          Physical Exam  Constitutional:       General: She is not in acute distress.     Appearance: Normal appearance.   HENT:      Mouth/Throat:      Mouth: Mucous membranes are moist.      Pharynx: Oropharynx is clear.   Eyes:      Extraocular Movements: Extraocular movements intact.      Conjunctiva/sclera: Conjunctivae normal.   Cardiovascular:      Rate and Rhythm: Normal rate and regular rhythm.      Pulses: Normal pulses.      Heart sounds: Normal heart sounds.   Pulmonary:      Effort: Pulmonary effort is normal.      Breath sounds: Normal breath sounds.   Abdominal:      General: Abdomen is flat.      Palpations: Abdomen is soft.   Skin:     General: Skin is warm and dry.      Capillary Refill: Capillary refill takes less than 2 seconds.          Neurological:      General: No focal deficit present.      Mental Status: She is alert and oriented to person, place, and time.      Sensory: No sensory deficit.      Motor: No weakness.         ED Medications  Medications   oxyCODONE (ROXICODONE) immediate release tablet 10 mg (10 mg Oral Handoff 6/2/24 2001)   oxyCODONE (ROXICODONE) immediate release tablet 10 mg (10 mg Oral Given 6/2/24 1906)       Diagnostic Studies  Results Reviewed       None                   No orders to display         Procedures  Procedures      ED Course  ED Course as of 06/02/24 2022   Sun Jun 02, 2024   1858 Had extensive discussion with the Pt regarding their  expectations. Pt states that she does NOT have new symptoms, only worsening of her pain. She states she would like to achieve pain control and that she will plan to follow up with her general surgeon tomorrow.                             SBIRT 20yo+      Flowsheet Row Most Recent Value   Initial Alcohol Screen: US AUDIT-C     1. How often do you have a drink containing alcohol? 0 Filed at: 06/02/2024 1729   2. How many drinks containing alcohol do you have on a typical day you are drinking?  0 Filed at: 06/02/2024 1729   3a. Male UNDER 65: How often do you have five or more drinks on one occasion? 0 Filed at: 06/02/2024 1729   3b. FEMALE Any Age, or MALE 65+: How often do you have 4 or more drinks on one occassion? 0 Filed at: 06/02/2024 1729   Audit-C Score 0 Filed at: 06/02/2024 1729   ABIOLA: How many times in the past year have you...    Used an illegal drug or used a prescription medication for non-medical reasons? Never Filed at: 06/02/2024 1729                  Medical Decision Making  See ED course for additional MDM. No physical exam evidence concerning for compartment syndrome (compartments are soft, NO significant pain, good capillary refill, good sensation, good pulses), abscess (NO fever, no fluctuance). Pt with likely expected post-operative pain. I offered Pt a workup if she believed her presentation was different from the last time she came here, however, she states that there aren't any new symptoms, but that her pain is so severe that she needed additional pain medication to bridge her to her appointment tomorrow. She found significant relief in pain with 10mg oxycodone. She was provided an additional dose to take tonight at home if her pain returned. Pt agreed with this plan and stated she'd like to go home and follow up with her surgeon tomorrow as scheduled. Strict return precautions discussed.    Risk  Prescription drug management.          Disposition  Final diagnoses:   Right arm pain     Time  reflects when diagnosis was documented in both MDM as applicable and the Disposition within this note       Time User Action Codes Description Comment    6/2/2024  7:57 PM Jose Gibbs Add [M79.601] Right arm pain           ED Disposition       ED Disposition   Discharge    Condition   Stable    Date/Time   Sun Jun 2, 2024 1958    Comment   Jennifer Woodall discharge to home/self care.                   Follow-up Information       Follow up With Specialties Details Why Contact Info Additional Information    [facility] Emergency Department Emergency Medicine Go to  If symptoms worsen [address] [facility] [address]  [phone] [facility] Emergency Department, [address], [place], [address]            Discharge Medication List as of 6/2/2024  7:59 PM        CONTINUE these medications which have NOT CHANGED    Details   albuterol (PROVENTIL HFA,VENTOLIN HFA) 90 mcg/act inhaler Inhale 2 puffs every 6 (six) hours as needed for wheezing or shortness of breath, Starting Thu 12/21/2023, Normal      Alcohol Swabs (Alcohol Pads) 70 % PADS USE TO TEST BLOOD GLUCOSE 2-3 TIMES DAILY, Historical Med      ammonium lactate (LAC-HYDRIN) 12 % lotion as needed, Historical Med      atorvastatin (LIPITOR) 80 mg tablet Take 80 mg by mouth every evening, Starting Fri 10/14/2022, Historical Med      benzonatate (TESSALON PERLES) 100 mg capsule Take 1 capsule (100 mg total) by mouth 3 (three) times a day as needed for cough, Starting Tue 5/2/2023, Normal      Buprenorphine HCl (Belbuca) 300 MCG FILM Apply 300 mcg to cheek daily at bedtime, Historical Med      carboxymethylcellulose 0.5 % SOLN Administer 1 drop to both eyes daily as needed for dry eyes, Starting Thu 2/7/2019, Normal      !! clonazePAM (KlonoPIN) 0.5 mg tablet Take 0.5 mg by mouth daily at bedtime, Historical Med      !! clonazePAM (KlonoPIN) 1 mg tablet Take 1 mg by mouth daily in the early morning, Starting  Thu 1/25/2018, Historical Med      dicyclomine (BENTYL) 20 mg tablet Take 20 mg by mouth every 6 (six) hours, Historical Med      famotidine (PEPCID) 40 MG tablet Take 1 tablet (40 mg total) by mouth daily at bedtime, Starting Mon 5/20/2024, Normal      fenofibrate (TRICOR) 48 mg tablet Take 48 mg by mouth daily, Historical Med      fluticasone (FLONASE) 50 mcg/act nasal spray 2 sprays into each nostril daily, Starting Wed 1/11/2023, Historical Med      fluticasone-umeclidinium-vilanterol (Trelegy Ellipta) 200-62.5-25 mcg/actuation AEPB inhaler Inhale 1 puff daily Rinse mouth after use., Starting Wed 2/14/2024, Until Mon 8/12/2024, Normal      HYDROcodone-acetaminophen (NORCO)  mg per tablet Take 1 tablet by mouth 3 (three) times a day, Historical Med      lamoTRIgine (LaMICtal) 100 mg tablet Take 100 mg by mouth 2 (two) times a day, Starting Tue 1/16/2018, Until Tue 5/28/2024, Historical Med      latanoprost (XALATAN) 0.005 % ophthalmic solution instill 1 drop into both eyes at bedtime, Historical Med      levalbuterol (XOPENEX) 1.25 mg/3 mL nebulizer solution Historical Med      levothyroxine 137 mcg tablet Take 137 mcg by mouth daily, Starting Mon 1/21/2019, Historical Med      omeprazole (PriLOSEC) 40 MG capsule Take 1 PO QD in AM prior to a meal., Normal      ondansetron (ZOFRAN) 8 mg tablet Take 1 tablet (8 mg total) by mouth every 8 (eight) hours as needed for nausea or vomiting, Starting Mon 5/20/2024, Normal      OneTouch Ultra test strip USE TO TEST BLOOD GLUCOSE 2-3 TIMES DAILY, Historical Med      oxyCODONE-acetaminophen (Percocet)  mg per tablet Take 1 tablet by mouth every 4 (four) hours as needed for moderate pain Max Daily Amount: 6 tablets, Starting Tue 5/28/2024, Normal      polyethylene glycol (GLYCOLAX) 17 GM/SCOOP powder Take 1/2-1 capful every other day to daily., Normal      !! pregabalin (LYRICA) 100 mg capsule Take 100 mg by mouth 3 (three) times a day Morning-lunch-dinner,  Starting Thu 10/26/2017, Historical Med      !! pregabalin (LYRICA) 150 mg capsule take 1 capsule by mouth NIGHTLY, Historical Med      rimegepant sulfate (Nurtec) 75 mg TBDP Take 75 mg by mouth once, Historical Med      tiZANidine (ZANAFLEX) 4 mg tablet Take 8 mg by mouth once 1 tablet, Starting Sat 10/8/2022, Historical Med      topiramate (TOPAMAX) 200 MG tablet 200 mg 2 (two) times a day, Starting Mon 6/5/2023, Historical Med      traZODone (DESYREL) 100 mg tablet Take 100 mg by mouth, Starting Fri 5/10/2024, Historical Med      Trelegy Ellipta 100-62.5-25 MCG/ACT inhaler Historical Med      True Comfort Safety Lancets MISC USE TO TEST BLOOD GLUCOSE 2-3 TIMES DAILY, Historical Med       !! - Potential duplicate medications found. Please discuss with provider.        No discharge procedures on file.    PDMP Review         Value Time User    PDMP Reviewed  Yes 3/13/2024  8:03 AM Johnson Aranda MD             ED Provider  Attending physically available and evaluated Jennifer Woodall. I managed the patient along with the ED Attending.    Electronically Signed by           Jose Gibbs MD  06/02/24 2023

## 2024-06-03 ENCOUNTER — OFFICE VISIT (OUTPATIENT)
Dept: SURGERY | Facility: CLINIC | Age: 64
End: 2024-06-03

## 2024-06-03 VITALS
HEIGHT: 64 IN | TEMPERATURE: 98.2 F | OXYGEN SATURATION: 99 % | HEART RATE: 64 BPM | BODY MASS INDEX: 28.34 KG/M2 | SYSTOLIC BLOOD PRESSURE: 116 MMHG | DIASTOLIC BLOOD PRESSURE: 72 MMHG | WEIGHT: 166 LBS

## 2024-06-03 DIAGNOSIS — D17.21 LIPOMA OF RIGHT UPPER EXTREMITY: ICD-10-CM

## 2024-06-03 PROCEDURE — 99024 POSTOP FOLLOW-UP VISIT: CPT | Performed by: SURGERY

## 2024-06-03 RX ORDER — OXYCODONE AND ACETAMINOPHEN 10; 325 MG/1; MG/1
1 TABLET ORAL EVERY 4 HOURS PRN
Qty: 12 TABLET | Refills: 0 | Status: SHIPPED | OUTPATIENT
Start: 2024-06-03

## 2024-06-03 NOTE — ASSESSMENT & PLAN NOTE
Status post excision of right upper extremity lipoma.  Patient had some significant swelling and was seen in the ER and had a negative CT scan.  On exam she appears to have full range of motion.  Incisions healed well.  Minimal swelling.  No concerns for compartment syndrome.  No erythema.  No drainage.  Patient is has significant pain we will accommodate her with a refill of her recent narcotic medication but after that no more.  Will discuss with Occupational Therapy regarding her issues with her hand from the previous compartment syndrome in the past.  Patient no longer needs to follow-up with me and can be seen as needed

## 2024-06-03 NOTE — PROGRESS NOTES
Ambulatory Visit  Name: Jennifer Woodall      : 1960      MRN: [id number]  Encounter Provider: Valdo Montoya DO  Encounter Date: 6/3/2024   Encounter department: [facility]    Assessment & Plan   1. Lipoma of right upper extremity  Assessment & Plan:  Status post excision of right upper extremity lipoma.  Patient had some significant swelling and was seen in the ER and had a negative CT scan.  On exam she appears to have full range of motion.  Incisions healed well.  Minimal swelling.  No concerns for compartment syndrome.  No erythema.  No drainage.  Patient is has significant pain we will accommodate her with a refill of her recent narcotic medication but after that no more.  Will discuss with Occupational Therapy regarding her issues with her hand from the previous compartment syndrome in the past.  Patient no longer needs to follow-up with me and can be seen as needed  Orders:  -     oxyCODONE-acetaminophen (Percocet)  mg per tablet; Take 1 tablet by mouth every 4 (four) hours as needed for moderate pain Max Daily Amount: 6 tablets      History of Present Illness     Jennifer Woodall is a 63 y.o. female who presents for postop check after excision of a right upper extremity lipoma.  She Jusinski in the ER for significant swelling.  CT scan at that point time did not show any obvious fluid collection or evidence of infection.  Patient continues to complain of pain of her right upper extremity.  States she has issues with grabbing things and overall strength.  This is not new.  But she felt that this would improve after surgery.  Of course I did inform her that we had this discussion that this might not improve after surgery.    Review of Systems  Past Medical History   Past Medical History:   Diagnosis Date    Ambulates with cane     Anxiety     Arthritis     Asthma     Bipolar 1 disorder (HCC)     Brain aneurysm     Chronic pain disorder     spinal stenosis    Concussion  syndrome     neurological rx and balance rx    COPD (chronic obstructive pulmonary disease) ([facility])     Depression     Diabetes mellitus ([facility])     controlled w/ diet    Disease of thyroid gland     nodules     Family history of colon cancer     father    Fibromyalgia, primary     GERD (gastroesophageal reflux disease)     History of colon polyps     Hyperlipidemia     Hypertension     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     Irritable bowel syndrome     Lumbar degenerative disc disease 02/16/2022    Migraine     Neuropathy     bilateral feet and hands    Peripheral neuropathy     Psychiatric disorder     PTSD (post-traumatic stress disorder)     Shortness of breath     Sleep apnea     had 3 studies & last one negative    Stroke ([facility])     2012 no deficeits 2018 2019 TIA    TIA (transient ischemic attack)     Wears dentures     Wears glasses      Past Surgical History:   Procedure Laterality Date    ABDOMINAL SURGERY      laproscopic/ endometriosis    BRAIN SURGERY  2017    aneurysm/ coiling procedure    CARPAL TUNNEL RELEASE      CHOLECYSTECTOMY      COLONOSCOPY      DENTAL SURGERY      EPIDURAL BLOCK INJECTION Right 03/03/2022    Procedure: C7-T1 interlaminar epidural steroid injection;  Surgeon: Johnson Aranda MD;  [facility]: [facility];  Service: Pain Management     EPIDURAL BLOCK INJECTION N/A 04/21/2022    Procedure: C6-C7 BLOCK / INJECTION EPIDURAL STEROID CERVICAL;  Surgeon: Johnson Aranda MD;  [facility];  Service: Pain Management     EPIDURAL BLOCK INJECTION Left 06/21/2022    Procedure: BLOCK / INJECTION EPIDURAL STEROID LUMBAR  left L3-4 TFESI;  Surgeon: Johnson Aranda MD;  [facility]: [facility];  Service: Pain Management     EYE SURGERY      cataract    FASCIOTOMY Right 10/27/2023    Procedure: FASCIOTOMY OF RIGHT UPPER EXTREMITY; POSSIBLE VAC DRESSING APPLICATION;  Surgeon: Bruno Blevins MD;  [facility];  Service: General    FL GUIDED NEEDLE PLAC BX/ASP/INJ   03/03/2022    FL GUIDED NEEDLE PLAC BX/ASP/INJ  11/17/2022    HYSTERECTOMY      NERVE BLOCK Left 02/20/2024    Procedure: BLOCK MEDIAL BRANCH  left C3-4 and C4-5 MBB #1;  Surgeon: Johnson Aranda MD;  [facility];  Service: Pain Management     NERVE BLOCK Left 04/04/2024    Procedure: BLOCK MEDIAL BRANCH Left C3-C4 and C4-5 MBB2;  Surgeon: Johnson Aranda MD;  [facility];  Service: Pain Management     MD ESOPHAGOGASTRODUODENOSCOPY TRANSORAL DIAGNOSTIC N/A 02/08/2018    Procedure: EGD AND COLONOSCOPY;  Surgeon: Caleb Pete MD;  Location: [facility];  Service: Gastroenterology    MD EXC B9 LESION MRGN XCP SK TG F/E/E/N/L/M > 4.0CM Right 5/28/2024    Procedure: EXCISION LIPOMA;  Surgeon: Valdo Montoya DO;  [facility];  Service: General    MD PRQ IMPLTJ NSTIM ELECTRODE ARRAY EPIDURAL N/A 11/17/2022    Procedure: NEVRO SCS TRIAL;  Surgeon: Johnson Aranda MD;  [facility];  Service: Pain Management     MD PRQ IMPLTJ NSTIM ELECTRODE ARRAY EPIDURAL N/A 02/01/2023    Procedure: Insertion percutaneous thoracic spinal cord stimulator with left buttock implantable pulse generator;  Surgeon: Craig Robert Goldberg, MD;  [facility];  Service: Neurosurgery    SPLIT THICKNESS SKIN GRAFT Right 11/07/2023    Procedure: SKIN GRAFT SPLIT THICKNESS (STSG)  EXTREMITY;  Surgeon: Samm Sims MD;  [facility];  Service: General    THYROID SURGERY      TONSILLECTOMY      US GUIDED THYROID BIOPSY  11/30/2016    US GUIDED THYROID BIOPSY  03/21/2018    VAC DRESSING APPLICATION Right 10/29/2023    Procedure: CHANGE DRESSING/VAC RIGHT UPPER EXTREMITY; WASHOUT; PARTIAL CLOSURE;  Surgeon: Irina Day DO;  [facility];  Service: General    WOUND DEBRIDEMENT Right 11/04/2023    Procedure: SIMPLE CLOSURE 3.5CM, OASIS APPLICATION 15X7CM, WOUND VAC APPLICATION;  Surgeon: Maryuri Goldstein MD;  [facility];  Service: General     Family History    Problem Relation Age of Onset    Brain cancer Mother     Alzheimer's disease Mother     Alzheimer's disease Father     Parkinsonism Father      Current Outpatient Medications on File Prior to Visit   Medication Sig Dispense Refill    albuterol (PROVENTIL HFA,VENTOLIN HFA) 90 mcg/act inhaler Inhale 2 puffs every 6 (six) hours as needed for wheezing or shortness of breath 18 g 5    Alcohol Swabs (Alcohol Pads) 70 % PADS USE TO TEST BLOOD GLUCOSE 2-3 TIMES DAILY      ammonium lactate (LAC-HYDRIN) 12 % lotion as needed      atorvastatin (LIPITOR) 80 mg tablet Take 80 mg by mouth every evening      benzonatate (TESSALON PERLES) 100 mg capsule Take 1 capsule (100 mg total) by mouth 3 (three) times a day as needed for cough 30 capsule 1    Buprenorphine HCl (Belbuca) 300 MCG FILM Apply 300 mcg to cheek daily at bedtime      carboxymethylcellulose 0.5 % SOLN Administer 1 drop to both eyes daily as needed for dry eyes 1 Bottle 0    clonazePAM (KlonoPIN) 0.5 mg tablet Take 0.5 mg by mouth daily at bedtime      clonazePAM (KlonoPIN) 1 mg tablet Take 1 mg by mouth daily in the early morning      dicyclomine (BENTYL) 20 mg tablet Take 20 mg by mouth every 6 (six) hours      famotidine (PEPCID) 40 MG tablet Take 1 tablet (40 mg total) by mouth daily at bedtime 30 tablet 5    fenofibrate (TRICOR) 48 mg tablet Take 48 mg by mouth daily      fluticasone (FLONASE) 50 mcg/act nasal spray 2 sprays into each nostril daily      fluticasone-umeclidinium-vilanterol (Trelegy Ellipta) 200-62.5-25 mcg/actuation AEPB inhaler Inhale 1 puff daily Rinse mouth after use. 60 blister 5    HYDROcodone-acetaminophen (NORCO)  mg per tablet Take 1 tablet by mouth 3 (three) times a day      lamoTRIgine (LaMICtal) 100 mg tablet Take 100 mg by mouth 2 (two) times a day      latanoprost (XALATAN) 0.005 % ophthalmic solution instill 1 drop into both eyes at bedtime      levalbuterol (XOPENEX) 1.25 mg/3 mL nebulizer solution       levothyroxine 137  "mcg tablet Take 137 mcg by mouth daily      omeprazole (PriLOSEC) 40 MG capsule Take 1 PO QD in AM prior to a meal. 30 capsule 5    ondansetron (ZOFRAN) 8 mg tablet Take 1 tablet (8 mg total) by mouth every 8 (eight) hours as needed for nausea or vomiting 20 tablet 5    OneTouch Ultra test strip USE TO TEST BLOOD GLUCOSE 2-3 TIMES DAILY      polyethylene glycol (GLYCOLAX) 17 GM/SCOOP powder Take 1/2-1 capful every other day to daily. 578 g 5    pregabalin (LYRICA) 100 mg capsule Take 100 mg by mouth 3 (three) times a day Morning-lunch-dinner      pregabalin (LYRICA) 150 mg capsule take 1 capsule by mouth NIGHTLY      rimegepant sulfate (Nurtec) 75 mg TBDP Take 75 mg by mouth once      tiZANidine (ZANAFLEX) 4 mg tablet Take 8 mg by mouth once 1 tablet      topiramate (TOPAMAX) 200 MG tablet 200 mg 2 (two) times a day      traZODone (DESYREL) 100 mg tablet Take 100 mg by mouth      Trelegy Ellipta 100-62.5-25 MCG/ACT inhaler       True Comfort Safety Lancets MISC USE TO TEST BLOOD GLUCOSE 2-3 TIMES DAILY      [DISCONTINUED] oxyCODONE-acetaminophen (Percocet)  mg per tablet Take 1 tablet by mouth every 4 (four) hours as needed for moderate pain Max Daily Amount: 6 tablets 12 tablet 0     Current Facility-Administered Medications on File Prior to Visit   Medication Dose Route Frequency Provider Last Rate Last Admin    [COMPLETED] oxyCODONE (ROXICODONE) immediate release tablet 10 mg  10 mg Oral Once Shar Islas MD   10 mg at 06/02/24 1906    [DISCONTINUED] oxyCODONE (ROXICODONE) immediate release tablet 10 mg  10 mg Oral Once Jose Gibbs MD         Allergies   Allergen Reactions    Hydroxyzine Anaphylaxis     Claims it gives her convulsions.     Bee Pollen Other (See Comments)     familial    Pollen Extract Itching    Tree Extract     Clarithromycin Rash     Pt denies    Ibuprofen Nausea Only    Latex Rash    Sulfa Antibiotics Rash     \"severe skin burn\"      Current Outpatient Medications on File Prior " to Visit   Medication Sig Dispense Refill    albuterol (PROVENTIL HFA,VENTOLIN HFA) 90 mcg/act inhaler Inhale 2 puffs every 6 (six) hours as needed for wheezing or shortness of breath 18 g 5    Alcohol Swabs (Alcohol Pads) 70 % PADS USE TO TEST BLOOD GLUCOSE 2-3 TIMES DAILY      ammonium lactate (LAC-HYDRIN) 12 % lotion as needed      atorvastatin (LIPITOR) 80 mg tablet Take 80 mg by mouth every evening      benzonatate (TESSALON PERLES) 100 mg capsule Take 1 capsule (100 mg total) by mouth 3 (three) times a day as needed for cough 30 capsule 1    Buprenorphine HCl (Belbuca) 300 MCG FILM Apply 300 mcg to cheek daily at bedtime      carboxymethylcellulose 0.5 % SOLN Administer 1 drop to both eyes daily as needed for dry eyes 1 Bottle 0    clonazePAM (KlonoPIN) 0.5 mg tablet Take 0.5 mg by mouth daily at bedtime      clonazePAM (KlonoPIN) 1 mg tablet Take 1 mg by mouth daily in the early morning      dicyclomine (BENTYL) 20 mg tablet Take 20 mg by mouth every 6 (six) hours      famotidine (PEPCID) 40 MG tablet Take 1 tablet (40 mg total) by mouth daily at bedtime 30 tablet 5    fenofibrate (TRICOR) 48 mg tablet Take 48 mg by mouth daily      fluticasone (FLONASE) 50 mcg/act nasal spray 2 sprays into each nostril daily      fluticasone-umeclidinium-vilanterol (Trelegy Ellipta) 200-62.5-25 mcg/actuation AEPB inhaler Inhale 1 puff daily Rinse mouth after use. 60 blister 5    HYDROcodone-acetaminophen (NORCO)  mg per tablet Take 1 tablet by mouth 3 (three) times a day      lamoTRIgine (LaMICtal) 100 mg tablet Take 100 mg by mouth 2 (two) times a day      latanoprost (XALATAN) 0.005 % ophthalmic solution instill 1 drop into both eyes at bedtime      levalbuterol (XOPENEX) 1.25 mg/3 mL nebulizer solution       levothyroxine 137 mcg tablet Take 137 mcg by mouth daily      omeprazole (PriLOSEC) 40 MG capsule Take 1 PO QD in AM prior to a meal. 30 capsule 5    ondansetron (ZOFRAN) 8 mg tablet Take 1 tablet (8 mg total)  "by mouth every 8 (eight) hours as needed for nausea or vomiting 20 tablet 5    OneTouch Ultra test strip USE TO TEST BLOOD GLUCOSE 2-3 TIMES DAILY      polyethylene glycol (GLYCOLAX) 17 GM/SCOOP powder Take 1/2-1 capful every other day to daily. 578 g 5    pregabalin (LYRICA) 100 mg capsule Take 100 mg by mouth 3 (three) times a day Morning-lunch-dinner      pregabalin (LYRICA) 150 mg capsule take 1 capsule by mouth NIGHTLY      rimegepant sulfate (Nurtec) 75 mg TBDP Take 75 mg by mouth once      tiZANidine (ZANAFLEX) 4 mg tablet Take 8 mg by mouth once 1 tablet      topiramate (TOPAMAX) 200 MG tablet 200 mg 2 (two) times a day      traZODone (DESYREL) 100 mg tablet Take 100 mg by mouth      Trelegy Ellipta 100-62.5-25 MCG/ACT inhaler       True Comfort Safety Lancets MISC USE TO TEST BLOOD GLUCOSE 2-3 TIMES DAILY      [DISCONTINUED] oxyCODONE-acetaminophen (Percocet)  mg per tablet Take 1 tablet by mouth every 4 (four) hours as needed for moderate pain Max Daily Amount: 6 tablets 12 tablet 0     Current Facility-Administered Medications on File Prior to Visit   Medication Dose Route Frequency Provider Last Rate Last Admin    [COMPLETED] oxyCODONE (ROXICODONE) immediate release tablet 10 mg  10 mg Oral Once Shar Islas MD   10 mg at 06/02/24 1906    [DISCONTINUED] oxyCODONE (ROXICODONE) immediate release tablet 10 mg  10 mg Oral Once Jose Gibbs MD          Social History     Tobacco Use    Smoking status: Every Day     Current packs/day: 2.00     Types: Cigarettes    Smokeless tobacco: Never   Vaping Use    Vaping status: Never Used   Substance and Sexual Activity    Alcohol use: Not Currently    Drug use: Never     Comment: prescribed Belbuca    Sexual activity: Yes     Partners: Male     Objective     /72 (BP Location: Left arm, Patient Position: Sitting, Cuff Size: Standard)   Pulse 64   Temp 98.2 °F (36.8 °C) (Temporal)   Ht 5' 4\" (1.626 m)   Wt 75.3 kg (166 lb)   SpO2 99%   BMI " 28.49 kg/m²     Physical Exam  Vitals reviewed.   Constitutional:       General: She is not in acute distress.     Appearance: Normal appearance. She is not ill-appearing, toxic-appearing or diaphoretic.   Musculoskeletal:         General: Swelling and tenderness present.      Comments: Noted tender palpation along the right forearm incision.  No erythema.  No drainage.  Mild swelling noted.  Patient appears to have good range of motion and sensation intact of the right upper extremity   Neurological:      Mental Status: She is alert.       Administrative Statements

## 2024-06-05 ENCOUNTER — TELEPHONE (OUTPATIENT)
Dept: PAIN MEDICINE | Facility: CLINIC | Age: 64
End: 2024-06-05

## 2024-06-06 ENCOUNTER — TELEPHONE (OUTPATIENT)
Age: 64
End: 2024-06-06

## 2024-06-06 NOTE — TELEPHONE ENCOUNTER
Pt called to see if today's appt was cancelled; reminded her of appt for next week 6/13; pt asks if we are keeping this appt?; states she is still having bad pain but the oxy is helping her get longer relief.

## 2024-06-07 DIAGNOSIS — R11.0 NAUSEA: ICD-10-CM

## 2024-06-07 RX ORDER — ONDANSETRON HYDROCHLORIDE 8 MG/1
8 TABLET, FILM COATED ORAL EVERY 8 HOURS PRN
Qty: 20 TABLET | Refills: 5 | Status: SHIPPED | OUTPATIENT
Start: 2024-06-07

## 2024-06-07 NOTE — TELEPHONE ENCOUNTER
Caller: alexx Rodriguez    Doctor: Rosi    Reason for call: pt returning schedulers call pt stated 6/19/24 does not work for her she will be out of town, and returning on 6/25. Pt would like a call back to discuss other dates and times.     Call back#: [phone]    **pt stated is currently at her PCP office but she gives her aid permission to speak on her behalf **

## 2024-06-13 ENCOUNTER — NURSE TRIAGE (OUTPATIENT)
Dept: SURGERY | Facility: CLINIC | Age: 64
End: 2024-06-13

## 2024-06-13 NOTE — TELEPHONE ENCOUNTER
"Pt of Dr. Montoya, EXCISION LIPOMA (Right: Arm Lower)  on   5/28/24    Pt transferred from [place].  Pt states she missed appt today and is having a lot of pain in right arm surgical site.  Pt states she is taking Percocet with no relief.  Pt then started to say her face is swollen and mentioned TIA.  RN confirmed that pt is not having any facial droop or weakness of extremities.  Pt reports general weakness and said face is swollen from all that she is going through.  Pt then started talking about having difficulty finding an OT that takes her insurance and mentioned that her right arm and hand are more weak and painful due to not having OT.  RN advised she can reach out to see if there is another appointment sooner than 7/3/24 but pt began talking about the pain.  RN was unable to redirect patient to find out if she needed pain medication or OT or other assistance.  Pt also mentioned taking Hydrocodone and RN could not see this on active med list, so asked pt when/who prescribed, pt became upset and states \"You just want to get me off the phone.  You don't want to help me.  This is why I don't want to talk to a nurse\" and hung up.        Pt currently scheduled for 7/3/24 by [place].   [phone]        Answer Assessment - Initial Assessment Questions  1. REASON FOR CALL or QUESTION: \"What is your reason for calling today?\" or \"How can I best help you?\" or \"What question do you have that I can help answer?\"      Pt calling to reschedule appt and has pain right arm.    Protocols used: Information Only Call - No Triage-ADULT-[place]    "

## 2024-06-19 ENCOUNTER — APPOINTMENT (OUTPATIENT)
Dept: RADIOLOGY | Facility: HOSPITAL | Age: 64
End: 2024-06-19
Payer: COMMERCIAL

## 2024-06-19 ENCOUNTER — HOSPITAL ENCOUNTER (OUTPATIENT)
Facility: HOSPITAL | Age: 64
Setting detail: OUTPATIENT SURGERY
Discharge: HOME | End: 2024-06-19
Attending: ANESTHESIOLOGY | Admitting: ANESTHESIOLOGY
Payer: COMMERCIAL

## 2024-06-19 VITALS
WEIGHT: 164 LBS | HEART RATE: 70 BPM | HEIGHT: 64 IN | TEMPERATURE: 97.8 F | DIASTOLIC BLOOD PRESSURE: 68 MMHG | RESPIRATION RATE: 18 BRPM | SYSTOLIC BLOOD PRESSURE: 114 MMHG | BODY MASS INDEX: 28 KG/M2 | OXYGEN SATURATION: 96 %

## 2024-06-19 PROCEDURE — 64633 DESTROY CERV/THOR FACET JNT: CPT | Performed by: ANESTHESIOLOGY

## 2024-06-19 PROCEDURE — 64634 DESTROY C/TH FACET JNT ADDL: CPT | Performed by: ANESTHESIOLOGY

## 2024-06-19 RX ORDER — LIDOCAINE HYDROCHLORIDE 10 MG/ML
INJECTION, SOLUTION EPIDURAL; INFILTRATION; INTRACAUDAL; PERINEURAL AS NEEDED
Status: DISCONTINUED | OUTPATIENT
Start: 2024-06-19 | End: 2024-06-19 | Stop reason: HOSPADM

## 2024-06-19 RX ORDER — BUPIVACAINE HYDROCHLORIDE 5 MG/ML
INJECTION, SOLUTION EPIDURAL; INTRACAUDAL AS NEEDED
Status: DISCONTINUED | OUTPATIENT
Start: 2024-06-19 | End: 2024-06-19 | Stop reason: HOSPADM

## 2024-06-19 RX ORDER — LIDOCAINE HYDROCHLORIDE 20 MG/ML
INJECTION, SOLUTION EPIDURAL; INFILTRATION; INTRACAUDAL; PERINEURAL AS NEEDED
Status: DISCONTINUED | OUTPATIENT
Start: 2024-06-19 | End: 2024-06-19 | Stop reason: HOSPADM

## 2024-06-19 NOTE — OP NOTE
OPERATIVE REPORT  PATIENT NAME: Jennifer Woodall    :  1960  MRN: [id number]  Pt Location: [facility]    SURGERY DATE: 2024    Surgeons and Role:     * Johnson Aranda MD - Primary    Preop Diagnosis:  Cervical radiculopathy [M54.12]    Post-Op Diagnosis Codes:     * Cervical radiculopathy [M54.12]    Procedure(s):  Left - Left C3-4 and C4-5 RFA (Case cancelled Intra-Op)  Specimen(s):  * No specimens in log *    Estimated Blood Loss:   Minimal    Drains:  * No LDAs found *    Anesthesia Type:   Local    Operative Indications:  Cervical radiculopathy [M54.12]      Operative Findings:  same      Complications:   None    Procedure and Technique:  Fluoroscopically-guided Radiofrequency denervation of the left C3-C4 C4-C5 facet joint(s)     After discussing the risks, benefits, and alternatives to the procedure, the patient expressed understanding and wished to proceed.  The patient was brought to the fluoroscopy suite and placed in the prone position.  Procedural pause conducted to verify:  correct patient identity, procedure to be performed and as applicable, correct side and site, correct patient position, and availability of implants, special equipment and special requirements.  Using fluoroscopy, the mid-body of the articular pillar at the left C3-4 and C4-5 levels were identified and marked.  The skin was sterilely prepped and draped in the usual fashion using Chloraprep skin prep.  The skin and subcutaneous tissue were anesthetized with 1% lidocaine.  Using fluoroscopic guidance, a 50 mm 22 gauge RFK RF cannula with a 10 mm active tip was advanced to each target.  This was confirmed using PA, lateral and oblique fluoroscopic views.  Motor testing was performed at 2Hz up to 2.5 volts, and measured tissue impedances were satisfactory.  With final needle positioning, there was no evidence of radicular stimulation.  Prior to lesioning, 1cc of 2% lidocaine was injected at each site.  Patient  experienced a vasovagal episode prior to being able to RFA the medial branches.  Patient was then rolled onto her back onto a stretcher which we did a physical exam and neurological exam and vital signs assessment in which patient was temporarily unresponsive however vital signs were normal at 100% S pO2 with a blood pressure of 123/73.  Patient then regained her faculties.  Procedure was then canceled.  After appropriate observation, the patient was dismissed from the clinic in good condition under their own power.              I was present for the entire procedure.    Patient Disposition:  hemodynamically stable         SIGNATURE: Johnson Aranda MD  DATE: June 19, 2024  TIME: 2:45 PM

## 2024-06-19 NOTE — H&P
Assessment:  No diagnosis found.    Plan:  Jennifer Woodall is a 63 y.o. female with complaints of neck pain presents to surgical center for procedure.  We will perform a Procedure(s) (LRB):  Left C3-4 and C4-5 Radio Frequency Ablation (Left)   2. Follow-up 1 month after injection    Complete risks and benefits including bleeding, infection, tissue reaction, nerve injury and allergic reaction were discussed. The approach was demonstrated using models and literature was provided. Verbal and written consent was obtained.    My impressions and treatment recommendations were discussed in detail with the patient who verbalized understanding and had no further questions.  Discharge instructions were provided. I personally saw and examined the patient and I agree with the above discussed plan of care.    Orders Placed This Encounter   Procedures    FL spine and pain procedure     Standing Status:   Standing     Number of Occurrences:   1     Order Specific Question:   Reason for Exam:     Answer:   Left C3-4 and C4-5 Radio Frequency Ablation - Left     Order Specific Question:   Anticoagulant hold needed?     Answer:   no     No orders of the defined types were placed in this encounter.      History of Present Illness:  Jennifer Woodall is a 63 y.o. female who presents for a follow up office visit in regards to nck pain.   The patient’s current symptoms include 8/10 sharp, stabbing pain without any particular time pattern.      I have personally reviewed and/or updated the patient's past medical history, past surgical history, family history, social history, current medications, allergies, and vital signs today.     Review of Systems   Musculoskeletal:  Positive for neck pain and neck stiffness.   All other systems reviewed and are negative.      Patient Active Problem List   Diagnosis    Other hyperlipidemia    Dizziness    Hypothyroid    Brain aneurysm    Hypokalemia    Hypertriglyceridemia    Low HDL (under 40)    Elevated  TSH    Tobacco abuse    Intractable abdominal pain    TMJ syndrome    Stroke-like symptoms    Nausea    Left chest pressure    Hypophosphatemia    Hypotension    Tobacco use    COPD (chronic obstructive pulmonary disease) ([facility])    Hypertension    Constipation    Fibromyalgia, primary    Bipolar 1 disorder ([facility])    Depression    Chronic pain syndrome    Anxiety    Migraine    Syncope and collapse    Mid back pain    Cervical radiculopathy    Neck pain    Lumbar degenerative disc disease    Lumbar radiculopathy    Lumbar spondylosis    Cervical disc disorder with radiculopathy of mid-cervical region    Sacroiliitis ([facility])    Carotid artery aneurysm ([facility])    Irritable bowel syndrome with both constipation and diarrhea    Shortness of breath    Cervical spondylosis    Diabetic polyneuropathy associated with type 2 diabetes mellitus ([facility])    Coronary artery calcification seen on CAT scan    Pulmonary nodules/lesions, multiple    S/P insertion of spinal cord stimulator    Common bile duct dilation    Gastroesophageal reflux disease    Fall    Compartment syndrome ([facility])    Type 2 diabetes mellitus ([facility])    Subcutaneous mass of right forearm    Polypharmacy    Lipoma of right upper extremity    Lesion of parotid gland    Heartburn       Past Medical History:   Diagnosis Date    Ambulates with cane     Anxiety     Arthritis     Asthma     Bipolar 1 disorder ([facility])     Brain aneurysm     Chronic pain disorder     spinal stenosis    Concussion syndrome     neurological rx and balance rx    COPD (chronic obstructive pulmonary disease) ([facility])     Depression     Diabetes mellitus ([facility])     controlled w/ diet    Disease of thyroid gland     nodules     Family history of colon cancer     father    Fibromyalgia, primary     GERD (gastroesophageal reflux disease)     History of colon polyps     Hyperlipidemia     Hypertension     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     Irritable bowel syndrome     Lumbar degenerative disc  disease 02/16/2022    Migraine     Neuropathy     bilateral feet and hands    Peripheral neuropathy     Psychiatric disorder     PTSD (post-traumatic stress disorder)     Shortness of breath     Sleep apnea     had 3 studies & last one negative    Stroke (HCC)     2012 no deficeits 2018 2019 TIA    TIA (transient ischemic attack)     Wears dentures     Wears glasses        Past Surgical History:   Procedure Laterality Date    ABDOMINAL SURGERY      laproscopic/ endometriosis    BRAIN SURGERY  2017    aneurysm/ coiling procedure    CARPAL TUNNEL RELEASE      CHOLECYSTECTOMY      COLONOSCOPY      DENTAL SURGERY      EPIDURAL BLOCK INJECTION Right 03/03/2022    Procedure: C7-T1 interlaminar epidural steroid injection;  Surgeon: Johnson Aranda MD;  [facility];  Service: Pain Management     EPIDURAL BLOCK INJECTION N/A 04/21/2022    Procedure: C6-C7 BLOCK / INJECTION EPIDURAL STEROID CERVICAL;  Surgeon: Johnson Aranda MD;  [facility];  Service: Pain Management     EPIDURAL BLOCK INJECTION Left 06/21/2022    Procedure: BLOCK / INJECTION EPIDURAL STEROID LUMBAR  left L3-4 TFESI;  Surgeon: Johnson Aranda MD;  [facility]: [facility];  Service: Pain Management     EYE SURGERY      cataract    FASCIOTOMY Right 10/27/2023    Procedure: FASCIOTOMY OF RIGHT UPPER EXTREMITY; POSSIBLE VAC DRESSING APPLICATION;  Surgeon: Bruno Blevins MD;  [facility];  Service: General    FL GUIDED NEEDLE PLAC BX/ASP/INJ  03/03/2022    FL GUIDED NEEDLE PLAC BX/ASP/INJ  11/17/2022    HYSTERECTOMY      NERVE BLOCK Left 02/20/2024    Procedure: BLOCK MEDIAL BRANCH  left C3-4 and C4-5 MBB #1;  Surgeon: Johnson Aranda MD;  [facility];  Service: Pain Management     NERVE BLOCK Left 04/04/2024    Procedure: BLOCK MEDIAL BRANCH Left C3-C4 and C4-5 MBB2;  Surgeon: Johnson Aranda MD;  [facility];  Service: Pain Management     VA ESOPHAGOGASTRODUODENOSCOPY TRANSORAL DIAGNOSTIC N/A  02/08/2018    Procedure: EGD AND COLONOSCOPY;  Surgeon: Caleb Pete MD;  [facility]: [facility];  Service: Gastroenterology    RI EXC B9 LESION MRGN XCP SK TG F/E/E/N/L/M > 4.0CM Right 5/28/2024    Procedure: EXCISION LIPOMA;  Surgeon: Valdo Montoya DO;  [facility];  Service: General    RI PRQ IMPLTJ NSTIM ELECTRODE ARRAY EPIDURAL N/A 11/17/2022    Procedure: NEVRO SCS TRIAL;  Surgeon: Johnson Aranda MD;  [facility];  Service: Pain Management     RI PRQ IMPLTJ NSTIM ELECTRODE ARRAY EPIDURAL N/A 02/01/2023    Procedure: Insertion percutaneous thoracic spinal cord stimulator with left buttock implantable pulse generator;  Surgeon: Craig Robert Goldberg, MD;  [facility];  Service: Neurosurgery    SPLIT THICKNESS SKIN GRAFT Right 11/07/2023    Procedure: SKIN GRAFT SPLIT THICKNESS (STSG)  EXTREMITY;  Surgeon: Samm Sims MD;  [facility];  Service: General    THYROID SURGERY      TONSILLECTOMY      US GUIDED THYROID BIOPSY  11/30/2016    US GUIDED THYROID BIOPSY  03/21/2018    VAC DRESSING APPLICATION Right 10/29/2023    Procedure: CHANGE DRESSING/VAC RIGHT UPPER EXTREMITY; WASHOUT; PARTIAL CLOSURE;  Surgeon: Irina Day DO;  [facility];  Service: General    WOUND DEBRIDEMENT Right 11/04/2023    Procedure: SIMPLE CLOSURE 3.5CM, OASIS APPLICATION 15X7CM, WOUND VAC APPLICATION;  Surgeon: Maryuri Goldstein MD;  [facility];  Service: General       Family History   Problem Relation Age of Onset    Brain cancer Mother     Alzheimer's disease Mother     Alzheimer's disease Father     Parkinsonism Father        Social History     Occupational History    Not on file   Tobacco Use    Smoking status: Every Day     Current packs/day: 2.00     Types: Cigarettes    Smokeless tobacco: Never   Vaping Use    Vaping status: Never Used   Substance and Sexual Activity    Alcohol use: Not Currently    Drug use: Never     Comment: prescribed Belbuca     Sexual activity: Yes     Partners: Male       No current facility-administered medications on file prior to encounter.     Current Outpatient Medications on File Prior to Encounter   Medication Sig    albuterol (PROVENTIL HFA,VENTOLIN HFA) 90 mcg/act inhaler Inhale 2 puffs every 6 (six) hours as needed for wheezing or shortness of breath    ammonium lactate (LAC-HYDRIN) 12 % lotion as needed    atorvastatin (LIPITOR) 80 mg tablet Take 80 mg by mouth every evening    benzonatate (TESSALON PERLES) 100 mg capsule Take 1 capsule (100 mg total) by mouth 3 (three) times a day as needed for cough    Buprenorphine HCl (Belbuca) 300 MCG FILM Apply 300 mcg to cheek daily at bedtime    carboxymethylcellulose 0.5 % SOLN Administer 1 drop to both eyes daily as needed for dry eyes    clonazePAM (KlonoPIN) 1 mg tablet Take 1 mg by mouth daily in the early morning    dicyclomine (BENTYL) 20 mg tablet Take 20 mg by mouth every 6 (six) hours    fenofibrate (TRICOR) 48 mg tablet Take 48 mg by mouth daily    fluticasone-umeclidinium-vilanterol (Trelegy Ellipta) 200-62.5-25 mcg/actuation AEPB inhaler Inhale 1 puff daily Rinse mouth after use.    HYDROcodone-acetaminophen (NORCO)  mg per tablet Take 1 tablet by mouth 3 (three) times a day    lamoTRIgine (LaMICtal) 100 mg tablet Take 100 mg by mouth 2 (two) times a day    latanoprost (XALATAN) 0.005 % ophthalmic solution instill 1 drop into both eyes at bedtime    levothyroxine 137 mcg tablet Take 137 mcg by mouth daily    OneTouch Ultra test strip USE TO TEST BLOOD GLUCOSE 2-3 TIMES DAILY    pregabalin (LYRICA) 100 mg capsule Take 100 mg by mouth 3 (three) times a day Morning-lunch-dinner    pregabalin (LYRICA) 150 mg capsule take 1 capsule by mouth NIGHTLY    tiZANidine (ZANAFLEX) 4 mg tablet Take 8 mg by mouth once 1 tablet    topiramate (TOPAMAX) 200 MG tablet 200 mg 2 (two) times a day    True Comfort Safety Lancets MISC USE TO TEST BLOOD GLUCOSE 2-3 TIMES DAILY    Alcohol  "Swabs (Alcohol Pads) 70 % PADS USE TO TEST BLOOD GLUCOSE 2-3 TIMES DAILY    clonazePAM (KlonoPIN) 0.5 mg tablet Take 0.5 mg by mouth daily at bedtime    fluticasone (FLONASE) 50 mcg/act nasal spray 2 sprays into each nostril daily       Allergies   Allergen Reactions    Hydroxyzine Anaphylaxis     Claims it gives her convulsions.     Bee Pollen Other (See Comments)     familial    Pollen Extract Itching    Tree Extract     Clarithromycin Rash     Pt denies    Ibuprofen Nausea Only    Latex Rash    Sulfa Antibiotics Rash     \"severe skin burn\"       Physical Exam:    /66   Pulse 78   Temp 97.6 °F (36.4 °C) (Tympanic)   Resp 20   Ht 5' 4\" (1.626 m)   Wt 74.4 kg (164 lb)   SpO2 96%   BMI 28.15 kg/m²     Constitutional:normal, well developed, well nourished, alert, in no distress and non-toxic and no overt pain behavior.  Eyes:anicteric  HEENT:grossly intact  Neck:supple, symmetric, trachea midline and no masses   Pulmonary:even and unlabored  Cardiovascular:No edema or pitting edema present  Skin:Normal without rashes or lesions and well hydrated  Psychiatric:Mood and affect appropriate  Neurologic:Cranial Nerves II-XII grossly intact  Musculoskeletal:antalgic        "

## 2024-06-19 NOTE — NURSING NOTE
0856 Patient not responding to verbal or tactile stimuli. Patient assisted onto stretcher in supine position. /[id number] 99%. Explained to patient that procedure could not be completed due to unresponsiveness. VS assesed & transported to [facility] via stretcher, report given to Sharona Guerra RN & Marlen Metcalf RN.

## 2024-06-20 ENCOUNTER — TELEPHONE (OUTPATIENT)
Dept: PAIN MEDICINE | Facility: CLINIC | Age: 64
End: 2024-06-20

## 2024-06-20 NOTE — TELEPHONE ENCOUNTER
Left C3-4 and C4-5 RFA at [facility] with RM 6/19    Has f/u scheduled 6/24 with note stating to move to July for procedure f/u

## 2024-06-20 NOTE — TELEPHONE ENCOUNTER
"Fyi...    S/w pt s/p Lt Cervical RFA 6/19/24. Pt stated needle sites look good, denies S/S of infect, denies fever, (+) soreness and denies sun burn like sensation. Pt states current pain is 6/10 soreness at inj site. RN advised pt her 6/24/24 f/u would need to be moved to July (per sched note). RN advised pt that the f/u would be w/ BK due to RM being OOO for month of July. Pt states \"how can he be out I have another RFA on 7/18/24?\" RN reviewed pt appts and surgery sched and advised there was no scheduled appts on 7/18/24 for the pt w/ our office or proc suites. Pt states \" I didn't just write this in my book for no reason.\" RN advised pt that she is unsure of the why, but that it doesn't look like any testing was done on the right side to warrant a RFA, and that RFA's are only completed once per side at a time. Pt states \"I am so sick and tired of the screw ups with scheduling and this place I am done.\" Pt then ended phone call w/ RN.  "

## 2024-06-20 NOTE — TELEPHONE ENCOUNTER
Patient has to get rescheduled for RFA because she did not of through the vasovagal and has a history of TIAs.  Patient lost consciousness before I was able to burn the nerves in her neck

## 2024-06-24 ENCOUNTER — TELEPHONE (OUTPATIENT)
Age: 64
End: 2024-06-24

## 2024-06-25 DIAGNOSIS — T79.A11D COMPARTMENT SYNDROME OF RIGHT UPPER EXTREMITY, SUBSEQUENT ENCOUNTER: Primary | ICD-10-CM

## 2024-06-26 ENCOUNTER — TELEPHONE (OUTPATIENT)
Age: 64
End: 2024-06-26

## 2024-06-26 NOTE — TELEPHONE ENCOUNTER
Spoke to Jennifer about her procedure . She is scheduled for an appt on Aug 27 in [place] . I also penciled her in for her procedure in case Dr Comer gives the okay to move forward with RFA

## 2024-07-31 DIAGNOSIS — K21.9 GASTROESOPHAGEAL REFLUX DISEASE, UNSPECIFIED WHETHER ESOPHAGITIS PRESENT: ICD-10-CM

## 2024-07-31 DIAGNOSIS — R12 HEARTBURN: ICD-10-CM

## 2024-07-31 DIAGNOSIS — R11.0 NAUSEA: ICD-10-CM

## 2024-07-31 RX ORDER — OMEPRAZOLE 40 MG/1
CAPSULE, DELAYED RELEASE ORAL
Qty: 30 CAPSULE | Refills: 5 | Status: SHIPPED | OUTPATIENT
Start: 2024-07-31

## 2024-08-01 ENCOUNTER — TELEPHONE (OUTPATIENT)
Age: 64
End: 2024-08-01

## 2024-08-01 NOTE — TELEPHONE ENCOUNTER
Caller: Patient     Doctor:      Reason for call: Patients PCP sent her to be seen for her right hand pain.    She saw  at [facility] and will be going for an emg on 8/8 follow up on 8/13 with  to discuss possible surgery. They are thinking it is carpal tunnel.      For the past 2 weeks between her hand and neck have been really bothering her. She is looking to see what suggestions can be to help with this. She states it has been very itchy in the arm area.     She did have lice a few days after her procedure but that is gone now but she wanted to note that as well.     Please advise       Call back#: [phone]

## 2024-08-07 NOTE — TELEPHONE ENCOUNTER
"Fyi..    S/w pt who states she is cont to have severe midline neck pain rad to Lt UE into hand. Pt states her pcp fills her vicodin and provided pt w/ rf 1.5 days after pt was oom. Pt states med written for TID and pts pain returns prior to nxt dose. Pt asking for QID dosing. RN advised pt that the vicodin is prescr by her pcp and being that she does not have a pain contract w/ [facility] she would need to s/w pcp for med changes. Pt then began to get agitated and stated that she cont to be \"forgotten about in the system and I'm always made to wait even though I'm in so much pain.\" Pt stated that she takes her meds as prescr and always asks for rf on time. RN advised pt, again, that being the med is an opioid and is prescr by another physician RM/BK are unable to take over the prescr of that med w/ agreement and contract/UDS. Pt stated \" so he can't do anything?\" RN advised pt that RM OOO and returning 8/12/24. RN advised pt that she has OV on 8/27/24 and sched Lt cervical RFA in Sept poss tx of current sx. RN advised she also has upcoming EMG and ortho appt to discuss probable Lt hand pain. Pt became incr agitated and stated \" I dont even know why I called, ill just lay here and take extra medication while I wait. Thanks for calling.\" Pt ended call at that time.     Prev RFA attempted (6/2024) and aborted due to signif vaso vagal during localization.   "

## 2024-08-07 NOTE — TELEPHONE ENCOUNTER
"Fyi...    RN had prev explained to pt that RM/BK are unable to incr opioid medication that is prescr by another provider. RN also had prev advised pt that she has an EMG tomorrow, upcoming OV w/ ortho, upcoming OV w/ RM and upcoming proc w/ RM in Sept to address her current cervical pain. Pt at that time cont to become incr agitated and would not allow RN to address concerns. Pt then ended call w/o allowing RN to aid in providing recommendations such as ER, calling ordering prov for incr in med freq ect... RN had never stated to pt that there is \"nothing else to offer at this time.\"  "

## 2024-08-07 NOTE — TELEPHONE ENCOUNTER
Caller: Jennifer     Doctor: MARIA DEL CARMEN    Reason for call: pt stated that she found her own resolution and that there is no need to call her back. Pt advised that she would just take extra pills. She advised that she was told that dr gordon had nothing else to offer at this time.     Call back#: [phone]

## 2024-08-27 ENCOUNTER — OFFICE VISIT (OUTPATIENT)
Age: 64
End: 2024-08-27
Payer: COMMERCIAL

## 2024-08-27 VITALS
HEIGHT: 64 IN | DIASTOLIC BLOOD PRESSURE: 78 MMHG | BODY MASS INDEX: 27.49 KG/M2 | SYSTOLIC BLOOD PRESSURE: 130 MMHG | HEART RATE: 69 BPM | WEIGHT: 161 LBS

## 2024-08-27 DIAGNOSIS — M47.812 CERVICAL SPONDYLOSIS: Primary | ICD-10-CM

## 2024-08-27 DIAGNOSIS — G89.4 CHRONIC PAIN SYNDROME: ICD-10-CM

## 2024-08-27 PROCEDURE — 99214 OFFICE O/P EST MOD 30 MIN: CPT | Performed by: ANESTHESIOLOGY

## 2024-08-27 PROCEDURE — G2211 COMPLEX E/M VISIT ADD ON: HCPCS | Performed by: ANESTHESIOLOGY

## 2024-08-27 NOTE — H&P (VIEW-ONLY)
Assessment:  1. Cervical spondylosis    2. Chronic pain syndrome        Plan:  Patient is a 63-year-old female complains of neck pain, bilateral shoulder pain, low back pain, bilateral feet pain with palpation secondary to peripheral neuropathy, cervical spondylosis, lumbar degenerative disease, lumbar radiculopathy status post lumbar spinal cord stimulator presents to office for follow-up visit.  Patient was status post left C3-C4 and C4-C5 radiofrequency ablation however we had to abort procedure secondary to patient experienced a vasovagal episode intraoperatively.  1.  We will reschedule left C3-C4 and C4-C5 radiofrequency ablation with anesthesia  2.  Follow-up 1 month after procedure      Complete risks and benefits including bleeding, infection, tissue reaction, nerve injury and allergic reaction were discussed. The approach was demonstrated using models and literature was provided. Verbal and written consent was obtained.      History of Present Illness:  The patient is a 63 y.o. female who presents for a follow up office visit in regards to Shoulder Pain, Neck Pain, Arm Pain, Hand Pain, Abdominal Pain, Back Pain, Leg Pain, and Foot Pain.   The patient’s current symptoms include 8/10     Current pain medications includes:  None    I have personally reviewed and/or updated the patient's past medical history, past surgical history, family history, social history, current medications, allergies, and vital signs today.         Review of Systems  Review of Systems   Constitutional:  Negative for unexpected weight change.   HENT:  Negative for hearing loss.    Eyes:  Negative for visual disturbance.   Respiratory:  Positive for shortness of breath.    Cardiovascular:  Positive for chest pain. Negative for leg swelling.   Gastrointestinal:  Negative for constipation.   Endocrine: Negative for polyuria.   Genitourinary:  Negative for difficulty urinating.   Musculoskeletal:  Positive for arthralgias, gait problem  and myalgias. Negative for joint swelling.        Decreased range of motion  Joint stiffness  Swelling- right hand/arm  Pain in extremity   Skin:  Negative for rash.   Neurological:  Positive for dizziness. Negative for weakness and headaches.        Memory loss   Psychiatric/Behavioral:  Negative for decreased concentration.    All other systems reviewed and are negative.      Past Medical History:   Diagnosis Date    Ambulates with cane     Anxiety     Arthritis     Asthma     Bipolar 1 disorder ([facility])     Brain aneurysm     Chronic pain disorder     spinal stenosis    Concussion syndrome     neurological rx and balance rx    COPD (chronic obstructive pulmonary disease) ([facility])     Depression     Diabetes mellitus ([facility])     controlled w/ diet    Disease of thyroid gland     nodules     Family history of colon cancer     father    Fibromyalgia, primary     GERD (gastroesophageal reflux disease)     History of colon polyps     Hyperlipidemia     Hypertension     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     Irritable bowel syndrome     Lumbar degenerative disc disease 02/16/2022    Migraine     Neuropathy     bilateral feet and hands    Peripheral neuropathy     Psychiatric disorder     PTSD (post-traumatic stress disorder)     Shortness of breath     Sleep apnea     had 3 studies & last one negative    Stroke ([facility])     2012 no deficeits 2018 2019 TIA    TIA (transient ischemic attack)     Wears dentures     Wears glasses        Past Surgical History:   Procedure Laterality Date    ABDOMINAL SURGERY      laproscopic/ endometriosis    BRAIN SURGERY  2017    aneurysm/ coiling procedure    CARPAL TUNNEL RELEASE      CHOLECYSTECTOMY      COLONOSCOPY      DENTAL SURGERY      EPIDURAL BLOCK INJECTION Right 03/03/2022    Procedure: C7-T1 interlaminar epidural steroid injection;  Surgeon: Johnson Aranda MD;  [facility];  Service: Pain Management     EPIDURAL BLOCK INJECTION N/A 04/21/2022    Procedure:  C6-C7 BLOCK / INJECTION EPIDURAL STEROID CERVICAL;  Surgeon: Johnson Aranda MD;  [facility];  Service: Pain Management     EPIDURAL BLOCK INJECTION Left 06/21/2022    Procedure: BLOCK / INJECTION EPIDURAL STEROID LUMBAR  left L3-4 TFESI;  Surgeon: Johnson Aranda MD;  [facility];  Service: Pain Management     EYE SURGERY      cataract    FASCIOTOMY Right 10/27/2023    Procedure: FASCIOTOMY OF RIGHT UPPER EXTREMITY; POSSIBLE VAC DRESSING APPLICATION;  Surgeon: Burno Blevins MD;  [facility];  Service: General    FL GUIDED NEEDLE PLAC BX/ASP/INJ  03/03/2022    FL GUIDED NEEDLE PLAC BX/ASP/INJ  11/17/2022    HYSTERECTOMY      NERVE BLOCK Left 02/20/2024    Procedure: BLOCK MEDIAL BRANCH  left C3-4 and C4-5 MBB #1;  Surgeon: Johnson Aranda MD;  [facility];  Service: Pain Management     NERVE BLOCK Left 04/04/2024    Procedure: BLOCK MEDIAL BRANCH Left C3-C4 and C4-5 MBB2;  Surgeon: Johnson Aranda MD;  [facility];  Service: Pain Management     NC ESOPHAGOGASTRODUODENOSCOPY TRANSORAL DIAGNOSTIC N/A 02/08/2018    Procedure: EGD AND COLONOSCOPY;  Surgeon: Caleb Pete MD;  [facility]: [facility];  Service: Gastroenterology    NC EXC B9 LESION MRGN XCP SK TG F/E/E/N/L/M > 4.0CM Right 5/28/2024    Procedure: EXCISION LIPOMA;  Surgeon: Valdo Montoya DO;  [facility];  Service: General    NC PRQ IMPLTJ NSTIM ELECTRODE ARRAY EPIDURAL N/A 11/17/2022    Procedure: NEVRO SCS TRIAL;  Surgeon: Johnson Aranda MD;  [facility];  Service: Pain Management     NC PRQ IMPLTJ NSTIM ELECTRODE ARRAY EPIDURAL N/A 02/01/2023    Procedure: Insertion percutaneous thoracic spinal cord stimulator with left buttock implantable pulse generator;  Surgeon: Craig Robert Goldberg, MD;  [facility];  Service: Neurosurgery    RADIOFREQUENCY ABLATION Left 6/19/2024    Procedure: Left C3-4 and C4-5 Radio Frequency Ablation;  Surgeon: Johnson GARCIA  MD Rosi;  [facility];  Service: Pain Management     SPLIT THICKNESS SKIN GRAFT Right 11/07/2023    Procedure: SKIN GRAFT SPLIT THICKNESS (STSG)  EXTREMITY;  Surgeon: Samm Sims MD;  Location:  MAIN OR;  Service: General    THYROID SURGERY      TONSILLECTOMY      US GUIDED THYROID BIOPSY  11/30/2016    US GUIDED THYROID BIOPSY  03/21/2018    VAC DRESSING APPLICATION Right 10/29/2023    Procedure: CHANGE DRESSING/VAC RIGHT UPPER EXTREMITY; WASHOUT; PARTIAL CLOSURE;  Surgeon: Irina Day DO;  [facility];  Service: General    WOUND DEBRIDEMENT Right 11/04/2023    Procedure: SIMPLE CLOSURE 3.5CM, OASIS APPLICATION 15X7CM, WOUND VAC APPLICATION;  Surgeon: Maryuri Goldstein MD;  [facility];  Service: General       Family History   Problem Relation Age of Onset    Brain cancer Mother     Alzheimer's disease Mother     Alzheimer's disease Father     Parkinsonism Father        Social History     Occupational History    Not on file   Tobacco Use    Smoking status: Every Day     Current packs/day: 2.00     Types: Cigarettes    Smokeless tobacco: Never   Vaping Use    Vaping status: Never Used   Substance and Sexual Activity    Alcohol use: Not Currently    Drug use: Never     Comment: prescribed Belbuca    Sexual activity: Yes     Partners: Male         Current Outpatient Medications:     albuterol (PROVENTIL HFA,VENTOLIN HFA) 90 mcg/act inhaler, Inhale 2 puffs every 6 (six) hours as needed for wheezing or shortness of breath, Disp: 18 g, Rfl: 5    Alcohol Swabs (Alcohol Pads) 70 % PADS, USE TO TEST BLOOD GLUCOSE 2-3 TIMES DAILY, Disp: , Rfl:     ammonium lactate (LAC-HYDRIN) 12 % lotion, as needed, Disp: , Rfl:     atorvastatin (LIPITOR) 80 mg tablet, Take 80 mg by mouth every evening, Disp: , Rfl:     benzonatate (TESSALON PERLES) 100 mg capsule, Take 1 capsule (100 mg total) by mouth 3 (three) times a day as needed for cough, Disp: 30 capsule, Rfl: 1    Buprenorphine HCl  (Belbuca) 300 MCG FILM, Apply 300 mcg to cheek daily at bedtime, Disp: , Rfl:     carboxymethylcellulose 0.5 % SOLN, Administer 1 drop to both eyes daily as needed for dry eyes, Disp: 1 Bottle, Rfl: 0    clonazePAM (KlonoPIN) 0.5 mg tablet, Take 0.5 mg by mouth daily at bedtime, Disp: , Rfl:     clonazePAM (KlonoPIN) 1 mg tablet, Take 1 mg by mouth daily in the early morning, Disp: , Rfl:     dexamethasone (DECADRON) 4 mg tablet, Take 4 mg by mouth, Disp: , Rfl:     dicyclomine (BENTYL) 20 mg tablet, Take 20 mg by mouth every 6 (six) hours, Disp: , Rfl:     famotidine (PEPCID) 40 MG tablet, Take 1 tablet (40 mg total) by mouth daily at bedtime, Disp: 30 tablet, Rfl: 5    fenofibrate (TRICOR) 48 mg tablet, Take 48 mg by mouth daily, Disp: , Rfl:     fluticasone (FLONASE) 50 mcg/act nasal spray, 2 sprays into each nostril daily, Disp: , Rfl:     HYDROcodone-acetaminophen (NORCO)  mg per tablet, Take 1 tablet by mouth 3 (three) times a day, Disp: , Rfl:     latanoprost (XALATAN) 0.005 % ophthalmic solution, instill 1 drop into both eyes at bedtime, Disp: , Rfl:     levalbuterol (XOPENEX) 1.25 mg/3 mL nebulizer solution, , Disp: , Rfl:     levothyroxine 137 mcg tablet, Take 137 mcg by mouth daily, Disp: , Rfl:     omeprazole (PriLOSEC) 40 MG capsule, TAKE 1 CAPSULE BY MOUTH DAILY IN THE MORNING PRIOR TO A MEAL., Disp: 30 capsule, Rfl: 5    ondansetron (ZOFRAN) 8 mg tablet, TAKE 1 TABLET (8 MG TOTAL) BY MOUTH EVERY 8 (EIGHT) HOURS AS NEEDED FOR NAUSEA OR VOMITING, Disp: 20 tablet, Rfl: 5    OneTouch Ultra test strip, USE TO TEST BLOOD GLUCOSE 2-3 TIMES DAILY, Disp: , Rfl:     oxyCODONE-acetaminophen (Percocet)  mg per tablet, Take 1 tablet by mouth every 4 (four) hours as needed for moderate pain Max Daily Amount: 6 tablets, Disp: 12 tablet, Rfl: 0    pregabalin (LYRICA) 100 mg capsule, Take 100 mg by mouth 3 (three) times a day Morning-lunch-dinner, Disp: , Rfl:     pregabalin (LYRICA) 150 mg capsule, take 1  "capsule by mouth NIGHTLY, Disp: , Rfl:     rimegepant sulfate (Nurtec) 75 mg TBDP, Take 75 mg by mouth once, Disp: , Rfl:     tiZANidine (ZANAFLEX) 4 mg tablet, Take 8 mg by mouth once 1 tablet, Disp: , Rfl:     topiramate (TOPAMAX) 200 MG tablet, 200 mg 2 (two) times a day, Disp: , Rfl:     traZODone (DESYREL) 100 mg tablet, Take 100 mg by mouth, Disp: , Rfl:     True Comfort Safety Lancets MISC, USE TO TEST BLOOD GLUCOSE 2-3 TIMES DAILY, Disp: , Rfl:     fluticasone-umeclidinium-vilanterol (Trelegy Ellipta) 200-62.5-25 mcg/actuation AEPB inhaler, Inhale 1 puff daily Rinse mouth after use., Disp: 60 blister, Rfl: 5    lamoTRIgine (LaMICtal) 100 mg tablet, Take 100 mg by mouth 2 (two) times a day, Disp: , Rfl:     Allergies   Allergen Reactions    Hydroxyzine Anaphylaxis     Claims it gives her convulsions.     Bee Pollen Other (See Comments)     familial    Pollen Extract Itching    Tree Extract     Clarithromycin Rash     Pt denies    Ibuprofen Nausea Only    Latex Rash    Sulfa Antibiotics Rash     \"severe skin burn\"       Physical Exam:    /78   Pulse 69   Ht 5' 4\" (1.626 m)   Wt 73 kg (161 lb)   BMI 27.64 kg/m²     Constitutional:normal, well developed, well nourished, alert, in no distress and non-toxic and no overt pain behavior.  Eyes:anicteric  HEENT:grossly intact  Neck:supple, symmetric, trachea midline and no masses   Pulmonary:even and unlabored  Cardiovascular:No edema or pitting edema present  Skin:Normal without rashes or lesions and well hydrated  Psychiatric:Mood and affect appropriate  Neurologic:Cranial Nerves II-XII grossly intact  Musculoskeletal:antalgic    Cervical Spine examination demonstrates. Decreased ROM secondary to pain with lateral rotation to the left/right and bending to the left/right, in addition to neck flexion. 5/5 upper extremity strength in all muscle groups bilaterally. Negative Spurling's maneuver to the b/l Ue, sensitivity to light touch intact b/l " Ue.    Imaging  No orders to display       No orders of the defined types were placed in this encounter.

## 2024-08-27 NOTE — PATIENT INSTRUCTIONS
Cervical, Thoracic and Lumbar Medial Branch (Facet) Radiofrequency Neurotomy (Rhizotomy)      What is a medial branch neurotomy and why is it helpful?  A medial branch neurotomy is a non-surgical procedure which cauterizes the nerves (through very localized heating) that allow you to feel pain caused by your facet joints. Your facet has been proven to be painful by diagnostic injection procedures, but your pain has not been reduced by other treatment methods. Likely, we have previously numbed the medial branch nerves to see if you were a candidate for the neurotomy. The pain relief you obtained from numbing the facet nerve supply (medial branch nerves) should persist for several months after locally heating  (cauterizing) these nerves.    In some patients, the pain never returns. The neurotomy (if technically successful) theoretically prevents the pain signal from traveling through these nerves (from your joints to your brain) so you cannot feel or sense your injured and/or diseased spinal joints. These medial branch nerves do not control any muscles or sensation in your arms or legs. They only allow you to feel these joints and small nearby ligaments and control a short, small muscle (multifidus muscle) in your neck, mid-back or low back. Studies have shown that there is no functional or clinical significance from ablating the multifidi muscles.    What happens during the procedure?  Lying on your stomach, the skin over your neck, mid-back or low back will be well cleaned. The physician will numb a small area of skin with numbing medicine which may sting for a few seconds. The physician will use X-ray guidance to direct a special (radiofrequency) needle along side the targeted medial branch nerve. A small amount of electrical current will be carefully given through the tip of the radiofrequency needle to assure the needle is precisely next to the target medial branch nerve and not any other larger nerves. This may,  for a few seconds, recreate your pain and cause a muscle twitch in your neck or back. The medial branch nerves will then be numbed so you will feel little to nothing while the nerve is being heated (locally destroyed) for one minute. This process will be repeated for usually one to five additional nerves. The entire procedure takes about 30 minutes.    What should I do after the procedure?  You will wait 30 to 60 minutes in recovery before going home. You may not drive for eight hours. You will want to substantially limit your activity for two days after the procedure.    What should I expect after the procedure?  Your neck or back may often be moderately sore during the next one to four days. This pain is usually caused by muscle spasms and irritability while the medial branch nerves are disrupted from the heat lesion over the next 7 to 14 days. Your physician will give you medicine to treat the expected spasms and soreness. Pain relief usually isn’t experienced until about two to three weeks after the procedure when, on occasion, your back or neck may feel slightly weak for several weeks after the procedure.  The nerves will eventually grow back (regenerate) but the pain may not recur in 9 to 14 months, or on occasion, sooner. If the pain does recur when the nerves grow back and the nerve signal is re-established, you may have the procedure repeated with equal success seen in most patients. Some patients never have a return of their pain, but we cannot predict when this happens.    General Pre/Post Instructions  Eating  You may eat a light, but not full meal at least six hours before the procedure. If you are an insulin dependent diabetic do not alter your normal food intake.   Medications  Take your routine medications before the procedure (such as high blood pressure and diabetes medications) except for those that need to be discontinued five days before the procedure such as aspirin and all anti-inflammatory  medications (e.g. Motrin/Ibuprofen, Aleve, Relafen, Daypro). These medicines may be re-started the day after the procedure. You may take your regular pain medicine as needed before/after the procedure. If you are taking Coumadin, Heparin, Lovenox, Plavix or Ticlid you must notify the office so that the timing of stopping these medications can be explained.   Exercise  You must bring a  with you. You may return to your current level of activities the next day including return to work.     Things that may Delay the Procedure  If you are on antibiotics please notify our office; we may delay the procedure. If you have an active infection or fever we will not do the procedure.

## 2024-08-27 NOTE — PROGRESS NOTES
Assessment:  1. Cervical spondylosis    2. Chronic pain syndrome        Plan:  Patient is a 63-year-old female complains of neck pain, bilateral shoulder pain, low back pain, bilateral feet pain with palpation secondary to peripheral neuropathy, cervical spondylosis, lumbar degenerative disease, lumbar radiculopathy status post lumbar spinal cord stimulator presents to office for follow-up visit.  Patient was status post left C3-C4 and C4-C5 radiofrequency ablation however we had to abort procedure secondary to patient experienced a vasovagal episode intraoperatively.  1.  We will reschedule left C3-C4 and C4-C5 radiofrequency ablation with anesthesia  2.  Follow-up 1 month after procedure      Complete risks and benefits including bleeding, infection, tissue reaction, nerve injury and allergic reaction were discussed. The approach was demonstrated using models and literature was provided. Verbal and written consent was obtained.      History of Present Illness:  The patient is a 63 y.o. female who presents for a follow up office visit in regards to Shoulder Pain, Neck Pain, Arm Pain, Hand Pain, Abdominal Pain, Back Pain, Leg Pain, and Foot Pain.   The patient’s current symptoms include 8/10     Current pain medications includes:  None    I have personally reviewed and/or updated the patient's past medical history, past surgical history, family history, social history, current medications, allergies, and vital signs today.         Review of Systems  Review of Systems   Constitutional:  Negative for unexpected weight change.   HENT:  Negative for hearing loss.    Eyes:  Negative for visual disturbance.   Respiratory:  Positive for shortness of breath.    Cardiovascular:  Positive for chest pain. Negative for leg swelling.   Gastrointestinal:  Negative for constipation.   Endocrine: Negative for polyuria.   Genitourinary:  Negative for difficulty urinating.   Musculoskeletal:  Positive for arthralgias, gait problem  and myalgias. Negative for joint swelling.        Decreased range of motion  Joint stiffness  Swelling- right hand/arm  Pain in extremity   Skin:  Negative for rash.   Neurological:  Positive for dizziness. Negative for weakness and headaches.        Memory loss   Psychiatric/Behavioral:  Negative for decreased concentration.    All other systems reviewed and are negative.      Past Medical History:   Diagnosis Date    Ambulates with cane     Anxiety     Arthritis     Asthma     Bipolar 1 disorder ([facility])     Brain aneurysm     Chronic pain disorder     spinal stenosis    Concussion syndrome     neurological rx and balance rx    COPD (chronic obstructive pulmonary disease) ([facility])     Depression     Diabetes mellitus ([facility])     controlled w/ diet    Disease of thyroid gland     nodules     Family history of colon cancer     father    Fibromyalgia, primary     GERD (gastroesophageal reflux disease)     History of colon polyps     Hyperlipidemia     Hypertension     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     Irritable bowel syndrome     Lumbar degenerative disc disease 02/16/2022    Migraine     Neuropathy     bilateral feet and hands    Peripheral neuropathy     Psychiatric disorder     PTSD (post-traumatic stress disorder)     Shortness of breath     Sleep apnea     had 3 studies & last one negative    Stroke ([facility])     2012 no deficeits 2018 2019 TIA    TIA (transient ischemic attack)     Wears dentures     Wears glasses        Past Surgical History:   Procedure Laterality Date    ABDOMINAL SURGERY      laproscopic/ endometriosis    BRAIN SURGERY  2017    aneurysm/ coiling procedure    CARPAL TUNNEL RELEASE      CHOLECYSTECTOMY      COLONOSCOPY      DENTAL SURGERY      EPIDURAL BLOCK INJECTION Right 03/03/2022    Procedure: C7-T1 interlaminar epidural steroid injection;  Surgeon: Johnson Aranda MD;  [facility]: [facility];  Service: Pain Management     EPIDURAL BLOCK INJECTION N/A 04/21/2022    Procedure:  C6-C7 BLOCK / INJECTION EPIDURAL STEROID CERVICAL;  Surgeon: Johnson Aranda MD;  [facility];  Service: Pain Management     EPIDURAL BLOCK INJECTION Left 06/21/2022    Procedure: BLOCK / INJECTION EPIDURAL STEROID LUMBAR  left L3-4 TFESI;  Surgeon: Johnson Aranda MD;  [facility];  Service: Pain Management     EYE SURGERY      cataract    FASCIOTOMY Right 10/27/2023    Procedure: FASCIOTOMY OF RIGHT UPPER EXTREMITY; POSSIBLE VAC DRESSING APPLICATION;  Surgeon: Bruno Blevins MD;  [facility];  Service: General    FL GUIDED NEEDLE PLAC BX/ASP/INJ  03/03/2022    FL GUIDED NEEDLE PLAC BX/ASP/INJ  11/17/2022    HYSTERECTOMY      NERVE BLOCK Left 02/20/2024    Procedure: BLOCK MEDIAL BRANCH  left C3-4 and C4-5 MBB #1;  Surgeon: Johnson Aranda MD;  [facility];  Service: Pain Management     NERVE BLOCK Left 04/04/2024    Procedure: BLOCK MEDIAL BRANCH Left C3-C4 and C4-5 MBB2;  Surgeon: Johnson Aranda MD;  [facility];  Service: Pain Management     OK ESOPHAGOGASTRODUODENOSCOPY TRANSORAL DIAGNOSTIC N/A 02/08/2018    Procedure: EGD AND COLONOSCOPY;  Surgeon: Caleb Pete MD;  [facility]: [facility];  Service: Gastroenterology    OK EXC B9 LESION MRGN XCP SK TG F/E/E/N/L/M > 4.0CM Right 5/28/2024    Procedure: EXCISION LIPOMA;  Surgeon: Valdo Montoya DO;  [facility];  Service: General    OK PRQ IMPLTJ NSTIM ELECTRODE ARRAY EPIDURAL N/A 11/17/2022    Procedure: NEVRO SCS TRIAL;  Surgeon: Johnson Aranda MD;  [facility];  Service: Pain Management     OK PRQ IMPLTJ NSTIM ELECTRODE ARRAY EPIDURAL N/A 02/01/2023    Procedure: Insertion percutaneous thoracic spinal cord stimulator with left buttock implantable pulse generator;  Surgeon: Craig Robert Goldberg, MD;  [facility];  Service: Neurosurgery    RADIOFREQUENCY ABLATION Left 6/19/2024    Procedure: Left C3-4 and C4-5 Radio Frequency Ablation;  Surgeon: Johnson GARCIA  MD Rosi;  [facility];  Service: Pain Management     SPLIT THICKNESS SKIN GRAFT Right 11/07/2023    Procedure: SKIN GRAFT SPLIT THICKNESS (STSG)  EXTREMITY;  Surgeon: Samm Sims MD;  Location:  MAIN OR;  Service: General    THYROID SURGERY      TONSILLECTOMY      US GUIDED THYROID BIOPSY  11/30/2016    US GUIDED THYROID BIOPSY  03/21/2018    VAC DRESSING APPLICATION Right 10/29/2023    Procedure: CHANGE DRESSING/VAC RIGHT UPPER EXTREMITY; WASHOUT; PARTIAL CLOSURE;  Surgeon: Irina Day DO;  [facility];  Service: General    WOUND DEBRIDEMENT Right 11/04/2023    Procedure: SIMPLE CLOSURE 3.5CM, OASIS APPLICATION 15X7CM, WOUND VAC APPLICATION;  Surgeon: Maryuri Goldstein MD;  [facility];  Service: General       Family History   Problem Relation Age of Onset    Brain cancer Mother     Alzheimer's disease Mother     Alzheimer's disease Father     Parkinsonism Father        Social History     Occupational History    Not on file   Tobacco Use    Smoking status: Every Day     Current packs/day: 2.00     Types: Cigarettes    Smokeless tobacco: Never   Vaping Use    Vaping status: Never Used   Substance and Sexual Activity    Alcohol use: Not Currently    Drug use: Never     Comment: prescribed Belbuca    Sexual activity: Yes     Partners: Male         Current Outpatient Medications:     albuterol (PROVENTIL HFA,VENTOLIN HFA) 90 mcg/act inhaler, Inhale 2 puffs every 6 (six) hours as needed for wheezing or shortness of breath, Disp: 18 g, Rfl: 5    Alcohol Swabs (Alcohol Pads) 70 % PADS, USE TO TEST BLOOD GLUCOSE 2-3 TIMES DAILY, Disp: , Rfl:     ammonium lactate (LAC-HYDRIN) 12 % lotion, as needed, Disp: , Rfl:     atorvastatin (LIPITOR) 80 mg tablet, Take 80 mg by mouth every evening, Disp: , Rfl:     benzonatate (TESSALON PERLES) 100 mg capsule, Take 1 capsule (100 mg total) by mouth 3 (three) times a day as needed for cough, Disp: 30 capsule, Rfl: 1    Buprenorphine HCl  (Belbuca) 300 MCG FILM, Apply 300 mcg to cheek daily at bedtime, Disp: , Rfl:     carboxymethylcellulose 0.5 % SOLN, Administer 1 drop to both eyes daily as needed for dry eyes, Disp: 1 Bottle, Rfl: 0    clonazePAM (KlonoPIN) 0.5 mg tablet, Take 0.5 mg by mouth daily at bedtime, Disp: , Rfl:     clonazePAM (KlonoPIN) 1 mg tablet, Take 1 mg by mouth daily in the early morning, Disp: , Rfl:     dexamethasone (DECADRON) 4 mg tablet, Take 4 mg by mouth, Disp: , Rfl:     dicyclomine (BENTYL) 20 mg tablet, Take 20 mg by mouth every 6 (six) hours, Disp: , Rfl:     famotidine (PEPCID) 40 MG tablet, Take 1 tablet (40 mg total) by mouth daily at bedtime, Disp: 30 tablet, Rfl: 5    fenofibrate (TRICOR) 48 mg tablet, Take 48 mg by mouth daily, Disp: , Rfl:     fluticasone (FLONASE) 50 mcg/act nasal spray, 2 sprays into each nostril daily, Disp: , Rfl:     HYDROcodone-acetaminophen (NORCO)  mg per tablet, Take 1 tablet by mouth 3 (three) times a day, Disp: , Rfl:     latanoprost (XALATAN) 0.005 % ophthalmic solution, instill 1 drop into both eyes at bedtime, Disp: , Rfl:     levalbuterol (XOPENEX) 1.25 mg/3 mL nebulizer solution, , Disp: , Rfl:     levothyroxine 137 mcg tablet, Take 137 mcg by mouth daily, Disp: , Rfl:     omeprazole (PriLOSEC) 40 MG capsule, TAKE 1 CAPSULE BY MOUTH DAILY IN THE MORNING PRIOR TO A MEAL., Disp: 30 capsule, Rfl: 5    ondansetron (ZOFRAN) 8 mg tablet, TAKE 1 TABLET (8 MG TOTAL) BY MOUTH EVERY 8 (EIGHT) HOURS AS NEEDED FOR NAUSEA OR VOMITING, Disp: 20 tablet, Rfl: 5    OneTouch Ultra test strip, USE TO TEST BLOOD GLUCOSE 2-3 TIMES DAILY, Disp: , Rfl:     oxyCODONE-acetaminophen (Percocet)  mg per tablet, Take 1 tablet by mouth every 4 (four) hours as needed for moderate pain Max Daily Amount: 6 tablets, Disp: 12 tablet, Rfl: 0    pregabalin (LYRICA) 100 mg capsule, Take 100 mg by mouth 3 (three) times a day Morning-lunch-dinner, Disp: , Rfl:     pregabalin (LYRICA) 150 mg capsule, take 1  "capsule by mouth NIGHTLY, Disp: , Rfl:     rimegepant sulfate (Nurtec) 75 mg TBDP, Take 75 mg by mouth once, Disp: , Rfl:     tiZANidine (ZANAFLEX) 4 mg tablet, Take 8 mg by mouth once 1 tablet, Disp: , Rfl:     topiramate (TOPAMAX) 200 MG tablet, 200 mg 2 (two) times a day, Disp: , Rfl:     traZODone (DESYREL) 100 mg tablet, Take 100 mg by mouth, Disp: , Rfl:     True Comfort Safety Lancets MISC, USE TO TEST BLOOD GLUCOSE 2-3 TIMES DAILY, Disp: , Rfl:     fluticasone-umeclidinium-vilanterol (Trelegy Ellipta) 200-62.5-25 mcg/actuation AEPB inhaler, Inhale 1 puff daily Rinse mouth after use., Disp: 60 blister, Rfl: 5    lamoTRIgine (LaMICtal) 100 mg tablet, Take 100 mg by mouth 2 (two) times a day, Disp: , Rfl:     Allergies   Allergen Reactions    Hydroxyzine Anaphylaxis     Claims it gives her convulsions.     Bee Pollen Other (See Comments)     familial    Pollen Extract Itching    Tree Extract     Clarithromycin Rash     Pt denies    Ibuprofen Nausea Only    Latex Rash    Sulfa Antibiotics Rash     \"severe skin burn\"       Physical Exam:    /78   Pulse 69   Ht 5' 4\" (1.626 m)   Wt 73 kg (161 lb)   BMI 27.64 kg/m²     Constitutional:normal, well developed, well nourished, alert, in no distress and non-toxic and no overt pain behavior.  Eyes:anicteric  HEENT:grossly intact  Neck:supple, symmetric, trachea midline and no masses   Pulmonary:even and unlabored  Cardiovascular:No edema or pitting edema present  Skin:Normal without rashes or lesions and well hydrated  Psychiatric:Mood and affect appropriate  Neurologic:Cranial Nerves II-XII grossly intact  Musculoskeletal:antalgic    Cervical Spine examination demonstrates. Decreased ROM secondary to pain with lateral rotation to the left/right and bending to the left/right, in addition to neck flexion. 5/5 upper extremity strength in all muscle groups bilaterally. Negative Spurling's maneuver to the b/l Ue, sensitivity to light touch intact b/l " Ue.    Imaging  No orders to display       No orders of the defined types were placed in this encounter.

## 2024-08-30 ENCOUNTER — TELEPHONE (OUTPATIENT)
Age: 64
End: 2024-08-30

## 2024-09-04 ENCOUNTER — ANESTHESIA EVENT (OUTPATIENT)
Dept: PERIOP | Facility: HOSPITAL | Age: 64
End: 2024-09-04
Payer: COMMERCIAL

## 2024-09-05 ENCOUNTER — APPOINTMENT (OUTPATIENT)
Dept: RADIOLOGY | Facility: HOSPITAL | Age: 64
End: 2024-09-05
Payer: COMMERCIAL

## 2024-09-05 ENCOUNTER — HOSPITAL ENCOUNTER (OUTPATIENT)
Facility: HOSPITAL | Age: 64
Setting detail: OUTPATIENT SURGERY
Discharge: HOME | End: 2024-09-05
Attending: ANESTHESIOLOGY | Admitting: ANESTHESIOLOGY
Payer: COMMERCIAL

## 2024-09-05 ENCOUNTER — ANESTHESIA (OUTPATIENT)
Dept: PERIOP | Facility: HOSPITAL | Age: 64
End: 2024-09-05
Payer: COMMERCIAL

## 2024-09-05 VITALS
DIASTOLIC BLOOD PRESSURE: 69 MMHG | HEART RATE: 68 BPM | HEIGHT: 64 IN | RESPIRATION RATE: 20 BRPM | SYSTOLIC BLOOD PRESSURE: 120 MMHG | WEIGHT: 161 LBS | OXYGEN SATURATION: 97 % | BODY MASS INDEX: 27.49 KG/M2 | TEMPERATURE: 97.6 F

## 2024-09-05 DIAGNOSIS — M50.120 CERVICAL DISC DISORDER WITH RADICULOPATHY OF MID-CERVICAL REGION: ICD-10-CM

## 2024-09-05 DIAGNOSIS — M54.2 NECK PAIN: ICD-10-CM

## 2024-09-05 DIAGNOSIS — M47.812 CERVICAL SPONDYLOSIS: ICD-10-CM

## 2024-09-05 DIAGNOSIS — M54.12 CERVICAL RADICULOPATHY: Primary | ICD-10-CM

## 2024-09-05 LAB
GLUCOSE SERPL-MCNC: 100 MG/DL (ref 65–140)
GLUCOSE SERPL-MCNC: 76 MG/DL (ref 65–140)

## 2024-09-05 PROCEDURE — 64634 DESTROY C/TH FACET JNT ADDL: CPT | Performed by: ANESTHESIOLOGY

## 2024-09-05 PROCEDURE — 64633 DESTROY CERV/THOR FACET JNT: CPT | Performed by: ANESTHESIOLOGY

## 2024-09-05 PROCEDURE — 82948 REAGENT STRIP/BLOOD GLUCOSE: CPT

## 2024-09-05 RX ORDER — GLYCOPYRROLATE 0.2 MG/ML
INJECTION INTRAMUSCULAR; INTRAVENOUS AS NEEDED
Status: DISCONTINUED | OUTPATIENT
Start: 2024-09-05 | End: 2024-09-05

## 2024-09-05 RX ORDER — SODIUM CHLORIDE, SODIUM LACTATE, POTASSIUM CHLORIDE, CALCIUM CHLORIDE 600; 310; 30; 20 MG/100ML; MG/100ML; MG/100ML; MG/100ML
125 INJECTION, SOLUTION INTRAVENOUS CONTINUOUS
Status: DISCONTINUED | OUTPATIENT
Start: 2024-09-05 | End: 2024-09-05 | Stop reason: HOSPADM

## 2024-09-05 RX ORDER — LIDOCAINE HYDROCHLORIDE 10 MG/ML
INJECTION, SOLUTION EPIDURAL; INFILTRATION; INTRACAUDAL; PERINEURAL AS NEEDED
Status: DISCONTINUED | OUTPATIENT
Start: 2024-09-05 | End: 2024-09-05

## 2024-09-05 RX ORDER — LIDOCAINE HYDROCHLORIDE 10 MG/ML
INJECTION, SOLUTION EPIDURAL; INFILTRATION; INTRACAUDAL; PERINEURAL AS NEEDED
Status: DISCONTINUED | OUTPATIENT
Start: 2024-09-05 | End: 2024-09-05 | Stop reason: HOSPADM

## 2024-09-05 RX ORDER — MIDAZOLAM HYDROCHLORIDE 2 MG/2ML
INJECTION, SOLUTION INTRAMUSCULAR; INTRAVENOUS AS NEEDED
Status: DISCONTINUED | OUTPATIENT
Start: 2024-09-05 | End: 2024-09-05

## 2024-09-05 RX ORDER — ONDANSETRON 2 MG/ML
4 INJECTION INTRAMUSCULAR; INTRAVENOUS ONCE AS NEEDED
Status: DISCONTINUED | OUTPATIENT
Start: 2024-09-05 | End: 2024-09-05 | Stop reason: HOSPADM

## 2024-09-05 RX ORDER — PROPOFOL 10 MG/ML
INJECTION, EMULSION INTRAVENOUS AS NEEDED
Status: DISCONTINUED | OUTPATIENT
Start: 2024-09-05 | End: 2024-09-05

## 2024-09-05 RX ORDER — KETAMINE HCL IN NACL, ISO-OSM 100MG/10ML
SYRINGE (ML) INJECTION AS NEEDED
Status: DISCONTINUED | OUTPATIENT
Start: 2024-09-05 | End: 2024-09-05

## 2024-09-05 RX ORDER — ONDANSETRON 2 MG/ML
INJECTION INTRAMUSCULAR; INTRAVENOUS AS NEEDED
Status: DISCONTINUED | OUTPATIENT
Start: 2024-09-05 | End: 2024-09-05

## 2024-09-05 RX ORDER — FENTANYL CITRATE 50 UG/ML
INJECTION, SOLUTION INTRAMUSCULAR; INTRAVENOUS AS NEEDED
Status: DISCONTINUED | OUTPATIENT
Start: 2024-09-05 | End: 2024-09-05

## 2024-09-05 RX ORDER — LIDOCAINE HYDROCHLORIDE 20 MG/ML
INJECTION, SOLUTION EPIDURAL; INFILTRATION; INTRACAUDAL; PERINEURAL AS NEEDED
Status: DISCONTINUED | OUTPATIENT
Start: 2024-09-05 | End: 2024-09-05 | Stop reason: HOSPADM

## 2024-09-05 RX ADMIN — GLYCOPYRROLATE 0.2 MG: 0.2 INJECTION, SOLUTION INTRAMUSCULAR; INTRAVENOUS at 07:45

## 2024-09-05 RX ADMIN — FENTANYL CITRATE 25 MCG: 50 INJECTION INTRAMUSCULAR; INTRAVENOUS at 07:45

## 2024-09-05 RX ADMIN — PROPOFOL 50 MCG/KG/MIN: 10 INJECTION, EMULSION INTRAVENOUS at 07:51

## 2024-09-05 RX ADMIN — LIDOCAINE HYDROCHLORIDE 25 MG: 10 INJECTION, SOLUTION EPIDURAL; INFILTRATION; INTRACAUDAL; PERINEURAL at 07:50

## 2024-09-05 RX ADMIN — SODIUM CHLORIDE, SODIUM LACTATE, POTASSIUM CHLORIDE, AND CALCIUM CHLORIDE 125 ML/HR: .6; .31; .03; .02 INJECTION, SOLUTION INTRAVENOUS at 07:34

## 2024-09-05 RX ADMIN — ONDANSETRON 4 MG: 2 INJECTION INTRAMUSCULAR; INTRAVENOUS at 07:45

## 2024-09-05 RX ADMIN — Medication 25 MG: at 07:50

## 2024-09-05 RX ADMIN — PROPOFOL 20 MG: 10 INJECTION, EMULSION INTRAVENOUS at 07:50

## 2024-09-05 RX ADMIN — FENTANYL CITRATE 25 MCG: 50 INJECTION INTRAMUSCULAR; INTRAVENOUS at 08:04

## 2024-09-05 RX ADMIN — MIDAZOLAM 2 MG: 1 INJECTION INTRAMUSCULAR; INTRAVENOUS at 07:45

## 2024-09-05 RX ADMIN — Medication 10 MG: at 08:04

## 2024-09-05 NOTE — DISCHARGE INSTR - AVS FIRST PAGE

## 2024-09-05 NOTE — ANESTHESIA POSTPROCEDURE EVALUATION
Post-Op Assessment Note    CV Status:  Stable    Pain management: adequate       Mental Status:  Alert and awake   Hydration Status:  Euvolemic   PONV Controlled:  Controlled   Airway Patency:  Patent     Post Op Vitals Reviewed: Yes    No anethesia notable event occurred.    Staff: CRNA               BP   136/78   Temp   97.8   Pulse  78   Resp   18   SpO2   98%

## 2024-09-05 NOTE — OP NOTE
OPERATIVE REPORT  PATIENT NAME: Jennifer Woodall    :  1960  MRN: [id number]  Pt Location: [facility]    SURGERY DATE: 2024    Surgeons and Role:     * Johnson Aranda MD - Primary    Preop Diagnosis:  Cervical radiculopathy [M54.12]    Post-Op Diagnosis Codes:     * Cervical radiculopathy [M54.12]    Procedure(s):  Left - Left C3-4 and C4-5 Radio Frequency Ablation    Specimen(s):  * No specimens in log *    Estimated Blood Loss:   Minimal    Drains:  * No LDAs found *    Anesthesia Type:   IV Sedation with Anesthesia    Operative Indications:  Cervical radiculopathy [M54.12]      Operative Findings:  same      Complications:   None    Procedure and Technique:  Fluoroscopically-guided Radiofrequency denervation of the left C3-C4 and C4-C5 facet joint(s)     After discussing the risks, benefits, and alternatives to the procedure, the patient expressed understanding and wished to proceed.  The patient was brought to the fluoroscopy suite and placed in the prone position.  Procedural pause conducted to verify:  correct patient identity, procedure to be performed and as applicable, correct side and site, correct patient position, and availability of implants, special equipment and special requirements.  Using fluoroscopy, the mid-body of the articular pillar at the left C3, C4, C5 levels were identified and marked.  The skin was sterilely prepped and draped in the usual fashion using Chloraprep skin prep.  The skin and subcutaneous tissue were anesthetized with 0.5% lidocaine.  Using fluoroscopic guidance, a 100 mm 22 gauge RFK RF cannula with a 10 mm active tip was advanced to each target.  This was confirmed using PA, lateral and oblique fluoroscopic views.  Motor testing was performed at 2Hz up to 2.5 volts, and measured tissue impedances were satisfactory.  With final needle positioning, there was no evidence of radicular stimulation.  Prior to lesioning, 1cc of 2% lidocaine was injected at each  site.  After waiting 2 minutes for local anesthesia to take effect, lesioning was performed at 90 degrees Celsius for 90 seconds. A slight repositioning of the needles was performed an lesioning was again performed at 90 degreees Celsius for 90 seconds. After RF treatment, each site was injected using 1cc of 0.25% bupivacaine. The patient tolerated the procedure well and there were no apparent complications.  After appropriate observation, the patient was dismissed from the clinic in good condition under their own power.              I was present for the entire procedure.    Patient Disposition:  hemodynamically stable             SIGNATURE: Johnson Aranda MD  DATE: September 5, 2024  TIME: 8:15 AM

## 2024-09-05 NOTE — INTERVAL H&P NOTE
H&P reviewed. After examining the patient I find no changes in the patients condition since the H&P had been written.    Vitals:    09/05/24 0717   BP: 110/75   Pulse: 70   Resp: 18   Temp: (!) 97.3 °F (36.3 °C)   SpO2: 99%

## 2024-09-05 NOTE — ANESTHESIA PREPROCEDURE EVALUATION
Procedure:  Left C3-4 and C4-5 Radio Frequency Ablation (Left: Spine Cervical)    Relevant Problems   CARDIO   (+) Hypertension   (+) Hypertriglyceridemia   (+) Migraine   (+) Other hyperlipidemia      ENDO   (+) Hypothyroid   (+) Type 2 diabetes mellitus (HCC)      GI/HEPATIC   (+) Gastroesophageal reflux disease      MUSCULOSKELETAL   (+) Cervical spondylosis   (+) Fibromyalgia, primary   (+) Lumbar degenerative disc disease   (+) Lumbar spondylosis   (+) Mid back pain   (+) Sacroiliitis (HCC)      NEURO/PSYCH   (+) Anxiety   (+) Chronic pain syndrome   (+) Depression   (+) Diabetic polyneuropathy associated with type 2 diabetes mellitus (HCC)   (+) Fibromyalgia, primary   (+) Migraine      PULMONARY   (+) COPD (chronic obstructive pulmonary disease) ([facility])   (+) Shortness of breath        Physical Exam    Airway    Mallampati score: II  TM Distance: >3 FB  Neck ROM: full     Dental    upper dentures and lower dentures    Cardiovascular      Pulmonary      Other Findings  post-pubertal.      Anesthesia Plan  ASA Score- 3     Anesthesia Type- IV sedation with anesthesia with ASA Monitors.         Additional Monitors:     Airway Plan:            Plan Factors-Exercise tolerance (METS): >4 METS.    Chart reviewed. EKG reviewed.   Patient summary reviewed.    Patient is a current smoker.  Patient instructed to abstain from smoking on day of procedure. Patient smoked on day of surgery.    There is medical exclusion for perioperative obstructive sleep apnea risk education.        Induction- intravenous.    Postoperative Plan-         Informed Consent- Anesthetic plan and risks discussed with patient.  I personally reviewed this patient with the CRNA. Discussed and agreed on the Anesthesia Plan with the CRNA..

## 2024-09-06 ENCOUNTER — TELEPHONE (OUTPATIENT)
Dept: PAIN MEDICINE | Facility: CLINIC | Age: 64
End: 2024-09-06

## 2024-09-06 NOTE — TELEPHONE ENCOUNTER
Yessi...    S/w pt s/p Lt C3-5 RFA RM on 9/5/24. Pt stated needle sites look good, denies S/S of infect, denies fever, denies soreness and denies sun burn like sensation. Pt states no pain at this time. Advised pt if they have any further pain to take OTC/prescribed meds and/or use ice/heat and that it can take 4-6wks to see full effect. Confirmed nxt appt w/ pt 10/7/24 @ 1145 w/ RM (pt rqst). Pt verbalized understanding.

## 2024-10-04 ENCOUNTER — TELEPHONE (OUTPATIENT)
Age: 64
End: 2024-10-04

## 2024-10-04 NOTE — TELEPHONE ENCOUNTER
Called and spoke to pt .She states she doesn't have her car and no transportation to the office appointment on Monday. I offered to reschedule and she said I don't want to and don't care anymore then proceeded to hang up

## 2024-10-04 NOTE — TELEPHONE ENCOUNTER
Attempted to reach pt again to reschedule appt.  Went to   Advised pt to call back to reschedule her appt so she has adequate time to arrange transportation    Reschedule appointment when pt calls back

## 2024-10-04 NOTE — TELEPHONE ENCOUNTER
I called pt back. She states she is alone, tired of helping others and they don't help her in return. I asked about helping her get transportation and she said she needs to provide 3 days notice, so again I tried to reschedule her appt so she would have time to request transportation. She stated she needs to take a  walk right now to get a money order. I asked if I could call her back later and she said yes if I have time and hung up

## 2024-10-04 NOTE — TELEPHONE ENCOUNTER
Caller: Patient     Doctor: Rosi     Reason for call: Patient called in to reschedule appointment.     Patient made alarming comment , she stated :She wishes life would just be over already , was attempting to reach out to nurse when patient disconnected the call.     Can clinical please reach out to patient to assist ?     Call back#: [phone]

## 2024-10-07 ENCOUNTER — TELEPHONE (OUTPATIENT)
Dept: PAIN MEDICINE | Facility: CLINIC | Age: 64
End: 2024-10-07

## 2024-10-07 NOTE — TELEPHONE ENCOUNTER
Can you please call the pt to reschedule her missed follow-up appointment. She needs 3 days to arrange transportation

## 2024-10-22 ENCOUNTER — APPOINTMENT (EMERGENCY)
Dept: RADIOLOGY | Facility: HOSPITAL | Age: 64
End: 2024-10-22
Payer: COMMERCIAL

## 2024-10-22 ENCOUNTER — HOSPITAL ENCOUNTER (EMERGENCY)
Facility: HOSPITAL | Age: 64
Discharge: HOME | End: 2024-10-23
Payer: COMMERCIAL

## 2024-10-22 VITALS
WEIGHT: 169.75 LBS | RESPIRATION RATE: 20 BRPM | HEART RATE: 77 BPM | OXYGEN SATURATION: 94 % | SYSTOLIC BLOOD PRESSURE: 152 MMHG | BODY MASS INDEX: 29.14 KG/M2 | DIASTOLIC BLOOD PRESSURE: 94 MMHG | TEMPERATURE: 98 F

## 2024-10-22 DIAGNOSIS — J44.1 COPD EXACERBATION (HCC): Primary | ICD-10-CM

## 2024-10-22 LAB
ALBUMIN SERPL BCG-MCNC: 3.9 G/DL (ref 3.5–5)
ALP SERPL-CCNC: 62 U/L (ref 34–104)
ALT SERPL W P-5'-P-CCNC: 10 U/L (ref 7–52)
ANION GAP SERPL CALCULATED.3IONS-SCNC: 9 MMOL/L (ref 4–13)
AST SERPL W P-5'-P-CCNC: 21 U/L (ref 13–39)
BASE EX.OXY STD BLDV CALC-SCNC: 85.8 % (ref 60–80)
BASE EXCESS BLDV CALC-SCNC: 1.9 MMOL/L
BASOPHILS # BLD AUTO: 0.03 THOUSANDS/ΜL (ref 0–0.1)
BASOPHILS NFR BLD AUTO: 0 % (ref 0–1)
BILIRUB SERPL-MCNC: 0.2 MG/DL (ref 0.2–1)
BNP SERPL-MCNC: 18 PG/ML (ref 0–100)
BUN SERPL-MCNC: 14 MG/DL (ref 5–25)
CALCIUM SERPL-MCNC: 8.6 MG/DL (ref 8.4–10.2)
CARDIAC TROPONIN I PNL SERPL HS: <2 NG/L
CHLORIDE SERPL-SCNC: 106 MMOL/L (ref 96–108)
CO2 SERPL-SCNC: 25 MMOL/L (ref 21–32)
CREAT SERPL-MCNC: 0.65 MG/DL (ref 0.6–1.3)
EOSINOPHIL # BLD AUTO: 0.15 THOUSAND/ΜL (ref 0–0.61)
EOSINOPHIL NFR BLD AUTO: 2 % (ref 0–6)
ERYTHROCYTE [DISTWIDTH] IN BLOOD BY AUTOMATED COUNT: 12.6 % (ref 11.6–15.1)
FLUAV AG UPPER RESP QL IA.RAPID: NEGATIVE
FLUBV AG UPPER RESP QL IA.RAPID: NEGATIVE
GFR SERPL CREATININE-BSD FRML MDRD: 95 ML/MIN/1.73SQ M
GLUCOSE SERPL-MCNC: 92 MG/DL (ref 65–140)
HCO3 BLDV-SCNC: 24.6 MMOL/L (ref 24–30)
HCT VFR BLD AUTO: 39.5 % (ref 34.8–46.1)
HGB BLD-MCNC: 13 G/DL (ref 11.5–15.4)
IMM GRANULOCYTES # BLD AUTO: 0.03 THOUSAND/UL (ref 0–0.2)
IMM GRANULOCYTES NFR BLD AUTO: 0 % (ref 0–2)
LIPASE SERPL-CCNC: 39 U/L (ref 11–82)
LYMPHOCYTES # BLD AUTO: 3.46 THOUSANDS/ΜL (ref 0.6–4.47)
LYMPHOCYTES NFR BLD AUTO: 46 % (ref 14–44)
MCH RBC QN AUTO: 31.1 PG (ref 26.8–34.3)
MCHC RBC AUTO-ENTMCNC: 32.9 G/DL (ref 31.4–37.4)
MCV RBC AUTO: 95 FL (ref 82–98)
MONOCYTES # BLD AUTO: 0.53 THOUSAND/ΜL (ref 0.17–1.22)
MONOCYTES NFR BLD AUTO: 7 % (ref 4–12)
NEUTROPHILS # BLD AUTO: 3.41 THOUSANDS/ΜL (ref 1.85–7.62)
NEUTS SEG NFR BLD AUTO: 45 % (ref 43–75)
NRBC BLD AUTO-RTO: 0 /100 WBCS
O2 CT BLDV-SCNC: 17 ML/DL
PCO2 BLDV: 32.6 MM HG (ref 42–50)
PH BLDV: 7.5 [PH] (ref 7.3–7.4)
PLATELET # BLD AUTO: 208 THOUSANDS/UL (ref 149–390)
PMV BLD AUTO: 10.3 FL (ref 8.9–12.7)
PO2 BLDV: 91 MM HG (ref 35–45)
POTASSIUM SERPL-SCNC: 4.1 MMOL/L (ref 3.5–5.3)
PROT SERPL-MCNC: 6.5 G/DL (ref 6.4–8.4)
RBC # BLD AUTO: 4.18 MILLION/UL (ref 3.81–5.12)
SARS-COV+SARS-COV-2 AG RESP QL IA.RAPID: NEGATIVE
SODIUM SERPL-SCNC: 140 MMOL/L (ref 135–147)
WBC # BLD AUTO: 7.61 THOUSAND/UL (ref 4.31–10.16)

## 2024-10-22 PROCEDURE — 96375 TX/PRO/DX INJ NEW DRUG ADDON: CPT

## 2024-10-22 PROCEDURE — 83880 ASSAY OF NATRIURETIC PEPTIDE: CPT

## 2024-10-22 PROCEDURE — 87804 INFLUENZA ASSAY W/OPTIC: CPT

## 2024-10-22 PROCEDURE — 99285 EMERGENCY DEPT VISIT HI MDM: CPT

## 2024-10-22 PROCEDURE — 71045 X-RAY EXAM CHEST 1 VIEW: CPT

## 2024-10-22 PROCEDURE — 93005 ELECTROCARDIOGRAM TRACING: CPT

## 2024-10-22 PROCEDURE — 85025 COMPLETE CBC W/AUTO DIFF WBC: CPT

## 2024-10-22 PROCEDURE — 82805 BLOOD GASES W/O2 SATURATION: CPT

## 2024-10-22 PROCEDURE — 96365 THER/PROPH/DIAG IV INF INIT: CPT

## 2024-10-22 PROCEDURE — 84484 ASSAY OF TROPONIN QUANT: CPT

## 2024-10-22 PROCEDURE — 94640 AIRWAY INHALATION TREATMENT: CPT

## 2024-10-22 PROCEDURE — 36415 COLL VENOUS BLD VENIPUNCTURE: CPT

## 2024-10-22 PROCEDURE — 87811 SARS-COV-2 COVID19 W/OPTIC: CPT

## 2024-10-22 PROCEDURE — 83690 ASSAY OF LIPASE: CPT

## 2024-10-22 PROCEDURE — 80053 COMPREHEN METABOLIC PANEL: CPT

## 2024-10-22 RX ORDER — METHYLPREDNISOLONE SODIUM SUCCINATE 125 MG/2ML
100 INJECTION, POWDER, LYOPHILIZED, FOR SOLUTION INTRAMUSCULAR; INTRAVENOUS ONCE
Status: COMPLETED | OUTPATIENT
Start: 2024-10-22 | End: 2024-10-22

## 2024-10-22 RX ORDER — SODIUM CHLORIDE 9 MG/ML
3 INJECTION INTRAVENOUS
Status: DISCONTINUED | OUTPATIENT
Start: 2024-10-22 | End: 2024-10-23 | Stop reason: HOSPADM

## 2024-10-22 RX ORDER — IPRATROPIUM BROMIDE AND ALBUTEROL SULFATE 2.5; .5 MG/3ML; MG/3ML
3 SOLUTION RESPIRATORY (INHALATION) ONCE
Status: COMPLETED | OUTPATIENT
Start: 2024-10-22 | End: 2024-10-22

## 2024-10-22 RX ORDER — ACETAMINOPHEN 325 MG/1
650 TABLET ORAL ONCE
Status: COMPLETED | OUTPATIENT
Start: 2024-10-23 | End: 2024-10-22

## 2024-10-22 RX ADMIN — ACETAMINOPHEN 650 MG: 325 TABLET, FILM COATED ORAL at 23:57

## 2024-10-22 RX ADMIN — AZITHROMYCIN MONOHYDRATE 500 MG: 500 INJECTION, POWDER, LYOPHILIZED, FOR SOLUTION INTRAVENOUS at 23:41

## 2024-10-22 RX ADMIN — IPRATROPIUM BROMIDE AND ALBUTEROL SULFATE 3 ML: .5; 3 SOLUTION RESPIRATORY (INHALATION) at 23:43

## 2024-10-22 RX ADMIN — METHYLPREDNISOLONE SODIUM SUCCINATE 100 MG: 125 INJECTION, POWDER, FOR SOLUTION INTRAMUSCULAR; INTRAVENOUS at 23:39

## 2024-10-23 LAB
ATRIAL RATE: 78 BPM
P AXIS: 55 DEGREES
PR INTERVAL: 148 MS
QRS AXIS: 70 DEGREES
QRSD INTERVAL: 58 MS
QT INTERVAL: 384 MS
QTC INTERVAL: 437 MS
T WAVE AXIS: 60 DEGREES
VENTRICULAR RATE: 78 BPM

## 2024-10-23 PROCEDURE — 93010 ELECTROCARDIOGRAM REPORT: CPT | Performed by: INTERNAL MEDICINE

## 2024-10-23 RX ORDER — PREDNISONE 20 MG/1
40 TABLET ORAL DAILY
Qty: 8 TABLET | Refills: 0 | Status: SHIPPED | OUTPATIENT
Start: 2024-10-23 | End: 2024-10-27

## 2024-10-23 RX ORDER — AZITHROMYCIN 250 MG/1
TABLET, FILM COATED ORAL
Qty: 4 TABLET | Refills: 0 | Status: SHIPPED | OUTPATIENT
Start: 2024-10-23 | End: 2024-10-27

## 2024-10-23 RX ORDER — LEVALBUTEROL INHALATION SOLUTION 1.25 MG/3ML
1.25 SOLUTION RESPIRATORY (INHALATION) EVERY 4 HOURS PRN
Qty: 90 ML | Refills: 0 | Status: SHIPPED | OUTPATIENT
Start: 2024-10-23

## 2024-10-23 NOTE — DISCHARGE INSTRUCTIONS
You have a COPD exacerbation likely from some type of upper respiratory infection (possibly viral, possibly due to bronchitis).  I am treating you with antibiotics, and steroids for the next 4 days.  For the next 24 hours you should take your albuterol nebulizer every 3-4 hours while awake.  Then after tomorrow you should space back out to your normal treatments.  If you are feeling significantly more short of breath, if you are having difficulty breathing, or if you are having severe chest pain, come back to the emergency department.

## 2024-10-23 NOTE — ED PROVIDER NOTES
Time reflects when diagnosis was documented in both MDM as applicable and the Disposition within this note       Time User Action Codes Description Comment    10/23/2024 12:01 AM Oliver Veliz Add [J44.1] COPD exacerbation (HCC)           ED Disposition       ED Disposition   Discharge    Condition   Stable    Date/Time   Wed Oct 23, 2024 12:01 AM    Comment   Jennifer Woodall discharge to home/self care.                   Assessment & Plan       Medical Decision Making  Medical complexity: 63-year-old female patient presenting with cough, and worsening shortness of breath over the last 2 days.  Suspicious for either viral upper respiratory infection, COPD exacerbation, or less likely a pneumonia or other infectious etiology.  Given patient's chest pain however will need rule out for ACS.  Will obtain EKG, serum troponin cardiac biomarker, and will obtain VBG to risk stratify her shortness of breath further.  Patient is not tachycardic, not hypoxic, does not have beta-blockade on board, and her shortness of breath has been indolent in nature without any severe pleuritic symptoms.  Therefore my suspicion of pulmonary embolus as a cause of her symptoms is very low.  Will obtain chest x-ray to evaluate for other causes of her shortness of breath such as pneumothorax, pleural effusion, or pneumonia/focal infiltrate.  Given the lower rib cage/epigastric pain, will evaluate for liver pathology and pancreatic pathology as possible causes of her symptoms especially given the fact that she did have an episode of vomiting yesterday.    Empiric steroid and azithromycin ordered for likely COPD exacerbation which is thought to be most likely cause of her symptoms currently.    Reassessment/disposition: Patient's workup did reveal slight lymphocyte predominance, possible that she has a viral upper respiratory infection which has triggered a COPD exacerbation.  Other lab workup was negative for any ACS or ACS concerns.  No sign of  left ventricular volume overload.  Chest x-ray was within normal limits.  At this time we will treat a COPD exacerbation with azithromycin and prednisone to be used at home as well as scheduled nebulizer treatments for the neck several days.  Patient will follow-up with her primary doctor and pulmonology team as scheduled.  She will come back to the emergency department if she feels significantly more short of breath or if she is having new/different chest pain.  Patient was discharged with improved symptoms, normal work of breathing, normal range vital signs, and no sign of respiratory distress.    Amount and/or Complexity of Data Reviewed  Labs: ordered. Decision-making details documented in ED Course.  Radiology: ordered and independent interpretation performed.    Risk  OTC drugs.  Prescription drug management.        ED Course as of 10/23/24 0257   Tue Oct 22, 2024   2334 pH, Kayode(!): 7.495  Consistent with hyperventilation   2339 Lymphocytes %(!): 46   2351 ECG interpreted by me.  Date: 10/22/2024.  Time: 2331.  Rate: 78 bpm.  Axis: Normal.  Rhythm: Regular.  This is a sinus rhythm with P waves preceding every QRS.  There is limitation secondary to artifact from patient's spinal stimulator however cannot decipher ST segments and see no ST segment elevations or depressions evident.  There are no T waves that are abnormally inverted or flattened.  There are no pathologic Q waves present on this ECG.  Interpretation: Similar to prior from 4/29/2024, technically abnormal ECG but without acute ischemic change when compared to prior.   Wed Oct 23, 2024   0018 Patient ambulates to restroom with minimal assistance, no SOB       Medications   sodium chloride (PF) 0.9 % injection 3 mL (has no administration in time range)   methylPREDNISolone sodium succinate (Solu-MEDROL) injection 100 mg (100 mg Intravenous Given 10/22/24 2339)   ipratropium-albuterol (DUO-NEB) 0.5-2.5 mg/3 mL inhalation solution 3 mL (3 mL  Nebulization Given 10/22/24 2343)   azithromycin (ZITHROMAX) 500 mg in sodium chloride 0.9% 250mL IVPB 500 mg (0 mg Intravenous Stopped 10/23/24 0056)   acetaminophen (TYLENOL) tablet 650 mg (650 mg Oral Given 10/22/24 2357)       ED Risk Strat Scores   HEART Risk Score      Flowsheet Row Most Recent Value   Heart Score Risk Calculator    History 0 Filed at: 10/23/2024 0003   ECG 1 Filed at: 10/23/2024 0003   Age 1 Filed at: 10/23/2024 0003   Risk Factors 1 Filed at: 10/23/2024 0003   Troponin 0 Filed at: 10/23/2024 0003   HEART Score 3 Filed at: 10/23/2024 0003                               SBIRT 20yo+      Flowsheet Row Most Recent Value   Initial Alcohol Screen: US AUDIT-C     1. How often do you have a drink containing alcohol? 0 Filed at: 10/22/2024 2322   2. How many drinks containing alcohol do you have on a typical day you are drinking?  0 Filed at: 10/22/2024 2322   3b. FEMALE Any Age, or MALE 65+: How often do you have 4 or more drinks on one occassion? 0 Filed at: 10/22/2024 2322   Audit-C Score 0 Filed at: 10/22/2024 2322   ABIOLA: How many times in the past year have you...    Used an illegal drug or used a prescription medication for non-medical reasons? Never Filed at: 10/22/2024 2322                            History of Present Illness       Chief Complaint   Patient presents with    Shortness of Breath     EMS arrival, pt presents with sob and cough x 2 days, hx of COPD       Past Medical History:   Diagnosis Date    Ambulates with cane     Anxiety     Arthritis     Asthma     Bipolar 1 disorder (HCC)     Brain aneurysm     Chronic pain disorder     spinal stenosis    Concussion syndrome     neurological rx and balance rx    COPD (chronic obstructive pulmonary disease) (HCC)     Depression     Diabetes mellitus (HCC)     controlled w/ diet    Disease of thyroid gland     nodules     Family history of colon cancer     father    Fibromyalgia, primary     GERD (gastroesophageal reflux disease)      History of colon polyps     Hyperlipidemia     Hypertension     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     Irritable bowel syndrome     Lumbar degenerative disc disease 02/16/2022    Migraine     Neuropathy     bilateral feet and hands    Peripheral neuropathy     Psychiatric disorder     PTSD (post-traumatic stress disorder)     Shortness of breath     Sleep apnea     had 3 studies & last one negative    Stroke (HCC)     2012 no deficeits 2018 2019 TIA    TIA (transient ischemic attack)     Wears dentures     Wears glasses       Past Surgical History:   Procedure Laterality Date    ABDOMINAL SURGERY      laproscopic/ endometriosis    BRAIN SURGERY  2017    aneurysm/ coiling procedure    CARPAL TUNNEL RELEASE      CHOLECYSTECTOMY      COLONOSCOPY      DENTAL SURGERY      EPIDURAL BLOCK INJECTION Right 03/03/2022    Procedure: C7-T1 interlaminar epidural steroid injection;  Surgeon: Johnson Aranda MD;  [facility];  Service: Pain Management     EPIDURAL BLOCK INJECTION N/A 04/21/2022    Procedure: C6-C7 BLOCK / INJECTION EPIDURAL STEROID CERVICAL;  Surgeon: Johnson Aranda MD;  [facility];  Service: Pain Management     EPIDURAL BLOCK INJECTION Left 06/21/2022    Procedure: BLOCK / INJECTION EPIDURAL STEROID LUMBAR  left L3-4 TFESI;  Surgeon: Johnson Aranda MD;  [facility];  Service: Pain Management     EYE SURGERY      cataract    FASCIOTOMY Right 10/27/2023    Procedure: FASCIOTOMY OF RIGHT UPPER EXTREMITY; POSSIBLE VAC DRESSING APPLICATION;  Surgeon: Bruno Blevins MD;  [facility];  Service: General    FL GUIDED NEEDLE PLAC BX/ASP/INJ  03/03/2022    FL GUIDED NEEDLE PLAC BX/ASP/INJ  11/17/2022    HYSTERECTOMY      NERVE BLOCK Left 02/20/2024    Procedure: BLOCK MEDIAL BRANCH  left C3-4 and C4-5 MBB #1;  Surgeon: Johnson Aranda MD;  [facility];  Service: Pain Management     NERVE BLOCK Left 04/04/2024    Procedure: BLOCK MEDIAL BRANCH  Left C3-C4 and C4-5 MBB2;  Surgeon: Johnson Aranda MD;  [facility];  Service: Pain Management     NH ESOPHAGOGASTRODUODENOSCOPY TRANSORAL DIAGNOSTIC N/A 02/08/2018    Procedure: EGD AND COLONOSCOPY;  Surgeon: Caleb Pete MD;  Location:  GI LAB;  Service: Gastroenterology    NH EXC B9 LESION MRGN XCP SK TG F/E/E/N/L/M > 4.0CM Right 5/28/2024    Procedure: EXCISION LIPOMA;  Surgeon: Valdo Montoya DO;  [facility];  Service: General    NH PRQ IMPLTJ NSTIM ELECTRODE ARRAY EPIDURAL N/A 11/17/2022    Procedure: NEVRO SCS TRIAL;  Surgeon: Johnson Aranda MD;  [facility];  Service: Pain Management     NH PRQ IMPLTJ NSTIM ELECTRODE ARRAY EPIDURAL N/A 02/01/2023    Procedure: Insertion percutaneous thoracic spinal cord stimulator with left buttock implantable pulse generator;  Surgeon: Craig Robert Goldberg, MD;  [facility];  Service: Neurosurgery    RADIOFREQUENCY ABLATION Left 6/19/2024    Procedure: Left C3-4 and C4-5 Radio Frequency Ablation;  Surgeon: Johnson Aranda MD;  [facility];  Service: Pain Management     RADIOFREQUENCY ABLATION Left 9/5/2024    Procedure: Left C3-4 and C4-5 Radio Frequency Ablation;  Surgeon: Johnson Aranda MD;  [facility];  Service: Pain Management     SPLIT THICKNESS SKIN GRAFT Right 11/07/2023    Procedure: SKIN GRAFT SPLIT THICKNESS (STSG)  EXTREMITY;  Surgeon: Samm Sims MD;  [facility];  Service: General    THYROID SURGERY      TONSILLECTOMY      US GUIDED THYROID BIOPSY  11/30/2016    US GUIDED THYROID BIOPSY  03/21/2018    VAC DRESSING APPLICATION Right 10/29/2023    Procedure: CHANGE DRESSING/VAC RIGHT UPPER EXTREMITY; WASHOUT; PARTIAL CLOSURE;  Surgeon: Irina Day DO;  [facility];  Service: General    WOUND DEBRIDEMENT Right 11/04/2023    Procedure: SIMPLE CLOSURE 3.5CM, OASIS APPLICATION 15X7CM, WOUND VAC APPLICATION;  Surgeon: Maryuri Goldstein MD;   [facility];  Service: General      Family History   Problem Relation Age of Onset    Brain cancer Mother     Alzheimer's disease Mother     Alzheimer's disease Father     Parkinsonism Father       Social History     Tobacco Use    Smoking status: Every Day     Current packs/day: 2.00     Types: Cigarettes    Smokeless tobacco: Never   Vaping Use    Vaping status: Never Used   Substance Use Topics    Alcohol use: Not Currently    Drug use: Never     Comment: prescribed Belbuca      E-Cigarette/Vaping    E-Cigarette Use Never User       E-Cigarette/Vaping Substances    Nicotine No     THC No     CBD No     Flavoring No     Other No     Unknown No       I have reviewed and agree with the history as documented.     This is a 63-year-old female who has a known history of COPD, hypothyroidism, hypertension, chronic pain syndrome, and anxiety who is presenting today with concern for worsening shortness of breath and cough over the past 2 days.  Patient reports that her symptoms began about 2 days ago with a mild nonproductive cough.  Her cough is worsened over the past day or so.  She did reach out to her pulmonologist at [facility] who advised her to be seen yesterday however patient states that she thought she would recover today.  She states that today however she has had ongoing shortness of breath, chest tightness, and a cough which is occasionally productive of clear sputum.  She states that her cough is more severe than her typical baseline COPD cough.  Patient does not require any home supplemental oxygen, and has not required oxygen per EMS prehospital.  Patient is denying any fevers or chills at home, and is denying any sore throat, nasal congestion.  She does state that tonight while coughing she is experiencing worsening chest tightness that she localizes to the lower aspect of her anterior chest.  Patient does endorse 1 episode of vomiting yesterday but says that this is not totally abnormal for her.  She  otherwise is denying any new abdominal pain, urinary symptoms, diarrhea or constipation.  Patient last used her nebulizer treatment around 8 PM tonight.        Review of Systems   Constitutional:  Positive for fatigue. Negative for chills and fever.   HENT:  Negative for ear pain and sore throat.    Eyes:  Negative for pain and visual disturbance.   Respiratory:  Positive for cough, chest tightness and shortness of breath.    Cardiovascular:  Positive for chest pain. Negative for palpitations.   Gastrointestinal:  Negative for abdominal pain and vomiting.   Genitourinary:  Negative for dysuria and hematuria.   Musculoskeletal:  Negative for arthralgias and back pain.   Skin:  Negative for color change and rash.   Neurological:  Negative for seizures and syncope.   All other systems reviewed and are negative.          Objective       ED Triage Vitals [10/22/24 2322]   Temperature Pulse Blood Pressure Respirations SpO2 Patient Position - Orthostatic VS   98 °F (36.7 °C) 77 152/94 20 94 % Lying      Temp Source Heart Rate Source BP Location FiO2 (%) Pain Score    Temporal Monitor Left arm -- No Pain      Vitals      Date and Time Temp Pulse SpO2 Resp BP Pain Score FACES Pain Rating User   10/22/24 2357 -- -- -- -- -- 7 -- CS   10/22/24 2322 98 °F (36.7 °C) 77 94 % 20 152/94 No Pain -- BB            Physical Exam  Vitals and nursing note reviewed.   Constitutional:       General: She is not in acute distress.     Appearance: She is well-developed and normal weight.   HENT:      Head: Normocephalic and atraumatic.      Right Ear: External ear normal.      Left Ear: External ear normal.      Nose: Nose normal. No congestion or rhinorrhea.      Mouth/Throat:      Mouth: Mucous membranes are moist.      Pharynx: Oropharynx is clear. No oropharyngeal exudate or posterior oropharyngeal erythema.   Eyes:      General: No scleral icterus.     Extraocular Movements: Extraocular movements intact.      Conjunctiva/sclera:  Conjunctivae normal.      Pupils: Pupils are equal, round, and reactive to light.   Cardiovascular:      Rate and Rhythm: Normal rate and regular rhythm.      Pulses: Normal pulses.      Heart sounds: Normal heart sounds. No murmur heard.  Pulmonary:      Effort: Pulmonary effort is normal. Tachypnea (22 to 24 breaths/min) present. No accessory muscle usage or respiratory distress.      Breath sounds: Decreased breath sounds (Diffusely) and wheezing (end expiratory) present. No rhonchi.   Chest:      Chest wall: Tenderness (Replicable tenderness along lower rib cage anteriorly) present.   Abdominal:      General: Abdomen is flat. There is no distension.      Palpations: Abdomen is soft.      Tenderness: There is no abdominal tenderness. There is no guarding.   Musculoskeletal:         General: No swelling.      Cervical back: Neck supple. No rigidity.      Right lower leg: No edema.      Left lower leg: No edema.   Lymphadenopathy:      Cervical: No cervical adenopathy.   Skin:     General: Skin is warm and dry.      Capillary Refill: Capillary refill takes less than 2 seconds.      Coloration: Skin is not jaundiced.      Findings: No rash.   Neurological:      General: No focal deficit present.      Mental Status: She is alert and oriented to person, place, and time. Mental status is at baseline.   Psychiatric:         Mood and Affect: Mood is anxious.         Behavior: Behavior normal.         Results Reviewed       Procedure Component Value Units Date/Time    HS Troponin 0hr (reflex protocol) [[id number]]  (Normal) Collected: 10/22/24 2326    Lab Status: Final result Specimen: Blood from Line, Venous Updated: 10/22/24 2359     hs TnI 0hr <2 ng/L     B-Type Natriuretic Peptide(BNP) [[id number]]  (Normal) Collected: 10/22/24 2326    Lab Status: Final result Specimen: Blood from Line, Venous Updated: 10/22/24 2358     BNP 18 pg/mL     Comprehensive metabolic panel [[id number]] Collected: 10/22/24 2326    Lab Status:  Final result Specimen: Blood from Line, Venous Updated: 10/22/24 2355     Sodium 140 mmol/L      Potassium 4.1 mmol/L      Chloride 106 mmol/L      CO2 25 mmol/L      ANION GAP 9 mmol/L      BUN 14 mg/dL      Creatinine 0.65 mg/dL      Glucose 92 mg/dL      Calcium 8.6 mg/dL      AST 21 U/L      ALT 10 U/L      Alkaline Phosphatase 62 U/L      Total Protein 6.5 g/dL      Albumin 3.9 g/dL      Total Bilirubin 0.20 mg/dL      eGFR 95 ml/min/1.73sq m     Narrative:      National Kidney Disease Foundation guidelines for Chronic Kidney Disease (CKD):     Stage 1 with normal or high GFR (GFR > 90 mL/min/1.73 square meters)    Stage 2 Mild CKD (GFR = 60-89 mL/min/1.73 square meters)    Stage 3A Moderate CKD (GFR = 45-59 mL/min/1.73 square meters)    Stage 3B Moderate CKD (GFR = 30-44 mL/min/1.73 square meters)    Stage 4 Severe CKD (GFR = 15-29 mL/min/1.73 square meters)    Stage 5 End Stage CKD (GFR <15 mL/min/1.73 square meters)  Note: GFR calculation is accurate only with a steady state creatinine    Lipase [[id number]]  (Normal) Collected: 10/22/24 2326    Lab Status: Final result Specimen: Blood from Line, Venous Updated: 10/22/24 2355     Lipase 39 u/L     FLU/COVID Rapid Antigen (30 min. TAT) - Preferred screening test in ED [[id number]]  (Normal) Collected: 10/22/24 2326    Lab Status: Final result Specimen: Nares from Nose Updated: 10/22/24 2354     SARS COV Rapid Antigen Negative     Influenza A Rapid Antigen Negative     Influenza B Rapid Antigen Negative    Narrative:      This test has been performed using the Quidel Harper 2 FLU+SARS Antigen test under the Emergency Use Authorization (EUA). This test has been validated by the  and verified by the performing laboratory. The Harper uses lateral flow immunofluorescent sandwich assay to detect SARS-COV, Influenza A and Influenza B Antigen.     The Quidel Harper 2 SARS Antigen test does not differentiate between SARS-CoV and SARS-CoV-2.     Negative results  are presumptive and may be confirmed with a molecular assay, if necessary, for patient management. Negative results do not rule out SARS-CoV-2 or influenza infection and should not be used as the sole basis for treatment or patient management decisions. A negative test result may occur if the level of antigen in a sample is below the limit of detection of this test.     Positive results are indicative of the presence of viral antigens, but do not rule out bacterial infection or co-infection with other viruses.     All test results should be used as an adjunct to clinical observations and other information available to the provider.    FOR PEDIATRIC PATIENTS - copy/paste COVID Guidelines URL to browser: [email or web address]    Blood gas, venous [[id number]]  (Abnormal) Collected: 10/22/24 2326    Lab Status: Final result Specimen: Blood from Line, Venous Updated: 10/22/24 2333     pH, Kayode 7.495     pCO2, Kaoyde 32.6 mm Hg      pO2, Kayode 91.0 mm Hg      HCO3, Kayode 24.6 mmol/L      Base Excess, Akyode 1.9 mmol/L      O2 Content, Kayode 17.0 ml/dL      O2 HGB, VENOUS 85.8 %     CBC and differential [[id number]]  (Abnormal) Collected: 10/22/24 2326    Lab Status: Final result Specimen: Blood from Line, Venous Updated: 10/22/24 2332     WBC 7.61 Thousand/uL      RBC 4.18 Million/uL      Hemoglobin 13.0 g/dL      Hematocrit 39.5 %      MCV 95 fL      MCH 31.1 pg      MCHC 32.9 g/dL      RDW 12.6 %      MPV 10.3 fL      Platelets 208 Thousands/uL      nRBC 0 /100 WBCs      Segmented % 45 %      Immature Grans % 0 %      Lymphocytes % 46 %      Monocytes % 7 %      Eosinophils Relative 2 %      Basophils Relative 0 %      Absolute Neutrophils 3.41 Thousands/µL      Absolute Immature Grans 0.03 Thousand/uL      Absolute Lymphocytes 3.46 Thousands/µL      Absolute Monocytes 0.53 Thousand/µL      Eosinophils Absolute 0.15 Thousand/µL      Basophils Absolute 0.03 Thousands/µL              X-ray chest 1 view portable   ED Interpretation by Oliver Veliz MD (10/22 7512)   No acute focal infiltrate, no pneumothorax, no pleural effusions noted          Procedures    ED Medication and Procedure Management   Prior to Admission Medications   Prescriptions Last Dose Informant Patient Reported? Taking?   Alcohol Swabs (Alcohol Pads) 70 % PADS  Care Giver, Self Yes No   Sig: USE TO TEST BLOOD GLUCOSE 2-3 TIMES DAILY   Buprenorphine HCl (Belbuca) 300 MCG FILM  Self, Care Giver Yes No   Sig: Apply 300 mcg to cheek daily at bedtime   HYDROcodone-acetaminophen (NORCO)  mg per tablet  Self, Care Giver Yes No   Sig: Take 1 tablet by mouth 3 (three) times a day   OneTouch Ultra test strip  Care Giver, Self Yes No   Sig: USE TO TEST BLOOD GLUCOSE 2-3 TIMES DAILY   True Comfort Safety Lancets MISC  Care Giver, Self Yes No   Sig: USE TO TEST BLOOD GLUCOSE 2-3 TIMES DAILY   albuterol (PROVENTIL HFA,VENTOLIN HFA) 90 mcg/act inhaler  Self, Care Giver No No   Sig: Inhale 2 puffs every 6 (six) hours as needed for wheezing or shortness of breath   ammonium lactate (LAC-HYDRIN) 12 % lotion  Care Giver, Self Yes No   Sig: as needed   atorvastatin (LIPITOR) 80 mg tablet  Care Giver, Self Yes No   Sig: Take 80 mg by mouth every evening   benzonatate (TESSALON PERLES) 100 mg capsule  Care Giver, Self No No   Sig: Take 1 capsule (100 mg total) by mouth 3 (three) times a day as needed for cough   carboxymethylcellulose 0.5 % SOLN  Self, Care Giver No No   Sig: Administer 1 drop to both eyes daily as needed for dry eyes   clonazePAM (KlonoPIN) 0.5 mg tablet  Self, Care Giver Yes No   Sig: Take 0.5 mg by mouth daily at bedtime   clonazePAM (KlonoPIN) 1 mg tablet  Self, Care Giver Yes No   Sig: Take 1 mg by mouth daily in the early morning   dexamethasone (DECADRON) 4 mg tablet   Yes No   Sig: Take 4 mg by mouth   dicyclomine (BENTYL) 20 mg tablet  Self, Care Giver Yes No   Sig: Take 20 mg by mouth every 6 (six) hours    famotidine (PEPCID) 40 MG tablet   No No   Sig: Take 1 tablet (40 mg total) by mouth daily at bedtime   fenofibrate (TRICOR) 48 mg tablet  Self, Care Giver Yes No   Sig: Take 48 mg by mouth daily   fluticasone (FLONASE) 50 mcg/act nasal spray  Care Giver, Self Yes No   Si sprays into each nostril daily   fluticasone-umeclidinium-vilanterol (Trelegy Ellipta) 200-62.5-25 mcg/actuation AEPB inhaler  Self, Care Giver No No   Sig: Inhale 1 puff daily Rinse mouth after use.   lamoTRIgine (LaMICtal) 100 mg tablet  Care Giver, Self Yes No   Sig: Take 100 mg by mouth 2 (two) times a day   latanoprost (XALATAN) 0.005 % ophthalmic solution  Care Giver, Self Yes No   Sig: instill 1 drop into both eyes at bedtime   levalbuterol (XOPENEX) 1.25 mg/3 mL nebulizer solution  Care Giver Yes No   levalbuterol (XOPENEX) 1.25 mg/3 mL nebulizer solution   No Yes   Sig: Take 3 mL (1.25 mg total) by nebulization every 4 (four) hours as needed for wheezing or shortness of breath   levothyroxine 137 mcg tablet  Self, Care Giver Yes No   Sig: Take 137 mcg by mouth daily   omeprazole (PriLOSEC) 40 MG capsule   No No   Sig: TAKE 1 CAPSULE BY MOUTH DAILY IN THE MORNING PRIOR TO A MEAL.   ondansetron (ZOFRAN) 8 mg tablet   No No   Sig: TAKE 1 TABLET (8 MG TOTAL) BY MOUTH EVERY 8 (EIGHT) HOURS AS NEEDED FOR NAUSEA OR VOMITING   oxyCODONE-acetaminophen (Percocet)  mg per tablet   No No   Sig: Take 1 tablet by mouth every 4 (four) hours as needed for moderate pain Max Daily Amount: 6 tablets   pregabalin (LYRICA) 100 mg capsule  Self, Care Giver Yes No   Sig: Take 100 mg by mouth 3 (three) times a day Morning-lunch-dinner   pregabalin (LYRICA) 150 mg capsule  Self, Care Giver Yes No   Sig: take 1 capsule by mouth NIGHTLY   rimegepant sulfate (Nurtec) 75 mg TBDP  Care Giver Yes No   Sig: Take 75 mg by mouth once   tiZANidine (ZANAFLEX) 4 mg tablet  Care Giver, Self Yes No   Sig: Take 8 mg by mouth once 1 tablet   topiramate (TOPAMAX) 200  MG tablet  Care Giver, Self Yes No   Si mg 2 (two) times a day   traZODone (DESYREL) 100 mg tablet  Care Giver Yes No   Sig: Take 100 mg by mouth      Facility-Administered Medications: None     Discharge Medication List as of 10/23/2024 12:03 AM        START taking these medications    Details   azithromycin (ZITHROMAX) 250 mg tablet Take 1 tablet daily x 4 days, Normal      predniSONE 20 mg tablet Take 2 tablets (40 mg total) by mouth daily for 4 days, Starting Wed 10/23/2024, Until Sun 10/27/2024, Normal           CONTINUE these medications which have NOT CHANGED    Details   albuterol (PROVENTIL HFA,VENTOLIN HFA) 90 mcg/act inhaler Inhale 2 puffs every 6 (six) hours as needed for wheezing or shortness of breath, Starting 2023, Normal      Alcohol Swabs (Alcohol Pads) 70 % PADS USE TO TEST BLOOD GLUCOSE 2-3 TIMES DAILY, Historical Med      ammonium lactate (LAC-HYDRIN) 12 % lotion as needed, Historical Med      atorvastatin (LIPITOR) 80 mg tablet Take 80 mg by mouth every evening, Starting Fri 10/14/2022, Historical Med      benzonatate (TESSALON PERLES) 100 mg capsule Take 1 capsule (100 mg total) by mouth 3 (three) times a day as needed for cough, Starting 2023, Normal      Buprenorphine HCl (Belbuca) 300 MCG FILM Apply 300 mcg to cheek daily at bedtime, Historical Med      carboxymethylcellulose 0.5 % SOLN Administer 1 drop to both eyes daily as needed for dry eyes, Starting 2019, Normal      !! clonazePAM (KlonoPIN) 0.5 mg tablet Take 0.5 mg by mouth daily at bedtime, Historical Med      !! clonazePAM (KlonoPIN) 1 mg tablet Take 1 mg by mouth daily in the early morning, Starting u 2018, Historical Med      dexamethasone (DECADRON) 4 mg tablet Take 4 mg by mouth, Starting 2024, Historical Med      dicyclomine (BENTYL) 20 mg tablet Take 20 mg by mouth every 6 (six) hours, Historical Med      famotidine (PEPCID) 40 MG tablet Take 1 tablet (40 mg total) by mouth  daily at bedtime, Starting Mon 5/20/2024, Normal      fenofibrate (TRICOR) 48 mg tablet Take 48 mg by mouth daily, Historical Med      fluticasone (FLONASE) 50 mcg/act nasal spray 2 sprays into each nostril daily, Starting Wed 1/11/2023, Historical Med      fluticasone-umeclidinium-vilanterol (Trelegy Ellipta) 200-62.5-25 mcg/actuation AEPB inhaler Inhale 1 puff daily Rinse mouth after use., Starting Wed 2/14/2024, Until Thu 9/5/2024, Normal      HYDROcodone-acetaminophen (NORCO)  mg per tablet Take 1 tablet by mouth 3 (three) times a day, Historical Med      lamoTRIgine (LaMICtal) 100 mg tablet Take 100 mg by mouth 2 (two) times a day, Starting Tue 1/16/2018, Until Wed 6/19/2024, Historical Med      latanoprost (XALATAN) 0.005 % ophthalmic solution instill 1 drop into both eyes at bedtime, Historical Med      levothyroxine 137 mcg tablet Take 137 mcg by mouth daily, Starting Mon 1/21/2019, Historical Med      omeprazole (PriLOSEC) 40 MG capsule TAKE 1 CAPSULE BY MOUTH DAILY IN THE MORNING PRIOR TO A MEAL., Normal      ondansetron (ZOFRAN) 8 mg tablet TAKE 1 TABLET (8 MG TOTAL) BY MOUTH EVERY 8 (EIGHT) HOURS AS NEEDED FOR NAUSEA OR VOMITING, Starting Fri 6/7/2024, Normal      OneTouch Ultra test strip USE TO TEST BLOOD GLUCOSE 2-3 TIMES DAILY, Historical Med      oxyCODONE-acetaminophen (Percocet)  mg per tablet Take 1 tablet by mouth every 4 (four) hours as needed for moderate pain Max Daily Amount: 6 tablets, Starting Mon 6/3/2024, Normal      !! pregabalin (LYRICA) 100 mg capsule Take 100 mg by mouth 3 (three) times a day Morning-lunch-dinner, Starting Thu 10/26/2017, Historical Med      !! pregabalin (LYRICA) 150 mg capsule take 1 capsule by mouth NIGHTLY, Historical Med      rimegepant sulfate (Nurtec) 75 mg TBDP Take 75 mg by mouth once, Historical Med      tiZANidine (ZANAFLEX) 4 mg tablet Take 8 mg by mouth once 1 tablet, Starting Sat 10/8/2022, Historical Med      topiramate (TOPAMAX) 200 MG  tablet 200 mg 2 (two) times a day, Starting Mon 6/5/2023, Historical Med      traZODone (DESYREL) 100 mg tablet Take 100 mg by mouth, Starting Fri 5/10/2024, Historical Med      True Comfort Safety Lancets MISC USE TO TEST BLOOD GLUCOSE 2-3 TIMES DAILY, Historical Med      levalbuterol (XOPENEX) 1.25 mg/3 mL nebulizer solution Historical Med       !! - Potential duplicate medications found. Please discuss with provider.        No discharge procedures on file.  ED SEPSIS DOCUMENTATION   Time reflects when diagnosis was documented in both MDM as applicable and the Disposition within this note       Time User Action Codes Description Comment    10/23/2024 12:01 AM Oliver Veliz Add [J44.1] COPD exacerbation (HCC)                  Oliver Veliz MD  10/23/24 0257

## 2024-11-04 RX ORDER — POLYETHYLENE GLYCOL 3350 17 G/17G
17 POWDER, FOR SOLUTION ORAL DAILY
COMMUNITY
Start: 2024-09-20

## 2024-11-06 ENCOUNTER — OFFICE VISIT (OUTPATIENT)
Dept: PAIN MEDICINE | Facility: CLINIC | Age: 64
End: 2024-11-06
Payer: COMMERCIAL

## 2024-11-06 VITALS
BODY MASS INDEX: 28.44 KG/M2 | TEMPERATURE: 98.2 F | HEIGHT: 64 IN | HEART RATE: 78 BPM | RESPIRATION RATE: 16 BRPM | WEIGHT: 166.6 LBS | OXYGEN SATURATION: 96 % | DIASTOLIC BLOOD PRESSURE: 74 MMHG | SYSTOLIC BLOOD PRESSURE: 118 MMHG

## 2024-11-06 DIAGNOSIS — G89.4 CHRONIC PAIN SYNDROME: ICD-10-CM

## 2024-11-06 DIAGNOSIS — M47.812 CERVICAL SPONDYLOSIS: Primary | ICD-10-CM

## 2024-11-06 DIAGNOSIS — M54.2 NECK PAIN: ICD-10-CM

## 2024-11-06 PROCEDURE — 99214 OFFICE O/P EST MOD 30 MIN: CPT | Performed by: ANESTHESIOLOGY

## 2024-11-06 NOTE — PROGRESS NOTES
Assessment:  1. Cervical spondylosis    2. Neck pain    3. Chronic pain syndrome        Plan:  Patient is a 63-year-old female complains of neck pain, bilateral shoulder pain, low back pain, bilateral feet pain with palpation secondary to peripheral neuropathy, cervical spondylosis, lumbar degenerative disease, lumbar radiculopathy status post lumbar spinal cord stimulator presents to office for follow-up visit.  Patient was status post left C3-C4 and C4-C5 radiofrequency ablation and reports 60% relief from procedure. She notes increased pain in the right side of the neck.  Right C3-4 and C4-5 MBB #1  F/u 1 month after injection      Complete risks and benefits including bleeding, infection, tissue reaction, nerve injury and allergic reaction were discussed. The approach was demonstrated using models and literature was provided. Verbal and written consent was obtained.    There are risks associated with opioid medications, including dependence, addiction and tolerance. The patient understands and agrees to use these medications only as prescribed. Potential side effects of the medications include, but are not limited to, constipation, drowsiness, addiction, impaired judgment and risk of fatal overdose if not taken as prescribed. The patient was warned against driving while taking sedation medications.  Sharing medications is a felony. At this point in time, the patient is showing no signs of addiction, abuse, diversion or suicidal ideation.        History of Present Illness:  The patient is a 63 y.o. female who presents for a follow up office visit in regards to Neck Pain.   The patient’s current symptoms include 10/10 constant sharp, stabbing and throbbing pain time particular time pattern.    Current pain medications includes:  Norco 10/325 mg.  The patient reports that this regimen is providing 60% pain relief.  The patient is reporting no side effects from this pain medication regimen.    I have personally  reviewed and/or updated the patient's past medical history, past surgical history, family history, social history, current medications, allergies, and vital signs today.         Review of Systems  Review of Systems   Respiratory:  Positive for shortness of breath.    Cardiovascular:  Positive for chest pain.   Gastrointestinal:  Positive for nausea.   Musculoskeletal:  Positive for back pain, gait problem, joint swelling, myalgias, neck pain and neck stiffness.        Decreased ROM  Joint stiffness  Pain    Neurological:  Positive for dizziness.        Memory loss   All other systems reviewed and are negative.          Past Medical History:   Diagnosis Date    Ambulates with cane     Anxiety     Arthritis     Asthma     Bipolar 1 disorder ([facility])     Brain aneurysm     Chronic pain disorder     spinal stenosis    Concussion syndrome     neurological rx and balance rx    COPD (chronic obstructive pulmonary disease) ([facility])     Depression     Diabetes mellitus ([facility])     controlled w/ diet    Disease of thyroid gland     nodules     Family history of colon cancer     father    Fibromyalgia, primary     GERD (gastroesophageal reflux disease)     History of colon polyps     Hyperlipidemia     Hypertension     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     Irritable bowel syndrome     Lumbar degenerative disc disease 02/16/2022    Migraine     Neuropathy     bilateral feet and hands    Peripheral neuropathy     Psychiatric disorder     PTSD (post-traumatic stress disorder)     Shortness of breath     Sleep apnea     had 3 studies & last one negative    Stroke ([facility])     2012 no deficeits 2018 2019 TIA    TIA (transient ischemic attack)     Wears dentures     Wears glasses        Past Surgical History:   Procedure Laterality Date    ABDOMINAL SURGERY      laproscopic/ endometriosis    BRAIN SURGERY  2017    aneurysm/ coiling procedure    CARPAL TUNNEL RELEASE      CHOLECYSTECTOMY      COLONOSCOPY      DENTAL SURGERY       EPIDURAL BLOCK INJECTION Right 03/03/2022    Procedure: C7-T1 interlaminar epidural steroid injection;  Surgeon: Johnson Aranda MD;  [facility];  Service: Pain Management     EPIDURAL BLOCK INJECTION N/A 04/21/2022    Procedure: C6-C7 BLOCK / INJECTION EPIDURAL STEROID CERVICAL;  Surgeon: Johnson Aranda MD;  [facility];  Service: Pain Management     EPIDURAL BLOCK INJECTION Left 06/21/2022    Procedure: BLOCK / INJECTION EPIDURAL STEROID LUMBAR  left L3-4 TFESI;  Surgeon: Johnson Aranda MD;  [facility];  Service: Pain Management     EYE SURGERY      cataract    FASCIOTOMY Right 10/27/2023    Procedure: FASCIOTOMY OF RIGHT UPPER EXTREMITY; POSSIBLE VAC DRESSING APPLICATION;  Surgeon: Bruno Blevins MD;  [facility];  Service: General    FL GUIDED NEEDLE PLAC BX/ASP/INJ  03/03/2022    FL GUIDED NEEDLE PLAC BX/ASP/INJ  11/17/2022    HYSTERECTOMY      NERVE BLOCK Left 02/20/2024    Procedure: BLOCK MEDIAL BRANCH  left C3-4 and C4-5 MBB #1;  Surgeon: Johnson Aranda MD;  [facility];  Service: Pain Management     NERVE BLOCK Left 04/04/2024    Procedure: BLOCK MEDIAL BRANCH Left C3-C4 and C4-5 MBB2;  Surgeon: Johnson Aranda MD;  [facility];  Service: Pain Management     ME ESOPHAGOGASTRODUODENOSCOPY TRANSORAL DIAGNOSTIC N/A 02/08/2018    Procedure: EGD AND COLONOSCOPY;  Surgeon: Caleb Pete MD;  [facility];  Service: Gastroenterology    ME EXC B9 LESION MRGN XCP SK TG F/E/E/N/L/M > 4.0CM Right 5/28/2024    Procedure: EXCISION LIPOMA;  Surgeon: Valdo Montoya DO;  [facility];  Service: General    ME PRQ IMPLTJ NSTIM ELECTRODE ARRAY EPIDURAL N/A 11/17/2022    Procedure: NEVRO SCS TRIAL;  Surgeon: Johnson Aranda MD;  [facility];  Service: Pain Management     ME PRQ IMPLTJ NSTIM ELECTRODE ARRAY EPIDURAL N/A 02/01/2023    Procedure: Insertion percutaneous thoracic spinal cord stimulator with left  buttock implantable pulse generator;  Surgeon: Craig Robert Goldberg, MD;  [facility];  Service: Neurosurgery    RADIOFREQUENCY ABLATION Left 6/19/2024    Procedure: Left C3-4 and C4-5 Radio Frequency Ablation;  Surgeon: Johnson Aranda MD;  [facility];  Service: Pain Management     RADIOFREQUENCY ABLATION Left 9/5/2024    Procedure: Left C3-4 and C4-5 Radio Frequency Ablation;  Surgeon: Johnson Aranda MD;  [facility];  Service: Pain Management     SPLIT THICKNESS SKIN GRAFT Right 11/07/2023    Procedure: SKIN GRAFT SPLIT THICKNESS (STSG)  EXTREMITY;  Surgeon: Samm Sims MD;  [facility];  Service: General    THYROID SURGERY      TONSILLECTOMY      US GUIDED THYROID BIOPSY  11/30/2016    US GUIDED THYROID BIOPSY  03/21/2018    VAC DRESSING APPLICATION Right 10/29/2023    Procedure: CHANGE DRESSING/VAC RIGHT UPPER EXTREMITY; WASHOUT; PARTIAL CLOSURE;  Surgeon: Irina Day DO;  [facility];  Service: General    WOUND DEBRIDEMENT Right 11/04/2023    Procedure: SIMPLE CLOSURE 3.5CM, OASIS APPLICATION 15X7CM, WOUND VAC APPLICATION;  Surgeon: Maryuri Goldstein MD;  [facility];  Service: General       Family History   Problem Relation Age of Onset    Brain cancer Mother     Alzheimer's disease Mother     Alzheimer's disease Father     Parkinsonism Father        Social History     Occupational History    Not on file   Tobacco Use    Smoking status: Every Day     Current packs/day: 2.00     Types: Cigarettes    Smokeless tobacco: Never   Vaping Use    Vaping status: Never Used   Substance and Sexual Activity    Alcohol use: Not Currently    Drug use: Never     Comment: prescribed Belbuca    Sexual activity: Yes     Partners: Male         Current Outpatient Medications:     albuterol (PROVENTIL HFA,VENTOLIN HFA) 90 mcg/act inhaler, Inhale 2 puffs every 6 (six) hours as needed for wheezing or shortness of breath, Disp: 18 g, Rfl: 5    Alcohol  Swabs (Alcohol Pads) 70 % PADS, USE TO TEST BLOOD GLUCOSE 2-3 TIMES DAILY, Disp: , Rfl:     ammonium lactate (LAC-HYDRIN) 12 % lotion, as needed, Disp: , Rfl:     atorvastatin (LIPITOR) 80 mg tablet, Take 80 mg by mouth every evening, Disp: , Rfl:     benzonatate (TESSALON PERLES) 100 mg capsule, Take 1 capsule (100 mg total) by mouth 3 (three) times a day as needed for cough, Disp: 30 capsule, Rfl: 1    Buprenorphine HCl (Belbuca) 300 MCG FILM, Apply 300 mcg to cheek daily at bedtime, Disp: , Rfl:     carboxymethylcellulose 0.5 % SOLN, Administer 1 drop to both eyes daily as needed for dry eyes, Disp: 1 Bottle, Rfl: 0    clonazePAM (KlonoPIN) 0.5 mg tablet, Take 0.5 mg by mouth daily at bedtime, Disp: , Rfl:     clonazePAM (KlonoPIN) 1 mg tablet, Take 1 mg by mouth daily in the early morning, Disp: , Rfl:     dexamethasone (DECADRON) 4 mg tablet, Take 4 mg by mouth, Disp: , Rfl:     dicyclomine (BENTYL) 20 mg tablet, Take 20 mg by mouth every 6 (six) hours, Disp: , Rfl:     famotidine (PEPCID) 40 MG tablet, Take 1 tablet (40 mg total) by mouth daily at bedtime, Disp: 30 tablet, Rfl: 5    fenofibrate (TRICOR) 48 mg tablet, Take 48 mg by mouth daily, Disp: , Rfl:     fluticasone (FLONASE) 50 mcg/act nasal spray, 2 sprays into each nostril daily, Disp: , Rfl:     fluticasone-umeclidinium-vilanterol (Trelegy Ellipta) 200-62.5-25 mcg/actuation AEPB inhaler, Inhale 1 puff daily Rinse mouth after use., Disp: 60 blister, Rfl: 5    HYDROcodone-acetaminophen (NORCO)  mg per tablet, Take 1 tablet by mouth 3 (three) times a day, Disp: , Rfl:     lamoTRIgine (LaMICtal) 100 mg tablet, Take 100 mg by mouth 2 (two) times a day, Disp: , Rfl:     latanoprost (XALATAN) 0.005 % ophthalmic solution, instill 1 drop into both eyes at bedtime, Disp: , Rfl:     levalbuterol (XOPENEX) 1.25 mg/3 mL nebulizer solution, Take 3 mL (1.25 mg total) by nebulization every 4 (four) hours as needed for wheezing or shortness of breath, Disp: 90  "mL, Rfl: 0    levothyroxine 137 mcg tablet, Take 137 mcg by mouth daily, Disp: , Rfl:     omeprazole (PriLOSEC) 40 MG capsule, TAKE 1 CAPSULE BY MOUTH DAILY IN THE MORNING PRIOR TO A MEAL., Disp: 30 capsule, Rfl: 5    ondansetron (ZOFRAN) 8 mg tablet, TAKE 1 TABLET (8 MG TOTAL) BY MOUTH EVERY 8 (EIGHT) HOURS AS NEEDED FOR NAUSEA OR VOMITING, Disp: 20 tablet, Rfl: 5    OneTouch Ultra test strip, USE TO TEST BLOOD GLUCOSE 2-3 TIMES DAILY, Disp: , Rfl:     oxyCODONE-acetaminophen (Percocet)  mg per tablet, Take 1 tablet by mouth every 4 (four) hours as needed for moderate pain Max Daily Amount: 6 tablets, Disp: 12 tablet, Rfl: 0    polyethylene glycol (GLYCOLAX) 17 GM/SCOOP powder, Take 17 g by mouth daily, Disp: , Rfl:     pregabalin (LYRICA) 100 mg capsule, Take 100 mg by mouth 3 (three) times a day Morning-lunch-dinner, Disp: , Rfl:     pregabalin (LYRICA) 150 mg capsule, take 1 capsule by mouth NIGHTLY, Disp: , Rfl:     rimegepant sulfate (Nurtec) 75 mg TBDP, Take 75 mg by mouth once, Disp: , Rfl:     tiZANidine (ZANAFLEX) 4 mg tablet, Take 8 mg by mouth once 1 tablet, Disp: , Rfl:     topiramate (TOPAMAX) 200 MG tablet, 200 mg 2 (two) times a day, Disp: , Rfl:     traZODone (DESYREL) 100 mg tablet, Take 100 mg by mouth, Disp: , Rfl:     True Comfort Safety Lancets MISC, USE TO TEST BLOOD GLUCOSE 2-3 TIMES DAILY, Disp: , Rfl:     Allergies   Allergen Reactions    Hydroxyzine Anaphylaxis     Claims it gives her convulsions.     Bee Pollen Other (See Comments)     familial    Pollen Extract Itching    Tree Extract     Clarithromycin Rash     Pt denies    Ibuprofen Nausea Only    Latex Rash    Sulfa Antibiotics Rash     \"severe skin burn\"       Physical Exam:    /74   Pulse 78   Temp 98.2 °F (36.8 °C)   Resp 16   Ht 5' 4\" (1.626 m)   Wt 75.6 kg (166 lb 9.6 oz)   SpO2 96%   BMI 28.60 kg/m²     Constitutional:normal, well developed, well nourished, alert, in no distress and non-toxic and no overt pain " behavior.  Eyes:anicteric  HEENT:grossly intact  Neck:supple, symmetric, trachea midline and no masses   Pulmonary:even and unlabored  Cardiovascular:No edema or pitting edema present  Skin:Normal without rashes or lesions and well hydrated  Psychiatric:Mood and affect appropriate  Neurologic:Cranial Nerves II-XII grossly intact  Musculoskeletal:antalgic    Cervical Spine examination demonstrates. Decreased ROM secondary to pain with lateral rotation to the left/right and bending to the left/right, in addition to neck flexion. 5/5 upper extremity strength in all muscle groups bilaterally. Negative Spurling's maneuver to the b/l Ue, sensitivity to light touch intact b/l Ue.    Imaging          No orders to display       No orders of the defined types were placed in this encounter.

## 2024-12-17 NOTE — PRE-PROCEDURE INSTRUCTIONS
Pre-Surgery Instructions:   Medication Instructions    albuterol (PROVENTIL HFA,VENTOLIN HFA) 90 mcg/act inhaler Uses PRN- OK to take day of surgery    ammonium lactate (LAC-HYDRIN) 12 % lotion Hold day of surgery.    atorvastatin (LIPITOR) 80 mg tablet Take night before surgery    benzonatate (TESSALON PERLES) 100 mg capsule Uses PRN- OK to take day of surgery    Buprenorphine HCl (Belbuca) 300 MCG FILM Take night before surgery    carboxymethylcellulose 0.5 % SOLN Uses PRN- OK to take day of surgery    clonazePAM (KlonoPIN) 0.5 mg tablet Take night before surgery    clonazePAM (KlonoPIN) 1 mg tablet Take day of surgery.    dicyclomine (BENTYL) 20 mg tablet Uses PRN- OK to take day of surgery    famotidine (PEPCID) 40 MG tablet Uses PRN- OK to take day of surgery    fenofibrate (TRICOR) 48 mg tablet Take night before surgery    fluticasone (FLONASE) 50 mcg/act nasal spray Uses PRN- OK to take day of surgery    fluticasone-umeclidinium-vilanterol (Trelegy Ellipta) 200-62.5-25 mcg/actuation AEPB inhaler Take day of surgery.    HYDROcodone-acetaminophen (NORCO)  mg per tablet Uses PRN- OK to take day of surgery    latanoprost (XALATAN) 0.005 % ophthalmic solution Take night before surgery    levalbuterol (XOPENEX) 1.25 mg/3 mL nebulizer solution Uses PRN- OK to take day of surgery    levothyroxine 137 mcg tablet Take day of surgery.    omeprazole (PriLOSEC) 40 MG capsule Take day of surgery.    ondansetron (ZOFRAN) 8 mg tablet Uses PRN- OK to take day of surgery    polyethylene glycol (GLYCOLAX) 17 GM/SCOOP powder Hold day of surgery.    pregabalin (LYRICA) 100 mg capsule Take day of surgery.    pregabalin (LYRICA) 150 mg capsule Take day of surgery.    rimegepant sulfate (Nurtec) 75 mg TBDP Take day of surgery.    tiZANidine (ZANAFLEX) 4 mg tablet Take day of surgery.    topiramate (TOPAMAX) 200 MG tablet Take day of surgery.    traZODone (DESYREL) 100 mg tablet Take night before surgery    Medication  instructions for day surgery reviewed. Please use only a sip of water to take your instructed medications. Avoid all over the counter vitamins, supplements and NSAIDS for one week prior to surgery per anesthesia guidelines. Tylenol is ok to take as needed.     You will receive a call one business day prior to surgery with an arrival time and hospital directions. If your surgery is scheduled on a Monday, the hospital will be calling you on the Friday prior to your surgery. If you have not heard from anyone by 8pm, please call the hospital supervisor through the hospital  at [phone]. ([place] [phone] or [place] [phone]).    Do not eat or drink anything after midnight the night before your surgery, including candy, mints, lifesavers, or chewing gum. Do not drink alcohol 24hrs before your surgery. Try not to smoke at least 24hrs before your surgery.       Follow the pre surgery showering instructions as listed in the “My Surgical Experience Booklet” or otherwise provided by your surgeon's office. Do not use a blade to shave the surgical area 1 week before surgery. It is okay to use a clean electric clippers up to 24 hours before surgery. Do not apply any lotions, creams, including makeup, cologne, deodorant, or perfumes after showering on the day of your surgery. Do not use dry shampoo, hair spray, hair gel, or any type of hair products.   Patient has spinal stim Nevro will bring remote day of surgery.    No contact lenses, eye make-up, or artificial eyelashes. Remove nail polish, including gel polish, and any artificial, gel, or acrylic nails if possible. Remove all jewelry including rings and body piercing jewelry.     Wear causal clothing that is easy to take on and off. Consider your type of surgery.    Keep any valuables, jewelry, piercings at home. Please bring any specially ordered equipment (sling, braces) if indicated.    Arrange for a responsible person to drive you to and from the  hospital on the day of your surgery. Please confirm the visitor policy for the day of your procedure when you receive your phone call with an arrival time.     Call the surgeon's office with any new illnesses, exposures, or additional questions prior to surgery.    Please reference your “My Surgical Experience Booklet” for additional information to prepare for your upcoming surgery.

## 2024-12-18 ENCOUNTER — ANESTHESIA EVENT (OUTPATIENT)
Dept: PERIOP | Facility: HOSPITAL | Age: 64
End: 2024-12-18
Payer: COMMERCIAL

## 2024-12-19 ENCOUNTER — APPOINTMENT (OUTPATIENT)
Dept: RADIOLOGY | Facility: HOSPITAL | Age: 64
End: 2024-12-19
Payer: COMMERCIAL

## 2024-12-19 ENCOUNTER — HOSPITAL ENCOUNTER (OUTPATIENT)
Facility: HOSPITAL | Age: 64
Setting detail: OUTPATIENT SURGERY
Discharge: HOME | End: 2024-12-19
Attending: ANESTHESIOLOGY | Admitting: ANESTHESIOLOGY
Payer: COMMERCIAL

## 2024-12-19 ENCOUNTER — ANESTHESIA (OUTPATIENT)
Dept: PERIOP | Facility: HOSPITAL | Age: 64
End: 2024-12-19
Payer: COMMERCIAL

## 2024-12-19 VITALS
DIASTOLIC BLOOD PRESSURE: 73 MMHG | BODY MASS INDEX: 27.66 KG/M2 | TEMPERATURE: 97.3 F | OXYGEN SATURATION: 98 % | HEART RATE: 57 BPM | WEIGHT: 162 LBS | SYSTOLIC BLOOD PRESSURE: 107 MMHG | HEIGHT: 64 IN | RESPIRATION RATE: 18 BRPM

## 2024-12-19 DIAGNOSIS — M50.120 CERVICAL DISC DISORDER WITH RADICULOPATHY OF MID-CERVICAL REGION: ICD-10-CM

## 2024-12-19 DIAGNOSIS — R12 HEARTBURN: ICD-10-CM

## 2024-12-19 DIAGNOSIS — M54.12 CERVICAL RADICULOPATHY: Primary | ICD-10-CM

## 2024-12-19 DIAGNOSIS — M47.812 CERVICAL SPONDYLOSIS: ICD-10-CM

## 2024-12-19 DIAGNOSIS — G89.4 CHRONIC PAIN SYNDROME: ICD-10-CM

## 2024-12-19 DIAGNOSIS — M54.2 NECK PAIN: ICD-10-CM

## 2024-12-19 LAB — GLUCOSE SERPL-MCNC: 137 MG/DL (ref 65–140)

## 2024-12-19 PROCEDURE — 82948 REAGENT STRIP/BLOOD GLUCOSE: CPT

## 2024-12-19 PROCEDURE — 64491 INJ PARAVERT F JNT C/T 2 LEV: CPT | Performed by: ANESTHESIOLOGY

## 2024-12-19 PROCEDURE — 64490 INJ PARAVERT F JNT C/T 1 LEV: CPT | Performed by: ANESTHESIOLOGY

## 2024-12-19 RX ORDER — FENTANYL CITRATE 50 UG/ML
INJECTION, SOLUTION INTRAMUSCULAR; INTRAVENOUS AS NEEDED
Status: DISCONTINUED | OUTPATIENT
Start: 2024-12-19 | End: 2024-12-19

## 2024-12-19 RX ORDER — PROPOFOL 10 MG/ML
INJECTION, EMULSION INTRAVENOUS AS NEEDED
Status: DISCONTINUED | OUTPATIENT
Start: 2024-12-19 | End: 2024-12-19

## 2024-12-19 RX ORDER — FAMOTIDINE 40 MG/1
40 TABLET, FILM COATED ORAL
Qty: 90 TABLET | Refills: 1 | Status: SHIPPED | OUTPATIENT
Start: 2024-12-19

## 2024-12-19 RX ORDER — ONDANSETRON 2 MG/ML
4 INJECTION INTRAMUSCULAR; INTRAVENOUS ONCE AS NEEDED
Status: DISCONTINUED | OUTPATIENT
Start: 2024-12-19 | End: 2024-12-19 | Stop reason: HOSPADM

## 2024-12-19 RX ORDER — MIDAZOLAM HYDROCHLORIDE 2 MG/2ML
INJECTION, SOLUTION INTRAMUSCULAR; INTRAVENOUS AS NEEDED
Status: DISCONTINUED | OUTPATIENT
Start: 2024-12-19 | End: 2024-12-19

## 2024-12-19 RX ORDER — LIDOCAINE HYDROCHLORIDE 20 MG/ML
INJECTION, SOLUTION EPIDURAL; INFILTRATION; INTRACAUDAL; PERINEURAL AS NEEDED
Status: DISCONTINUED | OUTPATIENT
Start: 2024-12-19 | End: 2024-12-19 | Stop reason: HOSPADM

## 2024-12-19 RX ORDER — LIDOCAINE HYDROCHLORIDE 10 MG/ML
0.5 INJECTION, SOLUTION EPIDURAL; INFILTRATION; INTRACAUDAL; PERINEURAL ONCE AS NEEDED
Status: DISCONTINUED | OUTPATIENT
Start: 2024-12-19 | End: 2024-12-19 | Stop reason: HOSPADM

## 2024-12-19 RX ORDER — FENTANYL CITRATE/PF 50 MCG/ML
25 SYRINGE (ML) INJECTION
Status: DISCONTINUED | OUTPATIENT
Start: 2024-12-19 | End: 2024-12-19 | Stop reason: HOSPADM

## 2024-12-19 RX ORDER — LIDOCAINE HYDROCHLORIDE 10 MG/ML
INJECTION, SOLUTION EPIDURAL; INFILTRATION; INTRACAUDAL; PERINEURAL AS NEEDED
Status: DISCONTINUED | OUTPATIENT
Start: 2024-12-19 | End: 2024-12-19

## 2024-12-19 RX ORDER — SODIUM CHLORIDE, SODIUM LACTATE, POTASSIUM CHLORIDE, CALCIUM CHLORIDE 600; 310; 30; 20 MG/100ML; MG/100ML; MG/100ML; MG/100ML
125 INJECTION, SOLUTION INTRAVENOUS CONTINUOUS
Status: DISCONTINUED | OUTPATIENT
Start: 2024-12-19 | End: 2024-12-19 | Stop reason: HOSPADM

## 2024-12-19 RX ADMIN — LIDOCAINE HYDROCHLORIDE 50 MG: 10 INJECTION, SOLUTION EPIDURAL; INFILTRATION; INTRACAUDAL; PERINEURAL at 08:41

## 2024-12-19 RX ADMIN — PROPOFOL 20 MG: 10 INJECTION, EMULSION INTRAVENOUS at 08:48

## 2024-12-19 RX ADMIN — MIDAZOLAM 2 MG: 1 INJECTION INTRAMUSCULAR; INTRAVENOUS at 08:37

## 2024-12-19 RX ADMIN — FENTANYL CITRATE 50 MCG: 50 INJECTION INTRAMUSCULAR; INTRAVENOUS at 08:46

## 2024-12-19 RX ADMIN — FENTANYL CITRATE 50 MCG: 50 INJECTION INTRAMUSCULAR; INTRAVENOUS at 08:41

## 2024-12-19 RX ADMIN — SODIUM CHLORIDE, SODIUM LACTATE, POTASSIUM CHLORIDE, AND CALCIUM CHLORIDE: .6; .31; .03; .02 INJECTION, SOLUTION INTRAVENOUS at 08:31

## 2024-12-19 RX ADMIN — PROPOFOL 30 MG: 10 INJECTION, EMULSION INTRAVENOUS at 08:43

## 2024-12-19 NOTE — ANESTHESIA POSTPROCEDURE EVALUATION
Post-Op Assessment Note    CV Status:  Stable  Pain Score: 5    Pain management: adequate       Mental Status:  Awake and sleepy   Hydration Status:  Euvolemic   PONV Controlled:  Controlled   Airway Patency:  Patent     Post Op Vitals Reviewed: Yes    No anethesia notable event occurred.    Staff: CRNA       Last Filed PACU Vitals:  Vitals Value Taken Time   Temp     Pulse 62 12/19/24 0902   BP     Resp 15 12/19/24 0902   SpO2 98 % 12/19/24 0902   Vitals shown include unfiled device data.    Modified Brianna:  No data recorded

## 2024-12-19 NOTE — DISCHARGE INSTR - AVS FIRST PAGE

## 2024-12-19 NOTE — ANESTHESIA PREPROCEDURE EVALUATION
Procedure:  Right C3-4 and C4-5 Medial branch block #1 (Right: Spine Cervical)    Mod COPD    EF60%    Relevant Problems   CARDIO   (+) Brain aneurysm   (+) Carotid artery aneurysm (HCC)   (+) Coronary artery calcification seen on CAT scan   (+) Hypertension   (+) Hypertriglyceridemia   (+) Migraine   (+) Other hyperlipidemia      ENDO   (+) Hypothyroid   (+) Type 2 diabetes mellitus (HCC)      GI/HEPATIC   (+) Gastroesophageal reflux disease      MUSCULOSKELETAL   (+) Cervical spondylosis   (+) Fibromyalgia, primary   (+) Lumbar degenerative disc disease   (+) Lumbar spondylosis   (+) Mid back pain      NEURO/PSYCH   (+) Anxiety   (+) Chronic pain syndrome   (+) Depression   (+) Diabetic polyneuropathy associated with type 2 diabetes mellitus (HCC)   (+) Fibromyalgia, primary   (+) Migraine      PULMONARY   (+) COPD (chronic obstructive pulmonary disease) (HCC)   (+) Shortness of breath        Physical Exam    Airway    Mallampati score: III  TM Distance: >3 FB  Neck ROM: full     Dental    upper dentures and lower dentures,     Cardiovascular  Rhythm: regular, Rate: normal, Cardiovascular exam normal    Pulmonary  Pulmonary exam normal Breath sounds clear to auscultation    Other Findings  post-pubertal.      Anesthesia Plan  ASA Score- 3     Anesthesia Type- IV sedation with anesthesia with ASA Monitors.         Additional Monitors:     Airway Plan:     Comment: Discussed risks/benefits, including medication reactions, awareness, aspiration, and serious/life threatening complications. Plan to maintain native airway with IVGA, monitored with EtCO2.       Plan Factors-Exercise tolerance (METS): >4 METS.    Chart reviewed.    Patient summary reviewed.      Patient instructed to abstain from smoking on day of procedure. Patient did not smoke on day of surgery.            Induction- intravenous.    Postoperative Plan-         Informed Consent- Anesthetic plan and risks discussed with patient.  I personally reviewed  this patient with the CRNA. Discussed and agreed on the Anesthesia Plan with the CRNA..

## 2024-12-19 NOTE — ANESTHESIA POSTPROCEDURE EVALUATION
Post-Op Assessment Note    CV Status:  Stable  Pain Score: 0    Pain management: adequate       Mental Status:  Awake and sleepy   Hydration Status:  Stable   PONV Controlled:  None   Airway Patency:  Patent     Post Op Vitals Reviewed: Yes    No anethesia notable event occurred.    Staff: Anesthesiologist           Last Filed PACU Vitals:  Vitals Value Taken Time   Temp 97.8 °F (36.6 °C) 12/19/24 0913   Pulse 61 12/19/24 0917   /73 12/19/24 0915   Resp 61 12/19/24 0917   SpO2 99 % 12/19/24 0917   Vitals shown include unfiled device data.    Modified Brianna:  Activity: 2 (12/19/2024  9:13 AM)  Respiration: 2 (12/19/2024  9:13 AM)  Circulation: 2 (12/19/2024  9:13 AM)  Consciousness: 2 (12/19/2024  9:13 AM)  Oxygen Saturation: 2 (12/19/2024  9:13 AM)  Modified Brianna Score: 10 (12/19/2024  9:13 AM)

## 2024-12-19 NOTE — H&P
Assessment:  1. Cervical radiculopathy  FL spine and pain procedure    FL spine and pain procedure      2. Cervical disc disorder with radiculopathy of mid-cervical region  FL spine and pain procedure    FL spine and pain procedure      3. Chronic pain syndrome  FL spine and pain procedure    FL spine and pain procedure      4. Neck pain  FL spine and pain procedure    FL spine and pain procedure      5. Cervical spondylosis  FL spine and pain procedure    FL spine and pain procedure          Plan:  Jennifer Woodall is a 63 y.o. female with complaints of neck pain presents to surgical center for procedure.  We will perform a Procedure(s) (LRB):  Right C3-4 and C4-5 Medial branch block #1 (Right)   2. Follow-up 1 month after injection    Complete risks and benefits including bleeding, infection, tissue reaction, nerve injury and allergic reaction were discussed. The approach was demonstrated using models and literature was provided. Verbal and written consent was obtained.    My impressions and treatment recommendations were discussed in detail with the patient who verbalized understanding and had no further questions.  Discharge instructions were provided. I personally saw and examined the patient and I agree with the above discussed plan of care.    Orders Placed This Encounter   Procedures    FL spine and pain procedure     Standing Status:   Standing     Number of Occurrences:   1     Reason for Exam::   Right C3-4 and C4-5 Medial branch block     Anticoagulant hold needed?:   na    Diet NPO; Sips with meds     Standing Status:   Standing     Number of Occurrences:   1     Diet Type:   NPO     NPO Except::   Sips with meds    POCT glucose     Standing Status:   Standing     Number of Occurrences:   1    Notify physician     Standing Status:   Standing     Number of Occurrences:   1     Temperature greater than:   100.4     Systolic blood pressure greater than:   200     Systolic blood pressure less than:   90      Diastolic blood pressure greater than:   105     Diastolic blood pressure less than:   50     Heart rate greater than:   120     Respiratory rate greater than:   25     SpO2 less than:   88     Other:   Urinary output less than 30 ml/ hr    Insert peripheral IV     Use the largest reasonable catheter size.     Standing Status:   Standing     Number of Occurrences:   1    Check fingerstick glucose on all diabetic patients. Call if greater than 180.     Standing Status:   Standing     Number of Occurrences:   1    Urine Pregnancy (Holding Area Only) POCT     Please hold for genetically male patients, those post-menopausal, or patients status post hysterectomy. If patient refuses immediately contact surgeon and anesthesiologist - do not clear for surgery until results/addressed.     Standing Status:   Standing     Number of Occurrences:   1     New Medications Ordered This Visit   Medications    lidocaine (PF) (XYLOCAINE-MPF) 1 % injection 0.5 mL    lactated ringers infusion       History of Present Illness:  Jennifer Woodall is a 63 y.o. female who presents for a follow up office visit in regards to neck pain.   The patient’s current symptoms include 8/10 constant sharp, stabbing, throbbing pain without any particular time pattern.      I have personally reviewed and/or updated the patient's past medical history, past surgical history, family history, social history, current medications, allergies, and vital signs today.     Review of Systems   Musculoskeletal:  Positive for arthralgias, neck pain and neck stiffness.   All other systems reviewed and are negative.      Patient Active Problem List   Diagnosis    Other hyperlipidemia    Dizziness    Hypothyroid    Brain aneurysm    Hypokalemia    Hypertriglyceridemia    Low HDL (under 40)    Elevated TSH    Tobacco abuse    Intractable abdominal pain    TMJ syndrome    Stroke-like symptoms    Nausea    Left chest pressure    Hypophosphatemia    Hypotension    Tobacco use     COPD (chronic obstructive pulmonary disease) ([facility])    Hypertension    Constipation    Fibromyalgia, primary    Bipolar 1 disorder ([facility])    Depression    Chronic pain syndrome    Anxiety    Migraine    Syncope and collapse    Mid back pain    Cervical radiculopathy    Neck pain    Lumbar degenerative disc disease    Lumbar radiculopathy    Lumbar spondylosis    Cervical disc disorder with radiculopathy of mid-cervical region    Sacroiliitis ([facility])    Carotid artery aneurysm ([facility])    Irritable bowel syndrome with both constipation and diarrhea    Shortness of breath    Cervical spondylosis    Diabetic polyneuropathy associated with type 2 diabetes mellitus ([facility])    Coronary artery calcification seen on CAT scan    Pulmonary nodules/lesions, multiple    S/P insertion of spinal cord stimulator    Common bile duct dilation    Gastroesophageal reflux disease    Fall    Compartment syndrome ([facility])    Type 2 diabetes mellitus ([facility])    Subcutaneous mass of right forearm    Polypharmacy    Lipoma of right upper extremity    Lesion of parotid gland    Heartburn       Past Medical History:   Diagnosis Date    Ambulates with cane     Anxiety     Arthritis     Asthma     Bipolar 1 disorder ([facility])     Brain aneurysm     Chronic pain disorder     spinal stenosis    Concussion syndrome     neurological rx and balance rx    COPD (chronic obstructive pulmonary disease) ([facility])     Depression     Diabetes mellitus ([facility])     controlled w/ diet    Disease of thyroid gland     nodules     Family history of colon cancer     father    Fibromyalgia, primary     GERD (gastroesophageal reflux disease)     History of colon polyps     Hyperlipidemia     Hypertension     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     Irritable bowel syndrome     Lumbar degenerative disc disease 02/16/2022    Migraine     Neuropathy     bilateral feet and hands    Peripheral neuropathy     Psychiatric disorder     PTSD (post-traumatic stress disorder)      Shortness of breath     Sleep apnea     had 3 studies & last one negative    Stroke (HCC)     2012 no deficeits 2018 2019 TIA    TIA (transient ischemic attack)     Wears dentures     Wears glasses        Past Surgical History:   Procedure Laterality Date    ABDOMINAL SURGERY      laproscopic/ endometriosis    BRAIN SURGERY  2017    aneurysm/ coiling procedure    CARPAL TUNNEL RELEASE      CHOLECYSTECTOMY      COLONOSCOPY      DENTAL SURGERY      EPIDURAL BLOCK INJECTION Right 03/03/2022    Procedure: C7-T1 interlaminar epidural steroid injection;  Surgeon: Johnson Aranda MD;  [facility];  Service: Pain Management     EPIDURAL BLOCK INJECTION N/A 04/21/2022    Procedure: C6-C7 BLOCK / INJECTION EPIDURAL STEROID CERVICAL;  Surgeon: Johnson Aranda MD;  [facility];  Service: Pain Management     EPIDURAL BLOCK INJECTION Left 06/21/2022    Procedure: BLOCK / INJECTION EPIDURAL STEROID LUMBAR  left L3-4 TFESI;  Surgeon: Johnson Aranda MD;  [facility];  Service: Pain Management     EYE SURGERY      cataract    FASCIOTOMY Right 10/27/2023    Procedure: FASCIOTOMY OF RIGHT UPPER EXTREMITY; POSSIBLE VAC DRESSING APPLICATION;  Surgeon: Bruno Blevins MD;  [facility];  Service: General    FL GUIDED NEEDLE PLAC BX/ASP/INJ  03/03/2022    FL GUIDED NEEDLE PLAC BX/ASP/INJ  11/17/2022    HYSTERECTOMY      NERVE BLOCK Left 02/20/2024    Procedure: BLOCK MEDIAL BRANCH  left C3-4 and C4-5 MBB #1;  Surgeon: Johnson Aranda MD;  [facility];  Service: Pain Management     NERVE BLOCK Left 04/04/2024    Procedure: BLOCK MEDIAL BRANCH Left C3-C4 and C4-5 MBB2;  Surgeon: Johnson Aranda MD;  [facility];  Service: Pain Management     ND ESOPHAGOGASTRODUODENOSCOPY TRANSORAL DIAGNOSTIC N/A 02/08/2018    Procedure: EGD AND COLONOSCOPY;  Surgeon: Caleb Pete MD;  [facility]: [facility];  Service: Gastroenterology    ND EXC B9 LESION MRGN XCP SK TG  F/E/E/N/L/M > 4.0CM Right 5/28/2024    Procedure: EXCISION LIPOMA;  Surgeon: Valdo Montoya DO;  [facility];  Service: General    OK PRQ IMPLTJ NSTIM ELECTRODE ARRAY EPIDURAL N/A 11/17/2022    Procedure: NEVRO SCS TRIAL;  Surgeon: Johnson Aranda MD;  [facility];  Service: Pain Management     OK PRQ IMPLTJ NSTIM ELECTRODE ARRAY EPIDURAL N/A 02/01/2023    Procedure: Insertion percutaneous thoracic spinal cord stimulator with left buttock implantable pulse generator;  Surgeon: Craig Robert Goldberg, MD;  [facility];  Service: Neurosurgery    RADIOFREQUENCY ABLATION Left 6/19/2024    Procedure: Left C3-4 and C4-5 Radio Frequency Ablation;  Surgeon: Johnson Aranda MD;  [facility];  Service: Pain Management     RADIOFREQUENCY ABLATION Left 9/5/2024    Procedure: Left C3-4 and C4-5 Radio Frequency Ablation;  Surgeon: Johnson Aranda MD;  [facility];  Service: Pain Management     SPLIT THICKNESS SKIN GRAFT Right 11/07/2023    Procedure: SKIN GRAFT SPLIT THICKNESS (STSG)  EXTREMITY;  Surgeon: Samm Sims MD;  [facility];  Service: General    THYROID SURGERY      TONSILLECTOMY      US GUIDED THYROID BIOPSY  11/30/2016    US GUIDED THYROID BIOPSY  03/21/2018    VAC DRESSING APPLICATION Right 10/29/2023    Procedure: CHANGE DRESSING/VAC RIGHT UPPER EXTREMITY; WASHOUT; PARTIAL CLOSURE;  Surgeon: Irina Day DO;  [facility];  Service: General    WOUND DEBRIDEMENT Right 11/04/2023    Procedure: SIMPLE CLOSURE 3.5CM, OASIS APPLICATION 15X7CM, WOUND VAC APPLICATION;  Surgeon: Maryuri Goldstein MD;  [facility];  Service: General       Family History   Problem Relation Age of Onset    Brain cancer Mother     Alzheimer's disease Mother     Alzheimer's disease Father     Parkinsonism Father        Social History     Occupational History    Not on file   Tobacco Use    Smoking status: Every Day     Current packs/day: 1.50      Types: Cigarettes    Smokeless tobacco: Never   Vaping Use    Vaping status: Never Used   Substance and Sexual Activity    Alcohol use: Not Currently    Drug use: Never     Comment: prescribed Belbuca    Sexual activity: Yes     Partners: Male       No current facility-administered medications on file prior to encounter.     Current Outpatient Medications on File Prior to Encounter   Medication Sig    albuterol (PROVENTIL HFA,VENTOLIN HFA) 90 mcg/act inhaler Inhale 2 puffs every 6 (six) hours as needed for wheezing or shortness of breath    atorvastatin (LIPITOR) 80 mg tablet Take 80 mg by mouth every evening    benzonatate (TESSALON PERLES) 100 mg capsule Take 1 capsule (100 mg total) by mouth 3 (three) times a day as needed for cough    Buprenorphine HCl (Belbuca) 300 MCG FILM Apply 300 mcg to cheek daily at bedtime    clonazePAM (KlonoPIN) 0.5 mg tablet Take 0.5 mg by mouth daily at bedtime    clonazePAM (KlonoPIN) 1 mg tablet Take 1 mg by mouth daily in the early morning    dicyclomine (BENTYL) 20 mg tablet Take 20 mg by mouth every 6 (six) hours    famotidine (PEPCID) 40 MG tablet Take 1 tablet (40 mg total) by mouth daily at bedtime (Patient taking differently: Take 40 mg by mouth if needed)    fenofibrate (TRICOR) 48 mg tablet Take 48 mg by mouth daily    fluticasone (FLONASE) 50 mcg/act nasal spray 2 sprays into each nostril if needed    fluticasone-umeclidinium-vilanterol (Trelegy Ellipta) 200-62.5-25 mcg/actuation AEPB inhaler Inhale 1 puff daily Rinse mouth after use.    HYDROcodone-acetaminophen (NORCO)  mg per tablet Take 1 tablet by mouth 3 (three) times a day    latanoprost (XALATAN) 0.005 % ophthalmic solution instill 1 drop into both eyes at bedtime    levalbuterol (XOPENEX) 1.25 mg/3 mL nebulizer solution Take 3 mL (1.25 mg total) by nebulization every 4 (four) hours as needed for wheezing or shortness of breath    levothyroxine 137 mcg tablet Take 137 mcg by mouth daily    omeprazole  "(PriLOSEC) 40 MG capsule TAKE 1 CAPSULE BY MOUTH DAILY IN THE MORNING PRIOR TO A MEAL.    ondansetron (ZOFRAN) 8 mg tablet TAKE 1 TABLET (8 MG TOTAL) BY MOUTH EVERY 8 (EIGHT) HOURS AS NEEDED FOR NAUSEA OR VOMITING    pregabalin (LYRICA) 100 mg capsule Take 100 mg by mouth 3 (three) times a day Morning-lunch-dinner    pregabalin (LYRICA) 150 mg capsule take 1 capsule by mouth NIGHTLY    rimegepant sulfate (Nurtec) 75 mg TBDP Take 75 mg by mouth once    tiZANidine (ZANAFLEX) 4 mg tablet Take 8 mg by mouth once 1 tablet    topiramate (TOPAMAX) 200 MG tablet 200 mg 2 (two) times a day    traZODone (DESYREL) 100 mg tablet Take 100 mg by mouth    Alcohol Swabs (Alcohol Pads) 70 % PADS USE TO TEST BLOOD GLUCOSE 2-3 TIMES DAILY    ammonium lactate (LAC-HYDRIN) 12 % lotion as needed    carboxymethylcellulose 0.5 % SOLN Administer 1 drop to both eyes daily as needed for dry eyes    dexamethasone (DECADRON) 4 mg tablet Take 4 mg by mouth Not taking    OneTouch Ultra test strip USE TO TEST BLOOD GLUCOSE 2-3 TIMES DAILY    oxyCODONE-acetaminophen (Percocet)  mg per tablet Take 1 tablet by mouth every 4 (four) hours as needed for moderate pain Max Daily Amount: 6 tablets    polyethylene glycol (GLYCOLAX) 17 GM/SCOOP powder Take 17 g by mouth if needed    True Comfort Safety Lancets MISC USE TO TEST BLOOD GLUCOSE 2-3 TIMES DAILY    [DISCONTINUED] lamoTRIgine (LaMICtal) 100 mg tablet Take 100 mg by mouth 2 (two) times a day Not taking       Allergies   Allergen Reactions    Hydroxyzine Anaphylaxis     Claims it gives her convulsions.     Bee Pollen Other (See Comments)     familial    Pollen Extract Itching    Tree Extract     Clarithromycin Rash     Pt denies    Ibuprofen Nausea Only    Latex Rash    Sulfa Antibiotics Rash     \"severe skin burn\"       Physical Exam:    /83   Pulse 70   Temp (!) 96.4 °F (35.8 °C) (Temporal)   Resp 18   Ht 5' 4\" (1.626 m)   Wt 73.5 kg (162 lb)   SpO2 99%   BMI 27.81 kg/m² "     Constitutional:normal, well developed, well nourished, alert, in no distress and non-toxic and no overt pain behavior.  Eyes:anicteric  HEENT:grossly intact  Neck:supple, symmetric, trachea midline and no masses   Pulmonary:even and unlabored  Cardiovascular:No edema or pitting edema present  Skin:Normal without rashes or lesions and well hydrated  Psychiatric:Mood and affect appropriate  Neurologic:Cranial Nerves II-XII grossly intact  Musculoskeletal:normal

## 2024-12-19 NOTE — ANESTHESIA POSTPROCEDURE EVALUATION
Post-Op Assessment Note    Last Filed PACU Vitals:  Vitals Value Taken Time   Temp 97.4    Pulse 63 12/19/24 0900   /65    Resp 8 12/19/24 0900   SpO2 98 % 12/19/24 0900   Vitals shown include unfiled device data.    Modified Brianna:  No data recorded

## 2024-12-20 NOTE — OP NOTE
OPERATIVE REPORT  PATIENT NAME: Jennifer Woodall    :  1960  MRN: [id number]  Pt Location: [facility]    SURGERY DATE: 2024    Surgeons and Role:     * Johnson Aranda MD - Primary    Preop Diagnosis:  Cervical spondylosis [M47.812]    Post-Op Diagnosis Codes:     * Cervical spondylosis [M47.812]    Procedure(s):  Right - Right C3-4 and C4-5 Medial branch block #1    Specimen(s):  * No specimens in log *    Estimated Blood Loss:   Minimal    Drains:  * No LDAs found *    Anesthesia Type:   IV Sedation with Anesthesia    Operative Indications:  Cervical spondylosis [M47.812]      Operative Findings:  same      Complications:   None    Procedure and Technique:  Fluoroscopically-guided Medial Branch Nerve Blocks of the Right C3-4 C4-5 facet joint(s) using 2% LIDOCAINE      After discussing the risks, benefits, and alternatives to the procedure, the patient expressed understanding and wished to proceed.  The patient was brought to the fluoroscopy suite and placed in the prone position.  Procedural pause conducted to verify:  correct patient identity, procedure to be performed and as applicable, correct side and site, correct patient position, and availability of implants, special equipment and special requirements.  Using fluoroscopy, the mid-body of the articular pillar at the Right C3, C4, C5 levels were identified.  The skin was sterilely prepped and draped in the usual fashion using Chloraprep skin prep.  Using fluoroscopic guidance, a 3.5 inch 25 gauge spinal needle was advanced to each target.  After negative aspiration, 0.5cc of 2% lidocaine was injected at each site and the needles were then removed. The patient tolerated the procedure well and there were no apparent complications.  After appropriate observation, the patient was dismissed from the clinic in good condition under their own power.  The patient was instructed to keep a pain diary and report the results at her follow up  appointment.              I was present for the entire procedure.    Patient Disposition:  hemodynamically stable             SIGNATURE: Johnson Aranda MD  DATE: December 19, 2024  TIME: 10:02 PM

## 2025-01-14 ENCOUNTER — TELEPHONE (OUTPATIENT)
Dept: PAIN MEDICINE | Facility: CLINIC | Age: 65
End: 2025-01-14

## 2025-01-14 NOTE — TELEPHONE ENCOUNTER
Caller: Jennifer    Doctor: Nahomi    Reason for call: getting migraines every day, unable to sleep pain is in neck its very severe   Also has numbness in Left and R arm     Please advise    Call back#: [phone]

## 2025-01-14 NOTE — TELEPHONE ENCOUNTER
Attempted to reach pt. VMMLOM with CB# and OH    (Appears pt had cervical MBB #1 12/19/24  Do not see a diary returned)

## 2025-01-16 NOTE — TELEPHONE ENCOUNTER
"S/w pt, at length, who states she has not been well since Nov and is unsure as to what happened to the PD provided post Rt Cervical MBB #1 12/19/24 RM @ [facility]. Pt states she rec'd 80% or more relief for the remainder of the day post MBB and did not have pain until the next morning. Pt states pain is now worse in neck, b/l arms. Pt states cont to take Hydrocodone acet 10-325mg prescr by PCP (Stock) but cont to have numbness/weakness in b/l arms and hands. RN attempted to ask questions regarding addit med and provide med recs and pt would not permit RN to cont. Pt states she \"may go to the ER\" due to illness and pain.    Pt stated, at length, regarding socioeconomic and medical issues causing her to be unable to maintain home and health. RN advised pt she needs to be seen in ED for further work up per her prolonged illness since Nov. Pt verbalized understanding.     Pls advise on cont w/ Rt cervical MBB #2?  Thank you!  "

## 2025-02-05 ENCOUNTER — APPOINTMENT (OUTPATIENT)
Dept: LAB | Facility: HOSPITAL | Age: 65
End: 2025-02-05
Payer: COMMERCIAL

## 2025-02-05 DIAGNOSIS — E03.9 HYPOTHYROIDISM, UNSPECIFIED TYPE: ICD-10-CM

## 2025-02-05 DIAGNOSIS — R10.823 ABDOMINAL REBOUND TENDERNESS OF RIGHT LOWER QUADRANT: ICD-10-CM

## 2025-02-05 LAB
ALBUMIN SERPL BCG-MCNC: 4.1 G/DL (ref 3.5–5)
ALP SERPL-CCNC: 77 U/L (ref 34–104)
ALT SERPL W P-5'-P-CCNC: 23 U/L (ref 7–52)
ANION GAP SERPL CALCULATED.3IONS-SCNC: 7 MMOL/L (ref 4–13)
AST SERPL W P-5'-P-CCNC: 26 U/L (ref 13–39)
BASOPHILS # BLD AUTO: 0.05 THOUSANDS/ΜL (ref 0–0.1)
BASOPHILS NFR BLD AUTO: 1 % (ref 0–1)
BILIRUB SERPL-MCNC: 0.27 MG/DL (ref 0.2–1)
BUN SERPL-MCNC: 16 MG/DL (ref 5–25)
CALCIUM SERPL-MCNC: 8.6 MG/DL (ref 8.4–10.2)
CHLORIDE SERPL-SCNC: 101 MMOL/L (ref 96–108)
CO2 SERPL-SCNC: 28 MMOL/L (ref 21–32)
CREAT SERPL-MCNC: 0.72 MG/DL (ref 0.6–1.3)
EOSINOPHIL # BLD AUTO: 0.06 THOUSAND/ΜL (ref 0–0.61)
EOSINOPHIL NFR BLD AUTO: 1 % (ref 0–6)
ERYTHROCYTE [DISTWIDTH] IN BLOOD BY AUTOMATED COUNT: 13.3 % (ref 11.6–15.1)
GFR SERPL CREATININE-BSD FRML MDRD: 89 ML/MIN/1.73SQ M
GLUCOSE P FAST SERPL-MCNC: 82 MG/DL (ref 65–99)
HCT VFR BLD AUTO: 43.6 % (ref 34.8–46.1)
HGB BLD-MCNC: 14.2 G/DL (ref 11.5–15.4)
IMM GRANULOCYTES # BLD AUTO: 0.02 THOUSAND/UL (ref 0–0.2)
IMM GRANULOCYTES NFR BLD AUTO: 0 % (ref 0–2)
LYMPHOCYTES # BLD AUTO: 2.65 THOUSANDS/ΜL (ref 0.6–4.47)
LYMPHOCYTES NFR BLD AUTO: 44 % (ref 14–44)
MCH RBC QN AUTO: 31.6 PG (ref 26.8–34.3)
MCHC RBC AUTO-ENTMCNC: 32.6 G/DL (ref 31.4–37.4)
MCV RBC AUTO: 97 FL (ref 82–98)
MONOCYTES # BLD AUTO: 0.44 THOUSAND/ΜL (ref 0.17–1.22)
MONOCYTES NFR BLD AUTO: 7 % (ref 4–12)
NEUTROPHILS # BLD AUTO: 2.84 THOUSANDS/ΜL (ref 1.85–7.62)
NEUTS SEG NFR BLD AUTO: 47 % (ref 43–75)
NRBC BLD AUTO-RTO: 0 /100 WBCS
PLATELET # BLD AUTO: 170 THOUSANDS/UL (ref 149–390)
PMV BLD AUTO: 10.4 FL (ref 8.9–12.7)
POTASSIUM SERPL-SCNC: 4.2 MMOL/L (ref 3.5–5.3)
PROT SERPL-MCNC: 6.7 G/DL (ref 6.4–8.4)
RBC # BLD AUTO: 4.5 MILLION/UL (ref 3.81–5.12)
SODIUM SERPL-SCNC: 136 MMOL/L (ref 135–147)
TSH SERPL DL<=0.05 MIU/L-ACNC: 0.3 UIU/ML (ref 0.45–4.5)
WBC # BLD AUTO: 6.06 THOUSAND/UL (ref 4.31–10.16)

## 2025-02-05 PROCEDURE — 84439 ASSAY OF FREE THYROXINE: CPT

## 2025-02-05 PROCEDURE — 80053 COMPREHEN METABOLIC PANEL: CPT

## 2025-02-05 PROCEDURE — 36415 COLL VENOUS BLD VENIPUNCTURE: CPT

## 2025-02-05 PROCEDURE — 85025 COMPLETE CBC W/AUTO DIFF WBC: CPT

## 2025-02-05 PROCEDURE — 84443 ASSAY THYROID STIM HORMONE: CPT

## 2025-02-06 LAB — T4 FREE SERPL-MCNC: 1.2 NG/DL (ref 0.61–1.12)

## 2025-03-04 ENCOUNTER — TELEPHONE (OUTPATIENT)
Dept: PAIN MEDICINE | Facility: CLINIC | Age: 65
End: 2025-03-04

## 2025-03-04 NOTE — TELEPHONE ENCOUNTER
Attempted to reach pt. VMMLOM with CB# and OH    Pt is scheduled for cervical MBB #2 3/20 with Dr Aranda

## 2025-03-04 NOTE — TELEPHONE ENCOUNTER
Caller: Jennifer     Doctor: Dr. Comer     Reason for call: patient just got off the phone with  Dr. Richard Sanz from [facility] he states he can't help her patient all her issues are coming from her Neck, she can't do the dishes she is overwhelmed.   Does not like Dr. Sanz she wants to make sure you are aware she trust you Dr. Comer she doesn't know what else to do.     Call back#: [phone]

## 2025-03-05 NOTE — TELEPHONE ENCOUNTER
Caller: Jennifer    Doctor: Dr. Comer     Reason for call: returning nurses phone call     Call back#: [phone]    Transferred to nurse

## 2025-03-05 NOTE — TELEPHONE ENCOUNTER
Caller: alexx Rodriguez    Doctor: Dr. Aranda    Reason for call: pt did not confirm 3 forms of HIPAA as she was telling me what was going on.  Call dropped abruptly mid conversation    Pt was returning the nurse's call    Call back#: [phone]

## 2025-03-05 NOTE — TELEPHONE ENCOUNTER
S/w pt, at length, and provided emotional support throughout conversation.     Pt states multiple socioeconomic hardships at this time and having diff w/ pain and mental health. RN advised pt that [facility] is providing as much assistance as poss to help pt w/ pain via upcoming cervical MBB #2 on 3/20/25 RM @ [facility] w/ anesth. Pt states having EMG on 2/20/25 and was advised of Cervical Radiculopathy, pt does not feel this is accurate. Pt expressed concern/unsatisfied over appt w/ [facility] ortho. RN advised pt that, typically, pts are entitled to a 2nd opinion. RN provided [facility] ortho # for sched 2nd opinion w/ ortho hand provider; assess carpal/cubital tunnel.  RN unable to assess EMG from 2/20/25 as it is not uploaded in Care Everywhere (compl @ [facility] per pt). Pt states she has PCP appt [place] to discuss PVD as well (which she feels may also be contrib to extremity pain/weakness) and agencies coming to pts home to assess other appro services to assist socioecon issues. Pt very apprec of RN and all [employer] employees who have provided support.

## 2025-03-20 ENCOUNTER — TELEPHONE (OUTPATIENT)
Age: 65
End: 2025-03-20

## 2025-03-20 ENCOUNTER — APPOINTMENT (EMERGENCY)
Dept: CT IMAGING | Facility: HOSPITAL | Age: 65
End: 2025-03-20
Payer: COMMERCIAL

## 2025-03-20 ENCOUNTER — APPOINTMENT (OUTPATIENT)
Dept: RADIOLOGY | Facility: HOSPITAL | Age: 65
End: 2025-03-20
Payer: COMMERCIAL

## 2025-03-20 ENCOUNTER — ANESTHESIA (OUTPATIENT)
Dept: PERIOP | Facility: HOSPITAL | Age: 65
End: 2025-03-20
Payer: COMMERCIAL

## 2025-03-20 ENCOUNTER — HOSPITAL ENCOUNTER (EMERGENCY)
Facility: HOSPITAL | Age: 65
Discharge: HOME | End: 2025-03-20
Attending: EMERGENCY MEDICINE
Payer: COMMERCIAL

## 2025-03-20 ENCOUNTER — ANESTHESIA EVENT (OUTPATIENT)
Dept: PERIOP | Facility: HOSPITAL | Age: 65
End: 2025-03-20
Payer: COMMERCIAL

## 2025-03-20 ENCOUNTER — HOSPITAL ENCOUNTER (OUTPATIENT)
Facility: HOSPITAL | Age: 65
Setting detail: OUTPATIENT SURGERY
Discharge: HOME | End: 2025-03-20
Attending: ANESTHESIOLOGY | Admitting: ANESTHESIOLOGY
Payer: COMMERCIAL

## 2025-03-20 VITALS
OXYGEN SATURATION: 96 % | HEART RATE: 57 BPM | TEMPERATURE: 97.2 F | BODY MASS INDEX: 29.55 KG/M2 | RESPIRATION RATE: 20 BRPM | WEIGHT: 172.18 LBS | SYSTOLIC BLOOD PRESSURE: 111 MMHG | DIASTOLIC BLOOD PRESSURE: 77 MMHG

## 2025-03-20 VITALS
HEART RATE: 67 BPM | OXYGEN SATURATION: 98 % | TEMPERATURE: 97.8 F | DIASTOLIC BLOOD PRESSURE: 76 MMHG | SYSTOLIC BLOOD PRESSURE: 122 MMHG | BODY MASS INDEX: 29.37 KG/M2 | WEIGHT: 172 LBS | HEIGHT: 64 IN | RESPIRATION RATE: 18 BRPM

## 2025-03-20 DIAGNOSIS — M54.2 NECK PAIN: ICD-10-CM

## 2025-03-20 DIAGNOSIS — M54.12 CERVICAL RADICULOPATHY: Primary | ICD-10-CM

## 2025-03-20 DIAGNOSIS — M50.120 CERVICAL DISC DISORDER WITH RADICULOPATHY OF MID-CERVICAL REGION: ICD-10-CM

## 2025-03-20 DIAGNOSIS — M47.812 CERVICAL SPONDYLOSIS: ICD-10-CM

## 2025-03-20 DIAGNOSIS — K11.8 PAROTID MASS: Primary | ICD-10-CM

## 2025-03-20 LAB
ANION GAP SERPL CALCULATED.3IONS-SCNC: 7 MMOL/L (ref 4–13)
BASOPHILS # BLD AUTO: 0.03 THOUSANDS/ÂΜL (ref 0–0.1)
BASOPHILS NFR BLD AUTO: 0 % (ref 0–1)
BUN SERPL-MCNC: 13 MG/DL (ref 5–25)
CALCIUM SERPL-MCNC: 8.6 MG/DL (ref 8.4–10.2)
CHLORIDE SERPL-SCNC: 106 MMOL/L (ref 96–108)
CO2 SERPL-SCNC: 28 MMOL/L (ref 21–32)
CREAT SERPL-MCNC: 0.74 MG/DL (ref 0.6–1.3)
EOSINOPHIL # BLD AUTO: 0.11 THOUSAND/ÂΜL (ref 0–0.61)
EOSINOPHIL NFR BLD AUTO: 2 % (ref 0–6)
ERYTHROCYTE [DISTWIDTH] IN BLOOD BY AUTOMATED COUNT: 12.8 % (ref 11.6–15.1)
GFR SERPL CREATININE-BSD FRML MDRD: 86 ML/MIN/1.73SQ M
GLUCOSE SERPL-MCNC: 102 MG/DL (ref 65–140)
GLUCOSE SERPL-MCNC: 90 MG/DL (ref 65–140)
HCT VFR BLD AUTO: 36.9 % (ref 34.8–46.1)
HGB BLD-MCNC: 12 G/DL (ref 11.5–15.4)
IMM GRANULOCYTES # BLD AUTO: 0.02 THOUSAND/UL (ref 0–0.2)
IMM GRANULOCYTES NFR BLD AUTO: 0 % (ref 0–2)
LYMPHOCYTES # BLD AUTO: 3.37 THOUSANDS/ÂΜL (ref 0.6–4.47)
LYMPHOCYTES NFR BLD AUTO: 49 % (ref 14–44)
MCH RBC QN AUTO: 31.7 PG (ref 26.8–34.3)
MCHC RBC AUTO-ENTMCNC: 32.5 G/DL (ref 31.4–37.4)
MCV RBC AUTO: 97 FL (ref 82–98)
MONOCYTES # BLD AUTO: 0.49 THOUSAND/ÂΜL (ref 0.17–1.22)
MONOCYTES NFR BLD AUTO: 7 % (ref 4–12)
NEUTROPHILS # BLD AUTO: 2.87 THOUSANDS/ÂΜL (ref 1.85–7.62)
NEUTS SEG NFR BLD AUTO: 42 % (ref 43–75)
NRBC BLD AUTO-RTO: 0 /100 WBCS
PLATELET # BLD AUTO: 151 THOUSANDS/UL (ref 149–390)
PMV BLD AUTO: 10 FL (ref 8.9–12.7)
POTASSIUM SERPL-SCNC: 3.7 MMOL/L (ref 3.5–5.3)
RBC # BLD AUTO: 3.79 MILLION/UL (ref 3.81–5.12)
SODIUM SERPL-SCNC: 141 MMOL/L (ref 135–147)
WBC # BLD AUTO: 6.89 THOUSAND/UL (ref 4.31–10.16)

## 2025-03-20 PROCEDURE — 70491 CT SOFT TISSUE NECK W/DYE: CPT

## 2025-03-20 PROCEDURE — 82948 REAGENT STRIP/BLOOD GLUCOSE: CPT

## 2025-03-20 PROCEDURE — 80048 BASIC METABOLIC PNL TOTAL CA: CPT | Performed by: PHYSICIAN ASSISTANT

## 2025-03-20 PROCEDURE — 64490 INJ PARAVERT F JNT C/T 1 LEV: CPT | Performed by: ANESTHESIOLOGY

## 2025-03-20 PROCEDURE — 99284 EMERGENCY DEPT VISIT MOD MDM: CPT | Performed by: PHYSICIAN ASSISTANT

## 2025-03-20 PROCEDURE — 85025 COMPLETE CBC W/AUTO DIFF WBC: CPT | Performed by: PHYSICIAN ASSISTANT

## 2025-03-20 PROCEDURE — 64491 INJ PARAVERT F JNT C/T 2 LEV: CPT | Performed by: ANESTHESIOLOGY

## 2025-03-20 PROCEDURE — 36415 COLL VENOUS BLD VENIPUNCTURE: CPT | Performed by: PHYSICIAN ASSISTANT

## 2025-03-20 RX ORDER — GLYCOPYRROLATE 0.2 MG/ML
INJECTION INTRAMUSCULAR; INTRAVENOUS AS NEEDED
Status: DISCONTINUED | OUTPATIENT
Start: 2025-03-20 | End: 2025-03-20

## 2025-03-20 RX ORDER — FENTANYL CITRATE 50 UG/ML
INJECTION, SOLUTION INTRAMUSCULAR; INTRAVENOUS AS NEEDED
Status: DISCONTINUED | OUTPATIENT
Start: 2025-03-20 | End: 2025-03-20

## 2025-03-20 RX ORDER — SODIUM CHLORIDE, SODIUM LACTATE, POTASSIUM CHLORIDE, CALCIUM CHLORIDE 600; 310; 30; 20 MG/100ML; MG/100ML; MG/100ML; MG/100ML
125 INJECTION, SOLUTION INTRAVENOUS CONTINUOUS
Status: CANCELLED | OUTPATIENT
Start: 2025-03-20

## 2025-03-20 RX ORDER — SODIUM CHLORIDE, SODIUM LACTATE, POTASSIUM CHLORIDE, CALCIUM CHLORIDE 600; 310; 30; 20 MG/100ML; MG/100ML; MG/100ML; MG/100ML
125 INJECTION, SOLUTION INTRAVENOUS CONTINUOUS
Status: DISCONTINUED | OUTPATIENT
Start: 2025-03-20 | End: 2025-03-20 | Stop reason: HOSPADM

## 2025-03-20 RX ORDER — ONDANSETRON 2 MG/ML
INJECTION INTRAMUSCULAR; INTRAVENOUS AS NEEDED
Status: DISCONTINUED | OUTPATIENT
Start: 2025-03-20 | End: 2025-03-20

## 2025-03-20 RX ORDER — LIDOCAINE HYDROCHLORIDE 10 MG/ML
INJECTION, SOLUTION EPIDURAL; INFILTRATION; INTRACAUDAL; PERINEURAL AS NEEDED
Status: DISCONTINUED | OUTPATIENT
Start: 2025-03-20 | End: 2025-03-20

## 2025-03-20 RX ORDER — MIDAZOLAM HYDROCHLORIDE 2 MG/2ML
INJECTION, SOLUTION INTRAMUSCULAR; INTRAVENOUS AS NEEDED
Status: DISCONTINUED | OUTPATIENT
Start: 2025-03-20 | End: 2025-03-20

## 2025-03-20 RX ORDER — FENTANYL CITRATE/PF 50 MCG/ML
25 SYRINGE (ML) INJECTION
Status: DISCONTINUED | OUTPATIENT
Start: 2025-03-20 | End: 2025-03-20 | Stop reason: HOSPADM

## 2025-03-20 RX ORDER — LIDOCAINE HYDROCHLORIDE 10 MG/ML
INJECTION, SOLUTION EPIDURAL; INFILTRATION; INTRACAUDAL; PERINEURAL AS NEEDED
Status: DISCONTINUED | OUTPATIENT
Start: 2025-03-20 | End: 2025-03-20 | Stop reason: HOSPADM

## 2025-03-20 RX ORDER — PROPOFOL 10 MG/ML
INJECTION, EMULSION INTRAVENOUS AS NEEDED
Status: DISCONTINUED | OUTPATIENT
Start: 2025-03-20 | End: 2025-03-20

## 2025-03-20 RX ORDER — BUPIVACAINE HYDROCHLORIDE 5 MG/ML
INJECTION, SOLUTION EPIDURAL; INTRACAUDAL; PERINEURAL AS NEEDED
Status: DISCONTINUED | OUTPATIENT
Start: 2025-03-20 | End: 2025-03-20 | Stop reason: HOSPADM

## 2025-03-20 RX ADMIN — PROPOFOL 20 MG: 10 INJECTION, EMULSION INTRAVENOUS at 08:02

## 2025-03-20 RX ADMIN — SODIUM CHLORIDE, SODIUM LACTATE, POTASSIUM CHLORIDE, AND CALCIUM CHLORIDE 125 ML/HR: .6; .31; .03; .02 INJECTION, SOLUTION INTRAVENOUS at 07:25

## 2025-03-20 RX ADMIN — FENTANYL CITRATE 50 MCG: 50 INJECTION INTRAMUSCULAR; INTRAVENOUS at 08:05

## 2025-03-20 RX ADMIN — MIDAZOLAM 2 MG: 1 INJECTION INTRAMUSCULAR; INTRAVENOUS at 07:58

## 2025-03-20 RX ADMIN — MORPHINE SULFATE 2 MG: 2 INJECTION, SOLUTION INTRAMUSCULAR; INTRAVENOUS at 18:19

## 2025-03-20 RX ADMIN — FENTANYL CITRATE 50 MCG: 50 INJECTION INTRAMUSCULAR; INTRAVENOUS at 07:58

## 2025-03-20 RX ADMIN — GLYCOPYRROLATE 0.2 MG: 0.2 INJECTION, SOLUTION INTRAMUSCULAR; INTRAVENOUS at 07:58

## 2025-03-20 RX ADMIN — PROPOFOL 100 MCG/KG/MIN: 10 INJECTION, EMULSION INTRAVENOUS at 08:03

## 2025-03-20 RX ADMIN — LIDOCAINE HYDROCHLORIDE 25 MG: 10 INJECTION, SOLUTION EPIDURAL; INFILTRATION; INTRACAUDAL; PERINEURAL at 08:02

## 2025-03-20 RX ADMIN — ONDANSETRON 4 MG: 2 INJECTION INTRAMUSCULAR; INTRAVENOUS at 07:58

## 2025-03-20 RX ADMIN — IOHEXOL 85 ML: 350 INJECTION, SOLUTION INTRAVENOUS at 18:05

## 2025-03-20 NOTE — ANESTHESIA PREPROCEDURE EVALUATION
Procedure:  C3-4 and C4-5 MBB2 (Right: Spine Thoracic)    Relevant Problems   ANESTHESIA (within normal limits)      CARDIO   (+) Brain aneurysm   (+) Carotid artery aneurysm (HCC)   (+) Coronary artery calcification seen on CAT scan   (+) Hypertension   (+) Hypertriglyceridemia   (+) Migraine   (+) Other hyperlipidemia      ENDO   (+) Hypothyroid   (+) Type 2 diabetes mellitus (HCC)      GI/HEPATIC   (+) Gastroesophageal reflux disease      MUSCULOSKELETAL   (+) Cervical spondylosis   (+) Fibromyalgia, primary   (+) Lumbar degenerative disc disease   (+) Lumbar spondylosis   (+) Mid back pain      NEURO/PSYCH   (+) Anxiety   (+) Chronic pain syndrome   (+) Depression   (+) Diabetic polyneuropathy associated with type 2 diabetes mellitus (HCC)   (+) Fibromyalgia, primary   (+) Migraine      PULMONARY   (+) COPD (chronic obstructive pulmonary disease) (HCC)   (+) Shortness of breath        Physical Exam    Airway    Mallampati score: II  TM Distance: >3 FB  Neck ROM: full     Dental       Cardiovascular  Rate: normal    Pulmonary  Pulmonary exam normal     Other Findings  Per pt denies anything remaining that is loose or removeablepost-pubertal.      Anesthesia Plan  ASA Score- 3     Anesthesia Type- IV sedation with anesthesia with ASA Monitors.         Additional Monitors:     Airway Plan:            Plan Factors-Exercise tolerance (METS): >4 METS.    Chart reviewed.    Patient summary reviewed.    Patient is a current smoker.              Induction- intravenous.    Postoperative Plan-         Informed Consent- Anesthetic plan and risks discussed with patient.  I personally reviewed this patient with the CRNA. Discussed and agreed on the Anesthesia Plan with the CRNA..      NPO Status:  Vitals Value Taken Time   Date of last liquid 03/19/25 03/20/25 0715   Time of last liquid 2320 03/20/25 0715   Date of last solid 03/19/25 03/20/25 0715   Time of last solid 2320 03/20/25 0715

## 2025-03-20 NOTE — ANESTHESIA POSTPROCEDURE EVALUATION
Post-Op Assessment Note    Last Filed PACU Vitals:  Vitals Value Taken Time   Temp 98.6 °F (37 °C) 03/20/25 0840   Pulse 67 03/20/25 0842   /74 03/20/25 0840   Resp 37 03/20/25 0842   SpO2 99 % 03/20/25 0842   Vitals shown include unfiled device data.    Modified Brianna:     Vitals Value Taken Time   Activity 2 03/20/25 0840   Respiration 2 03/20/25 0840   Circulation 2 03/20/25 0840   Consciousness 2 03/20/25 0840   Oxygen Saturation 2 03/20/25 0840     Modified Brianna Score: 10

## 2025-03-20 NOTE — DISCHARGE INSTR - AVS FIRST PAGE

## 2025-03-20 NOTE — TELEPHONE ENCOUNTER
Caller: alexx Rodriguez     Doctor: Dr. Aranda     Reason for call: pt had procedure today. Pt stated that she is having a lot of pain circling underneath her right jaw. Pt stated that she has taken some medication for the pain, but it is not working . Please Advise     Call back#: [phone]

## 2025-03-20 NOTE — H&P
Assessment:  1. Cervical spondylosis  FL spine and pain procedure    FL spine and pain procedure      2. Neck pain     FL spine and pain procedure    FL spine and pain procedure       Plan:  Jennifer Woodall is a 64 y.o. female with complaints of neck pain presents to surgical center for procedure.  We will perform a Procedure(s) (LRB):  C3-4 and C4-5 MBB2 (Right)   2. Follow-up 1 month after injection    Complete risks and benefits including bleeding, infection, tissue reaction, nerve injury and allergic reaction were discussed. The approach was demonstrated using models and literature was provided. Verbal and written consent was obtained.    My impressions and treatment recommendations were discussed in detail with the patient who verbalized understanding and had no further questions.  Discharge instructions were provided. I personally saw and examined the patient and I agree with the above discussed plan of care.    Orders Placed This Encounter   Procedures    FL spine and pain procedure     Standing Status:   Standing     Number of Occurrences:   1     Reason for Exam::   RIGHT C3-C4, C4-C5 MBB2     Anticoagulant hold needed?:   N/A    Insert peripheral IV     Standing Status:   Standing     Number of Occurrences:   1     New Medications Ordered This Visit   Medications    lactated ringers infusion       History of Present Illness:  Jennifer Woodall is a 64 y.o. female who presents for a follow up office visit in regards to neck pain.   The patient’s current symptoms include 8 out of 10 sharp to stabbing throbbing therapeutic time pattern.      I have personally reviewed and/or updated the patient's past medical history, past surgical history, family history, social history, current medications, allergies, and vital signs today.     Review of Systems   Musculoskeletal:  Positive for arthralgias and neck pain.   All other systems reviewed and are negative.      Patient Active Problem List   Diagnosis    Other  hyperlipidemia    Dizziness    Hypothyroid    Brain aneurysm    Hypokalemia    Hypertriglyceridemia    Low HDL (under 40)    Elevated TSH    Tobacco abuse    Intractable abdominal pain    TMJ syndrome    Stroke-like symptoms    Nausea    Left chest pressure    Hypophosphatemia    Hypotension    Tobacco use    COPD (chronic obstructive pulmonary disease) ([facility])    Hypertension    Constipation    Fibromyalgia, primary    Bipolar 1 disorder ([facility])    Depression    Chronic pain syndrome    Anxiety    Migraine    Syncope and collapse    Mid back pain    Cervical radiculopathy    Neck pain    Lumbar degenerative disc disease    Lumbar radiculopathy    Lumbar spondylosis    Cervical disc disorder with radiculopathy of mid-cervical region    Sacroiliitis ([facility])    Carotid artery aneurysm ([facility])    Irritable bowel syndrome with both constipation and diarrhea    Shortness of breath    Cervical spondylosis    Diabetic polyneuropathy associated with type 2 diabetes mellitus ([facility])    Coronary artery calcification seen on CAT scan    Pulmonary nodules/lesions, multiple    S/P insertion of spinal cord stimulator    Common bile duct dilation    Gastroesophageal reflux disease    Fall    Compartment syndrome ([facility])    Type 2 diabetes mellitus ([facility])    Subcutaneous mass of right forearm    Polypharmacy    Lipoma of right upper extremity    Lesion of parotid gland    Heartburn       Past Medical History:   Diagnosis Date    Ambulates with cane     Anxiety     Arthritis     Asthma     Bipolar 1 disorder ([facility])     Brain aneurysm     Chronic pain disorder     spinal stenosis    Concussion syndrome     neurological rx and balance rx    COPD (chronic obstructive pulmonary disease) ([facility])     Depression     Diabetes mellitus ([facility])     controlled w/ diet    Disease of thyroid gland     nodules     Family history of colon cancer     father    Fibromyalgia, primary     GERD (gastroesophageal reflux disease)     History of colon polyps      Hyperlipidemia     Hypertension     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     Irritable bowel syndrome     Lumbar degenerative disc disease 02/16/2022    Migraine     Neuropathy     bilateral feet and hands    Peripheral neuropathy     Psychiatric disorder     PTSD (post-traumatic stress disorder)     Shortness of breath     Sleep apnea     had 3 studies & last one negative    Stroke (HCC)     2012 no deficeits 2018 2019 TIA    TIA (transient ischemic attack)     Wears dentures     Wears glasses        Past Surgical History:   Procedure Laterality Date    ABDOMINAL SURGERY      laproscopic/ endometriosis    BRAIN SURGERY  2017    aneurysm/ coiling procedure    CARPAL TUNNEL RELEASE      CHOLECYSTECTOMY      COLONOSCOPY      DENTAL SURGERY      EPIDURAL BLOCK INJECTION Right 03/03/2022    Procedure: C7-T1 interlaminar epidural steroid injection;  Surgeon: Johnson Aranda MD;  [facility];  Service: Pain Management     EPIDURAL BLOCK INJECTION N/A 04/21/2022    Procedure: C6-C7 BLOCK / INJECTION EPIDURAL STEROID CERVICAL;  Surgeon: Johnson Aranda MD;  [facility];  Service: Pain Management     EPIDURAL BLOCK INJECTION Left 06/21/2022    Procedure: BLOCK / INJECTION EPIDURAL STEROID LUMBAR  left L3-4 TFESI;  Surgeon: Johnson Aranda MD;  [facility];  Service: Pain Management     EYE SURGERY      cataract    FASCIOTOMY Right 10/27/2023    Procedure: FASCIOTOMY OF RIGHT UPPER EXTREMITY; POSSIBLE VAC DRESSING APPLICATION;  Surgeon: Bruno Blevins MD;  [facility];  Service: General    FL GUIDED NEEDLE PLAC BX/ASP/INJ  03/03/2022    FL GUIDED NEEDLE PLAC BX/ASP/INJ  11/17/2022    HYSTERECTOMY      NERVE BLOCK Left 02/20/2024    Procedure: BLOCK MEDIAL BRANCH  left C3-4 and C4-5 MBB #1;  Surgeon: Johnson Aranda MD;  [facility];  Service: Pain Management     NERVE BLOCK Left 04/04/2024    Procedure: BLOCK MEDIAL BRANCH Left C3-C4 and C4-5 MBB2;   Surgeon: Johnson Aranda MD;  [facility];  Service: Pain Management     NERVE BLOCK Right 12/19/2024    Procedure: Right C3-4 and C4-5 Medial branch block #1;  Surgeon: Johnson Aranda MD;  [facility];  Service: Pain Management     TN ESOPHAGOGASTRODUODENOSCOPY TRANSORAL DIAGNOSTIC N/A 02/08/2018    Procedure: EGD AND COLONOSCOPY;  Surgeon: Caleb Pete MD;  [facility]: [facility];  Service: Gastroenterology    TN EXC B9 LESION MRGN XCP SK TG F/E/E/N/L/M > 4.0CM Right 5/28/2024    Procedure: EXCISION LIPOMA;  Surgeon: Valdo Montoya DO;  [facility];  Service: General    TN PRQ IMPLTJ NSTIM ELECTRODE ARRAY EPIDURAL N/A 11/17/2022    Procedure: NEVRO SCS TRIAL;  Surgeon: Johnson Aranda MD;  [facility];  Service: Pain Management     TN PRQ IMPLTJ NSTIM ELECTRODE ARRAY EPIDURAL N/A 02/01/2023    Procedure: Insertion percutaneous thoracic spinal cord stimulator with left buttock implantable pulse generator;  Surgeon: Craig Robert Goldberg, MD;  [facility];  Service: Neurosurgery    RADIOFREQUENCY ABLATION Left 6/19/2024    Procedure: Left C3-4 and C4-5 Radio Frequency Ablation;  Surgeon: Johnson Aranda MD;  [facility];  Service: Pain Management     RADIOFREQUENCY ABLATION Left 9/5/2024    Procedure: Left C3-4 and C4-5 Radio Frequency Ablation;  Surgeon: Johnson Aranda MD;  [facility];  Service: Pain Management     SPLIT THICKNESS SKIN GRAFT Right 11/07/2023    Procedure: SKIN GRAFT SPLIT THICKNESS (STSG)  EXTREMITY;  Surgeon: Samm Sims MD;  [facility];  Service: General    THYROID SURGERY      TONSILLECTOMY      US GUIDED THYROID BIOPSY  11/30/2016    US GUIDED THYROID BIOPSY  03/21/2018    VAC DRESSING APPLICATION Right 10/29/2023    Procedure: CHANGE DRESSING/VAC RIGHT UPPER EXTREMITY; WASHOUT; PARTIAL CLOSURE;  Surgeon: Irina Day DO;  [facility];  Service: General    WOUND  DEBRIDEMENT Right 11/04/2023    Procedure: SIMPLE CLOSURE 3.5CM, OASIS APPLICATION 15X7CM, WOUND VAC APPLICATION;  Surgeon: Maryuri Goldstein MD;  [facility];  Service: General       Family History   Problem Relation Age of Onset    Brain cancer Mother     Alzheimer's disease Mother     Alzheimer's disease Father     Parkinsonism Father        Social History     Occupational History    Not on file   Tobacco Use    Smoking status: Every Day     Current packs/day: 1.50     Types: Cigarettes    Smokeless tobacco: Never    Tobacco comments:     Smoked one cigarette today 3/20/25   Vaping Use    Vaping status: Never Used   Substance and Sexual Activity    Alcohol use: Not Currently    Drug use: Never     Comment: prescribed Belbuca    Sexual activity: Yes     Partners: Male       No current facility-administered medications on file prior to encounter.     Current Outpatient Medications on File Prior to Encounter   Medication Sig    albuterol (PROVENTIL HFA,VENTOLIN HFA) 90 mcg/act inhaler Inhale 2 puffs every 6 (six) hours as needed for wheezing or shortness of breath    Alcohol Swabs (Alcohol Pads) 70 % PADS USE TO TEST BLOOD GLUCOSE 2-3 TIMES DAILY    atorvastatin (LIPITOR) 80 mg tablet Take 80 mg by mouth every evening    benzonatate (TESSALON PERLES) 100 mg capsule Take 1 capsule (100 mg total) by mouth 3 (three) times a day as needed for cough    Buprenorphine HCl (Belbuca) 300 MCG FILM Apply 300 mcg to cheek daily at bedtime    carboxymethylcellulose 0.5 % SOLN Administer 1 drop to both eyes daily as needed for dry eyes    clonazePAM (KlonoPIN) 0.5 mg tablet Take 0.5 mg by mouth daily at bedtime    clonazePAM (KlonoPIN) 1 mg tablet Take 1 mg by mouth daily in the early morning    dicyclomine (BENTYL) 20 mg tablet Take 20 mg by mouth every 6 (six) hours    famotidine (PEPCID) 40 MG tablet TAKE 1 TABLET (40 MG TOTAL) BY MOUTH DAILY AT BEDTIME    fluticasone-umeclidinium-vilanterol (Trelegy Ellipta)  200-62.5-25 mcg/actuation AEPB inhaler Inhale 1 puff daily Rinse mouth after use.    HYDROcodone-acetaminophen (NORCO)  mg per tablet Take 1 tablet by mouth 3 (three) times a day    latanoprost (XALATAN) 0.005 % ophthalmic solution instill 1 drop into both eyes at bedtime    levalbuterol (XOPENEX) 1.25 mg/3 mL nebulizer solution Take 3 mL (1.25 mg total) by nebulization every 4 (four) hours as needed for wheezing or shortness of breath    levothyroxine 137 mcg tablet Take 137 mcg by mouth daily    omeprazole (PriLOSEC) 40 MG capsule TAKE 1 CAPSULE BY MOUTH DAILY IN THE MORNING PRIOR TO A MEAL.    ondansetron (ZOFRAN) 8 mg tablet TAKE 1 TABLET (8 MG TOTAL) BY MOUTH EVERY 8 (EIGHT) HOURS AS NEEDED FOR NAUSEA OR VOMITING    OneTouch Ultra test strip USE TO TEST BLOOD GLUCOSE 2-3 TIMES DAILY    polyethylene glycol (GLYCOLAX) 17 GM/SCOOP powder Take 17 g by mouth if needed    pregabalin (LYRICA) 100 mg capsule Take 100 mg by mouth 3 (three) times a day Morning-lunch-dinner    pregabalin (LYRICA) 150 mg capsule take 1 capsule by mouth NIGHTLY    rimegepant sulfate (Nurtec) 75 mg TBDP Take 75 mg by mouth once    tiZANidine (ZANAFLEX) 4 mg tablet Take 8 mg by mouth once 1 tablet    traZODone (DESYREL) 100 mg tablet Take 100 mg by mouth    True Comfort Safety Lancets MISC USE TO TEST BLOOD GLUCOSE 2-3 TIMES DAILY    ammonium lactate (LAC-HYDRIN) 12 % lotion as needed    dexamethasone (DECADRON) 4 mg tablet Take 4 mg by mouth Not taking    fenofibrate (TRICOR) 48 mg tablet Take 48 mg by mouth daily    fluticasone (FLONASE) 50 mcg/act nasal spray 2 sprays into each nostril if needed    topiramate (TOPAMAX) 200 MG tablet 200 mg 2 (two) times a day    [DISCONTINUED] oxyCODONE-acetaminophen (Percocet)  mg per tablet Take 1 tablet by mouth every 4 (four) hours as needed for moderate pain Max Daily Amount: 6 tablets       Allergies   Allergen Reactions    Hydroxyzine Anaphylaxis     Claims it gives her convulsions.      "Bee Pollen Other (See Comments)     familial    Pollen Extract Itching    Tree Extract     Clarithromycin Rash     Pt denies    Ibuprofen Nausea Only    Latex Rash    Sulfa Antibiotics Rash     \"severe skin burn\"       Physical Exam:    /77   Pulse 72   Temp 98.1 °F (36.7 °C) (Temporal)   Resp 20   Ht 5' 4\" (1.626 m)   Wt 78 kg (172 lb)   SpO2 100%   BMI 29.52 kg/m²     Constitutional:normal, well developed, well nourished, alert, in no distress and non-toxic and no overt pain behavior.  Eyes:anicteric  HEENT:grossly intact  Neck:supple, symmetric, trachea midline and no masses   Pulmonary:even and unlabored  Cardiovascular:No edema or pitting edema present  Skin:Normal without rashes or lesions and well hydrated  Psychiatric:Mood and affect appropriate  Neurologic:Cranial Nerves II-XII grossly intact  Musculoskeletal:normal        "

## 2025-03-20 NOTE — TELEPHONE ENCOUNTER
S/w pt who states having Rt C3-5 MBB #2 today [facility] under anesth. Pt states she feel that she has swelling under her Rt ear to her Rt jaw causing her to have diff speaking and slight diff swallowing. Pt states the swelling seemed to be causing incr pain and current pain 7/10 2hrs post vicodin. Pt denies bleeding under band aids or discoloration in skin surrounding inj site. RN advised pt that if she is having diff swallowing she will need to be seen in ED. RN advised she may feel dizzing, sore throat, HA from the anesth as well. Pt denies any sob.     Pls advise any further recs. Thank you!

## 2025-03-20 NOTE — INCIDENTAL FINDINGS
The following findings require follow up:  Radiographic finding   Finding: L Parotid Mass   Follow up required: ENT Eval   Follow up should be done within 3 month(s)    Please notify the following clinician to assist with the follow up:   Dr. BARNES    Incidental finding results were discussed with the Patient by Matthew Yoon PA-C on 03/20/25.   They expressed understanding and all questions answered.

## 2025-03-20 NOTE — TELEPHONE ENCOUNTER
S/w pt and advised of same. Pt verbalized understanding and states she will go to ED either via ambul or car.

## 2025-03-20 NOTE — ANESTHESIA POSTPROCEDURE EVALUATION
Post-Op Assessment Note    CV Status:  Stable    Pain management: adequate       Mental Status:  Alert and awake   Hydration Status:  Euvolemic   PONV Controlled:  Controlled   Airway Patency:  Patent     Post Op Vitals Reviewed: Yes    No anethesia notable event occurred.    Staff: CRNA           Last Filed PACU Vitals:  Vitals Value Taken Time   Temp 97.9    Pulse 71 03/20/25 0824   BP 97/64    Resp 17 03/20/25 0824   SpO2 98 % 03/20/25 0824   Vitals shown include unfiled device data.

## 2025-03-20 NOTE — ED PROVIDER NOTES
Time reflects when diagnosis was documented in both MDM as applicable and the Disposition within this note       Time User Action Codes Description Comment    3/20/2025  6:33 PM Matthew Yoon Add [K11.8] Parotid mass     3/20/2025  6:33 PM Matthew Yoon Modify [K11.8] Parotid mass left          ED Disposition       ED Disposition   Discharge    Condition   Stable    Date/Time   Thu Mar 20, 2025  6:33 PM    Comment   Jennifer ROBINS Jose C discharge to home/self care.                   Assessment & Plan       Medical Decision Making  64-year-old female presents for evaluation of neck pain status post procedure earlier today.  See HPI for further details differential diagnosis includes abscess, cellulitis, hemorrhage.    No evidence of acute pathology on the right side of the patient's neck over the region of symptomatology.  She is well-appearing stable for discharge home incidental finding of parotid mass larger than last year still has not followed up with the ENT and stressed the importance of this completed incidental findings.    Amount and/or Complexity of Data Reviewed  Labs: ordered. Decision-making details documented in ED Course.  Radiology: ordered.    Risk  Prescription drug management.        ED Course as of 03/20/25 1904   Thu Mar 20, 2025   1704 WBC: 6.89   1704 Hematocrit: 36.9   1704 Platelet Count: 151   1704 Hemoglobin: 12.0   1704  CBC reviewed within reasonable limits.   1725 GFR, Calculated: 86   1725 Awaiting CT   1802 Pt enroute to CT   1833    IMPRESSION:     1.  No suspicious mass or fluid collection in the region of the patient's reported symptoms.  2.  Enhancing left parotid mass as above, slightly increased in size compared to prior from 4/29/2024. ENT follow-up is advised for further work-up.  3.  Additional chronic/incidental findings as above.         Medications   morphine injection 2 mg (2 mg Intravenous Given 3/20/25 1819)   iohexol (OMNIPAQUE) 350 MG/ML injection (MULTI-DOSE) 85 mL  (85 mL Intravenous Given 3/20/25 1805)       ED Risk Strat Scores                            SBIRT 22yo+      Flowsheet Row Most Recent Value   Initial Alcohol Screen: US AUDIT-C     1. How often do you have a drink containing alcohol? 0 Filed at: 03/20/2025 1635   2. How many drinks containing alcohol do you have on a typical day you are drinking?  0 Filed at: 03/20/2025 1635   3b. FEMALE Any Age, or MALE 65+: How often do you have 4 or more drinks on one occassion? 0 Filed at: 03/20/2025 1635   Audit-C Score 0 Filed at: 03/20/2025 1635   ABIOLA: How many times in the past year have you...    Used an illegal drug or used a prescription medication for non-medical reasons? Never Filed at: 03/20/2025 1635                            History of Present Illness       Chief Complaint   Patient presents with    Neck Pain     States that she had an RFA procedure done this morning, is now having pain and swelling the right side of her neck        Past Medical History:   Diagnosis Date    Ambulates with cane     Anxiety     Arthritis     Asthma     Bipolar 1 disorder ([facility])     Brain aneurysm     Chronic pain disorder     spinal stenosis    Concussion syndrome     neurological rx and balance rx    COPD (chronic obstructive pulmonary disease) ([facility])     Depression     Diabetes mellitus ([facility])     controlled w/ diet    Disease of thyroid gland     nodules     Family history of colon cancer     father    Fibromyalgia, primary     GERD (gastroesophageal reflux disease)     History of colon polyps     Hyperlipidemia     Hypertension     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     Irritable bowel syndrome     Lumbar degenerative disc disease 02/16/2022    Migraine     Neuropathy     bilateral feet and hands    Peripheral neuropathy     Psychiatric disorder     PTSD (post-traumatic stress disorder)     Shortness of breath     Sleep apnea     had 3 studies & last one negative    Stroke ([facility])     2012 no deficeits 2018 2019 TIA     TIA (transient ischemic attack)     Wears dentures     Wears glasses       Past Surgical History:   Procedure Laterality Date    ABDOMINAL SURGERY      laproscopic/ endometriosis    BRAIN SURGERY  2017    aneurysm/ coiling procedure    CARPAL TUNNEL RELEASE      CHOLECYSTECTOMY      COLONOSCOPY      DENTAL SURGERY      EPIDURAL BLOCK INJECTION Right 03/03/2022    Procedure: C7-T1 interlaminar epidural steroid injection;  Surgeon: Johnson Aranda MD;  [facility];  Service: Pain Management     EPIDURAL BLOCK INJECTION N/A 04/21/2022    Procedure: C6-C7 BLOCK / INJECTION EPIDURAL STEROID CERVICAL;  Surgeon: Johnson Aranda MD;  [facility];  Service: Pain Management     EPIDURAL BLOCK INJECTION Left 06/21/2022    Procedure: BLOCK / INJECTION EPIDURAL STEROID LUMBAR  left L3-4 TFESI;  Surgeon: Johnson Aranda MD;  [facility];  Service: Pain Management     EYE SURGERY      cataract    FASCIOTOMY Right 10/27/2023    Procedure: FASCIOTOMY OF RIGHT UPPER EXTREMITY; POSSIBLE VAC DRESSING APPLICATION;  Surgeon: Bruno Blevins MD;  [facility];  Service: General    FL GUIDED NEEDLE PLAC BX/ASP/INJ  03/03/2022    FL GUIDED NEEDLE PLAC BX/ASP/INJ  11/17/2022    HYSTERECTOMY      NERVE BLOCK Left 02/20/2024    Procedure: BLOCK MEDIAL BRANCH  left C3-4 and C4-5 MBB #1;  Surgeon: Johnson Aranda MD;  [facility];  Service: Pain Management     NERVE BLOCK Left 04/04/2024    Procedure: BLOCK MEDIAL BRANCH Left C3-C4 and C4-5 MBB2;  Surgeon: Johnson Aranda MD;  [facility];  Service: Pain Management     NERVE BLOCK Right 12/19/2024    Procedure: Right C3-4 and C4-5 Medial branch block #1;  Surgeon: Johnson Aranda MD;  [facility];  Service: Pain Management     NERVE BLOCK Right 3/20/2025    Procedure: C3-4 and C4-5 MBB2;  Surgeon: Johnson Aranda MD;  [facility];  Service: Pain Management     HI ESOPHAGOGASTRODUODENOSCOPY  TRANSORAL DIAGNOSTIC N/A 02/08/2018    Procedure: EGD AND COLONOSCOPY;  Surgeon: Caleb Pete MD;  [facility];  Service: Gastroenterology    TN EXC B9 LESION MRGN XCP SK TG F/E/E/N/L/M > 4.0CM Right 5/28/2024    Procedure: EXCISION LIPOMA;  Surgeon: Valdo Montoya DO;  [facility];  Service: General    TN PRQ IMPLTJ NSTIM ELECTRODE ARRAY EPIDURAL N/A 11/17/2022    Procedure: NEVRO SCS TRIAL;  Surgeon: Johnson Aranda MD;  [facility];  Service: Pain Management     TN PRQ IMPLTJ NSTIM ELECTRODE ARRAY EPIDURAL N/A 02/01/2023    Procedure: Insertion percutaneous thoracic spinal cord stimulator with left buttock implantable pulse generator;  Surgeon: Craig Robert Goldberg, MD;  [facility];  Service: Neurosurgery    RADIOFREQUENCY ABLATION Left 6/19/2024    Procedure: Left C3-4 and C4-5 Radio Frequency Ablation;  Surgeon: Johnson Aranda MD;  [facility];  Service: Pain Management     RADIOFREQUENCY ABLATION Left 9/5/2024    Procedure: Left C3-4 and C4-5 Radio Frequency Ablation;  Surgeon: Johnson Aranda MD;  [facility];  Service: Pain Management     SPLIT THICKNESS SKIN GRAFT Right 11/07/2023    Procedure: SKIN GRAFT SPLIT THICKNESS (STSG)  EXTREMITY;  Surgeon: Samm Sims MD;  [facility];  Service: General    THYROID SURGERY      TONSILLECTOMY      US GUIDED THYROID BIOPSY  11/30/2016    US GUIDED THYROID BIOPSY  03/21/2018    VAC DRESSING APPLICATION Right 10/29/2023    Procedure: CHANGE DRESSING/VAC RIGHT UPPER EXTREMITY; WASHOUT; PARTIAL CLOSURE;  Surgeon: Irina Day DO;  [facility];  Service: General    WOUND DEBRIDEMENT Right 11/04/2023    Procedure: SIMPLE CLOSURE 3.5CM, OASIS APPLICATION 15X7CM, WOUND VAC APPLICATION;  Surgeon: Maryuri Goldstein MD;  [facility];  Service: General      Family History   Problem Relation Age of Onset    Brain cancer Mother     Alzheimer's disease Mother      Alzheimer's disease Father     Parkinsonism Father       Social History     Tobacco Use    Smoking status: Every Day     Current packs/day: 1.50     Types: Cigarettes    Smokeless tobacco: Never    Tobacco comments:     Smoked one cigarette today 3/20/25   Vaping Use    Vaping status: Never Used   Substance Use Topics    Alcohol use: Not Currently    Drug use: Never     Comment: prescribed Belbuca      E-Cigarette/Vaping    E-Cigarette Use Never User       E-Cigarette/Vaping Substances    Nicotine No     THC No     CBD No     Flavoring No     Other No     Unknown No       I have reviewed and agree with the history as documented.     64-year-old female presents for evaluation of pain and what she perceives to be a swelling on the right side hip posterior lateral neck over a region that she had pain management procedure completed at this morning.  She noted increased pain and swelling in the region called the provider who recommended ER visit to evaluate for underlying bleeding.  She also offers of complaints where she feels as though she is having difficulty swallowing but she is able to do so.  No gross abnormalities noted on physical examination.      Neck Pain  Associated symptoms: no chest pain, no fever, no headaches, no numbness and no weakness        Review of Systems   Constitutional: Negative.  Negative for chills and fever.   HENT: Negative.  Negative for sore throat and trouble swallowing.    Eyes: Negative.    Respiratory: Negative.  Negative for cough, shortness of breath and wheezing.    Cardiovascular: Negative.  Negative for chest pain and leg swelling.   Gastrointestinal: Negative.  Negative for abdominal pain, blood in stool and vomiting.   Endocrine: Negative.    Genitourinary: Negative.    Musculoskeletal:  Positive for neck pain. Negative for neck stiffness.   Skin: Negative.    Allergic/Immunologic: Negative.    Neurological: Negative.  Negative for dizziness, seizures, speech difficulty,  weakness, light-headedness, numbness and headaches.   Hematological: Negative.    Psychiatric/Behavioral: Negative.     All other systems reviewed and are negative.          Objective       ED Triage Vitals [03/20/25 1635]   Temperature Pulse Blood Pressure Respirations SpO2 Patient Position - Orthostatic VS   (!) 97.2 °F (36.2 °C) 61 127/71 18 100 % Lying      Temp Source Heart Rate Source BP Location FiO2 (%) Pain Score    Temporal Monitor Right arm -- 8      Vitals      Date and Time Temp Pulse SpO2 Resp BP Pain Score FACES Pain Rating User   03/20/25 1837 -- -- -- -- -- 7 -- AK   03/20/25 1830 -- 58 93 % 20 123/84 -- -- AK   03/20/25 1819 -- -- -- -- -- 8 -- AK   03/20/25 1817 -- 58 95 % 20 136/71 -- -- AK   03/20/25 1635 97.2 °F (36.2 °C) 61 100 % 18 127/71 8 -- [place]            Physical Exam  Constitutional:       Appearance: She is well-developed. She is not ill-appearing.   HENT:      Right Ear: External ear normal. No swelling. Tympanic membrane is not bulging.      Left Ear: External ear normal. No swelling. Tympanic membrane is not bulging.      Nose: Nose normal.      Mouth/Throat:      Pharynx: No oropharyngeal exudate.   Eyes:      General: Lids are normal.      Conjunctiva/sclera: Conjunctivae normal.      Pupils: Pupils are equal, round, and reactive to light.   Neck:      Thyroid: No thyromegaly.      Vascular: No JVD.      Trachea: No tracheal deviation.     Cardiovascular:      Rate and Rhythm: Normal rate and regular rhythm.      Pulses: Normal pulses.      Heart sounds: Normal heart sounds. No murmur heard.     No friction rub. No gallop.   Pulmonary:      Effort: Pulmonary effort is normal. No respiratory distress.      Breath sounds: Normal breath sounds. No stridor. No wheezing, rhonchi or rales.   Chest:      Chest wall: No tenderness.   Abdominal:      General: Bowel sounds are normal. There is no distension.      Palpations: Abdomen is soft. There is no mass.      Tenderness: There is no  abdominal tenderness. There is no guarding or rebound.      Hernia: No hernia is present.   Musculoskeletal:         General: Tenderness present. No swelling, deformity or signs of injury. Normal range of motion.      Cervical back: Normal range of motion and neck supple. No edema. Normal range of motion.      Right lower leg: No edema.      Left lower leg: No edema.   Lymphadenopathy:      Cervical: No cervical adenopathy.   Skin:     General: Skin is warm and dry.      Coloration: Skin is not pale.      Findings: No erythema or rash.   Neurological:      Mental Status: She is alert and oriented to person, place, and time.      GCS: GCS eye subscore is 4. GCS verbal subscore is 5. GCS motor subscore is 6.      Cranial Nerves: No cranial nerve deficit.      Sensory: No sensory deficit.      Deep Tendon Reflexes: Reflexes are normal and symmetric.   Psychiatric:         Speech: Speech normal.         Behavior: Behavior normal.         Results Reviewed       Procedure Component Value Units Date/Time    Basic metabolic panel [[id number]] Collected: 03/20/25 1652    Lab Status: Final result Specimen: Blood from Arm, Left Updated: 03/20/25 1711     Sodium 141 mmol/L      Potassium 3.7 mmol/L      Chloride 106 mmol/L      CO2 28 mmol/L      ANION GAP 7 mmol/L      BUN 13 mg/dL      Creatinine 0.74 mg/dL      Glucose 102 mg/dL      Calcium 8.6 mg/dL      eGFR 86 ml/min/1.73sq m     Narrative:      National Kidney Disease Foundation guidelines for Chronic Kidney Disease (CKD):     Stage 1 with normal or high GFR (GFR > 90 mL/min/1.73 square meters)    Stage 2 Mild CKD (GFR = 60-89 mL/min/1.73 square meters)    Stage 3A Moderate CKD (GFR = 45-59 mL/min/1.73 square meters)    Stage 3B Moderate CKD (GFR = 30-44 mL/min/1.73 square meters)    Stage 4 Severe CKD (GFR = 15-29 mL/min/1.73 square meters)    Stage 5 End Stage CKD (GFR <15 mL/min/1.73 square meters)  Note: GFR calculation is accurate only with a steady state creatinine     CBC and differential [[id number]]  (Abnormal) Collected: 03/20/25 1652    Lab Status: Final result Specimen: Blood from Arm, Left Updated: 03/20/25 1701     WBC 6.89 Thousand/uL      RBC 3.79 Million/uL      Hemoglobin 12.0 g/dL      Hematocrit 36.9 %      MCV 97 fL      MCH 31.7 pg      MCHC 32.5 g/dL      RDW 12.8 %      MPV 10.0 fL      Platelets 151 Thousands/uL      nRBC 0 /100 WBCs      Segmented % 42 %      Immature Grans % 0 %      Lymphocytes % 49 %      Monocytes % 7 %      Eosinophils Relative 2 %      Basophils Relative 0 %      Absolute Neutrophils 2.87 Thousands/µL      Absolute Immature Grans 0.02 Thousand/uL      Absolute Lymphocytes 3.37 Thousands/µL      Absolute Monocytes 0.49 Thousand/µL      Eosinophils Absolute 0.11 Thousand/µL      Basophils Absolute 0.03 Thousands/µL             CT soft tissue neck with contrast   Final Interpretation by Lam Maravilla MD (03/20 1818)      1.  No suspicious mass or fluid collection in the region of the patient's reported symptoms.   2.  Enhancing left parotid mass as above, slightly increased in size compared to prior from 4/29/2024. ENT follow-up is advised for further work-up.   3.  Additional chronic/incidental findings as above.      The study was marked in EPIC for immediate notification.      Workstation performed: [id number]             Procedures    ED Medication and Procedure Management   Prior to Admission Medications   Prescriptions Last Dose Informant Patient Reported? Taking?   Alcohol Swabs (Alcohol Pads) 70 % PADS  Care Giver, Self Yes No   Sig: USE TO TEST BLOOD GLUCOSE 2-3 TIMES DAILY   Buprenorphine HCl (Belbuca) 300 MCG FILM  Self, Care Giver Yes No   Sig: Apply 300 mcg to cheek daily at bedtime   HYDROcodone-acetaminophen (NORCO)  mg per tablet  Self, Care Giver Yes No   Sig: Take 1 tablet by mouth 3 (three) times a day   OneTouch Ultra test strip  Care Giver, Self Yes No   Sig: USE TO TEST BLOOD GLUCOSE 2-3 TIMES DAILY   True  Comfort Safety Lancets MISC  Care Giver, Self Yes No   Sig: USE TO TEST BLOOD GLUCOSE 2-3 TIMES DAILY   albuterol (PROVENTIL HFA,VENTOLIN HFA) 90 mcg/act inhaler  Self, Care Giver No No   Sig: Inhale 2 puffs every 6 (six) hours as needed for wheezing or shortness of breath   ammonium lactate (LAC-HYDRIN) 12 % lotion  Care Giver, Self Yes No   Sig: as needed   atorvastatin (LIPITOR) 80 mg tablet  Care Giver, Self Yes No   Sig: Take 80 mg by mouth every evening   benzonatate (TESSALON PERLES) 100 mg capsule  Care Giver, Self No No   Sig: Take 1 capsule (100 mg total) by mouth 3 (three) times a day as needed for cough   carboxymethylcellulose 0.5 % SOLN  Self, Care Giver No No   Sig: Administer 1 drop to both eyes daily as needed for dry eyes   clonazePAM (KlonoPIN) 0.5 mg tablet  Self, Care Giver Yes No   Sig: Take 0.5 mg by mouth daily at bedtime   clonazePAM (KlonoPIN) 1 mg tablet  Self, Care Giver Yes No   Sig: Take 1 mg by mouth daily in the early morning   dicyclomine (BENTYL) 20 mg tablet  Self, Care Giver Yes No   Sig: Take 20 mg by mouth every 6 (six) hours   famotidine (PEPCID) 40 MG tablet   No No   Sig: TAKE 1 TABLET (40 MG TOTAL) BY MOUTH DAILY AT BEDTIME   fenofibrate (TRICOR) 48 mg tablet  Self, Care Giver Yes No   Sig: Take 48 mg by mouth daily   fluticasone (FLONASE) 50 mcg/act nasal spray  Care Giver, Self Yes No   Si sprays into each nostril if needed   fluticasone-umeclidinium-vilanterol (Trelegy Ellipta) 200-62.5-25 mcg/actuation AEPB inhaler  Self, Care Giver No No   Sig: Inhale 1 puff daily Rinse mouth after use.   latanoprost (XALATAN) 0.005 % ophthalmic solution  Care Giver, Self Yes No   Sig: instill 1 drop into both eyes at bedtime   levalbuterol (XOPENEX) 1.25 mg/3 mL nebulizer solution  Self, Care Giver No No   Sig: Take 3 mL (1.25 mg total) by nebulization every 4 (four) hours as needed for wheezing or shortness of breath   levothyroxine 137 mcg tablet  Self, Care Giver Yes No   Sig:  Take 137 mcg by mouth daily   omeprazole (PriLOSEC) 40 MG capsule  Self, Care Giver No No   Sig: TAKE 1 CAPSULE BY MOUTH DAILY IN THE MORNING PRIOR TO A MEAL.   ondansetron (ZOFRAN) 8 mg tablet  Self, Care Giver No No   Sig: TAKE 1 TABLET (8 MG TOTAL) BY MOUTH EVERY 8 (EIGHT) HOURS AS NEEDED FOR NAUSEA OR VOMITING   polyethylene glycol (GLYCOLAX) 17 GM/SCOOP powder  Self, Care Giver Yes No   Sig: Take 17 g by mouth if needed   pregabalin (LYRICA) 100 mg capsule  Self, Care Giver Yes No   Sig: Take 100 mg by mouth 3 (three) times a day Morning-lunch-dinner   pregabalin (LYRICA) 150 mg capsule  Self, Care Giver Yes No   Sig: take 1 capsule by mouth NIGHTLY   rimegepant sulfate (Nurtec) 75 mg TBDP  Care Giver, Self Yes No   Sig: Take 75 mg by mouth once   tiZANidine (ZANAFLEX) 4 mg tablet  Care Giver, Self Yes No   Sig: Take 8 mg by mouth once 1 tablet   traZODone (DESYREL) 100 mg tablet  Care Giver, Self Yes No   Sig: Take 100 mg by mouth      Facility-Administered Medications: None     Patient's Medications   Discharge Prescriptions    No medications on file     No discharge procedures on file.  ED SEPSIS DOCUMENTATION   Time reflects when diagnosis was documented in both MDM as applicable and the Disposition within this note       Time User Action Codes Description Comment    3/20/2025  6:33 PM Matthew Yoon Add [K11.8] Parotid mass     3/20/2025  6:33 PM Matthew Yoon Modify [K11.8] Parotid mass left                 Matthew Yoon PA-C  03/20/25 1764

## 2025-03-20 NOTE — OP NOTE
OPERATIVE REPORT  PATIENT NAME: Jennifer Woodall    :  1960  MRN: [id number]  Pt Location: [facility]    SURGERY DATE: 3/20/2025    Surgeons and Role:     * Johnson Aranda MD - Primary    Preop Diagnosis:  Cervical radiculopathy [M54.12]    Post-Op Diagnosis Codes:     * Cervical radiculopathy [M54.12]    Procedure(s):  Right - C3-4 and C4-5 MBB2    Specimen(s):  * No specimens in log *    Estimated Blood Loss:   Minimal    Drains:  * No LDAs found *    Anesthesia Type:   IV Sedation with Anesthesia    Operative Indications:  Cervical radiculopathy [M54.12]      Operative Findings:  same      Complications:   None    Procedure and Technique:  Fluoroscopically-guided Medial Branch Nerve Blocks of the right C3-C4 and C4-C5 facet joint(s) using 0.5% bupivacaine      After discussing the risks, benefits, and alternatives to the procedure, the patient expressed understanding and wished to proceed.  The patient was brought to the fluoroscopy suite and placed in the prone position.  Procedural pause conducted to verify:  correct patient identity, procedure to be performed and as applicable, correct side and site, correct patient position, and availability of implants, special equipment and special requirements.  Using fluoroscopy, the mid-body of the articular pillar at the right C3, C4, C5 levels were identified.  The skin was sterilely prepped and draped in the usual fashion using Chloraprep skin prep.  Using fluoroscopic guidance, a 3.5 inch 25 gauge spinal needle was advanced to each target.  After negative aspiration, 0.5cc of 0.5% bupivacaine was injected at each site and the needles were then removed. The patient tolerated the procedure well and there were no apparent complications.  After appropriate observation, the patient was dismissed from the clinic in good condition under their own power.  The patient was instructed to keep a pain diary and report the results at her follow up  appointment.              I was present for the entire procedure.    Patient Disposition:  hemodynamically stable             SIGNATURE: Johnson Aranda MD  DATE: March 20, 2025  TIME: 12:22 PM

## 2025-03-25 ENCOUNTER — TELEPHONE (OUTPATIENT)
Age: 65
End: 2025-03-25

## 2025-03-26 ENCOUNTER — TELEPHONE (OUTPATIENT)
Dept: PAIN MEDICINE | Facility: CLINIC | Age: 65
End: 2025-03-26

## 2025-03-31 ENCOUNTER — PREP FOR PROCEDURE (OUTPATIENT)
Dept: PAIN MEDICINE | Facility: CLINIC | Age: 65
End: 2025-03-31

## 2025-03-31 DIAGNOSIS — M54.12 CERVICAL RADICULOPATHY: Primary | ICD-10-CM

## 2025-04-02 ENCOUNTER — OFFICE VISIT (OUTPATIENT)
Dept: OBGYN CLINIC | Facility: CLINIC | Age: 65
End: 2025-04-02
Payer: COMMERCIAL

## 2025-04-02 VITALS — BODY MASS INDEX: 29.37 KG/M2 | WEIGHT: 172 LBS | HEIGHT: 64 IN

## 2025-04-02 DIAGNOSIS — M77.8 RIGHT WRIST TENDINITIS: ICD-10-CM

## 2025-04-02 DIAGNOSIS — M77.11 LATERAL EPICONDYLITIS OF RIGHT ELBOW: Primary | ICD-10-CM

## 2025-04-02 PROCEDURE — 99203 OFFICE O/P NEW LOW 30 MIN: CPT | Performed by: SURGERY

## 2025-04-02 RX ORDER — LEVOTHYROXINE SODIUM 100 UG/1
TABLET ORAL
COMMUNITY
Start: 2025-03-30

## 2025-04-02 NOTE — PATIENT INSTRUCTIONS
NSAIDs for 3-5 days can help  Limit activities and break up longer activities into smaller portions  Work on forming a fist

## 2025-04-02 NOTE — PROGRESS NOTES
ORTHOPAEDIC HAND, WRIST, AND ELBOW OFFICE  VISIT       ASSESSMENT/PLAN:      64 y.o. year old female who presents with    Assessment & Plan  Right wrist tendinitis  Discussed using splint at night . She has one at home  Recommend 3-5 days of NSAIDs  Activities as tolerated       Lateral epicondylitis of right elbow  Discussed using splint at night . She has one at home  Recommend 3-5 days of NSAIDs  Exercise packet provided  Activities as tolerated         The patient verbalized understanding of exam findings and treatment plan. We engaged in the shared decision-making process and treatment options were discussed at length with the patient. Surgical and conservative management discussed today along with risks and benefits.        Follow Up:  Return if symptoms worsen or fail to improve.    ____________________________________________________________________________________________________________________________________________      CHIEF COMPLAINT:  Chief Complaint   Patient presents with    Right Forearm - Pain       SUBJECTIVE:  Jennifer Woodall is a 64 y.o. year old  female who presents for evaluation of Right arm pain      Patient states about two weeks ago she started to get Right hand and forearm pain. No injuries reported. She states she started to loose the ability to grasp items due to the pain and she cannot make a fist. Pain will radiate up her dorsal hand to her elbow a times She also reports constant tingling into her hand as well. She is dropping items as well. She did have an EMG and is currently receiving RFA for her neck    Had EMG 2/20/2025 see results below      I have personally reviewed all the relevant PMH, PSH, SH, FH, Medications and allergies      PAST MEDICAL HISTORY:  Past Medical History:   Diagnosis Date    Ambulates with cane     Anxiety     Arthritis     Asthma     Bipolar 1 disorder (HCC)     Brain aneurysm     Chronic pain disorder     spinal stenosis    Concussion syndrome      neurological rx and balance rx    COPD (chronic obstructive pulmonary disease) ([facility])     Depression     Diabetes mellitus (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)     2012 no deficeits 2018 2019 TIA    TIA (transient ischemic attack)     Wears dentures     Wears glasses        PAST SURGICAL HISTORY:  Past Surgical History:   Procedure Laterality Date    ABDOMINAL SURGERY      laproscopic/ endometriosis    BRAIN SURGERY  2017    aneurysm/ coiling procedure    CARPAL TUNNEL RELEASE      CHOLECYSTECTOMY      COLONOSCOPY      DENTAL SURGERY      EPIDURAL BLOCK INJECTION Right 03/03/2022    Procedure: C7-T1 interlaminar epidural steroid injection;  Surgeon: Johnson Aranda MD;  [facility];  Service: Pain Management     EPIDURAL BLOCK INJECTION N/A 04/21/2022    Procedure: C6-C7 BLOCK / INJECTION EPIDURAL STEROID CERVICAL;  Surgeon: Johnson Aranda MD;  [facility];  Service: Pain Management     EPIDURAL BLOCK INJECTION Left 06/21/2022    Procedure: BLOCK / INJECTION EPIDURAL STEROID LUMBAR  left L3-4 TFESI;  Surgeon: Johnson Aranda MD;  [facility];  Service: Pain Management     EYE SURGERY      cataract    FASCIOTOMY Right 10/27/2023    Procedure: FASCIOTOMY OF RIGHT UPPER EXTREMITY; POSSIBLE VAC DRESSING APPLICATION;  Surgeon: Bruno Blevins MD;  [facility];  Service: General    FL GUIDED NEEDLE  PLAC BX/ASP/INJ  03/03/2022    FL GUIDED NEEDLE PLAC BX/ASP/INJ  11/17/2022    HYSTERECTOMY      NERVE BLOCK Left 02/20/2024    Procedure: BLOCK MEDIAL BRANCH  left C3-4 and C4-5 MBB #1;  Surgeon: Johnson Aranda MD;  [facility];  Service: Pain Management     NERVE BLOCK Left 04/04/2024    Procedure: BLOCK MEDIAL BRANCH Left C3-C4 and C4-5 MBB2;  Surgeon: Johnson Aranda MD;  [facility];  Service: Pain Management     NERVE BLOCK Right 12/19/2024    Procedure: Right C3-4 and C4-5 Medial branch block #1;  Surgeon: Johnson Aranda MD;  [facility];  Service: Pain Management     NERVE BLOCK Right 3/20/2025    Procedure: C3-4 and C4-5 MBB2;  Surgeon: Johnson Aranda MD;  [facility];  Service: Pain Management     DE ESOPHAGOGASTRODUODENOSCOPY TRANSORAL DIAGNOSTIC N/A 02/08/2018    Procedure: EGD AND COLONOSCOPY;  Surgeon: Caleb Pete MD;  [facility]: [facility];  Service: Gastroenterology    DE EXC B9 LESION MRGN XCP SK TG F/E/E/N/L/M > 4.0CM Right 5/28/2024    Procedure: EXCISION LIPOMA;  Surgeon: Valdo Montoay DO;  [facility];  Service: General    DE PRQ IMPLTJ NSTIM ELECTRODE ARRAY EPIDURAL N/A 11/17/2022    Procedure: NEVRO SCS TRIAL;  Surgeon: Johnson Aranda MD;  [facility];  Service: Pain Management     DE PRQ IMPLTJ NSTIM ELECTRODE ARRAY EPIDURAL N/A 02/01/2023    Procedure: Insertion percutaneous thoracic spinal cord stimulator with left buttock implantable pulse generator;  Surgeon: Craig Robert Goldberg, MD;  [facility];  Service: Neurosurgery    RADIOFREQUENCY ABLATION Left 6/19/2024    Procedure: Left C3-4 and C4-5 Radio Frequency Ablation;  Surgeon: Johnson Aranda MD;  [facility];  Service: Pain Management     RADIOFREQUENCY ABLATION Left 9/5/2024    Procedure: Left C3-4 and C4-5 Radio Frequency Ablation;  Surgeon: Johnson Aranda MD;  [facility];  Service: Pain Management      SPLIT THICKNESS SKIN GRAFT Right 11/07/2023    Procedure: SKIN GRAFT SPLIT THICKNESS (STSG)  EXTREMITY;  Surgeon: Samm Sims MD;  [facility];  Service: General    THYROID SURGERY      TONSILLECTOMY      US GUIDED THYROID BIOPSY  11/30/2016    US GUIDED THYROID BIOPSY  03/21/2018    VAC DRESSING APPLICATION Right 10/29/2023    Procedure: CHANGE DRESSING/VAC RIGHT UPPER EXTREMITY; WASHOUT; PARTIAL CLOSURE;  Surgeon: Irina Day DO;  [facility];  Service: General    WOUND DEBRIDEMENT Right 11/04/2023    Procedure: SIMPLE CLOSURE 3.5CM, OASIS APPLICATION 15X7CM, WOUND VAC APPLICATION;  Surgeon: Maryuri Goldstein MD;  [facility];  Service: General       FAMILY HISTORY:  Family History   Problem Relation Age of Onset    Brain cancer Mother     Alzheimer's disease Mother     Alzheimer's disease Father     Parkinsonism Father        SOCIAL HISTORY:  Social History     Tobacco Use    Smoking status: Every Day     Current packs/day: 1.50     Types: Cigarettes    Smokeless tobacco: Never    Tobacco comments:     Smoked one cigarette today 3/20/25   Vaping Use    Vaping status: Never Used   Substance Use Topics    Alcohol use: Not Currently    Drug use: Never     Comment: prescribed Belbuca       MEDICATIONS:    Current Outpatient Medications:     albuterol (PROVENTIL HFA,VENTOLIN HFA) 90 mcg/act inhaler, Inhale 2 puffs every 6 (six) hours as needed for wheezing or shortness of breath, Disp: 18 g, Rfl: 5    Alcohol Swabs (Alcohol Pads) 70 % PADS, USE TO TEST BLOOD GLUCOSE 2-3 TIMES DAILY, Disp: , Rfl:     ammonium lactate (LAC-HYDRIN) 12 % lotion, as needed, Disp: , Rfl:     atorvastatin (LIPITOR) 80 mg tablet, Take 80 mg by mouth every evening, Disp: , Rfl:     benzonatate (TESSALON PERLES) 100 mg capsule, Take 1 capsule (100 mg total) by mouth 3 (three) times a day as needed for cough, Disp: 30 capsule, Rfl: 1    Buprenorphine HCl (Belbuca) 300 MCG FILM, Apply 300 mcg to cheek daily  at bedtime, Disp: , Rfl:     carboxymethylcellulose 0.5 % SOLN, Administer 1 drop to both eyes daily as needed for dry eyes, Disp: 1 Bottle, Rfl: 0    clonazePAM (KlonoPIN) 0.5 mg tablet, Take 0.5 mg by mouth daily at bedtime, Disp: , Rfl:     clonazePAM (KlonoPIN) 1 mg tablet, Take 1 mg by mouth daily in the early morning, Disp: , Rfl:     dicyclomine (BENTYL) 20 mg tablet, Take 20 mg by mouth every 6 (six) hours, Disp: , Rfl:     famotidine (PEPCID) 40 MG tablet, TAKE 1 TABLET (40 MG TOTAL) BY MOUTH DAILY AT BEDTIME, Disp: 90 tablet, Rfl: 1    fenofibrate (TRICOR) 48 mg tablet, Take 48 mg by mouth daily, Disp: , Rfl:     fluticasone (FLONASE) 50 mcg/act nasal spray, 2 sprays into each nostril if needed, Disp: , Rfl:     fluticasone-umeclidinium-vilanterol (Trelegy Ellipta) 200-62.5-25 mcg/actuation AEPB inhaler, Inhale 1 puff daily Rinse mouth after use., Disp: 60 blister, Rfl: 5    HYDROcodone-acetaminophen (NORCO)  mg per tablet, Take 1 tablet by mouth 3 (three) times a day, Disp: , Rfl:     latanoprost (XALATAN) 0.005 % ophthalmic solution, instill 1 drop into both eyes at bedtime, Disp: , Rfl:     levalbuterol (XOPENEX) 1.25 mg/3 mL nebulizer solution, Take 3 mL (1.25 mg total) by nebulization every 4 (four) hours as needed for wheezing or shortness of breath, Disp: 90 mL, Rfl: 0    levothyroxine 100 mcg tablet, , Disp: , Rfl:     omeprazole (PriLOSEC) 40 MG capsule, TAKE 1 CAPSULE BY MOUTH DAILY IN THE MORNING PRIOR TO A MEAL., Disp: 30 capsule, Rfl: 5    ondansetron (ZOFRAN) 8 mg tablet, TAKE 1 TABLET (8 MG TOTAL) BY MOUTH EVERY 8 (EIGHT) HOURS AS NEEDED FOR NAUSEA OR VOMITING, Disp: 20 tablet, Rfl: 5    OneTouch Ultra test strip, USE TO TEST BLOOD GLUCOSE 2-3 TIMES DAILY, Disp: , Rfl:     polyethylene glycol (GLYCOLAX) 17 GM/SCOOP powder, Take 17 g by mouth if needed, Disp: , Rfl:     pregabalin (LYRICA) 100 mg capsule, Take 100 mg by mouth 3 (three) times a day Morning-lunch-dinner, Disp: , Rfl:      "pregabalin (LYRICA) 150 mg capsule, take 1 capsule by mouth NIGHTLY, Disp: , Rfl:     rimegepant sulfate (Nurtec) 75 mg TBDP, Take 75 mg by mouth once, Disp: , Rfl:     tiZANidine (ZANAFLEX) 4 mg tablet, Take 8 mg by mouth once 1 tablet, Disp: , Rfl:     traZODone (DESYREL) 100 mg tablet, Take 100 mg by mouth, Disp: , Rfl:     True Comfort Safety Lancets MISC, USE TO TEST BLOOD GLUCOSE 2-3 TIMES DAILY, Disp: , Rfl:     levothyroxine 137 mcg tablet, Take 137 mcg by mouth daily (Patient not taking: Reported on 4/2/2025), Disp: , Rfl:     ALLERGIES:  Allergies   Allergen Reactions    Hydroxyzine Anaphylaxis     Claims it gives her convulsions.     Bee Pollen Other (See Comments)     familial    Pollen Extract Itching    Tree Extract     Clarithromycin Rash     Pt denies    Ibuprofen Nausea Only    Latex Rash    Sulfa Antibiotics Rash     \"severe skin burn\"           REVIEW OF SYSTEMS:  Review of Systems   Constitutional:  Negative for chills and fever.   HENT:  Negative for ear pain and sore throat.    Eyes:  Negative for pain and visual disturbance.   Respiratory:  Negative for cough and shortness of breath.    Cardiovascular:  Negative for chest pain and palpitations.   Gastrointestinal:  Negative for abdominal pain and vomiting.   Genitourinary:  Negative for dysuria and hematuria.   Musculoskeletal:  Negative for arthralgias and back pain.   Skin:  Negative for color change and rash.   Neurological:  Positive for numbness. Negative for seizures and syncope.   All other systems reviewed and are negative.      VITALS:  There were no vitals filed for this visit.    LABS:  HgA1c:   Lab Results   Component Value Date    HGBA1C 5.6 10/28/2024     BMP:   Lab Results   Component Value Date    GLUCOSE 99 08/10/2015    CALCIUM 8.6 03/20/2025     08/10/2015    K 3.7 03/20/2025    CO2 28 03/20/2025     03/20/2025    BUN 13 03/20/2025    CREATININE 0.74 03/20/2025 "       _____________________________________________________  PHYSICAL EXAMINATION:  General: well developed and well nourished, alert, oriented times 3, and appears comfortable  Psychiatric: Normal  HEENT: Normocephalic, Atraumatic Trachea Midline, No torticollis  Pulmonary: No audible wheezing or respiratory distress   Abdomen/GI: Non tender, non distended   Cardiovascular: No pitting edema, 2+ radial pulse   Skin: No masses, erythema, lacerations, fluctation, ulcerations  Neurovascular: Sensation Intact to the Median, Ulnar, Radial Nerve, Motor Intact to the Median, Ulnar, Radial Nerve, and Pulses Intact  Musculoskeletal: Normal, except as noted in detailed exam and in HPI.      MUSCULOSKELETAL EXAMINATION:  Right hand:  SILT  Composite fist- Unable    Tender FCR, lateral Epicondyle. 3rd and 4th dorsal compartment  Multiple trigger fingers    Ovoid skin graft dorsal mid forearm    Left hand:  SILT  Composite fist    ___________________________________________________  STUDIES REVIEWED:    EMG  This is an abnormal electrodiagnostic study. This study shows:    1. Right ulnar neuropathy at/across elbow segment with mixed axonal and demyelinating features. The electrodiagnostic parameters showed some improvement when compared with the previous electrodiagnostic study performed on August 8, 2024.  2. Right chronic C5, C6 and C7 cervical radiculopathy.  3. Right median motor and sensory nerve conduction studies are normal.  4. Right radial sensory nerve conduction study is normal    Clinical correlation is advised.     As discussed previously, I believe the vast majority of her symptoms are being caused by her cervical spine issues. This is supported by the recent EMG, especially because the values pertaining to the ulnar nerve entrapment at the elbow improved after cubital tunnel release surgery performed by Dr. Sharp last year.    I explained I do not know what else I can offer her from a hand surgery perspective  and recommended she continue to follow with her [facility] physician for her neck problems.    Patient sounded frustrated with our discussion but expressed understanding. She denied having any other questions or concerns at this time.    Richard Sanz PA-C 03/04/25 10:06 AM         PROCEDURES PERFORMED:  Procedures  No Procedures performed today    _____________________________________________________      Scribe Attestation      I,:  Omid Murray am acting as a scribe while in the presence of the attending physician.:       I,:  Asad Lomeli MD personally performed the services described in this documentation    as scribed in my presence.:

## 2025-04-15 ENCOUNTER — TELEPHONE (OUTPATIENT)
Age: 65
End: 2025-04-15

## 2025-04-15 NOTE — TELEPHONE ENCOUNTER
Caller: pt    Doctor: Dr. gordon    Reason for call: pt is having a lot of pain below her stim. She would like to talk to a rn about it.    Call back#: [phone]

## 2025-04-15 NOTE — TELEPHONE ENCOUNTER
Attempted contact w/ pt. [facility]MM[facility] w/ c/b #, [place] and loc.  Pt had thoracic scs placed in 2023 by Dr. Goldberg

## 2025-05-02 NOTE — TELEPHONE ENCOUNTER
Care giver calling in and asking that Dr. Montoya call patients PCP as her PCP wants to change her medications and wants both dr to review before moving forward  
Reached out to PCP office, spoke with Shanna, re: medication change.  I made them aware Dr Montoya was out of the office and unreachable until 4/2.  I gave a call back # for Dr Talamantes to reach out if she needed to speak with Dr Montoya.  
Alert and oriented to person, place and time

## 2025-05-15 ENCOUNTER — ANESTHESIA (OUTPATIENT)
Dept: PERIOP | Facility: HOSPITAL | Age: 65
End: 2025-05-15
Payer: COMMERCIAL

## 2025-05-15 ENCOUNTER — HOSPITAL ENCOUNTER (OUTPATIENT)
Facility: HOSPITAL | Age: 65
Setting detail: OUTPATIENT SURGERY
Discharge: HOME | End: 2025-05-15
Attending: ANESTHESIOLOGY | Admitting: ANESTHESIOLOGY
Payer: COMMERCIAL

## 2025-05-15 ENCOUNTER — TELEPHONE (OUTPATIENT)
Dept: PAIN MEDICINE | Facility: CLINIC | Age: 65
End: 2025-05-15

## 2025-05-15 ENCOUNTER — ANESTHESIA EVENT (OUTPATIENT)
Dept: PERIOP | Facility: HOSPITAL | Age: 65
End: 2025-05-15
Payer: COMMERCIAL

## 2025-05-15 ENCOUNTER — APPOINTMENT (OUTPATIENT)
Dept: RADIOLOGY | Facility: HOSPITAL | Age: 65
End: 2025-05-15
Payer: COMMERCIAL

## 2025-05-15 VITALS
SYSTOLIC BLOOD PRESSURE: 113 MMHG | HEIGHT: 64 IN | HEART RATE: 69 BPM | RESPIRATION RATE: 20 BRPM | WEIGHT: 172 LBS | TEMPERATURE: 97.4 F | DIASTOLIC BLOOD PRESSURE: 58 MMHG | OXYGEN SATURATION: 99 % | BODY MASS INDEX: 29.37 KG/M2

## 2025-05-15 DIAGNOSIS — M47.812 CERVICAL SPONDYLOSIS: Primary | ICD-10-CM

## 2025-05-15 DIAGNOSIS — M54.2 NECK PAIN: ICD-10-CM

## 2025-05-15 LAB — GLUCOSE SERPL-MCNC: 130 MG/DL (ref 65–140)

## 2025-05-15 PROCEDURE — 64633 DESTROY CERV/THOR FACET JNT: CPT | Performed by: ANESTHESIOLOGY

## 2025-05-15 PROCEDURE — 64634 DESTROY C/TH FACET JNT ADDL: CPT | Performed by: ANESTHESIOLOGY

## 2025-05-15 PROCEDURE — 82948 REAGENT STRIP/BLOOD GLUCOSE: CPT

## 2025-05-15 RX ORDER — SODIUM CHLORIDE, SODIUM LACTATE, POTASSIUM CHLORIDE, CALCIUM CHLORIDE 600; 310; 30; 20 MG/100ML; MG/100ML; MG/100ML; MG/100ML
125 INJECTION, SOLUTION INTRAVENOUS CONTINUOUS
Status: DISCONTINUED | OUTPATIENT
Start: 2025-05-15 | End: 2025-05-15 | Stop reason: HOSPADM

## 2025-05-15 RX ORDER — FENTANYL CITRATE 50 UG/ML
INJECTION, SOLUTION INTRAMUSCULAR; INTRAVENOUS AS NEEDED
Status: DISCONTINUED | OUTPATIENT
Start: 2025-05-15 | End: 2025-05-15

## 2025-05-15 RX ORDER — LIDOCAINE HYDROCHLORIDE 10 MG/ML
INJECTION, SOLUTION EPIDURAL; INFILTRATION; INTRACAUDAL; PERINEURAL AS NEEDED
Status: DISCONTINUED | OUTPATIENT
Start: 2025-05-15 | End: 2025-05-15

## 2025-05-15 RX ORDER — MIDAZOLAM HYDROCHLORIDE 2 MG/2ML
INJECTION, SOLUTION INTRAMUSCULAR; INTRAVENOUS AS NEEDED
Status: DISCONTINUED | OUTPATIENT
Start: 2025-05-15 | End: 2025-05-15

## 2025-05-15 RX ORDER — LIDOCAINE HYDROCHLORIDE 10 MG/ML
INJECTION, SOLUTION EPIDURAL; INFILTRATION; INTRACAUDAL; PERINEURAL AS NEEDED
Status: DISCONTINUED | OUTPATIENT
Start: 2025-05-15 | End: 2025-05-15 | Stop reason: HOSPADM

## 2025-05-15 RX ORDER — SODIUM CHLORIDE, SODIUM LACTATE, POTASSIUM CHLORIDE, CALCIUM CHLORIDE 600; 310; 30; 20 MG/100ML; MG/100ML; MG/100ML; MG/100ML
INJECTION, SOLUTION INTRAVENOUS CONTINUOUS PRN
Status: DISCONTINUED | OUTPATIENT
Start: 2025-05-15 | End: 2025-05-15

## 2025-05-15 RX ORDER — PROPOFOL 10 MG/ML
INJECTION, EMULSION INTRAVENOUS AS NEEDED
Status: DISCONTINUED | OUTPATIENT
Start: 2025-05-15 | End: 2025-05-15

## 2025-05-15 RX ORDER — LIDOCAINE HYDROCHLORIDE 20 MG/ML
INJECTION, SOLUTION EPIDURAL; INFILTRATION; INTRACAUDAL; PERINEURAL AS NEEDED
Status: DISCONTINUED | OUTPATIENT
Start: 2025-05-15 | End: 2025-05-15 | Stop reason: HOSPADM

## 2025-05-15 RX ORDER — SODIUM CHLORIDE, SODIUM LACTATE, POTASSIUM CHLORIDE, CALCIUM CHLORIDE 600; 310; 30; 20 MG/100ML; MG/100ML; MG/100ML; MG/100ML
125 INJECTION, SOLUTION INTRAVENOUS CONTINUOUS
Status: CANCELLED | OUTPATIENT
Start: 2025-05-15

## 2025-05-15 RX ORDER — KETAMINE HCL IN NACL, ISO-OSM 100MG/10ML
SYRINGE (ML) INJECTION AS NEEDED
Status: DISCONTINUED | OUTPATIENT
Start: 2025-05-15 | End: 2025-05-15

## 2025-05-15 RX ORDER — GLYCOPYRROLATE 0.2 MG/ML
INJECTION INTRAMUSCULAR; INTRAVENOUS AS NEEDED
Status: DISCONTINUED | OUTPATIENT
Start: 2025-05-15 | End: 2025-05-15

## 2025-05-15 RX ORDER — ONDANSETRON 2 MG/ML
INJECTION INTRAMUSCULAR; INTRAVENOUS AS NEEDED
Status: DISCONTINUED | OUTPATIENT
Start: 2025-05-15 | End: 2025-05-15

## 2025-05-15 RX ADMIN — ONDANSETRON 4 MG: 2 INJECTION INTRAMUSCULAR; INTRAVENOUS at 09:03

## 2025-05-15 RX ADMIN — PROPOFOL 50 MCG/KG/MIN: 10 INJECTION, EMULSION INTRAVENOUS at 09:09

## 2025-05-15 RX ADMIN — GLYCOPYRROLATE 0.2 MG: 0.2 INJECTION, SOLUTION INTRAMUSCULAR; INTRAVENOUS at 09:03

## 2025-05-15 RX ADMIN — SODIUM CHLORIDE, SODIUM LACTATE, POTASSIUM CHLORIDE, AND CALCIUM CHLORIDE: .6; .31; .03; .02 INJECTION, SOLUTION INTRAVENOUS at 08:55

## 2025-05-15 RX ADMIN — LIDOCAINE HYDROCHLORIDE 25 MG: 10 INJECTION, SOLUTION EPIDURAL; INFILTRATION; INTRACAUDAL at 09:08

## 2025-05-15 RX ADMIN — MIDAZOLAM 2 MG: 1 INJECTION INTRAMUSCULAR; INTRAVENOUS at 09:03

## 2025-05-15 RX ADMIN — FENTANYL CITRATE 50 MCG: 50 INJECTION INTRAMUSCULAR; INTRAVENOUS at 09:03

## 2025-05-15 RX ADMIN — PROPOFOL 30 MG: 10 INJECTION, EMULSION INTRAVENOUS at 09:08

## 2025-05-15 RX ADMIN — Medication 25 MG: at 09:10

## 2025-05-15 RX ADMIN — FENTANYL CITRATE 50 MCG: 50 INJECTION INTRAMUSCULAR; INTRAVENOUS at 09:08

## 2025-05-15 NOTE — TELEPHONE ENCOUNTER
Caller: Jennifer    Doctor: Dr. Comer     Reason for call: the back of her neck feels full, she feels horrible, she has never felt like this before and this is her 4th RFA she has a migraine her pain level is a 9/10     She is delirious she is warm not hot     Call back#: [phone]

## 2025-05-15 NOTE — OP NOTE
OPERATIVE REPORT  PATIENT NAME: Jennifer Woodall    :  1960  MRN: [id number]  Pt Location: [facility]    SURGERY DATE: 5/15/2025    Surgeons and Role:     * Johnson Aranda MD - Primary    Preop Diagnosis:  Cervical radiculopathy [M54.12]    Post-Op Diagnosis Codes:     * Cervical radiculopathy [M54.12]    Procedure(s):  Right - ABLATION RADIO FREQUENCY (RFA) Rt C3-C4 and C4-C5 RFA    Specimen(s):  * No specimens in log *    Estimated Blood Loss:   Minimal    Drains:  * No LDAs found *    Anesthesia Type:   IV Sedation with Anesthesia    Operative Indications:  Cervical radiculopathy [M54.12]      Operative Findings:  same      Complications:   None    Procedure and Technique:  Fluoroscopically-guided Radiofrequency denervation of the right C3-C4 and C4-C5 facet joint(s)     After discussing the risks, benefits, and alternatives to the procedure, the patient expressed understanding and wished to proceed.  The patient was brought to the fluoroscopy suite and placed in the prone position.  Procedural pause conducted to verify:  correct patient identity, procedure to be performed and as applicable, correct side and site, correct patient position, and availability of implants, special equipment and special requirements.  Using fluoroscopy, the mid-body of the articular pillar at the right C2, C3, C4 levels were identified and marked.  The skin was sterilely prepped and draped in the usual fashion using Chloraprep skin prep.  The skin and subcutaneous tissue were anesthetized with 0.5% lidocaine.  Using fluoroscopic guidance, a 100 mm 22 gauge RFK RF cannula with a 10 mm active tip was advanced to each target.  This was confirmed using PA, lateral and oblique fluoroscopic views.  Motor testing was performed at 2Hz up to 2.5 volts, and measured tissue impedances were satisfactory.  With final needle positioning, there was no evidence of radicular stimulation.  Prior to lesioning, 1cc of 2% lidocaine was  injected at each site.  After waiting 2 minutes for local anesthesia to take effect, lesioning was performed at 90 degrees Celsius for 90 seconds. A slight repositioning of the needles was performed an lesioning was again performed at 90 degreees Celsius for 90 seconds. After RF treatment, each site was injected using 1cc of 0.25% bupivacaine. The patient tolerated the procedure well and there were no apparent complications.  After appropriate observation, the patient was dismissed from the clinic in good condition under their own power.              I was present for the entire procedure.    Patient Disposition:  hemodynamically stable             SIGNATURE: Johnson Aranda MD  DATE: May 15, 2025  TIME: 10:30 AM

## 2025-05-15 NOTE — TELEPHONE ENCOUNTER
Pt s/p Rt C3-5 RFA on 5/15/25 RM @ [facility]    Pt currently sched 6/17/25 0930 RM @ [facility]--> pls confirm [facility] is correct office bc normally pt see's provider in [facility]

## 2025-05-15 NOTE — TELEPHONE ENCOUNTER
S/W pt.  Pt has pain in neck and head.  She stated she never had these problems before with the other RFAs.  She stated it started at 1 pm.  She has nausea and took medication and has not resolved.  She took Zyrtec, Ibuprofen 600 mg, tired ice and has been laying down.  She has had no change. Pt stated she can take tylenol.  Advised to take up to 3,000 mg/24 hrs and to alternate with the ibuprofen.  Advised to increase fluids.  She does have a history of migraines.  Advised to treat her migraine like normal.  Advised the area could of been more inflamed, medications need to spread out in the surrounding tissues.  Advised pt if the pain is too bad to seek treatment at the ER.  Pt stated the ER is useless.  Pt stated she tried all that and she disconnected the call.

## 2025-05-15 NOTE — ANESTHESIA PREPROCEDURE EVALUATION
Procedure:  ABLATION RADIO FREQUENCY (RFA) Rt C3-C4 and C4-C5 RFA (Right: Spine Cervical)    Relevant Problems   CARDIO   (+) Brain aneurysm   (+) Carotid artery aneurysm (HCC)   (+) Coronary artery calcification seen on CAT scan   (+) Hypertension   (+) Hypertriglyceridemia   (+) Migraine   (+) Other hyperlipidemia      ENDO   (+) Hypothyroid   (+) Type 2 diabetes mellitus (HCC)      GI/HEPATIC   (+) Gastroesophageal reflux disease      MUSCULOSKELETAL   (+) Mid back pain      NEURO/PSYCH   (+) Anxiety   (+) Chronic pain syndrome   (+) Depression   (+) Diabetic polyneuropathy associated with type 2 diabetes mellitus (HCC)   (+) Migraine      PULMONARY   (+) COPD (chronic obstructive pulmonary disease) (HCC)   (+) Shortness of breath        Physical Exam    Airway     Mallampati score: II  TM Distance: >3 FB  Neck ROM: full      Cardiovascular      Dental       Pulmonary      Neurological      Other Findings  post-pubertal.      Anesthesia Plan  ASA Score- 3     Anesthesia Type- MAC with ASA Monitors.         Additional Monitors:     Airway Plan:            Plan Factors-Exercise tolerance (METS): >4 METS.    Chart reviewed.   Existing labs reviewed. Patient summary reviewed.                  Induction-     Postoperative Plan- .   Monitoring Plan - Monitoring plan - standard ASA monitoring          Informed Consent- Anesthetic plan and risks discussed with patient.  I personally reviewed this patient with the CRNA. Discussed and agreed on the Anesthesia Plan with the CRNA..      NPO Status:  No vitals data found for the desired time range.

## 2025-05-15 NOTE — H&P
Assessment:  No diagnosis found.    Plan:  Jennifer Woodall is a 64 y.o. female with complaints of neck pain presents to surgical center for procedure.  We will perform a Procedure(s) (LRB):  ABLATION RADIO FREQUENCY (RFA) Rt C3-C4 and C4-C5 RFA (Right)   2. Follow-up 1 month after injection    Complete risks and benefits including bleeding, infection, tissue reaction, nerve injury and allergic reaction were discussed. The approach was demonstrated using models and literature was provided. Verbal and written consent was obtained.    My impressions and treatment recommendations were discussed in detail with the patient who verbalized understanding and had no further questions.  Discharge instructions were provided. I personally saw and examined the patient and I agree with the above discussed plan of care.    Orders Placed This Encounter   Procedures    Insert peripheral IV     Standing Status:   Standing     Number of Occurrences:   1    POCT glucose     Standing Status:   Standing     Number of Occurrences:   1     New Medications Ordered This Visit   Medications    lactated ringers infusion       History of Present Illness:  Jennifer Woodall is a 64 y.o. female who presents for a follow up office visit in regards to neck pain.   The patient’s current symptoms include 8 out of 10 sharp and slow, throbbing pins in particular time pattern.      I have personally reviewed and/or updated the patient's past medical history, past surgical history, family history, social history, current medications, allergies, and vital signs today.     Review of Systems   Musculoskeletal:  Positive for neck pain and neck stiffness.   All other systems reviewed and are negative.      Problem List[1]    Past Medical History:   Diagnosis Date    Ambulates with cane     Anxiety     Arthritis     Asthma     Bipolar 1 disorder (HCC)     Brain aneurysm     Chronic pain disorder     spinal stenosis    Concussion syndrome     neurological rx and  balance rx    COPD (chronic obstructive pulmonary disease) ([facility])     Depression     Diabetes mellitus (HCC)     controlled w/ diet    Disease of thyroid gland     nodules     Family history of colon cancer     father    Fibromyalgia, primary     GERD (gastroesophageal reflux disease)     History of colon polyps     Hyperlipidemia     Hypertension     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     Irritable bowel syndrome     Lumbar degenerative disc disease 02/16/2022    Migraine     Neuropathy     bilateral feet and hands    Peripheral neuropathy     Psychiatric disorder     PTSD (post-traumatic stress disorder)     Shortness of breath     Sleep apnea     had 3 studies & last one negative    Stroke (HCC)     2012 no deficeits 2018 2019 TIA    TIA (transient ischemic attack)     Wears dentures     Wears glasses        Past Surgical History:   Procedure Laterality Date    ABDOMINAL SURGERY      laproscopic/ endometriosis    BRAIN SURGERY  2017    aneurysm/ coiling procedure    CARPAL TUNNEL RELEASE      CHOLECYSTECTOMY      COLONOSCOPY      DENTAL SURGERY      EPIDURAL BLOCK INJECTION Right 03/03/2022    Procedure: C7-T1 interlaminar epidural steroid injection;  Surgeon: Johnson Aranda MD;  [facility];  Service: Pain Management     EPIDURAL BLOCK INJECTION N/A 04/21/2022    Procedure: C6-C7 BLOCK / INJECTION EPIDURAL STEROID CERVICAL;  Surgeon: Johnson Aranda MD;  [facility];  Service: Pain Management     EPIDURAL BLOCK INJECTION Left 06/21/2022    Procedure: BLOCK / INJECTION EPIDURAL STEROID LUMBAR  left L3-4 TFESI;  Surgeon: Johnson Aranda MD;  [facility];  Service: Pain Management     EYE SURGERY      cataract    FASCIOTOMY Right 10/27/2023    Procedure: FASCIOTOMY OF RIGHT UPPER EXTREMITY; POSSIBLE VAC DRESSING APPLICATION;  Surgeon: Bruno Blevins MD;  [facility];  Service: General    FL GUIDED NEEDLE PLAC BX/ASP/INJ  03/03/2022    FL GUIDED  NEEDLE PLAC BX/ASP/INJ  11/17/2022    HYSTERECTOMY      NERVE BLOCK Left 02/20/2024    Procedure: BLOCK MEDIAL BRANCH  left C3-4 and C4-5 MBB #1;  Surgeon: Johnson Aranda MD;  [facility];  Service: Pain Management     NERVE BLOCK Left 04/04/2024    Procedure: BLOCK MEDIAL BRANCH Left C3-C4 and C4-5 MBB2;  Surgeon: Johnson Aranda MD;  [facility];  Service: Pain Management     NERVE BLOCK Right 12/19/2024    Procedure: Right C3-4 and C4-5 Medial branch block #1;  Surgeon: Johnson Aranda MD;  [facility];  Service: Pain Management     NERVE BLOCK Right 3/20/2025    Procedure: C3-4 and C4-5 MBB2;  Surgeon: Johnson Aranda MD;  [facility];  Service: Pain Management     WY ESOPHAGOGASTRODUODENOSCOPY TRANSORAL DIAGNOSTIC N/A 02/08/2018    Procedure: EGD AND COLONOSCOPY;  Surgeon: Caleb Pete MD;  [facility]: [facility];  Service: Gastroenterology    WY EXC B9 LESION MRGN XCP SK TG F/E/E/N/L/M > 4.0CM Right 5/28/2024    Procedure: EXCISION LIPOMA;  Surgeon: Valdo Montoya DO;  [facility];  Service: General    WY PRQ IMPLTJ NSTIM ELECTRODE ARRAY EPIDURAL N/A 11/17/2022    Procedure: NEVRO SCS TRIAL;  Surgeon: Johnson Aranda MD;  [facility];  Service: Pain Management     WY PRQ IMPLTJ NSTIM ELECTRODE ARRAY EPIDURAL N/A 02/01/2023    Procedure: Insertion percutaneous thoracic spinal cord stimulator with left buttock implantable pulse generator;  Surgeon: Craig Robert Goldberg, MD;  [facility];  Service: Neurosurgery    RADIOFREQUENCY ABLATION Left 6/19/2024    Procedure: Left C3-4 and C4-5 Radio Frequency Ablation;  Surgeon: Johnson Aranda MD;  [facility];  Service: Pain Management     RADIOFREQUENCY ABLATION Left 9/5/2024    Procedure: Left C3-4 and C4-5 Radio Frequency Ablation;  Surgeon: Johnson Aranda MD;  [facility];  Service: Pain Management     SPLIT THICKNESS SKIN GRAFT Right  11/07/2023    Procedure: SKIN GRAFT SPLIT THICKNESS (STSG)  EXTREMITY;  Surgeon: Samm Sims MD;  [facility];  Service: General    THYROID SURGERY      TONSILLECTOMY      US GUIDED THYROID BIOPSY  11/30/2016    US GUIDED THYROID BIOPSY  03/21/2018    VAC DRESSING APPLICATION Right 10/29/2023    Procedure: CHANGE DRESSING/VAC RIGHT UPPER EXTREMITY; WASHOUT; PARTIAL CLOSURE;  Surgeon: Irina Day DO;  [facility];  Service: General    WOUND DEBRIDEMENT Right 11/04/2023    Procedure: SIMPLE CLOSURE 3.5CM, OASIS APPLICATION 15X7CM, WOUND VAC APPLICATION;  Surgeon: Maryuri Goldstein MD;  [facility];  Service: General       Family History   Problem Relation Age of Onset    Brain cancer Mother     Alzheimer's disease Mother     Alzheimer's disease Father     Parkinsonism Father        Social History     Occupational History    Not on file   Tobacco Use    Smoking status: Every Day     Current packs/day: 1.50     Types: Cigarettes    Smokeless tobacco: Never    Tobacco comments:     Smoked one cigarette today 3/20/25   Vaping Use    Vaping status: Never Used   Substance and Sexual Activity    Alcohol use: Not Currently    Drug use: Never     Comment: prescribed Belbuca    Sexual activity: Yes     Partners: Male       No current facility-administered medications on file prior to encounter.     Current Outpatient Medications on File Prior to Encounter   Medication Sig    albuterol (PROVENTIL HFA,VENTOLIN HFA) 90 mcg/act inhaler Inhale 2 puffs every 6 (six) hours as needed for wheezing or shortness of breath    atorvastatin (LIPITOR) 80 mg tablet Take 80 mg by mouth every evening    benzonatate (TESSALON PERLES) 100 mg capsule Take 1 capsule (100 mg total) by mouth 3 (three) times a day as needed for cough    Buprenorphine HCl (Belbuca) 300 MCG FILM Apply 300 mcg to cheek daily at bedtime    carboxymethylcellulose 0.5 % SOLN Administer 1 drop to both eyes daily as needed for dry eyes     clonazePAM (KlonoPIN) 0.5 mg tablet Take 0.5 mg by mouth daily at bedtime    clonazePAM (KlonoPIN) 1 mg tablet Take 1 mg by mouth daily in the early morning    dicyclomine (BENTYL) 20 mg tablet Take 20 mg by mouth every 6 (six) hours    famotidine (PEPCID) 40 MG tablet TAKE 1 TABLET (40 MG TOTAL) BY MOUTH DAILY AT BEDTIME    fenofibrate (TRICOR) 48 mg tablet Take 48 mg by mouth in the morning.    fluticasone (FLONASE) 50 mcg/act nasal spray 2 sprays into each nostril if needed    fluticasone-umeclidinium-vilanterol (Trelegy Ellipta) 200-62.5-25 mcg/actuation AEPB inhaler Inhale 1 puff daily Rinse mouth after use.    HYDROcodone-acetaminophen (NORCO)  mg per tablet Take 1 tablet by mouth in the morning and 1 tablet in the evening and 1 tablet before bedtime.    latanoprost (XALATAN) 0.005 % ophthalmic solution     levalbuterol (XOPENEX) 1.25 mg/3 mL nebulizer solution Take 3 mL (1.25 mg total) by nebulization every 4 (four) hours as needed for wheezing or shortness of breath    omeprazole (PriLOSEC) 40 MG capsule TAKE 1 CAPSULE BY MOUTH DAILY IN THE MORNING PRIOR TO A MEAL.    ondansetron (ZOFRAN) 8 mg tablet TAKE 1 TABLET (8 MG TOTAL) BY MOUTH EVERY 8 (EIGHT) HOURS AS NEEDED FOR NAUSEA OR VOMITING    polyethylene glycol (GLYCOLAX) 17 GM/SCOOP powder Take 17 g by mouth if needed    pregabalin (LYRICA) 100 mg capsule Take 100 mg by mouth in the morning and 100 mg in the evening and 100 mg before bedtime. Morning-lunch-dinner.    pregabalin (LYRICA) 150 mg capsule     rimegepant sulfate (Nurtec) 75 mg TBDP Take 75 mg by mouth once    tiZANidine (ZANAFLEX) 4 mg tablet Take 8 mg by mouth once 1 tablet    traZODone (DESYREL) 100 mg tablet Take 100 mg by mouth    Alcohol Swabs (Alcohol Pads) 70 % PADS USE TO TEST BLOOD GLUCOSE 2-3 TIMES DAILY    ammonium lactate (LAC-HYDRIN) 12 % lotion as needed    levothyroxine 137 mcg tablet Take 137 mcg by mouth daily (Patient not taking: Reported on 4/2/2025)    OneTouch  "Ultra test strip USE TO TEST BLOOD GLUCOSE 2-3 TIMES DAILY    True Comfort Safety Lancets MISC USE TO TEST BLOOD GLUCOSE 2-3 TIMES DAILY       Allergies[2]    Physical Exam:    /66   Pulse 81   Temp 97.7 °F (36.5 °C) (Temporal)   Resp 18   Ht 5' 4\" (1.626 m)   Wt 78 kg (172 lb)   SpO2 95%   BMI 29.52 kg/m²     Constitutional:normal, well developed, well nourished, alert, in no distress and non-toxic and no overt pain behavior.  Eyes:anicteric  HEENT:grossly intact  Neck:supple, symmetric, trachea midline and no masses   Pulmonary:even and unlabored  Cardiovascular:No edema or pitting edema present  Skin:Normal without rashes or lesions and well hydrated  Psychiatric:Mood and affect appropriate  Neurologic:Cranial Nerves II-XII grossly intact  Musculoskeletal:normal             [1]   Patient Active Problem List  Diagnosis    Other hyperlipidemia    Dizziness    Hypothyroid    Brain aneurysm    Hypokalemia    Hypertriglyceridemia    Low HDL (under 40)    Elevated TSH    Tobacco abuse    Intractable abdominal pain    TMJ syndrome    Stroke-like symptoms    Nausea    Left chest pressure    Hypophosphatemia    Hypotension    Tobacco use    COPD (chronic obstructive pulmonary disease) (HCC)    Hypertension    Constipation    Fibromyalgia, primary    Bipolar 1 disorder (HCC)    Depression    Chronic pain syndrome    Anxiety    Migraine    Syncope and collapse    Mid back pain    Cervical radiculopathy    Neck pain    Lumbar degenerative disc disease    Lumbar radiculopathy    Lumbar spondylosis    Cervical disc disorder with radiculopathy of mid-cervical region    Sacroiliitis (HCC)    Carotid artery aneurysm (HCC)    Irritable bowel syndrome with both constipation and diarrhea    Shortness of breath    Cervical spondylosis    Diabetic polyneuropathy associated with type 2 diabetes mellitus (HCC)    Coronary artery calcification seen on CAT scan    Pulmonary nodules/lesions, multiple    S/P insertion of spinal " "cord stimulator    Common bile duct dilation    Gastroesophageal reflux disease    Fall    Compartment syndrome (HCC)    Type 2 diabetes mellitus (HCC)    Subcutaneous mass of right forearm    Polypharmacy    Lipoma of right upper extremity    Lesion of parotid gland    Heartburn   [2]   Allergies  Allergen Reactions    Hydroxyzine Anaphylaxis     Claims it gives her convulsions.     Bee Pollen Other (See Comments)     familial    Pollen Extract Itching    Tree Extract     Clarithromycin Rash     Pt denies    Ibuprofen Nausea Only    Latex Rash    Sulfa Antibiotics Rash     \"severe skin burn\"     "

## 2025-05-15 NOTE — DISCHARGE INSTR - AVS FIRST PAGE

## 2025-05-15 NOTE — ANESTHESIA POSTPROCEDURE EVALUATION
Post-Op Assessment Note    CV Status:  Stable    Pain management: adequate       Mental Status:  Alert and awake   Hydration Status:  Euvolemic   PONV Controlled:  Controlled   Airway Patency:  Patent     Post Op Vitals Reviewed: Yes              Last Filed PACU Vitals:  Vitals Value Taken Time   Temp 98.1    Pulse 79 05/15/25 09:33   /72 05/15/25 09:31   Resp 16 05/15/25 09:33   SpO2 95 % 05/15/25 09:33   Vitals shown include unfiled device data.

## 2025-05-15 NOTE — ANESTHESIA POSTPROCEDURE EVALUATION
Post-Op Assessment Note    CV Status:  Stable    Pain management: adequate       Mental Status:  Alert and awake   Hydration Status:  Euvolemic   PONV Controlled:  Controlled   Airway Patency:  Patent     Post Op Vitals Reviewed: Yes    No anethesia notable event occurred.    Staff: Anesthesiologist         Last Filed PACU Vitals:  Vitals Value Taken Time   Temp 98.1 °F (36.7 °C) 05/15/25 09:46   Pulse 68 05/15/25 09:46   /80 05/15/25 09:46   Resp 22 05/15/25 09:46   SpO2 98 % 05/15/25 09:46       Modified Brianna:     Vitals Value Taken Time   Activity 2 05/15/25 09:46   Respiration 2 05/15/25 09:46   Circulation 2 05/15/25 09:46   Consciousness 2 05/15/25 09:46   Oxygen Saturation 2 05/15/25 09:46     Modified Brianna Score: 10

## 2025-05-16 NOTE — TELEPHONE ENCOUNTER
Pt did have similar outcome after last RFA. If pt is concerned it was relayed that pt should go to ER for assessment.

## 2025-05-16 NOTE — TELEPHONE ENCOUNTER
S/w pt regarding previous. Pt continues to have head and neck pain but feels better than yesterday. Pt advised she did not go to the ER. Pt advised if symptoms continue to go to the ER to be evaluated. Pt does have an appt with her PCP today.     Headache does not change with position. Denies change in vision and or weakness. Pt is having nausea denies vomiting. Pt has a hx of nausea and is taking Zofran as prescribed, states she feels nauseous with stress. Pt has a cough and sinus congestion as well. Pt is seeing her PCP for this today.     Pt denies s/s of infections. Pt states her cervical pain has improved since the RFA and is a 7/10 now. Pt reminded it takes 2 weeks to notice a difference post RFA and 4-6 weeks for the full benfit of the procedure. F/U appt confirmed with pt.     Please advise if there are any additional recommendations-

## 2025-05-30 DIAGNOSIS — M54.16 LUMBAR RADICULOPATHY: Primary | ICD-10-CM

## 2025-05-30 RX ORDER — METHYLPREDNISOLONE 4 MG/1
TABLET ORAL
Qty: 21 TABLET | Refills: 0 | Status: SHIPPED | OUTPATIENT
Start: 2025-05-30

## 2025-07-31 ENCOUNTER — APPOINTMENT (OUTPATIENT)
Dept: LAB | Facility: HOSPITAL | Age: 65
End: 2025-07-31
Payer: COMMERCIAL

## 2025-07-31 DIAGNOSIS — E11.9 DIABETES MELLITUS WITHOUT COMPLICATION (HCC): ICD-10-CM

## 2025-07-31 LAB
ALBUMIN SERPL BCG-MCNC: 4.1 G/DL (ref 3.5–5)
ALP SERPL-CCNC: 70 U/L (ref 34–104)
ALT SERPL W P-5'-P-CCNC: 9 U/L (ref 7–52)
ANION GAP SERPL CALCULATED.3IONS-SCNC: 7 MMOL/L (ref 4–13)
AST SERPL W P-5'-P-CCNC: 13 U/L (ref 13–39)
BASOPHILS # BLD AUTO: 0.04 THOUSANDS/ÂΜL (ref 0–0.1)
BASOPHILS NFR BLD AUTO: 1 % (ref 0–1)
BILIRUB SERPL-MCNC: 0.24 MG/DL (ref 0.2–1)
BUN SERPL-MCNC: 15 MG/DL (ref 5–25)
CALCIUM SERPL-MCNC: 9.1 MG/DL (ref 8.4–10.2)
CHLORIDE SERPL-SCNC: 104 MMOL/L (ref 96–108)
CO2 SERPL-SCNC: 30 MMOL/L (ref 21–32)
CREAT SERPL-MCNC: 0.78 MG/DL (ref 0.6–1.3)
CRP SERPL QL: 1.1 MG/L
EOSINOPHIL # BLD AUTO: 0.09 THOUSAND/ÂΜL (ref 0–0.61)
EOSINOPHIL NFR BLD AUTO: 1 % (ref 0–6)
ERYTHROCYTE [DISTWIDTH] IN BLOOD BY AUTOMATED COUNT: 12.7 % (ref 11.6–15.1)
ERYTHROCYTE [SEDIMENTATION RATE] IN BLOOD: 20 MM/HOUR (ref 0–29)
EST. AVERAGE GLUCOSE BLD GHB EST-MCNC: 128 MG/DL
GFR SERPL CREATININE-BSD FRML MDRD: 81 ML/MIN/1.73SQ M
GLUCOSE P FAST SERPL-MCNC: 89 MG/DL (ref 65–99)
HBA1C MFR BLD: 6.1 %
HCT VFR BLD AUTO: 40.8 % (ref 34.8–46.1)
HGB BLD-MCNC: 13.4 G/DL (ref 11.5–15.4)
IMM GRANULOCYTES # BLD AUTO: 0.04 THOUSAND/UL (ref 0–0.2)
IMM GRANULOCYTES NFR BLD AUTO: 1 % (ref 0–2)
LYMPHOCYTES # BLD AUTO: 3.18 THOUSANDS/ÂΜL (ref 0.6–4.47)
LYMPHOCYTES NFR BLD AUTO: 47 % (ref 14–44)
MCH RBC QN AUTO: 31.8 PG (ref 26.8–34.3)
MCHC RBC AUTO-ENTMCNC: 32.8 G/DL (ref 31.4–37.4)
MCV RBC AUTO: 97 FL (ref 82–98)
MONOCYTES # BLD AUTO: 0.48 THOUSAND/ÂΜL (ref 0.17–1.22)
MONOCYTES NFR BLD AUTO: 7 % (ref 4–12)
NEUTROPHILS # BLD AUTO: 2.86 THOUSANDS/ÂΜL (ref 1.85–7.62)
NEUTS SEG NFR BLD AUTO: 43 % (ref 43–75)
NRBC BLD AUTO-RTO: 0 /100 WBCS
PLATELET # BLD AUTO: 181 THOUSANDS/UL (ref 149–390)
PMV BLD AUTO: 10 FL (ref 8.9–12.7)
POTASSIUM SERPL-SCNC: 4.2 MMOL/L (ref 3.5–5.3)
PROT SERPL-MCNC: 6.6 G/DL (ref 6.4–8.4)
RBC # BLD AUTO: 4.22 MILLION/UL (ref 3.81–5.12)
SODIUM SERPL-SCNC: 141 MMOL/L (ref 135–147)
T4 FREE SERPL-MCNC: 0.73 NG/DL (ref 0.61–1.12)
TSH SERPL DL<=0.05 MIU/L-ACNC: 4.94 UIU/ML (ref 0.45–4.5)
WBC # BLD AUTO: 6.69 THOUSAND/UL (ref 4.31–10.16)

## 2025-07-31 PROCEDURE — 83036 HEMOGLOBIN GLYCOSYLATED A1C: CPT

## 2025-07-31 PROCEDURE — 85025 COMPLETE CBC W/AUTO DIFF WBC: CPT

## 2025-07-31 PROCEDURE — 84443 ASSAY THYROID STIM HORMONE: CPT

## 2025-07-31 PROCEDURE — 80053 COMPREHEN METABOLIC PANEL: CPT

## 2025-07-31 PROCEDURE — 36415 COLL VENOUS BLD VENIPUNCTURE: CPT

## 2025-07-31 PROCEDURE — 85652 RBC SED RATE AUTOMATED: CPT

## 2025-07-31 PROCEDURE — 84439 ASSAY OF FREE THYROXINE: CPT

## 2025-07-31 PROCEDURE — 86140 C-REACTIVE PROTEIN: CPT

## 2025-08-10 ENCOUNTER — HOSPITAL ENCOUNTER (EMERGENCY)
Facility: HOSPITAL | Age: 65
Discharge: HOME | End: 2025-08-10
Attending: EMERGENCY MEDICINE | Admitting: EMERGENCY MEDICINE
Payer: COMMERCIAL

## 2025-08-10 ENCOUNTER — APPOINTMENT (EMERGENCY)
Dept: CT IMAGING | Facility: HOSPITAL | Age: 65
End: 2025-08-10
Payer: COMMERCIAL

## 2025-08-20 ENCOUNTER — TELEPHONE (OUTPATIENT)
Age: 65
End: 2025-08-20

## 2025-08-20 DIAGNOSIS — M54.16 LUMBAR RADICULOPATHY: Primary | ICD-10-CM

## 2025-08-20 DIAGNOSIS — M54.12 CERVICAL RADICULOPATHY: ICD-10-CM

## 2025-08-20 RX ORDER — METHYLPREDNISOLONE 4 MG/1
TABLET ORAL
Qty: 21 TABLET | Refills: 0 | Status: SHIPPED | OUTPATIENT
Start: 2025-08-20

## (undated) DEVICE — CHLORAPREP HI-LITE 10.5ML ORANGE

## (undated) DEVICE — HEAVY DUTY TABLE COVER: Brand: CONVERTORS

## (undated) DEVICE — SYRINGE 3ML LL

## (undated) DEVICE — NEEDLE 25G X 1 1/2

## (undated) DEVICE — SUT SILK 2-0 SH 30 IN K833H

## (undated) DEVICE — 3M™ STERI-STRIP™ REINFORCED ADHESIVE SKIN CLOSURES, R1546, 1/4 IN X 4 IN (6 MM X 100 MM), 10 STRIPS/ENVELOPE: Brand: 3M™ STERI-STRIP™

## (undated) DEVICE — IV EXTENSION TUBING 33 IN

## (undated) DEVICE — 3M™ TEGADERM™ TRANSPARENT FILM DRESSING FRAME STYLE, 1627, 4 IN X 10 IN (10 CM X 25 CM), 20/CT 4CT/CASE: Brand: 3M™ TEGADERM™

## (undated) DEVICE — CHLORAPREP HI-LITE 26ML ORANGE

## (undated) DEVICE — PENCIL ELECTROSURG E-Z CLEAN -0035H

## (undated) DEVICE — SYRINGE 10ML LL

## (undated) DEVICE — GAUZE SPONGES,16 PLY: Brand: CURITY

## (undated) DEVICE — FLEXIBLE ADHESIVE BANDAGE,X-LARGE: Brand: CURITY

## (undated) DEVICE — PLUMEPEN PRO 10FT

## (undated) DEVICE — MONITORING SPINAL IMPULSE CASE FEE

## (undated) DEVICE — TRAY EPIDURAL SINGLE SHOT

## (undated) DEVICE — GLOVE SRG BIOGEL 8

## (undated) DEVICE — BETHLEHEM UNIV MAJOR ORTHO,KIT: Brand: CARDINAL HEALTH

## (undated) DEVICE — 3M™ STERI-STRIP™ COMPOUND BENZOIN TINCTURE 40 BAGS/CARTON 4 CARTONS/CASE C1544: Brand: 3M™ STERI-STRIP™

## (undated) DEVICE — PAD GROUNDING ADULT

## (undated) DEVICE — NEEDLE 18 G X 1 1/2

## (undated) DEVICE — NEEDLE SPINAL 25G X 3.5 IN QUINCKE

## (undated) DEVICE — GLOVE INDICATOR PI UNDERGLOVE SZ 7 BLUE

## (undated) DEVICE — BANDAID SHEER SPOT

## (undated) DEVICE — ACE WRAP 3 IN UNSTERILE

## (undated) DEVICE — DRESSING XEROFORM 5 X 9

## (undated) DEVICE — SYRINGE EPI 8ML LUER SLIP LOSS OF RESISTANCE PLASTIC PERFIX

## (undated) DEVICE — ADHESIVE SKIN HIGH VISCOSITY EXOFIN 1ML

## (undated) DEVICE — ALL PURPOSE SPONGES,NONWOVEN, 4 PLY: Brand: CURITY

## (undated) DEVICE — SYRINGE 5ML LL

## (undated) DEVICE — PAD GROUNDING DUAL ADULT

## (undated) DEVICE — GLOVE INDICATOR PI UNDERGLOVE SZ 8.5 BLUE

## (undated) DEVICE — SUT VICRYL 3-0 SH 27 IN J416H

## (undated) DEVICE — DRAPE C-ARMOUR

## (undated) DEVICE — PREMIUM DRY TRAY LF: Brand: MEDLINE INDUSTRIES, INC.

## (undated) DEVICE — TONGUE DEPRESSOR STERILE

## (undated) DEVICE — DRAPE SURGIKIT SADDLE BAG

## (undated) DEVICE — DRAPE C-ARM X-RAY

## (undated) DEVICE — THREE-QUARTER SHEET: Brand: CONVERTORS

## (undated) DEVICE — VAC CANISTER 500ML

## (undated) DEVICE — BETHLEHEM UNIVERSAL OUTPATIENT: Brand: CARDINAL HEALTH

## (undated) DEVICE — NEEDLE COUDE CRV INSERTION 4IN

## (undated) DEVICE — ANTIBACTERIAL VIOLET BRAIDED (POLYGLACTIN 910), SYNTHETIC ABSORBABLE SUTURE: Brand: COATED VICRYL

## (undated) DEVICE — TIBURON SPLIT SHEET: Brand: CONVERTORS

## (undated) DEVICE — GLOVE INDICATOR PI UNDERGLOVE SZ 7.5 BLUE

## (undated) DEVICE — 3M™ TEGADERM™ TRANSPARENT FILM DRESSING FRAME STYLE, 1628, 6 IN X 8 IN (15 CM X 20 CM), 10/CT 8CT/CASE: Brand: 3M™ TEGADERM™

## (undated) DEVICE — PAD GROUNDING IONIC RF DISP

## (undated) DEVICE — SUT MONOCRYL 4-0 PS-2 27 IN Y426H

## (undated) DEVICE — CURITY NON-ADHERENT STRIPS: Brand: CURITY

## (undated) DEVICE — DRAPE EQUIPMENT RF WAND

## (undated) DEVICE — TUNNELING TOOL KIT, 35CM: Brand: NEVRO®

## (undated) DEVICE — SUPPLY FEE STD

## (undated) DEVICE — 3M™ IOBAN™ 2 ANTIMICROBIAL INCISE DRAPE 6650EZ: Brand: IOBAN™ 2

## (undated) DEVICE — BETHLEHEM UNIVERSAL MINOR GEN: Brand: CARDINAL HEALTH

## (undated) DEVICE — KERLIX BANDAGE ROLL: Brand: KERLIX

## (undated) DEVICE — SPONGE PVP SCRUB WING STERILE

## (undated) DEVICE — GLOVE SRG BIOGEL ORTHOPEDIC 7.5

## (undated) DEVICE — ACE WRAP 6 IN UNSTERILE

## (undated) DEVICE — 3M™ STERI-STRIP™ REINFORCED ADHESIVE SKIN CLOSURES, R1547, 1/2 IN X 4 IN (12 MM X 100 MM), 6 STRIPS/ENVELOPE: Brand: 3M™ STERI-STRIP™

## (undated) DEVICE — Device

## (undated) DEVICE — 3M™ TEGADERM™ TRANSPARENT FILM DRESSING FRAME STYLE, 1626W, 4 IN X 4-3/4 IN (10 CM X 12 CM), 50/CT 4CT/CASE: Brand: 3M™ TEGADERM™

## (undated) DEVICE — GLOVE PI ULTRA TOUCH SZ.7.5

## (undated) DEVICE — RX-2™ COUDÉ®  EPIDURAL NEEDLE 14G TW X 4.0": Brand: EPIMED

## (undated) DEVICE — GLOVE INDICATOR PI UNDERGLOVE SZ 8 BLUE

## (undated) DEVICE — GAUZE SPONGES,8 PLY: Brand: CURITY

## (undated) DEVICE — INTENDED FOR TISSUE SEPARATION, AND OTHER PROCEDURES THAT REQUIRE A SHARP SURGICAL BLADE TO PUNCTURE OR CUT.: Brand: BARD-PARKER SAFETY BLADES SIZE 10, STERILE

## (undated) DEVICE — ZIMMER SKIN GRAFT CARRIER 8 INCH LENGTH: Brand: DERMACARRIERS

## (undated) DEVICE — ACE WRAP 4 IN UNSTERILE

## (undated) DEVICE — PATIENT REMOTE 2500 KIT: Brand: OMNIA

## (undated) DEVICE — SPONGE LAP 18 X 18 IN STRL RFD

## (undated) DEVICE — JACKSON TABLE FOAM POSITIONING KIT: Brand: CARDINAL HEALTH

## (undated) DEVICE — NEEDLE SPINAL 22G X 3.5IN  QUINCKE

## (undated) DEVICE — NEEDLE 22 G X 1 1/2 SAFETY

## (undated) DEVICE — DRESSING GUAZE ADH BORDER 4 X 4 IN

## (undated) DEVICE — VAC DRESSING SENSATRAC SMALL

## (undated) DEVICE — MEDI-VAC YANK SUCT HNDL W/TPRD BULBOUS TIP: Brand: CARDINAL HEALTH

## (undated) DEVICE — ADHESIVE SKIN HIGH VISCOSITY EXOFIN MICRO 0.5ML

## (undated) DEVICE — CHARGER KIT SENZA

## (undated) DEVICE — SUT MONOCRYL PLUS 4-0 PS-2 18 IN MCP496G

## (undated) DEVICE — 3M™ TEGADERM™ TRANSPARENT FILM DRESSING FRAME STYLE, 1624W, 2-3/8 IN X 2-3/4 IN (6 CM X 7 CM), 100/CT 4CT/CASE: Brand: 3M™ TEGADERM™

## (undated) DEVICE — IPG2000 TEMPLATE KIT: Brand: NEVRO

## (undated) DEVICE — GLOVE SRG BIOGEL ECLIPSE 6.5

## (undated) DEVICE — INTENDED FOR TISSUE SEPARATION, AND OTHER PROCEDURES THAT REQUIRE A SHARP SURGICAL BLADE TO PUNCTURE OR CUT.: Brand: BARD-PARKER SAFETY BLADES SIZE 15, STERILE

## (undated) DEVICE — DISPOSABLE OR TOWEL: Brand: CARDINAL HEALTH

## (undated) DEVICE — TUBING SUCTION 5MM X 12 FT

## (undated) DEVICE — TIBURON LAPAROTOMY DRAPE: Brand: CONVERTORS

## (undated) DEVICE — RX COUDÉ® EPIDURAL NEEDLE 14G TW X 4.0": Brand: EPIMED

## (undated) DEVICE — IMPERVIOUS SURGICAL GOWN, LG: Brand: CONVERTORS

## (undated) DEVICE — DERMATOME BLADES: Brand: DERMATOME

## (undated) DEVICE — VAC DRESSING SENSATRAC LRG

## (undated) DEVICE — 3000CC GUARDIAN II: Brand: GUARDIAN

## (undated) DEVICE — SKIN MARKER DUAL TIP WITH RULER CAP, FLEXIBLE RULER AND LABELS: Brand: DEVON

## (undated) DEVICE — BULB SYRINGE,IRRIGATION WITH PROTECTIVE CAP: Brand: DOVER

## (undated) DEVICE — TRAY EPIDURAL PERIFIX 20GA X 3.5IN TUOHY 8ML